# Patient Record
Sex: MALE | Race: WHITE | HISPANIC OR LATINO | URBAN - METROPOLITAN AREA
[De-identification: names, ages, dates, MRNs, and addresses within clinical notes are randomized per-mention and may not be internally consistent; named-entity substitution may affect disease eponyms.]

---

## 2018-01-02 ENCOUNTER — INPATIENT (INPATIENT)
Facility: HOSPITAL | Age: 28
LOS: 2 days | Discharge: HOME | DRG: 897 | End: 2018-01-05
Attending: HOSPITALIST | Admitting: GENERAL ACUTE CARE HOSPITAL
Payer: COMMERCIAL

## 2018-01-02 VITALS
SYSTOLIC BLOOD PRESSURE: 189 MMHG | RESPIRATION RATE: 20 BRPM | HEIGHT: 67 IN | OXYGEN SATURATION: 97 % | WEIGHT: 199.96 LBS | TEMPERATURE: 99 F | DIASTOLIC BLOOD PRESSURE: 118 MMHG | HEART RATE: 130 BPM

## 2018-01-02 DIAGNOSIS — F10.230 ALCOHOL DEPENDENCE WITH WITHDRAWAL, UNCOMPLICATED: ICD-10-CM

## 2018-01-02 LAB
ACETONE SERPL-MCNC: NEGATIVE — SIGNIFICANT CHANGE UP
ALBUMIN SERPL ELPH-MCNC: 5.1 G/DL — SIGNIFICANT CHANGE UP (ref 3.3–5.2)
ALP SERPL-CCNC: 126 U/L — HIGH (ref 40–120)
ALT FLD-CCNC: 96 U/L — HIGH
AMPHET UR-MCNC: NEGATIVE — SIGNIFICANT CHANGE UP
ANION GAP SERPL CALC-SCNC: 20 MMOL/L — HIGH (ref 5–17)
ANISOCYTOSIS BLD QL: SLIGHT — SIGNIFICANT CHANGE UP
AST SERPL-CCNC: 132 U/L — HIGH
BARBITURATES UR SCN-MCNC: NEGATIVE — SIGNIFICANT CHANGE UP
BENZODIAZ UR-MCNC: NEGATIVE — SIGNIFICANT CHANGE UP
BILIRUB SERPL-MCNC: 1.7 MG/DL — SIGNIFICANT CHANGE UP (ref 0.4–2)
BUN SERPL-MCNC: 6 MG/DL — LOW (ref 8–20)
CALCIUM SERPL-MCNC: 10.6 MG/DL — HIGH (ref 8.6–10.2)
CHLORIDE SERPL-SCNC: 92 MMOL/L — LOW (ref 98–107)
CO2 SERPL-SCNC: 25 MMOL/L — SIGNIFICANT CHANGE UP (ref 22–29)
COCAINE METAB.OTHER UR-MCNC: NEGATIVE — SIGNIFICANT CHANGE UP
CREAT SERPL-MCNC: 0.54 MG/DL — SIGNIFICANT CHANGE UP (ref 0.5–1.3)
ETHANOL SERPL-MCNC: <10 MG/DL — SIGNIFICANT CHANGE UP
GAS PNL BLDV: SIGNIFICANT CHANGE UP
GLUCOSE SERPL-MCNC: 415 MG/DL — HIGH (ref 70–115)
HCO3 BLDV-SCNC: 28 MMOL/L — SIGNIFICANT CHANGE UP (ref 21–29)
HCT VFR BLD CALC: 45.3 % — SIGNIFICANT CHANGE UP (ref 42–52)
HGB BLD-MCNC: 14.9 G/DL — SIGNIFICANT CHANGE UP (ref 14–18)
HYPOCHROMIA BLD QL: SLIGHT — SIGNIFICANT CHANGE UP
LIDOCAIN IGE QN: 14 U/L — LOW (ref 22–51)
LYMPHOCYTES # BLD AUTO: 7 % — LOW (ref 20–55)
MACROCYTES BLD QL: SLIGHT — SIGNIFICANT CHANGE UP
MAGNESIUM SERPL-MCNC: 1.8 MG/DL — SIGNIFICANT CHANGE UP (ref 1.6–2.6)
MCHC RBC-ENTMCNC: 31.6 PG — HIGH (ref 27–31)
MCHC RBC-ENTMCNC: 32.9 G/DL — SIGNIFICANT CHANGE UP (ref 32–36)
MCV RBC AUTO: 96.2 FL — HIGH (ref 80–94)
METHADONE UR-MCNC: NEGATIVE — SIGNIFICANT CHANGE UP
MICROCYTES BLD QL: SLIGHT — SIGNIFICANT CHANGE UP
MONOCYTES NFR BLD AUTO: 8 % — SIGNIFICANT CHANGE UP (ref 3–10)
NEUTROPHILS NFR BLD AUTO: 85 % — HIGH (ref 37–73)
OPIATES UR-MCNC: NEGATIVE — SIGNIFICANT CHANGE UP
PCO2 BLDV: 40 MMHG — SIGNIFICANT CHANGE UP (ref 35–50)
PCP SPEC-MCNC: SIGNIFICANT CHANGE UP
PCP UR-MCNC: NEGATIVE — SIGNIFICANT CHANGE UP
PH BLDV: 7.46 — HIGH (ref 7.32–7.43)
PHOSPHATE SERPL-MCNC: 3.1 MG/DL — SIGNIFICANT CHANGE UP (ref 2.4–4.7)
PLAT MORPH BLD: NORMAL — SIGNIFICANT CHANGE UP
PLATELET # BLD AUTO: 97 K/UL — LOW (ref 150–400)
PO2 BLDV: 103 MMHG — HIGH (ref 25–45)
POIKILOCYTOSIS BLD QL AUTO: SLIGHT — SIGNIFICANT CHANGE UP
POTASSIUM SERPL-MCNC: 4.6 MMOL/L — SIGNIFICANT CHANGE UP (ref 3.5–5.3)
POTASSIUM SERPL-SCNC: 4.6 MMOL/L — SIGNIFICANT CHANGE UP (ref 3.5–5.3)
PROT SERPL-MCNC: 8.3 G/DL — SIGNIFICANT CHANGE UP (ref 6.6–8.7)
RBC # BLD: 4.71 M/UL — SIGNIFICANT CHANGE UP (ref 4.6–6.2)
RBC # FLD: 12.9 % — SIGNIFICANT CHANGE UP (ref 11–15.6)
RBC BLD AUTO: ABNORMAL
SAO2 % BLDV: 98 % — SIGNIFICANT CHANGE UP
SODIUM SERPL-SCNC: 137 MMOL/L — SIGNIFICANT CHANGE UP (ref 135–145)
THC UR QL: NEGATIVE — SIGNIFICANT CHANGE UP
WBC # BLD: 6.6 K/UL — SIGNIFICANT CHANGE UP (ref 4.8–10.8)
WBC # FLD AUTO: 6.6 K/UL — SIGNIFICANT CHANGE UP (ref 4.8–10.8)

## 2018-01-02 PROCEDURE — 99223 1ST HOSP IP/OBS HIGH 75: CPT

## 2018-01-02 PROCEDURE — 99285 EMERGENCY DEPT VISIT HI MDM: CPT

## 2018-01-02 RX ORDER — ONDANSETRON 8 MG/1
4 TABLET, FILM COATED ORAL ONCE
Qty: 0 | Refills: 0 | Status: COMPLETED | OUTPATIENT
Start: 2018-01-02 | End: 2018-01-02

## 2018-01-02 RX ORDER — THIAMINE MONONITRATE (VIT B1) 100 MG
100 TABLET ORAL DAILY
Qty: 0 | Refills: 0 | Status: COMPLETED | OUTPATIENT
Start: 2018-01-02 | End: 2018-01-05

## 2018-01-02 RX ORDER — DEXTROSE 50 % IN WATER 50 %
1 SYRINGE (ML) INTRAVENOUS ONCE
Qty: 0 | Refills: 0 | Status: DISCONTINUED | OUTPATIENT
Start: 2018-01-02 | End: 2018-01-05

## 2018-01-02 RX ORDER — DEXTROSE 50 % IN WATER 50 %
25 SYRINGE (ML) INTRAVENOUS ONCE
Qty: 0 | Refills: 0 | Status: DISCONTINUED | OUTPATIENT
Start: 2018-01-02 | End: 2018-01-05

## 2018-01-02 RX ORDER — INSULIN LISPRO 100/ML
VIAL (ML) SUBCUTANEOUS
Qty: 0 | Refills: 0 | Status: DISCONTINUED | OUTPATIENT
Start: 2018-01-02 | End: 2018-01-05

## 2018-01-02 RX ORDER — INFLUENZA VIRUS VACCINE 15; 15; 15; 15 UG/.5ML; UG/.5ML; UG/.5ML; UG/.5ML
0.5 SUSPENSION INTRAMUSCULAR ONCE
Qty: 0 | Refills: 0 | Status: COMPLETED | OUTPATIENT
Start: 2018-01-02 | End: 2018-01-02

## 2018-01-02 RX ORDER — GLUCAGON INJECTION, SOLUTION 0.5 MG/.1ML
1 INJECTION, SOLUTION SUBCUTANEOUS ONCE
Qty: 0 | Refills: 0 | Status: DISCONTINUED | OUTPATIENT
Start: 2018-01-02 | End: 2018-01-05

## 2018-01-02 RX ORDER — AMLODIPINE BESYLATE 2.5 MG/1
5 TABLET ORAL DAILY
Qty: 0 | Refills: 0 | Status: DISCONTINUED | OUTPATIENT
Start: 2018-01-02 | End: 2018-01-03

## 2018-01-02 RX ORDER — DEXTROSE 50 % IN WATER 50 %
12.5 SYRINGE (ML) INTRAVENOUS ONCE
Qty: 0 | Refills: 0 | Status: DISCONTINUED | OUTPATIENT
Start: 2018-01-02 | End: 2018-01-05

## 2018-01-02 RX ORDER — SODIUM CHLORIDE 9 MG/ML
1000 INJECTION INTRAMUSCULAR; INTRAVENOUS; SUBCUTANEOUS ONCE
Qty: 0 | Refills: 0 | Status: COMPLETED | OUTPATIENT
Start: 2018-01-02 | End: 2018-01-02

## 2018-01-02 RX ORDER — HEPARIN SODIUM 5000 [USP'U]/ML
5000 INJECTION INTRAVENOUS; SUBCUTANEOUS EVERY 12 HOURS
Qty: 0 | Refills: 0 | Status: DISCONTINUED | OUTPATIENT
Start: 2018-01-02 | End: 2018-01-05

## 2018-01-02 RX ORDER — SODIUM CHLORIDE 9 MG/ML
1000 INJECTION, SOLUTION INTRAVENOUS
Qty: 0 | Refills: 0 | Status: DISCONTINUED | OUTPATIENT
Start: 2018-01-02 | End: 2018-01-05

## 2018-01-02 RX ORDER — FOLIC ACID 0.8 MG
1 TABLET ORAL DAILY
Qty: 0 | Refills: 0 | Status: DISCONTINUED | OUTPATIENT
Start: 2018-01-02 | End: 2018-01-05

## 2018-01-02 RX ORDER — SODIUM CHLORIDE 9 MG/ML
1000 INJECTION INTRAMUSCULAR; INTRAVENOUS; SUBCUTANEOUS
Qty: 0 | Refills: 0 | Status: COMPLETED | OUTPATIENT
Start: 2018-01-02 | End: 2018-01-03

## 2018-01-02 RX ADMIN — Medication 2 MILLIGRAM(S): at 16:59

## 2018-01-02 RX ADMIN — Medication 50 MILLIGRAM(S): at 20:10

## 2018-01-02 RX ADMIN — ONDANSETRON 4 MILLIGRAM(S): 8 TABLET, FILM COATED ORAL at 13:01

## 2018-01-02 RX ADMIN — Medication 2 MILLIGRAM(S): at 12:44

## 2018-01-02 RX ADMIN — Medication 2 MILLIGRAM(S): at 13:13

## 2018-01-02 RX ADMIN — SODIUM CHLORIDE 100 MILLILITER(S): 9 INJECTION INTRAMUSCULAR; INTRAVENOUS; SUBCUTANEOUS at 20:09

## 2018-01-02 RX ADMIN — SODIUM CHLORIDE 1000 MILLILITER(S): 9 INJECTION INTRAMUSCULAR; INTRAVENOUS; SUBCUTANEOUS at 12:41

## 2018-01-02 RX ADMIN — Medication 50 MILLIGRAM(S): at 23:36

## 2018-01-02 NOTE — ED PROVIDER NOTE - OBJECTIVE STATEMENT
This patient is a 27 year old man with a hx of alcohol abuse who presents to the ER c/o tremors and vomiting.  Past reports that last alcoholic drink was about 1 day ago when celebrating the new year.  He has had pancreatitis in the past but denies abdominal pain.  His last episode of vomiting was prior to arrival.

## 2018-01-02 NOTE — H&P ADULT - ASSESSMENT
Patient is a 27 year old male with PMH of Alcohol Abuse and Pancreatitis who presented with tremulous and admitted for alcohol withdrawal.    1. Alcohol Withdrawal  -admit to monitored bed  -CIWA protocol (current score 7)  -Librium taper  -Ativan PRN  -IV hydration  -MVI/folic acid/thiamine  -Social work evaluation  -Low threshold for MICU consult    2. Elevated LFTs  -likely secondary to alcoholic hepatitis  -check abdominal US    3. History of Pancreatitis  -normal lipase  -clinically does not appear to have pancreatitis  -regular diet  -zofran PRN    4. Counseling  -tobacco and alcohol cessation discussed in great detail    5. HTN  -start low dose norvasc with holding parameters    6. Hypercalcemia  -likely secondary to volume depletion  -hydrate and repeat levels in AM    7. DVT prophylaxis  -heparin SQ Patient is a 27 year old male with PMH of Alcohol Abuse and Pancreatitis who presented with tremulous and admitted for alcohol withdrawal.    1. Alcohol Withdrawal  -admit to monitored bed  -CIWA protocol (current score 7)  -Librium taper  -Ativan PRN  -IV hydration  -MVI/folic acid/thiamine  -Social work evaluation  -Low threshold for MICU consult    2. Elevated LFTs  -likely secondary to alcoholic hepatitis  -check abdominal US    3. History of Pancreatitis  -normal lipase  -clinically does not appear to have pancreatitis  -regular diet  -zofran PRN    4. Counseling  -tobacco and alcohol cessation discussed in great detail    5. HTN  -start low dose norvasc with holding parameters    6. Hypercalcemia  -likely secondary to volume depletion  -hydrate and repeat levels in AM    7. Hyperglycemia  -anion gap of 20, pH 7.46  -acetone negative  -repeat fingerstick stat   -sliding scale  -HbA1c    8. DVT prophylaxis  -heparin SQ Patient is a 27 year old male with PMH of Alcohol Abuse and Pancreatitis who presented with tremulous and admitted for alcohol withdrawal.    1. Alcohol Withdrawal  -admit to monitored bed  -CIWA protocol (current score 7)  -Librium taper  -Ativan PRN  -IV hydration  -MVI/folic acid/thiamine  -alcohol level < 10  -Social work evaluation  -Low threshold for MICU consult    2. Elevated LFTs  -likely secondary to alcoholic hepatitis  -check abdominal US    3. History of Pancreatitis  -normal lipase  -clinically does not appear to have pancreatitis  -regular diet  -zofran PRN    4. Counseling  -tobacco and alcohol cessation discussed in great detail    5. HTN  -start low dose norvasc with holding parameters    6. Hypercalcemia  -likely secondary to volume depletion  -hydrate and repeat levels in AM    7. Hyperglycemia  -anion gap of 20, pH 7.46  -acetone negative  -repeat fingerstick stat   -sliding scale  -HbA1c    8. DVT prophylaxis  -heparin SQ

## 2018-01-02 NOTE — SBIRT NOTE. - NSSBIRTSERVICES_GEN_A_ED
Full Screen ONLY/Brief Intervention Performed/Referral to Treatment Provided/Smoking Cessation Information Provided

## 2018-01-02 NOTE — H&P ADULT - HISTORY OF PRESENT ILLNESS
Patient is a 27 year old male with PMH of Alcohol Abuse and Pancreatitis who presented to the ED with complaints of worsening tremors. Patient reports he has been drinking daily for the past 2 months and specifically drinks four 24 oz cans of beer daily. Last drink on New Years Linda. Patient reports being in his usual state of health the following day but today he noted that he had tremors that progressively worsened as the day continued. In the ED, patient was found to have a CIWA score of 16 on arrival and was treated with IV ativan. Also noted to be hypertensive and tachycardic. Hospitalist service then called for admission. Upon arrival, patient awake and alert and able to provide history. Reports one episode of emesis earlier today but otherwise denies fever, chills, headaches, blurry vision, hallucinations, chest pain, SOB, nausea, abdominal pain, fever or chills. Reports feeling better, although still tremulous. CIWA 7.

## 2018-01-02 NOTE — ED PROVIDER NOTE - MEDICAL DECISION MAKING DETAILS
Tremors, tachycardia, HTN and nausea possible alcohol withdrawal.  Will check CIWA score, Labs, administer ativan and admit if no improvement.

## 2018-01-02 NOTE — SBIRT NOTE. - NSSBIRTSERVICES_GEN_A_ED_FT
Provided SBIRT services: Full screen positive. Referral to Treatment attempted. Screening results were reviewed with the patient and patient was provided information about healthy guidelines and potential negative consequences associated with level of risk. Motivation and readiness to reduce or stop use was discussed and goals and activities to make changes were suggested/offered.  Options discussed for further evaluation and treatment, but referral to treatment was not completed because Patient refused  Audit Score: 28  DAST Score: 0  Duration = 15 Minutes

## 2018-01-02 NOTE — ED STATDOCS - PROGRESS NOTE DETAILS
27 year old male presenting to the ED complaining of tremors s/p alcohol withdrawal. Pt states that he has drank on New Years Linda, and that he drinks approximately 4 24 oz. of alcohol daily. He states that he has been previously admitted for alcohol withdrawal. Pt denies having any hallucinations or seizures. He states that his PMHx includes alcoholism and pancreatitis. Pt states that he does not currently take any medication and that he has NKDA. Upon examination in the intake room, pt was found to be diaphoretic and tremulous. Pt has tongue vesiculations and contusions to his tongue. His pupils are 4mm round and reactive to light and he has no scleral icterus. Will transfer to the main ED on a monitor for withdrawal. 27 year old male presenting to the ED complaining of tremors s/p alcohol withdrawal. Pt states that he has drank on New Years Linda, and that he drinks approximately 4-24 oz. of alcohol daily. He states that he has been previously admitted for alcohol withdrawal. Pt denies having any hallucinations or seizures. He states that his PMHx includes alcoholism and pancreatitis. Pt states that he does not currently take any medication and that he has NKDA. Upon examination in the intake room, pt was found to be diaphoretic and tremulous. Pt has tongue vesiculations and contusions to his tongue. His pupils are 4mm round and reactive to light and he has no scleral icterus. Will transfer to the main ED on a monitor for withdrawal.

## 2018-01-02 NOTE — ED PROVIDER NOTE - MUSCULOSKELETAL, MLM
Spine appears normal, range of motion is not limited, no muscle or joint tenderness.  No CVA tenderness

## 2018-01-02 NOTE — ED ADULT NURSE NOTE - OBJECTIVE STATEMENT
Patient states that he is here for detox from alcohol. Patient states that he normally drinks a 12 pack a day for the last few months. Patient states that he had his last drink new years anna night. Patient presents to the ED tremulous, sweating and anxious. Patient states that he has been having some nausea with dry heaves. Patient states that he has tried to stop drinking on his own in the past but was unable to. Patient denies any drug use.

## 2018-01-03 LAB
ALBUMIN SERPL ELPH-MCNC: 4.2 G/DL — SIGNIFICANT CHANGE UP (ref 3.3–5.2)
ALP SERPL-CCNC: 111 U/L — SIGNIFICANT CHANGE UP (ref 40–120)
ALT FLD-CCNC: 73 U/L — HIGH
AMMONIA BLD-MCNC: 57 UMOL/L — HIGH (ref 11–55)
ANION GAP SERPL CALC-SCNC: 16 MMOL/L — SIGNIFICANT CHANGE UP (ref 5–17)
ANISOCYTOSIS BLD QL: SLIGHT — SIGNIFICANT CHANGE UP
AST SERPL-CCNC: 105 U/L — HIGH
BASOPHILS # BLD AUTO: 0 K/UL — SIGNIFICANT CHANGE UP (ref 0–0.2)
BASOPHILS NFR BLD AUTO: 1 % — SIGNIFICANT CHANGE UP (ref 0–2)
BILIRUB SERPL-MCNC: 2.7 MG/DL — HIGH (ref 0.4–2)
BUN SERPL-MCNC: 9 MG/DL — SIGNIFICANT CHANGE UP (ref 8–20)
CALCIUM SERPL-MCNC: 9.7 MG/DL — SIGNIFICANT CHANGE UP (ref 8.6–10.2)
CHLORIDE SERPL-SCNC: 96 MMOL/L — LOW (ref 98–107)
CO2 SERPL-SCNC: 26 MMOL/L — SIGNIFICANT CHANGE UP (ref 22–29)
CREAT SERPL-MCNC: 0.57 MG/DL — SIGNIFICANT CHANGE UP (ref 0.5–1.3)
EOSINOPHIL # BLD AUTO: 0.1 K/UL — SIGNIFICANT CHANGE UP (ref 0–0.5)
EOSINOPHIL NFR BLD AUTO: 4 % — SIGNIFICANT CHANGE UP (ref 0–6)
GLUCOSE BLDC GLUCOMTR-MCNC: 194 MG/DL — HIGH (ref 70–99)
GLUCOSE BLDC GLUCOMTR-MCNC: 219 MG/DL — HIGH (ref 70–99)
GLUCOSE BLDC GLUCOMTR-MCNC: 254 MG/DL — HIGH (ref 70–99)
GLUCOSE BLDC GLUCOMTR-MCNC: 275 MG/DL — HIGH (ref 70–99)
GLUCOSE SERPL-MCNC: 202 MG/DL — HIGH (ref 70–115)
HBA1C BLD-MCNC: 9.6 % — HIGH (ref 4–5.6)
HCT VFR BLD CALC: 41.6 % — LOW (ref 42–52)
HGB BLD-MCNC: 13.8 G/DL — LOW (ref 14–18)
LYMPHOCYTES # BLD AUTO: 0.8 K/UL — LOW (ref 1–4.8)
LYMPHOCYTES # BLD AUTO: 23 % — SIGNIFICANT CHANGE UP (ref 20–55)
MACROCYTES BLD QL: SLIGHT — SIGNIFICANT CHANGE UP
MAGNESIUM SERPL-MCNC: 1.9 MG/DL — SIGNIFICANT CHANGE UP (ref 1.6–2.6)
MCHC RBC-ENTMCNC: 32.7 PG — HIGH (ref 27–31)
MCHC RBC-ENTMCNC: 33.2 G/DL — SIGNIFICANT CHANGE UP (ref 32–36)
MCV RBC AUTO: 98.6 FL — HIGH (ref 80–94)
MONOCYTES # BLD AUTO: 0.6 K/UL — SIGNIFICANT CHANGE UP (ref 0–0.8)
MONOCYTES NFR BLD AUTO: 17 % — HIGH (ref 3–10)
NEUTROPHILS # BLD AUTO: 1.7 K/UL — LOW (ref 1.8–8)
NEUTROPHILS NFR BLD AUTO: 53 % — SIGNIFICANT CHANGE UP (ref 37–73)
NEUTS BAND # BLD: 2 % — SIGNIFICANT CHANGE UP (ref 0–8)
PHOSPHATE SERPL-MCNC: 4.2 MG/DL — SIGNIFICANT CHANGE UP (ref 2.4–4.7)
PLAT MORPH BLD: ABNORMAL
PLATELET # BLD AUTO: 96 K/UL — LOW (ref 150–400)
POIKILOCYTOSIS BLD QL AUTO: SLIGHT — SIGNIFICANT CHANGE UP
POTASSIUM SERPL-MCNC: 3.8 MMOL/L — SIGNIFICANT CHANGE UP (ref 3.5–5.3)
POTASSIUM SERPL-SCNC: 3.8 MMOL/L — SIGNIFICANT CHANGE UP (ref 3.5–5.3)
PROT SERPL-MCNC: 7.1 G/DL — SIGNIFICANT CHANGE UP (ref 6.6–8.7)
RBC # BLD: 4.22 M/UL — LOW (ref 4.6–6.2)
RBC # FLD: 13.2 % — SIGNIFICANT CHANGE UP (ref 11–15.6)
RBC BLD AUTO: ABNORMAL
SODIUM SERPL-SCNC: 138 MMOL/L — SIGNIFICANT CHANGE UP (ref 135–145)
WBC # BLD: 3.2 K/UL — LOW (ref 4.8–10.8)
WBC # FLD AUTO: 3.2 K/UL — LOW (ref 4.8–10.8)

## 2018-01-03 PROCEDURE — 99233 SBSQ HOSP IP/OBS HIGH 50: CPT

## 2018-01-03 PROCEDURE — 76700 US EXAM ABDOM COMPLETE: CPT | Mod: 26

## 2018-01-03 RX ADMIN — Medication 0.1 MILLIGRAM(S): at 22:28

## 2018-01-03 RX ADMIN — Medication 100 MILLIGRAM(S): at 12:10

## 2018-01-03 RX ADMIN — Medication 1 MILLIGRAM(S): at 12:10

## 2018-01-03 RX ADMIN — Medication 50 MILLIGRAM(S): at 12:10

## 2018-01-03 RX ADMIN — HEPARIN SODIUM 5000 UNIT(S): 5000 INJECTION INTRAVENOUS; SUBCUTANEOUS at 17:33

## 2018-01-03 RX ADMIN — HEPARIN SODIUM 5000 UNIT(S): 5000 INJECTION INTRAVENOUS; SUBCUTANEOUS at 05:15

## 2018-01-03 RX ADMIN — Medication 1 TABLET(S): at 12:10

## 2018-01-03 RX ADMIN — Medication 4: at 17:32

## 2018-01-03 RX ADMIN — SODIUM CHLORIDE 100 MILLILITER(S): 9 INJECTION INTRAMUSCULAR; INTRAVENOUS; SUBCUTANEOUS at 05:15

## 2018-01-03 RX ADMIN — Medication 6: at 12:03

## 2018-01-03 RX ADMIN — Medication 50 MILLIGRAM(S): at 22:28

## 2018-01-03 RX ADMIN — AMLODIPINE BESYLATE 5 MILLIGRAM(S): 2.5 TABLET ORAL at 05:15

## 2018-01-03 RX ADMIN — Medication 50 MILLIGRAM(S): at 05:15

## 2018-01-03 RX ADMIN — Medication 2: at 08:19

## 2018-01-03 NOTE — PROGRESS NOTE ADULT - SUBJECTIVE AND OBJECTIVE BOX
CC:   HPI:  Patient is a 27 year old male with PMH of Alcohol Abuse and Pancreatitis who presented to the ED with complaints of worsening tremors. Patient reports he has been drinking daily for the past 2 months and specifically drinks four 24 oz cans of beer daily. Last drink on New Years Linda. Patient reports being in his usual state of health the following day but today he noted that he had tremors that progressively worsened as the day continued. In the ED, patient was found to have a CIWA score of 16 on arrival and was treated with IV ativan. Also noted to be hypertensive and tachycardic. Hospitalist service then called for admission. Upon arrival, patient awake and alert and able to provide history. Reports one episode of emesis earlier today but otherwise denies fever, chills, headaches, blurry vision, hallucinations, chest pain, SOB, nausea, abdominal pain, fever or chills. Reports feeling better, although still tremulous. CIWA 7. (02 Jan 2018 20:44)    REVIEW OF SYSTEMS:    Patient denied fever, chills, abdominal pain, nausea, vomiting, cough, shortness of breath, chest pain or palpitations    Vital Signs Last 24 Hrs  T(C): 36.8 (03 Jan 2018 16:38), Max: 37.3 (03 Jan 2018 04:12)  T(F): 98.2 (03 Jan 2018 16:38), Max: 99.1 (03 Jan 2018 04:12)  HR: 70 (03 Jan 2018 16:38) (67 - 96)  BP: 146/81 (03 Jan 2018 16:38) (140/95 - 153/86)  BP(mean): --  RR: 18 (03 Jan 2018 16:38) (16 - 18)  SpO2: 96% (03 Jan 2018 08:07) (95% - 98%)I&O's Summary    03 Jan 2018 07:01  -  03 Jan 2018 16:58  --------------------------------------------------------  IN: 720 mL / OUT: 3 mL / NET: 717 mL      PHYSICAL EXAM:  GENERAL: NAD, well-groomed  HEENT: PERRL, +EOMI, anicteric, no [place]  NECK: Supple, No JVD   CHEST/LUNG: CTA bilaterally; Normal effort  HEART: S1S2 Normal intensity, no murmurs, gallops or rubs noted  ABDOMEN: Soft, BS Normoactive, NT, ND, no HSM noted  EXTREMITIES:  2+ radial and DP pulses noted, no clubbing, cyanosis, or edema noted, FROM x 4  SKIN: No rashes or lesions noted  NEURO: A&Ox3, no focal deficits noted, CN II-XII intact  PSYCH: normal mood and affect; insight/judgement appropriate  LABS:                        13.8   3.2   )-----------( 96       ( 03 Jan 2018 06:36 )             41.6     01-03    138  |  96<L>  |  9.0  ----------------------------<  202<H>  3.8   |  26.0  |  0.57    Ca    9.7      03 Jan 2018 06:36  Phos  4.2     01-03  Mg     1.9     01-03    TPro  7.1  /  Alb  4.2  /  TBili  2.7<H>  /  DBili  x   /  AST  105<H>  /  ALT  73<H>  /  AlkPhos  111  01-03        RADIOLOGY & ADDITIONAL TESTS:    MEDICATIONS:  MEDICATIONS  (STANDING):  amLODIPine   Tablet 5 milliGRAM(s) Oral daily  chlordiazePOXIDE 50 milliGRAM(s) Oral every 8 hours  chlordiazePOXIDE   Oral   dextrose 5%. 1000 milliLiter(s) (50 mL/Hr) IV Continuous <Continuous>  dextrose 50% Injectable 12.5 Gram(s) IV Push once  dextrose 50% Injectable 25 Gram(s) IV Push once  dextrose 50% Injectable 25 Gram(s) IV Push once  folic acid 1 milliGRAM(s) Oral daily  heparin  Injectable 5000 Unit(s) SubCutaneous every 12 hours  insulin lispro (HumaLOG) corrective regimen sliding scale   SubCutaneous three times a day before meals  multivitamin 1 Tablet(s) Oral daily  thiamine 100 milliGRAM(s) Oral daily    MEDICATIONS  (PRN):  dextrose Gel 1 Dose(s) Oral once PRN Blood Glucose LESS THAN 70 milliGRAM(s)/deciliter  glucagon  Injectable 1 milliGRAM(s) IntraMuscular once PRN Glucose LESS THAN 70 milligrams/deciliter  LORazepam   Injectable 2 milliGRAM(s) IntraMuscular every 2 hours PRN Anxiety CC: Alcohol withdrawal.  Tremors and shakes is resolving.    HPI:  Patient is a 27 year old male with PMH of Alcohol Abuse and Pancreatitis who presented to the ED with complaints of worsening tremors. Patient reports he has been drinking daily for the past 2 months and specifically drinks four 24 oz cans of beer daily. Last drink on New Years Linda. Patient reports being in his usual state of health the following day but today he noted that he had tremors that progressively worsened as the day continued. In the ED, patient was found to have a CIWA score of 16 on arrival and was treated with IV ativan. Also noted to be hypertensive and tachycardic. Hospitalist service then called for admission. Upon arrival, patient awake and alert and able to provide history. Reports one episode of emesis earlier today but otherwise denies fever, chills, headaches, blurry vision, hallucinations, chest pain, SOB, nausea, abdominal pain, fever or chills. Reports feeling better, although still tremulous. CIWA 7. (02 Jan 2018 20:44)    REVIEW OF SYSTEMS:    Patient denied fever, chills, abdominal pain, nausea, vomiting, cough, shortness of breath, chest pain or palpitations    Vital Signs Last 24 Hrs  T(C): 36.8 (03 Jan 2018 16:38), Max: 37.3 (03 Jan 2018 04:12)  T(F): 98.2 (03 Jan 2018 16:38), Max: 99.1 (03 Jan 2018 04:12)  HR: 70 (03 Jan 2018 16:38) (67 - 96)  BP: 146/81 (03 Jan 2018 16:38) (140/95 - 153/86)  BP(mean): --  RR: 18 (03 Jan 2018 16:38) (16 - 18)  SpO2: 96% (03 Jan 2018 08:07) (95% - 98%)I&O's Summary    03 Jan 2018 07:01  -  03 Jan 2018 16:58  --------------------------------------------------------  IN: 720 mL / OUT: 3 mL / NET: 717 mL      PHYSICAL EXAM:  GENERAL: NAD, well-groomed  HEENT: PERRL, +EOMI, anicteric, no [place]  NECK: Supple, No JVD   CHEST/LUNG: CTA bilaterally; Normal effort  HEART: S1S2 Normal intensity, no murmurs, gallops or rubs noted  ABDOMEN: Soft, BS Normoactive, NT, ND, no HSM noted  EXTREMITIES:  2+ radial and DP pulses noted, no clubbing, cyanosis, or edema noted. Fine finger tremors   SKIN: No rashes or lesions noted  NEURO: A&Ox3, no focal deficits noted, CN II-XII intact  PSYCH: anxious  mood and affect; insight/judgement appropriate  LABS:                        13.8   3.2   )-----------( 96       ( 03 Jan 2018 06:36 )             41.6     01-03    138  |  96<L>  |  9.0  ----------------------------<  202<H>  3.8   |  26.0  |  0.57    Ca    9.7      03 Jan 2018 06:36  Phos  4.2     01-03  Mg     1.9     01-03    TPro  7.1  /  Alb  4.2  /  TBili  2.7<H>  /  DBili  x   /  AST  105<H>  /  ALT  73<H>  /  AlkPhos  111  01-03        RADIOLOGY & ADDITIONAL TESTS:    MEDICATIONS:  MEDICATIONS  (STANDING):  amLODIPine   Tablet 5 milliGRAM(s) Oral daily  chlordiazePOXIDE 50 milliGRAM(s) Oral every 8 hours  chlordiazePOXIDE   Oral   dextrose 5%. 1000 milliLiter(s) (50 mL/Hr) IV Continuous <Continuous>  dextrose 50% Injectable 12.5 Gram(s) IV Push once  dextrose 50% Injectable 25 Gram(s) IV Push once  dextrose 50% Injectable 25 Gram(s) IV Push once  folic acid 1 milliGRAM(s) Oral daily  heparin  Injectable 5000 Unit(s) SubCutaneous every 12 hours  insulin lispro (HumaLOG) corrective regimen sliding scale   SubCutaneous three times a day before meals  multivitamin 1 Tablet(s) Oral daily  thiamine 100 milliGRAM(s) Oral daily    MEDICATIONS  (PRN):  dextrose Gel 1 Dose(s) Oral once PRN Blood Glucose LESS THAN 70 milliGRAM(s)/deciliter  glucagon  Injectable 1 milliGRAM(s) IntraMuscular once PRN Glucose LESS THAN 70 milligrams/deciliter  LORazepam   Injectable 2 milliGRAM(s) IntraMuscular every 2 hours PRN Anxiety

## 2018-01-03 NOTE — PROGRESS NOTE ADULT - ASSESSMENT
Patient is a 27 year old male with PMH of Alcohol Abuse and Pancreatitis who presented with tremulous and admitted for alcohol withdrawal.    1. Alcohol Withdrawal  -admit to monitored bed  -CIWA protocol (current score 7)  -Librium taper  -Ativan PRN  -IV hydration  -MVI/folic acid/thiamine  -alcohol level < 10  -Social work evaluation  -Low threshold for MICU consult    2. Elevated LFTs  -likely secondary to alcoholic hepatitis  -check abdominal US    3. History of Pancreatitis  -normal lipase  -clinically does not appear to have pancreatitis  -regular diet  -zofran PRN    4. Counseling  -tobacco and alcohol cessation discussed in great detail    5. HTN  -start low dose norvasc with holding parameters    6. Hypercalcemia  -likely secondary to volume depletion  -hydrate and repeat levels in AM    7. Hyperglycemia  -anion gap of 20, pH 7.46  -acetone negative  -repeat fingerstick stat   -sliding scale  -HbA1c Patient is a 27 year old male with PMH of Alcohol Abuse and Pancreatitis who presented with tremulous and admitted for alcohol withdrawal.    1. Alcohol Withdrawal  -CIWA protocol (current score 4)  -Librium taper  -Ativan PRN  -MVI/folic acid/thiamine  -Social work evaluation    2. Elevated LFTs  -likely secondary to alcoholic hepatitis  abdominal US- moderate hepatic steatosis    3. History of Pancreatitis  -normal lipase at this time.   A1c 9  DM may be due to chronic pancreatitis due to alcohol  Diabetic diet     4. Counseling  -tobacco and alcohol cessation discussed in great detail    5. HTN  -start low dose norvasc with holding parameters    6. Hypercalcemia  -likely secondary to volume depletion  -hydrate and repeat levels in AM    7. Hyperglycemia  -anion gap of 20, pH 7.46  -acetone negative  -repeat fingerstick stat   -sliding scale  -HbA1c Patient is a 27 year old male with PMH of Alcohol Abuse and Pancreatitis who presented with tremulous and admitted for alcohol withdrawal.    1. Alcohol Withdrawal  -CIWA protocol (current score 4)  -Librium taper  -Ativan PRN  -MVI/folic acid/thiamine  -Social work evaluation    2. Elevated LFTs  -likely secondary to alcoholic hepatitis  abdominal US- moderate hepatic steatosis    3. History of Pancreatitis  -normal lipase at this time.   A1c 9.6  DM may be due to chronic pancreatitis due to alcohol  Diabetic diet     4. Counseling  -tobacco and alcohol cessation discussed in great detail    5. HTN  -start low dose norvasc with holding parameters    6. Hypercalcemia  -likely secondary to volume depletion  -hydrate and repeat levels in AM    7. Hyperglycemia  -anion gap of 20, pH 7.46  -acetone negative  -repeat fingerstick stat   -sliding scale  -HbA1c

## 2018-01-04 ENCOUNTER — TRANSCRIPTION ENCOUNTER (OUTPATIENT)
Age: 28
End: 2018-01-04

## 2018-01-04 LAB
ALBUMIN SERPL ELPH-MCNC: 4.1 G/DL — SIGNIFICANT CHANGE UP (ref 3.3–5.2)
ALP SERPL-CCNC: 102 U/L — SIGNIFICANT CHANGE UP (ref 40–120)
ALT FLD-CCNC: 79 U/L — HIGH
ANION GAP SERPL CALC-SCNC: 16 MMOL/L — SIGNIFICANT CHANGE UP (ref 5–17)
AST SERPL-CCNC: 109 U/L — HIGH
BILIRUB DIRECT SERPL-MCNC: 0.4 MG/DL — HIGH (ref 0–0.3)
BILIRUB INDIRECT FLD-MCNC: 1.9 MG/DL — HIGH (ref 0.2–1)
BILIRUB SERPL-MCNC: 2.3 MG/DL — HIGH (ref 0.4–2)
BUN SERPL-MCNC: 12 MG/DL — SIGNIFICANT CHANGE UP (ref 8–20)
CALCIUM SERPL-MCNC: 9.5 MG/DL — SIGNIFICANT CHANGE UP (ref 8.6–10.2)
CHLORIDE SERPL-SCNC: 98 MMOL/L — SIGNIFICANT CHANGE UP (ref 98–107)
CO2 SERPL-SCNC: 22 MMOL/L — SIGNIFICANT CHANGE UP (ref 22–29)
CREAT SERPL-MCNC: 0.5 MG/DL — SIGNIFICANT CHANGE UP (ref 0.5–1.3)
GLUCOSE BLDC GLUCOMTR-MCNC: 176 MG/DL — HIGH (ref 70–99)
GLUCOSE BLDC GLUCOMTR-MCNC: 226 MG/DL — HIGH (ref 70–99)
GLUCOSE BLDC GLUCOMTR-MCNC: 229 MG/DL — HIGH (ref 70–99)
GLUCOSE BLDC GLUCOMTR-MCNC: 230 MG/DL — HIGH (ref 70–99)
GLUCOSE SERPL-MCNC: 250 MG/DL — HIGH (ref 70–115)
HCT VFR BLD CALC: 44 % — SIGNIFICANT CHANGE UP (ref 42–52)
HGB BLD-MCNC: 14.4 G/DL — SIGNIFICANT CHANGE UP (ref 14–18)
MCHC RBC-ENTMCNC: 31.6 PG — HIGH (ref 27–31)
MCHC RBC-ENTMCNC: 32.7 G/DL — SIGNIFICANT CHANGE UP (ref 32–36)
MCV RBC AUTO: 96.7 FL — HIGH (ref 80–94)
PLATELET # BLD AUTO: 107 K/UL — LOW (ref 150–400)
POTASSIUM SERPL-MCNC: 3.9 MMOL/L — SIGNIFICANT CHANGE UP (ref 3.5–5.3)
POTASSIUM SERPL-SCNC: 3.9 MMOL/L — SIGNIFICANT CHANGE UP (ref 3.5–5.3)
PROT SERPL-MCNC: 6.9 G/DL — SIGNIFICANT CHANGE UP (ref 6.6–8.7)
RBC # BLD: 4.55 M/UL — LOW (ref 4.6–6.2)
RBC # FLD: 12.8 % — SIGNIFICANT CHANGE UP (ref 11–15.6)
SODIUM SERPL-SCNC: 136 MMOL/L — SIGNIFICANT CHANGE UP (ref 135–145)
WBC # BLD: 3 K/UL — LOW (ref 4.8–10.8)
WBC # FLD AUTO: 3 K/UL — LOW (ref 4.8–10.8)

## 2018-01-04 PROCEDURE — 99239 HOSP IP/OBS DSCHRG MGMT >30: CPT

## 2018-01-04 RX ORDER — INSULIN LISPRO 100/ML
2 VIAL (ML) SUBCUTANEOUS ONCE
Qty: 0 | Refills: 0 | Status: COMPLETED | OUTPATIENT
Start: 2018-01-04 | End: 2018-01-04

## 2018-01-04 RX ORDER — INSULIN LISPRO 100/ML
5 VIAL (ML) SUBCUTANEOUS
Qty: 5 | Refills: 0 | OUTPATIENT
Start: 2018-01-04 | End: 2018-02-02

## 2018-01-04 RX ORDER — INSULIN GLARGINE 100 [IU]/ML
10 INJECTION, SOLUTION SUBCUTANEOUS
Qty: 5 | Refills: 0 | OUTPATIENT
Start: 2018-01-04 | End: 2018-02-02

## 2018-01-04 RX ORDER — ENOXAPARIN SODIUM 100 MG/ML
10 INJECTION SUBCUTANEOUS
Qty: 5 | Refills: 0 | OUTPATIENT
Start: 2018-01-04 | End: 2018-02-02

## 2018-01-04 RX ORDER — INSULIN ASPART 100 [IU]/ML
5 INJECTION, SOLUTION SUBCUTANEOUS
Qty: 5 | Refills: 0 | OUTPATIENT
Start: 2018-01-04 | End: 2018-02-02

## 2018-01-04 RX ORDER — THIAMINE MONONITRATE (VIT B1) 100 MG
1 TABLET ORAL
Qty: 30 | Refills: 0 | OUTPATIENT
Start: 2018-01-04 | End: 2018-02-02

## 2018-01-04 RX ORDER — FOLIC ACID 0.8 MG
1 TABLET ORAL
Qty: 30 | Refills: 0 | OUTPATIENT
Start: 2018-01-04 | End: 2018-02-02

## 2018-01-04 RX ORDER — METFORMIN HYDROCHLORIDE 850 MG/1
1 TABLET ORAL
Qty: 30 | Refills: 5 | OUTPATIENT
Start: 2018-01-04 | End: 2018-07-02

## 2018-01-04 RX ADMIN — Medication 4: at 17:11

## 2018-01-04 RX ADMIN — Medication 100 MILLIGRAM(S): at 12:24

## 2018-01-04 RX ADMIN — Medication 50 MILLIGRAM(S): at 14:33

## 2018-01-04 RX ADMIN — Medication 0.1 MILLIGRAM(S): at 21:49

## 2018-01-04 RX ADMIN — Medication 0.1 MILLIGRAM(S): at 05:33

## 2018-01-04 RX ADMIN — Medication 50 MILLIGRAM(S): at 05:33

## 2018-01-04 RX ADMIN — Medication 4: at 08:25

## 2018-01-04 RX ADMIN — Medication 2: at 12:25

## 2018-01-04 RX ADMIN — Medication 1 MILLIGRAM(S): at 12:24

## 2018-01-04 RX ADMIN — HEPARIN SODIUM 5000 UNIT(S): 5000 INJECTION INTRAVENOUS; SUBCUTANEOUS at 05:33

## 2018-01-04 RX ADMIN — Medication 1 TABLET(S): at 12:24

## 2018-01-04 RX ADMIN — Medication 2 UNIT(S): at 22:19

## 2018-01-04 RX ADMIN — HEPARIN SODIUM 5000 UNIT(S): 5000 INJECTION INTRAVENOUS; SUBCUTANEOUS at 17:10

## 2018-01-04 RX ADMIN — Medication 0.1 MILLIGRAM(S): at 14:33

## 2018-01-04 NOTE — DISCHARGE NOTE ADULT - CARE PROVIDER_API CALL
Chang Castorena  PMD  Phone: (   )    -  Fax: (   )    -    Amanda Mota), EndocrinologyMetabDiabetes  [address] [place] [id number]  Phone: [phone]  Fax: [phone]

## 2018-01-04 NOTE — DISCHARGE NOTE ADULT - PROVIDER TOKENS
FREE:[LAST:[Valentinao],FIRST:[Chang],PHONE:[(   )    -],FAX:[(   )    -],ADDRESS:[PMD]],TOKEN:'[id number]'

## 2018-01-04 NOTE — DISCHARGE NOTE ADULT - COMMUNITY RESOURCES
[facility]-PATIENT DIABETES PROGRAM, [address], [place], [place] [phone]

## 2018-01-04 NOTE — DISCHARGE NOTE ADULT - CARE PLAN
Principal Discharge DX:	Alcohol withdrawal syndrome without complication  Goal:	ADLs  Instructions for follow-up, activity and diet:	follow up with pmd in 3-5 days  follow up with endocrinologist in 2-4 weeks  Secondary Diagnosis:	Alcohol-induced chronic pancreatitis  Secondary Diagnosis:	Type 1 diabetes mellitus without complication

## 2018-01-04 NOTE — DISCHARGE NOTE ADULT - MEDICATION SUMMARY - MEDICATIONS TO TAKE
I will START or STAY ON the medications listed below when I get home from the hospital:    cloNIDine 0.1 mg oral tablet  -- 1 tab(s) by mouth 3 times a day  -- Indication: For Alcohol dependence with uncomplicated withdrawal    Lantus Solostar Pen 100 units/mL subcutaneous solution  -- 10 unit(s) subcutaneous once a day (at bedtime)   -- Do not drink alcoholic beverages when taking this medication.  It is very important that you take or use this exactly as directed.  Do not skip doses or discontinue unless directed by your doctor.  Keep in refrigerator.  Do not freeze.    -- Indication: For diabetes mellitus     HumaLOG KwikPen 100 units/mL subcutaneous solution  -- 5 unit(s) subcutaneous 3 times a day (before meals)   -- Do not drink alcoholic beverages when taking this medication.  It is very important that you take or use this exactly as directed.  Do not skip doses or discontinue unless directed by your doctor.  Keep in refrigerator.  Do not freeze.    -- Indication: For Diabetes mellitus     Fortamet 500 mg oral tablet, extended release  -- 1 tab(s) by mouth once a day   -- Check with your doctor before becoming pregnant.  Do not drink alcoholic beverages when taking this medication.  Obtain medical advice before taking any non-prescription drugs as some may affect the action of this medication.  Swallow whole.  Do not crush.  Take with food or milk.    -- Indication: For Diabetes mellitus     Multiple Vitamins oral tablet  -- 1 tab(s) by mouth once a day  -- Indication: For Alcohol withdrawal syndrome without complication    folic acid 1 mg oral tablet  -- 1 tab(s) by mouth once a day  -- Indication: For Alcohol dependence with uncomplicated withdrawal    thiamine 100 mg oral tablet  -- 1 tab(s) by mouth once a day  -- Indication: For Alcohol withdrawal syndrome without complication I will START or STAY ON the medications listed below when I get home from the hospital:    cloNIDine 0.1 mg oral tablet  -- 1 tab(s) by mouth 3 times a day  -- Indication: For Alcohol dependence with uncomplicated withdrawal    Lantus 100 units/mL subcutaneous solution  -- 10 unit(s) subcutaneous once a day (at bedtime)   -- Do not drink alcoholic beverages when taking this medication.  It is very important that you take or use this exactly as directed.  Do not skip doses or discontinue unless directed by your doctor.  Keep in refrigerator.  Do not freeze.    -- Indication: For Type 1 diabetes mellitus without complication    NovoLOG 100 units/mL subcutaneous solution  -- 5 unit(s) subcutaneous 3 times a day (before meals)   -- Do not drink alcoholic beverages when taking this medication.  Keep in refrigerator.  Do not freeze.  Obtain medical advice before taking any non-prescription drugs as some may affect the action of this medication.    -- Indication: For Type 1 diabetes mellitus without complication    Glucophage 500 mg oral tablet  -- 1 tab(s) by mouth once a day   -- Check with your doctor before becoming pregnant.  Do not drink alcoholic beverages when taking this medication.  It is very important that you take or use this exactly as directed.  Do not skip doses or discontinue unless directed by your doctor.  Obtain medical advice before taking any non-prescription drugs as some may affect the action of this medication.  Take with food or milk.    -- Indication: For Type 1 diabetes mellitus without complication    Multiple Vitamins oral tablet  -- 1 tab(s) by mouth once a day  -- Indication: For Alcohol withdrawal syndrome without complication    folic acid 1 mg oral tablet  -- 1 tab(s) by mouth once a day  -- Indication: For Alcohol dependence with uncomplicated withdrawal    thiamine 100 mg oral tablet  -- 1 tab(s) by mouth once a day  -- Indication: For Alcohol withdrawal syndrome without complication I will START or STAY ON the medications listed below when I get home from the hospital:    cloNIDine 0.1 mg oral tablet  -- 1 tab(s) by mouth 3 times a day  -- Indication: For Alcohol dependence with uncomplicated withdrawal    NovoLOG 100 units/mL subcutaneous solution  -- 5 unit(s) subcutaneous 3 times a day (before meals)   -- Do not drink alcoholic beverages when taking this medication.  Keep in refrigerator.  Do not freeze.  Obtain medical advice before taking any non-prescription drugs as some may affect the action of this medication.    -- Indication: For Type 1 diabetes mellitus without complication    Glucophage 500 mg oral tablet  -- 1 tab(s) by mouth once a day   -- Check with your doctor before becoming pregnant.  Do not drink alcoholic beverages when taking this medication.  It is very important that you take or use this exactly as directed.  Do not skip doses or discontinue unless directed by your doctor.  Obtain medical advice before taking any non-prescription drugs as some may affect the action of this medication.  Take with food or milk.    -- Indication: For Type 1 diabetes mellitus without complication    Toujeo SoloStar 300 units/mL subcutaneous solution  -- 10 unit(s) subcutaneous once a day (at bedtime)   -- Check with your doctor before becoming pregnant.  Do not drink alcoholic beverages when taking this medication.  Expires___________________  It is very important that you take or use this exactly as directed.  Do not skip doses or discontinue unless directed by your doctor.  Keep in refrigerator.  Do not freeze.  Obtain medical advice before taking any non-prescription drugs as some may affect the action of this medication.  This drug may impair the ability to drive or operate machinery.  Use care until you become familiar with its effects.    -- Indication: For Type 1 diabetes mellitus without complication    Multiple Vitamins oral tablet  -- 1 tab(s) by mouth once a day  -- Indication: For Alcohol withdrawal syndrome without complication    folic acid 1 mg oral tablet  -- 1 tab(s) by mouth once a day  -- Indication: For Alcohol dependence with uncomplicated withdrawal    thiamine 100 mg oral tablet  -- 1 tab(s) by mouth once a day  -- Indication: For Alcohol withdrawal syndrome without complication I will START or STAY ON the medications listed below when I get home from the hospital:    glucometer  -- 3 times daily blood sugar testing   -- Indication: For Type 1 diabetes mellitus without complication    lancet  -- Indication: For Type 1 diabetes mellitus without complication    test strip blood sugar   -- Indication: For Type 1 diabetes mellitus without complication    cloNIDine 0.1 mg oral tablet  -- 1 tab(s) by mouth 3 times a day  -- Indication: For Alcohol dependence with uncomplicated withdrawal    NovoLOG 100 units/mL subcutaneous solution  -- 5 unit(s) subcutaneous 3 times a day (before meals)   -- Do not drink alcoholic beverages when taking this medication.  Keep in refrigerator.  Do not freeze.  Obtain medical advice before taking any non-prescription drugs as some may affect the action of this medication.    -- Indication: For Type 1 diabetes mellitus without complication    Glucophage 500 mg oral tablet  -- 1 tab(s) by mouth once a day   -- Check with your doctor before becoming pregnant.  Do not drink alcoholic beverages when taking this medication.  It is very important that you take or use this exactly as directed.  Do not skip doses or discontinue unless directed by your doctor.  Obtain medical advice before taking any non-prescription drugs as some may affect the action of this medication.  Take with food or milk.    -- Indication: For Type 1 diabetes mellitus without complication    Toujeo SoloStar 300 units/mL subcutaneous solution  -- 10 unit(s) subcutaneous once a day (at bedtime)   -- Check with your doctor before becoming pregnant.  Do not drink alcoholic beverages when taking this medication.  Expires___________________  It is very important that you take or use this exactly as directed.  Do not skip doses or discontinue unless directed by your doctor.  Keep in refrigerator.  Do not freeze.  Obtain medical advice before taking any non-prescription drugs as some may affect the action of this medication.  This drug may impair the ability to drive or operate machinery.  Use care until you become familiar with its effects.    -- Indication: For Type 1 diabetes mellitus without complication    Multiple Vitamins oral tablet  -- 1 tab(s) by mouth once a day  -- Indication: For Alcohol withdrawal syndrome without complication    folic acid 1 mg oral tablet  -- 1 tab(s) by mouth once a day  -- Indication: For Alcohol dependence with uncomplicated withdrawal    thiamine 100 mg oral tablet  -- 1 tab(s) by mouth once a day  -- Indication: For Alcohol withdrawal syndrome without complication

## 2018-01-04 NOTE — DISCHARGE NOTE ADULT - PATIENT PORTAL LINK FT
“You can access the [facility] Patient Portal, offered by [facility], by registering with the following website: [email or web address]”

## 2018-01-04 NOTE — DISCHARGE NOTE ADULT - HOSPITAL COURSE
Patient is a 27 year old male with PMH of Alcohol Abuse and Pancreatitis who presented with tremulous and admitted for alcohol withdrawal.  Received ativan, librium , thiamine .  Improved.  Shakes and tremors resolved.  Counselled to quit alcohol use. Addressed patient concern about Diabetes mellitus.  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  Will start on insulin and metformin, Diabetes teaching prior to discharge.  Will follow with Endocrinology dr Mota who will dose insulin and/or d/c and continue oral medication if possible

## 2018-01-05 VITALS
TEMPERATURE: 98 F | HEART RATE: 64 BPM | RESPIRATION RATE: 18 BRPM | DIASTOLIC BLOOD PRESSURE: 74 MMHG | SYSTOLIC BLOOD PRESSURE: 124 MMHG | OXYGEN SATURATION: 97 %

## 2018-01-05 LAB
GLUCOSE BLDC GLUCOMTR-MCNC: 213 MG/DL — HIGH (ref 70–99)
GLUCOSE BLDC GLUCOMTR-MCNC: 227 MG/DL — HIGH (ref 70–99)
GLUCOSE BLDC GLUCOMTR-MCNC: 230 MG/DL — HIGH (ref 70–99)

## 2018-01-05 PROCEDURE — 80053 COMPREHEN METABOLIC PANEL: CPT

## 2018-01-05 PROCEDURE — 96375 TX/PRO/DX INJ NEW DRUG ADDON: CPT

## 2018-01-05 PROCEDURE — 83690 ASSAY OF LIPASE: CPT

## 2018-01-05 PROCEDURE — 93005 ELECTROCARDIOGRAM TRACING: CPT

## 2018-01-05 PROCEDURE — 83735 ASSAY OF MAGNESIUM: CPT

## 2018-01-05 PROCEDURE — 82009 KETONE BODYS QUAL: CPT

## 2018-01-05 PROCEDURE — 96374 THER/PROPH/DIAG INJ IV PUSH: CPT

## 2018-01-05 PROCEDURE — 82962 GLUCOSE BLOOD TEST: CPT

## 2018-01-05 PROCEDURE — 80048 BASIC METABOLIC PNL TOTAL CA: CPT

## 2018-01-05 PROCEDURE — 96376 TX/PRO/DX INJ SAME DRUG ADON: CPT

## 2018-01-05 PROCEDURE — 80307 DRUG TEST PRSMV CHEM ANLYZR: CPT

## 2018-01-05 PROCEDURE — 76700 US EXAM ABDOM COMPLETE: CPT

## 2018-01-05 PROCEDURE — 85027 COMPLETE CBC AUTOMATED: CPT

## 2018-01-05 PROCEDURE — 82803 BLOOD GASES ANY COMBINATION: CPT

## 2018-01-05 PROCEDURE — 83036 HEMOGLOBIN GLYCOSYLATED A1C: CPT

## 2018-01-05 PROCEDURE — 84100 ASSAY OF PHOSPHORUS: CPT

## 2018-01-05 PROCEDURE — 36415 COLL VENOUS BLD VENIPUNCTURE: CPT

## 2018-01-05 PROCEDURE — 99285 EMERGENCY DEPT VISIT HI MDM: CPT | Mod: 25

## 2018-01-05 PROCEDURE — 99233 SBSQ HOSP IP/OBS HIGH 50: CPT

## 2018-01-05 PROCEDURE — 80076 HEPATIC FUNCTION PANEL: CPT

## 2018-01-05 PROCEDURE — 82140 ASSAY OF AMMONIA: CPT

## 2018-01-05 RX ORDER — METFORMIN HYDROCHLORIDE 850 MG/1
1 TABLET ORAL
Qty: 30 | Refills: 0 | OUTPATIENT
Start: 2018-01-05 | End: 2018-02-03

## 2018-01-05 RX ORDER — INSULIN GLARGINE 100 [IU]/ML
10 INJECTION, SOLUTION SUBCUTANEOUS
Qty: 5 | Refills: 0 | OUTPATIENT
Start: 2018-01-05 | End: 2018-02-03

## 2018-01-05 RX ORDER — INSULIN LISPRO 100/ML
5 VIAL (ML) SUBCUTANEOUS
Qty: 5 | Refills: 0 | OUTPATIENT
Start: 2018-01-05 | End: 2018-02-03

## 2018-01-05 RX ORDER — INSULIN GLARGINE 100 [IU]/ML
10 INJECTION, SOLUTION SUBCUTANEOUS
Qty: 2 | Refills: 0 | OUTPATIENT
Start: 2018-01-05 | End: 2018-02-03

## 2018-01-05 RX ADMIN — Medication 100 MILLIGRAM(S): at 12:48

## 2018-01-05 RX ADMIN — Medication 4: at 18:01

## 2018-01-05 RX ADMIN — Medication 1 TABLET(S): at 12:48

## 2018-01-05 RX ADMIN — Medication 50 MILLIGRAM(S): at 18:01

## 2018-01-05 RX ADMIN — HEPARIN SODIUM 5000 UNIT(S): 5000 INJECTION INTRAVENOUS; SUBCUTANEOUS at 05:25

## 2018-01-05 RX ADMIN — Medication 1 MILLIGRAM(S): at 12:48

## 2018-01-05 RX ADMIN — Medication 0.1 MILLIGRAM(S): at 05:26

## 2018-01-05 RX ADMIN — Medication 4: at 08:16

## 2018-01-05 RX ADMIN — Medication 0.1 MILLIGRAM(S): at 17:59

## 2018-01-05 RX ADMIN — Medication 4: at 12:48

## 2018-01-05 RX ADMIN — Medication 50 MILLIGRAM(S): at 05:25

## 2018-01-05 NOTE — PROGRESS NOTE ADULT - ASSESSMENT
Patient is a 27 year old male with PMH of Alcohol Abuse and Pancreatitis who presented with tremulous and admitted for alcohol withdrawal.    1. Alcohol Withdrawal  DTs resolved    2. Elevated LFTs  -likely secondary to alcoholic hepatitis  abdominal US- moderate hepatic steatosis    3. History of Pancreatitis  -normal lipase at this time.   A1c 9.6  DM may be due to chronic pancreatitis due to alcohol  Diabetic diet .  Insulin lantus , meal time on discharge    4. Counseling  -tobacco and alcohol cessation discussed in great detail    5. HTN  Clonidine     6. Hypercalcemia  -likely secondary to volume depletion  Resolved     7. DM possible type 1  Follow up with endocrinology dr Svetlana West as outpatient.   Discharge home today.

## 2018-01-05 NOTE — PROGRESS NOTE ADULT - SUBJECTIVE AND OBJECTIVE BOX
CC: ETIO withdrawal .  Chronic pancreatitis. DM  HPI:  Patient is a 27 year old male with PMH of Alcohol Abuse and Pancreatitis who presented to the ED with complaints of worsening tremors. Patient reports he has been drinking daily for the past 2 months and specifically drinks four 24 oz cans of beer daily. Last drink on New Years [place]. Patient reports being in his usual state of health the following day but today he noted that he had tremors that progressively worsened as the day continued. In the ED, patient was found to have a CIWA score of 16 on arrival and was treated with IV ativan. Also noted to be hypertensive and tachycardic. Hospitalist service then called for admission. Upon arrival, patient awake and alert and able to provide history. Reports one episode of emesis earlier today but otherwise denies fever, chills, headaches, blurry vision, hallucinations, chest pain, SOB, nausea, abdominal pain, fever or chills. Reports feeling better, although still tremulous. CIWA 7. (02 Jan 2018 20:44)    REVIEW OF SYSTEMS:    Patient denied fever, chills, abdominal pain, nausea, vomiting, cough, shortness of breath, chest pain or palpitations    Vital Signs Last 24 Hrs  T(C): 36.4 (05 Jan 2018 08:14), Max: 37.2 (04 Jan 2018 17:21)  T(F): 97.6 (05 Jan 2018 08:14), Max: 99 (04 Jan 2018 17:21)  HR: 69 (05 Jan 2018 08:14) (58 - 70)  BP: 102/63 (05 Jan 2018 08:14) (102/63 - 115/63)  BP(mean): --  RR: 18 (05 Jan 2018 08:14) (16 - 19)  SpO2: 97% (05 Jan 2018 08:14) (96% - 97%)I&O's Summary    PHYSICAL EXAM:  GENERAL: NAD, well-groomed  HEENT: PERRL, +EOMI, anicteric, no [place]  NECK: Supple, No JVD   CHEST/LUNG: CTA bilaterally; Normal effort  HEART: S1S2 Normal intensity, no murmurs, gallops or rubs noted  ABDOMEN: Soft, BS Normoactive, NT, ND, no HSM noted  EXTREMITIES:  2+ radial and DP pulses noted, no clubbing, cyanosis, or edema noted, FROM x 4  SKIN: No rashes or lesions noted  NEURO: A&Ox3, no focal deficits noted, CN II-XII intact  PSYCH: normal mood and affect; insight/judgement appropriate  LABS:                        14.4   3.0   )-----------( 107      ( 04 Jan 2018 06:04 )             44.0     01-04    136  |  98  |  12.0  ----------------------------<  250<H>  3.9   |  22.0  |  0.50    Ca    9.5      04 Jan 2018 06:04    TPro  6.9  /  Alb  4.1  /  TBili  2.3<H>  /  DBili  0.4<H>  /  AST  109<H>  /  ALT  79<H>  /  AlkPhos  102  01-04        RADIOLOGY & ADDITIONAL TESTS:    MEDICATIONS:  MEDICATIONS  (STANDING):  chlordiazePOXIDE 50 milliGRAM(s) Oral every 12 hours  chlordiazePOXIDE 50 milliGRAM(s) Oral once  chlordiazePOXIDE   Oral   cloNIDine 0.1 milliGRAM(s) Oral three times a day  dextrose 5%. 1000 milliLiter(s) (50 mL/Hr) IV Continuous <Continuous>  dextrose 50% Injectable 12.5 Gram(s) IV Push once  dextrose 50% Injectable 25 Gram(s) IV Push once  dextrose 50% Injectable 25 Gram(s) IV Push once  folic acid 1 milliGRAM(s) Oral daily  heparin  Injectable 5000 Unit(s) SubCutaneous every 12 hours  insulin lispro (HumaLOG) corrective regimen sliding scale   SubCutaneous three times a day before meals  multivitamin 1 Tablet(s) Oral daily  thiamine 100 milliGRAM(s) Oral daily    MEDICATIONS  (PRN):  dextrose Gel 1 Dose(s) Oral once PRN Blood Glucose LESS THAN 70 milliGRAM(s)/deciliter  glucagon  Injectable 1 milliGRAM(s) IntraMuscular once PRN Glucose LESS THAN 70 milligrams/deciliter  LORazepam   Injectable 2 milliGRAM(s) IntraMuscular every 2 hours PRN Anxiety

## 2018-05-14 ENCOUNTER — INPATIENT (INPATIENT)
Facility: HOSPITAL | Age: 28
LOS: 1 days | Discharge: HOME | DRG: 897 | End: 2018-05-16
Attending: HOSPITALIST | Admitting: HOSPITALIST
Payer: COMMERCIAL

## 2018-05-14 VITALS — WEIGHT: 190.04 LBS | HEIGHT: 67 IN

## 2018-05-14 LAB
ALBUMIN SERPL ELPH-MCNC: 4.8 G/DL — SIGNIFICANT CHANGE UP (ref 3.3–5.2)
ALP SERPL-CCNC: 114 U/L — SIGNIFICANT CHANGE UP (ref 40–120)
ALT FLD-CCNC: 53 U/L — HIGH
ANION GAP SERPL CALC-SCNC: 21 MMOL/L — HIGH (ref 5–17)
APPEARANCE UR: CLEAR — SIGNIFICANT CHANGE UP
APTT BLD: 25.5 SEC — LOW (ref 27.5–37.4)
AST SERPL-CCNC: 91 U/L — HIGH
BASOPHILS # BLD AUTO: 0 K/UL — SIGNIFICANT CHANGE UP (ref 0–0.2)
BASOPHILS NFR BLD AUTO: 0.8 % — SIGNIFICANT CHANGE UP (ref 0–2)
BILIRUB SERPL-MCNC: 1.3 MG/DL — SIGNIFICANT CHANGE UP (ref 0.4–2)
BILIRUB UR-MCNC: NEGATIVE — SIGNIFICANT CHANGE UP
BUN SERPL-MCNC: 4 MG/DL — LOW (ref 8–20)
CALCIUM SERPL-MCNC: 9.3 MG/DL — SIGNIFICANT CHANGE UP (ref 8.6–10.2)
CHLORIDE SERPL-SCNC: 98 MMOL/L — SIGNIFICANT CHANGE UP (ref 98–107)
CO2 SERPL-SCNC: 19 MMOL/L — LOW (ref 22–29)
COLOR SPEC: YELLOW — SIGNIFICANT CHANGE UP
CREAT SERPL-MCNC: 0.49 MG/DL — LOW (ref 0.5–1.3)
DIFF PNL FLD: NEGATIVE — SIGNIFICANT CHANGE UP
EOSINOPHIL # BLD AUTO: 0.1 K/UL — SIGNIFICANT CHANGE UP (ref 0–0.5)
EOSINOPHIL NFR BLD AUTO: 2.2 % — SIGNIFICANT CHANGE UP (ref 0–5)
ETHANOL SERPL-MCNC: 330 MG/DL — SIGNIFICANT CHANGE UP
GLUCOSE SERPL-MCNC: 279 MG/DL — HIGH (ref 70–115)
GLUCOSE UR QL: 1000 MG/DL
HCT VFR BLD CALC: 44 % — SIGNIFICANT CHANGE UP (ref 42–52)
HGB BLD-MCNC: 15.2 G/DL — SIGNIFICANT CHANGE UP (ref 14–18)
INR BLD: 0.94 RATIO — SIGNIFICANT CHANGE UP (ref 0.88–1.16)
KETONES UR-MCNC: NEGATIVE — SIGNIFICANT CHANGE UP
LEUKOCYTE ESTERASE UR-ACNC: NEGATIVE — SIGNIFICANT CHANGE UP
LIDOCAIN IGE QN: 41 U/L — SIGNIFICANT CHANGE UP (ref 22–51)
LYMPHOCYTES # BLD AUTO: 0.8 K/UL — LOW (ref 1–4.8)
LYMPHOCYTES # BLD AUTO: 22.1 % — SIGNIFICANT CHANGE UP (ref 20–55)
MCHC RBC-ENTMCNC: 32.9 PG — HIGH (ref 27–31)
MCHC RBC-ENTMCNC: 34.5 G/DL — SIGNIFICANT CHANGE UP (ref 32–36)
MCV RBC AUTO: 95.2 FL — HIGH (ref 80–94)
MONOCYTES # BLD AUTO: 0.4 K/UL — SIGNIFICANT CHANGE UP (ref 0–0.8)
MONOCYTES NFR BLD AUTO: 10 % — SIGNIFICANT CHANGE UP (ref 3–10)
NEUTROPHILS # BLD AUTO: 2.4 K/UL — SIGNIFICANT CHANGE UP (ref 1.8–8)
NEUTROPHILS NFR BLD AUTO: 64.9 % — SIGNIFICANT CHANGE UP (ref 37–73)
NITRITE UR-MCNC: NEGATIVE — SIGNIFICANT CHANGE UP
PH UR: 6 — SIGNIFICANT CHANGE UP (ref 5–8)
PLATELET # BLD AUTO: 97 K/UL — LOW (ref 150–400)
POTASSIUM SERPL-MCNC: 5.5 MMOL/L — HIGH (ref 3.5–5.3)
POTASSIUM SERPL-SCNC: 5.5 MMOL/L — HIGH (ref 3.5–5.3)
PROT SERPL-MCNC: 8.3 G/DL — SIGNIFICANT CHANGE UP (ref 6.6–8.7)
PROT UR-MCNC: 15 MG/DL
PROTHROM AB SERPL-ACNC: 10.3 SEC — SIGNIFICANT CHANGE UP (ref 9.8–12.7)
RBC # BLD: 4.62 M/UL — SIGNIFICANT CHANGE UP (ref 4.6–6.2)
RBC # FLD: 13.7 % — SIGNIFICANT CHANGE UP (ref 11–15.6)
SODIUM SERPL-SCNC: 138 MMOL/L — SIGNIFICANT CHANGE UP (ref 135–145)
SP GR SPEC: 1.01 — SIGNIFICANT CHANGE UP (ref 1.01–1.02)
UROBILINOGEN FLD QL: NEGATIVE MG/DL — SIGNIFICANT CHANGE UP
WBC # BLD: 3.7 K/UL — LOW (ref 4.8–10.8)
WBC # FLD AUTO: 3.7 K/UL — LOW (ref 4.8–10.8)
WBC UR QL: SIGNIFICANT CHANGE UP

## 2018-05-14 PROCEDURE — 99285 EMERGENCY DEPT VISIT HI MDM: CPT

## 2018-05-14 PROCEDURE — 74177 CT ABD & PELVIS W/CONTRAST: CPT | Mod: 26

## 2018-05-14 PROCEDURE — 76705 ECHO EXAM OF ABDOMEN: CPT | Mod: 26

## 2018-05-14 RX ORDER — SODIUM CHLORIDE 9 MG/ML
3000 INJECTION INTRAMUSCULAR; INTRAVENOUS; SUBCUTANEOUS ONCE
Qty: 0 | Refills: 0 | Status: COMPLETED | OUTPATIENT
Start: 2018-05-14 | End: 2018-05-14

## 2018-05-14 RX ORDER — FAMOTIDINE 10 MG/ML
20 INJECTION INTRAVENOUS ONCE
Qty: 0 | Refills: 0 | Status: COMPLETED | OUTPATIENT
Start: 2018-05-14 | End: 2018-05-14

## 2018-05-14 RX ORDER — SODIUM CHLORIDE 9 MG/ML
2000 INJECTION, SOLUTION INTRAVENOUS ONCE
Qty: 0 | Refills: 0 | Status: COMPLETED | OUTPATIENT
Start: 2018-05-14 | End: 2018-05-14

## 2018-05-14 RX ORDER — KETOROLAC TROMETHAMINE 30 MG/ML
30 SYRINGE (ML) INJECTION ONCE
Qty: 0 | Refills: 0 | Status: DISCONTINUED | OUTPATIENT
Start: 2018-05-14 | End: 2018-05-14

## 2018-05-14 RX ORDER — LIDOCAINE 4 G/100G
5 CREAM TOPICAL ONCE
Qty: 0 | Refills: 0 | Status: COMPLETED | OUTPATIENT
Start: 2018-05-14 | End: 2018-05-14

## 2018-05-14 RX ORDER — MORPHINE SULFATE 50 MG/1
4 CAPSULE, EXTENDED RELEASE ORAL ONCE
Qty: 0 | Refills: 0 | Status: DISCONTINUED | OUTPATIENT
Start: 2018-05-14 | End: 2018-05-14

## 2018-05-14 RX ADMIN — MORPHINE SULFATE 4 MILLIGRAM(S): 50 CAPSULE, EXTENDED RELEASE ORAL at 14:18

## 2018-05-14 RX ADMIN — SODIUM CHLORIDE 1000 MILLILITER(S): 9 INJECTION INTRAMUSCULAR; INTRAVENOUS; SUBCUTANEOUS at 19:57

## 2018-05-14 RX ADMIN — Medication 1 MILLIGRAM(S): at 14:17

## 2018-05-14 RX ADMIN — Medication 50 MILLIGRAM(S): at 14:16

## 2018-05-14 RX ADMIN — FAMOTIDINE 20 MILLIGRAM(S): 10 INJECTION INTRAVENOUS at 17:48

## 2018-05-14 RX ADMIN — Medication 30 MILLIGRAM(S): at 21:44

## 2018-05-14 RX ADMIN — SODIUM CHLORIDE 2000 MILLILITER(S): 9 INJECTION, SOLUTION INTRAVENOUS at 14:23

## 2018-05-14 RX ADMIN — Medication 30 MILLIGRAM(S): at 22:14

## 2018-05-14 RX ADMIN — LIDOCAINE 5 MILLILITER(S): 4 CREAM TOPICAL at 17:48

## 2018-05-14 RX ADMIN — MORPHINE SULFATE 4 MILLIGRAM(S): 50 CAPSULE, EXTENDED RELEASE ORAL at 16:40

## 2018-05-14 RX ADMIN — Medication 30 MILLILITER(S): at 17:48

## 2018-05-14 NOTE — ED PROVIDER NOTE - PROGRESS NOTE DETAILS
As per sign-out from Dr. Martinez patient with dehydration and acute alcohol abuse pending hydration and observation of sobriety. Patient with persistent and significant signs of alcohol withdrawal. Will admit and continue to treat symptoms. Labs and CT are as noted. Patient with alcohol gastritis

## 2018-05-14 NOTE — ED PROVIDER NOTE - CARE PLAN
Principal Discharge DX:	Alcohol abuse Principal Discharge DX:	Alcohol abuse  Secondary Diagnosis:	Gastritis due to alcohol without hemorrhage

## 2018-05-14 NOTE — ED PROVIDER NOTE - MEDICAL DECISION MAKING DETAILS
Pt with symptomatic alcohol withdrawal, will require admisison for perisstently high CIWA despite benzo tx

## 2018-05-14 NOTE — ED ADULT NURSE NOTE - OBJECTIVE STATEMENT
pt c/o abd pain x 2 days, unable to keep anything done per pt.  pt drinks a 12pak beer per day last drink per pt was saturday.

## 2018-05-14 NOTE — ED STATDOCS - PROGRESS NOTE DETAILS
28 y/o male chronic ETOH use presents c/o RUQ/LUQ pain radiating to the back. Last ETOH use was yesterday. Also c/o of withdrawal symptoms. Will order labs, ativan , analgesics, fluids and transfer to main department for further management.

## 2018-05-14 NOTE — ED PROVIDER NOTE - OBJECTIVE STATEMENT
28 y/o M pt with hx of alcohol abuse presents to ED c/o sharp epigastric abdominal pain radiating to back since yesterday associated with vomiting and diarrhea. Symptoms are worse with eating. non bloody non bilious vomiting and diarrhea. pt admits to drinking EtOH. Pt was offered to talk to rehab services and declined.

## 2018-05-15 DIAGNOSIS — Z29.9 ENCOUNTER FOR PROPHYLACTIC MEASURES, UNSPECIFIED: ICD-10-CM

## 2018-05-15 DIAGNOSIS — F10.239 ALCOHOL DEPENDENCE WITH WITHDRAWAL, UNSPECIFIED: ICD-10-CM

## 2018-05-15 DIAGNOSIS — F10.10 ALCOHOL ABUSE, UNCOMPLICATED: ICD-10-CM

## 2018-05-15 DIAGNOSIS — E11.9 TYPE 2 DIABETES MELLITUS WITHOUT COMPLICATIONS: ICD-10-CM

## 2018-05-15 DIAGNOSIS — K29.20 ALCOHOLIC GASTRITIS WITHOUT BLEEDING: ICD-10-CM

## 2018-05-15 LAB
ANION GAP SERPL CALC-SCNC: 12 MMOL/L — SIGNIFICANT CHANGE UP (ref 5–17)
BUN SERPL-MCNC: 4 MG/DL — LOW (ref 8–20)
CALCIUM SERPL-MCNC: 8.5 MG/DL — LOW (ref 8.6–10.2)
CHLORIDE SERPL-SCNC: 102 MMOL/L — SIGNIFICANT CHANGE UP (ref 98–107)
CO2 SERPL-SCNC: 25 MMOL/L — SIGNIFICANT CHANGE UP (ref 22–29)
CREAT SERPL-MCNC: 0.55 MG/DL — SIGNIFICANT CHANGE UP (ref 0.5–1.3)
ETHANOL SERPL-MCNC: 29 MG/DL — SIGNIFICANT CHANGE UP
GLUCOSE BLDC GLUCOMTR-MCNC: 172 MG/DL — HIGH (ref 70–99)
GLUCOSE BLDC GLUCOMTR-MCNC: 174 MG/DL — HIGH (ref 70–99)
GLUCOSE BLDC GLUCOMTR-MCNC: 226 MG/DL — HIGH (ref 70–99)
GLUCOSE SERPL-MCNC: 137 MG/DL — HIGH (ref 70–115)
POTASSIUM SERPL-MCNC: 3.9 MMOL/L — SIGNIFICANT CHANGE UP (ref 3.5–5.3)
POTASSIUM SERPL-SCNC: 3.9 MMOL/L — SIGNIFICANT CHANGE UP (ref 3.5–5.3)
SODIUM SERPL-SCNC: 139 MMOL/L — SIGNIFICANT CHANGE UP (ref 135–145)

## 2018-05-15 PROCEDURE — 99223 1ST HOSP IP/OBS HIGH 75: CPT

## 2018-05-15 RX ORDER — DEXTROSE 50 % IN WATER 50 %
15 SYRINGE (ML) INTRAVENOUS ONCE
Qty: 0 | Refills: 0 | Status: DISCONTINUED | OUTPATIENT
Start: 2018-05-15 | End: 2018-05-16

## 2018-05-15 RX ORDER — INSULIN LISPRO 100/ML
VIAL (ML) SUBCUTANEOUS AT BEDTIME
Qty: 0 | Refills: 0 | Status: DISCONTINUED | OUTPATIENT
Start: 2018-05-15 | End: 2018-05-16

## 2018-05-15 RX ORDER — INSULIN LISPRO 100/ML
VIAL (ML) SUBCUTANEOUS
Qty: 0 | Refills: 0 | Status: DISCONTINUED | OUTPATIENT
Start: 2018-05-15 | End: 2018-05-16

## 2018-05-15 RX ORDER — SODIUM CHLORIDE 9 MG/ML
1000 INJECTION, SOLUTION INTRAVENOUS
Qty: 0 | Refills: 0 | Status: DISCONTINUED | OUTPATIENT
Start: 2018-05-15 | End: 2018-05-16

## 2018-05-15 RX ORDER — DEXTROSE 50 % IN WATER 50 %
12.5 SYRINGE (ML) INTRAVENOUS ONCE
Qty: 0 | Refills: 0 | Status: DISCONTINUED | OUTPATIENT
Start: 2018-05-15 | End: 2018-05-16

## 2018-05-15 RX ORDER — DEXTROSE 50 % IN WATER 50 %
25 SYRINGE (ML) INTRAVENOUS ONCE
Qty: 0 | Refills: 0 | Status: DISCONTINUED | OUTPATIENT
Start: 2018-05-15 | End: 2018-05-16

## 2018-05-15 RX ORDER — PANTOPRAZOLE SODIUM 20 MG/1
40 TABLET, DELAYED RELEASE ORAL
Qty: 0 | Refills: 0 | Status: DISCONTINUED | OUTPATIENT
Start: 2018-05-15 | End: 2018-05-16

## 2018-05-15 RX ORDER — GLUCAGON INJECTION, SOLUTION 0.5 MG/.1ML
1 INJECTION, SOLUTION SUBCUTANEOUS ONCE
Qty: 0 | Refills: 0 | Status: DISCONTINUED | OUTPATIENT
Start: 2018-05-15 | End: 2018-05-16

## 2018-05-15 RX ORDER — MORPHINE SULFATE 50 MG/1
2 CAPSULE, EXTENDED RELEASE ORAL EVERY 4 HOURS
Qty: 0 | Refills: 0 | Status: DISCONTINUED | OUTPATIENT
Start: 2018-05-15 | End: 2018-05-15

## 2018-05-15 RX ORDER — MORPHINE SULFATE 50 MG/1
2 CAPSULE, EXTENDED RELEASE ORAL EVERY 4 HOURS
Qty: 0 | Refills: 0 | Status: DISCONTINUED | OUTPATIENT
Start: 2018-05-15 | End: 2018-05-16

## 2018-05-15 RX ADMIN — SODIUM CHLORIDE 100 MILLILITER(S): 9 INJECTION, SOLUTION INTRAVENOUS at 08:24

## 2018-05-15 RX ADMIN — PANTOPRAZOLE SODIUM 40 MILLIGRAM(S): 20 TABLET, DELAYED RELEASE ORAL at 17:11

## 2018-05-15 RX ADMIN — Medication 2 MILLIGRAM(S): at 01:30

## 2018-05-15 RX ADMIN — Medication 2: at 17:17

## 2018-05-15 RX ADMIN — Medication 100 MILLIGRAM(S): at 01:30

## 2018-05-15 RX ADMIN — Medication 50 MILLIGRAM(S): at 17:11

## 2018-05-15 RX ADMIN — MORPHINE SULFATE 2 MILLIGRAM(S): 50 CAPSULE, EXTENDED RELEASE ORAL at 19:11

## 2018-05-15 RX ADMIN — Medication 50 MILLIGRAM(S): at 07:41

## 2018-05-15 RX ADMIN — Medication 2: at 12:07

## 2018-05-15 RX ADMIN — Medication 100 MILLIGRAM(S): at 00:11

## 2018-05-15 RX ADMIN — SODIUM CHLORIDE 100 MILLILITER(S): 9 INJECTION, SOLUTION INTRAVENOUS at 17:17

## 2018-05-15 RX ADMIN — Medication 50 MILLIGRAM(S): at 12:05

## 2018-05-15 NOTE — ED ADULT NURSE REASSESSMENT NOTE - COMFORT CARE
side rails up/treatment delay explained/wait time explained/warm blanket provided/plan of care explained/po fluids offered/repositioned

## 2018-05-15 NOTE — H&P ADULT - HISTORY OF PRESENT ILLNESS
26 yo M with h/o ETOH abuse, pancreatitis, DM2 (noncompliant with inuslin) presents from home with acute onset RUQ abdominal pain that radiated to his back that has been ongoing for one day. Unsure if anything made it better or worse, 10/10, and is sharp. Also associated with vomiting. Reports that he drinks at least 12pack beer daily and has been through withdrawals in the past. States his last drink was 4 days prior to admission. In the ED, pt was placed on observation but was found to have increasingly elevated CIWA scores despite multiple doses of librium. CT abdomen negative. RUQ US negative. Initial ETOH level 330.

## 2018-05-15 NOTE — SBIRT NOTE. - NSSBIRTSERVICES_GEN_A_ED_FT
Provided SBIRT services: Full screen positive. Referral to Treatment Performed. Screening results were reviewed with the patient and patient was provided information about healthy guidelines and potential negative consequences associated with level of risk. Motivation and readiness to reduce or stop use was discussed and goals and activities to make changes were suggested/offered.    Referral for complete assessment and level of care determination at a certified treatment facility was completed by contacting the treatment facility via phone, [facility] inpatient rehab, following inpatient medical stay at [facility].  Patient requested admission for 5/21/18.  Audit Score: 37  DAST Score: 0

## 2018-05-15 NOTE — ED ADULT NURSE REASSESSMENT NOTE - NS ED NURSE REASSESS COMMENT FT1
Dr. Martinez aware of labs and test results.
pt last drink was Saturday a 12 pack of beer
pt resting in bed awaiting sono, offers no complaints at this time.
MD Max made aware of ciwa-10, pt A&Ox4 calm and corporative , resp even and unlabored no distress noted, pt c/o ha and nausea, will continue to monitor
Md Max made aware of ciwa will medicate as ordered, will continue to monitor
Patient received from CDU a/ox4 denying pain/discomfort. IV fluids initiated, CIWA of 5 on tapering librium protocol. VSS, NSR on monitor, safety maintianed.
Pt moved to A side , pt care endorsed to holding  nurse Samia pt in no distress , VSS , pt care endorsed at this time
pt care assumed at 1950, no apparent distress noted at this time, charting as noted. pt received Alert and Oriented to person, place, situation and time laying in bed with SO at bedside. Pt c/o epigastric pain. CIWA=4. pt educated on plan of care. vital signs remain stable.
pt sleeping and easily arousable, resp even and unlabored no distress noted, pt denies any pain at this time, CIWA-6 Md cuellar made aware, will continue to monitor
pt sleeping and easily arousable, resp even and unlabored no distress noted, pt denies any pain at this time, ciwa-6, will continue to monitor pt
Pt received in the stretcher resting comfortably, appears slightly anxious , VSS, awaiting admission orders, plan of care explained, will continue monitor

## 2018-05-15 NOTE — H&P ADULT - RS GEN PE MLT RESP DETAILS PC
airway patent/no chest wall tenderness/good air movement/clear to auscultation bilaterally/breath sounds equal/normal/no intercostal retractions/respirations non-labored

## 2018-05-16 VITALS
DIASTOLIC BLOOD PRESSURE: 75 MMHG | SYSTOLIC BLOOD PRESSURE: 127 MMHG | HEART RATE: 66 BPM | RESPIRATION RATE: 18 BRPM | OXYGEN SATURATION: 98 % | TEMPERATURE: 98 F

## 2018-05-16 LAB
GLUCOSE BLDC GLUCOMTR-MCNC: 206 MG/DL — HIGH (ref 70–99)
GLUCOSE BLDC GLUCOMTR-MCNC: 230 MG/DL — HIGH (ref 70–99)
HBA1C BLD-MCNC: 8.2 % — HIGH (ref 4–5.6)

## 2018-05-16 PROCEDURE — 83690 ASSAY OF LIPASE: CPT

## 2018-05-16 PROCEDURE — 85610 PROTHROMBIN TIME: CPT

## 2018-05-16 PROCEDURE — 80053 COMPREHEN METABOLIC PANEL: CPT

## 2018-05-16 PROCEDURE — 82962 GLUCOSE BLOOD TEST: CPT

## 2018-05-16 PROCEDURE — 99239 HOSP IP/OBS DSCHRG MGMT >30: CPT

## 2018-05-16 PROCEDURE — 76705 ECHO EXAM OF ABDOMEN: CPT

## 2018-05-16 PROCEDURE — 85027 COMPLETE CBC AUTOMATED: CPT

## 2018-05-16 PROCEDURE — 80048 BASIC METABOLIC PNL TOTAL CA: CPT

## 2018-05-16 PROCEDURE — 85730 THROMBOPLASTIN TIME PARTIAL: CPT

## 2018-05-16 PROCEDURE — 81001 URINALYSIS AUTO W/SCOPE: CPT

## 2018-05-16 PROCEDURE — 96374 THER/PROPH/DIAG INJ IV PUSH: CPT | Mod: XU

## 2018-05-16 PROCEDURE — G0378: CPT

## 2018-05-16 PROCEDURE — 74177 CT ABD & PELVIS W/CONTRAST: CPT

## 2018-05-16 PROCEDURE — 83036 HEMOGLOBIN GLYCOSYLATED A1C: CPT

## 2018-05-16 PROCEDURE — 80307 DRUG TEST PRSMV CHEM ANLYZR: CPT

## 2018-05-16 PROCEDURE — 36415 COLL VENOUS BLD VENIPUNCTURE: CPT

## 2018-05-16 PROCEDURE — 99285 EMERGENCY DEPT VISIT HI MDM: CPT | Mod: 25

## 2018-05-16 PROCEDURE — 96375 TX/PRO/DX INJ NEW DRUG ADDON: CPT

## 2018-05-16 RX ORDER — PANTOPRAZOLE SODIUM 20 MG/1
1 TABLET, DELAYED RELEASE ORAL
Qty: 30 | Refills: 0 | OUTPATIENT
Start: 2018-05-16 | End: 2018-06-14

## 2018-05-16 RX ORDER — METFORMIN HYDROCHLORIDE 850 MG/1
1 TABLET ORAL
Qty: 30 | Refills: 0
Start: 2018-05-16 | End: 2018-06-14

## 2018-05-16 RX ADMIN — Medication 4: at 08:27

## 2018-05-16 RX ADMIN — Medication 50 MILLIGRAM(S): at 01:31

## 2018-05-16 RX ADMIN — Medication 4: at 12:28

## 2018-05-16 RX ADMIN — PANTOPRAZOLE SODIUM 40 MILLIGRAM(S): 20 TABLET, DELAYED RELEASE ORAL at 06:42

## 2018-05-16 NOTE — PROGRESS NOTE ADULT - SUBJECTIVE AND OBJECTIVE BOX
CARMEN JOYCE    [id number]    27y      Male    INTERVAL HPI/OVERNIGHT EVENTS: Offers no complaints. CIWA overnight 2-3.    Hospital course:  26 yo M with h/o ETOH abuse, pancreatitis, DM2 (noncompliant with inuslin) presents from home with acute onset RUQ abdominal pain that radiated to his back that has been ongoing for one day. Unsure if anything made it better or worse, 10/10, and is sharp. Also associated with vomiting. Reports that he drinks at least 12pack beer daily and has been through withdrawals in the past. States his last drink was 4 days prior to admission. In the ED, pt was placed on observation but was found to have increasingly elevated CIWA scores despite multiple doses of librium. CT abdomen negative. RUQ US negative. Initial ETOH level 330.  Started on librium taper and CIWA improved.     REVIEW OF SYSTEMS:    CONSTITUTIONAL: No fever   RESPIRATORY: No cough  CARDIOVASCULAR: No chest pain       Vital Signs Last 24 Hrs  T(C): 36.7 (16 May 2018 04:25), Max: 37.2 (15 May 2018 14:17)  T(F): 98.1 (16 May 2018 04:25), Max: 99 (15 May 2018 14:17)  HR: 67 (16 May 2018 04:25) (67 - 85)  BP: 132/75 (16 May 2018 04:25) (132/66 - 152/94)  BP(mean): --  RR: 18 (16 May 2018 04:25) (15 - 18)  SpO2: 97% (16 May 2018 04:25) (97% - 98%)    PHYSICAL EXAM:    GENERAL: NAD, well-groomed  HEENT: PERRL, +EOMI, MMM  CHEST/LUNG: Clear to percussion bilaterally; No wheezing  HEART: S1S2+, Regular rate and rhythm   ABDOMEN: Soft, Nontender, Nondistended; Bowel sounds present  EXTREMITIES:  no edema    LABS:                        15.2   3.7   )-----------( 97       ( 14 May 2018 14:33 )             44.0     05-15    139  |  102  |  4.0<L>  ----------------------------<  137<H>  3.9   |  25.0  |  0.55    Ca    8.5<L>      15 May 2018 00:15    TPro  8.3  /  Alb  4.8  /  TBili  1.3  /  DBili  x   /  AST  91<H>  /  ALT  53<H>  /  AlkPhos  114  05-14    PT/INR - ( 14 May 2018 14:33 )   PT: 10.3 sec;   INR: 0.94 ratio         PTT - ( 14 May 2018 14:33 )  PTT:25.5 sec  Urinalysis Basic - ( 14 May 2018 14:33 )    Color: Yellow / Appearance: Clear / S.010 / pH: x  Gluc: x / Ketone: Negative  / Bili: Negative / Urobili: Negative mg/dL   Blood: x / Protein: 15 mg/dL / Nitrite: Negative   Leuk Esterase: Negative / RBC: x / WBC 0-2   Sq Epi: x / Non Sq Epi: x / Bacteria: x          MEDICATIONS  (STANDING):  chlordiazePOXIDE 50 milliGRAM(s) Oral every 8 hours  chlordiazePOXIDE   Oral   dextrose 5%. 1000 milliLiter(s) (50 mL/Hr) IV Continuous <Continuous>  dextrose 50% Injectable 12.5 Gram(s) IV Push once  dextrose 50% Injectable 25 Gram(s) IV Push once  dextrose 50% Injectable 25 Gram(s) IV Push once  insulin lispro (HumaLOG) corrective regimen sliding scale   SubCutaneous three times a day before meals  insulin lispro (HumaLOG) corrective regimen sliding scale   SubCutaneous at bedtime  pantoprazole   Suspension 40 milliGRAM(s) Oral before breakfast  sodium chloride 0.9% 1000 milliLiter(s) (100 mL/Hr) IV Continuous <Continuous>    MEDICATIONS  (PRN):  dextrose 40% Gel 15 Gram(s) Oral once PRN Blood Glucose LESS THAN 70 milliGRAM(s)/deciliter  glucagon  Injectable 1 milliGRAM(s) IntraMuscular once PRN Glucose LESS THAN 70 milligrams/deciliter  LORazepam   Injectable 2 milliGRAM(s) IV Push every 1 hour PRN Symptom-triggered: each CIWA -Ar score 8 or GREATER  morphine  - Injectable 2 milliGRAM(s) IV Push every 4 hours PRN Moderate Pain (4 - 6)      RADIOLOGY & ADDITIONAL TESTS:

## 2018-05-16 NOTE — DISCHARGE NOTE ADULT - PLAN OF CARE
Therapeutic optimization Continue with librium taper. Follow up with your regular doctor in one week. Abstain from alcohol use. Continue with protonix. Continue with metformin.

## 2018-05-16 NOTE — DISCHARGE NOTE ADULT - MEDICATION SUMMARY - MEDICATIONS TO TAKE
I will START or STAY ON the medications listed below when I get home from the hospital:    metFORMIN 500 mg oral tablet  -- 1 tab(s) by mouth once a day   -- Check with your doctor before becoming pregnant.  Do not drink alcoholic beverages when taking this medication.  It is very important that you take or use this exactly as directed.  Do not skip doses or discontinue unless directed by your doctor.  Obtain medical advice before taking any non-prescription drugs as some may affect the action of this medication.  Take with food or milk.    -- Indication: For Diabetes mellitus    chlordiazePOXIDE 25 mg oral capsule  -- Day 1: Take 2 tabs PO every 8 hours  Day 2: Take 2 tabs PO every 12 hours  Day 3: Take 2 tabs PO once MDD:6 tabs  -- Caution federal law prohibits the transfer of this drug to any person other  than the person for whom it was prescribed.  May cause drowsiness.  Alcohol may intensify this effect.  Use care when operating dangerous machinery.    -- Indication: For Alcohol withdrawal syndrome    Protonix 40 mg oral granule, delayed release  -- 1 tab(s) by mouth once a day   -- Indication: For Gastritis due to alcohol without hemorrhage

## 2018-05-16 NOTE — DISCHARGE NOTE ADULT - HOSPITAL COURSE
28 yo M with h/o ETOH abuse, pancreatitis, DM2 (noncompliant with inuslin) presents from home with acute onset RUQ abdominal pain that radiated to his back that has been ongoing for one day. Unsure if anything made it better or worse, 10/10, and is sharp. Also associated with vomiting. Reports that he drinks at least 12pack beer daily and has been through withdrawals in the past. States his last drink was 4 days prior to admission. In the ED, pt was placed on observation but was found to have increasingly elevated CIWA scores despite multiple doses of librium. CT abdomen negative. RUQ US negative. Initial ETOH level 330.  Started on librium taper and CIWA improved.     Time to discharge: >35 minutes spent coordinating care  Patient agreeable to discharge plan.

## 2018-05-16 NOTE — PROGRESS NOTE ADULT - ASSESSMENT
28 yo M with h/o ETOH abuse, pancreatitis, DM2 (noncompliant with insulin) here with etoh withdrawal.

## 2018-05-16 NOTE — DISCHARGE NOTE ADULT - CARE PLAN
Principal Discharge DX:	Alcohol withdrawal syndrome  Goal:	Therapeutic optimization  Assessment and plan of treatment:	Continue with librium taper. Follow up with your regular doctor in one week.  Secondary Diagnosis:	Alcohol abuse  Assessment and plan of treatment:	Abstain from alcohol use.  Secondary Diagnosis:	Gastritis due to alcohol without hemorrhage  Assessment and plan of treatment:	Continue with protonix.  Secondary Diagnosis:	Diabetes mellitus  Assessment and plan of treatment:	Continue with metformin.

## 2018-05-16 NOTE — DISCHARGE NOTE ADULT - DEVELOP COPING SKILLS. FOR EXAMPLE, STRATEGIES AND LIFESTYLE CHANGES THAT REDUCE NEGATIVE MOODS, STRESS, AND EXPOSURE TO SMOKING CUES
2018  EMPLOYEE INFORMATION: EMPLOYER INFORMATION:   NAME: Chery TURNER [employer] TO[employer]   : 1990 [phone]   DATE OF INJURY/EVENT: 2018          [facility]   Treating Provider: Gulshan Sargent MD,MPH  Time In:  10:15 AM Time Out:  10:54 AM      DIAGNOSIS:   1. Laceration of earlobe, left, subsequent encounter    2. Facial laceration, subsequent encounter    STATUS: This injury is determined to be WORK RELATED.   RETURN TO WORK:  Employee may return to work without restrictions.               RESTRICTIONS:   Diagnostic Testing:   None  ANTICIPATED MAXIMUM MEDICAL IMPROVEMENT:  1 week  TREATMENT PLAN:  Finish antibiotic as directed  Go to ER if ear swells  FOLLOW UP:  Is not necessary at this time.  Should your condition worsen or return, please do not hesitate to call and/or return to this clinic.    Thank you for the privilege of providing medical care for this injury/condition.  If there are any questions, please call the clinic at Dept: [phone].    Electronically signed on 2018 at 10:44 AM by:   Gulshan Sargent MD,MPH   Medical Director  [facility]    
Statement Selected

## 2018-05-16 NOTE — DISCHARGE NOTE ADULT - CARE PROVIDER_API CALL
Chagn Angeles (DO), Family Medicine  [address] [facility] [address]  [place] [address]  Phone: [phone]  Fax: [phone]

## 2018-05-16 NOTE — DISCHARGE NOTE ADULT - PATIENT PORTAL LINK FT
You can access the [facility], offered by [facility], by registering with the following website: [email or web address]

## 2018-06-24 ENCOUNTER — EMERGENCY (EMERGENCY)
Facility: HOSPITAL | Age: 28
LOS: 1 days | Discharge: HOME | End: 2018-06-24
Attending: STUDENT IN AN ORGANIZED HEALTH CARE EDUCATION/TRAINING PROGRAM
Payer: COMMERCIAL

## 2018-06-24 VITALS — HEART RATE: 108 BPM | OXYGEN SATURATION: 95 % | RESPIRATION RATE: 18 BRPM

## 2018-06-24 VITALS
RESPIRATION RATE: 18 BRPM | TEMPERATURE: 99 F | SYSTOLIC BLOOD PRESSURE: 126 MMHG | HEIGHT: 67 IN | OXYGEN SATURATION: 97 % | DIASTOLIC BLOOD PRESSURE: 91 MMHG | HEART RATE: 120 BPM | WEIGHT: 169.98 LBS

## 2018-06-24 LAB
ALBUMIN SERPL ELPH-MCNC: 4.4 G/DL — SIGNIFICANT CHANGE UP (ref 3.3–5.2)
ALP SERPL-CCNC: 108 U/L — SIGNIFICANT CHANGE UP (ref 40–120)
ALT FLD-CCNC: 23 U/L — SIGNIFICANT CHANGE UP
AMPHET UR-MCNC: NEGATIVE — SIGNIFICANT CHANGE UP
ANION GAP SERPL CALC-SCNC: 17 MMOL/L — SIGNIFICANT CHANGE UP (ref 5–17)
AST SERPL-CCNC: 35 U/L — SIGNIFICANT CHANGE UP
BARBITURATES UR SCN-MCNC: NEGATIVE — SIGNIFICANT CHANGE UP
BASOPHILS # BLD AUTO: 0 K/UL — SIGNIFICANT CHANGE UP (ref 0–0.2)
BASOPHILS NFR BLD AUTO: 0.2 % — SIGNIFICANT CHANGE UP (ref 0–2)
BENZODIAZ UR-MCNC: NEGATIVE — SIGNIFICANT CHANGE UP
BILIRUB SERPL-MCNC: 0.8 MG/DL — SIGNIFICANT CHANGE UP (ref 0.4–2)
BUN SERPL-MCNC: 3 MG/DL — LOW (ref 8–20)
CALCIUM SERPL-MCNC: 9.1 MG/DL — SIGNIFICANT CHANGE UP (ref 8.6–10.2)
CHLORIDE SERPL-SCNC: 101 MMOL/L — SIGNIFICANT CHANGE UP (ref 98–107)
CO2 SERPL-SCNC: 23 MMOL/L — SIGNIFICANT CHANGE UP (ref 22–29)
COCAINE METAB.OTHER UR-MCNC: NEGATIVE — SIGNIFICANT CHANGE UP
CREAT SERPL-MCNC: 0.69 MG/DL — SIGNIFICANT CHANGE UP (ref 0.5–1.3)
EOSINOPHIL # BLD AUTO: 0 K/UL — SIGNIFICANT CHANGE UP (ref 0–0.5)
EOSINOPHIL NFR BLD AUTO: 1.2 % — SIGNIFICANT CHANGE UP (ref 0–5)
ETHANOL SERPL-MCNC: 391 MG/DL — SIGNIFICANT CHANGE UP
GLUCOSE SERPL-MCNC: 224 MG/DL — HIGH (ref 70–115)
HCT VFR BLD CALC: 41.5 % — LOW (ref 42–52)
HGB BLD-MCNC: 14.9 G/DL — SIGNIFICANT CHANGE UP (ref 14–18)
LIDOCAIN IGE QN: 7 U/L — LOW (ref 22–51)
LYMPHOCYTES # BLD AUTO: 0.9 K/UL — LOW (ref 1–4.8)
LYMPHOCYTES # BLD AUTO: 21.8 % — SIGNIFICANT CHANGE UP (ref 20–55)
MCHC RBC-ENTMCNC: 32.5 PG — HIGH (ref 27–31)
MCHC RBC-ENTMCNC: 35.9 G/DL — SIGNIFICANT CHANGE UP (ref 32–36)
MCV RBC AUTO: 90.6 FL — SIGNIFICANT CHANGE UP (ref 80–94)
METHADONE UR-MCNC: NEGATIVE — SIGNIFICANT CHANGE UP
MONOCYTES # BLD AUTO: 0.4 K/UL — SIGNIFICANT CHANGE UP (ref 0–0.8)
MONOCYTES NFR BLD AUTO: 8.7 % — SIGNIFICANT CHANGE UP (ref 3–10)
NEUTROPHILS # BLD AUTO: 2.9 K/UL — SIGNIFICANT CHANGE UP (ref 1.8–8)
NEUTROPHILS NFR BLD AUTO: 67.9 % — SIGNIFICANT CHANGE UP (ref 37–73)
OPIATES UR-MCNC: NEGATIVE — SIGNIFICANT CHANGE UP
PCP SPEC-MCNC: SIGNIFICANT CHANGE UP
PCP UR-MCNC: NEGATIVE — SIGNIFICANT CHANGE UP
PLATELET # BLD AUTO: 138 K/UL — LOW (ref 150–400)
POTASSIUM SERPL-MCNC: 4.1 MMOL/L — SIGNIFICANT CHANGE UP (ref 3.5–5.3)
POTASSIUM SERPL-SCNC: 4.1 MMOL/L — SIGNIFICANT CHANGE UP (ref 3.5–5.3)
PROT SERPL-MCNC: 7.9 G/DL — SIGNIFICANT CHANGE UP (ref 6.6–8.7)
RBC # BLD: 4.58 M/UL — LOW (ref 4.6–6.2)
RBC # FLD: 13.2 % — SIGNIFICANT CHANGE UP (ref 11–15.6)
SODIUM SERPL-SCNC: 141 MMOL/L — SIGNIFICANT CHANGE UP (ref 135–145)
THC UR QL: NEGATIVE — SIGNIFICANT CHANGE UP
TSH SERPL-MCNC: 1.78 UIU/ML — SIGNIFICANT CHANGE UP (ref 0.27–4.2)
WBC # BLD: 4.3 K/UL — LOW (ref 4.8–10.8)
WBC # FLD AUTO: 4.3 K/UL — LOW (ref 4.8–10.8)

## 2018-06-24 PROCEDURE — 83690 ASSAY OF LIPASE: CPT

## 2018-06-24 PROCEDURE — 93005 ELECTROCARDIOGRAM TRACING: CPT

## 2018-06-24 PROCEDURE — 84443 ASSAY THYROID STIM HORMONE: CPT

## 2018-06-24 PROCEDURE — 99285 EMERGENCY DEPT VISIT HI MDM: CPT

## 2018-06-24 PROCEDURE — 85027 COMPLETE CBC AUTOMATED: CPT

## 2018-06-24 PROCEDURE — 36415 COLL VENOUS BLD VENIPUNCTURE: CPT

## 2018-06-24 PROCEDURE — 99284 EMERGENCY DEPT VISIT MOD MDM: CPT

## 2018-06-24 PROCEDURE — 80053 COMPREHEN METABOLIC PANEL: CPT

## 2018-06-24 PROCEDURE — 93010 ELECTROCARDIOGRAM REPORT: CPT

## 2018-06-24 PROCEDURE — 80307 DRUG TEST PRSMV CHEM ANLYZR: CPT

## 2018-06-24 RX ADMIN — Medication 100 MILLIGRAM(S): at 20:36

## 2018-06-24 NOTE — ED PROVIDER NOTE - OBJECTIVE STATEMENT
28 y/o M, with hx of ETOH abuse, HTN, and pancreatitis, presents to the ED c/o palpitations and difficulty breathing, onset today.  Pt states that he was hanging out with friends, when he began to have a "panic attack" and difficulty breathing.  During episode pt states that he felt diaphoretic. Notes similar sx in the past. Pt states sx happen from "time to time" and are aggravated by stressful situations.  Denies LOC.  Pt also admits to ETOH use today approximately 5 hrs ago.  Pt states that he drinks 12 beers everyday.  Pt has been in detox before most recently 3 weeks ago.  Notes that he was sober for about 2 weeks before he started drinking again.  Denies SI or HI.  Denies fever, chills, N/V/D, cough, HA, abd pain, or back pain.

## 2018-06-24 NOTE — ED PROVIDER NOTE - PROGRESS NOTE DETAILS
Pt is feeling better. Discussed if pt would like to remain in the hospital or be d/c in care of sister.  Family will discuss and decide soon.

## 2018-06-24 NOTE — ED PROVIDER NOTE - MEDICAL DECISION MAKING DETAILS
Pt with episode of palpitations, possibly related to ETOH use, will evalaute for actute cause, if negative and pt doesn't want detox, will d/c home.  If pt wishes to return to detox, will speak to social work in the morning.  Will obtain labs, EKG, and reeval.

## 2018-06-24 NOTE — ED PROVIDER NOTE - PSYCHIATRIC, MLM
Oriented - self; Oriented - place; Oriented - time
Alert and oriented to person, place, time/situation. normal mood and affect. no apparent risk to self or others.

## 2018-06-24 NOTE — ED PROVIDER NOTE - CONSTITUTIONAL, MLM
normal... Diaphoretic appearing, well nourished, awake, alert, oriented to person, place, time/situation and in no apparent distress.

## 2018-06-24 NOTE — ED ADULT NURSE NOTE - OBJECTIVE STATEMENT
pt here c/o had difficulty of breathing, c/o chest and deuce discomfort, pt admits to drinks every day a 12 pack and smokes   c/o anxiety   at this time a&ox4, cooperative no acute respiratory nor cardiac distress noted, yellowish eye discoloration slight anxiety pt here c/o had difficulty of breathing, c/o chest and deuce discomfort, pt admits to drinks every day a 12 pack and smokes   c/o anxiety   at this time a&ox4, cooperative no acute respiratory nor cardiac distress noted, yellowish eye discoloration slight anxiety last alcoholic drink approxiamtely 3 hrs ago

## 2018-06-24 NOTE — ED ADULT TRIAGE NOTE - CHIEF COMPLAINT QUOTE
Pt BIBA, hx of ETOH abuse c/o alcohol intoxication from drinking heavily with his friends. Denies any drug use. Denies any falls or injuries. Pt  undressed and placed in yellow gown.

## 2018-06-25 ENCOUNTER — EMERGENCY (EMERGENCY)
Facility: HOSPITAL | Age: 28
LOS: 1 days | Discharge: HOME | End: 2018-06-25
Attending: EMERGENCY MEDICINE
Payer: COMMERCIAL

## 2018-06-25 VITALS — HEIGHT: 67 IN | WEIGHT: 179.9 LBS

## 2018-06-25 LAB
ALBUMIN SERPL ELPH-MCNC: 4.5 G/DL — SIGNIFICANT CHANGE UP (ref 3.3–5.2)
ALP SERPL-CCNC: 107 U/L — SIGNIFICANT CHANGE UP (ref 40–120)
ALT FLD-CCNC: 24 U/L — SIGNIFICANT CHANGE UP
ANION GAP SERPL CALC-SCNC: 19 MMOL/L — HIGH (ref 5–17)
APAP SERPL-MCNC: <7.5 UG/ML — LOW (ref 10–26)
AST SERPL-CCNC: 36 U/L — SIGNIFICANT CHANGE UP
BASOPHILS # BLD AUTO: 0 K/UL — SIGNIFICANT CHANGE UP (ref 0–0.2)
BASOPHILS NFR BLD AUTO: 0.2 % — SIGNIFICANT CHANGE UP (ref 0–2)
BILIRUB SERPL-MCNC: 1.3 MG/DL — SIGNIFICANT CHANGE UP (ref 0.4–2)
BUN SERPL-MCNC: 4 MG/DL — LOW (ref 8–20)
CALCIUM SERPL-MCNC: 9.6 MG/DL — SIGNIFICANT CHANGE UP (ref 8.6–10.2)
CHLORIDE SERPL-SCNC: 99 MMOL/L — SIGNIFICANT CHANGE UP (ref 98–107)
CO2 SERPL-SCNC: 23 MMOL/L — SIGNIFICANT CHANGE UP (ref 22–29)
CREAT SERPL-MCNC: 0.62 MG/DL — SIGNIFICANT CHANGE UP (ref 0.5–1.3)
EOSINOPHIL # BLD AUTO: 0 K/UL — SIGNIFICANT CHANGE UP (ref 0–0.5)
EOSINOPHIL NFR BLD AUTO: 0.3 % — SIGNIFICANT CHANGE UP (ref 0–5)
ETHANOL SERPL-MCNC: 362 MG/DL — SIGNIFICANT CHANGE UP
GLUCOSE SERPL-MCNC: 219 MG/DL — HIGH (ref 70–115)
HCT VFR BLD CALC: 41.7 % — LOW (ref 42–52)
HGB BLD-MCNC: 14.4 G/DL — SIGNIFICANT CHANGE UP (ref 14–18)
LYMPHOCYTES # BLD AUTO: 1.3 K/UL — SIGNIFICANT CHANGE UP (ref 1–4.8)
LYMPHOCYTES # BLD AUTO: 20.3 % — SIGNIFICANT CHANGE UP (ref 20–55)
MCHC RBC-ENTMCNC: 31.9 PG — HIGH (ref 27–31)
MCHC RBC-ENTMCNC: 34.5 G/DL — SIGNIFICANT CHANGE UP (ref 32–36)
MCV RBC AUTO: 92.5 FL — SIGNIFICANT CHANGE UP (ref 80–94)
MONOCYTES # BLD AUTO: 0.7 K/UL — SIGNIFICANT CHANGE UP (ref 0–0.8)
MONOCYTES NFR BLD AUTO: 10.7 % — HIGH (ref 3–10)
NEUTROPHILS # BLD AUTO: 4.5 K/UL — SIGNIFICANT CHANGE UP (ref 1.8–8)
NEUTROPHILS NFR BLD AUTO: 68.3 % — SIGNIFICANT CHANGE UP (ref 37–73)
PLATELET # BLD AUTO: 122 K/UL — LOW (ref 150–400)
POTASSIUM SERPL-MCNC: 3.9 MMOL/L — SIGNIFICANT CHANGE UP (ref 3.5–5.3)
POTASSIUM SERPL-SCNC: 3.9 MMOL/L — SIGNIFICANT CHANGE UP (ref 3.5–5.3)
PROT SERPL-MCNC: 8 G/DL — SIGNIFICANT CHANGE UP (ref 6.6–8.7)
RBC # BLD: 4.51 M/UL — LOW (ref 4.6–6.2)
RBC # FLD: 13.5 % — SIGNIFICANT CHANGE UP (ref 11–15.6)
SALICYLATES SERPL-MCNC: <0.6 MG/DL — LOW (ref 10–20)
SODIUM SERPL-SCNC: 141 MMOL/L — SIGNIFICANT CHANGE UP (ref 135–145)
TSH SERPL-MCNC: 1.86 UIU/ML — SIGNIFICANT CHANGE UP (ref 0.27–4.2)
WBC # BLD: 6.5 K/UL — SIGNIFICANT CHANGE UP (ref 4.8–10.8)
WBC # FLD AUTO: 6.5 K/UL — SIGNIFICANT CHANGE UP (ref 4.8–10.8)

## 2018-06-25 PROCEDURE — 93010 ELECTROCARDIOGRAM REPORT: CPT

## 2018-06-25 PROCEDURE — 99284 EMERGENCY DEPT VISIT MOD MDM: CPT

## 2018-06-25 RX ORDER — SODIUM CHLORIDE 9 MG/ML
1000 INJECTION INTRAMUSCULAR; INTRAVENOUS; SUBCUTANEOUS
Qty: 0 | Refills: 0 | Status: DISCONTINUED | OUTPATIENT
Start: 2018-06-25 | End: 2018-06-30

## 2018-06-25 RX ORDER — METOPROLOL TARTRATE 50 MG
50 TABLET ORAL ONCE
Qty: 0 | Refills: 0 | Status: COMPLETED | OUTPATIENT
Start: 2018-06-25 | End: 2018-06-25

## 2018-06-25 RX ORDER — IBUPROFEN 200 MG
600 TABLET ORAL ONCE
Qty: 0 | Refills: 0 | Status: COMPLETED | OUTPATIENT
Start: 2018-06-25 | End: 2018-06-25

## 2018-06-25 RX ORDER — CHLORPROMAZINE HCL 10 MG
50 TABLET ORAL ONCE
Qty: 0 | Refills: 0 | Status: DISCONTINUED | OUTPATIENT
Start: 2018-06-25 | End: 2018-06-25

## 2018-06-25 RX ADMIN — Medication 100 MILLIGRAM(S): at 20:01

## 2018-06-25 RX ADMIN — SODIUM CHLORIDE 125 MILLILITER(S): 9 INJECTION INTRAMUSCULAR; INTRAVENOUS; SUBCUTANEOUS at 20:01

## 2018-06-25 NOTE — ED PROVIDER NOTE - PLAN OF CARE
You were seen and evaluated in the emergency department for alcohol abuse and mild withdrawal symptoms. You were offered detox and declined. You did not want to stay for observation. We have provided you with resources to help place you in outpatient rehab if you change your mind or you may return here. Do not stop drinking abruptly as this can be dangerous. Your alcohol withdrawal is mild and will be controlled if you continue to drink alcohol. Please return if your symptoms worsen despite drinking, if you have a seizure, or have other worsening or concerning new symptoms or if you change your mind and wish to go to rehab.

## 2018-06-25 NOTE — ED PROVIDER NOTE - DATA REVIEWED, MDM
Patient was contacted and informed of his psa and the need for a re-draw.  Lab appt scheduled.   nurses notes/vital signs

## 2018-06-25 NOTE — ED PROVIDER NOTE - CARE PLAN
Principal Discharge DX:	Alcohol dependence  Assessment and plan of treatment:	You were seen and evaluated in the emergency department for alcohol abuse and mild withdrawal symptoms. You were offered detox and declined. You did not want to stay for observation. We have provided you with resources to help place you in outpatient rehab if you change your mind or you may return here. Do not stop drinking abruptly as this can be dangerous. Your alcohol withdrawal is mild and will be controlled if you continue to drink alcohol. Please return if your symptoms worsen despite drinking, if you have a seizure, or have other worsening or concerning new symptoms or if you change your mind and wish to go to rehab.  Secondary Diagnosis:	Uncomplicated alcohol withdrawal without perceptual disturbances

## 2018-06-25 NOTE — ED ADULT TRIAGE NOTE - CHIEF COMPLAINT QUOTE
pt here after signing out AMA last night, pt had htn and dm and had stopped taking his medications due to drinking alcohol. now tried to go to [facility]  for detox but they sent  in

## 2018-06-25 NOTE — ED ADULT NURSE NOTE - DISCHARGE TEACHING
Utilize referrals given for Detox and follow up asap. Return to ED if withdrawal sx return or worsen.

## 2018-06-25 NOTE — ED PROVIDER NOTE - PROGRESS NOTE DETAILS
ATTENDING MD Jorge: Pt stable, mild benzo requirements. tells SBIRT this AM does not want to go to rehab. Wants to be discharged. Plans to go home and drink. Minimal CIWA score currently, does not plan to abstain. Is clinically sober and of sound mind. SBIRT will provide with additional resources. Given plans to drink patient is at low risk for DTs. He  has been offered and refuses hospital admission vs observation. Pt is stable for discharge. Withdrawal symptoms continue to be stable. Pt again expresses plans to drink. Lives at home with family will not be by himself. Pt stable for discharge

## 2018-06-25 NOTE — ED ADULT NURSE NOTE - OBJECTIVE STATEMENT
patient awake and alert -s tates that he has pain to bilateral shoulders states that it is from smoking - states that he drinks everyday and was drinking today and had elevated blood pressure then started to sweat and friends were concerned - patient states that his last drink was at 4pm today - patient with warm skin - breath sounds clear bilaterally - abdomen soft - denies nausea or vomitting at this time. states that he was hyperventilating yesterday, states that he came in and was given option of going home or staying and he left - patient returns today with elevated blood pressure and sweating and states tremors and states a replase - last time he had DT's was in 2016

## 2018-06-25 NOTE — ED PROVIDER NOTE - OBJECTIVE STATEMENT
26 y/o pt with hx of HTN, pancreatitis, DM and alcohol abuse presents to the ED c/o detoxification and no significant PSHx. He admits to numbness in his feet, specifically in his toes, feeling "shakey", and pain in his upper back. Denies nausea, vomiting, chills dizziness and SOB. No further complaints at this time.    PMD: 28 y/o pt with hx of HTN, pancreatitis, DM and alcohol abuse presents to the ED for EtOH intoxication. He admits to intermittent numbness in his feet, specifically in his toes, feeling "shaky", and pain in his upper back. Pt does not have any numbness at this time. Denies nausea, vomiting, chills dizziness and SOB. Pt is on metformin and HTN medications. No further complaints at this time.

## 2018-06-26 VITALS
RESPIRATION RATE: 18 BRPM | HEART RATE: 84 BPM | DIASTOLIC BLOOD PRESSURE: 82 MMHG | TEMPERATURE: 98 F | SYSTOLIC BLOOD PRESSURE: 140 MMHG | OXYGEN SATURATION: 97 %

## 2018-06-26 LAB
AMPHET UR-MCNC: NEGATIVE — SIGNIFICANT CHANGE UP
BARBITURATES UR SCN-MCNC: NEGATIVE — SIGNIFICANT CHANGE UP
BENZODIAZ UR-MCNC: POSITIVE
COCAINE METAB.OTHER UR-MCNC: NEGATIVE — SIGNIFICANT CHANGE UP
ETHANOL SERPL-MCNC: 111 MG/DL — SIGNIFICANT CHANGE UP
METHADONE UR-MCNC: NEGATIVE — SIGNIFICANT CHANGE UP
OPIATES UR-MCNC: NEGATIVE — SIGNIFICANT CHANGE UP
PCP SPEC-MCNC: SIGNIFICANT CHANGE UP
PCP UR-MCNC: NEGATIVE — SIGNIFICANT CHANGE UP
THC UR QL: NEGATIVE — SIGNIFICANT CHANGE UP

## 2018-06-26 PROCEDURE — 85027 COMPLETE CBC AUTOMATED: CPT

## 2018-06-26 PROCEDURE — 93005 ELECTROCARDIOGRAM TRACING: CPT

## 2018-06-26 PROCEDURE — 99285 EMERGENCY DEPT VISIT HI MDM: CPT

## 2018-06-26 PROCEDURE — 84443 ASSAY THYROID STIM HORMONE: CPT

## 2018-06-26 PROCEDURE — 80307 DRUG TEST PRSMV CHEM ANLYZR: CPT

## 2018-06-26 PROCEDURE — 36415 COLL VENOUS BLD VENIPUNCTURE: CPT

## 2018-06-26 PROCEDURE — 80053 COMPREHEN METABOLIC PANEL: CPT

## 2018-06-26 RX ADMIN — Medication 100 MILLIGRAM(S): at 09:30

## 2018-06-26 RX ADMIN — SODIUM CHLORIDE 125 MILLILITER(S): 9 INJECTION INTRAMUSCULAR; INTRAVENOUS; SUBCUTANEOUS at 08:33

## 2018-06-26 NOTE — ED ADULT NURSE REASSESSMENT NOTE - NS ED NURSE REASSESS COMMENT FT1
Pt. symptoms improved and CIWA of 1 at this time. Pt. v/s stable and able to ambulate with a steady gait. Pt. is safe for d/c.
patient resting comfortably - sister at bedside patient refused motrin states " I don't need it anymore" - blood pressure taken - too low to take lopressor at this time,. saline lock intact and patent. no signs of tremors noted
sister at bedside - patient states that he is tired and his back is sore,
Pt. CIWA increased, informed MD Patel.
Assumed pt. care from Godfrey TALBERT. Pt. is a/o x4, calm, and cooperative. Pt. is in yellow gown with clothing removed. 20g R arm IV access in place. Pt. denies any pain or SOB at this time. Lungs CTA, no rales, rhonchi, or wheezes. Normal S1,S2. Pt. skin warm, dry, intact. Pt. informed up plan of care. Will continue to monitor pt.

## 2018-06-26 NOTE — SBIRT NOTE. - NSSBIRTSERVICES_GEN_A_ED_FT
Provided SBIRT services: Full screen positive. Referral to Treatment Performed. Screening results were reviewed with the patient and patient was provided information about healthy guidelines and potential negative consequences associated with level of risk. Motivation and readiness to reduce or stop use was discussed and goals and activities to make changes were suggested/offered.  Referral for complete assessment and level of care determination at a certified treatment facility was completed by contacting the treatment facility via phone, and add apt info as noted below:  Appt confirmed with [facility] will be here by 12pm to meet with patient -  Audit Score: 36  DAST Score: 2  Duration = 45 Minutes

## 2018-06-26 NOTE — ED ADULT NURSE REASSESSMENT NOTE - GENERAL PATIENT STATE
cooperative/comfortable appearance
comfortable appearance/cooperative
cooperative/comfortable appearance

## 2018-07-01 ENCOUNTER — EMERGENCY (EMERGENCY)
Facility: HOSPITAL | Age: 28
LOS: 1 days | Discharge: HOME | End: 2018-07-01
Attending: STUDENT IN AN ORGANIZED HEALTH CARE EDUCATION/TRAINING PROGRAM
Payer: COMMERCIAL

## 2018-07-01 VITALS — HEIGHT: 68 IN | WEIGHT: 179.9 LBS

## 2018-07-01 LAB
BASOPHILS # BLD AUTO: 0 K/UL — SIGNIFICANT CHANGE UP (ref 0–0.2)
BASOPHILS NFR BLD AUTO: 0.8 % — SIGNIFICANT CHANGE UP (ref 0–2)
EOSINOPHIL # BLD AUTO: 0.2 K/UL — SIGNIFICANT CHANGE UP (ref 0–0.5)
EOSINOPHIL NFR BLD AUTO: 4.4 % — SIGNIFICANT CHANGE UP (ref 0–5)
HCT VFR BLD CALC: 37.6 % — LOW (ref 42–52)
HGB BLD-MCNC: 12.6 G/DL — LOW (ref 14–18)
LYMPHOCYTES # BLD AUTO: 1.7 K/UL — SIGNIFICANT CHANGE UP (ref 1–4.8)
LYMPHOCYTES # BLD AUTO: 46 % — SIGNIFICANT CHANGE UP (ref 20–55)
MCHC RBC-ENTMCNC: 31.8 PG — HIGH (ref 27–31)
MCHC RBC-ENTMCNC: 33.5 G/DL — SIGNIFICANT CHANGE UP (ref 32–36)
MCV RBC AUTO: 94.9 FL — HIGH (ref 80–94)
MONOCYTES # BLD AUTO: 0.3 K/UL — SIGNIFICANT CHANGE UP (ref 0–0.8)
MONOCYTES NFR BLD AUTO: 8.2 % — SIGNIFICANT CHANGE UP (ref 3–10)
NEUTROPHILS # BLD AUTO: 1.5 K/UL — LOW (ref 1.8–8)
NEUTROPHILS NFR BLD AUTO: 40.6 % — SIGNIFICANT CHANGE UP (ref 37–73)
PLATELET # BLD AUTO: 111 K/UL — LOW (ref 150–400)
RBC # BLD: 3.96 M/UL — LOW (ref 4.6–6.2)
RBC # FLD: 14.3 % — SIGNIFICANT CHANGE UP (ref 11–15.6)
WBC # BLD: 3.6 K/UL — LOW (ref 4.8–10.8)
WBC # FLD AUTO: 3.6 K/UL — LOW (ref 4.8–10.8)

## 2018-07-01 PROCEDURE — 99284 EMERGENCY DEPT VISIT MOD MDM: CPT

## 2018-07-01 PROCEDURE — 93010 ELECTROCARDIOGRAM REPORT: CPT

## 2018-07-01 RX ORDER — SODIUM CHLORIDE 9 MG/ML
3 INJECTION INTRAMUSCULAR; INTRAVENOUS; SUBCUTANEOUS ONCE
Qty: 0 | Refills: 0 | Status: COMPLETED | OUTPATIENT
Start: 2018-07-01 | End: 2018-07-01

## 2018-07-01 RX ADMIN — SODIUM CHLORIDE 3 MILLILITER(S): 9 INJECTION INTRAMUSCULAR; INTRAVENOUS; SUBCUTANEOUS at 22:47

## 2018-07-01 NOTE — ED PROVIDER NOTE - PROGRESS NOTE DETAILS
Pt's girlfriend states that she went to go shopping and when she returned pt was on the floor, but awake and alert.  Girlfriend states that he has been falling more frequently and is concerned about pt's HA.  Given new information about possible recent trauma, will obtain CT head and reeval Labs and CT are as noted. Patient stable to be discharged home with significant other whom is willing to accept caring for the patient.

## 2018-07-01 NOTE — ED ADULT NURSE NOTE - OBJECTIVE STATEMENT
Brought to ED by spouse, pt offers no complaints, alcohol on breath, poor historian, admits to "a 12-pack" drinking alcohol daily

## 2018-07-01 NOTE — ED ADULT TRIAGE NOTE - CHIEF COMPLAINT QUOTE
Patient presents to ER for evaluation, as per wife patient diaphoretic and altered, reports  not complying with HTN and DM medications, patient appears intoxicated

## 2018-07-01 NOTE — ED PROVIDER NOTE - OBJECTIVE STATEMENT
28 y/o M, with hx of ETOH abuse, DM, HTN, and pancreatitis, presents to the ED with girlfriend at bedside.  As per girlfriend, pt was c/o HA, diaphoresis, and dizziness at home.  Girlfriend states that shortly after taking medications, pt appeared "sweaty".  Girlfriend states that she unsure what medications he took and how much.  Pt admits to drinking alcohol today.  Denies SOB, cough, chest pain, fever, chills, N/V/D, or abd pain.

## 2018-07-01 NOTE — ED PROVIDER NOTE - CONSTITUTIONAL, MLM
normal... Strong smell of alcohol on breath, slight slurred speech, awake, alert, oriented to person, place, time/situation and in no apparent distress.

## 2018-07-02 VITALS
DIASTOLIC BLOOD PRESSURE: 70 MMHG | OXYGEN SATURATION: 95 % | RESPIRATION RATE: 20 BRPM | TEMPERATURE: 97 F | SYSTOLIC BLOOD PRESSURE: 116 MMHG | HEART RATE: 75 BPM

## 2018-07-02 LAB
ALBUMIN SERPL ELPH-MCNC: 4.2 G/DL — SIGNIFICANT CHANGE UP (ref 3.3–5.2)
ALP SERPL-CCNC: 88 U/L — SIGNIFICANT CHANGE UP (ref 40–120)
ALT FLD-CCNC: 25 U/L — SIGNIFICANT CHANGE UP
ANION GAP SERPL CALC-SCNC: 21 MMOL/L — HIGH (ref 5–17)
AST SERPL-CCNC: 41 U/L — HIGH
BILIRUB SERPL-MCNC: 0.4 MG/DL — SIGNIFICANT CHANGE UP (ref 0.4–2)
BUN SERPL-MCNC: <3 MG/DL — LOW (ref 8–20)
CALCIUM SERPL-MCNC: 8.6 MG/DL — SIGNIFICANT CHANGE UP (ref 8.6–10.2)
CHLORIDE SERPL-SCNC: 104 MMOL/L — SIGNIFICANT CHANGE UP (ref 98–107)
CK MB CFR SERPL CALC: 1.3 NG/ML — SIGNIFICANT CHANGE UP (ref 0–6.7)
CK SERPL-CCNC: 216 U/L — HIGH (ref 30–200)
CO2 SERPL-SCNC: 22 MMOL/L — SIGNIFICANT CHANGE UP (ref 22–29)
CREAT SERPL-MCNC: 0.68 MG/DL — SIGNIFICANT CHANGE UP (ref 0.5–1.3)
ETHANOL SERPL-MCNC: 475 MG/DL — SIGNIFICANT CHANGE UP
GLUCOSE SERPL-MCNC: 186 MG/DL — HIGH (ref 70–115)
LIDOCAIN IGE QN: 8 U/L — LOW (ref 22–51)
POTASSIUM SERPL-MCNC: 4 MMOL/L — SIGNIFICANT CHANGE UP (ref 3.5–5.3)
POTASSIUM SERPL-SCNC: 4 MMOL/L — SIGNIFICANT CHANGE UP (ref 3.5–5.3)
PROT SERPL-MCNC: 7.2 G/DL — SIGNIFICANT CHANGE UP (ref 6.6–8.7)
SODIUM SERPL-SCNC: 147 MMOL/L — HIGH (ref 135–145)
TROPONIN T SERPL-MCNC: <0.01 NG/ML — SIGNIFICANT CHANGE UP (ref 0–0.06)

## 2018-07-02 PROCEDURE — 84484 ASSAY OF TROPONIN QUANT: CPT

## 2018-07-02 PROCEDURE — 83690 ASSAY OF LIPASE: CPT

## 2018-07-02 PROCEDURE — 36415 COLL VENOUS BLD VENIPUNCTURE: CPT

## 2018-07-02 PROCEDURE — 85027 COMPLETE CBC AUTOMATED: CPT

## 2018-07-02 PROCEDURE — 82553 CREATINE MB FRACTION: CPT

## 2018-07-02 PROCEDURE — 70450 CT HEAD/BRAIN W/O DYE: CPT

## 2018-07-02 PROCEDURE — 80053 COMPREHEN METABOLIC PANEL: CPT

## 2018-07-02 PROCEDURE — 93005 ELECTROCARDIOGRAM TRACING: CPT

## 2018-07-02 PROCEDURE — 80307 DRUG TEST PRSMV CHEM ANLYZR: CPT

## 2018-07-02 PROCEDURE — 82550 ASSAY OF CK (CPK): CPT

## 2018-07-02 PROCEDURE — 70450 CT HEAD/BRAIN W/O DYE: CPT | Mod: 26

## 2018-07-02 PROCEDURE — 99285 EMERGENCY DEPT VISIT HI MDM: CPT | Mod: 25

## 2018-07-02 RX ADMIN — Medication 100 MILLIGRAM(S): at 00:20

## 2018-07-02 NOTE — ED ADULT NURSE REASSESSMENT NOTE - NS ED NURSE REASSESS COMMENT FT1
Pt reports feeling better, discharge instructions given and explained, questions encouraged and answered appropriately, PIV d/c'd per facility protocol, pt tolerated well, DCD in place, pt safely discharged home with spouse. Pt reports feeling better, discharge instructions given and explained, questions encouraged and answered appropriately, PIV d/c'd per facility protocol, pt tolerated well, DCD in place, pt safely discharged home with spouse who is assuming responsibility for pt at this time.

## 2018-11-08 NOTE — ED ADULT NURSE NOTE - NS ED NURSE DISCH DISPOSITION
Outcome: Left Message    Please transfer to Patient Outreach Team at [phone] when patient returns call.    Attempt # 3         Admitted

## 2019-05-10 ENCOUNTER — EMERGENCY (EMERGENCY)
Facility: HOSPITAL | Age: 29
LOS: 1 days | Discharge: HOME | End: 2019-05-10
Attending: EMERGENCY MEDICINE
Payer: COMMERCIAL

## 2019-05-10 VITALS
RESPIRATION RATE: 18 BRPM | SYSTOLIC BLOOD PRESSURE: 152 MMHG | HEART RATE: 75 BPM | WEIGHT: 149.91 LBS | OXYGEN SATURATION: 99 % | HEIGHT: 67 IN | TEMPERATURE: 99 F | DIASTOLIC BLOOD PRESSURE: 89 MMHG

## 2019-05-10 LAB
ALBUMIN SERPL ELPH-MCNC: 5.2 G/DL — SIGNIFICANT CHANGE UP (ref 3.3–5.2)
ALP SERPL-CCNC: 128 U/L — HIGH (ref 40–120)
ALT FLD-CCNC: 128 U/L — HIGH
ANION GAP SERPL CALC-SCNC: 20 MMOL/L — HIGH (ref 5–17)
AST SERPL-CCNC: 254 U/L — HIGH
BASOPHILS NFR BLD AUTO: 2 % — SIGNIFICANT CHANGE UP (ref 0–2)
BILIRUB SERPL-MCNC: 1 MG/DL — SIGNIFICANT CHANGE UP (ref 0.4–2)
BUN SERPL-MCNC: 6 MG/DL — LOW (ref 8–20)
CALCIUM SERPL-MCNC: 9.3 MG/DL — SIGNIFICANT CHANGE UP (ref 8.6–10.2)
CHLORIDE SERPL-SCNC: 95 MMOL/L — LOW (ref 98–107)
CO2 SERPL-SCNC: 23 MMOL/L — SIGNIFICANT CHANGE UP (ref 22–29)
CREAT SERPL-MCNC: 0.58 MG/DL — SIGNIFICANT CHANGE UP (ref 0.5–1.3)
GLUCOSE SERPL-MCNC: 307 MG/DL — HIGH (ref 70–115)
HCT VFR BLD CALC: 43.1 % — SIGNIFICANT CHANGE UP (ref 42–52)
HGB BLD-MCNC: 14.7 G/DL — SIGNIFICANT CHANGE UP (ref 14–18)
LIDOCAIN IGE QN: 21 U/L — LOW (ref 22–51)
LYMPHOCYTES # BLD AUTO: 37 % — SIGNIFICANT CHANGE UP (ref 20–55)
MCHC RBC-ENTMCNC: 32.8 PG — HIGH (ref 27–31)
MCHC RBC-ENTMCNC: 34.1 G/DL — SIGNIFICANT CHANGE UP (ref 32–36)
MCV RBC AUTO: 96.2 FL — HIGH (ref 80–94)
MONOCYTES NFR BLD AUTO: 7 % — SIGNIFICANT CHANGE UP (ref 3–10)
NEUTROPHILS NFR BLD AUTO: 54 % — SIGNIFICANT CHANGE UP (ref 37–73)
PLAT MORPH BLD: NORMAL — SIGNIFICANT CHANGE UP
PLATELET # BLD AUTO: 98 K/UL — LOW (ref 150–400)
POTASSIUM SERPL-MCNC: 4.8 MMOL/L — SIGNIFICANT CHANGE UP (ref 3.5–5.3)
POTASSIUM SERPL-SCNC: 4.8 MMOL/L — SIGNIFICANT CHANGE UP (ref 3.5–5.3)
PROT SERPL-MCNC: 8.4 G/DL — SIGNIFICANT CHANGE UP (ref 6.6–8.7)
RBC # BLD: 4.48 M/UL — LOW (ref 4.6–6.2)
RBC # FLD: 13.9 % — SIGNIFICANT CHANGE UP (ref 11–15.6)
RBC BLD AUTO: NORMAL — SIGNIFICANT CHANGE UP
SODIUM SERPL-SCNC: 138 MMOL/L — SIGNIFICANT CHANGE UP (ref 135–145)
WBC # BLD: 3 K/UL — LOW (ref 4.8–10.8)
WBC # FLD AUTO: 3 K/UL — LOW (ref 4.8–10.8)

## 2019-05-10 PROCEDURE — 76705 ECHO EXAM OF ABDOMEN: CPT | Mod: 26

## 2019-05-10 PROCEDURE — 99284 EMERGENCY DEPT VISIT MOD MDM: CPT

## 2019-05-10 RX ORDER — SODIUM CHLORIDE 9 MG/ML
3 INJECTION INTRAMUSCULAR; INTRAVENOUS; SUBCUTANEOUS ONCE
Refills: 0 | Status: COMPLETED | OUTPATIENT
Start: 2019-05-10 | End: 2019-05-10

## 2019-05-10 RX ORDER — SODIUM CHLORIDE 9 MG/ML
1000 INJECTION INTRAMUSCULAR; INTRAVENOUS; SUBCUTANEOUS ONCE
Refills: 0 | Status: COMPLETED | OUTPATIENT
Start: 2019-05-10 | End: 2019-05-10

## 2019-05-10 RX ADMIN — Medication 50 MILLIGRAM(S): at 22:05

## 2019-05-10 RX ADMIN — SODIUM CHLORIDE 1000 MILLILITER(S): 9 INJECTION INTRAMUSCULAR; INTRAVENOUS; SUBCUTANEOUS at 22:06

## 2019-05-10 RX ADMIN — SODIUM CHLORIDE 3 MILLILITER(S): 9 INJECTION INTRAMUSCULAR; INTRAVENOUS; SUBCUTANEOUS at 22:06

## 2019-05-10 NOTE — ED STATDOCS - PROGRESS NOTE DETAILS
29 y/o M pt with PMHx DM, HTN, pancreatitis presents to the ED c/o abdominal pain beginning 2 days ago.  Pt reports constant epigastric pain beginning 2 days ago, with associated N/V and chills.  Pt took tylenol x2 and advil x2 at home today.  He also reports intermittent R foot numbness for the past several months.  Pt drinks a 12-pack daily, last EtOH intake approx 16:00 yesterday.  Pt has been in withdrawals previously, no hx of seizures, he has been to rehab previously.  He takes metformin.  Denies fever, diarrhea, dysuria.  No further acute complaints at this time.  Pt will be sent to the Main ED for further treatment and evaluation. Initial orders placed. 29 y/o M pt with PMHx DM, HTN, pancreatitis presents to the ED c/o abdominal pain beginning 2 days ago.  Pt reports constant epigastric pain beginning 2 days ago, with associated N/V and chills.  Pt took tylenol x2 and advil x2 at home today.  He also reports intermittent R foot numbness for the past several months.  Pt drinks a 12-pack daily, last EtOH intake approx 16:00 yesterday.  Pt has been in withdrawals previously, no hx of seizures, he has been to rehab previously.  He takes metformin.  Denies fever, diarrhea, dysuria.    Concerned for withdrawal. Pt will be sent to the Main ED for further treatment and evaluation. Initial orders placed.

## 2019-05-11 LAB
APPEARANCE UR: CLEAR — SIGNIFICANT CHANGE UP
BILIRUB UR-MCNC: NEGATIVE — SIGNIFICANT CHANGE UP
COLOR SPEC: YELLOW — SIGNIFICANT CHANGE UP
DIFF PNL FLD: NEGATIVE — SIGNIFICANT CHANGE UP
EPI CELLS # UR: SIGNIFICANT CHANGE UP
GLUCOSE UR QL: 1000 MG/DL
KETONES UR-MCNC: ABNORMAL
LEUKOCYTE ESTERASE UR-ACNC: NEGATIVE — SIGNIFICANT CHANGE UP
NITRITE UR-MCNC: NEGATIVE — SIGNIFICANT CHANGE UP
PH UR: 6 — SIGNIFICANT CHANGE UP (ref 5–8)
PROT UR-MCNC: 100 MG/DL
SP GR SPEC: 1.01 — SIGNIFICANT CHANGE UP (ref 1.01–1.02)
UROBILINOGEN FLD QL: NEGATIVE MG/DL — SIGNIFICANT CHANGE UP

## 2019-05-11 PROCEDURE — 81001 URINALYSIS AUTO W/SCOPE: CPT

## 2019-05-11 PROCEDURE — 74177 CT ABD & PELVIS W/CONTRAST: CPT | Mod: 26

## 2019-05-11 PROCEDURE — 83690 ASSAY OF LIPASE: CPT

## 2019-05-11 PROCEDURE — 85027 COMPLETE CBC AUTOMATED: CPT

## 2019-05-11 PROCEDURE — 36415 COLL VENOUS BLD VENIPUNCTURE: CPT

## 2019-05-11 PROCEDURE — 74177 CT ABD & PELVIS W/CONTRAST: CPT

## 2019-05-11 PROCEDURE — 80053 COMPREHEN METABOLIC PANEL: CPT

## 2019-05-11 PROCEDURE — 76705 ECHO EXAM OF ABDOMEN: CPT

## 2019-05-11 PROCEDURE — 99285 EMERGENCY DEPT VISIT HI MDM: CPT | Mod: 25

## 2019-05-11 RX ORDER — SODIUM CHLORIDE 9 MG/ML
1000 INJECTION, SOLUTION INTRAVENOUS
Refills: 0 | Status: DISCONTINUED | OUTPATIENT
Start: 2019-05-11 | End: 2019-05-15

## 2019-05-11 NOTE — ED PROVIDER NOTE - CARE PLAN
Principal Discharge DX:	Gastroparesis  Secondary Diagnosis:	Alcohol intoxication  Secondary Diagnosis:	Hyperglycemia

## 2019-05-11 NOTE — ED PROVIDER NOTE - NS ED ROS FT
no weight change, no fever or chills  no recent travel, no recent abox, no sick contacts  no recent change in medications  no rash, no bruises  no visual changes no eye discharge  no cough cold or congestion,   no sob, no chest pain  does not follow with cardiology  no stress test, no cath  no orthopnea, no pnd  + abd pain and nausea, no v/d  no endoscopy, no colonoscopy  no hematuria, no change in urinary habits  no joint pain, no deformity  no headache, no paresthesia

## 2019-05-11 NOTE — ED PROVIDER NOTE - PHYSICAL EXAMINATION
Constitutional : Appears uncomfortable, talking in full sentences, had eaten a few cookies prior to evaluation   Head :NC AT , no swelling  Eyes :eomi, no swelling  Mouth :mm dry, evidence of tongue bite   Neck : supple, trachea in midline  Chest :Chuck air entry, symm chest expansion, no distress  Heart :S1 S2 distant  Abdomen :abd soft, epigastric tenderness, no rebound or guarding    Musc/Skel :ext no swelling, no deformity, no spine tenderness, distal pulses present  Neuro  :AAO 3 no focal deficits

## 2019-05-11 NOTE — ED PROVIDER NOTE - OBJECTIVE STATEMENT
Source: patient    29 y/o M with PMHx of alcohol abuse, DM, pancreatitis and HTN presents to ED c/o upper abdominal pain and nausea onset 2 days ago. Describes pain as similar to when he had pancreatitis and hoped it would go away, but it has been constant since onset. Admits to drinking EtOH daily since he was 17 years old, last drink was a few hours PTA. Denies fevers or chills, difficulty passing urine or stool, blood in the stool, vomiting, diarrhea or hx of withdrawal seizures. States that if he does not drink he experiences a sensation described as "bugs crawling all over me".

## 2019-05-11 NOTE — ED PROVIDER NOTE - CLINICAL SUMMARY MEDICAL DECISION MAKING FREE TEXT BOX
27 y/o M with hx of alcohol abuse and pancreatitis, check CT of abdomen, labs, give IV fluids, CIWA protocol, pain control, and re-evaluate.

## 2019-05-12 PROBLEM — E11.9 TYPE 2 DIABETES MELLITUS WITHOUT COMPLICATIONS: Chronic | Status: ACTIVE | Noted: 2018-06-25

## 2019-05-12 PROBLEM — I10 ESSENTIAL (PRIMARY) HYPERTENSION: Chronic | Status: ACTIVE | Noted: 2018-06-25

## 2019-07-08 ENCOUNTER — EMERGENCY (EMERGENCY)
Facility: HOSPITAL | Age: 29
LOS: 1 days | Discharge: LEFT BEFORE DISCHARGE | End: 2019-07-08

## 2019-07-08 VITALS
OXYGEN SATURATION: 96 % | DIASTOLIC BLOOD PRESSURE: 93 MMHG | TEMPERATURE: 98 F | WEIGHT: 164.02 LBS | HEART RATE: 102 BPM | HEIGHT: 67 IN | RESPIRATION RATE: 18 BRPM | SYSTOLIC BLOOD PRESSURE: 152 MMHG

## 2019-07-08 NOTE — ED ADULT TRIAGE NOTE - CHIEF COMPLAINT QUOTE
Patient brought from [facility], states he thinks his BP is high, hx of HTN and has not been taking his medication, states HA and dizziness since yesterday

## 2019-07-08 NOTE — ED ADULT NURSE NOTE - CHIEF COMPLAINT QUOTE
Patient brought from [facility], states he thinks his BP is high, hx of HTN and has not been taking his medication, states HA and dizziness since yesterday

## 2019-07-31 ENCOUNTER — EMERGENCY (EMERGENCY)
Facility: HOSPITAL | Age: 29
LOS: 1 days | Discharge: HOME | End: 2019-07-31
Attending: EMERGENCY MEDICINE
Payer: COMMERCIAL

## 2019-07-31 VITALS
SYSTOLIC BLOOD PRESSURE: 160 MMHG | TEMPERATURE: 98 F | WEIGHT: 164.91 LBS | HEIGHT: 67 IN | OXYGEN SATURATION: 100 % | RESPIRATION RATE: 20 BRPM | HEART RATE: 104 BPM | DIASTOLIC BLOOD PRESSURE: 102 MMHG

## 2019-07-31 PROCEDURE — 99285 EMERGENCY DEPT VISIT HI MDM: CPT

## 2019-07-31 RX ORDER — METOCLOPRAMIDE HCL 10 MG
10 TABLET ORAL ONCE
Refills: 0 | Status: COMPLETED | OUTPATIENT
Start: 2019-07-31 | End: 2019-07-31

## 2019-07-31 RX ORDER — SODIUM CHLORIDE 9 MG/ML
2000 INJECTION INTRAMUSCULAR; INTRAVENOUS; SUBCUTANEOUS ONCE
Refills: 0 | Status: DISCONTINUED | OUTPATIENT
Start: 2019-07-31 | End: 2019-07-31

## 2019-07-31 RX ORDER — SODIUM CHLORIDE 9 MG/ML
1000 INJECTION, SOLUTION INTRAVENOUS
Refills: 0 | Status: DISCONTINUED | OUTPATIENT
Start: 2019-07-31 | End: 2019-08-09

## 2019-07-31 RX ORDER — PANTOPRAZOLE SODIUM 20 MG/1
40 TABLET, DELAYED RELEASE ORAL ONCE
Refills: 0 | Status: COMPLETED | OUTPATIENT
Start: 2019-07-31 | End: 2019-07-31

## 2019-07-31 RX ADMIN — PANTOPRAZOLE SODIUM 40 MILLIGRAM(S): 20 TABLET, DELAYED RELEASE ORAL at 23:48

## 2019-07-31 RX ADMIN — Medication 2 MILLIGRAM(S): at 23:48

## 2019-07-31 RX ADMIN — Medication 10 MILLIGRAM(S): at 23:49

## 2019-07-31 NOTE — ED PROVIDER NOTE - OBJECTIVE STATEMENT
y/o M pt with significant PMHx of presents to the ED c/o diffuse abd pain onset 3 days ago. Pt says that his initial vomit was black and "like coffee groundfd   Reports 29 y/o M pt with significant PMHx of presents to the ED c/o diffuse abd pain onset 3 days ago. Pt says that his initial vomit was black and "like coffee groundfd   Reports 27 y/o M pt with significant PMHx of Alcohol abuse, Diabetes, HTN, and Pancreatitis presents to the ED c/o constant epigastric abd pain with multiple episodes of vomiting onset 3 days ago. The abdominal pain does not radiate. Pt states he had one episode of "coffee grounds emesis." He notes the vomiting episodes usually occurs after PO intake. Reports cold sweats. Pt took Advil and Ibuprofen for his symptoms which provided minimal to no relief. He says that he does not have black stool but found traces blood when cleaning himself s/p BM yesterday. No Personal or FHx of MI or Cardiac Disease. Pt says that he drinks the equivalent of a 12 pack of EtOH daily, but his last drink was yesterday. Denies Hx of ulcers or tears in his esophagus. Pt denies fevers/chills, ha, loc, focal neuro deficits, cp/sob/palp, abd pain/n/d, urinary symptoms, hematuria, recent travel and sick contacts. He is a current smoker. No illicit drug use. No further complaints at this time. 29 y/o M pt with significant PMHx of Alcohol abuse, Diabetes, HTN, and Pancreatitis presents to the ED c/o constant epigastric abd pain with multiple episodes of vomiting onset 3 days ago. The abdominal pain does not radiate. Pt states he had one episode of "coffee grounds emesis." 3 days ago none since then. He notes the vomiting episodes usually occurs after PO intake. Reports cold sweats. Pt took Advil and Ibuprofen for his symptoms which provided minimal to no relief. He says that he does not have black stool but found traces blood when cleaning himself s/p BM yesterday. No Personal or FHx of MI or Cardiac Disease. Pt says that he drinks the equivalent of a 12 pack of EtOH daily, but his last drink was yesterday. Denies Hx of ulcers or tears in his esophagus. Pt denies fevers/chills, ha, loc, focal neuro deficits, cp/sob/palp, abd pain/n/d, urinary symptoms, hematuria, recent travel and sick contacts. He is a current smoker. No illicit drug use. No further complaints at this time.

## 2019-07-31 NOTE — ED PROVIDER NOTE - CLINICAL SUMMARY MEDICAL DECISION MAKING FREE TEXT BOX
29 y/o M pt with significant PMHx of Alcohol abuse, Diabetes, HTN, and Pancreatitis presents to the ED c/o constant diffuse abd pain with associated vomiting onset 3 days ago. 29 y/o M pt with significant PMHx of Alcohol abuse, Diabetes, HTN, and Pancreatitis presents to the ED c/o constant diffuse abd pain with associated vomiting onset 3 days ago - pt with no acute findings on CT, labs at baseline, neg occult - pt stable for dc no vomiting here, tolerating po, stable for dc follow up with GI 29 y/o M pt with significant PMHx of Alcohol abuse, Diabetes, HTN, and Pancreatitis presents to the ED c/o constant diffuse abd pain with associated vomiting onset 3 days ago - pt with no acute findings on CT, labs at baseline, neg occult - pt stable for dc no vomiting here, tolerating po, stable for dc follow up with GI but requesting rehab - will have him see SW/SBIRT

## 2019-07-31 NOTE — ED PROVIDER NOTE - PROGRESS NOTE DETAILS
Given the significant and immediate threats to this patient based on initial presentation, he would benefit from a contrast-enhanced CT although he is under the influence of alcohol. Pt agrees to test at this time as well. Patient seen and reassessed.   Patient is AAOX3, NAD, able to ambulate, non tremulous, and tolerating oral intake.   VSS.   Patient states that they feel safe going home and have a safe way to get home.   Give copy of tests performed.   Discussed need to cut down on alcohol intake, given literature. pt initially wanting to go then requesting rehab, will stay for SBIRT/SW rachel: pt to go to inpatient detox accepted by dr. middleton medically cleared

## 2019-07-31 NOTE — ED ADULT NURSE NOTE - OBJECTIVE STATEMENT
pt c/o of vomitting x3 days, and self reports vomit of black emesis once 3 days ago. pt reports last drink to be tuesday 7pm. pt reports drinking four 24's, which is equivalent to a 12 pack per day. 2 days ago self reports bloody stools x1. pt presents to ER with ABD pain. pt has been self medicating with advil, but with no relief. pt c/o of vomiting x3 days, and self reports black, coffee ground emesis that occurred once, 3 days ago.  pt reports drinking four 24 ounce beers/day. pt reports last drink to be tuesday 7pm. pt also self reports seeing streaks of blood after having a bowel movement 2 days ago. pt denies urinary symptoms. pt has hx of HTN and DM, but states he ran out of medications and has not been taking them for approximately 1 week.

## 2019-07-31 NOTE — ED ADULT NURSE NOTE - INTERVENTIONS DEFINITIONS
Stretcher in lowest position, wheels locked, appropriate side rails in place/Instruct patient to call for assistance/Call bell, personal items and telephone within reach/Physically safe environment: no spills, clutter or unnecessary equipment

## 2019-07-31 NOTE — ED PROVIDER NOTE - CHPI ED SYMPTOMS NEG
no diarrhea/HA, LOC, focal neuro deficits, cp/sob/palp, urinary symptoms/no fever/no chills/no nausea/no dysuria

## 2019-07-31 NOTE — ED ADULT TRIAGE NOTE - CHIEF COMPLAINT QUOTE
Patient reports epigastric pain, nausea/vomiting for 3 days.  Took ibuprofen with no relief.  Patient reports coffee ground emesis on the first day.  Patient admits to alcoholism.  Patient reports drinking 4 24 ounce beers/day, last drink yesterday.  Sclera appear yellow.  Pt w/ history of HTN, unable to fill medication for a week.

## 2019-07-31 NOTE — ED PROVIDER NOTE - GASTROINTESTINAL, MLM
Abdomen soft, non-tender, no guarding. Diffuse abd tenderness to palpation, + voluntary guarding, no rebound, + bowel sounds.

## 2019-08-01 VITALS
RESPIRATION RATE: 18 BRPM | OXYGEN SATURATION: 98 % | SYSTOLIC BLOOD PRESSURE: 144 MMHG | TEMPERATURE: 99 F | DIASTOLIC BLOOD PRESSURE: 97 MMHG | HEART RATE: 67 BPM

## 2019-08-01 LAB
ALBUMIN SERPL ELPH-MCNC: 4.8 G/DL — SIGNIFICANT CHANGE UP (ref 3.3–5.2)
ALP SERPL-CCNC: 119 U/L — SIGNIFICANT CHANGE UP (ref 40–120)
ALT FLD-CCNC: 82 U/L — HIGH
ANION GAP SERPL CALC-SCNC: 16 MMOL/L — SIGNIFICANT CHANGE UP (ref 5–17)
ANISOCYTOSIS BLD QL: SIGNIFICANT CHANGE UP
APPEARANCE UR: CLEAR — SIGNIFICANT CHANGE UP
APTT BLD: 30.8 SEC — SIGNIFICANT CHANGE UP (ref 27.5–36.3)
AST SERPL-CCNC: 124 U/L — HIGH
BASE EXCESS BLDV CALC-SCNC: 3 MMOL/L — HIGH (ref -2–2)
BASOPHILS # BLD AUTO: 0.09 K/UL — SIGNIFICANT CHANGE UP (ref 0–0.2)
BASOPHILS NFR BLD AUTO: 3.5 % — HIGH (ref 0–2)
BILIRUB SERPL-MCNC: 0.9 MG/DL — SIGNIFICANT CHANGE UP (ref 0.4–2)
BILIRUB UR-MCNC: NEGATIVE — SIGNIFICANT CHANGE UP
BUN SERPL-MCNC: 3 MG/DL — LOW (ref 8–20)
CA-I SERPL-SCNC: 1.12 MMOL/L — LOW (ref 1.15–1.33)
CALCIUM SERPL-MCNC: 9.5 MG/DL — SIGNIFICANT CHANGE UP (ref 8.6–10.2)
CHLORIDE BLDV-SCNC: 101 MMOL/L — SIGNIFICANT CHANGE UP (ref 98–107)
CHLORIDE SERPL-SCNC: 97 MMOL/L — LOW (ref 98–107)
CO2 SERPL-SCNC: 25 MMOL/L — SIGNIFICANT CHANGE UP (ref 22–29)
COLOR SPEC: YELLOW — SIGNIFICANT CHANGE UP
CREAT SERPL-MCNC: 0.53 MG/DL — SIGNIFICANT CHANGE UP (ref 0.5–1.3)
DIFF PNL FLD: NEGATIVE — SIGNIFICANT CHANGE UP
EOSINOPHIL # BLD AUTO: 0.02 K/UL — SIGNIFICANT CHANGE UP (ref 0–0.5)
EOSINOPHIL NFR BLD AUTO: 0.9 % — SIGNIFICANT CHANGE UP (ref 0–6)
EPI CELLS # UR: SIGNIFICANT CHANGE UP
GAS PNL BLDV: 144 MMOL/L — SIGNIFICANT CHANGE UP (ref 135–145)
GAS PNL BLDV: SIGNIFICANT CHANGE UP
GAS PNL BLDV: SIGNIFICANT CHANGE UP
GIANT PLATELETS BLD QL SMEAR: PRESENT — SIGNIFICANT CHANGE UP
GLUCOSE BLDV-MCNC: 303 MG/DL — HIGH (ref 70–99)
GLUCOSE SERPL-MCNC: 296 MG/DL — HIGH (ref 70–115)
GLUCOSE UR QL: 1000 MG/DL
HCO3 BLDV-SCNC: 27 MMOL/L — SIGNIFICANT CHANGE UP (ref 21–29)
HCT VFR BLD CALC: 41.1 % — SIGNIFICANT CHANGE UP (ref 39–50)
HCT VFR BLDA CALC: 44 — SIGNIFICANT CHANGE UP (ref 39–50)
HGB BLD CALC-MCNC: 14.3 G/DL — SIGNIFICANT CHANGE UP (ref 13–17)
HGB BLD-MCNC: 13.9 G/DL — SIGNIFICANT CHANGE UP (ref 13–17)
INR BLD: 0.88 RATIO — SIGNIFICANT CHANGE UP (ref 0.88–1.16)
KETONES UR-MCNC: ABNORMAL
LACTATE BLDV-MCNC: 1.8 MMOL/L — SIGNIFICANT CHANGE UP (ref 0.5–2)
LEUKOCYTE ESTERASE UR-ACNC: NEGATIVE — SIGNIFICANT CHANGE UP
LIDOCAIN IGE QN: 7 U/L — LOW (ref 22–51)
LYMPHOCYTES # BLD AUTO: 0.95 K/UL — LOW (ref 1–3.3)
LYMPHOCYTES # BLD AUTO: 36.8 % — SIGNIFICANT CHANGE UP (ref 13–44)
MACROCYTES BLD QL: SIGNIFICANT CHANGE UP
MANUAL SMEAR VERIFICATION: SIGNIFICANT CHANGE UP
MCHC RBC-ENTMCNC: 33.7 PG — SIGNIFICANT CHANGE UP (ref 27–34)
MCHC RBC-ENTMCNC: 33.8 GM/DL — SIGNIFICANT CHANGE UP (ref 32–36)
MCV RBC AUTO: 99.5 FL — SIGNIFICANT CHANGE UP (ref 80–100)
MONOCYTES # BLD AUTO: 0.27 K/UL — SIGNIFICANT CHANGE UP (ref 0–0.9)
MONOCYTES NFR BLD AUTO: 10.5 % — SIGNIFICANT CHANGE UP (ref 2–14)
MYELOCYTES NFR BLD: 0.9 % — HIGH (ref 0–0)
NEUTROPHILS # BLD AUTO: 1.22 K/UL — LOW (ref 1.8–7.4)
NEUTROPHILS NFR BLD AUTO: 47.4 % — SIGNIFICANT CHANGE UP (ref 43–77)
NITRITE UR-MCNC: NEGATIVE — SIGNIFICANT CHANGE UP
OB PNL STL: NEGATIVE — SIGNIFICANT CHANGE UP
OTHER CELLS CSF MANUAL: 19 ML/DL — SIGNIFICANT CHANGE UP (ref 18–22)
PCO2 BLDV: 45 MMHG — SIGNIFICANT CHANGE UP (ref 35–50)
PH BLDV: 7.41 — SIGNIFICANT CHANGE UP (ref 7.32–7.43)
PH UR: 6 — SIGNIFICANT CHANGE UP (ref 5–8)
PLAT MORPH BLD: NORMAL — SIGNIFICANT CHANGE UP
PLATELET # BLD AUTO: 85 K/UL — LOW (ref 150–400)
PO2 BLDV: 95 MMHG — HIGH (ref 25–45)
POTASSIUM BLDV-SCNC: 4.3 MMOL/L — SIGNIFICANT CHANGE UP (ref 3.4–4.5)
POTASSIUM SERPL-MCNC: 4.1 MMOL/L — SIGNIFICANT CHANGE UP (ref 3.5–5.3)
POTASSIUM SERPL-SCNC: 4.1 MMOL/L — SIGNIFICANT CHANGE UP (ref 3.5–5.3)
PROT SERPL-MCNC: 7.9 G/DL — SIGNIFICANT CHANGE UP (ref 6.6–8.7)
PROT UR-MCNC: 30 MG/DL
PROTHROM AB SERPL-ACNC: 10.1 SEC — SIGNIFICANT CHANGE UP (ref 10–12.9)
RBC # BLD: 4.13 M/UL — LOW (ref 4.2–5.8)
RBC # FLD: 12.9 % — SIGNIFICANT CHANGE UP (ref 10.3–14.5)
RBC BLD AUTO: ABNORMAL
SAO2 % BLDV: 97 % — SIGNIFICANT CHANGE UP
SMUDGE CELLS # BLD: PRESENT — SIGNIFICANT CHANGE UP
SODIUM SERPL-SCNC: 138 MMOL/L — SIGNIFICANT CHANGE UP (ref 135–145)
SP GR SPEC: 1.01 — SIGNIFICANT CHANGE UP (ref 1.01–1.02)
UROBILINOGEN FLD QL: NEGATIVE MG/DL — SIGNIFICANT CHANGE UP
WBC # BLD: 2.57 K/UL — LOW (ref 3.8–10.5)
WBC # FLD AUTO: 2.57 K/UL — LOW (ref 3.8–10.5)

## 2019-08-01 PROCEDURE — 85730 THROMBOPLASTIN TIME PARTIAL: CPT

## 2019-08-01 PROCEDURE — 80053 COMPREHEN METABOLIC PANEL: CPT

## 2019-08-01 PROCEDURE — 82435 ASSAY OF BLOOD CHLORIDE: CPT

## 2019-08-01 PROCEDURE — 93010 ELECTROCARDIOGRAM REPORT: CPT

## 2019-08-01 PROCEDURE — 82272 OCCULT BLD FECES 1-3 TESTS: CPT

## 2019-08-01 PROCEDURE — 85610 PROTHROMBIN TIME: CPT

## 2019-08-01 PROCEDURE — 96366 THER/PROPH/DIAG IV INF ADDON: CPT

## 2019-08-01 PROCEDURE — 93005 ELECTROCARDIOGRAM TRACING: CPT

## 2019-08-01 PROCEDURE — 87086 URINE CULTURE/COLONY COUNT: CPT

## 2019-08-01 PROCEDURE — 96365 THER/PROPH/DIAG IV INF INIT: CPT | Mod: XU

## 2019-08-01 PROCEDURE — 85027 COMPLETE CBC AUTOMATED: CPT

## 2019-08-01 PROCEDURE — 84484 ASSAY OF TROPONIN QUANT: CPT

## 2019-08-01 PROCEDURE — 80307 DRUG TEST PRSMV CHEM ANLYZR: CPT

## 2019-08-01 PROCEDURE — 96375 TX/PRO/DX INJ NEW DRUG ADDON: CPT

## 2019-08-01 PROCEDURE — 84295 ASSAY OF SERUM SODIUM: CPT

## 2019-08-01 PROCEDURE — 96372 THER/PROPH/DIAG INJ SC/IM: CPT | Mod: XU

## 2019-08-01 PROCEDURE — 74177 CT ABD & PELVIS W/CONTRAST: CPT | Mod: 26

## 2019-08-01 PROCEDURE — 84132 ASSAY OF SERUM POTASSIUM: CPT

## 2019-08-01 PROCEDURE — 83690 ASSAY OF LIPASE: CPT

## 2019-08-01 PROCEDURE — 74177 CT ABD & PELVIS W/CONTRAST: CPT

## 2019-08-01 PROCEDURE — 83605 ASSAY OF LACTIC ACID: CPT

## 2019-08-01 PROCEDURE — 36415 COLL VENOUS BLD VENIPUNCTURE: CPT

## 2019-08-01 PROCEDURE — 82330 ASSAY OF CALCIUM: CPT

## 2019-08-01 PROCEDURE — 82947 ASSAY GLUCOSE BLOOD QUANT: CPT

## 2019-08-01 PROCEDURE — 81001 URINALYSIS AUTO W/SCOPE: CPT

## 2019-08-01 PROCEDURE — 82803 BLOOD GASES ANY COMBINATION: CPT

## 2019-08-01 PROCEDURE — 85014 HEMATOCRIT: CPT

## 2019-08-01 PROCEDURE — 99284 EMERGENCY DEPT VISIT MOD MDM: CPT | Mod: 25

## 2019-08-01 PROCEDURE — 82962 GLUCOSE BLOOD TEST: CPT

## 2019-08-01 RX ADMIN — Medication 100 MILLIGRAM(S): at 06:33

## 2019-08-01 RX ADMIN — SODIUM CHLORIDE 1000 MILLILITER(S): 9 INJECTION, SOLUTION INTRAVENOUS at 06:25

## 2019-08-01 RX ADMIN — Medication 2 MILLIGRAM(S): at 13:37

## 2019-08-01 RX ADMIN — SODIUM CHLORIDE 200 MILLILITER(S): 9 INJECTION, SOLUTION INTRAVENOUS at 00:20

## 2019-08-01 NOTE — ED ADULT NURSE REASSESSMENT NOTE - NS ED NURSE REASSESS COMMENT FT1
assumed pt care at 0730. Pt A&O x4. States he is "feeling OK." Pt states some nausea and a mild headache. Denies any chest pain, SOB, V/D, headaches, dizziness. No s/s of respiratory distress noted. Safety maintained. CIWA as documented. IVF infusing as ordered. Will continue to monitor.

## 2019-08-02 LAB
CULTURE RESULTS: NO GROWTH — SIGNIFICANT CHANGE UP
SPECIMEN SOURCE: SIGNIFICANT CHANGE UP

## 2019-08-10 ENCOUNTER — EMERGENCY (EMERGENCY)
Facility: HOSPITAL | Age: 29
LOS: 1 days | Discharge: STILL ADMITTED OR ADMINISTRATIVE | End: 2019-08-10
Attending: STUDENT IN AN ORGANIZED HEALTH CARE EDUCATION/TRAINING PROGRAM
Payer: COMMERCIAL

## 2019-08-10 VITALS
SYSTOLIC BLOOD PRESSURE: 145 MMHG | DIASTOLIC BLOOD PRESSURE: 87 MMHG | OXYGEN SATURATION: 90 % | TEMPERATURE: 99 F | HEART RATE: 101 BPM | RESPIRATION RATE: 16 BRPM

## 2019-08-10 LAB
ALBUMIN SERPL ELPH-MCNC: 4.9 G/DL — SIGNIFICANT CHANGE UP (ref 3.3–5.2)
ALP SERPL-CCNC: 98 U/L — SIGNIFICANT CHANGE UP (ref 40–120)
ALT FLD-CCNC: 42 U/L — HIGH
AMPHET UR-MCNC: NEGATIVE — SIGNIFICANT CHANGE UP
ANION GAP SERPL CALC-SCNC: 20 MMOL/L — HIGH (ref 5–17)
APAP SERPL-MCNC: <7.5 UG/ML — LOW (ref 10–26)
AST SERPL-CCNC: 53 U/L — HIGH
BARBITURATES UR SCN-MCNC: NEGATIVE — SIGNIFICANT CHANGE UP
BASOPHILS # BLD AUTO: 0.08 K/UL — SIGNIFICANT CHANGE UP (ref 0–0.2)
BASOPHILS NFR BLD AUTO: 2.2 % — HIGH (ref 0–2)
BENZODIAZ UR-MCNC: NEGATIVE — SIGNIFICANT CHANGE UP
BILIRUB SERPL-MCNC: 0.5 MG/DL — SIGNIFICANT CHANGE UP (ref 0.4–2)
BUN SERPL-MCNC: 6 MG/DL — LOW (ref 8–20)
CALCIUM SERPL-MCNC: 9.3 MG/DL — SIGNIFICANT CHANGE UP (ref 8.6–10.2)
CHLORIDE SERPL-SCNC: 101 MMOL/L — SIGNIFICANT CHANGE UP (ref 98–107)
CO2 SERPL-SCNC: 25 MMOL/L — SIGNIFICANT CHANGE UP (ref 22–29)
COCAINE METAB.OTHER UR-MCNC: NEGATIVE — SIGNIFICANT CHANGE UP
CREAT SERPL-MCNC: 0.54 MG/DL — SIGNIFICANT CHANGE UP (ref 0.5–1.3)
D DIMER BLD IA.RAPID-MCNC: 188 NG/ML DDU — SIGNIFICANT CHANGE UP
EOSINOPHIL # BLD AUTO: 0.04 K/UL — SIGNIFICANT CHANGE UP (ref 0–0.5)
EOSINOPHIL NFR BLD AUTO: 1.1 % — SIGNIFICANT CHANGE UP (ref 0–6)
ETHANOL SERPL-MCNC: 438 MG/DL — SIGNIFICANT CHANGE UP
GAS PNL BLDA: SIGNIFICANT CHANGE UP
GLUCOSE SERPL-MCNC: 289 MG/DL — HIGH (ref 70–115)
HCT VFR BLD CALC: 40.7 % — SIGNIFICANT CHANGE UP (ref 39–50)
HGB BLD-MCNC: 14.1 G/DL — SIGNIFICANT CHANGE UP (ref 13–17)
IMM GRANULOCYTES NFR BLD AUTO: 0.6 % — SIGNIFICANT CHANGE UP (ref 0–1.5)
LIDOCAIN IGE QN: 10 U/L — LOW (ref 22–51)
LYMPHOCYTES # BLD AUTO: 1.66 K/UL — SIGNIFICANT CHANGE UP (ref 1–3.3)
LYMPHOCYTES # BLD AUTO: 46.5 % — HIGH (ref 13–44)
MCHC RBC-ENTMCNC: 34.1 PG — HIGH (ref 27–34)
MCHC RBC-ENTMCNC: 34.6 GM/DL — SIGNIFICANT CHANGE UP (ref 32–36)
MCV RBC AUTO: 98.3 FL — SIGNIFICANT CHANGE UP (ref 80–100)
METHADONE UR-MCNC: NEGATIVE — SIGNIFICANT CHANGE UP
MONOCYTES # BLD AUTO: 0.38 K/UL — SIGNIFICANT CHANGE UP (ref 0–0.9)
MONOCYTES NFR BLD AUTO: 10.6 % — SIGNIFICANT CHANGE UP (ref 2–14)
NEUTROPHILS # BLD AUTO: 1.39 K/UL — LOW (ref 1.8–7.4)
NEUTROPHILS NFR BLD AUTO: 39 % — LOW (ref 43–77)
OPIATES UR-MCNC: NEGATIVE — SIGNIFICANT CHANGE UP
PCP SPEC-MCNC: SIGNIFICANT CHANGE UP
PCP UR-MCNC: NEGATIVE — SIGNIFICANT CHANGE UP
PLATELET # BLD AUTO: 261 K/UL — SIGNIFICANT CHANGE UP (ref 150–400)
POTASSIUM SERPL-MCNC: 4.2 MMOL/L — SIGNIFICANT CHANGE UP (ref 3.5–5.3)
POTASSIUM SERPL-SCNC: 4.2 MMOL/L — SIGNIFICANT CHANGE UP (ref 3.5–5.3)
PROT SERPL-MCNC: 7.8 G/DL — SIGNIFICANT CHANGE UP (ref 6.6–8.7)
RBC # BLD: 4.14 M/UL — LOW (ref 4.2–5.8)
RBC # FLD: 12.5 % — SIGNIFICANT CHANGE UP (ref 10.3–14.5)
SALICYLATES SERPL-MCNC: <0.6 MG/DL — LOW (ref 10–20)
SODIUM SERPL-SCNC: 146 MMOL/L — HIGH (ref 135–145)
THC UR QL: NEGATIVE — SIGNIFICANT CHANGE UP
TROPONIN T SERPL-MCNC: <0.01 NG/ML — SIGNIFICANT CHANGE UP (ref 0–0.06)
WBC # BLD: 3.57 K/UL — LOW (ref 3.8–10.5)
WBC # FLD AUTO: 3.57 K/UL — LOW (ref 3.8–10.5)

## 2019-08-10 PROCEDURE — 84295 ASSAY OF SERUM SODIUM: CPT

## 2019-08-10 PROCEDURE — 86703 HIV-1/HIV-2 1 RESULT ANTBDY: CPT

## 2019-08-10 PROCEDURE — 82947 ASSAY GLUCOSE BLOOD QUANT: CPT

## 2019-08-10 PROCEDURE — 80307 DRUG TEST PRSMV CHEM ANLYZR: CPT

## 2019-08-10 PROCEDURE — 93005 ELECTROCARDIOGRAM TRACING: CPT

## 2019-08-10 PROCEDURE — 71045 X-RAY EXAM CHEST 1 VIEW: CPT | Mod: 26

## 2019-08-10 PROCEDURE — 83690 ASSAY OF LIPASE: CPT

## 2019-08-10 PROCEDURE — 82435 ASSAY OF BLOOD CHLORIDE: CPT

## 2019-08-10 PROCEDURE — 83735 ASSAY OF MAGNESIUM: CPT

## 2019-08-10 PROCEDURE — 84484 ASSAY OF TROPONIN QUANT: CPT

## 2019-08-10 PROCEDURE — 85014 HEMATOCRIT: CPT

## 2019-08-10 PROCEDURE — 36415 COLL VENOUS BLD VENIPUNCTURE: CPT

## 2019-08-10 PROCEDURE — 80053 COMPREHEN METABOLIC PANEL: CPT

## 2019-08-10 PROCEDURE — 82803 BLOOD GASES ANY COMBINATION: CPT

## 2019-08-10 PROCEDURE — 93010 ELECTROCARDIOGRAM REPORT: CPT

## 2019-08-10 PROCEDURE — 83605 ASSAY OF LACTIC ACID: CPT

## 2019-08-10 PROCEDURE — 96366 THER/PROPH/DIAG IV INF ADDON: CPT

## 2019-08-10 PROCEDURE — 96375 TX/PRO/DX INJ NEW DRUG ADDON: CPT

## 2019-08-10 PROCEDURE — 71045 X-RAY EXAM CHEST 1 VIEW: CPT

## 2019-08-10 PROCEDURE — 99285 EMERGENCY DEPT VISIT HI MDM: CPT

## 2019-08-10 PROCEDURE — 84132 ASSAY OF SERUM POTASSIUM: CPT

## 2019-08-10 PROCEDURE — 99285 EMERGENCY DEPT VISIT HI MDM: CPT | Mod: 25

## 2019-08-10 PROCEDURE — 85379 FIBRIN DEGRADATION QUANT: CPT

## 2019-08-10 PROCEDURE — 71275 CT ANGIOGRAPHY CHEST: CPT

## 2019-08-10 PROCEDURE — 96365 THER/PROPH/DIAG IV INF INIT: CPT | Mod: XU

## 2019-08-10 PROCEDURE — 85027 COMPLETE CBC AUTOMATED: CPT

## 2019-08-10 PROCEDURE — 71275 CT ANGIOGRAPHY CHEST: CPT | Mod: 26

## 2019-08-10 PROCEDURE — 82330 ASSAY OF CALCIUM: CPT

## 2019-08-10 RX ORDER — MORPHINE SULFATE 50 MG/1
4 CAPSULE, EXTENDED RELEASE ORAL ONCE
Refills: 0 | Status: DISCONTINUED | OUTPATIENT
Start: 2019-08-10 | End: 2019-08-10

## 2019-08-10 RX ORDER — SODIUM CHLORIDE 9 MG/ML
1000 INJECTION, SOLUTION INTRAVENOUS
Refills: 0 | Status: DISCONTINUED | OUTPATIENT
Start: 2019-08-10 | End: 2019-08-15

## 2019-08-10 RX ORDER — SODIUM CHLORIDE 9 MG/ML
1000 INJECTION INTRAMUSCULAR; INTRAVENOUS; SUBCUTANEOUS ONCE
Refills: 0 | Status: COMPLETED | OUTPATIENT
Start: 2019-08-10 | End: 2019-08-10

## 2019-08-10 RX ADMIN — MORPHINE SULFATE 4 MILLIGRAM(S): 50 CAPSULE, EXTENDED RELEASE ORAL at 23:38

## 2019-08-10 RX ADMIN — MORPHINE SULFATE 4 MILLIGRAM(S): 50 CAPSULE, EXTENDED RELEASE ORAL at 22:38

## 2019-08-10 RX ADMIN — SODIUM CHLORIDE 125 MILLILITER(S): 9 INJECTION, SOLUTION INTRAVENOUS at 18:09

## 2019-08-10 NOTE — ED PROVIDER NOTE - OBJECTIVE STATEMENT
27yo M with chronic ETOH abuse, daily unable to express how much, thinks last drink 12 hours ago. here with brother who said he tried to arouse him but seemed to be ahving trouble breathing, now fine. patient without CP/SOB. denies any trauma. never had seizures before. brother states it did not look like a seizure. requesting detox

## 2019-08-10 NOTE — ED ADULT NURSE REASSESSMENT NOTE - NSIMPLEMENTINTERV_GEN_ALL_ED
Implemented All Fall Risk Interventions:  [place] to call system. Call bell, personal items and telephone within reach. Instruct patient to call for assistance. Room bathroom lighting operational. Non-slip footwear when patient is off stretcher. Physically safe environment: no spills, clutter or unnecessary equipment. Stretcher in lowest position, wheels locked, appropriate side rails in place. Provide visual cue, wrist band, yellow gown, etc. Monitor gait and stability. Monitor for mental status changes and reorient to person, place, and time. Review medications for side effects contributing to fall risk. Reinforce activity limits and safety measures with patient and family.

## 2019-08-10 NOTE — ED PROVIDER NOTE - PROGRESS NOTE DETAILS
Pt signed out to me by Dr. Krishna pending CTA and reassessment.  Pt unable to consent for IV contrast 2/2 alcohol intoxication.  Pt had an episode of hypoxia with chest pain so patient needs urgent CT with IV contrast to evaluate for emergent conditions and cannot wait until he is sober. CTA negative.  Pt does not want to go to detox. Pt saturating 97% on RA and comfortable.  Pt instructed to f/up with pcp in 1-2 days and to return for any new/concerning symptoms. CTA negative.  Pt does not want to go to detox. Pt saturating 97% on RA and comfortable.  Pt instructed to f/up with pcp in 1-2 days and to return for any new/concerning symptoms.  Pt given a copy of all results and instructed to f/up with pcp regarding any abnormal results.

## 2019-08-10 NOTE — ED ADULT NURSE REASSESSMENT NOTE - NS ED NURSE REASSESS COMMENT FT1
Pt complaining of 5/10 midsternal chest pain pt states "I don't know if its because of my breathing or my chest." Pt states pain is dull, non-radiating. VSS as documented. MD SPEARS made aware. Pt awaiting CTA at this time, to be medicated for pain as ordered.
Pt having an episode of audible wheezing and stridor, NRB in place pt on CM and MD Erendira YODER called to bedside. Pt appears anxious.
Assumed pt care from RN ELIAZAR. Pt received in yellow gown. Pt is A+Ox2-3, slightly disoriented to time. Pt is moving all extremities without difficulty. Pt denies chest pain or shortness of breath, pt respirations are spontaneous even and unlabored, pt received on 2LNC with O2 sat maintained, pt to be weaned on nasal cannula O2 therapy as tolerated. Pt VSS at this time as noted in flow sheet. Pt safety maintained, side rails up, stretcher in lowest position and wheels locked, call bell within reach and use reinforced by RN. Pt and pt family educated on plan of care- aware pt is awaiting CTA at this time, able to successfully teach back plan of care to RN. RN will continue to update pt throughout ED stay.

## 2019-08-10 NOTE — ED ADULT NURSE REASSESSMENT NOTE - RESPIRATORY WDL
Breathing spontaneous and unlabored. Breath sounds clear and equal bilaterally with regular rhythm. Pt received on 2LNC, O2 sat maintained %, pt to be weaned on O2 delivery therapy.

## 2019-08-10 NOTE — ED PROVIDER NOTE - CLINICAL SUMMARY MEDICAL DECISION MAKING FREE TEXT BOX
patient with alcohol abuse requesting detox, no complaints at this time. brother describing episode of respiratory ditrress but no seizure activity, currently patient intoxicated no sign of withdrawal. labs with alcohol. ekg. cxr reasses

## 2019-08-10 NOTE — ED ADULT NURSE NOTE - OBJECTIVE STATEMENT
Pt arrives with admission to alcohol consumption today at home. Pt brother with pot states he went to wake him up and had a tough time doing so they called EMS. Pt arrives a&ox3, speaking coherently and following commands. Pt states he drinks everyday and is aware he has alcoholism. Pt offers no complaints and requests dry clothes and to be comfortable. Pt brother staying at bedside with pt.

## 2019-08-10 NOTE — ED ADULT NURSE NOTE - NSIMPLEMENTINTERV_GEN_ALL_ED
Implemented All Fall Risk Interventions:  [place] to call system. Call bell, personal items and telephone within reach. Instruct patient to call for assistance. Room bathroom lighting operational. Non-slip footwear when patient is off stretcher. Physically safe environment: no spills, clutter or unnecessary equipment. Stretcher in lowest position, wheels locked, appropriate side rails in place. Provide visual cue, wrist band, yellow gown, etc. Monitor gait and stability. Monitor for mental status changes and reorient to person, place, and time. Review medications for side effects contributing to fall risk. Reinforce activity limits and safety measures with patient and family.

## 2019-08-11 VITALS
SYSTOLIC BLOOD PRESSURE: 135 MMHG | DIASTOLIC BLOOD PRESSURE: 72 MMHG | OXYGEN SATURATION: 98 % | RESPIRATION RATE: 18 BRPM | HEART RATE: 74 BPM

## 2019-08-11 LAB — HIV 1 & 2 AB SERPL IA.RAPID: SIGNIFICANT CHANGE UP

## 2019-08-11 RX ADMIN — SODIUM CHLORIDE 1000 MILLILITER(S): 9 INJECTION, SOLUTION INTRAVENOUS at 00:52

## 2019-09-03 NOTE — ED ADULT NURSE NOTE - CAS EDN DISCHARGE INTERVENTIONS
Patient states that she is returning call to nurse from last week to schedule surgery.  Please call patient to discuss.    
IV intact/Arm band on

## 2019-10-29 ENCOUNTER — EMERGENCY (EMERGENCY)
Facility: HOSPITAL | Age: 29
LOS: 1 days | Discharge: HOME | End: 2019-10-29
Attending: STUDENT IN AN ORGANIZED HEALTH CARE EDUCATION/TRAINING PROGRAM
Payer: MEDICAID

## 2019-10-29 VITALS
RESPIRATION RATE: 20 BRPM | WEIGHT: 162.04 LBS | HEART RATE: 91 BPM | SYSTOLIC BLOOD PRESSURE: 136 MMHG | TEMPERATURE: 98 F | HEIGHT: 67 IN | DIASTOLIC BLOOD PRESSURE: 89 MMHG | OXYGEN SATURATION: 96 %

## 2019-10-29 VITALS
TEMPERATURE: 98 F | DIASTOLIC BLOOD PRESSURE: 77 MMHG | SYSTOLIC BLOOD PRESSURE: 126 MMHG | HEART RATE: 89 BPM | RESPIRATION RATE: 18 BRPM | OXYGEN SATURATION: 96 %

## 2019-10-29 PROCEDURE — 99283 EMERGENCY DEPT VISIT LOW MDM: CPT | Mod: 25

## 2019-10-29 PROCEDURE — 99283 EMERGENCY DEPT VISIT LOW MDM: CPT

## 2019-10-29 PROCEDURE — 73552 X-RAY EXAM OF FEMUR 2/>: CPT | Mod: 26,RT

## 2019-10-29 PROCEDURE — 90471 IMMUNIZATION ADMIN: CPT

## 2019-10-29 PROCEDURE — 73552 X-RAY EXAM OF FEMUR 2/>: CPT

## 2019-10-29 PROCEDURE — 90715 TDAP VACCINE 7 YRS/> IM: CPT

## 2019-10-29 RX ORDER — IBUPROFEN 200 MG
600 TABLET ORAL ONCE
Refills: 0 | Status: COMPLETED | OUTPATIENT
Start: 2019-10-29 | End: 2019-10-29

## 2019-10-29 RX ORDER — TETANUS TOXOID, REDUCED DIPHTHERIA TOXOID AND ACELLULAR PERTUSSIS VACCINE, ADSORBED 5; 2.5; 8; 8; 2.5 [IU]/.5ML; [IU]/.5ML; UG/.5ML; UG/.5ML; UG/.5ML
0.5 SUSPENSION INTRAMUSCULAR ONCE
Refills: 0 | Status: COMPLETED | OUTPATIENT
Start: 2019-10-29 | End: 2019-10-29

## 2019-10-29 RX ADMIN — Medication 600 MILLIGRAM(S): at 23:06

## 2019-10-29 RX ADMIN — TETANUS TOXOID, REDUCED DIPHTHERIA TOXOID AND ACELLULAR PERTUSSIS VACCINE, ADSORBED 0.5 MILLILITER(S): 5; 2.5; 8; 8; 2.5 SUSPENSION INTRAMUSCULAR at 23:07

## 2019-10-29 NOTE — ED PROVIDER NOTE - OBJECTIVE STATEMENT
PMHx DM2 (Metformin), pain to right upper leg (thigh) x2 days. took tylenol and iburpofen without relief. no rash. hurts, 10/10. works construction on and off. began hurting last night, then progressively stronger. 12 hours ago self medciated. began while at [employer]. subjective numbness to toes  Denies injury/trauma, recent travel, swelling, numbness/tingling.   PCP: Chang Angeles 27 yo male PMHx DM2 (Metformin), known to [facility], ED, presents to ED c/o right thigh pain. States began while at work yesterday and has since progressed. Associated with subjective numbness to right toes. Patient works in construction. Patient has self medicated with acetaminophen and ibuprofen without relief 12 hours PTA. No further complaints at this time.   Denies injury/trauma, rash, recent travel, swelling, numbness/tingling.   PCP: Chang Angeles

## 2019-10-29 NOTE — ED ADULT TRIAGE NOTE - CHIEF COMPLAINT QUOTE
pt c/o right thigh, pain started yesterday, pt denies injury.  no swelling or deformity noted. pt able to walk without difficulty

## 2019-10-29 NOTE — ED PROVIDER NOTE - CLINICAL SUMMARY MEDICAL DECISION MAKING FREE TEXT BOX
29 yo male PMHx DM2 (Metformin), known to [facility], ED, presents to ED c/o right thigh pain.  Plan:  - XR  - Meds

## 2019-10-29 NOTE — ED ADULT NURSE NOTE - CAS EDN DISCHARGE ASSESSMENT
Awake/Symptoms improved/Patient baseline mental status/Alert and oriented to person, place and time/No adverse reaction to first time med in ED

## 2019-10-29 NOTE — ED PROVIDER NOTE - ATTENDING CONTRIBUTION TO CARE
27 yo male with complaints of right thigh pain with associated tingling sensation. Patient grossly normal neurologic examination. I personally saw the patient with the PA, and completed the key components of the history and physical exam. I then discussed the management plan with the PA.

## 2019-10-29 NOTE — ED PROVIDER NOTE - PHYSICAL EXAMINATION
sentation intact  ttp anterior gith thigh. no swellign redness sentation intact  ttp anterior gith thigh. no swellign redness  right anterior foot over 4th MTP abrasion General: In NAD, non-toxic appearing; well nourished/developed.  Skin: No rashes or lesions. Warm, dry, color normal for race. No cyanosis appreciated.  Cardio: No lifts, heaves, visible pulsations. No thrills. Rate and rhythm regular, S1 & S2 clear. No audible murmur, gallop, or rub.  PV: Pulses: b/l 2+ DP, PT. Capillary refill <2 seconds.  Resp: Normal AP to lateral diameter, symmetrical excursion b/l. Breath sounds vesicular, symmetrical and without rales, rhonchi or wheezing b/l.  MSK/Neuro: A&Ox3. Normal muscle bulk/tone, no deformity. No localized bony tenderness. No snuff box tenderness. FROM. Strength 5/5 upper and lower extremities. Sensation intact to bilateral upper and lower extremities. Normal gait.     sentation intact  ttp anterior gith thigh. no swellign redness  right anterior foot over 4th MTP abrasion

## 2019-10-29 NOTE — ED PROVIDER NOTE - PATIENT PORTAL LINK FT
You can access the FollowMyHealth Patient Portal offered by [facility] by registering at the following website: [email or web address]. By joining [facility]’s FollowMyHealth portal, you will also be able to view your health information using other applications (apps) compatible with our system.

## 2019-10-29 NOTE — ED ADULT NURSE NOTE - CHPI ED NUR SYMPTOMS NEG
no back pain/no bruising/no deformity/no numbness/no fever/no tingling/no stiffness/no abrasion/no weakness/no difficulty bearing weight

## 2019-12-11 ENCOUNTER — EMERGENCY (EMERGENCY)
Facility: HOSPITAL | Age: 29
LOS: 1 days | Discharge: HOME | End: 2019-12-11
Attending: EMERGENCY MEDICINE
Payer: MEDICAID

## 2019-12-11 VITALS
HEIGHT: 67 IN | RESPIRATION RATE: 16 BRPM | DIASTOLIC BLOOD PRESSURE: 84 MMHG | HEART RATE: 101 BPM | SYSTOLIC BLOOD PRESSURE: 128 MMHG | OXYGEN SATURATION: 99 % | WEIGHT: 151.9 LBS | TEMPERATURE: 98 F

## 2019-12-11 VITALS
DIASTOLIC BLOOD PRESSURE: 73 MMHG | HEART RATE: 89 BPM | SYSTOLIC BLOOD PRESSURE: 121 MMHG | OXYGEN SATURATION: 99 % | TEMPERATURE: 98 F | RESPIRATION RATE: 20 BRPM

## 2019-12-11 LAB
ALBUMIN SERPL ELPH-MCNC: 4.4 G/DL — SIGNIFICANT CHANGE UP (ref 3.3–5.2)
ALP SERPL-CCNC: 112 U/L — SIGNIFICANT CHANGE UP (ref 40–120)
ALT FLD-CCNC: 24 U/L — SIGNIFICANT CHANGE UP
AMPHET UR-MCNC: NEGATIVE — SIGNIFICANT CHANGE UP
ANION GAP SERPL CALC-SCNC: 16 MMOL/L — SIGNIFICANT CHANGE UP (ref 5–17)
APPEARANCE UR: CLEAR — SIGNIFICANT CHANGE UP
AST SERPL-CCNC: 47 U/L — HIGH
BACTERIA # UR AUTO: ABNORMAL
BARBITURATES UR SCN-MCNC: NEGATIVE — SIGNIFICANT CHANGE UP
BASE EXCESS BLDV CALC-SCNC: 0.8 MMOL/L — SIGNIFICANT CHANGE UP (ref -2–2)
BASOPHILS # BLD AUTO: 0.05 K/UL — SIGNIFICANT CHANGE UP (ref 0–0.2)
BASOPHILS NFR BLD AUTO: 1.7 % — SIGNIFICANT CHANGE UP (ref 0–2)
BENZODIAZ UR-MCNC: NEGATIVE — SIGNIFICANT CHANGE UP
BILIRUB SERPL-MCNC: 0.6 MG/DL — SIGNIFICANT CHANGE UP (ref 0.4–2)
BILIRUB UR-MCNC: NEGATIVE — SIGNIFICANT CHANGE UP
BUN SERPL-MCNC: 4 MG/DL — LOW (ref 8–20)
CA-I SERPL-SCNC: 1.03 MMOL/L — LOW (ref 1.15–1.33)
CALCIUM SERPL-MCNC: 8.6 MG/DL — SIGNIFICANT CHANGE UP (ref 8.6–10.2)
CHLORIDE BLDV-SCNC: 111 MMOL/L — HIGH (ref 98–107)
CHLORIDE SERPL-SCNC: 105 MMOL/L — SIGNIFICANT CHANGE UP (ref 98–107)
CO2 SERPL-SCNC: 22 MMOL/L — SIGNIFICANT CHANGE UP (ref 22–29)
COCAINE METAB.OTHER UR-MCNC: NEGATIVE — SIGNIFICANT CHANGE UP
COLOR SPEC: SIGNIFICANT CHANGE UP
CREAT SERPL-MCNC: 0.51 MG/DL — SIGNIFICANT CHANGE UP (ref 0.5–1.3)
DIFF PNL FLD: NEGATIVE — SIGNIFICANT CHANGE UP
EOSINOPHIL # BLD AUTO: 0.08 K/UL — SIGNIFICANT CHANGE UP (ref 0–0.5)
EOSINOPHIL NFR BLD AUTO: 2.6 % — SIGNIFICANT CHANGE UP (ref 0–6)
EPI CELLS # UR: SIGNIFICANT CHANGE UP
ETHANOL SERPL-MCNC: 464 MG/DL — SIGNIFICANT CHANGE UP
GAS PNL BLDV: 147 MMOL/L — HIGH (ref 135–145)
GAS PNL BLDV: SIGNIFICANT CHANGE UP
GAS PNL BLDV: SIGNIFICANT CHANGE UP
GLUCOSE BLDV-MCNC: 161 MG/DL — HIGH (ref 70–99)
GLUCOSE SERPL-MCNC: 178 MG/DL — HIGH (ref 70–115)
GLUCOSE UR QL: 250 MG/DL
HCO3 BLDV-SCNC: 25 MMOL/L — SIGNIFICANT CHANGE UP (ref 20–26)
HCT VFR BLD CALC: 36.9 % — LOW (ref 39–50)
HCT VFR BLDA CALC: 38 — LOW (ref 39–50)
HGB BLD CALC-MCNC: 12.4 G/DL — LOW (ref 13–17)
HGB BLD-MCNC: 12.3 G/DL — LOW (ref 13–17)
KETONES UR-MCNC: NEGATIVE — SIGNIFICANT CHANGE UP
LACTATE BLDV-MCNC: 0.8 MMOL/L — SIGNIFICANT CHANGE UP (ref 0.5–2)
LACTATE BLDV-MCNC: 1.1 MMOL/L — SIGNIFICANT CHANGE UP (ref 0.5–2)
LEUKOCYTE ESTERASE UR-ACNC: NEGATIVE — SIGNIFICANT CHANGE UP
LIDOCAIN IGE QN: 11 U/L — LOW (ref 22–51)
LYMPHOCYTES # BLD AUTO: 1.37 K/UL — SIGNIFICANT CHANGE UP (ref 1–3.3)
LYMPHOCYTES # BLD AUTO: 43.5 % — SIGNIFICANT CHANGE UP (ref 13–44)
MAGNESIUM SERPL-MCNC: 1.9 MG/DL — SIGNIFICANT CHANGE UP (ref 1.6–2.6)
MANUAL SMEAR VERIFICATION: SIGNIFICANT CHANGE UP
MCHC RBC-ENTMCNC: 33.3 GM/DL — SIGNIFICANT CHANGE UP (ref 32–36)
MCHC RBC-ENTMCNC: 34 PG — SIGNIFICANT CHANGE UP (ref 27–34)
MCV RBC AUTO: 101.9 FL — HIGH (ref 80–100)
METHADONE UR-MCNC: NEGATIVE — SIGNIFICANT CHANGE UP
MONOCYTES # BLD AUTO: 0.3 K/UL — SIGNIFICANT CHANGE UP (ref 0–0.9)
MONOCYTES NFR BLD AUTO: 9.6 % — SIGNIFICANT CHANGE UP (ref 2–14)
NEUTROPHILS # BLD AUTO: 1.31 K/UL — LOW (ref 1.8–7.4)
NEUTROPHILS NFR BLD AUTO: 40 % — LOW (ref 43–77)
NEUTS BAND # BLD: 1.7 % — SIGNIFICANT CHANGE UP (ref 0–8)
NITRITE UR-MCNC: NEGATIVE — SIGNIFICANT CHANGE UP
OPIATES UR-MCNC: NEGATIVE — SIGNIFICANT CHANGE UP
OTHER CELLS CSF MANUAL: 17 ML/DL — LOW (ref 18–22)
PCO2 BLDV: 41 MMHG — SIGNIFICANT CHANGE UP (ref 35–50)
PCP SPEC-MCNC: SIGNIFICANT CHANGE UP
PCP UR-MCNC: NEGATIVE — SIGNIFICANT CHANGE UP
PH BLDV: 7.41 — SIGNIFICANT CHANGE UP (ref 7.32–7.43)
PH UR: 6 — SIGNIFICANT CHANGE UP (ref 5–8)
PHOSPHATE SERPL-MCNC: 2.6 MG/DL — SIGNIFICANT CHANGE UP (ref 2.4–4.7)
PLAT MORPH BLD: NORMAL — SIGNIFICANT CHANGE UP
PLATELET # BLD AUTO: 138 K/UL — LOW (ref 150–400)
PO2 BLDV: 145 MMHG — HIGH (ref 25–45)
POTASSIUM BLDV-SCNC: 4.3 MMOL/L — SIGNIFICANT CHANGE UP (ref 3.4–4.5)
POTASSIUM SERPL-MCNC: 4.5 MMOL/L — SIGNIFICANT CHANGE UP (ref 3.5–5.3)
POTASSIUM SERPL-SCNC: 4.5 MMOL/L — SIGNIFICANT CHANGE UP (ref 3.5–5.3)
PROT SERPL-MCNC: 7.5 G/DL — SIGNIFICANT CHANGE UP (ref 6.6–8.7)
PROT UR-MCNC: 15 MG/DL
RBC # BLD: 3.62 M/UL — LOW (ref 4.2–5.8)
RBC # FLD: 13.2 % — SIGNIFICANT CHANGE UP (ref 10.3–14.5)
RBC BLD AUTO: NORMAL — SIGNIFICANT CHANGE UP
RBC CASTS # UR COMP ASSIST: SIGNIFICANT CHANGE UP /HPF (ref 0–4)
SAO2 % BLDV: 99 % — SIGNIFICANT CHANGE UP
SODIUM SERPL-SCNC: 143 MMOL/L — SIGNIFICANT CHANGE UP (ref 135–145)
SP GR SPEC: 1.01 — SIGNIFICANT CHANGE UP (ref 1.01–1.02)
THC UR QL: NEGATIVE — SIGNIFICANT CHANGE UP
TROPONIN T SERPL-MCNC: 0.05 NG/ML — SIGNIFICANT CHANGE UP (ref 0–0.06)
UROBILINOGEN FLD QL: NEGATIVE MG/DL — SIGNIFICANT CHANGE UP
VARIANT LYMPHS # BLD: 0.9 % — SIGNIFICANT CHANGE UP (ref 0–6)
WBC # BLD: 3.14 K/UL — LOW (ref 3.8–10.5)
WBC # FLD AUTO: 3.14 K/UL — LOW (ref 3.8–10.5)
WBC UR QL: SIGNIFICANT CHANGE UP

## 2019-12-11 PROCEDURE — 71045 X-RAY EXAM CHEST 1 VIEW: CPT

## 2019-12-11 PROCEDURE — 84295 ASSAY OF SERUM SODIUM: CPT

## 2019-12-11 PROCEDURE — 83605 ASSAY OF LACTIC ACID: CPT

## 2019-12-11 PROCEDURE — 84484 ASSAY OF TROPONIN QUANT: CPT

## 2019-12-11 PROCEDURE — 84100 ASSAY OF PHOSPHORUS: CPT

## 2019-12-11 PROCEDURE — 82803 BLOOD GASES ANY COMBINATION: CPT

## 2019-12-11 PROCEDURE — 36415 COLL VENOUS BLD VENIPUNCTURE: CPT

## 2019-12-11 PROCEDURE — 74177 CT ABD & PELVIS W/CONTRAST: CPT | Mod: 26

## 2019-12-11 PROCEDURE — 85027 COMPLETE CBC AUTOMATED: CPT

## 2019-12-11 PROCEDURE — 96361 HYDRATE IV INFUSION ADD-ON: CPT

## 2019-12-11 PROCEDURE — 99284 EMERGENCY DEPT VISIT MOD MDM: CPT | Mod: 25

## 2019-12-11 PROCEDURE — 82435 ASSAY OF BLOOD CHLORIDE: CPT

## 2019-12-11 PROCEDURE — 96375 TX/PRO/DX INJ NEW DRUG ADDON: CPT

## 2019-12-11 PROCEDURE — 71045 X-RAY EXAM CHEST 1 VIEW: CPT | Mod: 26

## 2019-12-11 PROCEDURE — 83735 ASSAY OF MAGNESIUM: CPT

## 2019-12-11 PROCEDURE — 85014 HEMATOCRIT: CPT

## 2019-12-11 PROCEDURE — 80053 COMPREHEN METABOLIC PANEL: CPT

## 2019-12-11 PROCEDURE — 70450 CT HEAD/BRAIN W/O DYE: CPT

## 2019-12-11 PROCEDURE — 82330 ASSAY OF CALCIUM: CPT

## 2019-12-11 PROCEDURE — 80307 DRUG TEST PRSMV CHEM ANLYZR: CPT

## 2019-12-11 PROCEDURE — 96374 THER/PROPH/DIAG INJ IV PUSH: CPT | Mod: XU

## 2019-12-11 PROCEDURE — 82947 ASSAY GLUCOSE BLOOD QUANT: CPT

## 2019-12-11 PROCEDURE — 70450 CT HEAD/BRAIN W/O DYE: CPT | Mod: 26

## 2019-12-11 PROCEDURE — 81001 URINALYSIS AUTO W/SCOPE: CPT

## 2019-12-11 PROCEDURE — 74177 CT ABD & PELVIS W/CONTRAST: CPT

## 2019-12-11 PROCEDURE — 84132 ASSAY OF SERUM POTASSIUM: CPT

## 2019-12-11 PROCEDURE — 99285 EMERGENCY DEPT VISIT HI MDM: CPT

## 2019-12-11 PROCEDURE — 83690 ASSAY OF LIPASE: CPT

## 2019-12-11 RX ORDER — FAMOTIDINE 10 MG/ML
20 INJECTION INTRAVENOUS ONCE
Refills: 0 | Status: DISCONTINUED | OUTPATIENT
Start: 2019-12-11 | End: 2019-12-11

## 2019-12-11 RX ORDER — FAMOTIDINE 10 MG/ML
20 INJECTION INTRAVENOUS ONCE
Refills: 0 | Status: COMPLETED | OUTPATIENT
Start: 2019-12-11 | End: 2019-12-11

## 2019-12-11 RX ORDER — SODIUM CHLORIDE 9 MG/ML
1000 INJECTION INTRAMUSCULAR; INTRAVENOUS; SUBCUTANEOUS ONCE
Refills: 0 | Status: DISCONTINUED | OUTPATIENT
Start: 2019-12-11 | End: 2019-12-30

## 2019-12-11 RX ORDER — SODIUM CHLORIDE 9 MG/ML
1000 INJECTION INTRAMUSCULAR; INTRAVENOUS; SUBCUTANEOUS ONCE
Refills: 0 | Status: COMPLETED | OUTPATIENT
Start: 2019-12-11 | End: 2019-12-11

## 2019-12-11 RX ORDER — ONDANSETRON 8 MG/1
4 TABLET, FILM COATED ORAL ONCE
Refills: 0 | Status: COMPLETED | OUTPATIENT
Start: 2019-12-11 | End: 2019-12-11

## 2019-12-11 RX ORDER — THIAMINE MONONITRATE (VIT B1) 100 MG
100 TABLET ORAL ONCE
Refills: 0 | Status: COMPLETED | OUTPATIENT
Start: 2019-12-11 | End: 2019-12-11

## 2019-12-11 RX ADMIN — FAMOTIDINE 20 MILLIGRAM(S): 10 INJECTION INTRAVENOUS at 12:58

## 2019-12-11 RX ADMIN — SODIUM CHLORIDE 1000 MILLILITER(S): 9 INJECTION INTRAMUSCULAR; INTRAVENOUS; SUBCUTANEOUS at 13:15

## 2019-12-11 RX ADMIN — Medication 100 MILLIGRAM(S): at 12:25

## 2019-12-11 RX ADMIN — ONDANSETRON 4 MILLIGRAM(S): 8 TABLET, FILM COATED ORAL at 12:25

## 2019-12-11 RX ADMIN — Medication 2 MILLIGRAM(S): at 12:25

## 2019-12-11 RX ADMIN — SODIUM CHLORIDE 1000 MILLILITER(S): 9 INJECTION INTRAMUSCULAR; INTRAVENOUS; SUBCUTANEOUS at 12:25

## 2019-12-11 RX ADMIN — Medication 30 MILLILITER(S): at 12:58

## 2019-12-11 NOTE — ED PROVIDER NOTE - CROS ED RESP ALL NEG
Department of Neurology                                      Resident Consult Note    Reason for Consult:  AMS  Requesting Physician:  Dr. More Teague    History Obtained From:  electronic medical record, staff    CHIEF COMPLAINT:       AMS    HISTORY OF PRESENT ILLNESS:       The patient is a 46 y.o. male who presents with AMS since around afternoon yesterday. Patient has a PMHx of DM,htn,Stroke, L ICA stenosis,L BKA. In the ER he had CT Brain done that was negative for any bleeding but was consistent with possible PRES. On exam he was found to have R gaze deviation,rhythmic jerking of head and arm. He was given ativan in the ER with minimal help. SBP in 200s. He was started on cardene drip. He was also found to have leukocytosis,high lactic acid level,high pro-bnp,cxr,pulmonary venous congestion.        PAST MEDICAL HISTORY :       Past Medical History:        Diagnosis Date    Acid reflux     Calculus of gallbladder without cholecystitis without obstruction 9/2/2018    Cerebrovascular disease 2006    TIA    Diabetic foot infection ([address]     Drug (multiple) resistant infection     GERD (gastroesophageal reflux disease)     Glaucoma     Glaucoma     Hemispheric carotid artery syndrome 9/2/2018    Hyperlipidemia     Hypertension     IDDM (insulin dependent diabetes mellitus) (HCC)     MDRO (multiple drug resistant organisms) resistance     MRSA (methicillin resistant staph aureus) culture positive 09/03/2017    foot    Neuropathy     Osteomyelitis (HCC)     Left Hallux    S/P cataract extraction and insertion of intraocular lens 2015    bilateral     Type II or unspecified type diabetes mellitus without mention of complication, not stated as uncontrolled        Past Surgical History:        Procedure Laterality Date    EYE SURGERY Bilateral     lazer both eyes    FOOT SURGERY Left 02/24/2017    surgical prep of wound bed    FOOT SURGERY Left 04/13/2017    1) sodium chloride 200 mL infusion, 5 mg/hr, Intravenous, Continuous  levetiracetam (KEPPRA) 500 mg/100 mL IVPB, 500 mg, Intravenous, Once  vancomycin (VANCOCIN) 1250 mg in dextrose 5 % 250 mL IVPB, 15 mg/kg, Intravenous, Once  piperacillin-tazobactam (ZOSYN) 3.375 g in dextrose 5 % 50 mL IVPB (mini-bag), 3.375 g, Intravenous, Once  furosemide (LASIX) injection 20 mg, 20 mg, Intravenous, Once    REVIEW OF SYSTEMS:       Unable to obtain due to AMS. PHYSICAL EXAM:       BP (!) 172/71   Pulse 104   Temp 99.6 °F (37.6 °C) (Oral)   Resp 16   Ht 5' 7\" (1.702 m)   Wt 170 lb (77.1 kg)   SpO2 99%   BMI 26.63 kg/m²     CONSTITUTIONAL:  Stuporous,not following commands   HEAD:  normocephalic, atraumatic    EYES:  PERRLA, EOMI.   ENT:  moist mucous membranes   NECK:  supple, symmetric, no midline tenderness to palpation    BACK:  without midline tenderness, step-offs or deformities    LUNGS:  Equal air entry bilaterally   CARDIOVASCULAR:  normal s1 / s2   ABDOMEN:  Soft, no rigidity   NEUROLOGIC:    Mental status   Stuporous ,not following commands     Cranial nerves   II - blind                                               III, IV, VI - extra-ocular muscles full: no pupillary defect; no GYPSY, no nystagmus, no ptosis, R gaze deviation                                                                   Exam was limited due to patient not following commands   Motor function  Normal muscle bulk and tone;   No movement RUE/RLE,LUE on noxious stimulus,  Flexion withdrawal LLE   Sensory function Intact to pain   Cerebellar  No involuntary movements or tremors     Reflex function Intact 1+ DTR and symmetric with no pathologic reflex or Babinski sign     Gait                  Not tested due to weakness       GCS:9       SKIN:  no rash      LABS AND IMAGING:     CBC with Differential:    Lab Results   Component Value Date    WBC 17.0 11/07/2018    RBC 4.03 11/07/2018    HGB 11.1 11/07/2018    HCT 35.2 11/07/2018     11/07/2018    MCV 87.3 11/07/2018    MCH 27.5 11/07/2018    MCHC 31.5 11/07/2018    RDW 14.4 11/07/2018    LYMPHOPCT 8 11/07/2018    MONOPCT 3 11/07/2018    BASOPCT 0 11/07/2018    MONOSABS 0.43 11/07/2018    LYMPHSABS 1.31 11/07/2018    EOSABS 0.03 11/07/2018    BASOSABS 0.06 11/07/2018    DIFFTYPE NOT REPORTED 11/07/2018     BMP:    Lab Results   Component Value Date     11/07/2018    K 4.2 11/07/2018    CL 98 11/07/2018    CO2 24 11/07/2018    BUN 9 11/07/2018    LABALBU 3.4 11/07/2018    CREATININE 0.68 11/07/2018    CALCIUM 8.8 11/07/2018    GFRAA >60 11/07/2018    LABGLOM >60 11/07/2018    GLUCOSE 347 11/07/2018       Radiology Review:  CT Brain: no intracranial hemorrhage,PRES?       ASSESSMENT AND PLAN:       Patient Active Problem List   Diagnosis    Essential hypertension    IDDM (insulin dependent diabetes mellitus) ([address]    Neuropathy ([address]    Uncontrolled type 2 diabetes with cellulitis of foot ([address]    Cerebrovascular disease    Cataract    Dysphagia    Family history of colon cancer    Bowel habit changes    Hyperlipidemia    Gangrene of foot ([address]    Diabetic foot left    Acute kidney injury ([address]    Ischemic foot left    Urinary retention    Subacute osteomyelitis of left foot (HCC)    Hx of BKA (HCC)    Impaired mobility    HCAP (healthcare-associated pneumonia)    Primary open angle glaucoma of both eyes, moderate stage    Pseudophakia of both eyes    Retinopathy, diabetic, bilateral ([address]    Chest pain, atypical    Paresthesia of right upper extremity    Metabolic acidosis    Right arm pain    Neck pain    Intracranial carotid stenosis, left    Calculus of gallbladder without cholecystitis without obstruction    Hemispheric carotid artery syndrome    Abdominal pain    Acute cholecystitis with chronic cholecystitis    Severe malnutrition ([address]       46 y.o. male who presents with encephalopathy likely multifactorial,R gaze deviation.     - Discussed with  Anamariaidat  - CTA H/N   - MRI Brain WO   - ECHO,    - ASA 300mg rectal  now then 49IW daily   - Folic acid 1mg BID   - Pravachol 10mg nightly    - Fasting Lipid panel   - PT, OT, Speech eval    - Hydrate with  IVF NS @ 75cc/hr    - Telemetry    - Neuro checks per protocol  - We recommend -180  -load with keppra,start keppra 500 mg bid 1 hours after the bolus. -EEG  - Blood glucose goal less than 180  - Please avoid dextrose containing solutions    Additional recommendations may follow    Please contact [facility] with any changes in patients neurologic status. Thank you for your consult.        Josefina Nassar MD   11/7/2018  3:36 AM negative...

## 2019-12-11 NOTE — ED ADULT TRIAGE NOTE - CHIEF COMPLAINT QUOTE
Pt with h/o alcohol abuse comes in c/o abdominal pain, nausea and vomiting. Pt with yellowing of the eyes. Pt denies drinking anything today. Normally drinking 4 large cans of beer per day.

## 2019-12-11 NOTE — ED ADULT NURSE REASSESSMENT NOTE - NS ED NURSE REASSESS COMMENT FT1
Pt voided freely in bed, pt cleaned, linens changed, belongings labeled and pt placed in yellow gown, Pt with cardiac and O2 monitor in place, physician at bedside, fall precautions in place, will monitor.

## 2019-12-11 NOTE — ED PROVIDER NOTE - PROGRESS NOTE DETAILS
Xochilt: pt with elevated etoh level, observed for <7 hours, mentating clearly, no tremors, calm, ciwa ~1-2, pulse 90, abd improved without ttp, neg lipase. does not want detox. will give librium, parents to . otherwise well, no active withdrawal and cliniaclly appropriate and nl mentation. stable for d/c.

## 2019-12-11 NOTE — ED ADULT NURSE NOTE - NS TRANSFER PATIENT BELONGINGS
[facility]  Hematology/Oncology  [address] [id number]  Phone: [phone]  Fax:   Follow Up Time: Routine Clothing

## 2019-12-11 NOTE — ED ADULT NURSE NOTE - OBJECTIVE STATEMENT
pt alert and oriented x4 came in c/o abdominal pain with tremors. pt states he drinks daily last drink yesterday. pt ROMI Buchanan MD at bedside, pt placed on cardiac monitor. respirations even unlabored. pt denies history of withdrawal seizures. abdomen soft non distended. pt reports history of pancreatitis and states this feels the same. pt educated on plan of care, pt able to successfully teach back plan of care to RN, RN will continue to reeducate pt during hospital stay.

## 2019-12-11 NOTE — ED PROVIDER NOTE - PATIENT PORTAL LINK FT
You can access the FollowMyHealth Patient Portal offered by [facility] by registering at the following website: [email or web address]. By joining [facility]’s FollowMyHealth portal, you will also be able to view your health information using other applications (apps) compatible with our system.

## 2019-12-11 NOTE — ED PROVIDER NOTE - OBJECTIVE STATEMENT
PCP: Dr Gastelum 30 y/o M with PMH HTN, type 2 DM, ETOH abuse, pancreatitis presents to ED for abdominal pain and chest pain. Patient was scheduled for oral surgery today but reports woke up this morning tremulous with mid epigastric pain and vomited 3 x since. Emesis is non bloody. Denies fever, diarrhea, rashes, SOB. Patient had normal BM this am. Denies dysuria, hematuria, bloody stools. Girlfriend at bedside states patient confused, usually drinks four 24 oz beers daily. Was drinking four locos but developed type 2 DM and switched to beer because "its less sugar", patients last drink was 9:30pm last night. Also reports tingling to lower back unchanged from baseline, had imaging out patient and is been treated for "pinched nerve" on gabapentin.     PCP: Dr Gastelum

## 2019-12-11 NOTE — ED PROVIDER NOTE - ATTENDING CONTRIBUTION TO CARE
Xochilt: I performed a face to face bedside interview with patient regarding history of present illness, review of symptoms and past medical history. I completed an independent physical exam.  I have discussed patient's plan of care with advanced care provider.   I agree with note as stated above including HISTORY OF PRESENT ILLNESS, HIV, PAST MEDICAL/SURGICAL/FAMILY/SOCIAL HISTORY, ALLERGIES AND HOME MEDICATIONS, REVIEW OF SYSTEMS, PHYSICAL EXAM, MEDICAL DECISION MAKING and any PROGRESS NOTES during the time I functioned as the attending physician for this patient  unless otherwise noted. My brief assessment is as follows: pt daily drinker with withdrawal in past (no seizures), present with tremors, some confusion, epigastric pain, n/v. States last drink >24 hours ago as was going to have oral surgery today. Pt answers questions, occasionally with some confusion, difficutly speaking/tremors. denies f/c, neck pain. on PE: tremulous, sometimes stuttering/word scanning, sometimes fine, supple neck, no photophobia, pulse ~100, clear lungs, abd soft, tender epigastric, no rebound, moving all exremities with good strength/sensation. ciwa ~15, will give ativan, meds, ct abd/pel, ct head, reassess.

## 2019-12-11 NOTE — ED PROVIDER NOTE - PHYSICAL EXAMINATION
ALOOB, patient visibly tremulous, diaphoretic, alert to person and place, confused to time and situation, + tactile hallucinations

## 2019-12-11 NOTE — ED PROVIDER NOTE - CLINICAL SUMMARY MEDICAL DECISION MAKING FREE TEXT BOX
30 y/o M with PMHx ETOH abuse, pancreatitis presents with epigastric abdominal pain, chest pain and tremors; last drink 9:30pm, patient likely in alcohol withdrawal will give benzos for withdrawal symptoms but obtain labs, EKG, CXR, and CT abdomen to evaluate for intra-abdominal pathology and r/o cardiac causes

## 2020-04-01 ENCOUNTER — EMERGENCY (EMERGENCY)
Facility: HOSPITAL | Age: 30
LOS: 1 days | End: 2020-04-01
Attending: STUDENT IN AN ORGANIZED HEALTH CARE EDUCATION/TRAINING PROGRAM
Payer: MEDICAID

## 2020-04-01 VITALS
HEIGHT: 66 IN | HEART RATE: 156 BPM | OXYGEN SATURATION: 99 % | TEMPERATURE: 100 F | WEIGHT: 154.98 LBS | DIASTOLIC BLOOD PRESSURE: 98 MMHG | SYSTOLIC BLOOD PRESSURE: 200 MMHG | RESPIRATION RATE: 28 BRPM

## 2020-04-01 LAB
ALBUMIN SERPL ELPH-MCNC: 5.2 G/DL — SIGNIFICANT CHANGE UP (ref 3.3–5.2)
ALP SERPL-CCNC: 108 U/L — SIGNIFICANT CHANGE UP (ref 40–120)
ALT FLD-CCNC: 68 U/L — HIGH
ANION GAP SERPL CALC-SCNC: 25 MMOL/L — HIGH (ref 5–17)
AST SERPL-CCNC: 65 U/L — HIGH
BASE EXCESS BLDV CALC-SCNC: -9.8 MMOL/L — LOW (ref -2–2)
BILIRUB SERPL-MCNC: 1.7 MG/DL — SIGNIFICANT CHANGE UP (ref 0.4–2)
BUN SERPL-MCNC: 4 MG/DL — LOW (ref 8–20)
CA-I SERPL-SCNC: 1.23 MMOL/L — SIGNIFICANT CHANGE UP (ref 1.15–1.33)
CALCIUM SERPL-MCNC: 10.5 MG/DL — HIGH (ref 8.6–10.2)
CHLORIDE BLDV-SCNC: 101 MMOL/L — SIGNIFICANT CHANGE UP (ref 98–107)
CHLORIDE SERPL-SCNC: 88 MMOL/L — LOW (ref 98–107)
CO2 SERPL-SCNC: 15 MMOL/L — LOW (ref 22–29)
CREAT SERPL-MCNC: 0.46 MG/DL — LOW (ref 0.5–1.3)
FERRITIN SERPL-MCNC: 243 NG/ML — SIGNIFICANT CHANGE UP (ref 30–400)
GAS PNL BLDV: 134 MMOL/L — LOW (ref 135–145)
GAS PNL BLDV: SIGNIFICANT CHANGE UP
GAS PNL BLDV: SIGNIFICANT CHANGE UP
GLUCOSE BLDV-MCNC: 183 MG/DL — HIGH (ref 70–99)
GLUCOSE SERPL-MCNC: 204 MG/DL — HIGH (ref 70–99)
HCO3 BLDV-SCNC: 16 MMOL/L — LOW (ref 21–29)
HCT VFR BLD CALC: 43.1 % — SIGNIFICANT CHANGE UP (ref 39–50)
HCT VFR BLDA CALC: 42 — SIGNIFICANT CHANGE UP (ref 39–50)
HGB BLD CALC-MCNC: 13.8 G/DL — SIGNIFICANT CHANGE UP (ref 13–17)
HGB BLD-MCNC: 14.7 G/DL — SIGNIFICANT CHANGE UP (ref 13–17)
LACTATE BLDV-MCNC: 0.7 MMOL/L — SIGNIFICANT CHANGE UP (ref 0.5–2)
MAGNESIUM SERPL-MCNC: 1.7 MG/DL — SIGNIFICANT CHANGE UP (ref 1.6–2.6)
MCHC RBC-ENTMCNC: 33.8 PG — SIGNIFICANT CHANGE UP (ref 27–34)
MCHC RBC-ENTMCNC: 34.1 GM/DL — SIGNIFICANT CHANGE UP (ref 32–36)
MCV RBC AUTO: 99.1 FL — SIGNIFICANT CHANGE UP (ref 80–100)
OTHER CELLS CSF MANUAL: 16 ML/DL — LOW (ref 18–22)
PCO2 BLDV: 34 MMHG — LOW (ref 35–50)
PH BLDV: 7.28 — LOW (ref 7.32–7.43)
PLATELET # BLD AUTO: 55 K/UL — LOW (ref 150–400)
PO2 BLDV: 51 MMHG — HIGH (ref 25–45)
POTASSIUM BLDV-SCNC: 4 MMOL/L — SIGNIFICANT CHANGE UP (ref 3.4–4.5)
POTASSIUM SERPL-MCNC: 4.3 MMOL/L — SIGNIFICANT CHANGE UP (ref 3.5–5.3)
POTASSIUM SERPL-SCNC: 4.3 MMOL/L — SIGNIFICANT CHANGE UP (ref 3.5–5.3)
PROCALCITONIN SERPL-MCNC: 0.1 NG/ML — SIGNIFICANT CHANGE UP (ref 0.02–0.1)
PROT SERPL-MCNC: 8.5 G/DL — SIGNIFICANT CHANGE UP (ref 6.6–8.7)
RBC # BLD: 4.35 M/UL — SIGNIFICANT CHANGE UP (ref 4.2–5.8)
RBC # FLD: 12.8 % — SIGNIFICANT CHANGE UP (ref 10.3–14.5)
SAO2 % BLDV: 83 % — SIGNIFICANT CHANGE UP
SODIUM SERPL-SCNC: 128 MMOL/L — LOW (ref 135–145)
WBC # BLD: 5.92 K/UL — SIGNIFICANT CHANGE UP (ref 3.8–10.5)
WBC # FLD AUTO: 5.92 K/UL — SIGNIFICANT CHANGE UP (ref 3.8–10.5)

## 2020-04-01 PROCEDURE — 70450 CT HEAD/BRAIN W/O DYE: CPT | Mod: 26

## 2020-04-01 PROCEDURE — 93010 ELECTROCARDIOGRAM REPORT: CPT

## 2020-04-01 PROCEDURE — 99285 EMERGENCY DEPT VISIT HI MDM: CPT

## 2020-04-01 RX ORDER — INSULIN HUMAN 100 [IU]/ML
8 INJECTION, SOLUTION SUBCUTANEOUS
Qty: 100 | Refills: 0 | Status: DISCONTINUED | OUTPATIENT
Start: 2020-04-01 | End: 2020-04-02

## 2020-04-01 RX ORDER — POTASSIUM CHLORIDE 20 MEQ
10 PACKET (EA) ORAL
Refills: 0 | Status: DISCONTINUED | OUTPATIENT
Start: 2020-04-01 | End: 2020-04-06

## 2020-04-01 RX ORDER — SODIUM CHLORIDE 9 MG/ML
1000 INJECTION INTRAMUSCULAR; INTRAVENOUS; SUBCUTANEOUS ONCE
Refills: 0 | Status: COMPLETED | OUTPATIENT
Start: 2020-04-01 | End: 2020-04-01

## 2020-04-01 RX ORDER — INSULIN HUMAN 100 [IU]/ML
5 INJECTION, SOLUTION SUBCUTANEOUS ONCE
Refills: 0 | Status: COMPLETED | OUTPATIENT
Start: 2020-04-01 | End: 2020-04-01

## 2020-04-01 RX ORDER — SODIUM CHLORIDE 9 MG/ML
1000 INJECTION, SOLUTION INTRAVENOUS
Refills: 0 | Status: DISCONTINUED | OUTPATIENT
Start: 2020-04-01 | End: 2020-04-02

## 2020-04-01 RX ADMIN — INSULIN HUMAN 5 UNIT(S): 100 INJECTION, SOLUTION SUBCUTANEOUS at 22:35

## 2020-04-01 RX ADMIN — SODIUM CHLORIDE 4000 MILLILITER(S): 9 INJECTION INTRAMUSCULAR; INTRAVENOUS; SUBCUTANEOUS at 22:35

## 2020-04-01 RX ADMIN — SODIUM CHLORIDE 1000 MILLILITER(S): 9 INJECTION INTRAMUSCULAR; INTRAVENOUS; SUBCUTANEOUS at 20:01

## 2020-04-01 NOTE — ED ADULT NURSE NOTE - NS ED NOTE  TALK SOMEONE YN
"Chief Complaint   Patient presents with     RECHECK      3 month AK f/u-is going to be put on triglyceride medication, the scalp is better, using humidifier,        Initial There were no vitals taken for this visit. Estimated body mass index is 33.37 kg/(m^2) as calculated from the following:    Height as of 1/24/18: 6' 2\" (188 cm).    Weight as of 1/24/18: 259 lb 14.4 oz (117.9 kg).  Medication Reconciliation: complete   Elvi Haynes MA      " No

## 2020-04-01 NOTE — ED ADULT TRIAGE NOTE - CHIEF COMPLAINT QUOTE
since yesterday I have been feeling these zaps all over my body, it comes randomly. jittery in triage. states Hx of HTN and DM

## 2020-04-01 NOTE — ED ADULT NURSE NOTE - OBJECTIVE STATEMENT
Pt is here with c/o being intermittently being zapped.  P/s it started yesterday afternoon and states it starts in his toes and travels to his chest.  Pt denies dizziness, SOB, or diaphoretic.

## 2020-04-02 VITALS
TEMPERATURE: 98 F | SYSTOLIC BLOOD PRESSURE: 132 MMHG | RESPIRATION RATE: 20 BRPM | HEART RATE: 80 BPM | DIASTOLIC BLOOD PRESSURE: 70 MMHG | OXYGEN SATURATION: 98 %

## 2020-04-02 LAB
ACETONE SERPL-MCNC: ABNORMAL
ACETONE SERPL-MCNC: ABNORMAL
ANION GAP SERPL CALC-SCNC: 17 MMOL/L — SIGNIFICANT CHANGE UP (ref 5–17)
ANION GAP SERPL CALC-SCNC: 17 MMOL/L — SIGNIFICANT CHANGE UP (ref 5–17)
ANION GAP SERPL CALC-SCNC: 19 MMOL/L — HIGH (ref 5–17)
BUN SERPL-MCNC: 3 MG/DL — LOW (ref 8–20)
BUN SERPL-MCNC: 3 MG/DL — LOW (ref 8–20)
BUN SERPL-MCNC: <3 MG/DL — LOW (ref 8–20)
CALCIUM SERPL-MCNC: 8.8 MG/DL — SIGNIFICANT CHANGE UP (ref 8.6–10.2)
CALCIUM SERPL-MCNC: 8.8 MG/DL — SIGNIFICANT CHANGE UP (ref 8.6–10.2)
CALCIUM SERPL-MCNC: 8.9 MG/DL — SIGNIFICANT CHANGE UP (ref 8.6–10.2)
CHLORIDE SERPL-SCNC: 100 MMOL/L — SIGNIFICANT CHANGE UP (ref 98–107)
CHLORIDE SERPL-SCNC: 101 MMOL/L — SIGNIFICANT CHANGE UP (ref 98–107)
CHLORIDE SERPL-SCNC: 97 MMOL/L — LOW (ref 98–107)
CO2 SERPL-SCNC: 17 MMOL/L — LOW (ref 22–29)
CO2 SERPL-SCNC: 18 MMOL/L — LOW (ref 22–29)
CO2 SERPL-SCNC: 19 MMOL/L — LOW (ref 22–29)
CREAT SERPL-MCNC: 0.38 MG/DL — LOW (ref 0.5–1.3)
CREAT SERPL-MCNC: 0.43 MG/DL — LOW (ref 0.5–1.3)
CREAT SERPL-MCNC: 0.47 MG/DL — LOW (ref 0.5–1.3)
GLUCOSE SERPL-MCNC: 190 MG/DL — HIGH (ref 70–99)
GLUCOSE SERPL-MCNC: 321 MG/DL — HIGH (ref 70–99)
GLUCOSE SERPL-MCNC: 66 MG/DL — LOW (ref 70–99)
POTASSIUM SERPL-MCNC: 3.9 MMOL/L — SIGNIFICANT CHANGE UP (ref 3.5–5.3)
POTASSIUM SERPL-MCNC: 4 MMOL/L — SIGNIFICANT CHANGE UP (ref 3.5–5.3)
POTASSIUM SERPL-MCNC: 4.2 MMOL/L — SIGNIFICANT CHANGE UP (ref 3.5–5.3)
POTASSIUM SERPL-SCNC: 3.9 MMOL/L — SIGNIFICANT CHANGE UP (ref 3.5–5.3)
POTASSIUM SERPL-SCNC: 4 MMOL/L — SIGNIFICANT CHANGE UP (ref 3.5–5.3)
POTASSIUM SERPL-SCNC: 4.2 MMOL/L — SIGNIFICANT CHANGE UP (ref 3.5–5.3)
SODIUM SERPL-SCNC: 133 MMOL/L — LOW (ref 135–145)
SODIUM SERPL-SCNC: 136 MMOL/L — SIGNIFICANT CHANGE UP (ref 135–145)
SODIUM SERPL-SCNC: 136 MMOL/L — SIGNIFICANT CHANGE UP (ref 135–145)

## 2020-04-02 PROCEDURE — 85027 COMPLETE CBC AUTOMATED: CPT

## 2020-04-02 PROCEDURE — 83605 ASSAY OF LACTIC ACID: CPT

## 2020-04-02 PROCEDURE — 85014 HEMATOCRIT: CPT

## 2020-04-02 PROCEDURE — 82803 BLOOD GASES ANY COMBINATION: CPT

## 2020-04-02 PROCEDURE — 82962 GLUCOSE BLOOD TEST: CPT

## 2020-04-02 PROCEDURE — 84100 ASSAY OF PHOSPHORUS: CPT

## 2020-04-02 PROCEDURE — 82947 ASSAY GLUCOSE BLOOD QUANT: CPT

## 2020-04-02 PROCEDURE — 80048 BASIC METABOLIC PNL TOTAL CA: CPT

## 2020-04-02 PROCEDURE — 82330 ASSAY OF CALCIUM: CPT

## 2020-04-02 PROCEDURE — 36415 COLL VENOUS BLD VENIPUNCTURE: CPT

## 2020-04-02 PROCEDURE — 96375 TX/PRO/DX INJ NEW DRUG ADDON: CPT

## 2020-04-02 PROCEDURE — 82435 ASSAY OF BLOOD CHLORIDE: CPT

## 2020-04-02 PROCEDURE — 96374 THER/PROPH/DIAG INJ IV PUSH: CPT

## 2020-04-02 PROCEDURE — 84132 ASSAY OF SERUM POTASSIUM: CPT

## 2020-04-02 PROCEDURE — 84145 PROCALCITONIN (PCT): CPT

## 2020-04-02 PROCEDURE — 70450 CT HEAD/BRAIN W/O DYE: CPT

## 2020-04-02 PROCEDURE — 99284 EMERGENCY DEPT VISIT MOD MDM: CPT | Mod: 25

## 2020-04-02 PROCEDURE — 82009 KETONE BODYS QUAL: CPT

## 2020-04-02 PROCEDURE — 84295 ASSAY OF SERUM SODIUM: CPT

## 2020-04-02 PROCEDURE — 80053 COMPREHEN METABOLIC PANEL: CPT

## 2020-04-02 PROCEDURE — 83735 ASSAY OF MAGNESIUM: CPT

## 2020-04-02 PROCEDURE — 82728 ASSAY OF FERRITIN: CPT

## 2020-04-02 PROCEDURE — 93005 ELECTROCARDIOGRAM TRACING: CPT

## 2020-04-02 RX ORDER — SODIUM CHLORIDE 9 MG/ML
1000 INJECTION, SOLUTION INTRAVENOUS
Refills: 0 | Status: DISCONTINUED | OUTPATIENT
Start: 2020-04-02 | End: 2020-04-06

## 2020-04-02 RX ORDER — SODIUM CHLORIDE 9 MG/ML
1000 INJECTION, SOLUTION INTRAVENOUS ONCE
Refills: 0 | Status: COMPLETED | OUTPATIENT
Start: 2020-04-02 | End: 2020-04-02

## 2020-04-02 RX ORDER — SODIUM CHLORIDE 9 MG/ML
1000 INJECTION INTRAMUSCULAR; INTRAVENOUS; SUBCUTANEOUS
Refills: 0 | Status: DISCONTINUED | OUTPATIENT
Start: 2020-04-02 | End: 2020-04-06

## 2020-04-02 RX ADMIN — Medication 100 MILLIGRAM(S): at 03:05

## 2020-04-02 RX ADMIN — Medication 2 MILLIGRAM(S): at 00:49

## 2020-04-02 RX ADMIN — SODIUM CHLORIDE 125 MILLILITER(S): 9 INJECTION INTRAMUSCULAR; INTRAVENOUS; SUBCUTANEOUS at 05:29

## 2020-04-02 NOTE — CONSULT NOTE ADULT - SUBJECTIVE AND OBJECTIVE BOX
INTERVAL HPI/OVERNIGHT EVENTS/SUBJECTIVE:    ICU Vital Signs Last 24 Hrs  T(C): 37 (01 Apr 2020 23:35), Max: 37.8 (01 Apr 2020 17:12)  T(F): 98.6 (01 Apr 2020 23:35), Max: 100.1 (01 Apr 2020 17:12)  HR: 94 (01 Apr 2020 23:35) (94 - 156)  BP: 149/89 (01 Apr 2020 23:35) (149/89 - 200/98)  BP(mean): --  ABP: --  ABP(mean): --  RR: 20 (01 Apr 2020 23:35) (20 - 28)  SpO2: 98% (01 Apr 2020 23:35) (98% - 99%)      I&O's Detail            MEDICATIONS  (STANDING):  dextrose 5% + sodium chloride 0.45%. 1000 milliLiter(s) (100 mL/Hr) IV Continuous <Continuous>  potassium chloride  10 mEq/100 mL IVPB 10 milliEquivalent(s) IV Intermittent every 1 hour    MEDICATIONS  (PRN):      NUTRITION/IVF:     CENTRAL LINE:  LOCATION:   DATE INSERTED:  CVP:  SCVO2:    PENG:   DATE INSERTED:    A-LINE:    LOCATION:   DATE INSERTED:   SVV:  CO/CI:     CHEST TUBE:  LOCATION:  DATE INSERTED: OUTPUT/24 HRS:  SUCTION/WATER SEAL:     NG/OG TUBE:  DATE INSERTED:  OUTPUT/24 HRS:    MISC:     PHYSICAL EXAM:    Gen:    Eyes:    Neurological:    ENMT:    Neck:    Pulmonary:    Cardiovascular:    Gastrointestinal:    Genitourinary:    Back:    Extremities:    Skin:    Musculoskeletal:          LABS:  CBC Full  -  ( 01 Apr 2020 20:24 )  WBC Count : 5.92 K/uL  RBC Count : 4.35 M/uL  Hemoglobin : 14.7 g/dL  Hematocrit : 43.1 %  Platelet Count - Automated : 55 K/uL  Mean Cell Volume : 99.1 fl  Mean Cell Hemoglobin : 33.8 pg  Mean Cell Hemoglobin Concentration : 34.1 gm/dL  Auto Neutrophil # : x  Auto Lymphocyte # : x  Auto Monocyte # : x  Auto Eosinophil # : x  Auto Basophil # : x  Auto Neutrophil % : x  Auto Lymphocyte % : x  Auto Monocyte % : x  Auto Eosinophil % : x  Auto Basophil % : x    04-01    136  |  100  |  3.0<L>  ----------------------------<  66<L>  4.0   |  17.0<L>  |  0.43<L>    Ca    8.9      01 Apr 2020 23:34  Phos  2.3     04-01  Mg     1.7     04-01    TPro  8.5  /  Alb  5.2  /  TBili  1.7  /  DBili  x   /  AST  65<H>  /  ALT  68<H>  /  AlkPhos  108  04-01        RECENT CULTURES:      LIVER FUNCTIONS - ( 01 Apr 2020 20:24 )  Alb: 5.2 g/dL / Pro: 8.5 g/dL / ALK PHOS: 108 U/L / ALT: 68 U/L / AST: 65 U/L / GGT: x               CAPILLARY BLOOD GLUCOSE      RADIOLOGY & ADDITIONAL STUDIES:    ASSESSMENT/PLAN:  29yMale presenting with:    Neuro:    CV:    Pulm:    GI/Nutrition:    /Renal:    ID:    Lines/Tubes:    Endo:    Skin:    Proph:    Dispo:      CRITICAL CARE TIME SPENT: HPI:  30 y/o PMH IDDM and HTN present to ED 4/1 with c/o feeling "zaps" through legs starting on Monday.  Pt states has never felt this before and hasn't been able to sleep since it started.  States yesterday felt "zap through my chest like someone was using a defibrillator on me".  Pt endorses has been eating normally.  Takes Metformin TID as well as 10U premeal insulin with FS in the 150's.  Last visit to PMD 6 mos prior.  Does not see Endocrinologist.  Pt also endorses has h/o recent admission at beginning of March for ETOH withdrawal, however states has not had a drink since.  Pt denies changes in vision, chest pain, shortness of breath, abd pain, N/V/D, auditory or visual hallucinations.      ICU Vital Signs Last 24 Hrs  T(C): 37 (01 Apr 2020 23:35), Max: 37.8 (01 Apr 2020 17:12)  T(F): 98.6 (01 Apr 2020 23:35), Max: 100.1 (01 Apr 2020 17:12)  HR: 94 (01 Apr 2020 23:35) (94 - 156)  BP: 149/89 (01 Apr 2020 23:35) (149/89 - 200/98)  BP(mean): --  ABP: --  ABP(mean): --  RR: 20 (01 Apr 2020 23:35) (20 - 28)  SpO2: 98% (01 Apr 2020 23:35) (98% - 99%)      I&O's Detail            MEDICATIONS  (STANDING):  dextrose 5% + sodium chloride 0.45%. 1000 milliLiter(s) (100 mL/Hr) IV Continuous <Continuous>  potassium chloride  10 mEq/100 mL IVPB 10 milliEquivalent(s) IV Intermittent every 1 hour    MEDICATIONS  (PRN):      NUTRITION/IVF: NPO/ D5 .45%NS @ 50cc/hr        PHYSICAL EXAM:    Gen:  Pt laying in bed, noticeably tremulous, diaphoretic, no respiratory distress    Eyes:  EOMI's BL    Neurological:  GCS 15 without focal deficit.  Tremors of BL UE and LE noted.      ENMT:  MMM    Neck:  Supple, no JVD    Pulmonary:  Unlabored, CTAB    Cardiovascular:  NSR to sinus tach    Gastrointestinal:  Soft NTND    Extremities:  AFROM    Skin:  diaphoretic        LABS:  CBC Full  -  ( 01 Apr 2020 20:24 )  WBC Count : 5.92 K/uL  RBC Count : 4.35 M/uL  Hemoglobin : 14.7 g/dL  Hematocrit : 43.1 %  Platelet Count - Automated : 55 K/uL  Mean Cell Volume : 99.1 fl  Mean Cell Hemoglobin : 33.8 pg  Mean Cell Hemoglobin Concentration : 34.1 gm/dL  Auto Neutrophil # : x  Auto Lymphocyte # : x  Auto Monocyte # : x  Auto Eosinophil # : x  Auto Basophil # : x  Auto Neutrophil % : x  Auto Lymphocyte % : x  Auto Monocyte % : x  Auto Eosinophil % : x  Auto Basophil % : x    04-01    136  |  100  |  3.0<L>  ----------------------------<  66<L>  4.0   |  17.0<L>  |  0.43<L>    Ca    8.9      01 Apr 2020 23:34  Phos  2.3     04-01  Mg     1.7     04-01    TPro  8.5  /  Alb  5.2  /  TBili  1.7  /  DBili  x   /  AST  65<H>  /  ALT  68<H>  /  AlkPhos  108  04-01        RECENT CULTURES:      LIVER FUNCTIONS - ( 01 Apr 2020 20:24 )  Alb: 5.2 g/dL / Pro: 8.5 g/dL / ALK PHOS: 108 U/L / ALT: 68 U/L / AST: 65 U/L / GGT: x               CAPILLARY BLOOD GLUCOSE      RADIOLOGY & ADDITIONAL STUDIES:    ASSESSMENT/PLAN:  29yMale presenting with:  h/o IDDM, HTN presents with tremors    ICU eval called for possible DKA vs withdrawal  Pt had initial labs repeated after 2L IVF given with some improvement of metabolic acidosis.  Glucose on repeat BMP 66.  Juice given by ED attending prior to my arrival.  Would not recommend starting Insulin gtt at this time  Additional L LR recommended with repeat labs  Ativan for possible withdrawal.      Will reeval after IVF and repeat labs.

## 2020-04-02 NOTE — ED ADULT NURSE REASSESSMENT NOTE - NS ED NURSE REASSESS COMMENT FT1
Report received from NITISH Candelaria at 2330, pt care assumed at that time. Pt observed to be intermittently tremulous. Pt awake, alert, oriented  x4, denies lightheadedness/dizziness/ headache. Respirations appearing even, unlabored, pt denies SOB or chest pain. Pt denies pain at rest, reports 'it only hurts when I get the zings in my toes'. BMP showing blood sugar 66, 2 juices given to Pt By Dr. Hamilton. RPT . LR D5 bolus given by SICU PA, infusing at this time. Pt medicated per orders. Safety maintained. Will continue to monitor.

## 2020-04-02 NOTE — ED PROVIDER NOTE - CARE PLAN
Principal Discharge DX:	Alcohol abuse Principal Discharge DX:	Diabetic ketoacidosis without coma associated with other specified diabetes mellitus  Secondary Diagnosis:	Alcohol abuse

## 2020-04-02 NOTE — ED PROVIDER NOTE - OBJECTIVE STATEMENT
28 yo male pmh diabetes, htn  comes to ed c/o "feeling zaps through his body since 4pm yesterday; pt denies any cough , headaches , nausea or vomiting; pt states is compliant with medications; denies any alcohol or drug use

## 2020-04-02 NOTE — ED PROVIDER NOTE - PATIENT PORTAL LINK FT
You can access the FollowMyHealth Patient Portal offered by [facility] by registering at the following website: [email or web address]. By joining [employer]’s FollowMyHealth portal, you will also be able to view your health information using other applications (apps) compatible with our system.

## 2020-04-02 NOTE — ED PROVIDER NOTE - PROGRESS NOTE DETAILS
Dr. Watson: Pt signed out to me by Dr. Salas pending repeat labs.  Repeat labs show sugar improved.  gap closed.  Pt no longer in DKA.  will d/c to home with instructions to f/up with pcp in 1-2 days. instructed to return for any new/concerning symptoms.  Pt given a copy of all results and instructed to f/up with pcp regarding any abnormal results.

## 2020-04-02 NOTE — CHART NOTE - NSCHARTNOTEFT_GEN_A_CORE
Pt labs abd vital signs reevaluated after further IVF provided.  Metabolic acidosis improving with volume.  Pt also given Ativan and Librium from ED with some improvement per ED attending.    Hyperglycemia noted on pt's recent FS< however pt still on D5 .45%NS.  Per ED attending that was recently d/c and LR was used in place of D5.      Pt hemodynamically well.  Pt does not require ICU care at this time.    D/W Dr. Jeffries and Dr Salas in the ED.

## 2020-12-29 NOTE — ED ADULT TRIAGE NOTE - NS ED TRIAGE AVPU SCALE
8pm, 12/28/Yes Alert-The patient is alert, awake and responds to voice. The patient is oriented to time, place, and person. The triage nurse is able to obtain subjective information.

## 2021-02-18 NOTE — PATIENT PROFILE ADULT. - ..
DATE OF CONSULTATION:      REFERRING Physician:  Attila Frederick MD    REFERRING PHYSICIAN:  ER.    HISTORY OF PRESENT ILLNESS:  This is a 54-year-old male, accompanied with his wife, both of them is giving me the history.  The patient was brought to the emergency yesterday after the wife called EMS.     The patient is a drinker, heavily, for very long time.  Yesterday, his wife came home around lunch time and found him face down on the couch.  He was diaphoretic, incoherent with verbal responses.  He did lose his bladder control.  Afterwards, his speech became incoherent.  He did not act properly and then went into grand mal seizure, which was witnessed by his wife.  He was brought to the emergency and was admitted.     He was kept in ICU, and we were consulted.  Overnight, he remains on Versed drip 2 mg/hour with no recurrence of seizures.     The patient was found to be thrombocytopenic, but no evidence of any active bleeding.     Two weeks ago, he did have some rectal bleeding.  He did not seek any medical help and it resolved on its own.     Today, he says he is feeling fine.  He is hungry.  He wants to eat, and he offers no specific complaints.    REVIEW OF SYMPTOMS:  According to the wife, he drinks on regular basis.  He had bleeding 2 weeks ago.  He never had a seizure in the past.  No history of any head trauma otherwise.  Today, he looks back to his baseline.    PAST MEDICAL HISTORY:  Includes heartburn, hyperlipidemia, and alcoholism, and tobacco use.    MEDICATIONS:  At home include omeprazole.    PAST SURGICAL HISTORY:  None.    SOCIAL HISTORY:  He drinks alcohol regularly and heavily.  He used to smoke cigarettes about half pack of cigarettes a day.  He denied any drug abuse.  He is retired from work as a manager for a warehouse.    ALLERGIES:  NO KNOWN DRUG ALLERGIES.    FAMILY HISTORY:  Noncontributory.    PHYSICAL EXAMINATION:    GENERAL:  A middle-aged male, who is awake and oriented x3,  accompanied with his wife, not in any distress.  He is slightly tremulous, but follows all instructions appropriately.     VITAL SIGNS:  Reviewed.  He is in no distress.  Head of the bed raised at 30-degree angle.     HEENT:  Neck is supple.  No evidence of any external head injury.  Pupils are 3 to 4 mm in size, reacting to light.  No nystagmus.  No pallor.  No icterus.  No active nose/oral bleeding.       NECK:  Unremarkable.     HEART:  S1, S2 heard.  No murmur.  No acute ST or T-wave changes.  JVD is not raised.     LUNGS:  Show bilateral good air entry.  No wheezing.  Trachea is midline.     ABDOMEN:  Soft and nontender.  Bowel sounds are heard.     EXTREMITIES:  No edema.  No clubbing.     CNS:  Mild tremors, otherwise no focal deficit.     CT of the brain shows no acute process.    LABORATORY DATA:  All labs were reviewed.  Ultrasound of liver is pending.    IMPRESSION:    1. Alcohol dependence with seizure.  2. Thrombocytopenia, most likely because of alcoholism.  Other etiologies cannot be completely excluded.  Rule out disseminated intravascular coagulation.    3. Hypokalemia.   4. Metabolic acidosis.   5. Tobacco use.   6. Heartburn.    PLAN:    1. The patient is clinically doing better, protecting his airway.  Mental status is normal.  Hemodynamically stable.  We will replace his potassium.  I will start him on banana bag.  2. Awaiting for DIC workup.  Also, check CPK and serum osmolarity level.  We will repeat labs again in the morning.  If there is any change in condition, further recommendations will be made.  Decrease his Versed drip to 1 mg and try to wean it slowly.  The plan was discussed with the patient and his wife in detail.  The patient is at risk for delirium tremens.        ______________________________  Iva Bledsoe MD  [id number]      D:  11/03/2017 09:33:21  T:  11/03/2017 11:24:54   MM/modl   Job:  [id number]          05-Dec-2016 23:24:39

## 2021-04-18 NOTE — ED PROVIDER NOTE - CONSTITUTIONAL NEGATIVE STATEMENT, MLM
Problem: Falls - Risk of  Goal: *Absence of Falls  Description: Document Chris Gonzalez Fall Risk and appropriate interventions in the flowsheet. Outcome: Progressing Towards Goal  Note: Fall Risk Interventions:  Mobility Interventions: Bed/chair exit alarm, Patient to call before getting OOB, Utilize walker, cane, or other assistive device         Medication Interventions: Bed/chair exit alarm, Evaluate medications/consider consulting pharmacy, Patient to call before getting OOB    Elimination Interventions: Call light in reach, Bed/chair exit alarm, Toileting schedule/hourly rounds    History of Falls Interventions: Bed/chair exit alarm         Problem: Pressure Injury - Risk of  Goal: *Prevention of pressure injury  Description: Document Dejuan Scale and appropriate interventions in the flowsheet.   Outcome: Progressing Towards Goal  Note: Pressure Injury Interventions:  Sensory Interventions: Assess changes in LOC, Float heels, Minimize linen layers    Moisture Interventions: Absorbent underpads, Apply protective barrier, creams and emollients, Contain wound drainage    Activity Interventions: Pressure redistribution bed/mattress(bed type)    Mobility Interventions: Assess need for specialty bed, HOB 30 degrees or less, Float heels    Nutrition Interventions: Document food/fluid/supplement intake    Friction and Shear Interventions: Apply protective barrier, creams and emollients, Feet elevated on foot rest, HOB 30 degrees or less, Minimize layers no fever and no chills.

## 2021-05-06 ENCOUNTER — INPATIENT (INPATIENT)
Facility: HOSPITAL | Age: 31
LOS: 4 days | Discharge: HOME | DRG: 286 | End: 2021-05-11
Attending: HOSPITALIST | Admitting: STUDENT IN AN ORGANIZED HEALTH CARE EDUCATION/TRAINING PROGRAM
Payer: MEDICAID

## 2021-05-06 VITALS
DIASTOLIC BLOOD PRESSURE: 88 MMHG | SYSTOLIC BLOOD PRESSURE: 162 MMHG | HEIGHT: 66 IN | TEMPERATURE: 98 F | WEIGHT: 149.91 LBS | HEART RATE: 170 BPM | RESPIRATION RATE: 28 BRPM | OXYGEN SATURATION: 97 %

## 2021-05-06 DIAGNOSIS — E11.10 TYPE 2 DIABETES MELLITUS WITH KETOACIDOSIS WITHOUT COMA: ICD-10-CM

## 2021-05-06 LAB
A1C WITH ESTIMATED AVERAGE GLUCOSE RESULT: 10 % — HIGH (ref 4–5.6)
ACETONE SERPL-MCNC: ABNORMAL
ACETONE SERPL-MCNC: ABNORMAL
ALBUMIN SERPL ELPH-MCNC: 4.1 G/DL — SIGNIFICANT CHANGE UP (ref 3.3–5.2)
ALBUMIN SERPL ELPH-MCNC: 5.1 G/DL — SIGNIFICANT CHANGE UP (ref 3.3–5.2)
ALP SERPL-CCNC: 109 U/L — SIGNIFICANT CHANGE UP (ref 40–120)
ALP SERPL-CCNC: 199 U/L — HIGH (ref 40–120)
ALT FLD-CCNC: 155 U/L — HIGH
ALT FLD-CCNC: 211 U/L — HIGH
ANION GAP SERPL CALC-SCNC: 22 MMOL/L — HIGH (ref 5–17)
ANION GAP SERPL CALC-SCNC: SIGNIFICANT CHANGE UP MMOL/L (ref 5–17)
ANION GAP SERPL CALC-SCNC: SIGNIFICANT CHANGE UP MMOL/L (ref 5–17)
ANISOCYTOSIS BLD QL: SLIGHT — SIGNIFICANT CHANGE UP
APTT BLD: 32.2 SEC — SIGNIFICANT CHANGE UP (ref 27.5–35.5)
AST SERPL-CCNC: 177 U/L — HIGH
AST SERPL-CCNC: 276 U/L — HIGH
BASE EXCESS BLDV CALC-SCNC: -12.9 MMOL/L — LOW (ref -3–3)
BASE EXCESS BLDV CALC-SCNC: -27.6 MMOL/L — LOW (ref -2–2)
BASE EXCESS BLDV CALC-SCNC: -28.3 MMOL/L — LOW (ref -2–2)
BASOPHILS # BLD AUTO: 0 K/UL — SIGNIFICANT CHANGE UP (ref 0–0.2)
BASOPHILS NFR BLD AUTO: 0 % — SIGNIFICANT CHANGE UP (ref 0–2)
BILIRUB SERPL-MCNC: 1.7 MG/DL — SIGNIFICANT CHANGE UP (ref 0.4–2)
BILIRUB SERPL-MCNC: 1.7 MG/DL — SIGNIFICANT CHANGE UP (ref 0.4–2)
BLOOD GAS COMMENTS, VENOUS: SIGNIFICANT CHANGE UP
BUN SERPL-MCNC: 19 MG/DL — SIGNIFICANT CHANGE UP (ref 8–20)
BUN SERPL-MCNC: 24 MG/DL — HIGH (ref 8–20)
BUN SERPL-MCNC: 25 MG/DL — HIGH (ref 8–20)
CA-I SERPL-SCNC: 1.11 MMOL/L — LOW (ref 1.12–1.3)
CA-I SERPL-SCNC: 1.12 MMOL/L — LOW (ref 1.15–1.33)
CA-I SERPL-SCNC: 1.14 MMOL/L — LOW (ref 1.15–1.33)
CALCIUM SERPL-MCNC: 7.5 MG/DL — LOW (ref 8.6–10.2)
CALCIUM SERPL-MCNC: 7.9 MG/DL — LOW (ref 8.6–10.2)
CALCIUM SERPL-MCNC: 8.7 MG/DL — SIGNIFICANT CHANGE UP (ref 8.6–10.2)
CHLORIDE BLDV-SCNC: 102 MMOL/L — SIGNIFICANT CHANGE UP (ref 98–107)
CHLORIDE BLDV-SCNC: 105 MMOL/L — SIGNIFICANT CHANGE UP (ref 98–106)
CHLORIDE BLDV-SCNC: 95 MMOL/L — LOW (ref 98–107)
CHLORIDE SERPL-SCNC: 101 MMOL/L — SIGNIFICANT CHANGE UP (ref 98–107)
CHLORIDE SERPL-SCNC: 88 MMOL/L — LOW (ref 98–107)
CHLORIDE SERPL-SCNC: 92 MMOL/L — LOW (ref 98–107)
CO2 SERPL-SCNC: 11 MMOL/L — LOW (ref 22–29)
CO2 SERPL-SCNC: <6 MMOL/L — CRITICAL LOW (ref 22–29)
CO2 SERPL-SCNC: <6 MMOL/L — CRITICAL LOW (ref 22–29)
CREAT SERPL-MCNC: 1.01 MG/DL — SIGNIFICANT CHANGE UP (ref 0.5–1.3)
CREAT SERPL-MCNC: 1.34 MG/DL — HIGH (ref 0.5–1.3)
CREAT SERPL-MCNC: 1.68 MG/DL — HIGH (ref 0.5–1.3)
D DIMER BLD IA.RAPID-MCNC: 208 NG/ML DDU — SIGNIFICANT CHANGE UP
DACRYOCYTES BLD QL SMEAR: SLIGHT — SIGNIFICANT CHANGE UP
EOSINOPHIL # BLD AUTO: 0 K/UL — SIGNIFICANT CHANGE UP (ref 0–0.5)
EOSINOPHIL NFR BLD AUTO: 0 % — SIGNIFICANT CHANGE UP (ref 0–6)
ESTIMATED AVERAGE GLUCOSE: 240 MG/DL — HIGH (ref 68–114)
ETHANOL SERPL-MCNC: <10 MG/DL — SIGNIFICANT CHANGE UP (ref 0–9)
GAS PNL BLDV: 133 MMOL/L — LOW (ref 135–145)
GAS PNL BLDV: 133 MMOL/L — LOW (ref 135–145)
GAS PNL BLDV: 135 MMOL/L — LOW (ref 136–146)
GAS PNL BLDV: SIGNIFICANT CHANGE UP
GIANT PLATELETS BLD QL SMEAR: PRESENT — SIGNIFICANT CHANGE UP
GLUCOSE BLDC GLUCOMTR-MCNC: 137 MG/DL — HIGH (ref 70–99)
GLUCOSE BLDC GLUCOMTR-MCNC: 214 MG/DL — HIGH (ref 70–99)
GLUCOSE BLDC GLUCOMTR-MCNC: 340 MG/DL — HIGH (ref 70–99)
GLUCOSE BLDC GLUCOMTR-MCNC: 434 MG/DL — HIGH (ref 70–99)
GLUCOSE BLDV-MCNC: 153 MG/DL — HIGH (ref 70–99)
GLUCOSE BLDV-MCNC: 431 MG/DL — HIGH (ref 70–99)
GLUCOSE BLDV-MCNC: 472 MG/DL — CRITICAL HIGH (ref 70–99)
GLUCOSE SERPL-MCNC: 126 MG/DL — HIGH (ref 70–99)
GLUCOSE SERPL-MCNC: 445 MG/DL — HIGH (ref 70–99)
GLUCOSE SERPL-MCNC: 497 MG/DL — HIGH (ref 70–99)
HCO3 BLDV-SCNC: 14 MMOL/L — LOW (ref 20–26)
HCO3 BLDV-SCNC: 6 MMOL/L — LOW (ref 20–26)
HCO3 BLDV-SCNC: 6 MMOL/L — LOW (ref 21–29)
HCT VFR BLD CALC: 36 % — LOW (ref 39–50)
HCT VFR BLD CALC: 43.6 % — SIGNIFICANT CHANGE UP (ref 39–50)
HCT VFR BLDA CALC: 37 — LOW (ref 39–50)
HCT VFR BLDA CALC: 41 — SIGNIFICANT CHANGE UP (ref 39–50)
HCT VFR BLDA CALC: 45 — SIGNIFICANT CHANGE UP (ref 39–50)
HGB BLD CALC-MCNC: 12.1 G/DL — LOW (ref 13–17)
HGB BLD CALC-MCNC: 13.5 G/DL — SIGNIFICANT CHANGE UP (ref 13–17)
HGB BLD CALC-MCNC: 14.6 G/DL — SIGNIFICANT CHANGE UP (ref 13–17)
HGB BLD-MCNC: 12.2 G/DL — LOW (ref 13–17)
HGB BLD-MCNC: 13.8 G/DL — SIGNIFICANT CHANGE UP (ref 13–17)
HOROWITZ INDEX BLDV+IHG-RTO: SIGNIFICANT CHANGE UP
INR BLD: 1.01 RATIO — SIGNIFICANT CHANGE UP (ref 0.88–1.16)
LACTATE BLDV-MCNC: 1.3 MMOL/L — SIGNIFICANT CHANGE UP (ref 0.5–2)
LACTATE BLDV-MCNC: 1.8 MMOL/L — SIGNIFICANT CHANGE UP (ref 0.5–2)
LACTATE BLDV-MCNC: 2.4 MMOL/L — HIGH (ref 0.5–2)
LYMPHOCYTES # BLD AUTO: 0.76 K/UL — LOW (ref 1–3.3)
LYMPHOCYTES # BLD AUTO: 5.3 % — LOW (ref 13–44)
MACROCYTES BLD QL: SLIGHT — SIGNIFICANT CHANGE UP
MAGNESIUM SERPL-MCNC: 1.9 MG/DL — SIGNIFICANT CHANGE UP (ref 1.6–2.6)
MAGNESIUM SERPL-MCNC: 2.6 MG/DL — SIGNIFICANT CHANGE UP (ref 1.6–2.6)
MANUAL SMEAR VERIFICATION: SIGNIFICANT CHANGE UP
MCHC RBC-ENTMCNC: 31.7 GM/DL — LOW (ref 32–36)
MCHC RBC-ENTMCNC: 32.2 PG — SIGNIFICANT CHANGE UP (ref 27–34)
MCHC RBC-ENTMCNC: 32.8 PG — SIGNIFICANT CHANGE UP (ref 27–34)
MCHC RBC-ENTMCNC: 33.9 GM/DL — SIGNIFICANT CHANGE UP (ref 32–36)
MCV RBC AUTO: 101.6 FL — HIGH (ref 80–100)
MCV RBC AUTO: 96.8 FL — SIGNIFICANT CHANGE UP (ref 80–100)
METAMYELOCYTES # FLD: 0.9 % — HIGH (ref 0–0)
MONOCYTES # BLD AUTO: 0.37 K/UL — SIGNIFICANT CHANGE UP (ref 0–0.9)
MONOCYTES NFR BLD AUTO: 2.6 % — SIGNIFICANT CHANGE UP (ref 2–14)
NEUTROPHILS # BLD AUTO: 13.04 K/UL — HIGH (ref 1.8–7.4)
NEUTROPHILS NFR BLD AUTO: 89.5 % — HIGH (ref 43–77)
NEUTS BAND # BLD: 1.7 % — SIGNIFICANT CHANGE UP (ref 0–8)
NRBC # BLD: 1 /100 — HIGH (ref 0–0)
NT-PROBNP SERPL-SCNC: 112 PG/ML — SIGNIFICANT CHANGE UP (ref 0–300)
OTHER CELLS CSF MANUAL: 15 ML/DL — LOW (ref 18–22)
OTHER CELLS CSF MANUAL: 17 ML/DL — LOW (ref 18–22)
OTHER CELLS CSF MANUAL: 9 ML/DL — LOW (ref 18–22)
PCO2 BLDV: 23 MMHG — LOW (ref 35–50)
PCO2 BLDV: 25 MMHG — LOW (ref 35–50)
PCO2 BLDV: 35 MMHG — SIGNIFICANT CHANGE UP (ref 35–50)
PH BLDV: 6.86 — CRITICAL LOW (ref 7.32–7.43)
PH BLDV: 6.9 — CRITICAL LOW (ref 7.32–7.43)
PH BLDV: 7.21 — LOW (ref 7.35–7.45)
PHOSPHATE SERPL-MCNC: 1.5 MG/DL — LOW (ref 2.4–4.7)
PHOSPHATE SERPL-MCNC: 7.9 MG/DL — HIGH (ref 2.4–4.7)
PLAT MORPH BLD: NORMAL — SIGNIFICANT CHANGE UP
PLATELET # BLD AUTO: 123 K/UL — LOW (ref 150–400)
PLATELET # BLD AUTO: 80 K/UL — LOW (ref 150–400)
PO2 BLDV: 28 MMHG — SIGNIFICANT CHANGE UP (ref 25–45)
PO2 BLDV: 65 MMHG — HIGH (ref 25–45)
PO2 BLDV: 78 MMHG — HIGH (ref 25–45)
POIKILOCYTOSIS BLD QL AUTO: SLIGHT — SIGNIFICANT CHANGE UP
POLYCHROMASIA BLD QL SMEAR: SLIGHT — SIGNIFICANT CHANGE UP
POTASSIUM BLDV-SCNC: 3.2 MMOL/L — LOW (ref 3.4–4.5)
POTASSIUM BLDV-SCNC: 4.6 MMOL/L — HIGH (ref 3.4–4.5)
POTASSIUM BLDV-SCNC: 5.3 MMOL/L — HIGH (ref 3.4–4.5)
POTASSIUM SERPL-MCNC: 3.4 MMOL/L — LOW (ref 3.5–5.3)
POTASSIUM SERPL-MCNC: 4.6 MMOL/L — SIGNIFICANT CHANGE UP (ref 3.5–5.3)
POTASSIUM SERPL-MCNC: 4.6 MMOL/L — SIGNIFICANT CHANGE UP (ref 3.5–5.3)
POTASSIUM SERPL-SCNC: 3.4 MMOL/L — LOW (ref 3.5–5.3)
POTASSIUM SERPL-SCNC: 4.6 MMOL/L — SIGNIFICANT CHANGE UP (ref 3.5–5.3)
POTASSIUM SERPL-SCNC: 4.6 MMOL/L — SIGNIFICANT CHANGE UP (ref 3.5–5.3)
PROCALCITONIN SERPL-MCNC: 0.66 NG/ML — HIGH (ref 0.02–0.1)
PROT SERPL-MCNC: 6.6 G/DL — SIGNIFICANT CHANGE UP (ref 6.6–8.7)
PROT SERPL-MCNC: 8.5 G/DL — SIGNIFICANT CHANGE UP (ref 6.6–8.7)
PROTHROM AB SERPL-ACNC: 11.7 SEC — SIGNIFICANT CHANGE UP (ref 10.6–13.6)
RBC # BLD: 3.72 M/UL — LOW (ref 4.2–5.8)
RBC # BLD: 4.29 M/UL — SIGNIFICANT CHANGE UP (ref 4.2–5.8)
RBC # FLD: 13.2 % — SIGNIFICANT CHANGE UP (ref 10.3–14.5)
RBC # FLD: 13.4 % — SIGNIFICANT CHANGE UP (ref 10.3–14.5)
RBC BLD AUTO: ABNORMAL
SAO2 % BLDV: 56 % — LOW (ref 67–88)
SAO2 % BLDV: 82 % — SIGNIFICANT CHANGE UP
SAO2 % BLDV: 88 % — SIGNIFICANT CHANGE UP
SARS-COV-2 RNA SPEC QL NAA+PROBE: SIGNIFICANT CHANGE UP
SODIUM SERPL-SCNC: 128 MMOL/L — LOW (ref 135–145)
SODIUM SERPL-SCNC: 129 MMOL/L — LOW (ref 135–145)
SODIUM SERPL-SCNC: 134 MMOL/L — LOW (ref 135–145)
TROPONIN T SERPL-MCNC: <0.01 NG/ML — SIGNIFICANT CHANGE UP (ref 0–0.06)
TROPONIN T SERPL-MCNC: <0.01 NG/ML — SIGNIFICANT CHANGE UP (ref 0–0.06)
TSH SERPL-MCNC: 0.64 UIU/ML — SIGNIFICANT CHANGE UP (ref 0.27–4.2)
WBC # BLD: 14.3 K/UL — HIGH (ref 3.8–10.5)
WBC # BLD: 8.73 K/UL — SIGNIFICANT CHANGE UP (ref 3.8–10.5)
WBC # FLD AUTO: 14.3 K/UL — HIGH (ref 3.8–10.5)
WBC # FLD AUTO: 8.73 K/UL — SIGNIFICANT CHANGE UP (ref 3.8–10.5)

## 2021-05-06 PROCEDURE — 74176 CT ABD & PELVIS W/O CONTRAST: CPT | Mod: 26

## 2021-05-06 PROCEDURE — 99223 1ST HOSP IP/OBS HIGH 75: CPT

## 2021-05-06 PROCEDURE — 93010 ELECTROCARDIOGRAM REPORT: CPT

## 2021-05-06 PROCEDURE — 99291 CRITICAL CARE FIRST HOUR: CPT

## 2021-05-06 PROCEDURE — 71045 X-RAY EXAM CHEST 1 VIEW: CPT | Mod: 26

## 2021-05-06 RX ORDER — METOPROLOL TARTRATE 50 MG
5 TABLET ORAL ONCE
Refills: 0 | Status: COMPLETED | OUTPATIENT
Start: 2021-05-06 | End: 2021-05-06

## 2021-05-06 RX ORDER — DEXTROSE 50 % IN WATER 50 %
15 SYRINGE (ML) INTRAVENOUS ONCE
Refills: 0 | Status: DISCONTINUED | OUTPATIENT
Start: 2021-05-06 | End: 2021-05-07

## 2021-05-06 RX ORDER — SODIUM CHLORIDE 9 MG/ML
2000 INJECTION INTRAMUSCULAR; INTRAVENOUS; SUBCUTANEOUS ONCE
Refills: 0 | Status: COMPLETED | OUTPATIENT
Start: 2021-05-06 | End: 2021-05-06

## 2021-05-06 RX ORDER — SODIUM CHLORIDE 9 MG/ML
1000 INJECTION, SOLUTION INTRAVENOUS
Refills: 0 | Status: DISCONTINUED | OUTPATIENT
Start: 2021-05-06 | End: 2021-05-07

## 2021-05-06 RX ORDER — CHLORHEXIDINE GLUCONATE 213 G/1000ML
1 SOLUTION TOPICAL
Refills: 0 | Status: DISCONTINUED | OUTPATIENT
Start: 2021-05-06 | End: 2021-05-11

## 2021-05-06 RX ORDER — PHENOBARBITAL 60 MG
65 TABLET ORAL EVERY 6 HOURS
Refills: 0 | Status: DISCONTINUED | OUTPATIENT
Start: 2021-05-06 | End: 2021-05-08

## 2021-05-06 RX ORDER — DIAZEPAM 5 MG
10 TABLET ORAL ONCE
Refills: 0 | Status: DISCONTINUED | OUTPATIENT
Start: 2021-05-06 | End: 2021-05-06

## 2021-05-06 RX ORDER — THIAMINE MONONITRATE (VIT B1) 100 MG
100 TABLET ORAL DAILY
Refills: 0 | Status: DISCONTINUED | OUTPATIENT
Start: 2021-05-06 | End: 2021-05-07

## 2021-05-06 RX ORDER — PANTOPRAZOLE SODIUM 20 MG/1
40 TABLET, DELAYED RELEASE ORAL DAILY
Refills: 0 | Status: DISCONTINUED | OUTPATIENT
Start: 2021-05-06 | End: 2021-05-07

## 2021-05-06 RX ORDER — PHENOBARBITAL 60 MG
65 TABLET ORAL EVERY 6 HOURS
Refills: 0 | Status: DISCONTINUED | OUTPATIENT
Start: 2021-05-06 | End: 2021-05-07

## 2021-05-06 RX ORDER — GLUCAGON INJECTION, SOLUTION 0.5 MG/.1ML
1 INJECTION, SOLUTION SUBCUTANEOUS ONCE
Refills: 0 | Status: DISCONTINUED | OUTPATIENT
Start: 2021-05-06 | End: 2021-05-07

## 2021-05-06 RX ORDER — INSULIN HUMAN 100 [IU]/ML
7 INJECTION, SOLUTION SUBCUTANEOUS
Qty: 100 | Refills: 0 | Status: DISCONTINUED | OUTPATIENT
Start: 2021-05-06 | End: 2021-05-08

## 2021-05-06 RX ORDER — METOPROLOL TARTRATE 50 MG
25 TABLET ORAL ONCE
Refills: 0 | Status: COMPLETED | OUTPATIENT
Start: 2021-05-06 | End: 2021-05-06

## 2021-05-06 RX ORDER — PHENOBARBITAL 60 MG
260 TABLET ORAL ONCE
Refills: 0 | Status: DISCONTINUED | OUTPATIENT
Start: 2021-05-06 | End: 2021-05-06

## 2021-05-06 RX ORDER — ENOXAPARIN SODIUM 100 MG/ML
40 INJECTION SUBCUTANEOUS DAILY
Refills: 0 | Status: DISCONTINUED | OUTPATIENT
Start: 2021-05-06 | End: 2021-05-07

## 2021-05-06 RX ORDER — DIAZEPAM 5 MG
20 TABLET ORAL ONCE
Refills: 0 | Status: DISCONTINUED | OUTPATIENT
Start: 2021-05-06 | End: 2021-05-06

## 2021-05-06 RX ORDER — SODIUM CHLORIDE 9 MG/ML
3000 INJECTION INTRAMUSCULAR; INTRAVENOUS; SUBCUTANEOUS ONCE
Refills: 0 | Status: DISCONTINUED | OUTPATIENT
Start: 2021-05-06 | End: 2021-05-06

## 2021-05-06 RX ORDER — SODIUM CHLORIDE 9 MG/ML
1000 INJECTION, SOLUTION INTRAVENOUS
Refills: 0 | Status: COMPLETED | OUTPATIENT
Start: 2021-05-06 | End: 2021-05-06

## 2021-05-06 RX ORDER — DEXTROSE 50 % IN WATER 50 %
12.5 SYRINGE (ML) INTRAVENOUS ONCE
Refills: 0 | Status: DISCONTINUED | OUTPATIENT
Start: 2021-05-06 | End: 2021-05-11

## 2021-05-06 RX ORDER — HYDROMORPHONE HYDROCHLORIDE 2 MG/ML
2 INJECTION INTRAMUSCULAR; INTRAVENOUS; SUBCUTANEOUS ONCE
Refills: 0 | Status: DISCONTINUED | OUTPATIENT
Start: 2021-05-06 | End: 2021-05-06

## 2021-05-06 RX ORDER — GABAPENTIN 400 MG/1
0 CAPSULE ORAL
Qty: 0 | Refills: 0 | DISCHARGE

## 2021-05-06 RX ORDER — DEXTROSE 50 % IN WATER 50 %
25 SYRINGE (ML) INTRAVENOUS ONCE
Refills: 0 | Status: DISCONTINUED | OUTPATIENT
Start: 2021-05-06 | End: 2021-05-11

## 2021-05-06 RX ORDER — SODIUM CHLORIDE 9 MG/ML
2000 INJECTION, SOLUTION INTRAVENOUS ONCE
Refills: 0 | Status: COMPLETED | OUTPATIENT
Start: 2021-05-06 | End: 2021-05-06

## 2021-05-06 RX ADMIN — Medication 260 MILLIGRAM(S): at 18:49

## 2021-05-06 RX ADMIN — INSULIN HUMAN 7 UNIT(S)/HR: 100 INJECTION, SOLUTION SUBCUTANEOUS at 16:54

## 2021-05-06 RX ADMIN — Medication 20 MILLIGRAM(S): at 16:58

## 2021-05-06 RX ADMIN — Medication 10 MILLIGRAM(S): at 17:12

## 2021-05-06 RX ADMIN — SODIUM CHLORIDE 2000 MILLILITER(S): 9 INJECTION INTRAMUSCULAR; INTRAVENOUS; SUBCUTANEOUS at 16:24

## 2021-05-06 RX ADMIN — INSULIN HUMAN 7 UNIT(S)/HR: 100 INJECTION, SOLUTION SUBCUTANEOUS at 17:28

## 2021-05-06 RX ADMIN — Medication 25 MILLIGRAM(S): at 15:17

## 2021-05-06 RX ADMIN — Medication 50 MILLIGRAM(S): at 15:17

## 2021-05-06 RX ADMIN — Medication 1 MILLIGRAM(S): at 15:46

## 2021-05-06 RX ADMIN — Medication 1 MILLIGRAM(S): at 15:00

## 2021-05-06 RX ADMIN — Medication 10 MILLIGRAM(S): at 16:33

## 2021-05-06 RX ADMIN — HYDROMORPHONE HYDROCHLORIDE 2 MILLIGRAM(S): 2 INJECTION INTRAMUSCULAR; INTRAVENOUS; SUBCUTANEOUS at 18:27

## 2021-05-06 RX ADMIN — Medication 5 MILLIGRAM(S): at 15:33

## 2021-05-06 RX ADMIN — SODIUM CHLORIDE 2000 MILLILITER(S): 9 INJECTION, SOLUTION INTRAVENOUS at 17:28

## 2021-05-06 RX ADMIN — Medication 5 MILLIGRAM(S): at 15:00

## 2021-05-06 RX ADMIN — HYDROMORPHONE HYDROCHLORIDE 2 MILLIGRAM(S): 2 INJECTION INTRAMUSCULAR; INTRAVENOUS; SUBCUTANEOUS at 18:45

## 2021-05-06 RX ADMIN — SODIUM CHLORIDE 500 MILLILITER(S): 9 INJECTION, SOLUTION INTRAVENOUS at 18:23

## 2021-05-06 NOTE — CHART NOTE - NSCHARTNOTEFT_GEN_A_CORE
asked to see regarding AF by Dr. Cleveland    30 years old man with DM, HTN and now with severe DKA, and alcohol withdrawal. PH 6.86 on VBG. RR > 40, AFIB with RVR >140-150 bpm and SBP >100       AFIB with RVR is likely secondary to above  being managed by ICU  D/W ICU  - Suggest DKA, and alcohol withdrawal care and aggressive IVF.  - Once fully repleted with fluid which likely will need several litters can consider low dose short acting beta blocker  - cannot get proper history now given his current medical status    will r/a when out of DKA and alcohol withdrawal  above d/w ICU team

## 2021-05-06 NOTE — ED PROVIDER NOTE - PHYSICAL EXAMINATION
General: well appearing, interactive, well nourished, NAD  HEENT: pupils equal and reactive, normal external ears bilaterally   Cardiac: irregular, tachycardic, no MRG appreciated  Resp: lungs clear to auscultation bilaterally, symmetric chest wall rise  Abd: soft, +ttp RLQ, nondistended,   : no CVA tenderness  Neuro: Moving all extremities  Skin:  normal color for race

## 2021-05-06 NOTE — CONSULT NOTE ADULT - ASSESSMENT
30M hx Alcohol abuse (he drinks every other day last drink yesterday) with h/o seizures for detoxing in the past, Diabetes, HTN, Pancreatitis who started feeling his heart racing  today with associated SOB. Pt states he lives with his parent and EMS was called. Ems reports upon arrival at the scene patient was found to be in rapid AF. As  per EMS pt was given Cardizem 20mg  and ASA without improvement. Admits feeling dizzy with RLQ pain and  some discomfort in lower back. Denied CP, orthopnea, pnd, edema, hematuria, melena syncope, near syncope.     New onset AF with RVR  CXR NAP  Serial CE, EKG, Echo, TFTs  Telemetry Monitoring  Rate Control, AC  EP Evaluation    ETOH withdrawal  As per PMT    DM,   Possible DKA  Blood Glu >530  Monitor Blood Glu levels  Insulin  As per PMT 30M hx Alcohol abuse (he drinks every other day last drink yesterday) with h/o seizures for detoxing in the past, Diabetes, HTN, Pancreatitis who started feeling his heart racing  today with associated SOB. Pt states he lives with his parent and EMS was called. Ems reports upon arrival at the scene patient was found to be in rapid AF. As  per EMS pt was given Cardizem 20mg  and ASA without improvement. Admits feeling dizzy with RLQ pain and  some discomfort in lower back. Denied CP, orthopnea, pnd, edema, hematuria, melena syncope, near syncope.     New onset AF with RVR  CXR NAP  Serial CE, EKG, Echo, TFTs  Telemetry Monitoring  Rate Control, AC  EP Evaluation    ETOH withdrawal  As per PMT    DM   Possible DKA  Blood Glu >530  MICU Eval  Monitor Blood Glu levels  Insulin  As per PMT

## 2021-05-06 NOTE — ED ADULT NURSE NOTE - NSIMPLEMENTINTERV_GEN_ALL_ED
Implemented All Fall Risk Interventions:  [place] to call system. Call bell, personal items and telephone within reach. Instruct patient to call for assistance. Room bathroom lighting operational. Non-slip footwear when patient is off stretcher. Physically safe environment: no spills, clutter or unnecessary equipment. Stretcher in lowest position, wheels locked, appropriate side rails in place. Provide visual cue, wrist band, yellow gown, etc. Monitor gait and stability. Monitor for mental status changes and reorient to person, place, and time. Review medications for side effects contributing to fall risk. Reinforce activity limits and safety measures with patient and family.

## 2021-05-06 NOTE — ED ADULT NURSE REASSESSMENT NOTE - NS ED NURSE REASSESS COMMENT FT1
pt confused at this time. speaking to his mother who is not present at bedside. pt is itching skin feels as if bugs ae crawling on him. fruity breath noted. increased work of breathing noted. pt is tachycardic on monitor- 140s. md made aware. interventions implemented.

## 2021-05-06 NOTE — ED ADULT NURSE REASSESSMENT NOTE - NS ED NURSE REASSESS COMMENT FT1
ICU MD and PA at bedside. insulin drip started at 7 units/hour. respirations labored. pt tachycardic 167. medications administered.

## 2021-05-06 NOTE — H&P ADULT - GASTROINTESTINAL COMMENTS
Soft, nondistended. normoactive bowel sounds. Diffuse mild tenderness to palpation, no rigidity or guarding.

## 2021-05-06 NOTE — H&P ADULT - HISTORY OF PRESENT ILLNESS
31 yo male with pmhx of ETOH abuse, DM, HTN, and pancreatitis presented to the ER for lower right abdominal pain that radiated to the back associated with SOB. Pt has been hospitalized for alcohol withdrawal in the past. Pt was seen to be in Afib when EMS arrived; as per EMS: "given Cardizem 20mg and ASA without improvement." In the ER, pt was noted to have FS >530, moderate acetone, and was in metabolic acidosis. Pt received a total of Librium 50 mg, 2 mg Ativan, valium 40 mg, was started on insulin @7 units/hr, and Lopressor 35 mg. He was given 2L IV bolus of NaCl and currently receiving 2L IV bolus of LR.   ICU consult was called for worsening mental status, DT's, and DKA. Pt was seen and examined at bedside, laying uncomfortable in bed. Unable to obtain a thorough history from pt due to his AMS and delirium. Pt's BP was 178/77, tachycardic, and Kussmaul breathing.  31 yo male with pmhx of ETOH abuse, DM, HTN, and pancreatitis presented to the ER for lower right abdominal pain that radiated to the back associated with SOB. Pt has been hospitalized for alcohol withdrawal in the past. Pt was seen to be in Afib with RVR when EMS arrived; as per EMS: "given Cardizem 20mg and ASA without improvement." In the ER, pt was noted to have FS >530, moderate acetone, and was in metabolic acidosis. Pt received a total of Librium 50 mg, 2 mg Ativan, valium 40 mg, was started on insulin @7 units/hr, and Lopressor 35 mg. He was given 2L IV bolus of NaCl and currently receiving 2L IV bolus of LR.   ICU consult was called for worsening mental status, DT's, and DKA. Pt was seen and examined at bedside, laying uncomfortable in bed picking at things in mid air/likely halucinating.  Unable to obtain a thorough history from pt due to his AMS and delirium. Pt's BP was 178/77, tachycardic, and Kussmaul breathing.

## 2021-05-06 NOTE — SBIRT NOTE ADULT - NSSBIRTBRIEFINTDET_GEN_A_CORE
MET WITH PATIENT. PATIENT ACCEPTED VARIOUS RESOURCES FOR HELP IN THE COMMUNITY FROM ETOH. NOT REQUIRING DETOX AT THIS TIME AND NOT INTERESTED IN INPATIENT REHAB AT THIS TIME. PLEASANT AND COOPERATIVE WITH INTERVIEW.

## 2021-05-06 NOTE — ED PROVIDER NOTE - OBJECTIVE STATEMENT
Pt is a 31 y/o M w/PMHx DM, EtOH use disorder, HTN, presents c/o shortness of breath.  Pt states he began to have lower right abdominal pain this morning after waking up.  The pain has been getting worse throughout the day and began to radiate to his back.  He then started to feel his heart race and felt shortness of breath.  He states he drinks daily, last drink was yesterday.  He has gone into withdrawal before and been hospitalized.  Pt denies headache, fever, endorses nausea, palpitations, abdominal pain, back pain.

## 2021-05-06 NOTE — ED PROVIDER NOTE - NS ED ROS FT
CONSTITUTIONAL: No fevers, no chills  Eyes: No vision changes  Cardiovascular: No Chest pain  Respiratory: +SOB  Gastrointestinal: +nausea, +abd pain  Genitourinary: no dysuria, no hematuria  SKIN: no rashes.  MSK: no weakness, no myalgias, no arthralgias  NEURO: no headache, no weakness, no numbness  PSYCHIATRIC: no SI/HI

## 2021-05-06 NOTE — ED PROVIDER NOTE - PROGRESS NOTE DETAILS
Kwasi PGY-2: Given the significant and immediate threats to this patient based on initial presentation, the benefits of emergency contrast-enhanced CT imaging outweigh the potential risk of harm due to contrast-induced nephropathy.  Physician consent

## 2021-05-06 NOTE — ED PROVIDER NOTE - ATTENDING CONTRIBUTION TO CARE
Dr. Pearce : I have personally seen and examined this patient at the bedside. I have fully participated in the care of this patient. I have reviewed all pertinent clinical information, including history, physical exam, plan and the Resident's note and agree except as noted.       29 y/o M w/PMHx DM, EtOH use disorder, HTN, pw palpitations. notes initially rlq pain, then some discomfort in lower back. followed by palpitations while watching TV. and now sob.  He states he drinks daily, last drink was yesterday.  He has gone into withdrawal before and been hospitalized in the past.  denies palpitations with prior withdrawals.  notes that tried rehab before but didn't work for him  Denies f/c/n/v/cp//cough/d/c/dysuria/hematuria. no sick contacts/recent travel.    PE:  head; atraumatic normocephalic  eyes: perrla  Heart: irregularly irregular s1s2  lungs: ctab  abd: soft, mild rlq ttp nd + bs no rebound/guarding no cva ttp  le: no swelling no calf ttp  back: no midline cervical/thoracic/lumbar ttp  neuro: aa ox 3 no focal neuro deficit    -->new onset afib; pt in withdrawal will give metoprolol, card consult; ativan/librium/valium for withdrawal--FS high check labs to eval for DKA; ICU consult--admit

## 2021-05-06 NOTE — ED PROVIDER NOTE - CARE PLAN
Principal Discharge DX:	DKA (diabetic ketoacidoses)  Secondary Diagnosis:	Atrial fibrillation  Secondary Diagnosis:	Withdrawal symptoms, alcohol, with delirium

## 2021-05-06 NOTE — ED ADULT NURSE NOTE - OBJECTIVE STATEMENT
pt alert and oriented x4 comes in with rapid afib. as per EMS they gave cardizem 20 and 324 aspirin. pt states he feels dizzy with RLQ pain and SOB. pt reports he drinks everyother day last drink yesterday. reports having seizures for detoxing in the past. RR even unlabored. pt educated on plan of care, pt able to successfully teach back plan of care to RN, RN will continue to reeducate pt during hospital stay.

## 2021-05-06 NOTE — H&P ADULT - CONSTITUTIONAL COMMENTS
Thin male, laying uncomfortably in bed. In moderate distress Thin male, laying uncomfortably in bed. In moderate distress. Ill appearing

## 2021-05-06 NOTE — H&P ADULT - ASSESSMENT
31 yo male with pmhx of ETOH abuse, DM, HTN, and pancreatitis presented to the ER on 5/6/ 21 for right lower abdominal pain that radiated to the back associated with SOB and is admitted to MICU for delirium tremens secondary to ETOH withdrawal and DKA.     1. Delirium tremens  2. Metabolic acidosis secondary to DKA  3. A fib with RVR  4. SYED secondary to hypoperfusion/hypovolemia  5. Transaminitis (AST>ALT)    Heme  -DVT ppx with 40 mg SQ daily  Neuro  -Monitor mental status  -Neuro checks q4hrs  -CIWA protocol   CV  -Cardio was consulted for new onset A fib with RVR.   -TTE echo pending  Respiratory  -Monitor respiratory status for signs of fluid overload  GI  -NPO  -GI ppx with protonix   -CT A/P pending  Renal   -Monitor BUN/Cr and electrolytes  -Replete electrolytes as needed  -Monitor I&O's q1hr  Endo  -Pt was given 2 amps bicarb in 1/2 NS for metabolic acidosis  -Continue insulin gtt @7 units/hr; will titrate as needed.   -Hypoglycemic protocol  -Accuchecks hourly   -BMP q6hrs   -Elevated lactate has resolved (2.4 --> 1.8)  -consider endocrine consult  ID  -Leukocytosis with left shift (14.30)  -Blood and urine cultures, lactate pending  -Will start empiric abx if pt becomes febrile 29 yo male with pmhx of ETOH abuse, DM, HTN, and pancreatitis presented to the ER on 5/6/ 21 for right lower abdominal pain that radiated to the back associated with SOB and is admitted to MICU for delirium tremens secondary to ETOH withdrawal and DKA.     1. Delirium tremens  2. Metabolic acidosis secondary to DKA  3. A fib with RVR  4. SYED secondary to hypoperfusion/hypovolemia  5. Transaminitis (AST>ALT)    Heme  -DVT ppx with Lovenox 40 mg SQ daily  Neuro  -Monitor mental status  -Neuro checks q4hrs  -CIWA protocol   CV  -Cardio was consulted for new onset A fib with RVR.   -TTE echo pending  -If Afib still present tomorrow after aggressive treatment with volume resuscitation will need to consider full dose AC  Respiratory  -Monitor respiratory status for signs of fluid overload  GI  -NPO  -GI ppx with protonix   -CT A/P pending  Renal   -Monitor BUN/Cr and electrolytes  -Replete electrolytes as needed  -Monitor I&O's q1hr  -avoid nephrotoxins/dose adjust meds as needed  Endo  -aggressive volume resuscitation  -Pt was given 2 amps bicarb in 1/2 NS for severe metabolic acidosis  -Continue insulin gtt @7 units/hr; will titrate as needed.   -Hypoglycemic protocol  -Accuchecks hourly   -BMP q6hrs   -Elevated lactate has resolved (2.4 --> 1.8)  -consider endocrine consult  ID  -Leukocytosis with left shift (14.30)  -Blood and urine cultures, lactate pending  -Will start empiric abx if pt becomes febrile

## 2021-05-06 NOTE — H&P ADULT - NSHPLABSRESULTS_GEN_ALL_CORE
Xray Chest 1 View-PORTABLE IMMEDIATE (Xray Chest 1 View-PORTABLE IMMEDIATE .) (05.06.21 @ 15:37)   EXAM:  XR CHEST PORTABLE IMMED 1V                        PROCEDURE DATE:  05/06/2021    INTERPRETATION:  Clinical history: 30-year-old male, A. fib, shortness of breath.  Two expiratory views of the chest are compared to 12/1/2019 and demonstrate a normal cardiac silhouette and normal pulmonary vasculature with no consolidation, effusion, gross adenopathy, pneumothorax or acute osseous finding.    IMPRESSION:  No acute radiographic findings and no change  PALLAVI BARKER DO; Attending Radiologist

## 2021-05-06 NOTE — H&P ADULT - CARDIOVASCULAR COMMENTS
+S1,S2. Tachycardic. No murmurs, rubs, or gallops +S1,S2. Tachycardic. No murmurs, rubs, or gallops/Afib with RVR

## 2021-05-06 NOTE — ED PROVIDER NOTE - CLINICAL SUMMARY MEDICAL DECISION MAKING FREE TEXT BOX
Pt is a 29 y/o M c/o dka, new onset a-fib, withdrawal - lopressor for rate control, will IVF bolus, start insulin ggt, valium for withdrawal, consult ICU

## 2021-05-06 NOTE — ED ADULT TRIAGE NOTE - CHIEF COMPLAINT QUOTE
pt reports started feeling hear racing and sob. ems reports they arrived at scene and found patient to be in rapid Afib. as per ems, iv was established and Cardizem was given without improvement.

## 2021-05-06 NOTE — CONSULT NOTE ADULT - SUBJECTIVE AND OBJECTIVE BOX
[facility] [place]                                                                                                 ([facility])                                                                                                 CONSULTATION NOTE       History obtained by: Patient, family and medical record     obtained: No    Primary Cardiologist: NONE    Chief complaint:    Patient is a 30y old  Male who presents with a chief complaint of Atrial Fibrillation    HPI:      30M hx Alcohol abuse (he drinks every other day last drink yesterday) with h/o seizures for detoxing in the past, Diabetes, HTN, Pancreatitis who started feeling his heart racing  today with associated SOB. Pt states he lives with his parent and EMS was called. Ems reports upon arrival at the scene patient was found to be in rapid AF. As  per EMS pt was given Cardizem 20mg  and ASA without improvement. Admits feeling dizzy with RLQ pain and  some discomfort in lower back. Denied CP, orthopnea, pnd, edema, hematuria, melena syncope, near syncope.     REVIEW OF SYMPTOMS:   Constitutional: Denied: fever, chills, weight loss or gain  Eyes: Denied: reddened ayes, eye discharge, eye pain  ENMT: Denied: ear pain, nasal discharge, mouth pain, throat pain or swelling  Cardiovascular: Denied: chest pain,  Chest pressure,  edema  Respiratory: Denied: cough, phlegm production, wheezes,  orthopnea, PND,   GI: Denied:  constipation, melena, rectal bleed  : Denied: hematuria, frequency  Musculoskeletal: Denied: weakness,   Hematology/lymp: Denied: Bleeding disorder, anemia, blood clotting  Neuro: Denied: Headache, numbness, aphasia, dysarthria, seizure,  syncope, near syncope  Psych: Denied: anxiety  Integumentary/skin: Denied: rash, bruises, ecchymosis, itching  Allergy/Immunology: denied environmental or drug allergies    ALL OTHER REVIEW OF SYSTEMS ARE NEGATIVE.      ALLERGIES AND HOME MEDICATIONS:   Allergies:        Allergies:  	No Known Allergies:     Home Medications:   * Incomplete Medication History as of 06-May-2021 15:45 documented in Structured Notes  · 	lisinopril 10 mg oral tablet: Last Dose Taken:  , 1 tab(s) orally once a day  · 	metFORMIN 1000 mg oral tablet: Last Dose Taken:  , 1 tab(s) orally 2 times a day        PAST MEDICAL & SURGICAL HISTORY:  Alcohol abuse  Pancreatitis  HTN (hypertension)  Diabetes    No significant past surgical history        FAMILY HISTORY:  No pertinent family history in first degree relatives  Bio family h/o CVD      SOCIAL HISTORY:    CIGARETTES:  Denies    ALCOHOL: ETOH ABuse +    DRUGS: Denies    Vital Signs Last 24 Hrs  T(C): 36.6 (06 May 2021 16:38), Max: 36.9 (06 May 2021 14:50)  T(F): 97.8 (06 May 2021 16:38), Max: 98.4 (06 May 2021 14:50)  HR: 148 (06 May 2021 16:38) (116 - 170)  BP: 115/80 (06 May 2021 16:38) (115/80 - 172/90)  BP(mean): --  RR: 32 (06 May 2021 16:38) (26 - 34)  SpO2: 100% (06 May 2021 16:38) (97% - 100%)    PHYSICAL EXAM:    Constitutional: No fever, chills, NAD, Comfortable    Eyes: Not reddened, no discharge    ENMT: No discharge, No pain    Neck: No JVD, No Bruit    Back: No CVA tenderness    Respiratory: Clear to auscultation bilaterally    Cardiovascular: IRR, tachycardic S1-2, No S3-,     Gastrointestinal: Soft, NT/ND. BS+, No organomegaly    Extremities: No edema    Vascular: Distal pulses intact    Neurological: Alert, awake,  able to move extremities,     Skin: No ecchymosis, no rash    Musculoskeletal: Non tender, no weakness    Psychiatric:  no anxiety      INTERPRETATION OF TELEMETRY:    ECG: P    I&O's Detail      LABS:                        13.8   14.30 )-----------( 123      ( 06 May 2021 15:23 )             43.6     05-06    129<L>  |  88<L>  |  24.0<H>  ----------------------------<  497<H>  4.6   |  <6.0<LL>  |  1.68<H>    Ca    8.7      06 May 2021 15:23  Phos  7.9     05-06  Mg     2.6     05-06    TPro  8.5  /  Alb  5.1  /  TBili  1.7  /  DBili  x   /  AST  276<H>  /  ALT  211<H>  /  AlkPhos  199<H>  05-06    CARDIAC MARKERS ( 06 May 2021 15:23 )  x     / <0.01 ng/mL / x     / x     / x          PT/INR - ( 06 May 2021 15:23 )   PT: 11.7 sec;   INR: 1.01 ratio         PTT - ( 06 May 2021 15:23 )  PTT:32.2 sec    I&O's Summary                                                                                                                                                    [facility] [place]                                                                                                 ([facility])                                                                                                 CONSULTATION NOTE       History obtained by: Patient, family and medical record     obtained: No    Primary Cardiologist: NONE    Chief complaint:    Patient is a 30y old  Male who presents with a chief complaint of Atrial Fibrillation    HPI:      30M hx Alcohol abuse (he drinks every other day last drink yesterday) with h/o seizures for detoxing in the past, Diabetes, HTN, Pancreatitis who started feeling his heart racing  today with associated SOB. Pt states he lives with his parent and EMS was called. Ems reports upon arrival at the scene patient was found to be in rapid AF. As  per EMS pt was given Cardizem 20mg  and ASA without improvement. Admits feeling dizzy with RLQ pain and  some discomfort in lower back. Denied CP, orthopnea, pnd, edema, hematuria, melena syncope, near syncope.     REVIEW OF SYMPTOMS:   Constitutional: Denied: fever, chills, weight loss or gain  Eyes: Denied: reddened ayes, eye discharge, eye pain  ENMT: Denied: ear pain, nasal discharge, mouth pain, throat pain or swelling  Cardiovascular: Denied: chest pain,  Chest pressure,  edema  Respiratory: Denied: cough, phlegm production, wheezes,  orthopnea, PND,   GI: Denied:  constipation, melena, rectal bleed  : Denied: hematuria, frequency  Musculoskeletal: Denied: weakness,   Hematology/lymp: Denied: Bleeding disorder, anemia, blood clotting  Neuro: Denied: Headache, numbness, aphasia, dysarthria, seizure,  syncope, near syncope  Psych: Denied: anxiety  Integumentary/skin: Denied: rash, bruises, ecchymosis, itching  Allergy/Immunology: denied environmental or drug allergies    ALL OTHER REVIEW OF SYSTEMS ARE NEGATIVE.      ALLERGIES AND HOME MEDICATIONS:   Allergies:        Allergies:  	No Known Allergies:     Home Medications:   * Incomplete Medication History as of 06-May-2021 15:45 documented in Structured Notes  · 	lisinopril 10 mg oral tablet: Last Dose Taken:  , 1 tab(s) orally once a day  · 	metFORMIN 1000 mg oral tablet: Last Dose Taken:  , 1 tab(s) orally 2 times a day        PAST MEDICAL & SURGICAL HISTORY:  Alcohol abuse  Pancreatitis  HTN (hypertension)  Diabetes    No significant past surgical history        FAMILY HISTORY:  No pertinent family history in first degree relatives  Bio family h/o CVD      SOCIAL HISTORY:    CIGARETTES:  Denies    ALCOHOL: ETOH ABuse +    DRUGS: Denies    Vital Signs Last 24 Hrs  T(C): 36.6 (06 May 2021 16:38), Max: 36.9 (06 May 2021 14:50)  T(F): 97.8 (06 May 2021 16:38), Max: 98.4 (06 May 2021 14:50)  HR: 148 (06 May 2021 16:38) (116 - 170)  BP: 115/80 (06 May 2021 16:38) (115/80 - 172/90)  BP(mean): --  RR: 32 (06 May 2021 16:38) (26 - 34)  SpO2: 100% (06 May 2021 16:38) (97% - 100%)    PHYSICAL EXAM:    Constitutional: No fever, chills, NAD, Comfortable    Eyes: Not reddened, no discharge    ENMT: No discharge, No pain    Neck: No JVD, No Bruit    Back: No CVA tenderness    Respiratory: Clear to auscultation bilaterally    Cardiovascular: IRR, tachycardic S1-2, No S3-,     Gastrointestinal: Soft, NT/ND. BS+, No organomegaly    Extremities: No edema    Vascular: Distal pulses intact    Neurological: Alert, awake,  able to move extremities,     Skin: No ecchymosis, no rash    Musculoskeletal: Non tender, no weakness    Psychiatric:  no anxiety      INTERPRETATION OF TELEMETRY:    ECG: AF RVR NSST T Abnl    I&O's Detail      LABS:                        13.8   14.30 )-----------( 123      ( 06 May 2021 15:23 )             43.6     05-06    129<L>  |  88<L>  |  24.0<H>  ----------------------------<  497<H>  4.6   |  <6.0<LL>  |  1.68<H>    Ca    8.7      06 May 2021 15:23  Phos  7.9     05-06  Mg     2.6     05-06    TPro  8.5  /  Alb  5.1  /  TBili  1.7  /  DBili  x   /  AST  276<H>  /  ALT  211<H>  /  AlkPhos  199<H>  05-06    CARDIAC MARKERS ( 06 May 2021 15:23 )  x     / <0.01 ng/mL / x     / x     / x          PT/INR - ( 06 May 2021 15:23 )   PT: 11.7 sec;   INR: 1.01 ratio         PTT - ( 06 May 2021 15:23 )  PTT:32.2 sec    I&O's Summary

## 2021-05-06 NOTE — H&P ADULT - MENTAL STATUS
Pt is oriented to situation and place. Poor mental status and unable to communicate appropriately. Pt had visual hallucinations. Pt is oriented to hospital. Poor mental status and unable to communicate appropriately. Pt had visual hallucinations grabbing at things in midair

## 2021-05-07 LAB
A1C WITH ESTIMATED AVERAGE GLUCOSE RESULT: 9.6 % — HIGH (ref 4–5.6)
AMPHET UR-MCNC: NEGATIVE — SIGNIFICANT CHANGE UP
ANION GAP SERPL CALC-SCNC: 15 MMOL/L — SIGNIFICANT CHANGE UP (ref 5–17)
ANION GAP SERPL CALC-SCNC: 15 MMOL/L — SIGNIFICANT CHANGE UP (ref 5–17)
ANION GAP SERPL CALC-SCNC: 16 MMOL/L — SIGNIFICANT CHANGE UP (ref 5–17)
APPEARANCE UR: CLEAR — SIGNIFICANT CHANGE UP
BACTERIA # UR AUTO: ABNORMAL
BARBITURATES UR SCN-MCNC: POSITIVE
BENZODIAZ UR-MCNC: NEGATIVE — SIGNIFICANT CHANGE UP
BILIRUB UR-MCNC: NEGATIVE — SIGNIFICANT CHANGE UP
BUN SERPL-MCNC: 10 MG/DL — SIGNIFICANT CHANGE UP (ref 8–20)
BUN SERPL-MCNC: 5 MG/DL — LOW (ref 8–20)
BUN SERPL-MCNC: 6 MG/DL — LOW (ref 8–20)
CALCIUM SERPL-MCNC: 7.2 MG/DL — LOW (ref 8.6–10.2)
CALCIUM SERPL-MCNC: 7.3 MG/DL — LOW (ref 8.6–10.2)
CALCIUM SERPL-MCNC: 7.3 MG/DL — LOW (ref 8.6–10.2)
CHLORIDE SERPL-SCNC: 97 MMOL/L — LOW (ref 98–107)
CHLORIDE SERPL-SCNC: 97 MMOL/L — LOW (ref 98–107)
CHLORIDE SERPL-SCNC: 99 MMOL/L — SIGNIFICANT CHANGE UP (ref 98–107)
CO2 SERPL-SCNC: 14 MMOL/L — LOW (ref 22–29)
CO2 SERPL-SCNC: 16 MMOL/L — LOW (ref 22–29)
CO2 SERPL-SCNC: 17 MMOL/L — LOW (ref 22–29)
COCAINE METAB.OTHER UR-MCNC: NEGATIVE — SIGNIFICANT CHANGE UP
COLOR SPEC: YELLOW — SIGNIFICANT CHANGE UP
COVID-19 SPIKE DOMAIN AB INTERP: NEGATIVE — SIGNIFICANT CHANGE UP
COVID-19 SPIKE DOMAIN ANTIBODY RESULT: 0.4 U/ML — SIGNIFICANT CHANGE UP
CREAT SERPL-MCNC: 0.46 MG/DL — LOW (ref 0.5–1.3)
CREAT SERPL-MCNC: 0.52 MG/DL — SIGNIFICANT CHANGE UP (ref 0.5–1.3)
CREAT SERPL-MCNC: 0.63 MG/DL — SIGNIFICANT CHANGE UP (ref 0.5–1.3)
DIFF PNL FLD: ABNORMAL
EPI CELLS # UR: SIGNIFICANT CHANGE UP
ESTIMATED AVERAGE GLUCOSE: 229 MG/DL — HIGH (ref 68–114)
GLUCOSE BLDC GLUCOMTR-MCNC: 107 MG/DL — HIGH (ref 70–99)
GLUCOSE BLDC GLUCOMTR-MCNC: 127 MG/DL — HIGH (ref 70–99)
GLUCOSE BLDC GLUCOMTR-MCNC: 139 MG/DL — HIGH (ref 70–99)
GLUCOSE BLDC GLUCOMTR-MCNC: 143 MG/DL — HIGH (ref 70–99)
GLUCOSE BLDC GLUCOMTR-MCNC: 146 MG/DL — HIGH (ref 70–99)
GLUCOSE BLDC GLUCOMTR-MCNC: 149 MG/DL — HIGH (ref 70–99)
GLUCOSE BLDC GLUCOMTR-MCNC: 160 MG/DL — HIGH (ref 70–99)
GLUCOSE BLDC GLUCOMTR-MCNC: 164 MG/DL — HIGH (ref 70–99)
GLUCOSE BLDC GLUCOMTR-MCNC: 169 MG/DL — HIGH (ref 70–99)
GLUCOSE BLDC GLUCOMTR-MCNC: 169 MG/DL — HIGH (ref 70–99)
GLUCOSE BLDC GLUCOMTR-MCNC: 176 MG/DL — HIGH (ref 70–99)
GLUCOSE BLDC GLUCOMTR-MCNC: 176 MG/DL — HIGH (ref 70–99)
GLUCOSE BLDC GLUCOMTR-MCNC: 179 MG/DL — HIGH (ref 70–99)
GLUCOSE BLDC GLUCOMTR-MCNC: 187 MG/DL — HIGH (ref 70–99)
GLUCOSE BLDC GLUCOMTR-MCNC: 192 MG/DL — HIGH (ref 70–99)
GLUCOSE BLDC GLUCOMTR-MCNC: 201 MG/DL — HIGH (ref 70–99)
GLUCOSE BLDC GLUCOMTR-MCNC: 204 MG/DL — HIGH (ref 70–99)
GLUCOSE BLDC GLUCOMTR-MCNC: 218 MG/DL — HIGH (ref 70–99)
GLUCOSE BLDC GLUCOMTR-MCNC: 225 MG/DL — HIGH (ref 70–99)
GLUCOSE BLDC GLUCOMTR-MCNC: 247 MG/DL — HIGH (ref 70–99)
GLUCOSE BLDC GLUCOMTR-MCNC: 84 MG/DL — SIGNIFICANT CHANGE UP (ref 70–99)
GLUCOSE BLDC GLUCOMTR-MCNC: 86 MG/DL — SIGNIFICANT CHANGE UP (ref 70–99)
GLUCOSE SERPL-MCNC: 180 MG/DL — HIGH (ref 70–99)
GLUCOSE SERPL-MCNC: 213 MG/DL — HIGH (ref 70–99)
GLUCOSE SERPL-MCNC: 223 MG/DL — HIGH (ref 70–99)
GLUCOSE UR QL: 1000 MG/DL
GRAN CASTS # UR COMP ASSIST: ABNORMAL /LPF
HCT VFR BLD CALC: 33.9 % — LOW (ref 39–50)
HGB BLD-MCNC: 11.7 G/DL — LOW (ref 13–17)
KETONES UR-MCNC: ABNORMAL
LEUKOCYTE ESTERASE UR-ACNC: NEGATIVE — SIGNIFICANT CHANGE UP
MAGNESIUM SERPL-MCNC: 1.7 MG/DL — SIGNIFICANT CHANGE UP (ref 1.6–2.6)
MAGNESIUM SERPL-MCNC: 2.2 MG/DL — SIGNIFICANT CHANGE UP (ref 1.6–2.6)
MAGNESIUM SERPL-MCNC: 2.7 MG/DL — HIGH (ref 1.6–2.6)
MCHC RBC-ENTMCNC: 32.2 PG — SIGNIFICANT CHANGE UP (ref 27–34)
MCHC RBC-ENTMCNC: 34.5 GM/DL — SIGNIFICANT CHANGE UP (ref 32–36)
MCV RBC AUTO: 93.4 FL — SIGNIFICANT CHANGE UP (ref 80–100)
METHADONE UR-MCNC: NEGATIVE — SIGNIFICANT CHANGE UP
NITRITE UR-MCNC: NEGATIVE — SIGNIFICANT CHANGE UP
OPIATES UR-MCNC: NEGATIVE — SIGNIFICANT CHANGE UP
PCP SPEC-MCNC: SIGNIFICANT CHANGE UP
PCP UR-MCNC: NEGATIVE — SIGNIFICANT CHANGE UP
PH UR: 5 — SIGNIFICANT CHANGE UP (ref 5–8)
PHOSPHATE SERPL-MCNC: 1 MG/DL — CRITICAL LOW (ref 2.4–4.7)
PHOSPHATE SERPL-MCNC: 1.7 MG/DL — LOW (ref 2.4–4.7)
PLATELET # BLD AUTO: 76 K/UL — LOW (ref 150–400)
POTASSIUM SERPL-MCNC: 3.5 MMOL/L — SIGNIFICANT CHANGE UP (ref 3.5–5.3)
POTASSIUM SERPL-MCNC: 4 MMOL/L — SIGNIFICANT CHANGE UP (ref 3.5–5.3)
POTASSIUM SERPL-MCNC: 4.3 MMOL/L — SIGNIFICANT CHANGE UP (ref 3.5–5.3)
POTASSIUM SERPL-SCNC: 3.5 MMOL/L — SIGNIFICANT CHANGE UP (ref 3.5–5.3)
POTASSIUM SERPL-SCNC: 4 MMOL/L — SIGNIFICANT CHANGE UP (ref 3.5–5.3)
POTASSIUM SERPL-SCNC: 4.3 MMOL/L — SIGNIFICANT CHANGE UP (ref 3.5–5.3)
PROT UR-MCNC: 30 MG/DL
RBC # BLD: 3.63 M/UL — LOW (ref 4.2–5.8)
RBC # FLD: 13.1 % — SIGNIFICANT CHANGE UP (ref 10.3–14.5)
RBC CASTS # UR COMP ASSIST: ABNORMAL /HPF (ref 0–4)
SARS-COV-2 IGG+IGM SERPL QL IA: 0.4 U/ML — SIGNIFICANT CHANGE UP
SARS-COV-2 IGG+IGM SERPL QL IA: NEGATIVE — SIGNIFICANT CHANGE UP
SODIUM SERPL-SCNC: 128 MMOL/L — LOW (ref 135–145)
SODIUM SERPL-SCNC: 129 MMOL/L — LOW (ref 135–145)
SODIUM SERPL-SCNC: 129 MMOL/L — LOW (ref 135–145)
SP GR SPEC: 1.01 — SIGNIFICANT CHANGE UP (ref 1.01–1.02)
THC UR QL: NEGATIVE — SIGNIFICANT CHANGE UP
UROBILINOGEN FLD QL: NEGATIVE MG/DL — SIGNIFICANT CHANGE UP
WBC # BLD: 9.48 K/UL — SIGNIFICANT CHANGE UP (ref 3.8–10.5)
WBC # FLD AUTO: 9.48 K/UL — SIGNIFICANT CHANGE UP (ref 3.8–10.5)
WBC UR QL: SIGNIFICANT CHANGE UP

## 2021-05-07 PROCEDURE — 99222 1ST HOSP IP/OBS MODERATE 55: CPT

## 2021-05-07 PROCEDURE — 93010 ELECTROCARDIOGRAM REPORT: CPT

## 2021-05-07 PROCEDURE — 99233 SBSQ HOSP IP/OBS HIGH 50: CPT

## 2021-05-07 RX ORDER — MAGNESIUM SULFATE 500 MG/ML
1 VIAL (ML) INJECTION ONCE
Refills: 0 | Status: COMPLETED | OUTPATIENT
Start: 2021-05-07 | End: 2021-05-07

## 2021-05-07 RX ORDER — SODIUM CHLORIDE 9 MG/ML
1000 INJECTION, SOLUTION INTRAVENOUS
Refills: 0 | Status: DISCONTINUED | OUTPATIENT
Start: 2021-05-07 | End: 2021-05-08

## 2021-05-07 RX ORDER — MAGNESIUM SULFATE 500 MG/ML
2 VIAL (ML) INJECTION ONCE
Refills: 0 | Status: COMPLETED | OUTPATIENT
Start: 2021-05-07 | End: 2021-05-07

## 2021-05-07 RX ORDER — POTASSIUM CHLORIDE 20 MEQ
10 PACKET (EA) ORAL
Refills: 0 | Status: COMPLETED | OUTPATIENT
Start: 2021-05-07 | End: 2021-05-07

## 2021-05-07 RX ORDER — POTASSIUM PHOSPHATE, MONOBASIC POTASSIUM PHOSPHATE, DIBASIC 236; 224 MG/ML; MG/ML
30 INJECTION, SOLUTION INTRAVENOUS ONCE
Refills: 0 | Status: COMPLETED | OUTPATIENT
Start: 2021-05-07 | End: 2021-05-07

## 2021-05-07 RX ORDER — POTASSIUM PHOSPHATE, MONOBASIC POTASSIUM PHOSPHATE, DIBASIC 236; 224 MG/ML; MG/ML
15 INJECTION, SOLUTION INTRAVENOUS ONCE
Refills: 0 | Status: DISCONTINUED | OUTPATIENT
Start: 2021-05-07 | End: 2021-05-07

## 2021-05-07 RX ORDER — METOPROLOL TARTRATE 50 MG
50 TABLET ORAL
Refills: 0 | Status: DISCONTINUED | OUTPATIENT
Start: 2021-05-07 | End: 2021-05-09

## 2021-05-07 RX ORDER — SODIUM BICARBONATE 1 MEQ/ML
650 SYRINGE (ML) INTRAVENOUS ONCE
Refills: 0 | Status: COMPLETED | OUTPATIENT
Start: 2021-05-07 | End: 2021-05-07

## 2021-05-07 RX ORDER — POTASSIUM CHLORIDE 20 MEQ
40 PACKET (EA) ORAL EVERY 4 HOURS
Refills: 0 | Status: COMPLETED | OUTPATIENT
Start: 2021-05-07 | End: 2021-05-07

## 2021-05-07 RX ORDER — PANTOPRAZOLE SODIUM 20 MG/1
40 TABLET, DELAYED RELEASE ORAL
Refills: 0 | Status: DISCONTINUED | OUTPATIENT
Start: 2021-05-07 | End: 2021-05-11

## 2021-05-07 RX ORDER — SODIUM CHLORIDE 9 MG/ML
1000 INJECTION, SOLUTION INTRAVENOUS ONCE
Refills: 0 | Status: COMPLETED | OUTPATIENT
Start: 2021-05-07 | End: 2021-05-07

## 2021-05-07 RX ORDER — POTASSIUM CHLORIDE 20 MEQ
10 PACKET (EA) ORAL
Refills: 0 | Status: DISCONTINUED | OUTPATIENT
Start: 2021-05-07 | End: 2021-05-07

## 2021-05-07 RX ORDER — FOLIC ACID 0.8 MG
1 TABLET ORAL DAILY
Refills: 0 | Status: DISCONTINUED | OUTPATIENT
Start: 2021-05-07 | End: 2021-05-11

## 2021-05-07 RX ORDER — METOPROLOL TARTRATE 50 MG
5 TABLET ORAL ONCE
Refills: 0 | Status: COMPLETED | OUTPATIENT
Start: 2021-05-07 | End: 2021-05-07

## 2021-05-07 RX ORDER — ONDANSETRON 8 MG/1
4 TABLET, FILM COATED ORAL ONCE
Refills: 0 | Status: COMPLETED | OUTPATIENT
Start: 2021-05-07 | End: 2021-05-07

## 2021-05-07 RX ORDER — MAGNESIUM SULFATE 500 MG/ML
4 VIAL (ML) INJECTION ONCE
Refills: 0 | Status: DISCONTINUED | OUTPATIENT
Start: 2021-05-07 | End: 2021-05-07

## 2021-05-07 RX ORDER — SODIUM,POTASSIUM PHOSPHATES 278-250MG
2 POWDER IN PACKET (EA) ORAL ONCE
Refills: 0 | Status: COMPLETED | OUTPATIENT
Start: 2021-05-07 | End: 2021-05-07

## 2021-05-07 RX ORDER — THIAMINE MONONITRATE (VIT B1) 100 MG
100 TABLET ORAL DAILY
Refills: 0 | Status: DISCONTINUED | OUTPATIENT
Start: 2021-05-07 | End: 2021-05-11

## 2021-05-07 RX ORDER — ENOXAPARIN SODIUM 100 MG/ML
70 INJECTION SUBCUTANEOUS EVERY 12 HOURS
Refills: 0 | Status: DISCONTINUED | OUTPATIENT
Start: 2021-05-07 | End: 2021-05-09

## 2021-05-07 RX ADMIN — Medication 50 GRAM(S): at 08:40

## 2021-05-07 RX ADMIN — Medication 100 MILLIEQUIVALENT(S): at 07:47

## 2021-05-07 RX ADMIN — Medication 40 MILLIEQUIVALENT(S): at 00:23

## 2021-05-07 RX ADMIN — Medication 100 MILLIGRAM(S): at 11:33

## 2021-05-07 RX ADMIN — Medication 100 MILLIGRAM(S): at 06:04

## 2021-05-07 RX ADMIN — Medication 100 MILLIEQUIVALENT(S): at 09:40

## 2021-05-07 RX ADMIN — SODIUM CHLORIDE 150 MILLILITER(S): 9 INJECTION, SOLUTION INTRAVENOUS at 21:05

## 2021-05-07 RX ADMIN — SODIUM CHLORIDE 4000 MILLILITER(S): 9 INJECTION, SOLUTION INTRAVENOUS at 13:30

## 2021-05-07 RX ADMIN — Medication 85 MILLIMOLE(S): at 13:44

## 2021-05-07 RX ADMIN — PANTOPRAZOLE SODIUM 40 MILLIGRAM(S): 20 TABLET, DELAYED RELEASE ORAL at 10:36

## 2021-05-07 RX ADMIN — Medication 40 MILLIEQUIVALENT(S): at 03:30

## 2021-05-07 RX ADMIN — Medication 50 MILLIGRAM(S): at 13:16

## 2021-05-07 RX ADMIN — Medication 65 MILLIGRAM(S): at 00:17

## 2021-05-07 RX ADMIN — Medication 40 MILLIEQUIVALENT(S): at 10:36

## 2021-05-07 RX ADMIN — Medication 100 MILLIEQUIVALENT(S): at 02:12

## 2021-05-07 RX ADMIN — Medication 65 MILLIGRAM(S): at 06:04

## 2021-05-07 RX ADMIN — Medication 1 MILLIGRAM(S): at 11:33

## 2021-05-07 RX ADMIN — Medication 5 MILLIGRAM(S): at 08:06

## 2021-05-07 RX ADMIN — Medication 100 MILLIEQUIVALENT(S): at 00:18

## 2021-05-07 RX ADMIN — Medication 25 MILLIGRAM(S): at 19:59

## 2021-05-07 RX ADMIN — Medication 100 MILLIEQUIVALENT(S): at 03:05

## 2021-05-07 RX ADMIN — Medication 50 MILLIGRAM(S): at 18:57

## 2021-05-07 RX ADMIN — ENOXAPARIN SODIUM 70 MILLIGRAM(S): 100 INJECTION SUBCUTANEOUS at 17:23

## 2021-05-07 RX ADMIN — Medication 40 MILLIEQUIVALENT(S): at 13:47

## 2021-05-07 RX ADMIN — ONDANSETRON 4 MILLIGRAM(S): 8 TABLET, FILM COATED ORAL at 13:20

## 2021-05-07 RX ADMIN — Medication 100 MILLIEQUIVALENT(S): at 08:40

## 2021-05-07 RX ADMIN — Medication 2 PACKET(S): at 13:19

## 2021-05-07 RX ADMIN — CHLORHEXIDINE GLUCONATE 1 APPLICATION(S): 213 SOLUTION TOPICAL at 06:04

## 2021-05-07 RX ADMIN — Medication 650 MILLIGRAM(S): at 13:16

## 2021-05-07 RX ADMIN — Medication 100 GRAM(S): at 00:17

## 2021-05-07 RX ADMIN — Medication 50 GRAM(S): at 07:47

## 2021-05-07 RX ADMIN — Medication 40 MILLIEQUIVALENT(S): at 06:04

## 2021-05-07 RX ADMIN — SODIUM CHLORIDE 1000 MILLILITER(S): 9 INJECTION, SOLUTION INTRAVENOUS at 08:07

## 2021-05-07 RX ADMIN — Medication 85 MILLIMOLE(S): at 01:31

## 2021-05-07 RX ADMIN — Medication 50 MILLIGRAM(S): at 09:33

## 2021-05-07 RX ADMIN — POTASSIUM PHOSPHATE, MONOBASIC POTASSIUM PHOSPHATE, DIBASIC 83.33 MILLIMOLE(S): 236; 224 INJECTION, SOLUTION INTRAVENOUS at 20:51

## 2021-05-07 RX ADMIN — Medication 1 TABLET(S): at 11:33

## 2021-05-07 NOTE — PROGRESS NOTE ADULT - SUBJECTIVE AND OBJECTIVE BOX
[facility]                                                               Office: [address] [address]                                                              Telephone: [phone]. Fax:[phone]                                                                             PROGRESS NOTE  Reason for follow up:   Overnight: No new events.   Update:       Subjective: "  ______________________"        Review of symptoms:   Cardiac:  No chest pain. No dyspnea. No palpitations.  Respiratory: no cough. No dyspnea  Gastrointestinal: No diarrhea. No abdominal pain. No bleeding.   Neuro: No focal neuro complaints.      Vitals:  T(C): 37.3 (05-07-21 @ 07:38), Max: 37.3 (05-07-21 @ 07:38)  HR: 110 (05-07-21 @ 12:00) (82 - 170)  BP: 108/59 (05-07-21 @ 12:00) (97/72 - 186/118)  RR: 16 (05-07-21 @ 12:00) (9 - 34)  SpO2: 98% (05-07-21 @ 12:00) (95% - 100%)      I&O's Summary    06 May 2021 07:01  -  07 May 2021 07:00  --------------------------------------------------------  IN: 3594.2 mL / OUT: 1400 mL / NET: 2194.2 mL    07 May 2021 07:01  -  07 May 2021 12:29  --------------------------------------------------------  IN: 2672 mL / OUT: 1100 mL / NET: 1572 mL      Weight (kg): 68 (05-06 @ 14:50)      PHYSICAL EXAM:  Appearance: Comfortable. No acute distress  HEENT:  Atraumatic. Normocephalic.  Normal oral mucosa, PERRL  Neurologic: A & O x 3, no focal deficits. EOMI.  Cardiovascular: Normal S1 S2, No murmur, rubs/gallops.   Respiratory: Lungs clear to auscultation, unlabored   Gastrointestinal:  Soft, Non-tender, + BS  Lower Extremities: No edema  Psychiatry: Patient is calm. No agitation. Mood & affect appropriate  Skin: No rashes/ ecchymoses/cyanosis/ulcers visualized on the face, hands or feet.      CURRENT MEDICATIONS:  metoprolol tartrate 50 milliGRAM(s) Oral two times a day  pantoprazole    Tablet  dextrose 50% Injectable  insulin regular Infusion  chlorhexidine 4% Liquid  dextrose 5% + lactated ringers.  enoxaparin Injectable  folic acid  multivitamin  potassium chloride    Tablet ER  potassium phosphate / sodium phosphate Powder (PHOS-NaK)  sodium bicarbonate  sodium phosphate IVPB  thiamine      DIAGNOSTIC TESTING:  [ ] Echocardiogram:   < from: TTE Echo Complete w/ Contrast w/ Doppler (05.06.21 @ 21:14) >  Summary:   1. Left ventricular ejection fraction, by visual estimation, is 40 to 45%.   2. Technically difficult study.   3. Mildly decreased global left ventricular systolic function.   4. The left ventricular diastolic function could not be assessed in this study.   5. EF% difficult to determine due to afib. By visual estimation, EF appears to be about 40-45%.   6. Normal left atrial size.   7. Normal right atrial size.   8. Mild thickening of the anterior and posterior mitral valve leaflets.   9. Trace mitral valve regurgitation.  10. Endocardial visualization was enhanced with intravenous echo contrast.    MD Leo Electronically signed on 5/7/2021 at 11:59:34 AM    < end of copied text >	      LABS:	 	  CARDIAC MARKERS ( 06 May 2021 17:33 )  x     / <0.01 ng/mL / x     / x     / x      p-BNP 06 May 2021 17:33: 112 pg/mL, CARDIAC MARKERS ( 06 May 2021 15:23 )  x     / <0.01 ng/mL / x     / x     / x      p-BNP 06 May 2021 15:23: x                              11.7   9.48  )-----------( 76       ( 07 May 2021 05:21 )             33.9     05-07    128<L>  |  97<L>  |  6.0<L>  ----------------------------<  180<H>  4.3   |  16.0<L>  |  0.52    Ca    7.3<L>      07 May 2021 11:18  Phos  1.0     05-07  Mg     2.7     05-07    TPro  6.6  /  Alb  4.1  /  TBili  1.7  /  DBili  x   /  AST  177<H>  /  ALT  155<H>  /  AlkPhos  109  05-06    proBNP: Serum Pro-Brain Natriuretic Peptide: 112 pg/mL (05-06 @ 17:33)      TSH: Thyroid Stimulating Hormone, Serum: 0.64 uIU/mL      TELEMETRY: Afib, Afib RVR

## 2021-05-07 NOTE — PROGRESS NOTE ADULT - ASSESSMENT
HPI/INTERVAL EVENTS: afib with RVR, responded to metoprolol     EXAM:   alert and oriented, cooperative, nad  diaphoretic  irregular rhythm, HR now in 90s    RADIOLOGY REVIEWED:      IMPRESSION/ASSESSMENT & PLAN:    HPI/INTERVAL EVENTS: afib with RVR, responded to metoprolol     EXAM:   alert and oriented, cooperative, nad  diaphoretic  irregular rhythm, HR now in 90s  abdomen soft nontender  no edema    RADIOLOGY REVIEWED:  cxr - no infiltrate  CT abd - no acute pathology     IMPRESSION/ASSESSMENT & PLAN:   29 yo male with hx of ETOH abuse, DM, HTN, pancreatitis, admitted with DKA, also noted to be in Afib with RVR, etoh with withdrawal.     DKA  - insulin infusion, d5LR, will transition to lantus and lispro once anion gap and acidosis improve  - aggressive electrolyte repletion underway    Afib with RVR  - rate control with metoprolol, aggressive magnesium repletion   - Lovenox full dose for AC  - probable HARRY/ cardioversion on monday    ETOH withdrawal  - librium taper    Diet: start diabetic diet  activity: OOB  dvt proph: lovenox

## 2021-05-07 NOTE — PATIENT PROFILE ADULT - SURGICAL SITE INCISION
Pt resting comfortably at this time. VSS. Respirations equal and non-labored. Pt on cardiac monitor showing sinus rhythm. Pt denies pain. Call light in reach.    no

## 2021-05-07 NOTE — PROGRESS NOTE ADULT - ASSESSMENT
30M hx Alcohol abuse (he drinks every other day last drink yesterday) with h/o seizures for detoxing in the past, Diabetes, HTN, Pancreatitis who started feeling his heart racing  today with associated SOB. Pt states he lives with his parent and EMS was called. Ems reports upon arrival at the scene patient was found to be in rapid AF. As  per EMS pt was given Cardizem 20mg  and ASA without improvement. Admits feeling dizzy with RLQ pain and  some discomfort in lower back. Denied CP, orthopnea, pnd, edema, hematuria, melena syncope, near syncope.     New onset AF with RVR  TTE- EF 40-45%  Telemetry Monitoring- Afib/Afib RVR  EP following  cont metoprolol PO, IVP sustained HR > 140  IF remains in Afib over weekend, will plan for HARRY/DCCV monday    ETOH withdrawal  As per PMT    DM   Possible DKA  As per PMT

## 2021-05-07 NOTE — CONSULT NOTE ADULT - ASSESSMENT
This is a 30 years old man with PMH of DM and HTN for > 4 years as well as gallbladder removal. Also heavy alcohol abuse since he was 17. Now he is admitted with DKA and alcohol withdrawal. Noted to be in new AFIB with RVR, now improved after IVF.   Admitted to fast and strong heart beating with exertion, but no palpitation. Denies CP, SOB, orthopnea, leg edema, PND. He had 2 postural syncope episodes.   His sister found dead in her room in her 20s. An autopsy was done but he does not know the results. He has one brother who is healthy, and his parents are healthy in their late 40s/early 50s. No other family history of sudden or unexpected death.     1. AIFB, CHADS-VASc = 2 (DM, HTN) in the setting of DKA and alcohol withdrawal.  - suggest metoprolol 12.5 mg q 6 hours  - anticoagulation for at least one months then reassess. If AF becomes a chronic issue then will need long term AC  - NPO Sunday midnight for HARRY DCCV Monday if remains in AF  - TTE    2. Family history of SCD, patient stated that his other brother has the autopsy report and he was not told that a family screening is needed, I encouraged him to follow up on this and to see a specialist (could be me) if this is not resolved  - TTE    Will r/a Monday for HARRY-DCCV.  Call EPS before then if needed  above d/w patient and ICU team This is a 30 years old man with PMH of DM and HTN for > 4 years as well as gallbladder removal. Also heavy alcohol abuse since he was 17. Now he is admitted with DKA and alcohol withdrawal. Noted to be in new AFIB with RVR, now improved after IVF.   Admitted to fast and strong heart beating with exertion, but no palpitation. Denies CP, SOB, orthopnea, leg edema, PND. He had 2 postural syncope episodes.   His sister found dead in her room in her 20s. An autopsy was done but he does not know the results. He has one brother who is healthy, and his parents are healthy in their late 40s/early 50s. No other family history of sudden or unexpected death.     1. AIFB, CHADS-VASc = 2 (DM, HTN) in the setting of DKA and alcohol withdrawal.  - suggest metoprolol 12.5 mg q 6 hours  - anticoagulation for at least one months then reassess. If AF becomes a chronic issue then will need long term AC  - NPO Sunday midnight for HARRY DCCV Monday if remains in AF  - TTE    2. Family history of SCD, patient stated that his other brother has the autopsy report and he was not told that a family screening is needed, I encouraged him to follow up on this and to see a specialist (could be me) if this is not resolved  - TTE    3. syncope, c/w postural syncope. Monitor for now. TTE    Will r/a Monday for HARRY-DCCV.  Call EPS before then if needed  above d/w patient and ICU team

## 2021-05-07 NOTE — CONSULT NOTE ADULT - SUBJECTIVE AND OBJECTIVE BOX
Electrophysiology Attending Consult Note    HPI:  29 yo male with pmhx of ETOH abuse, DM, HTN, and pancreatitis presented to the ER for lower right abdominal pain that radiated to the back associated with SOB. Pt has been hospitalized for alcohol withdrawal in the past. Pt was seen to be in Afib with RVR when EMS arrived; as per EMS: "given Cardizem 20mg and ASA without improvement." In the ER, pt was noted to have FS >530, moderate acetone, and was in metabolic acidosis. Pt received a total of Librium 50 mg, 2 mg Ativan, valium 40 mg, was started on insulin @7 units/hr, and Lopressor 35 mg. He was given 2L IV bolus of NaCl and currently receiving 2L IV bolus of LR.   ICU consult was called for worsening mental status, DT's, and DKA. Pt was seen and examined at bedside, laying uncomfortable in bed picking at things in mid air/likely halucinating.  Unable to obtain a thorough history from pt due to his AMS and delirium. Pt's BP was 178/77, tachycardic, and Kussmaul breathing.  (06 May 2021 17:19)    Above appreciated  Patient has DM and HTN for > 4 years. Also heavy alcohol abuse since he was 17. Now he is admitted with DKA and alcohol withdrawal. Noted to be in new AFIB with RVR, now improved after IVF.   Admitted to fast and strong heart beating with exertion, but no palpitation. Denies CP, SOB, orthopnea, leg edema, PND. He had 2 postural syncope episodes.       Family Hx:  His sister found dead in her room in her 20s. An autopsy was done but he does not know the results. He has one brother who is healthy, and his parents are healthy in their late 40s/early 50s. No other family history of sudden or unexpected death.     Social Hx:   Heavy alcohol abuse since age 17  occasional smoking  no drug use      PAST MEDICAL & SURGICAL HISTORY:  Alcohol abuse  Pancreatitis  HTN (hypertension)  Diabetes    Gallbladder removal         REVIEW OF SYSTEMS:    CONSTITUTIONAL: No fever, weight loss, or fatigue  ENMT:  No difficulty hearing, tinnitus, vertigo; No sinus or throat pain  NECK: No pain or stiffness  RESPIRATORY: No cough, wheezing, chills or hemoptysis; No shortness of breath  CARDIOVASCULAR: No chest pain, palpitations, dizziness, or leg swelling  GASTROINTESTINAL: No abdominal or epigastric pain. No nausea, vomiting, or hematemesis; No diarrhea or constipation. No melena or hematochezia.  NEUROLOGICAL: No headaches, memory loss, loss of strength, numbness, or tremors  SKIN: No itching, burning, rashes, or lesions   LYMPH NODES: No enlarged glands  ENDOCRINE: No heat or cold intolerance; No hair loss  MUSCULOSKELETAL: No joint pain or swelling; No muscle, back, or extremity pain  HEME/LYMPH: No easy bruising, or bleeding gums  ALLERY AND IMMUNOLOGIC: No hives or eczema      MEDICATIONS  (STANDING):  chlorhexidine 4% Liquid 1 Application(s) Topical <User Schedule>  dextrose 5% + lactated ringers. 1000 milliLiter(s) (150 mL/Hr) IV Continuous <Continuous>  dextrose 50% Injectable 25 Gram(s) IV Push once  dextrose 50% Injectable 12.5 Gram(s) IV Push once  dextrose 50% Injectable 25 Gram(s) IV Push once  enoxaparin Injectable 70 milliGRAM(s) SubCutaneous every 12 hours  insulin regular Infusion 7 Unit(s)/Hr (7 mL/Hr) IV Continuous <Continuous>  metoprolol tartrate 50 milliGRAM(s) Oral two times a day  pantoprazole  Injectable 40 milliGRAM(s) IV Push daily  PHENobarbital Injectable 65 milliGRAM(s) IV Push every 6 hours  potassium chloride    Tablet ER 40 milliEquivalent(s) Oral every 4 hours  potassium chloride  10 mEq/100 mL IVPB 10 milliEquivalent(s) IV Intermittent every 1 hour  thiamine Injectable 100 milliGRAM(s) IV Push daily    MEDICATIONS  (PRN):  PHENobarbital Injectable 65 milliGRAM(s) IV Push every 6 hours PRN agitation      Allergies  - no Known Allergies      Vital Signs Last 24 Hrs  T(C): 37.3 (07 May 2021 07:38), Max: 37.3 (07 May 2021 07:38)  T(F): 99.1 (07 May 2021 07:38), Max: 99.1 (07 May 2021 07:38)  HR: 99 (07 May 2021 08:15) (99 - 170)  BP: 97/72 (07 May 2021 08:15) (97/72 - 186/118)  BP(mean): 104 (07 May 2021 08:00) (88 - 112)  RR: 15 (07 May 2021 08:15) (9 - 34)  SpO2: 100% (07 May 2021 08:15) (97% - 100%)    Physical Exam:  Constitutional: AAOx3, NAD  Neck: supple, No JVD  Cardiovascular: +S1S2 irregular pulse, no murmurs, rubs, gallops   Pulmonary: CTA b/l, unlabored, no wheezes, rales. rhonci  Abdomen: +BS, soft NTND  Extremities: no edema b/l, +distal pulses b/l  Neuro: non focal, speech clear, BOOKER x 4    LABS:                        11.7   9.48  )-----------( 76       ( 07 May 2021 05:21 )             33.9   05-07    129<L>  |  99  |  10.0  ----------------------------<  213<H>  3.5   |  14.0<L>  |  0.63    Ca    7.2<L>      07 May 2021 05:21  Phos  1.5     05-06  Mg     1.7     05-07    TPro  6.6  /  Alb  4.1  /  TBili  1.7  /  DBili  x   /  AST  177<H>  /  ALT  155<H>  /  AlkPhos  109  05-06  LIVER FUNCTIONS - ( 06 May 2021 22:18 )  Alb: 4.1 g/dL / Pro: 6.6 g/dL / ALK PHOS: 109 U/L / ALT: 155 U/L / AST: 177 U/L / GGT: x           PT/INR - ( 06 May 2021 15:23 )   PT: 11.7 sec;   INR: 1.01 ratio         PTT - ( 06 May 2021 15:23 )  PTT:32.2 secCARDIAC MARKERS ( 06 May 2021 17:33 )  x     / <0.01 ng/mL / x     / x     / x      CARDIAC MARKERS ( 06 May 2021 15:23 )  x     / <0.01 ng/mL / x     / x     / x          Urinalysis Basic - ( 07 May 2021 05:24 )    EKG: AFIb   tele: AFIb with RVR, rates now around 100-110 bpm. no pauses    c< from: CT Abdomen and Pelvis No Cont (21 @ 20:30) >    IMPRESSION:  *  No acute pathology.    < end of copied text >    Color: Yellow / Appearance: Clear / S.015 / pH: x  Gluc: x / Ketone: Large  / Bili: Negative / Urobili: Negative mg/dL   Blood: x / Protein: 30 mg/dL / Nitrite: Negative   Leuk Esterase: Negative / RBC: 3-5 /HPF / WBC 0-2   Sq Epi: x / Non Sq Epi: Occasional / Bacteria: Few        RADIOLOGY & ADDITIONAL STUDIES:

## 2021-05-07 NOTE — PROGRESS NOTE ADULT - SUBJECTIVE AND OBJECTIVE BOX
On Admission  21 (1d)  HPI:  29 yo male with pmhx of ETOH abuse, DM, HTN, and pancreatitis presented to the ER for lower right abdominal pain that radiated to the back associated with SOB. Pt has been hospitalized for alcohol withdrawal in the past. Pt was seen to be in Afib with RVR when EMS arrived; as per EMS: "given Cardizem 20mg and ASA without improvement." In the ER, pt was noted to have FS >530, moderate acetone, and was in metabolic acidosis. Pt received a total of Librium 50 mg, 2 mg Ativan, valium 40 mg, was started on insulin @7 units/hr, and Lopressor 35 mg. He was given 2L IV bolus of NaCl and currently receiving 2L IV bolus of LR.   ICU consult was called for worsening mental status, DT's, and DKA. Pt was seen and examined at bedside, laying uncomfortable in bed picking at things in mid air/likely halucinating.  Unable to obtain a thorough history from pt due to his AMS and delirium. Pt's BP was 178/77, tachycardic, and Kussmaul breathing.  (06 May 2021 17:19)    PAST MEDICAL & SURGICAL HISTORY:  Alcohol abuse    Pancreatitis    HTN (hypertension)    Diabetes    No significant past surgical history        Antimicrobial:    Cardiovascular:  metoprolol tartrate 50 milliGRAM(s) Oral two times a day    Pulmonary:    Hematalogic:  enoxaparin Injectable 70 milliGRAM(s) SubCutaneous every 12 hours    Other:  chlorhexidine 4% Liquid 1 Application(s) Topical <User Schedule>  dextrose 5% + lactated ringers. 1000 milliLiter(s) IV Continuous <Continuous>  dextrose 50% Injectable 25 Gram(s) IV Push once  dextrose 50% Injectable 12.5 Gram(s) IV Push once  dextrose 50% Injectable 25 Gram(s) IV Push once  insulin regular Infusion 7 Unit(s)/Hr IV Continuous <Continuous>  pantoprazole  Injectable 40 milliGRAM(s) IV Push daily  PHENobarbital Injectable 65 milliGRAM(s) IV Push every 6 hours  PHENobarbital Injectable 65 milliGRAM(s) IV Push every 6 hours PRN  potassium chloride    Tablet ER 40 milliEquivalent(s) Oral every 4 hours  potassium chloride  10 mEq/100 mL IVPB 10 milliEquivalent(s) IV Intermittent every 1 hour  thiamine Injectable 100 milliGRAM(s) IV Push daily      Drug Dosing Weight  Height (cm): 167.6 (06 May 2021 14:50)  Weight (kg): 68 (06 May 2021 14:50)  BMI (kg/m2): 24.2 (06 May 2021 14:50)  BSA (m2): 1.77 (06 May 2021 14:50)    T(C): 37.3 (21 @ 07:38), Max: 37.3 (21 @ 07:38)  HR: 99 (21 @ 08:15)  BP: 130/96 (21 @ 08:00)  BP(mean): 104 (21 @ 08:00)  ABP: --  ABP(mean): --  RR: 15 (21 @ 08:15)  SpO2: 100% (21 @ 08:15)           @ 07:01  -   @ 07:00  --------------------------------------------------------  IN: 3592.2 mL / OUT: 1400 mL / NET: 2192.2 mL              LABS:  CBC Full  -  ( 07 May 2021 05:21 )  WBC Count : 9.48 K/uL  RBC Count : 3.63 M/uL  Hemoglobin : 11.7 g/dL  Hematocrit : 33.9 %  Platelet Count - Automated : 76 K/uL  Mean Cell Volume : 93.4 fl  Mean Cell Hemoglobin : 32.2 pg  Mean Cell Hemoglobin Concentration : 34.5 gm/dL  Auto Neutrophil # : x  Auto Lymphocyte # : x  Auto Monocyte # : x  Auto Eosinophil # : x  Auto Basophil # : x  Auto Neutrophil % : x  Auto Lymphocyte % : x  Auto Monocyte % : x  Auto Eosinophil % : x  Auto Basophil % : x        129<L>  |  99  |  10.0  ----------------------------<  213<H>  3.5   |  14.0<L>  |  0.63    Ca    7.2<L>      07 May 2021 05:21  Phos  1.5       Mg     1.7         TPro  6.6  /  Alb  4.1  /  TBili  1.7  /  DBili  x   /  AST  177<H>  /  ALT  155<H>  /  AlkPhos  109      PT/INR - ( 06 May 2021 15:23 )   PT: 11.7 sec;   INR: 1.01 ratio         PTT - ( 06 May 2021 15:23 )  PTT:32.2 sec  Urinalysis Basic - ( 07 May 2021 05:24 )    Color: Yellow / Appearance: Clear / S.015 / pH: x  Gluc: x / Ketone: Large  / Bili: Negative / Urobili: Negative mg/dL   Blood: x / Protein: 30 mg/dL / Nitrite: Negative   Leuk Esterase: Negative / RBC: 3-5 /HPF / WBC 0-2   Sq Epi: x / Non Sq Epi: Occasional / Bacteria: Few        ____________________________________________________________________________________________________

## 2021-05-08 LAB
ACETONE SERPL-MCNC: NEGATIVE — SIGNIFICANT CHANGE UP
ALBUMIN SERPL ELPH-MCNC: 3.3 G/DL — SIGNIFICANT CHANGE UP (ref 3.3–5.2)
ALP SERPL-CCNC: 97 U/L — SIGNIFICANT CHANGE UP (ref 40–120)
ALT FLD-CCNC: 97 U/L — HIGH
ANION GAP SERPL CALC-SCNC: 10 MMOL/L — SIGNIFICANT CHANGE UP (ref 5–17)
ANION GAP SERPL CALC-SCNC: 13 MMOL/L — SIGNIFICANT CHANGE UP (ref 5–17)
AST SERPL-CCNC: 137 U/L — HIGH
BASOPHILS # BLD AUTO: 0.02 K/UL — SIGNIFICANT CHANGE UP (ref 0–0.2)
BASOPHILS NFR BLD AUTO: 0.5 % — SIGNIFICANT CHANGE UP (ref 0–2)
BILIRUB SERPL-MCNC: 1.8 MG/DL — SIGNIFICANT CHANGE UP (ref 0.4–2)
BUN SERPL-MCNC: 4 MG/DL — LOW (ref 8–20)
BUN SERPL-MCNC: 4 MG/DL — LOW (ref 8–20)
CALCIUM SERPL-MCNC: 7.9 MG/DL — LOW (ref 8.6–10.2)
CALCIUM SERPL-MCNC: 8.2 MG/DL — LOW (ref 8.6–10.2)
CHLORIDE SERPL-SCNC: 101 MMOL/L — SIGNIFICANT CHANGE UP (ref 98–107)
CHLORIDE SERPL-SCNC: 103 MMOL/L — SIGNIFICANT CHANGE UP (ref 98–107)
CO2 SERPL-SCNC: 19 MMOL/L — LOW (ref 22–29)
CO2 SERPL-SCNC: 22 MMOL/L — SIGNIFICANT CHANGE UP (ref 22–29)
CREAT SERPL-MCNC: 0.41 MG/DL — LOW (ref 0.5–1.3)
CREAT SERPL-MCNC: 0.43 MG/DL — LOW (ref 0.5–1.3)
EOSINOPHIL # BLD AUTO: 0.04 K/UL — SIGNIFICANT CHANGE UP (ref 0–0.5)
EOSINOPHIL NFR BLD AUTO: 1.1 % — SIGNIFICANT CHANGE UP (ref 0–6)
GLUCOSE BLDC GLUCOMTR-MCNC: 117 MG/DL — HIGH (ref 70–99)
GLUCOSE BLDC GLUCOMTR-MCNC: 163 MG/DL — HIGH (ref 70–99)
GLUCOSE BLDC GLUCOMTR-MCNC: 182 MG/DL — HIGH (ref 70–99)
GLUCOSE BLDC GLUCOMTR-MCNC: 198 MG/DL — HIGH (ref 70–99)
GLUCOSE BLDC GLUCOMTR-MCNC: 201 MG/DL — HIGH (ref 70–99)
GLUCOSE BLDC GLUCOMTR-MCNC: 202 MG/DL — HIGH (ref 70–99)
GLUCOSE BLDC GLUCOMTR-MCNC: 203 MG/DL — HIGH (ref 70–99)
GLUCOSE BLDC GLUCOMTR-MCNC: 226 MG/DL — HIGH (ref 70–99)
GLUCOSE BLDC GLUCOMTR-MCNC: 249 MG/DL — HIGH (ref 70–99)
GLUCOSE BLDC GLUCOMTR-MCNC: 76 MG/DL — SIGNIFICANT CHANGE UP (ref 70–99)
GLUCOSE BLDC GLUCOMTR-MCNC: 96 MG/DL — SIGNIFICANT CHANGE UP (ref 70–99)
GLUCOSE SERPL-MCNC: 122 MG/DL — HIGH (ref 70–99)
GLUCOSE SERPL-MCNC: 197 MG/DL — HIGH (ref 70–99)
HCT VFR BLD CALC: 34 % — LOW (ref 39–50)
HGB BLD-MCNC: 11.7 G/DL — LOW (ref 13–17)
IMM GRANULOCYTES NFR BLD AUTO: 0.5 % — SIGNIFICANT CHANGE UP (ref 0–1.5)
LYMPHOCYTES # BLD AUTO: 0.88 K/UL — LOW (ref 1–3.3)
LYMPHOCYTES # BLD AUTO: 23.7 % — SIGNIFICANT CHANGE UP (ref 13–44)
MAGNESIUM SERPL-MCNC: 1.9 MG/DL — SIGNIFICANT CHANGE UP (ref 1.6–2.6)
MAGNESIUM SERPL-MCNC: 2 MG/DL — SIGNIFICANT CHANGE UP (ref 1.6–2.6)
MCHC RBC-ENTMCNC: 32.1 PG — SIGNIFICANT CHANGE UP (ref 27–34)
MCHC RBC-ENTMCNC: 34.4 GM/DL — SIGNIFICANT CHANGE UP (ref 32–36)
MCV RBC AUTO: 93.4 FL — SIGNIFICANT CHANGE UP (ref 80–100)
MONOCYTES # BLD AUTO: 0.41 K/UL — SIGNIFICANT CHANGE UP (ref 0–0.9)
MONOCYTES NFR BLD AUTO: 11.1 % — SIGNIFICANT CHANGE UP (ref 2–14)
NEUTROPHILS # BLD AUTO: 2.34 K/UL — SIGNIFICANT CHANGE UP (ref 1.8–7.4)
NEUTROPHILS NFR BLD AUTO: 63.1 % — SIGNIFICANT CHANGE UP (ref 43–77)
PHOSPHATE SERPL-MCNC: 1.8 MG/DL — LOW (ref 2.4–4.7)
PHOSPHATE SERPL-MCNC: 2.6 MG/DL — SIGNIFICANT CHANGE UP (ref 2.4–4.7)
PLATELET # BLD AUTO: 64 K/UL — LOW (ref 150–400)
POTASSIUM SERPL-MCNC: 3.7 MMOL/L — SIGNIFICANT CHANGE UP (ref 3.5–5.3)
POTASSIUM SERPL-MCNC: 3.9 MMOL/L — SIGNIFICANT CHANGE UP (ref 3.5–5.3)
POTASSIUM SERPL-SCNC: 3.7 MMOL/L — SIGNIFICANT CHANGE UP (ref 3.5–5.3)
POTASSIUM SERPL-SCNC: 3.9 MMOL/L — SIGNIFICANT CHANGE UP (ref 3.5–5.3)
PROT SERPL-MCNC: 5.7 G/DL — LOW (ref 6.6–8.7)
RBC # BLD: 3.64 M/UL — LOW (ref 4.2–5.8)
RBC # FLD: 14.2 % — SIGNIFICANT CHANGE UP (ref 10.3–14.5)
SODIUM SERPL-SCNC: 132 MMOL/L — LOW (ref 135–145)
SODIUM SERPL-SCNC: 135 MMOL/L — SIGNIFICANT CHANGE UP (ref 135–145)
WBC # BLD: 3.71 K/UL — LOW (ref 3.8–10.5)
WBC # FLD AUTO: 3.71 K/UL — LOW (ref 3.8–10.5)

## 2021-05-08 PROCEDURE — 99233 SBSQ HOSP IP/OBS HIGH 50: CPT

## 2021-05-08 RX ORDER — INSULIN GLARGINE 100 [IU]/ML
20 INJECTION, SOLUTION SUBCUTANEOUS AT BEDTIME
Refills: 0 | Status: DISCONTINUED | OUTPATIENT
Start: 2021-05-08 | End: 2021-05-11

## 2021-05-08 RX ORDER — GLUCAGON INJECTION, SOLUTION 0.5 MG/.1ML
1 INJECTION, SOLUTION SUBCUTANEOUS ONCE
Refills: 0 | Status: DISCONTINUED | OUTPATIENT
Start: 2021-05-08 | End: 2021-05-11

## 2021-05-08 RX ORDER — SODIUM CHLORIDE 9 MG/ML
1000 INJECTION, SOLUTION INTRAVENOUS ONCE
Refills: 0 | Status: COMPLETED | OUTPATIENT
Start: 2021-05-08 | End: 2021-05-08

## 2021-05-08 RX ORDER — SODIUM CHLORIDE 9 MG/ML
1000 INJECTION, SOLUTION INTRAVENOUS
Refills: 0 | Status: DISCONTINUED | OUTPATIENT
Start: 2021-05-08 | End: 2021-05-11

## 2021-05-08 RX ORDER — POTASSIUM PHOSPHATE, MONOBASIC POTASSIUM PHOSPHATE, DIBASIC 236; 224 MG/ML; MG/ML
15 INJECTION, SOLUTION INTRAVENOUS ONCE
Refills: 0 | Status: COMPLETED | OUTPATIENT
Start: 2021-05-08 | End: 2021-05-08

## 2021-05-08 RX ORDER — DIGOXIN 250 MCG
500 TABLET ORAL ONCE
Refills: 0 | Status: COMPLETED | OUTPATIENT
Start: 2021-05-08 | End: 2021-05-08

## 2021-05-08 RX ORDER — MAGNESIUM SULFATE 500 MG/ML
1 VIAL (ML) INJECTION ONCE
Refills: 0 | Status: COMPLETED | OUTPATIENT
Start: 2021-05-08 | End: 2021-05-08

## 2021-05-08 RX ORDER — INSULIN LISPRO 100/ML
VIAL (ML) SUBCUTANEOUS
Refills: 0 | Status: DISCONTINUED | OUTPATIENT
Start: 2021-05-08 | End: 2021-05-11

## 2021-05-08 RX ORDER — DEXTROSE 50 % IN WATER 50 %
15 SYRINGE (ML) INTRAVENOUS ONCE
Refills: 0 | Status: DISCONTINUED | OUTPATIENT
Start: 2021-05-08 | End: 2021-05-11

## 2021-05-08 RX ORDER — DIGOXIN 250 MCG
250 TABLET ORAL EVERY 6 HOURS
Refills: 0 | Status: COMPLETED | OUTPATIENT
Start: 2021-05-08 | End: 2021-05-08

## 2021-05-08 RX ORDER — METOPROLOL TARTRATE 50 MG
5 TABLET ORAL ONCE
Refills: 0 | Status: COMPLETED | OUTPATIENT
Start: 2021-05-08 | End: 2021-05-08

## 2021-05-08 RX ORDER — INSULIN LISPRO 100/ML
4 VIAL (ML) SUBCUTANEOUS
Refills: 0 | Status: DISCONTINUED | OUTPATIENT
Start: 2021-05-08 | End: 2021-05-11

## 2021-05-08 RX ADMIN — ENOXAPARIN SODIUM 70 MILLIGRAM(S): 100 INJECTION SUBCUTANEOUS at 05:27

## 2021-05-08 RX ADMIN — Medication 4 UNIT(S): at 07:54

## 2021-05-08 RX ADMIN — SODIUM CHLORIDE 1000 MILLILITER(S): 9 INJECTION, SOLUTION INTRAVENOUS at 02:20

## 2021-05-08 RX ADMIN — Medication 250 MICROGRAM(S): at 17:00

## 2021-05-08 RX ADMIN — Medication 100 GRAM(S): at 06:33

## 2021-05-08 RX ADMIN — SODIUM CHLORIDE 175 MILLILITER(S): 9 INJECTION, SOLUTION INTRAVENOUS at 02:23

## 2021-05-08 RX ADMIN — Medication 1 MILLIGRAM(S): at 11:08

## 2021-05-08 RX ADMIN — Medication 4: at 16:24

## 2021-05-08 RX ADMIN — INSULIN GLARGINE 20 UNIT(S): 100 INJECTION, SOLUTION SUBCUTANEOUS at 22:01

## 2021-05-08 RX ADMIN — Medication 1 TABLET(S): at 11:08

## 2021-05-08 RX ADMIN — ENOXAPARIN SODIUM 70 MILLIGRAM(S): 100 INJECTION SUBCUTANEOUS at 17:00

## 2021-05-08 RX ADMIN — Medication 5 MILLIGRAM(S): at 11:30

## 2021-05-08 RX ADMIN — INSULIN GLARGINE 20 UNIT(S): 100 INJECTION, SOLUTION SUBCUTANEOUS at 03:03

## 2021-05-08 RX ADMIN — Medication 100 MILLIGRAM(S): at 11:08

## 2021-05-08 RX ADMIN — Medication 25 MILLIGRAM(S): at 14:09

## 2021-05-08 RX ADMIN — Medication 50 MILLIGRAM(S): at 17:00

## 2021-05-08 RX ADMIN — Medication 4: at 07:53

## 2021-05-08 RX ADMIN — Medication 25 MILLIGRAM(S): at 05:27

## 2021-05-08 RX ADMIN — Medication 4: at 22:01

## 2021-05-08 RX ADMIN — POTASSIUM PHOSPHATE, MONOBASIC POTASSIUM PHOSPHATE, DIBASIC 62.5 MILLIMOLE(S): 236; 224 INJECTION, SOLUTION INTRAVENOUS at 07:54

## 2021-05-08 RX ADMIN — Medication 50 MILLIGRAM(S): at 05:28

## 2021-05-08 RX ADMIN — Medication 250 MICROGRAM(S): at 22:31

## 2021-05-08 RX ADMIN — Medication 500 MICROGRAM(S): at 12:00

## 2021-05-08 RX ADMIN — CHLORHEXIDINE GLUCONATE 1 APPLICATION(S): 213 SOLUTION TOPICAL at 05:29

## 2021-05-08 RX ADMIN — Medication 4 UNIT(S): at 16:24

## 2021-05-08 RX ADMIN — PANTOPRAZOLE SODIUM 40 MILLIGRAM(S): 20 TABLET, DELAYED RELEASE ORAL at 05:28

## 2021-05-08 NOTE — DIETITIAN INITIAL EVALUATION ADULT. - OTHER INFO
30 year old male with hx of ETOH abuse, DM, HTN, pancreatitis, admitted with DKA, also noted to be in Afib with RVR, etoh with withdrawal. Pt reports a few days PTA he was experiencing nausea and vomiting with anything he ate or drink. Reports yesterday his appetite was poor, however, is feeling better this morning and consumed 100% of breakfast. Pt reports ~4 lb wt loss within the last year. HgA1c 9.6% - pt reports he normally eats well, three meals/day and checks blood sugars before eating. Emphasized the importance of portion sizes and pairing carbohydrates with a protein and fat. Also provided with written literature on carbohydrate counting. RD to follow up.

## 2021-05-08 NOTE — DIETITIAN INITIAL EVALUATION ADULT. - ADD RECOMMEND
Continue MVI, thiamine, and folic acid supplementation. Encourage po intake, monitor diet tolerance, and provide assistance at meals as needed. Reinforce diet education PRN. Obtain daily weights to monitor trends.

## 2021-05-08 NOTE — PROGRESS NOTE ADULT - ASSESSMENT
Brief summary of hospital stay;    Mr. Roman is 30M hx Alcohol abuse,  (he drinks every other day last drink yesterday) with h/o seizures due to alcohol withdrawal,  DMT2 on insluin at home ( not complaint) , HTN, Pancreatitis who presented [facility] on 05[facility]06 with c/o SOB, palpitations in rapid A.fib by EMS. On arriving to ED he was found to be in sever DKA  with ph 6.8 with starvation ketoaccidosis from alcohol abuse as well as A.fib with RVR. He was admitted to ICU started on Insulin ggt, iv hyration, cardiology team consulted for new a.fib with RVR and pt was covered of benzo for alcohol withdrawal. Pt has improved and closed anion gap and transitioned to SQ insulin on 5/7 and subsequently downgraded to tele bed to hospitalist service on 05/08.    # Sever DKA in pt with DMT2 and starvation ketoacidosis from alcohol abuse, has improved  - c/w lantus 20, ac 4 units, MDSS for now, will adjust insluin base on POC  - will dapagliflozin 10 mg daily  - Diabetic specialist consulted  - management of alcohol withdrawal as bellow    # Alcohol abuse and alcohol withdrawal   - has imrpoved   - counseled at bed side, SW provider list of rehabs  - c/w Librium mercy  - mv, Mg, folic acid and thiamin as per [facility] protocol  - ppi for gi ppx     # New a.fib with RVR with mild cardiomyopathy  - cardiology recommendation noted  - c/w bb, and digoxin as per cardiology team  - ac with full dose of Lovenox  - EP consult noted >> tentative plan for [facility] and [facility] on Monday    #DVT ppx - on lovenox as above  # CODE -full  # Dispo - will stay till Tuesday most likely given plan for [facility]

## 2021-05-08 NOTE — PROGRESS NOTE ADULT - SUBJECTIVE AND OBJECTIVE BOX
[facility]                                                               Office: [address] [address]                                                              Telephone: [phone]. Fax:[phone]                                                                             PROGRESS NOTE  Reason for follow up: Afib with RVR, HFrEF   Update: Patient still with some rapid Afib overnight, but currently with HR in the low 100s. Patient states he is feeling better today, no active complaints.   Covid Status: Negative 05/06/21    Review of symptoms:   Cardiac:  No chest pain. No dyspnea. No palpitations.  Respiratory: No cough. No dyspnea  Gastrointestinal: No diarrhea. No abdominal pain. No bleeding.     Past medical history: No updates.   	  Vitals:  T(C): 37.1 (05-08-21 @ 07:48), Max: 37.1 (05-08-21 @ 07:48)  HR: 102 (05-08-21 @ 08:00) (79 - 118)  BP: 114/91 (05-08-21 @ 08:00) (76/64 - 130/74)  RR: 20 (05-08-21 @ 08:00) (11 - 20)  SpO2: 100% (05-08-21 @ 08:00) (95% - 100%)  Wt(kg): --  I&O's Summary    07 May 2021 07:01  -  08 May 2021 07:00  --------------------------------------------------------  IN: 7748.8 mL / OUT: 4170 mL / NET: 3578.8 mL    08 May 2021 07:01  -  08 May 2021 09:10  --------------------------------------------------------  IN: 0 mL / OUT: 500 mL / NET: -500 mL      Weight (kg): 68 (05-06 @ 14:50)      PHYSICAL EXAM:  Appearance: Comfortable. No acute distress  HEENT:  Head and neck: Atraumatic. Normocephalic.  Normal oral mucosa, PERRL, Neck is supple. No JVD, No carotid bruit.   Neurologic: A&Ox 3, no focal deficits. EOMI, Cranial nerves are intact.  Lymphatic: No cervical lymphadenopathy  Cardiovascular: Tachycardic, irregularly irregular, rubs/gallops. No JVD, No edema  Respiratory: Lungs clear to auscultation  Gastrointestinal:  Soft, Non-tender, + BS  Lower Extremities: No edema  Psychiatry: Patient is calm. No agitation. Mood & affect appropriate  Skin: No rashes/ecchymoses/cyanosis/ulcers visualized on the face, hands or feet.      CURRENT MEDICATIONS:  metoprolol tartrate 50 milliGRAM(s) Oral two times a day    chlordiazePOXIDE  chlordiazePOXIDE  pantoprazole    Tablet  dextrose 40% Gel  dextrose 50% Injectable  dextrose 50% Injectable  dextrose 50% Injectable  glucagon  Injectable  insulin glargine Injectable (LANTUS)  insulin lispro (ADMELOG) corrective regimen sliding scale  insulin lispro Injectable (ADMELOG)  chlorhexidine 4% Liquid  dextrose 5%.  dextrose 5%.  enoxaparin Injectable  folic acid  multivitamin  thiamine      DIAGNOSTIC TESTING:  [ ] Echocardiogram:   < from: TTE Echo Complete w/ Contrast w/ Doppler (05.06.21 @ 21:14) >  PHYSICIAN INTERPRETATION:  Left Ventricle: Endocardial visualization was enhanced with intravenous echo contrast. The left ventricular internal cavity size is normal. Left ventricular wall thickness is normal.  Global LV systolic functionwas mildly decreased. Left ventricular ejection fraction, by visual estimation, is 40 to 45%. The left ventricular diastolic function could not be assessed in this study.  EF% difficult to determine due to afib. By visual estimation, EF appears to beabout 40-45%.  Right Ventricle: The right ventricular size is normal. RV systolic function is normal.  Left Atrium: Normal left atrial size.  Right Atrium: Normal right atrial size.  Pericardium: There is no evidence of pericardial effusion.  Mitral Valve: Mild thickening of the anterior and posterior mitral valve leaflets. Trace mitral valve regurgitation is seen.  Tricuspid Valve: The tricuspid valve is normal in structure. Trivial tricuspid regurgitation is visualized.  Aortic Valve: The aortic valve was not well visualized. The aortic valve is trileaflet. No evidence of aortic valve regurgitation is seen.  Pulmonic Valve: The pulmonic valve is normal. No indication of pulmonic valve regurgitation.  Aorta: The aortic root and ascending aorta are structurally normal, with no evidence of dilitation.  Pulmonary Artery: The main pulmonary artery is normal in size.  Venous: The inferior vena cava was normal sized, with respiratory size variation greater than 50%.      Summary:   1. Left ventricular ejection fraction, by visual estimation, is 40 to 45%.   2. Technically difficult study.   3. Mildly decreased global left ventricular systolic function.   4. The left ventricular diastolic function could not be assessed in this study.   5. EF% difficult to determine due to afib. By visual estimation, EF appears to be about 40-45%.   6. Normal left atrial size.   7. Normal right atrial size.   8. Mild thickening of the anterior and posterior mitral valve leaflets.   9. Trace mitral valve regurgitation.  10. Endocardial visualization was enhanced with intravenous echo contrast.    MD Leo Electronically signed on 5/7/2021 at 11:59:34 AM    < end of copied text >    OTHER: 	      LABS:	 	  CARDIAC MARKERS ( 06 May 2021 17:33 )  x     / <0.01 ng/mL / x     / x     / x      p-BNP 06 May 2021 17:33: 112 pg/mL, CARDIAC MARKERS ( 06 May 2021 15:23 )  x     / <0.01 ng/mL / x     / x     / x      p-BNP 06 May 2021 15:23: x                              11.7   3.71  )-----------( 64       ( 08 May 2021 04:57 )             34.0     05-08    135  |  103  |  4.0<L>  ----------------------------<  122<H>  3.7   |  22.0  |  0.41<L>    Ca    8.2<L>      08 May 2021 04:57  Phos  1.8     05-08  Mg     1.9     05-08    TPro  5.7<L>  /  Alb  3.3  /  TBili  1.8  /  DBili  x   /  AST  137<H>  /  ALT  97<H>  /  AlkPhos  97  05-08    proBNP: Serum Pro-Brain Natriuretic Peptide: 112 pg/mL (05-06 @ 17:33)    Lipid Profile:   HgA1c:   TSH: Thyroid Stimulating Hormone, Serum: 0.64 uIU/mL        TELEMETRY: Reviewed  Afib HR 90-140s                                                                        [facility]                                                               Office: [address] [address]                                                              Telephone: [phone]. Fax:[phone]                                                                             PROGRESS NOTE  Reason for follow up: Afib with RVR, HFrEF   Update: Patient still with some rapid Afib overnight, but currently with HR in the low 100s. Patient states he is feeling better today, no active complaints.        Covid Status: Negative 05/06/21    Review of symptoms:   Cardiac:  No chest pain. No dyspnea. No palpitations.  Respiratory: No cough. No dyspnea  Gastrointestinal: No diarrhea. No abdominal pain. No bleeding.     Past medical history: No updates.   	  Vitals:  T(C): 37.1 (05-08-21 @ 07:48), Max: 37.1 (05-08-21 @ 07:48)  HR: 102 (05-08-21 @ 08:00) (79 - 118)  BP: 114/91 (05-08-21 @ 08:00) (76/64 - 130/74)  RR: 20 (05-08-21 @ 08:00) (11 - 20)  SpO2: 100% (05-08-21 @ 08:00) (95% - 100%)  Wt(kg): --  I&O's Summary    07 May 2021 07:01  -  08 May 2021 07:00  --------------------------------------------------------  IN: 7748.8 mL / OUT: 4170 mL / NET: 3578.8 mL    08 May 2021 07:01  -  08 May 2021 09:10  --------------------------------------------------------  IN: 0 mL / OUT: 500 mL / NET: -500 mL      Weight (kg): 68 (05-06 @ 14:50)      PHYSICAL EXAM:  Appearance: Comfortable. No acute distress  HEENT:  Head and neck: Atraumatic. Normocephalic.  Normal oral mucosa, PERRL, Neck is supple. No JVD, No carotid bruit.   Neurologic: A&Ox 3, no focal deficits. EOMI, Cranial nerves are intact.  Lymphatic: No cervical lymphadenopathy  Cardiovascular: Tachycardic, irregularly irregular, rubs/gallops. No JVD, No edema  Respiratory: Lungs clear to auscultation  Gastrointestinal:  Soft, Non-tender, + BS  Lower Extremities: No edema  Psychiatry: Patient is calm. No agitation. Mood & affect appropriate  Skin: No rashes/ecchymoses/cyanosis/ulcers visualized on the face, hands or feet.      CURRENT MEDICATIONS:  metoprolol tartrate 50 milliGRAM(s) Oral two times a day    chlordiazePOXIDE  chlordiazePOXIDE  pantoprazole    Tablet  dextrose 40% Gel  dextrose 50% Injectable  dextrose 50% Injectable  dextrose 50% Injectable  glucagon  Injectable  insulin glargine Injectable (LANTUS)  insulin lispro (ADMELOG) corrective regimen sliding scale  insulin lispro Injectable (ADMELOG)  chlorhexidine 4% Liquid  dextrose 5%.  dextrose 5%.  enoxaparin Injectable  folic acid  multivitamin  thiamine      DIAGNOSTIC TESTING:  [ ] Echocardiogram:   < from: TTE Echo Complete w/ Contrast w/ Doppler (05.06.21 @ 21:14) >  PHYSICIAN INTERPRETATION:  Left Ventricle: Endocardial visualization was enhanced with intravenous echo contrast. The left ventricular internal cavity size is normal. Left ventricular wall thickness is normal.  Global LV systolic functionwas mildly decreased. Left ventricular ejection fraction, by visual estimation, is 40 to 45%. The left ventricular diastolic function could not be assessed in this study.  EF% difficult to determine due to afib. By visual estimation, EF appears to beabout 40-45%.  Right Ventricle: The right ventricular size is normal. RV systolic function is normal.  Left Atrium: Normal left atrial size.  Right Atrium: Normal right atrial size.  Pericardium: There is no evidence of pericardial effusion.  Mitral Valve: Mild thickening of the anterior and posterior mitral valve leaflets. Trace mitral valve regurgitation is seen.  Tricuspid Valve: The tricuspid valve is normal in structure. Trivial tricuspid regurgitation is visualized.  Aortic Valve: The aortic valve was not well visualized. The aortic valve is trileaflet. No evidence of aortic valve regurgitation is seen.  Pulmonic Valve: The pulmonic valve is normal. No indication of pulmonic valve regurgitation.  Aorta: The aortic root and ascending aorta are structurally normal, with no evidence of dilitation.  Pulmonary Artery: The main pulmonary artery is normal in size.  Venous: The inferior vena cava was normal sized, with respiratory size variation greater than 50%.      Summary:   1. Left ventricular ejection fraction, by visual estimation, is 40 to 45%.   2. Technically difficult study.   3. Mildly decreased global left ventricular systolic function.   4. The left ventricular diastolic function could not be assessed in this study.   5. EF% difficult to determine due to afib. By visual estimation, EF appears to be about 40-45%.   6. Normal left atrial size.   7. Normal right atrial size.   8. Mild thickening of the anterior and posterior mitral valve leaflets.   9. Trace mitral valve regurgitation.  10. Endocardial visualization was enhanced with intravenous echo contrast.    MD Leo Electronically signed on 5/7/2021 at 11:59:34 AM    < end of copied text >    OTHER: 	      LABS:	 	  CARDIAC MARKERS ( 06 May 2021 17:33 )  x     / <0.01 ng/mL / x     / x     / x      p-BNP 06 May 2021 17:33: 112 pg/mL, CARDIAC MARKERS ( 06 May 2021 15:23 )  x     / <0.01 ng/mL / x     / x     / x      p-BNP 06 May 2021 15:23: x                              11.7   3.71  )-----------( 64       ( 08 May 2021 04:57 )             34.0     05-08    135  |  103  |  4.0<L>  ----------------------------<  122<H>  3.7   |  22.0  |  0.41<L>    Ca    8.2<L>      08 May 2021 04:57  Phos  1.8     05-08  Mg     1.9     05-08    TPro  5.7<L>  /  Alb  3.3  /  TBili  1.8  /  DBili  x   /  AST  137<H>  /  ALT  97<H>  /  AlkPhos  97  05-08    proBNP: Serum Pro-Brain Natriuretic Peptide: 112 pg/mL (05-06 @ 17:33)    Lipid Profile:   HgA1c:   TSH: Thyroid Stimulating Hormone, Serum: 0.64 uIU/mL        TELEMETRY: Reviewed  Afib HR 90-140s

## 2021-05-08 NOTE — DIETITIAN INITIAL EVALUATION ADULT. - PERTINENT LABORATORY DATA
05-08 Na135 mmol/L Glu 122 mg/dL<H> K+ 3.7 mmol/L Cr  0.41 mg/dL<L> BUN 4.0 mg/dL<L> Phos 1.8 mg/dL<L> Alb 3.3 g/dL PAB n/a

## 2021-05-08 NOTE — PROGRESS NOTE ADULT - ASSESSMENT
29 yo male with pmhx of ETOH abuse, DM, HTN, and pancreatitis, admitted with DKA, EtOH withdrawal delirium, and new onset AFib RVR    Neuro: EtOH withdrawal delirium well controlled, has not required PRN medication. Start Librium taper.  Pulm: no active issues  CV: AFib RVR, on Metoprolol PO, d/w Cardiology start Digoxin load, recommending against amiodarone given that we do not know how long he has been in AFib or if he has LA thrombus. IV Metoprolol PRN for HR >130. Supplementing lytes to optimize arrhythmia suppression. Plan for HARRY/DCCV on Monday.  GI: CC / DASH diet  Renal: Electrolyte monitoring and supplementation as above, keep K>4, Mg>2, Phos >3  ID: No active issues  Endo: DKA resolved, bridged to Lantus 20u QHS with prandial and ISS to keep BG <180. Based on 24hr insulin gtt requirement would need ~40u Lantus, however patient is Lantus naive uptitrate as needed.  Heme: Full dose Lovenox for new onset Afib    Patient has been optimized for transfer to telemetry. Case endorsed to Dr. Adams.

## 2021-05-08 NOTE — PROGRESS NOTE ADULT - SUBJECTIVE AND OBJECTIVE BOX
[facility] Division of Hospital Medicine - ICU downgrade acceptance note    Chief Complaint:  DKA/alcohol withdrawal     SUBJECTIVE: reports feeling much better, no medical complains, asking a lot of question related to his missing belonging     OVERNIGHT EVENTS: none reported, see summary of hospital stay bellow    Patient denies chest pain, SOB, abd pain, N/V, fever, chills, dysuria or any other complaints. All remainder ROS negative.     MEDICATIONS  (STANDING):  chlordiazePOXIDE   Oral   chlorhexidine 4% Liquid 1 Application(s) Topical <User Schedule>  dextrose 40% Gel 15 Gram(s) Oral once  dextrose 5%. 1000 milliLiter(s) (50 mL/Hr) IV Continuous <Continuous>  dextrose 5%. 1000 milliLiter(s) (100 mL/Hr) IV Continuous <Continuous>  dextrose 50% Injectable 25 Gram(s) IV Push once  dextrose 50% Injectable 12.5 Gram(s) IV Push once  dextrose 50% Injectable 25 Gram(s) IV Push once  digoxin  Injectable 250 MICROGram(s) IV Push every 6 hours  enoxaparin Injectable 70 milliGRAM(s) SubCutaneous every 12 hours  folic acid 1 milliGRAM(s) Oral daily  glucagon  Injectable 1 milliGRAM(s) IntraMuscular once  insulin glargine Injectable (LANTUS) 20 Unit(s) SubCutaneous at bedtime  insulin lispro (ADMELOG) corrective regimen sliding scale   SubCutaneous Before meals and at bedtime  insulin lispro Injectable (ADMELOG) 4 Unit(s) SubCutaneous three times a day before meals  metoprolol tartrate 50 milliGRAM(s) Oral two times a day  multivitamin 1 Tablet(s) Oral daily  pantoprazole    Tablet 40 milliGRAM(s) Oral before breakfast  thiamine 100 milliGRAM(s) Oral daily    MEDICATIONS  (PRN):        I&O's Summary    07 May 2021 07:  -  08 May 2021 07:00  --------------------------------------------------------  IN: 7748.8 mL / OUT: 4170 mL / NET: 3578.8 mL    08 May 2021 07:  -  08 May 2021 17:53  --------------------------------------------------------  IN: 427.5 mL / OUT: 1050 mL / NET: -622.5 mL        PHYSICAL EXAM:  Vital Signs Last 24 Hrs  T(C): 36.7 (08 May 2021 17:07), Max: 37.1 (08 May 2021 07:48)  T(F): 98.1 (08 May 2021 17:07), Max: 98.7 (08 May 2021 07:48)  HR: 94 (08 May 2021 17:07) (79 - 168)  BP: 119/85 (08 May 2021 17:07) (76/64 - 136/97)  BP(mean): 96 (08 May 2021 12:00) (67 - 106)  RR: 18 (08 May 2021 17:07) (10 - 21)  SpO2: 99% (08 May 2021 17:07) (97% - 100%)        CONSTITUTIONAL: NAD, well-developed, well-groomed  ENMT: Moist oral mucosa, no pharyngeal injection or exudates; normal dentition; No JVD  RESPIRATORY: Normal respiratory effort; lungs are clear to auscultation bilaterally  CARDIOVASCULAR: irregular rate and rhythm, normal S1 and S2, no murmur/rub/gallop; No lower extremity edema; Peripheral pulses are 2+ bilaterally  ABDOMEN: Nontender to palpation, normoactive bowel sounds, no rebound/guarding; No hepatosplenomegaly  MUSCLOSKELETAL:  no clubbing or cyanosis of digits; no joint swelling or tenderness to palpation, few bruises on L shin  PSYCH: A+O to person, place, and time; affect appropriate  NEUROLOGY: CN 2-12 are intact and symmetric; no gross sensory deficits; was observed moving all 4 ext against gravity cooperating with exam.   SKIN: No rashes; no palpable lesions    LABS:                        11.7   3.71  )-----------( 64       ( 08 May 2021 04:57 )             34.0     05-08    135  |  103  |  4.0<L>  ----------------------------<  122<H>  3.7   |  22.0  |  0.41<L>    Ca    8.2<L>      08 May 2021 04:57  Phos  1.8     05-08  Mg     1.9     05-08    TPro  5.7<L>  /  Alb  3.3  /  TBili  1.8  /  DBili  x   /  AST  137<H>  /  ALT  97<H>  /  AlkPhos  97  05-08          Urinalysis Basic - ( 07 May 2021 05:24 )    Color: Yellow / Appearance: Clear / S.015 / pH: x  Gluc: x / Ketone: Large  / Bili: Negative / Urobili: Negative mg/dL   Blood: x / Protein: 30 mg/dL / Nitrite: Negative   Leuk Esterase: Negative / RBC: 3-5 /HPF / WBC 0-2   Sq Epi: x / Non Sq Epi: Occasional / Bacteria: Few        CAPILLARY BLOOD GLUCOSE  198 (08 May 2021 05:00)  117 (08 May 2021 04:00)  163 (08 May 2021 03:00)  203 (08 May 2021 02:00)  202 (08 May 2021 01:00)  182 (08 May 2021 00:00)  127 (07 May 2021 23:00)  84 (07 May 2021 22:00)  139 (07 May 2021 21:00)  179 (07 May 2021 20:00)      POCT Blood Glucose.: 226 mg/dL (08 May 2021 16:23)  POCT Blood Glucose.: 76 mg/dL (08 May 2021 11:05)  POCT Blood Glucose.: 96 mg/dL (08 May 2021 10:31)  POCT Blood Glucose.: 201 mg/dL (08 May 2021 07:48)  POCT Blood Glucose.: 198 mg/dL (08 May 2021 05:12)  POCT Blood Glucose.: 117 mg/dL (08 May 2021 04:08)  POCT Blood Glucose.: 163 mg/dL (08 May 2021 03:01)  POCT Blood Glucose.: 203 mg/dL (08 May 2021 02:00)  POCT Blood Glucose.: 202 mg/dL (08 May 2021 01:03)  POCT Blood Glucose.: 182 mg/dL (08 May 2021 00:13)  POCT Blood Glucose.: 127 mg/dL (07 May 2021 23:01)  POCT Blood Glucose.: 84 mg/dL (07 May 2021 22:05)  POCT Blood Glucose.: 139 mg/dL (07 May 2021 21:02)  POCT Blood Glucose.: 176 mg/dL (07 May 2021 20:01)  POCT Blood Glucose.: 169 mg/dL (07 May 2021 19:01)        RADIOLOGY & ADDITIONAL TESTS:  Results Reviewed:   Imaging Personally Reviewed:  Electrocardiogram Personally Reviewed:

## 2021-05-08 NOTE — PROGRESS NOTE ADULT - SUBJECTIVE AND OBJECTIVE BOX
Patient is a 30y old  Male who presents with a chief complaint of Delirium tremens and DKA (08 May 2021 13:10)      BRIEF HOSPITAL COURSE: 29 yo male with pmhx of ETOH abuse, DM, HTN, and pancreatitis presented to the ER for lower right abdominal pain that radiated to the back associated with SOB. Pt has been hospitalized for alcohol withdrawal in the past. Pt was seen to be in Afib with RVR when EMS arrived; as per EMS: "given Cardizem 20mg and ASA without improvement." In the ER, pt was noted to have FS >530, moderate acetone, and was in metabolic acidosis. Pt received a total of Librium 50 mg, 2 mg Ativan, valium 40 mg, was started on insulin @7 units/hr, and Lopressor 35 mg. He was given 2L IV bolus of NaCl and currently receiving 2L IV bolus of LR.   ICU consult was called for worsening mental status, DT's, and DKA. Pt was seen and examined at bedside, laying uncomfortable in bed picking at things in mid air/likely halucinating.  Unable to obtain a thorough history from pt due to his AMS and delirium. Pt's BP was 178/77, tachycardic, and Kussmaul breathing.      Events last 24 hours: Acidemia and anion gap closed, bridged to Lantus overnight. Hypoglycemic this AM before eating lunch, asymptomatic.   Developed AFib 's after receiving Digoxin. Hemodynamically stable, given Lopressor 5mg IV with improvement in HR to low 100's.  Alert, oriented, sitting up in bed watching TV and eating lunch.       PAST MEDICAL & SURGICAL HISTORY:  Alcohol abuse    Pancreatitis    HTN (hypertension)    Diabetes    No significant past surgical history        SOCIAL HISTORY: EtOH abuse        Review of Systems:  CONSTITUTIONAL: No fever, chills, or fatigue  EYES: No visual disturbances  ENMT:  No difficulty hearing  NECK: No pain  RESPIRATORY: No cough. No shortness of breath  CARDIOVASCULAR: No chest pain, palpitations, or leg swelling  GASTROINTESTINAL: No abdominal pain. No nausea, vomiting, diarrhea, or constipation. No hematemesis, melena or hematochezia  GENITOURINARY: No dysuria, frequency, hematuria, or incontinence  NEUROLOGICAL: No headaches, loss of strength, numbness, or tremors  SKIN: No rashes  MUSCULOSKELETAL: No back or extremity pain. No calf pain  PSYCHIATRIC: No depression, anxiety, or difficulty sleeping        Medications:    digoxin  Injectable 250 MICROGram(s) IV Push every 6 hours  metoprolol tartrate 50 milliGRAM(s) Oral two times a day      chlordiazePOXIDE   Oral       enoxaparin Injectable 70 milliGRAM(s) SubCutaneous every 12 hours    pantoprazole    Tablet 40 milliGRAM(s) Oral before breakfast      dextrose 40% Gel 15 Gram(s) Oral once  dextrose 50% Injectable 25 Gram(s) IV Push once  dextrose 50% Injectable 12.5 Gram(s) IV Push once  dextrose 50% Injectable 25 Gram(s) IV Push once  glucagon  Injectable 1 milliGRAM(s) IntraMuscular once  insulin glargine Injectable (LANTUS) 20 Unit(s) SubCutaneous at bedtime  insulin lispro (ADMELOG) corrective regimen sliding scale   SubCutaneous Before meals and at bedtime  insulin lispro Injectable (ADMELOG) 4 Unit(s) SubCutaneous three times a day before meals    dextrose 5%. 1000 milliLiter(s) IV Continuous <Continuous>  dextrose 5%. 1000 milliLiter(s) IV Continuous <Continuous>  folic acid 1 milliGRAM(s) Oral daily  multivitamin 1 Tablet(s) Oral daily  thiamine 100 milliGRAM(s) Oral daily      chlorhexidine 4% Liquid 1 Application(s) Topical <User Schedule>            ICU Vital Signs Last 24 Hrs  T(C): 36.7 (08 May 2021 17:07), Max: 37.1 (08 May 2021 07:48)  T(F): 98.1 (08 May 2021 17:07), Max: 98.7 (08 May 2021 07:48)  HR: 94 (08 May 2021 17:07) (79 - 168)  BP: 119/85 (08 May 2021 17:07) (76/64 - 136/97)  BP(mean): 96 (08 May 2021 12:00) (67 - 106)  ABP: --  ABP(mean): --  RR: 18 (08 May 2021 17:07) (10 - 21)  SpO2: 99% (08 May 2021 17:07) (97% - 100%)          I&O's Detail    07 May 2021 07:01  -  08 May 2021 07:00  --------------------------------------------------------  IN:    dextrose 5% + lactated ringers: 3300 mL    Insulin: 39 mL    IV PiggyBack: 300 mL    IV PiggyBack: 200 mL    IV PiggyBack: 499.8 mL    Lactated Ringers Bolus: 1000 mL    multiple electrolytes Injection Type 1 Bolus: 2000 mL    Oral Fluid: 410 mL  Total IN: 7748.8 mL    OUT:    Voided (mL): 4170 mL  Total OUT: 4170 mL    Total NET: 3578.8 mL      08 May 2021 07:01  -  08 May 2021 18:15  --------------------------------------------------------  IN:    IV PiggyBack: 187.5 mL    Oral Fluid: 240 mL  Total IN: 427.5 mL    OUT:    Voided (mL): 1050 mL  Total OUT: 1050 mL    Total NET: -622.5 mL            LABS:                        11.7   3.71  )-----------( 64       ( 08 May 2021 04:57 )             34.0     05-08    135  |  103  |  4.0<L>  ----------------------------<  122<H>  3.7   |  22.0  |  0.41<L>    Ca    8.2<L>      08 May 2021 04:57  Phos  1.8     05-08  Mg     1.9     -08    TPro  5.7<L>  /  Alb  3.3  /  TBili  1.8  /  DBili  x   /  AST  137<H>  /  ALT  97<H>  /  AlkPhos  97  05-08          CAPILLARY BLOOD GLUCOSE  198 (08 May 2021 05:00)      POCT Blood Glucose.: 226 mg/dL (08 May 2021 16:23)      Urinalysis Basic - ( 07 May 2021 05:24 )    Color: Yellow / Appearance: Clear / S.015 / pH: x  Gluc: x / Ketone: Large  / Bili: Negative / Urobili: Negative mg/dL   Blood: x / Protein: 30 mg/dL / Nitrite: Negative   Leuk Esterase: Negative / RBC: 3-5 /HPF / WBC 0-2   Sq Epi: x / Non Sq Epi: Occasional / Bacteria: Few      CULTURES:            Physical Examination:    General: No acute distress.      HEENT: Pupils equal, reactive to light.  Symmetric.    PULM: Clear to auscultation bilaterally, no significant sputum production    CVS: Regular rate and rhythm, no murmurs, rubs, or gallops    ABD: Soft, nondistended, nontender, normoactive bowel sounds, no masses    EXT: No edema, nontender    SKIN: Warm and well perfused, no rashes noted.    NEURO: Alert, oriented, interactive, nonfocal        RADIOLOGY: < from: CT Abdomen and Pelvis No Cont (21 @ 20:30) >     EXAM:  CT ABDOMEN AND PELVIS                          PROCEDURE DATE:  2021          INTERPRETATION:  CLINICAL INFORMATION: Diabetic ketoacidosis status post alcohol abuse, generalized abdominal pain    COMPARISON: CT abdomen and pelvis 2019    CONTRAST/COMPLICATIONS:  IV Contrast: None  Oral Contrast: None  Complications: None    PROCEDURE:  CT of the Abdomen and Pelvis was performed.  Sagittal and coronal reformats were performed.    FINDINGS:  LOWER CHEST: Clear.    LIVER: Diffuse steatosis.  BILE DUCTS: Nondilated.  GALLBLADDER: Stones and sludge.  SPLEEN: Normal.  PANCREAS: Diffuse atrophy.  ADRENALS: Normal.  KIDNEYS/URETERS: No hydronephrosis or urinary tract calculi.    BLADDER: Normal.  REPRODUCTIVE ORGANS: Nonenlarged.    BOWEL: No bowel-related abnormality. Specifically, no evidence of acute diverticulitis. Normal appendix and ileocecal region. No bowel obstruction or bowel inflammation.  PERITONEUM: No free air or ascites.  VESSELS: Normal caliber aorta.  RETROPERITONEUM/LYMPH NODES: No adenopathy.  ABDOMINAL WALL: Normal.  BONES: No acute bony abnormality.    IMPRESSION:  *  No acute pathology.            RACHAEL BLACK MD; Attending Radiologist  This document has been electronically signed. May  6 2021  8:43PM    < end of copied text >          INVASIVE LINES: X   INDWELLING EPNG: X   VTE PROPHYLAXIS: Lovenox BID  CAM ICU: -  CODE STATUS: FULL        TIME SPENT: 55 minutes assessing presenting problems of acute illness, including medical decision making, initiating plan of care, reviewing data, reviewing radiologic exams, discussing with multidisciplinary team, discussing goals of care.

## 2021-05-08 NOTE — PROGRESS NOTE ADULT - ASSESSMENT
A/P: 30M hx Alcohol abuse (he drinks every other day last drink yesterday) with h/o seizures for detoxing in the past, Diabetes, HTN, Pancreatitis who started feeling his heart racing  today with associated SOB. Pt states he lives with his parent and EMS was called. Ems reports upon arrival at the scene patient was found to be in rapid AF. As  per EMS pt was given Cardizem 20mg  and ASA without improvement. Admits feeling dizzy with RLQ pain and  some discomfort in lower back. Denied CP, orthopnea, pnd, edema, hematuria, melena syncope, near syncope.     New onset AF with RVR  - HR currently in the low 100s.   - Continue metoprolol 50mg PO BID.   - Can give IV metoprolol for HR sustained >130.   - Continue lovenox for AC.   - Plan for HARRY/DCCV on Monday 05/10/21.   - NPO after midnight tomorrow.   - Will need to hold AM dose of lovenox 05/10/21 until after LHC.     HFrEF   -EF 40-45%.   - Could be secondary to tachyarrhythmia vs. EtOH abuse.   - Plan for LHC on Monday as well.   - Hold AM lovenox 05/10/21.   - Repeat covid PCR ordered for tomorrow pre-procedure.   - Continue metoprolol, will transition to XL prior to D/C.   - Will start ACEi/ARB post LHC.    ETOH withdrawal  - As per PMT    DM   - Possible DKA  - As per PMT    Assessment and recommendations are final when note is signed by the attending.    A/P: 30M hx Alcohol abuse (he drinks every other day last drink yesterday) with h/o seizures for detoxing in the past, Diabetes, HTN, Pancreatitis who started feeling his heart racing  today with associated SOB. Pt states he lives with his parent and EMS was called. Ems reports upon arrival at the scene patient was found to be in rapid AF. As  per EMS pt was given Cardizem 20mg  and ASA without improvement. Admits feeling dizzy with RLQ pain and  some discomfort in lower back. Denied CP, orthopnea, pnd, edema, hematuria, melena syncope, near syncope.     New onset AF with RVR  - HR currently in the low 100s.   - Continue metoprolol 50mg PO BID, unable to increase due to blood pressure.   - Can give IV metoprolol for HR sustained >130.   - Would digoxin load patient with 0.5mg IV then 0.25mg IV q6 x 2 doses.   - Continue lovenox for AC.   - Plan for HARRY/DCCV on Monday 05/10/21.   - NPO after midnight tomorrow.   - Will need to hold AM dose of lovenox 05/10/21 until after LHC.     HFrEF   -EF 40-45%.   - Could be secondary to tachyarrhythmia vs. EtOH abuse.   - Plan for LHC on Monday as well.   - Hold AM lovenox 05/10/21.   - Repeat covid PCR ordered for tomorrow pre-procedure.   - Continue metoprolol, will transition to XL prior to D/C.   - Will start ACEi/ARB post LHC.    ETOH withdrawal  - As per PMT    DM   - Possible DKA  - As per PMT    Assessment and recommendations are final when note is signed by the attending.

## 2021-05-08 NOTE — DIETITIAN INITIAL EVALUATION ADULT. - PERTINENT MEDS FT
MEDICATIONS  (STANDING):  chlordiazePOXIDE   Oral   chlordiazePOXIDE 25 milliGRAM(s) Oral every 8 hours  chlorhexidine 4% Liquid 1 Application(s) Topical <User Schedule>  dextrose 40% Gel 15 Gram(s) Oral once  dextrose 5%. 1000 milliLiter(s) (50 mL/Hr) IV Continuous <Continuous>  dextrose 5%. 1000 milliLiter(s) (100 mL/Hr) IV Continuous <Continuous>  dextrose 50% Injectable 25 Gram(s) IV Push once  dextrose 50% Injectable 12.5 Gram(s) IV Push once  dextrose 50% Injectable 25 Gram(s) IV Push once  enoxaparin Injectable 70 milliGRAM(s) SubCutaneous every 12 hours  folic acid 1 milliGRAM(s) Oral daily  glucagon  Injectable 1 milliGRAM(s) IntraMuscular once  insulin glargine Injectable (LANTUS) 20 Unit(s) SubCutaneous at bedtime  insulin lispro (ADMELOG) corrective regimen sliding scale   SubCutaneous Before meals and at bedtime  insulin lispro Injectable (ADMELOG) 4 Unit(s) SubCutaneous three times a day before meals  metoprolol tartrate 50 milliGRAM(s) Oral two times a day  multivitamin 1 Tablet(s) Oral daily  pantoprazole    Tablet 40 milliGRAM(s) Oral before breakfast  thiamine 100 milliGRAM(s) Oral daily    MEDICATIONS  (PRN):  PHENobarbital Injectable 65 milliGRAM(s) IV Push every 6 hours PRN agitation

## 2021-05-09 LAB
ANION GAP SERPL CALC-SCNC: 12 MMOL/L — SIGNIFICANT CHANGE UP (ref 5–17)
BUN SERPL-MCNC: 6 MG/DL — LOW (ref 8–20)
CALCIUM SERPL-MCNC: 8.5 MG/DL — LOW (ref 8.6–10.2)
CHLORIDE SERPL-SCNC: 101 MMOL/L — SIGNIFICANT CHANGE UP (ref 98–107)
CHOLEST SERPL-MCNC: 155 MG/DL — SIGNIFICANT CHANGE UP
CO2 SERPL-SCNC: 24 MMOL/L — SIGNIFICANT CHANGE UP (ref 22–29)
CREAT SERPL-MCNC: 0.37 MG/DL — LOW (ref 0.5–1.3)
GLUCOSE BLDC GLUCOMTR-MCNC: 130 MG/DL — HIGH (ref 70–99)
GLUCOSE BLDC GLUCOMTR-MCNC: 131 MG/DL — HIGH (ref 70–99)
GLUCOSE BLDC GLUCOMTR-MCNC: 271 MG/DL — HIGH (ref 70–99)
GLUCOSE BLDC GLUCOMTR-MCNC: 305 MG/DL — HIGH (ref 70–99)
GLUCOSE SERPL-MCNC: 128 MG/DL — HIGH (ref 70–99)
HCT VFR BLD CALC: 37 % — LOW (ref 39–50)
HDLC SERPL-MCNC: 47 MG/DL — SIGNIFICANT CHANGE UP
HGB BLD-MCNC: 12.5 G/DL — LOW (ref 13–17)
LIPID PNL WITH DIRECT LDL SERPL: 92 MG/DL — SIGNIFICANT CHANGE UP
MAGNESIUM SERPL-MCNC: 2 MG/DL — SIGNIFICANT CHANGE UP (ref 1.6–2.6)
MCHC RBC-ENTMCNC: 31.7 PG — SIGNIFICANT CHANGE UP (ref 27–34)
MCHC RBC-ENTMCNC: 33.8 GM/DL — SIGNIFICANT CHANGE UP (ref 32–36)
MCV RBC AUTO: 93.9 FL — SIGNIFICANT CHANGE UP (ref 80–100)
NON HDL CHOLESTEROL: 108 MG/DL — SIGNIFICANT CHANGE UP
PHOSPHATE SERPL-MCNC: 2.6 MG/DL — SIGNIFICANT CHANGE UP (ref 2.4–4.7)
PLATELET # BLD AUTO: 81 K/UL — LOW (ref 150–400)
POTASSIUM SERPL-MCNC: 3.4 MMOL/L — LOW (ref 3.5–5.3)
POTASSIUM SERPL-SCNC: 3.4 MMOL/L — LOW (ref 3.5–5.3)
RBC # BLD: 3.94 M/UL — LOW (ref 4.2–5.8)
RBC # FLD: 13.9 % — SIGNIFICANT CHANGE UP (ref 10.3–14.5)
SARS-COV-2 RNA SPEC QL NAA+PROBE: SIGNIFICANT CHANGE UP
SODIUM SERPL-SCNC: 137 MMOL/L — SIGNIFICANT CHANGE UP (ref 135–145)
TRIGL SERPL-MCNC: 82 MG/DL — SIGNIFICANT CHANGE UP
WBC # BLD: 4.51 K/UL — SIGNIFICANT CHANGE UP (ref 3.8–10.5)
WBC # FLD AUTO: 4.51 K/UL — SIGNIFICANT CHANGE UP (ref 3.8–10.5)

## 2021-05-09 PROCEDURE — 99233 SBSQ HOSP IP/OBS HIGH 50: CPT

## 2021-05-09 PROCEDURE — 99232 SBSQ HOSP IP/OBS MODERATE 35: CPT

## 2021-05-09 RX ORDER — METOPROLOL TARTRATE 50 MG
50 TABLET ORAL EVERY 8 HOURS
Refills: 0 | Status: DISCONTINUED | OUTPATIENT
Start: 2021-05-09 | End: 2021-05-11

## 2021-05-09 RX ORDER — ENOXAPARIN SODIUM 100 MG/ML
70 INJECTION SUBCUTANEOUS ONCE
Refills: 0 | Status: COMPLETED | OUTPATIENT
Start: 2021-05-09 | End: 2021-05-09

## 2021-05-09 RX ADMIN — Medication 8: at 16:36

## 2021-05-09 RX ADMIN — Medication 1 TABLET(S): at 11:04

## 2021-05-09 RX ADMIN — Medication 4 UNIT(S): at 07:42

## 2021-05-09 RX ADMIN — ENOXAPARIN SODIUM 70 MILLIGRAM(S): 100 INJECTION SUBCUTANEOUS at 17:02

## 2021-05-09 RX ADMIN — INSULIN GLARGINE 20 UNIT(S): 100 INJECTION, SOLUTION SUBCUTANEOUS at 21:38

## 2021-05-09 RX ADMIN — Medication 6: at 21:37

## 2021-05-09 RX ADMIN — Medication 50 MILLIGRAM(S): at 21:38

## 2021-05-09 RX ADMIN — Medication 50 MILLIGRAM(S): at 05:07

## 2021-05-09 RX ADMIN — PANTOPRAZOLE SODIUM 40 MILLIGRAM(S): 20 TABLET, DELAYED RELEASE ORAL at 05:06

## 2021-05-09 RX ADMIN — Medication 4 UNIT(S): at 11:47

## 2021-05-09 RX ADMIN — Medication 25 MILLIGRAM(S): at 17:02

## 2021-05-09 RX ADMIN — ENOXAPARIN SODIUM 70 MILLIGRAM(S): 100 INJECTION SUBCUTANEOUS at 05:06

## 2021-05-09 RX ADMIN — Medication 1 MILLIGRAM(S): at 11:04

## 2021-05-09 RX ADMIN — Medication 4 UNIT(S): at 16:36

## 2021-05-09 RX ADMIN — Medication 100 MILLIGRAM(S): at 11:04

## 2021-05-09 RX ADMIN — Medication 50 MILLIGRAM(S): at 12:46

## 2021-05-09 RX ADMIN — Medication 25 MILLIGRAM(S): at 05:06

## 2021-05-09 NOTE — PROGRESS NOTE ADULT - SUBJECTIVE AND OBJECTIVE BOX
[facility]                                                               Office: [address] [address]                                                              Telephone: [phone]. Fax:[phone]                                                                             PROGRESS NOTE  Reason for follow up: Afib, HFrEF  Update: Patient with periods of Afib with RVR up to the 160s, currently rate controlled. Overall heart rates improved after IV digoxin. Patient amenable to LHC and HARRY/DCCV.  Covid Status: Negative 05/09/21    Review of symptoms:   Cardiac:  No chest pain. No dyspnea. No palpitations.  Respiratory: No cough. No dyspnea  Gastrointestinal: No diarrhea. No abdominal pain. No bleeding.     Past medical history: No updates.   	  Vitals:  T(C): 37.6 (05-09-21 @ 04:51), Max: 37.6 (05-09-21 @ 04:51)  HR: 118 (05-09-21 @ 08:28) (76 - 168)  BP: 104/73 (05-09-21 @ 08:28) (102/74 - 136/97)  RR: 18 (05-09-21 @ 04:51) (10 - 21)  SpO2: 99% (05-09-21 @ 04:51) (97% - 100%)  Wt(kg): --  I&O's Summary    08 May 2021 07:01  -  09 May 2021 07:00  --------------------------------------------------------  IN: 427.5 mL / OUT: 1050 mL / NET: -622.5 mL      Weight (kg): 68 (05-06 @ 14:50)      PHYSICAL EXAM:  Appearance: Comfortable. No acute distress  HEENT:  Head and neck: Atraumatic. Normocephalic.  Normal oral mucosa, PERRL, Neck is supple. No JVD, No carotid bruit.   Neurologic: A&Ox 3, no focal deficits. EOMI, Cranial nerves are intact.  Lymphatic: No cervical lymphadenopathy  Cardiovascular: Irregularly irregular, rubs/gallops. No JVD, No edema  Respiratory: Lungs clear to auscultation  Gastrointestinal:  Soft, Non-tender, + BS  Lower Extremities: No edema  Psychiatry: Patient is calm. No agitation. Mood & affect appropriate  Skin: No rashes/ecchymoses/cyanosis/ulcers visualized on the face, hands or feet.        CURRENT MEDICATIONS:  metoprolol tartrate 50 milliGRAM(s) Oral every 8 hours    chlordiazePOXIDE  chlordiazePOXIDE  pantoprazole    Tablet  dextrose 40% Gel  dextrose 50% Injectable  dextrose 50% Injectable  dextrose 50% Injectable  glucagon  Injectable  insulin glargine Injectable (LANTUS)  insulin lispro (ADMELOG) corrective regimen sliding scale  insulin lispro Injectable (ADMELOG)  chlorhexidine 4% Liquid  dextrose 5%.  dextrose 5%.  enoxaparin Injectable  folic acid  multivitamin  thiamine      DIAGNOSTIC TESTING:  [ ] Echocardiogram:   < from: TTE Echo Complete w/ Contrast w/ Doppler (05.06.21 @ 21:14) >  PHYSICIAN INTERPRETATION:  Left Ventricle: Endocardial visualization was enhanced with intravenous echo contrast. The left ventricular internal cavity size is normal. Left ventricular wall thickness is normal.  Global LV systolic functionwas mildly decreased. Left ventricular ejection fraction, by visual estimation, is 40 to 45%. The left ventricular diastolic function could not be assessed in this study.  EF% difficult to determine due to afib. By visual estimation, EF appears to beabout 40-45%.  Right Ventricle: The right ventricular size is normal. RV systolic function is normal.  Left Atrium: Normal left atrial size.  Right Atrium: Normal right atrial size.  Pericardium: There is no evidence of pericardial effusion.  Mitral Valve: Mild thickening of the anterior and posterior mitral valve leaflets. Trace mitral valve regurgitation is seen.  Tricuspid Valve: The tricuspid valve is normal in structure. Trivial tricuspid regurgitation is visualized.  Aortic Valve: The aortic valve was not well visualized. The aortic valve is trileaflet. No evidence of aortic valve regurgitation is seen.  Pulmonic Valve: The pulmonic valve is normal. No indication of pulmonic valve regurgitation.  Aorta: The aortic root and ascending aorta are structurally normal, with no evidence of dilitation.  Pulmonary Artery: The main pulmonary artery is normal in size.  Venous: The inferior vena cava was normal sized, with respiratory size variation greater than 50%.      Summary:   1. Left ventricular ejection fraction, by visual estimation, is 40 to 45%.   2. Technically difficult study.   3. Mildly decreased global left ventricular systolic function.   4. The left ventricular diastolic function could not be assessed in this study.   5. EF% difficult to determine due to afib. By visual estimation, EF appears to be about 40-45%.   6. Normal left atrial size.   7. Normal right atrial size.   8. Mild thickening of the anterior and posterior mitral valve leaflets.   9. Trace mitral valve regurgitation.  10. Endocardial visualization was enhanced with intravenous echo contrast.    MD Leo Electronically signed on 5/7/2021 at 11:59:34 AM    < end of copied text >    LABS:	 	  CARDIAC MARKERS ( 06 May 2021 17:33 )  x     / <0.01 ng/mL / x     / x     / x      p-BNP 06 May 2021 17:33: 112 pg/mL, CARDIAC MARKERS ( 06 May 2021 15:23 )  x     / <0.01 ng/mL / x     / x     / x      p-BNP 06 May 2021 15:23: x                              12.5   4.51  )-----------( 81       ( 09 May 2021 08:52 )             37.0     05-09    137  |  101  |  6.0<L>  ----------------------------<  128<H>  3.4<L>   |  24.0  |  0.37<L>    Ca    8.5<L>      09 May 2021 08:52  Phos  2.6     05-09  Mg     2.0     05-09    TPro  5.7<L>  /  Alb  3.3  /  TBili  1.8  /  DBili  x   /  AST  137<H>  /  ALT  97<H>  /  AlkPhos  97  05-08    proBNP: Serum Pro-Brain Natriuretic Peptide: 112 pg/mL (05-06 @ 17:33)    Lipid Profile: Date: 05-09 @ 08:52  Total cholesterol 155; Direct LDL: --; HDL: 47; Triglycerides:82    HgA1c:   TSH: Thyroid Stimulating Hormone, Serum: 0.64 uIU/mL        TELEMETRY: Reviewed  Afib HR 70-160s

## 2021-05-09 NOTE — PROGRESS NOTE ADULT - ASSESSMENT
This is a 30 years old man with PMH of DM and HTN for > 4 years as well as gallbladder removal. Also heavy alcohol abuse since he was 17. Now he is admitted with DKA and alcohol withdrawal. Noted to be in new AFIB with RVR, now improved after IVF.   Admitted to fast and strong heart beating with exertion, but no palpitation. Denies CP, SOB, orthopnea, leg edema, PND. He had 2 postural syncope episodes.   His sister found dead in her room in her 20s. An autopsy was done but he does not know the results. He has one brother who is healthy, and his parents are healthy in their late 40s/early 50s. No other family history of sudden or unexpected death.     Interval Hx 5/7-9:  Remains in AF with RVR, on metoprolol 50 mg q 8 and PRN IV digoxin. TTE showed mild LV dysfunction. Feeling much better with no palpitation, CP or SOB. BG is under much better control    Family Hx:  His sister found dead in her room in her 20s. An autopsy was done but he does not know the results. He has one brother who is healthy, and his parents are healthy in their late 40s/early 50s. No other family history of sudden or unexpected death.     1. AIFB, CHADS-VASc = 2 (DM, HTN) in the setting of DKA and alcohol withdrawal.  - metoprolol titration, and agree with changing to long acting prior to DC  - anticoagulation for at least one months then reassess. If AF becomes a chronic issue then will need long term AC  - NPO Sunday midnight for HARRY DCCV tomorrow after [facility].   - Agree with [facility].     2. Family history of SCD, patient stated that his other brother has the autopsy report and he was not told that a family screening is needed, I encouraged him to follow up on this and to see a specialist (could be me) if this is not resolved  - TTE showed mild LV dysfunction    3. syncope, c/w postural syncope. Monitor for now. TTE    4. Mild LV dysfunction, ? alcohol related  - GDMT after DCCV, may need to lower metoprolol dose to allow for this    above d/w patient and dr. Chavez

## 2021-05-09 NOTE — PROGRESS NOTE ADULT - SUBJECTIVE AND OBJECTIVE BOX
CC: Delirium tremens and DKA (08 May 2021 18:15)    INTERVAL HPI/OVERNIGHT EVENTS:    Vital Signs Last 24 Hrs  T(C): 37.6 (09 May 2021 04:51), Max: 37.6 (09 May 2021 04:51)  T(F): 99.6 (09 May 2021 04:51), Max: 99.6 (09 May 2021 04:51)  HR: 76 (09 May 2021 04:51) (76 - 168)  BP: 110/69 (09 May 2021 04:51) (98/74 - 136/97)  BP(mean): 96 (08 May 2021 12:00) (78 - 100)  RR: 18 (09 May 2021 04:51) (10 - 21)  SpO2: 99% (09 May 2021 04:51) (97% - 100%)    PHYSICAL EXAM:  General: Well developed; well nourished; in no acute distress  Eyes: PERRLA, EOMI; conjunctiva and sclera clear  Head: Normocephalic; atraumatic  ENMT: No nasal discharge; airway clear  Neck: Supple; non tender; no masses  Respiratory: No wheezes, rales or rhonchi  Cardiovascular: Regular rate and rhythm. S1 and S2 Normal; No murmurs, gallops or rubs  Gastrointestinal: Soft non-tender non-distended; Normal bowel sounds  Genitourinary: No costovertebral angle tenderness  Extremities: Normal range of motion, No clubbing, cyanosis or edema  Vascular: Peripheral pulses palpable 2+ bilaterally  Neurological: Alert and oriented x4  Skin: Warm and dry. No acute rash  Lymph Nodes: No acute cervical adenopathy  Musculoskeletal: Normal gait, tone, without deformities  Psychiatric: Cooperative and appropriate    I&O's Detail    08 May 2021 07:01  -  09 May 2021 07:00  --------------------------------------------------------  IN:    IV PiggyBack: 187.5 mL    Oral Fluid: 240 mL  Total IN: 427.5 mL    OUT:    Voided (mL): 1050 mL  Total OUT: 1050 mL    Total NET: -622.5 mL                        11.7   3.71  )-----------( 64       ( 08 May 2021 04:57 )             34.0     08 May 2021 04:57    135    |  103    |  4.0    ----------------------------<  122    3.7     |  22.0   |  0.41     Ca    8.2        08 May 2021 04:57  Phos  1.8       08 May 2021 04:57  Mg     1.9       08 May 2021 04:57    TPro  5.7    /  Alb  3.3    /  TBili  1.8    /  DBili  x      /  AST  137    /  ALT  97     /  AlkPhos  97     08 May 2021 04:57    CAPILLARY BLOOD GLUCOSE  POCT Blood Glucose.: 131 mg/dL (09 May 2021 07:37)  POCT Blood Glucose.: 249 mg/dL (08 May 2021 21:46)  POCT Blood Glucose.: 226 mg/dL (08 May 2021 16:23)  POCT Blood Glucose.: 76 mg/dL (08 May 2021 11:05)  POCT Blood Glucose.: 96 mg/dL (08 May 2021 10:31)    LIVER FUNCTIONS - ( 08 May 2021 04:57 )  Alb: 3.3 g/dL / Pro: 5.7 g/dL / ALK PHOS: 97 U/L / ALT: 97 U/L / AST: 137 U/L / GGT: x           MEDICATIONS  (STANDING):  chlordiazePOXIDE   Oral   chlordiazePOXIDE 25 milliGRAM(s) Oral every 12 hours  chlorhexidine 4% Liquid 1 Application(s) Topical <User Schedule>  dextrose 40% Gel 15 Gram(s) Oral once  dextrose 5%. 1000 milliLiter(s) (50 mL/Hr) IV Continuous <Continuous>  dextrose 5%. 1000 milliLiter(s) (100 mL/Hr) IV Continuous <Continuous>  dextrose 50% Injectable 25 Gram(s) IV Push once  dextrose 50% Injectable 12.5 Gram(s) IV Push once  dextrose 50% Injectable 25 Gram(s) IV Push once  enoxaparin Injectable 70 milliGRAM(s) SubCutaneous every 12 hours  folic acid 1 milliGRAM(s) Oral daily  glucagon  Injectable 1 milliGRAM(s) IntraMuscular once  insulin glargine Injectable (LANTUS) 20 Unit(s) SubCutaneous at bedtime  insulin lispro (ADMELOG) corrective regimen sliding scale   SubCutaneous Before meals and at bedtime  insulin lispro Injectable (ADMELOG) 4 Unit(s) SubCutaneous three times a day before meals  metoprolol tartrate 50 milliGRAM(s) Oral two times a day  multivitamin 1 Tablet(s) Oral daily  pantoprazole    Tablet 40 milliGRAM(s) Oral before breakfast  thiamine 100 milliGRAM(s) Oral daily    MEDICATIONS  (PRN):      RADIOLOGY & ADDITIONAL TESTS: CC: Delirium tremens and DKA (08 May 2021 18:15)    INTERVAL HPI/OVERNIGHT EVENTS:  no acute events  without acute complaints    Vital Signs Last 24 Hrs  T(C): 37.6 (09 May 2021 04:51), Max: 37.6 (09 May 2021 04:51)  T(F): 99.6 (09 May 2021 04:51), Max: 99.6 (09 May 2021 04:51)  HR: 76 (09 May 2021 04:51) (76 - 168)  BP: 110/69 (09 May 2021 04:51) (98/74 - 136/97)  BP(mean): 96 (08 May 2021 12:00) (78 - 100)  RR: 18 (09 May 2021 04:51) (10 - 21)  SpO2: 99% (09 May 2021 04:51) (97% - 100%)    PHYSICAL EXAM:  General: in no acute distress; sweaty appearing; glassy eyes  Eyes: PERRLA, EOMI; conjunctiva and sclera clear  Head: Normocephalic; atraumatic  ENMT: No nasal discharge; airway clear  Neck: Supple; non tender; no masses  Respiratory: No wheezes, rales or rhonchi  Cardiovascular: Regular rate and rhythm. S1 and S2 Normal; No murmurs, gallops or rubs  Gastrointestinal: Soft non-tender non-distended; Normal bowel sounds  Genitourinary: No costovertebral angle tenderness  Extremities: Normal range of motion, No clubbing, cyanosis or edema  Vascular: Peripheral pulses palpable 2+ bilaterally  Neurological: Alert and oriented x4  Skin: Warm and dry. No acute rash  Psychiatric: Cooperative and appropriate    I&O's Detail    08 May 2021 07:01  -  09 May 2021 07:00  --------------------------------------------------------  IN:    IV PiggyBack: 187.5 mL    Oral Fluid: 240 mL  Total IN: 427.5 mL    OUT:    Voided (mL): 1050 mL  Total OUT: 1050 mL    Total NET: -622.5 mL                        11.7   3.71  )-----------( 64       ( 08 May 2021 04:57 )             34.0     08 May 2021 04:57    135    |  103    |  4.0    ----------------------------<  122    3.7     |  22.0   |  0.41     Ca    8.2        08 May 2021 04:57  Phos  1.8       08 May 2021 04:57  Mg     1.9       08 May 2021 04:57    TPro  5.7    /  Alb  3.3    /  TBili  1.8    /  DBili  x      /  AST  137    /  ALT  97     /  AlkPhos  97     08 May 2021 04:57    CAPILLARY BLOOD GLUCOSE  POCT Blood Glucose.: 131 mg/dL (09 May 2021 07:37)  POCT Blood Glucose.: 249 mg/dL (08 May 2021 21:46)  POCT Blood Glucose.: 226 mg/dL (08 May 2021 16:23)  POCT Blood Glucose.: 76 mg/dL (08 May 2021 11:05)  POCT Blood Glucose.: 96 mg/dL (08 May 2021 10:31)    LIVER FUNCTIONS - ( 08 May 2021 04:57 )  Alb: 3.3 g/dL / Pro: 5.7 g/dL / ALK PHOS: 97 U/L / ALT: 97 U/L / AST: 137 U/L / GGT: x           MEDICATIONS  (STANDING):  chlordiazePOXIDE   Oral   chlordiazePOXIDE 25 milliGRAM(s) Oral every 12 hours  chlorhexidine 4% Liquid 1 Application(s) Topical <User Schedule>  dextrose 40% Gel 15 Gram(s) Oral once  dextrose 5%. 1000 milliLiter(s) (50 mL/Hr) IV Continuous <Continuous>  dextrose 5%. 1000 milliLiter(s) (100 mL/Hr) IV Continuous <Continuous>  dextrose 50% Injectable 25 Gram(s) IV Push once  dextrose 50% Injectable 12.5 Gram(s) IV Push once  dextrose 50% Injectable 25 Gram(s) IV Push once  enoxaparin Injectable 70 milliGRAM(s) SubCutaneous every 12 hours  folic acid 1 milliGRAM(s) Oral daily  glucagon  Injectable 1 milliGRAM(s) IntraMuscular once  insulin glargine Injectable (LANTUS) 20 Unit(s) SubCutaneous at bedtime  insulin lispro (ADMELOG) corrective regimen sliding scale   SubCutaneous Before meals and at bedtime  insulin lispro Injectable (ADMELOG) 4 Unit(s) SubCutaneous three times a day before meals  metoprolol tartrate 50 milliGRAM(s) Oral two times a day  multivitamin 1 Tablet(s) Oral daily  pantoprazole    Tablet 40 milliGRAM(s) Oral before breakfast  thiamine 100 milliGRAM(s) Oral daily    MEDICATIONS  (PRN):      RADIOLOGY & ADDITIONAL TESTS:

## 2021-05-09 NOTE — PROGRESS NOTE ADULT - ASSESSMENT
A/P: 30M hx Alcohol abuse (he drinks every other day last drink yesterday) with h/o seizures for detoxing in the past, Diabetes, HTN, Pancreatitis who started feeling his heart racing  today with associated SOB. Pt states he lives with his parent and EMS was called. Ems reports upon arrival at the scene patient was found to be in rapid AF. As  per EMS pt was given Cardizem 20mg  and ASA without improvement. Admits feeling dizzy with RLQ pain and  some discomfort in lower back. Denied CP, orthopnea, pnd, edema, hematuria, melena syncope, near syncope.     New onset AF with RVR  - HR with some improvement after IV digoxin. No PO digoxin at this time.  - Increase metoprolol to 50mg PO q8.   - Can give IV metoprolol for HR sustained >130.   - Continue lovenox for AC.   - Plan for HARRY/DCCV on tomorrow 05/10/21.   - NPO after midnight.   - Will need to hold AM dose of lovenox 05/10/21 until after LHC.     HFrEF   -EF 40-45%.   - Could be secondary to tachyarrhythmia vs. EtOH abuse.   - Plan for LHC on Monday as well.   - Hold AM lovenox 05/10/21.   - Repeat covid PCR negative.  - Continue metoprolol, will transition to XL prior to D/C.   - Will start ACEi/ARB post LHC.    ETOH withdrawal  - As per PMT    DM   - Possible DKA  - As per PMT    Assessment and recommendations are final when note is signed by the attending.

## 2021-05-09 NOTE — PROGRESS NOTE ADULT - ASSESSMENT
Mr. Roman is 30M hx Alcohol abuse,  (he drinks every other day last drink yesterday) with h/o seizures due to alcohol withdrawal,  DMT2 on insluin at home ( not complaint) , HTN, Pancreatitis who presented [facility] ED  [facility] on 05/06 with c/o SOB, palpitations in rapid a-fib. Admitted to ICU with severe DKA with ph 6.8. Treated with insulin drip. Cardiology consulted for new a.fib with RVR. Anion gap closed and transitioned to SQ insulin on 5/7 and subsequently downgraded to tele bed to hospitalist service on 05/08.    # Severe DKA in pt with DMT2 - has resolved  - c/w lantus 20, ac 4 units, MDSS for now, will adjust insluin base on POC  - will dapagliflozin 10 mg daily  - Diabetic specialist consulted  - management of alcohol withdrawal as bellow    # Alcohol abuse and alcohol withdrawal   - improving  - counseled at bed side,  provider list of rehabs  - c/w Librium mercy  - mv, Mg, folic acid and thiamin as per [facility] protocol  - ppi for gi ppx     # New a.fib with RVR with mild cardiomyopathy  - cardiology recommendation noted  - c/w bb, and digoxin as per cardiology team  - ac with full dose of Lovenox  - EP consult noted >> tentative plan for HARRY and DDCV on Monday    #DVT ppx - on lovenox as above  # CODE -full  # Dispo - will stay till Tuesday most likely given plan for DDCV

## 2021-05-09 NOTE — PROGRESS NOTE ADULT - SUBJECTIVE AND OBJECTIVE BOX
Electrophysiology Attending Consult Note    HPI:  31 yo male with pmhx of ETOH abuse, DM, HTN, and pancreatitis presented to the ER for lower right abdominal pain that radiated to the back associated with SOB. Pt has been hospitalized for alcohol withdrawal in the past. Pt was seen to be in Afib with RVR when EMS arrived; as per EMS: "given Cardizem 20mg and ASA without improvement." In the ER, pt was noted to have FS >530, moderate acetone, and was in metabolic acidosis. Pt received a total of Librium 50 mg, 2 mg Ativan, valium 40 mg, was started on insulin @7 units/hr, and Lopressor 35 mg. He was given 2L IV bolus of NaCl and currently receiving 2L IV bolus of LR.   ICU consult was called for worsening mental status, DT's, and DKA. Pt was seen and examined at bedside, laying uncomfortable in bed picking at things in mid air/likely halucinating.  Unable to obtain a thorough history from pt due to his AMS and delirium. Pt's BP was 178/77, tachycardic, and Kussmaul breathing.  (06 May 2021 17:19)    Above appreciated  Patient has DM and HTN for > 4 years. Also heavy alcohol abuse since he was 17. Now he is admitted with DKA and alcohol withdrawal. Noted to be in new AFIB with RVR, now improved after IVF.   Admitted to fast and strong heart beating with exertion, but no palpitation. Denies CP, SOB, orthopnea, leg edema, PND. He had 2 postural syncope episodes.     Interval Hx 5/7-9:  Remains in AF with RVR, on metoprolol 50 mg q 8 and PRN IV digoxin. TTE showed mild LV dysfunction. Feeling much better with no palpitation, CP or SOB. BG is under much better control    Family Hx:  His sister found dead in her room in her 20s. An autopsy was done but he does not know the results. He has one brother who is healthy, and his parents are healthy in their late 40s/early 50s. No other family history of sudden or unexpected death.     Social Hx:   Heavy alcohol abuse since age 17  occasional smoking  no drug use      PAST MEDICAL & SURGICAL HISTORY:  Alcohol abuse  Pancreatitis  HTN (hypertension)  Diabetes    Gallbladder removal         REVIEW OF SYSTEMS:    CONSTITUTIONAL: No fever, weight loss, or fatigue  ENMT:  No difficulty hearing, tinnitus, vertigo; No sinus or throat pain  NECK: No pain or stiffness  RESPIRATORY: No cough, wheezing, chills or hemoptysis; No shortness of breath  CARDIOVASCULAR: No chest pain, palpitations, dizziness, or leg swelling  GASTROINTESTINAL: No abdominal or epigastric pain. No nausea, vomiting, or hematemesis; No diarrhea or constipation. No melena or hematochezia.  NEUROLOGICAL: No headaches, memory loss, loss of strength, numbness, or tremors  SKIN: No itching, burning, rashes, or lesions   LYMPH NODES: No enlarged glands  ENDOCRINE: No heat or cold intolerance; No hair loss  MUSCULOSKELETAL: No joint pain or swelling; No muscle, back, or extremity pain  HEME/LYMPH: No easy bruising, or bleeding gums  ALLERY AND IMMUNOLOGIC: No hives or eczema    MEDICATIONS  (STANDING):  chlordiazePOXIDE   Oral   chlordiazePOXIDE 25 milliGRAM(s) Oral every 12 hours  chlorhexidine 4% Liquid 1 Application(s) Topical <User Schedule>  dextrose 40% Gel 15 Gram(s) Oral once  dextrose 5%. 1000 milliLiter(s) (50 mL/Hr) IV Continuous <Continuous>  dextrose 5%. 1000 milliLiter(s) (100 mL/Hr) IV Continuous <Continuous>  dextrose 50% Injectable 25 Gram(s) IV Push once  dextrose 50% Injectable 12.5 Gram(s) IV Push once  dextrose 50% Injectable 25 Gram(s) IV Push once  enoxaparin Injectable 70 milliGRAM(s) SubCutaneous once  folic acid 1 milliGRAM(s) Oral daily  glucagon  Injectable 1 milliGRAM(s) IntraMuscular once  insulin glargine Injectable (LANTUS) 20 Unit(s) SubCutaneous at bedtime  insulin lispro (ADMELOG) corrective regimen sliding scale   SubCutaneous Before meals and at bedtime  insulin lispro Injectable (ADMELOG) 4 Unit(s) SubCutaneous three times a day before meals  metoprolol tartrate 50 milliGRAM(s) Oral every 8 hours  multivitamin 1 Tablet(s) Oral daily  pantoprazole    Tablet 40 milliGRAM(s) Oral before breakfast  thiamine 100 milliGRAM(s) Oral daily    MEDICATIONS  (PRN):      Allergies  - no Known Allergies      Vital Signs Last 24 Hrs  T(C): 36.7 (09 May 2021 12:25), Max: 37.6 (09 May 2021 04:51)  T(F): 98 (09 May 2021 12:25), Max: 99.6 (09 May 2021 04:51)  HR: 83 (09 May 2021 12:25) (76 - 118)  BP: 113/70 (09 May 2021 12:25) (104/73 - 136/97)  BP(mean): --  RR: 18 (09 May 2021 12:25) (18 - 18)  SpO2: 98% (09 May 2021 12:25) (98% - 99%)    Physical Exam:  Constitutional: AAOx3, NAD  Neck: supple, No JVD  Cardiovascular: +S1S2 irregular pulse, no murmurs, rubs, gallops   Pulmonary: CTA b/l, unlabored, no wheezes, rales. rhonci  Abdomen: +BS, soft NTND  Extremities: no edema b/l, +distal pulses b/l  Neuro: non focal, speech clear, BOOKER x 4    LABS:                               12.5   4.51  )-----------( 81       ( 09 May 2021 08:52 )             37.0   05-09    137  |  101  |  6.0<L>  ----------------------------<  128<H>  3.4<L>   |  24.0  |  0.37<L>    Ca    8.5<L>      09 May 2021 08:52  Phos  2.6     05-09  Mg     2.0     05-09    TPro  5.7<L>  /  Alb  3.3  /  TBili  1.8  /  DBili  x   /  AST  137<H>  /  ALT  97<H>  /  AlkPhos  97  05-08      EKG: AFIb   tele: AFIb with RVR, rates now around 100-110 bpm. no pauses    c< from: CT Abdomen and Pelvis No Cont (05.06.21 @ 20:30) >    IMPRESSION:  *  No acute pathology.    < end of copied text >      RADIOLOGY & ADDITIONAL STUDIES:  < from: TTE Echo Complete w/ Contrast w/ Doppler (05.06.21 @ 21:14) >  Summary:   1. Left ventricular ejection fraction, by visual estimation, is 40 to 45%.   2. Technically difficult study.   3. Mildly decreased global left ventricular systolic function.   4. The left ventricular diastolic function could not be assessed in this study.   5. EF% difficult to determine due to afib. By visual estimation, EF appears to be about 40-45%.   6. Normal left atrial size.   7. Normal right atrial size.   8. Mild thickening of the anterior and posterior mitral valve leaflets.   9. Trace mitral valve regurgitation.  10. Endocardial visualization was enhanced with intravenous echo contrast.    < end of copied text >

## 2021-05-10 ENCOUNTER — TRANSCRIPTION ENCOUNTER (OUTPATIENT)
Age: 31
End: 2021-05-10

## 2021-05-10 LAB
ALBUMIN SERPL ELPH-MCNC: 3.4 G/DL — SIGNIFICANT CHANGE UP (ref 3.3–5.2)
ALP SERPL-CCNC: 90 U/L — SIGNIFICANT CHANGE UP (ref 40–120)
ALT FLD-CCNC: 72 U/L — HIGH
ANION GAP SERPL CALC-SCNC: 10 MMOL/L — SIGNIFICANT CHANGE UP (ref 5–17)
AST SERPL-CCNC: 61 U/L — HIGH
BILIRUB SERPL-MCNC: 0.9 MG/DL — SIGNIFICANT CHANGE UP (ref 0.4–2)
BLD GP AB SCN SERPL QL: SIGNIFICANT CHANGE UP
BUN SERPL-MCNC: 5 MG/DL — LOW (ref 8–20)
CALCIUM SERPL-MCNC: 9.3 MG/DL — SIGNIFICANT CHANGE UP (ref 8.6–10.2)
CHLORIDE SERPL-SCNC: 108 MMOL/L — HIGH (ref 98–107)
CO2 SERPL-SCNC: 22 MMOL/L — SIGNIFICANT CHANGE UP (ref 22–29)
CREAT SERPL-MCNC: 0.39 MG/DL — LOW (ref 0.5–1.3)
GLUCOSE BLDC GLUCOMTR-MCNC: 118 MG/DL — HIGH (ref 70–99)
GLUCOSE BLDC GLUCOMTR-MCNC: 154 MG/DL — HIGH (ref 70–99)
GLUCOSE BLDC GLUCOMTR-MCNC: 375 MG/DL — HIGH (ref 70–99)
GLUCOSE SERPL-MCNC: 129 MG/DL — HIGH (ref 70–99)
HCT VFR BLD CALC: 38.6 % — LOW (ref 39–50)
HGB BLD-MCNC: 12.9 G/DL — LOW (ref 13–17)
MCHC RBC-ENTMCNC: 32.2 PG — SIGNIFICANT CHANGE UP (ref 27–34)
MCHC RBC-ENTMCNC: 33.4 GM/DL — SIGNIFICANT CHANGE UP (ref 32–36)
MCV RBC AUTO: 96.3 FL — SIGNIFICANT CHANGE UP (ref 80–100)
PLATELET # BLD AUTO: 128 K/UL — LOW (ref 150–400)
POTASSIUM SERPL-MCNC: 4 MMOL/L — SIGNIFICANT CHANGE UP (ref 3.5–5.3)
POTASSIUM SERPL-SCNC: 4 MMOL/L — SIGNIFICANT CHANGE UP (ref 3.5–5.3)
PROT SERPL-MCNC: 6.3 G/DL — LOW (ref 6.6–8.7)
RBC # BLD: 4.01 M/UL — LOW (ref 4.2–5.8)
RBC # FLD: 14.1 % — SIGNIFICANT CHANGE UP (ref 10.3–14.5)
SODIUM SERPL-SCNC: 140 MMOL/L — SIGNIFICANT CHANGE UP (ref 135–145)
WBC # BLD: 3.56 K/UL — LOW (ref 3.8–10.5)
WBC # FLD AUTO: 3.56 K/UL — LOW (ref 3.8–10.5)

## 2021-05-10 PROCEDURE — 99152 MOD SED SAME PHYS/QHP 5/>YRS: CPT

## 2021-05-10 PROCEDURE — 93458 L HRT ARTERY/VENTRICLE ANGIO: CPT | Mod: 26

## 2021-05-10 PROCEDURE — 99222 1ST HOSP IP/OBS MODERATE 55: CPT

## 2021-05-10 PROCEDURE — 99232 SBSQ HOSP IP/OBS MODERATE 35: CPT

## 2021-05-10 PROCEDURE — 99233 SBSQ HOSP IP/OBS HIGH 50: CPT

## 2021-05-10 RX ORDER — SODIUM CHLORIDE 9 MG/ML
500 INJECTION INTRAMUSCULAR; INTRAVENOUS; SUBCUTANEOUS ONCE
Refills: 0 | Status: DISCONTINUED | OUTPATIENT
Start: 2021-05-10 | End: 2021-05-11

## 2021-05-10 RX ORDER — ASPIRIN/CALCIUM CARB/MAGNESIUM 324 MG
81 TABLET ORAL ONCE
Refills: 0 | Status: COMPLETED | OUTPATIENT
Start: 2021-05-10 | End: 2021-05-10

## 2021-05-10 RX ADMIN — Medication 1 TABLET(S): at 11:11

## 2021-05-10 RX ADMIN — Medication 1 MILLIGRAM(S): at 11:12

## 2021-05-10 RX ADMIN — Medication 10: at 22:40

## 2021-05-10 RX ADMIN — INSULIN GLARGINE 20 UNIT(S): 100 INJECTION, SOLUTION SUBCUTANEOUS at 22:39

## 2021-05-10 RX ADMIN — Medication 25 MILLIGRAM(S): at 11:11

## 2021-05-10 RX ADMIN — Medication 100 MILLIGRAM(S): at 11:13

## 2021-05-10 RX ADMIN — Medication 81 MILLIGRAM(S): at 16:50

## 2021-05-10 RX ADMIN — Medication 50 MILLIGRAM(S): at 22:39

## 2021-05-10 NOTE — PROGRESS NOTE ADULT - SUBJECTIVE AND OBJECTIVE BOX
Electrophysiology Attending Consult Note    HPI:  31 yo male with pmhx of ETOH abuse, DM, HTN, and pancreatitis presented to the ER for lower right abdominal pain that radiated to the back associated with SOB. Pt has been hospitalized for alcohol withdrawal in the past. Pt was seen to be in Afib with RVR when EMS arrived; as per EMS: "given Cardizem 20mg and ASA without improvement." In the ER, pt was noted to have FS >530, moderate acetone, and was in metabolic acidosis. Pt received a total of Librium 50 mg, 2 mg Ativan, valium 40 mg, was started on insulin @7 units/hr, and Lopressor 35 mg. He was given 2L IV bolus of NaCl and currently receiving 2L IV bolus of LR.   ICU consult was called for worsening mental status, DT's, and DKA. Pt was seen and examined at bedside, laying uncomfortable in bed picking at things in mid air/likely halucinating.  Unable to obtain a thorough history from pt due to his AMS and delirium. Pt's BP was 178/77, tachycardic, and Kussmaul breathing.  (06 May 2021 17:19)    Above appreciated  Patient has DM and HTN for > 4 years. Also heavy alcohol abuse since he was 17. Now he is admitted with DKA and alcohol withdrawal. Noted to be in new AFIB with RVR, now improved after IVF.   Admitted to fast and strong heart beating with exertion, but no palpitation. Denies CP, SOB, orthopnea, leg edema, PND. He had 2 postural syncope episodes.     Interval Hx 5/7-9:  Remains in AF with RVR, on metoprolol 50 mg q 8 and PRN IV digoxin. TTE showed mild LV dysfunction. Feeling much better with no palpitation, CP or SOB. BG is under much better control    Interval Hx 5/9-10  converted to sinus rhythm. now for LHC. Feeling much better, BP is soft    Family Hx:  His sister found dead in her room in her 20s. An autopsy was done but he does not know the results. He has one brother who is healthy, and his parents are healthy in their late 40s/early 50s. No other family history of sudden or unexpected death.     Social Hx:   Heavy alcohol abuse since age 17  occasional smoking  no drug use      PAST MEDICAL & SURGICAL HISTORY:  Alcohol abuse  Pancreatitis  HTN (hypertension)  Diabetes    Gallbladder removal         REVIEW OF SYSTEMS:    CONSTITUTIONAL: No fever, weight loss, or fatigue  ENMT:  No difficulty hearing, tinnitus, vertigo; No sinus or throat pain  NECK: No pain or stiffness  RESPIRATORY: No cough, wheezing, chills or hemoptysis; No shortness of breath  CARDIOVASCULAR: No chest pain, palpitations, dizziness, or leg swelling  GASTROINTESTINAL: No abdominal or epigastric pain. No nausea, vomiting, or hematemesis; No diarrhea or constipation. No melena or hematochezia.  NEUROLOGICAL: No headaches, memory loss, loss of strength, numbness, or tremors  SKIN: No itching, burning, rashes, or lesions   LYMPH NODES: No enlarged glands  ENDOCRINE: No heat or cold intolerance; No hair loss  MUSCULOSKELETAL: No joint pain or swelling; No muscle, back, or extremity pain  HEME/LYMPH: No easy bruising, or bleeding gums  ALLERY AND IMMUNOLOGIC: No hives or eczema    MEDICATIONS  (STANDING):  aspirin  chewable 81 milliGRAM(s) Oral once  chlorhexidine 4% Liquid 1 Application(s) Topical <User Schedule>  dextrose 40% Gel 15 Gram(s) Oral once  dextrose 5%. 1000 milliLiter(s) (50 mL/Hr) IV Continuous <Continuous>  dextrose 5%. 1000 milliLiter(s) (100 mL/Hr) IV Continuous <Continuous>  dextrose 50% Injectable 25 Gram(s) IV Push once  dextrose 50% Injectable 12.5 Gram(s) IV Push once  dextrose 50% Injectable 25 Gram(s) IV Push once  folic acid 1 milliGRAM(s) Oral daily  glucagon  Injectable 1 milliGRAM(s) IntraMuscular once  insulin glargine Injectable (LANTUS) 20 Unit(s) SubCutaneous at bedtime  insulin lispro (ADMELOG) corrective regimen sliding scale   SubCutaneous Before meals and at bedtime  insulin lispro Injectable (ADMELOG) 4 Unit(s) SubCutaneous three times a day before meals  metoprolol tartrate 50 milliGRAM(s) Oral every 8 hours  multivitamin 1 Tablet(s) Oral daily  pantoprazole    Tablet 40 milliGRAM(s) Oral before breakfast  thiamine 100 milliGRAM(s) Oral daily      Allergies  - no Known Allergies      Vital Signs Last 24 Hrs  T(C): 36.8 (10 May 2021 13:12), Max: 36.9 (10 May 2021 10:38)  T(F): 98.2 (10 May 2021 13:12), Max: 98.5 (10 May 2021 10:38)  HR: 84 (10 May 2021 13:12) (67 - 112)  BP: 91/58 (10 May 2021 13:12) (91/58 - 115/74)  BP(mean): --  RR: 16 (10 May 2021 13:12) (16 - 20)  SpO2: 99% (10 May 2021 13:12) (96% - 100%)    Physical Exam:  Constitutional: AAOx3, NAD  Neck: supple, No JVD  Cardiovascular: +S1S2, RRR, no murmurs, rubs, gallops   Pulmonary: CTA b/l, unlabored, no wheezes, rales. rhonci  Abdomen: +BS, soft NTND  Extremities: no edema b/l, +distal pulses b/l  Neuro: non focal, speech clear, BOOKER x 4    LABS:                        12.9   3.56  )-----------( 128      ( 10 May 2021 08:19 )             38.6   05-10    140  |  108<H>  |  5.0<L>  ----------------------------<  129<H>  4.0   |  22.0  |  0.39<L>    Ca    9.3      10 May 2021 08:19  Phos  2.6     05-09  Mg     2.0     05-09    TPro  6.3<L>  /  Alb  3.4  /  TBili  0.9  /  DBili  x   /  AST  61<H>  /  ALT  72<H>  /  AlkPhos  90  05-10      EKG today: NSR  EKG prior: AFIb   tele: AFIb with RVR, rates now around 100-110 bpm. no pauses    c< from: CT Abdomen and Pelvis No Cont (05.06.21 @ 20:30) >    IMPRESSION:  *  No acute pathology.    < end of copied text >      RADIOLOGY & ADDITIONAL STUDIES:  < from: TTE Echo Complete w/ Contrast w/ Doppler (05.06.21 @ 21:14) >  Summary:   1. Left ventricular ejection fraction, by visual estimation, is 40 to 45%.   2. Technically difficult study.   3. Mildly decreased global left ventricular systolic function.   4. The left ventricular diastolic function could not be assessed in this study.   5. EF% difficult to determine due to afib. By visual estimation, EF appears to be about 40-45%.   6. Normal left atrial size.   7. Normal right atrial size.   8. Mild thickening of the anterior and posterior mitral valve leaflets.   9. Trace mitral valve regurgitation.  10. Endocardial visualization was enhanced with intravenous echo contrast.    < end of copied text >

## 2021-05-10 NOTE — PROGRESS NOTE ADULT - SUBJECTIVE AND OBJECTIVE BOX
CARMEN JOYCE    [id number]    30y      Male    CC: sycnope  Found to be in Afib, along with delirium tremens and DKA  awaiting DCCV and LHC today   offers no other complaints     INTERVAL HPI/OVERNIGHT EVENTS: no acute events     REVIEW OF SYSTEMS:    CONSTITUTIONAL: No fever, or fatigue  RESPIRATORY: No cough, wheezing,  No shortness of breath  CARDIOVASCULAR: No chest pain, palpitations  GASTROINTESTINAL: No abdominal or epigastric pain. No nausea, vomiting        Vital Signs Last 24 Hrs  T(C): 36.8 (10 May 2021 13:12), Max: 36.9 (10 May 2021 10:38)  T(F): 98.2 (10 May 2021 13:12), Max: 98.5 (10 May 2021 10:38)  HR: 84 (10 May 2021 13:12) (67 - 112)  BP: 91/58 (10 May 2021 13:12) (91/58 - 115/74)  BP(mean): --  RR: 16 (10 May 2021 13:12) (16 - 20)  SpO2: 99% (10 May 2021 13:12) (96% - 100%)    PHYSICAL EXAM:    GENERAL: NAD, well-groomed  HEENT: PERRL, +EOMI  NECK: soft, Supple, No JVD,   CHEST/LUNG: Clear to percussion bilaterally; No wheezing  HEART: S1S2+, Regular rate and rhythm; No murmur  ABDOMEN: Soft, Nontender, Nondistended; Bowel sounds present  EXTREMITIES:   No clubbing, cyanosis, or edema  SKIN: No rashes or lesions  NEURO: AAOX3, no tremors+        LABS:                        12.9   3.56  )-----------( 128      ( 10 May 2021 08:19 )             38.6     05-10    140  |  108<H>  |  5.0<L>  ----------------------------<  129<H>  4.0   |  22.0  |  0.39<L>    Ca    9.3      10 May 2021 08:19  Phos  2.6     05-09  Mg     2.0     05-09    TPro  6.3<L>  /  Alb  3.4  /  TBili  0.9  /  DBili  x   /  AST  61<H>  /  ALT  72<H>  /  AlkPhos  90  05-10            MEDICATIONS  (STANDING):  aspirin  chewable 81 milliGRAM(s) Oral once  chlorhexidine 4% Liquid 1 Application(s) Topical <User Schedule>  dextrose 40% Gel 15 Gram(s) Oral once  dextrose 5%. 1000 milliLiter(s) (50 mL/Hr) IV Continuous <Continuous>  dextrose 5%. 1000 milliLiter(s) (100 mL/Hr) IV Continuous <Continuous>  dextrose 50% Injectable 25 Gram(s) IV Push once  dextrose 50% Injectable 12.5 Gram(s) IV Push once  dextrose 50% Injectable 25 Gram(s) IV Push once  folic acid 1 milliGRAM(s) Oral daily  glucagon  Injectable 1 milliGRAM(s) IntraMuscular once  insulin glargine Injectable (LANTUS) 20 Unit(s) SubCutaneous at bedtime  insulin lispro (ADMELOG) corrective regimen sliding scale   SubCutaneous Before meals and at bedtime  insulin lispro Injectable (ADMELOG) 4 Unit(s) SubCutaneous three times a day before meals  metoprolol tartrate 50 milliGRAM(s) Oral every 8 hours  multivitamin 1 Tablet(s) Oral daily  pantoprazole    Tablet 40 milliGRAM(s) Oral before breakfast  thiamine 100 milliGRAM(s) Oral daily    MEDICATIONS  (PRN):      RADIOLOGY & ADDITIONAL TESTS:

## 2021-05-10 NOTE — DISCHARGE NOTE PROVIDER - NSDCMRMEDTOKEN_GEN_ALL_CORE_FT
lisinopril 10 mg oral tablet: 1 tab(s) orally once a day  metFORMIN 1000 mg oral tablet: 1 tab(s) orally 2 times a day   folic acid 1 mg oral tablet: 1 tab(s) orally once a day  HumaLOG KwikPen 100 units/mL injectable solution: 5 unit(s) injectable 3 times a day   Lantus Solostar Pen 100 units/mL subcutaneous solution: 20 unit(s) subcutaneous once a day (at bedtime)   losartan 25 mg oral tablet: 1 tab(s) orally once a day  metoprolol tartrate 25 mg oral tablet: 1 tab(s) orally once a day  Multiple Vitamins oral tablet: 1 tab(s) orally once a day  pantoprazole 40 mg oral delayed release tablet: 1 tab(s) orally once a day (before a meal)  thiamine 100 mg oral tablet: 1 tab(s) orally once a day   apixaban 5 mg oral tablet: 1 tab(s) orally every 12 hours  folic acid 1 mg oral tablet: 1 tab(s) orally once a day  Lantus Solostar Pen 100 units/mL subcutaneous solution: 16 unit(s) subcutaneous once a day (at bedtime)   losartan 25 mg oral tablet: 1 tab(s) orally once a day  metFORMIN 500 mg oral tablet: 1 tab(s) orally 2 times a day  metoprolol tartrate 25 mg oral tablet: 1 tab(s) orally once a day  Multiple Vitamins oral tablet: 1 tab(s) orally once a day  pantoprazole 40 mg oral delayed release tablet: 1 tab(s) orally once a day (before a meal)  thiamine 100 mg oral tablet: 1 tab(s) orally once a day   apixaban 5 mg oral tablet: 1 tab(s) orally every 12 hours  folic acid 1 mg oral tablet: 1 tab(s) orally once a day  Insulin Pen Needles, 4mm: 1 application subcutaneously 4 times a day. ** Use with insulin pen **   lancets: 1 application subcutaneously 4 times a day   Lantus Solostar Pen 100 units/mL subcutaneous solution: 16 unit(s) subcutaneous once a day (at bedtime)   losartan 25 mg oral tablet: 1 tab(s) orally once a day  metFORMIN 500 mg oral tablet: 1 tab(s) orally 2 times a day  metoprolol tartrate 25 mg oral tablet: 1 tab(s) orally once a day  Multiple Vitamins oral tablet: 1 tab(s) orally once a day  pantoprazole 40 mg oral delayed release tablet: 1 tab(s) orally once a day (before a meal)  test strips (per patient&#x27;s insurance): 1 application subcutaneously 4 times a day. ** Compatible with patient&#x27;s glucometer **  thiamine 100 mg oral tablet: 1 tab(s) orally once a day

## 2021-05-10 NOTE — PROGRESS NOTE ADULT - SUBJECTIVE AND OBJECTIVE BOX
Nurse Practitioner Progress note:     INTERVAL HISTORY: 30 year old male who presents to cath lab for HARRY/DCCV and LHC.  Pt found to be in NSR-->DCCV cancelled.     MEDICATIONS:  metoprolol tartrate 50 milliGRAM(s) Oral every 8 hours  pantoprazole    Tablet 40 milliGRAM(s) Oral before breakfast  dextrose 40% Gel 15 Gram(s) Oral once  dextrose 50% Injectable 25 Gram(s) IV Push once  dextrose 50% Injectable 12.5 Gram(s) IV Push once  dextrose 50% Injectable 25 Gram(s) IV Push once  glucagon  Injectable 1 milliGRAM(s) IntraMuscular once  insulin glargine Injectable (LANTUS) 20 Unit(s) SubCutaneous at bedtime  insulin lispro (ADMELOG) corrective regimen sliding scale   SubCutaneous Before meals and at bedtime  insulin lispro Injectable (ADMELOG) 4 Unit(s) SubCutaneous three times a day before meals  aspirin  chewable 81 milliGRAM(s) Oral once  chlorhexidine 4% Liquid 1 Application(s) Topical <User Schedule>  dextrose 5%. 1000 milliLiter(s) IV Continuous <Continuous>  dextrose 5%. 1000 milliLiter(s) IV Continuous <Continuous>  folic acid 1 milliGRAM(s) Oral daily  multivitamin 1 Tablet(s) Oral daily  thiamine 100 milliGRAM(s) Oral daily      TELEMETRY: NSR    T(C): 36.8 (05-10-21 @ 13:12), Max: 36.9 (05-10-21 @ 10:38)  HR: 84 (05-10-21 @ 13:12) (67 - 112)  BP: 91/58 (05-10-21 @ 13:12) (91/58 - 115/74)  RR: 16 (05-10-21 @ 13:12) (16 - 20)  SpO2: 99% (05-10-21 @ 13:12) (96% - 100%)  Wt(kg): --    PHYSICAL EXAM:  Appearance: Normal	  Cardiovascular: Normal S1 S2, No JVD, No murmurs, No edema  Respiratory: Lungs clear to auscultation	  Psychiatry: A & O x 3, Mood & affect appropriate  Gastrointestinal:  Soft, Non-tender, + BS	  Skin: No rashes, No ecchymoses, No cyanosis  Neurologic: Non-focal, A&O X3.  No neuro deficits  Extremities: Normal range of motion, No clubbing, cyanosis or edema  Vascular: Peripheral pulses palpable 2+ bilaterally  Procedure Site: Right radial band in place.  Site benign.  No bleeding/hematoma/ecchymosis.  + palp radial pulse      MEDICATIONS DURING PROCEDURE:  versed 1 mg  fentanyl 25 mcg  heparin 3000 u    PROCEDURE RESULTS: S/P LHC which revealed normal coronaries, EF 55%, LVEDP 8 via right radial.  Full report to follow.    ASSESSMENT/PLAN: 	  -Radial precautions  -D/C radial band in 1 hour  -Resume meds  - cc bolus  -Follow up with Dr. Mahajan  -Site check in AM

## 2021-05-10 NOTE — DISCHARGE NOTE PROVIDER - NSDCCPCAREPLAN_GEN_ALL_CORE_FT
PRINCIPAL DISCHARGE DIAGNOSIS  Diagnosis: DKA (diabetic ketoacidoses)  Assessment and Plan of Treatment: Now resolved. Continue lantus 20U at bedtime and Humalog premeal 5U three times a day before meals. Outpatient endocrine follow up on discharge      SECONDARY DISCHARGE DIAGNOSES  Diagnosis: Atrial fibrillation  Assessment and Plan of Treatment: New diagnosis. Continue metoprolol as prescribed. Outpatient cardio follow up on discharge    Diagnosis: Withdrawal symptoms, alcohol, with delirium  Assessment and Plan of Treatment: Resolved, alcohol cessation encouraged    Diagnosis: Nonischemic cardiomyopathy  Assessment and Plan of Treatment: Continue losartan and metoprolol as prescribed. Outpatient cardio follow up on discharge.     PRINCIPAL DISCHARGE DIAGNOSIS  Diagnosis: DKA (diabetic ketoacidoses)  Assessment and Plan of Treatment: Now resolved. Continue lantus 16U at bedtime and metformin 500mg BID. Outpatient up endocrine follow on discharge      SECONDARY DISCHARGE DIAGNOSES  Diagnosis: Atrial fibrillation  Assessment and Plan of Treatment: New diagnosis. Continue metoprolol and eliquis as prescribed. Outpatient cardio follow up on discharge    Diagnosis: Withdrawal symptoms, alcohol, with delirium  Assessment and Plan of Treatment: Resolved, alcohol cessation encouraged    Diagnosis: Nonischemic cardiomyopathy  Assessment and Plan of Treatment: Continue losartan and metoprolol as prescribed. Outpatient cardio follow up on discharge.

## 2021-05-10 NOTE — PROGRESS NOTE ADULT - ASSESSMENT
This is a 30 years old man with PMH of DM and HTN for > 4 years as well as gallbladder removal. Also heavy alcohol abuse since he was 17. Now he is admitted with DKA and alcohol withdrawal. Noted to be in new AFIB with RVR, now improved after IVF.   Admitted to fast and strong heart beating with exertion, but no palpitation. Denies CP, SOB, orthopnea, leg edema, PND. He had 2 postural syncope episodes.   His sister found dead in her room in her 20s. An autopsy was done but he does not know the results. He has one brother who is healthy, and his parents are healthy in their late 40s/early 50s. No other family history of sudden or unexpected death.     Interval Hx 5/7-9:  Remains in AF with RVR, on metoprolol 50 mg q 8 and PRN IV digoxin. TTE showed mild LV dysfunction. Feeling much better with no palpitation, CP or SOB. BG is under much better control    Interval Hx 5/9-10  converted to sinus rhythm. now for [facility]. Feeling much better, BP is soft    Family Hx:  His sister found dead in her room in her 20s. An autopsy was done but he does not know the results. He has one brother who is healthy, and his parents are healthy in their late 40s/early 50s. No other family history of sudden or unexpected death.     1. AIFB, CHADS-VASc = 2 (DM, HTN) in the setting of DKA and alcohol withdrawal. Now converted to sinus.   - Lower metoprolol to Toprol 25 mg daily to allow for other HF meds  - anticoagulation for at least one months then reassess. If AF becomes a chronic issue then will need long term AC  - Outpatient follow up with me in 3-4 weeks with one week [facility]OT MONITORING  - Agree with [facility].     2. Family history of SCD, patient stated that his other brother has the autopsy report and he was not told that a family screening is needed, I encouraged him to follow up on this and to see a specialist (could be me) if this is not resolved  - TTE showed mild LV dysfunction    3. syncope, c/w postural syncope. Monitor for now. TTE    4. Mild LV dysfunction, ? alcohol related  - GDMT after DCCV, may need to lower metoprolol dose to allow for this  - awaiting [facility]    EPS will sign off, pls call us if needed

## 2021-05-10 NOTE — DISCHARGE NOTE PROVIDER - PROVIDER TOKENS
PROVIDER:[TOKEN:[[id number]]] PROVIDER:[TOKEN:[[id number]]],PROVIDER:[TOKEN:[[id number]]]

## 2021-05-10 NOTE — DISCHARGE NOTE PROVIDER - CARE PROVIDERS DIRECT ADDRESSES
[email or web address]

## 2021-05-10 NOTE — PROGRESS NOTE ADULT - SUBJECTIVE AND OBJECTIVE BOX
Cardiology Nurse Practitioner Note    30 year old male presents to cath holding for LHC and HARRY/DCCV.  Pt found to be in NSR...HARRY and DCCV cancelled.    VSS  Neuro: A&O X3  Lungs: CTA  CV: RRR  Ext: + palp pulses X4    ASA 2  [place] 2  BRA 0.6%    Aspirin given

## 2021-05-10 NOTE — CONSULT NOTE ADULT - ASSESSMENT
30M with pmhx of ETOH abuse w/ etoh w/drawal admissions in the past, T2DM, HTN, and pancreatitis presented to the ER for lower right abdominal pain that radiated to the back associated with SOB, found to have afib rvr. In the ER, pt was noted to have FS >530, moderate acetone, and was in metabolic acidosis. Pt received a total of Librium 50 mg, 2 mg Ativan, valium 40 mg, was started on insulin @7 units/hr, and Lopressor 35 mg. He was given 2L IV bolus of NaCl and currently receiving 2L IV bolus of LR.   ICU consult was called for worsening mental status, DT's, and DKA. Pt was seen and examined at bedside, laying uncomfortable in bed picking at things in mid air/likely halucinating.  Unable to obtain a thorough history from pt due to his AMS and delirium. Pt's BP was 178/77, tachycardic, and Kussmaul breathing.   Consult for diabetes mgmt. A1c 9.6    Uncontrolled T2DM- FS reasonable  -A1c 9.6  -check sugars AC and bedtime  -ensure diabetic diet  -continue with insulin sliding scale  -continue lantus 20 units  -continue lispro 4 tid  -hx of pancreatitis, likely from EtOH, not candidate for glp1 or dpp4 (potential for relapse)    Afib w/ rvr- on BB, care per primary team and cardiology, s/p cath today    EtOH abuse- care per primary team     30M with pmhx of ETOH abuse w/ etoh w/drawal admissions in the past, T2DM, HTN, and pancreatitis presented to the ER for lower right abdominal pain that radiated to the back associated with SOB, found to have afib rvr. In the ER, pt was noted to have FS >530, moderate acetone, and was in metabolic acidosis. Pt received a total of Librium 50 mg, 2 mg Ativan, valium 40 mg, was started on insulin @7 units/hr, and Lopressor 35 mg. He was given 2L IV bolus of NaCl and currently receiving 2L IV bolus of LR.   ICU consult was called for worsening mental status, DT's, and DKA. Pt was seen and examined at bedside, laying uncomfortable in bed picking at things in mid air/likely halucinating.  Unable to obtain a thorough history from pt due to his AMS and delirium. Pt's BP was 178/77, tachycardic, and Kussmaul breathing.   Consult for diabetes mgmt. A1c 9.6    Uncontrolled T2DM- FS reasonable  -A1c 9.6  -check sugars AC and bedtime  -ensure diabetic diet  -continue with insulin sliding scale  -continue lantus 20 units  -continue lispro 4 tid  -hx of pancreatitis, likely from EtOH, not candidate for glp1 or dpp4 (potential for relapse)  -likely will need basal insulin at home with metformin   -check cpeptide and PADMINI    Afib w/ rvr- on BB, care per primary team and cardiology, s/p cath today    EtOH abuse- care per primary team. counseled severely

## 2021-05-10 NOTE — DISCHARGE NOTE PROVIDER - NSDCCPTREATMENT_GEN_ALL_CORE_FT
PRINCIPAL PROCEDURE  Procedure: Left heart cardiac cath  Findings and Treatment: Normal coronaries

## 2021-05-10 NOTE — DISCHARGE NOTE PROVIDER - HOSPITAL COURSE
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 Admitted to ICU with severe DKA with ph 6.8. Treated with insulin drip. Cardiology consulted for new a.fib with RVR. Anion gap closed and transitioned to SQ insulin on 5/7 and subsequently downgraded to tele bed to hospitalist service on 05/08.     # Severe DKA in pt with DMT2   - Resolved s/p insulin drip   - C/w lantus 20, ac 4 units  - Discussed w/ pt importance of compliancy. Per pt he stopped taking his insulin as he ran out at home.   - Discussed DM regimen w/ DM Educator for discharge. Plan will be to discontinue metformin on discharge and start lantus 20U QHS and continue 5U TID premeal  - Endo consul appreciated     # New a.fib with RVR with mild cardiomyopathy  - Plan was for HARRY/DCCV 5/10 w/ EP however cancelled after pt converted to NSR  - S/p LHC 5/10 w/ cardio, normal coronaries  - Awaiting final cardio plan in terms of AC needs on discharge  - C/w Toprol 25mg QD     #HFrEF   - EF 40-45%.   - Likely secondary to tachyarrhythmia vs. EtOH abuse  - [facility]- normal cors; LV Gram on Cath EF 55%   - Continue toprolol 25mg QD  - Losartan 25mg QD started per cards    # Alcohol abuse and alcohol withdrawal   - Low CIWA, 1-2. No further signs of withdrawal at this time  - Counseled at bed side, SW provider list of rehabs  - C/w Librium mercy  - C/w mv, Mg, folic acid and thiamine as per CIWA protocol      #DVT ppx - on lovenox as above    CODE -full    DISPO: DC home pending cardio plan. 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 Admitted to ICU with severe DKA with ph 6.8. Treated with insulin drip. Cardiology consulted for new a.fib with RVR. Anion gap closed and transitioned to SQ insulin on 5/7 and subsequently downgraded to tele bed to hospitalist service on 05/08.     # Severe DKA in pt with DMT2   - Resolved s/p insulin drip   - C/w lantus 16, ac 4 units  - Discussed w/ pt importance of compliancy. Per pt he stopped taking his insulin as he ran out at home.   - Discussed DM regimen w/ DM Educator for discharge. Plan will be to discontinue metformin on discharge and start lantus 16U QHS and continue 5U TID premeal  - Endo consul appreciated     # New a.fib with RVR with mild cardiomyopathy  - Plan was for HARRY/DCCV 5/10 w/ EP however cancelled after pt converted to NSR  - S/p LHC 5/10 w/ cardio, normal coronaries  - C/w Toprol 25mg QD  - cleared by EP for disharge on Eliquis  Risk vs benefits were discussed. he is at risk of falls however is motivated to quit alcohol     #HFrEF   - EF 40-45%.   - Likely secondary to tachyarrhythmia vs. EtOH abuse  - [facility]- normal cors; LV Gram on Cath EF 55%   - Continue toprolol 25mg QD  - Losartan 25mg QD started per cards    # Alcohol abuse and alcohol withdrawal   - Low CIWA, 1-2. No further signs of withdrawal at this time  - Counseled at bed side, SW provider list of rehabs  - C/w Librium mercy  - C/w mv, Mg, folic acid and thiamine as per CIWA protocol        CODE -full    DISPO: DC home    Time spent in discharge planning and co-ordination : 45min

## 2021-05-10 NOTE — CONSULT NOTE ADULT - SUBJECTIVE AND OBJECTIVE BOX
Patient is a 30y old  Male who presents with a chief complaint of Delirium tremens and DKA (10 May 2021 14:39)    HPI:  30M with pmhx of ETOH abuse w/ etoh w/drawal admissions in the past, T2DM, HTN, and pancreatitis presented to the ER for lower right abdominal pain that radiated to the back associated with SOB, found to have afib rvr. In the ER, pt was noted to have FS >530, moderate acetone, and was in metabolic acidosis. Pt received a total of Librium 50 mg, 2 mg Ativan, valium 40 mg, was started on insulin @7 units/hr, and Lopressor 35 mg. He was given 2L IV bolus of NaCl and currently receiving 2L IV bolus of LR.   ICU consult was called for worsening mental status, DT's, and DKA. Pt was seen and examined at bedside, laying uncomfortable in bed picking at things in mid air/likely halucinating.  Unable to obtain a thorough history from pt due to his AMS and delirium. Pt's BP was 178/77, tachycardic, and Kussmaul breathing.   Consult for diabetes mgmt. A1c 9.6      PAST MEDICAL & SURGICAL HISTORY:  Alcohol abuse    Pancreatitis    HTN (hypertension)    Diabetes    No significant past surgical history        Social History:  ETOH abuse- unable to obtain amount pt drinks due to mental status (06 May 2021 17:19)      FAMILY HISTORY:  No pertinent family history in first degree relatives          Allergies    No Known Allergies    Intolerances        REVIEW OF SYSTEMS:    CONSTITUTIONAL: No fever, weight loss, or fatigue  EYES: No eye pain, visual disturbances, or discharge  ENMT:  No difficulty hearing, tinnitus, vertigo; No sinus or throat pain  NECK: No pain or stiffness  RESPIRATORY: No cough, wheezing, chills or hemoptysis; No shortness of breath  CARDIOVASCULAR: No chest pain, palpitations, dizziness, or leg swelling  GASTROINTESTINAL: No abdominal or epigastric pain. No nausea, vomiting, or hematemesis; No diarrhea or constipation. No melena or hematochezia.  NEUROLOGICAL: No headaches, memory loss, loss of strength, numbness, or tremors  SKIN: No itching, burning, rashes, or lesions   MUSCULOSKELETAL: No joint pain or swelling; No muscle, back, or extremity pain  PSYCHIATRIC: No depression, anxiety, mood swings, or difficulty sleeping        MEDICATIONS  (STANDING):  aspirin  chewable 81 milliGRAM(s) Oral once  chlorhexidine 4% Liquid 1 Application(s) Topical <User Schedule>  dextrose 40% Gel 15 Gram(s) Oral once  dextrose 5%. 1000 milliLiter(s) (50 mL/Hr) IV Continuous <Continuous>  dextrose 5%. 1000 milliLiter(s) (100 mL/Hr) IV Continuous <Continuous>  dextrose 50% Injectable 25 Gram(s) IV Push once  dextrose 50% Injectable 12.5 Gram(s) IV Push once  dextrose 50% Injectable 25 Gram(s) IV Push once  folic acid 1 milliGRAM(s) Oral daily  glucagon  Injectable 1 milliGRAM(s) IntraMuscular once  insulin glargine Injectable (LANTUS) 20 Unit(s) SubCutaneous at bedtime  insulin lispro (ADMELOG) corrective regimen sliding scale   SubCutaneous Before meals and at bedtime  insulin lispro Injectable (ADMELOG) 4 Unit(s) SubCutaneous three times a day before meals  metoprolol tartrate 50 milliGRAM(s) Oral every 8 hours  multivitamin 1 Tablet(s) Oral daily  pantoprazole    Tablet 40 milliGRAM(s) Oral before breakfast  thiamine 100 milliGRAM(s) Oral daily    MEDICATIONS  (PRN):      Vital Signs Last 24 Hrs  T(C): 36.8 (10 May 2021 13:12), Max: 36.9 (10 May 2021 10:38)  T(F): 98.2 (10 May 2021 13:12), Max: 98.5 (10 May 2021 10:38)  HR: 84 (10 May 2021 13:12) (67 - 112)  BP: 91/58 (10 May 2021 13:12) (91/58 - 115/74)  BP(mean): --  RR: 16 (10 May 2021 13:12) (16 - 20)  SpO2: 99% (10 May 2021 13:12) (96% - 100%)    Physical Exam:    Constitutional: NAD, well-developed  HEENT: EOMI, no exophalmos  Neck: trachea midline, no thyroid enlargement  Respiratory: CTAB, normal respirations  Cardiovascular: S1 and S2, RRR  Gastrointestinal: BS+, soft, ntnd  Extremities: No peripheral edema  Neurological: AOx3, no focal deficits  Psychiatric: Normal mood and normal affect  Skin: no rashes, no acanthosis    LABS  05-10    140  |  108<H>  |  5.0<L>  ----------------------------<  129<H>  4.0   |  22.0  |  0.39<L>    Ca    9.3      10 May 2021 08:19  Phos  2.6     05-09  Mg     2.0     05-09    TPro  6.3<L>  /  Alb  3.4  /  TBili  0.9  /  DBili  x   /  AST  61<H>  /  ALT  72<H>  /  AlkPhos  90  05-10                          12.9   3.56  )-----------( 128      ( 10 May 2021 08:19 )             38.6       A1C with Estimated Average Glucose Result: 9.6 % (05-07-21 @ 05:21)  A1C with Estimated Average Glucose Result: 10.0 % (05-06-21 @ 17:32)          Aspartate Aminotransferase (AST/SGOT): 61 U/L (05-10-21 @ 08:19)  Alkaline Phosphatase, Serum: 90 U/L (05-10-21 @ 08:19)  Alanine Aminotransferase (ALT/SGPT): 72 U/L (05-10-21 @ 08:19)  Albumin, Serum: 3.4 g/dL (05-10-21 @ 08:19)    Thyroid Stimulating Hormone, Serum: 0.64 uIU/mL (05-06-21 @ 15:23)    CAPILLARY BLOOD GLUCOSE      POCT Blood Glucose.: 154 mg/dL (10 May 2021 11:06)  POCT Blood Glucose.: 118 mg/dL (10 May 2021 07:46)  POCT Blood Glucose.: 271 mg/dL (09 May 2021 21:36)  POCT Blood Glucose.: 305 mg/dL (09 May 2021 16:35)     Patient is a 30y old  Male who presents with a chief complaint of Delirium tremens and DKA (10 May 2021 14:39)    HPI:  30M with pmhx of ETOH abuse w/ etoh w/drawal admissions in the past, T2DM, HTN, and pancreatitis presented to the ER for lower right abdominal pain that radiated to the back associated with SOB, found to have afib rvr. In the ER, pt was noted to have FS >530, moderate acetone, and was in metabolic acidosis. Pt received a total of Librium 50 mg, 2 mg Ativan, valium 40 mg, was started on insulin @7 units/hr, and Lopressor 35 mg. He was given 2L IV bolus of NaCl and currently receiving 2L IV bolus of LR.   ICU consult was called for worsening mental status, DT's, and DKA. Pt was seen and examined at bedside, laying uncomfortable in bed picking at things in mid air/likely halucinating.  Unable to obtain a thorough history from pt due to his AMS and delirium. Pt's BP was 178/77, tachycardic, and Kussmaul breathing.   Consult for diabetes mgmt. A1c 9.6    drinks 8 beers daily up until admission  reports that he was taking metformin 1000mg BID  was checking FS 3x daily ranging from 150-210  no fhx of diabetes  diabetes for 4-5 years  no complications  has not seen a doctor in a while  lives with his parents    PAST MEDICAL & SURGICAL HISTORY:  Alcohol abuse    Pancreatitis    HTN (hypertension)    Diabetes    No significant past surgical history        Social History:  ETOH abuse- unable to obtain amount pt drinks due to mental status (06 May 2021 17:19)      FAMILY HISTORY:  No pertinent family history in first degree relatives          Allergies    No Known Allergies    Intolerances        REVIEW OF SYSTEMS:    CONSTITUTIONAL: No fever, weight loss, or fatigue  EYES: No eye pain, visual disturbances, or discharge  ENMT:  No difficulty hearing, tinnitus, vertigo; No sinus or throat pain  NECK: No pain or stiffness  RESPIRATORY: No cough, wheezing, chills or hemoptysis; No shortness of breath  CARDIOVASCULAR: No chest pain, palpitations, dizziness, or leg swelling  GASTROINTESTINAL: No abdominal or epigastric pain. No nausea, vomiting, or hematemesis; No diarrhea or constipation. No melena or hematochezia.  NEUROLOGICAL: No headaches, memory loss, loss of strength, numbness, or tremors  SKIN: No itching, burning, rashes, or lesions   MUSCULOSKELETAL: No joint pain or swelling; No muscle, back, or extremity pain  PSYCHIATRIC: No depression, anxiety, mood swings, or difficulty sleeping        MEDICATIONS  (STANDING):  aspirin  chewable 81 milliGRAM(s) Oral once  chlorhexidine 4% Liquid 1 Application(s) Topical <User Schedule>  dextrose 40% Gel 15 Gram(s) Oral once  dextrose 5%. 1000 milliLiter(s) (50 mL/Hr) IV Continuous <Continuous>  dextrose 5%. 1000 milliLiter(s) (100 mL/Hr) IV Continuous <Continuous>  dextrose 50% Injectable 25 Gram(s) IV Push once  dextrose 50% Injectable 12.5 Gram(s) IV Push once  dextrose 50% Injectable 25 Gram(s) IV Push once  folic acid 1 milliGRAM(s) Oral daily  glucagon  Injectable 1 milliGRAM(s) IntraMuscular once  insulin glargine Injectable (LANTUS) 20 Unit(s) SubCutaneous at bedtime  insulin lispro (ADMELOG) corrective regimen sliding scale   SubCutaneous Before meals and at bedtime  insulin lispro Injectable (ADMELOG) 4 Unit(s) SubCutaneous three times a day before meals  metoprolol tartrate 50 milliGRAM(s) Oral every 8 hours  multivitamin 1 Tablet(s) Oral daily  pantoprazole    Tablet 40 milliGRAM(s) Oral before breakfast  thiamine 100 milliGRAM(s) Oral daily    MEDICATIONS  (PRN):      Vital Signs Last 24 Hrs  T(C): 36.8 (10 May 2021 13:12), Max: 36.9 (10 May 2021 10:38)  T(F): 98.2 (10 May 2021 13:12), Max: 98.5 (10 May 2021 10:38)  HR: 84 (10 May 2021 13:12) (67 - 112)  BP: 91/58 (10 May 2021 13:12) (91/58 - 115/74)  BP(mean): --  RR: 16 (10 May 2021 13:12) (16 - 20)  SpO2: 99% (10 May 2021 13:12) (96% - 100%)    Physical Exam:    Constitutional: NAD, well-developed  HEENT: EOMI, no exophalmos  Neck: trachea midline, no thyroid enlargement  Respiratory: CTAB, normal respirations  Cardiovascular: S1 and S2, RRR  Gastrointestinal: BS+, soft, ntnd  Extremities: No peripheral edema  Neurological: AOx3, no focal deficits  Psychiatric: Normal mood and normal affect  Skin: no rashes, no acanthosis    LABS  05-10    140  |  108<H>  |  5.0<L>  ----------------------------<  129<H>  4.0   |  22.0  |  0.39<L>    Ca    9.3      10 May 2021 08:19  Phos  2.6     05-09  Mg     2.0     05-09    TPro  6.3<L>  /  Alb  3.4  /  TBili  0.9  /  DBili  x   /  AST  61<H>  /  ALT  72<H>  /  AlkPhos  90  05-10                          12.9   3.56  )-----------( 128      ( 10 May 2021 08:19 )             38.6       A1C with Estimated Average Glucose Result: 9.6 % (05-07-21 @ 05:21)  A1C with Estimated Average Glucose Result: 10.0 % (05-06-21 @ 17:32)          Aspartate Aminotransferase (AST/SGOT): 61 U/L (05-10-21 @ 08:19)  Alkaline Phosphatase, Serum: 90 U/L (05-10-21 @ 08:19)  Alanine Aminotransferase (ALT/SGPT): 72 U/L (05-10-21 @ 08:19)  Albumin, Serum: 3.4 g/dL (05-10-21 @ 08:19)    Thyroid Stimulating Hormone, Serum: 0.64 uIU/mL (05-06-21 @ 15:23)    CAPILLARY BLOOD GLUCOSE      POCT Blood Glucose.: 154 mg/dL (10 May 2021 11:06)  POCT Blood Glucose.: 118 mg/dL (10 May 2021 07:46)  POCT Blood Glucose.: 271 mg/dL (09 May 2021 21:36)  POCT Blood Glucose.: 305 mg/dL (09 May 2021 16:35)

## 2021-05-10 NOTE — PROGRESS NOTE ADULT - ASSESSMENT
30M hx Alcohol abuse,  (he drinks every other day last drink yesterday) with h/o seizures due to alcohol withdrawal,  DMT2 on insulin at home ( not complaint) , HTN, Pancreatitis who presented [facility] ED  [facility] 05[facility]06 with c/o SOB, palpitations in rapid a-fib. Admitted to ICU with severe DKA with ph 6.8. Treated with insulin drip. Cardiology consulted for new a.fib with RVR. Anion gap closed and transitioned to SQ insulin on 5/7 and subsequently downgraded to tele bed to hospitalist service on 05/08.     # Severe DKA in pt with DMT2 - has resolved  - c/w lantus 20, ac 4 units, MDSS for now, will adjust   - Diabetic specialist consult placed   - management of alcohol withdrawal as bellow    # Alcohol abuse and alcohol withdrawal   - counseled at bed side,  provider list of rehabs  - c/w Librium mercy  - mv, Mg, folic acid and thiamin as per [facility] protocol  - ppi for gi ppx     # New a.fib with RVR with mild cardiomyopathy  - cardiology recommendation noted  -HARRY ?DCCV cancelled as pt converted to NSR and EP signed off - consider LHC - await cardio input. ct toprol 25mg and AC for 1mth   await C     #DVT ppx - on lovenox as above  # CODE -full  # Dispo - 1-2 days

## 2021-05-10 NOTE — DISCHARGE NOTE PROVIDER - CARE PROVIDER_API CALL
Brian Mahajan)  Cardiovascular Disease  [address] [facility], [place] [address]  Phone: [phone]  Fax: [phone]  Follow Up Time:    Brian Mahajan)  Cardiovascular Disease  [address] [facility], [address]  [place] [address]  Phone: [phone]  Fax: [phone]  Follow Up Time:     Kathy Phipps)  Cardiac Electrophysiology; Cardiovascular Disease; Internal Medicine  [address] [place] [address]  Phone: [phone]  Fax: [phone]  Follow Up Time:

## 2021-05-11 ENCOUNTER — TRANSCRIPTION ENCOUNTER (OUTPATIENT)
Age: 31
End: 2021-05-11

## 2021-05-11 VITALS
SYSTOLIC BLOOD PRESSURE: 114 MMHG | OXYGEN SATURATION: 98 % | TEMPERATURE: 98 F | RESPIRATION RATE: 18 BRPM | DIASTOLIC BLOOD PRESSURE: 62 MMHG | HEART RATE: 62 BPM

## 2021-05-11 LAB
ANION GAP SERPL CALC-SCNC: 11 MMOL/L — SIGNIFICANT CHANGE UP (ref 5–17)
BASOPHILS # BLD AUTO: 0.04 K/UL — SIGNIFICANT CHANGE UP (ref 0–0.2)
BASOPHILS NFR BLD AUTO: 1.2 % — SIGNIFICANT CHANGE UP (ref 0–2)
BUN SERPL-MCNC: 5 MG/DL — LOW (ref 8–20)
C PEPTIDE SERPL-MCNC: 0.7 NG/ML — LOW (ref 1.1–4.4)
CALCIUM SERPL-MCNC: 9.6 MG/DL — SIGNIFICANT CHANGE UP (ref 8.6–10.2)
CHLORIDE SERPL-SCNC: 103 MMOL/L — SIGNIFICANT CHANGE UP (ref 98–107)
CO2 SERPL-SCNC: 26 MMOL/L — SIGNIFICANT CHANGE UP (ref 22–29)
CREAT SERPL-MCNC: 0.54 MG/DL — SIGNIFICANT CHANGE UP (ref 0.5–1.3)
CULTURE RESULTS: SIGNIFICANT CHANGE UP
CULTURE RESULTS: SIGNIFICANT CHANGE UP
EOSINOPHIL # BLD AUTO: 0.15 K/UL — SIGNIFICANT CHANGE UP (ref 0–0.5)
EOSINOPHIL NFR BLD AUTO: 4.5 % — SIGNIFICANT CHANGE UP (ref 0–6)
GLUCOSE BLDC GLUCOMTR-MCNC: 162 MG/DL — HIGH (ref 70–99)
GLUCOSE BLDC GLUCOMTR-MCNC: 173 MG/DL — HIGH (ref 70–99)
GLUCOSE BLDC GLUCOMTR-MCNC: 78 MG/DL — SIGNIFICANT CHANGE UP (ref 70–99)
GLUCOSE SERPL-MCNC: 183 MG/DL — HIGH (ref 70–99)
HCT VFR BLD CALC: 33.8 % — LOW (ref 39–50)
HGB BLD-MCNC: 11 G/DL — LOW (ref 13–17)
IMM GRANULOCYTES NFR BLD AUTO: 0.3 % — SIGNIFICANT CHANGE UP (ref 0–1.5)
LYMPHOCYTES # BLD AUTO: 1.12 K/UL — SIGNIFICANT CHANGE UP (ref 1–3.3)
LYMPHOCYTES # BLD AUTO: 33.3 % — SIGNIFICANT CHANGE UP (ref 13–44)
MCHC RBC-ENTMCNC: 32.4 PG — SIGNIFICANT CHANGE UP (ref 27–34)
MCHC RBC-ENTMCNC: 32.5 GM/DL — SIGNIFICANT CHANGE UP (ref 32–36)
MCV RBC AUTO: 99.7 FL — SIGNIFICANT CHANGE UP (ref 80–100)
MONOCYTES # BLD AUTO: 0.79 K/UL — SIGNIFICANT CHANGE UP (ref 0–0.9)
MONOCYTES NFR BLD AUTO: 23.5 % — HIGH (ref 2–14)
NEUTROPHILS # BLD AUTO: 1.25 K/UL — LOW (ref 1.8–7.4)
NEUTROPHILS NFR BLD AUTO: 37.2 % — LOW (ref 43–77)
PLATELET # BLD AUTO: 158 K/UL — SIGNIFICANT CHANGE UP (ref 150–400)
POTASSIUM SERPL-MCNC: 4 MMOL/L — SIGNIFICANT CHANGE UP (ref 3.5–5.3)
POTASSIUM SERPL-SCNC: 4 MMOL/L — SIGNIFICANT CHANGE UP (ref 3.5–5.3)
RBC # BLD: 3.39 M/UL — LOW (ref 4.2–5.8)
RBC # FLD: 14.6 % — HIGH (ref 10.3–14.5)
SODIUM SERPL-SCNC: 140 MMOL/L — SIGNIFICANT CHANGE UP (ref 135–145)
SPECIMEN SOURCE: SIGNIFICANT CHANGE UP
SPECIMEN SOURCE: SIGNIFICANT CHANGE UP
WBC # BLD: 3.36 K/UL — LOW (ref 3.8–10.5)
WBC # FLD AUTO: 3.36 K/UL — LOW (ref 3.8–10.5)

## 2021-05-11 PROCEDURE — 86850 RBC ANTIBODY SCREEN: CPT

## 2021-05-11 PROCEDURE — 84132 ASSAY OF SERUM POTASSIUM: CPT

## 2021-05-11 PROCEDURE — 87040 BLOOD CULTURE FOR BACTERIA: CPT

## 2021-05-11 PROCEDURE — 85018 HEMOGLOBIN: CPT

## 2021-05-11 PROCEDURE — C1894: CPT

## 2021-05-11 PROCEDURE — 36415 COLL VENOUS BLD VENIPUNCTURE: CPT

## 2021-05-11 PROCEDURE — 74176 CT ABD & PELVIS W/O CONTRAST: CPT

## 2021-05-11 PROCEDURE — 83605 ASSAY OF LACTIC ACID: CPT

## 2021-05-11 PROCEDURE — 84145 PROCALCITONIN (PCT): CPT

## 2021-05-11 PROCEDURE — C1769: CPT

## 2021-05-11 PROCEDURE — 85025 COMPLETE CBC W/AUTO DIFF WBC: CPT

## 2021-05-11 PROCEDURE — 86341 ISLET CELL ANTIBODY: CPT

## 2021-05-11 PROCEDURE — 71045 X-RAY EXAM CHEST 1 VIEW: CPT

## 2021-05-11 PROCEDURE — 80307 DRUG TEST PRSMV CHEM ANLYZR: CPT

## 2021-05-11 PROCEDURE — 82435 ASSAY OF BLOOD CHLORIDE: CPT

## 2021-05-11 PROCEDURE — 85027 COMPLETE CBC AUTOMATED: CPT

## 2021-05-11 PROCEDURE — 85379 FIBRIN DEGRADATION QUANT: CPT

## 2021-05-11 PROCEDURE — 87635 SARS-COV-2 COVID-19 AMP PRB: CPT

## 2021-05-11 PROCEDURE — 99291 CRITICAL CARE FIRST HOUR: CPT | Mod: 25

## 2021-05-11 PROCEDURE — 83036 HEMOGLOBIN GLYCOSYLATED A1C: CPT

## 2021-05-11 PROCEDURE — 86900 BLOOD TYPING SEROLOGIC ABO: CPT

## 2021-05-11 PROCEDURE — 82330 ASSAY OF CALCIUM: CPT

## 2021-05-11 PROCEDURE — 82962 GLUCOSE BLOOD TEST: CPT

## 2021-05-11 PROCEDURE — 82947 ASSAY GLUCOSE BLOOD QUANT: CPT

## 2021-05-11 PROCEDURE — 84484 ASSAY OF TROPONIN QUANT: CPT

## 2021-05-11 PROCEDURE — 82803 BLOOD GASES ANY COMBINATION: CPT

## 2021-05-11 PROCEDURE — 99152 MOD SED SAME PHYS/QHP 5/>YRS: CPT

## 2021-05-11 PROCEDURE — 93458 L HRT ARTERY/VENTRICLE ANGIO: CPT

## 2021-05-11 PROCEDURE — 85610 PROTHROMBIN TIME: CPT

## 2021-05-11 PROCEDURE — U0005: CPT

## 2021-05-11 PROCEDURE — 93005 ELECTROCARDIOGRAM TRACING: CPT

## 2021-05-11 PROCEDURE — 84100 ASSAY OF PHOSPHORUS: CPT

## 2021-05-11 PROCEDURE — 83880 ASSAY OF NATRIURETIC PEPTIDE: CPT

## 2021-05-11 PROCEDURE — 80061 LIPID PANEL: CPT

## 2021-05-11 PROCEDURE — C1887: CPT

## 2021-05-11 PROCEDURE — 99153 MOD SED SAME PHYS/QHP EA: CPT

## 2021-05-11 PROCEDURE — U0003: CPT

## 2021-05-11 PROCEDURE — 84295 ASSAY OF SERUM SODIUM: CPT

## 2021-05-11 PROCEDURE — 85730 THROMBOPLASTIN TIME PARTIAL: CPT

## 2021-05-11 PROCEDURE — 93010 ELECTROCARDIOGRAM REPORT: CPT

## 2021-05-11 PROCEDURE — 80048 BASIC METABOLIC PNL TOTAL CA: CPT

## 2021-05-11 PROCEDURE — C8929: CPT

## 2021-05-11 PROCEDURE — 83735 ASSAY OF MAGNESIUM: CPT

## 2021-05-11 PROCEDURE — 99239 HOSP IP/OBS DSCHRG MGMT >30: CPT

## 2021-05-11 PROCEDURE — 81001 URINALYSIS AUTO W/SCOPE: CPT

## 2021-05-11 PROCEDURE — 80053 COMPREHEN METABOLIC PANEL: CPT

## 2021-05-11 PROCEDURE — 84681 ASSAY OF C-PEPTIDE: CPT

## 2021-05-11 PROCEDURE — 82009 KETONE BODYS QUAL: CPT

## 2021-05-11 PROCEDURE — 99232 SBSQ HOSP IP/OBS MODERATE 35: CPT

## 2021-05-11 PROCEDURE — 86901 BLOOD TYPING SEROLOGIC RH(D): CPT

## 2021-05-11 PROCEDURE — 84443 ASSAY THYROID STIM HORMONE: CPT

## 2021-05-11 PROCEDURE — 85014 HEMATOCRIT: CPT

## 2021-05-11 PROCEDURE — 36592 COLLECT BLOOD FROM PICC: CPT

## 2021-05-11 PROCEDURE — 86769 SARS-COV-2 COVID-19 ANTIBODY: CPT

## 2021-05-11 RX ORDER — ENOXAPARIN SODIUM 100 MG/ML
20 INJECTION SUBCUTANEOUS
Qty: 1 | Refills: 0
Start: 2021-05-11 | End: 2021-06-09

## 2021-05-11 RX ORDER — THIAMINE MONONITRATE (VIT B1) 100 MG
1 TABLET ORAL
Qty: 0 | Refills: 0 | DISCHARGE
Start: 2021-05-11

## 2021-05-11 RX ORDER — PANTOPRAZOLE SODIUM 20 MG/1
1 TABLET, DELAYED RELEASE ORAL
Qty: 30 | Refills: 0
Start: 2021-05-11 | End: 2021-06-09

## 2021-05-11 RX ORDER — METOPROLOL TARTRATE 50 MG
25 TABLET ORAL DAILY
Refills: 0 | Status: DISCONTINUED | OUTPATIENT
Start: 2021-05-11 | End: 2021-05-11

## 2021-05-11 RX ORDER — INSULIN GLARGINE 100 [IU]/ML
16 INJECTION, SOLUTION SUBCUTANEOUS AT BEDTIME
Refills: 0 | Status: DISCONTINUED | OUTPATIENT
Start: 2021-05-11 | End: 2021-05-11

## 2021-05-11 RX ORDER — METFORMIN HYDROCHLORIDE 850 MG/1
1 TABLET ORAL
Qty: 0 | Refills: 0 | DISCHARGE

## 2021-05-11 RX ORDER — APIXABAN 2.5 MG/1
5 TABLET, FILM COATED ORAL EVERY 12 HOURS
Refills: 0 | Status: DISCONTINUED | OUTPATIENT
Start: 2021-05-11 | End: 2021-05-11

## 2021-05-11 RX ORDER — APIXABAN 2.5 MG/1
1 TABLET, FILM COATED ORAL
Qty: 60 | Refills: 0
Start: 2021-05-11 | End: 2021-06-09

## 2021-05-11 RX ORDER — INSULIN LISPRO 100/ML
5 VIAL (ML) SUBCUTANEOUS
Qty: 1 | Refills: 0
Start: 2021-05-11 | End: 2021-06-09

## 2021-05-11 RX ORDER — LOSARTAN POTASSIUM 100 MG/1
1 TABLET, FILM COATED ORAL
Qty: 30 | Refills: 0
Start: 2021-05-11 | End: 2021-06-09

## 2021-05-11 RX ORDER — METFORMIN HYDROCHLORIDE 850 MG/1
500 TABLET ORAL
Refills: 0 | Status: DISCONTINUED | OUTPATIENT
Start: 2021-05-11 | End: 2021-05-11

## 2021-05-11 RX ORDER — LISINOPRIL 2.5 MG/1
1 TABLET ORAL
Qty: 0 | Refills: 0 | DISCHARGE

## 2021-05-11 RX ORDER — THIAMINE MONONITRATE (VIT B1) 100 MG
1 TABLET ORAL
Qty: 30 | Refills: 0
Start: 2021-05-11 | End: 2021-06-09

## 2021-05-11 RX ORDER — METOPROLOL TARTRATE 50 MG
1 TABLET ORAL
Qty: 0 | Refills: 0 | DISCHARGE
Start: 2021-05-11

## 2021-05-11 RX ORDER — PANTOPRAZOLE SODIUM 20 MG/1
1 TABLET, DELAYED RELEASE ORAL
Qty: 0 | Refills: 0 | DISCHARGE
Start: 2021-05-11

## 2021-05-11 RX ORDER — METFORMIN HYDROCHLORIDE 850 MG/1
1 TABLET ORAL
Qty: 0 | Refills: 0 | DISCHARGE
Start: 2021-05-11

## 2021-05-11 RX ORDER — ENOXAPARIN SODIUM 100 MG/ML
16 INJECTION SUBCUTANEOUS
Qty: 480 | Refills: 0
Start: 2021-05-11 | End: 2021-06-09

## 2021-05-11 RX ORDER — LOSARTAN POTASSIUM 100 MG/1
25 TABLET, FILM COATED ORAL DAILY
Refills: 0 | Status: DISCONTINUED | OUTPATIENT
Start: 2021-05-11 | End: 2021-05-11

## 2021-05-11 RX ORDER — METFORMIN HYDROCHLORIDE 850 MG/1
1 TABLET ORAL
Qty: 60 | Refills: 0
Start: 2021-05-11 | End: 2021-06-09

## 2021-05-11 RX ORDER — FOLIC ACID 0.8 MG
1 TABLET ORAL
Qty: 0 | Refills: 0 | DISCHARGE
Start: 2021-05-11

## 2021-05-11 RX ORDER — FOLIC ACID 0.8 MG
1 TABLET ORAL
Qty: 30 | Refills: 0
Start: 2021-05-11 | End: 2021-06-09

## 2021-05-11 RX ORDER — METOPROLOL TARTRATE 50 MG
1 TABLET ORAL
Qty: 30 | Refills: 0
Start: 2021-05-11 | End: 2021-06-09

## 2021-05-11 RX ADMIN — LOSARTAN POTASSIUM 25 MILLIGRAM(S): 100 TABLET, FILM COATED ORAL at 15:33

## 2021-05-11 RX ADMIN — Medication 2: at 17:00

## 2021-05-11 RX ADMIN — METFORMIN HYDROCHLORIDE 500 MILLIGRAM(S): 850 TABLET ORAL at 16:59

## 2021-05-11 RX ADMIN — APIXABAN 5 MILLIGRAM(S): 2.5 TABLET, FILM COATED ORAL at 16:56

## 2021-05-11 RX ADMIN — PANTOPRAZOLE SODIUM 40 MILLIGRAM(S): 20 TABLET, DELAYED RELEASE ORAL at 06:13

## 2021-05-11 RX ADMIN — Medication 1 TABLET(S): at 11:20

## 2021-05-11 RX ADMIN — Medication 25 MILLIGRAM(S): at 15:33

## 2021-05-11 RX ADMIN — Medication 2: at 07:50

## 2021-05-11 RX ADMIN — Medication 4 UNIT(S): at 07:50

## 2021-05-11 RX ADMIN — Medication 50 MILLIGRAM(S): at 06:13

## 2021-05-11 RX ADMIN — CHLORHEXIDINE GLUCONATE 1 APPLICATION(S): 213 SOLUTION TOPICAL at 06:14

## 2021-05-11 RX ADMIN — Medication 1 MILLIGRAM(S): at 11:20

## 2021-05-11 RX ADMIN — Medication 100 MILLIGRAM(S): at 11:20

## 2021-05-11 NOTE — PROGRESS NOTE ADULT - SUBJECTIVE AND OBJECTIVE BOX
Interval Events:  no overnight events  follow up on diabetes    patient seen and examined at bedside.  FS better  low cpeptide with glucose 180s  will need insulin on discharge      REVIEW OF SYSTEMS:    CONSTITUTIONAL: No fever, weight loss, or fatigue  EYES: No eye pain, visual disturbances, or discharge  ENMT:  No difficulty hearing, tinnitus, vertigo; No sinus or throat pain  NECK: No pain or stiffness  RESPIRATORY: No cough, wheezing, chills or hemoptysis; No shortness of breath  CARDIOVASCULAR: No chest pain, palpitations, dizziness, or leg swelling  GASTROINTESTINAL: No abdominal or epigastric pain. No nausea, vomiting, or hematemesis; No diarrhea or constipation. No melena or hematochezia.  NEUROLOGICAL: No headaches, memory loss, loss of strength, numbness, or tremors  SKIN: No itching, burning, rashes, or lesions   MUSCULOSKELETAL: No joint pain or swelling; No muscle, back, or extremity pain  PSYCHIATRIC: No depression, anxiety, mood swings, or difficulty sleeping        No Known Allergies      MEDICATIONS  (STANDING):  apixaban 5 milliGRAM(s) Oral every 12 hours  chlorhexidine 4% Liquid 1 Application(s) Topical <User Schedule>  dextrose 40% Gel 15 Gram(s) Oral once  dextrose 5%. 1000 milliLiter(s) (50 mL/Hr) IV Continuous <Continuous>  dextrose 5%. 1000 milliLiter(s) (100 mL/Hr) IV Continuous <Continuous>  dextrose 50% Injectable 25 Gram(s) IV Push once  dextrose 50% Injectable 12.5 Gram(s) IV Push once  dextrose 50% Injectable 25 Gram(s) IV Push once  folic acid 1 milliGRAM(s) Oral daily  glucagon  Injectable 1 milliGRAM(s) IntraMuscular once  insulin glargine Injectable (LANTUS) 20 Unit(s) SubCutaneous at bedtime  insulin lispro (ADMELOG) corrective regimen sliding scale   SubCutaneous Before meals and at bedtime  insulin lispro Injectable (ADMELOG) 4 Unit(s) SubCutaneous three times a day before meals  losartan 25 milliGRAM(s) Oral daily  metoprolol tartrate 25 milliGRAM(s) Oral daily  multivitamin 1 Tablet(s) Oral daily  pantoprazole    Tablet 40 milliGRAM(s) Oral before breakfast  thiamine 100 milliGRAM(s) Oral daily    MEDICATIONS  (PRN):      Vital Signs Last 24 Hrs  T(C): 36.9 (11 May 2021 09:26), Max: 36.9 (11 May 2021 09:26)  T(F): 98.4 (11 May 2021 09:26), Max: 98.4 (11 May 2021 09:26)  HR: 62 (11 May 2021 09:26) (62 - 84)  BP: 103/69 (11 May 2021 09:26) (100/57 - 119/72)  BP(mean): --  RR: 18 (11 May 2021 09:26) (16 - 18)  SpO2: 100% (11 May 2021 09:26) (98% - 100%)    Physical Exam:    Constitutional: NAD, well-developed  HEENT: EOMI, no exophalmos  Neck: trachea midline, no thyroid enlargement  Respiratory: CTAB, normal respirations  Cardiovascular: S1 and S2, RRR  Gastrointestinal: BS+, soft, ntnd  Extremities: No peripheral edema  Neurological: AOx3, no focal deficits  Psychiatric: Normal mood and normal affect  Skin: no rashes, no acanthosis    LABS  05-11    140  |  103  |  5.0<L>  ----------------------------<  183<H>  4.0   |  26.0  |  0.54    Ca    9.6      11 May 2021 09:14    TPro  6.3<L>  /  Alb  3.4  /  TBili  0.9  /  DBili  x   /  AST  61<H>  /  ALT  72<H>  /  AlkPhos  90  05-10                          11.0   3.36  )-----------( 158      ( 11 May 2021 09:46 )             33.8     Cholesterol, Serum: 155 mg/dL (05-09-21 @ 08:52)  Triglycerides, Serum: 82 mg/dL (05-09-21 @ 08:52)  HDL Cholesterol, Serum: 47 mg/dL (05-09-21 @ 08:52)    A1C with Estimated Average Glucose Result: 9.6 % (05-07-21 @ 05:21)  CT Abdomen and Pelvis No Cont:    EXAM:  CT ABDOMEN AND PELVIS                          PROCEDURE DATE:  05/06/2021          INTERPRETATION:  CLINICAL INFORMATION: Diabetic ketoacidosis status post alcohol abuse, generalized abdominal pain    COMPARISON: CT abdomen and pelvis 12/11/2019    CONTRAST/COMPLICATIONS:  IV Contrast: None  Oral Contrast: None  Complications: None    PROCEDURE:  CT of the Abdomen and Pelvis was performed.  Sagittal and coronal reformats were performed.    FINDINGS:  LOWER CHEST: Clear.    LIVER: Diffuse steatosis.  BILE DUCTS: Nondilated.  GALLBLADDER: Stones and sludge.  SPLEEN: Normal.  PANCREAS: Diffuse atrophy.  ADRENALS: Normal.  KIDNEYS/URETERS: No hydronephrosis or urinary tract calculi.    BLADDER: Normal.  REPRODUCTIVE ORGANS: Nonenlarged.    BOWEL: No bowel-related abnormality. Specifically, no evidence of acute diverticulitis. Normal appendix and ileocecal region. No bowel obstruction or bowel inflammation.  PERITONEUM: No free air or ascites.  VESSELS: Normal caliber aorta.  RETROPERITONEUM/LYMPH NODES: No adenopathy.  ABDOMINAL WALL: Normal.  BONES: No acute bony abnormality.    IMPRESSION:  *  No acute pathology.            RACHAEL BLACK MD; Attending Radiologist  This document has been electronically signed. May  6 2021  8:43PM (05-06-21 @ 20:30)  A1C with Estimated Average Glucose Result: 10.0 % (05-06-21 @ 17:32)  Thyroid Stimulating Hormone, Serum: 0.64 uIU/mL (05-06-21 @ 15:23)    Aspartate Aminotransferase (AST/SGOT): 61 U/L (05-10-21 @ 08:19)  Alkaline Phosphatase, Serum: 90 U/L (05-10-21 @ 08:19)  Alanine Aminotransferase (ALT/SGPT): 72 U/L (05-10-21 @ 08:19)  Albumin, Serum: 3.4 g/dL (05-10-21 @ 08:19)          CAPILLARY BLOOD GLUCOSE      POCT Blood Glucose.: 78 mg/dL (11 May 2021 11:17)  POCT Blood Glucose.: 173 mg/dL (11 May 2021 07:49)  POCT Blood Glucose.: 375 mg/dL (10 May 2021 22:38)

## 2021-05-11 NOTE — PROGRESS NOTE ADULT - SUBJECTIVE AND OBJECTIVE BOX
CARMEN JOYCE    [id number]    30y      Male    CC: sycnope    Pt seen and examined at bedside  Pt sitting in chair, no medical complaints other than general fatigue  Pt was suppose to have DCCV/HARRY yesterday however it was cancelled after pt converted to NSR. S/p LHC yest 5/10, normal coronaries  Denies headache, dizziness, n/v, fever, chills, cough, SOB, chest pain, abdmoinal pain  ROS neg  VSS     Vital Signs Last 24 Hrs  T(C): 36.9 (11 May 2021 09:26), Max: 36.9 (11 May 2021 09:26)  T(F): 98.4 (11 May 2021 09:26), Max: 98.4 (11 May 2021 09:26)  HR: 62 (11 May 2021 09:26) (62 - 84)  BP: 103/69 (11 May 2021 09:26) (91/58 - 119/72)  BP(mean): --  RR: 18 (11 May 2021 09:26) (16 - 18)  SpO2: 100% (11 May 2021 09:26) (96% - 100%) on RA    PHYSICAL EXAM:  GENERAL: NAD, well-groomed  HEENT: PERRL, +EOMI  NECK: soft, Supple, No JVD,   CHEST/LUNG: Clear to percussion bilaterally; No wheezing  HEART: S1S2+, Regular rate and rhythm; No murmur  ABDOMEN: Soft, Nontender, Nondistended; Bowel sounds present  EXTREMITIES:   No clubbing, cyanosis, or edema  SKIN: No rashes or lesions  NEURO: AAOX3, no tremors    LABS:                                   11.0   3.36  )-----------( 158      ( 11 May 2021 09:46 )             33.8   05-11    140  |  103  |  5.0<L>  ----------------------------<  183<H>  4.0   |  26.0  |  0.54    Ca    9.6      11 May 2021 09:14    TPro  6.3<L>  /  Alb  3.4  /  TBili  0.9  /  DBili  x   /  AST  61<H>  /  ALT  72<H>  /  AlkPhos  90  05-10      MEDICATIONS  (STANDING):  aspirin  chewable 81 milliGRAM(s) Oral once  chlorhexidine 4% Liquid 1 Application(s) Topical <User Schedule>  dextrose 40% Gel 15 Gram(s) Oral once  dextrose 5%. 1000 milliLiter(s) (50 mL/Hr) IV Continuous <Continuous>  dextrose 5%. 1000 milliLiter(s) (100 mL/Hr) IV Continuous <Continuous>  dextrose 50% Injectable 25 Gram(s) IV Push once  dextrose 50% Injectable 12.5 Gram(s) IV Push once  dextrose 50% Injectable 25 Gram(s) IV Push once  folic acid 1 milliGRAM(s) Oral daily  glucagon  Injectable 1 milliGRAM(s) IntraMuscular once  insulin glargine Injectable (LANTUS) 20 Unit(s) SubCutaneous at bedtime  insulin lispro (ADMELOG) corrective regimen sliding scale   SubCutaneous Before meals and at bedtime  insulin lispro Injectable (ADMELOG) 4 Unit(s) SubCutaneous three times a day before meals  metoprolol tartrate 50 milliGRAM(s) Oral every 8 hours  multivitamin 1 Tablet(s) Oral daily  pantoprazole    Tablet 40 milliGRAM(s) Oral before breakfast  thiamine 100 milliGRAM(s) Oral daily      RADIOLOGY & ADDITIONAL TESTS: REVIEWED

## 2021-05-11 NOTE — PROGRESS NOTE ADULT - PROVIDER SPECIALTY LIST ADULT
Cardiology
Critical Care
Hospitalist
Cardiology
Cardiology
Electrophysiology
Hospitalist
Hospitalist
MICU
Cardiology
Hospitalist
Cardiology
Cardiology
Electrophysiology
Endocrinology

## 2021-05-11 NOTE — PROGRESS NOTE ADULT - NSICDXPILOT_GEN_ALL_CORE
[place]

## 2021-05-11 NOTE — PROGRESS NOTE ADULT - SUBJECTIVE AND OBJECTIVE BOX
[place] [facility]                                                               Office: [address] [address]                                                              Telephone: [phone]. Fax:[phone]                                                                             PROGRESS NOTE  Reason for follow up:   Overnight: No new events.   Update:     Subjective: "  ______________________"      Review of symptoms:   Cardiac:  No chest pain. No dyspnea. No palpitations.  Respiratory:no cough. No dyspnea  Gastrointestinal: No diarrhea. No abdominal pain. No bleeding.   Neuro: No focal neuro complaints.      Vitals:  T(C): 36.7 (05-11-21 @ 05:00), Max: 36.9 (05-10-21 @ 10:38)  HR: 63 (05-11-21 @ 05:00) (63 - 112)  BP: 119/72 (05-11-21 @ 05:00) (91/58 - 119/72)  RR: 18 (05-11-21 @ 05:00) (16 - 20)  SpO2: 98% (05-11-21 @ 05:00) (96% - 100%)  Wt(kg): --  I&O's Summary    Weight (kg): 72.121 (05-10 @ 13:12)      PHYSICAL EXAM:  Appearance: Comfortable. No acute distress  HEENT:  Atraumatic. Normocephalic.  Normal oral mucosa, PERRL, Neck is supple. No carotid bruit.   Neurologic: A & O x 3, no focal deficits. EOMI.  Cardiovascular: Normal S1 S2, No murmur, rubs/gallops. No JVD, No edema  Respiratory: Lungs clear to auscultation, unlabored   Gastrointestinal:  Soft, Non-tender, + BS  Lower Extremities: No edema  Psychiatry: Patient is calm. No agitation. Mood & affect appropriate  Skin: No rashes/ ecchymoses/cyanosis/ulcers visualized on the face, hands or feet.      CURRENT MEDICATIONS:  metoprolol tartrate 50 milliGRAM(s) Oral every 8 hours    pantoprazole    Tablet  dextrose 40% Gel  dextrose 50% Injectable  dextrose 50% Injectable  dextrose 50% Injectable  glucagon  Injectable  insulin glargine Injectable (LANTUS)  insulin lispro (ADMELOG) corrective regimen sliding scale  insulin lispro Injectable (ADMELOG)  chlorhexidine 4% Liquid  dextrose 5%.  dextrose 5%.  folic acid  multivitamin  sodium chloride 0.9% Bolus  thiamine      DIAGNOSTIC TESTING:  [ ] Echocardiogram:   [ ]  Catheterization:  [ ] Stress Test:    OTHER: 	      LABS:	 	                            12.9   3.56  )-----------( 128      ( 10 May 2021 08:19 )             38.6     05-10    140  |  108<H>  |  5.0<L>  ----------------------------<  129<H>  4.0   |  22.0  |  0.39<L>    Ca    9.3      10 May 2021 08:19    TPro  6.3<L>  /  Alb  3.4  /  TBili  0.9  /  DBili  x   /  AST  61<H>  /  ALT  72<H>  /  AlkPhos  90  05-10    proBNP: Serum Pro-Brain Natriuretic Peptide: 112 pg/mL (05-06 @ 17:33)    Lipid Profile: Date: 05-09 @ 08:52  Total cholesterol 155; Direct LDL: --; HDL: 47; Triglycerides:82    HgA1c:   TSH: Thyroid Stimulating Hormone, Serum: 0.64 uIU/mL        TELEMETRY: Reviewed    ECG:  Reviewed by me. 	                                                                      [place] [facility]                                                               Office: [address] [address]                                                              Telephone: [phone]. Fax:[phone]                                                                             PROGRESS NOTE  Reason for follow up: Afib/HF  Overnight: No new events.   Update: s/p LHC- normal cors, EF 55% on cath. Pt converted to SR 5/10. tele- SR ST up to 130. no afib noted.    R radial site clean dry no bleeding no hematoma.    Review of symptoms:   Cardiac:  No chest pain. No dyspnea. No palpitations.  Respiratory: no cough. No dyspnea  Gastrointestinal: No diarrhea. No abdominal pain. No bleeding.   Neuro: No focal neuro complaints.      Vitals:  T(C): 36.7 (05-11-21 @ 05:00), Max: 36.9 (05-10-21 @ 10:38)  HR: 63 (05-11-21 @ 05:00) (63 - 112)  BP: 119/72 (05-11-21 @ 05:00) (91/58 - 119/72)  RR: 18 (05-11-21 @ 05:00) (16 - 20)  SpO2: 98% (05-11-21 @ 05:00) (96% - 100%)    Weight (kg): 72.121 (05-10 @ 13:12)    PHYSICAL EXAM:  Appearance: Comfortable. No acute distress  HEENT:  Atraumatic. Normocephalic.  Normal oral mucosa, PERRL  Neurologic: A & O x 3, no focal deficits. EOMI.  Cardiovascular: Normal S1 S2, No murmur, rubs/gallops.   Respiratory: Lungs clear to auscultation, unlabored   Gastrointestinal:  Soft, Non-tender, + BS  Lower Extremities: No edema  Psychiatry: Patient is calm. No agitation. Mood & affect appropriate  Skin: No rashes/ ecchymoses/cyanosis/ulcers visualized on the face, hands or feet.      CURRENT MEDICATIONS:  metoprolol tartrate 50 milliGRAM(s) Oral every 8 hours    pantoprazole    Tablet  dextrose 40% Gel  dextrose 50% Injectable  glucagon  Injectable  insulin glargine Injectable (LANTUS)  insulin lispro (ADMELOG) corrective regimen sliding scale  insulin lispro Injectable (ADMELOG)  chlorhexidine 4% Liquid  dextrose 5%.  folic acid  multivitamin  sodium chloride 0.9% Bolus  thiamine      DIAGNOSTIC TESTING:  [ ] Echocardiogram:   < from: TTE Echo Complete w/ Contrast w/ Doppler (05.06.21 @ 21:14) >    Summary:   1. Left ventricular ejection fraction, by visual estimation, is 40 to 45%.   2. Technically difficult study.   3. Mildly decreased global left ventricular systolic function.   4. The left ventricular diastolic function could not be assessed in this study.   5. EF% difficult to determine due to afib. By visual estimation, EF appears to be about 40-45%.   6. Normal left atrial size.   7. Normal right atrial size.   8. Mild thickening of the anterior and posterior mitral valve leaflets.   9. Trace mitral valve regurgitation.  10. Endocardial visualization was enhanced with intravenous echo contrast.    MD Leo Electronically signed on 5/7/2021 at 11:59:34 AM      < end of copied text >    [ ]  Catheterization:  normal Cors- full report pending     LABS:	 	                     12.9   3.56  )-----------( 128      ( 10 May 2021 08:19 )             38.6     05-10    140  |  108<H>  |  5.0<L>  ----------------------------<  129<H>  4.0   |  22.0  |  0.39<L>    Ca    9.3      10 May 2021 08:19    TPro  6.3<L>  /  Alb  3.4  /  TBili  0.9  /  DBili  x   /  AST  61<H>  /  ALT  72<H>  /  AlkPhos  90  05-10    proBNP: Serum Pro-Brain Natriuretic Peptide: 112 pg/mL (05-06 @ 17:33)    Lipid Profile: Date: 05-09 @ 08:52  Total cholesterol 155; Direct LDL: --; HDL: 47; Triglycerides:82    TSH: Thyroid Stimulating Hormone, Serum: 0.64 uIU/mL    TELEMETRY: SR, ST

## 2021-05-11 NOTE — PROGRESS NOTE ADULT - REASON FOR ADMISSION
Delirium tremens and DKA

## 2021-05-11 NOTE — PROGRESS NOTE ADULT - ASSESSMENT
30M with pmhx of ETOH abuse w/ etoh w/drawal admissions in the past, T2DM, HTN, and pancreatitis presented to the ER for lower right abdominal pain that radiated to the back associated with SOB, found to have afib rvr. In the ER, pt was noted to have FS >530, moderate acetone, and was in metabolic acidosis. Pt received a total of Librium 50 mg, 2 mg Ativan, valium 40 mg, was started on insulin @7 units/hr, and Lopressor 35 mg. He was given 2L IV bolus of NaCl and currently receiving 2L IV bolus of LR.   ICU consult was called for worsening mental status, DT's, and DKA. Pt was seen and examined at bedside, laying uncomfortable in bed picking at things in mid air/likely halucinating.  Unable to obtain a thorough history from pt due to his AMS and delirium. Pt's BP was 178/77, tachycardic, and Kussmaul breathing.   Consult for diabetes mgmt. A1c 9.6    Uncontrolled T2DM- FS reasonable  -A1c 9.6  -check sugars AC and bedtime  -ensure diabetic diet  -continue with insulin sliding scale  -change to lantus 16 units  -dc lispro,re start metformin 500mg BID  -hx of pancreatitis, likely from EtOH, not candidate for glp1 or dpp4 (potential for relapse)  -likely will need basal insulin at home with metformin   -pending PADMINI  -cpeptide 0.7 with glucose 180s    Afib w/ rvr- on BB, care per primary team and cardiology, s/p cath today    EtOH abuse- care per primary team. counseled severely     30M with pmhx of ETOH abuse w/ etoh w/drawal admissions in the past, T2DM, HTN, and pancreatitis presented to the ER for lower right abdominal pain that radiated to the back associated with SOB, found to have afib rvr. In the ER, pt was noted to have FS >530, moderate acetone, and was in metabolic acidosis. Pt received a total of Librium 50 mg, 2 mg Ativan, valium 40 mg, was started on insulin @7 units/hr, and Lopressor 35 mg. He was given 2L IV bolus of NaCl and currently receiving 2L IV bolus of LR.   ICU consult was called for worsening mental status, DT's, and DKA. Pt was seen and examined at bedside, laying uncomfortable in bed picking at things in mid air/likely halucinating.  Unable to obtain a thorough history from pt due to his AMS and delirium. Pt's BP was 178/77, tachycardic, and Kussmaul breathing.   Consult for diabetes mgmt. A1c 9.6    Uncontrolled T2DM- FS reasonable  -A1c 9.6  -check sugars AC and bedtime  -ensure diabetic diet  -continue with insulin sliding scale  -change to lantus 16 units  -dc lispro, restart metformin 500mg BID due to concern for relapse in EtoH use, warned patient that he would need to go on lifelong insulin 4x daily if he continues to drink EtOH as his insulin stores are low  -hx of pancreatitis, likely from EtOH, not candidate for glp1 or dpp4 (potential for relapse)  -pending PADMINI  -cpeptide 0.7 with glucose 180s    Afib w/ rvr- on BB, care per primary team and cardiology, s/p cath today    EtOH abuse- care per primary team. counseled severely

## 2021-05-11 NOTE — CHART NOTE - NSCHARTNOTEFT_GEN_A_CORE
PA event note    Discussed w/ GERARDO DM Educator. Plan for DM home meds on discharge are lantus 20U QHS and premeal 5U TID. DC metforming on discharge PA event note    Discussed w/ GERARDO DM Educator. Plan for DM home meds on discharge are lantus 20U QHS and premeal 5U TID. DC metforming on discharge    Endocrine was consulted - will follow recs. pt needs close f/u in office post discharge.

## 2021-05-11 NOTE — ADVANCED PRACTICE NURSE CONSULT - ASSESSMENT
went to see pt in am pt is a+ox3 c/o 0 pain, pt states he stopped taking insulin because he run out of it. pt was educated about the importance of taking insulin as prescribed and plan ahead in order not to run out of supply. pt was educated about the importance of not consuming alcohol while on insulin and the importance of using insulin @ this time wo metformin due to the use of alcohol. pt verbalized understanding. pt verbalized understanding. written material about diabetes self management education.

## 2021-05-11 NOTE — DISCHARGE NOTE NURSING/CASE MANAGEMENT/SOCIAL WORK - PATIENT PORTAL LINK FT
You can access the FollowMyHealth Patient Portal offered by [facility] by registering at the following website: [email or web address]. By joining [employer]’s FollowMyHealth portal, you will also be able to view your health information using other applications (apps) compatible with our system.

## 2021-05-11 NOTE — PROGRESS NOTE ADULT - ASSESSMENT
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 Admitted to ICU with severe DKA with ph 6.8. Treated with insulin drip. Cardiology consulted for new a.fib with RVR. Anion gap closed and transitioned to SQ insulin on 5/7 and subsequently downgraded to tele bed to hospitalist service on 05/08.     # Severe DKA in pt with DMT2   - Resolved s/p insulin drip   - C/w lantus 20, ac 4 units  - Discussed w/ pt importance of compliancy. Per pt he stopped taking his insulin as he ran out at home.   - Discussed DM regimen w/ DM Educator for discharge. Plan will be to discontinue metformin on discharge and start lantus 20U QHS and continue 5U TID premeal  - Endo consul appreciated     # New a.fib with RVR with mild cardiomyopathy  - Plan was for HARRY/DCCV 5/10 w/ EP however cancelled after pt converted to NSR  - S/p LHC 5/10 w/ cardio, normal coronaries  - Awaiting final cardio plan in terms of AC needs on discharge  - C/w Toprol 25mg QD     #HFrEF   - EF 40-45%.   - Likely secondary to tachyarrhythmia vs. EtOH abuse  - [facility]- normal cors; LV Gram on Cath EF 55%   - Continue toprolol 25mg QD  - Losartan 25mg QD started per cards    # Alcohol abuse and alcohol withdrawal   - Low CIWA, 1-2. No further signs of withdrawal at this time  - Counseled at bed side, SW provider list of rehabs  - C/w Librium mercy  - C/w mv, Mg, folic acid and thiamine as per CIWA protocol      #DVT ppx - on lovenox as above    CODE -full    DISPO: DC home pending cardio plan.      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 Admitted to ICU with severe DKA with ph 6.8. Treated with insulin drip. Cardiology consulted for new a.fib with RVR. Anion gap closed and transitioned to SQ insulin on 5/7 and subsequently downgraded to tele bed to hospitalist service on 05/08.     # Severe DKA in pt with DMT2   - Resolved s/p insulin drip   - C/w lantus 20, ac 4 units  - Discussed w/ pt importance of compliancy. Per pt he stopped taking his insulin as he ran out at home.   - Discussed DM regimen w/ DM Educator for discharge. Plan will be to discontinue metformin on discharge and start lantus 20U QHS and continue 5U TID premeal  - Endo consul appreciated     # New a.fib with RVR with mild cardiomyopathy  - Plan was for HARRY/DCCV 5/10 w/ EP however cancelled after pt converted to NSR  - S/p LHC 5/10 w/ cardio, normal coronaries  - Awaiting final cardio plan in terms of AC needs on discharge, for now continue w/ Eliquis   - C/w Toprol 25mg QD     #HFrEF   - EF 40-45%.   - Likely secondary to tachyarrhythmia vs. EtOH abuse  - [facility]- normal cors; LV Gram on Cath EF 55%   - Continue toprolol 25mg QD  - Losartan 25mg QD started per cards    # Alcohol abuse and alcohol withdrawal   - Low CIWA, 1-2. No further signs of withdrawal at this time  - Counseled at bed side,  provider list of rehabs  - C/w Librium mercy  - C/w mv, Mg, folic acid and thiamine as per CIWA protocol      #DVT ppx - on lovenox as above    CODE -full    DISPO: Monitor on tele overnight and if no further events likely DC tomorrow

## 2021-05-11 NOTE — PROGRESS NOTE ADULT - ASSESSMENT
30M hx Alcohol abuse (he drinks every other day last drink yesterday) with h/o seizures for detoxing in the past, Diabetes, HTN, Pancreatitis who started feeling his heart racing  today with associated SOB. Pt states he lives with his parent and EMS was called. DKA    DM and HTN for > 4 years. Also heavy alcohol abuse since he was 17. Now he is admitted with DKA and alcohol withdrawal. Noted to be in new AFIB with RVR, now improved after IVF.   Admitted to fast and strong heart beating with exertion, but no palpitation. Denies CP, SOB, orthopnea, leg edema, PND. He had 2 postural syncope episodes.     5/7-9: Remains in AF with RVR, on metoprolol 50 mg q 8 and PRN IV digoxin. TTE showed mild LV dysfunction. Feeling much better with no palpitation, CP or SOB. BG is under much better control  5/10- converted to sinus rhythm. now for LHC. Feeling much better, BP is soft  5/11- S/P LHC- normal Cors- SR ST on monitor. no futher Afib overnight     New onset AF with RVR  - converted to SR- 5/10- no HARRY/DCCV  - metoprolol to 50mg PO q8.   - Plan for HARRY/DCCV on tomorrow 05/10/21.     HFrEF   - EF 40-45%. -   - likely secondary to tachyarrhythmia vs. EtOH abuse.   - LHC- normal cors; LV Gram on Cath EF 55%, f/u TTE as out patient  - Continue metoprolol, will transition to XL prior to D/C.     ETOH withdrawal  - As per PMT  - Pt reading AA book this am. States in/out rehab twice 3-4 years ago, and in July  - Is willing to try again. Lives with parents    DM   - Possible DKA  - As per PMT   30M hx Alcohol abuse (he drinks every other day last drink yesterday) with h/o seizures for detoxing in the past, Diabetes, HTN, Pancreatitis who started feeling his heart racing  today with associated SOB. Pt states he lives with his parent and EMS was called. DKA    DM and HTN for > 4 years. Also heavy alcohol abuse since he was 17. Now he is admitted with DKA and alcohol withdrawal. Noted to be in new AFIB with RVR, now improved after IVF.   Admitted to fast and strong heart beating with exertion, but no palpitation. Denies CP, SOB, orthopnea, leg edema, PND. He had 2 postural syncope episodes.     5/7-9: Remains in AF with RVR, on metoprolol 50 mg q 8 and PRN IV digoxin. TTE showed mild LV dysfunction. Feeling much better with no palpitation, CP or SOB. BG is under much better control  5/10- converted to sinus rhythm. now for LHC. Feeling much better, BP is soft  5/11- S/P LHC- normal Cors- SR ST on monitor. no futher Afib overnight     New onset AF with RVR  - converted to SR- 5/10- no HARRY/DCCV  - metoprolol to 50mg PO q8.   - Plan for HARRY/DCCV on tomorrow 05/10/21.     HFrEF   - EF 40-45%. -   - likely secondary to tachyarrhythmia vs. EtOH abuse.   - LHC- normal cors; LV Gram on Cath EF 55%, f/u TTE as out patient  - Continue metoprolol, will transition to XL prior to D/C.   - Start low dose ARB, losartan 25mg PO daily    ETOH withdrawal  - As per PMT  - Pt reading AA book this am. States in/out rehab twice 3-4 years ago, and in July  - Is willing to try again. Lives with parents    DM   - as per primary team   - As per PMT

## 2021-05-11 NOTE — PROGRESS NOTE ADULT - ATTENDING COMMENTS
Pt is seen, examined, chart reviewed, d/w np/pa.  Management as outlined above.  New onset AF with RVR  New onset CM, unknown etiology, tach mediated?  TTE- EF 40-45%   Telemetry Monitoring- Afib/Afib RVR  EP following  cont metoprolol PO, IVP sustained HR > 140  -will need [facility], [facility]  IF remains in Afib over weekend, will plan for HARRY/DCCV monday
-s/p 1mg IV digoxin load yesterday still with Afib RVR 150s whenever he moves/walk, continue increase metoprolol dose as BP tolerate, eventual addition of ACE/ARB after HARRY/CV for cardiomyopathy, suspected cause due to diabetes/EtOH/Afib   -for [facility] and HARRY/CV tomorrow        Darrin Chavez DO, [facility]  Faculty Non-Invasive Cardiologist  [phone]
-still with Afib RVR adding digoxin load, continue metoprolol  -plan for LHC on Monday and HARRY/DCCV, hold LMWH on Monday morning  -continue Librium taper, mood stable   -check lipid panel, add statin for diabetes if LDL >70  -eventual addition of ACE/ARB for HFrEF/diabetes after rhythm control   -would benefit from SGLT-2 inhibitor as well      Darrin Chavez DO, [facility]  Faculty Non-Invasive Cardiologist  [phone]
pt seen and examined at bedside   Yesterday HARRY/DCCv was cancelled a s pt converted to NSR - today - he is back to AFIb - Hr in 70s - iovernight 130s at times   Discussed with EP - ct to monitor overnight, start eliquis - risks vs benefits discussed.   [facility] - wnl - cardio signed off.  poorly controlled DM   Alcohol abuse   ct to monitor on tele -  Dispo -planning for tomorrow - if remains stable for AFib standpoint.

## 2021-05-12 LAB — C PEPTIDE SERPL-MCNC: 0.7 NG/ML — LOW (ref 1.1–4.4)

## 2021-05-17 LAB — GAD65 AB SER-MCNC: 0 NMOL/L — SIGNIFICANT CHANGE UP

## 2021-05-24 ENCOUNTER — INPATIENT (INPATIENT)
Facility: HOSPITAL | Age: 31
LOS: 1 days | Discharge: LEFT BEFORE DISCHARGE | DRG: 894 | End: 2021-05-26
Attending: HOSPITALIST | Admitting: HOSPITALIST
Payer: MEDICAID

## 2021-05-24 VITALS
TEMPERATURE: 98 F | OXYGEN SATURATION: 95 % | SYSTOLIC BLOOD PRESSURE: 126 MMHG | DIASTOLIC BLOOD PRESSURE: 76 MMHG | HEIGHT: 67 IN | RESPIRATION RATE: 18 BRPM | WEIGHT: 160.06 LBS | HEART RATE: 96 BPM

## 2021-05-24 PROCEDURE — 99285 EMERGENCY DEPT VISIT HI MDM: CPT

## 2021-05-24 PROCEDURE — 93010 ELECTROCARDIOGRAM REPORT: CPT

## 2021-05-25 DIAGNOSIS — F10.239 ALCOHOL DEPENDENCE WITH WITHDRAWAL, UNSPECIFIED: ICD-10-CM

## 2021-05-25 LAB
ALBUMIN SERPL ELPH-MCNC: 3.4 G/DL — SIGNIFICANT CHANGE UP (ref 3.3–5.2)
ALBUMIN SERPL ELPH-MCNC: 4 G/DL — SIGNIFICANT CHANGE UP (ref 3.3–5.2)
ALP SERPL-CCNC: 73 U/L — SIGNIFICANT CHANGE UP (ref 40–120)
ALP SERPL-CCNC: 88 U/L — SIGNIFICANT CHANGE UP (ref 40–120)
ALT FLD-CCNC: 18 U/L — SIGNIFICANT CHANGE UP
ALT FLD-CCNC: 23 U/L — SIGNIFICANT CHANGE UP
ANION GAP SERPL CALC-SCNC: 11 MMOL/L — SIGNIFICANT CHANGE UP (ref 5–17)
ANION GAP SERPL CALC-SCNC: 9 MMOL/L — SIGNIFICANT CHANGE UP (ref 5–17)
ANISOCYTOSIS BLD QL: SLIGHT — SIGNIFICANT CHANGE UP
APTT BLD: 35.3 SEC — SIGNIFICANT CHANGE UP (ref 27.5–35.5)
AST SERPL-CCNC: 18 U/L — SIGNIFICANT CHANGE UP
AST SERPL-CCNC: 23 U/L — SIGNIFICANT CHANGE UP
BASOPHILS # BLD AUTO: 0 K/UL — SIGNIFICANT CHANGE UP (ref 0–0.2)
BASOPHILS NFR BLD AUTO: 0 % — SIGNIFICANT CHANGE UP (ref 0–2)
BILIRUB DIRECT SERPL-MCNC: 0.1 MG/DL — SIGNIFICANT CHANGE UP (ref 0–0.3)
BILIRUB INDIRECT FLD-MCNC: 0.2 MG/DL — SIGNIFICANT CHANGE UP (ref 0.2–1)
BILIRUB SERPL-MCNC: 0.3 MG/DL — LOW (ref 0.4–2)
BILIRUB SERPL-MCNC: 0.3 MG/DL — LOW (ref 0.4–2)
BUN SERPL-MCNC: 4 MG/DL — LOW (ref 8–20)
BUN SERPL-MCNC: 5 MG/DL — LOW (ref 8–20)
CALCIUM SERPL-MCNC: 7.8 MG/DL — LOW (ref 8.6–10.2)
CALCIUM SERPL-MCNC: 8.8 MG/DL — SIGNIFICANT CHANGE UP (ref 8.6–10.2)
CHLORIDE SERPL-SCNC: 111 MMOL/L — HIGH (ref 98–107)
CHLORIDE SERPL-SCNC: 113 MMOL/L — HIGH (ref 98–107)
CO2 SERPL-SCNC: 20 MMOL/L — LOW (ref 22–29)
CO2 SERPL-SCNC: 23 MMOL/L — SIGNIFICANT CHANGE UP (ref 22–29)
CREAT SERPL-MCNC: 0.47 MG/DL — LOW (ref 0.5–1.3)
CREAT SERPL-MCNC: 0.7 MG/DL — SIGNIFICANT CHANGE UP (ref 0.5–1.3)
ELLIPTOCYTES BLD QL SMEAR: SLIGHT — SIGNIFICANT CHANGE UP
EOSINOPHIL # BLD AUTO: 0.2 K/UL — SIGNIFICANT CHANGE UP (ref 0–0.5)
EOSINOPHIL NFR BLD AUTO: 3.6 % — SIGNIFICANT CHANGE UP (ref 0–6)
ETHANOL SERPL-MCNC: 388 MG/DL — HIGH (ref 0–9)
GIANT PLATELETS BLD QL SMEAR: PRESENT — SIGNIFICANT CHANGE UP
GLUCOSE BLDC GLUCOMTR-MCNC: 111 MG/DL — HIGH (ref 70–99)
GLUCOSE BLDC GLUCOMTR-MCNC: 58 MG/DL — LOW (ref 70–99)
GLUCOSE BLDC GLUCOMTR-MCNC: 66 MG/DL — LOW (ref 70–99)
GLUCOSE BLDC GLUCOMTR-MCNC: 92 MG/DL — SIGNIFICANT CHANGE UP (ref 70–99)
GLUCOSE SERPL-MCNC: 136 MG/DL — HIGH (ref 70–99)
GLUCOSE SERPL-MCNC: 82 MG/DL — SIGNIFICANT CHANGE UP (ref 70–99)
HCT VFR BLD CALC: 35.5 % — LOW (ref 39–50)
HGB BLD-MCNC: 11.4 G/DL — LOW (ref 13–17)
INR BLD: 0.99 RATIO — SIGNIFICANT CHANGE UP (ref 0.88–1.16)
LIDOCAIN IGE QN: 5 U/L — LOW (ref 22–51)
LYMPHOCYTES # BLD AUTO: 2.6 K/UL — SIGNIFICANT CHANGE UP (ref 1–3.3)
LYMPHOCYTES # BLD AUTO: 46.9 % — HIGH (ref 13–44)
MACROCYTES BLD QL: SLIGHT — SIGNIFICANT CHANGE UP
MAGNESIUM SERPL-MCNC: 1.5 MG/DL — LOW (ref 1.6–2.6)
MANUAL SMEAR VERIFICATION: SIGNIFICANT CHANGE UP
MCHC RBC-ENTMCNC: 32.1 GM/DL — SIGNIFICANT CHANGE UP (ref 32–36)
MCHC RBC-ENTMCNC: 32.6 PG — SIGNIFICANT CHANGE UP (ref 27–34)
MCV RBC AUTO: 101.4 FL — HIGH (ref 80–100)
MONOCYTES # BLD AUTO: 0.59 K/UL — SIGNIFICANT CHANGE UP (ref 0–0.9)
MONOCYTES NFR BLD AUTO: 10.6 % — SIGNIFICANT CHANGE UP (ref 2–14)
NEUTROPHILS # BLD AUTO: 1.52 K/UL — LOW (ref 1.8–7.4)
NEUTROPHILS NFR BLD AUTO: 27.4 % — LOW (ref 43–77)
OVALOCYTES BLD QL SMEAR: SLIGHT — SIGNIFICANT CHANGE UP
PHOSPHATE SERPL-MCNC: 3.9 MG/DL — SIGNIFICANT CHANGE UP (ref 2.4–4.7)
PLAT MORPH BLD: NORMAL — SIGNIFICANT CHANGE UP
PLATELET # BLD AUTO: 262 K/UL — SIGNIFICANT CHANGE UP (ref 150–400)
POIKILOCYTOSIS BLD QL AUTO: SLIGHT — SIGNIFICANT CHANGE UP
POLYCHROMASIA BLD QL SMEAR: SLIGHT — SIGNIFICANT CHANGE UP
POTASSIUM SERPL-MCNC: 4.1 MMOL/L — SIGNIFICANT CHANGE UP (ref 3.5–5.3)
POTASSIUM SERPL-MCNC: 4.3 MMOL/L — SIGNIFICANT CHANGE UP (ref 3.5–5.3)
POTASSIUM SERPL-SCNC: 4.1 MMOL/L — SIGNIFICANT CHANGE UP (ref 3.5–5.3)
POTASSIUM SERPL-SCNC: 4.3 MMOL/L — SIGNIFICANT CHANGE UP (ref 3.5–5.3)
PROT SERPL-MCNC: 5.7 G/DL — LOW (ref 6.6–8.7)
PROT SERPL-MCNC: 6.7 G/DL — SIGNIFICANT CHANGE UP (ref 6.6–8.7)
PROTHROM AB SERPL-ACNC: 11.5 SEC — SIGNIFICANT CHANGE UP (ref 10.6–13.6)
RBC # BLD: 3.5 M/UL — LOW (ref 4.2–5.8)
RBC # FLD: 15.2 % — HIGH (ref 10.3–14.5)
RBC BLD AUTO: ABNORMAL
SARS-COV-2 RNA SPEC QL NAA+PROBE: SIGNIFICANT CHANGE UP
SMUDGE CELLS # BLD: PRESENT — SIGNIFICANT CHANGE UP
SODIUM SERPL-SCNC: 142 MMOL/L — SIGNIFICANT CHANGE UP (ref 135–145)
SODIUM SERPL-SCNC: 145 MMOL/L — SIGNIFICANT CHANGE UP (ref 135–145)
TROPONIN T SERPL-MCNC: <0.01 NG/ML — SIGNIFICANT CHANGE UP (ref 0–0.06)
VARIANT LYMPHS # BLD: 11.5 % — HIGH (ref 0–6)
WBC # BLD: 5.54 K/UL — SIGNIFICANT CHANGE UP (ref 3.8–10.5)
WBC # FLD AUTO: 5.54 K/UL — SIGNIFICANT CHANGE UP (ref 3.8–10.5)

## 2021-05-25 PROCEDURE — 72125 CT NECK SPINE W/O DYE: CPT | Mod: 26,MB

## 2021-05-25 PROCEDURE — 71045 X-RAY EXAM CHEST 1 VIEW: CPT | Mod: 26

## 2021-05-25 PROCEDURE — 70450 CT HEAD/BRAIN W/O DYE: CPT | Mod: 26,MB

## 2021-05-25 PROCEDURE — 99223 1ST HOSP IP/OBS HIGH 75: CPT

## 2021-05-25 RX ORDER — DEXTROSE 50 % IN WATER 50 %
25 SYRINGE (ML) INTRAVENOUS ONCE
Refills: 0 | Status: DISCONTINUED | OUTPATIENT
Start: 2021-05-25 | End: 2021-05-26

## 2021-05-25 RX ORDER — DEXTROSE 50 % IN WATER 50 %
12.5 SYRINGE (ML) INTRAVENOUS ONCE
Refills: 0 | Status: DISCONTINUED | OUTPATIENT
Start: 2021-05-25 | End: 2021-05-26

## 2021-05-25 RX ORDER — SODIUM CHLORIDE 9 MG/ML
1000 INJECTION INTRAMUSCULAR; INTRAVENOUS; SUBCUTANEOUS
Refills: 0 | Status: DISCONTINUED | OUTPATIENT
Start: 2021-05-25 | End: 2021-05-26

## 2021-05-25 RX ORDER — METOPROLOL TARTRATE 50 MG
25 TABLET ORAL DAILY
Refills: 0 | Status: DISCONTINUED | OUTPATIENT
Start: 2021-05-25 | End: 2021-05-26

## 2021-05-25 RX ORDER — DIAZEPAM 5 MG
10 TABLET ORAL ONCE
Refills: 0 | Status: DISCONTINUED | OUTPATIENT
Start: 2021-05-25 | End: 2021-05-25

## 2021-05-25 RX ORDER — SODIUM CHLORIDE 9 MG/ML
1000 INJECTION, SOLUTION INTRAVENOUS
Refills: 0 | Status: DISCONTINUED | OUTPATIENT
Start: 2021-05-25 | End: 2021-05-26

## 2021-05-25 RX ORDER — INSULIN GLARGINE 100 [IU]/ML
10 INJECTION, SOLUTION SUBCUTANEOUS AT BEDTIME
Refills: 0 | Status: DISCONTINUED | OUTPATIENT
Start: 2021-05-25 | End: 2021-05-26

## 2021-05-25 RX ORDER — PANTOPRAZOLE SODIUM 20 MG/1
40 TABLET, DELAYED RELEASE ORAL
Refills: 0 | Status: DISCONTINUED | OUTPATIENT
Start: 2021-05-25 | End: 2021-05-26

## 2021-05-25 RX ORDER — DEXTROSE 50 % IN WATER 50 %
15 SYRINGE (ML) INTRAVENOUS ONCE
Refills: 0 | Status: DISCONTINUED | OUTPATIENT
Start: 2021-05-25 | End: 2021-05-26

## 2021-05-25 RX ORDER — LOSARTAN POTASSIUM 100 MG/1
25 TABLET, FILM COATED ORAL DAILY
Refills: 0 | Status: DISCONTINUED | OUTPATIENT
Start: 2021-05-25 | End: 2021-05-26

## 2021-05-25 RX ORDER — LANOLIN ALCOHOL/MO/W.PET/CERES
5 CREAM (GRAM) TOPICAL AT BEDTIME
Refills: 0 | Status: DISCONTINUED | OUTPATIENT
Start: 2021-05-25 | End: 2021-05-26

## 2021-05-25 RX ORDER — APIXABAN 2.5 MG/1
5 TABLET, FILM COATED ORAL EVERY 12 HOURS
Refills: 0 | Status: DISCONTINUED | OUTPATIENT
Start: 2021-05-26 | End: 2021-05-26

## 2021-05-25 RX ORDER — FOLIC ACID 0.8 MG
1 TABLET ORAL DAILY
Refills: 0 | Status: DISCONTINUED | OUTPATIENT
Start: 2021-05-25 | End: 2021-05-26

## 2021-05-25 RX ORDER — GLUCAGON INJECTION, SOLUTION 0.5 MG/.1ML
1 INJECTION, SOLUTION SUBCUTANEOUS ONCE
Refills: 0 | Status: DISCONTINUED | OUTPATIENT
Start: 2021-05-25 | End: 2021-05-26

## 2021-05-25 RX ORDER — DEXTROSE 50 % IN WATER 50 %
15 SYRINGE (ML) INTRAVENOUS ONCE
Refills: 0 | Status: COMPLETED | OUTPATIENT
Start: 2021-05-25 | End: 2021-05-25

## 2021-05-25 RX ORDER — INSULIN LISPRO 100/ML
VIAL (ML) SUBCUTANEOUS
Refills: 0 | Status: DISCONTINUED | OUTPATIENT
Start: 2021-05-25 | End: 2021-05-26

## 2021-05-25 RX ORDER — THIAMINE MONONITRATE (VIT B1) 100 MG
100 TABLET ORAL DAILY
Refills: 0 | Status: DISCONTINUED | OUTPATIENT
Start: 2021-05-25 | End: 2021-05-26

## 2021-05-25 RX ADMIN — Medication 4 MILLIGRAM(S): at 09:19

## 2021-05-25 RX ADMIN — PANTOPRAZOLE SODIUM 40 MILLIGRAM(S): 20 TABLET, DELAYED RELEASE ORAL at 09:24

## 2021-05-25 RX ADMIN — Medication 15 GRAM(S): at 22:17

## 2021-05-25 RX ADMIN — Medication 4 MILLIGRAM(S): at 13:12

## 2021-05-25 RX ADMIN — Medication 1 MILLIGRAM(S): at 11:09

## 2021-05-25 RX ADMIN — Medication 50 MILLIGRAM(S): at 03:57

## 2021-05-25 RX ADMIN — Medication 1 MILLIGRAM(S): at 03:57

## 2021-05-25 RX ADMIN — Medication 25 MILLIGRAM(S): at 11:09

## 2021-05-25 RX ADMIN — Medication 100 MILLIGRAM(S): at 11:09

## 2021-05-25 RX ADMIN — Medication 1 TABLET(S): at 11:17

## 2021-05-25 RX ADMIN — Medication 50 MILLIGRAM(S): at 01:02

## 2021-05-25 RX ADMIN — Medication 10 MILLIGRAM(S): at 05:31

## 2021-05-25 RX ADMIN — Medication 4 MILLIGRAM(S): at 17:02

## 2021-05-25 RX ADMIN — Medication 4 MILLIGRAM(S): at 22:17

## 2021-05-25 RX ADMIN — SODIUM CHLORIDE 100 MILLILITER(S): 9 INJECTION INTRAMUSCULAR; INTRAVENOUS; SUBCUTANEOUS at 22:17

## 2021-05-25 RX ADMIN — SODIUM CHLORIDE 100 MILLILITER(S): 9 INJECTION INTRAMUSCULAR; INTRAVENOUS; SUBCUTANEOUS at 08:30

## 2021-05-25 NOTE — H&P ADULT - ASSESSMENT
Patient is a 29 yo M w/ PMH of ETOH abuse, DM2 on insulin, AFib on Eliquis, HFrEF presenting with chief complaint of ETOH abuse.    #ETOH intoxication w/ impending withdrawal  - ETOH: 188  - S/p Librium 50mg x 2, Ativan 1mg IVP, Diazepam 10mg IVP  - CIWA protocol  - Will start Librium taper and symptom trigged Ativan prn  - MVT/Folate/Thiamine  - IVFs  - Counselled on importance of ETOH cessation  - SW    #DM2  - Lantus 10u qhs  - Sliding scale    #Hx of PAFib, HFpEF  - in SR  - C/w Toprol, Losartan  - No head trauma noted. Risks and benefits of Eliquis discussed. Importance of ETOH cessation emphasized as patient risk for falls. He understands and wishes to continue AC.     DVT ppx: On Eliquis   Patient is a 31 yo M w/ PMH of ETOH abuse, DM2 on insulin, AFib on Eliquis, HFrEF presenting with chief complaint of ETOH abuse.    #ETOH intoxication w/ impending withdrawal  - ETOH: 188  - S/p Librium 50mg x 2, Ativan 1mg IVP, Diazepam 10mg IVP  - CIWA protocol  - Will start Ativan taper and symptom triggered Ativan prn  - MVT/Folate/Thiamine  - IVFs  - Counselled on importance of ETOH cessation  - SW    #DM2  - Lantus 10u qhs  - Sliding scale    #Hx of PAFib, HFpEF  - in SR  - C/w Toprol, Losartan  - No head trauma noted. Risks and benefits of Eliquis discussed. Importance of ETOH cessation emphasized as patient risk for falls. He understands and wishes to continue AC.     DVT ppx: On Eliquis   Patient is a 31 yo M w/ PMH of ETOH abuse, DM2 on insulin, AFib on Eliquis, HFrEF presenting with chief complaint of ETOH abuse.    #ETOH intoxication w/ impending withdrawal  - ETOH level upon arrival: 388.  - S/p Librium 50mg x 2, Ativan 1mg IVP, Diazepam 10mg IVP  - CIWA protocol  - Will start Ativan taper and symptom triggered Ativan prn  - MVT/Folate/Thiamine  - IVFs  - Counselled on importance of ETOH cessation  - SW    #DM2  - Lantus 10u qhs  - Sliding scale    #Hx of PAFib, HFpEF  - in SR  - C/w Toprol, Losartan  - No head trauma noted. Risks and benefits of Eliquis discussed. Importance of ETOH cessation emphasized as patient risk for falls. He understands and wishes to continue AC.     DVT ppx: On Eliquis

## 2021-05-25 NOTE — H&P ADULT - NSHPPHYSICALEXAM_GEN_ALL_CORE
Vital Signs Last 24 Hrs  T(F): 98.3 (24 May 2021 23:37), Max: 98.3 (24 May 2021 23:37)  HR: 77 (25 May 2021 06:36) (77 - 96)  BP: 111/68 (25 May 2021 06:36) (98/54 - 126/76)  RR: 16 (25 May 2021 06:36) (16 - 18)  SpO2: 96% (25 May 2021 06:36) (95% - 100%)    Physical Exam:  Constitutional: Young male, laying in bed, intoxicated  HEENT: NC/AT, PERRL, EOMI, trachea midline, no JVD  Respiratory: CTA bilaterally, symmetrical chest rise  Cardiovascular: RRR, no m/g/r  Gastrointestinal: Soft, NT/ND, BS+  Vascular: 2+ peripheral pulses  Neurological: A/O x 2 (does not know day or approximate time), mildly tremulous  Psych: Fair mood/affect  Musculoskeletal: No edema, cyanosis, deformities. ROM normal  Skin: No obvious rash, lesions. No jaundice.

## 2021-05-25 NOTE — ED ADULT NURSE NOTE - NSIMPLEMENTINTERV_GEN_ALL_ED
Implemented All Fall with Harm Risk Interventions:  [place] to call system. Call bell, personal items and telephone within reach. Instruct patient to call for assistance. Room bathroom lighting operational. Non-slip footwear when patient is off stretcher. Physically safe environment: no spills, clutter or unnecessary equipment. Stretcher in lowest position, wheels locked, appropriate side rails in place. Provide visual cue, wrist band, yellow gown, etc. Monitor gait and stability. Monitor for mental status changes and reorient to person, place, and time. Review medications for side effects contributing to fall risk. Reinforce activity limits and safety measures with patient and family. Provide visual clues: red socks.

## 2021-05-25 NOTE — ED PROVIDER NOTE - PHYSICAL EXAMINATION
Const: Awake, alert and oriented. In no acute distress. alcohol noted on breath   HEENT: NC/AT. Moist mucous membranes.  Eyes: No scleral icterus. EOMI.  Neck:. Soft and supple. Full ROM without pain.  Cardiac: +S1/S2. No murmurs. Peripheral pulses 2+ and symmetric. No LE edema.  Resp: Speaking in full sentences. No evidence of respiratory distress. No wheezes, rales or rhonchi.  Abd: Soft, non-tender, non-distended. Normal bowel sounds in all 4 quadrants. No guarding or rebound.  Back: Spine midline and non-tender. No CVAT.  Skin: No rashes, abrasions or lacerations.  Lymph: No cervical lymphadenopathy.  Neuro: Awake, alert & oriented x 3. Moves all extremities symmetrically.

## 2021-05-25 NOTE — ED PROVIDER NOTE - ATTENDING CONTRIBUTION TO CARE
31 yo male presents to the ED but is unclear to why he came. I personally saw the patient with the PA, and completed the key components of the history and physical exam. I then discussed the management plan with the PA.

## 2021-05-25 NOTE — ED ADULT NURSE NOTE - OBJECTIVE STATEMENT
PT presents to ED for chest pain. Upon further assessment pt in altered asking repetitive questions stating he does not know how he got here. PT admits to drinking "a lot of alcohol" tonight.  Unknown If pt hit his head.  Bruising noted to body in various stages of healing.  IV placed labs drawn pt medicated per orders. PT taken for priority CT. Pending results. PT changed into yellow gown and belongings secured. CTM

## 2021-05-25 NOTE — H&P ADULT - HISTORY OF PRESENT ILLNESS
Patient is a 29 yo M w/ PMH of ETOH abuse, DM2 on insulin, AFib on Eliquis, HFrEF presenting with chief complaint of ETOH abuse. Patient does not recall the events that led to the hospital. He states the last think he remembers is drinking in his room last night watching TV. He cannot quantify the amount he was drinking. Per chart, patient stated that he drank multiple 24oz cans and last drink was two hours prior to arrival. Pt also stated he had a head injury and chest pain in triage and chest pain later resolved. At this time, patient denies any complaints. Denies fevers, chills, chest pain, SOB, cough, palpitations, orthopnea/PND, abdominal pain, nausea, vomiting, diarrhea, constipation, bloody bowel movements, melena.    ETOH level: 388 in ED. CTH negative.

## 2021-05-25 NOTE — H&P ADULT - NSHPREVIEWOFSYSTEMS_GEN_ALL_CORE
REVIEW OF SYSTEMS:  CONSTITUTIONAL: No fever, chills, weight loss, or fatigue  EYES: No eye pain, visual disturbances, or discharge  ENMT:  No difficulty hearing, tinnitus, vertigo; No sinus or throat pain  NECK: No pain or stiffness  BREASTS: No pain, masses, or nipple discharge  RESPIRATORY: No cough, wheezing, chills or hemoptysis; No shortness of breath  CARDIOVASCULAR: No chest pain, palpitations, dizziness, or leg swelling  GASTROINTESTINAL: No abdominal or epigastric pain. No nausea, vomiting, or hematemesis; No diarrhea or constipation. No melena or hematochezia.  GENITOURINARY: No dysuria, frequency, hematuria, or incontinence  NEUROLOGICAL: No headaches, memory loss, loss of strength, numbness, or tremors  SKIN: No itching, burning, rashes, or lesions   LYMPH NODES: No enlarged glands  ENDOCRINE: No heat or cold intolerance; No hair loss  MUSCULOSKELETAL: No joint pain or swelling; No muscle, back, or extremity pain  PSYCHIATRIC: No depression, anxiety, mood swings, or difficulty sleeping  HEME/LYMPH: No easy bruising, or bleeding gums  ALLERY AND IMMUNOLOGIC: No hives or eczema    ALL ROS REVIEWED AND NORMAL EXCEPT AS STATED ABOVE

## 2021-05-25 NOTE — ED PROVIDER NOTE - OBJECTIVE STATEMENT
pt is a 29 y/o male with a pmhx of alcohol abuse, DM, HTN presenting to the ed for alcohol intoxication. pt states drank multiple 24 oz cans tonight, states last drank two hours prior to arrival. pt states head injury, fell. pt in triage stating had chest pain, but currently denying chest pain. pt denies any complaints at this time.

## 2021-05-25 NOTE — ED ADULT NURSE NOTE - CHIEF COMPLAINT
Patient has H/o Pulmonary embolism   on Xarelto 20 mg daily   will hold AC for now due to hematuria The patient is a 30y Male complaining of chest pain.

## 2021-05-26 VITALS
OXYGEN SATURATION: 98 % | TEMPERATURE: 98 F | SYSTOLIC BLOOD PRESSURE: 157 MMHG | RESPIRATION RATE: 18 BRPM | HEART RATE: 70 BPM | DIASTOLIC BLOOD PRESSURE: 103 MMHG

## 2021-05-26 LAB
ALBUMIN SERPL ELPH-MCNC: 2.9 G/DL — LOW (ref 3.3–5.2)
ALP SERPL-CCNC: 72 U/L — SIGNIFICANT CHANGE UP (ref 40–120)
ALT FLD-CCNC: 16 U/L — SIGNIFICANT CHANGE UP
ANION GAP SERPL CALC-SCNC: 7 MMOL/L — SIGNIFICANT CHANGE UP (ref 5–17)
AST SERPL-CCNC: 18 U/L — SIGNIFICANT CHANGE UP
BILIRUB SERPL-MCNC: 0.6 MG/DL — SIGNIFICANT CHANGE UP (ref 0.4–2)
BUN SERPL-MCNC: 5 MG/DL — LOW (ref 8–20)
CALCIUM SERPL-MCNC: 7.9 MG/DL — LOW (ref 8.6–10.2)
CHLORIDE SERPL-SCNC: 107 MMOL/L — SIGNIFICANT CHANGE UP (ref 98–107)
CO2 SERPL-SCNC: 26 MMOL/L — SIGNIFICANT CHANGE UP (ref 22–29)
COVID-19 SPIKE DOMAIN AB INTERP: NEGATIVE — SIGNIFICANT CHANGE UP
COVID-19 SPIKE DOMAIN ANTIBODY RESULT: 0.4 U/ML — SIGNIFICANT CHANGE UP
CREAT SERPL-MCNC: 0.51 MG/DL — SIGNIFICANT CHANGE UP (ref 0.5–1.3)
GLUCOSE BLDC GLUCOMTR-MCNC: 110 MG/DL — HIGH (ref 70–99)
GLUCOSE BLDC GLUCOMTR-MCNC: 114 MG/DL — HIGH (ref 70–99)
GLUCOSE SERPL-MCNC: 142 MG/DL — HIGH (ref 70–99)
HCT VFR BLD CALC: 30.8 % — LOW (ref 39–50)
HGB BLD-MCNC: 10.2 G/DL — LOW (ref 13–17)
MAGNESIUM SERPL-MCNC: 1.7 MG/DL — SIGNIFICANT CHANGE UP (ref 1.6–2.6)
MCHC RBC-ENTMCNC: 32.5 PG — SIGNIFICANT CHANGE UP (ref 27–34)
MCHC RBC-ENTMCNC: 33.1 GM/DL — SIGNIFICANT CHANGE UP (ref 32–36)
MCV RBC AUTO: 98.1 FL — SIGNIFICANT CHANGE UP (ref 80–100)
PHOSPHATE SERPL-MCNC: 3.8 MG/DL — SIGNIFICANT CHANGE UP (ref 2.4–4.7)
PLATELET # BLD AUTO: 212 K/UL — SIGNIFICANT CHANGE UP (ref 150–400)
POTASSIUM SERPL-MCNC: 4 MMOL/L — SIGNIFICANT CHANGE UP (ref 3.5–5.3)
POTASSIUM SERPL-SCNC: 4 MMOL/L — SIGNIFICANT CHANGE UP (ref 3.5–5.3)
PROT SERPL-MCNC: 5.2 G/DL — LOW (ref 6.6–8.7)
RBC # BLD: 3.14 M/UL — LOW (ref 4.2–5.8)
RBC # FLD: 14.4 % — SIGNIFICANT CHANGE UP (ref 10.3–14.5)
SARS-COV-2 IGG+IGM SERPL QL IA: 0.4 U/ML — SIGNIFICANT CHANGE UP
SARS-COV-2 IGG+IGM SERPL QL IA: NEGATIVE — SIGNIFICANT CHANGE UP
SODIUM SERPL-SCNC: 140 MMOL/L — SIGNIFICANT CHANGE UP (ref 135–145)
WBC # BLD: 3.55 K/UL — LOW (ref 3.8–10.5)
WBC # FLD AUTO: 3.55 K/UL — LOW (ref 3.8–10.5)

## 2021-05-26 PROCEDURE — 99233 SBSQ HOSP IP/OBS HIGH 50: CPT

## 2021-05-26 RX ADMIN — Medication 3 MILLIGRAM(S): at 10:58

## 2021-05-26 RX ADMIN — APIXABAN 5 MILLIGRAM(S): 2.5 TABLET, FILM COATED ORAL at 05:12

## 2021-05-26 RX ADMIN — Medication 25 MILLIGRAM(S): at 05:12

## 2021-05-26 RX ADMIN — Medication 1 MILLIGRAM(S): at 10:58

## 2021-05-26 RX ADMIN — PANTOPRAZOLE SODIUM 40 MILLIGRAM(S): 20 TABLET, DELAYED RELEASE ORAL at 05:12

## 2021-05-26 RX ADMIN — Medication 100 MILLIGRAM(S): at 10:58

## 2021-05-26 RX ADMIN — Medication 4 MILLIGRAM(S): at 01:04

## 2021-05-26 RX ADMIN — SODIUM CHLORIDE 100 MILLILITER(S): 9 INJECTION INTRAMUSCULAR; INTRAVENOUS; SUBCUTANEOUS at 10:58

## 2021-05-26 RX ADMIN — Medication 1 TABLET(S): at 10:58

## 2021-05-26 RX ADMIN — LOSARTAN POTASSIUM 25 MILLIGRAM(S): 100 TABLET, FILM COATED ORAL at 05:12

## 2021-05-26 RX ADMIN — Medication 3 MILLIGRAM(S): at 05:12

## 2021-05-26 NOTE — PATIENT PROFILE ADULT - PATIENT REPRESENTATIVE: ( YOU CAN CHOOSE ANY PERSON THAT CAN ASSIST YOU WITH YOUR HEALTH CARE PREFERENCES, DOES NOT HAVE TO BE A SPOUSE, IMMEDIATE FAMILY OR SIGNIFICANT OTHER/PARTNER)
Hpi Title: Evaluation of Skin Lesions
How Severe Are Your Spot(S)?: mild
Have Your Spot(S) Been Treated In The Past?: has not been treated
yes

## 2021-05-26 NOTE — PROGRESS NOTE ADULT - SUBJECTIVE AND OBJECTIVE BOX
Patient is a 30y old  Male who presents with a chief complaint of ETOH intoxication (25 May 2021 07:41)    Patient seen and examined at bedside.     ALLERGIES:  No Known Allergies    MEDICATIONS  (STANDING):  apixaban 5 milliGRAM(s) Oral every 12 hours  dextrose 40% Gel 15 Gram(s) Oral once  dextrose 5%. 1000 milliLiter(s) (50 mL/Hr) IV Continuous <Continuous>  dextrose 5%. 1000 milliLiter(s) (100 mL/Hr) IV Continuous <Continuous>  dextrose 50% Injectable 25 Gram(s) IV Push once  dextrose 50% Injectable 12.5 Gram(s) IV Push once  dextrose 50% Injectable 25 Gram(s) IV Push once  folic acid 1 milliGRAM(s) Oral daily  glucagon  Injectable 1 milliGRAM(s) IntraMuscular once  insulin glargine Injectable (LANTUS) 10 Unit(s) SubCutaneous at bedtime  insulin lispro (ADMELOG) corrective regimen sliding scale   SubCutaneous three times a day before meals  LORazepam   Injectable   IV Push   LORazepam   Injectable 3 milliGRAM(s) IV Push every 4 hours  losartan 25 milliGRAM(s) Oral daily  metoprolol tartrate 25 milliGRAM(s) Oral daily  multivitamin 1 Tablet(s) Oral daily  pantoprazole    Tablet 40 milliGRAM(s) Oral before breakfast  sodium chloride 0.9%. 1000 milliLiter(s) (100 mL/Hr) IV Continuous <Continuous>  thiamine 100 milliGRAM(s) Oral daily    MEDICATIONS  (PRN):  LORazepam     Tablet 2 milliGRAM(s) Oral every 1 hour PRN Symptom-triggered: each CIWA -Ar score 8 or GREATER  LORazepam   Injectable 2 milliGRAM(s) IV Push every 1 hour PRN Symptom-triggered: each CIWA -Ar score 8 or GREATER  melatonin 5 milliGRAM(s) Oral at bedtime PRN Sleep    Vital Signs Last 24 Hrs  T(F): 98.5 (26 May 2021 11:25), Max: 98.7 (25 May 2021 20:53)  HR: 70 (26 May 2021 11:25) (64 - 82)  BP: 157/103 (26 May 2021 11:25) (118/65 - 157/103)  RR: 18 (26 May 2021 11:25) (16 - 18)  SpO2: 98% (26 May 2021 11:25) (96% - 98%)  I&O's Summary    PHYSICAL EXAM:  General: NAD, A/O x 3  ENT: MMM, no thrush  Neck: Supple, No JVD  Lungs: good air entry, non-labored breathing  Cardio: +s1/s2  Abdomen: Soft, Nontender, Nondistended; Bowel sounds present  Extremities:  No pitting edema    LABS:                        10.2   3.55  )-----------( 212      ( 26 May 2021 04:16 )             30.8     05-26    140  |  107  |  5.0  ----------------------------<  142  4.0   |  26.0  |  0.51    Ca    7.9      26 May 2021 04:16  Phos  3.8     05-26  Mg     1.7     05-26    TPro  5.2  /  Alb  2.9  /  TBili  0.6  /  DBili  x   /  AST  18  /  ALT  16  /  AlkPhos  72  05-26    Lipase, Serum: 5 U/L (05-25-21 @ 00:57)    eGFR if Non African American: 143 mL/min/1.73M2 (05-26-21 @ 04:16)  eGFR if African American: 166 mL/min/1.73M2 (05-26-21 @ 04:16)    PT/INR - ( 25 May 2021 00:57 )   PT: 11.5 sec;   INR: 0.99 ratio    PTT - ( 25 May 2021 00:57 )  PTT:35.3 sec    CARDIAC MARKERS ( 25 May 2021 00:57 )  x     / <0.01 ng/mL / x     / x     / x        05-09 Chol 155 mg/dL LDL -- HDL 47 mg/dL Trig 82 mg/dL    Glucose  POCT Blood Glucose.: 114 mg/dL (26 May 2021 11:05)  POCT Blood Glucose.: 110 mg/dL (26 May 2021 07:58)  POCT Blood Glucose.: 92 mg/dL (25 May 2021 22:38)  POCT Blood Glucose.: 58 mg/dL (25 May 2021 22:01)  POCT Blood Glucose.: 66 mg/dL (25 May 2021 16:35)    COVID-19 PCR: NotDetec (05-25-21 @ 07:05)  COVID-19 PCR: NotDetec (05-09-21 @ 01:03)  COVID-19 PCR: NotDetec (05-06-21 @ 17:31)    RADIOLOGY & ADDITIONAL TESTS:  < from: CT C-spine/ Head No Cont (05.25.21 @ 01:25) >  CT head:  No acute intracranial hemorrhage or mass effect.  CT cervical spine:  No CT evidence of cervical spine fracture.  < end of copied text >    < from: Xray Chest 1 View- PORTABLE-Urgent (Xray Chest 1 View- PORTABLE-Urgent .) (05.25.21 @ 00:51) >  IMPRESSION: No acute cardiopulmonarydisease process.  < end of copied text >

## 2021-05-26 NOTE — PROGRESS NOTE ADULT - ASSESSMENT
31 yo M w/ PMH of ETOH abuse, DM2 on insulin, AFib on Eliquis, HFrEF presenting with chief complaint of ETOH abuse.    #ETOH Abuse   - ciwa protocol  - w/ probable thiamine defc- MVT/Folate/Thiamine  - IVF  - Counselled on importance of ETOH cessation    #DM2  - Lantus sliding scale  - monitor fingersticks    #Hx of PAFib, HFpEF  - in SR  - Toprol, Losartan, eliquis    #DVT Prophylaxis  - eliquis    Patient wishing to leave AMA. Patient seen at bedside to further discuss plan of care. Discussed risks of leaving against medical advice, which includes worsening of current medical condition, up to and including death. Patient understands risks and still wishes to leave. AMA paperwork signed and placed in chart.     Will continue to follow, RN to call if any changes.

## 2021-06-17 NOTE — PATIENT PROFILE ADULT. - BLOOD TRANSFUSION, PREVIOUS, PROFILE
"Pt called to report that beginning Friday afternoon he began using a cane because his LBP, and RLE pain became \"unbearable\". In addition, pt reports that he can't take full steps with his right leg as it is very weak, pt has not fallen but reports he has \"almost collapsed\". Pain is 10/10 at its worst. PCP is prescribing pain medication - instructed pt to reach out for refills.     Pain has now radiated into the left side of the back and down into the left leg, some N/T into left leg which is new since his last MRI.    Pt has telephone appt with Dr. Lopez on 6/1 switched to in person d/t new symptoms. New MRI and XR orders placed, will hold off on MDP per MARY BETH Comer CNP d/t pt's hx of T2D.    Instructed pt to seek care in ED if he experiences severe/unmanagable pain, has a fall at home, experiences bowel or bladder incontinence, or new symptoms arise between today and his appt with Dr. Lopez on 6/1.    Pt in agreement with treatment plan. Was transferred to scheduling to make appt for MRI.     Laquita Savage RN         " no

## 2021-06-22 PROCEDURE — 71045 X-RAY EXAM CHEST 1 VIEW: CPT

## 2021-06-22 PROCEDURE — 72125 CT NECK SPINE W/O DYE: CPT

## 2021-06-22 PROCEDURE — U0003: CPT

## 2021-06-22 PROCEDURE — 82962 GLUCOSE BLOOD TEST: CPT

## 2021-06-22 PROCEDURE — 86769 SARS-COV-2 COVID-19 ANTIBODY: CPT

## 2021-06-22 PROCEDURE — 85027 COMPLETE CBC AUTOMATED: CPT

## 2021-06-22 PROCEDURE — 96376 TX/PRO/DX INJ SAME DRUG ADON: CPT

## 2021-06-22 PROCEDURE — 93005 ELECTROCARDIOGRAM TRACING: CPT

## 2021-06-22 PROCEDURE — U0005: CPT

## 2021-06-22 PROCEDURE — 80076 HEPATIC FUNCTION PANEL: CPT

## 2021-06-22 PROCEDURE — 85610 PROTHROMBIN TIME: CPT

## 2021-06-22 PROCEDURE — 80053 COMPREHEN METABOLIC PANEL: CPT

## 2021-06-22 PROCEDURE — 84100 ASSAY OF PHOSPHORUS: CPT

## 2021-06-22 PROCEDURE — 80307 DRUG TEST PRSMV CHEM ANLYZR: CPT

## 2021-06-22 PROCEDURE — 99285 EMERGENCY DEPT VISIT HI MDM: CPT | Mod: 25

## 2021-06-22 PROCEDURE — 80048 BASIC METABOLIC PNL TOTAL CA: CPT

## 2021-06-22 PROCEDURE — 85025 COMPLETE CBC W/AUTO DIFF WBC: CPT

## 2021-06-22 PROCEDURE — 85730 THROMBOPLASTIN TIME PARTIAL: CPT

## 2021-06-22 PROCEDURE — 70450 CT HEAD/BRAIN W/O DYE: CPT

## 2021-06-22 PROCEDURE — 83735 ASSAY OF MAGNESIUM: CPT

## 2021-06-22 PROCEDURE — 84484 ASSAY OF TROPONIN QUANT: CPT

## 2021-06-22 PROCEDURE — 96374 THER/PROPH/DIAG INJ IV PUSH: CPT

## 2021-06-22 PROCEDURE — 36415 COLL VENOUS BLD VENIPUNCTURE: CPT

## 2021-06-22 PROCEDURE — 83690 ASSAY OF LIPASE: CPT

## 2021-07-03 ENCOUNTER — INPATIENT (INPATIENT)
Facility: HOSPITAL | Age: 31
LOS: 5 days | Discharge: HOME | DRG: 637 | End: 2021-07-09
Attending: HOSPITALIST | Admitting: INTERNAL MEDICINE
Payer: MEDICAID

## 2021-07-03 VITALS
RESPIRATION RATE: 28 BRPM | HEIGHT: 67 IN | OXYGEN SATURATION: 99 % | DIASTOLIC BLOOD PRESSURE: 107 MMHG | TEMPERATURE: 98 F | WEIGHT: 160.06 LBS | HEART RATE: 147 BPM | SYSTOLIC BLOOD PRESSURE: 152 MMHG

## 2021-07-03 DIAGNOSIS — E11.10 TYPE 2 DIABETES MELLITUS WITH KETOACIDOSIS WITHOUT COMA: ICD-10-CM

## 2021-07-03 LAB
A1C WITH ESTIMATED AVERAGE GLUCOSE RESULT: 8 % — HIGH (ref 4–5.6)
ACETONE SERPL-MCNC: ABNORMAL
ALBUMIN SERPL ELPH-MCNC: 5.3 G/DL — HIGH (ref 3.3–5.2)
ALP SERPL-CCNC: 124 U/L — HIGH (ref 40–120)
ALT FLD-CCNC: 17 U/L — SIGNIFICANT CHANGE UP
ANION GAP SERPL CALC-SCNC: 39 MMOL/L — HIGH (ref 5–17)
ANISOCYTOSIS BLD QL: SLIGHT — SIGNIFICANT CHANGE UP
APPEARANCE UR: CLEAR — SIGNIFICANT CHANGE UP
APTT BLD: 26.8 SEC — LOW (ref 27.5–35.5)
AST SERPL-CCNC: 22 U/L — SIGNIFICANT CHANGE UP
BACTERIA # UR AUTO: ABNORMAL
BASE EXCESS BLDV CALC-SCNC: -25.3 MMOL/L — LOW (ref -2–3)
BASOPHILS # BLD AUTO: 0 K/UL — SIGNIFICANT CHANGE UP (ref 0–0.2)
BASOPHILS NFR BLD AUTO: 0 % — SIGNIFICANT CHANGE UP (ref 0–2)
BILIRUB SERPL-MCNC: 2.4 MG/DL — HIGH (ref 0.4–2)
BILIRUB UR-MCNC: NEGATIVE — SIGNIFICANT CHANGE UP
BLD GP AB SCN SERPL QL: SIGNIFICANT CHANGE UP
BUN SERPL-MCNC: 31.7 MG/DL — HIGH (ref 8–20)
BURR CELLS BLD QL SMEAR: SLIGHT — SIGNIFICANT CHANGE UP
CA-I SERPL-SCNC: 1.18 MMOL/L — SIGNIFICANT CHANGE UP (ref 1.15–1.33)
CALCIUM SERPL-MCNC: 9.6 MG/DL — SIGNIFICANT CHANGE UP (ref 8.6–10.2)
CHLORIDE BLDV-SCNC: 97 MMOL/L — LOW (ref 98–107)
CHLORIDE SERPL-SCNC: 91 MMOL/L — LOW (ref 98–107)
CO2 SERPL-SCNC: 7 MMOL/L — CRITICAL LOW (ref 22–29)
COLOR SPEC: YELLOW — SIGNIFICANT CHANGE UP
CREAT SERPL-MCNC: 2.12 MG/DL — HIGH (ref 0.5–1.3)
DIFF PNL FLD: ABNORMAL
EOSINOPHIL # BLD AUTO: 0 K/UL — SIGNIFICANT CHANGE UP (ref 0–0.5)
EOSINOPHIL NFR BLD AUTO: 0 % — SIGNIFICANT CHANGE UP (ref 0–6)
EPI CELLS # UR: SIGNIFICANT CHANGE UP
ESTIMATED AVERAGE GLUCOSE: 183 MG/DL — HIGH (ref 68–114)
GAS PNL BLDV: 133 MMOL/L — LOW (ref 136–145)
GAS PNL BLDV: SIGNIFICANT CHANGE UP
GAS PNL BLDV: SIGNIFICANT CHANGE UP
GIANT PLATELETS BLD QL SMEAR: PRESENT — SIGNIFICANT CHANGE UP
GLUCOSE BLDC GLUCOMTR-MCNC: 278 MG/DL — HIGH (ref 70–99)
GLUCOSE BLDV-MCNC: >656 MG/DL — CRITICAL HIGH (ref 70–99)
GLUCOSE SERPL-MCNC: 636 MG/DL — CRITICAL HIGH (ref 70–99)
GLUCOSE UR QL: 1000 MG/DL
HCO3 BLDV-SCNC: 6 MMOL/L — CRITICAL LOW (ref 22–29)
HCT VFR BLD CALC: 38 % — LOW (ref 39–50)
HCT VFR BLD CALC: 47.6 % — SIGNIFICANT CHANGE UP (ref 39–50)
HCT VFR BLDA CALC: 46 % — SIGNIFICANT CHANGE UP (ref 39–51)
HGB BLD CALC-MCNC: 15.4 G/DL — SIGNIFICANT CHANGE UP (ref 12.6–17.4)
HGB BLD-MCNC: 12.7 G/DL — LOW (ref 13–17)
HGB BLD-MCNC: 15.1 G/DL — SIGNIFICANT CHANGE UP (ref 13–17)
HYALINE CASTS # UR AUTO: ABNORMAL /LPF
INR BLD: 0.98 RATIO — SIGNIFICANT CHANGE UP (ref 0.88–1.16)
KETONES UR-MCNC: ABNORMAL
LACTATE BLDV-MCNC: 2.5 MMOL/L — HIGH (ref 0.5–2)
LEUKOCYTE ESTERASE UR-ACNC: NEGATIVE — SIGNIFICANT CHANGE UP
LIDOCAIN IGE QN: 14 U/L — LOW (ref 22–51)
LYMPHOCYTES # BLD AUTO: 0.64 K/UL — LOW (ref 1–3.3)
LYMPHOCYTES # BLD AUTO: 3.5 % — LOW (ref 13–44)
MACROCYTES BLD QL: SLIGHT — SIGNIFICANT CHANGE UP
MANUAL SMEAR VERIFICATION: SIGNIFICANT CHANGE UP
MCHC RBC-ENTMCNC: 31.4 PG — SIGNIFICANT CHANGE UP (ref 27–34)
MCHC RBC-ENTMCNC: 31.4 PG — SIGNIFICANT CHANGE UP (ref 27–34)
MCHC RBC-ENTMCNC: 31.7 GM/DL — LOW (ref 32–36)
MCHC RBC-ENTMCNC: 33.4 GM/DL — SIGNIFICANT CHANGE UP (ref 32–36)
MCV RBC AUTO: 94.1 FL — SIGNIFICANT CHANGE UP (ref 80–100)
MCV RBC AUTO: 99 FL — SIGNIFICANT CHANGE UP (ref 80–100)
METAMYELOCYTES # FLD: 0.9 % — HIGH (ref 0–0)
MONOCYTES # BLD AUTO: 1.47 K/UL — HIGH (ref 0–0.9)
MONOCYTES NFR BLD AUTO: 8 % — SIGNIFICANT CHANGE UP (ref 2–14)
NEUTROPHILS # BLD AUTO: 16.08 K/UL — HIGH (ref 1.8–7.4)
NEUTROPHILS NFR BLD AUTO: 87.6 % — HIGH (ref 43–77)
NITRITE UR-MCNC: NEGATIVE — SIGNIFICANT CHANGE UP
OVALOCYTES BLD QL SMEAR: SLIGHT — SIGNIFICANT CHANGE UP
PCO2 BLDV: 22 MMHG — LOW (ref 42–55)
PCP SPEC-MCNC: SIGNIFICANT CHANGE UP
PH BLDV: 7.02 — CRITICAL LOW (ref 7.32–7.43)
PH UR: 5 — SIGNIFICANT CHANGE UP (ref 5–8)
PLAT MORPH BLD: NORMAL — SIGNIFICANT CHANGE UP
PLATELET # BLD AUTO: 155 K/UL — SIGNIFICANT CHANGE UP (ref 150–400)
PLATELET # BLD AUTO: 211 K/UL — SIGNIFICANT CHANGE UP (ref 150–400)
PO2 BLDV: 72 MMHG — HIGH (ref 25–45)
POIKILOCYTOSIS BLD QL AUTO: SLIGHT — SIGNIFICANT CHANGE UP
POLYCHROMASIA BLD QL SMEAR: SLIGHT — SIGNIFICANT CHANGE UP
POTASSIUM BLDV-SCNC: 5.4 MMOL/L — HIGH (ref 3.5–5.1)
POTASSIUM SERPL-MCNC: 5.1 MMOL/L — SIGNIFICANT CHANGE UP (ref 3.5–5.3)
POTASSIUM SERPL-SCNC: 5.1 MMOL/L — SIGNIFICANT CHANGE UP (ref 3.5–5.3)
PROT SERPL-MCNC: 9.4 G/DL — HIGH (ref 6.6–8.7)
PROT UR-MCNC: 100 MG/DL
PROTHROM AB SERPL-ACNC: 11.4 SEC — SIGNIFICANT CHANGE UP (ref 10.6–13.6)
RBC # BLD: 4.04 M/UL — LOW (ref 4.2–5.8)
RBC # BLD: 4.81 M/UL — SIGNIFICANT CHANGE UP (ref 4.2–5.8)
RBC # FLD: 13.1 % — SIGNIFICANT CHANGE UP (ref 10.3–14.5)
RBC # FLD: 13.2 % — SIGNIFICANT CHANGE UP (ref 10.3–14.5)
RBC BLD AUTO: ABNORMAL
RBC CASTS # UR COMP ASSIST: ABNORMAL /HPF (ref 0–4)
SAO2 % BLDV: 90.4 % — SIGNIFICANT CHANGE UP
SARS-COV-2 RNA SPEC QL NAA+PROBE: SIGNIFICANT CHANGE UP
SODIUM SERPL-SCNC: 137 MMOL/L — SIGNIFICANT CHANGE UP (ref 135–145)
SP GR SPEC: 1.02 — SIGNIFICANT CHANGE UP (ref 1.01–1.02)
UROBILINOGEN FLD QL: NEGATIVE MG/DL — SIGNIFICANT CHANGE UP
WBC # BLD: 13.43 K/UL — HIGH (ref 3.8–10.5)
WBC # BLD: 18.36 K/UL — HIGH (ref 3.8–10.5)
WBC # FLD AUTO: 13.43 K/UL — HIGH (ref 3.8–10.5)
WBC # FLD AUTO: 18.36 K/UL — HIGH (ref 3.8–10.5)
WBC UR QL: SIGNIFICANT CHANGE UP

## 2021-07-03 PROCEDURE — 99291 CRITICAL CARE FIRST HOUR: CPT

## 2021-07-03 PROCEDURE — 93010 ELECTROCARDIOGRAM REPORT: CPT

## 2021-07-03 PROCEDURE — 74176 CT ABD & PELVIS W/O CONTRAST: CPT | Mod: 26

## 2021-07-03 RX ORDER — INSULIN HUMAN 100 [IU]/ML
5 INJECTION, SOLUTION SUBCUTANEOUS
Qty: 100 | Refills: 0 | Status: DISCONTINUED | OUTPATIENT
Start: 2021-07-03 | End: 2021-07-04

## 2021-07-03 RX ORDER — METOCLOPRAMIDE HCL 10 MG
10 TABLET ORAL ONCE
Refills: 0 | Status: COMPLETED | OUTPATIENT
Start: 2021-07-03 | End: 2021-07-03

## 2021-07-03 RX ORDER — FOLIC ACID 0.8 MG
1 TABLET ORAL DAILY
Refills: 0 | Status: DISCONTINUED | OUTPATIENT
Start: 2021-07-03 | End: 2021-07-09

## 2021-07-03 RX ORDER — ONDANSETRON 8 MG/1
4 TABLET, FILM COATED ORAL ONCE
Refills: 0 | Status: COMPLETED | OUTPATIENT
Start: 2021-07-03 | End: 2021-07-03

## 2021-07-03 RX ORDER — HYDROMORPHONE HYDROCHLORIDE 2 MG/ML
1 INJECTION INTRAMUSCULAR; INTRAVENOUS; SUBCUTANEOUS ONCE
Refills: 0 | Status: DISCONTINUED | OUTPATIENT
Start: 2021-07-03 | End: 2021-07-03

## 2021-07-03 RX ORDER — INSULIN HUMAN 100 [IU]/ML
10 INJECTION, SOLUTION SUBCUTANEOUS ONCE
Refills: 0 | Status: DISCONTINUED | OUTPATIENT
Start: 2021-07-03 | End: 2021-07-03

## 2021-07-03 RX ORDER — SODIUM CHLORIDE 9 MG/ML
1000 INJECTION, SOLUTION INTRAVENOUS ONCE
Refills: 0 | Status: COMPLETED | OUTPATIENT
Start: 2021-07-03 | End: 2021-07-03

## 2021-07-03 RX ORDER — PANTOPRAZOLE SODIUM 20 MG/1
80 TABLET, DELAYED RELEASE ORAL ONCE
Refills: 0 | Status: COMPLETED | OUTPATIENT
Start: 2021-07-03 | End: 2021-07-03

## 2021-07-03 RX ORDER — HYDROMORPHONE HYDROCHLORIDE 2 MG/ML
0.5 INJECTION INTRAMUSCULAR; INTRAVENOUS; SUBCUTANEOUS ONCE
Refills: 0 | Status: DISCONTINUED | OUTPATIENT
Start: 2021-07-03 | End: 2021-07-03

## 2021-07-03 RX ORDER — THIAMINE MONONITRATE (VIT B1) 100 MG
500 TABLET ORAL EVERY 8 HOURS
Refills: 0 | Status: COMPLETED | OUTPATIENT
Start: 2021-07-03 | End: 2021-07-06

## 2021-07-03 RX ORDER — DIAZEPAM 5 MG
10 TABLET ORAL ONCE
Refills: 0 | Status: DISCONTINUED | OUTPATIENT
Start: 2021-07-03 | End: 2021-07-03

## 2021-07-03 RX ORDER — CHLORHEXIDINE GLUCONATE 213 G/1000ML
1 SOLUTION TOPICAL
Refills: 0 | Status: DISCONTINUED | OUTPATIENT
Start: 2021-07-03 | End: 2021-07-09

## 2021-07-03 RX ORDER — SODIUM CHLORIDE 9 MG/ML
1000 INJECTION, SOLUTION INTRAVENOUS
Refills: 0 | Status: DISCONTINUED | OUTPATIENT
Start: 2021-07-03 | End: 2021-07-03

## 2021-07-03 RX ORDER — THIAMINE MONONITRATE (VIT B1) 100 MG
100 TABLET ORAL DAILY
Refills: 0 | Status: DISCONTINUED | OUTPATIENT
Start: 2021-07-07 | End: 2021-07-09

## 2021-07-03 RX ORDER — SODIUM CHLORIDE 9 MG/ML
2000 INJECTION, SOLUTION INTRAVENOUS ONCE
Refills: 0 | Status: COMPLETED | OUTPATIENT
Start: 2021-07-03 | End: 2021-07-03

## 2021-07-03 RX ORDER — METOPROLOL TARTRATE 50 MG
5 TABLET ORAL EVERY 6 HOURS
Refills: 0 | Status: DISCONTINUED | OUTPATIENT
Start: 2021-07-03 | End: 2021-07-06

## 2021-07-03 RX ORDER — SODIUM CHLORIDE 9 MG/ML
1000 INJECTION, SOLUTION INTRAVENOUS
Refills: 0 | Status: DISCONTINUED | OUTPATIENT
Start: 2021-07-03 | End: 2021-07-04

## 2021-07-03 RX ORDER — MORPHINE SULFATE 50 MG/1
4 CAPSULE, EXTENDED RELEASE ORAL ONCE
Refills: 0 | Status: DISCONTINUED | OUTPATIENT
Start: 2021-07-03 | End: 2021-07-03

## 2021-07-03 RX ORDER — METOCLOPRAMIDE HCL 10 MG
10 TABLET ORAL ONCE
Refills: 0 | Status: DISCONTINUED | OUTPATIENT
Start: 2021-07-03 | End: 2021-07-03

## 2021-07-03 RX ORDER — PANTOPRAZOLE SODIUM 20 MG/1
40 TABLET, DELAYED RELEASE ORAL
Refills: 0 | Status: DISCONTINUED | OUTPATIENT
Start: 2021-07-03 | End: 2021-07-09

## 2021-07-03 RX ORDER — SODIUM CHLORIDE 9 MG/ML
1000 INJECTION INTRAMUSCULAR; INTRAVENOUS; SUBCUTANEOUS ONCE
Refills: 0 | Status: DISCONTINUED | OUTPATIENT
Start: 2021-07-03 | End: 2021-07-03

## 2021-07-03 RX ADMIN — SODIUM CHLORIDE 1000 MILLILITER(S): 9 INJECTION, SOLUTION INTRAVENOUS at 21:05

## 2021-07-03 RX ADMIN — PANTOPRAZOLE SODIUM 40 MILLIGRAM(S): 20 TABLET, DELAYED RELEASE ORAL at 22:47

## 2021-07-03 RX ADMIN — MORPHINE SULFATE 4 MILLIGRAM(S): 50 CAPSULE, EXTENDED RELEASE ORAL at 21:28

## 2021-07-03 RX ADMIN — MORPHINE SULFATE 4 MILLIGRAM(S): 50 CAPSULE, EXTENDED RELEASE ORAL at 19:42

## 2021-07-03 RX ADMIN — Medication 10 MILLIGRAM(S): at 21:10

## 2021-07-03 RX ADMIN — Medication 105 MILLIGRAM(S): at 22:48

## 2021-07-03 RX ADMIN — HYDROMORPHONE HYDROCHLORIDE 1 MILLIGRAM(S): 2 INJECTION INTRAMUSCULAR; INTRAVENOUS; SUBCUTANEOUS at 22:48

## 2021-07-03 RX ADMIN — HYDROMORPHONE HYDROCHLORIDE 0.5 MILLIGRAM(S): 2 INJECTION INTRAMUSCULAR; INTRAVENOUS; SUBCUTANEOUS at 21:10

## 2021-07-03 RX ADMIN — SODIUM CHLORIDE 250 MILLILITER(S): 9 INJECTION, SOLUTION INTRAVENOUS at 23:24

## 2021-07-03 RX ADMIN — Medication 5 MILLIGRAM(S): at 22:47

## 2021-07-03 RX ADMIN — HYDROMORPHONE HYDROCHLORIDE 0.5 MILLIGRAM(S): 2 INJECTION INTRAMUSCULAR; INTRAVENOUS; SUBCUTANEOUS at 22:18

## 2021-07-03 RX ADMIN — ONDANSETRON 4 MILLIGRAM(S): 8 TABLET, FILM COATED ORAL at 20:27

## 2021-07-03 RX ADMIN — PANTOPRAZOLE SODIUM 80 MILLIGRAM(S): 20 TABLET, DELAYED RELEASE ORAL at 19:42

## 2021-07-03 RX ADMIN — Medication 2 MILLIGRAM(S): at 23:22

## 2021-07-03 RX ADMIN — HYDROMORPHONE HYDROCHLORIDE 1 MILLIGRAM(S): 2 INJECTION INTRAMUSCULAR; INTRAVENOUS; SUBCUTANEOUS at 23:41

## 2021-07-03 RX ADMIN — SODIUM CHLORIDE 2000 MILLILITER(S): 9 INJECTION, SOLUTION INTRAVENOUS at 19:42

## 2021-07-03 RX ADMIN — INSULIN HUMAN 7 UNIT(S)/HR: 100 INJECTION, SOLUTION SUBCUTANEOUS at 21:05

## 2021-07-03 NOTE — ED ADULT TRIAGE NOTE - CHIEF COMPLAINT QUOTE
Vomiting dark black for 2 days. pt reports drinking 4 24 oz beers daily. last drink 3 days ago.  pt diaphoretic, . pt brought to CC MD called to bedside. pt c/o abdominal pain

## 2021-07-03 NOTE — H&P ADULT - NEUROLOGICAL DETAILS
bilateral resting hand tremor, A&Ox4 without hallucinations. No focal deficits./alert and oriented x 3

## 2021-07-03 NOTE — H&P ADULT - TENDERNESS
abdomen diffusely tender to palpation, with extreme point tenderness to RLQ. No rebound tenderness./RLQ/diffuse

## 2021-07-03 NOTE — ED PROVIDER NOTE - CLINICAL SUMMARY MEDICAL DECISION MAKING FREE TEXT BOX
29 yo male presenting with vomiting, abdominal pain. FS is above 500. Will give fluids, order labs, ct, pain control.

## 2021-07-03 NOTE — ED ADULT NURSE NOTE - OBJECTIVE STATEMENT
Pt care assumed in critical care. Airway patent, respirations even and tachypneic. Pt diaphoretic and tremulous. A&OX3 c/o vomiting and LUQ pain x2 days. Tenderness upon abdominal palpation. Reports dark black vomit, denies any diarrhea. Hx of diabetes and HTN, states he has not taken his insulin in 4 days. Reports daily drinker, states he drinks four 24oz beers every day, last drink 3 days ago. Denies any auditory hallucinations, visual hallucinations, SI or HI.

## 2021-07-03 NOTE — H&P ADULT - NSHPSOCIALHISTORY_GEN_ALL_CORE
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

## 2021-07-03 NOTE — ED PROVIDER NOTE - ATTENDING CONTRIBUTION TO CARE
Abdominal pain and vomiting secondary to DKA, possibly some superimposed alcohol withdrawal. Aggressive hydration, insulin infusion, dispo to MICU.

## 2021-07-03 NOTE — ED PROVIDER NOTE - OBJECTIVE STATEMENT
29 yo male with hx of DM, Alcohol abuse, HTN presents to the ED complaining of vomiting. Vomiting began 2 days ago, assc with LUQ abdominal pain. Since then patient has had decreased PO intake. States he has not taken his prescribed insulin. Patient is also a daily drinker of about 14 beers/day. Recently stopped drinking 3 days ago. Feeling anxious, tremulous. Denies other ROS.

## 2021-07-03 NOTE — ED ADULT NURSE REASSESSMENT NOTE - NS ED NURSE REASSESS COMMENT FT1
Insulin drip changed to 4units/hour as per ICU PA. Pt awaiting CT results. Reports improved abdominal pain after medication administration.

## 2021-07-03 NOTE — ED ADULT NURSE REASSESSMENT NOTE - NS ED NURSE REASSESS COMMENT FT1
Pt c/o abdominal pain. Contacted MICU PA regarding abdominal pain and FS. As per ICU PA, insulin drip to remain at 7units/hour. Pt taken to CT scan. Awaiting ct results and icu bed. Family at bedside, educated family on plan of care.

## 2021-07-03 NOTE — ED ADULT NURSE NOTE - PMH
Alcohol abuse    Atrial fibrillation    Diabetes    HTN (hypertension)    Non-ischemic cardiomyopathy    Pancreatitis

## 2021-07-03 NOTE — H&P ADULT - ASSESSMENT
29 yo male, PMHx EtOH abuse, T2DM on insulin and Metformin, HTN, recently admitted with new diagnosis of non-ischemic cardiomyopathy EF 40-45% and atrial fibrillation on Eliquis, who presents with abdominal pain, n/v, found to be in DKA with severe hyperglycemia and anion gap metabolic acidosis, complicated by dehydration, hypovolemia, SYED, acute alcohol withdrawal, GI bleed.      1. DKA  Admit to MICU  Started on insulin drip, titrate per patient-specific protocol based on q1H accu-checks  Aggressive fluid hydration, received 2L IV Fluid bolus, appears hypovolemic, give additional 2L LR bolus and start maintenance fluids @ 250 cc/hr titrated to goal UOP >0.5 cc/kg/hr  When blood sugar <250 add dextrose to IVF  Q4H BMP, magnesium, phosphorous levels. Aggressive electrolyte supplementation to keep K >4, Mg >2, Phos >3 for optimal arrhythmia suppression  NPO  Diabetic education and counseling  HbA1C = 8.0  Endocrine c/s    2. GI bleed  Coffee ground emesis, suspect upper GI bleed  H/H significantly hemoconcentrated from prior admission due to hypovolemia  Start protonix  Will monitor serial CBC, keep NPO   GI consult for evaluation for eventual EGD    3. Abdominal pain  CT abd/pelv pending  Change from Morphine to Dilaudid PRN   Give dose of Reglan    4. Alcohol withdrawal  Give dose of Valium 10mg IV now  Start IV Ativan taper  CIWA protocol with Ativan IV PRN per symptom-triggered protocol        CRITICAL CARE TIME SPENT: 55 minutes assessing presenting problems of acute critical illness, which pose high probability of life threatening deterioration or end organ damage/dysfunction, requiring frequent bedside reassessments and adjustments to plan of care, including medical decision making, initiating plan of care, reviewing data, reviewing radiologic exams, discussing with multidisciplinary team, discussing goals of care. Critical Care time is non-inclusive of independent time spent on procedures performed. 29 yo male, PMHx EtOH abuse, T2DM on insulin and Metformin, HTN, recently admitted with new diagnosis of non-ischemic cardiomyopathy EF 40-45% and atrial fibrillation on Eliquis, who presents with abdominal pain, n/v, found to be in DKA with severe hyperglycemia and anion gap metabolic acidosis, complicated by dehydration, hypovolemia, SYED, acute alcohol withdrawal, GI bleed.      1. DKA  Admit to MICU  Started on insulin drip, titrate per patient-specific protocol based on q1H accu-checks  Aggressive fluid hydration, received 2L IV Fluid bolus, appears hypovolemic, give additional 2L LR bolus and start maintenance fluids @ 250 cc/hr titrated to goal UOP >0.5 cc/kg/hr  When blood sugar <250 add dextrose to IVF  Q4H BMP, magnesium, phosphorous levels. Aggressive electrolyte supplementation to keep K >4, Mg >2, Phos >3 for optimal arrhythmia suppression  NPO  Diabetic education and counseling  HbA1C = 8.0  Endocrine c/s    2. GI bleed  Coffee ground emesis, suspect upper GI bleed  Coagulopathy, on Eliquis, non-compliant with meds  H/H significantly hemoconcentrated from prior admission due to hypovolemia  Start protonix  Will monitor serial CBC, keep NPO   GI consult for evaluation for eventual EGD    3. Abdominal pain  CT abd/pelv pending  Change from Morphine to Dilaudid PRN   Give dose of Reglan    4. Alcohol withdrawal  Give dose of Valium 10mg IV now  Start IV Ativan taper  CIWA protocol with Ativan IV PRN per symptom-triggered protocol    5. CHF  Non-ischemic CM, recent cardiac cath reviewed   Recent TTE 5/6/21 with EF 40-45%  Not acutely decompensated  Hold aspirin due to GIB  Hold ACEI due to SYED  Start Metoprolol 5mg IV q6h standing    6. Afib   Start Metoprolol, as above, for rate control  Hold A/C due to suspected GI bleed        CRITICAL CARE TIME SPENT: 55 minutes assessing presenting problems of acute critical illness, which pose high probability of life threatening deterioration or end organ damage/dysfunction, requiring frequent bedside reassessments and adjustments to plan of care, including medical decision making, initiating plan of care, reviewing data, reviewing radiologic exams, discussing with multidisciplinary team, discussing goals of care. Critical Care time is non-inclusive of independent time spent on procedures performed.

## 2021-07-03 NOTE — H&P ADULT - HISTORY OF PRESENT ILLNESS
29 yo male, PMHx EtOH abuse, T2DM on insulin and Metformin, HTN, recently admitted with new diagnosis of non-ischemic cardiomyopathy EF 40-45% and atrial fibrillation on Eliquis, who presented from home with complaints of abdominal pain, nausea, and multiple episodes of vomiting over the past two days. Vomiting multiple times in ED, now black appearing. Patient states he stopped taking his insulin 4 days ago because he "forgot". On arrival to ED, patient was tachycardic in 140's, hemodynamically stable, complaining of abdominal pain. He was found to be in DKA with severe metabolic acidosis, pH 7.02, serum bicarb 7, anion gap 39, blood glucose 636 with SYED Cr 2.12 (up from 0.5 5/26/21). Tbili 2.4, normal LFTs, lipase 14. Also noted to be tremulous, last EtOH use 3 days ago. Admits to drinking malt liquor, 4 24-oz containers, daily. Denies binge drinking recently. Pending CT abd/pelv.    Patient's mother Brigid Villalpando ([phone]) present at bedside, updated via  with patient's permission.

## 2021-07-03 NOTE — H&P ADULT - ATTENDING COMMENTS
29 yo male with hx of ETOH abuse, DM, dilated cardiomyopathy, CHFrEF, Atrial fibrillation, presented to the ED 7/3 with 2 days of abdominal pain, nausea, vomiting, and inability to keep anything down.  Stopped taking insulin 4 days prior to presentation (forgot/ran out).  In ED patient noted to have DKA, ETOH withdrawal, SYED, dehydration. 29 yo male with hx of ETOH abuse, DM, dilated cardiomyopathy, CHFrEF, Atrial fibrillation, presented to the ED 7/3 with 2 days of abdominal pain, nausea, vomiting, and inability to keep anything down.  Stopped taking insulin 4 days prior to presentation (forgot/ran out).  In ED patient noted to have DKA, ETOH withdrawal, SYED, dehydration.  Also noted to have coffee ground emesis.  CT abdomen reveals small pneumomediastinum on lung cuts.   Will treat for DKA, ETOH withdrawal.  May have Nohemy Anderson tear, will start PPI, monitor cbc.  CXR to assess pneumomediastinum (likely from coughing/retching vigorously)

## 2021-07-03 NOTE — H&P ADULT - NSICDXPASTMEDICALHX_GEN_ALL_CORE_FT
PAST MEDICAL HISTORY:  Alcohol abuse     Atrial fibrillation     Diabetes     HTN (hypertension)     Non-ischemic cardiomyopathy     Pancreatitis

## 2021-07-04 LAB
ANION GAP SERPL CALC-SCNC: 12 MMOL/L — SIGNIFICANT CHANGE UP (ref 5–17)
ANION GAP SERPL CALC-SCNC: 14 MMOL/L — SIGNIFICANT CHANGE UP (ref 5–17)
ANION GAP SERPL CALC-SCNC: 29 MMOL/L — HIGH (ref 5–17)
BUN SERPL-MCNC: 18.9 MG/DL — SIGNIFICANT CHANGE UP (ref 8–20)
BUN SERPL-MCNC: 21.3 MG/DL — HIGH (ref 8–20)
BUN SERPL-MCNC: 27.5 MG/DL — HIGH (ref 8–20)
C PEPTIDE SERPL-MCNC: 0.3 NG/ML — LOW (ref 1.1–4.4)
CALCIUM SERPL-MCNC: 8.7 MG/DL — SIGNIFICANT CHANGE UP (ref 8.6–10.2)
CALCIUM SERPL-MCNC: 9.1 MG/DL — SIGNIFICANT CHANGE UP (ref 8.6–10.2)
CALCIUM SERPL-MCNC: 9.5 MG/DL — SIGNIFICANT CHANGE UP (ref 8.6–10.2)
CHLORIDE SERPL-SCNC: 104 MMOL/L — SIGNIFICANT CHANGE UP (ref 98–107)
CHLORIDE SERPL-SCNC: 112 MMOL/L — HIGH (ref 98–107)
CHLORIDE SERPL-SCNC: 112 MMOL/L — HIGH (ref 98–107)
CO2 SERPL-SCNC: 18 MMOL/L — LOW (ref 22–29)
CO2 SERPL-SCNC: 19 MMOL/L — LOW (ref 22–29)
CO2 SERPL-SCNC: 8 MMOL/L — CRITICAL LOW (ref 22–29)
CREAT SERPL-MCNC: 0.74 MG/DL — SIGNIFICANT CHANGE UP (ref 0.5–1.3)
CREAT SERPL-MCNC: 1.03 MG/DL — SIGNIFICANT CHANGE UP (ref 0.5–1.3)
CREAT SERPL-MCNC: 1.4 MG/DL — HIGH (ref 0.5–1.3)
GLUCOSE BLDC GLUCOMTR-MCNC: 104 MG/DL — HIGH (ref 70–99)
GLUCOSE BLDC GLUCOMTR-MCNC: 116 MG/DL — HIGH (ref 70–99)
GLUCOSE BLDC GLUCOMTR-MCNC: 126 MG/DL — HIGH (ref 70–99)
GLUCOSE BLDC GLUCOMTR-MCNC: 126 MG/DL — HIGH (ref 70–99)
GLUCOSE BLDC GLUCOMTR-MCNC: 128 MG/DL — HIGH (ref 70–99)
GLUCOSE BLDC GLUCOMTR-MCNC: 157 MG/DL — HIGH (ref 70–99)
GLUCOSE BLDC GLUCOMTR-MCNC: 171 MG/DL — HIGH (ref 70–99)
GLUCOSE BLDC GLUCOMTR-MCNC: 174 MG/DL — HIGH (ref 70–99)
GLUCOSE BLDC GLUCOMTR-MCNC: 184 MG/DL — HIGH (ref 70–99)
GLUCOSE BLDC GLUCOMTR-MCNC: 188 MG/DL — HIGH (ref 70–99)
GLUCOSE BLDC GLUCOMTR-MCNC: 197 MG/DL — HIGH (ref 70–99)
GLUCOSE BLDC GLUCOMTR-MCNC: 207 MG/DL — HIGH (ref 70–99)
GLUCOSE BLDC GLUCOMTR-MCNC: 212 MG/DL — HIGH (ref 70–99)
GLUCOSE BLDC GLUCOMTR-MCNC: 220 MG/DL — HIGH (ref 70–99)
GLUCOSE BLDC GLUCOMTR-MCNC: 75 MG/DL — SIGNIFICANT CHANGE UP (ref 70–99)
GLUCOSE SERPL-MCNC: 134 MG/DL — HIGH (ref 70–99)
GLUCOSE SERPL-MCNC: 172 MG/DL — HIGH (ref 70–99)
GLUCOSE SERPL-MCNC: 276 MG/DL — HIGH (ref 70–99)
HCT VFR BLD CALC: 34.8 % — LOW (ref 39–50)
HGB BLD-MCNC: 11.7 G/DL — LOW (ref 13–17)
MAGNESIUM SERPL-MCNC: 1.8 MG/DL — SIGNIFICANT CHANGE UP (ref 1.6–2.6)
MAGNESIUM SERPL-MCNC: 2 MG/DL — SIGNIFICANT CHANGE UP (ref 1.6–2.6)
MAGNESIUM SERPL-MCNC: 2.3 MG/DL — SIGNIFICANT CHANGE UP (ref 1.6–2.6)
MCHC RBC-ENTMCNC: 30.8 PG — SIGNIFICANT CHANGE UP (ref 27–34)
MCHC RBC-ENTMCNC: 33.6 GM/DL — SIGNIFICANT CHANGE UP (ref 32–36)
MCV RBC AUTO: 91.6 FL — SIGNIFICANT CHANGE UP (ref 80–100)
PHOSPHATE SERPL-MCNC: 1.5 MG/DL — LOW (ref 2.4–4.7)
PHOSPHATE SERPL-MCNC: 1.7 MG/DL — LOW (ref 2.4–4.7)
PHOSPHATE SERPL-MCNC: 2.9 MG/DL — SIGNIFICANT CHANGE UP (ref 2.4–4.7)
PLATELET # BLD AUTO: 132 K/UL — LOW (ref 150–400)
POTASSIUM SERPL-MCNC: 3.7 MMOL/L — SIGNIFICANT CHANGE UP (ref 3.5–5.3)
POTASSIUM SERPL-MCNC: 4.4 MMOL/L — SIGNIFICANT CHANGE UP (ref 3.5–5.3)
POTASSIUM SERPL-MCNC: 4.7 MMOL/L — SIGNIFICANT CHANGE UP (ref 3.5–5.3)
POTASSIUM SERPL-SCNC: 3.7 MMOL/L — SIGNIFICANT CHANGE UP (ref 3.5–5.3)
POTASSIUM SERPL-SCNC: 4.4 MMOL/L — SIGNIFICANT CHANGE UP (ref 3.5–5.3)
POTASSIUM SERPL-SCNC: 4.7 MMOL/L — SIGNIFICANT CHANGE UP (ref 3.5–5.3)
RBC # BLD: 3.8 M/UL — LOW (ref 4.2–5.8)
RBC # FLD: 13.1 % — SIGNIFICANT CHANGE UP (ref 10.3–14.5)
SODIUM SERPL-SCNC: 141 MMOL/L — SIGNIFICANT CHANGE UP (ref 135–145)
SODIUM SERPL-SCNC: 143 MMOL/L — SIGNIFICANT CHANGE UP (ref 135–145)
SODIUM SERPL-SCNC: 144 MMOL/L — SIGNIFICANT CHANGE UP (ref 135–145)
WBC # BLD: 6.64 K/UL — SIGNIFICANT CHANGE UP (ref 3.8–10.5)
WBC # FLD AUTO: 6.64 K/UL — SIGNIFICANT CHANGE UP (ref 3.8–10.5)

## 2021-07-04 PROCEDURE — 99233 SBSQ HOSP IP/OBS HIGH 50: CPT

## 2021-07-04 PROCEDURE — 71045 X-RAY EXAM CHEST 1 VIEW: CPT | Mod: 26

## 2021-07-04 PROCEDURE — 99222 1ST HOSP IP/OBS MODERATE 55: CPT

## 2021-07-04 RX ORDER — DEXTROSE 50 % IN WATER 50 %
12.5 SYRINGE (ML) INTRAVENOUS ONCE
Refills: 0 | Status: DISCONTINUED | OUTPATIENT
Start: 2021-07-04 | End: 2021-07-09

## 2021-07-04 RX ORDER — OCTREOTIDE ACETATE 200 UG/ML
50 INJECTION, SOLUTION INTRAVENOUS; SUBCUTANEOUS
Qty: 500 | Refills: 0 | Status: DISCONTINUED | OUTPATIENT
Start: 2021-07-04 | End: 2021-07-06

## 2021-07-04 RX ORDER — DEXTROSE 50 % IN WATER 50 %
15 SYRINGE (ML) INTRAVENOUS ONCE
Refills: 0 | Status: DISCONTINUED | OUTPATIENT
Start: 2021-07-04 | End: 2021-07-09

## 2021-07-04 RX ORDER — METOCLOPRAMIDE HCL 10 MG
10 TABLET ORAL EVERY 6 HOURS
Refills: 0 | Status: COMPLETED | OUTPATIENT
Start: 2021-07-04 | End: 2021-07-06

## 2021-07-04 RX ORDER — GLUCAGON INJECTION, SOLUTION 0.5 MG/.1ML
1 INJECTION, SOLUTION SUBCUTANEOUS ONCE
Refills: 0 | Status: DISCONTINUED | OUTPATIENT
Start: 2021-07-04 | End: 2021-07-09

## 2021-07-04 RX ORDER — INSULIN GLARGINE 100 [IU]/ML
5 INJECTION, SOLUTION SUBCUTANEOUS AT BEDTIME
Refills: 0 | Status: DISCONTINUED | OUTPATIENT
Start: 2021-07-04 | End: 2021-07-06

## 2021-07-04 RX ORDER — SODIUM CHLORIDE 9 MG/ML
1000 INJECTION, SOLUTION INTRAVENOUS
Refills: 0 | Status: DISCONTINUED | OUTPATIENT
Start: 2021-07-04 | End: 2021-07-08

## 2021-07-04 RX ORDER — DEXTROSE 50 % IN WATER 50 %
25 SYRINGE (ML) INTRAVENOUS ONCE
Refills: 0 | Status: DISCONTINUED | OUTPATIENT
Start: 2021-07-04 | End: 2021-07-09

## 2021-07-04 RX ORDER — SODIUM CHLORIDE 9 MG/ML
1000 INJECTION, SOLUTION INTRAVENOUS
Refills: 0 | Status: DISCONTINUED | OUTPATIENT
Start: 2021-07-04 | End: 2021-07-04

## 2021-07-04 RX ORDER — METOCLOPRAMIDE HCL 10 MG
10 TABLET ORAL EVERY 6 HOURS
Refills: 0 | Status: DISCONTINUED | OUTPATIENT
Start: 2021-07-04 | End: 2021-07-05

## 2021-07-04 RX ORDER — INSULIN LISPRO 100/ML
VIAL (ML) SUBCUTANEOUS EVERY 6 HOURS
Refills: 0 | Status: DISCONTINUED | OUTPATIENT
Start: 2021-07-04 | End: 2021-07-09

## 2021-07-04 RX ORDER — OCTREOTIDE ACETATE 200 UG/ML
8 INJECTION, SOLUTION INTRAVENOUS; SUBCUTANEOUS
Qty: 500 | Refills: 0 | Status: DISCONTINUED | OUTPATIENT
Start: 2021-07-04 | End: 2021-07-04

## 2021-07-04 RX ORDER — HYDROMORPHONE HYDROCHLORIDE 2 MG/ML
1 INJECTION INTRAMUSCULAR; INTRAVENOUS; SUBCUTANEOUS EVERY 4 HOURS
Refills: 0 | Status: DISCONTINUED | OUTPATIENT
Start: 2021-07-04 | End: 2021-07-09

## 2021-07-04 RX ORDER — SODIUM CHLORIDE 9 MG/ML
1000 INJECTION, SOLUTION INTRAVENOUS
Refills: 0 | Status: DISCONTINUED | OUTPATIENT
Start: 2021-07-04 | End: 2021-07-09

## 2021-07-04 RX ORDER — INSULIN GLARGINE 100 [IU]/ML
5 INJECTION, SOLUTION SUBCUTANEOUS EVERY MORNING
Refills: 0 | Status: DISCONTINUED | OUTPATIENT
Start: 2021-07-04 | End: 2021-07-06

## 2021-07-04 RX ORDER — POTASSIUM PHOSPHATE, MONOBASIC POTASSIUM PHOSPHATE, DIBASIC 236; 224 MG/ML; MG/ML
30 INJECTION, SOLUTION INTRAVENOUS ONCE
Refills: 0 | Status: COMPLETED | OUTPATIENT
Start: 2021-07-04 | End: 2021-07-04

## 2021-07-04 RX ORDER — MAGNESIUM SULFATE 500 MG/ML
2 VIAL (ML) INJECTION ONCE
Refills: 0 | Status: COMPLETED | OUTPATIENT
Start: 2021-07-04 | End: 2021-07-04

## 2021-07-04 RX ADMIN — HYDROMORPHONE HYDROCHLORIDE 1 MILLIGRAM(S): 2 INJECTION INTRAMUSCULAR; INTRAVENOUS; SUBCUTANEOUS at 22:58

## 2021-07-04 RX ADMIN — Medication 105 MILLIGRAM(S): at 13:00

## 2021-07-04 RX ADMIN — Medication 5 MILLIGRAM(S): at 06:00

## 2021-07-04 RX ADMIN — CHLORHEXIDINE GLUCONATE 1 APPLICATION(S): 213 SOLUTION TOPICAL at 06:04

## 2021-07-04 RX ADMIN — SODIUM CHLORIDE 250 MILLILITER(S): 9 INJECTION, SOLUTION INTRAVENOUS at 11:10

## 2021-07-04 RX ADMIN — INSULIN GLARGINE 10 UNIT(S): 100 INJECTION, SOLUTION SUBCUTANEOUS at 12:56

## 2021-07-04 RX ADMIN — OCTREOTIDE ACETATE 10 MICROGRAM(S)/HR: 200 INJECTION, SOLUTION INTRAVENOUS; SUBCUTANEOUS at 17:28

## 2021-07-04 RX ADMIN — Medication 2 MILLIGRAM(S): at 02:45

## 2021-07-04 RX ADMIN — Medication 1.5 MILLIGRAM(S): at 23:36

## 2021-07-04 RX ADMIN — Medication 2 MILLIGRAM(S): at 06:00

## 2021-07-04 RX ADMIN — SODIUM CHLORIDE 250 MILLILITER(S): 9 INJECTION, SOLUTION INTRAVENOUS at 13:20

## 2021-07-04 RX ADMIN — Medication 5 MILLIGRAM(S): at 17:28

## 2021-07-04 RX ADMIN — Medication 5 MILLIGRAM(S): at 23:22

## 2021-07-04 RX ADMIN — Medication 10 MILLIGRAM(S): at 17:30

## 2021-07-04 RX ADMIN — HYDROMORPHONE HYDROCHLORIDE 1 MILLIGRAM(S): 2 INJECTION INTRAMUSCULAR; INTRAVENOUS; SUBCUTANEOUS at 23:13

## 2021-07-04 RX ADMIN — Medication 2 MILLIGRAM(S): at 19:20

## 2021-07-04 RX ADMIN — Medication 5 MILLIGRAM(S): at 11:09

## 2021-07-04 RX ADMIN — POTASSIUM PHOSPHATE, MONOBASIC POTASSIUM PHOSPHATE, DIBASIC 83.33 MILLIMOLE(S): 236; 224 INJECTION, SOLUTION INTRAVENOUS at 03:36

## 2021-07-04 RX ADMIN — SODIUM CHLORIDE 75 MILLILITER(S): 9 INJECTION, SOLUTION INTRAVENOUS at 22:38

## 2021-07-04 RX ADMIN — Medication 105 MILLIGRAM(S): at 21:20

## 2021-07-04 RX ADMIN — Medication 2 MILLIGRAM(S): at 15:01

## 2021-07-04 RX ADMIN — Medication 1 MILLIGRAM(S): at 11:09

## 2021-07-04 RX ADMIN — SODIUM CHLORIDE 250 MILLILITER(S): 9 INJECTION, SOLUTION INTRAVENOUS at 05:45

## 2021-07-04 RX ADMIN — Medication 2 MILLIGRAM(S): at 11:09

## 2021-07-04 RX ADMIN — Medication 4 MILLIGRAM(S): at 08:20

## 2021-07-04 RX ADMIN — INSULIN GLARGINE 10 UNIT(S): 100 INJECTION, SOLUTION SUBCUTANEOUS at 23:02

## 2021-07-04 RX ADMIN — PANTOPRAZOLE SODIUM 40 MILLIGRAM(S): 20 TABLET, DELAYED RELEASE ORAL at 17:28

## 2021-07-04 RX ADMIN — Medication 10 MILLIGRAM(S): at 23:22

## 2021-07-04 RX ADMIN — Medication 105 MILLIGRAM(S): at 06:00

## 2021-07-04 RX ADMIN — PANTOPRAZOLE SODIUM 40 MILLIGRAM(S): 20 TABLET, DELAYED RELEASE ORAL at 06:00

## 2021-07-04 RX ADMIN — Medication 50 GRAM(S): at 02:45

## 2021-07-04 RX ADMIN — SODIUM CHLORIDE 250 MILLILITER(S): 9 INJECTION, SOLUTION INTRAVENOUS at 01:15

## 2021-07-04 RX ADMIN — Medication 2: at 12:57

## 2021-07-04 NOTE — PROVIDER CONTACT NOTE (EICU) - SITUATION
d/c insulin infusion, d/c D5/LR, and start LR ordered as per bedside nursing request after discussion with primary ICU team who is unable to place this order currently due to emergent patient needs. This test will be reviewed and addressed by the primary team as per local ICU protocols/guideline.  patient identification confirmed with- Name, , and MRN- confirmation facilitated by bedside clinical team if the patient is unable to self-identify.

## 2021-07-04 NOTE — CONSULT NOTE ADULT - ASSESSMENT
The patient with alcohol abuse, diabetes mellitus, presented with significant vomiting, may be related to diabetes ketoacidosis as he was not taking insulin versus alcoholic disease.  Most likely the patient had Nohemy-Anderson tear.  There are no current signs of any active GI bleeding.  At this time will recommend to consider PPI twice daily, octreotide, Reglan around-the-clock and monitor the blood count.  Once the patient is better, can consider Gastrografin esophagram to rule out any Boerhaave syndrome.Will follow up

## 2021-07-04 NOTE — PROGRESS NOTE ADULT - SUBJECTIVE AND OBJECTIVE BOX
CARMEN JOYCE  MRN-[id number]  Patient is a 30y old  Male who presents with a chief complaint of DKA, Alcohol withdrawal (2021 21:16)    HPI:  31 yo male, PMHx EtOH abuse, T2DM on insulin and Metformin, HTN, recently admitted with new diagnosis of non-ischemic cardiomyopathy EF 40-45% and atrial fibrillation on Eliquis, who presented from home with complaints of abdominal pain, nausea, and multiple episodes of vomiting over the past two days. Vomiting multiple times in ED, now black appearing. Patient states he stopped taking his insulin 4 days ago because he "forgot". On arrival to ED, patient was tachycardic in 140's, hemodynamically stable, complaining of abdominal pain. He was found to be in DKA with severe metabolic acidosis, pH 7.02, serum bicarb 7, anion gap 39, blood glucose 636 with SYED Cr 2.12 (up from 0.5 21). Tbili 2.4, normal LFTs, lipase 14. Also noted to be tremulous, last EtOH use 3 days ago. Admits to drinking malt liquor, 4 24-oz containers, daily. Denies binge drinking recently. Pending CT abd/pelv.    Patient's mother Brigid Villalpando ([phone]) present at bedside, updated via  with patient's permission. (2021 21:16)      Last 24 hours   Started on insulin infusion   CIWa monitoring with ativan taper   IV hydration       REVIEW OF SYSTEMS:    Could not obtain ROS due to underlying mentation    Physical Exam:  Vital Signs Last 24 Hrs  T(C): 36.9 (2021 08:00), Max: 37.9 (2021 19:11)  T(F): 98.4 (2021 08:00), Max: 100.2 (2021 19:11)  HR: 90 (2021 13:00) (86 - 147)  BP: 120/86 (2021 13:00) (104/71 - 172/98)  BP(mean): 94 (2021 13:00) (82 - 112)  RR: 10 (2021 13:00) (10 - 28)  SpO2: 98% (2021 13:00) (90% - 100%)    Gen:  Lethargic post ativan, prior to that was hallucinating   CNS: non focal 	  Neck: no JVD  RES : clear , no wheezing                      CVS: Regular  rhythm. Normal S1/S2  Abd: Soft, non-distended. Bowel sounds present.  Skin: No rash.  Ext:  no edema    ============================I/O===========================   I&O's Detail    2021 07:  -  2021 07:00  --------------------------------------------------------  IN:    dextrose 5% + lactated ringers: 1500 mL    Insulin: 25 mL    Insulin: 4 mL    IV PiggyBack: 50 mL    IV PiggyBack: 333.2 mL    IV PiggyBack: 100 mL  Total IN: 2012.2 mL    OUT:  Total OUT: 0 mL    Total NET: 2012.2 mL      2021 07:  -  2021 13:55  --------------------------------------------------------  IN:    dextrose 5% + lactated ringers: 1500 mL    Insulin: 12 mL    IV PiggyBack: 166.6 mL  Total IN: 1678.6 mL    OUT:  Total OUT: 0 mL    Total NET: 1678.6 mL        ============================ LABS =========================                        11.7   6.64  )-----------( 132      ( 2021 06:54 )             34.8     07-04    143  |  112<H>  |  18.9  ----------------------------<  172<H>  4.7   |  19.0<L>  |  0.74    Ca    8.7      2021 10:34  Phos  2.9     07-  Mg     2.0     -    TPro  9.4<H>  /  Alb  5.3<H>  /  TBili  2.4<H>  /  DBili  x   /  AST  22  /  ALT  17  /  AlkPhos  124<H>  -    LIVER FUNCTIONS - ( 2021 19:23 )  Alb: 5.3 g/dL / Pro: 9.4 g/dL / ALK PHOS: 124 U/L / ALT: 17 U/L / AST: 22 U/L / GGT: x           PT/INR - ( 2021 23:38 )   PT: 11.4 sec;   INR: 0.98 ratio         PTT - ( 2021 23:38 )  PTT:26.8 sec    Urinalysis Basic - ( 2021 20:10 )    Color: Yellow / Appearance: Clear / S.020 / pH: x  Gluc: x / Ketone: Large  / Bili: Negative / Urobili: Negative mg/dL   Blood: x / Protein: 100 mg/dL / Nitrite: Negative   Leuk Esterase: Negative / RBC: 6-10 /HPF / WBC 0-2   Sq Epi: x / Non Sq Epi: Occasional / Bacteria: Occasional      ======================Micro/Rad/Cardio=================  Culture: Reviewed   CXR: Reviewed  Echo:Reviewed  ======================================================  PAST MEDICAL & SURGICAL HISTORY:  Alcohol abuse    Pancreatitis    HTN (hypertension)    Diabetes    Atrial fibrillation    Non-ischemic cardiomyopathy    No significant past surgical history      ====================ASSESSMENT ==============    29 y/o  male with  EtOH abuse, T2DM HTN, non-ischemic cardiomyopathy EF 40-45% and atrial fibrillation on Eliquis admitted with     1: DKA without coma   2: Alcohol withdrawal with DTs with underlying DTs  3: GIB while on Eliquis   4: NICMP with Afib on AC        ====================== NEUROLOGY=====================  HYDROmorphone  Injectable 1 milliGRAM(s) IV Push every 4 hours PRN Severe Pain (7 - 10)  LORazepam   Injectable   IV Push   LORazepam   Injectable 4 milliGRAM(s) IV Push every 1 hour PRN Symptom-triggered: each CIWA -Ar score 8 or GREATER  LORazepam   Injectable 2 milliGRAM(s) IV Push every 4 hours  LORazepam   Injectable 1.5 milliGRAM(s) IV Push every 4 hours  metoclopramide  IVPB 10 milliGRAM(s) IV Intermittent every 6 hours PRN nausea/vomiting  Fall, aspiration and seizure precautions   folic acid Injectable 1 milliGRAM(s) IV Push daily  thiamine IVPB 500 milliGRAM(s) IV Intermittent every 8 hours    ==================== RESPIRATORY======================  On supplemental oxygen-> oxygenating and ventilating fine for now     ====================CARDIOVASCULAR==================  metoprolol tartrate Injectable 5 milliGRAM(s) IV Push every 6 hours  Trend lactate as needed     ===================HEMATOLOGIC/ONC ===================  Holding off of Eliquis for now   Trend H&H or other signs of bleeding     ===================== RENAL =========================  Continue monitoring urine output    ==================== GASTROINTESTINAL===================  dextrose 5%. 1000 milliLiter(s) (50 mL/Hr) IV Continuous <Continuous>  dextrose 5%. 1000 milliLiter(s) (100 mL/Hr) IV Continuous <Continuous>  lactated ringers. 1000 milliLiter(s) (75 mL/Hr) IV Continuous : mind ful of volume overload   pantoprazole  Injectable 40 milliGRAM(s) IV Push two times a day  Octreotide infusion started as well       =======================    ENDOCRINE  =====================  dextrose 40% Gel 15 Gram(s) Oral once  dextrose 50% Injectable 25 Gram(s) IV Push once  dextrose 50% Injectable 12.5 Gram(s) IV Push once  dextrose 50% Injectable 25 Gram(s) IV Push once  glucagon  Injectable 1 milliGRAM(s) IntraMuscular once  insulin glargine Injectable (LANTUS) 10 Unit(s) SubCutaneous every morning  insulin glargine Injectable (LANTUS) 10 Unit(s) SubCutaneous at bedtime  insulin lispro (ADMELOG) corrective regimen sliding scale   SubCutaneous every 6 hours  Stopped insulin infusion this AM    ========================INFECTIOUS DISEASE================  RODRI  If ascites, would contemplate CTX    Patient requires continuous monitoring with bedside rhythm monitoring, pulse oximetry,  monitoring and intermittent blood gas analysis.    Care plan discussed with ICU care team, family and consultants    Patient remained critical; I have spent 35 minutes providing non-routine care, revaluated multiple times during the day.

## 2021-07-05 DIAGNOSIS — J98.2 INTERSTITIAL EMPHYSEMA: ICD-10-CM

## 2021-07-05 DIAGNOSIS — K92.0 HEMATEMESIS: ICD-10-CM

## 2021-07-05 LAB
ANION GAP SERPL CALC-SCNC: 9 MMOL/L — SIGNIFICANT CHANGE UP (ref 5–17)
BUN SERPL-MCNC: 14.8 MG/DL — SIGNIFICANT CHANGE UP (ref 8–20)
CALCIUM SERPL-MCNC: 8.8 MG/DL — SIGNIFICANT CHANGE UP (ref 8.6–10.2)
CHLORIDE SERPL-SCNC: 113 MMOL/L — HIGH (ref 98–107)
CO2 SERPL-SCNC: 23 MMOL/L — SIGNIFICANT CHANGE UP (ref 22–29)
COVID-19 SPIKE DOMAIN AB INTERP: NEGATIVE — SIGNIFICANT CHANGE UP
COVID-19 SPIKE DOMAIN ANTIBODY RESULT: 0.4 U/ML — SIGNIFICANT CHANGE UP
CREAT SERPL-MCNC: 0.73 MG/DL — SIGNIFICANT CHANGE UP (ref 0.5–1.3)
GLUCOSE BLDC GLUCOMTR-MCNC: 126 MG/DL — HIGH (ref 70–99)
GLUCOSE BLDC GLUCOMTR-MCNC: 135 MG/DL — HIGH (ref 70–99)
GLUCOSE BLDC GLUCOMTR-MCNC: 151 MG/DL — HIGH (ref 70–99)
GLUCOSE BLDC GLUCOMTR-MCNC: 65 MG/DL — LOW (ref 70–99)
GLUCOSE BLDC GLUCOMTR-MCNC: 77 MG/DL — SIGNIFICANT CHANGE UP (ref 70–99)
GLUCOSE BLDC GLUCOMTR-MCNC: 99 MG/DL — SIGNIFICANT CHANGE UP (ref 70–99)
GLUCOSE SERPL-MCNC: 126 MG/DL — HIGH (ref 70–99)
HCT VFR BLD CALC: 32.8 % — LOW (ref 39–50)
HGB BLD-MCNC: 11.1 G/DL — LOW (ref 13–17)
MAGNESIUM SERPL-MCNC: 1.8 MG/DL — SIGNIFICANT CHANGE UP (ref 1.6–2.6)
MCHC RBC-ENTMCNC: 31.6 PG — SIGNIFICANT CHANGE UP (ref 27–34)
MCHC RBC-ENTMCNC: 33.8 GM/DL — SIGNIFICANT CHANGE UP (ref 32–36)
MCV RBC AUTO: 93.4 FL — SIGNIFICANT CHANGE UP (ref 80–100)
PHOSPHATE SERPL-MCNC: 1.5 MG/DL — LOW (ref 2.4–4.7)
PLATELET # BLD AUTO: 98 K/UL — LOW (ref 150–400)
POTASSIUM SERPL-MCNC: 3.8 MMOL/L — SIGNIFICANT CHANGE UP (ref 3.5–5.3)
POTASSIUM SERPL-SCNC: 3.8 MMOL/L — SIGNIFICANT CHANGE UP (ref 3.5–5.3)
RBC # BLD: 3.51 M/UL — LOW (ref 4.2–5.8)
RBC # FLD: 13.2 % — SIGNIFICANT CHANGE UP (ref 10.3–14.5)
SARS-COV-2 IGG+IGM SERPL QL IA: 0.4 U/ML — SIGNIFICANT CHANGE UP
SARS-COV-2 IGG+IGM SERPL QL IA: NEGATIVE — SIGNIFICANT CHANGE UP
SODIUM SERPL-SCNC: 145 MMOL/L — SIGNIFICANT CHANGE UP (ref 135–145)
WBC # BLD: 4.14 K/UL — SIGNIFICANT CHANGE UP (ref 3.8–10.5)
WBC # FLD AUTO: 4.14 K/UL — SIGNIFICANT CHANGE UP (ref 3.8–10.5)

## 2021-07-05 PROCEDURE — 99222 1ST HOSP IP/OBS MODERATE 55: CPT

## 2021-07-05 PROCEDURE — 99233 SBSQ HOSP IP/OBS HIGH 50: CPT

## 2021-07-05 PROCEDURE — 71250 CT THORAX DX C-: CPT | Mod: 26

## 2021-07-05 RX ORDER — POTASSIUM PHOSPHATE, MONOBASIC POTASSIUM PHOSPHATE, DIBASIC 236; 224 MG/ML; MG/ML
15 INJECTION, SOLUTION INTRAVENOUS ONCE
Refills: 0 | Status: COMPLETED | OUTPATIENT
Start: 2021-07-05 | End: 2021-07-05

## 2021-07-05 RX ORDER — INSULIN GLARGINE 100 [IU]/ML
5 INJECTION, SOLUTION SUBCUTANEOUS ONCE
Refills: 0 | Status: COMPLETED | OUTPATIENT
Start: 2021-07-05 | End: 2021-07-05

## 2021-07-05 RX ORDER — DEXTROSE 50 % IN WATER 50 %
12.5 SYRINGE (ML) INTRAVENOUS ONCE
Refills: 0 | Status: COMPLETED | OUTPATIENT
Start: 2021-07-05 | End: 2021-07-05

## 2021-07-05 RX ORDER — PIPERACILLIN AND TAZOBACTAM 4; .5 G/20ML; G/20ML
3.38 INJECTION, POWDER, LYOPHILIZED, FOR SOLUTION INTRAVENOUS ONCE
Refills: 0 | Status: COMPLETED | OUTPATIENT
Start: 2021-07-05 | End: 2021-07-05

## 2021-07-05 RX ORDER — MAGNESIUM SULFATE 500 MG/ML
1 VIAL (ML) INJECTION ONCE
Refills: 0 | Status: COMPLETED | OUTPATIENT
Start: 2021-07-05 | End: 2021-07-05

## 2021-07-05 RX ORDER — PIPERACILLIN AND TAZOBACTAM 4; .5 G/20ML; G/20ML
3.38 INJECTION, POWDER, LYOPHILIZED, FOR SOLUTION INTRAVENOUS EVERY 8 HOURS
Refills: 0 | Status: DISCONTINUED | OUTPATIENT
Start: 2021-07-05 | End: 2021-07-09

## 2021-07-05 RX ADMIN — Medication 105 MILLIGRAM(S): at 13:33

## 2021-07-05 RX ADMIN — HYDROMORPHONE HYDROCHLORIDE 1 MILLIGRAM(S): 2 INJECTION INTRAMUSCULAR; INTRAVENOUS; SUBCUTANEOUS at 21:44

## 2021-07-05 RX ADMIN — Medication 10 MILLIGRAM(S): at 05:31

## 2021-07-05 RX ADMIN — Medication 1.5 MILLIGRAM(S): at 07:44

## 2021-07-05 RX ADMIN — OCTREOTIDE ACETATE 10 MICROGRAM(S)/HR: 200 INJECTION, SOLUTION INTRAVENOUS; SUBCUTANEOUS at 03:37

## 2021-07-05 RX ADMIN — HYDROMORPHONE HYDROCHLORIDE 1 MILLIGRAM(S): 2 INJECTION INTRAMUSCULAR; INTRAVENOUS; SUBCUTANEOUS at 09:00

## 2021-07-05 RX ADMIN — Medication 10 MILLIGRAM(S): at 17:27

## 2021-07-05 RX ADMIN — INSULIN GLARGINE 5 UNIT(S): 100 INJECTION, SOLUTION SUBCUTANEOUS at 23:17

## 2021-07-05 RX ADMIN — Medication 5 MILLIGRAM(S): at 06:13

## 2021-07-05 RX ADMIN — Medication 1.5 MILLIGRAM(S): at 14:58

## 2021-07-05 RX ADMIN — PANTOPRAZOLE SODIUM 40 MILLIGRAM(S): 20 TABLET, DELAYED RELEASE ORAL at 18:35

## 2021-07-05 RX ADMIN — HYDROMORPHONE HYDROCHLORIDE 1 MILLIGRAM(S): 2 INJECTION INTRAMUSCULAR; INTRAVENOUS; SUBCUTANEOUS at 21:29

## 2021-07-05 RX ADMIN — PANTOPRAZOLE SODIUM 40 MILLIGRAM(S): 20 TABLET, DELAYED RELEASE ORAL at 05:30

## 2021-07-05 RX ADMIN — CHLORHEXIDINE GLUCONATE 1 APPLICATION(S): 213 SOLUTION TOPICAL at 05:30

## 2021-07-05 RX ADMIN — INSULIN GLARGINE 10 UNIT(S): 100 INJECTION, SOLUTION SUBCUTANEOUS at 07:52

## 2021-07-05 RX ADMIN — Medication 1.5 MILLIGRAM(S): at 11:55

## 2021-07-05 RX ADMIN — Medication 10 MILLIGRAM(S): at 23:05

## 2021-07-05 RX ADMIN — Medication 10 MILLIGRAM(S): at 12:05

## 2021-07-05 RX ADMIN — Medication 1.5 MILLIGRAM(S): at 17:24

## 2021-07-05 RX ADMIN — HYDROMORPHONE HYDROCHLORIDE 1 MILLIGRAM(S): 2 INJECTION INTRAMUSCULAR; INTRAVENOUS; SUBCUTANEOUS at 08:53

## 2021-07-05 RX ADMIN — POTASSIUM PHOSPHATE, MONOBASIC POTASSIUM PHOSPHATE, DIBASIC 62.5 MILLIMOLE(S): 236; 224 INJECTION, SOLUTION INTRAVENOUS at 06:06

## 2021-07-05 RX ADMIN — Medication 1 MILLIGRAM(S): at 13:39

## 2021-07-05 RX ADMIN — Medication 12.5 GRAM(S): at 17:24

## 2021-07-05 RX ADMIN — Medication 1.5 MILLIGRAM(S): at 03:38

## 2021-07-05 RX ADMIN — PIPERACILLIN AND TAZOBACTAM 25 GRAM(S): 4; .5 INJECTION, POWDER, LYOPHILIZED, FOR SOLUTION INTRAVENOUS at 21:29

## 2021-07-05 RX ADMIN — Medication 1 MILLIGRAM(S): at 23:04

## 2021-07-05 RX ADMIN — PIPERACILLIN AND TAZOBACTAM 200 GRAM(S): 4; .5 INJECTION, POWDER, LYOPHILIZED, FOR SOLUTION INTRAVENOUS at 17:24

## 2021-07-05 RX ADMIN — Medication 105 MILLIGRAM(S): at 05:31

## 2021-07-05 RX ADMIN — Medication 105 MILLIGRAM(S): at 21:29

## 2021-07-05 RX ADMIN — Medication 100 GRAM(S): at 05:46

## 2021-07-05 NOTE — PROGRESS NOTE ADULT - SUBJECTIVE AND OBJECTIVE BOX
CARMEN JOYCE  MRN-[id number]  Patient is a 30y old  Male who presents with a chief complaint of DKA, Alcohol withdrawal (2021 09:24)    HPI:  31 yo male, PMHx EtOH abuse, T2DM on insulin and Metformin, HTN, recently admitted with new diagnosis of non-ischemic cardiomyopathy EF 40-45% and atrial fibrillation on Eliquis, who presented from home with complaints of abdominal pain, nausea, and multiple episodes of vomiting over the past two days. Vomiting multiple times in ED, now black appearing. Patient states he stopped taking his insulin 4 days ago because he "forgot". On arrival to ED, patient was tachycardic in 140's, hemodynamically stable, complaining of abdominal pain. He was found to be in DKA with severe metabolic acidosis, pH 7.02, serum bicarb 7, anion gap 39, blood glucose 636 with SYDE Cr 2.12 (up from 0.5 21). Tbili 2.4, normal LFTs, lipase 14. Also noted to be tremulous, last EtOH use 3 days ago. Admits to drinking malt liquor, 4 24-oz containers, daily. Denies binge drinking recently. Pending CT abd/pelv.    Patient's mother Brigid Villalpando ([phone]) present at bedside, updated via  with patient's permission. (2021 21:16)      Hospital course:  7.4: Improved DKA, started on Lantus with weaning off of insulin infusion; Started with DTs and required Ativan taper; GI consulted and started on Octreotide infusion with plan to evaluate pneumomediastinum       Today:  Improved Withdrawal  LUQ and epigastric pain with intermittent Nausea with no further bleeding       REVIEW OF SYSTEMS:    Gen: No fever  EYES/ENT: No visual changes;  No vertigo or throat pain   NECK: No pain   RES:  No shortness of breath or Cough  CARD: No chest pain   GI: No abdominal pain  : No dysuria  NEURO: No weakness  SKIN: No itching, rashes     Physical Exam:  Vital Signs Last 24 Hrs  T(C): 36.8 (2021 07:19), Max: 37.7 (2021 19:25)  T(F): 98.2 (2021 07:19), Max: 99.8 (2021 19:25)  HR: 54 (2021 10:00) (54 - 96)  BP: 126/91 (2021 10:00) (113/72 - 134/88)  BP(mean): 101 (2021 10:00) (84 - 107)  RR: 11 (2021 10:00) (8 - 19)  SpO2: 98% (2021 10:00) (94% - 100%)    Gen:  Awake, alert   CNS: non focal 	  Neck: no JVD  RES : clear , no wheezing                      CVS: Regular  rhythm. Normal S1/S2  Abd: Soft, non-distended. Bowel sounds present.  Skin: No rash.  Ext:  no edema    ============================I/O===========================   I&O's Detail    2021 07:01  -  2021 07:00  --------------------------------------------------------  IN:    dextrose 5% + lactated ringers: 1500 mL    Insulin: 12 mL    IV PiggyBack: 300 mL    IV PiggyBack: 100 mL    IV PiggyBack: 291.6 mL    Lactated Ringers: 1350 mL    Octreotide: 150 mL  Total IN: 3703.6 mL    OUT:    Voided (mL): 1750 mL  Total OUT: 1750 mL    Total NET: 1953.6 mL      2021 07:01  -  2021 12:03  --------------------------------------------------------  IN:    IV PiggyBack: 125 mL    Lactated Ringers: 225 mL    Octreotide: 30 mL  Total IN: 380 mL    OUT:    Oral Fluid: 0 mL    Voided (mL): 600 mL  Total OUT: 600 mL    Total NET: -220 mL        ============================ LABS =========================                        11.1   4.14  )-----------( 98       ( 2021 04:21 )             32.8     07-05    145  |  113<H>  |  14.8  ----------------------------<  126<H>  3.8   |  23.0  |  0.73    Ca    8.8      2021 04:21  Phos  1.5     07-  Mg     1.8     07-    TPro  9.4<H>  /  Alb  5.3<H>  /  TBili  2.4<H>  /  DBili  x   /  AST  22  /  ALT  17  /  AlkPhos  124<H>  07-03    LIVER FUNCTIONS - ( 2021 19:23 )  Alb: 5.3 g/dL / Pro: 9.4 g/dL / ALK PHOS: 124 U/L / ALT: 17 U/L / AST: 22 U/L / GGT: x           PT/INR - ( 2021 23:38 )   PT: 11.4 sec;   INR: 0.98 ratio         PTT - ( 2021 23:38 )  PTT:26.8 sec    Urinalysis Basic - ( 2021 20:10 )    Color: Yellow / Appearance: Clear / S.020 / pH: x  Gluc: x / Ketone: Large  / Bili: Negative / Urobili: Negative mg/dL   Blood: x / Protein: 100 mg/dL / Nitrite: Negative   Leuk Esterase: Negative / RBC: 6-10 /HPF / WBC 0-2   Sq Epi: x / Non Sq Epi: Occasional / Bacteria: Occasional      ======================Micro/Rad/Cardio=================  Culture: Reviewed   CXR: Reviewed  Echo:Reviewed  ======================================================  PAST MEDICAL & SURGICAL HISTORY:  Alcohol abuse    Pancreatitis    HTN (hypertension)    Diabetes    Atrial fibrillation    Non-ischemic cardiomyopathy    No significant past surgical history      ====================ASSESSMENT ==============  29 y/o  male with  EtOH abuse, T2DM HTN, non-ischemic cardiomyopathy EF 40-45% and atrial fibrillation on Eliquis admitted with     1: DKA without coma   2: Alcohol withdrawal with DTs with underlying DTs  3: GIB while on Eliquis   4: NICMP with Afib on AC  5: Pneumomediastinum     ====================== NEUROLOGY=====================  HYDROmorphone  Injectable 1 milliGRAM(s) IV Push every 4 hours PRN Severe Pain (7 - 10)  LORazepam   Injectable   IV Push   LORazepam   Injectable 4 milliGRAM(s) IV Push every 1 hour PRN Symptom-triggered: each CIWA -Ar score 8 or GREATER  LORazepam   Injectable 1.5 milliGRAM(s) IV Push every 4 hours  LORazepam   Injectable 1 milliGRAM(s) IV Push every 4 hours  metoclopramide Injectable 10 milliGRAM(s) IV Push every 6 hours  Fall, aspiration and seizure precautions   thiamine IVPB 500 milliGRAM(s) IV Intermittent every 8 hours  folic acid Injectable 1 milliGRAM(s) IV Push daily  ==================== RESPIRATORY======================  On supplemental oxygen-> oxygenating and ventilating fine for now     ====================CARDIOVASCULAR==================  metoprolol tartrate Injectable 5 milliGRAM(s) IV Push every 6 hours  Trend lactate, UO   ===================HEMATOLOGIC/ONC ===================  Holding off of Eliquis or other AC  Trend H&H or other signs of bleeding   ===================== RENAL =========================  Continue monitoring urine output  Avoid Nephrotoxic agents   Adjust medications for renal fuctiion   Close urinary output monitoring   UA/Renal imaging reviviewed   Replacing electrolytes as needed with Goal K> 4, PO> 3, Mg> 2    ==================== GASTROINTESTINAL===================    lactated ringers. 1000 milliLiter(s) (75 mL/Hr) IV Continuous <Continuous>  pantoprazole  Injectable 40 milliGRAM(s) IV Push two times a day  octreotide  Infusion 50 MICROgram(s)/Hr (10 mL/Hr) IV Continuous <Continuous>  NPO  Plan d/w GI and Radiology: CT Chest with PO contrast to evaluate for GIT leak/collection  =======================    ENDOCRINE  =====================  dextrose 40% Gel 15 Gram(s) Oral once  dextrose 50% Injectable 25 Gram(s) IV Push once  dextrose 50% Injectable 12.5 Gram(s) IV Push once  dextrose 50% Injectable 25 Gram(s) IV Push once  glucagon  Injectable 1 milliGRAM(s) IntraMuscular once  insulin glargine Injectable (LANTUS) 10 Unit(s) SubCutaneous every morning  insulin glargine Injectable (LANTUS) 10 Unit(s) SubCutaneous at bedtime  insulin lispro (ADMELOG) corrective regimen sliding scale   SubCutaneous every 6 hours    ========================INFECTIOUS DISEASE================  RODRI    Patient requires continuous monitoring with bedside rhythm monitoring, pulse oximetry,  monitoring and intermittent blood gas analysis.    Care plan discussed with ICU care team, family and consultants    Patient remained critical; I have spent 35 minutes providing non-routine care, revaluated multiple times during the day.

## 2021-07-05 NOTE — CONSULT NOTE ADULT - PROBLEM SELECTOR RECOMMENDATION 9
Likely 2/2 to wretching.    Recommend the following:  Strict NPO  IV Zosyn as a prophylactic measure  Maintenance fluid. Possibly D5W to maintain euglycemia  Daily CXR  Repeat CT scan with PO contrast in 48hrs (7/7/21)  D/w Dr. Pereira  Thoracic Surgery to follow along.

## 2021-07-05 NOTE — PROGRESS NOTE ADULT - PROBLEM SELECTOR PLAN 1
Resolved with pneumomediastinum likely secondary to forceful retching and possible Nohemy-Anderson esophageal tear, Continue to keep NPO for now. IV Pantoprazole. CTS follow-up. No EGD's planned because of pneumomediastinum. Repeat imaging sometime this week. Repeat labs ordered for the AM. Resolved with pneumomediastinum likely secondary to forceful retching and possible Nohemy-Anderson esophageal tear, Continue to keep NPO for now. IV Pantoprazole. CTS consultation for pneumomediastinum. No EGD's planned because of pneumomediastinum. Repeat imaging sometime this week. Repeat labs ordered for the AM. Resolved with pneumomediastinum likely secondary to forceful retching and possible Nohemy-Anderson esophageal tear, Continue to keep NPO for now. IV Pantoprazole. CTS consultation for pneumomediastinum. No EGD's planned because of pneumomediastinum. Would repeat CXR tomorrow with repeat Chest CT later in the week. Repeat labs ordered for the AM.

## 2021-07-05 NOTE — CONSULT NOTE ADULT - ASSESSMENT
31 yo male, PMHx EtOH abuse, T2DM on insulin and Metformin, HTN, recently admitted with new diagnosis of non-ischemic cardiomyopathy EF 40-45% and atrial fibrillation on Eliquis, who presented from home with complaints of abdominal pain, nausea, and multiple episodes of vomiting over the past two days. Vomiting multiple times in ED, now black appearing. Patient states he stopped taking his insulin 4 days ago because he "forgot". On arrival to ED, patient was tachycardic in 140's, hemodynamically stable, complaining of abdominal pain. He was found to be in DKA with severe metabolic acidosis, pH 7.02, serum bicarb 7, anion gap 39, blood glucose 636 with SYED Cr 2.12 (up from 0.5 5/26/21). Suspected radha-miguel tear. Found to have finding of pneumomediastinum on CT scan. Thoracic surgery consulted for pneumomediastinum.

## 2021-07-05 NOTE — CONSULT NOTE ADULT - ATTENDING COMMENTS
Patient seen and examined. Hx of alcohol consumption and vomiting. CT chest shows esophageal perforation with pneumo-mediastinum without evidence of active contrast extravasation.   He denies any chest pain or abdominal pain. Some neck discomfort is present. Recommend strict NPO, antibiotics, and repeat CT chest in 48 hours.

## 2021-07-05 NOTE — DIETITIAN INITIAL EVALUATION ADULT. - OTHER INFO
Pt is a 29 yo male, PMHx EtOH abuse, T2DM on insulin and Metformin, HTN, recently admitted with new diagnosis of non-ischemic cardiomyopathy EF 40-45% and atrial fibrillation on Eliquis, who presented from home with complaints of abdominal pain, nausea, and multiple episodes of vomiting over the past two days. Vomiting multiple times in ED, now black appearing. Patient states he stopped taking his insulin 4 days ago because he "forgot". On arrival to ED, patient was tachycardic in 140's, hemodynamically stable, complaining of abdominal pain. He was found to be in DKA with severe metabolic acidosis. Also noted to be tremulous, last EtOH use 3 days ago. Admits to drinking malt liquor, 4 24-oz containers, daily.   Pt admitted with DKA without coma, Alcohol withdrawal with DTs with underlying DTs, GIB while on Eliquis.   Pt reports typically eats well at home, follows a low CHO diet. Pt is unsure if he has lost any weight; however feels he covarrubias have lost a few pounds over past few days since has not eaten since admission. Pt reports being hungry at this time. NPO status maintained. Pt declined diet education at this time.

## 2021-07-05 NOTE — DIETITIAN INITIAL EVALUATION ADULT. - PERTINENT LABORATORY DATA
07-05 Na145 mmol/L Glu 126 mg/dL<H> K+ 3.8 mmol/L Cr  0.73 mg/dL BUN 14.8 mg/dL Phos 1.5 mg/dL<L> Alb n/a   PAB n/a     n/a  HgbA1C

## 2021-07-06 LAB
ANION GAP SERPL CALC-SCNC: 12 MMOL/L — SIGNIFICANT CHANGE UP (ref 5–17)
BUN SERPL-MCNC: 9.5 MG/DL — SIGNIFICANT CHANGE UP (ref 8–20)
CALCIUM SERPL-MCNC: 8.4 MG/DL — LOW (ref 8.6–10.2)
CHLORIDE SERPL-SCNC: 109 MMOL/L — HIGH (ref 98–107)
CO2 SERPL-SCNC: 25 MMOL/L — SIGNIFICANT CHANGE UP (ref 22–29)
CREAT SERPL-MCNC: 0.57 MG/DL — SIGNIFICANT CHANGE UP (ref 0.5–1.3)
GLUCOSE BLDC GLUCOMTR-MCNC: 120 MG/DL — HIGH (ref 70–99)
GLUCOSE BLDC GLUCOMTR-MCNC: 124 MG/DL — HIGH (ref 70–99)
GLUCOSE BLDC GLUCOMTR-MCNC: 145 MG/DL — HIGH (ref 70–99)
GLUCOSE BLDC GLUCOMTR-MCNC: 321 MG/DL — HIGH (ref 70–99)
GLUCOSE BLDC GLUCOMTR-MCNC: 467 MG/DL — CRITICAL HIGH (ref 70–99)
GLUCOSE BLDC GLUCOMTR-MCNC: 85 MG/DL — SIGNIFICANT CHANGE UP (ref 70–99)
GLUCOSE SERPL-MCNC: 63 MG/DL — LOW (ref 70–99)
HCT VFR BLD CALC: 30.9 % — LOW (ref 39–50)
HGB BLD-MCNC: 10.7 G/DL — LOW (ref 13–17)
MAGNESIUM SERPL-MCNC: 1.5 MG/DL — LOW (ref 1.6–2.6)
MCHC RBC-ENTMCNC: 31.3 PG — SIGNIFICANT CHANGE UP (ref 27–34)
MCHC RBC-ENTMCNC: 34.6 GM/DL — SIGNIFICANT CHANGE UP (ref 32–36)
MCV RBC AUTO: 90.4 FL — SIGNIFICANT CHANGE UP (ref 80–100)
PHOSPHATE SERPL-MCNC: 3.4 MG/DL — SIGNIFICANT CHANGE UP (ref 2.4–4.7)
PLATELET # BLD AUTO: 95 K/UL — LOW (ref 150–400)
POTASSIUM SERPL-MCNC: 3.1 MMOL/L — LOW (ref 3.5–5.3)
POTASSIUM SERPL-SCNC: 3.1 MMOL/L — LOW (ref 3.5–5.3)
RBC # BLD: 3.42 M/UL — LOW (ref 4.2–5.8)
RBC # FLD: 12.8 % — SIGNIFICANT CHANGE UP (ref 10.3–14.5)
SODIUM SERPL-SCNC: 146 MMOL/L — HIGH (ref 135–145)
WBC # BLD: 5.09 K/UL — SIGNIFICANT CHANGE UP (ref 3.8–10.5)
WBC # FLD AUTO: 5.09 K/UL — SIGNIFICANT CHANGE UP (ref 3.8–10.5)

## 2021-07-06 PROCEDURE — 99233 SBSQ HOSP IP/OBS HIGH 50: CPT

## 2021-07-06 PROCEDURE — 71045 X-RAY EXAM CHEST 1 VIEW: CPT | Mod: 26

## 2021-07-06 PROCEDURE — 99223 1ST HOSP IP/OBS HIGH 75: CPT

## 2021-07-06 PROCEDURE — 99231 SBSQ HOSP IP/OBS SF/LOW 25: CPT

## 2021-07-06 RX ORDER — INSULIN GLARGINE 100 [IU]/ML
10 INJECTION, SOLUTION SUBCUTANEOUS AT BEDTIME
Refills: 0 | Status: DISCONTINUED | OUTPATIENT
Start: 2021-07-06 | End: 2021-07-06

## 2021-07-06 RX ORDER — INSULIN GLARGINE 100 [IU]/ML
10 INJECTION, SOLUTION SUBCUTANEOUS EVERY MORNING
Refills: 0 | Status: DISCONTINUED | OUTPATIENT
Start: 2021-07-06 | End: 2021-07-09

## 2021-07-06 RX ORDER — MAGNESIUM SULFATE 500 MG/ML
2 VIAL (ML) INJECTION ONCE
Refills: 0 | Status: COMPLETED | OUTPATIENT
Start: 2021-07-06 | End: 2021-07-06

## 2021-07-06 RX ORDER — POTASSIUM CHLORIDE 20 MEQ
10 PACKET (EA) ORAL
Refills: 0 | Status: COMPLETED | OUTPATIENT
Start: 2021-07-06 | End: 2021-07-06

## 2021-07-06 RX ORDER — POTASSIUM PHOSPHATE, MONOBASIC POTASSIUM PHOSPHATE, DIBASIC 236; 224 MG/ML; MG/ML
30 INJECTION, SOLUTION INTRAVENOUS ONCE
Refills: 0 | Status: COMPLETED | OUTPATIENT
Start: 2021-07-06 | End: 2021-07-06

## 2021-07-06 RX ORDER — HYDRALAZINE HCL 50 MG
10 TABLET ORAL EVERY 6 HOURS
Refills: 0 | Status: DISCONTINUED | OUTPATIENT
Start: 2021-07-06 | End: 2021-07-09

## 2021-07-06 RX ORDER — TAMSULOSIN HYDROCHLORIDE 0.4 MG/1
0.4 CAPSULE ORAL AT BEDTIME
Refills: 0 | Status: DISCONTINUED | OUTPATIENT
Start: 2021-07-06 | End: 2021-07-06

## 2021-07-06 RX ADMIN — Medication 1 MILLIGRAM(S): at 19:37

## 2021-07-06 RX ADMIN — Medication 10 MILLIGRAM(S): at 15:11

## 2021-07-06 RX ADMIN — Medication 105 MILLIGRAM(S): at 05:19

## 2021-07-06 RX ADMIN — Medication 0.5 MILLIGRAM(S): at 23:08

## 2021-07-06 RX ADMIN — PIPERACILLIN AND TAZOBACTAM 25 GRAM(S): 4; .5 INJECTION, POWDER, LYOPHILIZED, FOR SOLUTION INTRAVENOUS at 15:10

## 2021-07-06 RX ADMIN — Medication 1 MILLIGRAM(S): at 08:24

## 2021-07-06 RX ADMIN — PIPERACILLIN AND TAZOBACTAM 25 GRAM(S): 4; .5 INJECTION, POWDER, LYOPHILIZED, FOR SOLUTION INTRAVENOUS at 05:51

## 2021-07-06 RX ADMIN — CHLORHEXIDINE GLUCONATE 1 APPLICATION(S): 213 SOLUTION TOPICAL at 05:21

## 2021-07-06 RX ADMIN — SODIUM CHLORIDE 75 MILLILITER(S): 9 INJECTION, SOLUTION INTRAVENOUS at 21:11

## 2021-07-06 RX ADMIN — Medication 1 MILLIGRAM(S): at 15:11

## 2021-07-06 RX ADMIN — PANTOPRAZOLE SODIUM 40 MILLIGRAM(S): 20 TABLET, DELAYED RELEASE ORAL at 17:01

## 2021-07-06 RX ADMIN — Medication 10 MILLIGRAM(S): at 11:44

## 2021-07-06 RX ADMIN — SODIUM CHLORIDE 75 MILLILITER(S): 9 INJECTION, SOLUTION INTRAVENOUS at 03:32

## 2021-07-06 RX ADMIN — Medication 1 MILLIGRAM(S): at 11:38

## 2021-07-06 RX ADMIN — Medication 50 GRAM(S): at 05:20

## 2021-07-06 RX ADMIN — Medication 10 MILLIGRAM(S): at 05:21

## 2021-07-06 RX ADMIN — POTASSIUM PHOSPHATE, MONOBASIC POTASSIUM PHOSPHATE, DIBASIC 83.33 MILLIMOLE(S): 236; 224 INJECTION, SOLUTION INTRAVENOUS at 11:42

## 2021-07-06 RX ADMIN — Medication 100 MILLIEQUIVALENT(S): at 05:50

## 2021-07-06 RX ADMIN — Medication 100 MILLIEQUIVALENT(S): at 08:25

## 2021-07-06 RX ADMIN — PANTOPRAZOLE SODIUM 40 MILLIGRAM(S): 20 TABLET, DELAYED RELEASE ORAL at 05:20

## 2021-07-06 RX ADMIN — PIPERACILLIN AND TAZOBACTAM 25 GRAM(S): 4; .5 INJECTION, POWDER, LYOPHILIZED, FOR SOLUTION INTRAVENOUS at 21:11

## 2021-07-06 RX ADMIN — INSULIN GLARGINE 5 UNIT(S): 100 INJECTION, SOLUTION SUBCUTANEOUS at 08:24

## 2021-07-06 RX ADMIN — OCTREOTIDE ACETATE 10 MICROGRAM(S)/HR: 200 INJECTION, SOLUTION INTRAVENOUS; SUBCUTANEOUS at 01:11

## 2021-07-06 RX ADMIN — Medication 1 MILLIGRAM(S): at 03:15

## 2021-07-06 RX ADMIN — Medication 100 MILLIEQUIVALENT(S): at 06:57

## 2021-07-06 RX ADMIN — Medication 105 MILLIGRAM(S): at 15:11

## 2021-07-06 RX ADMIN — Medication 8: at 23:09

## 2021-07-06 RX ADMIN — Medication 1 MILLIGRAM(S): at 11:42

## 2021-07-06 NOTE — PROGRESS NOTE ADULT - SUBJECTIVE AND OBJECTIVE BOX
Patient is a 30y old  Male who presents with a chief complaint of DKA, Alcohol withdrawal (2021 09:33)    Patient seen and examined at bedside.    ALLERGIES:  No Known Allergies    MEDICATIONS  (STANDING):  chlorhexidine 4% Liquid 1 Application(s) Topical <User Schedule>  dextrose 40% Gel 15 Gram(s) Oral once  dextrose 5%. 1000 milliLiter(s) (50 mL/Hr) IV Continuous <Continuous>  dextrose 5%. 1000 milliLiter(s) (100 mL/Hr) IV Continuous <Continuous>  dextrose 50% Injectable 25 Gram(s) IV Push once  dextrose 50% Injectable 12.5 Gram(s) IV Push once  dextrose 50% Injectable 25 Gram(s) IV Push once  folic acid Injectable 1 milliGRAM(s) IV Push daily  glucagon  Injectable 1 milliGRAM(s) IntraMuscular once  insulin glargine Injectable (LANTUS) 10 Unit(s) SubCutaneous at bedtime  insulin lispro (ADMELOG) corrective regimen sliding scale   SubCutaneous every 6 hours  lactated ringers. 1000 milliLiter(s) (75 mL/Hr) IV Continuous <Continuous>  LORazepam   Injectable   IV Push   LORazepam   Injectable 1 milliGRAM(s) IV Push every 4 hours  LORazepam   Injectable 0.5 milliGRAM(s) IV Push every 4 hours  metoclopramide Injectable 10 milliGRAM(s) IV Push every 6 hours  pantoprazole  Injectable 40 milliGRAM(s) IV Push two times a day  piperacillin/tazobactam IVPB.. 3.375 Gram(s) IV Intermittent every 8 hours  tamsulosin 0.4 milliGRAM(s) Oral at bedtime  thiamine IVPB 500 milliGRAM(s) IV Intermittent every 8 hours    MEDICATIONS  (PRN):  hydrALAZINE Injectable 10 milliGRAM(s) IV Push every 6 hours PRN SBP > 140, DBP > 110  HYDROmorphone  Injectable 1 milliGRAM(s) IV Push every 4 hours PRN Severe Pain (7 - 10)  LORazepam   Injectable 4 milliGRAM(s) IV Push every 1 hour PRN Symptom-triggered: each CIWA -Ar score 8 or GREATER    Vital Signs Last 24 Hrs  T(F): 98.4 (2021 07:25), Max: 99.3 (2021 22:20)  HR: 50 (2021 10:00) (45 - 74)  BP: 154/127 (2021 10:00) (122/94 - 159/115)  RR: 14 (2021 10:00) (8 - 17)  SpO2: 99% (2021 10:00) (95% - 100%)  I&O's Summary    2021 07:01  -  2021 07:00  --------------------------------------------------------  IN: 2590 mL / OUT: 1800 mL / NET: 790 mL    2021 07:01  -  2021 10:49  --------------------------------------------------------  IN: 370 mL / OUT: 800 mL / NET: -430 mL      PHYSICAL EXAM:  General: NAD, A/O x 3  ENT: MMM, no thrush  Neck: Supple, No JVD  Lungs: good air entry, non-labored breathing  Cardio: +s1/s2  Abdomen: Soft, Nontender, Nondistended; Bowel sounds present  Extremities: No pitting edema    LABS:                        10.7   5.09  )-----------( 95       ( 2021 03:57 )             30.9     07-06    146  |  109  |  9.5  ----------------------------<  63  3.1   |  25.0  |  0.57    Ca    8.4      2021 03:57  Phos  3.4     07-06  Mg     1.5     07-06    TPro  9.4  /  Alb  5.3  /  TBili  2.4  /  DBili  x   /  AST  22  /  ALT  17  /  AlkPhos  124  07-03    Lipase, Serum: 14 U/L (21 @ 19:23)    eGFR if Non African American: 137 mL/min/1.73M2 (21 @ 03:57)  eGFR if : 159 mL/min/1.73M2 (21 @ 03:57)    PT/INR - ( 2021 23:38 )   PT: 11.4 sec;   INR: 0.98 ratio    PTT - ( 2021 23:38 )  PTT:26.8 sec     Chol 155 mg/dL LDL -- HDL 47 mg/dL Trig 82 mg/dL    23:53 - VBG - pH: 7.290 | pCO2: <20   | pO2: 142   | Lactate: 1.50   19:35 - VBG - pH: 7.020 | pCO2: 22    | pO2: 72    | Lactate: 2.50     Glucose  POCT Blood Glucose.: 120 mg/dL (2021 08:01)  POCT Blood Glucose.: 85 mg/dL (2021 05:43)  POCT Blood Glucose.: 77 mg/dL (2021 23:09)  POCT Blood Glucose.: 135 mg/dL (2021 17:53)  POCT Blood Glucose.: 65 mg/dL (2021 17:01)  POCT Blood Glucose.: 99 mg/dL (2021 12:02)    Urinalysis Basic - ( 2021 20:10 )  Color: Yellow / Appearance: Clear / S.020 / pH: x  Gluc: x / Ketone: Large  / Bili: Negative / Urobili: Negative mg/dL   Blood: x / Protein: 100 mg/dL / Nitrite: Negative   Leuk Esterase: Negative / RBC: 6-10 /HPF / WBC 0-2   Sq Epi: x / Non Sq Epi: Occasional / Bacteria: Occasional    Cultures  Culture - Blood (collected 2021 19:29)  Source: .Blood Blood-Venous  Preliminary Report (2021 21:01):    No growth at 48 hours    Culture - Blood (collected 2021 19:28)  Source: .Blood Blood-Venous  Preliminary Report (2021 21:01):    No growth at 48 hours    COVID-19 PCR: NotDetec (21 @ 20:37)    RADIOLOGY & ADDITIONAL TESTS:  < from: CT Chest w/ Oral Cont (21 @ 11:27) >  IMPRESSION: Mild to moderate pneumomediastinum extending into the neck. No evidence of contrast extravasation from the esophagus or evidence of posterior mediastinal collection or stranding.  < end of copied text >    < from: Xray Chest 1 View- PORTABLE-Routine (Xray Chest 1 View- PORTABLE-Routine .) (21 @ 10:10) >  IMPRESSION:   Pneumomediastinum. Lungs clear..  < end of copied text >    < from: CT Abdomen and Pelvis No Cont (21 @ 22:38) >  IMPRESSION:  Partially imaged pneumomediastinum.  No acute intra-abdominal pathology on noncontrast CT.  < end of copied text >    Care Discussed with Consultants/Other Providers:   [facility]

## 2021-07-06 NOTE — PROGRESS NOTE ADULT - SUBJECTIVE AND OBJECTIVE BOX
Patient seen and examined; chart reviewed.  Events noted.  DKA is resolved.  Remains NPO/  Feels and looks very good.  No distress.  Denies chest pain or SOB or Skin changes/ crackling.  Not toxic.  Off Octreotide.  No signs of alcohol withdrawal.        PAST MEDICAL & SURGICAL HISTORY:  Alcohol abuse    Pancreatitis    HTN (hypertension)    Diabetes    Atrial fibrillation    Non-ischemic cardiomyopathy    No significant past surgical history        ROS:  No Heartburn, regurgitation, dysphagia, odynophagia.  No dyspepsia  No abdominal pain.    No Nausea, vomiting.  No Bleeding.  No hematemesis.   No diarrhea.    No hematochesia.  No weight loss, anorexia.  No edema.      MEDICATIONS  (STANDING):  chlorhexidine 4% Liquid 1 Application(s) Topical <User Schedule>  dextrose 40% Gel 15 Gram(s) Oral once  dextrose 5%. 1000 milliLiter(s) (50 mL/Hr) IV Continuous <Continuous>  dextrose 5%. 1000 milliLiter(s) (100 mL/Hr) IV Continuous <Continuous>  dextrose 50% Injectable 25 Gram(s) IV Push once  dextrose 50% Injectable 12.5 Gram(s) IV Push once  dextrose 50% Injectable 25 Gram(s) IV Push once  folic acid Injectable 1 milliGRAM(s) IV Push daily  glucagon  Injectable 1 milliGRAM(s) IntraMuscular once  insulin glargine Injectable (LANTUS) 10 Unit(s) SubCutaneous at bedtime  insulin lispro (ADMELOG) corrective regimen sliding scale   SubCutaneous every 6 hours  lactated ringers. 1000 milliLiter(s) (75 mL/Hr) IV Continuous <Continuous>  LORazepam   Injectable   IV Push   LORazepam   Injectable 1 milliGRAM(s) IV Push every 4 hours  LORazepam   Injectable 0.5 milliGRAM(s) IV Push every 4 hours  pantoprazole  Injectable 40 milliGRAM(s) IV Push two times a day  piperacillin/tazobactam IVPB.. 3.375 Gram(s) IV Intermittent every 8 hours    MEDICATIONS  (PRN):  hydrALAZINE Injectable 10 milliGRAM(s) IV Push every 6 hours PRN SBP > 140, DBP > 110  HYDROmorphone  Injectable 1 milliGRAM(s) IV Push every 4 hours PRN Severe Pain (7 - 10)  LORazepam   Injectable 4 milliGRAM(s) IV Push every 1 hour PRN Symptom-triggered: each CIWA -Ar score 8 or GREATER      Allergies    No Known Allergies    Intolerances        Vital Signs Last 24 Hrs  T(C): 37.2 (06 Jul 2021 12:38), Max: 37.4 (05 Jul 2021 22:20)  T(F): 98.9 (06 Jul 2021 12:38), Max: 99.3 (05 Jul 2021 22:20)  HR: 64 (06 Jul 2021 14:00) (45 - 74)  BP: 149/101 (06 Jul 2021 14:00) (125/92 - 159/115)  BP(mean): 113 (06 Jul 2021 14:00) (100 - 137)  RR: 14 (06 Jul 2021 14:00) (9 - 17)  SpO2: 97% (06 Jul 2021 14:00) (95% - 100%)    PHYSICAL EXAM:    GENERAL: NAD, well-groomed, well-developed  HEAD:  Atraumatic, Normocephalic  EYES: EOMI, PERRLA, conjunctiva and sclera clear  ENMT: No tonsillar erythema, exudates, or enlargement; Moist mucous membranes, Good dentition, No lesions  NECK: Supple, No JVD, Normal thyroid.  No Skin Crepitus.    CHEST/LUNG: Clear to percussion bilaterally; No rales, rhonchi, wheezing, or rubs  HEART: Regular rate and rhythm; No murmurs, rubs, or gallops  ABDOMEN: Soft, Nontender, Nondistended; Bowel sounds present.  No HSM.  No MTR/  No Distention.  Benign abdomen.    EXTREMITIES:  2+ Peripheral Pulses, No clubbing, cyanosis, or edema  LYMPH: No lymphadenopathy noted  SKIN: No rashes or lesions      LABS:                        10.7   5.09  )-----------( 95       ( 06 Jul 2021 03:57 )             30.9     07-06    146<H>  |  109<H>  |  9.5  ----------------------------<  63<L>  3.1<L>   |  25.0  |  0.57    Ca    8.4<L>      06 Jul 2021 03:57  Phos  3.4     07-06  Mg     1.5     07-06               RADIOLOGY & ADDITIONAL STUDIES:    CT chest #1 yesterday:  + Pneumomediaistinum but no leak of contrast.  7/5.    CXR:  No residual Mediastinal air.  7/6.

## 2021-07-06 NOTE — CHART NOTE - NSCHARTNOTEFT_GEN_A_CORE
29 yo male, PMHx EtOH abuse, T2DM on insulin and Metformin, HTN, recently admitted with new diagnosis of non-ischemic cardiomyopathy EF 40-45% and atrial fibrillation on Eliquis, who presented from home with complaints of abdominal pain, nausea, and multiple episodes of vomiting over the past two days. Vomiting multiple times in ED, now black appearing. Patient states he stopped taking his insulin 4 days ago because he "forgot". On arrival to ED, patient was tachycardic in 140's, hemodynamically stable, complaining of abdominal pain. He was found to be in DKA with severe metabolic acidosis, pH 7.02, serum bicarb 7, anion gap 39, blood glucose 636 with SYED Cr 2.12 (up from 0.5 5/26/21). Tbili 2.4, normal LFTs, lipase 14. Also noted to be tremulous, last EtOH use 3 days ago. Admits to drinking malt liquor, 4 24-oz containers, daily. Denies binge drinking recently. Patient w/hematemsis, GI saw, likely Nohemy Anderson tear, stable, c/w protonix and octreotide. CT scan showing Pneumomediastinum, repeat scan w/contrast 7/7    Vital Signs Last 24 Hrs  T(C): 36.9 (06 Jul 2021 07:25), Max: 37.4 (05 Jul 2021 22:20)  T(F): 98.4 (06 Jul 2021 07:25), Max: 99.3 (05 Jul 2021 22:20)  HR: 50 (06 Jul 2021 10:00) (45 - 74)  BP: 154/127 (06 Jul 2021 10:00) (122/94 - 159/115)  BP(mean): 137 (06 Jul 2021 10:00) (100 - 137)  RR: 14 (06 Jul 2021 10:00) (8 - 17)  SpO2: 99% (06 Jul 2021 10:00) (95% - 100%)                            10.7   5.09  )-----------( 95       ( 06 Jul 2021 03:57 )             30.9     06 Jul 2021 03:57    146    |  109    |  9.5    ----------------------------<  63     3.1     |  25.0   |  0.57     Ca    8.4        06 Jul 2021 03:57  Phos  3.4       06 Jul 2021 03:57  Mg     1.5       06 Jul 2021 03:57        CAPILLARY BLOOD GLUCOSE    POCT Blood Glucose.: 120 mg/dL (06 Jul 2021 08:01)  POCT Blood Glucose.: 85 mg/dL (06 Jul 2021 05:43)  POCT Blood Glucose.: 77 mg/dL (05 Jul 2021 23:09)  POCT Blood Glucose.: 135 mg/dL (05 Jul 2021 17:53)  POCT Blood Glucose.: 65 mg/dL (05 Jul 2021 17:01)  POCT Blood Glucose.: 99 mg/dL (05 Jul 2021 12:02)    A/P:  29 yo male, PMHx EtOH abuse, T2DM on insulin and Metformin, HTN, recently admitted with new diagnosis of non-ischemic cardiomyopathy EF 40-45% and atrial fibrillation on Eliquis, who presented from home with complaints of abdominal pain, nausea, and multiple episodes of vomiting over the past two days. Vomiting multiple times in ED, now black appearing. Patient states he stopped taking his insulin 4 days ago because he "forgot". On arrival to ED, patient was tachycardic in 140's, hemodynamically stable, complaining of abdominal pain. He was found to be in DKA with severe metabolic acidosis, pH 7.02, serum bicarb 7, anion gap 39, blood glucose 636 with SYED Cr 2.12 (up from 0.5 5/26/21). Tbili 2.4, normal LFTs, lipase 14. Also noted to be tremulous, last EtOH use 3 days ago. Admits to drinking malt liquor, 4 24-oz containers, daily. Denies binge drinking recently. Patient w/hematemsis, GI saw, likely Nohemy Anderson tear, stable, c/w protonix and octreotide. CT scan showing Pneumomediastinum, repeat scan w/contrast 7/7    C/w octreotide, PPI. Repeat CT scan 7/7 w/contrast for pneumomediastinum. Zosyn abx prophylaxis. Ativan taper for etoh withdrawal. stopped octreotide, no signs of bleeding.

## 2021-07-06 NOTE — PROGRESS NOTE ADULT - ASSESSMENT
30M w/ PMHx HTN, DM, EtOH abuse w/ NICM EF 40-45% and atrial fibrillation on Eliquis, who presented from home with complaints of abdominal pain, nausea, and multiple episodes of vomiting    Hematemesis likely sec to Nohemy miguel tear  Pneumomediastinum   DKA (on admission)     Neuro: Mentation at baseline. On Ativan taper in addition to FA, thiamine  Cardiovascular: Aim for MAP target 65. No active issues  Resp/Chest: Maintain SpO2 > 92%. Oxygenation well. CTS f/u and repeat CT w/ PO contrast on 07/07  GI/Nutrition: Diet: Keep NPO for now. IV PPI. Will stop Octreotide in AM if no more episodes of bleeding. No plan for EGD at this time given pneumomediastinum as per GI. H/H stable  ID: On empiric Zosyn cover. f/u cultures  Renal: Avoid nephrotoxic agents. Strict I&O  Endocrinology: Recent A1c ~ 8.0 and normal TSH. Currently on Lantus 10U bid and ISS as per protocol. Will need to decrease dose if he remains NPO (given only 5U tonight)  Haem/Oncology:  DVT ppx w/ SCDs  Code status: Full    Critical Care time: 35 minutes assessing presenting problems of acute illness that poses high probability of life threatening deterioration or end organ damage/dysfunction.  Medical decision making including Initiating plan of care, reviewing data, reviewing radiology, discussing with multidisciplinary team, non inclusive of procedures

## 2021-07-06 NOTE — PROGRESS NOTE ADULT - SUBJECTIVE AND OBJECTIVE BOX
Subjective - patient seen and evaluated bedside. Sitting comfortably in bed. Denies CP, SOB, HA, dizziness, n/v/d    Review of Systems: negative x 10 systems except as noted above    Brief summary:  30yMale with pneumomediastinum s/p vomiting with hematemesis in setting of DKA.     Significant/[id number] events: none      PAST MEDICAL & SURGICAL HISTORY:  Alcohol abuse    Pancreatitis    HTN (hypertension)    Diabetes    Atrial fibrillation    Non-ischemic cardiomyopathy    No significant past surgical history    No significant past surgical history          chlorhexidine 4% Liquid 1 Application(s) Topical <User Schedule>  dextrose 40% Gel 15 Gram(s) Oral once  dextrose 5%. 1000 milliLiter(s) IV Continuous <Continuous>  dextrose 5%. 1000 milliLiter(s) IV Continuous <Continuous>  dextrose 50% Injectable 25 Gram(s) IV Push once  dextrose 50% Injectable 12.5 Gram(s) IV Push once  dextrose 50% Injectable 25 Gram(s) IV Push once  folic acid Injectable 1 milliGRAM(s) IV Push daily  glucagon  Injectable 1 milliGRAM(s) IntraMuscular once  hydrALAZINE Injectable 10 milliGRAM(s) IV Push every 6 hours PRN  HYDROmorphone  Injectable 1 milliGRAM(s) IV Push every 4 hours PRN  insulin glargine Injectable (LANTUS) 5 Unit(s) SubCutaneous every morning  insulin glargine Injectable (LANTUS) 5 Unit(s) SubCutaneous at bedtime  insulin lispro (ADMELOG) corrective regimen sliding scale   SubCutaneous every 6 hours  lactated ringers. 1000 milliLiter(s) IV Continuous <Continuous>  LORazepam   Injectable   IV Push   LORazepam   Injectable 4 milliGRAM(s) IV Push every 1 hour PRN  LORazepam   Injectable 1 milliGRAM(s) IV Push every 4 hours  LORazepam   Injectable 0.5 milliGRAM(s) IV Push every 4 hours  metoclopramide Injectable 10 milliGRAM(s) IV Push every 6 hours  octreotide  Infusion 50 MICROgram(s)/Hr IV Continuous <Continuous>  pantoprazole  Injectable 40 milliGRAM(s) IV Push two times a day  piperacillin/tazobactam IVPB.. 3.375 Gram(s) IV Intermittent every 8 hours  thiamine IVPB 500 milliGRAM(s) IV Intermittent every 8 hours  MEDICATIONS  (PRN):  hydrALAZINE Injectable 10 milliGRAM(s) IV Push every 6 hours PRN SBP > 140, DBP > 110  HYDROmorphone  Injectable 1 milliGRAM(s) IV Push every 4 hours PRN Severe Pain (7 - 10)  LORazepam   Injectable 4 milliGRAM(s) IV Push every 1 hour PRN Symptom-triggered: each CIWA -Ar score 8 or GREATER      Daily     Daily Weight in k.4 (2021 05:00)                              10.7   5.09  )-----------( 95       ( 2021 03:57 )             30.9   07-06    146<H>  |  109<H>  |  9.5  ----------------------------<  63<L>  3.1<L>   |  25.0  |  0.57    Ca    8.4<L>      2021 03:57  Phos  3.4     07-06  Mg     1.5     -06              Objective:  T(C): 36.9 (21 @ 07:25), Max: 37.4 (21 @ 22:20)  HR: 59 (21 @ 09:00) (45 - 74)  BP: 149/111 (21 @ 09:00) (122/94 - 159/115)  RR: 17 (21 @ 09:00) (8 - 17)  SpO2: 98% (21 @ 09:00) (95% - 100%)  Wt(kg): --CAPILLARY BLOOD GLUCOSE      POCT Blood Glucose.: 120 mg/dL (2021 08:01)  POCT Blood Glucose.: 85 mg/dL (2021 05:43)  POCT Blood Glucose.: 77 mg/dL (2021 23:09)  POCT Blood Glucose.: 135 mg/dL (2021 17:53)  POCT Blood Glucose.: 65 mg/dL (2021 17:01)  POCT Blood Glucose.: 99 mg/dL (2021 12:02)  I&O's Summary    2021 07:01  -  2021 07:00  --------------------------------------------------------  IN: 2590 mL / OUT: 1800 mL / NET: 790 mL    2021 07:01  -  2021 09:33  --------------------------------------------------------  IN: 210 mL / OUT: 0 mL / NET: 210 mL        Physical Exam  Neuro: A+O x 3, non-focal, speech clear and intact  Pulm: CTA, equal bilaterally, no SQ air appreciated  CV: RRR, , +S1S2  Abd: soft, NT, ND, +BS  Ext: +DP Pulses b/l,  no edema      Imaging:  CXR: < from: Xray Chest 1 View- PORTABLE-Routine (Xray Chest 1 View- PORTABLE-Routine .) (21 @ 10:10) >    FINDINGS:  There is a mild a pneumomediastinum with air dissecting into the inferior neck soft tissues. Heart and mediastinum size is otherwise are within normal limits.      The lungs are clear of airspace consolidations or effusions. No pneumothorax.      Visualized osseous structures are intact.    IMPRESSION:   Pneumomediastinum. Lungs clear..    < end of copied text >  < from: CT Chest w/ Oral Cont (21 @ 11:27) >  IMPRESSION: Mild to moderate pneumomediastinum extending into the neck. No evidence of contrast extravasation from the esophagus or evidence of posterior mediastinal collection or stranding.    < end of copied text >

## 2021-07-06 NOTE — PROGRESS NOTE ADULT - SUBJECTIVE AND OBJECTIVE BOX
Patient is a 30y old  Male who presents with a chief complaint of DKA, Alcohol withdrawal (05 Jul 2021 15:34)    BRIEF HOSPITAL COURSE:   31 yo male, PMHx EtOH abuse, T2DM on insulin and Metformin, HTN, recently admitted with new diagnosis of non-ischemic cardiomyopathy EF 40-45% and atrial fibrillation on Eliquis, who presented from home with complaints of abdominal pain, nausea, and multiple episodes of vomiting over the past two days. Vomiting multiple times in ED, now black appearing. Patient states he stopped taking his insulin 4 days ago because he "forgot". On arrival to ED, patient was tachycardic in 140's, hemodynamically stable, complaining of abdominal pain. He was found to be in DKA with severe metabolic acidosis, pH 7.02, serum bicarb 7, anion gap 39, blood glucose 636 with SYED Cr 2.12 (up from 0.5 5/26/21). Tbili 2.4, normal LFTs, lipase 14. Also noted to be tremulous, last EtOH use 3 days ago. Admits to drinking malt liquor, 4 24-oz containers, daily. Denies binge drinking recently. Pending CT abd/pelv    Events last 24 hours:   No acute events over the past 24hrs. No more hematemesis. Remains on Octreotide gtt    PAST MEDICAL & SURGICAL HISTORY:  Alcohol abuse    Pancreatitis    HTN (hypertension)    Diabetes    Atrial fibrillation    Non-ischemic cardiomyopathy    No significant past surgical history    Review of Systems:  CONSTITUTIONAL: No fever, chills, or fatigue  EYES: No eye pain, visual disturbances, or discharge  ENMT:  No difficulty hearing, tinnitus, vertigo; No sinus or throat pain  NECK: No pain or stiffness  RESPIRATORY: No cough, wheezing, chills or hemoptysis; No shortness of breath  CARDIOVASCULAR: No chest pain, palpitations, dizziness, or leg swelling  GASTROINTESTINAL: No abdominal or epigastric pain. No nausea, vomiting, or hematemesis; No diarrhea or constipation. No melena or hematochezia.  GENITOURINARY: No dysuria, frequency, hematuria, or incontinence  NEUROLOGICAL: No headaches, memory loss, loss of strength, numbness, or tremors  SKIN: No itching, burning, rashes, or lesions   MUSCULOSKELETAL: No joint pain or swelling; No muscle, back, or extremity pain  PSYCHIATRIC: No depression, anxiety, mood swings, or difficulty sleeping      Physical Examination:    ICU Vital Signs Last 24 Hrs  T(C): 37.4 (05 Jul 2021 23:57), Max: 37.4 (05 Jul 2021 22:20)  T(F): 99.3 (05 Jul 2021 23:57), Max: 99.3 (05 Jul 2021 22:20)  HR: 47 (06 Jul 2021 00:00) (46 - 78)  BP: 135/92 (06 Jul 2021 00:00) (119/84 - 152/104)  BP(mean): 107 (06 Jul 2021 00:00) (94 - 119)  ABP: --  ABP(mean): --  RR: 9 (06 Jul 2021 00:00) (8 - 17)  SpO2: 95% (06 Jul 2021 00:00) (95% - 100%)      General: No acute distress.    Neuro: AAO*3, No motor, sensory, or cranial nerve deficit  HEENT: Pupils equal, reactive to light, Moist oral mucosa  PULM: Clear to auscultation bilaterally  CVS: Regular rhythm and controlled rate  ABD: Soft, nondistended, nontender, normoactive bowel sounds  EXT: No b/l LE edema  SKIN: Warm and well perfused, no acute rashes       Medications:  piperacillin/tazobactam IVPB.. 3.375 Gram(s) IV Intermittent every 8 hours    metoprolol tartrate Injectable 5 milliGRAM(s) IV Push every 6 hours      HYDROmorphone  Injectable 1 milliGRAM(s) IV Push every 4 hours PRN  LORazepam   Injectable   IV Push   LORazepam   Injectable 4 milliGRAM(s) IV Push every 1 hour PRN  LORazepam   Injectable 1 milliGRAM(s) IV Push every 4 hours  LORazepam   Injectable 0.5 milliGRAM(s) IV Push every 4 hours  metoclopramide Injectable 10 milliGRAM(s) IV Push every 6 hours        pantoprazole  Injectable 40 milliGRAM(s) IV Push two times a day      dextrose 40% Gel 15 Gram(s) Oral once  dextrose 50% Injectable 25 Gram(s) IV Push once  dextrose 50% Injectable 12.5 Gram(s) IV Push once  dextrose 50% Injectable 25 Gram(s) IV Push once  glucagon  Injectable 1 milliGRAM(s) IntraMuscular once  insulin glargine Injectable (LANTUS) 10 Unit(s) SubCutaneous every morning  insulin glargine Injectable (LANTUS) 10 Unit(s) SubCutaneous at bedtime  insulin lispro (ADMELOG) corrective regimen sliding scale   SubCutaneous every 6 hours  octreotide  Infusion 50 MICROgram(s)/Hr IV Continuous <Continuous>    dextrose 5%. 1000 milliLiter(s) IV Continuous <Continuous>  dextrose 5%. 1000 milliLiter(s) IV Continuous <Continuous>  folic acid Injectable 1 milliGRAM(s) IV Push daily  lactated ringers. 1000 milliLiter(s) IV Continuous <Continuous>  thiamine IVPB 500 milliGRAM(s) IV Intermittent every 8 hours      chlorhexidine 4% Liquid 1 Application(s) Topical <User Schedule>            I&O's Detail    04 Jul 2021 07:01  -  05 Jul 2021 07:00  --------------------------------------------------------  IN:    dextrose 5% + lactated ringers: 1500 mL    Insulin: 12 mL    IV PiggyBack: 300 mL    IV PiggyBack: 100 mL    IV PiggyBack: 291.6 mL    Lactated Ringers: 1350 mL    Octreotide: 150 mL  Total IN: 3703.6 mL    OUT:    Voided (mL): 1750 mL  Total OUT: 1750 mL    Total NET: 1953.6 mL      05 Jul 2021 07:01  -  06 Jul 2021 02:58  --------------------------------------------------------  IN:    IV PiggyBack: 125 mL    IV PiggyBack: 200 mL    IV PiggyBack: 200 mL    IV PiggyBack: 100 mL    Lactated Ringers: 975 mL    Octreotide: 130 mL  Total IN: 1730 mL    OUT:    Oral Fluid: 0 mL    Voided (mL): 1450 mL  Total OUT: 1450 mL    Total NET: 280 mL            RADIOLOGY/ Microbiology/ Labs: reviewed

## 2021-07-06 NOTE — PROGRESS NOTE ADULT - PROBLEM SELECTOR PLAN 1
Maintain Strict NPO  IV Zosyn as a prophylactic measure  Maintenance fluids  Daily CXR  Repeat CT scan with PO contrast tomorrow (7/7/21)  D/w Dr. Pereira  Thoracic Surgery to follow along. Maintain Strict NPO  IV Zosyn as a prophylactic measure  Maintenance fluids  Daily CXR  Repeat CT chest with PO contrast tomorrow (7/7/21)  D/w Dr. Pereira  Thoracic Surgery to follow along.

## 2021-07-06 NOTE — CONSULT NOTE ADULT - SUBJECTIVE AND OBJECTIVE BOX
HPI:  31 yo male, PMHx EtOH abuse, T2DM on  Basaglar and Metformin, HTN, recently admitted with new diagnosis of non-ischemic cardiomyopathy EF 40-45% and atrial fibrillation on Eliquis, who presented from home with complaints of abdominal pain, nausea, and multiple episodes of vomiting over the past two days. Vomiting multiple times in ED, now black appearing. Patient states he stopped taking his insulin 4 days ago because he "forgot". On arrival to ED, patient was tachycardic in 140's, hemodynamically stable, complaining of abdominal pain. He was found to be in DKA with severe metabolic acidosis, pH 7.02, serum bicarb 7, anion gap 39, blood glucose 636 with SYED Cr 2.12 (up from 0.5 5/26/21). Tbili 2.4, normal LFTs, lipase 14. Also noted to be tremulous, last EtOH use 3 days ago. Admits to drinking malt liquor, 9 beers per day  \          PAST MEDICAL & SURGICAL HISTORY:  Alcohol abuse    Pancreatitis    HTN (hypertension)    Diabetes    Atrial fibrillation    Non-ischemic cardiomyopathy    No significant past surgical history        FAMILY HISTORY:  No pertinent family history in first degree relatives        SOCIAL HISTORY: not smoking  + Etoh Abuse     REVIEW OF SYSTEMS:    Constitutional: No fever, no chills, no change in weight.    Eyes: No eye swelling,no  blurry vision, no redness, no loss of vision.    Neck: No neck pain, no change in voice.    Lungs: No shortness of breath, no wheezing, no cough    CV: No chest pain, no palpitations, no pain with walking.    GI: No nausea, no vomiting, no constipation, no diarrhea, no abdominal pain    : No urinary frequency, no blood in urine, no urinary burning, no difficulty voiding.    Musculoskeletal: No muscle pain, no joint pain, no swelling.    Skin: No rash, no infections.    Neurologic: No headaches, no weakness, no burning or pain in feet, no tremor.    Endocrine: No heat intolerance, no cold intolerance, no increased sweating, no shakiness between meals.    Psych: No depression, no anxiety, no trouble concentrating    MEDICATIONS  (STANDING):  chlorhexidine 4% Liquid 1 Application(s) Topical <User Schedule>  dextrose 40% Gel 15 Gram(s) Oral once  dextrose 5%. 1000 milliLiter(s) (50 mL/Hr) IV Continuous <Continuous>  dextrose 5%. 1000 milliLiter(s) (100 mL/Hr) IV Continuous <Continuous>  dextrose 50% Injectable 25 Gram(s) IV Push once  dextrose 50% Injectable 12.5 Gram(s) IV Push once  dextrose 50% Injectable 25 Gram(s) IV Push once  folic acid Injectable 1 milliGRAM(s) IV Push daily  glucagon  Injectable 1 milliGRAM(s) IntraMuscular once  insulin glargine Injectable (LANTUS) 10 Unit(s) SubCutaneous every morning  insulin lispro (ADMELOG) corrective regimen sliding scale   SubCutaneous every 6 hours  lactated ringers. 1000 milliLiter(s) (75 mL/Hr) IV Continuous <Continuous>  LORazepam   Injectable   IV Push   LORazepam   Injectable 0.5 milliGRAM(s) IV Push every 4 hours  pantoprazole  Injectable 40 milliGRAM(s) IV Push two times a day  piperacillin/tazobactam IVPB.. 3.375 Gram(s) IV Intermittent every 8 hours    MEDICATIONS  (PRN):  hydrALAZINE Injectable 10 milliGRAM(s) IV Push every 6 hours PRN SBP > 140, DBP > 110  HYDROmorphone  Injectable 1 milliGRAM(s) IV Push every 4 hours PRN Severe Pain (7 - 10)  LORazepam   Injectable 4 milliGRAM(s) IV Push every 1 hour PRN Symptom-triggered: each CIWA -Ar score 8 or GREATER      Allergies    No Known Allergies    Intolerances          CAPILLARY BLOOD GLUCOSE      POCT Blood Glucose.: 145 mg/dL (06 Jul 2021 16:52)      PHYSICAL EXAM:    Vital Signs Last 24 Hrs  T(C): 36.6 (06 Jul 2021 18:44), Max: 37.4 (05 Jul 2021 23:57)  T(F): 97.9 (06 Jul 2021 18:44), Max: 99.3 (05 Jul 2021 23:57)  HR: 97 (06 Jul 2021 18:44) (45 - 97)  BP: 124/80 (06 Jul 2021 18:44) (124/80 - 159/115)  BP(mean): 99 (06 Jul 2021 17:00) (96 - 137)  RR: 20 (06 Jul 2021 18:44) (9 - 21)  SpO2: 97% (06 Jul 2021 18:44) (95% - 100%)    General appearance: Well developed, well nourished.    Eyes: Pupils equal and reactive to light. EOM full. No exophthalmos.    Neck: Trachea midline. No thyroid enlargement.    Lungs: Normal respiratory excursion. Lungs clear.    CV: Regular cardiac rhythm. No murmur. Carotid and pedal pulses intact.    Abdomen: Soft, non tender, no organomegaly or mass.    Musculoskeletal: No cyanosis, clubbing, or edema. No pedal lesions.    Skin: Warm and moist. No rash. No acanthosis.    Neuro: Cranial nerves intact. Normal motor and sensory function. DTR's normal.    Psych: Normal affect, good judgement.            LABS:                        10.7   5.09  )-----------( 95       ( 06 Jul 2021 03:57 )             30.9     07-06    146<H>  |  109<H>  |  9.5  ----------------------------<  63<L>  3.1<L>   |  25.0  |  0.57    Ca    8.4<L>      06 Jul 2021 03:57  Phos  3.4     07-06  Mg     1.5     07-06    A1C with Estimated Average Glucose Result: 8.0 % (07.03.21 @ 19:24)       Historical Values  A1C with Estimated Average Glucose Result: 8.0 % (07.03.21 @ 19:24)   A1C with Estimated Average Glucose Result: 9.6 % (05.07.21 @ 05:21)     Thyroid Stimulating Hormone, Serum: 0.64 uIU/mL (05.06.21 @ 15:23)       Historical Values  Thyroid Stimulating Hormone, Serum: 0.64 uIU/mL (05.06.21 @ 15:23)             CAPILLARY BLOOD GLUCOSE  CAPILLARY BLOOD GLUCOSE      POCT Blood Glucose.: 145 mg/dL (06 Jul 2021 16:52)  POCT Blood Glucose.: 124 mg/dL (06 Jul 2021 11:40)  POCT Blood Glucose.: 120 mg/dL (06 Jul 2021 08:01)  POCT Blood Glucose.: 85 mg/dL (06 Jul 2021 05:43)  POCT Blood Glucose.: 77 mg/dL (05 Jul 2021 23:09)      RADIOLOGY & ADDITIONAL STUDIES:    
HPI:  29 yo man PMHx EtOH abuse, T2DM on insulin and Metformin, HTN, recently admitted with new diagnosis of non-ischemic cardiomyopathy EF 40-45% and atrial fibrillation on Eliquis, who presented from home with complaints of abdominal pain, nausea, and multiple episodes of vomiting over the past two days. Vomiting multiple times in ED and then black appearing. Patient stated he stopped taking his insulin 4 days ago because he "forgot". On arrival to ED, patient was tachycardic in 140's, hemodynamically stable, complaining of abdominal pain. He was found to be in DKA with severe metabolic acidosis, pH 7.02, serum bicarb 7, anion gap 39, blood glucose 636 with SYED Cr 2.12 (up from 0.5 21). Tbili 2.4, normal LFTs, lipase 14. Also noted to be tremulous, last EtOH use 3 days ago. Admits to drinking malt liquor, 4 24-oz containers, daily. Denies binge drinking recently.    Patient is off the insulin drip now.  GI evaluation is being called for hematemesis.  Patient was also noted to have pneumomediastinum.    PAST MEDICAL & SURGICAL HISTORY:  Alcohol abuse    Pancreatitis    HTN (hypertension)    Diabetes    Atrial fibrillation    Non-ischemic cardiomyopathy    No significant past surgical history        ROS:  No Heartburn, regurgitation, dysphagia, odynophagia.  No dyspepsia  No abdominal pain.    + Nausea, vomiting.  + Bleeding.  +hematemesis.   No diarrhea.    No hematochesia.  No weight loss, anorexia.  No edema.      MEDICATIONS  (STANDING):  chlorhexidine 4% Liquid 1 Application(s) Topical <User Schedule>  dextrose 40% Gel 15 Gram(s) Oral once  dextrose 5%. 1000 milliLiter(s) (50 mL/Hr) IV Continuous <Continuous>  dextrose 5%. 1000 milliLiter(s) (100 mL/Hr) IV Continuous <Continuous>  dextrose 50% Injectable 25 Gram(s) IV Push once  dextrose 50% Injectable 12.5 Gram(s) IV Push once  dextrose 50% Injectable 25 Gram(s) IV Push once  folic acid Injectable 1 milliGRAM(s) IV Push daily  glucagon  Injectable 1 milliGRAM(s) IntraMuscular once  insulin glargine Injectable (LANTUS) 10 Unit(s) SubCutaneous every morning  insulin glargine Injectable (LANTUS) 10 Unit(s) SubCutaneous at bedtime  insulin lispro (ADMELOG) corrective regimen sliding scale   SubCutaneous every 6 hours  lactated ringers. 1000 milliLiter(s) (75 mL/Hr) IV Continuous <Continuous>  LORazepam   Injectable   IV Push   LORazepam   Injectable 2 milliGRAM(s) IV Push every 4 hours  LORazepam   Injectable 1.5 milliGRAM(s) IV Push every 4 hours  metoclopramide Injectable 10 milliGRAM(s) IV Push every 6 hours  metoprolol tartrate Injectable 5 milliGRAM(s) IV Push every 6 hours  octreotide  Infusion 50 MICROgram(s)/Hr (10 mL/Hr) IV Continuous <Continuous>  pantoprazole  Injectable 40 milliGRAM(s) IV Push two times a day  thiamine IVPB 500 milliGRAM(s) IV Intermittent every 8 hours    MEDICATIONS  (PRN):  HYDROmorphone  Injectable 1 milliGRAM(s) IV Push every 4 hours PRN Severe Pain (7 - 10)  LORazepam   Injectable 4 milliGRAM(s) IV Push every 1 hour PRN Symptom-triggered: each CIWA -Ar score 8 or GREATER  metoclopramide  IVPB 10 milliGRAM(s) IV Intermittent every 6 hours PRN nausea/vomiting      Allergies    No Known Allergies    Intolerances        SOCIAL HISTORY:Alcohol abuse      FAMILY HISTORY:  No pertinent family history in first degree relatives        Vital Signs Last 24 Hrs  T(C): 36.8 (2021 12:00), Max: 37.9 (2021 19:11)  T(F): 98.3 (2021 12:00), Max: 100.2 (2021 19:11)  HR: 95 (2021 15:00) (86 - 147)  BP: 118/77 (2021 15:00) (104/71 - 172/98)  BP(mean): 88 (2021 15:00) (82 - 112)  RR: 17 (2021 15:00) (10 - 28)  SpO2: 96% (2021 15:00) (90% - 100%)    PHYSICAL EXAM:    GENERAL: Flushed appearance  HEAD:  Atraumatic, Normocephalic  EYES: EOMI, PERRLA, conjunctiva and sclera clear  ENMT: No tonsillar erythema, exudates, or enlargement; Moist mucous membranes, Good dentition, No lesions  NECK: Supple, No JVD, Normal thyroid  CHEST/LUNG: Clear to percussion bilaterally; No rales, rhonchi, wheezing, or rubs  HEART: Regular rate and rhythm; No murmurs, rubs, or gallops  ABDOMEN: Soft, diffuse tenderness  Nondistended; Bowel sounds present  RECTAL: Brown stools  EXTREMITIES:  2+ Peripheral Pulses, No clubbing, cyanosis, or edema  LYMPH: No lymphadenopathy noted  SKIN: No rashes or lesions      LABS:                        11.7   6.64  )-----------( 132      ( 2021 06:54 )             34.8     07-04    143  |  112<H>  |  18.9  ----------------------------<  172<H>  4.7   |  19.0<L>  |  0.74    Ca    8.7      2021 10:34  Phos  2.9     07-  Mg     2.0     07-    TPro  9.4<H>  /  Alb  5.3<H>  /  TBili  2.4<H>  /  DBili  x   /  AST  22  /  ALT  17  /  AlkPhos  124<H>  07-03    PT/INR - ( 2021 23:38 )   PT: 11.4 sec;   INR: 0.98 ratio         PTT - ( 2021 23:38 )  PTT:26.8 sec   Urinalysis Basic - ( 2021 20:10 )    Color: Yellow / Appearance: Clear / S.020 / pH: x  Gluc: x / Ketone: Large  / Bili: Negative / Urobili: Negative mg/dL   Blood: x / Protein: 100 mg/dL / Nitrite: Negative   Leuk Esterase: Negative / RBC: 6-10 /HPF / WBC 0-2   Sq Epi: x / Non Sq Epi: Occasional / Bacteria: Occasional        LIVER FUNCTIONS - ( 2021 19:23 )  Alb: 5.3 g/dL / Pro: 9.4 g/dL / ALK PHOS: 124 U/L / ALT: 17 U/L / AST: 22 U/L / GGT: x               RADIOLOGY & ADDITIONAL STUDIES:  < from: CT Abdomen and Pelvis No Cont (21 @ 22:38) >     EXAM:  CT ABDOMEN AND PELVIS                          PROCEDURE DATE:  2021          INTERPRETATION:  CLINICAL INFORMATION: Abdominal pain, nausea, and multiple episodes of vomiting.    COMPARISON: None.    CONTRAST/COMPLICATIONS:  IV Contrast: NONE  Oral Contrast: NONE  Complications: None reported at time of study completion    PROCEDURE:  CT of the Abdomen and Pelvis was performed.  Sagittal and coronal reformats were performed.    FINDINGS:  LOWER CHEST: Partially imaged pneumomediastinum.    LIVER: Steatosis.  BILE DUCTS: Normal caliber.  GALLBLADDER: Cholelithiasis.  SPLEEN: Within normal limits.  PANCREAS: Within normal limits.  ADRENALS: Within normal limits.  KIDNEYS/URETERS: Within normal limits.    BLADDER: Within normal limits.  REPRODUCTIVE ORGANS: Prostate within normal limits.    BOWEL: No bowel obstruction. Underdistention of the colon, limiting evaluation. Appendix is normal.  PERITONEUM: No ascites.  VESSELS: Within normal limits.  RETROPERITONEUM/LYMPH NODES: No lymphadenopathy.  ABDOMINAL WALL: Within normal limits.  BONES: Within normal limits.    IMPRESSION:  Partially imaged pneumomediastinum.    No acute intra-abdominal pathology on noncontrast CT.    Dr. Norman discussed the above findings with VANESSA Dorman at 1:53 AM on 2021 with read back.              REBECA NORMAN MD; Attending Radiologist  This document has been electronically signed. 2021  2:01AM    < end of copied text >  
HPI    31 yo male, PMHx EtOH abuse, T2DM on insulin and Metformin, HTN, recently admitted with new diagnosis of non-ischemic cardiomyopathy EF 40-45% and atrial fibrillation on Eliquis, who presented from home with complaints of abdominal pain, nausea, and multiple episodes of vomiting over the past two days. Vomiting multiple times in ED, now black appearing. Patient states he stopped taking his insulin 4 days ago because he "forgot". On arrival to ED, patient was tachycardic in 140's, hemodynamically stable, complaining of abdominal pain. He was found to be in DKA with severe metabolic acidosis, pH 7.02, serum bicarb 7, anion gap 39, blood glucose 636 with SYED Cr 2.12 (up from 0.5 21). Suspected radha-miguel tear. Found to have finding of pneumomediastinum on CT scan. Thoracic surgery consulted for pneumomediastinum.     Subjective - patient seen and evaluated bedside. Sitting comfortably in bed. Denies CP, SOB, HA, dizziness, n/v/d    Review of Systems: negative x 10 systems except as noted above      PAST MEDICAL & SURGICAL HISTORY:  Alcohol abuse    Pancreatitis    HTN (hypertension)    Diabetes    Atrial fibrillation    Non-ischemic cardiomyopathy    No significant past surgical history    No significant past surgical history          chlorhexidine 4% Liquid 1 Application(s) Topical <User Schedule>  dextrose 40% Gel 15 Gram(s) Oral once  dextrose 5%. 1000 milliLiter(s) IV Continuous <Continuous>  dextrose 5%. 1000 milliLiter(s) IV Continuous <Continuous>  dextrose 50% Injectable 25 Gram(s) IV Push once  dextrose 50% Injectable 12.5 Gram(s) IV Push once  dextrose 50% Injectable 25 Gram(s) IV Push once  folic acid Injectable 1 milliGRAM(s) IV Push daily  glucagon  Injectable 1 milliGRAM(s) IntraMuscular once  HYDROmorphone  Injectable 1 milliGRAM(s) IV Push every 4 hours PRN  insulin glargine Injectable (LANTUS) 10 Unit(s) SubCutaneous every morning  insulin glargine Injectable (LANTUS) 10 Unit(s) SubCutaneous at bedtime  insulin lispro (ADMELOG) corrective regimen sliding scale   SubCutaneous every 6 hours  lactated ringers. 1000 milliLiter(s) IV Continuous <Continuous>  LORazepam   Injectable   IV Push   LORazepam   Injectable 4 milliGRAM(s) IV Push every 1 hour PRN  LORazepam   Injectable 1.5 milliGRAM(s) IV Push every 4 hours  LORazepam   Injectable 1 milliGRAM(s) IV Push every 4 hours  metoclopramide Injectable 10 milliGRAM(s) IV Push every 6 hours  metoprolol tartrate Injectable 5 milliGRAM(s) IV Push every 6 hours  octreotide  Infusion 50 MICROgram(s)/Hr IV Continuous <Continuous>  pantoprazole  Injectable 40 milliGRAM(s) IV Push two times a day  thiamine IVPB 500 milliGRAM(s) IV Intermittent every 8 hours  MEDICATIONS  (PRN):  HYDROmorphone  Injectable 1 milliGRAM(s) IV Push every 4 hours PRN Severe Pain (7 - 10)  LORazepam   Injectable 4 milliGRAM(s) IV Push every 1 hour PRN Symptom-triggered: each CIWA -Ar score 8 or GREATER      Daily     Daily Weight in k.6 (2021 05:00)                              11.1   4.14  )-----------( 98       ( 2021 04:21 )             32.8       145  |  113<H>  |  14.8  ----------------------------<  126<H>  3.8   |  23.0  |  0.73    Ca    8.8      2021 04:21  Phos  1.5       Mg     1.8         TPro  9.4<H>  /  Alb  5.3<H>  /  TBili  2.4<H>  /  DBili  x   /  AST  22  /  ALT  17  /  AlkPhos  124<H>  03      PT/INR - ( 2021 23:38 )   PT: 11.4 sec;   INR: 0.98 ratio         PTT - ( 2021 23:38 )  PTT:26.8 sec      Objective:  T(C): 36.9 (21 @ 12:17), Max: 37.7 (21 @ 19:25)  HR: 59 (21 @ 15:00) (52 - 96)  BP: 143/98 (21 @ 15:00) (113/72 - 143/98)  RR: 17 (21 @ 15:00) (8 - 19)  SpO2: 96% (21 @ 15:00) (94% - 100%)  Wt(kg): --CAPILLARY BLOOD GLUCOSE      POCT Blood Glucose.: 99 mg/dL (2021 12:02)  POCT Blood Glucose.: 151 mg/dL (2021 07:50)  POCT Blood Glucose.: 126 mg/dL (2021 05:50)  POCT Blood Glucose.: 128 mg/dL (2021 23:01)  POCT Blood Glucose.: 75 mg/dL (2021 17:28)  I&O's Summary    2021 07:01  -  2021 07:00  --------------------------------------------------------  IN: 3703.6 mL / OUT: 1750 mL / NET: 1953.6 mL    2021 07:01  -  2021 15:40  --------------------------------------------------------  IN: 820 mL / OUT: 600 mL / NET: 220 mL        Physical Exam  Neuro: A+O x 3, non-focal, speech clear and intact  Pulm: CTA, equal bilaterally  CV: RRR, irregularly irregular, +S1S2  Abd: soft, NT, ND, +BS  Ext: +DP Pulses b/l, +PT pulses, no edema  Inc: MSI C/D/I/stable w/ dressing, ___ C/D/I with Ace wrap  Chest tubes:    Imaging:  CXR:  < from: Xray Chest 1 View- PORTABLE-Routine (Xray Chest 1 View- PORTABLE-Routine .) (21 @ 10:10) >    FINDINGS:  There is a mild a pneumomediastinum with air dissecting into the inferior neck soft tissues. Heart and mediastinum size is otherwise are within normal limits.      The lungs are clear of airspace consolidations or effusions. No pneumothorax.      Visualized osseous structures are intact.    IMPRESSION:   Pneumomediastinum. Lungs clear..    < end of copied text >  < from: CT Chest w/ Oral Cont (21 @ 11:27) >  FINDINGS:    LUNGS AND AIRWAYS: Patent central airways.  Mild nodular thickening of the right major fissure on image 115. No discrete pulmonary nodule or confluent airspace opacity is identified.  PLEURA: No pleural effusion. There is no pneumothorax.  MEDIASTINUM AND JOSE: No lymphadenopathy. There is oral contrast opacification of the esophagus. No extravasation of oral contrast is identified into the mediastinum. Again is noted mild to moderate pneumomediastinum extending into the visualized soft tissues of the neck including the retropharyngeal space. No discrete fluid collection or stranding is identified.  VESSELS: Within normal limits.  HEART: Heart size is normal. No pericardial effusion.  CHEST WALL AND LOWER NECK: Within normal limits.  VISUALIZED UPPER ABDOMEN: Tiny layering gallstones in the gallbladder..  BONES: Within normal limits.    IMPRESSION: Mild to moderate pneumomediastinum extending into the neck. No evidence of contrast extravasation from the esophagus or evidence of posterior mediastinal collection or stranding.    < end of copied text >

## 2021-07-06 NOTE — PROGRESS NOTE ADULT - ASSESSMENT
31 yo male, PMHx EtOH abuse, T2DM on insulin and Metformin, HTN, recently admitted with new diagnosis of non-ischemic cardiomyopathy EF 40-45% and atrial fibrillation on Eliquis, who presented from home with complaints of abdominal pain, nausea, and multiple episodes of vomiting over the past two days. Vomiting multiple times in ED, now black appearing. Patient states he stopped taking his insulin 4 days ago because he "forgot". On arrival to ED, patient was tachycardic in 140's, hemodynamically stable, complaining of abdominal pain. He was found to be in DKA with severe metabolic acidosis, pH 7.02, serum bicarb 7, anion gap 39, blood glucose 636 with SYED Cr 2.12 (up from 0.5 5/26/21). Tbili 2.4, normal LFTs, lipase 14. Also noted to be tremulous, last EtOH use 3 days ago. Admits to drinking malt liquor, 4 24-oz containers, daily. Denies binge drinking recently. Patient w/hematemsis, GI saw, likely Radha Anderson tear, stable, c/w protonix and octreotide. CT scan showing Pneumomediastinum and thoracics following    #pneumomediastinum  - npo for now   - ct surgery consult appreciated  - repeat ct pending in AM   - zosyn for now   - ivf    #Type 2 Diabetes  - s/p DKA and insulin drip  - monitor fingersticks  - iss and lantus   - endo consult pending     #hematemesis- resolved  - GI consult appreciated  - probable 2/2 radha anderson tear  - protonix    #alcohol abuse  - ciwa protocol  - probable thiamine defc  - thiamine folic acid  - start mvi when able to take po     #hypokalemia and hypomagnesemia  - repleted monitor    #urinary retention   - trial of void     #GERD  - protonix    #DVT Prophylaxis  - venodynes

## 2021-07-06 NOTE — CONSULT NOTE ADULT - ASSESSMENT
DM now s/p DKA   pancreatitis   Pt informed that he has T1DM   - will continue Lantus - pt ahd epsidoe of low BS this AM  recieved 10 unit LAntus  on 7/5 in Am  and 5 unit qhs on 7/5   will change to 10 unti in AM   cont premeal Admelog    DM education    dw pt needs to quit alcohol  as  has deleterious effect s on  DM      DM now s/p DKA   pancreatitis   Pt informed that he has T1DM   - will continue Lantus - pt ahd epsidoe of low BS this AM  recieved 10 unit LAntus  on 7/5 in Am  and 5 unit qhs on 7/5   will change to 10 unti in AM    Admelog sliding scale    DM education    dw pt needs to quit alcohol  as  has deleterious effect s on  DM

## 2021-07-06 NOTE — PROGRESS NOTE ADULT - ASSESSMENT
Suspicious for MWT or Borharve's syndrome.  Resolution of mediastinal air on CXR today.  Remains asymptomatic.  On IV Ab's.  Off of octreotide/  No signs of alcohol withdrawal.    Scheduled for a repeat of the CT chest for tomorrow.  Will keep NPO until then.  Hold on any endoscopic procedures for now.    OK with downgrading patient out of ICU setting.

## 2021-07-07 DIAGNOSIS — F10.10 ALCOHOL ABUSE, UNCOMPLICATED: ICD-10-CM

## 2021-07-07 LAB
ANION GAP SERPL CALC-SCNC: 11 MMOL/L — SIGNIFICANT CHANGE UP (ref 5–17)
BUN SERPL-MCNC: 7.2 MG/DL — LOW (ref 8–20)
CALCIUM SERPL-MCNC: 8.7 MG/DL — SIGNIFICANT CHANGE UP (ref 8.6–10.2)
CHLORIDE SERPL-SCNC: 104 MMOL/L — SIGNIFICANT CHANGE UP (ref 98–107)
CO2 SERPL-SCNC: 26 MMOL/L — SIGNIFICANT CHANGE UP (ref 22–29)
CREAT SERPL-MCNC: 0.54 MG/DL — SIGNIFICANT CHANGE UP (ref 0.5–1.3)
GLUCOSE BLDC GLUCOMTR-MCNC: 109 MG/DL — HIGH (ref 70–99)
GLUCOSE BLDC GLUCOMTR-MCNC: 153 MG/DL — HIGH (ref 70–99)
GLUCOSE BLDC GLUCOMTR-MCNC: 199 MG/DL — HIGH (ref 70–99)
GLUCOSE BLDC GLUCOMTR-MCNC: 207 MG/DL — HIGH (ref 70–99)
GLUCOSE BLDC GLUCOMTR-MCNC: 295 MG/DL — HIGH (ref 70–99)
GLUCOSE SERPL-MCNC: 168 MG/DL — HIGH (ref 70–99)
HCT VFR BLD CALC: 32.8 % — LOW (ref 39–50)
HGB BLD-MCNC: 11.2 G/DL — LOW (ref 13–17)
MAGNESIUM SERPL-MCNC: 1.7 MG/DL — SIGNIFICANT CHANGE UP (ref 1.6–2.6)
MCHC RBC-ENTMCNC: 31.1 PG — SIGNIFICANT CHANGE UP (ref 27–34)
MCHC RBC-ENTMCNC: 34.1 GM/DL — SIGNIFICANT CHANGE UP (ref 32–36)
MCV RBC AUTO: 91.1 FL — SIGNIFICANT CHANGE UP (ref 80–100)
PHOSPHATE SERPL-MCNC: 3.4 MG/DL — SIGNIFICANT CHANGE UP (ref 2.4–4.7)
PLATELET # BLD AUTO: 98 K/UL — LOW (ref 150–400)
POTASSIUM SERPL-MCNC: 3 MMOL/L — LOW (ref 3.5–5.3)
POTASSIUM SERPL-SCNC: 3 MMOL/L — LOW (ref 3.5–5.3)
RBC # BLD: 3.6 M/UL — LOW (ref 4.2–5.8)
RBC # FLD: 12.6 % — SIGNIFICANT CHANGE UP (ref 10.3–14.5)
SODIUM SERPL-SCNC: 141 MMOL/L — SIGNIFICANT CHANGE UP (ref 135–145)
WBC # BLD: 4.36 K/UL — SIGNIFICANT CHANGE UP (ref 3.8–10.5)
WBC # FLD AUTO: 4.36 K/UL — SIGNIFICANT CHANGE UP (ref 3.8–10.5)

## 2021-07-07 PROCEDURE — 71045 X-RAY EXAM CHEST 1 VIEW: CPT | Mod: 26

## 2021-07-07 PROCEDURE — 99233 SBSQ HOSP IP/OBS HIGH 50: CPT

## 2021-07-07 PROCEDURE — 99232 SBSQ HOSP IP/OBS MODERATE 35: CPT | Mod: 24

## 2021-07-07 PROCEDURE — 71250 CT THORAX DX C-: CPT | Mod: 26

## 2021-07-07 RX ORDER — INSULIN LISPRO 100/ML
4 VIAL (ML) SUBCUTANEOUS
Refills: 0 | Status: DISCONTINUED | OUTPATIENT
Start: 2021-07-07 | End: 2021-07-07

## 2021-07-07 RX ORDER — LANOLIN ALCOHOL/MO/W.PET/CERES
3 CREAM (GRAM) TOPICAL AT BEDTIME
Refills: 0 | Status: DISCONTINUED | OUTPATIENT
Start: 2021-07-07 | End: 2021-07-09

## 2021-07-07 RX ORDER — POTASSIUM CHLORIDE 20 MEQ
10 PACKET (EA) ORAL
Refills: 0 | Status: COMPLETED | OUTPATIENT
Start: 2021-07-07 | End: 2021-07-07

## 2021-07-07 RX ADMIN — Medication 100 MILLIEQUIVALENT(S): at 11:43

## 2021-07-07 RX ADMIN — Medication 1 MILLIGRAM(S): at 11:44

## 2021-07-07 RX ADMIN — Medication 0.5 MILLIGRAM(S): at 21:02

## 2021-07-07 RX ADMIN — PIPERACILLIN AND TAZOBACTAM 25 GRAM(S): 4; .5 INJECTION, POWDER, LYOPHILIZED, FOR SOLUTION INTRAVENOUS at 13:14

## 2021-07-07 RX ADMIN — Medication 100 MILLIEQUIVALENT(S): at 13:09

## 2021-07-07 RX ADMIN — Medication 3 MILLIGRAM(S): at 21:02

## 2021-07-07 RX ADMIN — Medication 4: at 05:21

## 2021-07-07 RX ADMIN — PIPERACILLIN AND TAZOBACTAM 25 GRAM(S): 4; .5 INJECTION, POWDER, LYOPHILIZED, FOR SOLUTION INTRAVENOUS at 21:03

## 2021-07-07 RX ADMIN — INSULIN GLARGINE 10 UNIT(S): 100 INJECTION, SOLUTION SUBCUTANEOUS at 09:20

## 2021-07-07 RX ADMIN — Medication 100 MILLIGRAM(S): at 11:44

## 2021-07-07 RX ADMIN — CHLORHEXIDINE GLUCONATE 1 APPLICATION(S): 213 SOLUTION TOPICAL at 08:14

## 2021-07-07 RX ADMIN — Medication 0.5 MILLIGRAM(S): at 08:14

## 2021-07-07 RX ADMIN — PIPERACILLIN AND TAZOBACTAM 25 GRAM(S): 4; .5 INJECTION, POWDER, LYOPHILIZED, FOR SOLUTION INTRAVENOUS at 05:10

## 2021-07-07 RX ADMIN — Medication 0.5 MILLIGRAM(S): at 11:43

## 2021-07-07 RX ADMIN — PANTOPRAZOLE SODIUM 40 MILLIGRAM(S): 20 TABLET, DELAYED RELEASE ORAL at 18:05

## 2021-07-07 RX ADMIN — SODIUM CHLORIDE 75 MILLILITER(S): 9 INJECTION, SOLUTION INTRAVENOUS at 05:11

## 2021-07-07 RX ADMIN — Medication 100 MILLIEQUIVALENT(S): at 14:49

## 2021-07-07 RX ADMIN — Medication 2: at 11:42

## 2021-07-07 RX ADMIN — PANTOPRAZOLE SODIUM 40 MILLIGRAM(S): 20 TABLET, DELAYED RELEASE ORAL at 05:11

## 2021-07-07 RX ADMIN — Medication 0.5 MILLIGRAM(S): at 03:34

## 2021-07-07 RX ADMIN — Medication 0.5 MILLIGRAM(S): at 16:22

## 2021-07-07 NOTE — SBIRT NOTE ADULT - NSSBIRTUNABLESTOP_GEN_A_CORE
What Is The Reason For Today's Visit?: History of Non-Melanoma Skin Cancer
How Many Skin Cancers Have You Had?: more than one
Never
Additional History: Last FBSE performed by [facility] on 07/2019.
When Was Your Last Cancer Diagnosed?: 2019

## 2021-07-07 NOTE — PROGRESS NOTE ADULT - SUBJECTIVE AND OBJECTIVE BOX
31yo Male admitted to [facility] for DKA. ETOH w/drawal and hematemesis. Pt examined this a.m. by Hospitalist and PA. No acute overnight events to report. Pt currently has no complaints. Denies SOB, cough, CP, palpitations, HA, dizziness, NVD, changes in urinary or bowel habits, fevers, chills. All other remaining ROS negative.     ROS: see above    Allergies    No Known Allergies    Intolerances    MEDICATIONS  (STANDING):  chlorhexidine 4% Liquid 1 Application(s) Topical <User Schedule>  dextrose 40% Gel 15 Gram(s) Oral once  dextrose 5%. 1000 milliLiter(s) (50 mL/Hr) IV Continuous <Continuous>  dextrose 5%. 1000 milliLiter(s) (100 mL/Hr) IV Continuous <Continuous>  dextrose 50% Injectable 25 Gram(s) IV Push once  dextrose 50% Injectable 12.5 Gram(s) IV Push once  dextrose 50% Injectable 25 Gram(s) IV Push once  folic acid Injectable 1 milliGRAM(s) IV Push daily  glucagon  Injectable 1 milliGRAM(s) IntraMuscular once  insulin glargine Injectable (LANTUS) 10 Unit(s) SubCutaneous every morning  insulin lispro (ADMELOG) corrective regimen sliding scale   SubCutaneous every 6 hours  lactated ringers. 1000 milliLiter(s) (75 mL/Hr) IV Continuous <Continuous>  LORazepam   Injectable   IV Push   LORazepam   Injectable 0.5 milliGRAM(s) IV Push every 4 hours  pantoprazole  Injectable 40 milliGRAM(s) IV Push two times a day  piperacillin/tazobactam IVPB.. 3.375 Gram(s) IV Intermittent every 8 hours  potassium chloride  10 mEq/100 mL IVPB 10 milliEquivalent(s) IV Intermittent every 1 hour  thiamine 100 milliGRAM(s) Oral daily    MEDICATIONS  (PRN):  hydrALAZINE Injectable 10 milliGRAM(s) IV Push every 6 hours PRN SBP > 140, DBP > 110  HYDROmorphone  Injectable 1 milliGRAM(s) IV Push every 4 hours PRN Severe Pain (7 - 10)  LORazepam   Injectable 4 milliGRAM(s) IV Push every 1 hour PRN Symptom-triggered: each CIWA -Ar score 8 or GREATER    Vital Signs Last 24 Hrs  T(C): 36.8 (07 Jul 2021 11:35), Max: 37.2 (06 Jul 2021 12:38)  T(F): 98.2 (07 Jul 2021 11:35), Max: 99 (06 Jul 2021 15:31)  HR: 77 (07 Jul 2021 11:35) (53 - 108)  BP: 119/77 (07 Jul 2021 11:35) (100/64 - 154/105)  BP(mean): 99 (06 Jul 2021 17:00) (96 - 121)  ABP: --  ABP(mean): --  RR: 18 (07 Jul 2021 11:35) (11 - 21)  SpO2: 96% (07 Jul 2021 11:35) (96% - 98%)    PHYSICAL EXAM:  GENERAL: NAD, lying in bed comfortably  HEAD:  Atraumatic, Normocephalic  EYES: EOMI, PERRL, conjunctiva and sclera clear  ENT: Moist mucous membranes  NECK: Supple, No JVD  CHEST/LUNG: Clear to auscultation bilaterally; No rales, rhonchi, wheezing, or rubs. Unlabored respirations  HEART: Regular rate and rhythm; No murmurs, rubs, or gallops  ABDOMEN: Bowel sounds present; Soft, Nontender, Nondistended   EXTREMITIES:  2+ Peripheral Pulses, brisk capillary refill. No clubbing, cyanosis, or edema  NERVOUS SYSTEM:  Alert & Oriented X3, speech clear. No facial droop, tongue protrusion midline. Answers questions appropriately. Full and equal 5/5 strength B/L upper and lower extremities. +reflexes B/L LE. Sensation intact. No deficits   MSK: FROM x 4 extremities   SKIN: No rashes or lesions                          11.2   4.36  )-----------( 98       ( 07 Jul 2021 07:31 )             32.8   07-07    141  |  104  |  7.2<L>  ----------------------------<  168<H>  3.0<L>   |  26.0  |  0.54    Ca    8.7      07 Jul 2021 07:31  Phos  3.4     07-07  Mg     1.7     07-07     31yo Male admitted to [facility] for DKA, ETOH w/drawal and hematemesis. Pt examined this a.m. by Hospitalist and PA. No acute overnight events to report. Pt currently has no complaints. Denies SOB, cough, CP, palpitations, HA, dizziness, NVD, changes in urinary or bowel habits, fevers, chills. All other remaining ROS negative.     ROS: see above    Allergies    No Known Allergies    Intolerances    MEDICATIONS  (STANDING):  chlorhexidine 4% Liquid 1 Application(s) Topical <User Schedule>  dextrose 40% Gel 15 Gram(s) Oral once  dextrose 5%. 1000 milliLiter(s) (50 mL/Hr) IV Continuous <Continuous>  dextrose 5%. 1000 milliLiter(s) (100 mL/Hr) IV Continuous <Continuous>  dextrose 50% Injectable 25 Gram(s) IV Push once  dextrose 50% Injectable 12.5 Gram(s) IV Push once  dextrose 50% Injectable 25 Gram(s) IV Push once  folic acid Injectable 1 milliGRAM(s) IV Push daily  glucagon  Injectable 1 milliGRAM(s) IntraMuscular once  insulin glargine Injectable (LANTUS) 10 Unit(s) SubCutaneous every morning  insulin lispro (ADMELOG) corrective regimen sliding scale   SubCutaneous every 6 hours  lactated ringers. 1000 milliLiter(s) (75 mL/Hr) IV Continuous <Continuous>  LORazepam   Injectable   IV Push   LORazepam   Injectable 0.5 milliGRAM(s) IV Push every 4 hours  pantoprazole  Injectable 40 milliGRAM(s) IV Push two times a day  piperacillin/tazobactam IVPB.. 3.375 Gram(s) IV Intermittent every 8 hours  potassium chloride  10 mEq/100 mL IVPB 10 milliEquivalent(s) IV Intermittent every 1 hour  thiamine 100 milliGRAM(s) Oral daily    MEDICATIONS  (PRN):  hydrALAZINE Injectable 10 milliGRAM(s) IV Push every 6 hours PRN SBP > 140, DBP > 110  HYDROmorphone  Injectable 1 milliGRAM(s) IV Push every 4 hours PRN Severe Pain (7 - 10)  LORazepam   Injectable 4 milliGRAM(s) IV Push every 1 hour PRN Symptom-triggered: each CIWA -Ar score 8 or GREATER    Vital Signs Last 24 Hrs  T(C): 36.8 (07 Jul 2021 11:35), Max: 37.2 (06 Jul 2021 12:38)  T(F): 98.2 (07 Jul 2021 11:35), Max: 99 (06 Jul 2021 15:31)  HR: 77 (07 Jul 2021 11:35) (53 - 108)  BP: 119/77 (07 Jul 2021 11:35) (100/64 - 154/105)  BP(mean): 99 (06 Jul 2021 17:00) (96 - 121)  ABP: --  ABP(mean): --  RR: 18 (07 Jul 2021 11:35) (11 - 21)  SpO2: 96% (07 Jul 2021 11:35) (96% - 98%)    PHYSICAL EXAM:  GENERAL: NAD, lying in bed comfortably  HEAD:  Atraumatic, Normocephalic  EENT: conjunctiva and sclera clear, MMM, trachea midline, nares patent  CHEST/LUNG: CTABLy; No rales, rhonchi, wheezing, or rubs. Unlabored respirations  HEART: RRR; No murmurs, rubs, or gallops  ABDOMEN: +BS present in all 4 quadrants; Soft, NTTP, ND  EXTREMITIES:  2+ Peripheral Pulses, brisk capillary refill. No clubbing, cyanosis, or edema  NERVOUS SYSTEM:  A&OX3, speech clear. No facial droop, tongue protrusion midline. Answers questions appropriately. Full and equal 5/5 strength B/L upper and lower extremities. +reflexes B/L LE. Sensation intact. No deficits   MSK: FROM x 4 extremities   SKIN: No rashes or lesions                          11.2   4.36  )-----------( 98       ( 07 Jul 2021 07:31 )             32.8   07-07    141  |  104  |  7.2<L>  ----------------------------<  168<H>  3.0<L>   |  26.0  |  0.54    Ca    8.7      07 Jul 2021 07:31  Phos  3.4     07-07  Mg     1.7     07-07     31yo Male admitted to [facility] for DKA, ETOH w/drawal and hematemesis. Pt examined this a.m. by Hospitalist and PA. No acute overnight events to report. Pt currently has no complaints. Denies SOB, cough, CP, palpitations, HA, dizziness, NVD, changes in urinary or bowel habits, fevers, chills. All other remaining ROS negative.     ROS: see above    Allergies    No Known Allergies    Intolerances    MEDICATIONS  (STANDING):  chlorhexidine 4% Liquid 1 Application(s) Topical <User Schedule>  dextrose 40% Gel 15 Gram(s) Oral once  dextrose 5%. 1000 milliLiter(s) (50 mL/Hr) IV Continuous <Continuous>  dextrose 5%. 1000 milliLiter(s) (100 mL/Hr) IV Continuous <Continuous>  dextrose 50% Injectable 25 Gram(s) IV Push once  dextrose 50% Injectable 12.5 Gram(s) IV Push once  dextrose 50% Injectable 25 Gram(s) IV Push once  folic acid Injectable 1 milliGRAM(s) IV Push daily  glucagon  Injectable 1 milliGRAM(s) IntraMuscular once  insulin glargine Injectable (LANTUS) 10 Unit(s) SubCutaneous every morning  insulin lispro (ADMELOG) corrective regimen sliding scale   SubCutaneous every 6 hours  lactated ringers. 1000 milliLiter(s) (75 mL/Hr) IV Continuous <Continuous>  LORazepam   Injectable   IV Push   LORazepam   Injectable 0.5 milliGRAM(s) IV Push every 4 hours  pantoprazole  Injectable 40 milliGRAM(s) IV Push two times a day  piperacillin/tazobactam IVPB.. 3.375 Gram(s) IV Intermittent every 8 hours  potassium chloride  10 mEq/100 mL IVPB 10 milliEquivalent(s) IV Intermittent every 1 hour  thiamine 100 milliGRAM(s) Oral daily    MEDICATIONS  (PRN):  hydrALAZINE Injectable 10 milliGRAM(s) IV Push every 6 hours PRN SBP > 140, DBP > 110  HYDROmorphone  Injectable 1 milliGRAM(s) IV Push every 4 hours PRN Severe Pain (7 - 10)  LORazepam   Injectable 4 milliGRAM(s) IV Push every 1 hour PRN Symptom-triggered: each CIWA -Ar score 8 or GREATER    Vital Signs Last 24 Hrs  T(C): 36.8 (07 Jul 2021 11:35), Max: 37.2 (06 Jul 2021 12:38)  T(F): 98.2 (07 Jul 2021 11:35), Max: 99 (06 Jul 2021 15:31)  HR: 77 (07 Jul 2021 11:35) (53 - 108)  BP: 119/77 (07 Jul 2021 11:35) (100/64 - 154/105)  BP(mean): 99 (06 Jul 2021 17:00) (96 - 121)  ABP: --  ABP(mean): --  RR: 18 (07 Jul 2021 11:35) (11 - 21)  SpO2: 96% (07 Jul 2021 11:35) (96% - 98%)    PHYSICAL EXAM:  GENERAL: NAD, lying in bed comfortably  HEAD:  Atraumatic, Normocephalic  EENT: conjunctiva and sclera clear, MMM, trachea midline, nares patent  CHEST/LUNG: CTABL; No rales, rhonchi, wheezing, or rubs. Unlabored respirations  HEART: RRR; No murmurs, rubs, or gallops  ABDOMEN: +BS present in all 4 quadrants; Soft, NTTP, ND  EXTREMITIES:  2+ Peripheral Pulses, brisk capillary refill. No clubbing, cyanosis, or edema  NERVOUS SYSTEM:  A&OX3, speech clear. No facial droop, tongue protrusion midline. Answers questions appropriately. Full and equal 5/5 strength B/L upper and lower extremities. +reflexes B/L LE. Sensation intact. No deficits   MSK: FROM x 4 extremities   SKIN: No rashes or lesions                          11.2   4.36  )-----------( 98       ( 07 Jul 2021 07:31 )             32.8   07-07    141  |  104  |  7.2<L>  ----------------------------<  168<H>  3.0<L>   |  26.0  |  0.54    Ca    8.7      07 Jul 2021 07:31  Phos  3.4     07-07  Mg     1.7     07-07

## 2021-07-07 NOTE — PROGRESS NOTE ADULT - ASSESSMENT
DM now s/p DKA   pancreatitis   Pt informed that he has T1DM  - start Admelog 4 units before meals plus sliding scale   - cont Lantus 10 units in am    DM education    dw pt needs to quit alcohol  as  has deleterious effect s on  DM  DM now s/p DKA   pancreatitis   Pt informed that he has T1DM  variable BS  still   - start Admelog 4 units before meals plus sliding scale when able to take po      - cont Lantus 10 units in am    DM education    dw pt needs to quit alcohol  as  has deleterious effect s on  DM   pt wants to quit drinking and is motivated to do so

## 2021-07-07 NOTE — SBIRT NOTE ADULT - NSSBIRTALCPASSREFTXDET_GEN_A_CORE
Worker provided pt with [employer] resources. Pt states he will start making calls to be put on waiting list for inpt rehabs.

## 2021-07-07 NOTE — PROGRESS NOTE ADULT - PROBLEM SELECTOR PLAN 1
Maintain strict NPO  Continue Zosyn as a empiric measure   Continue LR   Repeat CXR in am   CT chest with PO contrast today  Continue care as per primary team  Thoracic surgery to follow  Discussed plan with Dr. Pereira & thoracic surgery in am rounds. Remains NPO, will advance diet to clear liquid diet pending results of CT of chest   Continue Zosyn as a empiric measure   Continue LR   Repeat CXR in am   CT chest with PO contrast today  Continue care as per primary team  Thoracic surgery to follow  Discussed plan with Dr. Pereira & thoracic surgery in am rounds.

## 2021-07-07 NOTE — PROGRESS NOTE ADULT - ASSESSMENT
30 year old male, PMH of ETOH abuse, T2DM on insulin and Metformin, HTN, recently admitted with new diagnosis of non-ischemic cardiomyopathy EF 40-45% and atrial fibrillation on Eliquis, who presented from home with complaints of abdominal pain, nausea, and multiple episodes of vomiting over the past two days. Vomiting multiple times in ED, now black appearing. Patient states he stopped taking his insulin 4 days ago because he "forgot". On arrival to ED, patient was tachycardic in 140's, hemodynamically stable, complaining of abdominal pain. He was found to be in DKA with severe metabolic acidosis. Suspected Nohemy-miguel tear.   7/5 CT of chest indicated pneumomediastinum, Thoracic surgery consulted for pneumomediastinum.

## 2021-07-07 NOTE — PROGRESS NOTE ADULT - SUBJECTIVE AND OBJECTIVE BOX
Chart and meds reviewed.  Patient seen and examined.    Interval Visit/Overnight events: Diabetes follow up. No complaints.     MEDICATIONS  (STANDING):  chlorhexidine 4% Liquid 1 Application(s) Topical <User Schedule>  dextrose 40% Gel 15 Gram(s) Oral once  dextrose 5%. 1000 milliLiter(s) (50 mL/Hr) IV Continuous <Continuous>  dextrose 5%. 1000 milliLiter(s) (100 mL/Hr) IV Continuous <Continuous>  dextrose 50% Injectable 25 Gram(s) IV Push once  dextrose 50% Injectable 12.5 Gram(s) IV Push once  dextrose 50% Injectable 25 Gram(s) IV Push once  folic acid Injectable 1 milliGRAM(s) IV Push daily  glucagon  Injectable 1 milliGRAM(s) IntraMuscular once  insulin glargine Injectable (LANTUS) 10 Unit(s) SubCutaneous every morning  insulin lispro (ADMELOG) corrective regimen sliding scale   SubCutaneous every 6 hours  insulin lispro Injectable (ADMELOG) 4 Unit(s) SubCutaneous three times a day before meals  lactated ringers. 1000 milliLiter(s) (75 mL/Hr) IV Continuous <Continuous>  LORazepam   Injectable   IV Push   LORazepam   Injectable 0.5 milliGRAM(s) IV Push every 4 hours  pantoprazole  Injectable 40 milliGRAM(s) IV Push two times a day  piperacillin/tazobactam IVPB.. 3.375 Gram(s) IV Intermittent every 8 hours  potassium chloride  10 mEq/100 mL IVPB 10 milliEquivalent(s) IV Intermittent every 1 hour  thiamine 100 milliGRAM(s) Oral daily    MEDICATIONS  (PRN):  hydrALAZINE Injectable 10 milliGRAM(s) IV Push every 6 hours PRN SBP > 140, DBP > 110  HYDROmorphone  Injectable 1 milliGRAM(s) IV Push every 4 hours PRN Severe Pain (7 - 10)  LORazepam   Injectable 4 milliGRAM(s) IV Push every 1 hour PRN Symptom-triggered: each CIWA -Ar score 8 or GREATER      VITALS:  T(F): 98.2 (07-07-21 @ 11:35), Max: 99 (07-06-21 @ 15:31)  HR: 77 (07-07-21 @ 11:35) (61 - 108)  BP: 119/77 (07-07-21 @ 11:35) (100/64 - 135/81)  RR: 18 (07-07-21 @ 11:35) (18 - 21)  SpO2: 96% (07-07-21 @ 11:35) (96% - 98%)  Wt(kg): --    I&O's Summary    06 Jul 2021 07:01  -  07 Jul 2021 07:00  --------------------------------------------------------  IN: 2495 mL / OUT: 800 mL / NET: 1695 mL        CAPILLARY BLOOD GLUCOSE      POCT Blood Glucose.: 199 mg/dL (07 Jul 2021 11:41)  POCT Blood Glucose.: 295 mg/dL (07 Jul 2021 09:18)  POCT Blood Glucose.: 153 mg/dL (07 Jul 2021 08:13)  POCT Blood Glucose.: 207 mg/dL (07 Jul 2021 05:19)  POCT Blood Glucose.: 321 mg/dL (06 Jul 2021 23:06)  POCT Blood Glucose.: 145 mg/dL (06 Jul 2021 16:52)    REVIEW OF SYSTEMS:  Constitutional: No fever, no chills, no change in weight.  Eyes: No eye swelling,no  blurry vision, no redness, no loss of vision.  Neck: No neck pain, no change in voice.  Lungs: No shortness of breath, no wheezing, no cough  CV: No chest pain, no palpitations, no pain with walking.  GI: No nausea, no vomiting, no constipation, no diarrhea, no abdominal pain  : No urinary frequency, no blood in urine, no urinary burning, no difficulty voiding.  Musculoskeletal: No muscle pain, no joint pain, no swelling.  Skin: No rash, no infections.  Neurologic: No headaches, no weakness, no burning or pain in feet, no tremor.  Endocrine: No heat intolerance, no cold intolerance, no increased sweating, no shakiness between meals.  Psych: No depression, no anxiety, no trouble concentrating      PHYSICAL EXAM:  General appearance: Well developed, well nourished.  Eyes: Pupils equal and reactive to light. EOM full. No exophthalmos.  Neck: Trachea midline. No thyroid enlargement.  Lungs: Normal respiratory excursion. Lungs clear.  CV: Regular cardiac rhythm. No murmur. Carotid and pedal pulses intact.  Abdomen: Soft, non tender, no organomegaly or mass.  Musculoskeletal: No cyanosis, clubbing, or edema. No pedal lesions.  Skin: Warm and moist. No rash. No acanthosis.  Neuro: Cranial nerves intact. Normal motor and sensory function. DTR's normal.  Psych: Normal affect, good judgement.      LABS:                        11.2   4.36  )-----------( 98       ( 07 Jul 2021 07:31 )             32.8     07-07    141  |  104  |  7.2<L>  ----------------------------<  168<H>  3.0<L>   |  26.0  |  0.54    Ca    8.7      07 Jul 2021 07:31  Phos  3.4     07-07  Mg     1.7     07-07     Chart and meds reviewed.  Patient seen and examined.    Interval Visit/Overnight events:T1 Diabetes follow up. No complaints.       MEDICATIONS  (STANDING):  chlorhexidine 4% Liquid 1 Application(s) Topical <User Schedule>  dextrose 40% Gel 15 Gram(s) Oral once  dextrose 5%. 1000 milliLiter(s) (50 mL/Hr) IV Continuous <Continuous>  dextrose 5%. 1000 milliLiter(s) (100 mL/Hr) IV Continuous <Continuous>  dextrose 50% Injectable 25 Gram(s) IV Push once  dextrose 50% Injectable 12.5 Gram(s) IV Push once  dextrose 50% Injectable 25 Gram(s) IV Push once  folic acid Injectable 1 milliGRAM(s) IV Push daily  glucagon  Injectable 1 milliGRAM(s) IntraMuscular once  insulin glargine Injectable (LANTUS) 10 Unit(s) SubCutaneous every morning  insulin lispro (ADMELOG) corrective regimen sliding scale   SubCutaneous every 6 hours  insulin lispro Injectable (ADMELOG) 4 Unit(s) SubCutaneous three times a day before meals  lactated ringers. 1000 milliLiter(s) (75 mL/Hr) IV Continuous <Continuous>  LORazepam   Injectable   IV Push   LORazepam   Injectable 0.5 milliGRAM(s) IV Push every 4 hours  pantoprazole  Injectable 40 milliGRAM(s) IV Push two times a day  piperacillin/tazobactam IVPB.. 3.375 Gram(s) IV Intermittent every 8 hours  potassium chloride  10 mEq/100 mL IVPB 10 milliEquivalent(s) IV Intermittent every 1 hour  thiamine 100 milliGRAM(s) Oral daily    MEDICATIONS  (PRN):  hydrALAZINE Injectable 10 milliGRAM(s) IV Push every 6 hours PRN SBP > 140, DBP > 110  HYDROmorphone  Injectable 1 milliGRAM(s) IV Push every 4 hours PRN Severe Pain (7 - 10)  LORazepam   Injectable 4 milliGRAM(s) IV Push every 1 hour PRN Symptom-triggered: each CIWA -Ar score 8 or GREATER      VITALS:  T(F): 98.2 (07-07-21 @ 11:35), Max: 99 (07-06-21 @ 15:31)  HR: 77 (07-07-21 @ 11:35) (61 - 108)  BP: 119/77 (07-07-21 @ 11:35) (100/64 - 135/81)  RR: 18 (07-07-21 @ 11:35) (18 - 21)  SpO2: 96% (07-07-21 @ 11:35) (96% - 98%)  Wt(kg): --    I&O's Summary    06 Jul 2021 07:01  -  07 Jul 2021 07:00  --------------------------------------------------------  IN: 2495 mL / OUT: 800 mL / NET: 1695 mL        CAPILLARY BLOOD GLUCOSE      POCT Blood Glucose.: 199 mg/dL (07 Jul 2021 11:41)  POCT Blood Glucose.: 295 mg/dL (07 Jul 2021 09:18)  POCT Blood Glucose.: 153 mg/dL (07 Jul 2021 08:13)  POCT Blood Glucose.: 207 mg/dL (07 Jul 2021 05:19)  POCT Blood Glucose.: 321 mg/dL (06 Jul 2021 23:06)  POCT Blood Glucose.: 145 mg/dL (06 Jul 2021 16:52)    REVIEW OF SYSTEMS:  Constitutional: No fever, no chills, no change in weight.  Eyes: No eye swelling,no  blurry vision, no redness, no loss of vision.  Neck: No neck pain, no change in voice.  Lungs: No shortness of breath, no wheezing, no cough  CV: No chest pain, no palpitations, no pain with walking.  GI: No nausea, no vomiting, no constipation, no diarrhea, no abdominal pain  : No urinary frequency, no blood in urine, no urinary burning, no difficulty voiding.  Musculoskeletal: No muscle pain, no joint pain, no swelling.  Skin: No rash, no infections.  Neurologic: No headaches, no weakness, no burning or pain in feet, no tremor.  Endocrine: No heat intolerance, no cold intolerance, no increased sweating, no shakiness between meals.  Psych: No depression, no anxiety, no trouble concentrating      PHYSICAL EXAM:  General appearance: Well developed, well nourished.  Eyes: Pupils equal and reactive to light. EOM full. No exophthalmos.  Neck: Trachea midline. No thyroid enlargement.  Lungs: Normal respiratory excursion. Lungs clear.  CV: Regular cardiac rhythm. No murmur. Carotid and pedal pulses intact.    Musculoskeletal: No cyanosis, clubbing, or edema. No pedal lesions.  Skin: Warm and moist. No rash. No acanthosis.  Neuro: Cranial nerves intact. Normal motor and sensory function. DTR's normal.  Psych: Normal affect, good judgement.      LABS:                        11.2   4.36  )-----------( 98       ( 07 Jul 2021 07:31 )             32.8     07-07    141  |  104  |  7.2<L>  ----------------------------<  168<H>  3.0<L>   |  26.0  |  0.54    Ca    8.7      07 Jul 2021 07:31  Phos  3.4     07-07  Mg     1.7     07-07

## 2021-07-07 NOTE — PROGRESS NOTE ADULT - PROBLEM SELECTOR PLAN 2
Monitor for withdrawals, continue CIWA protocol  MVI, Thiamine, Folic acid daily Monitor for withdrawal, continue CIWA protocol  MVI, Thiamine, Folic acid daily

## 2021-07-07 NOTE — PROGRESS NOTE ADULT - SUBJECTIVE AND OBJECTIVE BOX
Subjective: Pt. seen & examined, lying in bed    VITAL SIGNS  Vital Signs Last 24 Hrs  T(C): 36.4 (07-07-21 @ 08:09), Max: 37.2 (07-06-21 @ 12:38)  T(F): 97.6 (07-07-21 @ 08:09), Max: 99 (07-06-21 @ 15:31)  HR: 74 (07-07-21 @ 11:09) (50 - 108)  BP: 114/72 (07-07-21 @ 08:09) (100/64 - 154/105)  RR: 18 (07-07-21 @ 08:09) (11 - 21)  SpO2: 98% (07-07-21 @ 08:09) (96% - 99%)  on (O2)                   MEDICATIONS  chlorhexidine 4% Liquid 1 Application(s) Topical <User Schedule>  dextrose 40% Gel 15 Gram(s) Oral once  dextrose 5%. 1000 milliLiter(s) IV Continuous <Continuous>  dextrose 5%. 1000 milliLiter(s) IV Continuous <Continuous>  dextrose 50% Injectable 25 Gram(s) IV Push once  dextrose 50% Injectable 12.5 Gram(s) IV Push once  dextrose 50% Injectable 25 Gram(s) IV Push once  folic acid Injectable 1 milliGRAM(s) IV Push daily  glucagon  Injectable 1 milliGRAM(s) IntraMuscular once  hydrALAZINE Injectable 10 milliGRAM(s) IV Push every 6 hours PRN  HYDROmorphone  Injectable 1 milliGRAM(s) IV Push every 4 hours PRN  insulin glargine Injectable (LANTUS) 10 Unit(s) SubCutaneous every morning  insulin lispro (ADMELOG) corrective regimen sliding scale   SubCutaneous every 6 hours  lactated ringers. 1000 milliLiter(s) IV Continuous <Continuous>  LORazepam   Injectable   IV Push   LORazepam   Injectable 4 milliGRAM(s) IV Push every 1 hour PRN  LORazepam   Injectable 0.5 milliGRAM(s) IV Push every 4 hours  pantoprazole  Injectable 40 milliGRAM(s) IV Push two times a day  piperacillin/tazobactam IVPB.. 3.375 Gram(s) IV Intermittent every 8 hours  potassium chloride  10 mEq/100 mL IVPB 10 milliEquivalent(s) IV Intermittent every 1 hour  thiamine 100 milliGRAM(s) Oral daily      PHYSICAL EXAM  General:  no acute distress  Neurology: alert and oriented x 3, nonfocal, no gross deficits  Respiratory: clear to auscultation bilaterally  CV: RRR, normal S1, S2  Abdomen: soft, nontender, nondistended, positive bowel sounds  Extremities: warm, well perfused. no edema. + DP pulses      I&O's Detail    06 Jul 2021 07:01  -  07 Jul 2021 07:00  --------------------------------------------------------  IN:    IV PiggyBack: 500 mL    IV PiggyBack: 100 mL    IV PiggyBack: 200 mL    IV PiggyBack: 100 mL    Lactated Ringers: 1575 mL    Octreotide: 20 mL  Total IN: 2495 mL    OUT:    Voided (mL): 800 mL  Total OUT: 800 mL    Total NET: 1695 mL          Weights:  Daily     Daily   Admit Wt: Drug Dosing Weight  Height (cm): 170.2 (06 Jul 2021 09:33)  Weight (kg): 66.9 (06 Jul 2021 09:33)  BMI (kg/m2): 23.1 (06 Jul 2021 09:33)  BSA (m2): 1.78 (06 Jul 2021 09:33)    LABS  07-07    141  |  104  |  7.2<L>  ----------------------------<  168<H>  3.0<L>   |  26.0  |  0.54    Ca    8.7      07 Jul 2021 07:31  Phos  3.4     07-07  Mg     1.7     07-07                                   11.2   4.36  )-----------( 98       ( 07 Jul 2021 07:31 )             32.8                  CAPILLARY BLOOD GLUCOSE      POCT Blood Glucose.: 295 mg/dL (07 Jul 2021 09:18)  POCT Blood Glucose.: 153 mg/dL (07 Jul 2021 08:13)  POCT Blood Glucose.: 207 mg/dL (07 Jul 2021 05:19)  POCT Blood Glucose.: 321 mg/dL (06 Jul 2021 23:06)  POCT Blood Glucose.: 145 mg/dL (06 Jul 2021 16:52)  POCT Blood Glucose.: 124 mg/dL (06 Jul 2021 11:40)           CXR:< from: Xray Chest 1 View- PORTABLE-Routine (Xray Chest 1 View- PORTABLE-Routine in AM.) (07.06.21 @ 06:33) >  EXAM:  XR CHEST PORTABLE ROUTINE 1V                          PROCEDURE DATE:  07/06/2021          INTERPRETATION:  Clinical history: 30-year-old male, pneumomediastinum present.    Two expiratory/kyphotic views are correlated with the chest CT of 7/5/2021 and demonstrate a normal cardiac silhouette and normal pulmonary vasculature with no consolidation, effusion, pneumothorax or acute osseous finding.    Pneumomediastinum evident on CT not radiographically apparent.    IMPRESSION:  No acute radiographic findings, in particular pneumomediastinum evident on CT not radiographically apparent    --- End of Report ---            PALLAVI BARKER DO; Attending Radiologist  This document has been electronically signed. Jul 6 2021  1:32PM    < end of copied text >    < from: CT Chest w/ Oral Cont (07.05.21 @ 11:27) >   EXAM:  CT CHEST OC                          PROCEDURE DATE:  07/05/2021          INTERPRETATION:  CLINICAL INFORMATION: Pneumomediastinum after retching. History of DKA, EtOH withdrawal.    COMPARISON: Noncontrast CT of the abdomen and pelvis dated July 3, 2021 and frontal chest radiograph dated July 4, 2021.    CONTRAST/COMPLICATIONS:  IV Contrast: NONE  Oral Contrast: Omnipaque 300 + Fruit 2o  Complications: None reported at time of study completion    PROCEDURE:  CT of the Chest was performed.  Sagittal and coronal reformats were performed.    FINDINGS:    LUNGS AND AIRWAYS: Patent central airways.  Mild nodular thickening of the right major fissure on image 115. No discrete pulmonary nodule or confluent airspace opacity is identified.  PLEURA: No pleural effusion. There is no pneumothorax.  MEDIASTINUM AND JOSE: No lymphadenopathy. There is oral contrast opacification of the esophagus. No extravasation of oral contrast is identified into the mediastinum. Again is noted mild to moderate pneumomediastinum extending into the visualized soft tissues of the neck including the retropharyngeal space. No discrete fluid collection or stranding is identified.  VESSELS: Within normal limits.  HEART: Heart size is normal. No pericardial effusion.  CHEST WALL AND LOWER NECK: Within normal limits.  VISUALIZED UPPER ABDOMEN: Tiny layering gallstones in the gallbladder..  BONES: Within normal limits.    IMPRESSION: Mild to moderate pneumomediastinum extending into the neck. No evidence of contrast extravasation from the esophagus or evidence of posterior mediastinal collection or stranding.            MARTITA SANCHEZ MD; Attending Radiologist  This document has been electronically signed. Jul 5 2021 11:41AM    < end of copied text >      PAST MEDICAL & SURGICAL HISTORY:  Alcohol abuse    Pancreatitis    HTN (hypertension)    Diabetes    Atrial fibrillation    Non-ischemic cardiomyopathy    No significant past surgical history          Subjective: Pt. seen & examined, lying in bed, NAD noted.    VITAL SIGNS  Vital Signs Last 24 Hrs  T(C): 36.4 (07-07-21 @ 08:09), Max: 37.2 (07-06-21 @ 12:38)  T(F): 97.6 (07-07-21 @ 08:09), Max: 99 (07-06-21 @ 15:31)  HR: 74 (07-07-21 @ 11:09) (50 - 108)  BP: 114/72 (07-07-21 @ 08:09) (100/64 - 154/105)  RR: 18 (07-07-21 @ 08:09) (11 - 21)  SpO2: 98% (07-07-21 @ 08:09) (96% - 99%)  on (O2)                   MEDICATIONS  chlorhexidine 4% Liquid 1 Application(s) Topical <User Schedule>  dextrose 40% Gel 15 Gram(s) Oral once  dextrose 5%. 1000 milliLiter(s) IV Continuous <Continuous>  dextrose 5%. 1000 milliLiter(s) IV Continuous <Continuous>  dextrose 50% Injectable 25 Gram(s) IV Push once  dextrose 50% Injectable 12.5 Gram(s) IV Push once  dextrose 50% Injectable 25 Gram(s) IV Push once  folic acid Injectable 1 milliGRAM(s) IV Push daily  glucagon  Injectable 1 milliGRAM(s) IntraMuscular once  hydrALAZINE Injectable 10 milliGRAM(s) IV Push every 6 hours PRN  HYDROmorphone  Injectable 1 milliGRAM(s) IV Push every 4 hours PRN  insulin glargine Injectable (LANTUS) 10 Unit(s) SubCutaneous every morning  insulin lispro (ADMELOG) corrective regimen sliding scale   SubCutaneous every 6 hours  lactated ringers. 1000 milliLiter(s) IV Continuous <Continuous>  LORazepam   Injectable   IV Push   LORazepam   Injectable 4 milliGRAM(s) IV Push every 1 hour PRN  LORazepam   Injectable 0.5 milliGRAM(s) IV Push every 4 hours  pantoprazole  Injectable 40 milliGRAM(s) IV Push two times a day  piperacillin/tazobactam IVPB.. 3.375 Gram(s) IV Intermittent every 8 hours  potassium chloride  10 mEq/100 mL IVPB 10 milliEquivalent(s) IV Intermittent every 1 hour  thiamine 100 milliGRAM(s) Oral daily      PHYSICAL EXAM  General:  no acute distress  Neurology: alert and oriented x 3, nonfocal, no gross deficits  Respiratory: clear to auscultation bilaterally  CV: RRR, normal S1, S2  Abdomen: soft, nontender, nondistended, positive bowel sounds  Extremities: warm, well perfused. no edema. + DP pulses      I&O's Detail    06 Jul 2021 07:01  -  07 Jul 2021 07:00  --------------------------------------------------------  IN:    IV PiggyBack: 500 mL    IV PiggyBack: 100 mL    IV PiggyBack: 200 mL    IV PiggyBack: 100 mL    Lactated Ringers: 1575 mL    Octreotide: 20 mL  Total IN: 2495 mL    OUT:    Voided (mL): 800 mL  Total OUT: 800 mL    Total NET: 1695 mL          Weights:  Daily     Daily   Admit Wt: Drug Dosing Weight  Height (cm): 170.2 (06 Jul 2021 09:33)  Weight (kg): 66.9 (06 Jul 2021 09:33)  BMI (kg/m2): 23.1 (06 Jul 2021 09:33)  BSA (m2): 1.78 (06 Jul 2021 09:33)    LABS  07-07    141  |  104  |  7.2<L>  ----------------------------<  168<H>  3.0<L>   |  26.0  |  0.54    Ca    8.7      07 Jul 2021 07:31  Phos  3.4     07-07  Mg     1.7     07-07                                   11.2   4.36  )-----------( 98       ( 07 Jul 2021 07:31 )             32.8                  CAPILLARY BLOOD GLUCOSE      POCT Blood Glucose.: 295 mg/dL (07 Jul 2021 09:18)  POCT Blood Glucose.: 153 mg/dL (07 Jul 2021 08:13)  POCT Blood Glucose.: 207 mg/dL (07 Jul 2021 05:19)  POCT Blood Glucose.: 321 mg/dL (06 Jul 2021 23:06)  POCT Blood Glucose.: 145 mg/dL (06 Jul 2021 16:52)  POCT Blood Glucose.: 124 mg/dL (06 Jul 2021 11:40)           CXR:< from: Xray Chest 1 View- PORTABLE-Routine (Xray Chest 1 View- PORTABLE-Routine in AM.) (07.06.21 @ 06:33) >  EXAM:  XR CHEST PORTABLE ROUTINE 1V                          PROCEDURE DATE:  07/06/2021          INTERPRETATION:  Clinical history: 30-year-old male, pneumomediastinum present.    Two expiratory/kyphotic views are correlated with the chest CT of 7/5/2021 and demonstrate a normal cardiac silhouette and normal pulmonary vasculature with no consolidation, effusion, pneumothorax or acute osseous finding.    Pneumomediastinum evident on CT not radiographically apparent.    IMPRESSION:  No acute radiographic findings, in particular pneumomediastinum evident on CT not radiographically apparent    --- End of Report ---            PALLAVI BARKER DO; Attending Radiologist  This document has been electronically signed. Jul 6 2021  1:32PM    < end of copied text >    < from: CT Chest w/ Oral Cont (07.05.21 @ 11:27) >   EXAM:  CT CHEST OC                          PROCEDURE DATE:  07/05/2021          INTERPRETATION:  CLINICAL INFORMATION: Pneumomediastinum after retching. History of DKA, EtOH withdrawal.    COMPARISON: Noncontrast CT of the abdomen and pelvis dated July 3, 2021 and frontal chest radiograph dated July 4, 2021.    CONTRAST/COMPLICATIONS:  IV Contrast: NONE  Oral Contrast: Omnipaque 300 + Fruit 2o  Complications: None reported at time of study completion    PROCEDURE:  CT of the Chest was performed.  Sagittal and coronal reformats were performed.    FINDINGS:    LUNGS AND AIRWAYS: Patent central airways.  Mild nodular thickening of the right major fissure on image 115. No discrete pulmonary nodule or confluent airspace opacity is identified.  PLEURA: No pleural effusion. There is no pneumothorax.  MEDIASTINUM AND JOSE: No lymphadenopathy. There is oral contrast opacification of the esophagus. No extravasation of oral contrast is identified into the mediastinum. Again is noted mild to moderate pneumomediastinum extending into the visualized soft tissues of the neck including the retropharyngeal space. No discrete fluid collection or stranding is identified.  VESSELS: Within normal limits.  HEART: Heart size is normal. No pericardial effusion.  CHEST WALL AND LOWER NECK: Within normal limits.  VISUALIZED UPPER ABDOMEN: Tiny layering gallstones in the gallbladder..  BONES: Within normal limits.    IMPRESSION: Mild to moderate pneumomediastinum extending into the neck. No evidence of contrast extravasation from the esophagus or evidence of posterior mediastinal collection or stranding.            MARTITA SANCHEZ MD; Attending Radiologist  This document has been electronically signed. Jul 5 2021 11:41AM    < end of copied text >      PAST MEDICAL & SURGICAL HISTORY:  Alcohol abuse    Pancreatitis    HTN (hypertension)    Diabetes    Atrial fibrillation    Non-ischemic cardiomyopathy    No significant past surgical history

## 2021-07-07 NOTE — PROGRESS NOTE ADULT - SUBJECTIVE AND OBJECTIVE BOX
Patient is a 30y old  Male who presents with a chief complaint of DKA, Alcohol withdrawal (07 Jul 2021 11:33)      INTERVAL HPI/OVERNIGHT EVENTS: Patient seeing and evaluated at bedside, reporting no complains no overnight events, NPO for repeat CT today. Denies nausea, vomiting, abdominal pain, chest pain, shortness of breath, hematemesis, hematochezia, melena.    MEDICATIONS  (STANDING):  chlorhexidine 4% Liquid 1 Application(s) Topical <User Schedule>  dextrose 40% Gel 15 Gram(s) Oral once  dextrose 5%. 1000 milliLiter(s) (50 mL/Hr) IV Continuous <Continuous>  dextrose 5%. 1000 milliLiter(s) (100 mL/Hr) IV Continuous <Continuous>  dextrose 50% Injectable 25 Gram(s) IV Push once  dextrose 50% Injectable 12.5 Gram(s) IV Push once  dextrose 50% Injectable 25 Gram(s) IV Push once  folic acid Injectable 1 milliGRAM(s) IV Push daily  glucagon  Injectable 1 milliGRAM(s) IntraMuscular once  insulin glargine Injectable (LANTUS) 10 Unit(s) SubCutaneous every morning  insulin lispro (ADMELOG) corrective regimen sliding scale   SubCutaneous every 6 hours  lactated ringers. 1000 milliLiter(s) (75 mL/Hr) IV Continuous <Continuous>  LORazepam   Injectable   IV Push   LORazepam   Injectable 0.5 milliGRAM(s) IV Push every 4 hours  pantoprazole  Injectable 40 milliGRAM(s) IV Push two times a day  piperacillin/tazobactam IVPB.. 3.375 Gram(s) IV Intermittent every 8 hours  potassium chloride  10 mEq/100 mL IVPB 10 milliEquivalent(s) IV Intermittent every 1 hour  thiamine 100 milliGRAM(s) Oral daily    MEDICATIONS  (PRN):  hydrALAZINE Injectable 10 milliGRAM(s) IV Push every 6 hours PRN SBP > 140, DBP > 110  HYDROmorphone  Injectable 1 milliGRAM(s) IV Push every 4 hours PRN Severe Pain (7 - 10)  LORazepam   Injectable 4 milliGRAM(s) IV Push every 1 hour PRN Symptom-triggered: each CIWA -Ar score 8 or GREATER      Allergies    No Known Allergies    Intolerances    Review of Systems:  No Heartburn, regurgitation, dysphagia, odynophagia.  No dyspepsia  No abdominal pain.    No Nausea, vomiting.  No Bleeding.  +hematemesis.   No diarrhea.    No hematochezia  No weight loss, anorexia.  No edema.      Vital Signs Last 24 Hrs  T(C): 36.8 (07 Jul 2021 11:35), Max: 37.2 (06 Jul 2021 12:38)  T(F): 98.2 (07 Jul 2021 11:35), Max: 99 (06 Jul 2021 15:31)  HR: 77 (07 Jul 2021 11:35) (53 - 108)  BP: 119/77 (07 Jul 2021 11:35) (100/64 - 154/105)  BP(mean): 99 (06 Jul 2021 17:00) (96 - 121)  RR: 18 (07 Jul 2021 11:35) (11 - 21)  SpO2: 96% (07 Jul 2021 11:35) (96% - 98%)    PHYSICAL EXAM:  GENERAL: Sitting comfortably, in no apparent distress.   HEAD:  Atraumatic, Normocephalic  EYES: EOMI, PERRLA, conjunctiva and sclera clear.  NECK: Supple, No JVD, Normal thyroid  CHEST/LUNG: Clear to percussion bilaterally; No rales, rhonchi, wheezing, or rubs  HEART: Regular rate and rhythm; No murmurs, rubs, or gallops  ABDOMEN: Soft, diffuse tenderness  Nondistended; Bowel sounds present  EXTREMITIES:  2+ Peripheral Pulses, No clubbing, cyanosis, or edema  LYMPH: No lymphadenopathy noted  SKIN: No rashes or lesions    LABS:                        11.2   4.36  )-----------( 98       ( 07 Jul 2021 07:31 )             32.8     07-07    141  |  104  |  7.2<L>  ----------------------------<  168<H>  3.0<L>   |  26.0  |  0.54    Ca    8.7      07 Jul 2021 07:31  Phos  3.4     07-07  Mg     1.7     07-07          LIVER FUNCTIONS - ( 03 Jul 2021 19:23 )  Alb: 5.3 g/dL / Pro: 9.4 g/dL / ALK PHOS: 124 U/L / ALT: 17 U/L / AST: 22 U/L / GGT: x

## 2021-07-07 NOTE — PROGRESS NOTE ADULT - PROBLEM SELECTOR PLAN 1
Now resolved. Possibly secondary to MWT or Borhave's syndrome.  Keep NPO for repeat CT of chest today to assess resolution of pneumomediastinum.   Continue PPI for cytoprotection Now resolved. Possibly secondary to MWT or Boerhaave's syndrome.  Keep NPO for repeat CT of chest today to assess resolution of pneumomediastinum.   Continue PPI for cytoprotection

## 2021-07-07 NOTE — PROGRESS NOTE ADULT - ASSESSMENT
29yo male w/PMHx of ETOH abuse, T2DM on insulin and Metformin, HTN, recently admitted with new Dx of non-ischemic cardiomyopathy EF 40-45% and Afib on Eliquis, who presented from home with c/o abdominal pain, nausea, and multiple episodes of vomiting which is now appearing black x2d. Pt stated he stopped taking insulin 4 days prior to admission bc he "forgot".  Pt was tachycardic in 140's, hemodynamically stable and found to be in DKA with severe metabolic acidosis, pH 7.02, serum bicarb 7, anion gap 39, blood glucose 636 with SYED Cr 2.12 (up from 0.5 5/26/21). Tbili 2.4, normal LFTs, lipase 14. Also noted to be tremulous, last ETOH use was 3days prior to admission. Admits to drinking malt liquor, four 24-oz containers, daily. Denies binge drinking recently. Pt w/hematemsis, GI consulted, likely Nohemy Anderson tear, stable, c/w Protonix and Octreotide. CT scan showing Pneumomediastinum, CTS following.     1. Pneumomediastinum  - NPO  - CTS following, rec's appreciated-  will advance diet to [facility] pending results of CT chest   - Repeat CXR in am   - Repeat CT pending   - C/w Zosyn  - C/w IVF    2. DM2  - S/p DKA and Insulin drip  - Monitor fingersticks  - ISS and Lantus   - DM education   - Educated and discussed w/Pt importance of ETOH abstinence and effects on DM  - Endo following, rec's appreciated    3. Hematemesis  - Resolved  - GI consult appreciated  - Likely 2/2 Nohemy Anderson tear or Boerhaave's syndrome.  - C/w PPI    4. ETOH abuse  - CIWA protocol  - C/w Thiamine, Folic acid   - Start Multi Vit when able to take PO    5. Hypokalemia and hypomagnesemia  - Repleted   - Continue to monitor    6. Urinary retention   - Resolved  - TOV passed    7. GERD  - PPI    DVT PPx: Venodynes     29yo male w/PMHx of ETOH abuse, T2DM on insulin and Metformin, HTN, recently admitted with new Dx of non-ischemic cardiomyopathy EF 40-45% and Afib on Eliquis, who presented from home with c/o abdominal pain, nausea, and multiple episodes of vomiting which is now appearing black x2d. Pt stated he stopped taking insulin 4 days prior to admission bc he "forgot".  Pt was tachycardic in 140's, hemodynamically stable and found to be in DKA with severe metabolic acidosis, pH 7.02, serum bicarb 7, anion gap 39, blood glucose 636 with SYED Cr 2.12 (up from 0.5 5/26/21). Tbili 2.4, normal LFTs, lipase 14. Also noted to be tremulous, last ETOH use was 3days prior to admission. Admits to drinking malt liquor, four 24-oz containers, daily. Denies binge drinking recently. Pt w/hematemsis, GI consulted, likely Nohemy Anderson tear, stable, c/w Protonix and Octreotide. CT scan showing Pneumomediastinum, CTS following.     1. Pneumomediastinum  - NPO  - CTS following, rec's appreciated-  will advance diet to [facility] pending results of CT chest   - Repeat CXR in am   - Repeat CT pending   - C/w Zosyn  - C/w IVF    2. DM2  - S/p DKA and Insulin drip  - Monitor fingersticks  - ISS and Lantus   - DM education   - Educated and discussed w/Pt importance of ETOH abstinence and effects on DM  - Endo following, rec's appreciated    3. Hematemesis  - Resolved  - GI consult appreciated  - Likely 2/2 Nohemy Anderson tear or Boerhaave's syndrome.  - C/w PPI    4. ETOH abuse  - [facility] protocol  - C/w Thiamine, Folic acid   - Start Multi Vit when able to take PO    5. Hypokalemia and hypomagnesemia  - Repleted   - Continue to monitor    6. Urinary retention   - Resolved  - TOV passed    7. GERD  - PPI    DVT PPx: Venodynes  DISPO: pending clinical improvement,  GI & CTS clear. Repeat CT chest & CXR pending

## 2021-07-08 PROBLEM — Z00.00 ENCOUNTER FOR PREVENTIVE HEALTH EXAMINATION: Status: ACTIVE | Noted: 2021-07-08

## 2021-07-08 PROBLEM — I48.91 UNSPECIFIED ATRIAL FIBRILLATION: Chronic | Status: ACTIVE | Noted: 2021-07-03

## 2021-07-08 PROBLEM — I42.8 OTHER CARDIOMYOPATHIES: Chronic | Status: ACTIVE | Noted: 2021-07-03

## 2021-07-08 LAB
ANION GAP SERPL CALC-SCNC: 11 MMOL/L — SIGNIFICANT CHANGE UP (ref 5–17)
BUN SERPL-MCNC: 7.1 MG/DL — LOW (ref 8–20)
CALCIUM SERPL-MCNC: 8.7 MG/DL — SIGNIFICANT CHANGE UP (ref 8.6–10.2)
CHLORIDE SERPL-SCNC: 104 MMOL/L — SIGNIFICANT CHANGE UP (ref 98–107)
CO2 SERPL-SCNC: 23 MMOL/L — SIGNIFICANT CHANGE UP (ref 22–29)
CREAT SERPL-MCNC: 0.54 MG/DL — SIGNIFICANT CHANGE UP (ref 0.5–1.3)
CULTURE RESULTS: SIGNIFICANT CHANGE UP
CULTURE RESULTS: SIGNIFICANT CHANGE UP
GLUCOSE BLDC GLUCOMTR-MCNC: 117 MG/DL — HIGH (ref 70–99)
GLUCOSE BLDC GLUCOMTR-MCNC: 125 MG/DL — HIGH (ref 70–99)
GLUCOSE BLDC GLUCOMTR-MCNC: 190 MG/DL — HIGH (ref 70–99)
GLUCOSE BLDC GLUCOMTR-MCNC: 202 MG/DL — HIGH (ref 70–99)
GLUCOSE BLDC GLUCOMTR-MCNC: 217 MG/DL — HIGH (ref 70–99)
GLUCOSE SERPL-MCNC: 175 MG/DL — HIGH (ref 70–99)
HCT VFR BLD CALC: 32.2 % — LOW (ref 39–50)
HGB BLD-MCNC: 11 G/DL — LOW (ref 13–17)
MCHC RBC-ENTMCNC: 31.3 PG — SIGNIFICANT CHANGE UP (ref 27–34)
MCHC RBC-ENTMCNC: 34.2 GM/DL — SIGNIFICANT CHANGE UP (ref 32–36)
MCV RBC AUTO: 91.7 FL — SIGNIFICANT CHANGE UP (ref 80–100)
PLATELET # BLD AUTO: 98 K/UL — LOW (ref 150–400)
POTASSIUM SERPL-MCNC: 3.4 MMOL/L — LOW (ref 3.5–5.3)
POTASSIUM SERPL-SCNC: 3.4 MMOL/L — LOW (ref 3.5–5.3)
RBC # BLD: 3.51 M/UL — LOW (ref 4.2–5.8)
RBC # FLD: 12.8 % — SIGNIFICANT CHANGE UP (ref 10.3–14.5)
SODIUM SERPL-SCNC: 138 MMOL/L — SIGNIFICANT CHANGE UP (ref 135–145)
SPECIMEN SOURCE: SIGNIFICANT CHANGE UP
SPECIMEN SOURCE: SIGNIFICANT CHANGE UP
WBC # BLD: 6.97 K/UL — SIGNIFICANT CHANGE UP (ref 3.8–10.5)
WBC # FLD AUTO: 6.97 K/UL — SIGNIFICANT CHANGE UP (ref 3.8–10.5)

## 2021-07-08 PROCEDURE — 99231 SBSQ HOSP IP/OBS SF/LOW 25: CPT

## 2021-07-08 PROCEDURE — 99232 SBSQ HOSP IP/OBS MODERATE 35: CPT

## 2021-07-08 PROCEDURE — 99233 SBSQ HOSP IP/OBS HIGH 50: CPT

## 2021-07-08 PROCEDURE — 71045 X-RAY EXAM CHEST 1 VIEW: CPT | Mod: 26

## 2021-07-08 RX ORDER — INSULIN LISPRO 100/ML
4 VIAL (ML) SUBCUTANEOUS
Refills: 0 | Status: DISCONTINUED | OUTPATIENT
Start: 2021-07-08 | End: 2021-07-09

## 2021-07-08 RX ORDER — POTASSIUM CHLORIDE 20 MEQ
20 PACKET (EA) ORAL
Refills: 0 | Status: COMPLETED | OUTPATIENT
Start: 2021-07-08 | End: 2021-07-08

## 2021-07-08 RX ADMIN — Medication 4: at 12:28

## 2021-07-08 RX ADMIN — PANTOPRAZOLE SODIUM 40 MILLIGRAM(S): 20 TABLET, DELAYED RELEASE ORAL at 05:20

## 2021-07-08 RX ADMIN — Medication 2: at 21:06

## 2021-07-08 RX ADMIN — Medication 0.5 MILLIGRAM(S): at 21:06

## 2021-07-08 RX ADMIN — Medication 100 MILLIGRAM(S): at 12:29

## 2021-07-08 RX ADMIN — Medication 1 MILLIGRAM(S): at 11:35

## 2021-07-08 RX ADMIN — Medication 20 MILLIEQUIVALENT(S): at 12:29

## 2021-07-08 RX ADMIN — Medication 20 MILLIEQUIVALENT(S): at 08:21

## 2021-07-08 RX ADMIN — CHLORHEXIDINE GLUCONATE 1 APPLICATION(S): 213 SOLUTION TOPICAL at 08:20

## 2021-07-08 RX ADMIN — Medication 20 MILLIEQUIVALENT(S): at 10:57

## 2021-07-08 RX ADMIN — SODIUM CHLORIDE 75 MILLILITER(S): 9 INJECTION, SOLUTION INTRAVENOUS at 08:21

## 2021-07-08 RX ADMIN — INSULIN GLARGINE 10 UNIT(S): 100 INJECTION, SOLUTION SUBCUTANEOUS at 08:36

## 2021-07-08 RX ADMIN — PIPERACILLIN AND TAZOBACTAM 25 GRAM(S): 4; .5 INJECTION, POWDER, LYOPHILIZED, FOR SOLUTION INTRAVENOUS at 21:07

## 2021-07-08 RX ADMIN — PIPERACILLIN AND TAZOBACTAM 25 GRAM(S): 4; .5 INJECTION, POWDER, LYOPHILIZED, FOR SOLUTION INTRAVENOUS at 05:20

## 2021-07-08 RX ADMIN — Medication 3 MILLIGRAM(S): at 21:07

## 2021-07-08 RX ADMIN — PIPERACILLIN AND TAZOBACTAM 25 GRAM(S): 4; .5 INJECTION, POWDER, LYOPHILIZED, FOR SOLUTION INTRAVENOUS at 13:35

## 2021-07-08 RX ADMIN — Medication 4: at 00:02

## 2021-07-08 RX ADMIN — PANTOPRAZOLE SODIUM 40 MILLIGRAM(S): 20 TABLET, DELAYED RELEASE ORAL at 17:50

## 2021-07-08 RX ADMIN — Medication 0.5 MILLIGRAM(S): at 10:01

## 2021-07-08 NOTE — PROGRESS NOTE ADULT - ASSESSMENT
29yo male w/PMHx of ETOH abuse, T2DM on insulin and Metformin, HTN, recently admitted with new Dx of non-ischemic cardiomyopathy EF 40-45% and Afib on Eliquis, who presented from home with c/o abdominal pain, nausea, and multiple episodes of vomiting which is now appearing black x2d. Pt stated he stopped taking insulin 4 days prior to admission bc he "forgot".  Pt was tachycardic in 140's, hemodynamically stable and found to be in DKA with severe metabolic acidosis, pH 7.02, serum bicarb 7, anion gap 39, blood glucose 636 with SYED Cr 2.12 (up from 0.5 5/26/21). Tbili 2.4, normal LFTs, lipase 14. Also noted to be tremulous, last ETOH use was 3days prior to admission. Admits to drinking malt liquor, four 24-oz containers, daily. Denies binge drinking recently. Pt w/hematemsis, GI consulted, likely Nohemy Anderson tear, stable, c/w Protonix and Octreotide. CT scan showing Pneumomediastinum, CTS following.     1. Pneumomediastinum  - NPO  - MBS pending  - CTS following, rec's appreciated-  Pt started on CLD, will continue w/CLD and possibly advance to FLD, Pt will F/U OP w/CTS and continue FLD OP unless otherwise directed by CTS.   - Repeat CXR in am   - Repeat CT chest performed- No evidence of extravasation of oral contrast from espohagus. Decreased pneumomediastinum. Resolved gas in neck soft tissues  - C/w Zosyn    2. DM2  - S/p DKA and Insulin drip  - Monitor fingersticks  - ISS and Lantus   - DM education   - Educated and discussed w/Pt importance of ETOH abstinence and effects on DM  - Endo following, rec's appreciated    3. Hematemesis  - Resolved  - GI consult appreciated  - Likely 2/2 Nohemy Anderson tear or Boerhaave's syndrome.  - C/w PPI    4. ETOH abuse  - CIWA protocol  - C/w Thiamine, Folic acid   - Start Multi Vit when able to take PO    5. Hypokalemia and hypomagnesemia  - Repleted   - Continue to monitor    6. Urinary retention   - Resolved  - TOV passed    7. GERD  - PPI    DVT PPx: Venodynes  DISPO: pending clinical improvement,  GI & CTS clear. Pt tolerating CLD, CTS to advance to FLD.    31yo male w/PMHx of ETOH abuse, T2DM on insulin and Metformin, HTN, recently admitted with new Dx of non-ischemic cardiomyopathy EF 40-45% and Afib on Eliquis, who presented from home with c/o abdominal pain, nausea, and multiple episodes of vomiting which is now appearing black x2d. Pt stated he stopped taking insulin 4 days prior to admission bc he "forgot".  Pt was tachycardic in 140's, hemodynamically stable and found to be in DKA with severe metabolic acidosis, pH 7.02, serum bicarb 7, anion gap 39, blood glucose 636 with SYED Cr 2.12 (up from 0.5 5/26/21). Tbili 2.4, normal LFTs, lipase 14. Also noted to be tremulous, last ETOH use was 3days prior to admission. Admits to drinking malt liquor, four 24-oz containers, daily. Denies binge drinking recently. Pt w/hematemsis, GI consulted, likely Nohemy Anderson tear, stable, c/w Protonix and Octreotide. CT scan showing Pneumomediastinum, CTS following.     1. Pneumomediastinum  - CTS following, rec's appreciated-  Pt started on CLD, will continue w/CLD and possibly advance to FLD, Pt will F/U OP w/CTS and continue FLD OP unless otherwise directed by CTS.   - Repeat CXR in am   - Repeat CT chest performed- No evidence of extravasation of oral contrast from espohagus. Decreased pneumomediastinum. Resolved gas in neck soft tissues  - C/w Zosyn    2. DM2  - S/p DKA and Insulin drip  - Monitor fingersticks  - ISS and Lantus   - DM education   - Educated and discussed w/Pt importance of ETOH abstinence and effects on DM  - Endo following, rec's appreciated    3. Hematemesis  - Resolved  - GI consult appreciated  - Likely 2/2 Nohemy Anderson tear or Boerhaave's syndrome.  - C/w PPI    4. ETOH abuse  - [facility] protocol  - C/w Thiamine, Folic acid   - Start Multi Vit when able to take PO    5. Hypokalemia and hypomagnesemia  - Repleted   - Continue to monitor    6. Urinary retention   - Resolved  - TOV passed    7. GERD  - PPI    DVT PPx: Venodynes  DISPO: pending clinical improvement,  GI & CTS clear. Pt tolerating CLD, CTS to advance to FLD.

## 2021-07-08 NOTE — PROGRESS NOTE ADULT - PROBLEM SELECTOR PLAN 1
repeat CT chest with PO contrast 7/7, no extravasation of contrast, improved pneumomediastinum   Continue Zosyn as a empiric measure --> will d/w Dr. Pereira when to stop  tolerating clear liquids  d/c IVF  plan to advance to full liquids in next few days (when cleared by Dr. Pereira)  AM CXR  thoracic surgery to follow  d/w Dr. Pereira

## 2021-07-08 NOTE — PROGRESS NOTE ADULT - ASSESSMENT
30M, PMH of ETOH abuse, DM2 on insulin and Metformin, HTN, recently admitted with new diagnosis of non-ischemic cardiomyopathy EF 40-45% and atrial fibrillation on Eliquis, p/w N/V x2 days, abd pain, not taking his insulin, found to be in DKA, severe metabolic acidosis, CT chest with pneumomediastinum, thoracic surgery consulted for suspected Nohemy-miguel tear.

## 2021-07-08 NOTE — PROGRESS NOTE ADULT - SUBJECTIVE AND OBJECTIVE BOX
Chart and meds reviewed.  Patient seen and examined.    Interval Visit/Overnight events:T1 Diabetes follow up. No complaints.    MEDICATIONS  (STANDING):  chlorhexidine 4% Liquid 1 Application(s) Topical <User Schedule>  dextrose 40% Gel 15 Gram(s) Oral once  dextrose 5%. 1000 milliLiter(s) (50 mL/Hr) IV Continuous <Continuous>  dextrose 5%. 1000 milliLiter(s) (100 mL/Hr) IV Continuous <Continuous>  dextrose 50% Injectable 25 Gram(s) IV Push once  dextrose 50% Injectable 12.5 Gram(s) IV Push once  dextrose 50% Injectable 25 Gram(s) IV Push once  folic acid Injectable 1 milliGRAM(s) IV Push daily  glucagon  Injectable 1 milliGRAM(s) IntraMuscular once  insulin glargine Injectable (LANTUS) 10 Unit(s) SubCutaneous every morning  insulin lispro (ADMELOG) corrective regimen sliding scale   SubCutaneous every 6 hours  LORazepam   Injectable   IV Push   LORazepam   Injectable 0.5 milliGRAM(s) IV Push every 12 hours  melatonin 3 milliGRAM(s) Oral at bedtime  pantoprazole  Injectable 40 milliGRAM(s) IV Push two times a day  piperacillin/tazobactam IVPB.. 3.375 Gram(s) IV Intermittent every 8 hours  thiamine 100 milliGRAM(s) Oral daily    MEDICATIONS  (PRN):  hydrALAZINE Injectable 10 milliGRAM(s) IV Push every 6 hours PRN SBP > 140, DBP > 110  HYDROmorphone  Injectable 1 milliGRAM(s) IV Push every 4 hours PRN Severe Pain (7 - 10)  LORazepam   Injectable 4 milliGRAM(s) IV Push every 1 hour PRN Symptom-triggered: each CIWA -Ar score 8 or GREATER      VITALS:  T(F): 97.6 (07-08-21 @ 11:00), Max: 99.2 (07-07-21 @ 21:19)  HR: 94 (07-08-21 @ 11:00) (63 - 94)  BP: 119/75 (07-08-21 @ 11:00) (101/61 - 128/82)  RR: 18 (07-08-21 @ 11:00) (18 - 20)  SpO2: 97% (07-08-21 @ 11:00) (95% - 98%)  Wt(kg): --    I&O's Summary    07 Jul 2021 07:01  -  08 Jul 2021 07:00  --------------------------------------------------------  IN: 1400 mL / OUT: 900 mL / NET: 500 mL        CAPILLARY BLOOD GLUCOSE      POCT Blood Glucose.: 217 mg/dL (08 Jul 2021 11:39)  POCT Blood Glucose.: 117 mg/dL (08 Jul 2021 05:21)  POCT Blood Glucose.: 202 mg/dL (07 Jul 2021 23:59)  POCT Blood Glucose.: 109 mg/dL (07 Jul 2021 17:58)        REVIEW OF SYSTEMS:  Constitutional: No fever, no chills, no change in weight.  Eyes: No eye swelling,no  blurry vision, no redness, no loss of vision.  Neck: No neck pain, no change in voice.  Lungs: No shortness of breath, no wheezing, no cough  CV: No chest pain, no palpitations, no pain with walking.  GI: No nausea, no vomiting, no constipation, no diarrhea, no abdominal pain  : No urinary frequency, no blood in urine, no urinary burning, no difficulty voiding.  Musculoskeletal: No muscle pain, no joint pain, no swelling.  Skin: No rash, no infections.  Neurologic: No headaches, no weakness, no burning or pain in feet, no tremor.  Endocrine: No heat intolerance, no cold intolerance, no increased sweating, no shakiness between meals.  Psych: No depression, no anxiety, no trouble concentrating      PHYSICAL EXAM:  General appearance: Well developed, well nourished.  Eyes: Pupils equal and reactive to light. EOM full. No exophthalmos.  Neck: Trachea midline. No thyroid enlargement.  Lungs: Normal respiratory excursion. Lungs clear.  CV: Regular cardiac rhythm. No murmur. Carotid and pedal pulses intact.  Musculoskeletal: No cyanosis, or clubbing. No pedal lesions.  Skin: Warm and moist. No rash. No acanthosis. left arm with edema   Neuro: Cranial nerves intact. Normal motor and sensory function. DTR's normal.  Psych: Normal affect, good judgement.        LABS:                        11.0   6.97  )-----------( 98       ( 08 Jul 2021 07:06 )             32.2     07-08    138  |  104  |  7.1<L>  ----------------------------<  175<H>  3.4<L>   |  23.0  |  0.54    Ca    8.7      08 Jul 2021 07:06  Phos  3.4     07-07  Mg     1.7     07-07       Chart and meds reviewed.  Patient seen and examined.    Interval Visit/Overnight events:T1 Diabetes follow up.   MEDICATIONS  (STANDING):  chlorhexidine 4% Liquid 1 Application(s) Topical <User Schedule>  dextrose 40% Gel 15 Gram(s) Oral once  dextrose 5%. 1000 milliLiter(s) (50 mL/Hr) IV Continuous <Continuous>  dextrose 5%. 1000 milliLiter(s) (100 mL/Hr) IV Continuous <Continuous>  dextrose 50% Injectable 25 Gram(s) IV Push once  dextrose 50% Injectable 12.5 Gram(s) IV Push once  dextrose 50% Injectable 25 Gram(s) IV Push once  folic acid Injectable 1 milliGRAM(s) IV Push daily  glucagon  Injectable 1 milliGRAM(s) IntraMuscular once  insulin glargine Injectable (LANTUS) 10 Unit(s) SubCutaneous every morning  insulin lispro (ADMELOG) corrective regimen sliding scale   SubCutaneous every 6 hours  LORazepam   Injectable   IV Push   LORazepam   Injectable 0.5 milliGRAM(s) IV Push every 12 hours  melatonin 3 milliGRAM(s) Oral at bedtime  pantoprazole  Injectable 40 milliGRAM(s) IV Push two times a day  piperacillin/tazobactam IVPB.. 3.375 Gram(s) IV Intermittent every 8 hours  thiamine 100 milliGRAM(s) Oral daily    MEDICATIONS  (PRN):  hydrALAZINE Injectable 10 milliGRAM(s) IV Push every 6 hours PRN SBP > 140, DBP > 110  HYDROmorphone  Injectable 1 milliGRAM(s) IV Push every 4 hours PRN Severe Pain (7 - 10)  LORazepam   Injectable 4 milliGRAM(s) IV Push every 1 hour PRN Symptom-triggered: each CIWA -Ar score 8 or GREATER      VITALS:  T(F): 97.6 (07-08-21 @ 11:00), Max: 99.2 (07-07-21 @ 21:19)  HR: 94 (07-08-21 @ 11:00) (63 - 94)  BP: 119/75 (07-08-21 @ 11:00) (101/61 - 128/82)  RR: 18 (07-08-21 @ 11:00) (18 - 20)  SpO2: 97% (07-08-21 @ 11:00) (95% - 98%)  Wt(kg): --    I&O's Summary    07 Jul 2021 07:01  -  08 Jul 2021 07:00  --------------------------------------------------------  IN: 1400 mL / OUT: 900 mL / NET: 500 mL        CAPILLARY BLOOD GLUCOSE      POCT Blood Glucose.: 217 mg/dL (08 Jul 2021 11:39)  POCT Blood Glucose.: 117 mg/dL (08 Jul 2021 05:21)  POCT Blood Glucose.: 202 mg/dL (07 Jul 2021 23:59)  POCT Blood Glucose.: 109 mg/dL (07 Jul 2021 17:58)        REVIEW OF SYSTEMS:  Constitutional: No fever, no chills, no change in weight.  Eyes: No eye swelling,no  blurry vision, no redness, no loss of vision.  Neck: No neck pain, no change in voice.  Lungs: No shortness of breath, no wheezing, no cough  CV: No chest pain, no palpitations, no pain with walking.  GI: No nausea, no vomiting, no constipation, no diarrhea, no abdominal pain  : No urinary frequency, no blood in urine, no urinary burning, no difficulty voiding.  Musculoskeletal: No muscle pain, no joint pain, no swelling.  Skin:left arm  swollen  Able to move no paon  leaking out clear fluid   Neurologic: No headaches, no weakness, no burning or pain in feet, no tremor.  Endocrine: No heat intolerance, no cold intolerance, no increased sweating, no shakiness between meals.  Psych: No depression, no anxiety, no trouble concentrating      PHYSICAL EXAM:  General appearance: Well developed, well nourished.  Eyes: Pupils equal and reactive to light. EOM full. No exophthalmos.  Neck: Trachea midline. No thyroid enlargement.  Lungs: Normal respiratory excursion. Lungs clear.  CV: Regular cardiac rhythm. No murmur. Carotid and pedal pulses intact.  Musculoskeletal: No cyanosis, or clubbing. No pedal lesions.  Skin: Warm and moist. No rash. No acanthosis. left arm with edema   Neuro: Cranial nerves intact. Normal motor and sensory function. DTR's normal.  Psych: Normal affect, good judgement.        LABS:                        11.0   6.97  )-----------( 98       ( 08 Jul 2021 07:06 )             32.2     07-08    138  |  104  |  7.1<L>  ----------------------------<  175<H>  3.4<L>   |  23.0  |  0.54    Ca    8.7      08 Jul 2021 07:06  Phos  3.4     07-07  Mg     1.7     07-07

## 2021-07-08 NOTE — PROGRESS NOTE ADULT - SUBJECTIVE AND OBJECTIVE BOX
Patient is a 30y old  Male who presents with a chief complaint of DKA, Alcohol withdrawal (07 Jul 2021 14:39)      HPI:The patient had a repeat CAT scan.  Showed decreased pneumomediastinum.  There was no extravasation of oral contrast.  He has no chest pain nausea vomiting.  He is tolerating clear liquids.  He is awake and alert and oriented x3            REVIEW OF SYSTEMS:  Constitutional: No fever, weight loss or fatigue  ENMT:  No difficulty hearing, tinnitus, vertigo; No sinus or throat pain  Respiratory: No cough, wheezing, chills or hemoptysis  Cardiovascular: No chest pain, palpitations, dizziness or leg swelling  Gastrointestinal: No abdominal or epigastric pain. No nausea, vomiting or hematemesis; No diarrhea or constipation. No melena or hematochezia.  Skin: No itching, burning, rashes or lesions   Musculoskeletal: No joint pain or swelling; No muscle, back or extremity pain    PAST MEDICAL & SURGICAL HISTORY:  Alcohol abuse    Pancreatitis    HTN (hypertension)    Diabetes    Atrial fibrillation    Non-ischemic cardiomyopathy    No significant past surgical history        FAMILY HISTORY:  No pertinent family history in first degree relatives        SOCIAL HISTORY:  Smoking Status: [ ] Current, [ ] Former, [ ] Never  Pack Years:  [X  ] EtOH  [  ] IVDA    MEDICATIONS:  MEDICATIONS  (STANDING):  chlorhexidine 4% Liquid 1 Application(s) Topical <User Schedule>  dextrose 40% Gel 15 Gram(s) Oral once  dextrose 5%. 1000 milliLiter(s) (50 mL/Hr) IV Continuous <Continuous>  dextrose 5%. 1000 milliLiter(s) (100 mL/Hr) IV Continuous <Continuous>  dextrose 50% Injectable 25 Gram(s) IV Push once  dextrose 50% Injectable 12.5 Gram(s) IV Push once  dextrose 50% Injectable 25 Gram(s) IV Push once  folic acid Injectable 1 milliGRAM(s) IV Push daily  glucagon  Injectable 1 milliGRAM(s) IntraMuscular once  insulin glargine Injectable (LANTUS) 10 Unit(s) SubCutaneous every morning  insulin lispro (ADMELOG) corrective regimen sliding scale   SubCutaneous every 6 hours  lactated ringers. 1000 milliLiter(s) (75 mL/Hr) IV Continuous <Continuous>  LORazepam   Injectable   IV Push   LORazepam   Injectable 0.5 milliGRAM(s) IV Push every 12 hours  melatonin 3 milliGRAM(s) Oral at bedtime  pantoprazole  Injectable 40 milliGRAM(s) IV Push two times a day  piperacillin/tazobactam IVPB.. 3.375 Gram(s) IV Intermittent every 8 hours  potassium chloride    Tablet ER 20 milliEquivalent(s) Oral every 2 hours  thiamine 100 milliGRAM(s) Oral daily    MEDICATIONS  (PRN):  hydrALAZINE Injectable 10 milliGRAM(s) IV Push every 6 hours PRN SBP > 140, DBP > 110  HYDROmorphone  Injectable 1 milliGRAM(s) IV Push every 4 hours PRN Severe Pain (7 - 10)  LORazepam   Injectable 4 milliGRAM(s) IV Push every 1 hour PRN Symptom-triggered: each CIWA -Ar score 8 or GREATER      Allergies    No Known Allergies    Intolerances        Vital Signs Last 24 Hrs  T(C): 36.7 (08 Jul 2021 04:10), Max: 37.3 (07 Jul 2021 21:19)  T(F): 98 (08 Jul 2021 04:10), Max: 99.2 (07 Jul 2021 21:19)  HR: 78 (08 Jul 2021 04:10) (63 - 87)  BP: 101/61 (08 Jul 2021 04:10) (101/61 - 128/82)  BP(mean): --  RR: 18 (08 Jul 2021 04:10) (18 - 20)  SpO2: 95% (08 Jul 2021 04:10) (95% - 98%)    07-07 @ 07:01  -  07-08 @ 07:00  --------------------------------------------------------  IN: 1400 mL / OUT: 900 mL / NET: 500 mL      The patient had a repeat CAT scan.  Showed decreased pneumomediastinum.  There was no extravasation of oral contrast.  He has no chest pain nausea vomiting.  He is tolerating clear liquids.  He is awake and alert and oriented x3      PHYSICAL EXAM:    General: Well developed; well nourished; in no acute distress  HEENT: MMM, conjunctiva and sclera clear  H- RRR  L- CTA   Gastrointestinal: Soft, non-tender non-distended; Normal bowel sounds; No rebound or guarding  Extremities: Normal range of motion, No clubbing, cyanosis or edema  Neurological: Alert and oriented x3  Skin: Warm and dry. No obvious rash      LABS:                        11.0   6.97  )-----------( 98       ( 08 Jul 2021 07:06 )             32.2     08 Jul 2021 07:06    138    |  104    |  7.1    ----------------------------<  175    3.4     |  23.0   |  0.54     Ca    8.7        08 Jul 2021 07:06  Phos  3.4       07 Jul 2021 07:31  Mg     1.7       07 Jul 2021 07:31                RADIOLOGY & ADDITIONAL STUDIES:   A

## 2021-07-08 NOTE — PROGRESS NOTE ADULT - ASSESSMENT
DM now s/p DKA   pancreatitis   Pt informed that he has T1DM  variable BS still   - start Admelog 4 units before meals plus sliding scale when able to take po   - cont Lantus 10 units in am    DM education    dw pt needs to quit alcohol  as  has deleterious effect s on  DM   pt wants to quit drinking and is motivated to do so DM now s/p DKA   pancreatitis   1DM  variable BS still   - start Admelog 4 units before meals plus sliding scale when able to take po   - cont Lantus 10 units in am    DM education    dw pt needs to quit alcohol  as  has deleterious effect s on  DM   pt wants to quit drinking and is motivated to do so    Arm- put using toweal as is leaking clear fluid    IV infiltrated

## 2021-07-08 NOTE — PROGRESS NOTE ADULT - SUBJECTIVE AND OBJECTIVE BOX
Subjective: "can i eat something more solid, like even a small crouton?" denies CP, palpitations, SOB, cough, fever, chills, itchiness/rash, diaphoresis, vision changes, HA, dizziness/lightheadedness, numbness/tingling, abd pain, N/V     Review of Systems: as above        Patient is a 30y old  Male who presents with a chief complaint of DKA, Alcohol withdrawal (08 Jul 2021 14:33)    HPI:  29 yo male, PMHx EtOH abuse, T2DM on insulin and Metformin, HTN, recently admitted with new diagnosis of non-ischemic cardiomyopathy EF 40-45% and atrial fibrillation on Eliquis, who presented from home with complaints of abdominal pain, nausea, and multiple episodes of vomiting over the past two days. Vomiting multiple times in ED, now black appearing. Patient states he stopped taking his insulin 4 days ago because he "forgot". On arrival to ED, patient was tachycardic in 140's, hemodynamically stable, complaining of abdominal pain. He was found to be in DKA with severe metabolic acidosis, pH 7.02, serum bicarb 7, anion gap 39, blood glucose 636 with SYED Cr 2.12 (up from 0.5 5/26/21). Tbili 2.4, normal LFTs, lipase 14. Also noted to be tremulous, last EtOH use 3 days ago. Admits to drinking malt liquor, 4 24-oz containers, daily. Denies binge drinking recently. Pending CT abd/pelv.    Patient's mother Brigid Villalpando ([phone]) present at bedside, updated via  with patient's permission. (03 Jul 2021 21:16)    PAST MEDICAL & SURGICAL HISTORY:  Alcohol abuse  Pancreatitis  HTN (hypertension)  Diabetes  Atrial fibrillation  Non-ischemic cardiomyopathy  No significant past surgical history    FAMILY HISTORY:  No pertinent family history in first degree relatives    Vitals   ICU Vital Signs Last 24 Hrs  T(C): 36.8 (08 Jul 2021 15:58), Max: 37.3 (07 Jul 2021 21:19)  T(F): 98.2 (08 Jul 2021 15:58), Max: 99.2 (07 Jul 2021 21:19)  HR: 82 (08 Jul 2021 15:58) (63 - 94)  BP: 126/74 (08 Jul 2021 15:58) (101/61 - 128/74)  RR: 17 (08 Jul 2021 15:58) (17 - 20)  SpO2: 97% (08 Jul 2021 15:58) (95% - 98%)    I&O's Detail    07 Jul 2021 07:01  -  08 Jul 2021 07:00  --------------------------------------------------------  IN:    IV PiggyBack: 200 mL    IV PiggyBack: 300 mL    Lactated Ringers: 900 mL  Total IN: 1400 mL    OUT:    Voided (mL): 900 mL  Total OUT: 900 mL  Total NET: 500 mL    LABS                        11.0   6.97  )-----------( 98       ( 08 Jul 2021 07:06 )             32.2     07-08    138  |  104  |  7.1<L>  ----------------------------<  175<H>  3.4<L>   |  23.0  |  0.54    Ca    8.7      08 Jul 2021 07:06  Phos  3.4     07-07  Mg     1.7     07-07    POCT Blood Glucose.: 125 mg/dL (07-08-21 @ 16:49)  POCT Blood Glucose.: 217 mg/dL (07-08-21 @ 11:39)  POCT Blood Glucose.: 117 mg/dL (07-08-21 @ 05:21)  POCT Blood Glucose.: 202 mg/dL (07-07-21 @ 23:59)  POCT Blood Glucose.: 109 mg/dL (07-07-21 @ 17:58)    MEDICATIONS  (STANDING):  chlorhexidine 4% Liquid 1 Application(s) Topical <User Schedule>  dextrose 40% Gel 15 Gram(s) Oral once  dextrose 5%. 1000 milliLiter(s) (50 mL/Hr) IV Continuous <Continuous>  dextrose 5%. 1000 milliLiter(s) (100 mL/Hr) IV Continuous <Continuous>  dextrose 50% Injectable 25 Gram(s) IV Push once  dextrose 50% Injectable 12.5 Gram(s) IV Push once  dextrose 50% Injectable 25 Gram(s) IV Push once  folic acid Injectable 1 milliGRAM(s) IV Push daily  glucagon  Injectable 1 milliGRAM(s) IntraMuscular once  insulin glargine Injectable (LANTUS) 10 Unit(s) SubCutaneous every morning  insulin lispro (ADMELOG) corrective regimen sliding scale   SubCutaneous every 6 hours  insulin lispro Injectable (ADMELOG) 4 Unit(s) SubCutaneous three times a day before meals  LORazepam   Injectable   IV Push   LORazepam   Injectable 0.5 milliGRAM(s) IV Push every 12 hours  melatonin 3 milliGRAM(s) Oral at bedtime  pantoprazole  Injectable 40 milliGRAM(s) IV Push two times a day  piperacillin/tazobactam IVPB.. 3.375 Gram(s) IV Intermittent every 8 hours  thiamine 100 milliGRAM(s) Oral daily    MEDICATIONS  (PRN):  hydrALAZINE Injectable 10 milliGRAM(s) IV Push every 6 hours PRN SBP > 140, DBP > 110  HYDROmorphone  Injectable 1 milliGRAM(s) IV Push every 4 hours PRN Severe Pain (7 - 10)  LORazepam   Injectable 4 milliGRAM(s) IV Push every 1 hour PRN Symptom-triggered: each CIWA -Ar score 8 or GREATER    Allergies:  No Known Allergies    Physical Exam:  Gen: NAD,   Neuro: A&Ox3, non-focal, speech clear and intact  CV: S1S2 RRR  Pulm: CTA b/l,  Abd:+ BS soft NT ND  Ext: BOOKER, no cyanosis or edema

## 2021-07-08 NOTE — PROGRESS NOTE ADULT - SUBJECTIVE AND OBJECTIVE BOX
31yo Male admitted to [facility] for DKA, ETOH w/drawal and hematemesis. Pt examined this a.m. by Hospitalist and PA. No acute overnight events to report. Pt currently has no complaints. Denies SOB, cough, CP, palpitations, HA, dizziness, NVD, changes in urinary or bowel habits, fevers, chills. All other remaining ROS negative.     ROS: see above    Allergies    No Known Allergies    Intolerances    MEDICATIONS  (STANDING):  chlorhexidine 4% Liquid 1 Application(s) Topical <User Schedule>  dextrose 40% Gel 15 Gram(s) Oral once  dextrose 5%. 1000 milliLiter(s) (50 mL/Hr) IV Continuous <Continuous>  dextrose 5%. 1000 milliLiter(s) (100 mL/Hr) IV Continuous <Continuous>  dextrose 50% Injectable 25 Gram(s) IV Push once  dextrose 50% Injectable 12.5 Gram(s) IV Push once  dextrose 50% Injectable 25 Gram(s) IV Push once  folic acid Injectable 1 milliGRAM(s) IV Push daily  glucagon  Injectable 1 milliGRAM(s) IntraMuscular once  insulin glargine Injectable (LANTUS) 10 Unit(s) SubCutaneous every morning  insulin lispro (ADMELOG) corrective regimen sliding scale   SubCutaneous every 6 hours  LORazepam   Injectable   IV Push   LORazepam   Injectable 0.5 milliGRAM(s) IV Push every 12 hours  melatonin 3 milliGRAM(s) Oral at bedtime  pantoprazole  Injectable 40 milliGRAM(s) IV Push two times a day  piperacillin/tazobactam IVPB.. 3.375 Gram(s) IV Intermittent every 8 hours  potassium chloride    Tablet ER 20 milliEquivalent(s) Oral every 2 hours  thiamine 100 milliGRAM(s) Oral daily    MEDICATIONS  (PRN):  hydrALAZINE Injectable 10 milliGRAM(s) IV Push every 6 hours PRN SBP > 140, DBP > 110  HYDROmorphone  Injectable 1 milliGRAM(s) IV Push every 4 hours PRN Severe Pain (7 - 10)  LORazepam   Injectable 4 milliGRAM(s) IV Push every 1 hour PRN Symptom-triggered: each CIWA -Ar score 8 or GREATER    Vital Signs Last 24 Hrs  T(C): 36.4 (08 Jul 2021 11:00), Max: 37.3 (07 Jul 2021 21:19)  T(F): 97.6 (08 Jul 2021 11:00), Max: 99.2 (07 Jul 2021 21:19)  HR: 94 (08 Jul 2021 11:00) (63 - 94)  BP: 119/75 (08 Jul 2021 11:00) (101/61 - 128/82)  BP(mean): --  ABP: --  ABP(mean): --  RR: 18 (08 Jul 2021 11:00) (18 - 20)  SpO2: 97% (08 Jul 2021 11:00) (95% - 98%)    PHYSICAL EXAM:  GENERAL: NAD, lying in bed comfortably  HEAD:  Atraumatic, Normocephalic  EENT: conjunctiva and sclera clear, MMM, trachea midline, nares patent  CHEST/LUNG: CTABL; No rales, rhonchi, wheezing, or rubs. Unlabored respirations  HEART: RRR; No murmurs, rubs, or gallops  ABDOMEN: +BS present in all 4 quadrants; Soft, NTTP, ND  EXTREMITIES:  2+ Peripheral Pulses, brisk capillary refill. No clubbing, cyanosis, or edema  NERVOUS SYSTEM:  A&OX3, speech clear. No facial droop, tongue protrusion midline. Answers questions appropriately. Full and equal 5/5 strength B/L upper and lower extremities. +reflexes B/L LE. Sensation intact. No deficits   MSK: FROM x 4 extremities   SKIN: No rashes or lesions                          11.0   6.97  )-----------( 98       ( 08 Jul 2021 07:06 )             32.2   07-08    138  |  104  |  7.1<L>  ----------------------------<  175<H>  3.4<L>   |  23.0  |  0.54    Ca    8.7      08 Jul 2021 07:06  Phos  3.4     07-07  Mg     1.7     07-07      < from: CT Chest w/ Oral Cont (07.07.21 @ 15:46) >  EXAM:  CT CHEST OC                          PROCEDURE:  CT of the Chest was performed with oral Gastroview.  Sagittal and coronal reformats were performed.    FINDINGS:  LUNGS AND AIRWAYS: Patent central airways. 1 mm calcified granuloma in the right middle lobe on image 113. Lungs are clear.  PLEURA: Trace bilateral layering pleuralfluid. No pneumothorax. There has been interval decrease in residual pneumomediastinum, predominantly in the superior and middle mediastinum. The retropharyngeal and inferior neck subcutaneous emphysema has resolved. No extravasation of oral contrastis identified from the esophagus. There is mild concentric wall thickening of the distal esophagus.  MEDIASTINUM AND JOSE: No lymphadenopathy.  VESSELS: Within normal limits.  HEART: Heart size is normal. No pericardial effusion.  CHEST WALL AND LOWER NECK: Within normal limits.  VISUALIZED UPPER ABDOMEN: Layering tiny gallstones.  BONES: Within normal limits. There is skin thickening and infiltration with subcutaneous gas of the proximal left upper extremity.    IMPRESSION: No evidence of extravasation of oral contrast from the esophagus. Decreased pneumomediastinum, now mild. Resolved gas in the soft tissues of the neck and retropharyngeal space      < end of copied text >

## 2021-07-09 ENCOUNTER — TRANSCRIPTION ENCOUNTER (OUTPATIENT)
Age: 31
End: 2021-07-09

## 2021-07-09 VITALS
RESPIRATION RATE: 18 BRPM | OXYGEN SATURATION: 100 % | DIASTOLIC BLOOD PRESSURE: 72 MMHG | HEART RATE: 86 BPM | SYSTOLIC BLOOD PRESSURE: 122 MMHG | TEMPERATURE: 98 F

## 2021-07-09 LAB
ANION GAP SERPL CALC-SCNC: 10 MMOL/L — SIGNIFICANT CHANGE UP (ref 5–17)
BUN SERPL-MCNC: 7.6 MG/DL — LOW (ref 8–20)
CALCIUM SERPL-MCNC: 8.5 MG/DL — LOW (ref 8.6–10.2)
CHLORIDE SERPL-SCNC: 107 MMOL/L — SIGNIFICANT CHANGE UP (ref 98–107)
CO2 SERPL-SCNC: 24 MMOL/L — SIGNIFICANT CHANGE UP (ref 22–29)
CREAT SERPL-MCNC: 0.58 MG/DL — SIGNIFICANT CHANGE UP (ref 0.5–1.3)
GLUCOSE BLDC GLUCOMTR-MCNC: 117 MG/DL — HIGH (ref 70–99)
GLUCOSE BLDC GLUCOMTR-MCNC: 154 MG/DL — HIGH (ref 70–99)
GLUCOSE BLDC GLUCOMTR-MCNC: 165 MG/DL — HIGH (ref 70–99)
GLUCOSE BLDC GLUCOMTR-MCNC: 182 MG/DL — HIGH (ref 70–99)
GLUCOSE SERPL-MCNC: 195 MG/DL — HIGH (ref 70–99)
HCT VFR BLD CALC: 30.7 % — LOW (ref 39–50)
HGB BLD-MCNC: 10.3 G/DL — LOW (ref 13–17)
MAGNESIUM SERPL-MCNC: 1.7 MG/DL — SIGNIFICANT CHANGE UP (ref 1.6–2.6)
MCHC RBC-ENTMCNC: 30.9 PG — SIGNIFICANT CHANGE UP (ref 27–34)
MCHC RBC-ENTMCNC: 33.6 GM/DL — SIGNIFICANT CHANGE UP (ref 32–36)
MCV RBC AUTO: 92.2 FL — SIGNIFICANT CHANGE UP (ref 80–100)
PLATELET # BLD AUTO: 113 K/UL — LOW (ref 150–400)
POTASSIUM SERPL-MCNC: 3.7 MMOL/L — SIGNIFICANT CHANGE UP (ref 3.5–5.3)
POTASSIUM SERPL-SCNC: 3.7 MMOL/L — SIGNIFICANT CHANGE UP (ref 3.5–5.3)
RBC # BLD: 3.33 M/UL — LOW (ref 4.2–5.8)
RBC # FLD: 13 % — SIGNIFICANT CHANGE UP (ref 10.3–14.5)
SODIUM SERPL-SCNC: 140 MMOL/L — SIGNIFICANT CHANGE UP (ref 135–145)
WBC # BLD: 5.55 K/UL — SIGNIFICANT CHANGE UP (ref 3.8–10.5)
WBC # FLD AUTO: 5.55 K/UL — SIGNIFICANT CHANGE UP (ref 3.8–10.5)

## 2021-07-09 PROCEDURE — 80307 DRUG TEST PRSMV CHEM ANLYZR: CPT

## 2021-07-09 PROCEDURE — 93005 ELECTROCARDIOGRAM TRACING: CPT

## 2021-07-09 PROCEDURE — 80053 COMPREHEN METABOLIC PANEL: CPT

## 2021-07-09 PROCEDURE — 82962 GLUCOSE BLOOD TEST: CPT

## 2021-07-09 PROCEDURE — 85027 COMPLETE CBC AUTOMATED: CPT

## 2021-07-09 PROCEDURE — 82009 KETONE BODYS QUAL: CPT

## 2021-07-09 PROCEDURE — 99239 HOSP IP/OBS DSCHRG MGMT >30: CPT

## 2021-07-09 PROCEDURE — 83605 ASSAY OF LACTIC ACID: CPT

## 2021-07-09 PROCEDURE — 82435 ASSAY OF BLOOD CHLORIDE: CPT

## 2021-07-09 PROCEDURE — 86900 BLOOD TYPING SEROLOGIC ABO: CPT

## 2021-07-09 PROCEDURE — 83036 HEMOGLOBIN GLYCOSYLATED A1C: CPT

## 2021-07-09 PROCEDURE — 86901 BLOOD TYPING SEROLOGIC RH(D): CPT

## 2021-07-09 PROCEDURE — 71250 CT THORAX DX C-: CPT

## 2021-07-09 PROCEDURE — 82947 ASSAY GLUCOSE BLOOD QUANT: CPT

## 2021-07-09 PROCEDURE — 84100 ASSAY OF PHOSPHORUS: CPT

## 2021-07-09 PROCEDURE — 84681 ASSAY OF C-PEPTIDE: CPT

## 2021-07-09 PROCEDURE — 85018 HEMOGLOBIN: CPT

## 2021-07-09 PROCEDURE — 82330 ASSAY OF CALCIUM: CPT

## 2021-07-09 PROCEDURE — 81001 URINALYSIS AUTO W/SCOPE: CPT

## 2021-07-09 PROCEDURE — 99291 CRITICAL CARE FIRST HOUR: CPT

## 2021-07-09 PROCEDURE — 74176 CT ABD & PELVIS W/O CONTRAST: CPT

## 2021-07-09 PROCEDURE — 87040 BLOOD CULTURE FOR BACTERIA: CPT

## 2021-07-09 PROCEDURE — 86769 SARS-COV-2 COVID-19 ANTIBODY: CPT

## 2021-07-09 PROCEDURE — 83735 ASSAY OF MAGNESIUM: CPT

## 2021-07-09 PROCEDURE — 99233 SBSQ HOSP IP/OBS HIGH 50: CPT

## 2021-07-09 PROCEDURE — 85014 HEMATOCRIT: CPT

## 2021-07-09 PROCEDURE — 86850 RBC ANTIBODY SCREEN: CPT

## 2021-07-09 PROCEDURE — 85610 PROTHROMBIN TIME: CPT

## 2021-07-09 PROCEDURE — 87635 SARS-COV-2 COVID-19 AMP PRB: CPT

## 2021-07-09 PROCEDURE — 71045 X-RAY EXAM CHEST 1 VIEW: CPT | Mod: 26

## 2021-07-09 PROCEDURE — 80048 BASIC METABOLIC PNL TOTAL CA: CPT

## 2021-07-09 PROCEDURE — 71045 X-RAY EXAM CHEST 1 VIEW: CPT

## 2021-07-09 PROCEDURE — 82803 BLOOD GASES ANY COMBINATION: CPT

## 2021-07-09 PROCEDURE — 96375 TX/PRO/DX INJ NEW DRUG ADDON: CPT

## 2021-07-09 PROCEDURE — 96374 THER/PROPH/DIAG INJ IV PUSH: CPT

## 2021-07-09 PROCEDURE — 84295 ASSAY OF SERUM SODIUM: CPT

## 2021-07-09 PROCEDURE — 99232 SBSQ HOSP IP/OBS MODERATE 35: CPT

## 2021-07-09 PROCEDURE — 83690 ASSAY OF LIPASE: CPT

## 2021-07-09 PROCEDURE — 84132 ASSAY OF SERUM POTASSIUM: CPT

## 2021-07-09 PROCEDURE — 85730 THROMBOPLASTIN TIME PARTIAL: CPT

## 2021-07-09 PROCEDURE — 99231 SBSQ HOSP IP/OBS SF/LOW 25: CPT

## 2021-07-09 PROCEDURE — 85025 COMPLETE CBC W/AUTO DIFF WBC: CPT

## 2021-07-09 PROCEDURE — 36415 COLL VENOUS BLD VENIPUNCTURE: CPT

## 2021-07-09 RX ORDER — METOPROLOL TARTRATE 50 MG
1 TABLET ORAL
Qty: 0 | Refills: 0 | DISCHARGE
Start: 2021-07-09 | End: 2021-08-07

## 2021-07-09 RX ORDER — LOSARTAN POTASSIUM 100 MG/1
1 TABLET, FILM COATED ORAL
Qty: 30 | Refills: 0
Start: 2021-07-09 | End: 2021-08-07

## 2021-07-09 RX ORDER — INSULIN LISPRO 100/ML
4 VIAL (ML) SUBCUTANEOUS
Qty: 360 | Refills: 0
Start: 2021-07-09 | End: 2021-08-07

## 2021-07-09 RX ORDER — ENOXAPARIN SODIUM 100 MG/ML
10 INJECTION SUBCUTANEOUS
Qty: 1 | Refills: 0
Start: 2021-07-09 | End: 2021-08-07

## 2021-07-09 RX ORDER — INSULIN LISPRO 100/ML
10 VIAL (ML) SUBCUTANEOUS
Qty: 0 | Refills: 0 | DISCHARGE
Start: 2021-07-09 | End: 2021-08-07

## 2021-07-09 RX ORDER — METOPROLOL TARTRATE 50 MG
1 TABLET ORAL
Qty: 30 | Refills: 0
Start: 2021-07-09 | End: 2021-08-07

## 2021-07-09 RX ORDER — APIXABAN 2.5 MG/1
1 TABLET, FILM COATED ORAL
Qty: 60 | Refills: 0
Start: 2021-07-09 | End: 2021-08-07

## 2021-07-09 RX ADMIN — Medication 100 MILLIGRAM(S): at 12:56

## 2021-07-09 RX ADMIN — Medication 0.5 MILLIGRAM(S): at 11:19

## 2021-07-09 RX ADMIN — PANTOPRAZOLE SODIUM 40 MILLIGRAM(S): 20 TABLET, DELAYED RELEASE ORAL at 17:12

## 2021-07-09 RX ADMIN — CHLORHEXIDINE GLUCONATE 1 APPLICATION(S): 213 SOLUTION TOPICAL at 09:59

## 2021-07-09 RX ADMIN — INSULIN GLARGINE 10 UNIT(S): 100 INJECTION, SOLUTION SUBCUTANEOUS at 09:57

## 2021-07-09 RX ADMIN — Medication 4 UNIT(S): at 09:58

## 2021-07-09 RX ADMIN — Medication 2: at 09:58

## 2021-07-09 RX ADMIN — Medication 4 UNIT(S): at 12:55

## 2021-07-09 RX ADMIN — PIPERACILLIN AND TAZOBACTAM 25 GRAM(S): 4; .5 INJECTION, POWDER, LYOPHILIZED, FOR SOLUTION INTRAVENOUS at 05:21

## 2021-07-09 RX ADMIN — PIPERACILLIN AND TAZOBACTAM 25 GRAM(S): 4; .5 INJECTION, POWDER, LYOPHILIZED, FOR SOLUTION INTRAVENOUS at 12:56

## 2021-07-09 RX ADMIN — Medication 4 UNIT(S): at 17:12

## 2021-07-09 RX ADMIN — PANTOPRAZOLE SODIUM 40 MILLIGRAM(S): 20 TABLET, DELAYED RELEASE ORAL at 05:21

## 2021-07-09 RX ADMIN — Medication 2: at 12:55

## 2021-07-09 NOTE — DISCHARGE NOTE PROVIDER - NSDCCPCAREPLAN_GEN_ALL_CORE_FT
PRINCIPAL DISCHARGE DIAGNOSIS  Diagnosis: DKA (diabetic ketoacidosis)  Assessment and Plan of Treatment: f/u with endocrine      SECONDARY DISCHARGE DIAGNOSES  Diagnosis: Alcohol abuse  Assessment and Plan of Treatment: abstain    Diagnosis: Pneumomediastinum  Assessment and Plan of Treatment: full liquid diet x3 days. If tolerates, recommend to advance to mechanical soft diet x2 days. If tolerates soft diet x2 days then cleared for regular carb controlled diet    Diagnosis: Hematemesis, presence of nausea not specified  Assessment and Plan of Treatment: Hematemesis, presence of nausea not specified    Diagnosis: Atrial fibrillation  Assessment and Plan of Treatment:

## 2021-07-09 NOTE — DISCHARGE NOTE PROVIDER - CARE PROVIDER_API CALL
Amanda Mota)  EndocrinologyMetabDiabetes  [address], [place] [id number]  Phone: [phone]  Fax: [phone]  Follow Up Time:     Esperanza Pereira)  Thoracic and Cardiac Surgery  [address] [place] [address]  Phone: [phone]  Fax: [phone]  Follow Up Time:

## 2021-07-09 NOTE — DISCHARGE NOTE PROVIDER - HOSPITAL COURSE
29yo male w/PMHx of ETOH abuse, T2DM on insulin and Metformin, HTN, recently admitted with new Dx of non-ischemic cardiomyopathy EF 40-45% and Afib on Eliquis, who presented from home with c/o abdominal pain, nausea, and multiple episodes of vomiting which is now appearing black x2d. Pt stated he stopped taking insulin 4 days prior to admission bc he "forgot".  Pt was tachycardic in 140's, hemodynamically stable and found to be in DKA with severe metabolic acidosis, pH 7.02, serum bicarb 7, anion gap 39, blood glucose 636 with SYED Cr 2.12 (up from 0.5 5/26/21). Tbili 2.4, normal LFTs, lipase 14. Also noted to be tremulous, last ETOH use was 3days prior to admission. Admits to drinking malt liquor, four 24-oz containers, daily. Denies binge drinking recently. Pt w/hematemsis, GI consulted, likely Nohemy Anderson tear, stable, c/w Protonix and Octreotide. CT scan showing Pneumomediastinum. repeat Ct chest showed no leak and patient tolerated Full liq diet - he was cleared for discharge by CTSx and GI and endocrine.     1. Pneumomediastinum  - CTS following, rec's appreciated-  Pt started on CLD, will continue w/CLD and possibly advance to FLD, Pt will F/U OP w/CTS and continue FLD OP unless otherwise directed by CTS.   - Repeat CXR in am   - Repeat CT chest performed- No evidence of extravasation of oral contrast from espohagus. Decreased pneumomediastinum. Resolved gas in neck soft tissues  - DC Zosyn on discharge    2. DM2  - S/p DKA and Insulin drip    3. Hematemesis  - Resolved  - GI consult appreciated  - Likely 2/2 Nohemy Anderson tear or Boerhaave's syndrome.  - C/w PPI    4. ETOH abuse  - C/w Thiamine, Folic acid   patient refused rehab     5. Hypokalemia and hypomagnesemia  - Repleted     6. Urinary retention   - Resolved       31yo male w/PMHx of ETOH abuse, T2DM on insulin and Metformin, HTN, recently admitted with new Dx of non-ischemic cardiomyopathy EF 40-45% and Afib on Eliquis, who presented from home with c/o abdominal pain, nausea, and multiple episodes of vomiting which is now appearing black x2d. Pt stated he stopped taking insulin 4 days prior to admission bc he "forgot".  Pt was tachycardic in 140's, hemodynamically stable and found to be in DKA with severe metabolic acidosis, pH 7.02, serum bicarb 7, anion gap 39, blood glucose 636 with SYED Cr 2.12 (up from 0.5 5/26/21). Tbili 2.4, normal LFTs, lipase 14. Also noted to be tremulous, last ETOH use was 3days prior to admission. Admits to drinking malt liquor, four 24-oz containers, daily. Denies binge drinking recently. Pt w/hematemsis, GI consulted, likely Nohemy Anderson tear, stable, c/w Protonix and Octreotide. CT scan showing Pneumomediastinum. repeat Ct chest showed no leak and patient tolerated Full liq diet - he was cleared for discharge by CTSx and GI and endocrine.     1. Pneumomediastinum  - CTS following, rec's appreciated-  Pt started on CLD, will continue w/CLD and possibly advance to FLD, Pt will F/U OP w/CTS and continue FLD OP unless otherwise directed by CTS.   - Repeat CXR in am   - Repeat CT chest performed- No evidence of extravasation of oral contrast from espohagus. Decreased pneumomediastinum. Resolved gas in neck soft tissues  - DC Zosyn on discharge    2. DM2  - S/p DKA and Insulin drip    3. Hematemesis  - Resolved  - GI consult appreciated  - Likely 2/2 Nohemy Anderson tear or Boerhaave's syndrome.  - C/w PPI    4. ETOH abuse  - C/w Thiamine, Folic acid   patient refused rehab     5. Hypokalemia and hypomagnesemia  - Repleted     6. Urinary retention   - Resolved      patient seen and examined at bedside     Vital Signs Last 24 Hrs  T(C): 36.9 (09 Jul 2021 11:18), Max: 37.8 (08 Jul 2021 21:12)  T(F): 98.5 (09 Jul 2021 11:18), Max: 100 (08 Jul 2021 21:12)  HR: 78 (09 Jul 2021 11:18) (75 - 82)  BP: 115/74 (09 Jul 2021 11:18) (114/72 - 126/74)  BP(mean): --  RR: 17 (09 Jul 2021 04:21) (16 - 17)  SpO2: 97% (09 Jul 2021 04:21) (97% - 97%)    PHYSICAL EXAM:    GENERAL: NAD, well-groomed  HEAD:  Atraumatic, Normocephalic  EYES: EOMI, PERRLA, conjunctiva and sclera clear  NECK: Supple, No JVD  NERVOUS SYSTEM:  Alert & Oriented X3, Good concentration  CHEST/LUNG: Clear to percussion bilaterally; No rales, rhonchi  HEART: Regular rate and rhythm; No murmurs  ABDOMEN: Soft, Nontender, Nondistended; Bowel sounds present  EXTREMITIES:  No clubbing, cyanosis, or edema  SKIN: No rashes or lesions    Time spent in discharge planning and co-ordination : 37min    29yo male w/PMHx of ETOH abuse, T2DM on insulin and Metformin, HTN, recently admitted with new Dx of non-ischemic cardiomyopathy EF 40-45% and Afib on Eliquis, who presented from home with c/o abdominal pain, nausea, and multiple episodes of vomiting which is now appearing black x2d. Pt stated he stopped taking insulin 4 days prior to admission bc he "forgot".  Pt was tachycardic in 140's, hemodynamically stable and found to be in DKA with severe metabolic acidosis, pH 7.02, serum bicarb 7, anion gap 39, blood glucose 636 with SYED Cr 2.12 (up from 0.5 5/26/21). Tbili 2.4, normal LFTs, lipase 14. Also noted to be tremulous, last ETOH use was 3days prior to admission. Admits to drinking malt liquor, four 24-oz containers, daily. Denies binge drinking recently. Pt w/hematemsis, GI consulted, likely Nohemy Anderson tear, stable, c/w Protonix and Octreotide. CT scan showing Pneumomediastinum. repeat Ct chest showed no leak and patient tolerated Full liq diet - he was cleared for discharge by CTSx and GI and endocrine.     1. Pneumomediastinum  - CTS following, rec's appreciated-  Pt started on CLD, will continue w/CLD and possibly advance to FLD, Pt will F/U OP w/CTS and continue FLD OP unless otherwise directed by CTS.   - Repeat CXR in am   - Repeat CT chest performed- No evidence of extravasation of oral contrast from espohagus. Decreased pneumomediastinum. Resolved gas in neck soft tissues  - DC Zosyn on discharge    2. DM2  - S/p DKA and Insulin drip    3. Hematemesis  - Resolved  - GI consult appreciated  - Likely 2/2 Nohemy Anderson tear or Boerhaave's syndrome.  - C/w PPI    4. ETOH abuse  - C/w Thiamine, Folic acid   patient refused rehab     5. Hypokalemia and hypomagnesemia  - Repleted     6. Urinary retention   - Resolved      patient seen and examined at bedside     Vital Signs Last 24 Hrs  T(C): 36.9 (09 Jul 2021 11:18), Max: 37.8 (08 Jul 2021 21:12)  T(F): 98.5 (09 Jul 2021 11:18), Max: 100 (08 Jul 2021 21:12)  HR: 78 (09 Jul 2021 11:18) (75 - 82)  BP: 115/74 (09 Jul 2021 11:18) (114/72 - 126/74)  BP(mean): --  RR: 17 (09 Jul 2021 04:21) (16 - 17)  SpO2: 97% (09 Jul 2021 04:21) (97% - 97%)    PHYSICAL EXAM:    GENERAL: NAD, well-groomed  HEAD:  Atraumatic, Normocephalic  EYES: EOMI, PERRLA, conjunctiva and sclera clear  NECK: Supple, No JVD  NERVOUS SYSTEM:  Alert & Oriented X3, Good concentration  CHEST/LUNG: Clear to percussion bilaterally; No rales, rhonchi  HEART: Regular rate and rhythm; No murmurs  ABDOMEN: Soft, Nontender, Nondistended; Bowel sounds present  EXTREMITIES:  No clubbing, cyanosis, or edema  SKIN: No rashes or lesions    Time spent in discharge planning and co-ordination : 37min   High risk of readmission 2/2 medication non-compliance and failure to abstain from alcohol abuse. Pt counselled at multiple times regarding this. continues to refuse rehab.

## 2021-07-09 NOTE — PROGRESS NOTE ADULT - PROBLEM SELECTOR PLAN 1
repeat CT chest with PO contrast 7/7, no extravasation of contrast, improved pneumomediastinum   Continue Zosyn as a empiric measure --> D/c upon discharge. No need to continue antibiotics after discharge.     Monitor Daily CXR     Tolerating clear liquid diet. Recommend advance to full liquid diet. If tolerating full liquid diet, no contraindication to discharge from CT surgery perspective. Recommend full liquid diet x3 days. If tolerates, recommend to advance to mechanical soft diet x2 days. If tolerates soft diet x2 days then cleared for full diet.     d/w Dr. Pereira repeat CT chest with PO contrast 7/7, no extravasation of contrast, improved pneumomediastinum   Continue Zosyn as a empiric measure --> D/c upon discharge. No need to continue antibiotics after discharge.     Tolerating clear liquid diet. Recommend advance to full liquid diet. If tolerating full liquid diet, no contraindication to discharge from CT surgery perspective. Recommend full liquid diet x3 days. If tolerates, recommend to advance to mechanical soft diet x2 days. If tolerates soft diet x2 days then cleared for full diet.     d/w Dr. Pereira

## 2021-07-09 NOTE — DISCHARGE NOTE PROVIDER - CARE PROVIDERS DIRECT ADDRESSES
[email or web address]

## 2021-07-09 NOTE — PROGRESS NOTE ADULT - ATTENDING COMMENTS
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 remains NPO  - repeat CT chest with no esophageal leak. started on CLD per Thoracic recs   Offers no complaints    on exam :    mild icteric   no distress.   RS: CTAB  CVS - S1, S2 normal   abd - soft, Non-tender   no pedal edema     Plan:  On CLD   endocrine recs appreciated - start admelog with meals  Dc ivfluids   repeat labs in am   Possible DC in am if cleared by THoracic Sx
Pt seen and examined   IV infitlrated left arm apparently since this AM    no main no Decreased ROM   leaking clear fluid      Pt started clear liquid diet today     DM- cont RX as outlined above  admelog 4 unti caesar cope eating full diet   Cont Lnatus      Arm- dw Dr Walsh ? if needs  wrap to help with iV infiltraiton and leak from puncture site   he willasses pt
Pt reptorst o be feeling ok   for repeat Ct scan today    for Nohemy Anderson tear and pneumomediastinum   will start Ademlog when eating   DM education
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 remains NPO  -awaiting repeat CT chest.  Offers no complaints  drinks alcohol for fun.    on exam :  mild tachycardia   mild icteric   no distress.   RS: CTAB  CVS - S1, S2 normal   abd - soft, Non-tender   no pedal edema     Plan:  CT chest   NPO   GI, CTSx recs   endocrine recs   ct Ringer lactate till NPO   repeat labs
Patient appears to be doing well.  Denies any current dysphagia or chest pain.  Pneumomediastinum likely secondary to Boerhaave's syndrome, now resolved.  Agree with CT chest.  No plans for EGD at this time.  Continue PPI.  Will continue to monitor.
Patient tolerating CLD.  Likely had Boerhaave's, now resolving.  No EGD planned this admission.  GI will sign off.  Please reconsult as needed and call with any questions or concerns.

## 2021-07-09 NOTE — PROGRESS NOTE ADULT - PROBLEM SELECTOR PLAN 2
CT-chest showed no evidence of extravasation of oral contrast from the esophagus. Decreased pneumomediastinum, now mild. Resolved gas in the soft tissues of the neck and retropharyngeal space  Diet as per CT-surgery.

## 2021-07-09 NOTE — PROGRESS NOTE ADULT - SUBJECTIVE AND OBJECTIVE BOX
Patient is a 30y old  Male who presents with a chief complaint of DKA, Alcohol withdrawal (09 Jul 2021 10:56)      INTERVAL HPI/OVERNIGHT EVENTS: Patient seeing and evaluated at bedside, reporting no complains, toelrating oral intakel,  no overnight events, repeat CT-chest revealing no extravasation of oral contrast. Denies nausea, vomiting, abdominal pain, chest pain, shortness of breath, hematemesis, hematochezia, melena.                                                           .  MEDICATIONS  (STANDING):  chlorhexidine 4% Liquid 1 Application(s) Topical <User Schedule>  dextrose 40% Gel 15 Gram(s) Oral once  dextrose 5%. 1000 milliLiter(s) (50 mL/Hr) IV Continuous <Continuous>  dextrose 5%. 1000 milliLiter(s) (100 mL/Hr) IV Continuous <Continuous>  dextrose 50% Injectable 25 Gram(s) IV Push once  dextrose 50% Injectable 12.5 Gram(s) IV Push once  dextrose 50% Injectable 25 Gram(s) IV Push once  folic acid Injectable 1 milliGRAM(s) IV Push daily  glucagon  Injectable 1 milliGRAM(s) IntraMuscular once  insulin glargine Injectable (LANTUS) 10 Unit(s) SubCutaneous every morning  insulin lispro (ADMELOG) corrective regimen sliding scale   SubCutaneous every 6 hours  insulin lispro Injectable (ADMELOG) 4 Unit(s) SubCutaneous three times a day before meals  LORazepam   Injectable   IV Push   LORazepam   Injectable 0.5 milliGRAM(s) IV Push every 12 hours  melatonin 3 milliGRAM(s) Oral at bedtime  pantoprazole  Injectable 40 milliGRAM(s) IV Push two times a day  piperacillin/tazobactam IVPB.. 3.375 Gram(s) IV Intermittent every 8 hours  thiamine 100 milliGRAM(s) Oral daily    MEDICATIONS  (PRN):  hydrALAZINE Injectable 10 milliGRAM(s) IV Push every 6 hours PRN SBP > 140, DBP > 110  HYDROmorphone  Injectable 1 milliGRAM(s) IV Push every 4 hours PRN Severe Pain (7 - 10)  LORazepam   Injectable 4 milliGRAM(s) IV Push every 1 hour PRN Symptom-triggered: each CIWA -Ar score 8 or GREATER      Allergies    No Known Allergies    Intolerances        Review of Systems:  Constitutional: No fever, weight loss or fatigue  ENMT:  No difficulty hearing, tinnitus, vertigo; No sinus or throat pain  Respiratory: No cough, wheezing, chills or hemoptysis  Cardiovascular: No chest pain, palpitations, dizziness or leg swelling  Gastrointestinal: No abdominal or epigastric pain. No nausea, vomiting or hematemesis; No diarrhea or constipation. No melena or hematochezia.  Skin: No itching, burning, rashes or lesions   Musculoskeletal: No joint pain or swelling; No muscle, back or extremity pain    Vital Signs Last 24 Hrs  T(C): 36.9 (09 Jul 2021 11:18), Max: 37.8 (08 Jul 2021 21:12)  T(F): 98.5 (09 Jul 2021 11:18), Max: 100 (08 Jul 2021 21:12)  HR: 78 (09 Jul 2021 11:18) (75 - 82)  BP: 115/74 (09 Jul 2021 11:18) (114/72 - 126/74)  BP(mean): --  RR: 17 (09 Jul 2021 04:21) (16 - 17)  SpO2: 97% (09 Jul 2021 04:21) (97% - 97%)    PHYSICAL EXAM:  General: Well developed; well nourished; in no acute distress  HEENT: MMM, conjunctiva and sclera clear  H- RRR  Respiratory: Lungs clear to auscultation.   Gastrointestinal: Soft, non-tender non-distended; Normal bowel sounds; No rebound or guarding  Extremities: Normal range of motion, No clubbing, cyanosis or edema  Neurological: Alert and oriented x3  Skin: Warm and dry. No obvious rash    LABS:                        10.3   5.55  )-----------( 113      ( 09 Jul 2021 07:28 )             30.7     07-09    140  |  107  |  7.6<L>  ----------------------------<  195<H>  3.7   |  24.0  |  0.58    Ca    8.5<L>      09 Jul 2021 07:28  Mg     1.7     07-09              RADIOLOGY & ADDITIONAL TESTS:   EXAM:  CT CHEST OC                          PROCEDURE DATE:  07/07/2021          INTERPRETATION:  CLINICAL INFORMATION: Follow-up pneumomediastinum. History of Boerhaave's.    COMPARISON: July 5, 2021.    PROCEDURE:  CT of the Chest was performed with oral Gastroview.  Sagittal and coronal reformats were performed.    FINDINGS:    LUNGS AND AIRWAYS: Patent central airways. 1 mm calcified granuloma in the right middle lobe on image 113. Lungs are clear.  PLEURA: Trace bilateral layering pleuralfluid. No pneumothorax. There has been interval decrease in residual pneumomediastinum, predominantly in the superior and middle mediastinum. The retropharyngeal and inferior neck subcutaneous emphysema has resolved. No extravasation of oral contrastis identified from the esophagus. There is mild concentric wall thickening of the distal esophagus.  MEDIASTINUM AND JOSE: No lymphadenopathy.  VESSELS: Within normal limits.  HEART: Heart size is normal. No pericardial effusion.  CHEST WALL AND LOWER NECK: Within normal limits.  VISUALIZED UPPER ABDOMEN: Layering tiny gallstones.  BONES: Within normal limits. There is skin thickening and infiltration with subcutaneous gas of the proximal left upper extremity.    IMPRESSION: No evidence of extravasation of oral contrast from the esophagus. Decreased pneumomediastinum, now mild. Resolved gas in the soft tissues of the neck and retropharyngeal space    --- End of Report ---   Patient is a 30y old  Male who presents with a chief complaint of DKA, Alcohol withdrawal (09 Jul 2021 10:56)      INTERVAL HPI/OVERNIGHT EVENTS: Patient seeing and evaluated at bedside, reporting no complains, tolerating oral intake  no overnight events, repeat CT-chest revealing no extravasation of oral contrast. Denies nausea, vomiting, abdominal pain, chest pain, shortness of breath, hematemesis, hematochezia, melena.                                                           .  MEDICATIONS  (STANDING):  chlorhexidine 4% Liquid 1 Application(s) Topical <User Schedule>  dextrose 40% Gel 15 Gram(s) Oral once  dextrose 5%. 1000 milliLiter(s) (50 mL/Hr) IV Continuous <Continuous>  dextrose 5%. 1000 milliLiter(s) (100 mL/Hr) IV Continuous <Continuous>  dextrose 50% Injectable 25 Gram(s) IV Push once  dextrose 50% Injectable 12.5 Gram(s) IV Push once  dextrose 50% Injectable 25 Gram(s) IV Push once  folic acid Injectable 1 milliGRAM(s) IV Push daily  glucagon  Injectable 1 milliGRAM(s) IntraMuscular once  insulin glargine Injectable (LANTUS) 10 Unit(s) SubCutaneous every morning  insulin lispro (ADMELOG) corrective regimen sliding scale   SubCutaneous every 6 hours  insulin lispro Injectable (ADMELOG) 4 Unit(s) SubCutaneous three times a day before meals  LORazepam   Injectable   IV Push   LORazepam   Injectable 0.5 milliGRAM(s) IV Push every 12 hours  melatonin 3 milliGRAM(s) Oral at bedtime  pantoprazole  Injectable 40 milliGRAM(s) IV Push two times a day  piperacillin/tazobactam IVPB.. 3.375 Gram(s) IV Intermittent every 8 hours  thiamine 100 milliGRAM(s) Oral daily    MEDICATIONS  (PRN):  hydrALAZINE Injectable 10 milliGRAM(s) IV Push every 6 hours PRN SBP > 140, DBP > 110  HYDROmorphone  Injectable 1 milliGRAM(s) IV Push every 4 hours PRN Severe Pain (7 - 10)  LORazepam   Injectable 4 milliGRAM(s) IV Push every 1 hour PRN Symptom-triggered: each CIWA -Ar score 8 or GREATER      Allergies    No Known Allergies    Intolerances        Review of Systems:  Constitutional: No fever, weight loss or fatigue  ENMT:  No difficulty hearing, tinnitus, vertigo; No sinus or throat pain  Respiratory: No cough, wheezing, chills or hemoptysis  Cardiovascular: No chest pain, palpitations, dizziness or leg swelling  Gastrointestinal: No abdominal or epigastric pain. No nausea, vomiting or hematemesis; No diarrhea or constipation. No melena or hematochezia.  Skin: No itching, burning, rashes or lesions   Musculoskeletal: No joint pain or swelling; No muscle, back or extremity pain    Vital Signs Last 24 Hrs  T(C): 36.9 (09 Jul 2021 11:18), Max: 37.8 (08 Jul 2021 21:12)  T(F): 98.5 (09 Jul 2021 11:18), Max: 100 (08 Jul 2021 21:12)  HR: 78 (09 Jul 2021 11:18) (75 - 82)  BP: 115/74 (09 Jul 2021 11:18) (114/72 - 126/74)  BP(mean): --  RR: 17 (09 Jul 2021 04:21) (16 - 17)  SpO2: 97% (09 Jul 2021 04:21) (97% - 97%)    PHYSICAL EXAM:  General: Well developed; well nourished; in no acute distress  HEENT: MMM, conjunctiva and sclera clear  H- RRR  Respiratory: Lungs clear to auscultation.   Gastrointestinal: Soft, non-tender non-distended; Normal bowel sounds; No rebound or guarding  Extremities: Normal range of motion, No clubbing, cyanosis or edema  Neurological: Alert and oriented x3  Skin: Warm and dry. No obvious rash    LABS:                        10.3   5.55  )-----------( 113      ( 09 Jul 2021 07:28 )             30.7     07-09    140  |  107  |  7.6<L>  ----------------------------<  195<H>  3.7   |  24.0  |  0.58    Ca    8.5<L>      09 Jul 2021 07:28  Mg     1.7     07-09              RADIOLOGY & ADDITIONAL TESTS:   EXAM:  CT CHEST OC                          PROCEDURE DATE:  07/07/2021          INTERPRETATION:  CLINICAL INFORMATION: Follow-up pneumomediastinum. History of Boerhaave's.    COMPARISON: July 5, 2021.    PROCEDURE:  CT of the Chest was performed with oral Gastroview.  Sagittal and coronal reformats were performed.    FINDINGS:    LUNGS AND AIRWAYS: Patent central airways. 1 mm calcified granuloma in the right middle lobe on image 113. Lungs are clear.  PLEURA: Trace bilateral layering pleuralfluid. No pneumothorax. There has been interval decrease in residual pneumomediastinum, predominantly in the superior and middle mediastinum. The retropharyngeal and inferior neck subcutaneous emphysema has resolved. No extravasation of oral contrastis identified from the esophagus. There is mild concentric wall thickening of the distal esophagus.  MEDIASTINUM AND JOSE: No lymphadenopathy.  VESSELS: Within normal limits.  HEART: Heart size is normal. No pericardial effusion.  CHEST WALL AND LOWER NECK: Within normal limits.  VISUALIZED UPPER ABDOMEN: Layering tiny gallstones.  BONES: Within normal limits. There is skin thickening and infiltration with subcutaneous gas of the proximal left upper extremity.    IMPRESSION: No evidence of extravasation of oral contrast from the esophagus. Decreased pneumomediastinum, now mild. Resolved gas in the soft tissues of the neck and retropharyngeal space    --- End of Report ---

## 2021-07-09 NOTE — PROGRESS NOTE ADULT - SUBJECTIVE AND OBJECTIVE BOX
INTERVAL HPI/OVERNIGHT EVENTS:   follow up T1DM   pt getting readt for discharge   arm less swolen   MEDICATIONS  (STANDING):  chlorhexidine 4% Liquid 1 Application(s) Topical <User Schedule>  dextrose 40% Gel 15 Gram(s) Oral once  dextrose 5%. 1000 milliLiter(s) (50 mL/Hr) IV Continuous <Continuous>  dextrose 5%. 1000 milliLiter(s) (100 mL/Hr) IV Continuous <Continuous>  dextrose 50% Injectable 25 Gram(s) IV Push once  dextrose 50% Injectable 12.5 Gram(s) IV Push once  dextrose 50% Injectable 25 Gram(s) IV Push once  folic acid Injectable 1 milliGRAM(s) IV Push daily  glucagon  Injectable 1 milliGRAM(s) IntraMuscular once  insulin glargine Injectable (LANTUS) 10 Unit(s) SubCutaneous every morning  insulin lispro (ADMELOG) corrective regimen sliding scale   SubCutaneous every 6 hours  insulin lispro Injectable (ADMELOG) 4 Unit(s) SubCutaneous three times a day before meals  LORazepam   Injectable 0.5 milliGRAM(s) IV Push every 12 hours  LORazepam   Injectable   IV Push   melatonin 3 milliGRAM(s) Oral at bedtime  pantoprazole  Injectable 40 milliGRAM(s) IV Push two times a day  piperacillin/tazobactam IVPB.. 3.375 Gram(s) IV Intermittent every 8 hours  thiamine 100 milliGRAM(s) Oral daily    MEDICATIONS  (PRN):  hydrALAZINE Injectable 10 milliGRAM(s) IV Push every 6 hours PRN SBP > 140, DBP > 110  HYDROmorphone  Injectable 1 milliGRAM(s) IV Push every 4 hours PRN Severe Pain (7 - 10)  LORazepam   Injectable 4 milliGRAM(s) IV Push every 1 hour PRN Symptom-triggered: each CIWA -Ar score 8 or GREATER      Allergies    No Known Allergies    Intolerances        Review of systems:    Vital Signs Last 24 Hrs  T(C): 36.4 (09 Jul 2021 16:46), Max: 36.9 (09 Jul 2021 11:18)  T(F): 97.6 (09 Jul 2021 16:46), Max: 98.5 (09 Jul 2021 11:18)  HR: 86 (09 Jul 2021 16:46) (75 - 86)  BP: 122/72 (09 Jul 2021 16:46) (114/72 - 122/72)  BP(mean): --  RR: 18 (09 Jul 2021 16:46) (17 - 18)  SpO2: 100% (09 Jul 2021 16:46) (97% - 100%)    PHYSICAL EXAM:      Constitutional: NAD, well-groomed, well-developed  HEENT: PERRLA, EOMI, no exophalmos  Neck: No LAD, No JVD, trachea midline, no thyroid enlargement  Back: Normal spine flexure, No CVA tenderness  Respiratory: CTAB  Cardiovascular: S1 and S2, RRR, no M/G/R    Extremities: No peripheral edema, no pedal lesions  Vascular: 2+ peripheral pulses    Musculoskeletal: 5/5 strength b/l upper and lower extremities  Skin: No rashes, no acanthosis        LABS:                        10.3   5.55  )-----------( 113      ( 09 Jul 2021 07:28 )             30.7     07-09    140  |  107  |  7.6<L>  ----------------------------<  195<H>  3.7   |  24.0  |  0.58    Ca    8.5<L>      09 Jul 2021 07:28  Mg     1.7     07-09            CAPILLARY BLOOD GLUCOSE    CAPILLARY BLOOD GLUCOSE      POCT Blood Glucose.: 117 mg/dL (09 Jul 2021 17:11)  POCT Blood Glucose.: 154 mg/dL (09 Jul 2021 12:53)  POCT Blood Glucose.: 182 mg/dL (09 Jul 2021 09:21)  POCT Blood Glucose.: 165 mg/dL (09 Jul 2021 08:13)    RADIOLOGY & ADDITIONAL TESTS:

## 2021-07-09 NOTE — PROGRESS NOTE ADULT - ASSESSMENT
T1DM   reviewd discharge instrucitons   Lantus 10 QHS - Pt has Badsaglar - can use home insulin   Humalog 4 tidac with meals with carbs  otherwise hold    Check BS tid ac and qhs     appt /4 in [facility] with SREEDHAR DOWD E

## 2021-07-09 NOTE — DISCHARGE NOTE PROVIDER - NSDCMRMEDTOKEN_GEN_ALL_CORE_FT
apixaban 5 mg oral tablet: 1 tab(s) orally every 12 hours  folic acid 1 mg oral tablet: 1 tab(s) orally once a day  HumaLOG KwikPen 100 units/mL injectable solution: 4 unit(s) injectable 3 times a day (with meals)   Lantus Solostar Pen 100 units/mL subcutaneous solution: 10 unit(s) subcutaneous once a day (at bedtime)   losartan 25 mg oral tablet: 1 tab(s) orally once a day  metoprolol tartrate 25 mg oral tablet: 1 tab(s) orally once a day  Multiple Vitamins oral tablet: 1 tab(s) orally once a day  pantoprazole 40 mg oral delayed release tablet: 1 tab(s) orally once a day (before a meal)  thiamine 100 mg oral tablet: 1 tab(s) orally once a day

## 2021-07-09 NOTE — PROGRESS NOTE ADULT - NSICDXPILOT_GEN_ALL_CORE
[place]

## 2021-07-09 NOTE — PROGRESS NOTE ADULT - SUBJECTIVE AND OBJECTIVE BOX
Subjective - patient seen and evaluated bedside. Sitting comfortably in bed. Denies CP, SOB, HA, dizziness, n/v/d    Review of Systems: negative x 10 systems except as noted above    Brief summary:  30yMale with pneumomediastinum s/p radha miguel tear in setting of DKA w/ vomiting     Significant/Nevy87ml events: tolerating clear liquid diet      PAST MEDICAL & SURGICAL HISTORY:  Alcohol abuse    Pancreatitis    HTN (hypertension)    Diabetes    Atrial fibrillation    Non-ischemic cardiomyopathy    No significant past surgical history    No significant past surgical history          chlorhexidine 4% Liquid 1 Application(s) Topical <User Schedule>  dextrose 40% Gel 15 Gram(s) Oral once  dextrose 5%. 1000 milliLiter(s) IV Continuous <Continuous>  dextrose 5%. 1000 milliLiter(s) IV Continuous <Continuous>  dextrose 50% Injectable 25 Gram(s) IV Push once  dextrose 50% Injectable 12.5 Gram(s) IV Push once  dextrose 50% Injectable 25 Gram(s) IV Push once  folic acid Injectable 1 milliGRAM(s) IV Push daily  glucagon  Injectable 1 milliGRAM(s) IntraMuscular once  hydrALAZINE Injectable 10 milliGRAM(s) IV Push every 6 hours PRN  HYDROmorphone  Injectable 1 milliGRAM(s) IV Push every 4 hours PRN  insulin glargine Injectable (LANTUS) 10 Unit(s) SubCutaneous every morning  insulin lispro (ADMELOG) corrective regimen sliding scale   SubCutaneous every 6 hours  insulin lispro Injectable (ADMELOG) 4 Unit(s) SubCutaneous three times a day before meals  LORazepam   Injectable   IV Push   LORazepam   Injectable 4 milliGRAM(s) IV Push every 1 hour PRN  LORazepam   Injectable 0.5 milliGRAM(s) IV Push every 12 hours  melatonin 3 milliGRAM(s) Oral at bedtime  pantoprazole  Injectable 40 milliGRAM(s) IV Push two times a day  piperacillin/tazobactam IVPB.. 3.375 Gram(s) IV Intermittent every 8 hours  thiamine 100 milliGRAM(s) Oral daily  MEDICATIONS  (PRN):  hydrALAZINE Injectable 10 milliGRAM(s) IV Push every 6 hours PRN SBP > 140, DBP > 110  HYDROmorphone  Injectable 1 milliGRAM(s) IV Push every 4 hours PRN Severe Pain (7 - 10)  LORazepam   Injectable 4 milliGRAM(s) IV Push every 1 hour PRN Symptom-triggered: each CIWA -Ar score 8 or GREATER      Daily     Daily                               10.3   5.55  )-----------( 113      ( 09 Jul 2021 07:28 )             30.7   07-09    140  |  107  |  7.6<L>  ----------------------------<  195<H>  3.7   |  24.0  |  0.58    Ca    8.5<L>      09 Jul 2021 07:28  Mg     1.7     07-09              Objective:  T(C): 36.5 (07-09-21 @ 04:21), Max: 37.8 (07-08-21 @ 21:12)  HR: 75 (07-09-21 @ 04:21) (75 - 94)  BP: 114/72 (07-09-21 @ 04:21) (114/72 - 126/74)  RR: 17 (07-09-21 @ 04:21) (16 - 18)  SpO2: 97% (07-09-21 @ 04:21) (97% - 97%)  Wt(kg): --CAPILLARY BLOOD GLUCOSE      POCT Blood Glucose.: 182 mg/dL (09 Jul 2021 09:21)  POCT Blood Glucose.: 165 mg/dL (09 Jul 2021 08:13)  POCT Blood Glucose.: 190 mg/dL (08 Jul 2021 20:41)  POCT Blood Glucose.: 125 mg/dL (08 Jul 2021 16:49)  POCT Blood Glucose.: 217 mg/dL (08 Jul 2021 11:39)  I&O's Summary      Physical Exam  Neuro: A+O x 3, non-focal, speech clear and intact  Pulm: CTA, equal bilaterally  CV: RRR, , +S1S2  Abd: soft, NT, ND, +BS  Ext: +DP Pulses b/l, no edema      Imaging:  < from: CT Chest w/ Oral Cont (07.07.21 @ 15:46) >  IMPRESSION: No evidence of extravasation of oral contrast from the esophagus. Decreased pneumomediastinum, now mild. Resolved gas in the soft tissues of the neck and retropharyngeal space    < end of copied text >  < from: Xray Chest 1 View- PORTABLE-Routine (Xray Chest 1 View- PORTABLE-Routine in AM.) (07.07.21 @ 06:35) >  Visualized osseous structures are intact.    IMPRESSION:   No evidence of active chest disease.    < end of copied text >

## 2021-07-09 NOTE — ADVANCED PRACTICE NURSE CONSULT - RECOMMEDATIONS
continue diabetes self management education  cc- pls task pt to diabetes wellness out pt
continue diabetes self management education  pt to inject all inuslin doses while in the hospital using prefilled insulin syringe and rn supervision  cc- pls task pt to diabetes wellness out pt

## 2021-07-09 NOTE — PROGRESS NOTE ADULT - REASON FOR ADMISSION
DKA, Alcohol withdrawal
DKA, Alcohol withdrawal; Vomiting/ Pneumomediastinum.
DKA, Alcohol withdrawal

## 2021-07-09 NOTE — ADVANCED PRACTICE NURSE CONSULT - ASSESSMENT
return to see pt in am, pt is a+ox3 c/o 0 apin. pt states he does not want to go to rehab right now, when asked about his plan to avoid drinking and succeed in his diabetes care he was exploring [facility] out patient services. he states he has a counselor there . discussed w pt reaching out to the counselor asap post dc. discussed w him the fu appointment w dr blackwood's office and the importance of staying sober. pt verbalize the importance of staying sobor  and the effect of his alcohol on bg and bg management, but as of now still not committed.  
went to see pt in afternoon, pt is a+ox3 c/o 0 pain sitting in bed. pt states he had a relapse in his diabetes care, he was drinking alcohol and was forgetting to take his insulin as prescribed. he was taking lantus and metformin. since last dc he admitted himself into a rehab in [facility]. he is here due to relapse. asked pt to think of a plan to assist him in staying sober and keeping up with his diabetes care. pt has a fu appointment w dr Mota  in [place] on august 4 @ 1000a

## 2021-07-09 NOTE — PROGRESS NOTE ADULT - PROBLEM SELECTOR PROBLEM 1
Pneumomediastinum
Pneumomediastinum
Hematemesis, presence of nausea not specified
Hematemesis, presence of nausea not specified
Pneumomediastinum
Pneumomediastinum
Hematemesis, presence of nausea not specified
Pneumomediastinum

## 2021-07-09 NOTE — DISCHARGE NOTE NURSING/CASE MANAGEMENT/SOCIAL WORK - PATIENT PORTAL LINK FT
You can access the FollowMyHealth Patient Portal offered by [facility] by registering at the following website: [email or web address]. By joining [employer]’s FollowMyHealth portal, you will also be able to view your health information using other applications (apps) compatible with our system.

## 2021-07-09 NOTE — PROGRESS NOTE ADULT - PROVIDER SPECIALTY LIST ADULT
Endocrinology
Endocrinology
Hospitalist
Critical Care
Hospitalist
Gastroenterology
Hospitalist
Thoracic Surgery
Critical Care
Critical Care
Endocrinology
Thoracic Surgery
Gastroenterology
Thoracic Surgery
Thoracic Surgery
Gastroenterology

## 2021-07-09 NOTE — DISCHARGE NOTE NURSING/CASE MANAGEMENT/SOCIAL WORK - NSDCVIVACCINE_GEN_ALL_CORE_FT
Tdap; 29-Oct-2019 23:07; Odessa Landis (RN); [employer]; [id number] (Exp. Date: 22-Oct-2021); IntraMuscular; Deltoid Left.; 0.5 milliLiter(s); VIS (VIS Published: 09-May-2013, VIS Presented: 29-Oct-2019);

## 2021-07-22 ENCOUNTER — EMERGENCY (EMERGENCY)
Facility: HOSPITAL | Age: 31
LOS: 1 days | Discharge: HOME | End: 2021-07-22
Attending: EMERGENCY MEDICINE
Payer: MEDICAID

## 2021-07-22 VITALS
DIASTOLIC BLOOD PRESSURE: 105 MMHG | HEIGHT: 67 IN | RESPIRATION RATE: 24 BRPM | TEMPERATURE: 99 F | OXYGEN SATURATION: 94 % | HEART RATE: 117 BPM | WEIGHT: 160.06 LBS | SYSTOLIC BLOOD PRESSURE: 162 MMHG

## 2021-07-22 LAB
ALBUMIN SERPL ELPH-MCNC: 4.1 G/DL — SIGNIFICANT CHANGE UP (ref 3.3–5.2)
ALP SERPL-CCNC: 113 U/L — SIGNIFICANT CHANGE UP (ref 40–120)
ALT FLD-CCNC: 13 U/L — SIGNIFICANT CHANGE UP
ANION GAP SERPL CALC-SCNC: 14 MMOL/L — SIGNIFICANT CHANGE UP (ref 5–17)
APPEARANCE UR: CLEAR — SIGNIFICANT CHANGE UP
AST SERPL-CCNC: 47 U/L — HIGH
BASE EXCESS BLDV CALC-SCNC: 1.5 MMOL/L — SIGNIFICANT CHANGE UP (ref -2–3)
BASOPHILS # BLD AUTO: 0.08 K/UL — SIGNIFICANT CHANGE UP (ref 0–0.2)
BASOPHILS NFR BLD AUTO: 2.6 % — HIGH (ref 0–2)
BILIRUB SERPL-MCNC: 0.6 MG/DL — SIGNIFICANT CHANGE UP (ref 0.4–2)
BILIRUB UR-MCNC: NEGATIVE — SIGNIFICANT CHANGE UP
BUN SERPL-MCNC: 5.2 MG/DL — LOW (ref 8–20)
CA-I SERPL-SCNC: 1.03 MMOL/L — LOW (ref 1.15–1.33)
CALCIUM SERPL-MCNC: 8.6 MG/DL — SIGNIFICANT CHANGE UP (ref 8.6–10.2)
CHLORIDE BLDV-SCNC: 103 MMOL/L — SIGNIFICANT CHANGE UP (ref 98–107)
CHLORIDE SERPL-SCNC: 98 MMOL/L — SIGNIFICANT CHANGE UP (ref 98–107)
CK SERPL-CCNC: 93 U/L — SIGNIFICANT CHANGE UP (ref 30–200)
CO2 SERPL-SCNC: 24 MMOL/L — SIGNIFICANT CHANGE UP (ref 22–29)
COLOR SPEC: YELLOW — SIGNIFICANT CHANGE UP
CREAT SERPL-MCNC: 0.7 MG/DL — SIGNIFICANT CHANGE UP (ref 0.5–1.3)
DIFF PNL FLD: NEGATIVE — SIGNIFICANT CHANGE UP
EOSINOPHIL # BLD AUTO: 0.03 K/UL — SIGNIFICANT CHANGE UP (ref 0–0.5)
EOSINOPHIL NFR BLD AUTO: 0.9 % — SIGNIFICANT CHANGE UP (ref 0–6)
ETHANOL SERPL-MCNC: 429 MG/DL — HIGH (ref 0–9)
GAS PNL BLDV: 138 MMOL/L — SIGNIFICANT CHANGE UP (ref 136–145)
GAS PNL BLDV: SIGNIFICANT CHANGE UP
GIANT PLATELETS BLD QL SMEAR: PRESENT — SIGNIFICANT CHANGE UP
GLUCOSE BLDV-MCNC: 233 MG/DL — HIGH (ref 70–99)
GLUCOSE SERPL-MCNC: 226 MG/DL — HIGH (ref 70–99)
GLUCOSE UR QL: 100 MG/DL
HCO3 BLDV-SCNC: 27 MMOL/L — SIGNIFICANT CHANGE UP (ref 22–29)
HCT VFR BLD CALC: 35.1 % — LOW (ref 39–50)
HCT VFR BLDA CALC: 36 % — LOW (ref 39–51)
HGB BLD CALC-MCNC: 12 G/DL — LOW (ref 12.6–17.4)
HGB BLD-MCNC: 11.9 G/DL — LOW (ref 13–17)
KETONES UR-MCNC: NEGATIVE — SIGNIFICANT CHANGE UP
LACTATE BLDV-MCNC: 2.2 MMOL/L — HIGH (ref 0.5–2)
LEUKOCYTE ESTERASE UR-ACNC: NEGATIVE — SIGNIFICANT CHANGE UP
LIDOCAIN IGE QN: 6 U/L — LOW (ref 22–51)
LYMPHOCYTES # BLD AUTO: 1.45 K/UL — SIGNIFICANT CHANGE UP (ref 1–3.3)
LYMPHOCYTES # BLD AUTO: 46.1 % — HIGH (ref 13–44)
MAGNESIUM SERPL-MCNC: 1.8 MG/DL — SIGNIFICANT CHANGE UP (ref 1.6–2.6)
MANUAL SMEAR VERIFICATION: SIGNIFICANT CHANGE UP
MCHC RBC-ENTMCNC: 31.2 PG — SIGNIFICANT CHANGE UP (ref 27–34)
MCHC RBC-ENTMCNC: 33.9 GM/DL — SIGNIFICANT CHANGE UP (ref 32–36)
MCV RBC AUTO: 92.1 FL — SIGNIFICANT CHANGE UP (ref 80–100)
MONOCYTES # BLD AUTO: 0.19 K/UL — SIGNIFICANT CHANGE UP (ref 0–0.9)
MONOCYTES NFR BLD AUTO: 6.1 % — SIGNIFICANT CHANGE UP (ref 2–14)
NEUTROPHILS # BLD AUTO: 1.4 K/UL — LOW (ref 1.8–7.4)
NEUTROPHILS NFR BLD AUTO: 44.3 % — SIGNIFICANT CHANGE UP (ref 43–77)
NITRITE UR-MCNC: NEGATIVE — SIGNIFICANT CHANGE UP
OSMOLALITY SERPL: 410 MOSMOL/KG — HIGH (ref 275–300)
PCO2 BLDV: 45 MMHG — SIGNIFICANT CHANGE UP (ref 42–55)
PH BLDV: 7.38 — SIGNIFICANT CHANGE UP (ref 7.32–7.43)
PH UR: 6.5 — SIGNIFICANT CHANGE UP (ref 5–8)
PLAT MORPH BLD: NORMAL — SIGNIFICANT CHANGE UP
PLATELET # BLD AUTO: 236 K/UL — SIGNIFICANT CHANGE UP (ref 150–400)
PO2 BLDV: 106 MMHG — HIGH (ref 25–45)
POTASSIUM BLDV-SCNC: 3.9 MMOL/L — SIGNIFICANT CHANGE UP (ref 3.5–5.1)
POTASSIUM SERPL-MCNC: 4.1 MMOL/L — SIGNIFICANT CHANGE UP (ref 3.5–5.3)
POTASSIUM SERPL-SCNC: 4.1 MMOL/L — SIGNIFICANT CHANGE UP (ref 3.5–5.3)
PROT SERPL-MCNC: 7.4 G/DL — SIGNIFICANT CHANGE UP (ref 6.6–8.7)
PROT UR-MCNC: NEGATIVE MG/DL — SIGNIFICANT CHANGE UP
RBC # BLD: 3.81 M/UL — LOW (ref 4.2–5.8)
RBC # FLD: 13.6 % — SIGNIFICANT CHANGE UP (ref 10.3–14.5)
RBC BLD AUTO: NORMAL — SIGNIFICANT CHANGE UP
SAO2 % BLDV: 98.3 % — SIGNIFICANT CHANGE UP
SODIUM SERPL-SCNC: 136 MMOL/L — SIGNIFICANT CHANGE UP (ref 135–145)
SP GR SPEC: 1 — LOW (ref 1.01–1.02)
TROPONIN T SERPL-MCNC: <0.01 NG/ML — SIGNIFICANT CHANGE UP (ref 0–0.06)
UROBILINOGEN FLD QL: NEGATIVE MG/DL — SIGNIFICANT CHANGE UP
WBC # BLD: 3.15 K/UL — LOW (ref 3.8–10.5)
WBC # FLD AUTO: 3.15 K/UL — LOW (ref 3.8–10.5)

## 2021-07-22 PROCEDURE — 71045 X-RAY EXAM CHEST 1 VIEW: CPT | Mod: 26

## 2021-07-22 PROCEDURE — 93010 ELECTROCARDIOGRAM REPORT: CPT

## 2021-07-22 PROCEDURE — 99285 EMERGENCY DEPT VISIT HI MDM: CPT

## 2021-07-22 RX ORDER — SODIUM CHLORIDE 9 MG/ML
2000 INJECTION, SOLUTION INTRAVENOUS ONCE
Refills: 0 | Status: COMPLETED | OUTPATIENT
Start: 2021-07-22 | End: 2021-07-22

## 2021-07-22 RX ORDER — FAMOTIDINE 10 MG/ML
20 INJECTION INTRAVENOUS ONCE
Refills: 0 | Status: COMPLETED | OUTPATIENT
Start: 2021-07-22 | End: 2021-07-22

## 2021-07-22 RX ADMIN — Medication 2 MILLIGRAM(S): at 22:32

## 2021-07-22 RX ADMIN — SODIUM CHLORIDE 2000 MILLILITER(S): 9 INJECTION, SOLUTION INTRAVENOUS at 22:25

## 2021-07-22 RX ADMIN — FAMOTIDINE 20 MILLIGRAM(S): 10 INJECTION INTRAVENOUS at 23:52

## 2021-07-22 NOTE — ED PROVIDER NOTE - CLINICAL SUMMARY MEDICAL DECISION MAKING FREE TEXT BOX
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  Patient given IV fluids and medication.  EKG labs and imaging performed to evaluate the patient.

## 2021-07-22 NOTE — ED ADULT TRIAGE NOTE - CHIEF COMPLAINT QUOTE
shortness of breath and abd pain pt tremoring in triage.  ran out of medication 2 days ago +alcohol intake today, pt drinks 4,  24oz cans of beer a day. shortness of breath and abd pain pt tremoring in triage.  ran out of medication 2 days ago +alcohol intake today, pt drinks 4,  24oz cans of beer a day. states is on eliquis for afib, denies falls/injury

## 2021-07-22 NOTE — ED ADULT NURSE NOTE - NSIMPLEMENTINTERV_GEN_ALL_ED
Implemented All Fall Risk Interventions:  [place]ay to call system. Call bell, personal items and telephone within reach. Instruct patient to call for assistance. Room bathroom lighting operational. Non-slip footwear when patient is off stretcher. Physically safe environment: no spills, clutter or unnecessary equipment. Stretcher in lowest position, wheels locked, appropriate side rails in place. Provide visual cue, wrist band, yellow gown, etc. Monitor gait and stability. Monitor for mental status changes and reorient to person, place, and time. Review medications for side effects contributing to fall risk. Reinforce activity limits and safety measures with patient and family.

## 2021-07-22 NOTE — ED PROVIDER NOTE - NS ED ROS FT
General: No fever, no massive bleeding  Head: No HA, no ongoing scalp bleed  ENT: no neck pain, no sore throat  Resp: + SOB, no hemoptysis  Cardiovascular: no CP, No LOC  GI: + abdominal pain, No blood in stool  : No dysuria, no hematuria   MSK: No back pain, no limb pain  Skin: No painful or bleeding lesions  Neuro: No paresthesias, No focal deficits

## 2021-07-22 NOTE — ED PROVIDER NOTE - ATTENDING CONTRIBUTION TO CARE
Intoxicated with non-specific abdominal pain and shortness of breath, non-tender on exam. No actionable findings on labs, treated symptomatically with acid suppression, observed overnight until safe for dc and all symptoms have resolved.

## 2021-07-22 NOTE — ED ADULT NURSE NOTE - OBJECTIVE STATEMENT
Assumed care of pt at 22:38. Pt A&Ox3 c/o abdominal pain and difficulty breathing this PM. Pt states he is diabetic,  at triage. Pt admits to chronic ETOH use, last use this PM, 4 beers. Pt states he "feels anxious" and "it hurts to breathe". Pt bought to CC area of ED for further evaluation. MD Branch at bedside, pt placed on CM, VSS, CIWA 8. Medicated as per orders.

## 2021-07-22 NOTE — ED PROVIDER NOTE - PATIENT PORTAL LINK FT
You can access the FollowMyHealth Patient Portal offered by [facility] by registering at the following website: [email or web address]. By joining [facility]’s FollowMyHealth portal, you will also be able to view your health information using other applications (apps) compatible with our system.

## 2021-07-22 NOTE — ED PROVIDER NOTE - OBJECTIVE STATEMENT
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 Patient normally has 12 beers a day but only had 2 day. Patient states he took his meds today but has not been adhering well recently.  Otherwise denies pain, bleeding, chest pain, or fevers.  Denies other pertinent medical problems.  Denies any other substance use.

## 2021-07-22 NOTE — ED PROVIDER NOTE - PROGRESS NOTE DETAILS
Patient slept to sobriety, now feeling improved and wishes to leave, no apparent withdrawal symptoms at this time.  Workup is unremarkable for any emergent process.   Patient/family was given full return precautions.  Patient was counseled on red flag symptoms such as fever, severe pain, or focal deficits and advised to return to the ED for these reasons or any reason that was concerning to them.  Patient/ family advised to make close follow up with their primary care provider and specialty clinics (as applicable) to follow up with this visit and continue investigation/treatment.  All questions were answered. Patient/family has shown adequate competence and understanding. Patient/family is agreeable to the plan.

## 2021-07-23 VITALS
OXYGEN SATURATION: 97 % | RESPIRATION RATE: 19 BRPM | SYSTOLIC BLOOD PRESSURE: 122 MMHG | TEMPERATURE: 98 F | DIASTOLIC BLOOD PRESSURE: 72 MMHG | HEART RATE: 87 BPM

## 2021-07-23 LAB — SARS-COV-2 RNA SPEC QL NAA+PROBE: SIGNIFICANT CHANGE UP

## 2021-07-23 PROCEDURE — 85018 HEMOGLOBIN: CPT

## 2021-07-23 PROCEDURE — 82550 ASSAY OF CK (CPK): CPT

## 2021-07-23 PROCEDURE — 71045 X-RAY EXAM CHEST 1 VIEW: CPT

## 2021-07-23 PROCEDURE — 82962 GLUCOSE BLOOD TEST: CPT

## 2021-07-23 PROCEDURE — 85014 HEMATOCRIT: CPT

## 2021-07-23 PROCEDURE — 82435 ASSAY OF BLOOD CHLORIDE: CPT

## 2021-07-23 PROCEDURE — 85025 COMPLETE CBC W/AUTO DIFF WBC: CPT

## 2021-07-23 PROCEDURE — 83735 ASSAY OF MAGNESIUM: CPT

## 2021-07-23 PROCEDURE — 84295 ASSAY OF SERUM SODIUM: CPT

## 2021-07-23 PROCEDURE — 83690 ASSAY OF LIPASE: CPT

## 2021-07-23 PROCEDURE — 82803 BLOOD GASES ANY COMBINATION: CPT

## 2021-07-23 PROCEDURE — 82330 ASSAY OF CALCIUM: CPT

## 2021-07-23 PROCEDURE — 93005 ELECTROCARDIOGRAM TRACING: CPT

## 2021-07-23 PROCEDURE — 82947 ASSAY GLUCOSE BLOOD QUANT: CPT

## 2021-07-23 PROCEDURE — 80053 COMPREHEN METABOLIC PANEL: CPT

## 2021-07-23 PROCEDURE — 36415 COLL VENOUS BLD VENIPUNCTURE: CPT

## 2021-07-23 PROCEDURE — 81003 URINALYSIS AUTO W/O SCOPE: CPT

## 2021-07-23 PROCEDURE — 80307 DRUG TEST PRSMV CHEM ANLYZR: CPT

## 2021-07-23 PROCEDURE — 96375 TX/PRO/DX INJ NEW DRUG ADDON: CPT

## 2021-07-23 PROCEDURE — U0003: CPT

## 2021-07-23 PROCEDURE — U0005: CPT

## 2021-07-23 PROCEDURE — 96374 THER/PROPH/DIAG INJ IV PUSH: CPT

## 2021-07-23 PROCEDURE — 83605 ASSAY OF LACTIC ACID: CPT

## 2021-07-23 PROCEDURE — 84484 ASSAY OF TROPONIN QUANT: CPT

## 2021-07-23 PROCEDURE — 83930 ASSAY OF BLOOD OSMOLALITY: CPT

## 2021-07-23 PROCEDURE — 99284 EMERGENCY DEPT VISIT MOD MDM: CPT | Mod: 25

## 2021-07-23 PROCEDURE — 84132 ASSAY OF SERUM POTASSIUM: CPT

## 2021-07-23 RX ORDER — LANOLIN ALCOHOL/MO/W.PET/CERES
1 CREAM (GRAM) TOPICAL AT BEDTIME
Refills: 0 | Status: DISCONTINUED | OUTPATIENT
Start: 2021-07-23 | End: 2021-07-27

## 2021-07-23 RX ADMIN — Medication 1 MILLIGRAM(S): at 02:18

## 2021-07-23 NOTE — ED ADULT NURSE REASSESSMENT NOTE - NS ED NURSE REASSESS COMMENT FT1
assumed care of pt, Pt in no apparent distress at this time. Airway patent, breathing spontaneous and nonlabored. Pt A&Ox3 resting in stretcher. Pt no complaints at this time

## 2021-08-04 ENCOUNTER — APPOINTMENT (OUTPATIENT)
Dept: ENDOCRINOLOGY | Facility: CLINIC | Age: 31
End: 2021-08-04

## 2021-08-22 ENCOUNTER — EMERGENCY (EMERGENCY)
Facility: HOSPITAL | Age: 31
LOS: 1 days | Discharge: HOME | End: 2021-08-22
Attending: EMERGENCY MEDICINE
Payer: MEDICAID

## 2021-08-22 VITALS
HEIGHT: 67 IN | TEMPERATURE: 98 F | OXYGEN SATURATION: 86 % | WEIGHT: 164.91 LBS | RESPIRATION RATE: 24 BRPM | SYSTOLIC BLOOD PRESSURE: 158 MMHG | DIASTOLIC BLOOD PRESSURE: 120 MMHG | HEART RATE: 110 BPM

## 2021-08-22 VITALS
DIASTOLIC BLOOD PRESSURE: 69 MMHG | OXYGEN SATURATION: 96 % | RESPIRATION RATE: 18 BRPM | TEMPERATURE: 98 F | HEART RATE: 64 BPM | SYSTOLIC BLOOD PRESSURE: 128 MMHG

## 2021-08-22 LAB
ALBUMIN SERPL ELPH-MCNC: 4.8 G/DL — SIGNIFICANT CHANGE UP (ref 3.3–5.2)
ALP SERPL-CCNC: 128 U/L — HIGH (ref 40–120)
ALT FLD-CCNC: 48 U/L — HIGH
ANION GAP SERPL CALC-SCNC: 15 MMOL/L — SIGNIFICANT CHANGE UP (ref 5–17)
ANION GAP SERPL CALC-SCNC: 18 MMOL/L — HIGH (ref 5–17)
ANISOCYTOSIS BLD QL: SIGNIFICANT CHANGE UP
APTT BLD: 35 SEC — SIGNIFICANT CHANGE UP (ref 27.5–35.5)
AST SERPL-CCNC: 106 U/L — HIGH
BASE EXCESS BLDV CALC-SCNC: -1 MMOL/L — SIGNIFICANT CHANGE UP (ref -2–3)
BASOPHILS # BLD AUTO: 0.04 K/UL — SIGNIFICANT CHANGE UP (ref 0–0.2)
BASOPHILS NFR BLD AUTO: 0.9 % — SIGNIFICANT CHANGE UP (ref 0–2)
BILIRUB SERPL-MCNC: 1 MG/DL — SIGNIFICANT CHANGE UP (ref 0.4–2)
BUN SERPL-MCNC: 5.4 MG/DL — LOW (ref 8–20)
BUN SERPL-MCNC: 5.6 MG/DL — LOW (ref 8–20)
CA-I SERPL-SCNC: 1.02 MMOL/L — LOW (ref 1.15–1.33)
CALCIUM SERPL-MCNC: 8.3 MG/DL — LOW (ref 8.6–10.2)
CALCIUM SERPL-MCNC: 9 MG/DL — SIGNIFICANT CHANGE UP (ref 8.6–10.2)
CHLORIDE BLDV-SCNC: 105 MMOL/L — SIGNIFICANT CHANGE UP (ref 98–107)
CHLORIDE SERPL-SCNC: 102 MMOL/L — SIGNIFICANT CHANGE UP (ref 98–107)
CHLORIDE SERPL-SCNC: 98 MMOL/L — SIGNIFICANT CHANGE UP (ref 98–107)
CO2 SERPL-SCNC: 23 MMOL/L — SIGNIFICANT CHANGE UP (ref 22–29)
CO2 SERPL-SCNC: 23 MMOL/L — SIGNIFICANT CHANGE UP (ref 22–29)
CREAT SERPL-MCNC: 0.52 MG/DL — SIGNIFICANT CHANGE UP (ref 0.5–1.3)
CREAT SERPL-MCNC: 0.56 MG/DL — SIGNIFICANT CHANGE UP (ref 0.5–1.3)
EOSINOPHIL # BLD AUTO: 0.04 K/UL — SIGNIFICANT CHANGE UP (ref 0–0.5)
EOSINOPHIL NFR BLD AUTO: 0.9 % — SIGNIFICANT CHANGE UP (ref 0–6)
ETHANOL SERPL-MCNC: 522 MG/DL — HIGH (ref 0–9)
GAS PNL BLDV: 140 MMOL/L — SIGNIFICANT CHANGE UP (ref 136–145)
GAS PNL BLDV: SIGNIFICANT CHANGE UP
GIANT PLATELETS BLD QL SMEAR: PRESENT — SIGNIFICANT CHANGE UP
GLUCOSE BLDV-MCNC: 157 MG/DL — HIGH (ref 70–99)
GLUCOSE SERPL-MCNC: 163 MG/DL — HIGH (ref 70–99)
GLUCOSE SERPL-MCNC: 170 MG/DL — HIGH (ref 70–99)
HCO3 BLDV-SCNC: 24 MMOL/L — SIGNIFICANT CHANGE UP (ref 22–29)
HCT VFR BLD CALC: 38.1 % — LOW (ref 39–50)
HCT VFR BLDA CALC: 36 % — LOW (ref 39–51)
HGB BLD CALC-MCNC: 12 G/DL — LOW (ref 12.6–17.4)
HGB BLD-MCNC: 12.9 G/DL — LOW (ref 13–17)
INR BLD: 0.98 RATIO — SIGNIFICANT CHANGE UP (ref 0.88–1.16)
LACTATE BLDV-MCNC: 2.2 MMOL/L — HIGH (ref 0.5–2)
LACTATE BLDV-MCNC: 3.1 MMOL/L — HIGH (ref 0.5–2)
LACTATE BLDV-MCNC: 3.2 MMOL/L — HIGH (ref 0.5–2)
LYMPHOCYTES # BLD AUTO: 2.32 K/UL — SIGNIFICANT CHANGE UP (ref 1–3.3)
LYMPHOCYTES # BLD AUTO: 55.2 % — HIGH (ref 13–44)
MACROCYTES BLD QL: SIGNIFICANT CHANGE UP
MAGNESIUM SERPL-MCNC: 2 MG/DL — SIGNIFICANT CHANGE UP (ref 1.6–2.6)
MANUAL SMEAR VERIFICATION: SIGNIFICANT CHANGE UP
MCHC RBC-ENTMCNC: 31.2 PG — SIGNIFICANT CHANGE UP (ref 27–34)
MCHC RBC-ENTMCNC: 33.9 GM/DL — SIGNIFICANT CHANGE UP (ref 32–36)
MCV RBC AUTO: 92.3 FL — SIGNIFICANT CHANGE UP (ref 80–100)
METAMYELOCYTES # FLD: 0.9 % — HIGH (ref 0–0)
MONOCYTES # BLD AUTO: 0.15 K/UL — SIGNIFICANT CHANGE UP (ref 0–0.9)
MONOCYTES NFR BLD AUTO: 3.5 % — SIGNIFICANT CHANGE UP (ref 2–14)
NEUTROPHILS # BLD AUTO: 1.33 K/UL — LOW (ref 1.8–7.4)
NEUTROPHILS NFR BLD AUTO: 28.1 % — LOW (ref 43–77)
NEUTS BAND # BLD: 3.5 % — SIGNIFICANT CHANGE UP (ref 0–8)
NT-PROBNP SERPL-SCNC: 6 PG/ML — SIGNIFICANT CHANGE UP (ref 0–300)
PCO2 BLDV: 42 MMHG — SIGNIFICANT CHANGE UP (ref 42–55)
PH BLDV: 7.37 — SIGNIFICANT CHANGE UP (ref 7.32–7.43)
PLAT MORPH BLD: NORMAL — SIGNIFICANT CHANGE UP
PLATELET # BLD AUTO: 99 K/UL — LOW (ref 150–400)
PO2 BLDV: 190 MMHG — HIGH (ref 25–45)
POLYCHROMASIA BLD QL SMEAR: SLIGHT — SIGNIFICANT CHANGE UP
POTASSIUM BLDV-SCNC: 4 MMOL/L — SIGNIFICANT CHANGE UP (ref 3.5–5.1)
POTASSIUM SERPL-MCNC: 3.9 MMOL/L — SIGNIFICANT CHANGE UP (ref 3.5–5.3)
POTASSIUM SERPL-MCNC: 4 MMOL/L — SIGNIFICANT CHANGE UP (ref 3.5–5.3)
POTASSIUM SERPL-SCNC: 3.9 MMOL/L — SIGNIFICANT CHANGE UP (ref 3.5–5.3)
POTASSIUM SERPL-SCNC: 4 MMOL/L — SIGNIFICANT CHANGE UP (ref 3.5–5.3)
PROT SERPL-MCNC: 8.1 G/DL — SIGNIFICANT CHANGE UP (ref 6.6–8.7)
PROTHROM AB SERPL-ACNC: 11.4 SEC — SIGNIFICANT CHANGE UP (ref 10.6–13.6)
RAPID RVP RESULT: SIGNIFICANT CHANGE UP
RBC # BLD: 4.13 M/UL — LOW (ref 4.2–5.8)
RBC # FLD: 14.8 % — HIGH (ref 10.3–14.5)
RBC BLD AUTO: SIGNIFICANT CHANGE UP
SAO2 % BLDV: 99.7 % — SIGNIFICANT CHANGE UP
SARS-COV-2 RNA SPEC QL NAA+PROBE: SIGNIFICANT CHANGE UP
SODIUM SERPL-SCNC: 138 MMOL/L — SIGNIFICANT CHANGE UP (ref 135–145)
SODIUM SERPL-SCNC: 140 MMOL/L — SIGNIFICANT CHANGE UP (ref 135–145)
TROPONIN T SERPL-MCNC: <0.01 NG/ML — SIGNIFICANT CHANGE UP (ref 0–0.06)
TROPONIN T SERPL-MCNC: <0.01 NG/ML — SIGNIFICANT CHANGE UP (ref 0–0.06)
VARIANT LYMPHS # BLD: 7 % — HIGH (ref 0–6)
WBC # BLD: 4.21 K/UL — SIGNIFICANT CHANGE UP (ref 3.8–10.5)
WBC # FLD AUTO: 4.21 K/UL — SIGNIFICANT CHANGE UP (ref 3.8–10.5)

## 2021-08-22 PROCEDURE — 82947 ASSAY GLUCOSE BLOOD QUANT: CPT

## 2021-08-22 PROCEDURE — 80053 COMPREHEN METABOLIC PANEL: CPT

## 2021-08-22 PROCEDURE — 80307 DRUG TEST PRSMV CHEM ANLYZR: CPT

## 2021-08-22 PROCEDURE — 82962 GLUCOSE BLOOD TEST: CPT

## 2021-08-22 PROCEDURE — 85730 THROMBOPLASTIN TIME PARTIAL: CPT

## 2021-08-22 PROCEDURE — 84484 ASSAY OF TROPONIN QUANT: CPT

## 2021-08-22 PROCEDURE — 96374 THER/PROPH/DIAG INJ IV PUSH: CPT | Mod: XU

## 2021-08-22 PROCEDURE — 85018 HEMOGLOBIN: CPT

## 2021-08-22 PROCEDURE — 85014 HEMATOCRIT: CPT

## 2021-08-22 PROCEDURE — 99285 EMERGENCY DEPT VISIT HI MDM: CPT | Mod: 25

## 2021-08-22 PROCEDURE — 96361 HYDRATE IV INFUSION ADD-ON: CPT

## 2021-08-22 PROCEDURE — 83880 ASSAY OF NATRIURETIC PEPTIDE: CPT

## 2021-08-22 PROCEDURE — 36415 COLL VENOUS BLD VENIPUNCTURE: CPT

## 2021-08-22 PROCEDURE — 84295 ASSAY OF SERUM SODIUM: CPT

## 2021-08-22 PROCEDURE — 84132 ASSAY OF SERUM POTASSIUM: CPT

## 2021-08-22 PROCEDURE — 82435 ASSAY OF BLOOD CHLORIDE: CPT

## 2021-08-22 PROCEDURE — 85610 PROTHROMBIN TIME: CPT

## 2021-08-22 PROCEDURE — 82803 BLOOD GASES ANY COMBINATION: CPT

## 2021-08-22 PROCEDURE — 87040 BLOOD CULTURE FOR BACTERIA: CPT

## 2021-08-22 PROCEDURE — 83605 ASSAY OF LACTIC ACID: CPT

## 2021-08-22 PROCEDURE — 85025 COMPLETE CBC W/AUTO DIFF WBC: CPT

## 2021-08-22 PROCEDURE — 71275 CT ANGIOGRAPHY CHEST: CPT | Mod: 26,MA

## 2021-08-22 PROCEDURE — 80048 BASIC METABOLIC PNL TOTAL CA: CPT

## 2021-08-22 PROCEDURE — 83735 ASSAY OF MAGNESIUM: CPT

## 2021-08-22 PROCEDURE — 74177 CT ABD & PELVIS W/CONTRAST: CPT | Mod: MA

## 2021-08-22 PROCEDURE — 82330 ASSAY OF CALCIUM: CPT

## 2021-08-22 PROCEDURE — 71045 X-RAY EXAM CHEST 1 VIEW: CPT

## 2021-08-22 PROCEDURE — 71275 CT ANGIOGRAPHY CHEST: CPT | Mod: MA

## 2021-08-22 PROCEDURE — 93010 ELECTROCARDIOGRAM REPORT: CPT

## 2021-08-22 PROCEDURE — 0225U NFCT DS DNA&RNA 21 SARSCOV2: CPT

## 2021-08-22 PROCEDURE — 74177 CT ABD & PELVIS W/CONTRAST: CPT | Mod: 26,MA

## 2021-08-22 PROCEDURE — 99285 EMERGENCY DEPT VISIT HI MDM: CPT

## 2021-08-22 PROCEDURE — 71045 X-RAY EXAM CHEST 1 VIEW: CPT | Mod: 26

## 2021-08-22 PROCEDURE — 93005 ELECTROCARDIOGRAM TRACING: CPT

## 2021-08-22 RX ORDER — SODIUM CHLORIDE 9 MG/ML
1000 INJECTION INTRAMUSCULAR; INTRAVENOUS; SUBCUTANEOUS ONCE
Refills: 0 | Status: COMPLETED | OUTPATIENT
Start: 2021-08-22 | End: 2021-08-22

## 2021-08-22 RX ORDER — DIAZEPAM 5 MG
10 TABLET ORAL ONCE
Refills: 0 | Status: DISCONTINUED | OUTPATIENT
Start: 2021-08-22 | End: 2021-08-22

## 2021-08-22 RX ADMIN — Medication 2 MILLIGRAM(S): at 12:58

## 2021-08-22 RX ADMIN — Medication 1 MILLIGRAM(S): at 06:01

## 2021-08-22 RX ADMIN — Medication 1 MILLIGRAM(S): at 16:03

## 2021-08-22 RX ADMIN — SODIUM CHLORIDE 1000 MILLILITER(S): 9 INJECTION INTRAMUSCULAR; INTRAVENOUS; SUBCUTANEOUS at 01:53

## 2021-08-22 RX ADMIN — SODIUM CHLORIDE 1000 MILLILITER(S): 9 INJECTION INTRAMUSCULAR; INTRAVENOUS; SUBCUTANEOUS at 03:40

## 2021-08-22 RX ADMIN — Medication 10 MILLIGRAM(S): at 16:03

## 2021-08-22 NOTE — ED PROVIDER NOTE - CLINICAL SUMMARY MEDICAL DECISION MAKING FREE TEXT BOX
29yo male with pmh of NICM EF 40-45%, afib on eliquis, mediastinitis from esophageal tear, ETOH abuse, alcohol withdrawal, T2DM on insulin and Metformin, HTN presents with chest pain and sob. Pt with no acute findings on CT, no PE, initial gap on labs, improved with fluids, also with lactate improving with fluids, pt very intox, trop negx2, no acute ischemic findings on ekg, re-eval for sobriety
31yo male with pmh of NICM EF 40-45%, afib on eliquis, mediastinitis from esophageal tear, ETOH abuse, alcohol withdrawal, T2DM on insulin and Metformin, HTN presents with chest pain and sob.

## 2021-08-22 NOTE — ED ADULT NURSE REASSESSMENT NOTE - NS ED NURSE REASSESS COMMENT FT1
assumed care of patient from [facility]. Resting comfortably, CIWA 9. Dr Pearce made aware. assumed care of patient from [facility]. Resting in bed CIWA 9. Dr Pearce made aware. Pt is alert and oriented x4, denies any SOB. NSR noted to cardiac monitor. all other VSS. Respirations even non labored, no s/s of distress noted at this time. Awaiting orders, safety maintained.

## 2021-08-22 NOTE — ED PROVIDER NOTE - PATIENT PORTAL LINK FT
You can access the FollowMyHealth Patient Portal offered by [facility] by registering at the following website: [email or web address]. By joining [facility]’s FollowMyHealth portal, you will also be able to view your health information using other applications (apps) compatible with our system.

## 2021-08-22 NOTE — ED PROVIDER NOTE - CHIEF COMPLAINT
The patient is a 30y Male complaining of shortness of breath.
The patient is a 30y Male complaining of shortness of breath.

## 2021-08-22 NOTE — ED PROVIDER NOTE - OBJECTIVE STATEMENT
29yo male with pmh of NICM EF 40-45%, afib on eliquis, mediastinitis from esophageal tear, ETOH abuse, alcohol withdrawal, T2DM on insulin and Metformin, HTN presents with chest pain and sob. Pt states for the past couple of days having a non productive cough along with chest pain left sided non radiating and sob. Pt also with sweats for the past day. Pt states he's had a few episodes of nbnb emesis. Pt denies fevers/chills, ha, loc, focal neuro deficits, palp, abd pain/d, urinary symptoms, recent travel and sick contacts. No vaccinated for COVID
29yo male with pmh of NICM EF 40-45%, afib on eliquis, mediastinitis from esophageal tear, ETOH abuse, alcohol withdrawal, T2DM on insulin and Metformin, HTN presents with chest pain and sob. Pt states for the past couple of days having a non productive cough along with chest pain left sided non radiating and sob. Pt also with sweats for the past day. Pt states he's had a few episodes of nbnb emesis. Pt denies fevers/chills, ha, loc, focal neuro deficits, palp, abd pain/d, urinary symptoms, recent travel and sick contacts. Not vaccinated for COVID

## 2021-08-22 NOTE — ED ADULT NURSE NOTE - OBJECTIVE STATEMENT
Pt in no apparent distress at this time. Airway patent, breathing spontaneous and nonlabored. Pt A&Ox3 resting in stretcher. Pt c/o       , chest pain and SOB. pt admits to alcohol consumption. also c/o recent sweats. on monitor /

## 2021-08-22 NOTE — ED ADULT TRIAGE NOTE - CHIEF COMPLAINT QUOTE
patient states "I am having trouble breathing"  feeling shaky weakness fever on and off since yesterday/ unvaccinated, patient brought to CC for eval

## 2021-08-22 NOTE — ED PROVIDER NOTE - NSICDXPASTSURGICALHX_GEN_ALL_CORE_FT
PAST SURGICAL HISTORY:  No significant past surgical history     
PAST SURGICAL HISTORY:  No significant past surgical history

## 2021-08-22 NOTE — ED PROVIDER NOTE - NSICDXPASTMEDICALHX_GEN_ALL_CORE_FT
PAST MEDICAL HISTORY:  Alcohol abuse     Atrial fibrillation     Diabetes     HTN (hypertension)     Non-ischemic cardiomyopathy     Pancreatitis     
PAST MEDICAL HISTORY:  Alcohol abuse     Atrial fibrillation     Diabetes     HTN (hypertension)     Non-ischemic cardiomyopathy     Pancreatitis

## 2021-08-22 NOTE — ED PROVIDER NOTE - PHYSICAL EXAMINATION
Const: Awake, alert and oriented. In no acute distress.   HEENT: NC/AT. Moist mucous membranes.  Eyes: No scleral icterus. EOMI.  Neck:. Soft and supple. Full ROM without pain.  Cardiac: Tachy rate and regular rhythm. +S1/S2. Peripheral pulses 2+ and symmetric. No LE edema.  Resp: Speaking in full sentences. No evidence of respiratory distress. No wheezes, rales or rhonchi.  Abd: Soft, non-tender, non-distended. Normal bowel sounds in all 4 quadrants. No guarding or rebound.  Back: Spine midline and non-tender. No CVAT.  Skin: No rashes, abrasions or lacerations.  Lymph: No cervical lymphadenopathy.  Neuro: Awake, alert & oriented x 3. Moves all extremities symmetrically.
Const: Awake, alert and oriented. In no acute distress. Intox  HEENT: NC/AT. Moist mucous membranes.  Eyes: No scleral icterus. EOMI.  Neck:. Soft and supple. Full ROM without pain.  Cardiac: Regular rate and regular rhythm. +S1/S2. Peripheral pulses 2+ and symmetric. No LE edema.  Resp: Speaking in full sentences. No evidence of respiratory distress. No wheezes, rales or rhonchi.  Abd: Soft, non-tender, non-distended. Normal bowel sounds in all 4 quadrants. No guarding or rebound.  Back: Spine midline and non-tender. No CVAT.  Skin: No rashes, abrasions or lacerations.  Lymph: No cervical lymphadenopathy.  Neuro: Awake, alert & oriented x 3. Moves all extremities symmetrically.

## 2021-08-22 NOTE — ED PROVIDER NOTE - PROGRESS NOTE DETAILS
rachel: pt signed out by dr. freire ; intox notes cp/sob earlier on arival cardiology consulted (Dr. Banks) note pt had a negative cath recently; if 2 trops neg ok to dc with outpatient follow up; pt feels better no pain at this time; initially inwithdrawal now improved VS stable not intrested in detox would like to go home will dc

## 2021-08-22 NOTE — ED ADULT NURSE REASSESSMENT NOTE - NS ED NURSE REASSESS COMMENT FT1
Pt medicated prior to discharge. pt asked again if he would like detox. Patient refused. CIWA 4 on d/c, Dr Pearce made aware and ok for d/c.

## 2021-08-22 NOTE — ED PROVIDER NOTE - CARE PROVIDER_API CALL
Eliu Banks)  Cardiology; Cardiovascular Disease; Internal Medicine  [address] [place] [address]  Phone: [phone]  Fax: [phone]  Follow Up Time:

## 2021-08-27 LAB
CULTURE RESULTS: SIGNIFICANT CHANGE UP
CULTURE RESULTS: SIGNIFICANT CHANGE UP
SPECIMEN SOURCE: SIGNIFICANT CHANGE UP
SPECIMEN SOURCE: SIGNIFICANT CHANGE UP

## 2021-09-01 NOTE — ED ADULT NURSE NOTE - PAIN: BODY LOCATION
No humira yet.  Check the muscle with the next blood test. Ff up in 5/2022. Having side effects from the prednisone.   abdomen

## 2021-10-28 NOTE — ED PROVIDER NOTE - NS ED MD DISPO DISCHARGE
Detail Level: Detailed
Add 49193 Cpt? (Important Note: In 2017 The Use Of 58524 Is Being Tracked By Cms To Determine Future Global Period Reimbursement For Global Periods): yes
Home

## 2021-12-17 ENCOUNTER — INPATIENT (INPATIENT)
Facility: HOSPITAL | Age: 31
LOS: 2 days | Discharge: HOME | DRG: 896 | End: 2021-12-20
Attending: INTERNAL MEDICINE | Admitting: INTERNAL MEDICINE
Payer: MEDICAID

## 2021-12-17 VITALS
HEIGHT: 67 IN | RESPIRATION RATE: 20 BRPM | DIASTOLIC BLOOD PRESSURE: 105 MMHG | SYSTOLIC BLOOD PRESSURE: 162 MMHG | HEART RATE: 132 BPM | TEMPERATURE: 98 F | WEIGHT: 162.92 LBS | OXYGEN SATURATION: 93 %

## 2021-12-17 DIAGNOSIS — F10.239 ALCOHOL DEPENDENCE WITH WITHDRAWAL, UNSPECIFIED: ICD-10-CM

## 2021-12-17 LAB
ALBUMIN SERPL ELPH-MCNC: 5.2 G/DL — SIGNIFICANT CHANGE UP (ref 3.3–5.2)
ALP SERPL-CCNC: 140 U/L — HIGH (ref 40–120)
ALT FLD-CCNC: 57 U/L — HIGH
AMPHET UR-MCNC: NEGATIVE — SIGNIFICANT CHANGE UP
ANION GAP SERPL CALC-SCNC: 14 MMOL/L — SIGNIFICANT CHANGE UP (ref 5–17)
ANION GAP SERPL CALC-SCNC: 16 MMOL/L — SIGNIFICANT CHANGE UP (ref 5–17)
ANION GAP SERPL CALC-SCNC: 21 MMOL/L — HIGH (ref 5–17)
APPEARANCE UR: CLEAR — SIGNIFICANT CHANGE UP
AST SERPL-CCNC: 114 U/L — HIGH
BARBITURATES UR SCN-MCNC: NEGATIVE — SIGNIFICANT CHANGE UP
BASE EXCESS BLDV CALC-SCNC: 0.1 MMOL/L — SIGNIFICANT CHANGE UP (ref -2–3)
BASOPHILS # BLD AUTO: 0.16 K/UL — SIGNIFICANT CHANGE UP (ref 0–0.2)
BASOPHILS NFR BLD AUTO: 6.2 % — HIGH (ref 0–2)
BENZODIAZ UR-MCNC: NEGATIVE — SIGNIFICANT CHANGE UP
BILIRUB SERPL-MCNC: 0.9 MG/DL — SIGNIFICANT CHANGE UP (ref 0.4–2)
BILIRUB UR-MCNC: NEGATIVE — SIGNIFICANT CHANGE UP
BUN SERPL-MCNC: 3.4 MG/DL — LOW (ref 8–20)
BUN SERPL-MCNC: <3 MG/DL — LOW (ref 8–20)
BUN SERPL-MCNC: <3 MG/DL — LOW (ref 8–20)
CA-I SERPL-SCNC: 1.1 MMOL/L — LOW (ref 1.15–1.33)
CALCIUM SERPL-MCNC: 8.4 MG/DL — LOW (ref 8.6–10.2)
CALCIUM SERPL-MCNC: 8.5 MG/DL — LOW (ref 8.6–10.2)
CALCIUM SERPL-MCNC: 9.5 MG/DL — SIGNIFICANT CHANGE UP (ref 8.6–10.2)
CHLORIDE BLDV-SCNC: 95 MMOL/L — LOW (ref 98–107)
CHLORIDE SERPL-SCNC: 101 MMOL/L — SIGNIFICANT CHANGE UP (ref 98–107)
CHLORIDE SERPL-SCNC: 90 MMOL/L — LOW (ref 98–107)
CHLORIDE SERPL-SCNC: 99 MMOL/L — SIGNIFICANT CHANGE UP (ref 98–107)
CO2 SERPL-SCNC: 22 MMOL/L — SIGNIFICANT CHANGE UP (ref 22–29)
CO2 SERPL-SCNC: 23 MMOL/L — SIGNIFICANT CHANGE UP (ref 22–29)
CO2 SERPL-SCNC: 26 MMOL/L — SIGNIFICANT CHANGE UP (ref 22–29)
COCAINE METAB.OTHER UR-MCNC: NEGATIVE — SIGNIFICANT CHANGE UP
COLOR SPEC: YELLOW — SIGNIFICANT CHANGE UP
CREAT SERPL-MCNC: 0.45 MG/DL — LOW (ref 0.5–1.3)
CREAT SERPL-MCNC: 0.47 MG/DL — LOW (ref 0.5–1.3)
CREAT SERPL-MCNC: 0.59 MG/DL — SIGNIFICANT CHANGE UP (ref 0.5–1.3)
DIFF PNL FLD: NEGATIVE — SIGNIFICANT CHANGE UP
EOSINOPHIL # BLD AUTO: 0.02 K/UL — SIGNIFICANT CHANGE UP (ref 0–0.5)
EOSINOPHIL NFR BLD AUTO: 0.9 % — SIGNIFICANT CHANGE UP (ref 0–6)
ETHANOL SERPL-MCNC: 462 MG/DL — HIGH (ref 0–9)
GAS PNL BLDV: 133 MMOL/L — LOW (ref 136–145)
GAS PNL BLDV: SIGNIFICANT CHANGE UP
GAS PNL BLDV: SIGNIFICANT CHANGE UP
GIANT PLATELETS BLD QL SMEAR: PRESENT — SIGNIFICANT CHANGE UP
GLUCOSE BLDC GLUCOMTR-MCNC: 115 MG/DL — HIGH (ref 70–99)
GLUCOSE BLDC GLUCOMTR-MCNC: 202 MG/DL — HIGH (ref 70–99)
GLUCOSE BLDC GLUCOMTR-MCNC: 219 MG/DL — HIGH (ref 70–99)
GLUCOSE BLDV-MCNC: >656 MG/DL — CRITICAL HIGH (ref 70–99)
GLUCOSE SERPL-MCNC: 116 MG/DL — HIGH (ref 70–99)
GLUCOSE SERPL-MCNC: 252 MG/DL — HIGH (ref 70–99)
GLUCOSE SERPL-MCNC: 605 MG/DL — CRITICAL HIGH (ref 70–99)
GLUCOSE UR QL: 1000 MG/DL
HCO3 BLDV-SCNC: 25 MMOL/L — SIGNIFICANT CHANGE UP (ref 22–29)
HCT VFR BLD CALC: 35.3 % — LOW (ref 39–50)
HCT VFR BLDA CALC: 38 % — LOW (ref 39–51)
HGB BLD CALC-MCNC: 12.8 G/DL — SIGNIFICANT CHANGE UP (ref 12.6–17.4)
HGB BLD-MCNC: 12 G/DL — LOW (ref 13–17)
KETONES UR-MCNC: ABNORMAL
LACTATE BLDV-MCNC: 3 MMOL/L — HIGH (ref 0.5–2)
LEUKOCYTE ESTERASE UR-ACNC: NEGATIVE — SIGNIFICANT CHANGE UP
LYMPHOCYTES # BLD AUTO: 0.88 K/UL — LOW (ref 1–3.3)
LYMPHOCYTES # BLD AUTO: 33.6 % — SIGNIFICANT CHANGE UP (ref 13–44)
MANUAL SMEAR VERIFICATION: SIGNIFICANT CHANGE UP
MCHC RBC-ENTMCNC: 30.6 PG — SIGNIFICANT CHANGE UP (ref 27–34)
MCHC RBC-ENTMCNC: 34 GM/DL — SIGNIFICANT CHANGE UP (ref 32–36)
MCV RBC AUTO: 90.1 FL — SIGNIFICANT CHANGE UP (ref 80–100)
METHADONE UR-MCNC: NEGATIVE — SIGNIFICANT CHANGE UP
MONOCYTES # BLD AUTO: 0.28 K/UL — SIGNIFICANT CHANGE UP (ref 0–0.9)
MONOCYTES NFR BLD AUTO: 10.6 % — SIGNIFICANT CHANGE UP (ref 2–14)
MYELOCYTES NFR BLD: 0.9 % — HIGH (ref 0–0)
NEUTROPHILS # BLD AUTO: 1.18 K/UL — LOW (ref 1.8–7.4)
NEUTROPHILS NFR BLD AUTO: 45.1 % — SIGNIFICANT CHANGE UP (ref 43–77)
NITRITE UR-MCNC: NEGATIVE — SIGNIFICANT CHANGE UP
OPIATES UR-MCNC: NEGATIVE — SIGNIFICANT CHANGE UP
OSMOLALITY SERPL: 384 MOSMOL/KG — HIGH (ref 275–300)
PCO2 BLDV: 44 MMHG — SIGNIFICANT CHANGE UP (ref 42–55)
PCP SPEC-MCNC: SIGNIFICANT CHANGE UP
PCP UR-MCNC: NEGATIVE — SIGNIFICANT CHANGE UP
PH BLDV: 7.37 — SIGNIFICANT CHANGE UP (ref 7.32–7.43)
PH UR: 6 — SIGNIFICANT CHANGE UP (ref 5–8)
PLAT MORPH BLD: NORMAL — SIGNIFICANT CHANGE UP
PLATELET # BLD AUTO: 82 K/UL — LOW (ref 150–400)
PO2 BLDV: 201 MMHG — HIGH (ref 25–45)
POTASSIUM BLDV-SCNC: 4.6 MMOL/L — SIGNIFICANT CHANGE UP (ref 3.5–5.1)
POTASSIUM SERPL-MCNC: 3.3 MMOL/L — LOW (ref 3.5–5.3)
POTASSIUM SERPL-MCNC: 3.7 MMOL/L — SIGNIFICANT CHANGE UP (ref 3.5–5.3)
POTASSIUM SERPL-MCNC: 4.5 MMOL/L — SIGNIFICANT CHANGE UP (ref 3.5–5.3)
POTASSIUM SERPL-SCNC: 3.3 MMOL/L — LOW (ref 3.5–5.3)
POTASSIUM SERPL-SCNC: 3.7 MMOL/L — SIGNIFICANT CHANGE UP (ref 3.5–5.3)
POTASSIUM SERPL-SCNC: 4.5 MMOL/L — SIGNIFICANT CHANGE UP (ref 3.5–5.3)
PROT SERPL-MCNC: 8.1 G/DL — SIGNIFICANT CHANGE UP (ref 6.6–8.7)
PROT UR-MCNC: NEGATIVE — SIGNIFICANT CHANGE UP
RAPID RVP RESULT: SIGNIFICANT CHANGE UP
RBC # BLD: 3.92 M/UL — LOW (ref 4.2–5.8)
RBC # FLD: 14.8 % — HIGH (ref 10.3–14.5)
RBC BLD AUTO: NORMAL — SIGNIFICANT CHANGE UP
SAO2 % BLDV: 99.9 % — SIGNIFICANT CHANGE UP
SARS-COV-2 RNA SPEC QL NAA+PROBE: SIGNIFICANT CHANGE UP
SMUDGE CELLS # BLD: PRESENT — SIGNIFICANT CHANGE UP
SODIUM SERPL-SCNC: 133 MMOL/L — LOW (ref 135–145)
SODIUM SERPL-SCNC: 139 MMOL/L — SIGNIFICANT CHANGE UP (ref 135–145)
SODIUM SERPL-SCNC: 140 MMOL/L — SIGNIFICANT CHANGE UP (ref 135–145)
SP GR SPEC: 1 — LOW (ref 1.01–1.02)
THC UR QL: NEGATIVE — SIGNIFICANT CHANGE UP
UROBILINOGEN FLD QL: NEGATIVE MG/DL — SIGNIFICANT CHANGE UP
VARIANT LYMPHS # BLD: 2.7 % — SIGNIFICANT CHANGE UP (ref 0–6)
WBC # BLD: 2.61 K/UL — LOW (ref 3.8–10.5)
WBC # FLD AUTO: 2.61 K/UL — LOW (ref 3.8–10.5)

## 2021-12-17 PROCEDURE — 99223 1ST HOSP IP/OBS HIGH 75: CPT

## 2021-12-17 PROCEDURE — 93010 ELECTROCARDIOGRAM REPORT: CPT

## 2021-12-17 PROCEDURE — 71045 X-RAY EXAM CHEST 1 VIEW: CPT | Mod: 26

## 2021-12-17 PROCEDURE — 99285 EMERGENCY DEPT VISIT HI MDM: CPT

## 2021-12-17 RX ORDER — INSULIN HUMAN 100 [IU]/ML
6 INJECTION, SOLUTION SUBCUTANEOUS ONCE
Refills: 0 | Status: DISCONTINUED | OUTPATIENT
Start: 2021-12-17 | End: 2021-12-17

## 2021-12-17 RX ORDER — INSULIN LISPRO 100/ML
4 VIAL (ML) SUBCUTANEOUS
Refills: 0 | Status: DISCONTINUED | OUTPATIENT
Start: 2021-12-17 | End: 2021-12-20

## 2021-12-17 RX ORDER — LOSARTAN POTASSIUM 100 MG/1
25 TABLET, FILM COATED ORAL DAILY
Refills: 0 | Status: DISCONTINUED | OUTPATIENT
Start: 2021-12-17 | End: 2021-12-20

## 2021-12-17 RX ORDER — SODIUM CHLORIDE 9 MG/ML
1000 INJECTION, SOLUTION INTRAVENOUS
Refills: 0 | Status: DISCONTINUED | OUTPATIENT
Start: 2021-12-17 | End: 2021-12-20

## 2021-12-17 RX ORDER — INSULIN GLARGINE 100 [IU]/ML
10 INJECTION, SOLUTION SUBCUTANEOUS AT BEDTIME
Refills: 0 | Status: DISCONTINUED | OUTPATIENT
Start: 2021-12-17 | End: 2021-12-20

## 2021-12-17 RX ORDER — THIAMINE MONONITRATE (VIT B1) 100 MG
100 TABLET ORAL DAILY
Refills: 0 | Status: DISCONTINUED | OUTPATIENT
Start: 2021-12-17 | End: 2021-12-20

## 2021-12-17 RX ORDER — DIAZEPAM 5 MG
15 TABLET ORAL ONCE
Refills: 0 | Status: DISCONTINUED | OUTPATIENT
Start: 2021-12-17 | End: 2021-12-17

## 2021-12-17 RX ORDER — FOLIC ACID 0.8 MG
1 TABLET ORAL DAILY
Refills: 0 | Status: DISCONTINUED | OUTPATIENT
Start: 2021-12-17 | End: 2021-12-20

## 2021-12-17 RX ORDER — DEXTROSE 50 % IN WATER 50 %
12.5 SYRINGE (ML) INTRAVENOUS ONCE
Refills: 0 | Status: DISCONTINUED | OUTPATIENT
Start: 2021-12-17 | End: 2021-12-20

## 2021-12-17 RX ORDER — GLUCAGON INJECTION, SOLUTION 0.5 MG/.1ML
1 INJECTION, SOLUTION SUBCUTANEOUS ONCE
Refills: 0 | Status: DISCONTINUED | OUTPATIENT
Start: 2021-12-17 | End: 2021-12-20

## 2021-12-17 RX ORDER — INSULIN GLARGINE 100 [IU]/ML
10 INJECTION, SOLUTION SUBCUTANEOUS ONCE
Refills: 0 | Status: COMPLETED | OUTPATIENT
Start: 2021-12-17 | End: 2021-12-17

## 2021-12-17 RX ORDER — DEXTROSE 50 % IN WATER 50 %
25 SYRINGE (ML) INTRAVENOUS ONCE
Refills: 0 | Status: DISCONTINUED | OUTPATIENT
Start: 2021-12-17 | End: 2021-12-20

## 2021-12-17 RX ORDER — DIAZEPAM 5 MG
5 TABLET ORAL ONCE
Refills: 0 | Status: DISCONTINUED | OUTPATIENT
Start: 2021-12-17 | End: 2021-12-17

## 2021-12-17 RX ORDER — DIAZEPAM 5 MG
10 TABLET ORAL ONCE
Refills: 0 | Status: DISCONTINUED | OUTPATIENT
Start: 2021-12-17 | End: 2021-12-17

## 2021-12-17 RX ORDER — METOPROLOL TARTRATE 50 MG
25 TABLET ORAL
Refills: 0 | Status: DISCONTINUED | OUTPATIENT
Start: 2021-12-17 | End: 2021-12-20

## 2021-12-17 RX ORDER — INSULIN LISPRO 100/ML
5 VIAL (ML) SUBCUTANEOUS ONCE
Refills: 0 | Status: COMPLETED | OUTPATIENT
Start: 2021-12-17 | End: 2021-12-17

## 2021-12-17 RX ORDER — INSULIN LISPRO 100/ML
15 VIAL (ML) SUBCUTANEOUS ONCE
Refills: 0 | Status: COMPLETED | OUTPATIENT
Start: 2021-12-17 | End: 2021-12-17

## 2021-12-17 RX ORDER — SODIUM CHLORIDE 9 MG/ML
2000 INJECTION, SOLUTION INTRAVENOUS ONCE
Refills: 0 | Status: COMPLETED | OUTPATIENT
Start: 2021-12-17 | End: 2021-12-17

## 2021-12-17 RX ORDER — SODIUM CHLORIDE 9 MG/ML
1000 INJECTION, SOLUTION INTRAVENOUS ONCE
Refills: 0 | Status: COMPLETED | OUTPATIENT
Start: 2021-12-17 | End: 2021-12-17

## 2021-12-17 RX ORDER — PANTOPRAZOLE SODIUM 20 MG/1
40 TABLET, DELAYED RELEASE ORAL
Refills: 0 | Status: DISCONTINUED | OUTPATIENT
Start: 2021-12-17 | End: 2021-12-20

## 2021-12-17 RX ORDER — POTASSIUM CHLORIDE 20 MEQ
40 PACKET (EA) ORAL ONCE
Refills: 0 | Status: COMPLETED | OUTPATIENT
Start: 2021-12-17 | End: 2021-12-17

## 2021-12-17 RX ORDER — INSULIN LISPRO 100/ML
VIAL (ML) SUBCUTANEOUS
Refills: 0 | Status: DISCONTINUED | OUTPATIENT
Start: 2021-12-17 | End: 2021-12-18

## 2021-12-17 RX ORDER — APIXABAN 2.5 MG/1
5 TABLET, FILM COATED ORAL EVERY 12 HOURS
Refills: 0 | Status: DISCONTINUED | OUTPATIENT
Start: 2021-12-17 | End: 2021-12-20

## 2021-12-17 RX ORDER — DEXTROSE 50 % IN WATER 50 %
15 SYRINGE (ML) INTRAVENOUS ONCE
Refills: 0 | Status: DISCONTINUED | OUTPATIENT
Start: 2021-12-17 | End: 2021-12-20

## 2021-12-17 RX ADMIN — Medication 1 TABLET(S): at 12:10

## 2021-12-17 RX ADMIN — INSULIN GLARGINE 10 UNIT(S): 100 INJECTION, SOLUTION SUBCUTANEOUS at 20:35

## 2021-12-17 RX ADMIN — Medication 5 UNIT(S): at 06:45

## 2021-12-17 RX ADMIN — Medication 15 MILLIGRAM(S): at 03:36

## 2021-12-17 RX ADMIN — Medication 2 MILLIGRAM(S): at 12:13

## 2021-12-17 RX ADMIN — Medication 4 UNIT(S): at 12:09

## 2021-12-17 RX ADMIN — Medication 50 MILLIGRAM(S): at 15:12

## 2021-12-17 RX ADMIN — Medication 50 MILLIGRAM(S): at 20:27

## 2021-12-17 RX ADMIN — Medication 2 MILLIGRAM(S): at 19:08

## 2021-12-17 RX ADMIN — SODIUM CHLORIDE 1000 MILLILITER(S): 9 INJECTION, SOLUTION INTRAVENOUS at 05:42

## 2021-12-17 RX ADMIN — Medication 50 MILLIGRAM(S): at 09:53

## 2021-12-17 RX ADMIN — Medication 1 MILLIGRAM(S): at 12:10

## 2021-12-17 RX ADMIN — SODIUM CHLORIDE 1000 MILLILITER(S): 9 INJECTION, SOLUTION INTRAVENOUS at 02:44

## 2021-12-17 RX ADMIN — Medication 4: at 12:09

## 2021-12-17 RX ADMIN — Medication 100 MILLIGRAM(S): at 12:10

## 2021-12-17 RX ADMIN — APIXABAN 5 MILLIGRAM(S): 2.5 TABLET, FILM COATED ORAL at 17:25

## 2021-12-17 RX ADMIN — Medication 25 MILLIGRAM(S): at 17:25

## 2021-12-17 RX ADMIN — Medication 4 UNIT(S): at 17:21

## 2021-12-17 RX ADMIN — Medication 40 MILLIEQUIVALENT(S): at 12:10

## 2021-12-17 RX ADMIN — Medication 15 UNIT(S): at 04:08

## 2021-12-17 RX ADMIN — Medication 5 MILLIGRAM(S): at 03:05

## 2021-12-17 RX ADMIN — SODIUM CHLORIDE 2000 MILLILITER(S): 9 INJECTION, SOLUTION INTRAVENOUS at 05:42

## 2021-12-17 RX ADMIN — Medication 10 MILLIGRAM(S): at 09:08

## 2021-12-17 RX ADMIN — SODIUM CHLORIDE 2000 MILLILITER(S): 9 INJECTION, SOLUTION INTRAVENOUS at 04:08

## 2021-12-17 RX ADMIN — INSULIN GLARGINE 10 UNIT(S): 100 INJECTION, SOLUTION SUBCUTANEOUS at 06:45

## 2021-12-17 NOTE — ED PROVIDER NOTE - ATTENDING CONTRIBUTION TO CARE
Pt reports withdrawal symptoms though appears to still be intoxicated, also with uncontrolled hyperglycemia due to medication nonadherence. No evidence of DKA, glucose controlled with fluids and insulin lispro. Will monitor for withdrawal while observing to sobriety to ensure safe dc.

## 2021-12-17 NOTE — ED PROVIDER NOTE - NS ED ROS FT
Constitutional: no fever, no chills  Head: NC, AT   Eyes: no redness   ENMT: no nasal congestion/drainage, no sore throat   CV: no chest pain, no edema  Resp: no cough, no dyspnea  GI: +abdominal pain, +nausea, +vomiting, no diarrhea  : no dysuria, no hematuria   Skin: no lesions, no rashes   Neuro: no LOC, no headache, no sensory deficits, no weakness, visual and auditory hallucinations,  Psych: anxious

## 2021-12-17 NOTE — ED PROVIDER NOTE - CLINICAL SUMMARY MEDICAL DECISION MAKING FREE TEXT BOX
31 y m with pmh of alcohol disorder, DMT2, nonischemic cardiomyopathy, A fib, presenting for alcohol withdrawal. cbc, cmp, vbg, osmolality, ua, drug screen, alcohol level, LR,

## 2021-12-17 NOTE — ED PROVIDER NOTE - NSFOLLOWUPINSTRUCTIONS_ED_ALL_ED_FT
Hyperglycemia      Hyperglycemia occurs when the level of sugar (glucose) in the blood is too high. Glucose is a type of sugar that provides the body's main source of energy. Certain hormones (insulin and glucagon) control the level of glucose in the blood. Insulin lowers blood glucose, and glucagon increases blood glucose. Hyperglycemia can result from not having enough insulin in the bloodstream, or from the body not responding normally to insulin.    Hyperglycemia occurs most often in people who have diabetes (diabetes mellitus), but it can happen in people who do not have diabetes. It can develop quickly, and it can be life-threatening if it causes you to become severely dehydrated (diabetic ketoacidosis or hyperglycemic hyperosmolar state). Severe hyperglycemia is a medical emergency.    For most people with diabetes, a blood glucose level above 240 mg/dL is considered hyperglycemia.      What are the causes?  If you have diabetes, hyperglycemia may be caused by:  •Medicines that increase blood glucose or affect your diabetes control.      •Getting less physical activity.      •Eating more than planned.      •Being sick or injured, having an infection, or having surgery.      •Stress.      •Not giving yourself enough insulin (if you are taking insulin).      If you have undiagnosed diabetes, this may be the reason you have hyperglycemia.  If you do not have diabetes, hyperglycemia may be caused by:•Certain medicines, including:  •Steroid medicines.      •Beta-blockers.      •Epinephrine.      •Thiazide diuretics.        •Stress.      •Having a serious illness, an infection, or surgery.      •Diseases of the pancreas.        What increases the risk?  Hyperglycemia is more likely to develop in people who have risk factors for diabetes, such as:  •Having a family member with diabetes.      •Certain conditions in which the body's disease-fighting system (immune system) attacks itself (autoimmune disorders).      •Being overweight or obese.      •Having an inactive (sedentary) lifestyle.      •Having been diagnosed with insulin resistance.      •Having a history of prediabetes, gestational diabetes, or polycystic ovarian syndrome (PCOS).        What are the signs or symptoms?  Hyperglycemia may not cause any symptoms. If you do have symptoms, they may include:  •Increased thirst.      •Needing to urinate more often than usual.      •Hunger.      •Feeling very tired.      •Blurry vision.    Other symptoms may develop if hyperglycemia gets worse, such as:  •Dry mouth.      •Abdominal pain.      •Loss of appetite.      •Fruity-smelling breath.      •Weakness.      •Unexpected weight loss.      •Tingling or numbness in the hands or feet.      •Headache.      •Cuts or bruises that are slow to heal.        How is this diagnosed?    Hyperglycemia is diagnosed with a blood test to measure your blood glucose level. This blood test is usually done while you are having symptoms. Your health care provider may also do a physical exam and review your medical history.  You may have more tests to determine the cause of your hyperglycemia, such as:  •A fasting blood glucose (FBG) test. You will not be allowed to eat (you will fast) for at least 8 hours before a blood sample is taken.      •An A1C blood test. This provides information about blood glucose control over the previous 2–3 months.    •An oral glucose tolerance test (OGTT). This measures your blood glucose at two times:  •After fasting. This is your baseline blood glucose level.      •2 hours after drinking a beverage that contains glucose.          How is this treated?  Treatment depends on the cause of your hyperglycemia. Treatment may include:  •Taking medicine to regulate your blood glucose levels. If you take insulin or other diabetes medicines, your medicine or dosage may be adjusted.      •Lifestyle changes, such as exercising more, eating healthier foods, or losing weight.      •Treating an illness or infection.      •Checking your blood glucose more often.      •Stopping or reducing steroid medicines.      If your hyperglycemia becomes severe and it results in diabetic ketoacidosis or hyperglycemic hyperosmolar state, you must be hospitalized and given IV fluids and IV insulin.      Follow these instructions at home:    General instructions     •Take over-the-counter and prescription medicines only as told by your health care provider.      • Do not use any products that contain nicotine or tobacco. These products include cigarettes, chewing tobacco, and vaping devices, such as e-cigarettes. If you need help quitting, ask your health care provider.    •If you drink alcohol: •Limit how much you have to:  •0–1 drink a day for women who are not pregnant.      •0–2 drinks a day for men.         •Know how much alcohol is in a drink. In the [place] one drink equals one 12 oz bottle of beer (355 mL), one 5 oz glass of wine (148 mL), or one 1½ oz glass of hard liquor (44 mL).        •Learn to manage stress. If you need help with this, ask your health care provider.      •Do exercises as told by your health care provider.      •Keep all follow-up visits. This is important.        Eating and drinking      •Maintain a healthy weight.      •Stay hydrated, especially when you exercise, get sick, or spend time in hot temperatures.      •Drink enough fluid to keep your urine pale yellow.        If you have diabetes:      •Know the symptoms of hyperglycemia.    •Follow your diabetes management plan as told by your health care provider. Make sure you:  •Take your insulin and medicines as told.      •Follow your exercise plan.      •Follow your meal plan. Eat on time, and do not skip meals.      •Check your blood glucose as often as told. Make sure to check your blood glucose before and after exercise. If you exercise longer or in a different way, check your blood glucose more often.      •Follow your sick day plan whenever you cannot eat or drink normally. Make this plan in advance with your health care provider.        •Share your diabetes management plan with people in your workplace, school, and household.      •Check your urine for ketones when you are ill and as told by your health care provider.      •Carry a medical alert card or wear medical alert jewelry.        Where to find more information    American Diabetes Association: www.diabetes.org      Contact a health care provider if:    •Your blood glucose is at or above 240 mg/dL (13.3 mmol/L) for 2 days in a row.      •You have problems keeping your blood glucose in your target range.      •You have frequent episodes of hyperglycemia.      •You have signs of illness, such as nausea, vomiting, or fever.        Get help right away if:    •Your blood glucose monitor reads "high" even when you are taking insulin.      •You have trouble breathing.      •You have a change in how you think, feel, or act (mental status).      •You have nausea or vomiting that does not go away.      These symptoms may represent a serious problem that is an emergency. Do not wait to see if the symptoms will go away. Get medical help right away. Call your local emergency services (911 in the [place] Do not drive yourself to the hospital.       Summary    •Hyperglycemia occurs when the level of sugar (glucose) in the blood is too high.      •Hyperglycemia can happen with or without diabetes, and severe hyperglycemia can be life-threatening.      •Hyperglycemia is diagnosed with a blood test to measure your blood glucose level. This blood test is usually done while you are having symptoms. Your health care provider may also do a physical exam and review your medical history.      •If you have diabetes, follow your diabetes management plan as told by your health care provider.      •Contact your health care provider if you have problems keeping your blood glucose in your target range.      This information is not intended to replace advice given to you by your health care provider. Make sure you discuss any questions you have with your health care provider.

## 2021-12-17 NOTE — ED PROVIDER NOTE - PROGRESS NOTE DETAILS
Patient continues to have withdrawal symptoms, will admit to hospital for acute ETOH withdrawal vinny: received sign out; patient continues to exhibit worsening symptoms of ETOH withdrawal despite previous doses of IV valium; patient also with medical comorbidities, chronic CHF, fragile diabetic; agree with plan for admission for continued management

## 2021-12-17 NOTE — ED PROVIDER NOTE - OBJECTIVE STATEMENT
31 y m with pmh of alcohol disorder, DMT2, nonischemic cardiomyopathy, A fib, presenting for alcohol withdrawal. He attempted to stop drinking 2 days ago. He started withdrawing earlier today with tremors, anxiety, sweats, headaches, n/v, and hallucinations. Directly before coming to the hospital he drank a beer to reduce his symptoms. He typically drink 4 24oz malt liquors a day. Currently reporting hearing whispers and seeing flies though has the incite to know that they are not real. He has never had seizures with withdrawal. He also stopped taking his insulin and metformin two days ago. He has started having abdominal pain earlier today. Denies f/c, cp, sob, dysuria, cough.

## 2021-12-17 NOTE — ED PROVIDER NOTE - PHYSICAL EXAMINATION
General: well appearing, NAD  Head: NC, AT  EENT: EOMI, no scleral icterus  Cardiac: RRR, no apparent murmurs, no lower extremity edema  Respiratory: CTABL, no respiratory distress   Abdomen: soft, ND, lower abdominal tenderness, nonperitonitic  MSK/Vascular: full ROM, soft compartments, warm extremities  Neuro: AAOx3, sensation to light touch intact  Psych: anxious, tremulous, cooperative

## 2021-12-17 NOTE — H&P ADULT - HISTORY OF PRESENT ILLNESS
30 yo M w/ hx alcohol abuse, NICM, afib on eliquis, mild HFrEF, Dm1 presents to ER for shortness of breath, weakness and tremors. Similiar episodes in past when he was either intoxicated or withdrawing. Notes missing insulin last 2 days. Drinks 4 32oz cans of malt liquor daily with last drink on day of presentation. denies history of DT's or seizures but had severe withdrawals in past. notes epigastric discomfort, no nausea or vomiting. Patient tried alcohol rehab in past, only sober for 1 month prior to relapsing again. Patient with frequent hospitalizations for alcohol intoxication or withdrawals.    in Er, noted to be intoxicated with , glucose 605 and mild gap acidosis which resolved with IVF and insulin. ER requesting admission for impending alcohol withdrawal

## 2021-12-17 NOTE — ED ADULT TRIAGE NOTE - CHIEF COMPLAINT QUOTE
patient states that he thinks his sugar level is high, states shaking feeling hot and cold palpitations

## 2021-12-17 NOTE — H&P ADULT - ASSESSMENT
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 Similiar episodes in past when he was either intoxicated or withdrawing. Notes missing insulin last 2 days. Drinks 4 32oz cans of malt liquor daily with last drink on day of presentation. denies history of DT's or seizures but had severe withdrawals in past. notes epigastric discomfort, no nausea or vomiting. Patient tried alcohol rehab in past, only sober for 1 month prior to relapsing again. Patient with frequent hospitalizations for alcohol intoxication or withdrawals.    in Er, noted to be intoxicated with , glucose 605 and mild gap acidosis which resolved with IVF and insulin. ER requesting admission for impending alcohol withdrawal      acute alcohol intoxication, impending alcohol withdrawal    ciwa ativan prn, librium taper    multivitamin, folate, thiamine for suspected deficiency    psych consulted as patient notes he drinks due to anxiety/depression    dm1 with hyperglycemia on admission     cpeptide low.      missed insulin at home     restart lantus, premeal and sliding scale     a1c      pAfib, NICM     negative cath in past     c/w eliquis, lopressor, losartan    hypokalemia: replete     gastritis: protonix

## 2021-12-18 LAB
A1C WITH ESTIMATED AVERAGE GLUCOSE RESULT: 11.9 % — HIGH (ref 4–5.6)
ALBUMIN SERPL ELPH-MCNC: 4.9 G/DL — SIGNIFICANT CHANGE UP (ref 3.3–5.2)
ALP SERPL-CCNC: 130 U/L — HIGH (ref 40–120)
ALT FLD-CCNC: 52 U/L — HIGH
ANION GAP SERPL CALC-SCNC: 16 MMOL/L — SIGNIFICANT CHANGE UP (ref 5–17)
AST SERPL-CCNC: 117 U/L — HIGH
BILIRUB SERPL-MCNC: 1.3 MG/DL — SIGNIFICANT CHANGE UP (ref 0.4–2)
BUN SERPL-MCNC: 4.6 MG/DL — LOW (ref 8–20)
CALCIUM SERPL-MCNC: 10 MG/DL — SIGNIFICANT CHANGE UP (ref 8.6–10.2)
CHLORIDE SERPL-SCNC: 89 MMOL/L — LOW (ref 98–107)
CO2 SERPL-SCNC: 26 MMOL/L — SIGNIFICANT CHANGE UP (ref 22–29)
CREAT SERPL-MCNC: 0.49 MG/DL — LOW (ref 0.5–1.3)
ESTIMATED AVERAGE GLUCOSE: 295 MG/DL — HIGH (ref 68–114)
GLUCOSE BLDC GLUCOMTR-MCNC: 155 MG/DL — HIGH (ref 70–99)
GLUCOSE BLDC GLUCOMTR-MCNC: 183 MG/DL — HIGH (ref 70–99)
GLUCOSE BLDC GLUCOMTR-MCNC: 249 MG/DL — HIGH (ref 70–99)
GLUCOSE BLDC GLUCOMTR-MCNC: 334 MG/DL — HIGH (ref 70–99)
GLUCOSE SERPL-MCNC: 220 MG/DL — HIGH (ref 70–99)
HCT VFR BLD CALC: 37.7 % — LOW (ref 39–50)
HGB BLD-MCNC: 12.7 G/DL — LOW (ref 13–17)
MAGNESIUM SERPL-MCNC: 1.7 MG/DL — SIGNIFICANT CHANGE UP (ref 1.6–2.6)
MCHC RBC-ENTMCNC: 30.2 PG — SIGNIFICANT CHANGE UP (ref 27–34)
MCHC RBC-ENTMCNC: 33.7 GM/DL — SIGNIFICANT CHANGE UP (ref 32–36)
MCV RBC AUTO: 89.5 FL — SIGNIFICANT CHANGE UP (ref 80–100)
PHOSPHATE SERPL-MCNC: 3.3 MG/DL — SIGNIFICANT CHANGE UP (ref 2.4–4.7)
PLATELET # BLD AUTO: 74 K/UL — LOW (ref 150–400)
POTASSIUM SERPL-MCNC: 4.1 MMOL/L — SIGNIFICANT CHANGE UP (ref 3.5–5.3)
POTASSIUM SERPL-SCNC: 4.1 MMOL/L — SIGNIFICANT CHANGE UP (ref 3.5–5.3)
PROT SERPL-MCNC: 8.1 G/DL — SIGNIFICANT CHANGE UP (ref 6.6–8.7)
RBC # BLD: 4.21 M/UL — SIGNIFICANT CHANGE UP (ref 4.2–5.8)
RBC # FLD: 14.6 % — HIGH (ref 10.3–14.5)
SODIUM SERPL-SCNC: 131 MMOL/L — LOW (ref 135–145)
WBC # BLD: 3.76 K/UL — LOW (ref 3.8–10.5)
WBC # FLD AUTO: 3.76 K/UL — LOW (ref 3.8–10.5)

## 2021-12-18 PROCEDURE — 99233 SBSQ HOSP IP/OBS HIGH 50: CPT

## 2021-12-18 RX ORDER — LANOLIN ALCOHOL/MO/W.PET/CERES
3 CREAM (GRAM) TOPICAL AT BEDTIME
Refills: 0 | Status: DISCONTINUED | OUTPATIENT
Start: 2021-12-18 | End: 2021-12-20

## 2021-12-18 RX ORDER — INSULIN LISPRO 100/ML
VIAL (ML) SUBCUTANEOUS
Refills: 0 | Status: DISCONTINUED | OUTPATIENT
Start: 2021-12-18 | End: 2021-12-20

## 2021-12-18 RX ADMIN — APIXABAN 5 MILLIGRAM(S): 2.5 TABLET, FILM COATED ORAL at 05:05

## 2021-12-18 RX ADMIN — Medication 1 TABLET(S): at 11:34

## 2021-12-18 RX ADMIN — Medication 4 UNIT(S): at 11:33

## 2021-12-18 RX ADMIN — Medication 25 MILLIGRAM(S): at 05:06

## 2021-12-18 RX ADMIN — INSULIN GLARGINE 10 UNIT(S): 100 INJECTION, SOLUTION SUBCUTANEOUS at 21:04

## 2021-12-18 RX ADMIN — Medication 2: at 08:14

## 2021-12-18 RX ADMIN — LOSARTAN POTASSIUM 25 MILLIGRAM(S): 100 TABLET, FILM COATED ORAL at 05:05

## 2021-12-18 RX ADMIN — Medication 50 MILLIGRAM(S): at 03:27

## 2021-12-18 RX ADMIN — Medication 3 MILLIGRAM(S): at 23:24

## 2021-12-18 RX ADMIN — Medication 4: at 16:58

## 2021-12-18 RX ADMIN — Medication 50 MILLIGRAM(S): at 14:28

## 2021-12-18 RX ADMIN — PANTOPRAZOLE SODIUM 40 MILLIGRAM(S): 20 TABLET, DELAYED RELEASE ORAL at 05:05

## 2021-12-18 RX ADMIN — Medication 8: at 21:35

## 2021-12-18 RX ADMIN — Medication 2 MILLIGRAM(S): at 23:24

## 2021-12-18 RX ADMIN — Medication 25 MILLIGRAM(S): at 17:42

## 2021-12-18 RX ADMIN — Medication 2: at 11:33

## 2021-12-18 RX ADMIN — Medication 4 UNIT(S): at 08:15

## 2021-12-18 RX ADMIN — Medication 4 UNIT(S): at 16:58

## 2021-12-18 RX ADMIN — Medication 100 MILLIGRAM(S): at 11:34

## 2021-12-18 RX ADMIN — APIXABAN 5 MILLIGRAM(S): 2.5 TABLET, FILM COATED ORAL at 17:42

## 2021-12-18 RX ADMIN — Medication 50 MILLIGRAM(S): at 21:01

## 2021-12-18 RX ADMIN — Medication 1 MILLIGRAM(S): at 11:34

## 2021-12-19 LAB
ALBUMIN SERPL ELPH-MCNC: 4.6 G/DL — SIGNIFICANT CHANGE UP (ref 3.3–5.2)
ALP SERPL-CCNC: 116 U/L — SIGNIFICANT CHANGE UP (ref 40–120)
ALT FLD-CCNC: 40 U/L — SIGNIFICANT CHANGE UP
ANION GAP SERPL CALC-SCNC: 18 MMOL/L — HIGH (ref 5–17)
AST SERPL-CCNC: 65 U/L — HIGH
BILIRUB SERPL-MCNC: 1.3 MG/DL — SIGNIFICANT CHANGE UP (ref 0.4–2)
BUN SERPL-MCNC: 11.4 MG/DL — SIGNIFICANT CHANGE UP (ref 8–20)
CALCIUM SERPL-MCNC: 9.8 MG/DL — SIGNIFICANT CHANGE UP (ref 8.6–10.2)
CHLORIDE SERPL-SCNC: 94 MMOL/L — LOW (ref 98–107)
CO2 SERPL-SCNC: 22 MMOL/L — SIGNIFICANT CHANGE UP (ref 22–29)
CREAT SERPL-MCNC: 0.48 MG/DL — LOW (ref 0.5–1.3)
GLUCOSE BLDC GLUCOMTR-MCNC: 144 MG/DL — HIGH (ref 70–99)
GLUCOSE BLDC GLUCOMTR-MCNC: 206 MG/DL — HIGH (ref 70–99)
GLUCOSE BLDC GLUCOMTR-MCNC: 217 MG/DL — HIGH (ref 70–99)
GLUCOSE BLDC GLUCOMTR-MCNC: 248 MG/DL — HIGH (ref 70–99)
GLUCOSE SERPL-MCNC: 161 MG/DL — HIGH (ref 70–99)
HCT VFR BLD CALC: 38.3 % — LOW (ref 39–50)
HGB BLD-MCNC: 12.7 G/DL — LOW (ref 13–17)
MAGNESIUM SERPL-MCNC: 1.8 MG/DL — SIGNIFICANT CHANGE UP (ref 1.6–2.6)
MCHC RBC-ENTMCNC: 30.1 PG — SIGNIFICANT CHANGE UP (ref 27–34)
MCHC RBC-ENTMCNC: 33.2 GM/DL — SIGNIFICANT CHANGE UP (ref 32–36)
MCV RBC AUTO: 90.8 FL — SIGNIFICANT CHANGE UP (ref 80–100)
PLATELET # BLD AUTO: 82 K/UL — LOW (ref 150–400)
POTASSIUM SERPL-MCNC: 3.6 MMOL/L — SIGNIFICANT CHANGE UP (ref 3.5–5.3)
POTASSIUM SERPL-SCNC: 3.6 MMOL/L — SIGNIFICANT CHANGE UP (ref 3.5–5.3)
PROT SERPL-MCNC: 7.5 G/DL — SIGNIFICANT CHANGE UP (ref 6.6–8.7)
RBC # BLD: 4.22 M/UL — SIGNIFICANT CHANGE UP (ref 4.2–5.8)
RBC # FLD: 14.4 % — SIGNIFICANT CHANGE UP (ref 10.3–14.5)
SODIUM SERPL-SCNC: 134 MMOL/L — LOW (ref 135–145)
WBC # BLD: 2.93 K/UL — LOW (ref 3.8–10.5)
WBC # FLD AUTO: 2.93 K/UL — LOW (ref 3.8–10.5)

## 2021-12-19 PROCEDURE — 99233 SBSQ HOSP IP/OBS HIGH 50: CPT | Mod: GC

## 2021-12-19 RX ADMIN — Medication 4: at 08:43

## 2021-12-19 RX ADMIN — Medication 4 UNIT(S): at 11:10

## 2021-12-19 RX ADMIN — APIXABAN 5 MILLIGRAM(S): 2.5 TABLET, FILM COATED ORAL at 05:05

## 2021-12-19 RX ADMIN — Medication 1 TABLET(S): at 08:44

## 2021-12-19 RX ADMIN — Medication 25 MILLIGRAM(S): at 19:24

## 2021-12-19 RX ADMIN — LOSARTAN POTASSIUM 25 MILLIGRAM(S): 100 TABLET, FILM COATED ORAL at 05:05

## 2021-12-19 RX ADMIN — Medication 4: at 20:41

## 2021-12-19 RX ADMIN — Medication 25 MILLIGRAM(S): at 05:05

## 2021-12-19 RX ADMIN — APIXABAN 5 MILLIGRAM(S): 2.5 TABLET, FILM COATED ORAL at 16:48

## 2021-12-19 RX ADMIN — Medication 1 MILLIGRAM(S): at 08:45

## 2021-12-19 RX ADMIN — Medication 4 UNIT(S): at 08:44

## 2021-12-19 RX ADMIN — Medication 100 MILLIGRAM(S): at 08:47

## 2021-12-19 RX ADMIN — PANTOPRAZOLE SODIUM 40 MILLIGRAM(S): 20 TABLET, DELAYED RELEASE ORAL at 07:57

## 2021-12-19 RX ADMIN — Medication 4: at 16:48

## 2021-12-19 RX ADMIN — Medication 50 MILLIGRAM(S): at 05:05

## 2021-12-19 RX ADMIN — Medication 4 UNIT(S): at 16:49

## 2021-12-19 RX ADMIN — INSULIN GLARGINE 10 UNIT(S): 100 INJECTION, SOLUTION SUBCUTANEOUS at 20:37

## 2021-12-19 RX ADMIN — Medication 50 MILLIGRAM(S): at 16:48

## 2021-12-19 NOTE — PROGRESS NOTE ADULT - ATTENDING COMMENTS
patient seen and eval. agree with above.   ciwa 1-2   monitor vs , lft s  c/w librium taper   monitor lfts   sbirt , sw consult

## 2021-12-19 NOTE — PATIENT PROFILE ADULT - FALL HARM RISK - HARM RISK INTERVENTIONS

## 2021-12-20 ENCOUNTER — TRANSCRIPTION ENCOUNTER (OUTPATIENT)
Age: 31
End: 2021-12-20

## 2021-12-20 VITALS
HEART RATE: 87 BPM | RESPIRATION RATE: 18 BRPM | TEMPERATURE: 98 F | OXYGEN SATURATION: 98 % | SYSTOLIC BLOOD PRESSURE: 119 MMHG | DIASTOLIC BLOOD PRESSURE: 75 MMHG

## 2021-12-20 DIAGNOSIS — F10.20 ALCOHOL DEPENDENCE, UNCOMPLICATED: ICD-10-CM

## 2021-12-20 LAB
ALBUMIN SERPL ELPH-MCNC: 4.1 G/DL — SIGNIFICANT CHANGE UP (ref 3.3–5.2)
ALP SERPL-CCNC: 99 U/L — SIGNIFICANT CHANGE UP (ref 40–120)
ALT FLD-CCNC: 47 U/L — HIGH
ANION GAP SERPL CALC-SCNC: 15 MMOL/L — SIGNIFICANT CHANGE UP (ref 5–17)
AST SERPL-CCNC: 90 U/L — HIGH
BILIRUB SERPL-MCNC: 0.9 MG/DL — SIGNIFICANT CHANGE UP (ref 0.4–2)
BUN SERPL-MCNC: 17 MG/DL — SIGNIFICANT CHANGE UP (ref 8–20)
CALCIUM SERPL-MCNC: 9.5 MG/DL — SIGNIFICANT CHANGE UP (ref 8.6–10.2)
CHLORIDE SERPL-SCNC: 97 MMOL/L — LOW (ref 98–107)
CO2 SERPL-SCNC: 20 MMOL/L — LOW (ref 22–29)
CREAT SERPL-MCNC: 0.53 MG/DL — SIGNIFICANT CHANGE UP (ref 0.5–1.3)
GLUCOSE BLDC GLUCOMTR-MCNC: 168 MG/DL — HIGH (ref 70–99)
GLUCOSE BLDC GLUCOMTR-MCNC: 174 MG/DL — HIGH (ref 70–99)
GLUCOSE BLDC GLUCOMTR-MCNC: 200 MG/DL — HIGH (ref 70–99)
GLUCOSE SERPL-MCNC: 274 MG/DL — HIGH (ref 70–99)
HCT VFR BLD CALC: 35 % — LOW (ref 39–50)
HGB BLD-MCNC: 11.7 G/DL — LOW (ref 13–17)
MCHC RBC-ENTMCNC: 30.3 PG — SIGNIFICANT CHANGE UP (ref 27–34)
MCHC RBC-ENTMCNC: 33.4 GM/DL — SIGNIFICANT CHANGE UP (ref 32–36)
MCV RBC AUTO: 90.7 FL — SIGNIFICANT CHANGE UP (ref 80–100)
PLATELET # BLD AUTO: 87 K/UL — LOW (ref 150–400)
POTASSIUM SERPL-MCNC: 3.7 MMOL/L — SIGNIFICANT CHANGE UP (ref 3.5–5.3)
POTASSIUM SERPL-SCNC: 3.7 MMOL/L — SIGNIFICANT CHANGE UP (ref 3.5–5.3)
PROT SERPL-MCNC: 6.8 G/DL — SIGNIFICANT CHANGE UP (ref 6.6–8.7)
RBC # BLD: 3.86 M/UL — LOW (ref 4.2–5.8)
RBC # FLD: 14.6 % — HIGH (ref 10.3–14.5)
SODIUM SERPL-SCNC: 132 MMOL/L — LOW (ref 135–145)
WBC # BLD: 3.34 K/UL — LOW (ref 3.8–10.5)
WBC # FLD AUTO: 3.34 K/UL — LOW (ref 3.8–10.5)

## 2021-12-20 PROCEDURE — 71045 X-RAY EXAM CHEST 1 VIEW: CPT

## 2021-12-20 PROCEDURE — 85018 HEMOGLOBIN: CPT

## 2021-12-20 PROCEDURE — 80053 COMPREHEN METABOLIC PANEL: CPT

## 2021-12-20 PROCEDURE — 83605 ASSAY OF LACTIC ACID: CPT

## 2021-12-20 PROCEDURE — 96376 TX/PRO/DX INJ SAME DRUG ADON: CPT

## 2021-12-20 PROCEDURE — 93005 ELECTROCARDIOGRAM TRACING: CPT

## 2021-12-20 PROCEDURE — 83735 ASSAY OF MAGNESIUM: CPT

## 2021-12-20 PROCEDURE — 80048 BASIC METABOLIC PNL TOTAL CA: CPT

## 2021-12-20 PROCEDURE — 96374 THER/PROPH/DIAG INJ IV PUSH: CPT

## 2021-12-20 PROCEDURE — 99222 1ST HOSP IP/OBS MODERATE 55: CPT

## 2021-12-20 PROCEDURE — 84132 ASSAY OF SERUM POTASSIUM: CPT

## 2021-12-20 PROCEDURE — 99239 HOSP IP/OBS DSCHRG MGMT >30: CPT

## 2021-12-20 PROCEDURE — 84295 ASSAY OF SERUM SODIUM: CPT

## 2021-12-20 PROCEDURE — 0225U NFCT DS DNA&RNA 21 SARSCOV2: CPT

## 2021-12-20 PROCEDURE — 82947 ASSAY GLUCOSE BLOOD QUANT: CPT

## 2021-12-20 PROCEDURE — 82330 ASSAY OF CALCIUM: CPT

## 2021-12-20 PROCEDURE — 96361 HYDRATE IV INFUSION ADD-ON: CPT

## 2021-12-20 PROCEDURE — 83930 ASSAY OF BLOOD OSMOLALITY: CPT

## 2021-12-20 PROCEDURE — 36415 COLL VENOUS BLD VENIPUNCTURE: CPT

## 2021-12-20 PROCEDURE — 80307 DRUG TEST PRSMV CHEM ANLYZR: CPT

## 2021-12-20 PROCEDURE — 85014 HEMATOCRIT: CPT

## 2021-12-20 PROCEDURE — 82435 ASSAY OF BLOOD CHLORIDE: CPT

## 2021-12-20 PROCEDURE — 82803 BLOOD GASES ANY COMBINATION: CPT

## 2021-12-20 PROCEDURE — 85027 COMPLETE CBC AUTOMATED: CPT

## 2021-12-20 PROCEDURE — 82962 GLUCOSE BLOOD TEST: CPT

## 2021-12-20 PROCEDURE — 81003 URINALYSIS AUTO W/O SCOPE: CPT

## 2021-12-20 PROCEDURE — 99285 EMERGENCY DEPT VISIT HI MDM: CPT

## 2021-12-20 PROCEDURE — 85025 COMPLETE CBC W/AUTO DIFF WBC: CPT

## 2021-12-20 PROCEDURE — 84100 ASSAY OF PHOSPHORUS: CPT

## 2021-12-20 PROCEDURE — 83036 HEMOGLOBIN GLYCOSYLATED A1C: CPT

## 2021-12-20 RX ORDER — INFLUENZA VIRUS VACCINE 15; 15; 15; 15 UG/.5ML; UG/.5ML; UG/.5ML; UG/.5ML
0.5 SUSPENSION INTRAMUSCULAR ONCE
Refills: 0 | Status: DISCONTINUED | OUTPATIENT
Start: 2021-12-20 | End: 2021-12-20

## 2021-12-20 RX ORDER — ENOXAPARIN SODIUM 100 MG/ML
15 INJECTION SUBCUTANEOUS
Qty: 30 | Refills: 0
Start: 2021-12-20 | End: 2022-01-18

## 2021-12-20 RX ORDER — ISOPROPYL ALCOHOL, BENZOCAINE .7; .06 ML/ML; ML/ML
1 SWAB TOPICAL
Qty: 100 | Refills: 1
Start: 2021-12-20 | End: 2022-02-07

## 2021-12-20 RX ORDER — FOLIC ACID 0.8 MG
1 TABLET ORAL
Qty: 30 | Refills: 0
Start: 2021-12-20 | End: 2022-01-18

## 2021-12-20 RX ORDER — METOPROLOL TARTRATE 50 MG
1 TABLET ORAL
Qty: 60 | Refills: 0
Start: 2021-12-20 | End: 2022-01-18

## 2021-12-20 RX ORDER — THIAMINE MONONITRATE (VIT B1) 100 MG
1 TABLET ORAL
Qty: 30 | Refills: 0
Start: 2021-12-20 | End: 2022-01-18

## 2021-12-20 RX ORDER — LOSARTAN POTASSIUM 100 MG/1
1 TABLET, FILM COATED ORAL
Qty: 30 | Refills: 0
Start: 2021-12-20 | End: 2022-01-18

## 2021-12-20 RX ORDER — APIXABAN 2.5 MG/1
1 TABLET, FILM COATED ORAL
Qty: 60 | Refills: 0
Start: 2021-12-20 | End: 2022-01-18

## 2021-12-20 RX ORDER — PANTOPRAZOLE SODIUM 20 MG/1
1 TABLET, DELAYED RELEASE ORAL
Qty: 30 | Refills: 0
Start: 2021-12-20 | End: 2022-01-18

## 2021-12-20 RX ORDER — INSULIN LISPRO 100/ML
5 VIAL (ML) SUBCUTANEOUS
Qty: 30 | Refills: 0
Start: 2021-12-20 | End: 2022-01-18

## 2021-12-20 RX ADMIN — Medication 100 MILLIGRAM(S): at 11:49

## 2021-12-20 RX ADMIN — Medication 1 MILLIGRAM(S): at 11:48

## 2021-12-20 RX ADMIN — Medication 1 TABLET(S): at 11:48

## 2021-12-20 RX ADMIN — APIXABAN 5 MILLIGRAM(S): 2.5 TABLET, FILM COATED ORAL at 05:12

## 2021-12-20 RX ADMIN — Medication 2: at 08:22

## 2021-12-20 RX ADMIN — Medication 2: at 11:09

## 2021-12-20 RX ADMIN — Medication 2: at 16:24

## 2021-12-20 RX ADMIN — Medication 50 MILLIGRAM(S): at 05:11

## 2021-12-20 RX ADMIN — LOSARTAN POTASSIUM 25 MILLIGRAM(S): 100 TABLET, FILM COATED ORAL at 06:10

## 2021-12-20 RX ADMIN — APIXABAN 5 MILLIGRAM(S): 2.5 TABLET, FILM COATED ORAL at 17:25

## 2021-12-20 RX ADMIN — Medication 25 MILLIGRAM(S): at 17:24

## 2021-12-20 RX ADMIN — Medication 25 MILLIGRAM(S): at 05:12

## 2021-12-20 RX ADMIN — PANTOPRAZOLE SODIUM 40 MILLIGRAM(S): 20 TABLET, DELAYED RELEASE ORAL at 05:12

## 2021-12-20 RX ADMIN — Medication 4 UNIT(S): at 11:08

## 2021-12-20 RX ADMIN — Medication 4 UNIT(S): at 08:21

## 2021-12-20 RX ADMIN — Medication 4 UNIT(S): at 16:24

## 2021-12-20 RX ADMIN — Medication 50 MILLIGRAM(S): at 11:48

## 2021-12-20 NOTE — BH CONSULTATION LIAISON ASSESSMENT NOTE - NSSUICPROTFACT_PSY_ALL_CORE
Responsibility to children, family, or others/Identifies reasons for living/Supportive social network of family or friends/Fear of death or the actual act of killing self/Positive therapeutic relationships/Beloved pets

## 2021-12-20 NOTE — DISCHARGE NOTE PROVIDER - PROVIDER TOKENS
PROVIDER:[TOKEN:[[id number]],FOLLOWUP:[1 week]],FREE:[LAST:[primary care],PHONE:[(   )    -],FAX:[(   )    -],FOLLOWUP:[1-3 days]],PROVIDER:[TOKEN:[[id number]],FOLLOWUP:[1 week]]

## 2021-12-20 NOTE — DISCHARGE NOTE NURSING/CASE MANAGEMENT/SOCIAL WORK - PATIENT PORTAL LINK FT
You can access the FollowMyHealth Patient Portal offered by [facility] by registering at the following website: [email or web address]. By joining [employer]’s FollowMyHealth portal, you will also be able to view your health information using other applications (apps) compatible with our system.

## 2021-12-20 NOTE — DISCHARGE NOTE PROVIDER - HOSPITAL COURSE
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 Notes missing insulin last 2 days. Drinks 4 (32oz cans) of malt liquor daily with last drink on day of presentation. Denies history of DT's or seizures but had severe withdrawals in past. notes epigastric discomfort, no nausea or vomiting. Patient tried alcohol rehab in past, only sober for 1 month prior to relapsing again. Patient with frequent hospitalizations for alcohol intoxication or withdrawals. In ER, noted to be intoxicated with , glucose 605 and mild gap acidosis which resolved with IVF and insulin. ER requesting admission for impending alcohol withdrawl. was started on  CIWA protocol:  ativan prn, librium taper completed. c/w multivitamin, folate, thiamine for suspected deficiency. psych consulted as patient notes he drinks due to anxiety/depression.  pt wants to go to [facility] after hospital dc. patient willing to quit and states that he will follow up at rehab. Diabetes type I with hyperglycemia / uncontrolled .  A1C with Estimated Average Glucose Result: 11.9 % (12.18.21 @ 03:31). states he  missed insulin at home. started on   Lantus , premeal , and ISS.     patient also has hx of chronic p Afib, NICM. negative cath in past. patient c/w eliquis, lopressor, losartan. patient to follow up op with cardio and pcp. Risk vs benefits were discussed with patient. patient states that hes motivated to quit alcohol.      thrombocytopenia / stable /  likely 2/2 to etoh use /  no active bleeding. patient to follow up op with pcp.     patient seen by social work prior to dc.     meds sent to [facility].     Vital Signs Last 24 Hrs  T(C): 36.7 (20 Dec 2021 15:58), Max: 36.7 (19 Dec 2021 19:14)  T(F): 98 (20 Dec 2021 15:58), Max: 98 (19 Dec 2021 19:14)  HR: 87 (20 Dec 2021 15:58) (69 - 106)  BP: 119/75 (20 Dec 2021 15:58) (95/60 - 119/75)  BP(mean): --  RR: 18 (20 Dec 2021 15:58) (17 - 18)  SpO2: 98% (20 Dec 2021 15:58) (98% - 100%)    time spent for this dc 50 mins.

## 2021-12-20 NOTE — BH CONSULTATION LIAISON ASSESSMENT NOTE - SUMMARY
Patient is a 31 year old male who is unemployed, living with parents and brother with a past history of alcohol use disorder with multiple past rehab/detox admissions and a PMH of Afib, HTN and DM who originally presented to ER for shortness of breath, weakness and tremors.   Patient seen and evaluated and found to be calm and cooperative denying any s/h ideation and no signs of piter or psychosis elicited. Patient anxiety likely in context of withdrawal which appears since to have resolved and states she will be getting in contact with his  at [facility] to go back to rehab. Patient offered  services and recommended to go to rehab but declined stating he will spend geena with his parents and then go to rehab.

## 2021-12-20 NOTE — DISCHARGE NOTE PROVIDER - NSDCCPCAREPLAN_GEN_ALL_CORE_FT
PRINCIPAL DISCHARGE DIAGNOSIS  Diagnosis: Alcohol withdrawal  Assessment and Plan of Treatment: avoid alcohol use.   follow up at alcohol rehab.   follow up with primary care in 1 -3 days.      SECONDARY DISCHARGE DIAGNOSES  Diagnosis: Diabetes mellitus  Assessment and Plan of Treatment: diabetic diet.   cotninue with insulin as advised,   follow up with endocrinology as out patient.    Diagnosis: Atrial fibrillation  Assessment and Plan of Treatment: continue with medications as advised.   follow up with cardiology as out patient in 1 week.

## 2021-12-20 NOTE — PROGRESS NOTE ADULT - SUBJECTIVE AND OBJECTIVE BOX
CC: shortness of breath (18 Dec 2021 15:21)      INTERVAL HPI/ OVERNIGHT EVENTS:  Patient was seen and examined. He reports feeling better and reports mild tremors with arms outstretched. He wants to go to [facility] facility after discharge from hospital.       Vital Signs Last 24 Hrs  T(C): 36.5 (20 Dec 2021 10:58), Max: 36.7 (19 Dec 2021 19:14)  T(F): 97.7 (20 Dec 2021 10:58), Max: 98 (19 Dec 2021 19:14)  HR: 79 (20 Dec 2021 10:58) (69 - 106)  BP: 97/65 (20 Dec 2021 10:58) (95/60 - 106/70)  BP(mean): --  RR: 17 (20 Dec 2021 10:58) (17 - 18)  SpO2: 99% (20 Dec 2021 10:58) (98% - 100%)    PHYSICAL EXAM:  General: in no acute distress, tremors noted with outstretched arms.   Neck: Supple; non tender; no masses  Respiratory: No wheezes, rales or rhonchi  Cardiovascular: Regular rate and rhythm. S1 and S2 Normal; No murmurs, gallops or rubs  Gastrointestinal: Soft non-tender non-distended; Normal bowel sounds  Extremities: Normal range of motion, No clubbing, cyanosis or edema  Vascular: Peripheral pulses palpable 2+ bilaterally  Neurological: Alert and oriented x4  Skin: Warm and dry. No acute rash  Musculoskeletal: Normal gait, tone, without deformities                          11.7   3.34  )-----------( 87       ( 20 Dec 2021 03:49 )             35.0   12-20    132<L>  |  97<L>  |  17.0  ----------------------------<  274<H>  3.7   |  20.0<L>  |  0.53    Ca    9.5      20 Dec 2021 03:49  Mg     1.8     12-19    TPro  6.8  /  Alb  4.1  /  TBili  0.9  /  DBili  x   /  AST  90<H>  /  ALT  47<H>  /  AlkPhos  99  12-20                  
CC: shortness of breath (18 Dec 2021 15:21)    HPI:  30 yo M w/ hx alcohol abuse, NICM, afib on eliquis, mild HFrEF, Dm1 presents to ER for shortness of breath, weakness and tremors. Similiar episodes in past when he was either intoxicated or withdrawing. Notes missing insulin last 2 days. Drinks 4 32oz cans of malt liquor daily with last drink on day of presentation. denies history of DT's or seizures but had severe withdrawals in past. notes epigastric discomfort, no nausea or vomiting. Patient tried alcohol rehab in past, only sober for 1 month prior to relapsing again. Patient with frequent hospitalizations for alcohol intoxication or withdrawals.    In ER, noted to be intoxicated with , glucose 605 and mild gap acidosis which resolved with IVF and insulin. ER requesting admission for impending alcohol withdrawal (17 Dec 2021 09:56)    INTERVAL HPI/ OVERNIGHT EVENTS:    Patient was seen and examined. He reports feeling better and reports mild tremors with arms outstretched. He wants to go to [facility] facility after discharge from hospital.     Vital Signs Last 24 Hrs  T(C): 36.7 (19 Dec 2021 11:47), Max: 36.8 (18 Dec 2021 16:08)  T(F): 98.1 (19 Dec 2021 11:47), Max: 98.2 (18 Dec 2021 16:08)  HR: 90 (19 Dec 2021 11:47) (85 - 100)  BP: 90/56 (19 Dec 2021 11:47) (90/56 - 117/90)  BP(mean): --  RR: 18 (19 Dec 2021 11:47) (18 - 18)  SpO2: 98% (19 Dec 2021 11:47) (95% - 99%)    PHYSICAL EXAM:  General: in no acute distress, tremors noted with outstretched arms.   Eyes: PERRLA, EOMI; conjunctiva and sclera clear  Head: Normocephalic; atraumatic  ENMT: No nasal discharge; airway clear  Neck: Supple; non tender; no masses  Respiratory: No wheezes, rales or rhonchi  Cardiovascular: Regular rate and rhythm. S1 and S2 Normal; No murmurs, gallops or rubs  Gastrointestinal: Soft non-tender non-distended; Normal bowel sounds  Genitourinary: No costovertebral angle tenderness  Extremities: Normal range of motion, No clubbing, cyanosis or edema  Vascular: Peripheral pulses palpable 2+ bilaterally  Neurological: Alert and oriented x4  Skin: Warm and dry. No acute rash  Lymph Nodes: No acute cervical adenopathy  Musculoskeletal: Normal gait, tone, without deformities  Psychiatric: Cooperative and appropriate    I&O's Detail                                12.7   2.93  )-----------( 82       ( 19 Dec 2021 03:27 )             38.3     19 Dec 2021 03:27    134    |  94     |  11.4   ----------------------------<  161    3.6     |  22.0   |  0.48     Ca    9.8        19 Dec 2021 03:27  Phos  3.3       18 Dec 2021 03:31  Mg     1.8       19 Dec 2021 03:27    TPro  7.5    /  Alb  4.6    /  TBili  1.3    /  DBili  x      /  AST  65     /  ALT  40     /  AlkPhos  116    19 Dec 2021 03:27      CAPILLARY BLOOD GLUCOSE      POCT Blood Glucose.: 144 mg/dL (19 Dec 2021 11:08)  POCT Blood Glucose.: 206 mg/dL (19 Dec 2021 08:40)  POCT Blood Glucose.: 334 mg/dL (18 Dec 2021 21:03)  POCT Blood Glucose.: 249 mg/dL (18 Dec 2021 16:18)    LIVER FUNCTIONS - ( 19 Dec 2021 03:27 )  Alb: 4.6 g/dL / Pro: 7.5 g/dL / ALK PHOS: 116 U/L / ALT: 40 U/L / AST: 65 U/L / GGT: x               MEDICATIONS  (STANDING):  apixaban 5 milliGRAM(s) Oral every 12 hours  chlordiazePOXIDE 50 milliGRAM(s) Oral every 12 hours  chlordiazePOXIDE   Oral   dextrose 40% Gel 15 Gram(s) Oral once  dextrose 5%. 1000 milliLiter(s) (50 mL/Hr) IV Continuous <Continuous>  dextrose 5%. 1000 milliLiter(s) (100 mL/Hr) IV Continuous <Continuous>  dextrose 50% Injectable 25 Gram(s) IV Push once  dextrose 50% Injectable 12.5 Gram(s) IV Push once  dextrose 50% Injectable 25 Gram(s) IV Push once  folic acid 1 milliGRAM(s) Oral daily  glucagon  Injectable 1 milliGRAM(s) IntraMuscular once  insulin glargine Injectable (LANTUS) 10 Unit(s) SubCutaneous at bedtime  insulin lispro (ADMELOG) corrective regimen sliding scale   SubCutaneous Before meals and at bedtime  insulin lispro Injectable (ADMELOG) 4 Unit(s) SubCutaneous three times a day before meals  losartan 25 milliGRAM(s) Oral daily  metoprolol tartrate 25 milliGRAM(s) Oral two times a day  multivitamin 1 Tablet(s) Oral daily  pantoprazole    Tablet 40 milliGRAM(s) Oral before breakfast  thiamine 100 milliGRAM(s) Oral daily    MEDICATIONS  (PRN):  LORazepam   Injectable 2 milliGRAM(s) IV Push every 1 hour PRN CIWA-Ar score 8 or greater  melatonin 3 milliGRAM(s) Oral at bedtime PRN Insomnia      RADIOLOGY & ADDITIONAL TESTS:
cc: etoh use       interval hx: patient seen and eval. comfortable. in no acute distress. mild tremors noted . denies any n/v/abd pain       Vital Signs Last 24 Hrs  T(C): 36.9 (18 Dec 2021 12:00), Max: 38.3 (17 Dec 2021 19:00)  T(F): 98.5 (18 Dec 2021 12:00), Max: 100.9 (17 Dec 2021 19:00)  HR: 97 (18 Dec 2021 14:00) (89 - 113)  BP: 111/76 (18 Dec 2021 14:00) (111/71 - 156/93)  BP(mean): --  RR: 18 (18 Dec 2021 14:00) (18 - 18)  SpO2: 95% (18 Dec 2021 14:00) (95% - 98%)      Physical Exam:    Physical Exam:  · Constitutional	mild tremors  · Respiratory	Breath Sounds equal & clear to percussion & auscultation, no accessory muscle use  · Cardiovascular Details	regular rate and rhythm S1 S2  · Gastrointestinal Comments	mild epigastric discomfort, soft +BS  · Extremities	No cyanosis, clubbing or edema, Equal and normal pulses (carotid, femoral, dorsalis pedis)  · Neurological	Alert & oriented; no sensory, motor or coordination deficits, normal reflexes  · Musculoskeletal	No joint pain, swelling or deformity; no limitation of movement  · Psychiatric	Affect and characteristics of appearance, verbalizations, behaviors are appropriate                            12.7   3.76  )-----------( 74       ( 18 Dec 2021 03:31 )             37.7   12-18    131<L>  |  89<L>  |  4.6<L>  ----------------------------<  220<H>  4.1   |  26.0  |  0.49<L>    Ca    10.0      18 Dec 2021 03:31  Phos  3.3     12-18  Mg     1.7     12-18    TPro  8.1  /  Alb  4.9  /  TBili  1.3  /  DBili  x   /  AST  117<H>  /  ALT  52<H>  /  AlkPhos  130<H>  12-18

## 2021-12-20 NOTE — BH CONSULTATION LIAISON ASSESSMENT NOTE - NSBHCHARTREVIEWINVESTIGATE_PSY_A_CORE FT
< from: 12 Lead ECG (12.17.21 @ 08:09) >      Ventricular Rate 99 BPM    Atrial Rate 99 BPM    P-R Interval 166 ms    QRS Duration 94 ms    Q-T Interval 362 ms    QTC Calculation(Bazett) 464 ms    P Axis 62 degrees    R Axis 86 degrees    T Axis 58 degrees    Diagnosis Line *** Poor data quality, interpretation may be adversely affected  Normal sinus rhythm  Normal ECG    Confirmed by VERN VICENTE ([id number]) on 12/18/2021 10:40:49 PM    < end of copied text >

## 2021-12-20 NOTE — DISCHARGE NOTE NURSING/CASE MANAGEMENT/SOCIAL WORK - NSDCPEFALRISK_GEN_ALL_CORE
For information on Fall & Injury Prevention, visit: [email or web address] OR  [email or web address] OR  https://www.cdc.gov/steadi/patient.html

## 2021-12-20 NOTE — BH CONSULTATION LIAISON ASSESSMENT NOTE - NSBHCHARTREVIEWLAB_PSY_A_CORE FT
Basic Metabolic Panel (12.17.21 @ 14:09)    Sodium, Serum: 139 mmol/L    Potassium, Serum: 3.7 mmol/L    Chloride, Serum: 99 mmol/L    Carbon Dioxide, Serum: 26.0 mmol/L    Anion Gap, Serum: 14 mmol/L    Blood Urea Nitrogen, Serum: <3.0 mg/dL    Creatinine, Serum: 0.45 mg/dL    Glucose, Serum: 116 mg/dL    Calcium, Total Serum: 8.4 mg/dL    eGFR if Non : 150: Interpretative comment  The units for eGFR are mL/min/1.73M2 (normalized body surface area). The  eGFR is calculated from a serum creatinine using the CKD-EPI equation.  Other variables required for calculation are race, age and sex. Among  patients with chronic kidney disease (CKD), the eGFR is useful in  determining the stage of disease according to KDOQI CKD classification.  All eGFR results are reported numerically with the following  interpretation.          GFR                    With                 Without     (ml/min/1.73 m2)    Kidney Damage       Kidney Damage        >= 90                    Stage 1                     Normal        60-89                    Stage 2                     Decreased GFR        30-59     Stage 3                     Stage 3        15-29                    Stage 4                     Stage 4        < 15                      Stage 5                     Stage 5  Each stage of CKD assumes that the associated GFR level has been in  effect for at least 3 months. Determination of stages one and two (with  eGFR > 59 ml/min/m2) requires estimation of kidney damage for at least 3  months as defined by structural or functional abnormalities.  Limitations: All estimates of GFR will be less accurate for patients at  extremes of muscle mass (including but not limited to frail elderly,  critically ill, or cancer patients), those with unusual diets, and those  with conditions associated with reduced secretion or extrarenal  elimination of creatinine. The eGFR equation is not recommended for use  in patients with unstable creatinine levels. mL/min/1.73M2    eGFR if African American: 174 mL/min/1.73M2

## 2021-12-20 NOTE — BH CONSULTATION LIAISON ASSESSMENT NOTE - CURRENT MEDICATION
MEDICATIONS  (STANDING):  apixaban 5 milliGRAM(s) Oral every 12 hours  dextrose 40% Gel 15 Gram(s) Oral once  dextrose 5%. 1000 milliLiter(s) (50 mL/Hr) IV Continuous <Continuous>  dextrose 5%. 1000 milliLiter(s) (100 mL/Hr) IV Continuous <Continuous>  dextrose 50% Injectable 25 Gram(s) IV Push once  dextrose 50% Injectable 12.5 Gram(s) IV Push once  dextrose 50% Injectable 25 Gram(s) IV Push once  folic acid 1 milliGRAM(s) Oral daily  glucagon  Injectable 1 milliGRAM(s) IntraMuscular once  influenza   Vaccine 0.5 milliLiter(s) IntraMuscular once  insulin glargine Injectable (LANTUS) 10 Unit(s) SubCutaneous at bedtime  insulin lispro (ADMELOG) corrective regimen sliding scale   SubCutaneous Before meals and at bedtime  insulin lispro Injectable (ADMELOG) 4 Unit(s) SubCutaneous three times a day before meals  losartan 25 milliGRAM(s) Oral daily  metoprolol tartrate 25 milliGRAM(s) Oral two times a day  multivitamin 1 Tablet(s) Oral daily  pantoprazole    Tablet 40 milliGRAM(s) Oral before breakfast  thiamine 100 milliGRAM(s) Oral daily    MEDICATIONS  (PRN):  LORazepam   Injectable 2 milliGRAM(s) IV Push every 1 hour PRN CIWA-Ar score 8 or greater  melatonin 3 milliGRAM(s) Oral at bedtime PRN Insomnia

## 2021-12-20 NOTE — BH CONSULTATION LIAISON ASSESSMENT NOTE - RISK ASSESSMENT
RF: age, gender, h/o ETOH use,   PF: denies any s/h ideation, no known SA, future oriented and willing to engage in treatment

## 2021-12-20 NOTE — DISCHARGE NOTE PROVIDER - CARE PROVIDER_API CALL
Kathy Phipps)  Cardiac Electrophysiology; Cardiovascular Disease; Internal Medicine  [address] [place] [address]  Phone: [phone]  Fax: [phone]  Follow Up Time: 1 week    primary care,   Phone: (   )    -  Fax: (   )    -  Follow Up Time: 1-3 days    Amanda Mota)  EndocrinologyMetabDiabetes  [address], [place] [id number]  Phone: [phone]  Fax: [phone]  Follow Up Time: 1 week

## 2021-12-20 NOTE — DISCHARGE NOTE PROVIDER - NSDCMRMEDTOKEN_GEN_ALL_CORE_FT
alcohol swabs : Apply topically to affected area 4 times a day   apixaban 5 mg oral tablet: 1 tab(s) orally every 12 hours  folic acid 1 mg oral tablet: 1 tab(s) orally once a day  glucometer (per patient&#x27;s insurance): Test blood sugars four times a day. Dispense #1 glucometer.  HumaLOG KwikPen 100 units/mL injectable solution: 5 unit(s) injectable 3 times a day (with meals)   lancets: 1 application subcutaneously 4 times a day   Lantus Solostar Pen 100 units/mL subcutaneous solution: 15 unit(s) subcutaneous once a day (at bedtime)   losartan 25 mg oral tablet: 1 tab(s) orally once a day  metoprolol tartrate 25 mg oral tablet: 1 tab(s) orally 2 times a day  Multiple Vitamins oral tablet: 1 tab(s) orally once a day  pantoprazole 40 mg oral delayed release tablet: 1 tab(s) orally once a day (before a meal)  test strips (per patient&#x27;s insurance): 1 application subcutaneously 4 times a day. ** Compatible with patient&#x27;s glucometer **  thiamine 100 mg oral tablet: 1 tab(s) orally once a day

## 2021-12-20 NOTE — DISCHARGE NOTE PROVIDER - CARE PROVIDERS DIRECT ADDRESSES
,DirectAddress_Unknown,DirectAddress_Unknown,[email or web address]

## 2021-12-20 NOTE — BH CONSULTATION LIAISON ASSESSMENT NOTE - NSBHCHARTREVIEWVS_PSY_A_CORE FT
Vital Signs Last 24 Hrs  T(C): 36.7 (20 Dec 2021 15:58), Max: 36.7 (19 Dec 2021 19:14)  T(F): 98 (20 Dec 2021 15:58), Max: 98 (19 Dec 2021 19:14)  HR: 87 (20 Dec 2021 15:58) (69 - 106)  BP: 119/75 (20 Dec 2021 15:58) (95/60 - 119/75)  BP(mean): --  RR: 18 (20 Dec 2021 15:58) (17 - 18)  SpO2: 98% (20 Dec 2021 15:58) (98% - 100%)

## 2021-12-20 NOTE — BH CONSULTATION LIAISON ASSESSMENT NOTE - HPI (INCLUDE ILLNESS QUALITY, SEVERITY, DURATION, TIMING, CONTEXT, MODIFYING FACTORS, ASSOCIATED SIGNS AND SYMPTOMS)
Patient is a 31 year old male who is unemployed, living with parents and brother with a past history of alcohol use disorder with multiple past rehab/detox admissions and a PMH of Afib, HTN and DM who originally presented to ER for shortness of breath, weakness and tremors.     Patient seen and evaluated and found to be calm and cooperative. Patient explains he has been in the hospital for several days for "alcohol detox" and states he is feeling better. Patient reports he was anxious but has since improved with the medication. Patient reports a long history of alcoholism with multiple past rehab / detox admission but denies any past complicated withdrawal or seizures and denies any formal psychiatric history and denies any difficulties with depression. Patient reports fair sleep and appetite denying any s/h ideation, denying any symptoms of piter or AVH

## 2021-12-20 NOTE — PROGRESS NOTE ADULT - ASSESSMENT
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 Similar episodes in past when he was either intoxicated or withdrawing. Notes missing insulin last 2 days. Drinks 4 (32oz cans) of malt liquor daily with last drink on day of presentation.   Denies history of DT's or seizures but had severe withdrawals in past. notes epigastric discomfort, no nausea or vomiting.   Patient tried alcohol rehab in past, only sober for 1 month prior to relapsing again. Patient with frequent hospitalizations for alcohol intoxication or withdrawals.  In ER, noted to be intoxicated with , glucose 605 and mild gap acidosis which resolved with IVF and insulin. ER requesting admission for impending alcohol withdrawal      >acute alcohol intoxication with impending alcohol withdrawal  Overnight had score of 6 and received Ativan. Scoring 0-1 since last night and this morning CIWA score 1  - CIWA protocol:  ativan prn, librium taper (on Librium BID today and daily tomorrow)  -multivitamin, folate, thiamine for suspected deficiency  -psych consulted as patient notes he drinks due to anxiety/depression  - monitor electrolyte and replete   - social work consult placed. pt wants to go to [facility] after hospital dc    >Diabetes type I with hyperglycemia / uncontrolled   - A1C with Estimated Average Glucose Result: 11.9 % (12.18.21 @ 03:31)  - missed insulin at home  - Lantus (10u), premeal (4u) and ISS     >Afib, NICM  -negative cath in past  -c/w eliquis, lopressor, losartan    > thrombocytopenia   - likely 2/2 to etoh use   - no active bleeding   - monitor plts     > gerd : c/w ppi    > dvt ppx: on eliquis   Dispo: likely in the next 24 hrs once taper is completed. 
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 Similar episodes in past when he was either intoxicated or withdrawing. Notes missing insulin last 2 days. Drinks 4 (32oz cans) of malt liquor daily with last drink on day of presentation. Denies history of DT's or seizures but had severe withdrawals in past. notes epigastric discomfort, no nausea or vomiting.   Patient tried alcohol rehab in past, only sober for 1 month prior to relapsing again. Patient with frequent hospitalizations for alcohol intoxication or withdrawals.  In ER, noted to be intoxicated with , glucose 605 and mild gap acidosis which resolved with IVF and insulin. ER requesting admission for impending alcohol withdrawal      >acute alcohol intoxication with impending alcohol withdrawal  - [facility] protocol:  ativan prn, librium taper (on Librium BID today and daily tomorrow)  -multivitamin, folate, thiamine for suspected deficiency  -psych consulted as patient notes he drinks due to anxiety/depression  - monitor electrolyte and replete   - awaiting social work consult,  placed. pt wants to go to [facility] after hospital dc    >Diabetes type I with hyperglycemia / uncontrolled   - A1C with Estimated Average Glucose Result: 11.9 % (12.18.21 @ 03:31)  - missed insulin at home  - Lantus (10u), premeal (4u) and ISS     >Afib, NICM  -negative cath in past  -c/w eliquis, lopressor, losartan    > thrombocytopenia   - likely 2/2 to etoh use   - no active bleeding   - monitor plts     > gerd : c/w ppi    > dvt ppx: on eliquis   Dispo: likely in the next 24 hrs once taper is completed. 
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 Similiar episodes in past when he was either intoxicated or withdrawing. Notes missing insulin last 2 days. Drinks 4 32oz cans of malt liquor daily with last drink on day of presentation. denies history of DT's or seizures but had severe withdrawals in past. notes epigastric discomfort, no nausea or vomiting. Patient tried alcohol rehab in past, only sober for 1 month prior to relapsing again. Patient with frequent hospitalizations for alcohol intoxication or withdrawals.  in Er, noted to be intoxicated with , glucose 605 and mild gap acidosis which resolved with IVF and insulin. ER requesting admission for impending alcohol withdrawal      >acute alcohol intoxication with impending alcohol withdrawal  - ciwa ativan prn, librium taper  -multivitamin, folate, thiamine for suspected deficiency  -psych consulted as patient notes he drinks due to anxiety/depression  - monitor electrolyte and replete     >d1 with hyperglycemia / uncontrolled   - A1C with Estimated Average Glucose Result: 11.9 % (12.18.21 @ 03:31)  - missed insulin at home  - antus, premeal and sliding scale     >Afib, NICM  -negative cath in past  -c/w eliquis, lopressor, losartan    > thrombocytopenia   - likely due to etoh use   - no active bleeding   - monitor plts     > gerd : c/w ppi      > dvt ppx: on eliquis

## 2021-12-20 NOTE — DISCHARGE NOTE NURSING/CASE MANAGEMENT/SOCIAL WORK - NSDCVIVACCINE_GEN_ALL_CORE_FT
Tdap; 29-Oct-2019 23:07; Odessa Landis (RN); [employer]; [id number] (Exp. Date: 22-Oct-2021); IntraMuscular; Deltoid Left.; 0.5 milliLiter(s); VIS (VIS Published: 09-May-2013, VIS Presented: 29-Oct-2019);

## 2021-12-31 NOTE — ED PROVIDER NOTE - HIV OFFER
Video Visit Progress Note    Patient Name: Betty Hightower    YOB: 1955    Consent: This visit is being performed via video to discuss Video Visit (Discuss diabetes management before and after surgery) and Md Call    Clinician Location: [place] [address]    Betty is in [place] and her identity has been established.   She was informed that consent to treat includes permission to submit charges to the applicable insurance on file. Betty was advised regarding the potential risk inherent in video visits, as the assessment may be limited due to what can be seen on the screen which potentially results in an incomplete assessment; as well as either of us may discontinue the video visit if it is felt that the videoconferencing connections are not adequate for this patient's situation.       Chief Complaint:   Chief Complaint   Patient presents with   • Video Visit     Discuss diabetes management before and after surgery   • Md Call       History of Present Illness: Patient requested this video visit for evaluation of above chief complaint.  Patient is going to have revision left knee patellar component and tibial poly at [place] surgery done under spinal anesthesia on January 3, 2022 at [facility] by Dr. Houston.  She had questions about her medications as to which ones to hold and which ones to continue prior to surgery and also what to do after she is done with the surgery in case changes in medications are needed as she was told that her glucose might be higher after the surgery as expected.  Patient was able to get in with endocrinology but her endocrinologist is away for few weeks for now and therefore she needed this guidance.  She is a known diabetic and currently on insulin.  Advised the patient that she should hold her Metformin 48 hours prior to surgery and therefore she can stop taking it after today, can hold the Jardiance, insulin, glipizide on the  day of surgery and then resume them after the surgery.  She will be n.p.o. on the day of surgery.  All her other medications that include pantoprazole, buspirone, levothyroxine, sertraline, carvedilol, rosuvastatin can be taken on the day of surgery.  Patient verbalized understanding and has no other concerns or questions.  I also reviewed her blood work and her A1c is 7.9% now which is favorable for the surgery as it is less than 8%.  We will continue to monitor her postoperatively after she sees me in the office for diabetes management.  Advised her that she can reach out to me if her glucose remains elevated after surgery despite using her usual doses of medications.  Advised her to reach out to me if she has any additional concerns or questions.      Relevant past medical history, past surgical history, social history, family history reviewed in detail through chart review with the patient.     Review of Systems: As per HPI. All other systems reviewed and are negative.    Physical Examination:  Visit Vitals  LMP  (LMP Unknown)     General: Appears stated age, in no acute cardiopulmonary distress  HEENT: Head atraumatic, normal eye movements on gross inspection  Neck: No visible JVD, no visible neck masses  Respiratory: Normal respiratory effort,no audible wheezing.   CNS: CN II to XII grossly intact, moving all extremities without any difficulty, gait not tested.  Psychiatric: Mood and behavior is normal, affect is appropriate.      Home Medications/Current Medications:   Current Medications    AMOXICILLIN (AMOXIL) 500 MG CAPSULE    TK FOUR CS PO 1 HOUR B DAPP    ASPIRIN 81 MG TABLET    Take 81 mg by mouth daily.    B COMPLEX VITAMINS (B COMPLEX 1 PO)        BLOOD GLUCOSE (ONE TOUCH ULTRA TEST) TEST STRIP    Test glu BID    BUSPIRONE (BUSPAR) 10 MG TABLET    Take 10 mg by mouth 2 times daily.    CALCIUM CARBONATE-CHOLECALCIFEROL (CALCIUM + VITAMIN D3) 500-400 MG-UNIT CHEWABLE TABLET        CARVEDILOL (COREG) 25  MG TABLET    TAKE 1 TABLET BY MOUTH  TWICE DAILY    CHOLECALCIFEROL (VITAMIN D3) 50 MCG (2,000 UNITS) CAPSULE    Take 50 mcg by mouth 2 times daily.    CYANOCOBALAMIN (B-12) 1000 MCG CAP        ECONAZOLE (SPECTAZOLE) 1 % CREAM    APPLY TO THE AFFECTED AREA TWICE DAILY UNDER BREAST AND ABDOMEN FOLD    FLUCONAZOLE (DIFLUCAN) 150 MG TABLET    1 po q 3 d PRN    GLYBURIDE (DIABETA) 5 MG TABLET    Take 2 tablets by mouth 2 times daily (with meals).    INSULIN GLARGINE (LANTUS SOLOSTAR) 100 UNIT/ML PEN-INJECTOR    Inject 50 Units into the skin nightly. Prime 2 units first before each administration    INSULIN GLARGINE (LANTUS SOLOSTAR) 100 UNIT/ML PEN-INJECTOR    Inject 20 Units into the skin nightly. Prime 2 units before each dose.    INSULIN PEN NEEDLE (PEN NEEDLES) 31G X 6 MM MISC    Use once daily for insulin and once daily for victoza    JARDIANCE 10 MG TABLET    Take 10 mg by mouth daily.    LANCETS (ONETOUCH ULTRASOFT) MISC    Use once daily    LEVOTHYROXINE 25 MCG TABLET    TAKE 1 AND 1/2 TABLETS BY  MOUTH DAILY    LISINOPRIL (ZESTRIL) 10 MG TABLET    Take 10 mg by mouth every evening.     METFORMIN (GLUCOPHAGE-XR) 500 MG 24 HR TABLET    Take 500 mg by mouth daily (before lunch).     MULTIPLE VITAMIN PO    every 12 hours.    PANTOPRAZOLE (PROTONIX) 40 MG TABLET    Take 1 tablet by mouth daily.    ROSUVASTATIN (CRESTOR) 5 MG TABLET    Take 1 tablet by mouth daily.    SERTRALINE (ZOLOFT) 100 MG TABLET    Take 200 mg by mouth at bedtime.        Relevant Labs reviewed in detail.         Assessment and Plan:  Betty was seen today for video visit and md call.    Diagnoses and all orders for this visit:    Type 2 diabetes mellitus with other diabetic arthropathy, with long-term current use of insulin (CMS/[facility])           Care plan was discussed with the patient in detail. All of patient's questions were answered to the best of my knowledge about patient's case. Verbalized understanding and is in agreement with the plan of  care. Reassurance provided.      Return in about 3 months (around 3/31/2022) for Medicare wellness visit.      Ritu Salas MD  Internal Medicine  [facility]  Phone: [phone]  Fax: [phone]     Previously Declined (within the last year)

## 2022-01-19 NOTE — ED ADULT NURSE NOTE - CAS DISCH TRANSFER METHOD
Patient is here with some audible wheezing by staff at the care facility where he lives. He is vaccinated against COVID. Patient states he is normally incontinent of urine and stool. He was brought by EMS. He states he has not had a coughing attack but only 1 time while he was here however he has them frequently at the residential home where he lives as there is a lot of carpet and he has allergies. No known fever, chest pain, shortness of breath, abdominal pain, dizziness, weakness or other new symptoms. Nurses note indicates he does smell of urine. The history is provided by the patient. Wheezing  This is a new problem. The current episode started 1 to 2 hours ago. The problem occurs constantly. The problem has not changed since onset. Nothing aggravates the symptoms. Nothing relieves the symptoms. He has tried nothing for the symptoms. Past Medical History:   Diagnosis Date    Acute encephalopathy 3/22/2015    Altered mental status 9/16/2014    Alzheimer disease ([address]     Anemia 9/16/2014    MILD      Anxiety     Asymmetrical sensorineural hearing loss     Bipolar disorder ([address]     NOT TREATED, DIAGNOSED MANY YEARS AGO    Cancer ([address]     multiple myeloma    Cataract 9/8/2016    Cortical hemorrhage ([address] 9/17/2014    Elevated AST (SGOT) 9/16/2014    GERD (gastroesophageal reflux disease)     not Tx    H/O seasonal allergies     History of TIA (transient ischemic attack) 9/16/2014    Hypomagnesemia 9/16/2014    MILD      Panic attack     Senile dementia ([address] 5/19/2017    Stroke ([address]     ?  TIA, PUT ON PLAVIX, TOOK SELF OFF    Syncope and collapse 3/22/2015    Tinea corporis 9/16/2014    Tinnitus        Past Surgical History:   Procedure Laterality Date    HX APPENDECTOMY      HX COLONOSCOPY  MULTIPLE OVER THE YEARS    NO CANCER    HX OTHER SURGICAL  AGE 20    COLONIC POLYP REMOVAL    HX VASCULAR ACCESS           Family History:   Problem Relation Age of Onset    Diabetes Mother         COMPLICATIONS OF DM    Cancer Mother     Heart Disease Mother     Lung Disease Father         BLACK LUNG    Dementia Sister     Heart Disease Brother         \"OLD AGE\"    Other Son         ESTRANGED, [place]    Other Daughter         1 ESTRANGED, [place], 1 IN [place]       Social History     Socioeconomic History    Marital status:      Spouse name: Not on file    Number of children: Not on file    Years of education: Not on file    Highest education level: Not on file   Occupational History    Not on file   Tobacco Use    Smoking status: Former Smoker     Packs/day: 2.00     Years: 16.00     Pack years: 32.00     Types: Cigarettes    Smokeless tobacco: Never Used    Tobacco comment: QUIT AGE 32   Substance and Sexual Activity    Alcohol use: No    Drug use: No    Sexual activity: Not Currently     Partners: Female     Birth control/protection: None   Other Topics Concern    Not on file   Social History Narrative    9/16/14:  PATIENT IS , LIVES WITH CATARINA MAYA. 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 HE MOVED TO [facility] FOR 5 YEARS, HAS BEEN HERE (?) FOR ABOUT 10 YEARS. HAS A DAUGHTER IN [place] THAT IS NOT SPEAKING TO HIM, A SON IN [place] WHO IS NOT SPEAKING TO HIM, AND A DAUGHTER IN [facility]. BROTHER AND SISTER ARE DEAD. ONLY FRIEND IS SERA COELLOASIA, ([facility]: [phone]), A  WHO LOOKS AFTER THE PATIENT. CALL HIM ON DISCHARGE AS HE HAS PATIENT'S DOG AND HOUSE KEYS. Social Determinants of Health     Financial Resource Strain:     Difficulty of Paying Living Expenses: Not on file   Food Insecurity:     Worried About Running Out of Food in the Last Year: Not on file    Deann of Food in the Last Year: Not on file   Transportation Needs:     Lack of Transportation (Medical): Not on file    Lack of Transportation (Non-Medical):  Not on file   Physical Activity:     Days of Exercise per Week: Not on file    Minutes of Exercise per Session: Not on file   Stress:     Feeling of Stress : Not on file   Social Connections:     Frequency of Communication with Friends and Family: Not on file    Frequency of Social Gatherings with Friends and Family: Not on file    Attends [employer] Services: Not on file    Active Member of Clubs or Organizations: Not on file    Attends Club or Organization Meetings: Not on file    Marital Status: Not on file   Intimate Partner Violence:     Fear of Current or Ex-Partner: Not on file    Emotionally Abused: Not on file    Physically Abused: Not on file    Sexually Abused: Not on file   Housing Stability:     Unable to Pay for Housing in the Last Year: Not on file    Number of [place] in the Last Year: Not on file    Unstable Housing in the Last Year: Not on file         ALLERGIES: Patient has no known allergies. Review of Systems   Constitutional: Negative. HENT: Negative. Eyes: Negative. Respiratory: Positive for cough and wheezing. Cardiovascular: Negative. Gastrointestinal: Negative. Genitourinary: Negative. Musculoskeletal: Negative. Skin: Negative. Neurological: Negative. Psychiatric/Behavioral: Negative. All other systems reviewed and are negative. Vitals:    01/19/22 1601 01/19/22 1607   BP: (!) 101/46    Pulse: 64    Resp: 20    SpO2: 97% 96%   Weight: 104.3 kg (230 lb)    Height: 5' 10\" (1.778 m)             Physical Exam  Vitals and nursing note reviewed. Constitutional:       Appearance: Normal appearance. He is well-developed. HENT:      Head: Normocephalic and atraumatic. Right Ear: External ear normal.      Left Ear: External ear normal.      Nose: Nose normal.      Mouth/Throat:      Mouth: Mucous membranes are moist.   Eyes:      Conjunctiva/sclera: Conjunctivae normal.      Pupils: Pupils are equal, round, and reactive to light. Cardiovascular:      Rate and Rhythm: Normal rate and regular rhythm. Heart sounds: Normal heart sounds. Pulmonary:      Effort: Pulmonary effort is normal.      Breath sounds: Wheezing present. Abdominal:      General: Bowel sounds are normal.      Palpations: Abdomen is soft. Musculoskeletal:         General: Normal range of motion. Cervical back: Normal range of motion and neck supple. Skin:     General: Skin is warm and dry. Capillary Refill: Capillary refill takes less than 2 seconds. Neurological:      General: No focal deficit present. Mental Status: He is alert and oriented to person, place, and time. Deep Tendon Reflexes: Reflexes are normal and symmetric. Psychiatric:         Mood and Affect: Mood normal.         Behavior: Behavior normal.         Thought Content: Thought content normal.         Judgment: Judgment normal.          MDM  Number of Diagnoses or Management Options     Amount and/or Complexity of Data Reviewed  Clinical lab tests: reviewed  Tests in the radiology section of CPT®: reviewed  Discuss the patient with other providers: yes (Dr. Sun Schuler)    Risk of Complications, Morbidity, and/or Mortality  Presenting problems: moderate  Diagnostic procedures: moderate  Management options: moderate           Procedures      6:22 PM Spoke with Dr. Sun Schuler regarding patient. We discussed the history, physical exam and work-up. Patient is refusing to give us a urine, he states he is ready to go home. His COVID is positive and his chest x-ray is negative. He does have a nebulizer at home. He states he is always incontinent of urine and always smells like urine. He is very hard of hearing and states his hearing aids do not work very well. He is oriented to person place and time. He is capable of making his own decisions. We will discharge him back to his facility and if he worsens in any way, he should return to the ED for further evaluation. He will be sent with a pulse oximeter to check his oxygen levels and make sure they are staying consistently above 90%.   He is stable for discharge and EMS will take him home. The patient was observed in the ED. Results Reviewed:      Recent Results (from the past 24 hour(s))   COVID-19 RAPID TEST    Collection Time: 01/19/22  4:17 PM   Result Value Ref Range    Specimen source NASAL SWAB      COVID-19 rapid test Detected (AA) NOTD       XR CHEST PA LAT   Final Result   No radiographic evidence of acute cardiopulmonary disease. I discussed the results of all labs, procedures, radiographs, and treatments with the patient and available family. Treatment plan is agreed upon and the patient is ready for discharge. All voiced understanding of the discharge plan and medication instructions or changes as appropriate. Questions about treatment in the ED were answered. All were encouraged to return should symptoms worsen or new problems develop. Private car

## 2022-02-15 ENCOUNTER — INPATIENT (INPATIENT)
Facility: HOSPITAL | Age: 32
LOS: 2 days | Discharge: HOME | DRG: 637 | End: 2022-02-18
Attending: HOSPITALIST | Admitting: INTERNAL MEDICINE
Payer: COMMERCIAL

## 2022-02-15 VITALS
SYSTOLIC BLOOD PRESSURE: 87 MMHG | WEIGHT: 164.91 LBS | HEIGHT: 67 IN | RESPIRATION RATE: 24 BRPM | OXYGEN SATURATION: 100 % | HEART RATE: 122 BPM | DIASTOLIC BLOOD PRESSURE: 52 MMHG | TEMPERATURE: 98 F

## 2022-02-15 DIAGNOSIS — E11.10 TYPE 2 DIABETES MELLITUS WITH KETOACIDOSIS WITHOUT COMA: ICD-10-CM

## 2022-02-15 LAB
A1C WITH ESTIMATED AVERAGE GLUCOSE RESULT: 10.7 % — HIGH (ref 4–5.6)
ACETONE SERPL-MCNC: ABNORMAL
ALBUMIN SERPL ELPH-MCNC: 4.5 G/DL — SIGNIFICANT CHANGE UP (ref 3.3–5.2)
ALP SERPL-CCNC: 166 U/L — HIGH (ref 40–120)
ALT FLD-CCNC: 89 U/L — HIGH
AMPHET UR-MCNC: NEGATIVE — SIGNIFICANT CHANGE UP
ANION GAP SERPL CALC-SCNC: 32 MMOL/L — HIGH (ref 5–17)
ANION GAP SERPL CALC-SCNC: SIGNIFICANT CHANGE UP MMOL/L (ref 5–17)
ANISOCYTOSIS BLD QL: SLIGHT — SIGNIFICANT CHANGE UP
APPEARANCE UR: CLEAR — SIGNIFICANT CHANGE UP
APTT BLD: 36.1 SEC — HIGH (ref 27.5–35.5)
AST SERPL-CCNC: 92 U/L — HIGH
BACTERIA # UR AUTO: ABNORMAL
BARBITURATES UR SCN-MCNC: NEGATIVE — SIGNIFICANT CHANGE UP
BASE EXCESS BLDV CALC-SCNC: -29.3 MMOL/L — LOW (ref -2–3)
BASOPHILS # BLD AUTO: 0.15 K/UL — SIGNIFICANT CHANGE UP (ref 0–0.2)
BASOPHILS NFR BLD AUTO: 0.9 % — SIGNIFICANT CHANGE UP (ref 0–2)
BENZODIAZ UR-MCNC: NEGATIVE — SIGNIFICANT CHANGE UP
BILIRUB SERPL-MCNC: 2 MG/DL — SIGNIFICANT CHANGE UP (ref 0.4–2)
BILIRUB UR-MCNC: NEGATIVE — SIGNIFICANT CHANGE UP
BLD GP AB SCN SERPL QL: SIGNIFICANT CHANGE UP
BUN SERPL-MCNC: 25 MG/DL — HIGH (ref 8–20)
BUN SERPL-MCNC: 31.8 MG/DL — HIGH (ref 8–20)
BURR CELLS BLD QL SMEAR: PRESENT — SIGNIFICANT CHANGE UP
CA-I SERPL-SCNC: 1.08 MMOL/L — LOW (ref 1.15–1.33)
CALCIUM SERPL-MCNC: 8.3 MG/DL — LOW (ref 8.6–10.2)
CALCIUM SERPL-MCNC: 9.1 MG/DL — SIGNIFICANT CHANGE UP (ref 8.6–10.2)
CHLORIDE BLDV-SCNC: 90 MMOL/L — LOW (ref 98–107)
CHLORIDE SERPL-SCNC: 82 MMOL/L — LOW (ref 98–107)
CHLORIDE SERPL-SCNC: 97 MMOL/L — LOW (ref 98–107)
CO2 SERPL-SCNC: 8 MMOL/L — CRITICAL LOW (ref 22–29)
CO2 SERPL-SCNC: <6 MMOL/L — CRITICAL LOW (ref 22–29)
COCAINE METAB.OTHER UR-MCNC: NEGATIVE — SIGNIFICANT CHANGE UP
COLOR SPEC: YELLOW — SIGNIFICANT CHANGE UP
CREAT SERPL-MCNC: 1.86 MG/DL — HIGH (ref 0.5–1.3)
CREAT SERPL-MCNC: 2.96 MG/DL — HIGH (ref 0.5–1.3)
DACRYOCYTES BLD QL SMEAR: SIGNIFICANT CHANGE UP
DIFF PNL FLD: ABNORMAL
EOSINOPHIL # BLD AUTO: 0 K/UL — SIGNIFICANT CHANGE UP (ref 0–0.5)
EOSINOPHIL NFR BLD AUTO: 0 % — SIGNIFICANT CHANGE UP (ref 0–6)
EPI CELLS # UR: SIGNIFICANT CHANGE UP
ESTIMATED AVERAGE GLUCOSE: 260 MG/DL — HIGH (ref 68–114)
ETHANOL SERPL-MCNC: 91 MG/DL — HIGH (ref 0–9)
GAS PNL BLDV: 128 MMOL/L — LOW (ref 136–145)
GAS PNL BLDV: SIGNIFICANT CHANGE UP
GIANT PLATELETS BLD QL SMEAR: PRESENT — SIGNIFICANT CHANGE UP
GLUCOSE BLDC GLUCOMTR-MCNC: 195 MG/DL — HIGH (ref 70–99)
GLUCOSE BLDC GLUCOMTR-MCNC: 209 MG/DL — HIGH (ref 70–99)
GLUCOSE BLDC GLUCOMTR-MCNC: 213 MG/DL — HIGH (ref 70–99)
GLUCOSE BLDC GLUCOMTR-MCNC: 222 MG/DL — HIGH (ref 70–99)
GLUCOSE BLDC GLUCOMTR-MCNC: 238 MG/DL — HIGH (ref 70–99)
GLUCOSE BLDC GLUCOMTR-MCNC: 477 MG/DL — CRITICAL HIGH (ref 70–99)
GLUCOSE BLDV-MCNC: >656 MG/DL — CRITICAL HIGH (ref 70–99)
GLUCOSE SERPL-MCNC: 204 MG/DL — HIGH (ref 70–99)
GLUCOSE SERPL-MCNC: 593 MG/DL — CRITICAL HIGH (ref 70–99)
GLUCOSE UR QL: 1000 MG/DL
HCO3 BLDV-SCNC: 5 MMOL/L — CRITICAL LOW (ref 22–29)
HCT VFR BLD CALC: 45.2 % — SIGNIFICANT CHANGE UP (ref 39–50)
HCT VFR BLDA CALC: 42 % — SIGNIFICANT CHANGE UP
HGB BLD CALC-MCNC: 14 G/DL — SIGNIFICANT CHANGE UP (ref 12.6–17.4)
HGB BLD-MCNC: 13.6 G/DL — SIGNIFICANT CHANGE UP (ref 13–17)
HIV 1 & 2 AB SERPL IA.RAPID: SIGNIFICANT CHANGE UP
INR BLD: 0.96 RATIO — SIGNIFICANT CHANGE UP (ref 0.88–1.16)
KETONES UR-MCNC: ABNORMAL
LACTATE BLDV-MCNC: 3.5 MMOL/L — HIGH (ref 0.5–2)
LACTATE BLDV-MCNC: 5 MMOL/L — CRITICAL HIGH (ref 0.5–2)
LEUKOCYTE ESTERASE UR-ACNC: NEGATIVE — SIGNIFICANT CHANGE UP
LYMPHOCYTES # BLD AUTO: 0.3 K/UL — LOW (ref 1–3.3)
LYMPHOCYTES # BLD AUTO: 1.8 % — LOW (ref 13–44)
MACROCYTES BLD QL: SLIGHT — SIGNIFICANT CHANGE UP
MAGNESIUM SERPL-MCNC: 1.8 MG/DL — SIGNIFICANT CHANGE UP (ref 1.8–2.6)
MAGNESIUM SERPL-MCNC: 2.2 MG/DL — SIGNIFICANT CHANGE UP (ref 1.6–2.6)
MANUAL SMEAR VERIFICATION: SIGNIFICANT CHANGE UP
MCHC RBC-ENTMCNC: 30.1 GM/DL — LOW (ref 32–36)
MCHC RBC-ENTMCNC: 31.5 PG — SIGNIFICANT CHANGE UP (ref 27–34)
MCV RBC AUTO: 104.6 FL — HIGH (ref 80–100)
METHADONE UR-MCNC: NEGATIVE — SIGNIFICANT CHANGE UP
MONOCYTES # BLD AUTO: 0.15 K/UL — SIGNIFICANT CHANGE UP (ref 0–0.9)
MONOCYTES NFR BLD AUTO: 0.9 % — LOW (ref 2–14)
NEUTROPHILS # BLD AUTO: 16.23 K/UL — HIGH (ref 1.8–7.4)
NEUTROPHILS NFR BLD AUTO: 91.1 % — HIGH (ref 43–77)
NEUTS BAND # BLD: 5.3 % — SIGNIFICANT CHANGE UP (ref 0–8)
NITRITE UR-MCNC: NEGATIVE — SIGNIFICANT CHANGE UP
OPIATES UR-MCNC: POSITIVE
PAPPENHEIMER BOD BLD QL SMEAR: PRESENT — SIGNIFICANT CHANGE UP
PCO2 BLDV: 32 MMHG — LOW (ref 42–55)
PCP SPEC-MCNC: SIGNIFICANT CHANGE UP
PCP UR-MCNC: NEGATIVE — SIGNIFICANT CHANGE UP
PH BLDV: <6.942 — CRITICAL LOW (ref 7.32–7.43)
PH UR: 6 — SIGNIFICANT CHANGE UP (ref 5–8)
PHOSPHATE SERPL-MCNC: 1.1 MG/DL — LOW (ref 2.4–4.7)
PHOSPHATE SERPL-MCNC: 7.9 MG/DL — HIGH (ref 2.4–4.7)
PLAT MORPH BLD: NORMAL — SIGNIFICANT CHANGE UP
PLATELET # BLD AUTO: 150 K/UL — SIGNIFICANT CHANGE UP (ref 150–400)
PO2 BLDV: 78 MMHG — HIGH (ref 25–45)
POIKILOCYTOSIS BLD QL AUTO: SIGNIFICANT CHANGE UP
POLYCHROMASIA BLD QL SMEAR: SLIGHT — SIGNIFICANT CHANGE UP
POTASSIUM BLDV-SCNC: 8.6 MMOL/L — CRITICAL HIGH (ref 3.5–5.1)
POTASSIUM SERPL-MCNC: 4.2 MMOL/L — SIGNIFICANT CHANGE UP (ref 3.5–5.3)
POTASSIUM SERPL-MCNC: 7 MMOL/L — CRITICAL HIGH (ref 3.5–5.3)
POTASSIUM SERPL-SCNC: 4.2 MMOL/L — SIGNIFICANT CHANGE UP (ref 3.5–5.3)
POTASSIUM SERPL-SCNC: 7 MMOL/L — CRITICAL HIGH (ref 3.5–5.3)
PROCALCITONIN SERPL-MCNC: 3.76 NG/ML — HIGH (ref 0.02–0.1)
PROT SERPL-MCNC: 7.3 G/DL — SIGNIFICANT CHANGE UP (ref 6.6–8.7)
PROT UR-MCNC: 30 MG/DL
PROTHROM AB SERPL-ACNC: 11.2 SEC — SIGNIFICANT CHANGE UP (ref 10.6–13.6)
RBC # BLD: 4.32 M/UL — SIGNIFICANT CHANGE UP (ref 4.2–5.8)
RBC # FLD: 14.4 % — SIGNIFICANT CHANGE UP (ref 10.3–14.5)
RBC BLD AUTO: ABNORMAL
RBC CASTS # UR COMP ASSIST: SIGNIFICANT CHANGE UP /HPF (ref 0–4)
SAO2 % BLDV: 93.6 % — SIGNIFICANT CHANGE UP
SARS-COV-2 RNA SPEC QL NAA+PROBE: SIGNIFICANT CHANGE UP
SODIUM SERPL-SCNC: 128 MMOL/L — LOW (ref 135–145)
SODIUM SERPL-SCNC: 137 MMOL/L — SIGNIFICANT CHANGE UP (ref 135–145)
SP GR SPEC: 1.01 — SIGNIFICANT CHANGE UP (ref 1.01–1.02)
THC UR QL: NEGATIVE — SIGNIFICANT CHANGE UP
TROPONIN T SERPL-MCNC: <0.01 NG/ML — SIGNIFICANT CHANGE UP (ref 0–0.06)
UROBILINOGEN FLD QL: NEGATIVE MG/DL — SIGNIFICANT CHANGE UP
WBC # BLD: 16.84 K/UL — HIGH (ref 3.8–10.5)
WBC # FLD AUTO: 16.84 K/UL — HIGH (ref 3.8–10.5)
WBC UR QL: SIGNIFICANT CHANGE UP /HPF (ref 0–5)

## 2022-02-15 PROCEDURE — 99291 CRITICAL CARE FIRST HOUR: CPT

## 2022-02-15 PROCEDURE — 93010 ELECTROCARDIOGRAM REPORT: CPT

## 2022-02-15 PROCEDURE — 74176 CT ABD & PELVIS W/O CONTRAST: CPT | Mod: 26

## 2022-02-15 PROCEDURE — 71045 X-RAY EXAM CHEST 1 VIEW: CPT | Mod: 26

## 2022-02-15 RX ORDER — INSULIN HUMAN 100 [IU]/ML
4 INJECTION, SOLUTION SUBCUTANEOUS
Qty: 100 | Refills: 0 | Status: DISCONTINUED | OUTPATIENT
Start: 2022-02-15 | End: 2022-02-16

## 2022-02-15 RX ORDER — CALCIUM GLUCONATE 100 MG/ML
1 VIAL (ML) INTRAVENOUS ONCE
Refills: 0 | Status: COMPLETED | OUTPATIENT
Start: 2022-02-15 | End: 2022-02-15

## 2022-02-15 RX ORDER — ONDANSETRON 8 MG/1
4 TABLET, FILM COATED ORAL EVERY 6 HOURS
Refills: 0 | Status: DISCONTINUED | OUTPATIENT
Start: 2022-02-15 | End: 2022-02-18

## 2022-02-15 RX ORDER — CHLORHEXIDINE GLUCONATE 213 G/1000ML
1 SOLUTION TOPICAL
Refills: 0 | Status: DISCONTINUED | OUTPATIENT
Start: 2022-02-15 | End: 2022-02-16

## 2022-02-15 RX ORDER — PANTOPRAZOLE SODIUM 20 MG/1
40 TABLET, DELAYED RELEASE ORAL
Refills: 0 | Status: DISCONTINUED | OUTPATIENT
Start: 2022-02-15 | End: 2022-02-18

## 2022-02-15 RX ORDER — SODIUM CHLORIDE 9 MG/ML
2000 INJECTION, SOLUTION INTRAVENOUS ONCE
Refills: 0 | Status: COMPLETED | OUTPATIENT
Start: 2022-02-15 | End: 2022-02-15

## 2022-02-15 RX ORDER — SODIUM CHLORIDE 9 MG/ML
1000 INJECTION, SOLUTION INTRAVENOUS
Refills: 0 | Status: DISCONTINUED | OUTPATIENT
Start: 2022-02-15 | End: 2022-02-16

## 2022-02-15 RX ORDER — SODIUM BICARBONATE 1 MEQ/ML
50 SYRINGE (ML) INTRAVENOUS ONCE
Refills: 0 | Status: COMPLETED | OUTPATIENT
Start: 2022-02-15 | End: 2022-02-15

## 2022-02-15 RX ORDER — ONDANSETRON 8 MG/1
4 TABLET, FILM COATED ORAL ONCE
Refills: 0 | Status: COMPLETED | OUTPATIENT
Start: 2022-02-15 | End: 2022-02-15

## 2022-02-15 RX ORDER — FOLIC ACID 0.8 MG
1 TABLET ORAL DAILY
Refills: 0 | Status: DISCONTINUED | OUTPATIENT
Start: 2022-02-15 | End: 2022-02-18

## 2022-02-15 RX ORDER — DEXTROSE MONOHYDRATE, SODIUM CHLORIDE, AND POTASSIUM CHLORIDE 50; .745; 4.5 G/1000ML; G/1000ML; G/1000ML
1000 INJECTION, SOLUTION INTRAVENOUS
Refills: 0 | Status: DISCONTINUED | OUTPATIENT
Start: 2022-02-15 | End: 2022-02-16

## 2022-02-15 RX ORDER — POTASSIUM CHLORIDE 20 MEQ
10 PACKET (EA) ORAL
Refills: 0 | Status: COMPLETED | OUTPATIENT
Start: 2022-02-15 | End: 2022-02-16

## 2022-02-15 RX ORDER — POTASSIUM PHOSPHATE, MONOBASIC POTASSIUM PHOSPHATE, DIBASIC 236; 224 MG/ML; MG/ML
30 INJECTION, SOLUTION INTRAVENOUS ONCE
Refills: 0 | Status: COMPLETED | OUTPATIENT
Start: 2022-02-15 | End: 2022-02-16

## 2022-02-15 RX ORDER — MAGNESIUM SULFATE 500 MG/ML
1 VIAL (ML) INJECTION ONCE
Refills: 0 | Status: COMPLETED | OUTPATIENT
Start: 2022-02-15 | End: 2022-02-15

## 2022-02-15 RX ORDER — FOLIC ACID 0.8 MG
1 TABLET ORAL DAILY
Refills: 0 | Status: DISCONTINUED | OUTPATIENT
Start: 2022-02-15 | End: 2022-02-15

## 2022-02-15 RX ORDER — SODIUM CHLORIDE 9 MG/ML
1000 INJECTION, SOLUTION INTRAVENOUS
Refills: 0 | Status: DISCONTINUED | OUTPATIENT
Start: 2022-02-15 | End: 2022-02-15

## 2022-02-15 RX ORDER — THIAMINE MONONITRATE (VIT B1) 100 MG
100 TABLET ORAL DAILY
Refills: 0 | Status: DISCONTINUED | OUTPATIENT
Start: 2022-02-15 | End: 2022-02-18

## 2022-02-15 RX ORDER — MORPHINE SULFATE 50 MG/1
4 CAPSULE, EXTENDED RELEASE ORAL ONCE
Refills: 0 | Status: DISCONTINUED | OUTPATIENT
Start: 2022-02-15 | End: 2022-02-15

## 2022-02-15 RX ADMIN — Medication 50 MILLIEQUIVALENT(S): at 17:41

## 2022-02-15 RX ADMIN — ONDANSETRON 4 MILLIGRAM(S): 8 TABLET, FILM COATED ORAL at 16:37

## 2022-02-15 RX ADMIN — Medication 100 GRAM(S): at 17:41

## 2022-02-15 RX ADMIN — Medication 1 GRAM(S): at 19:18

## 2022-02-15 RX ADMIN — Medication 1 MILLIGRAM(S): at 22:24

## 2022-02-15 RX ADMIN — SODIUM CHLORIDE 2000 MILLILITER(S): 9 INJECTION, SOLUTION INTRAVENOUS at 14:58

## 2022-02-15 RX ADMIN — Medication 100 MILLIGRAM(S): at 22:24

## 2022-02-15 RX ADMIN — Medication 2 MILLIGRAM(S): at 17:41

## 2022-02-15 RX ADMIN — Medication 4 MILLIGRAM(S): at 20:24

## 2022-02-15 RX ADMIN — SODIUM CHLORIDE 200 MILLILITER(S): 9 INJECTION, SOLUTION INTRAVENOUS at 20:59

## 2022-02-15 RX ADMIN — MORPHINE SULFATE 4 MILLIGRAM(S): 50 CAPSULE, EXTENDED RELEASE ORAL at 17:03

## 2022-02-15 RX ADMIN — SODIUM CHLORIDE 200 MILLILITER(S): 9 INJECTION, SOLUTION INTRAVENOUS at 19:18

## 2022-02-15 RX ADMIN — PANTOPRAZOLE SODIUM 40 MILLIGRAM(S): 20 TABLET, DELAYED RELEASE ORAL at 19:27

## 2022-02-15 RX ADMIN — Medication 100 MILLIEQUIVALENT(S): at 23:47

## 2022-02-15 RX ADMIN — Medication 2 MILLIGRAM(S): at 18:45

## 2022-02-15 RX ADMIN — CHLORHEXIDINE GLUCONATE 1 APPLICATION(S): 213 SOLUTION TOPICAL at 22:23

## 2022-02-15 RX ADMIN — SODIUM CHLORIDE 2000 MILLILITER(S): 9 INJECTION, SOLUTION INTRAVENOUS at 16:14

## 2022-02-15 RX ADMIN — Medication 100 GRAM(S): at 23:44

## 2022-02-15 RX ADMIN — INSULIN HUMAN 7.5 UNIT(S)/HR: 100 INJECTION, SOLUTION SUBCUTANEOUS at 16:34

## 2022-02-15 RX ADMIN — SODIUM CHLORIDE 2000 MILLILITER(S): 9 INJECTION, SOLUTION INTRAVENOUS at 16:23

## 2022-02-15 RX ADMIN — Medication 4 MILLIGRAM(S): at 22:24

## 2022-02-15 NOTE — H&P ADULT - NSHPLABSRESULTS_GEN_ALL_CORE
13.6   16.84 )-----------( 150      ( 15 Feb 2022 15:24 )             45.2   02-15    128<L>  |  82<L>  |  31.8<H>  ----------------------------<  593<HH>  7.0<HH>   |  <6.0<LL>  |  2.96<H>    Ca    8.3<L>      15 Feb 2022 16:30    TPro  7.3  /  Alb  4.5  /  TBili  2.0  /  DBili  x   /  AST  92<H>  /  ALT  89<H>  /  AlkPhos  166<H>  02-15

## 2022-02-15 NOTE — ED ADULT TRIAGE NOTE - CHIEF COMPLAINT QUOTE
pt BIBA for SOB, abdominal pain, n/v x 2 days. has not been taking BS and insulin as prescribed. pt tachypneic and tachycardiac upon arrival. BS HI. dr bashir @ bedside, pt placed in critical care pt BIBA for SOB, abdominal pain, n/v x 2 days. has not been taking BS and insulin as prescribed. pt tachypneic and tachycardiac upon arrival. BS HI. pt also reports not drinking in 3 days, typically drinks 4 beers every other day. dr bashir @ bedside, pt placed in critical care

## 2022-02-15 NOTE — ED ADULT NURSE REASSESSMENT NOTE - NS ED NURSE REASSESS COMMENT FT1
Patient began to have an altered mental status from A&O4 to A&O2. CIWA Score increased from 8 to 14.  MICU PA notified. PRN 2mg IV Ativan given. Ativan taper increased up to 4mg Q4. Blood sugar level decreasing.  Patient currently sleeping on stretcher. Safety maintained.

## 2022-02-15 NOTE — ED PROVIDER NOTE - PROGRESS NOTE DETAILS
: patient in DKA, insulin gtt started, BP improving with IVF, MICU consulted, will admit to Dr. dillard

## 2022-02-15 NOTE — H&P ADULT - NSHPSOCIALHISTORY_GEN_ALL_CORE
Hx of EtOH abuse. Reportedly drinks 4-6 beers every other day. Smokes cigarettes occasionally. Denies illicit drug abuse.

## 2022-02-15 NOTE — ED PROVIDER NOTE - PHYSICAL EXAMINATION
General: tachypneic male  HEENT: Normocephalic, atraumatic. Moist mucous membranes. Oropharynx clear. No lymphadenopathy. +dark appearing tongue , no fasciculations   Eyes: No scleral icterus. EOMI. WAYNE.  Neck:. Soft and supple. Full ROM without pain. No midline tenderness  Cardiac: Regular rate and regular rhythm. No murmurs, rubs, gallops. Peripheral pulses 2+ and symmetric. No LE edema.  Resp: Lungs CTAB. Speaking in full sentences. No wheezes, rales or rhonchi.  Abd: Soft, non-tender, non-distended. No guarding or rebound. No scars, masses, or lesions.  Back: Spine midline and non-tender. No CVA tenderness.    Skin: No rashes, abrasions, or lacerations.  Neuro: AO x 3. Moves all extremities symmetrically. Motor strength and sensation grossly intact.

## 2022-02-15 NOTE — ED ADULT NURSE REASSESSMENT NOTE - NS ED NURSE REASSESS COMMENT FT1
Patient complains of pain in bilateral lower quadrants. MD Jacobson notified, awaiting CMP for meds. Patient receiving additional 2L bolus of LR. Insulin drip started. Patient awaiting lab results.

## 2022-02-15 NOTE — ED PROVIDER NOTE - OBJECTIVE STATEMENT
30 y/o M hx of alcohol abuse, DM on insulin, afib brought in by EMS for shortness of breath. states that he has been non-complaint w/ his insulin (sliding scale w/ meals and 1 basal dose at night per patient) for the last several days because "he is lazy." States that he has also had multiple episodes of emesis the past several days, states last episode was "dark." Endorses chest pain. last alcoholic drink was 3 days ago. Endorsing diffuse abdominal discomfort. Denies fever/chills. Denies nausea or vomiting.

## 2022-02-15 NOTE — H&P ADULT - ASSESSMENT
32 y/o M w/ a PMHx of EtOH abuse (4-6 beers every other day), DM type 2, HTN, a fib on eliquis, and NICM (EF 40-45%) admitted w/:    1. DKA, medication noncompliance  2. SYED  3. Hyperkalemia  4. EtOH withdrawal  5. R/o GI bleed    PLAN:  - Started on insulin infusion, titrate per patient-specific protocol based on q1 hr accuchecks.  - Aggressive IVF hydration. S/p 4 L IVF. Start LR at 200cc/hr. When finger stick < 250, will add dextrose to IVF.  - q4 hr BMP, mag, phos. Aggressive electrolyte repletion to maintain K > 4 and Mg > 2 for optimal arrythmia suppression.  - Once anion gap has closed, will transition w/ long-acting insulin.  - Administer sodium bicarb x 1 amp and calcium gluconate 1g in the setting of severe hyperkalemia (K 7.0), plus on insulin infusion.  - Send hgba1c.  - Endocrinology consult.  - Trend renal function. Monitor urine output.  - NPO.  - Protonix 40mg IV BID in the setting of coffee ground emesis.  - Send FOB.  - CT abd/pelvis pending. R/o pancreatitis. Elevated lipase and diffusely tender to palpation on clinical exam.  - Zofran 4mg IV q6 hrs PRN for nausea.  - CIWA protocol.  - Ativan taper plus ativan 2mg IV q1 hr PRN for agitation/CIWA > 8.  - Thiamine and folic acid. Will add multivitamin once taking PO.  - Send utox.  - Blood pressure soft on arrival. Will hold anti-hypertensives for now.  - Troponin negative.  - CXR w/o evidence of infiltrate.  - Hold anticoagulation in the setting of possible GI bleed.  - Mechanical DVT prophylaxis w/ SCDs.    Case discussed w/ ICU attending, Dr. Yost.

## 2022-02-15 NOTE — H&P ADULT - NSHPPHYSICALEXAM_GEN_ALL_CORE
General: Tremulous, kussmaul's respirations.  Eyes: PERRL.  Neck: No JVD.  Heart: Tachycardic.  Lungs: Clear to auscultation b/l.  Abdomen: Soft, nondistended, diffusely tender to palpation.  Extremities: No edema.  Skin: No rashes noted.  Neuro: A&Ox3, interactive, nonfocal.

## 2022-02-15 NOTE — ED PROVIDER NOTE - CLINICAL SUMMARY MEDICAL DECISION MAKING FREE TEXT BOX
30 y/o M hx of alcohol abuse, DM on insulin brought in by EMS for shortness of breath. states that he has been non-complaint w/ his insulin (sliding scale w/ meals and 1 basal dose at night per patient) for the last several days because "he is lazy."  fingerstick "HIGH" on arrival, >500  r/o dka, patient tachypneic   vbg w/ lytes, acetone, 2L fluid bolus   low suspicion for alch withdrawal currently - no tremors/ fasciculations.   will be admitted

## 2022-02-15 NOTE — ED ADULT NURSE NOTE - OBJECTIVE STATEMENT
Assumed care at 1450 pt co N/V epigastric pain, and groin pain since yesterday, pt denies any diarrhea, fevers, chills. pt placed on cardiac monitor. MD Bay at pts bedside

## 2022-02-15 NOTE — H&P ADULT - HISTORY OF PRESENT ILLNESS
30 y/o M w/ a PMHx of EtOH abuse (4-6 beers every other day), DM type 2, HTN, NICM (EF 40-45%),  30 y/o M w/ a PMHx of EtOH abuse (4-6 beers every other day), DM type 2, HTN, a fib on eliquis, and NICM (EF 40-45%) who presented to the ER c/o nausea, multiple episodes of vomiting, and abdominal pain that began yesterday. Pt stated emesis was initially clear, but the last few episodes have been black appearing. Stopped taking anti-diabetic medication three days ago. Last drink was yesterday afternoon. Upon arrival to the ER, pt was tachycardic (HR 120s) and hypotensive (SBP 80s).    Labs revealing of leukocytosis (WBC count 16K), hyperkalemia (K 7), high anion gap metabolic acidosis (anion gap undetectable, CO2 < 6), SYED (BUN/Cr 31.8/2.96), transaminitis (AST/ALT 92/89), moderate serum acetone, and elevated lipase (123). Pt was administered 4 L IVF, zofran, morphine, and started on an insulin infusion. Admitted to MICU for management of DKA and EtOH withdrawal.

## 2022-02-15 NOTE — ED ADULT NURSE NOTE - CHIEF COMPLAINT QUOTE
pt BIBA for SOB, abdominal pain, n/v x 2 days. has not been taking BS and insulin as prescribed. pt tachypneic and tachycardiac upon arrival. BS HI. pt also reports not drinking in 3 days, typically drinks 4 beers every other day. dr bashir @ bedside, pt placed in critical care

## 2022-02-16 LAB
ALBUMIN SERPL ELPH-MCNC: 3.9 G/DL — SIGNIFICANT CHANGE UP (ref 3.3–5.2)
ALP SERPL-CCNC: 130 U/L — HIGH (ref 40–120)
ALT FLD-CCNC: 68 U/L — HIGH
ANION GAP SERPL CALC-SCNC: 15 MMOL/L — SIGNIFICANT CHANGE UP (ref 5–17)
ANION GAP SERPL CALC-SCNC: 15 MMOL/L — SIGNIFICANT CHANGE UP (ref 5–17)
ANION GAP SERPL CALC-SCNC: 18 MMOL/L — HIGH (ref 5–17)
AST SERPL-CCNC: 81 U/L — HIGH
BASOPHILS # BLD AUTO: 0.03 K/UL — SIGNIFICANT CHANGE UP (ref 0–0.2)
BASOPHILS NFR BLD AUTO: 0.3 % — SIGNIFICANT CHANGE UP (ref 0–2)
BILIRUB SERPL-MCNC: 1.8 MG/DL — SIGNIFICANT CHANGE UP (ref 0.4–2)
BUN SERPL-MCNC: 15.8 MG/DL — SIGNIFICANT CHANGE UP (ref 8–20)
BUN SERPL-MCNC: 18.7 MG/DL — SIGNIFICANT CHANGE UP (ref 8–20)
BUN SERPL-MCNC: 21.9 MG/DL — HIGH (ref 8–20)
CALCIUM SERPL-MCNC: 8.9 MG/DL — SIGNIFICANT CHANGE UP (ref 8.6–10.2)
CALCIUM SERPL-MCNC: 9.1 MG/DL — SIGNIFICANT CHANGE UP (ref 8.6–10.2)
CALCIUM SERPL-MCNC: 9.4 MG/DL — SIGNIFICANT CHANGE UP (ref 8.6–10.2)
CHLORIDE SERPL-SCNC: 105 MMOL/L — SIGNIFICANT CHANGE UP (ref 98–107)
CHLORIDE SERPL-SCNC: 106 MMOL/L — SIGNIFICANT CHANGE UP (ref 98–107)
CHLORIDE SERPL-SCNC: 106 MMOL/L — SIGNIFICANT CHANGE UP (ref 98–107)
CK MB CFR SERPL CALC: 3.1 NG/ML — SIGNIFICANT CHANGE UP (ref 0–6.7)
CK SERPL-CCNC: 219 U/L — HIGH (ref 30–200)
CO2 SERPL-SCNC: 21 MMOL/L — LOW (ref 22–29)
CO2 SERPL-SCNC: 21 MMOL/L — LOW (ref 22–29)
CO2 SERPL-SCNC: 22 MMOL/L — SIGNIFICANT CHANGE UP (ref 22–29)
CREAT SERPL-MCNC: 0.82 MG/DL — SIGNIFICANT CHANGE UP (ref 0.5–1.3)
CREAT SERPL-MCNC: 0.98 MG/DL — SIGNIFICANT CHANGE UP (ref 0.5–1.3)
CREAT SERPL-MCNC: 1.34 MG/DL — HIGH (ref 0.5–1.3)
CULTURE RESULTS: SIGNIFICANT CHANGE UP
EOSINOPHIL # BLD AUTO: 0.02 K/UL — SIGNIFICANT CHANGE UP (ref 0–0.5)
EOSINOPHIL NFR BLD AUTO: 0.2 % — SIGNIFICANT CHANGE UP (ref 0–6)
GLUCOSE BLDC GLUCOMTR-MCNC: 101 MG/DL — HIGH (ref 70–99)
GLUCOSE BLDC GLUCOMTR-MCNC: 101 MG/DL — HIGH (ref 70–99)
GLUCOSE BLDC GLUCOMTR-MCNC: 108 MG/DL — HIGH (ref 70–99)
GLUCOSE BLDC GLUCOMTR-MCNC: 135 MG/DL — HIGH (ref 70–99)
GLUCOSE BLDC GLUCOMTR-MCNC: 139 MG/DL — HIGH (ref 70–99)
GLUCOSE BLDC GLUCOMTR-MCNC: 156 MG/DL — HIGH (ref 70–99)
GLUCOSE BLDC GLUCOMTR-MCNC: 184 MG/DL — HIGH (ref 70–99)
GLUCOSE BLDC GLUCOMTR-MCNC: 191 MG/DL — HIGH (ref 70–99)
GLUCOSE BLDC GLUCOMTR-MCNC: 210 MG/DL — HIGH (ref 70–99)
GLUCOSE BLDC GLUCOMTR-MCNC: 230 MG/DL — HIGH (ref 70–99)
GLUCOSE BLDC GLUCOMTR-MCNC: 236 MG/DL — HIGH (ref 70–99)
GLUCOSE BLDC GLUCOMTR-MCNC: 239 MG/DL — HIGH (ref 70–99)
GLUCOSE BLDC GLUCOMTR-MCNC: 239 MG/DL — HIGH (ref 70–99)
GLUCOSE BLDC GLUCOMTR-MCNC: 262 MG/DL — HIGH (ref 70–99)
GLUCOSE BLDC GLUCOMTR-MCNC: 81 MG/DL — SIGNIFICANT CHANGE UP (ref 70–99)
GLUCOSE BLDC GLUCOMTR-MCNC: 89 MG/DL — SIGNIFICANT CHANGE UP (ref 70–99)
GLUCOSE BLDC GLUCOMTR-MCNC: 99 MG/DL — SIGNIFICANT CHANGE UP (ref 70–99)
GLUCOSE SERPL-MCNC: 149 MG/DL — HIGH (ref 70–99)
GLUCOSE SERPL-MCNC: 170 MG/DL — HIGH (ref 70–99)
GLUCOSE SERPL-MCNC: 80 MG/DL — SIGNIFICANT CHANGE UP (ref 70–99)
HCT VFR BLD CALC: 33.5 % — LOW (ref 39–50)
HCT VFR BLD CALC: 34.2 % — LOW (ref 39–50)
HGB BLD-MCNC: 11.2 G/DL — LOW (ref 13–17)
HGB BLD-MCNC: 11.6 G/DL — LOW (ref 13–17)
IMM GRANULOCYTES NFR BLD AUTO: 1.1 % — SIGNIFICANT CHANGE UP (ref 0–1.5)
LACTATE SERPL-SCNC: 2 MMOL/L — SIGNIFICANT CHANGE UP (ref 0.5–2)
LYMPHOCYTES # BLD AUTO: 0.6 K/UL — LOW (ref 1–3.3)
LYMPHOCYTES # BLD AUTO: 6.1 % — LOW (ref 13–44)
MAGNESIUM SERPL-MCNC: 1.8 MG/DL — SIGNIFICANT CHANGE UP (ref 1.6–2.6)
MAGNESIUM SERPL-MCNC: 1.8 MG/DL — SIGNIFICANT CHANGE UP (ref 1.6–2.6)
MAGNESIUM SERPL-MCNC: 1.9 MG/DL — SIGNIFICANT CHANGE UP (ref 1.6–2.6)
MCHC RBC-ENTMCNC: 30.9 PG — SIGNIFICANT CHANGE UP (ref 27–34)
MCHC RBC-ENTMCNC: 31.5 PG — SIGNIFICANT CHANGE UP (ref 27–34)
MCHC RBC-ENTMCNC: 33.4 GM/DL — SIGNIFICANT CHANGE UP (ref 32–36)
MCHC RBC-ENTMCNC: 33.9 GM/DL — SIGNIFICANT CHANGE UP (ref 32–36)
MCV RBC AUTO: 92.5 FL — SIGNIFICANT CHANGE UP (ref 80–100)
MCV RBC AUTO: 92.9 FL — SIGNIFICANT CHANGE UP (ref 80–100)
MONOCYTES # BLD AUTO: 0.74 K/UL — SIGNIFICANT CHANGE UP (ref 0–0.9)
MONOCYTES NFR BLD AUTO: 7.6 % — SIGNIFICANT CHANGE UP (ref 2–14)
NEUTROPHILS # BLD AUTO: 8.26 K/UL — HIGH (ref 1.8–7.4)
NEUTROPHILS NFR BLD AUTO: 84.7 % — HIGH (ref 43–77)
PHOSPHATE SERPL-MCNC: 0.9 MG/DL — CRITICAL LOW (ref 2.4–4.7)
PHOSPHATE SERPL-MCNC: 2.1 MG/DL — LOW (ref 2.4–4.7)
PHOSPHATE SERPL-MCNC: 2.2 MG/DL — LOW (ref 2.4–4.7)
PLATELET # BLD AUTO: 76 K/UL — LOW (ref 150–400)
PLATELET # BLD AUTO: 91 K/UL — LOW (ref 150–400)
POTASSIUM SERPL-MCNC: 3.6 MMOL/L — SIGNIFICANT CHANGE UP (ref 3.5–5.3)
POTASSIUM SERPL-MCNC: 4 MMOL/L — SIGNIFICANT CHANGE UP (ref 3.5–5.3)
POTASSIUM SERPL-MCNC: 4.2 MMOL/L — SIGNIFICANT CHANGE UP (ref 3.5–5.3)
POTASSIUM SERPL-SCNC: 3.6 MMOL/L — SIGNIFICANT CHANGE UP (ref 3.5–5.3)
POTASSIUM SERPL-SCNC: 4 MMOL/L — SIGNIFICANT CHANGE UP (ref 3.5–5.3)
POTASSIUM SERPL-SCNC: 4.2 MMOL/L — SIGNIFICANT CHANGE UP (ref 3.5–5.3)
PROT SERPL-MCNC: 6.7 G/DL — SIGNIFICANT CHANGE UP (ref 6.6–8.7)
RBC # BLD: 3.62 M/UL — LOW (ref 4.2–5.8)
RBC # BLD: 3.68 M/UL — LOW (ref 4.2–5.8)
RBC # FLD: 13.6 % — SIGNIFICANT CHANGE UP (ref 10.3–14.5)
RBC # FLD: 14 % — SIGNIFICANT CHANGE UP (ref 10.3–14.5)
SODIUM SERPL-SCNC: 142 MMOL/L — SIGNIFICANT CHANGE UP (ref 135–145)
SODIUM SERPL-SCNC: 143 MMOL/L — SIGNIFICANT CHANGE UP (ref 135–145)
SODIUM SERPL-SCNC: 143 MMOL/L — SIGNIFICANT CHANGE UP (ref 135–145)
SPECIMEN SOURCE: SIGNIFICANT CHANGE UP
WBC # BLD: 10.25 K/UL — SIGNIFICANT CHANGE UP (ref 3.8–10.5)
WBC # BLD: 9.76 K/UL — SIGNIFICANT CHANGE UP (ref 3.8–10.5)
WBC # FLD AUTO: 10.25 K/UL — SIGNIFICANT CHANGE UP (ref 3.8–10.5)
WBC # FLD AUTO: 9.76 K/UL — SIGNIFICANT CHANGE UP (ref 3.8–10.5)

## 2022-02-16 PROCEDURE — 99233 SBSQ HOSP IP/OBS HIGH 50: CPT

## 2022-02-16 RX ORDER — METOPROLOL TARTRATE 50 MG
12.5 TABLET ORAL EVERY 12 HOURS
Refills: 0 | Status: DISCONTINUED | OUTPATIENT
Start: 2022-02-16 | End: 2022-02-18

## 2022-02-16 RX ORDER — SODIUM CHLORIDE 9 MG/ML
1000 INJECTION, SOLUTION INTRAVENOUS
Refills: 0 | Status: DISCONTINUED | OUTPATIENT
Start: 2022-02-16 | End: 2022-02-17

## 2022-02-16 RX ORDER — INSULIN GLARGINE 100 [IU]/ML
20 INJECTION, SOLUTION SUBCUTANEOUS AT BEDTIME
Refills: 0 | Status: DISCONTINUED | OUTPATIENT
Start: 2022-02-17 | End: 2022-02-18

## 2022-02-16 RX ORDER — GLUCAGON INJECTION, SOLUTION 0.5 MG/.1ML
1 INJECTION, SOLUTION SUBCUTANEOUS ONCE
Refills: 0 | Status: DISCONTINUED | OUTPATIENT
Start: 2022-02-16 | End: 2022-02-18

## 2022-02-16 RX ORDER — DEXTROSE 50 % IN WATER 50 %
12.5 SYRINGE (ML) INTRAVENOUS ONCE
Refills: 0 | Status: DISCONTINUED | OUTPATIENT
Start: 2022-02-16 | End: 2022-02-18

## 2022-02-16 RX ORDER — INSULIN GLARGINE 100 [IU]/ML
20 INJECTION, SOLUTION SUBCUTANEOUS ONCE
Refills: 0 | Status: COMPLETED | OUTPATIENT
Start: 2022-02-16 | End: 2022-02-16

## 2022-02-16 RX ORDER — SODIUM,POTASSIUM PHOSPHATES 278-250MG
2 POWDER IN PACKET (EA) ORAL
Refills: 0 | Status: DISCONTINUED | OUTPATIENT
Start: 2022-02-16 | End: 2022-02-18

## 2022-02-16 RX ORDER — POTASSIUM CHLORIDE 20 MEQ
10 PACKET (EA) ORAL
Refills: 0 | Status: COMPLETED | OUTPATIENT
Start: 2022-02-16 | End: 2022-02-16

## 2022-02-16 RX ORDER — DEXTROSE 50 % IN WATER 50 %
25 SYRINGE (ML) INTRAVENOUS ONCE
Refills: 0 | Status: DISCONTINUED | OUTPATIENT
Start: 2022-02-16 | End: 2022-02-18

## 2022-02-16 RX ORDER — ACETAMINOPHEN 500 MG
1000 TABLET ORAL ONCE
Refills: 0 | Status: COMPLETED | OUTPATIENT
Start: 2022-02-16 | End: 2022-02-16

## 2022-02-16 RX ORDER — INSULIN LISPRO 100/ML
VIAL (ML) SUBCUTANEOUS EVERY 6 HOURS
Refills: 0 | Status: DISCONTINUED | OUTPATIENT
Start: 2022-02-16 | End: 2022-02-16

## 2022-02-16 RX ORDER — APIXABAN 2.5 MG/1
5 TABLET, FILM COATED ORAL EVERY 12 HOURS
Refills: 0 | Status: DISCONTINUED | OUTPATIENT
Start: 2022-02-16 | End: 2022-02-18

## 2022-02-16 RX ORDER — DEXTROSE 50 % IN WATER 50 %
15 SYRINGE (ML) INTRAVENOUS ONCE
Refills: 0 | Status: DISCONTINUED | OUTPATIENT
Start: 2022-02-16 | End: 2022-02-18

## 2022-02-16 RX ORDER — MAGNESIUM SULFATE 500 MG/ML
1 VIAL (ML) INJECTION ONCE
Refills: 0 | Status: COMPLETED | OUTPATIENT
Start: 2022-02-16 | End: 2022-02-16

## 2022-02-16 RX ORDER — POTASSIUM PHOSPHATE, MONOBASIC POTASSIUM PHOSPHATE, DIBASIC 236; 224 MG/ML; MG/ML
15 INJECTION, SOLUTION INTRAVENOUS ONCE
Refills: 0 | Status: COMPLETED | OUTPATIENT
Start: 2022-02-16 | End: 2022-02-16

## 2022-02-16 RX ORDER — INSULIN LISPRO 100/ML
VIAL (ML) SUBCUTANEOUS
Refills: 0 | Status: DISCONTINUED | OUTPATIENT
Start: 2022-02-16 | End: 2022-02-18

## 2022-02-16 RX ORDER — POTASSIUM PHOSPHATE, MONOBASIC POTASSIUM PHOSPHATE, DIBASIC 236; 224 MG/ML; MG/ML
30 INJECTION, SOLUTION INTRAVENOUS ONCE
Refills: 0 | Status: COMPLETED | OUTPATIENT
Start: 2022-02-16 | End: 2022-02-16

## 2022-02-16 RX ADMIN — DEXTROSE MONOHYDRATE, SODIUM CHLORIDE, AND POTASSIUM CHLORIDE 200 MILLILITER(S): 50; .745; 4.5 INJECTION, SOLUTION INTRAVENOUS at 00:41

## 2022-02-16 RX ADMIN — Medication 2: at 17:12

## 2022-02-16 RX ADMIN — Medication 100 MILLIEQUIVALENT(S): at 03:35

## 2022-02-16 RX ADMIN — Medication 1000 MILLIGRAM(S): at 13:46

## 2022-02-16 RX ADMIN — Medication 100 MILLIEQUIVALENT(S): at 08:42

## 2022-02-16 RX ADMIN — PANTOPRAZOLE SODIUM 40 MILLIGRAM(S): 20 TABLET, DELAYED RELEASE ORAL at 17:03

## 2022-02-16 RX ADMIN — Medication 400 MILLIGRAM(S): at 13:16

## 2022-02-16 RX ADMIN — POTASSIUM PHOSPHATE, MONOBASIC POTASSIUM PHOSPHATE, DIBASIC 62.5 MILLIMOLE(S): 236; 224 INJECTION, SOLUTION INTRAVENOUS at 17:21

## 2022-02-16 RX ADMIN — APIXABAN 5 MILLIGRAM(S): 2.5 TABLET, FILM COATED ORAL at 17:03

## 2022-02-16 RX ADMIN — DEXTROSE MONOHYDRATE, SODIUM CHLORIDE, AND POTASSIUM CHLORIDE 200 MILLILITER(S): 50; .745; 4.5 INJECTION, SOLUTION INTRAVENOUS at 08:41

## 2022-02-16 RX ADMIN — Medication 100 MILLIEQUIVALENT(S): at 06:35

## 2022-02-16 RX ADMIN — Medication 100 MILLIEQUIVALENT(S): at 10:00

## 2022-02-16 RX ADMIN — INSULIN GLARGINE 20 UNIT(S): 100 INJECTION, SOLUTION SUBCUTANEOUS at 16:50

## 2022-02-16 RX ADMIN — Medication 2 PACKET(S): at 17:03

## 2022-02-16 RX ADMIN — Medication 4 MILLIGRAM(S): at 04:41

## 2022-02-16 RX ADMIN — Medication 1 MILLIGRAM(S): at 12:51

## 2022-02-16 RX ADMIN — Medication 100 MILLIGRAM(S): at 12:49

## 2022-02-16 RX ADMIN — Medication 100 GRAM(S): at 17:11

## 2022-02-16 RX ADMIN — Medication 6: at 21:01

## 2022-02-16 RX ADMIN — POTASSIUM PHOSPHATE, MONOBASIC POTASSIUM PHOSPHATE, DIBASIC 83.33 MILLIMOLE(S): 236; 224 INJECTION, SOLUTION INTRAVENOUS at 00:41

## 2022-02-16 RX ADMIN — CHLORHEXIDINE GLUCONATE 1 APPLICATION(S): 213 SOLUTION TOPICAL at 05:13

## 2022-02-16 RX ADMIN — PANTOPRAZOLE SODIUM 40 MILLIGRAM(S): 20 TABLET, DELAYED RELEASE ORAL at 05:13

## 2022-02-16 RX ADMIN — POTASSIUM PHOSPHATE, MONOBASIC POTASSIUM PHOSPHATE, DIBASIC 83.33 MILLIMOLE(S): 236; 224 INJECTION, SOLUTION INTRAVENOUS at 06:35

## 2022-02-16 RX ADMIN — Medication 100 MILLIEQUIVALENT(S): at 01:44

## 2022-02-16 RX ADMIN — Medication 12.5 MILLIGRAM(S): at 17:03

## 2022-02-16 RX ADMIN — SODIUM CHLORIDE 100 MILLILITER(S): 9 INJECTION, SOLUTION INTRAVENOUS at 16:01

## 2022-02-16 NOTE — DIETITIAN INITIAL EVALUATION ADULT. - CONTINUE CURRENT NUTRITION CARE PLAN
- advance diet as medically feasible/tolerable; if concerns for chewing/swallowing difficulty, suggest SLP swallow evaluation prior to diet advancement./yes

## 2022-02-16 NOTE — SBIRT NOTE ADULT - NSSBIRTSCREENAVAIL_GEN_A_CORE
Detail Level: Generalized Price (Do Not Change): 0.00 Instructions: This plan will send the code FBSE to the PM system.  DO NOT or CHANGE the price. Yes

## 2022-02-16 NOTE — DIETITIAN INITIAL EVALUATION ADULT. - ADD RECOMMEND
Continue thiamine and folic acid supplementation, add MVI daily as feasible. Obtain daily weights to monitor trends.

## 2022-02-16 NOTE — DIETITIAN INITIAL EVALUATION ADULT. - PERTINENT MEDS FT
MEDICATIONS  (STANDING):  chlorhexidine 2% Cloths 1 Application(s) Topical <User Schedule>  dextrose 5% + lactated ringers with potassium chloride 20 mEq/L 1000 milliLiter(s) (200 mL/Hr) IV Continuous <Continuous>  folic acid Injectable 1 milliGRAM(s) IV Push daily  insulin regular Infusion 4 Unit(s)/Hr (4 mL/Hr) IV Continuous <Continuous>  LORazepam   Injectable   IV Push   LORazepam   Injectable 4 milliGRAM(s) IV Push every 4 hours  LORazepam   Injectable 3 milliGRAM(s) IV Push every 4 hours  pantoprazole  Injectable 40 milliGRAM(s) IV Push two times a day  potassium chloride  10 mEq/100 mL IVPB 10 milliEquivalent(s) IV Intermittent every 1 hour  potassium phosphate / sodium phosphate Powder (PHOS-NaK) 2 Packet(s) Oral two times a day  thiamine Injectable 100 milliGRAM(s) IV Push daily    MEDICATIONS  (PRN):  LORazepam   Injectable 4 milliGRAM(s) IV Push every 1 hour PRN Agitation  ondansetron Injectable 4 milliGRAM(s) IV Push every 6 hours PRN Nausea and/or Vomiting

## 2022-02-16 NOTE — DIETITIAN INITIAL EVALUATION ADULT. - ETIOLOGY
Attempted to contact pt at  number, no answer. No vm set up. After unsuccessful attempts to contact pt and no return call will send letter to address on file. related to increased physiological demand for nutrient

## 2022-02-16 NOTE — DIETITIAN INITIAL EVALUATION ADULT. - PERTINENT LABORATORY DATA
02-16 Na142 mmol/L Glu 170 mg/dL<H> K+ 3.6 mmol/L Cr  1.34 mg/dL<H> BUN 21.9 mg/dL<H> Phos 0.9 mg/dL<LL> Alb n/a   PAB n/a

## 2022-02-16 NOTE — DIETITIAN INITIAL EVALUATION ADULT. - OTHER INFO
31 year old male with PMH of EtOH abuse (4-6 beers every other day), DM2, HTN, Afib, and NICM (EF 40-45%) admitted w/ DKA, medication noncompliance, SYED, hyperkalemia, EtOH withdrawal, acute gastritis with transient GI bleeding. Pt lethargic at time of interview, receiving Ativan taper. Pt currently NPO, insulin drip in place for DKA. Per EMR review, pts weight in 7/2021 was 145.5 lbs, current admission weight 165 lbs, possible 20 lb wt gain x ~7 months if admission weight accurate. RD to follow up.

## 2022-02-17 LAB
ALBUMIN SERPL ELPH-MCNC: 3.7 G/DL — SIGNIFICANT CHANGE UP (ref 3.3–5.2)
ALP SERPL-CCNC: 122 U/L — HIGH (ref 40–120)
ALT FLD-CCNC: 57 U/L — HIGH
ANION GAP SERPL CALC-SCNC: 16 MMOL/L — SIGNIFICANT CHANGE UP (ref 5–17)
AST SERPL-CCNC: 85 U/L — HIGH
BASOPHILS # BLD AUTO: 0.04 K/UL — SIGNIFICANT CHANGE UP (ref 0–0.2)
BASOPHILS NFR BLD AUTO: 0.5 % — SIGNIFICANT CHANGE UP (ref 0–2)
BILIRUB SERPL-MCNC: 1.5 MG/DL — SIGNIFICANT CHANGE UP (ref 0.4–2)
BUN SERPL-MCNC: 8.6 MG/DL — SIGNIFICANT CHANGE UP (ref 8–20)
CALCIUM SERPL-MCNC: 8.9 MG/DL — SIGNIFICANT CHANGE UP (ref 8.6–10.2)
CHLORIDE SERPL-SCNC: 99 MMOL/L — SIGNIFICANT CHANGE UP (ref 98–107)
CO2 SERPL-SCNC: 26 MMOL/L — SIGNIFICANT CHANGE UP (ref 22–29)
CREAT SERPL-MCNC: 0.63 MG/DL — SIGNIFICANT CHANGE UP (ref 0.5–1.3)
EOSINOPHIL # BLD AUTO: 0.16 K/UL — SIGNIFICANT CHANGE UP (ref 0–0.5)
EOSINOPHIL NFR BLD AUTO: 2.1 % — SIGNIFICANT CHANGE UP (ref 0–6)
FOLATE SERPL-MCNC: 12.3 NG/ML — SIGNIFICANT CHANGE UP
GLUCOSE BLDC GLUCOMTR-MCNC: 118 MG/DL — HIGH (ref 70–99)
GLUCOSE BLDC GLUCOMTR-MCNC: 180 MG/DL — HIGH (ref 70–99)
GLUCOSE BLDC GLUCOMTR-MCNC: 301 MG/DL — HIGH (ref 70–99)
GLUCOSE BLDC GLUCOMTR-MCNC: 319 MG/DL — HIGH (ref 70–99)
GLUCOSE BLDC GLUCOMTR-MCNC: 75 MG/DL — SIGNIFICANT CHANGE UP (ref 70–99)
GLUCOSE SERPL-MCNC: 62 MG/DL — LOW (ref 70–99)
HCT VFR BLD CALC: 31.8 % — LOW (ref 39–50)
HGB BLD-MCNC: 10.8 G/DL — LOW (ref 13–17)
IMM GRANULOCYTES NFR BLD AUTO: 0.5 % — SIGNIFICANT CHANGE UP (ref 0–1.5)
LIDOCAIN IGE QN: 6 U/L — LOW (ref 22–51)
LYMPHOCYTES # BLD AUTO: 0.97 K/UL — LOW (ref 1–3.3)
LYMPHOCYTES # BLD AUTO: 12.8 % — LOW (ref 13–44)
MAGNESIUM SERPL-MCNC: 2 MG/DL — SIGNIFICANT CHANGE UP (ref 1.6–2.6)
MCHC RBC-ENTMCNC: 31.1 PG — SIGNIFICANT CHANGE UP (ref 27–34)
MCHC RBC-ENTMCNC: 34 GM/DL — SIGNIFICANT CHANGE UP (ref 32–36)
MCV RBC AUTO: 91.6 FL — SIGNIFICANT CHANGE UP (ref 80–100)
MONOCYTES # BLD AUTO: 0.68 K/UL — SIGNIFICANT CHANGE UP (ref 0–0.9)
MONOCYTES NFR BLD AUTO: 9 % — SIGNIFICANT CHANGE UP (ref 2–14)
NEUTROPHILS # BLD AUTO: 5.66 K/UL — SIGNIFICANT CHANGE UP (ref 1.8–7.4)
NEUTROPHILS NFR BLD AUTO: 75.1 % — SIGNIFICANT CHANGE UP (ref 43–77)
PHOSPHATE SERPL-MCNC: 2.4 MG/DL — SIGNIFICANT CHANGE UP (ref 2.4–4.7)
PLATELET # BLD AUTO: 72 K/UL — LOW (ref 150–400)
POTASSIUM SERPL-MCNC: 3.5 MMOL/L — SIGNIFICANT CHANGE UP (ref 3.5–5.3)
POTASSIUM SERPL-SCNC: 3.5 MMOL/L — SIGNIFICANT CHANGE UP (ref 3.5–5.3)
PROT SERPL-MCNC: 6.4 G/DL — LOW (ref 6.6–8.7)
RBC # BLD: 3.47 M/UL — LOW (ref 4.2–5.8)
RBC # FLD: 13.8 % — SIGNIFICANT CHANGE UP (ref 10.3–14.5)
SODIUM SERPL-SCNC: 141 MMOL/L — SIGNIFICANT CHANGE UP (ref 135–145)
VIT B12 SERPL-MCNC: 891 PG/ML — SIGNIFICANT CHANGE UP (ref 232–1245)
WBC # BLD: 7.55 K/UL — SIGNIFICANT CHANGE UP (ref 3.8–10.5)
WBC # FLD AUTO: 7.55 K/UL — SIGNIFICANT CHANGE UP (ref 3.8–10.5)

## 2022-02-17 PROCEDURE — 99233 SBSQ HOSP IP/OBS HIGH 50: CPT

## 2022-02-17 RX ORDER — METOCLOPRAMIDE HCL 10 MG
5 TABLET ORAL
Refills: 0 | Status: DISCONTINUED | OUTPATIENT
Start: 2022-02-17 | End: 2022-02-18

## 2022-02-17 RX ORDER — MAGNESIUM OXIDE 400 MG ORAL TABLET 241.3 MG
400 TABLET ORAL
Refills: 0 | Status: DISCONTINUED | OUTPATIENT
Start: 2022-02-17 | End: 2022-02-18

## 2022-02-17 RX ORDER — SUCRALFATE 1 G
1 TABLET ORAL
Refills: 0 | Status: DISCONTINUED | OUTPATIENT
Start: 2022-02-17 | End: 2022-02-18

## 2022-02-17 RX ORDER — SODIUM CHLORIDE 9 MG/ML
1000 INJECTION, SOLUTION INTRAVENOUS
Refills: 0 | Status: DISCONTINUED | OUTPATIENT
Start: 2022-02-17 | End: 2022-02-18

## 2022-02-17 RX ADMIN — APIXABAN 5 MILLIGRAM(S): 2.5 TABLET, FILM COATED ORAL at 05:53

## 2022-02-17 RX ADMIN — APIXABAN 5 MILLIGRAM(S): 2.5 TABLET, FILM COATED ORAL at 17:38

## 2022-02-17 RX ADMIN — Medication 1 MILLIGRAM(S): at 17:35

## 2022-02-17 RX ADMIN — Medication 2: at 21:41

## 2022-02-17 RX ADMIN — SODIUM CHLORIDE 85 MILLILITER(S): 9 INJECTION, SOLUTION INTRAVENOUS at 09:47

## 2022-02-17 RX ADMIN — MAGNESIUM OXIDE 400 MG ORAL TABLET 400 MILLIGRAM(S): 241.3 TABLET ORAL at 09:28

## 2022-02-17 RX ADMIN — Medication 1 GRAM(S): at 12:18

## 2022-02-17 RX ADMIN — MAGNESIUM OXIDE 400 MG ORAL TABLET 400 MILLIGRAM(S): 241.3 TABLET ORAL at 17:38

## 2022-02-17 RX ADMIN — Medication 12.5 MILLIGRAM(S): at 17:36

## 2022-02-17 RX ADMIN — MAGNESIUM OXIDE 400 MG ORAL TABLET 400 MILLIGRAM(S): 241.3 TABLET ORAL at 12:18

## 2022-02-17 RX ADMIN — Medication 2 PACKET(S): at 05:53

## 2022-02-17 RX ADMIN — Medication 5 MILLIGRAM(S): at 12:19

## 2022-02-17 RX ADMIN — Medication 8: at 17:39

## 2022-02-17 RX ADMIN — INSULIN GLARGINE 20 UNIT(S): 100 INJECTION, SOLUTION SUBCUTANEOUS at 21:40

## 2022-02-17 RX ADMIN — Medication 2 PACKET(S): at 17:36

## 2022-02-17 RX ADMIN — Medication 1 GRAM(S): at 17:36

## 2022-02-17 RX ADMIN — Medication 5 MILLIGRAM(S): at 17:38

## 2022-02-17 RX ADMIN — PANTOPRAZOLE SODIUM 40 MILLIGRAM(S): 20 TABLET, DELAYED RELEASE ORAL at 05:59

## 2022-02-17 RX ADMIN — Medication 100 MILLIGRAM(S): at 17:38

## 2022-02-17 RX ADMIN — PANTOPRAZOLE SODIUM 40 MILLIGRAM(S): 20 TABLET, DELAYED RELEASE ORAL at 17:36

## 2022-02-17 RX ADMIN — Medication 12.5 MILLIGRAM(S): at 05:53

## 2022-02-17 NOTE — PROGRESS NOTE ADULT - ASSESSMENT
30 y/o M with  EtOH abuse (4-6 beers every other day), DM type 2, HTN, a fib on eliquis, and NICM (EF 40-45%) admitted for DKA , SYED , alcohol withdrawal , abd pain       1. Diabetes Mellitus type 2 with  DKA, medication noncompliance   s/p iv insulin infusion   improved  on insulin lantus , premeals and ISS   monitor BG     2- Alcohol withdrawal , cronic alcohol use   improved CIWA score   cont ativan prn   folic acid thiamine for suspected vit deficiency   check vit b12 level   cont iv fluid     3- Abdominal pain / nausea vomiting   likely duet to gastritis   cont PPI bid   add sucralfate   maalox as needed   reglan prn   hb stable  no sign of acute GI bleed     4- SYED with AGMA   improved with hydration   hyperkalemia resolved     5-Hypophosphatemia   replace po phos supplement added   check lytes mag phos, BMP  in am     6- h/o CM and atrial fib   cont anticoagulation   will review old records ( he states that do not follow up with cardiologist outpatient )     DVt propylaxis on eliquis   ambulate OOB     discharge in few days likely   SW services on board    30 y/o M with  EtOH abuse (4-6 beers every other day), DM type 2, HTN, a fib on eliquis, and NICM (EF 40-45%) admitted for DKA , SYED , alcohol withdrawal , abd pain       1. Diabetes Mellitus type 2 with  DKA, medication noncompliance   s/p iv insulin infusion   improved  on insulin lantus , premeals and ISS   monitor BG     2- Alcohol withdrawal , cronic alcohol use   improved CIWA score   cont ativan prn   folic acid thiamine for suspected vit deficiency   check vit b12 level   cont iv fluid     3- Abdominal pain / nausea vomiting   likely duet to gastritis   cont PPI bid   add sucralfate   maalox as needed   reglan prn   hb stable  no sign of acute GI bleed     4- SYED with AGMA   improved with hydration   hyperkalemia resolved     5-Hypophosphatemia   replace po phos supplement added   check lytes mag phos, BMP  in am     6- h/o CM and atrial fib   cont anticoagulation   metoprolol low dose   off losartan due to SYED, low BP   resume   Old records reviewed ( seen by [facility] cardio DR Phipps and Dr Chavez last year   will give him referal and instruction on discharge to follow up )   He states that he had not follow up with cardiologist outpatient )     DVt propylaxis on eliquis   ambulate OOB     discharge in few days likely   SW services on board

## 2022-02-17 NOTE — PROGRESS NOTE ADULT - SUBJECTIVE AND OBJECTIVE BOX
CARMEN JOYCE  MRN-[id number]  Patient is a 31y old  Male who presents with a chief complaint of DKA (15 Feb 2022 17:31)    HPI:  32 y/o M w/ a PMHx of EtOH abuse (4-6 beers every other day), DM type 2, HTN, a fib on eliquis, and NICM (EF 40-45%) who presented to the ER c/o nausea, multiple episodes of vomiting, and abdominal pain that began yesterday. Pt stated emesis was initially clear, but the last few episodes have been black appearing. Stopped taking anti-diabetic medication three days ago. Last drink was yesterday afternoon. Upon arrival to the ER, pt was tachycardic (HR 120s) and hypotensive (SBP 80s).    Labs revealing of leukocytosis (WBC count 16K), hyperkalemia (K 7), high anion gap metabolic acidosis (anion gap undetectable, CO2 < 6), SYED (BUN/Cr 31.8/2.96), transaminitis (AST/ALT 92/89), moderate serum acetone, and elevated lipase (123). Pt was administered 4 L IVF, zofran, morphine, and started on an insulin infusion. Admitted to MICU for management of DKA and EtOH withdrawal. (15 Feb 2022 17:31)      Last 24 hours:    remained on insulin infusion, added dextrose fluid with potassium supplement   remains on Ativan taper with stable CIWA score   improved anion gap and severe metabolic acidosis as well as alcohol withdrawal    REVIEW OF SYSTEMS:  Could not obtain ROS due to underlying mentation      Physical Exam:  Vital Signs Last 24 Hrs  T(C): 36.8 (15 Feb 2022 19:28), Max: 36.8 (15 Feb 2022 19:28)  T(F): 98.3 (15 Feb 2022 19:28), Max: 98.3 (15 Feb 2022 19:28)  HR: 103 (2022 06:00) (100 - 122)  BP: 114/76 (2022 06:00) (87/52 - 141/78)  BP(mean): 88 (2022 06:00) (88 - 103)  RR: 11 (2022 06:00) (11 - 24)  SpO2: 97% (2022 06:00) (97% - 100%)    Gen:   31-year-old male sleeping in bed in no apparent distress during my assessment arousable to verbal command  CNS: no acute neurological findings, WAYNE   Neck: no JVD  RES : Air entry equal b/l, no adventitious breath sounds                     CVS: Normal S1/S2, regular rhythm and normal rate   Abd: Soft, non-distended. Bowel sounds present.  Skin: No acute rash.  Ext:  no edema b/l LE, PP+    ============================I/O===========================   I&O's Detail    15 Feb 2022 07:01  -  2022 06:40  --------------------------------------------------------  IN:    dextrose 5% + lactated ringers: 400 mL    dextrose 5% + lactated ringers w/ Additives: 1800 mL    Insulin: 71 mL    IV PiggyBack: 100 mL    IV PiggyBack: 300 mL    IV PiggyBack: 499.8 mL  Total IN: 3170.8 mL    OUT:    Voided (mL): 1000 mL  Total OUT: 1000 mL    Total NET: 2170.8 mL        ============================ LABS =========================                        11.2   10.25 )-----------( 91       ( 2022 04:27 )             33.5     02-16    142  |  106  |  21.9<H>  ----------------------------<  170<H>  3.6   |  21.0<L>  |  1.34<H>    Ca    9.4      2022 04:27  Phos  0.9     02-16  Mg     1.9         TPro  7.3  /  Alb  4.5  /  TBili  2.0  /  DBili  x   /  AST  92<H>  /  ALT  89<H>  /  AlkPhos  166<H>  02-15    LIVER FUNCTIONS - ( 15 Feb 2022 16:30 )  Alb: 4.5 g/dL / Pro: 7.3 g/dL / ALK PHOS: 166 U/L / ALT: 89 U/L / AST: 92 U/L / GGT: x           PT/INR - ( 15 Feb 2022 15:24 )   PT: 11.2 sec;   INR: 0.96 ratio         PTT - ( 15 Feb 2022 15:24 )  PTT:36.1 sec    Urinalysis Basic - ( 15 Feb 2022 17:55 )    Color: Yellow / Appearance: Clear / S.015 / pH: x  Gluc: x / Ketone: Large  / Bili: Negative / Urobili: Negative mg/dL   Blood: x / Protein: 30 mg/dL / Nitrite: Negative   Leuk Esterase: Negative / RBC: 0-2 /HPF / WBC 0-2 /HPF   Sq Epi: x / Non Sq Epi: Occasional / Bacteria: Occasional      ======================Micro/Rad/Cardio=================  Culture: Reviewed   CXR: Reviewed  Echo:Reviewed  ======================================================  PAST MEDICAL & SURGICAL HISTORY:  Alcohol abuse    Pancreatitis    HTN (hypertension)    Diabetes    Atrial fibrillation    Non-ischemic cardiomyopathy    No significant past surgical history      ====================ASSESSMENT ==============    1: Diabetic ketoacidosis   2: Hypokalemia   3: Severe hypophosphatemia   4: Alcohol withdrawal with underlying alcohol use disorder   5: Essential hypertension, A. fib on Eliquis, nonischemic cardiomyopathy with EF of 40 to 45%   6: Transaminitis   7: Acute kidney injury   8: Acute gastritis with transient GI bleeding  ====================== NEUROLOGY=====================  LORazepam   Injectable 4 milliGRAM(s) IV Push every 1 hour PRN Agitation  LORazepam   Injectable 4 milliGRAM(s) IV Push every 4 hours  LORazepam   Injectable 3 milliGRAM(s) IV Push every 4 hours  LORazepam   Injectable   IV Push   ondansetron Injectable 4 milliGRAM(s) IV Push every 6 hours PRN Nausea and/or Vomiting   CIWA monitoring   thiamine folic acid and multivitamin  ==================== RESPIRATORY======================  On supplemental oxygen-> oxygenating and ventilating fine for now      ====================CARDIOVASCULAR==================   hemodynamically stable  ===================HEMATOLOGIC/ONC ===================  scd for now  ===================== RENAL =========================  Continue monitoring urine output  Avoid Nephrotoxic agents   Adjust medications for renal fuctiion   Close urinary output monitoring   UA/Renal imaging reviviewed   Replacing electrolytes as needed with Goal K> 4, PO> 3, Mg> 2    ==================== GASTROINTESTINAL===================  dextrose 5% + lactated ringers with potassium chloride 20 mEq/L 1000 milliLiter(s) (200 mL/Hr) IV Continuous <Continuous>  folic acid Injectable 1 milliGRAM(s) IV Push daily  thiamine Injectable 100 milliGRAM(s) IV Push daily    pantoprazole  Injectable 40 milliGRAM(s) IV Push two times a day  potassium chloride  10 mEq/100 mL IVPB 10 milliEquivalent(s) IV Intermittent every 1 hour  potassium phosphate / sodium phosphate Powder (PHOS-NaK) 2 Packet(s) Oral two times a day     CT abdomen pelvis negative for pancreatitis but does have Monica lithiasis without cholecystitis, does have gastritis  =======================    ENDOCRINE  =====================  insulin regular Infusion 4 Unit(s)/Hr (4 mL/Hr) IV Continuous <Continuous>   repeating BMP, mag and phosphorus at 830 with plan to transition to Lantus with weaning off of   remains on dextrose 5% with lactated Ringer's and potassium chloride   endocrinology consulted  ========================INFECTIOUS DISEASE================  RODRI for now   pending urine culture    Patient requires continuous monitoring with bedside rhythm monitoring, pulse oximetry,   monitoring and intermittent blood gas analysis.    Care plan discussed with ICU care team    Patient remained critical; I have spent 45 minutes providing non-routine care, revaluated multiple times during the day.    
Acceptance Note     CC nausea abdominal pain     chart reviewed   he is with nausea vomiting and abd pain epigastric area started  before admission , feels better now   awake alert oriented speaks English       Hospital Course   30 y/o M w/ a PMHx of EtOH abuse (4-6 beers every other day), DM type 2, HTN, a fib on eliquis, and NICM (EF 40-45%) who presented to the ER c/o nausea, multiple episodes of vomiting, and abdominal pain that began 1 day prior admission . Pt stated emesis was initially clear, but the last few episodes have been black appearing. Stopped taking anti-diabetic medication three days prior , Last drink was   Upon arrival to the ER, pt was tachycardic (HR 120s) and hypotensive (SBP 80s).  Labs revealing of leukocytosis (WBC count 16K), hyperkalemia (K 7), high anion gap metabolic acidosis (anion gap undetectable, CO2 < 6), SYED (BUN/Cr 31.8/2.96), transaminitis (AST/ALT 92/89), moderate serum acetone, and elevated lipase (123). Pt was administered 4 L IVF, zofran, morphine, and started on an insulin infusion. Admitted to MICU for management of DKA and EtOH withdrawal.  CT of abd pelvis revealed severe gastritis   Treated with in insulin, electrolyte repleted      remains on Ativan taper with stable CIWA score   improved anion gap and severe metabolic acidosis as well as alcohol withdrawal, now transferred to medicine service         ALLERGIES:  No Known Allergies    MEDICATIONS  (STANDING):  apixaban 5 milliGRAM(s) Oral every 12 hours  dextrose 40% Gel 15 Gram(s) Oral once  dextrose 50% Injectable 25 Gram(s) IV Push once  dextrose 50% Injectable 12.5 Gram(s) IV Push once  dextrose 50% Injectable 25 Gram(s) IV Push once  folic acid Injectable 1 milliGRAM(s) IV Push daily  glucagon  Injectable 1 milliGRAM(s) IntraMuscular once  insulin glargine Injectable (LANTUS) 20 Unit(s) SubCutaneous at bedtime  insulin lispro (ADMELOG) corrective regimen sliding scale   SubCutaneous Before meals and at bedtime  lactated ringers. 1000 milliLiter(s) (100 mL/Hr) IV Continuous <Continuous>  metoclopramide Injectable 5 milliGRAM(s) IV Push three times a day  metoprolol tartrate 12.5 milliGRAM(s) Oral every 12 hours  pantoprazole  Injectable 40 milliGRAM(s) IV Push two times a day  potassium phosphate / sodium phosphate Powder (PHOS-NaK) 2 Packet(s) Oral two times a day  sucralfate 1 Gram(s) Oral four times a day  thiamine Injectable 100 milliGRAM(s) IV Push daily    MEDICATIONS  (PRN):  LORazepam   Injectable 4 milliGRAM(s) IV Push every 1 hour PRN Agitation  ondansetron Injectable 4 milliGRAM(s) IV Push every 6 hours PRN Nausea and/or Vomiting    Vital Signs Last 24 Hrs  T(F): 99.8 (2022 04:00), Max: 100.6 (2022 17:00)  HR: 90 (2022 04:00) (86 - 110)  BP: 116/78 (2022 04:00) (100/75 - 130/102)  RR: 18 (2022 04:00) (11 - 20)  SpO2: 97% (2022 04:00) (97% - 100%)  I&O's Summary    2022 07:01  -  2022 07:00  --------------------------------------------------------  IN: 3019.2 mL / OUT: 1820 mL / NET: 1199.2 mL        PHYSICAL EXAM:  General: awake alert sitting in the bed   Neck: supple , no JVD   Lungs: CTA bilateral , poor isnpiratory effort   Cardio: RRR, S1/S2, No murmur  Abdomen: Soft,+ epigastric upper ad tenderness mild on palpation , Nondistended; Bowel sounds present  Extremities: No calf tenderness, No pitting edema  Skin : warm , pale color   LABS:                        11.6   9.76  )-----------( 76       ( 2022 13:46 )             34.2     02-16    143  |  106  |  15.8  ----------------------------<  80  4.0   |  22.0  |  0.82    Ca    9.1      2022 13:46  Phos  2.1       Mg     1.8         TPro  6.7  /  Alb  3.9  /  TBili  1.8  /  DBili  x   /  AST  81  /  ALT  68  /  AlkPhos  130        Lipase, Serum: 123 U/L (02-15-22 @ 15:23)    eGFR if Non African American: 117 mL/min/1.73M2 (22 @ 13:46)  eGFR if : 136 mL/min/1.73M2 (22 @ 13:46)    PT/INR - ( 15 Feb 2022 15:24 )   PT: 11.2 sec;   INR: 0.96 ratio         PTT - ( 15 Feb 2022 15:24 )  PTT:36.1 sec  Lactate, Blood: 2.0 mmol/L ( @ 04:42)    Procalcitonin, Serum: 3.76 ng/mL (02-15-22 @ 21:53)    CARDIAC MARKERS ( 2022 13:46 )  x     / x     / 219 U/L / x     / 3.1 ng/mL          19:17 - VBG - pH:       | pCO2:       | pO2:       | Lactate: 3.50   15:25 - VBG - pH: <6.942 | pCO2: 32    | pO2: 78    | Lactate: 5.00             POCT Blood Glucose.: 262 mg/dL (2022 20:58)  POCT Blood Glucose.: 191 mg/dL (2022 16:28)  POCT Blood Glucose.: 139 mg/dL (2022 15:15)  POCT Blood Glucose.: 108 mg/dL (2022 14:16)  POCT Blood Glucose.: 81 mg/dL (2022 13:17)  POCT Blood Glucose.: 101 mg/dL (2022 12:14)  POCT Blood Glucose.: 210 mg/dL (2022 10:15)  POCT Blood Glucose.: 230 mg/dL (2022 09:27)      Urinalysis Basic - ( 15 Feb 2022 17:55 )    Color: Yellow / Appearance: Clear / S.015 / pH: x  Gluc: x / Ketone: Large  / Bili: Negative / Urobili: Negative mg/dL   Blood: x / Protein: 30 mg/dL / Nitrite: Negative   Leuk Esterase: Negative / RBC: 0-2 /HPF / WBC 0-2 /HPF   Sq Epi: x / Non Sq Epi: Occasional / Bacteria: Occasional        Culture - Urine (collected 2022 01:02)  Source: Clean Catch Clean Catch (Midstream)  Final Report (2022 20:38):    <10,000 CFU/mL Normal Urogenital Zee      COVID-19 PCR: NotDetec (02-15-22 @ 15:22)    RADIOLOGY & ADDITIONAL TESTS:

## 2022-02-18 ENCOUNTER — TRANSCRIPTION ENCOUNTER (OUTPATIENT)
Age: 32
End: 2022-02-18

## 2022-02-18 VITALS
SYSTOLIC BLOOD PRESSURE: 116 MMHG | RESPIRATION RATE: 18 BRPM | HEART RATE: 91 BPM | DIASTOLIC BLOOD PRESSURE: 81 MMHG | TEMPERATURE: 98 F | OXYGEN SATURATION: 94 %

## 2022-02-18 LAB
ANION GAP SERPL CALC-SCNC: 14 MMOL/L — SIGNIFICANT CHANGE UP (ref 5–17)
BUN SERPL-MCNC: 8.7 MG/DL — SIGNIFICANT CHANGE UP (ref 8–20)
CALCIUM SERPL-MCNC: 9.2 MG/DL — SIGNIFICANT CHANGE UP (ref 8.6–10.2)
CHLORIDE SERPL-SCNC: 100 MMOL/L — SIGNIFICANT CHANGE UP (ref 98–107)
CO2 SERPL-SCNC: 27 MMOL/L — SIGNIFICANT CHANGE UP (ref 22–29)
CREAT SERPL-MCNC: 0.48 MG/DL — LOW (ref 0.5–1.3)
GLUCOSE BLDC GLUCOMTR-MCNC: 139 MG/DL — HIGH (ref 70–99)
GLUCOSE BLDC GLUCOMTR-MCNC: 217 MG/DL — HIGH (ref 70–99)
GLUCOSE SERPL-MCNC: 147 MG/DL — HIGH (ref 70–99)
HCT VFR BLD CALC: 33.3 % — LOW (ref 39–50)
HGB BLD-MCNC: 11.2 G/DL — LOW (ref 13–17)
MCHC RBC-ENTMCNC: 31.2 PG — SIGNIFICANT CHANGE UP (ref 27–34)
MCHC RBC-ENTMCNC: 33.6 GM/DL — SIGNIFICANT CHANGE UP (ref 32–36)
MCV RBC AUTO: 92.8 FL — SIGNIFICANT CHANGE UP (ref 80–100)
OB PNL STL: NEGATIVE — SIGNIFICANT CHANGE UP
PLATELET # BLD AUTO: 66 K/UL — LOW (ref 150–400)
POTASSIUM SERPL-MCNC: 3.4 MMOL/L — LOW (ref 3.5–5.3)
POTASSIUM SERPL-SCNC: 3.4 MMOL/L — LOW (ref 3.5–5.3)
RBC # BLD: 3.59 M/UL — LOW (ref 4.2–5.8)
RBC # FLD: 13.7 % — SIGNIFICANT CHANGE UP (ref 10.3–14.5)
SODIUM SERPL-SCNC: 141 MMOL/L — SIGNIFICANT CHANGE UP (ref 135–145)
WBC # BLD: 5.02 K/UL — SIGNIFICANT CHANGE UP (ref 3.8–10.5)
WBC # FLD AUTO: 5.02 K/UL — SIGNIFICANT CHANGE UP (ref 3.8–10.5)

## 2022-02-18 PROCEDURE — 84295 ASSAY OF SERUM SODIUM: CPT

## 2022-02-18 PROCEDURE — 93005 ELECTROCARDIOGRAM TRACING: CPT

## 2022-02-18 PROCEDURE — 82272 OCCULT BLD FECES 1-3 TESTS: CPT

## 2022-02-18 PROCEDURE — 71045 X-RAY EXAM CHEST 1 VIEW: CPT

## 2022-02-18 PROCEDURE — 80053 COMPREHEN METABOLIC PANEL: CPT

## 2022-02-18 PROCEDURE — G1004: CPT

## 2022-02-18 PROCEDURE — 83036 HEMOGLOBIN GLYCOSYLATED A1C: CPT

## 2022-02-18 PROCEDURE — 86901 BLOOD TYPING SEROLOGIC RH(D): CPT

## 2022-02-18 PROCEDURE — 82553 CREATINE MB FRACTION: CPT

## 2022-02-18 PROCEDURE — 85730 THROMBOPLASTIN TIME PARTIAL: CPT

## 2022-02-18 PROCEDURE — 86703 HIV-1/HIV-2 1 RESULT ANTBDY: CPT

## 2022-02-18 PROCEDURE — 84484 ASSAY OF TROPONIN QUANT: CPT

## 2022-02-18 PROCEDURE — 80048 BASIC METABOLIC PNL TOTAL CA: CPT

## 2022-02-18 PROCEDURE — 83605 ASSAY OF LACTIC ACID: CPT

## 2022-02-18 PROCEDURE — 96375 TX/PRO/DX INJ NEW DRUG ADDON: CPT

## 2022-02-18 PROCEDURE — 85025 COMPLETE CBC W/AUTO DIFF WBC: CPT

## 2022-02-18 PROCEDURE — 87086 URINE CULTURE/COLONY COUNT: CPT

## 2022-02-18 PROCEDURE — 82962 GLUCOSE BLOOD TEST: CPT

## 2022-02-18 PROCEDURE — 36415 COLL VENOUS BLD VENIPUNCTURE: CPT

## 2022-02-18 PROCEDURE — 84145 PROCALCITONIN (PCT): CPT

## 2022-02-18 PROCEDURE — 81001 URINALYSIS AUTO W/SCOPE: CPT

## 2022-02-18 PROCEDURE — 84132 ASSAY OF SERUM POTASSIUM: CPT

## 2022-02-18 PROCEDURE — 82803 BLOOD GASES ANY COMBINATION: CPT

## 2022-02-18 PROCEDURE — 84100 ASSAY OF PHOSPHORUS: CPT

## 2022-02-18 PROCEDURE — 82607 VITAMIN B-12: CPT

## 2022-02-18 PROCEDURE — 82746 ASSAY OF FOLIC ACID SERUM: CPT

## 2022-02-18 PROCEDURE — 80307 DRUG TEST PRSMV CHEM ANLYZR: CPT

## 2022-02-18 PROCEDURE — 85014 HEMATOCRIT: CPT

## 2022-02-18 PROCEDURE — 74176 CT ABD & PELVIS W/O CONTRAST: CPT | Mod: MG

## 2022-02-18 PROCEDURE — 99239 HOSP IP/OBS DSCHRG MGMT >30: CPT

## 2022-02-18 PROCEDURE — 85018 HEMOGLOBIN: CPT

## 2022-02-18 PROCEDURE — 86850 RBC ANTIBODY SCREEN: CPT

## 2022-02-18 PROCEDURE — 83735 ASSAY OF MAGNESIUM: CPT

## 2022-02-18 PROCEDURE — 85027 COMPLETE CBC AUTOMATED: CPT

## 2022-02-18 PROCEDURE — U0005: CPT

## 2022-02-18 PROCEDURE — 82330 ASSAY OF CALCIUM: CPT

## 2022-02-18 PROCEDURE — 85610 PROTHROMBIN TIME: CPT

## 2022-02-18 PROCEDURE — 82550 ASSAY OF CK (CPK): CPT

## 2022-02-18 PROCEDURE — U0003: CPT

## 2022-02-18 PROCEDURE — 96361 HYDRATE IV INFUSION ADD-ON: CPT

## 2022-02-18 PROCEDURE — 82947 ASSAY GLUCOSE BLOOD QUANT: CPT

## 2022-02-18 PROCEDURE — 83690 ASSAY OF LIPASE: CPT

## 2022-02-18 PROCEDURE — 82435 ASSAY OF BLOOD CHLORIDE: CPT

## 2022-02-18 PROCEDURE — 99291 CRITICAL CARE FIRST HOUR: CPT | Mod: 25

## 2022-02-18 PROCEDURE — 86900 BLOOD TYPING SEROLOGIC ABO: CPT

## 2022-02-18 PROCEDURE — 82009 KETONE BODYS QUAL: CPT

## 2022-02-18 PROCEDURE — 96374 THER/PROPH/DIAG INJ IV PUSH: CPT

## 2022-02-18 RX ORDER — SUCRALFATE 1 G
1 TABLET ORAL
Qty: 120 | Refills: 0
Start: 2022-02-18 | End: 2022-03-19

## 2022-02-18 RX ORDER — PANTOPRAZOLE SODIUM 20 MG/1
1 TABLET, DELAYED RELEASE ORAL
Qty: 60 | Refills: 0
Start: 2022-02-18 | End: 2022-03-19

## 2022-02-18 RX ORDER — POTASSIUM CHLORIDE 20 MEQ
20 PACKET (EA) ORAL ONCE
Refills: 0 | Status: COMPLETED | OUTPATIENT
Start: 2022-02-18 | End: 2022-02-18

## 2022-02-18 RX ORDER — LOSARTAN POTASSIUM 100 MG/1
1 TABLET, FILM COATED ORAL
Qty: 30 | Refills: 0
Start: 2022-02-18 | End: 2022-03-19

## 2022-02-18 RX ORDER — INSULIN LISPRO 100/ML
5 VIAL (ML) SUBCUTANEOUS
Qty: 1 | Refills: 0
Start: 2022-02-18 | End: 2022-03-19

## 2022-02-18 RX ORDER — ENOXAPARIN SODIUM 100 MG/ML
20 INJECTION SUBCUTANEOUS
Qty: 1 | Refills: 0
Start: 2022-02-18 | End: 2022-03-19

## 2022-02-18 RX ORDER — METOPROLOL TARTRATE 50 MG
1 TABLET ORAL
Qty: 60 | Refills: 0
Start: 2022-02-18 | End: 2022-03-19

## 2022-02-18 RX ADMIN — PANTOPRAZOLE SODIUM 40 MILLIGRAM(S): 20 TABLET, DELAYED RELEASE ORAL at 05:24

## 2022-02-18 RX ADMIN — Medication 20 MILLIEQUIVALENT(S): at 11:03

## 2022-02-18 RX ADMIN — Medication 12.5 MILLIGRAM(S): at 05:25

## 2022-02-18 RX ADMIN — APIXABAN 5 MILLIGRAM(S): 2.5 TABLET, FILM COATED ORAL at 05:25

## 2022-02-18 RX ADMIN — Medication 1 GRAM(S): at 11:04

## 2022-02-18 RX ADMIN — Medication 5 MILLIGRAM(S): at 11:04

## 2022-02-18 RX ADMIN — SODIUM CHLORIDE 85 MILLILITER(S): 9 INJECTION, SOLUTION INTRAVENOUS at 05:25

## 2022-02-18 RX ADMIN — Medication 2 PACKET(S): at 05:25

## 2022-02-18 RX ADMIN — Medication 4: at 13:27

## 2022-02-18 RX ADMIN — Medication 100 MILLIGRAM(S): at 13:30

## 2022-02-18 RX ADMIN — Medication 1 GRAM(S): at 05:25

## 2022-02-18 RX ADMIN — Medication 1 MILLIGRAM(S): at 11:05

## 2022-02-18 RX ADMIN — MAGNESIUM OXIDE 400 MG ORAL TABLET 400 MILLIGRAM(S): 241.3 TABLET ORAL at 11:06

## 2022-02-18 NOTE — DISCHARGE NOTE PROVIDER - PROVIDER TOKENS
PROVIDER:[TOKEN:[[id number]],FOLLOWUP:[1 week]],FREE:[LAST:[PCP],PHONE:[(   )    -],FAX:[(   )    -],FOLLOWUP:[1 week]],PROVIDER:[TOKEN:[[id number]],FOLLOWUP:[1 week]],PROVIDER:[TOKEN:[[id number]],FOLLOWUP:[1 week]]

## 2022-02-18 NOTE — DISCHARGE NOTE NURSING/CASE MANAGEMENT/SOCIAL WORK - NSDCVIVACCINE_GEN_ALL_CORE_FT
Tdap; 29-Oct-2019 23:07; Odessa Landis (RN); [employer]; [id number] (Exp. Date: 22-Oct-2021); IntraMuscular; Deltoid Left.; 0.5 milliLiter(s); VIS (VIS Published: 09-May-2013, VIS Presented: 29-Oct-2019);

## 2022-02-18 NOTE — DISCHARGE NOTE PROVIDER - HOSPITAL COURSE
30 y/o M with  EtOH abuse (4-6 beers every other day), DM type 2, HTN, a fib on eliquis, and NICM (EF 40-45%) admitted for DKA, ,SYED ,alcohol withdrawal, abd pain. Pt was admitted to MICU service and was initiated on insulin gtt and was later transitioned to SC insulin. Blood glucose levels have improved. Pt was also admitted for etoh withdrawl 2/2 chronic etoh abuse. CIWA score has since improved and pt educated on cessation of etoh and supportive measures. Abdominal pain likely 2/2 gastritis d/t etoh abuse. Pt initiated on PPI and carafate with belkis. Pt denies any further abdominal pain at this time. Pt is now medically stable for DC home with endocrinology follow up in 1 week.      6- h/o CM and atrial fib   cont anticoagulation   metoprolol low dose   off losartan due to SYED, low BP   resume   Old records reviewed ( seen by [facility] cardio DR Phipps and Dr Chavez last year   will give him referal and instruction on discharge to follow up )   He states that he had not follow up with cardiologist outpatient )     DVt propylaxis on eliquis   ambulate OOB     discharge in few days likely    services on board    32 y/o M with  EtOH abuse (4-6 beers every other day), DM type 2, HTN, a fib on eliquis, and NICM (EF 40-45%) admitted for DKA, ,SYED ,alcohol withdrawal, abd pain. Pt was admitted to MICU service and was initiated on insulin gtt and was later transitioned to SC insulin. Blood glucose levels have improved. Pt was also admitted for etoh withdrawl 2/2 chronic etoh abuse. CIWA score has since improved and pt educated on cessation of etoh and supportive measures. Abdominal pain likely 2/2 gastritis d/t etoh abuse. Pt initiated on PPI and carafate with beliks. Pt denies any further abdominal pain at this time. Pt is now medically stable for DC home with endocrinology follow up in 1 week.      6- h/o CM and atrial fib   cont anticoagulation   metoprolol low dose   off losartan due to SYED, low BP   resume   Old records reviewed ( seen by [facility] cardio DR Phipps and Dr Chavez last year   will give him referal and instruction on discharge to follow up )   He states that he had not follow up with cardiologist outpatient )      32 y/o M with  EtOH abuse (4-6 beers every other day), DM type 2, HTN, a fib on eliquis, and NICM (EF 40-45%) admitted for DKA, ,SYED ,alcohol withdrawal, abd pain. Pt was admitted to MICU service and was initiated on insulin gtt and was later transitioned to SC insulin. Blood glucose levels have improved. Pt was also admitted for etoh withdrawl 2/2 chronic etoh abuse. CIWA score has since improved and pt educated on cessation of etoh and supportive measures. Abdominal pain likely 2/2 gastritis d/t etoh abuse. Pt initiated on PPI and carafate with benfit. Pt denies any further abdominal pain at this time. Pt is now medically stable for DC home with endocrinology follow up in 1 week.    All electrolyte abnormalities were monitored carefully and repleted as necessary during this hospitalization. At the time of discharge patient was hemodynamically stable and amenable to all terms of discharge. The patient has received verbal instructions from myself regarding discharge plans.     Vital Signs Last 24 Hrs  T(C): 37.2 (18 Feb 2022 10:26), Max: 37.2 (18 Feb 2022 10:26)  T(F): 99 (18 Feb 2022 10:26), Max: 99 (18 Feb 2022 10:26)  HR: 90 (18 Feb 2022 10:26) (90 - 99)  BP: 127/87 (18 Feb 2022 10:26) (114/70 - 127/87)  BP(mean): --  RR: 18 (18 Feb 2022 10:26) (18 - 18)  SpO2: 95% (18 Feb 2022 10:26) (93% - 97%) 30 y/o M with  EtOH abuse (4-6 beers every other day), DM type 2, HTN, a fib on eliquis, and NICM (EF 40-45%) admitted for DKA, ,SYED ,alcohol withdrawal, abd pain. Pt was admitted to MICU service and was initiated on insulin gtt and was later transitioned to SC insulin. Blood glucose levels have improved. Pt was also admitted for etoh withdrawl 2/2 chronic etoh abuse. CIWA score has since improved and pt educated on cessation of etoh and supportive measures. Abdominal pain likely 2/2 gastritis d/t etoh abuse. Pt initiated on PPI and carafate with benfit. Pt denies any further abdominal pain at this time. Pt is now medically stable for DC home with endocrinology follow up in 1 week.    All electrolyte abnormalities were monitored carefully and repleted as necessary during this hospitalization. At the time of discharge patient was hemodynamically stable and amenable to all terms of discharge. The patient has received verbal instructions from myself regarding discharge plans.

## 2022-02-18 NOTE — DISCHARGE NOTE PROVIDER - ATTENDING DISCHARGE PHYSICAL EXAMINATION:
Vital Signs Last 24 Hrs  T(C): 37.2 (18 Feb 2022 10:26), Max: 37.2 (18 Feb 2022 10:26)  T(F): 99 (18 Feb 2022 10:26), Max: 99 (18 Feb 2022 10:26)  HR: 90 (18 Feb 2022 10:26) (90 - 99)  BP: 127/87 (18 Feb 2022 10:26) (114/70 - 127/87)  RR: 18 (18 Feb 2022 10:26) (18 - 18)  SpO2: 95% (18 Feb 2022 10:26) (93% - 97%)    PHYSICAL EXAM:  Constitutional: No acute distress, alert and oriented by 3  HEENT: AT/NC, EOMI, PERRLA, Normal conjunctiva, no pharyngeal erythema, moist oral mucosa  Respiratory: CTA BL, equal breath sounds, no crackles or wheezing  Cardiovascular: RRR, no edema  Gastrointestinal: soft, Non-tender, Non-distended + Bowel sounds, no rebound or guarding  Musculoskeletal: No joint edema  Neurological: CN 2-12 grossly intact, no focal deficits  Skin: warm, dry and intact  Psychiatric: normal mood and affect

## 2022-02-18 NOTE — DISCHARGE NOTE PROVIDER - CARE PROVIDER_API CALL
Tye Winters (MD)  EndocrinologyMetabDiabetes; Internal Medicine  [address] [place] [address]  Phone: [phone]  Fax: [phone]  Follow Up Time: 1 week    PCP,   Phone: (   )    -  Fax: (   )    -  Follow Up Time: 1 week    Kathy Phipps)  Cardiac Electrophysiology; Cardiovascular Disease; Internal Medicine  [address] [place] [address]  Phone: [phone]  Fax: [phone]  Follow Up Time: 1 week    Darrin Chavez (DO)  Cardiovascular Disease; Internal Medicine  [address] [place] [id number]  Phone: [phone]  Fax: [phone]  Follow Up Time: 1 week

## 2022-02-18 NOTE — DISCHARGE NOTE NURSING/CASE MANAGEMENT/SOCIAL WORK - PATIENT PORTAL LINK FT
You can access the FollowMyHealth Patient Portal offered by [facility] by registering at the following website: [email or web address]. By joining [employer]’s FollowMyHealth portal, you will also be able to view your health information using other applications (apps) compatible with our system.

## 2022-02-18 NOTE — DISCHARGE NOTE PROVIDER - CARE PROVIDERS DIRECT ADDRESSES
,[email or web address],DirectAddress_Unknown,DirectAddress_Unknown,DirectAddress_Unknown

## 2022-02-18 NOTE — DISCHARGE NOTE PROVIDER - NSDCCPCAREPLAN_GEN_ALL_CORE_FT
PRINCIPAL DISCHARGE DIAGNOSIS  Diagnosis: DKA (diabetic ketoacidosis)  Assessment and Plan of Treatment: - Continue insulin regimen as directed  - Lantus 20U and Admelog  - Please check blood glucose levels at home  - Follow up with Endocrinology in 1 week      SECONDARY DISCHARGE DIAGNOSES  Diagnosis: ETOH abuse  Assessment and Plan of Treatment: - Please abstain from alcohol   - Supportive measures   - Follow up with PCP in 1 week    Diagnosis: Gastritis  Assessment and Plan of Treatment: - Secondary to alcohol abuse  - Continue with pantoprazole and carafate as directed   - Follow up with PCP in 1 week    Diagnosis: Afib  Assessment and Plan of Treatment: - Continue with eliquis   - Follow up with Dr. Phipps and Dr. Chavez in 1 week   - Follow up with PCP in 1 week     PRINCIPAL DISCHARGE DIAGNOSIS  Diagnosis: DKA (diabetic ketoacidosis)  Assessment and Plan of Treatment: - Continue insulin regimen as directed  - Lantus 20U and humalog 5U premeal as needed  - Please check blood glucose levels at home  - Follow up with Endocrinology in 1 week      SECONDARY DISCHARGE DIAGNOSES  Diagnosis: ETOH abuse  Assessment and Plan of Treatment: - Please abstain from alcohol   - Supportive measures   - Follow up with PCP in 1 week    Diagnosis: Gastritis  Assessment and Plan of Treatment: - Secondary to alcohol abuse  - Continue with pantoprazole and carafate as directed   - Follow up with PCP in 1 week    Diagnosis: Afib  Assessment and Plan of Treatment: - Continue with eliquis and metoprolol   - Follow up with Dr. Phipps and Dr. Chavez in 1 week   - Follow up with PCP in 1 week     PRINCIPAL DISCHARGE DIAGNOSIS  Diagnosis: DKA (diabetic ketoacidosis)  Assessment and Plan of Treatment: - Continue insulin regimen as directed  - Lantus 20U at bedtime and humalog 5U premeal as needed  - Please check blood glucose levels at home  - Follow up with Endocrinology in 1 week      SECONDARY DISCHARGE DIAGNOSES  Diagnosis: ETOH abuse  Assessment and Plan of Treatment: - Please abstain from alcohol   - Supportive measures   - Follow up with PCP in 1 week    Diagnosis: Gastritis  Assessment and Plan of Treatment: - Secondary to alcohol abuse  - Continue with pantoprazole and carafate as directed   - Follow up with PCP in 1 week    Diagnosis: Afib  Assessment and Plan of Treatment: - Continue with eliquis and metoprolol   - Follow up with Dr. Phipps and Dr. Chavez in 1 week   - Follow up with PCP in 1 week

## 2022-03-03 LAB
GLUCOSE BLDC GLUCOMTR-MCNC: 278 MG/DL — HIGH (ref 70–99)
GLUCOSE BLDC GLUCOMTR-MCNC: 313 MG/DL — HIGH (ref 70–99)

## 2022-03-16 NOTE — DISCHARGE NOTE PROVIDER - NSDCMRMEDTOKEN_GEN_ALL_CORE_FT
trop elevated to 116 with elevated pro-BNP 26k, likely elevated iso CKD and known LV diastolic dysfunction  - low suspicion for ACS at this time, EKG w/o evidence of ischemia. No CP  - low suspicion for ADHF. no SOB, lungs CTAB.  CXR clear   - TTE demonstrated reduced ejection from 30-35%  - HF consulted, appreciate recs regarding BP management   - Per anesthesia, should not interfere with kidney biopsy apixaban 5 mg oral tablet: 1 tab(s) orally every 12 hours  folic acid 1 mg oral tablet: 1 tab(s) orally once a day  HumaLOG KwikPen 100 units/mL injectable solution: 5 unit(s) injectable 3 times a day (with meals)   Lantus Solostar Pen 100 units/mL subcutaneous solution: 15 unit(s) subcutaneous once a day (at bedtime)   losartan 25 mg oral tablet: 1 tab(s) orally once a day  metoprolol tartrate 25 mg oral tablet: 1 tab(s) orally 2 times a day  Multiple Vitamins oral tablet: 1 tab(s) orally once a day  pantoprazole 40 mg oral delayed release tablet: 1 tab(s) orally once a day (before a meal)  thiamine 100 mg oral tablet: 1 tab(s) orally once a day   apixaban 5 mg oral tablet: 1 tab(s) orally every 12 hours  folic acid 1 mg oral tablet: 1 tab(s) orally once a day  HumaLOG KwikPen 100 units/mL injectable solution: 5 unit(s) injectable 3 times a day (with meals) PRN  Lantus Solostar Pen 100 units/mL subcutaneous solution: 20 unit(s) subcutaneous once a day (at bedtime)   losartan 25 mg oral tablet: 1 tab(s) orally once a day  metoprolol tartrate 25 mg oral tablet: 1 tab(s) orally 2 times a day  Multiple Vitamins oral tablet: 1 tab(s) orally once a day  pantoprazole 40 mg oral delayed release tablet: 1 tab(s) orally 2 times a day   sucralfate 1 g oral tablet: 1 tab(s) orally 4 times a day  thiamine 100 mg oral tablet: 1 tab(s) orally once a day

## 2022-03-30 NOTE — ED PROVIDER NOTE - IV ALTEPLASE DOOR HIDDEN
This Rx Request does not qualify for assessment with the OR   Please review protocol details and the Care Due Message for extra clinical information    Reasons Rx Request may be deferred:  Labs/Vitals overdue  Patient has been seen in the ED/Hospital since the last PCP visit    Note composed:5:34 PM 03/30/2022             show

## 2022-04-08 NOTE — ED ADULT NURSE NOTE - CHIEF COMPLAINT QUOTE
Pt BIBA, hx of ETOH abuse c/o alcohol intoxication from drinking heavily with his friends. Denies any drug use. Denies any falls or injuries. Pt  undressed and placed in yellow gown. noninvasive blood pressure monitor

## 2022-07-08 ENCOUNTER — INPATIENT (INPATIENT)
Facility: HOSPITAL | Age: 32
LOS: 4 days | Discharge: HOME | DRG: 638 | End: 2022-07-13
Attending: INTERNAL MEDICINE | Admitting: STUDENT IN AN ORGANIZED HEALTH CARE EDUCATION/TRAINING PROGRAM
Payer: COMMERCIAL

## 2022-07-08 VITALS
WEIGHT: 139.99 LBS | TEMPERATURE: 98 F | HEART RATE: 99 BPM | RESPIRATION RATE: 16 BRPM | HEIGHT: 67 IN | SYSTOLIC BLOOD PRESSURE: 152 MMHG | DIASTOLIC BLOOD PRESSURE: 104 MMHG | OXYGEN SATURATION: 99 %

## 2022-07-08 LAB
ALBUMIN SERPL ELPH-MCNC: 4.9 G/DL — SIGNIFICANT CHANGE UP (ref 3.3–5.2)
ALP SERPL-CCNC: 179 U/L — HIGH (ref 40–120)
ALT FLD-CCNC: 58 U/L — HIGH
ANION GAP SERPL CALC-SCNC: 24 MMOL/L — HIGH (ref 5–17)
AST SERPL-CCNC: 131 U/L — HIGH
BASOPHILS # BLD AUTO: 0.03 K/UL — SIGNIFICANT CHANGE UP (ref 0–0.2)
BASOPHILS NFR BLD AUTO: 0.8 % — SIGNIFICANT CHANGE UP (ref 0–2)
BILIRUB SERPL-MCNC: 2.8 MG/DL — HIGH (ref 0.4–2)
BUN SERPL-MCNC: 6.9 MG/DL — LOW (ref 8–20)
CALCIUM SERPL-MCNC: 9.7 MG/DL — SIGNIFICANT CHANGE UP (ref 8.6–10.2)
CHLORIDE SERPL-SCNC: 89 MMOL/L — LOW (ref 98–107)
CO2 SERPL-SCNC: 14 MMOL/L — LOW (ref 22–29)
CREAT SERPL-MCNC: 0.54 MG/DL — SIGNIFICANT CHANGE UP (ref 0.5–1.3)
EGFR: 136 ML/MIN/1.73M2 — SIGNIFICANT CHANGE UP
EOSINOPHIL # BLD AUTO: 0.02 K/UL — SIGNIFICANT CHANGE UP (ref 0–0.5)
EOSINOPHIL NFR BLD AUTO: 0.5 % — SIGNIFICANT CHANGE UP (ref 0–6)
ETHANOL SERPL-MCNC: <10 MG/DL — SIGNIFICANT CHANGE UP (ref 0–9)
GLUCOSE SERPL-MCNC: 362 MG/DL — HIGH (ref 70–99)
HCT VFR BLD CALC: 36.8 % — LOW (ref 39–50)
HGB BLD-MCNC: 12.3 G/DL — LOW (ref 13–17)
IMM GRANULOCYTES NFR BLD AUTO: 0.8 % — SIGNIFICANT CHANGE UP (ref 0–1.5)
LACTATE BLDV-MCNC: 3.8 MMOL/L — HIGH (ref 0.5–2)
LIDOCAIN IGE QN: 5 U/L — LOW (ref 22–51)
LYMPHOCYTES # BLD AUTO: 1.11 K/UL — SIGNIFICANT CHANGE UP (ref 1–3.3)
LYMPHOCYTES # BLD AUTO: 29.1 % — SIGNIFICANT CHANGE UP (ref 13–44)
MAGNESIUM SERPL-MCNC: 1.8 MG/DL — SIGNIFICANT CHANGE UP (ref 1.6–2.6)
MCHC RBC-ENTMCNC: 31.2 PG — SIGNIFICANT CHANGE UP (ref 27–34)
MCHC RBC-ENTMCNC: 33.4 GM/DL — SIGNIFICANT CHANGE UP (ref 32–36)
MCV RBC AUTO: 93.4 FL — SIGNIFICANT CHANGE UP (ref 80–100)
MONOCYTES # BLD AUTO: 0.62 K/UL — SIGNIFICANT CHANGE UP (ref 0–0.9)
MONOCYTES NFR BLD AUTO: 16.2 % — HIGH (ref 2–14)
NEUTROPHILS # BLD AUTO: 2.01 K/UL — SIGNIFICANT CHANGE UP (ref 1.8–7.4)
NEUTROPHILS NFR BLD AUTO: 52.6 % — SIGNIFICANT CHANGE UP (ref 43–77)
PHOSPHATE SERPL-MCNC: 3 MG/DL — SIGNIFICANT CHANGE UP (ref 2.4–4.7)
PLATELET # BLD AUTO: 72 K/UL — LOW (ref 150–400)
POTASSIUM SERPL-MCNC: 4.8 MMOL/L — SIGNIFICANT CHANGE UP (ref 3.5–5.3)
POTASSIUM SERPL-SCNC: 4.8 MMOL/L — SIGNIFICANT CHANGE UP (ref 3.5–5.3)
PROT SERPL-MCNC: 7.7 G/DL — SIGNIFICANT CHANGE UP (ref 6.6–8.7)
RBC # BLD: 3.94 M/UL — LOW (ref 4.2–5.8)
RBC # FLD: 15.9 % — HIGH (ref 10.3–14.5)
SODIUM SERPL-SCNC: 127 MMOL/L — LOW (ref 135–145)
WBC # BLD: 3.82 K/UL — SIGNIFICANT CHANGE UP (ref 3.8–10.5)
WBC # FLD AUTO: 3.82 K/UL — SIGNIFICANT CHANGE UP (ref 3.8–10.5)

## 2022-07-08 PROCEDURE — 99291 CRITICAL CARE FIRST HOUR: CPT

## 2022-07-08 RX ORDER — INSULIN HUMAN 100 [IU]/ML
2 INJECTION, SOLUTION SUBCUTANEOUS
Qty: 100 | Refills: 0 | Status: DISCONTINUED | OUTPATIENT
Start: 2022-07-08 | End: 2022-07-09

## 2022-07-08 RX ORDER — SODIUM CHLORIDE 9 MG/ML
2000 INJECTION, SOLUTION INTRAVENOUS ONCE
Refills: 0 | Status: COMPLETED | OUTPATIENT
Start: 2022-07-08 | End: 2022-07-08

## 2022-07-08 RX ORDER — MIDAZOLAM HYDROCHLORIDE 1 MG/ML
2 INJECTION, SOLUTION INTRAMUSCULAR; INTRAVENOUS ONCE
Refills: 0 | Status: DISCONTINUED | OUTPATIENT
Start: 2022-07-08 | End: 2022-07-08

## 2022-07-08 RX ORDER — CHLORHEXIDINE GLUCONATE 213 G/1000ML
1 SOLUTION TOPICAL
Refills: 0 | Status: DISCONTINUED | OUTPATIENT
Start: 2022-07-08 | End: 2022-07-09

## 2022-07-08 RX ORDER — THIAMINE MONONITRATE (VIT B1) 100 MG
100 TABLET ORAL ONCE
Refills: 0 | Status: COMPLETED | OUTPATIENT
Start: 2022-07-08 | End: 2022-07-08

## 2022-07-08 RX ORDER — SODIUM CHLORIDE 9 MG/ML
1000 INJECTION, SOLUTION INTRAVENOUS
Refills: 0 | Status: DISCONTINUED | OUTPATIENT
Start: 2022-07-08 | End: 2022-07-09

## 2022-07-08 RX ORDER — PHENOBARBITAL 60 MG
130 TABLET ORAL ONCE
Refills: 0 | Status: DISCONTINUED | OUTPATIENT
Start: 2022-07-08 | End: 2022-07-08

## 2022-07-08 RX ORDER — FOLIC ACID 0.8 MG
1 TABLET ORAL ONCE
Refills: 0 | Status: COMPLETED | OUTPATIENT
Start: 2022-07-08 | End: 2022-07-08

## 2022-07-08 RX ORDER — DIAZEPAM 5 MG
20 TABLET ORAL EVERY 4 HOURS
Refills: 0 | Status: DISCONTINUED | OUTPATIENT
Start: 2022-07-08 | End: 2022-07-09

## 2022-07-08 RX ORDER — DIAZEPAM 5 MG
5 TABLET ORAL ONCE
Refills: 0 | Status: DISCONTINUED | OUTPATIENT
Start: 2022-07-08 | End: 2022-07-08

## 2022-07-08 RX ORDER — DIAZEPAM 5 MG
10 TABLET ORAL EVERY 6 HOURS
Refills: 0 | Status: DISCONTINUED | OUTPATIENT
Start: 2022-07-08 | End: 2022-07-09

## 2022-07-08 RX ORDER — INSULIN HUMAN 100 [IU]/ML
10 INJECTION, SOLUTION SUBCUTANEOUS ONCE
Refills: 0 | Status: COMPLETED | OUTPATIENT
Start: 2022-07-08 | End: 2022-07-08

## 2022-07-08 RX ADMIN — Medication 1 MILLIGRAM(S): at 17:59

## 2022-07-08 RX ADMIN — MIDAZOLAM HYDROCHLORIDE 2 MILLIGRAM(S): 1 INJECTION, SOLUTION INTRAMUSCULAR; INTRAVENOUS at 17:19

## 2022-07-08 RX ADMIN — Medication 5 MILLIGRAM(S): at 22:29

## 2022-07-08 RX ADMIN — Medication 100 MILLIGRAM(S): at 18:00

## 2022-07-08 RX ADMIN — INSULIN HUMAN 10 UNIT(S): 100 INJECTION, SOLUTION SUBCUTANEOUS at 22:29

## 2022-07-08 RX ADMIN — Medication 130 MILLIGRAM(S): at 17:24

## 2022-07-08 RX ADMIN — SODIUM CHLORIDE 2000 MILLILITER(S): 9 INJECTION, SOLUTION INTRAVENOUS at 23:33

## 2022-07-08 NOTE — H&P ADULT - ASSESSMENT
30 yo m pmhx IDDM, HTN, Afib on Eliquis and ETOH abuse (~4 x 24oz cans beer daily) admitted with     1. DKA  2. ETOH withdrawal  3. s/p withdrawal seizure    NEURO: ETOH Withdrawal, Valium 10mg IVP q6hr with 20mg ivp q4 prn for CIWA >8. CIWA monitoring.  multivit, thiamine, folic acid  CV: Afib/HTN on metoprolol with hold parameters.  Readd losartan as bp allows  RESP: No active issues  RENAL: Monitor lytes, replace as needed.    GI: NPO, ice chips/water  ENDO: DKA, insulin gtt, poct q1hr, q4-6hr bmp, d5LR when poct <250  ID: No active infectious process, monitor off abx therapy   HEME: Eliquis for ac   DISPO: Full code.

## 2022-07-08 NOTE — H&P ADULT - HISTORY OF PRESENT ILLNESS
32 yo m pmhx IDDM, HTN, Afib on Eliquis and ETOH abuse (~4 x 24oz cans beer daily) biba from home s/p witnessed seizure.  Per patient over the last 3 days he has been nauseous, unable to keep anything down.  Patient endorses he stopped drinking yesterday around 1900 and hasn't had any alcohol since.  Today patient with his day watching tv when he had a witnessed seizure and woke up surround by EMS.  In ED patient a&O but tremulous, etoh level <10, labs significant for bicarb 14, AG 24, poct 362, AST//58, lactate 3.8.  Patient given phenobarb, valium and versed in ED for tremors, started on insulin gtt for glycemic control.  Patient admitted to MICU.

## 2022-07-08 NOTE — ED PROVIDER NOTE - CLINICAL SUMMARY MEDICAL DECISION MAKING FREE TEXT BOX
Patient presents with EtOH withdrawal seizure. Lab values c/w DKA.  insulin drip started.  d/w ICU and will admit.

## 2022-07-08 NOTE — ED ADULT NURSE NOTE - OBJECTIVE STATEMENT
Pt c/o seizure secondary to alcohol withdrawal.  States he has been an everyday drinker for years.  Last drink yesterday.  No prior hx of seizures.  Denies hallucinations, headache, nausea.

## 2022-07-08 NOTE — H&P ADULT - NSHPPHYSICALEXAM_GEN_ALL_CORE
GENERAL: Pleasant adult male, sitting in bed, +tremulous  HEENT: nc/at, pupils 3mm equal round and reactive  CV: sinus tach, no murmurs appreciated  RESP: symmetrical thorax expansion upon respiration.  cta b/l  ABD: soft, nontender, nondistended, normoactive bs, no masses appreciated  : wnl  MSK: wnl  EXT: no edema  SKIN: warm, +tattoos  NEURO: Alert, oriented, tremulous

## 2022-07-08 NOTE — ED PROVIDER NOTE - PHYSICAL EXAMINATION
Gen: Alert, NAD  Head: NC, AT, scleral icterus, PERRL, EOMI, normal lids/  ENT: normal hearing, patent oropharynx without erythema/exudate, uvula midline  Neck: +supple, no tenderness/meningismus/JVD, +Trachea midline  Pulm: Bilateral BS, normal resp effort, no wheeze/stridor/retractions  CV: RRR, no R/G, +dist pulses  Abd: soft, NT/ND, +BS, no hepatosplenomegaly  Mskel: no edema/erythema/cyanosis  Skin: no rash  Neuro: AAOx3, no gross sensory/motor deficits,

## 2022-07-08 NOTE — ED PROVIDER NOTE - OBJECTIVE STATEMENT
Pertinent PMH/PSH/FHx/SHx and Review of Systems contained within:  Patient presents to the ED for EtOH withdrawal with seizure.  PMH of EtOH with IDDM2 and HTN.  Has had withdrawal seizures int he past.  Otherwise baseline.  no trauma.  Non toxic.  No aggravating or relieving factors. No other pertinent PMH.  No other pertinent PSH.  No other pertinent FHx.  Patient denies tobacco/illicit substance use. No fever/chills, No photophobia/eye pain/changes in vision, No ear pain/sore throat/dysphagia, No chest pain/palpitations, no SOB/cough/wheeze/stridor, No abdominal pain, No N/V/D, no dysuria/frequency/discharge, No neck/back pain, no rash, no changes in neurological status/function.

## 2022-07-09 DIAGNOSIS — E11.10 TYPE 2 DIABETES MELLITUS WITH KETOACIDOSIS WITHOUT COMA: ICD-10-CM

## 2022-07-09 LAB
ANION GAP SERPL CALC-SCNC: 14 MMOL/L — SIGNIFICANT CHANGE UP (ref 5–17)
ANION GAP SERPL CALC-SCNC: 15 MMOL/L — SIGNIFICANT CHANGE UP (ref 5–17)
B-OH-BUTYR SERPL-SCNC: 6.1 MMOL/L — HIGH
BASE EXCESS BLDV CALC-SCNC: -7.4 MMOL/L — LOW (ref -2–3)
BUN SERPL-MCNC: 5.3 MG/DL — LOW (ref 8–20)
BUN SERPL-MCNC: 5.7 MG/DL — LOW (ref 8–20)
CA-I SERPL-SCNC: 1.28 MMOL/L — SIGNIFICANT CHANGE UP (ref 1.15–1.33)
CALCIUM SERPL-MCNC: 9.5 MG/DL — SIGNIFICANT CHANGE UP (ref 8.6–10.2)
CALCIUM SERPL-MCNC: 9.5 MG/DL — SIGNIFICANT CHANGE UP (ref 8.6–10.2)
CHLORIDE BLDV-SCNC: 98 MMOL/L — SIGNIFICANT CHANGE UP (ref 98–107)
CHLORIDE SERPL-SCNC: 95 MMOL/L — LOW (ref 98–107)
CHLORIDE SERPL-SCNC: 99 MMOL/L — SIGNIFICANT CHANGE UP (ref 98–107)
CO2 SERPL-SCNC: 21 MMOL/L — LOW (ref 22–29)
CO2 SERPL-SCNC: 24 MMOL/L — SIGNIFICANT CHANGE UP (ref 22–29)
CREAT SERPL-MCNC: 0.39 MG/DL — LOW (ref 0.5–1.3)
CREAT SERPL-MCNC: 0.4 MG/DL — LOW (ref 0.5–1.3)
EGFR: 149 ML/MIN/1.73M2 — SIGNIFICANT CHANGE UP
EGFR: 150 ML/MIN/1.73M2 — SIGNIFICANT CHANGE UP
GAS PNL BLDV: 132 MMOL/L — LOW (ref 136–145)
GAS PNL BLDV: SIGNIFICANT CHANGE UP
GAS PNL BLDV: SIGNIFICANT CHANGE UP
GLUCOSE BLDC GLUCOMTR-MCNC: 106 MG/DL — HIGH (ref 70–99)
GLUCOSE BLDC GLUCOMTR-MCNC: 123 MG/DL — HIGH (ref 70–99)
GLUCOSE BLDC GLUCOMTR-MCNC: 124 MG/DL — HIGH (ref 70–99)
GLUCOSE BLDC GLUCOMTR-MCNC: 128 MG/DL — HIGH (ref 70–99)
GLUCOSE BLDC GLUCOMTR-MCNC: 135 MG/DL — HIGH (ref 70–99)
GLUCOSE BLDC GLUCOMTR-MCNC: 140 MG/DL — HIGH (ref 70–99)
GLUCOSE BLDC GLUCOMTR-MCNC: 247 MG/DL — HIGH (ref 70–99)
GLUCOSE BLDC GLUCOMTR-MCNC: 286 MG/DL — HIGH (ref 70–99)
GLUCOSE BLDC GLUCOMTR-MCNC: >530 MG/DL — CRITICAL HIGH (ref 70–99)
GLUCOSE BLDV-MCNC: 174 MG/DL — HIGH (ref 70–99)
GLUCOSE SERPL-MCNC: 92 MG/DL — SIGNIFICANT CHANGE UP (ref 70–99)
GLUCOSE SERPL-MCNC: 96 MG/DL — SIGNIFICANT CHANGE UP (ref 70–99)
HCO3 BLDV-SCNC: 19 MMOL/L — LOW (ref 22–29)
HCT VFR BLDA CALC: 39 % — SIGNIFICANT CHANGE UP
HGB BLD CALC-MCNC: 13.1 G/DL — SIGNIFICANT CHANGE UP (ref 12.6–17.4)
HIV 1 & 2 AB SERPL IA.RAPID: SIGNIFICANT CHANGE UP
LACTATE BLDV-MCNC: 3 MMOL/L — HIGH (ref 0.5–2)
LACTATE SERPL-SCNC: 2.3 MMOL/L — HIGH (ref 0.5–2)
MAGNESIUM SERPL-MCNC: 1.6 MG/DL — SIGNIFICANT CHANGE UP (ref 1.6–2.6)
MAGNESIUM SERPL-MCNC: 1.7 MG/DL — SIGNIFICANT CHANGE UP (ref 1.6–2.6)
PCO2 BLDV: 40 MMHG — LOW (ref 42–55)
PH BLDV: 7.29 — LOW (ref 7.32–7.43)
PHOSPHATE SERPL-MCNC: 2.1 MG/DL — LOW (ref 2.4–4.7)
PHOSPHATE SERPL-MCNC: 2.9 MG/DL — SIGNIFICANT CHANGE UP (ref 2.4–4.7)
PO2 BLDV: 172 MMHG — HIGH (ref 25–45)
POTASSIUM BLDV-SCNC: 3.7 MMOL/L — SIGNIFICANT CHANGE UP (ref 3.5–5.1)
POTASSIUM SERPL-MCNC: 3.6 MMOL/L — SIGNIFICANT CHANGE UP (ref 3.5–5.3)
POTASSIUM SERPL-MCNC: 3.8 MMOL/L — SIGNIFICANT CHANGE UP (ref 3.5–5.3)
POTASSIUM SERPL-SCNC: 3.6 MMOL/L — SIGNIFICANT CHANGE UP (ref 3.5–5.3)
POTASSIUM SERPL-SCNC: 3.8 MMOL/L — SIGNIFICANT CHANGE UP (ref 3.5–5.3)
SAO2 % BLDV: 100 % — SIGNIFICANT CHANGE UP
SARS-COV-2 RNA SPEC QL NAA+PROBE: SIGNIFICANT CHANGE UP
SODIUM SERPL-SCNC: 131 MMOL/L — LOW (ref 135–145)
SODIUM SERPL-SCNC: 137 MMOL/L — SIGNIFICANT CHANGE UP (ref 135–145)

## 2022-07-09 PROCEDURE — 99233 SBSQ HOSP IP/OBS HIGH 50: CPT

## 2022-07-09 PROCEDURE — 70450 CT HEAD/BRAIN W/O DYE: CPT | Mod: 26

## 2022-07-09 PROCEDURE — 99233 SBSQ HOSP IP/OBS HIGH 50: CPT | Mod: GC

## 2022-07-09 RX ORDER — INSULIN GLARGINE 100 [IU]/ML
10 INJECTION, SOLUTION SUBCUTANEOUS AT BEDTIME
Refills: 0 | Status: DISCONTINUED | OUTPATIENT
Start: 2022-07-09 | End: 2022-07-12

## 2022-07-09 RX ORDER — SODIUM CHLORIDE 9 MG/ML
1000 INJECTION, SOLUTION INTRAVENOUS
Refills: 0 | Status: DISCONTINUED | OUTPATIENT
Start: 2022-07-09 | End: 2022-07-09

## 2022-07-09 RX ORDER — SODIUM CHLORIDE 9 MG/ML
1000 INJECTION, SOLUTION INTRAVENOUS
Refills: 0 | Status: DISCONTINUED | OUTPATIENT
Start: 2022-07-09 | End: 2022-07-13

## 2022-07-09 RX ORDER — MAGNESIUM SULFATE 500 MG/ML
2 VIAL (ML) INJECTION ONCE
Refills: 0 | Status: COMPLETED | OUTPATIENT
Start: 2022-07-09 | End: 2022-07-09

## 2022-07-09 RX ORDER — THIAMINE MONONITRATE (VIT B1) 100 MG
100 TABLET ORAL DAILY
Refills: 0 | Status: DISCONTINUED | OUTPATIENT
Start: 2022-07-09 | End: 2022-07-13

## 2022-07-09 RX ORDER — DEXTROSE 50 % IN WATER 50 %
15 SYRINGE (ML) INTRAVENOUS ONCE
Refills: 0 | Status: DISCONTINUED | OUTPATIENT
Start: 2022-07-09 | End: 2022-07-13

## 2022-07-09 RX ORDER — APIXABAN 2.5 MG/1
5 TABLET, FILM COATED ORAL ONCE
Refills: 0 | Status: COMPLETED | OUTPATIENT
Start: 2022-07-09 | End: 2022-07-09

## 2022-07-09 RX ORDER — FOLIC ACID 0.8 MG
1 TABLET ORAL DAILY
Refills: 0 | Status: DISCONTINUED | OUTPATIENT
Start: 2022-07-09 | End: 2022-07-09

## 2022-07-09 RX ORDER — INSULIN LISPRO 100/ML
10 VIAL (ML) SUBCUTANEOUS
Refills: 0 | Status: DISCONTINUED | OUTPATIENT
Start: 2022-07-09 | End: 2022-07-12

## 2022-07-09 RX ORDER — INSULIN GLARGINE 100 [IU]/ML
10 INJECTION, SOLUTION SUBCUTANEOUS ONCE
Refills: 0 | Status: COMPLETED | OUTPATIENT
Start: 2022-07-09 | End: 2022-07-09

## 2022-07-09 RX ORDER — DEXTROSE 50 % IN WATER 50 %
50 SYRINGE (ML) INTRAVENOUS ONCE
Refills: 0 | Status: DISCONTINUED | OUTPATIENT
Start: 2022-07-09 | End: 2022-07-09

## 2022-07-09 RX ORDER — INSULIN LISPRO 100/ML
3 VIAL (ML) SUBCUTANEOUS
Refills: 0 | Status: DISCONTINUED | OUTPATIENT
Start: 2022-07-09 | End: 2022-07-09

## 2022-07-09 RX ORDER — DIAZEPAM 5 MG
20 TABLET ORAL
Refills: 0 | Status: DISCONTINUED | OUTPATIENT
Start: 2022-07-09 | End: 2022-07-09

## 2022-07-09 RX ORDER — APIXABAN 2.5 MG/1
5 TABLET, FILM COATED ORAL EVERY 12 HOURS
Refills: 0 | Status: DISCONTINUED | OUTPATIENT
Start: 2022-07-09 | End: 2022-07-13

## 2022-07-09 RX ORDER — GLUCAGON INJECTION, SOLUTION 0.5 MG/.1ML
1 INJECTION, SOLUTION SUBCUTANEOUS ONCE
Refills: 0 | Status: DISCONTINUED | OUTPATIENT
Start: 2022-07-09 | End: 2022-07-13

## 2022-07-09 RX ORDER — FOLIC ACID 0.8 MG
1 TABLET ORAL DAILY
Refills: 0 | Status: DISCONTINUED | OUTPATIENT
Start: 2022-07-09 | End: 2022-07-13

## 2022-07-09 RX ORDER — LOSARTAN POTASSIUM 100 MG/1
25 TABLET, FILM COATED ORAL DAILY
Refills: 0 | Status: DISCONTINUED | OUTPATIENT
Start: 2022-07-09 | End: 2022-07-09

## 2022-07-09 RX ORDER — DEXTROSE 50 % IN WATER 50 %
12.5 SYRINGE (ML) INTRAVENOUS ONCE
Refills: 0 | Status: DISCONTINUED | OUTPATIENT
Start: 2022-07-09 | End: 2022-07-13

## 2022-07-09 RX ORDER — METOPROLOL TARTRATE 50 MG
12.5 TABLET ORAL
Refills: 0 | Status: DISCONTINUED | OUTPATIENT
Start: 2022-07-09 | End: 2022-07-13

## 2022-07-09 RX ORDER — DEXTROSE 50 % IN WATER 50 %
25 SYRINGE (ML) INTRAVENOUS ONCE
Refills: 0 | Status: DISCONTINUED | OUTPATIENT
Start: 2022-07-09 | End: 2022-07-13

## 2022-07-09 RX ORDER — DIAZEPAM 5 MG
10 TABLET ORAL
Refills: 0 | Status: DISCONTINUED | OUTPATIENT
Start: 2022-07-09 | End: 2022-07-10

## 2022-07-09 RX ORDER — POTASSIUM CHLORIDE 20 MEQ
40 PACKET (EA) ORAL EVERY 4 HOURS
Refills: 0 | Status: DISCONTINUED | OUTPATIENT
Start: 2022-07-09 | End: 2022-07-09

## 2022-07-09 RX ORDER — THIAMINE MONONITRATE (VIT B1) 100 MG
100 TABLET ORAL DAILY
Refills: 0 | Status: DISCONTINUED | OUTPATIENT
Start: 2022-07-09 | End: 2022-07-09

## 2022-07-09 RX ORDER — DEXTROSE 10 % IN WATER 10 %
250 INTRAVENOUS SOLUTION INTRAVENOUS
Refills: 0 | Status: DISCONTINUED | OUTPATIENT
Start: 2022-07-09 | End: 2022-07-09

## 2022-07-09 RX ORDER — INSULIN LISPRO 100/ML
VIAL (ML) SUBCUTANEOUS
Refills: 0 | Status: DISCONTINUED | OUTPATIENT
Start: 2022-07-09 | End: 2022-07-13

## 2022-07-09 RX ORDER — PANTOPRAZOLE SODIUM 20 MG/1
40 TABLET, DELAYED RELEASE ORAL
Refills: 0 | Status: DISCONTINUED | OUTPATIENT
Start: 2022-07-09 | End: 2022-07-13

## 2022-07-09 RX ADMIN — PANTOPRAZOLE SODIUM 40 MILLIGRAM(S): 20 TABLET, DELAYED RELEASE ORAL at 05:21

## 2022-07-09 RX ADMIN — INSULIN HUMAN 2 UNIT(S)/HR: 100 INJECTION, SOLUTION SUBCUTANEOUS at 01:04

## 2022-07-09 RX ADMIN — SODIUM CHLORIDE 75 MILLILITER(S): 9 INJECTION, SOLUTION INTRAVENOUS at 13:13

## 2022-07-09 RX ADMIN — Medication 30 MILLILITER(S): at 05:20

## 2022-07-09 RX ADMIN — Medication 4: at 12:18

## 2022-07-09 RX ADMIN — Medication 10 MILLIGRAM(S): at 05:19

## 2022-07-09 RX ADMIN — Medication 50 MILLIGRAM(S): at 12:18

## 2022-07-09 RX ADMIN — Medication 1000 MILLILITER(S): at 00:49

## 2022-07-09 RX ADMIN — SODIUM CHLORIDE 150 MILLILITER(S): 9 INJECTION, SOLUTION INTRAVENOUS at 02:12

## 2022-07-09 RX ADMIN — Medication 10 MILLIGRAM(S): at 01:03

## 2022-07-09 RX ADMIN — Medication 12.5 MILLIGRAM(S): at 18:09

## 2022-07-09 RX ADMIN — INSULIN GLARGINE 10 UNIT(S): 100 INJECTION, SOLUTION SUBCUTANEOUS at 22:50

## 2022-07-09 RX ADMIN — Medication 1 MILLIGRAM(S): at 05:29

## 2022-07-09 RX ADMIN — SODIUM CHLORIDE 200 MILLILITER(S): 9 INJECTION, SOLUTION INTRAVENOUS at 05:20

## 2022-07-09 RX ADMIN — APIXABAN 5 MILLIGRAM(S): 2.5 TABLET, FILM COATED ORAL at 05:21

## 2022-07-09 RX ADMIN — INSULIN GLARGINE 10 UNIT(S): 100 INJECTION, SOLUTION SUBCUTANEOUS at 05:18

## 2022-07-09 RX ADMIN — Medication 100 MILLIGRAM(S): at 18:09

## 2022-07-09 RX ADMIN — Medication 6: at 18:11

## 2022-07-09 RX ADMIN — Medication 1 TABLET(S): at 12:18

## 2022-07-09 RX ADMIN — Medication 50 MILLIGRAM(S): at 18:08

## 2022-07-09 RX ADMIN — Medication 10 UNIT(S): at 18:12

## 2022-07-09 RX ADMIN — Medication 12.5 MILLIGRAM(S): at 05:21

## 2022-07-09 RX ADMIN — APIXABAN 5 MILLIGRAM(S): 2.5 TABLET, FILM COATED ORAL at 18:09

## 2022-07-09 RX ADMIN — INSULIN HUMAN 2 UNIT(S)/HR: 100 INJECTION, SOLUTION SUBCUTANEOUS at 07:27

## 2022-07-09 NOTE — ED ADULT NURSE REASSESSMENT NOTE - NS ED NURSE REASSESS COMMENT FT1
. MICU VANESSA Patterson aware. increase d5LR to 200ml/hr and decrease insulin gtt to 1 unit per hour. repeat labs sent.

## 2022-07-09 NOTE — ED ADULT NURSE REASSESSMENT NOTE - NS ED NURSE REASSESS COMMENT FT1
Call placed to MICU PA, updated on BG of 135, order obtained to give the lantus and continue insulin for 2 additional hours and then stop. Continue with hourly checks of BG, notify provider of any changes. Pt given morning medications, VS recorded and placed in the EMR, pt not in any pain or distress. Pt expresses no questions or concerns at this time, pt is compliant with treatment plan and understands, educated about the treatment plan, pt states understanding, education deemed successful after teach back shows proficiency.

## 2022-07-09 NOTE — PROGRESS NOTE ADULT - CRITICAL CARE ATTENDING COMMENT
31M w/ PMH IDDM, HTN, AF on Eliquis and EtOH abuse presented on 07/08 after a witnessed seizure and was also in DKA at the time of admission.   As per pt he briefly stopped drinking a day back. Was aggressively resuscitated and placed on an insulin gtt overnight.  Bridged to Lantus at 5AM today and was off insulin infusion at 7AM. No complaints and tolerating orally. On IV Valium q6 and PRN dosing. CIWA as per protocol. Stable for downgrade

## 2022-07-09 NOTE — ED ADULT NURSE REASSESSMENT NOTE - NS ED NURSE REASSESS COMMENT FT1
Pt status improved. Pt downgraded to any bed. Pt resting comfortably, has no complaints at this time. Pt to be moved to HR awaiting admit bed.

## 2022-07-09 NOTE — ED ADULT NURSE REASSESSMENT NOTE - NS ED NURSE REASSESS COMMENT FT1
Contacted MICU PA and notified him of the patients BG, the stopping of the insulin and administration time of Lantus, order to be placed for LR at 150. Pt is A&O4, VS recorded in the EMR. Morning nurse Eric notified of the orders to be placed.

## 2022-07-09 NOTE — CHART NOTE - NSCHARTNOTEFT_GEN_A_CORE
30 yo m pmhx IDDM, HTN, Afib on Eliquis and ETOH abuse (~4 x 24oz cans beer daily) biba from home s/p witnessed seizure likely related to alcohol withdrawal. Found to be in DKA, placed on insulin drip. Transitioned to Lantus at 5am, off insulin drip at 7am. Restarted on premeal 10 Admelog, 10 Lantus at bedtime. On Valium for alcohol withdrawal seizure. Care transitioned to medicine team under Dr. MORIAH Renee.

## 2022-07-09 NOTE — ED ADULT NURSE REASSESSMENT NOTE - NS ED NURSE REASSESS COMMENT FT1
Received report from night RN. Pt ha sno medical complaints at this time. Pt resting comfortably. Pt awaiting admit bed, as per night RN pt being downgraded from ICU. Pt awaiting CT scan.

## 2022-07-10 LAB
A1C WITH ESTIMATED AVERAGE GLUCOSE RESULT: 9.9 % — HIGH (ref 4–5.6)
ESTIMATED AVERAGE GLUCOSE: 237 MG/DL — HIGH (ref 68–114)
GLUCOSE BLDC GLUCOMTR-MCNC: 293 MG/DL — HIGH (ref 70–99)
GLUCOSE BLDC GLUCOMTR-MCNC: 69 MG/DL — LOW (ref 70–99)
GLUCOSE BLDC GLUCOMTR-MCNC: 72 MG/DL — SIGNIFICANT CHANGE UP (ref 70–99)
GLUCOSE BLDC GLUCOMTR-MCNC: 92 MG/DL — SIGNIFICANT CHANGE UP (ref 70–99)
GLUCOSE BLDC GLUCOMTR-MCNC: 94 MG/DL — SIGNIFICANT CHANGE UP (ref 70–99)

## 2022-07-10 PROCEDURE — 36000 PLACE NEEDLE IN VEIN: CPT | Mod: RT,52,77

## 2022-07-10 PROCEDURE — 36000 PLACE NEEDLE IN VEIN: CPT

## 2022-07-10 PROCEDURE — 99233 SBSQ HOSP IP/OBS HIGH 50: CPT

## 2022-07-10 RX ORDER — DIAZEPAM 5 MG
10 TABLET ORAL
Refills: 0 | Status: DISCONTINUED | OUTPATIENT
Start: 2022-07-10 | End: 2022-07-13

## 2022-07-10 RX ADMIN — Medication 50 MILLIGRAM(S): at 00:44

## 2022-07-10 RX ADMIN — Medication 10 UNIT(S): at 17:04

## 2022-07-10 RX ADMIN — Medication 1 TABLET(S): at 12:00

## 2022-07-10 RX ADMIN — Medication 50 MILLIGRAM(S): at 05:21

## 2022-07-10 RX ADMIN — APIXABAN 5 MILLIGRAM(S): 2.5 TABLET, FILM COATED ORAL at 17:02

## 2022-07-10 RX ADMIN — Medication 10 UNIT(S): at 08:38

## 2022-07-10 RX ADMIN — Medication 10 UNIT(S): at 12:00

## 2022-07-10 RX ADMIN — Medication 6: at 17:03

## 2022-07-10 RX ADMIN — Medication 50 MILLIGRAM(S): at 13:35

## 2022-07-10 RX ADMIN — PANTOPRAZOLE SODIUM 40 MILLIGRAM(S): 20 TABLET, DELAYED RELEASE ORAL at 05:23

## 2022-07-10 RX ADMIN — Medication 12.5 MILLIGRAM(S): at 17:03

## 2022-07-10 RX ADMIN — Medication 100 MILLIGRAM(S): at 12:00

## 2022-07-10 RX ADMIN — APIXABAN 5 MILLIGRAM(S): 2.5 TABLET, FILM COATED ORAL at 05:23

## 2022-07-10 RX ADMIN — INSULIN GLARGINE 10 UNIT(S): 100 INJECTION, SOLUTION SUBCUTANEOUS at 22:38

## 2022-07-10 RX ADMIN — Medication 1 MILLIGRAM(S): at 12:00

## 2022-07-10 RX ADMIN — Medication 50 MILLIGRAM(S): at 21:53

## 2022-07-10 NOTE — PATIENT PROFILE ADULT - FALL HARM RISK - HARM RISK INTERVENTIONS

## 2022-07-10 NOTE — PROCEDURE NOTE - ADDITIONAL PROCEDURE DETAILS
US Guided 18g IV placed in left upper arm. +flash, good blood return, flushes easily. Patient tolerated procedure well with no immediate complications. Tourniquet removed, all sharps disposed of appropriately, bed rails raised and bed lowered.
unsuccessful despite using sono; second PA to try

## 2022-07-11 LAB
ALBUMIN SERPL ELPH-MCNC: 3.6 G/DL — SIGNIFICANT CHANGE UP (ref 3.3–5.2)
ALP SERPL-CCNC: 107 U/L — SIGNIFICANT CHANGE UP (ref 40–120)
ALT FLD-CCNC: 48 U/L — HIGH
ANION GAP SERPL CALC-SCNC: 12 MMOL/L — SIGNIFICANT CHANGE UP (ref 5–17)
APPEARANCE UR: CLEAR — SIGNIFICANT CHANGE UP
AST SERPL-CCNC: 81 U/L — HIGH
BILIRUB SERPL-MCNC: 1.1 MG/DL — SIGNIFICANT CHANGE UP (ref 0.4–2)
BILIRUB UR-MCNC: NEGATIVE — SIGNIFICANT CHANGE UP
BUN SERPL-MCNC: 3.7 MG/DL — LOW (ref 8–20)
CALCIUM SERPL-MCNC: 9.1 MG/DL — SIGNIFICANT CHANGE UP (ref 8.6–10.2)
CHLORIDE SERPL-SCNC: 106 MMOL/L — SIGNIFICANT CHANGE UP (ref 98–107)
CO2 SERPL-SCNC: 22 MMOL/L — SIGNIFICANT CHANGE UP (ref 22–29)
COLOR SPEC: YELLOW — SIGNIFICANT CHANGE UP
CREAT SERPL-MCNC: 0.46 MG/DL — LOW (ref 0.5–1.3)
DIFF PNL FLD: NEGATIVE — SIGNIFICANT CHANGE UP
EGFR: 143 ML/MIN/1.73M2 — SIGNIFICANT CHANGE UP
GLUCOSE BLDC GLUCOMTR-MCNC: 113 MG/DL — HIGH (ref 70–99)
GLUCOSE BLDC GLUCOMTR-MCNC: 137 MG/DL — HIGH (ref 70–99)
GLUCOSE BLDC GLUCOMTR-MCNC: 137 MG/DL — HIGH (ref 70–99)
GLUCOSE BLDC GLUCOMTR-MCNC: 171 MG/DL — HIGH (ref 70–99)
GLUCOSE BLDC GLUCOMTR-MCNC: 79 MG/DL — SIGNIFICANT CHANGE UP (ref 70–99)
GLUCOSE SERPL-MCNC: 71 MG/DL — SIGNIFICANT CHANGE UP (ref 70–99)
GLUCOSE UR QL: NEGATIVE MG/DL — SIGNIFICANT CHANGE UP
HCT VFR BLD CALC: 33.3 % — LOW (ref 39–50)
HGB BLD-MCNC: 10.9 G/DL — LOW (ref 13–17)
KETONES UR-MCNC: NEGATIVE — SIGNIFICANT CHANGE UP
LEUKOCYTE ESTERASE UR-ACNC: NEGATIVE — SIGNIFICANT CHANGE UP
MAGNESIUM SERPL-MCNC: 1.7 MG/DL — SIGNIFICANT CHANGE UP (ref 1.6–2.6)
MCHC RBC-ENTMCNC: 31.6 PG — SIGNIFICANT CHANGE UP (ref 27–34)
MCHC RBC-ENTMCNC: 32.7 GM/DL — SIGNIFICANT CHANGE UP (ref 32–36)
MCV RBC AUTO: 96.5 FL — SIGNIFICANT CHANGE UP (ref 80–100)
NITRITE UR-MCNC: NEGATIVE — SIGNIFICANT CHANGE UP
PH UR: 7 — SIGNIFICANT CHANGE UP (ref 5–8)
PHOSPHATE SERPL-MCNC: 3.8 MG/DL — SIGNIFICANT CHANGE UP (ref 2.4–4.7)
PLATELET # BLD AUTO: 85 K/UL — LOW (ref 150–400)
POTASSIUM SERPL-MCNC: 3.2 MMOL/L — LOW (ref 3.5–5.3)
POTASSIUM SERPL-SCNC: 3.2 MMOL/L — LOW (ref 3.5–5.3)
PROT SERPL-MCNC: 5.9 G/DL — LOW (ref 6.6–8.7)
PROT UR-MCNC: NEGATIVE — SIGNIFICANT CHANGE UP
RBC # BLD: 3.45 M/UL — LOW (ref 4.2–5.8)
RBC # FLD: 16.8 % — HIGH (ref 10.3–14.5)
SODIUM SERPL-SCNC: 140 MMOL/L — SIGNIFICANT CHANGE UP (ref 135–145)
SP GR SPEC: 1.01 — SIGNIFICANT CHANGE UP (ref 1.01–1.02)
UROBILINOGEN FLD QL: NEGATIVE MG/DL — SIGNIFICANT CHANGE UP
WBC # BLD: 2.77 K/UL — LOW (ref 3.8–10.5)
WBC # FLD AUTO: 2.77 K/UL — LOW (ref 3.8–10.5)

## 2022-07-11 PROCEDURE — 99233 SBSQ HOSP IP/OBS HIGH 50: CPT

## 2022-07-11 RX ORDER — POTASSIUM CHLORIDE 20 MEQ
40 PACKET (EA) ORAL ONCE
Refills: 0 | Status: COMPLETED | OUTPATIENT
Start: 2022-07-11 | End: 2022-07-11

## 2022-07-11 RX ADMIN — APIXABAN 5 MILLIGRAM(S): 2.5 TABLET, FILM COATED ORAL at 05:07

## 2022-07-11 RX ADMIN — Medication 1 TABLET(S): at 12:25

## 2022-07-11 RX ADMIN — Medication 40 MILLIEQUIVALENT(S): at 12:32

## 2022-07-11 RX ADMIN — Medication 2: at 12:24

## 2022-07-11 RX ADMIN — INSULIN GLARGINE 10 UNIT(S): 100 INJECTION, SOLUTION SUBCUTANEOUS at 22:11

## 2022-07-11 RX ADMIN — PANTOPRAZOLE SODIUM 40 MILLIGRAM(S): 20 TABLET, DELAYED RELEASE ORAL at 05:07

## 2022-07-11 RX ADMIN — Medication 12.5 MILLIGRAM(S): at 05:07

## 2022-07-11 RX ADMIN — Medication 50 MILLIGRAM(S): at 05:07

## 2022-07-11 RX ADMIN — APIXABAN 5 MILLIGRAM(S): 2.5 TABLET, FILM COATED ORAL at 17:19

## 2022-07-11 RX ADMIN — Medication 10 UNIT(S): at 12:24

## 2022-07-11 RX ADMIN — Medication 1 MILLIGRAM(S): at 12:25

## 2022-07-11 RX ADMIN — Medication 100 MILLIGRAM(S): at 12:26

## 2022-07-11 RX ADMIN — Medication 10 UNIT(S): at 17:15

## 2022-07-11 RX ADMIN — Medication 50 MILLIGRAM(S): at 17:13

## 2022-07-11 NOTE — SBIRT NOTE ADULT - NSSBIRTBRIEFINTDET_GEN_A_CORE
Pt acknowledged ETOH abuse. reports that he has been in treatment multiple times in the past and is considering treatment options at this time. Education and support provided. Pt reports he will request SW when he makes his decision.

## 2022-07-12 LAB
ALBUMIN SERPL ELPH-MCNC: 3.7 G/DL — SIGNIFICANT CHANGE UP (ref 3.3–5.2)
ALP SERPL-CCNC: 113 U/L — SIGNIFICANT CHANGE UP (ref 40–120)
ALT FLD-CCNC: 52 U/L — HIGH
ANION GAP SERPL CALC-SCNC: 13 MMOL/L — SIGNIFICANT CHANGE UP (ref 5–17)
AST SERPL-CCNC: 73 U/L — HIGH
BILIRUB SERPL-MCNC: 0.9 MG/DL — SIGNIFICANT CHANGE UP (ref 0.4–2)
BUN SERPL-MCNC: 5.4 MG/DL — LOW (ref 8–20)
CALCIUM SERPL-MCNC: 8.9 MG/DL — SIGNIFICANT CHANGE UP (ref 8.6–10.2)
CHLORIDE SERPL-SCNC: 105 MMOL/L — SIGNIFICANT CHANGE UP (ref 98–107)
CO2 SERPL-SCNC: 21 MMOL/L — LOW (ref 22–29)
CREAT SERPL-MCNC: 0.56 MG/DL — SIGNIFICANT CHANGE UP (ref 0.5–1.3)
EGFR: 134 ML/MIN/1.73M2 — SIGNIFICANT CHANGE UP
GLUCOSE BLDC GLUCOMTR-MCNC: 130 MG/DL — HIGH (ref 70–99)
GLUCOSE BLDC GLUCOMTR-MCNC: 158 MG/DL — HIGH (ref 70–99)
GLUCOSE BLDC GLUCOMTR-MCNC: 187 MG/DL — HIGH (ref 70–99)
GLUCOSE BLDC GLUCOMTR-MCNC: 68 MG/DL — LOW (ref 70–99)
GLUCOSE SERPL-MCNC: 154 MG/DL — HIGH (ref 70–99)
HCT VFR BLD CALC: 35.2 % — LOW (ref 39–50)
HGB BLD-MCNC: 11.5 G/DL — LOW (ref 13–17)
MAGNESIUM SERPL-MCNC: 1.7 MG/DL — LOW (ref 1.8–2.6)
MCHC RBC-ENTMCNC: 32 PG — SIGNIFICANT CHANGE UP (ref 27–34)
MCHC RBC-ENTMCNC: 32.7 GM/DL — SIGNIFICANT CHANGE UP (ref 32–36)
MCV RBC AUTO: 98.1 FL — SIGNIFICANT CHANGE UP (ref 80–100)
PHOSPHATE SERPL-MCNC: 4.4 MG/DL — SIGNIFICANT CHANGE UP (ref 2.4–4.7)
PLATELET # BLD AUTO: 113 K/UL — LOW (ref 150–400)
POTASSIUM SERPL-MCNC: 3.9 MMOL/L — SIGNIFICANT CHANGE UP (ref 3.5–5.3)
POTASSIUM SERPL-SCNC: 3.9 MMOL/L — SIGNIFICANT CHANGE UP (ref 3.5–5.3)
PROT SERPL-MCNC: 6.1 G/DL — LOW (ref 6.6–8.7)
RBC # BLD: 3.59 M/UL — LOW (ref 4.2–5.8)
RBC # FLD: 16.9 % — HIGH (ref 10.3–14.5)
SODIUM SERPL-SCNC: 139 MMOL/L — SIGNIFICANT CHANGE UP (ref 135–145)
WBC # BLD: 3.23 K/UL — LOW (ref 3.8–10.5)
WBC # FLD AUTO: 3.23 K/UL — LOW (ref 3.8–10.5)

## 2022-07-12 PROCEDURE — 99233 SBSQ HOSP IP/OBS HIGH 50: CPT

## 2022-07-12 PROCEDURE — 99222 1ST HOSP IP/OBS MODERATE 55: CPT

## 2022-07-12 RX ORDER — INSULIN LISPRO 100/ML
8 VIAL (ML) SUBCUTANEOUS
Refills: 0 | Status: DISCONTINUED | OUTPATIENT
Start: 2022-07-12 | End: 2022-07-13

## 2022-07-12 RX ORDER — INSULIN GLARGINE 100 [IU]/ML
8 INJECTION, SOLUTION SUBCUTANEOUS AT BEDTIME
Refills: 0 | Status: DISCONTINUED | OUTPATIENT
Start: 2022-07-12 | End: 2022-07-13

## 2022-07-12 RX ADMIN — APIXABAN 5 MILLIGRAM(S): 2.5 TABLET, FILM COATED ORAL at 18:08

## 2022-07-12 RX ADMIN — Medication 10 UNIT(S): at 08:24

## 2022-07-12 RX ADMIN — Medication 100 MILLIGRAM(S): at 12:21

## 2022-07-12 RX ADMIN — Medication 10 UNIT(S): at 18:05

## 2022-07-12 RX ADMIN — PANTOPRAZOLE SODIUM 40 MILLIGRAM(S): 20 TABLET, DELAYED RELEASE ORAL at 05:32

## 2022-07-12 RX ADMIN — INSULIN GLARGINE 8 UNIT(S): 100 INJECTION, SOLUTION SUBCUTANEOUS at 21:37

## 2022-07-12 RX ADMIN — APIXABAN 5 MILLIGRAM(S): 2.5 TABLET, FILM COATED ORAL at 05:33

## 2022-07-12 RX ADMIN — Medication 1 TABLET(S): at 12:21

## 2022-07-12 RX ADMIN — Medication 10 UNIT(S): at 12:22

## 2022-07-12 RX ADMIN — Medication 2: at 21:36

## 2022-07-12 RX ADMIN — Medication 2: at 18:04

## 2022-07-12 RX ADMIN — Medication 1 MILLIGRAM(S): at 12:22

## 2022-07-12 RX ADMIN — Medication 50 MILLIGRAM(S): at 05:33

## 2022-07-12 RX ADMIN — Medication 50 MILLIGRAM(S): at 12:31

## 2022-07-12 RX ADMIN — Medication 12.5 MILLIGRAM(S): at 18:08

## 2022-07-12 NOTE — PROGRESS NOTE ADULT - SUBJECTIVE AND OBJECTIVE BOX
[facility] Division of Hospital Medicine    Chief Complaint:  DKA, alcohol withdrawal     SUBJECTIVE / OVERNIGHT EVENTS:   Pt reports he still feels shaky   reports he sometimes feels dizzy when he stands up too quickly     Patient denies chest pain, SOB, abd pain, N/V, fever, chills, dysuria or any other complaints. All remainder ROS negative.     MEDICATIONS  (STANDING):  apixaban 5 milliGRAM(s) Oral every 12 hours  chlordiazePOXIDE 50 milliGRAM(s) Oral every 12 hours  chlordiazePOXIDE   Oral   dextrose 5%. 1000 milliLiter(s) (50 mL/Hr) IV Continuous <Continuous>  dextrose 5%. 1000 milliLiter(s) (100 mL/Hr) IV Continuous <Continuous>  dextrose 50% Injectable 25 Gram(s) IV Push once  dextrose 50% Injectable 12.5 Gram(s) IV Push once  dextrose 50% Injectable 25 Gram(s) IV Push once  folic acid 1 milliGRAM(s) Oral daily  glucagon  Injectable 1 milliGRAM(s) IntraMuscular once  insulin glargine Injectable (LANTUS) 10 Unit(s) SubCutaneous at bedtime  insulin lispro (ADMELOG) corrective regimen sliding scale   SubCutaneous Before meals and at bedtime  insulin lispro Injectable (ADMELOG) 10 Unit(s) SubCutaneous before breakfast  insulin lispro Injectable (ADMELOG) 10 Unit(s) SubCutaneous before lunch  insulin lispro Injectable (ADMELOG) 10 Unit(s) SubCutaneous before dinner  lactated ringers. 1000 milliLiter(s) (75 mL/Hr) IV Continuous <Continuous>  metoprolol tartrate 12.5 milliGRAM(s) Oral two times a day  multivitamin 1 Tablet(s) Oral daily  pantoprazole    Tablet 40 milliGRAM(s) Oral before breakfast  thiamine 100 milliGRAM(s) Oral daily    MEDICATIONS  (PRN):  aluminum hydroxide/magnesium hydroxide/simethicone Suspension 30 milliLiter(s) Oral every 6 hours PRN Dyspepsia  dextrose Oral Gel 15 Gram(s) Oral once PRN Blood Glucose LESS THAN 70 milliGRAM(s)/deciliter  diazepam  Injectable 10 milliGRAM(s) IV Push every 1 hour PRN CIWA >=8        I&O's Summary      PHYSICAL EXAM:  Vital Signs Last 24 Hrs  T(C): 36.9 (2022 11:44), Max: 37.1 (10 Jul 2022 19:14)  T(F): 98.4 (2022 11:44), Max: 98.7 (10 Jul 2022 19:14)  HR: 62 (2022 11:44) (61 - 81)  BP: 102/68 (2022 11:44) (95/59 - 115/78)  BP(mean): --  RR: 18 (2022 11:44) (18 - 18)  SpO2: 98% (2022 11:44) (98% - 100%)    Parameters below as of 2022 11:44  Patient On (Oxygen Delivery Method): room air        CONSTITUTIONAL: NAD, sitting up in bed  ENMT: Dry oral mucosa, PERRLA, EOMI   RESPIRATORY: Normal respiratory effort; lungs are clear to auscultation bilaterally  CARDIOVASCULAR: Regular rate and rhythm, normal S1 and S2, No lower extremity edema  ABDOMEN: Nontender to palpation, normoactive bowel sounds  MUSCULOSKELETAL:  No clubbing or cyanosis of digits, mild tremor UE b/l   PSYCH: A+O to person, place, and time but poor insight, flat affect   NEUROLOGY: No gross sensory or motor deficits   SKIN: warm, dry       LABS:                        10.9   2.77  )-----------( 85       ( 2022 06:02 )             33.3     07-11    140  |  106  |  3.7<L>  ----------------------------<  71  3.2<L>   |  22.0  |  0.46<L>    Ca    9.1      2022 06:02  Phos  3.8     07-11  Mg     1.7     07-11    TPro  5.9<L>  /  Alb  3.6  /  TBili  1.1  /  DBili  x   /  AST  81<H>  /  ALT  48<H>  /  AlkPhos  107  07-11          Urinalysis Basic - ( 2022 06:42 )    Color: Yellow / Appearance: Clear / S.010 / pH: x  Gluc: x / Ketone: Negative  / Bili: Negative / Urobili: Negative mg/dL   Blood: x / Protein: Negative / Nitrite: Negative   Leuk Esterase: Negative / RBC: x / WBC x   Sq Epi: x / Non Sq Epi: x / Bacteria: x        CAPILLARY BLOOD GLUCOSE      POCT Blood Glucose.: 171 mg/dL (2022 12:20)  POCT Blood Glucose.: 79 mg/dL (2022 08:24)  POCT Blood Glucose.: 94 mg/dL (10 Jul 2022 21:50)  POCT Blood Glucose.: 293 mg/dL (10 Jul 2022 16:58)  
Patient is a 31y old  Male who presents with a chief complaint of DKA (08 Jul 2022 23:50)      BRIEF HOSPITAL COURSE:   30 yo m pmhx IDDM, HTN, Afib on Eliquis and ETOH abuse (~4 x 24oz cans beer daily) biba from home s/p witnessed seizure.  Per patient over the last 3 days he has been nauseous, unable to keep anything down.  Patient endorses he stopped drinking yesterday around 1900 and hasn't had any alcohol since.  Today patient with his day watching tv when he had a witnessed seizure and woke up surround by EMS.  In ED patient a&O but tremulous, etoh level <10, labs significant for bicarb 14, AG 24, poct 362, AST//58, lactate 3.8.  Patient given phenobarb, valium and versed in ED for tremors, started on insulin gtt for glycemic control.  Patient admitted to MICU.       Events last 24 hours:   -Transitioned off insulin drip  -Restarted on 10 of Lantus at night, 10 Pre-meal Lantus  -Started on maintenance fluids     PAST MEDICAL & SURGICAL HISTORY:  Alcohol abuse      Pancreatitis      HTN (hypertension)      Diabetes      Atrial fibrillation      Non-ischemic cardiomyopathy      No significant past surgical history            Medications:    metoprolol tartrate 12.5 milliGRAM(s) Oral two times a day      diazepam  Injectable 10 milliGRAM(s) IV Push every 6 hours  diazepam  Injectable 10 milliGRAM(s) IV Push every 1 hour PRN        aluminum hydroxide/magnesium hydroxide/simethicone Suspension 30 milliLiter(s) Oral every 6 hours PRN  pantoprazole    Tablet 40 milliGRAM(s) Oral before breakfast      dextrose 50% Injectable 25 Gram(s) IV Push once  dextrose 50% Injectable 12.5 Gram(s) IV Push once  dextrose 50% Injectable 25 Gram(s) IV Push once  dextrose Oral Gel 15 Gram(s) Oral once PRN  glucagon  Injectable 1 milliGRAM(s) IntraMuscular once  insulin glargine Injectable (LANTUS) 10 Unit(s) SubCutaneous at bedtime  insulin lispro (ADMELOG) corrective regimen sliding scale   SubCutaneous Before meals and at bedtime  insulin lispro Injectable (ADMELOG) 3 Unit(s) SubCutaneous three times a day before meals    dextrose 5%. 1000 milliLiter(s) IV Continuous <Continuous>  dextrose 5%. 1000 milliLiter(s) IV Continuous <Continuous>  folic acid 1 milliGRAM(s) Oral daily  lactated ringers. 1000 milliLiter(s) IV Continuous <Continuous>  magnesium sulfate  IVPB 2 Gram(s) IV Intermittent once  multivitamin 1 Tablet(s) Oral daily  potassium chloride   Powder 40 milliEquivalent(s) Oral every 4 hours  thiamine 100 milliGRAM(s) Oral daily      chlorhexidine 4% Liquid 1 Application(s) Topical <User Schedule>            ICU Vital Signs Last 24 Hrs  T(C): 36.9 (09 Jul 2022 07:00), Max: 37.1 (08 Jul 2022 19:27)  T(F): 98.4 (09 Jul 2022 07:00), Max: 98.8 (08 Jul 2022 19:27)  HR: 64 (09 Jul 2022 07:00) (64 - 111)  BP: 111/74 (09 Jul 2022 07:00) (111/74 - 152/104)  BP(mean): --  ABP: --  ABP(mean): --  RR: 14 (09 Jul 2022 07:00) (13 - 16)  SpO2: 100% (09 Jul 2022 07:00) (96% - 100%)    O2 Parameters below as of 09 Jul 2022 07:00  Patient On (Oxygen Delivery Method): room air                I&O's Detail        LABS:                        12.3   3.82  )-----------( 72       ( 08 Jul 2022 17:34 )             36.8     07-09    137  |  99  |  5.3<L>  ----------------------------<  96  3.8   |  24.0  |  0.39<L>    Ca    9.5      09 Jul 2022 08:43  Phos  2.9     07-09  Mg     1.7     07-09    TPro  7.7  /  Alb  4.9  /  TBili  2.8<H>  /  DBili  x   /  AST  131<H>  /  ALT  58<H>  /  AlkPhos  179<H>  07-08          CAPILLARY BLOOD GLUCOSE      POCT Blood Glucose.: 128 mg/dL (09 Jul 2022 07:02)        CULTURES:      Physical Examination:    General: No acute distress.      HEENT: Pupils equal, reactive to light.  Symmetric.    PULM: Clear to auscultation bilaterally, no significant sputum production    NECK: Supple, no lymphadenopathy, trachea midline    CVS: Regular rate and rhythm, no murmurs, rubs, or gallops    ABD: Soft, nondistended, nontender, normoactive bowel sounds, no masses    EXT: No edema, nontender    SKIN: Warm and well perfused, no rashes noted.    NEURO: Alert, oriented, interactive, nonfocal    DEVICES:     RADIOLOGY: ***    CRITICAL CARE TIME SPENT: ***  
[facility] Division of Hospital Medicine    Chief Complaint:  DKA, alcohol withdrawal     SUBJECTIVE / OVERNIGHT EVENTS:   Pt reports he feels a little lightheaded   BG was low this afternoon     Patient denies chest pain, SOB, abd pain, N/V, fever, chills, dysuria or any other complaints. All remainder ROS negative.     MEDICATIONS  (STANDING):  apixaban 5 milliGRAM(s) Oral every 12 hours  dextrose 5%. 1000 milliLiter(s) (50 mL/Hr) IV Continuous <Continuous>  dextrose 5%. 1000 milliLiter(s) (100 mL/Hr) IV Continuous <Continuous>  dextrose 50% Injectable 25 Gram(s) IV Push once  dextrose 50% Injectable 12.5 Gram(s) IV Push once  dextrose 50% Injectable 25 Gram(s) IV Push once  folic acid 1 milliGRAM(s) Oral daily  glucagon  Injectable 1 milliGRAM(s) IntraMuscular once  insulin glargine Injectable (LANTUS) 8 Unit(s) SubCutaneous at bedtime  insulin lispro (ADMELOG) corrective regimen sliding scale   SubCutaneous Before meals and at bedtime  insulin lispro Injectable (ADMELOG) 10 Unit(s) SubCutaneous before breakfast  insulin lispro Injectable (ADMELOG) 10 Unit(s) SubCutaneous before lunch  insulin lispro Injectable (ADMELOG) 10 Unit(s) SubCutaneous before dinner  lactated ringers. 1000 milliLiter(s) (75 mL/Hr) IV Continuous <Continuous>  metoprolol tartrate 12.5 milliGRAM(s) Oral two times a day  multivitamin 1 Tablet(s) Oral daily  pantoprazole    Tablet 40 milliGRAM(s) Oral before breakfast  thiamine 100 milliGRAM(s) Oral daily    MEDICATIONS  (PRN):  aluminum hydroxide/magnesium hydroxide/simethicone Suspension 30 milliLiter(s) Oral every 6 hours PRN Dyspepsia  dextrose Oral Gel 15 Gram(s) Oral once PRN Blood Glucose LESS THAN 70 milliGRAM(s)/deciliter  diazepam  Injectable 10 milliGRAM(s) IV Push every 1 hour PRN CIWA >=8        I&O's Summary    2022 07:01  -  2022 07:00  --------------------------------------------------------  IN: 0 mL / OUT: 1400 mL / NET: -1400 mL        PHYSICAL EXAM:  Vital Signs Last 24 Hrs  T(C): 37 (2022 16:22), Max: 37 (2022 16:22)  T(F): 98.6 (2022 16:22), Max: 98.6 (2022 16:22)  HR: 62 (2022 16:22) (61 - 69)  BP: 107/65 (2022 16:22) (95/57 - 107/65)  BP(mean): --  RR: 18 (2022 16:22) (16 - 18)  SpO2: 98% (2022 16:22) (98% - 100%)    Parameters below as of 2022 16:22  Patient On (Oxygen Delivery Method): room air      CONSTITUTIONAL: NAD, sitting up in bed  ENMT: Dry oral mucosa, PERRLA, EOMI   RESPIRATORY: Normal respiratory effort; lungs are clear to auscultation bilaterally  CARDIOVASCULAR: Regular rate and rhythm, normal S1 and S2, No lower extremity edema  ABDOMEN: Nontender to palpation, normoactive bowel sounds  MUSCULOSKELETAL:  No clubbing or cyanosis of digits   PSYCH: A+O to person, place, and time, flat affect   NEUROLOGY: No gross sensory or motor deficits   SKIN: warm, dry       LABS:                        11.5   3.23  )-----------( 113      ( 2022 06:28 )             35.2     07-12    139  |  105  |  5.4<L>  ----------------------------<  154<H>  3.9   |  21.0<L>  |  0.56    Ca    8.9      2022 06:28  Phos  4.4     07-12  Mg     1.7     07-12    TPro  6.1<L>  /  Alb  3.7  /  TBili  0.9  /  DBili  x   /  AST  73<H>  /  ALT  52<H>  /  AlkPhos  113  07-12          Urinalysis Basic - ( 2022 06:42 )    Color: Yellow / Appearance: Clear / S.010 / pH: x  Gluc: x / Ketone: Negative  / Bili: Negative / Urobili: Negative mg/dL   Blood: x / Protein: Negative / Nitrite: Negative   Leuk Esterase: Negative / RBC: x / WBC x   Sq Epi: x / Non Sq Epi: x / Bacteria: x        CAPILLARY BLOOD GLUCOSE      POCT Blood Glucose.: 68 mg/dL (2022 12:17)  POCT Blood Glucose.: 130 mg/dL (2022 08:20)  POCT Blood Glucose.: 137 mg/dL (2022 22:11)  POCT Blood Glucose.: 137 mg/dL (2022 21:56)  POCT Blood Glucose.: 113 mg/dL (2022 16:57)  
Patient is a 31y old  Male who presents with a chief complaint of DKA (09 Jul 2022 10:19)    Patient seen and examined at bedside.     ALLERGIES:  No Known Allergies    MEDICATIONS  (STANDING):  apixaban 5 milliGRAM(s) Oral every 12 hours  chlordiazePOXIDE   Oral   chlordiazePOXIDE 50 milliGRAM(s) Oral every 6 hours  dextrose 5%. 1000 milliLiter(s) (50 mL/Hr) IV Continuous <Continuous>  dextrose 5%. 1000 milliLiter(s) (100 mL/Hr) IV Continuous <Continuous>  dextrose 50% Injectable 25 Gram(s) IV Push once  dextrose 50% Injectable 12.5 Gram(s) IV Push once  dextrose 50% Injectable 25 Gram(s) IV Push once  folic acid 1 milliGRAM(s) Oral daily  glucagon  Injectable 1 milliGRAM(s) IntraMuscular once  insulin glargine Injectable (LANTUS) 10 Unit(s) SubCutaneous at bedtime  insulin lispro (ADMELOG) corrective regimen sliding scale   SubCutaneous Before meals and at bedtime  insulin lispro Injectable (ADMELOG) 10 Unit(s) SubCutaneous before breakfast  insulin lispro Injectable (ADMELOG) 10 Unit(s) SubCutaneous before lunch  insulin lispro Injectable (ADMELOG) 10 Unit(s) SubCutaneous before dinner  lactated ringers. 1000 milliLiter(s) (75 mL/Hr) IV Continuous <Continuous>  metoprolol tartrate 12.5 milliGRAM(s) Oral two times a day  multivitamin 1 Tablet(s) Oral daily  pantoprazole    Tablet 40 milliGRAM(s) Oral before breakfast  thiamine 100 milliGRAM(s) Oral daily    MEDICATIONS  (PRN):  aluminum hydroxide/magnesium hydroxide/simethicone Suspension 30 milliLiter(s) Oral every 6 hours PRN Dyspepsia  dextrose Oral Gel 15 Gram(s) Oral once PRN Blood Glucose LESS THAN 70 milliGRAM(s)/deciliter  diazepam  Injectable 10 milliGRAM(s) IV Push every 1 hour PRN CIWA >=10    Vital Signs Last 24 Hrs  T(F): 98.4 (09 Jul 2022 07:00), Max: 98.8 (08 Jul 2022 19:27)  HR: 64 (09 Jul 2022 07:00) (64 - 111)  BP: 111/74 (09 Jul 2022 07:00) (111/74 - 152/104)  RR: 14 (09 Jul 2022 07:00) (13 - 16)  SpO2: 100% (09 Jul 2022 07:00) (96% - 100%)  I&O's Summary    PHYSICAL EXAM:  General: NAD, Alert  ENT: MMM, no thrush  Neck: Supple, No JVD  Lungs: Clear to auscultation bilaterally, good air entry, non-labored breathing  Cardio: +s1/s2  Abdomen: Soft, Nontender, Nondistended; Bowel sounds present  Extremities: No calf tenderness    LABS:                        12.3   3.82  )-----------( 72       ( 08 Jul 2022 17:34 )             36.8     07-09    137  |  99  |  5.3  ----------------------------<  96  3.8   |  24.0  |  0.39    Ca    9.5      09 Jul 2022 08:43  Phos  2.9     07-09  Mg     1.7     07-09    TPro  7.7  /  Alb  4.9  /  TBili  2.8  /  DBili  x   /  AST  131  /  ALT  58  /  AlkPhos  179  07-08      Lipase, Serum: 5 U/L (07-08-22 @ 17:34)    Lactate, Blood: 2.3 mmol/L (07-09 @ 02:26)    00:59 - VBG - pH: 7.290 | pCO2: 40    | pO2: 172   | Lactate: 3.00   17:35 - VBG - pH:       | pCO2:       | pO2:       | Lactate: 3.80     Glucose  POCT Blood Glucose.: 128 mg/dL (09 Jul 2022 07:02)  POCT Blood Glucose.: 140 mg/dL (09 Jul 2022 06:05)  POCT Blood Glucose.: 135 mg/dL (09 Jul 2022 05:06)  POCT Blood Glucose.: 124 mg/dL (09 Jul 2022 03:29)  POCT Blood Glucose.: 123 mg/dL (09 Jul 2022 02:18)  POCT Blood Glucose.: >530 mg/dL (09 Jul 2022 02:17)  POCT Blood Glucose.: 171 mg/dL (08 Jul 2022 23:55)    COVID-19 PCR: NotDetec (07-09-22 @ 02:49)    RADIOLOGY & ADDITIONAL TESTS:  - no new tests    Care Discussed with Consultants/Other Providers:   MICU
CARMEN JOYCE    [id number]    31y      Male    CC: dka    INTERVAL HPI/OVERNIGHT EVENTS: pt seen and examined. no acute events o/n. pt says he feels confused; disoriented to place/situation    REVIEW OF SYSTEMS:    CONSTITUTIONAL: No fever, weight loss  RESPIRATORY: No cough, wheezing, hemoptysis; No shortness of breath  CARDIOVASCULAR: No chest pain, palpitations  GASTROINTESTINAL: No abdominal or epigastric pain. No nausea, vomiting  NEUROLOGICAL: No headaches    Vital Signs Last 24 Hrs  T(C): 36.3 (10 Jul 2022 13:36), Max: 37.4 (09 Jul 2022 19:43)  T(F): 97.4 (10 Jul 2022 13:36), Max: 99.3 (09 Jul 2022 19:43)  HR: 66 (10 Jul 2022 13:36) (62 - 80)  BP: 112/68 (10 Jul 2022 13:36) (100/67 - 112/68)  BP(mean): --  RR: 18 (10 Jul 2022 04:32) (18 - 18)  SpO2: 100% (10 Jul 2022 13:36) (99% - 100%)    Parameters below as of 10 Jul 2022 13:36  Patient On (Oxygen Delivery Method): room air        PHYSICAL EXAM:    GENERAL: NAD  HEENT: PERRL, +EOMI  NECK: soft, supple  CHEST/LUNG: Clear to auscultation bilaterally  HEART: S1S2+, Regular rate and rhythm; No murmurs, rubs, or gallops  ABDOMEN: Soft, Nontender, Nondistended; Bowel sounds present  SKIN: warm, dry  NEURO: AAOX2; +mild BUE tremor  PSYCH: calm, cooperative     LABS:                        12.3   3.82  )-----------( 72       ( 08 Jul 2022 17:34 )             36.8     07-09    137  |  99  |  5.3<L>  ----------------------------<  96  3.8   |  24.0  |  0.39<L>    Ca    9.5      09 Jul 2022 08:43  Phos  2.9     07-09  Mg     1.7     07-09    TPro  7.7  /  Alb  4.9  /  TBili  2.8<H>  /  DBili  x   /  AST  131<H>  /  ALT  58<H>  /  AlkPhos  179<H>  07-08            MEDICATIONS  (STANDING):  apixaban 5 milliGRAM(s) Oral every 12 hours  chlordiazePOXIDE 50 milliGRAM(s) Oral every 8 hours  chlordiazePOXIDE   Oral   dextrose 5%. 1000 milliLiter(s) (50 mL/Hr) IV Continuous <Continuous>  dextrose 5%. 1000 milliLiter(s) (100 mL/Hr) IV Continuous <Continuous>  dextrose 50% Injectable 25 Gram(s) IV Push once  dextrose 50% Injectable 12.5 Gram(s) IV Push once  dextrose 50% Injectable 25 Gram(s) IV Push once  folic acid 1 milliGRAM(s) Oral daily  glucagon  Injectable 1 milliGRAM(s) IntraMuscular once  insulin glargine Injectable (LANTUS) 10 Unit(s) SubCutaneous at bedtime  insulin lispro (ADMELOG) corrective regimen sliding scale   SubCutaneous Before meals and at bedtime  insulin lispro Injectable (ADMELOG) 10 Unit(s) SubCutaneous before breakfast  insulin lispro Injectable (ADMELOG) 10 Unit(s) SubCutaneous before lunch  insulin lispro Injectable (ADMELOG) 10 Unit(s) SubCutaneous before dinner  lactated ringers. 1000 milliLiter(s) (75 mL/Hr) IV Continuous <Continuous>  metoprolol tartrate 12.5 milliGRAM(s) Oral two times a day  multivitamin 1 Tablet(s) Oral daily  pantoprazole    Tablet 40 milliGRAM(s) Oral before breakfast  thiamine 100 milliGRAM(s) Oral daily    MEDICATIONS  (PRN):  aluminum hydroxide/magnesium hydroxide/simethicone Suspension 30 milliLiter(s) Oral every 6 hours PRN Dyspepsia  dextrose Oral Gel 15 Gram(s) Oral once PRN Blood Glucose LESS THAN 70 milliGRAM(s)/deciliter  diazepam  Injectable 10 milliGRAM(s) IV Push every 1 hour PRN CIWA >=8      RADIOLOGY & ADDITIONAL TESTS:

## 2022-07-12 NOTE — CONSULT NOTE ADULT - SUBJECTIVE AND OBJECTIVE BOX
HPI:  32 yo m pmhx IDDM, HTN, Afib on Eliquis and ETOH abuse (~4 x 24oz cans beer daily) biba from home s/p witnessed seizure.  Per patient over the last 3 days he has been nauseous, unable to keep anything down.  Patient endorses he stopped drinking yesterday around 1900 and hasn't had any alcohol since.  Today patient with his day watching tv when he had a witnessed seizure and woke up surround by EMS.  In ED patient a&O but tremulous, etoh level <10, labs significant for bicarb 14, AG 24, poct 362, AST//58, lactate 3.8.  Patient given phenobarb, valium and versed in ED for tremors, started on insulin gtt for glycemic control.  Patient admitted to MICU.  (08 Jul 2022 23:50)    pt reports that he has T2DM x 6-7 years and takes MFN and insulin at home. Admits to nonadherence with diabetic medications due to alcohol abuse.  He reports variable glycemic control at home. He had hypoglycemia with sx shakines this am.     PAST MEDICAL & SURGICAL HISTORY:  Alcohol abuse  Pancreatitis  HTN (hypertension)  Diabetes  Atrial fibrillation  Non-ischemic cardiomyopathy  No significant past surgical history    FAMILY HISTORY:  FH: hypertension    SOCIAL HISTORY: as above    MEDICATIONS  (STANDING):  apixaban 5 milliGRAM(s) Oral every 12 hours  dextrose 5%. 1000 milliLiter(s) (50 mL/Hr) IV Continuous <Continuous>  dextrose 5%. 1000 milliLiter(s) (100 mL/Hr) IV Continuous <Continuous>  dextrose 50% Injectable 25 Gram(s) IV Push once  dextrose 50% Injectable 12.5 Gram(s) IV Push once  dextrose 50% Injectable 25 Gram(s) IV Push once  folic acid 1 milliGRAM(s) Oral daily  glucagon  Injectable 1 milliGRAM(s) IntraMuscular once  insulin glargine Injectable (LANTUS) 8 Unit(s) SubCutaneous at bedtime  insulin lispro (ADMELOG) corrective regimen sliding scale   SubCutaneous Before meals and at bedtime  insulin lispro Injectable (ADMELOG) 10 Unit(s) SubCutaneous before breakfast  insulin lispro Injectable (ADMELOG) 10 Unit(s) SubCutaneous before lunch  insulin lispro Injectable (ADMELOG) 10 Unit(s) SubCutaneous before dinner  lactated ringers. 1000 milliLiter(s) (75 mL/Hr) IV Continuous <Continuous>  metoprolol tartrate 12.5 milliGRAM(s) Oral two times a day  multivitamin 1 Tablet(s) Oral daily  pantoprazole    Tablet 40 milliGRAM(s) Oral before breakfast  thiamine 100 milliGRAM(s) Oral daily    MEDICATIONS  (PRN):  aluminum hydroxide/magnesium hydroxide/simethicone Suspension 30 milliLiter(s) Oral every 6 hours PRN Dyspepsia  dextrose Oral Gel 15 Gram(s) Oral once PRN Blood Glucose LESS THAN 70 milliGRAM(s)/deciliter  diazepam  Injectable 10 milliGRAM(s) IV Push every 1 hour PRN CIWA >=8      ALLERGIES: No Known Allergies    Vital Signs Last 24 Hrs  T(C): 37 (12 Jul 2022 16:22), Max: 37 (12 Jul 2022 16:22)  T(F): 98.6 (12 Jul 2022 16:22), Max: 98.6 (12 Jul 2022 16:22)  HR: 62 (12 Jul 2022 16:22) (61 - 69)  BP: 107/65 (12 Jul 2022 16:22) (99/62 - 107/65)  BP(mean): --  RR: 18 (12 Jul 2022 16:22) (16 - 18)  SpO2: 98% (12 Jul 2022 16:22) (98% - 100%)    Parameters below as of 12 Jul 2022 16:22  Patient On (Oxygen Delivery Method): room air    Physical Exam:  General appearance: thin, NAD  Eyes: EOMI  Lungs: Normal respiratory excursion. Lungs clear no w/r/r  CV: Normal S1S2, regular. No m/r/g.  Pedal pulses intact.  Abdomen: Soft, nontender, nondistended, (+) BS  Musculoskeletal: No cyanosis, clubbing, or edema.  Skin: Warm and moist. No acanthosis. Feet - no ulcers  Neuro: AOx3 Good sensation to light touch.  DTR's normal.  Psych: Normal affect, good judgement/insight      LABS:                        11.5   3.23  )-----------( 113      ( 12 Jul 2022 06:28 )             35.2     07-12    139  |  105  |  5.4<L>  ----------------------------<  154<H>  3.9   |  21.0<L>  |  0.56    Ca    8.9      12 Jul 2022 06:28  Phos  4.4     07-12  Mg     1.7     07-12    TPro  6.1<L>  /  Alb  3.7  /  TBili  0.9  /  DBili  x   /  AST  73<H>  /  ALT  52<H>  /  AlkPhos  113  07-12    LIVER FUNCTIONS - ( 12 Jul 2022 06:28 )  Alb: 3.7 g/dL / Pro: 6.1 g/dL / ALK PHOS: 113 U/L / ALT: 52 U/L / AST: 73 U/L / GGT: x             A1C with Estimated Average Glucose Result: 9.9 % (07-10-22 @ 08:15)  A1C with Estimated Average Glucose Result: 10.7 % (02-15-22 @ 21:53)      CAPILLARY BLOOD GLUCOSE  POCT Blood Glucose.: 158 mg/dL (12 Jul 2022 17:35)  POCT Blood Glucose.: 68 mg/dL (12 Jul 2022 12:17)  POCT Blood Glucose.: 130 mg/dL (12 Jul 2022 08:20)  POCT Blood Glucose.: 137 mg/dL (11 Jul 2022 22:11)  POCT Blood Glucose.: 137 mg/dL (11 Jul 2022 21:56)  POCT Blood Glucose.: 113 mg/dL (11 Jul 2022 16:57)  POCT Blood Glucose.: 171 mg/dL (11 Jul 2022 12:20)  POCT Blood Glucose.: 79 mg/dL (11 Jul 2022 08:24)  POCT Blood Glucose.: 94 mg/dL (10 Jul 2022 21:50)

## 2022-07-12 NOTE — PROGRESS NOTE ADULT - ASSESSMENT
31y/oM PMH IDDM, HTN, afib on Eliquis, EtOH abuse presenting to hospital after seizure. Admitted to MICU 7/9, seizure thought to be 2/2 alcohol withdrawal. Hospital course complicated by DKA with elevated anion gap, pt treated with insulin gtt, gap closed in MICU. Transitioned to Lantus, premeal, iss; downgraded to floor 7/9.     DKA   IDDM with hyperglycemia   -hgba1c 9.9   -s/p MICU, insulin gtt  -cont lantus, premeal, ISS   -endocrine consulted     EtOH abuse   seizure on admission   presumed thiamine deficiency   -s/p valium, now on librium taper   -cont ciwa   -mvi, thiamine, folic acid   -SW consult    chronic afib   -eliquis, metoprolol     vte ppx: eliquis 
31y/oM PMH IDDM, HTN, afib on Eliquis, EtOH abuse presenting to hospital after seizure. Admitted to MICU 7/9, seizure thought to be 2/2 alcohol withdrawal. Hospital course complicated by DKA with elevated anion gap, pt treated with insulin gtt, gap closed in MICU. Transitioned to Lantus, premeal, iss; downgraded to floor 7/9.     IDDM with hyperglycemia   DKA resolved   Hba1c 9.9   s/p MICU, insulin gtt  c/w lantus, premeal, ISS   Endocrine consulted, await recs     EtOH abuse   seizure on admission   presumed thiamine deficiency   s/p valium, c/w librium taper   CIWA   mvi, thiamine, folic acid   SW consult    Chronic afib   Eliquis, metoprolol   follows with [facility] Dr Phipps and Dr Chavez     Dizziness  check orthostatics     DVT ppx: eliquis   
31y/oM PMH IDDM, HTN, afib on Eliquis, EtOH abuse presenting to hospital after seizure. Admitted to MICU 7/9, seizure thought to be 2/2 alcohol withdrawal. Hospital course complicated by DKA with elevated anion gap, pt treated with insulin gtt, gap closed in MICU. Transitioned to Lantus, premeal, iss; downgraded to floor 7/9.     IDDM with hyperglycemia   DKA resolved   Now with hypoglycemia symptomatic   Hba1c 9.9   reduce dose of lantus, c/w current dose of premeal  ISS   Endocrine consulted, await recs     EtOH abuse   seizure on admission   presumed thiamine deficiency   s/p valium, c/w librium taper   CIWA   mvi, thiamine, folic acid   SW consult    Chronic afib   Eliquis, metoprolol   follows with [facility] Dr Phipps and Dr Chavez     Dizziness  orthostatics wnl   Improving      DVT ppx: eliquis   Likely DC in AM 
30 yo m pmhx IDDM, HTN, Afib on Eliquis and ETOH abuse (~4 x 24oz cans beer daily) admitted with     1. DKA  2. ETOH withdrawal  3. s/p withdrawal seizure    NEURO: ETOH Withdrawal, Valium 10mg IVP q6hr with 20mg ivp q4 prn for CIWA >8. CIWA monitoring.  multivit, thiamine, folic acid  CV: Afib/HTN on metoprolol with hold parameters.  Readd losartan as bp allows  RESP: No active issues  RENAL: Monitor lytes, replace as needed.    GI: NPO, ice chips/water  ENDO: DKA resolved. 4-6hr bmp, restart on maintenance fluids at 75/hr, Lantus 10 units nightly, 10 U premeal Admelog  ID: No active infectious process, monitor off abx therapy   HEME: Eliquis for ac   DISPO: Full code.      Plan to downgrade this am. 
31 year old male with pmh of insulin dependent diabetes, htn, afib on eliquis and alcohol abuse coming to hospital after seizure. initially admitted to MICU and seizure thought to be 2/2 alcohol. patient hospital course complicated as patient also found to be in dka with elevated anion gap, was treated with insulin drip and gap closed in micu. transitioned to lantus and premeal insulin. endo consult pending.     #Diabetic Ketoacidosis  - s/p micu  - anion gap closed  - in type 2 diabetic probable uncontrolled  - a1c pending  - lantus sliding scale and premeal  - monitor fingersticks    #Alcohol abuse   - with seizure on admission   - s/p valium change to librium taper  - monitor ciwa  - mvi, thiamine, folic acid     #Afib   - eliquis and metoprolol

## 2022-07-12 NOTE — CONSULT NOTE ADULT - ASSESSMENT
31y/oM PMH IDDM, HTN, afib on Eliquis, EtOH abuse presenting to hospital after seizure. Admitted to MICU 7/9, seizure thought to be 2/2 alcohol withdrawal. Hospital course complicated by DKA with elevated anion gap, pt treated with insulin gtt, gap closed in MICU.   1. uncontrolled diabetes, likely T1DM given history, body type and low insulin requirements. hypoglycemia this am  - decrease lantus to 8 units  - decreased admelog to 8 units premeals  - check cpeptide, GAD65 ab and ICA    2. alcohol abuse s/p seizure  - librium taper  - on MV tab, thiamine and folic acid    3. chronic Afib  - on AC and metoprolol

## 2022-07-13 ENCOUNTER — TRANSCRIPTION ENCOUNTER (OUTPATIENT)
Age: 32
End: 2022-07-13

## 2022-07-13 VITALS
SYSTOLIC BLOOD PRESSURE: 106 MMHG | DIASTOLIC BLOOD PRESSURE: 71 MMHG | RESPIRATION RATE: 18 BRPM | OXYGEN SATURATION: 100 % | HEART RATE: 73 BPM

## 2022-07-13 LAB
C PEPTIDE SERPL-MCNC: 0.7 NG/ML — LOW (ref 1.1–4.4)
GLUCOSE BLDC GLUCOMTR-MCNC: 150 MG/DL — HIGH (ref 70–99)
GLUCOSE BLDC GLUCOMTR-MCNC: 88 MG/DL — SIGNIFICANT CHANGE UP (ref 70–99)

## 2022-07-13 PROCEDURE — 99239 HOSP IP/OBS DSCHRG MGMT >30: CPT

## 2022-07-13 RX ORDER — ENOXAPARIN SODIUM 100 MG/ML
8 INJECTION SUBCUTANEOUS
Qty: 300 | Refills: 0
Start: 2022-07-13 | End: 2022-08-11

## 2022-07-13 RX ORDER — INSULIN LISPRO 100/ML
8 VIAL (ML) SUBCUTANEOUS
Qty: 800 | Refills: 0
Start: 2022-07-13 | End: 2022-08-11

## 2022-07-13 RX ORDER — METOPROLOL TARTRATE 50 MG
1 TABLET ORAL
Qty: 60 | Refills: 0
Start: 2022-07-13 | End: 2022-08-11

## 2022-07-13 RX ADMIN — PANTOPRAZOLE SODIUM 40 MILLIGRAM(S): 20 TABLET, DELAYED RELEASE ORAL at 06:10

## 2022-07-13 RX ADMIN — Medication 8 UNIT(S): at 09:29

## 2022-07-13 RX ADMIN — Medication 1 TABLET(S): at 09:30

## 2022-07-13 RX ADMIN — Medication 12.5 MILLIGRAM(S): at 06:10

## 2022-07-13 RX ADMIN — Medication 1 MILLIGRAM(S): at 09:30

## 2022-07-13 RX ADMIN — Medication 100 MILLIGRAM(S): at 09:30

## 2022-07-13 RX ADMIN — APIXABAN 5 MILLIGRAM(S): 2.5 TABLET, FILM COATED ORAL at 06:11

## 2022-07-13 NOTE — DISCHARGE NOTE NURSING/CASE MANAGEMENT/SOCIAL WORK - NSDCPEFALRISK_GEN_ALL_CORE
For information on Fall & Injury Prevention, visit: [email or web address] OR  [email or web address] OR  https://www.cdc.gov/steadi/patient.html

## 2022-07-13 NOTE — DISCHARGE NOTE NURSING/CASE MANAGEMENT/SOCIAL WORK - NSDCVIVACCINE_GEN_ALL_CORE_FT
Tdap; 29-Oct-2019 23:07; Odessa Landis (RN); [employer]; [id number] (Exp. Date: 22-Oct-2021); IntraMuscular; Deltoid Left.; 0.5 milliLiter(s); VIS (VIS Published: 09-May-2013, VIS Presented: 29-Oct-2019);

## 2022-07-13 NOTE — DISCHARGE NOTE PROVIDER - ATTENDING DISCHARGE PHYSICAL EXAMINATION:
Vital Signs Last 24 Hrs  T(C): 36.7 (13 Jul 2022 06:06), Max: 37 (12 Jul 2022 16:22)  T(F): 98.1 (13 Jul 2022 06:06), Max: 98.6 (12 Jul 2022 16:22)  HR: 68 (13 Jul 2022 06:06) (60 - 88)  BP: 116/72 (13 Jul 2022 06:06) (100/64 - 119/82)  BP(mean): --  RR: 18 (13 Jul 2022 06:06) (18 - 18)  SpO2: 99% (13 Jul 2022 06:06) (98% - 100%)    Parameters below as of 13 Jul 2022 06:06  Patient On (Oxygen Delivery Method): room air    CONSTITUTIONAL: NAD, sitting up in bed  ENMT: Dry oral mucosa, PERRLA, EOMI   RESPIRATORY: Normal respiratory effort; lungs are clear to auscultation bilaterally  CARDIOVASCULAR: Regular rate and rhythm, normal S1 and S2, No lower extremity edema  ABDOMEN: Nontender to palpation, normoactive bowel sounds  MUSCULOSKELETAL:  No clubbing or cyanosis of digits   PSYCH: A+O to person, place, and time, flat affect   NEUROLOGY: No gross sensory or motor deficits   SKIN: warm, dry

## 2022-07-13 NOTE — DISCHARGE NOTE NURSING/CASE MANAGEMENT/SOCIAL WORK - PATIENT PORTAL LINK FT
You can access the FollowMyHealth Patient Portal offered by [facility] by registering at the following website: [email or web address]. By joining [employer]’s FollowMyHealth portal, you will also be able to view your health information using other applications (apps) compatible with our system.

## 2022-07-13 NOTE — DISCHARGE NOTE PROVIDER - CARE PROVIDER_API CALL
Amelia Galvez (DO)  EndocrinologyMetabDiabetes; Internal Medicine  [address] [place] [address]  Phone: [phone]  Fax: [phone]  Follow Up Time:

## 2022-07-13 NOTE — DISCHARGE NOTE PROVIDER - NSDCCPCAREPLAN_GEN_ALL_CORE_FT
PRINCIPAL DISCHARGE DIAGNOSIS  Diagnosis: DKA (diabetic ketoacidosis)  Assessment and Plan of Treatment: Please take your Insulin as prescribed   checlk your BG regularly 4 timesa day and keep a record  follow up with Endocrinology      SECONDARY DISCHARGE DIAGNOSES  Diagnosis: Alcohol withdrawal  Assessment and Plan of Treatment: Cessation is strongly encouraged

## 2022-07-13 NOTE — DISCHARGE NOTE PROVIDER - NSDCMRMEDTOKEN_GEN_ALL_CORE_FT
apixaban 5 mg oral tablet: 1 tab(s) orally every 12 hours  folic acid 1 mg oral tablet: 1 tab(s) orally once a day  HumaLOG KwikPen 100 units/mL injectable solution: 5 unit(s) injectable 3 times a day (with meals) PRN  Lantus Solostar Pen 100 units/mL subcutaneous solution: 20 unit(s) subcutaneous once a day (at bedtime)   losartan 25 mg oral tablet: 1 tab(s) orally once a day  metoprolol tartrate 25 mg oral tablet: 1 tab(s) orally 2 times a day  Multiple Vitamins oral tablet: 1 tab(s) orally once a day  pantoprazole 40 mg oral delayed release tablet: 1 tab(s) orally 2 times a day   sucralfate 1 g oral tablet: 1 tab(s) orally 4 times a day  thiamine 100 mg oral tablet: 1 tab(s) orally once a day   apixaban 5 mg oral tablet: 1 tab(s) orally every 12 hours  folic acid 1 mg oral tablet: 1 tab(s) orally once a day  HumaLOG KwikPen 100 units/mL injectable solution: 8 unit(s) injectable 3 times a day (with meals)  PRN MDD:30 day supply  Lantus Solostar Pen 100 units/mL subcutaneous solution: 8 unit(s) subcutaneous once a day (at bedtime) MDD:30 day supply  metoprolol tartrate 25 mg oral tablet: 1 tab(s) orally 2 times a day  Multiple Vitamins oral tablet: 1 tab(s) orally once a day  pantoprazole 40 mg oral delayed release tablet: 1 tab(s) orally 2 times a day   sucralfate 1 g oral tablet: 1 tab(s) orally 4 times a day  thiamine 100 mg oral tablet: 1 tab(s) orally once a day

## 2022-07-13 NOTE — DISCHARGE NOTE PROVIDER - ATTENDING ATTESTATION STATEMENT
Neurological: Negative for syncope and headaches. Psychiatric/Behavioral: Positive for decreased concentration. Negative for self-injury, sleep disturbance and suicidal ideas. The patient is hyperactive. The patient is not nervous/anxious. All other systems reviewed and are negative. ROS above this line reviewed by Provider. Objective:   /78   Pulse 92   Temp 97.2 °F (36.2 °C) (Tympanic)   Resp 20   Wt (!) 168 lb 6.4 oz (76.4 kg)   SpO2 98%   Physical Exam  Vitals and nursing note reviewed. Exam conducted with a chaperone present. Constitutional:       General: He is active. He is not in acute distress. Appearance: Normal appearance. He is well-developed and normal weight. He is not toxic-appearing. HENT:      Head: Normocephalic and atraumatic. Nose: Nose normal. No congestion or rhinorrhea. Mouth/Throat:      Mouth: Mucous membranes are moist.      Pharynx: Oropharynx is clear. No oropharyngeal exudate or posterior oropharyngeal erythema. Eyes:      Conjunctiva/sclera: Conjunctivae normal.   Cardiovascular:      Rate and Rhythm: Normal rate and regular rhythm. Heart sounds: Normal heart sounds. No murmur heard. No friction rub. No gallop. Pulmonary:      Effort: Pulmonary effort is normal. No respiratory distress, nasal flaring or retractions. Breath sounds: Normal breath sounds. No stridor or decreased air movement. No wheezing, rhonchi or rales. Abdominal:      General: Abdomen is flat. Bowel sounds are normal. There is no distension. Palpations: Abdomen is soft. Tenderness: There is no abdominal tenderness. Musculoskeletal:      Cervical back: Normal range of motion and neck supple. No rigidity. No muscular tenderness. Lymphadenopathy:      Cervical: No cervical adenopathy. Skin:     General: Skin is warm and dry. Findings: No rash. Neurological:      Mental Status: He is alert.    Psychiatric:         Mood and Affect: Mood normal.         Behavior: Behavior normal.         Thought Content: Thought content normal.       Assessment and Plan:   1. ADHD (attention deficit hyperactivity disorder), combined type  Resume previous dose- call if any concerns- 3 months rx sent to pharmacy. - Dexmethylphenidate HCl ER 25 MG CP24; Take 1 capsule by mouth every morning for 30 days. Dispense: 30 capsule; Refill: 0  - Dexmethylphenidate HCl ER 25 MG CP24; Take 1 capsule by mouth every morning for 30 days. Dispense: 30 capsule; Refill: 0  - Dexmethylphenidate HCl ER 25 MG CP24; Take 1 capsule by mouth every morning for 30 days. Dispense: 30 capsule; Refill: 0    2. Severe obesity due to excess calories without serious comorbidity with body mass index (BMI) greater than 99th percentile for age in pediatric patient [facility])  Discussed dietary modifications and increasing exercise. 3. Refused influenza vaccine  Mother not interested. This chart note was prepared using a voice recognition dictation program. This note was reviewed for accuracy; however, addition, deletion and sound-alike word errors may occur. If there are any questions regarding this chart note, please contact the originating provider. Electronically signed by   Kaylee Cornell MD  10/12/2021   9:08 AM    Return in about 3 months (around 1/12/2022) for Medication Management. I have personally seen and examined the patient. I have collaborated with and supervised the

## 2022-07-13 NOTE — DISCHARGE NOTE PROVIDER - HOSPITAL COURSE
31y/oM PMH IDDM, HTN, afib on Eliquis, EtOH abuse presenting to hospital after seizure. Admitted to MICU 7/9, seizure thought to be 2/2 alcohol withdrawal. Hospital course complicated by DKA with elevated anion gap, pt treated with insulin gtt, gap closed in MICU. Transitioned to Lantus, premeal, iss; downgraded to floor 7/9. Pt was evaluated by Endo as he started having episodes of hypoglycemia which was symptomatic and his Insulin regimen was adjusted. Pt to be discharged on current Insulin regimen and will need close follow up with Endocrinology.     IDDM with hyperglycemia   EtOH abuse /seizure on admission /presumed thiamine deficiency - s/p librium taper  Chronic afib - Eliquis, metoprolol, follows with [facility] Dr Phipps and Dr Chavez

## 2022-07-16 LAB — ISLET CELL512 AB SER-ACNC: SIGNIFICANT CHANGE UP

## 2022-07-18 ENCOUNTER — EMERGENCY (EMERGENCY)
Facility: HOSPITAL | Age: 32
LOS: 1 days | Discharge: HOME | End: 2022-07-18
Attending: EMERGENCY MEDICINE
Payer: COMMERCIAL

## 2022-07-18 VITALS
RESPIRATION RATE: 18 BRPM | TEMPERATURE: 98 F | DIASTOLIC BLOOD PRESSURE: 78 MMHG | OXYGEN SATURATION: 98 % | HEIGHT: 67 IN | SYSTOLIC BLOOD PRESSURE: 113 MMHG | HEART RATE: 93 BPM

## 2022-07-18 LAB
GAD65 AB SER-MCNC: 0.01 NMOL/L — SIGNIFICANT CHANGE UP
GAS PNL BLDV: SIGNIFICANT CHANGE UP

## 2022-07-18 PROCEDURE — 85014 HEMATOCRIT: CPT

## 2022-07-18 PROCEDURE — 80053 COMPREHEN METABOLIC PANEL: CPT

## 2022-07-18 PROCEDURE — 84295 ASSAY OF SERUM SODIUM: CPT

## 2022-07-18 PROCEDURE — 85379 FIBRIN DEGRADATION QUANT: CPT

## 2022-07-18 PROCEDURE — 84484 ASSAY OF TROPONIN QUANT: CPT

## 2022-07-18 PROCEDURE — 99285 EMERGENCY DEPT VISIT HI MDM: CPT

## 2022-07-18 PROCEDURE — 82803 BLOOD GASES ANY COMBINATION: CPT

## 2022-07-18 PROCEDURE — 83735 ASSAY OF MAGNESIUM: CPT

## 2022-07-18 PROCEDURE — 71045 X-RAY EXAM CHEST 1 VIEW: CPT | Mod: 26

## 2022-07-18 PROCEDURE — 85025 COMPLETE CBC W/AUTO DIFF WBC: CPT

## 2022-07-18 PROCEDURE — 82962 GLUCOSE BLOOD TEST: CPT

## 2022-07-18 PROCEDURE — 83605 ASSAY OF LACTIC ACID: CPT

## 2022-07-18 PROCEDURE — 84132 ASSAY OF SERUM POTASSIUM: CPT

## 2022-07-18 PROCEDURE — 99285 EMERGENCY DEPT VISIT HI MDM: CPT | Mod: 25

## 2022-07-18 PROCEDURE — 82435 ASSAY OF BLOOD CHLORIDE: CPT

## 2022-07-18 PROCEDURE — 93005 ELECTROCARDIOGRAM TRACING: CPT

## 2022-07-18 PROCEDURE — 36415 COLL VENOUS BLD VENIPUNCTURE: CPT

## 2022-07-18 PROCEDURE — 93010 ELECTROCARDIOGRAM REPORT: CPT

## 2022-07-18 PROCEDURE — 96360 HYDRATION IV INFUSION INIT: CPT

## 2022-07-18 PROCEDURE — 85018 HEMOGLOBIN: CPT

## 2022-07-18 PROCEDURE — 82947 ASSAY GLUCOSE BLOOD QUANT: CPT

## 2022-07-18 PROCEDURE — 82330 ASSAY OF CALCIUM: CPT

## 2022-07-18 PROCEDURE — 71045 X-RAY EXAM CHEST 1 VIEW: CPT

## 2022-07-18 PROCEDURE — 82009 KETONE BODYS QUAL: CPT

## 2022-07-18 RX ORDER — SODIUM CHLORIDE 9 MG/ML
1000 INJECTION INTRAMUSCULAR; INTRAVENOUS; SUBCUTANEOUS ONCE
Refills: 0 | Status: COMPLETED | OUTPATIENT
Start: 2022-07-18 | End: 2022-07-18

## 2022-07-18 RX ADMIN — SODIUM CHLORIDE 1000 MILLILITER(S): 9 INJECTION INTRAMUSCULAR; INTRAVENOUS; SUBCUTANEOUS at 23:19

## 2022-07-18 NOTE — ED PROVIDER NOTE - PHYSICAL EXAMINATION
Gen: Well appearing in NAD  Head: NC/AT  Neck: trachea midline  Card: regular rate and rhythm  Resp:  CTAB  Abd: soft, non-distended, non-tender  Ext: no deformities  Neuro:  A&O appears non focal  Skin:  Warm and dry as visualized  Psych:  Normal affect and mood

## 2022-07-18 NOTE — ED PROVIDER NOTE - NSFOLLOWUPINSTRUCTIONS_ED_ALL_ED_FT
- Please follow-up with your primary care doctor.  Please call for an appointment in the next 1-3 days but if you cannot follow-up with your primary care doctor please return to the ED for any urgent issues.  - You were given a copy of the tests performed today.  Please bring the results with you and review them with your primary care doctor.  - If you have any worsening of symptoms or any other concerns please return to the ED immediately.    Nausea / Vomiting    Nausea is the feeling that you have to vomit. As nausea gets worse, it can lead to vomiting. Vomiting puts you at an increased risk for dehydration. Older adults and people with other diseases or a weak immune system are at higher risk for dehydration. Drink clear fluids in small but frequent amounts as tolerated. Eat bland, easy-to-digest foods in small amounts as tolerated.    SEEK IMMEDIATE MEDICAL CARE IF YOU HAVE ANY OF THE FOLLOWING SYMPTOMS: fever, inability to keep sufficient fluids down, black or bloody vomitus, black or bloody stools, lightheadedness/dizziness, chest pain, severe headache, rash, shortness of breath, cold or clammy skin, confusion, pain with urination, or any signs of dehydration.

## 2022-07-18 NOTE — ED ADULT NURSE NOTE - NSIMPLEMENTINTERV_GEN_ALL_ED
Implemented All Fall with Harm Risk Interventions:  [place] to call system. Call bell, personal items and telephone within reach. Instruct patient to call for assistance. Room bathroom lighting operational. Non-slip footwear when patient is off stretcher. Physically safe environment: no spills, clutter or unnecessary equipment. Stretcher in lowest position, wheels locked, appropriate side rails in place. Provide visual cue, wrist band, yellow gown, etc. Monitor gait and stability. Monitor for mental status changes and reorient to person, place, and time. Review medications for side effects contributing to fall risk. Reinforce activity limits and safety measures with patient and family. Provide visual clues: red socks.

## 2022-07-18 NOTE — ED PROVIDER NOTE - ATTENDING CONTRIBUTION TO CARE
Yadi: I performed a face to face bedside interview with patient regarding history of present illness, review of symptoms and past medical history. I completed an independent physical exam and ordered tests/medications as needed.  I have discussed patient's plan of care with the resident. The resident assisted in  executing the discussed plan. I was available for any questions or issues that may have arose during the execution of the plan of care.

## 2022-07-18 NOTE — ED PROVIDER NOTE - OBJECTIVE STATEMENT
32 y/o male with PMHx of HTN, etoh abuse, DM, pancreatitis, a-fib on Eliquis, presents to the ED c/o dizziness, "unable to see straight" nauseous and left sided chest pain that hurts when he breathes in not associated with SOB. Pt states he drank 2, 24 oz beers earlier today, states he usually drinks more daily. Pt states he does not feel like he is withdrawing at this time. Pt denies abdominal pain, dysuria,. Pt endorses polyuria and polydipsia. Pt takes 10 units insulin before he eats, states he has not missed any doses since his discharge. Pt states he ran out of Eliquis, ran out of it one month ago

## 2022-07-18 NOTE — ED ADULT TRIAGE NOTE - CHIEF COMPLAINT QUOTE
pt c/o left side of chest hurts when take deep breath, pt admits to drink yesterday and feel anxious that he drank one beer today to calm his anxiety. hx of a-fib

## 2022-07-18 NOTE — ED PROVIDER NOTE - CLINICAL SUMMARY MEDICAL DECISION MAKING FREE TEXT BOX
31y male with history of HTN, etoh abuse, DM, a-fib on Eliquis presenting with dizziness, blurred vision, nausea with chest pain. Endorses to daily drinking with last ingestion 4 hours ago. RRR, lungs clear, abdomen soft, nontender, no LE edema. neurovascularly intact. Labs, ivf, cxr, reassess.

## 2022-07-18 NOTE — ED PROVIDER NOTE - PROGRESS NOTE DETAILS
labs, cxr reviewed. stable on reassessment, asymptomatic. patient awake, ambulating around ED requesting DC at this time. -DO Cristian

## 2022-07-18 NOTE — ED PROVIDER NOTE - PATIENT PORTAL LINK FT
You can access the FollowMyHealth Patient Portal offered by [facility] by registering at the following website: [email or web address]. By joining [employer]’s FollowMyHealth portal, you will also be able to view your health information using other applications (apps) compatible with our system.

## 2022-07-18 NOTE — ED ADULT NURSE NOTE - OBJECTIVE STATEMENT
c/o dizziness, nausea, and left sided CP. PMH of ETOH abuse, DM, pancreatitis, a fib on eliquis. Pt stated he had 2, 24 ounce beers today prior to arrival. Pt denies SOB, V/D, palpitations, fevers, chills. Pt AOx4, speaking coherently respirations even and unlabored on RA, skin warm and dry. Bed locked in the lowest position, side rails up.

## 2022-07-19 VITALS
RESPIRATION RATE: 18 BRPM | HEART RATE: 87 BPM | DIASTOLIC BLOOD PRESSURE: 76 MMHG | SYSTOLIC BLOOD PRESSURE: 113 MMHG | OXYGEN SATURATION: 100 % | TEMPERATURE: 98 F

## 2022-07-19 LAB
ACETONE SERPL-MCNC: NEGATIVE — SIGNIFICANT CHANGE UP
ALBUMIN SERPL ELPH-MCNC: 3.8 G/DL — SIGNIFICANT CHANGE UP (ref 3.3–5.2)
ALP SERPL-CCNC: 113 U/L — SIGNIFICANT CHANGE UP (ref 40–120)
ALT FLD-CCNC: 51 U/L — HIGH
ANION GAP SERPL CALC-SCNC: 12 MMOL/L — SIGNIFICANT CHANGE UP (ref 5–17)
ANISOCYTOSIS BLD QL: SLIGHT — SIGNIFICANT CHANGE UP
AST SERPL-CCNC: 67 U/L — HIGH
BASE EXCESS BLDV CALC-SCNC: 0.7 MMOL/L — SIGNIFICANT CHANGE UP (ref -2–3)
BASOPHILS # BLD AUTO: 0.05 K/UL — SIGNIFICANT CHANGE UP (ref 0–0.2)
BASOPHILS NFR BLD AUTO: 1.8 % — SIGNIFICANT CHANGE UP (ref 0–2)
BILIRUB SERPL-MCNC: 0.3 MG/DL — LOW (ref 0.4–2)
BUN SERPL-MCNC: 5.2 MG/DL — LOW (ref 8–20)
CA-I SERPL-SCNC: 1.13 MMOL/L — LOW (ref 1.15–1.33)
CALCIUM SERPL-MCNC: 8.3 MG/DL — LOW (ref 8.6–10.2)
CHLORIDE BLDV-SCNC: 109 MMOL/L — HIGH (ref 98–107)
CHLORIDE SERPL-SCNC: 106 MMOL/L — SIGNIFICANT CHANGE UP (ref 98–107)
CO2 SERPL-SCNC: 24 MMOL/L — SIGNIFICANT CHANGE UP (ref 22–29)
CREAT SERPL-MCNC: 0.66 MG/DL — SIGNIFICANT CHANGE UP (ref 0.5–1.3)
D DIMER BLD IA.RAPID-MCNC: <150 NG/ML DDU — SIGNIFICANT CHANGE UP
EGFR: 128 ML/MIN/1.73M2 — SIGNIFICANT CHANGE UP
EOSINOPHIL # BLD AUTO: 0.18 K/UL — SIGNIFICANT CHANGE UP (ref 0–0.5)
EOSINOPHIL NFR BLD AUTO: 6.2 % — HIGH (ref 0–6)
GAS PNL BLDV: 142 MMOL/L — SIGNIFICANT CHANGE UP (ref 136–145)
GIANT PLATELETS BLD QL SMEAR: PRESENT — SIGNIFICANT CHANGE UP
GLUCOSE BLDV-MCNC: 202 MG/DL — HIGH (ref 70–99)
GLUCOSE SERPL-MCNC: 203 MG/DL — HIGH (ref 70–99)
HCO3 BLDV-SCNC: 28 MMOL/L — SIGNIFICANT CHANGE UP (ref 22–29)
HCT VFR BLD CALC: 34.6 % — LOW (ref 39–50)
HCT VFR BLDA CALC: 35 % — SIGNIFICANT CHANGE UP
HGB BLD CALC-MCNC: 11.7 G/DL — LOW (ref 12.6–17.4)
HGB BLD-MCNC: 11.2 G/DL — LOW (ref 13–17)
LACTATE BLDV-MCNC: 2.1 MMOL/L — HIGH (ref 0.5–2)
LYMPHOCYTES # BLD AUTO: 1.45 K/UL — SIGNIFICANT CHANGE UP (ref 1–3.3)
LYMPHOCYTES # BLD AUTO: 49.6 % — HIGH (ref 13–44)
MACROCYTES BLD QL: SLIGHT — SIGNIFICANT CHANGE UP
MAGNESIUM SERPL-MCNC: 1.7 MG/DL — SIGNIFICANT CHANGE UP (ref 1.6–2.6)
MANUAL SMEAR VERIFICATION: SIGNIFICANT CHANGE UP
MCHC RBC-ENTMCNC: 31.5 PG — SIGNIFICANT CHANGE UP (ref 27–34)
MCHC RBC-ENTMCNC: 32.4 GM/DL — SIGNIFICANT CHANGE UP (ref 32–36)
MCV RBC AUTO: 97.5 FL — SIGNIFICANT CHANGE UP (ref 80–100)
MONOCYTES # BLD AUTO: 0.26 K/UL — SIGNIFICANT CHANGE UP (ref 0–0.9)
MONOCYTES NFR BLD AUTO: 8.8 % — SIGNIFICANT CHANGE UP (ref 2–14)
NEUTROPHILS # BLD AUTO: 0.88 K/UL — LOW (ref 1.8–7.4)
NEUTROPHILS NFR BLD AUTO: 30.1 % — LOW (ref 43–77)
PCO2 BLDV: 62 MMHG — HIGH (ref 42–55)
PH BLDV: 7.26 — LOW (ref 7.32–7.43)
PLAT MORPH BLD: NORMAL — SIGNIFICANT CHANGE UP
PLATELET # BLD AUTO: 296 K/UL — SIGNIFICANT CHANGE UP (ref 150–400)
PO2 BLDV: 55 MMHG — HIGH (ref 25–45)
POLYCHROMASIA BLD QL SMEAR: SLIGHT — SIGNIFICANT CHANGE UP
POTASSIUM BLDV-SCNC: 5 MMOL/L — SIGNIFICANT CHANGE UP (ref 3.5–5.1)
POTASSIUM SERPL-MCNC: 5.1 MMOL/L — SIGNIFICANT CHANGE UP (ref 3.5–5.3)
POTASSIUM SERPL-SCNC: 5.1 MMOL/L — SIGNIFICANT CHANGE UP (ref 3.5–5.3)
PROT SERPL-MCNC: 6.4 G/DL — LOW (ref 6.6–8.7)
RBC # BLD: 3.55 M/UL — LOW (ref 4.2–5.8)
RBC # FLD: 16.4 % — HIGH (ref 10.3–14.5)
RBC BLD AUTO: NORMAL — SIGNIFICANT CHANGE UP
SAO2 % BLDV: 80.8 % — SIGNIFICANT CHANGE UP
SMUDGE CELLS # BLD: PRESENT — SIGNIFICANT CHANGE UP
SODIUM SERPL-SCNC: 142 MMOL/L — SIGNIFICANT CHANGE UP (ref 135–145)
TROPONIN T SERPL-MCNC: <0.01 NG/ML — SIGNIFICANT CHANGE UP (ref 0–0.06)
VARIANT LYMPHS # BLD: 3.5 % — SIGNIFICANT CHANGE UP (ref 0–6)
WBC # BLD: 2.92 K/UL — LOW (ref 3.8–10.5)
WBC # FLD AUTO: 2.92 K/UL — LOW (ref 3.8–10.5)

## 2022-07-20 ENCOUNTER — EMERGENCY (EMERGENCY)
Facility: HOSPITAL | Age: 32
LOS: 1 days | Discharge: HOME | End: 2022-07-20
Attending: EMERGENCY MEDICINE
Payer: COMMERCIAL

## 2022-07-20 VITALS
TEMPERATURE: 98 F | HEART RATE: 80 BPM | RESPIRATION RATE: 16 BRPM | OXYGEN SATURATION: 99 % | WEIGHT: 151.9 LBS | SYSTOLIC BLOOD PRESSURE: 106 MMHG | DIASTOLIC BLOOD PRESSURE: 71 MMHG | HEIGHT: 67 IN

## 2022-07-20 VITALS
HEART RATE: 78 BPM | OXYGEN SATURATION: 100 % | DIASTOLIC BLOOD PRESSURE: 68 MMHG | TEMPERATURE: 98 F | SYSTOLIC BLOOD PRESSURE: 117 MMHG | RESPIRATION RATE: 18 BRPM

## 2022-07-20 LAB
ALBUMIN SERPL ELPH-MCNC: 4.1 G/DL — SIGNIFICANT CHANGE UP (ref 3.3–5.2)
ALP SERPL-CCNC: 133 U/L — HIGH (ref 40–120)
ALT FLD-CCNC: 44 U/L — HIGH
AMPHET UR-MCNC: NEGATIVE — SIGNIFICANT CHANGE UP
ANION GAP SERPL CALC-SCNC: 10 MMOL/L — SIGNIFICANT CHANGE UP (ref 5–17)
AST SERPL-CCNC: 68 U/L — HIGH
BARBITURATES UR SCN-MCNC: NEGATIVE — SIGNIFICANT CHANGE UP
BASOPHILS # BLD AUTO: 0.05 K/UL — SIGNIFICANT CHANGE UP (ref 0–0.2)
BASOPHILS NFR BLD AUTO: 1.4 % — SIGNIFICANT CHANGE UP (ref 0–2)
BENZODIAZ UR-MCNC: POSITIVE
BILIRUB SERPL-MCNC: 0.4 MG/DL — SIGNIFICANT CHANGE UP (ref 0.4–2)
BUN SERPL-MCNC: 3.3 MG/DL — LOW (ref 8–20)
CALCIUM SERPL-MCNC: 8.5 MG/DL — LOW (ref 8.6–10.2)
CHLORIDE SERPL-SCNC: 108 MMOL/L — HIGH (ref 98–107)
CO2 SERPL-SCNC: 28 MMOL/L — SIGNIFICANT CHANGE UP (ref 22–29)
COCAINE METAB.OTHER UR-MCNC: NEGATIVE — SIGNIFICANT CHANGE UP
CREAT SERPL-MCNC: 0.55 MG/DL — SIGNIFICANT CHANGE UP (ref 0.5–1.3)
EGFR: 135 ML/MIN/1.73M2 — SIGNIFICANT CHANGE UP
EOSINOPHIL # BLD AUTO: 0.09 K/UL — SIGNIFICANT CHANGE UP (ref 0–0.5)
EOSINOPHIL NFR BLD AUTO: 2.5 % — SIGNIFICANT CHANGE UP (ref 0–6)
ETHANOL SERPL-MCNC: 418 MG/DL — HIGH (ref 0–9)
GLUCOSE SERPL-MCNC: 193 MG/DL — HIGH (ref 70–99)
HCT VFR BLD CALC: 37.9 % — LOW (ref 39–50)
HGB BLD-MCNC: 12.2 G/DL — LOW (ref 13–17)
IMM GRANULOCYTES NFR BLD AUTO: 0.6 % — SIGNIFICANT CHANGE UP (ref 0–1.5)
LYMPHOCYTES # BLD AUTO: 1.98 K/UL — SIGNIFICANT CHANGE UP (ref 1–3.3)
LYMPHOCYTES # BLD AUTO: 55.9 % — HIGH (ref 13–44)
MCHC RBC-ENTMCNC: 31.1 PG — SIGNIFICANT CHANGE UP (ref 27–34)
MCHC RBC-ENTMCNC: 32.2 GM/DL — SIGNIFICANT CHANGE UP (ref 32–36)
MCV RBC AUTO: 96.7 FL — SIGNIFICANT CHANGE UP (ref 80–100)
METHADONE UR-MCNC: NEGATIVE — SIGNIFICANT CHANGE UP
MONOCYTES # BLD AUTO: 0.21 K/UL — SIGNIFICANT CHANGE UP (ref 0–0.9)
MONOCYTES NFR BLD AUTO: 5.9 % — SIGNIFICANT CHANGE UP (ref 2–14)
NEUTROPHILS # BLD AUTO: 1.19 K/UL — LOW (ref 1.8–7.4)
NEUTROPHILS NFR BLD AUTO: 33.7 % — LOW (ref 43–77)
OPIATES UR-MCNC: NEGATIVE — SIGNIFICANT CHANGE UP
PCP SPEC-MCNC: SIGNIFICANT CHANGE UP
PCP UR-MCNC: NEGATIVE — SIGNIFICANT CHANGE UP
PLATELET # BLD AUTO: 338 K/UL — SIGNIFICANT CHANGE UP (ref 150–400)
POTASSIUM SERPL-MCNC: 4 MMOL/L — SIGNIFICANT CHANGE UP (ref 3.5–5.3)
POTASSIUM SERPL-SCNC: 4 MMOL/L — SIGNIFICANT CHANGE UP (ref 3.5–5.3)
PROT SERPL-MCNC: 6.7 G/DL — SIGNIFICANT CHANGE UP (ref 6.6–8.7)
RBC # BLD: 3.92 M/UL — LOW (ref 4.2–5.8)
RBC # FLD: 16.3 % — HIGH (ref 10.3–14.5)
SODIUM SERPL-SCNC: 146 MMOL/L — HIGH (ref 135–145)
THC UR QL: NEGATIVE — SIGNIFICANT CHANGE UP
WBC # BLD: 3.54 K/UL — LOW (ref 3.8–10.5)
WBC # FLD AUTO: 3.54 K/UL — LOW (ref 3.8–10.5)

## 2022-07-20 PROCEDURE — 70450 CT HEAD/BRAIN W/O DYE: CPT | Mod: MG

## 2022-07-20 PROCEDURE — 93005 ELECTROCARDIOGRAM TRACING: CPT

## 2022-07-20 PROCEDURE — 70450 CT HEAD/BRAIN W/O DYE: CPT | Mod: 26,MG

## 2022-07-20 PROCEDURE — 96360 HYDRATION IV INFUSION INIT: CPT

## 2022-07-20 PROCEDURE — 12011 RPR F/E/E/N/L/M 2.5 CM/<: CPT

## 2022-07-20 PROCEDURE — 99285 EMERGENCY DEPT VISIT HI MDM: CPT | Mod: 25

## 2022-07-20 PROCEDURE — G1004: CPT

## 2022-07-20 PROCEDURE — 80307 DRUG TEST PRSMV CHEM ANLYZR: CPT

## 2022-07-20 PROCEDURE — 93010 ELECTROCARDIOGRAM REPORT: CPT

## 2022-07-20 PROCEDURE — 82962 GLUCOSE BLOOD TEST: CPT

## 2022-07-20 PROCEDURE — 80053 COMPREHEN METABOLIC PANEL: CPT

## 2022-07-20 PROCEDURE — 99284 EMERGENCY DEPT VISIT MOD MDM: CPT

## 2022-07-20 PROCEDURE — 85025 COMPLETE CBC W/AUTO DIFF WBC: CPT

## 2022-07-20 PROCEDURE — 36415 COLL VENOUS BLD VENIPUNCTURE: CPT

## 2022-07-20 PROCEDURE — 87637 SARSCOV2&INF A&B&RSV AMP PRB: CPT

## 2022-07-20 RX ORDER — SODIUM CHLORIDE 9 MG/ML
1000 INJECTION INTRAMUSCULAR; INTRAVENOUS; SUBCUTANEOUS ONCE
Refills: 0 | Status: COMPLETED | OUTPATIENT
Start: 2022-07-20 | End: 2022-07-20

## 2022-07-20 RX ADMIN — SODIUM CHLORIDE 1000 MILLILITER(S): 9 INJECTION INTRAMUSCULAR; INTRAVENOUS; SUBCUTANEOUS at 20:41

## 2022-07-20 RX ADMIN — Medication 100 MILLIGRAM(S): at 22:15

## 2022-07-20 NOTE — ED CDU PROVIDER DISPOSITION NOTE - PATIENT PORTAL LINK FT
You can access the FollowMyHealth Patient Portal offered by [facility] by registering at the following website: [email or web address]. By joining [employer]’s FollowMyHealth portal, you will also be able to view your health information using other applications (apps) compatible with our system.

## 2022-07-20 NOTE — ED ADULT NURSE NOTE - COVID-19 RESULT
Improving Improving Improving Improving Improving Improving No Change Improving No Change No Change Improving NEGATIVE Improving Improving No Change Improving

## 2022-07-20 NOTE — ED PROVIDER NOTE - OBJECTIVE STATEMENT
32 y/o male hx etoh abuse, afib on eliquis (though poor compliance), father reports his last use yesterday, IDDM present with syncopal episode in bathroom. pt states drank a lot of etoh, dizzy doesn't remember falling. father states mother heard and found pt down in bathroom, responsive. pt denies any focal complaints states just doesn't feel well which is how he feels when he drinks this much. no other complaints.     limited ros by historian.

## 2022-07-20 NOTE — ED PROVIDER NOTE - PHYSICAL EXAMINATION
Gen: No acute distress, non toxic  HEENT: Mucous membranes moist, pink conjunctivae, EOMI. 2 cm lac above left eyebrow. no other head trauma. no tongue lac  CV: RRR, nl s1/s2.  Resp: CTAB, normal rate and effort  GI: Abdomen soft, NT, ND. No rebound, no guarding  : No CVAT  Neuro: A&O x 3, moving all 4 extremities. slurring words. moving all extremities.   MSK: No spine or joint tenderness to palpation  Skin: No rashes. intact and perfused.

## 2022-07-20 NOTE — ED ADULT TRIAGE NOTE - CHIEF COMPLAINT QUOTE
31M, nad, c/o syncope today, unsure of time, reports nausea and vomiting x 3 days and passed out in bathroom and found by parents approx 30 min pta. Pt with laceration above left eyebrow from fall. Pt admits to left chest pain x 2 weeks after being asked, will send for EKG now.

## 2022-07-20 NOTE — ED CDU PROVIDER DISPOSITION NOTE - CLINICAL COURSE
no withdrawal. steady gait, pt states would like to go home and follow up with [facility] in morning as [facility] scheduled. father aware and agrees with plan and to pick pt up for safe dispo, will be with pt. return precautions. [facility] enrollment process started per Justine from [facility] and to f/u in morning at home. no withdrawal. steady gait, pt states would like to go home and follow up with [facility] in morning as [facility] scheduled. father aware and agrees with plan and to pick pt up for safe dispo, will be with pt. return precautions. [facility] enrollment process started per Justine from [facility] and to f/u in morning at home. lac return instructions given to pt and fathre.

## 2022-07-20 NOTE — ED CDU PROVIDER DISPOSITION NOTE - NSFOLLOWUPINSTRUCTIONS_ED_ALL_ED_FT
Alcohol Abuse    Alcohol intoxication occurs when the amount of alcohol that a person has consumed impairs his or her ability to mentally and physically function. Chronic alcohol consumption can also lead to a variety of health issues including neurological disease, stomach disease, heart disease, liver disease, etc. Do not drive after drinking alcohol. Drinking enough alcohol to end up in an Emergency Room suggests you may have an alcohol abuse problem. Seek help at a drug addiction center.    SEEK IMMEDIATE MEDICAL CARE IF YOU HAVE ANY OF THE FOLLOWING SYMPTOMS: seizures, vomiting blood, blood in your stool, lightheadedness/dizziness, or becoming shaky to tremulous when you stop drinking. Alcohol Abuse    Alcohol intoxication occurs when the amount of alcohol that a person has consumed impairs his or her ability to mentally and physically function. Chronic alcohol consumption can also lead to a variety of health issues including neurological disease, stomach disease, heart disease, liver disease, etc. Do not drive after drinking alcohol. Drinking enough alcohol to end up in an Emergency Room suggests you may have an alcohol abuse problem. Seek help at a drug addiction center.    SEEK IMMEDIATE MEDICAL CARE IF YOU HAVE ANY OF THE FOLLOWING SYMPTOMS: seizures, vomiting blood, blood in your stool, lightheadedness/dizziness, or becoming shaky to tremulous when you stop drinking.    Laceration    A laceration is a cut that goes through all of the layers of the skin and into the tissue that is right under the skin. Some lacerations heal on their own. Others need to be closed with skin adhesive strips, skin glue, stitches (sutures), or staples. Proper laceration care minimizes the risk of infection and helps the laceration to heal better.  If non-absorbable stitches or staples have been placed, they must be taken out within the time frame instructed by your healthcare provider.    SEEK IMMEDIATE MEDICAL CARE IF YOU HAVE ANY OF THE FOLLOWING SYMPTOMS: swelling around the wound, worsening pain, drainage from the wound, red streaking going away from your wound, inability to move finger or toe near the laceration, or discoloration of skin near the laceration.

## 2022-07-20 NOTE — SBIRT NOTE ADULT - NSSBIRTALCPASSREFTXDET_GEN_A_CORE
Provided SBIRT services: Full screen positive. Referral to Treatment Performed. Screening results were reviewed with the patient and patient was provided information about healthy guidelines and potential negative consequences associated with level of risk. Motivation and readiness to reduce or stop use was discussed and goals and activities to make changes were suggested/offered.  Referral for complete assessment and level of care determination at a certified treatment facility was completed by contacting the treatment facility via phone.  Pending medical clearance; SO contacted by health : [phone]; patient and his family will follow-up tomorrow for active referral to treatment; tSBIRT: [phone].

## 2022-07-20 NOTE — ED PROVIDER NOTE - CLINICAL SUMMARY MEDICAL DECISION MAKING FREE TEXT BOX
pt with intox, syncope/head injury. no other trauma, no tongue lac, no other complaints. lac repaired, ct head, basic labs/hydration. parents bedside to take pt home for safe dispo.

## 2022-07-20 NOTE — ED PROVIDER NOTE - PROGRESS NOTE DETAILS
Xochilt: pt with etoh intox and daily use. sbirt discussed with pt/parents about treatment, arranging [facility]. will observe, sbirt/sw in morning again. yury protocol

## 2022-07-21 LAB
FLUAV AG NPH QL: SIGNIFICANT CHANGE UP
FLUBV AG NPH QL: SIGNIFICANT CHANGE UP
RSV RNA NPH QL NAA+NON-PROBE: SIGNIFICANT CHANGE UP
SARS-COV-2 RNA SPEC QL NAA+PROBE: DETECTED

## 2022-08-11 PROCEDURE — 82947 ASSAY GLUCOSE BLOOD QUANT: CPT

## 2022-08-11 PROCEDURE — 85014 HEMATOCRIT: CPT

## 2022-08-11 PROCEDURE — 80053 COMPREHEN METABOLIC PANEL: CPT

## 2022-08-11 PROCEDURE — 80048 BASIC METABOLIC PNL TOTAL CA: CPT

## 2022-08-11 PROCEDURE — 83605 ASSAY OF LACTIC ACID: CPT

## 2022-08-11 PROCEDURE — 99285 EMERGENCY DEPT VISIT HI MDM: CPT | Mod: 25

## 2022-08-11 PROCEDURE — 83735 ASSAY OF MAGNESIUM: CPT

## 2022-08-11 PROCEDURE — 85018 HEMOGLOBIN: CPT

## 2022-08-11 PROCEDURE — 81003 URINALYSIS AUTO W/O SCOPE: CPT

## 2022-08-11 PROCEDURE — 82010 KETONE BODYS QUAN: CPT

## 2022-08-11 PROCEDURE — 86703 HIV-1/HIV-2 1 RESULT ANTBDY: CPT

## 2022-08-11 PROCEDURE — 96374 THER/PROPH/DIAG INJ IV PUSH: CPT

## 2022-08-11 PROCEDURE — 82962 GLUCOSE BLOOD TEST: CPT

## 2022-08-11 PROCEDURE — 84132 ASSAY OF SERUM POTASSIUM: CPT

## 2022-08-11 PROCEDURE — 85027 COMPLETE CBC AUTOMATED: CPT

## 2022-08-11 PROCEDURE — 84295 ASSAY OF SERUM SODIUM: CPT

## 2022-08-11 PROCEDURE — 82803 BLOOD GASES ANY COMBINATION: CPT

## 2022-08-11 PROCEDURE — 83036 HEMOGLOBIN GLYCOSYLATED A1C: CPT

## 2022-08-11 PROCEDURE — 96375 TX/PRO/DX INJ NEW DRUG ADDON: CPT

## 2022-08-11 PROCEDURE — 80307 DRUG TEST PRSMV CHEM ANLYZR: CPT

## 2022-08-11 PROCEDURE — 85025 COMPLETE CBC W/AUTO DIFF WBC: CPT

## 2022-08-11 PROCEDURE — 84100 ASSAY OF PHOSPHORUS: CPT

## 2022-08-11 PROCEDURE — 86341 ISLET CELL ANTIBODY: CPT

## 2022-08-11 PROCEDURE — 82330 ASSAY OF CALCIUM: CPT

## 2022-08-11 PROCEDURE — 70450 CT HEAD/BRAIN W/O DYE: CPT | Mod: MA

## 2022-08-11 PROCEDURE — 36415 COLL VENOUS BLD VENIPUNCTURE: CPT

## 2022-08-11 PROCEDURE — U0005: CPT

## 2022-08-11 PROCEDURE — 83690 ASSAY OF LIPASE: CPT

## 2022-08-11 PROCEDURE — U0003: CPT

## 2022-08-11 PROCEDURE — 84681 ASSAY OF C-PEPTIDE: CPT

## 2022-08-11 PROCEDURE — 82435 ASSAY OF BLOOD CHLORIDE: CPT

## 2022-09-25 ENCOUNTER — INPATIENT (INPATIENT)
Facility: HOSPITAL | Age: 32
LOS: 3 days | Discharge: HOME | DRG: 682 | End: 2022-09-29
Attending: FAMILY MEDICINE | Admitting: INTERNAL MEDICINE
Payer: COMMERCIAL

## 2022-09-25 VITALS
OXYGEN SATURATION: 99 % | RESPIRATION RATE: 16 BRPM | HEIGHT: 67 IN | DIASTOLIC BLOOD PRESSURE: 82 MMHG | WEIGHT: 162.92 LBS | TEMPERATURE: 98 F | HEART RATE: 141 BPM | SYSTOLIC BLOOD PRESSURE: 125 MMHG

## 2022-09-25 LAB
ALBUMIN SERPL ELPH-MCNC: 6.4 G/DL — HIGH (ref 3.3–5.2)
ALP SERPL-CCNC: 157 U/L — HIGH (ref 40–120)
ALT FLD-CCNC: 30 U/L — SIGNIFICANT CHANGE UP
ANION GAP SERPL CALC-SCNC: 54 MMOL/L — HIGH (ref 5–17)
APTT BLD: 31.9 SEC — SIGNIFICANT CHANGE UP (ref 27.5–35.5)
AST SERPL-CCNC: 41 U/L — HIGH
BASE EXCESS BLDV CALC-SCNC: -24 MMOL/L — LOW (ref -2–3)
BASOPHILS # BLD AUTO: 0 K/UL — SIGNIFICANT CHANGE UP (ref 0–0.2)
BASOPHILS NFR BLD AUTO: 0 % — SIGNIFICANT CHANGE UP (ref 0–2)
BILIRUB SERPL-MCNC: 3.5 MG/DL — HIGH (ref 0.4–2)
BUN SERPL-MCNC: 50.9 MG/DL — HIGH (ref 8–20)
CA-I SERPL-SCNC: 0.97 MMOL/L — LOW (ref 1.15–1.33)
CALCIUM SERPL-MCNC: 9 MG/DL — SIGNIFICANT CHANGE UP (ref 8.4–10.5)
CHLORIDE BLDV-SCNC: 81 MMOL/L — LOW (ref 98–107)
CHLORIDE SERPL-SCNC: 71 MMOL/L — LOW (ref 98–107)
CO2 SERPL-SCNC: 6 MMOL/L — CRITICAL LOW (ref 22–29)
CREAT SERPL-MCNC: 3.76 MG/DL — HIGH (ref 0.5–1.3)
EGFR: 21 ML/MIN/1.73M2 — LOW
EOSINOPHIL # BLD AUTO: 0 K/UL — SIGNIFICANT CHANGE UP (ref 0–0.5)
EOSINOPHIL NFR BLD AUTO: 0 % — SIGNIFICANT CHANGE UP (ref 0–6)
ETHANOL SERPL-MCNC: <10 MG/DL — SIGNIFICANT CHANGE UP (ref 0–9)
FLUAV AG NPH QL: SIGNIFICANT CHANGE UP
FLUBV AG NPH QL: SIGNIFICANT CHANGE UP
GAS PNL BLDV: 130 MMOL/L — LOW (ref 136–145)
GAS PNL BLDV: SIGNIFICANT CHANGE UP
GLUCOSE BLDV-MCNC: >656 MG/DL — CRITICAL HIGH (ref 70–99)
GLUCOSE SERPL-MCNC: 852 MG/DL — CRITICAL HIGH (ref 70–99)
HCO3 BLDV-SCNC: 7 MMOL/L — CRITICAL LOW (ref 22–29)
HCT VFR BLD CALC: 46.5 % — SIGNIFICANT CHANGE UP (ref 39–50)
HCT VFR BLDA CALC: 44 % — SIGNIFICANT CHANGE UP
HGB BLD CALC-MCNC: 14.6 G/DL — SIGNIFICANT CHANGE UP (ref 12.6–17.4)
HGB BLD-MCNC: 14.5 G/DL — SIGNIFICANT CHANGE UP (ref 13–17)
INR BLD: 1.03 RATIO — SIGNIFICANT CHANGE UP (ref 0.88–1.16)
LACTATE BLDV-MCNC: 6.8 MMOL/L — CRITICAL HIGH (ref 0.5–2)
LIDOCAIN IGE QN: 100 U/L — HIGH (ref 22–51)
LIDOCAIN IGE QN: 114 U/L — HIGH (ref 22–51)
LYMPHOCYTES # BLD AUTO: 0.12 K/UL — LOW (ref 1–3.3)
LYMPHOCYTES # BLD AUTO: 0.9 % — LOW (ref 13–44)
MAGNESIUM SERPL-MCNC: 2.7 MG/DL — HIGH (ref 1.6–2.6)
MANUAL SMEAR VERIFICATION: SIGNIFICANT CHANGE UP
MCHC RBC-ENTMCNC: 28.8 PG — SIGNIFICANT CHANGE UP (ref 27–34)
MCHC RBC-ENTMCNC: 31.2 GM/DL — LOW (ref 32–36)
MCV RBC AUTO: 92.4 FL — SIGNIFICANT CHANGE UP (ref 80–100)
MONOCYTES # BLD AUTO: 0.59 K/UL — SIGNIFICANT CHANGE UP (ref 0–0.9)
MONOCYTES NFR BLD AUTO: 4.3 % — SIGNIFICANT CHANGE UP (ref 2–14)
MYELOCYTES NFR BLD: 0.9 % — HIGH (ref 0–0)
NEUTROPHILS # BLD AUTO: 12.9 K/UL — HIGH (ref 1.8–7.4)
NEUTROPHILS NFR BLD AUTO: 93 % — HIGH (ref 43–77)
NEUTS BAND # BLD: 0.9 % — SIGNIFICANT CHANGE UP (ref 0–8)
NRBC # BLD: 1 /100 — HIGH (ref 0–0)
NT-PROBNP SERPL-SCNC: 2467 PG/ML — HIGH (ref 0–300)
PCO2 BLDV: 27 MMHG — LOW (ref 42–55)
PH BLDV: 7.02 — CRITICAL LOW (ref 7.32–7.43)
PHOSPHATE SERPL-MCNC: 10.6 MG/DL — HIGH (ref 2.4–4.7)
PLAT MORPH BLD: NORMAL — SIGNIFICANT CHANGE UP
PLATELET # BLD AUTO: 132 K/UL — LOW (ref 150–400)
PO2 BLDV: 46 MMHG — HIGH (ref 25–45)
POTASSIUM BLDV-SCNC: 5.4 MMOL/L — HIGH (ref 3.5–5.1)
POTASSIUM SERPL-MCNC: 5.2 MMOL/L — SIGNIFICANT CHANGE UP (ref 3.5–5.3)
POTASSIUM SERPL-SCNC: 5.2 MMOL/L — SIGNIFICANT CHANGE UP (ref 3.5–5.3)
PROT SERPL-MCNC: 9.9 G/DL — HIGH (ref 6.6–8.7)
PROTHROM AB SERPL-ACNC: 12 SEC — SIGNIFICANT CHANGE UP (ref 10.5–13.4)
RBC # BLD: 5.03 M/UL — SIGNIFICANT CHANGE UP (ref 4.2–5.8)
RBC # FLD: 14.7 % — HIGH (ref 10.3–14.5)
RBC BLD AUTO: NORMAL — SIGNIFICANT CHANGE UP
RSV RNA NPH QL NAA+NON-PROBE: SIGNIFICANT CHANGE UP
SAO2 % BLDV: 60.2 % — SIGNIFICANT CHANGE UP
SARS-COV-2 RNA SPEC QL NAA+PROBE: SIGNIFICANT CHANGE UP
SODIUM SERPL-SCNC: 131 MMOL/L — LOW (ref 135–145)
TROPONIN T SERPL-MCNC: 0.01 NG/ML — SIGNIFICANT CHANGE UP (ref 0–0.06)
WBC # BLD: 13.74 K/UL — HIGH (ref 3.8–10.5)
WBC # FLD AUTO: 13.74 K/UL — HIGH (ref 3.8–10.5)

## 2022-09-25 PROCEDURE — 93010 ELECTROCARDIOGRAM REPORT: CPT

## 2022-09-25 PROCEDURE — 71045 X-RAY EXAM CHEST 1 VIEW: CPT | Mod: 26

## 2022-09-25 PROCEDURE — 99285 EMERGENCY DEPT VISIT HI MDM: CPT

## 2022-09-25 RX ORDER — SODIUM CHLORIDE 9 MG/ML
1000 INJECTION, SOLUTION INTRAVENOUS
Refills: 0 | Status: DISCONTINUED | OUTPATIENT
Start: 2022-09-25 | End: 2022-09-26

## 2022-09-25 RX ORDER — DIAZEPAM 5 MG
5 TABLET ORAL ONCE
Refills: 0 | Status: DISCONTINUED | OUTPATIENT
Start: 2022-09-25 | End: 2022-09-25

## 2022-09-25 RX ORDER — SODIUM CHLORIDE 9 MG/ML
1000 INJECTION, SOLUTION INTRAVENOUS
Refills: 0 | Status: DISCONTINUED | OUTPATIENT
Start: 2022-09-25 | End: 2022-09-25

## 2022-09-25 RX ORDER — SODIUM CHLORIDE 9 MG/ML
2000 INJECTION, SOLUTION INTRAVENOUS ONCE
Refills: 0 | Status: COMPLETED | OUTPATIENT
Start: 2022-09-25 | End: 2022-09-25

## 2022-09-25 RX ORDER — DIAZEPAM 5 MG
10 TABLET ORAL EVERY 6 HOURS
Refills: 0 | Status: DISCONTINUED | OUTPATIENT
Start: 2022-09-25 | End: 2022-09-26

## 2022-09-25 RX ORDER — INSULIN HUMAN 100 [IU]/ML
7 INJECTION, SOLUTION SUBCUTANEOUS
Qty: 250 | Refills: 0 | Status: DISCONTINUED | OUTPATIENT
Start: 2022-09-25 | End: 2022-09-27

## 2022-09-25 RX ADMIN — Medication 5 MILLIGRAM(S): at 23:03

## 2022-09-25 RX ADMIN — SODIUM CHLORIDE 2000 MILLILITER(S): 9 INJECTION, SOLUTION INTRAVENOUS at 23:03

## 2022-09-25 RX ADMIN — Medication 5 MILLIGRAM(S): at 23:04

## 2022-09-25 RX ADMIN — INSULIN HUMAN 7 UNIT(S)/HR: 100 INJECTION, SOLUTION SUBCUTANEOUS at 23:57

## 2022-09-25 NOTE — ED PROVIDER NOTE - CARE PLAN
1 Principal Discharge DX:	DKA (diabetic ketoacidosis)  Secondary Diagnosis:	SYED (acute kidney injury)  Secondary Diagnosis:	Lactic acidosis  Secondary Diagnosis:	Alcohol withdrawal

## 2022-09-25 NOTE — ED ADULT TRIAGE NOTE - CHIEF COMPLAINT QUOTE
Patient presents to ED c/o vomiting x2 days. Patient is pale with Kussmaul's respirations noted. FS HI in triage. Hx of DM type 2, HTN. Patient taken to critical care with Dr Marquez made aware of patient.

## 2022-09-25 NOTE — ED PROVIDER NOTE - PHYSICAL EXAMINATION
Const: Awake, alert and oriented. In mod acute distress. Ill appearing.  HEENT: NC/AT. Dry mucous membranes.  Eyes: No scleral icterus. EOMI.  Neck:. Soft and supple. Full ROM without pain.  Cardiac: tachy rate and regular rhythm. +S1/S2. Peripheral pulses 2+ and symmetric. No LE edema.  Resp: Speaking in full sentences. No evidence of respiratory distress. No wheezes, rales or rhonchi.  Abd: Soft, epigastric ttp, non-distended. Normal bowel sounds in all 4 quadrants. No guarding or rebound.  Back: Spine midline and non-tender. No CVAT.  Skin: No rashes, abrasions or lacerations. diaphoretic  Lymph: No cervical lymphadenopathy.  Neuro: Awake, alert & oriented x 3. Moves all extremities symmetrically. tremulous Const: Awake, alert and oriented. In mod acute distress. Ill appearing.  HEENT: NC/AT. Dry mucous membranes.  Eyes: No scleral icterus. EOMI.  Neck:. Soft and supple. Full ROM without pain.  Cardiac: tachy rate and regular rhythm. +S1/S2. Peripheral pulses 2+ and symmetric. No LE edema.  Resp: Speaking in full sentences. Kussmaul resp No wheezes, rales or rhonchi.  Abd: Soft, epigastric ttp, non-distended. Normal bowel sounds in all 4 quadrants. No guarding or rebound.  Back: Spine midline and non-tender. No CVAT.  Skin: No rashes, abrasions or lacerations. diaphoretic  Lymph: No cervical lymphadenopathy.  Neuro: Awake, alert & oriented x 3. Moves all extremities symmetrically. tremulous

## 2022-09-25 NOTE — ED ADULT NURSE NOTE - OBJECTIVE STATEMENT
Pt is a 31YOM who is here for nausea and vomiting, pt has a hx of DKA, pt endorses that he drinks alcohol but lately he has been slowing down, pt states that his family came home and found him not feeling well, his father brought him into the Emergency Department for evaluation, pt is A&O4, pt is diaphoretic, tachypneic, pt states that he has not checked his blood sugars and does not follow a diabetic diet, pt has a hx of alcohol withdrawal, pt states he has been nauseous, with vomiting, states he has increased urinary frequency, pt denies any fevers, chills.

## 2022-09-25 NOTE — ED PROVIDER NOTE - OBJECTIVE STATEMENT
30yo male with pmh of alcohol wd, DM, DKA, HTN, HLD presents with palpitations, nausea, nbnb emesis, chest pain, diaphoresis and weakness for the past 3 days. Pt states he has  not been compliant with his medications for the past 2 weeks, including his insulin. Pt has also started drinking alcohol again several beers and shots a day, last one yesterday today tried but vomited. Pt denies fevers/chills, ha, loc, focal neuro deficits, sob, cough, diarrhea, urinary symptoms, recent travel and sick contacts.

## 2022-09-25 NOTE — ED PROVIDER NOTE - CLINICAL SUMMARY MEDICAL DECISION MAKING FREE TEXT BOX
32yo male with pmh of alcohol wd, DM, DKA, HTN, HLD presents with palpitations, nausea, nbnb emesis, chest pain, diaphoresis and weakness for the past 3 days. Pt with alcohol wd and DKA, benzos, IVF, insulin drip, MICU consult

## 2022-09-25 NOTE — ED PROVIDER NOTE - NS ED ATTENDING STATEMENT MOD
Attending Only
pt. fell on arm 1 week ago, +right radial head fracture. Dad here because he couldn't find a ortho dr to go to, seen at [facility] last week. NKA/IUTD

## 2022-09-26 DIAGNOSIS — E11.10 TYPE 2 DIABETES MELLITUS WITH KETOACIDOSIS WITHOUT COMA: ICD-10-CM

## 2022-09-26 LAB
ANION GAP SERPL CALC-SCNC: 10 MMOL/L — SIGNIFICANT CHANGE UP (ref 5–17)
ANION GAP SERPL CALC-SCNC: 14 MMOL/L — SIGNIFICANT CHANGE UP (ref 5–17)
ANION GAP SERPL CALC-SCNC: 14 MMOL/L — SIGNIFICANT CHANGE UP (ref 5–17)
ANION GAP SERPL CALC-SCNC: 33 MMOL/L — HIGH (ref 5–17)
APPEARANCE UR: CLEAR — SIGNIFICANT CHANGE UP
B-OH-BUTYR SERPL-SCNC: 16.4 MMOL/L — HIGH
BACTERIA # UR AUTO: ABNORMAL
BILIRUB UR-MCNC: NEGATIVE — SIGNIFICANT CHANGE UP
BLD GP AB SCN SERPL QL: SIGNIFICANT CHANGE UP
BUN SERPL-MCNC: 29.9 MG/DL — HIGH (ref 8–20)
BUN SERPL-MCNC: 40.6 MG/DL — HIGH (ref 8–20)
BUN SERPL-MCNC: 44 MG/DL — HIGH (ref 8–20)
BUN SERPL-MCNC: 52.1 MG/DL — HIGH (ref 8–20)
CALCIUM SERPL-MCNC: 8.9 MG/DL — SIGNIFICANT CHANGE UP (ref 8.4–10.5)
CALCIUM SERPL-MCNC: 9.3 MG/DL — SIGNIFICANT CHANGE UP (ref 8.4–10.5)
CALCIUM SERPL-MCNC: 9.3 MG/DL — SIGNIFICANT CHANGE UP (ref 8.4–10.5)
CALCIUM SERPL-MCNC: 9.5 MG/DL — SIGNIFICANT CHANGE UP (ref 8.4–10.5)
CHLORIDE SERPL-SCNC: 103 MMOL/L — SIGNIFICANT CHANGE UP (ref 98–107)
CHLORIDE SERPL-SCNC: 103 MMOL/L — SIGNIFICANT CHANGE UP (ref 98–107)
CHLORIDE SERPL-SCNC: 107 MMOL/L — SIGNIFICANT CHANGE UP (ref 98–107)
CHLORIDE SERPL-SCNC: 90 MMOL/L — LOW (ref 98–107)
CO2 SERPL-SCNC: 14 MMOL/L — LOW (ref 22–29)
CO2 SERPL-SCNC: 27 MMOL/L — SIGNIFICANT CHANGE UP (ref 22–29)
CO2 SERPL-SCNC: 27 MMOL/L — SIGNIFICANT CHANGE UP (ref 22–29)
CO2 SERPL-SCNC: 28 MMOL/L — SIGNIFICANT CHANGE UP (ref 22–29)
COLOR SPEC: YELLOW — SIGNIFICANT CHANGE UP
CREAT SERPL-MCNC: 0.92 MG/DL — SIGNIFICANT CHANGE UP (ref 0.5–1.3)
CREAT SERPL-MCNC: 1.29 MG/DL — SIGNIFICANT CHANGE UP (ref 0.5–1.3)
CREAT SERPL-MCNC: 1.4 MG/DL — HIGH (ref 0.5–1.3)
CREAT SERPL-MCNC: 2.28 MG/DL — HIGH (ref 0.5–1.3)
DIFF PNL FLD: ABNORMAL
EGFR: 113 ML/MIN/1.73M2 — SIGNIFICANT CHANGE UP
EGFR: 38 ML/MIN/1.73M2 — LOW
EGFR: 68 ML/MIN/1.73M2 — SIGNIFICANT CHANGE UP
EGFR: 76 ML/MIN/1.73M2 — SIGNIFICANT CHANGE UP
EPI CELLS # UR: SIGNIFICANT CHANGE UP
GLUCOSE BLDC GLUCOMTR-MCNC: 133 MG/DL — HIGH (ref 70–99)
GLUCOSE BLDC GLUCOMTR-MCNC: 142 MG/DL — HIGH (ref 70–99)
GLUCOSE BLDC GLUCOMTR-MCNC: 149 MG/DL — HIGH (ref 70–99)
GLUCOSE BLDC GLUCOMTR-MCNC: 160 MG/DL — HIGH (ref 70–99)
GLUCOSE BLDC GLUCOMTR-MCNC: 161 MG/DL — HIGH (ref 70–99)
GLUCOSE BLDC GLUCOMTR-MCNC: 165 MG/DL — HIGH (ref 70–99)
GLUCOSE BLDC GLUCOMTR-MCNC: 180 MG/DL — HIGH (ref 70–99)
GLUCOSE BLDC GLUCOMTR-MCNC: 209 MG/DL — HIGH (ref 70–99)
GLUCOSE BLDC GLUCOMTR-MCNC: 219 MG/DL — HIGH (ref 70–99)
GLUCOSE BLDC GLUCOMTR-MCNC: 283 MG/DL — HIGH (ref 70–99)
GLUCOSE BLDC GLUCOMTR-MCNC: 394 MG/DL — HIGH (ref 70–99)
GLUCOSE BLDC GLUCOMTR-MCNC: 510 MG/DL — CRITICAL HIGH (ref 70–99)
GLUCOSE BLDC GLUCOMTR-MCNC: 81 MG/DL — SIGNIFICANT CHANGE UP (ref 70–99)
GLUCOSE SERPL-MCNC: 131 MG/DL — HIGH (ref 70–99)
GLUCOSE SERPL-MCNC: 135 MG/DL — HIGH (ref 70–99)
GLUCOSE SERPL-MCNC: 449 MG/DL — HIGH (ref 70–99)
GLUCOSE SERPL-MCNC: 80 MG/DL — SIGNIFICANT CHANGE UP (ref 70–99)
GLUCOSE UR QL: 1000 MG/DL
HCT VFR BLD CALC: 28.9 % — LOW (ref 39–50)
HCT VFR BLD CALC: 29.2 % — LOW (ref 39–50)
HCT VFR BLD CALC: 34.5 % — LOW (ref 39–50)
HGB BLD-MCNC: 10.1 G/DL — LOW (ref 13–17)
HGB BLD-MCNC: 11.9 G/DL — LOW (ref 13–17)
HGB BLD-MCNC: 9.9 G/DL — LOW (ref 13–17)
KETONES UR-MCNC: ABNORMAL
LACTATE BLDV-MCNC: 3.7 MMOL/L — HIGH (ref 0.5–2)
LACTATE SERPL-SCNC: 1.7 MMOL/L — SIGNIFICANT CHANGE UP (ref 0.5–2)
LEUKOCYTE ESTERASE UR-ACNC: NEGATIVE — SIGNIFICANT CHANGE UP
MAGNESIUM SERPL-MCNC: 1.8 MG/DL — SIGNIFICANT CHANGE UP (ref 1.6–2.6)
MAGNESIUM SERPL-MCNC: 1.8 MG/DL — SIGNIFICANT CHANGE UP (ref 1.6–2.6)
MAGNESIUM SERPL-MCNC: 1.9 MG/DL — SIGNIFICANT CHANGE UP (ref 1.6–2.6)
MAGNESIUM SERPL-MCNC: 2.1 MG/DL — SIGNIFICANT CHANGE UP (ref 1.6–2.6)
MCHC RBC-ENTMCNC: 28.7 PG — SIGNIFICANT CHANGE UP (ref 27–34)
MCHC RBC-ENTMCNC: 28.9 PG — SIGNIFICANT CHANGE UP (ref 27–34)
MCHC RBC-ENTMCNC: 29.5 PG — SIGNIFICANT CHANGE UP (ref 27–34)
MCHC RBC-ENTMCNC: 34.3 GM/DL — SIGNIFICANT CHANGE UP (ref 32–36)
MCHC RBC-ENTMCNC: 34.5 GM/DL — SIGNIFICANT CHANGE UP (ref 32–36)
MCHC RBC-ENTMCNC: 34.6 GM/DL — SIGNIFICANT CHANGE UP (ref 32–36)
MCV RBC AUTO: 83.7 FL — SIGNIFICANT CHANGE UP (ref 80–100)
MCV RBC AUTO: 83.8 FL — SIGNIFICANT CHANGE UP (ref 80–100)
MCV RBC AUTO: 85.4 FL — SIGNIFICANT CHANGE UP (ref 80–100)
NITRITE UR-MCNC: NEGATIVE — SIGNIFICANT CHANGE UP
PH UR: 6 — SIGNIFICANT CHANGE UP (ref 5–8)
PHOSPHATE SERPL-MCNC: 1.2 MG/DL — LOW (ref 2.4–4.7)
PHOSPHATE SERPL-MCNC: 1.7 MG/DL — LOW (ref 2.4–4.7)
PHOSPHATE SERPL-MCNC: 2.3 MG/DL — LOW (ref 2.4–4.7)
PHOSPHATE SERPL-MCNC: 7.7 MG/DL — HIGH (ref 2.4–4.7)
PLATELET # BLD AUTO: 105 K/UL — LOW (ref 150–400)
PLATELET # BLD AUTO: 79 K/UL — LOW (ref 150–400)
PLATELET # BLD AUTO: 92 K/UL — LOW (ref 150–400)
POTASSIUM SERPL-MCNC: 3.3 MMOL/L — LOW (ref 3.5–5.3)
POTASSIUM SERPL-MCNC: 3.6 MMOL/L — SIGNIFICANT CHANGE UP (ref 3.5–5.3)
POTASSIUM SERPL-MCNC: 3.8 MMOL/L — SIGNIFICANT CHANGE UP (ref 3.5–5.3)
POTASSIUM SERPL-MCNC: 6.1 MMOL/L — CRITICAL HIGH (ref 3.5–5.3)
POTASSIUM SERPL-SCNC: 3.3 MMOL/L — LOW (ref 3.5–5.3)
POTASSIUM SERPL-SCNC: 3.6 MMOL/L — SIGNIFICANT CHANGE UP (ref 3.5–5.3)
POTASSIUM SERPL-SCNC: 3.8 MMOL/L — SIGNIFICANT CHANGE UP (ref 3.5–5.3)
POTASSIUM SERPL-SCNC: 6.1 MMOL/L — CRITICAL HIGH (ref 3.5–5.3)
PROT UR-MCNC: 15
RBC # BLD: 3.45 M/UL — LOW (ref 4.2–5.8)
RBC # BLD: 3.49 M/UL — LOW (ref 4.2–5.8)
RBC # BLD: 4.04 M/UL — LOW (ref 4.2–5.8)
RBC # FLD: 14.3 % — SIGNIFICANT CHANGE UP (ref 10.3–14.5)
RBC # FLD: 14.5 % — SIGNIFICANT CHANGE UP (ref 10.3–14.5)
RBC # FLD: 14.6 % — HIGH (ref 10.3–14.5)
RBC CASTS # UR COMP ASSIST: SIGNIFICANT CHANGE UP /HPF (ref 0–4)
SODIUM SERPL-SCNC: 137 MMOL/L — SIGNIFICANT CHANGE UP (ref 135–145)
SODIUM SERPL-SCNC: 144 MMOL/L — SIGNIFICANT CHANGE UP (ref 135–145)
SODIUM SERPL-SCNC: 144 MMOL/L — SIGNIFICANT CHANGE UP (ref 135–145)
SODIUM SERPL-SCNC: 145 MMOL/L — SIGNIFICANT CHANGE UP (ref 135–145)
SP GR SPEC: 1.02 — SIGNIFICANT CHANGE UP (ref 1.01–1.02)
TROPONIN T SERPL-MCNC: <0.01 NG/ML — SIGNIFICANT CHANGE UP (ref 0–0.06)
TROPONIN T SERPL-MCNC: <0.01 NG/ML — SIGNIFICANT CHANGE UP (ref 0–0.06)
UROBILINOGEN FLD QL: NEGATIVE MG/DL — SIGNIFICANT CHANGE UP
WBC # BLD: 12.51 K/UL — HIGH (ref 3.8–10.5)
WBC # BLD: 8.14 K/UL — SIGNIFICANT CHANGE UP (ref 3.8–10.5)
WBC # BLD: 8.73 K/UL — SIGNIFICANT CHANGE UP (ref 3.8–10.5)
WBC # FLD AUTO: 12.51 K/UL — HIGH (ref 3.8–10.5)
WBC # FLD AUTO: 8.14 K/UL — SIGNIFICANT CHANGE UP (ref 3.8–10.5)
WBC # FLD AUTO: 8.73 K/UL — SIGNIFICANT CHANGE UP (ref 3.8–10.5)
WBC UR QL: SIGNIFICANT CHANGE UP /HPF (ref 0–5)

## 2022-09-26 PROCEDURE — 99223 1ST HOSP IP/OBS HIGH 75: CPT

## 2022-09-26 PROCEDURE — 93010 ELECTROCARDIOGRAM REPORT: CPT

## 2022-09-26 RX ORDER — THIAMINE MONONITRATE (VIT B1) 100 MG
100 TABLET ORAL DAILY
Refills: 0 | Status: DISCONTINUED | OUTPATIENT
Start: 2022-09-26 | End: 2022-09-29

## 2022-09-26 RX ORDER — INSULIN LISPRO 100/ML
VIAL (ML) SUBCUTANEOUS EVERY 6 HOURS
Refills: 0 | Status: DISCONTINUED | OUTPATIENT
Start: 2022-09-26 | End: 2022-09-28

## 2022-09-26 RX ORDER — METOCLOPRAMIDE HCL 10 MG
10 TABLET ORAL EVERY 6 HOURS
Refills: 0 | Status: DISCONTINUED | OUTPATIENT
Start: 2022-09-26 | End: 2022-09-29

## 2022-09-26 RX ORDER — POTASSIUM PHOSPHATE, MONOBASIC POTASSIUM PHOSPHATE, DIBASIC 236; 224 MG/ML; MG/ML
30 INJECTION, SOLUTION INTRAVENOUS ONCE
Refills: 0 | Status: COMPLETED | OUTPATIENT
Start: 2022-09-26 | End: 2022-09-26

## 2022-09-26 RX ORDER — DEXTROSE 50 % IN WATER 50 %
25 SYRINGE (ML) INTRAVENOUS ONCE
Refills: 0 | Status: DISCONTINUED | OUTPATIENT
Start: 2022-09-26 | End: 2022-09-29

## 2022-09-26 RX ORDER — SODIUM CHLORIDE 9 MG/ML
1000 INJECTION, SOLUTION INTRAVENOUS
Refills: 0 | Status: DISCONTINUED | OUTPATIENT
Start: 2022-09-26 | End: 2022-09-26

## 2022-09-26 RX ORDER — PANTOPRAZOLE SODIUM 20 MG/1
40 TABLET, DELAYED RELEASE ORAL
Refills: 0 | Status: DISCONTINUED | OUTPATIENT
Start: 2022-09-26 | End: 2022-09-26

## 2022-09-26 RX ORDER — DEXTROSE 50 % IN WATER 50 %
12.5 SYRINGE (ML) INTRAVENOUS ONCE
Refills: 0 | Status: DISCONTINUED | OUTPATIENT
Start: 2022-09-26 | End: 2022-09-29

## 2022-09-26 RX ORDER — SODIUM CHLORIDE 9 MG/ML
1000 INJECTION, SOLUTION INTRAVENOUS ONCE
Refills: 0 | Status: COMPLETED | OUTPATIENT
Start: 2022-09-26 | End: 2022-09-26

## 2022-09-26 RX ORDER — PANTOPRAZOLE SODIUM 20 MG/1
8 TABLET, DELAYED RELEASE ORAL
Qty: 80 | Refills: 0 | Status: DISCONTINUED | OUTPATIENT
Start: 2022-09-26 | End: 2022-09-27

## 2022-09-26 RX ORDER — METOPROLOL TARTRATE 50 MG
12.5 TABLET ORAL
Refills: 0 | Status: DISCONTINUED | OUTPATIENT
Start: 2022-09-26 | End: 2022-09-29

## 2022-09-26 RX ORDER — SODIUM CHLORIDE 9 MG/ML
1000 INJECTION, SOLUTION INTRAVENOUS
Refills: 0 | Status: DISCONTINUED | OUTPATIENT
Start: 2022-09-26 | End: 2022-09-27

## 2022-09-26 RX ORDER — GLUCAGON INJECTION, SOLUTION 0.5 MG/.1ML
1 INJECTION, SOLUTION SUBCUTANEOUS ONCE
Refills: 0 | Status: DISCONTINUED | OUTPATIENT
Start: 2022-09-26 | End: 2022-09-29

## 2022-09-26 RX ORDER — POTASSIUM PHOSPHATE, MONOBASIC POTASSIUM PHOSPHATE, DIBASIC 236; 224 MG/ML; MG/ML
15 INJECTION, SOLUTION INTRAVENOUS ONCE
Refills: 0 | Status: COMPLETED | OUTPATIENT
Start: 2022-09-26 | End: 2022-09-26

## 2022-09-26 RX ORDER — INSULIN LISPRO 100/ML
8 VIAL (ML) SUBCUTANEOUS EVERY 6 HOURS
Refills: 0 | Status: DISCONTINUED | OUTPATIENT
Start: 2022-09-26 | End: 2022-09-28

## 2022-09-26 RX ORDER — DEXTROSE 50 % IN WATER 50 %
15 SYRINGE (ML) INTRAVENOUS ONCE
Refills: 0 | Status: DISCONTINUED | OUTPATIENT
Start: 2022-09-26 | End: 2022-09-29

## 2022-09-26 RX ORDER — CHLORHEXIDINE GLUCONATE 213 G/1000ML
1 SOLUTION TOPICAL DAILY
Refills: 0 | Status: DISCONTINUED | OUTPATIENT
Start: 2022-09-26 | End: 2022-09-28

## 2022-09-26 RX ORDER — FOLIC ACID 0.8 MG
1 TABLET ORAL DAILY
Refills: 0 | Status: DISCONTINUED | OUTPATIENT
Start: 2022-09-26 | End: 2022-09-29

## 2022-09-26 RX ORDER — CHLORHEXIDINE GLUCONATE 213 G/1000ML
1 SOLUTION TOPICAL
Refills: 0 | Status: DISCONTINUED | OUTPATIENT
Start: 2022-09-26 | End: 2022-09-26

## 2022-09-26 RX ORDER — INSULIN GLARGINE 100 [IU]/ML
25 INJECTION, SOLUTION SUBCUTANEOUS EVERY MORNING
Refills: 0 | Status: DISCONTINUED | OUTPATIENT
Start: 2022-09-26 | End: 2022-09-29

## 2022-09-26 RX ORDER — DIAZEPAM 5 MG
10 TABLET ORAL ONCE
Refills: 0 | Status: DISCONTINUED | OUTPATIENT
Start: 2022-09-26 | End: 2022-09-26

## 2022-09-26 RX ORDER — INFLUENZA VIRUS VACCINE 15; 15; 15; 15 UG/.5ML; UG/.5ML; UG/.5ML; UG/.5ML
0.5 SUSPENSION INTRAMUSCULAR ONCE
Refills: 0 | Status: DISCONTINUED | OUTPATIENT
Start: 2022-09-26 | End: 2022-09-29

## 2022-09-26 RX ADMIN — PANTOPRAZOLE SODIUM 10 MG/HR: 20 TABLET, DELAYED RELEASE ORAL at 23:38

## 2022-09-26 RX ADMIN — Medication 12.5 MILLIGRAM(S): at 18:25

## 2022-09-26 RX ADMIN — Medication 50 MILLIGRAM(S): at 11:34

## 2022-09-26 RX ADMIN — Medication 10 MILLIGRAM(S): at 01:00

## 2022-09-26 RX ADMIN — Medication 10 MILLIGRAM(S): at 11:37

## 2022-09-26 RX ADMIN — SODIUM CHLORIDE 50 MILLILITER(S): 9 INJECTION, SOLUTION INTRAVENOUS at 23:38

## 2022-09-26 RX ADMIN — Medication 1 TABLET(S): at 00:39

## 2022-09-26 RX ADMIN — Medication 12.5 MILLIGRAM(S): at 05:34

## 2022-09-26 RX ADMIN — INSULIN GLARGINE 25 UNIT(S): 100 INJECTION, SOLUTION SUBCUTANEOUS at 14:51

## 2022-09-26 RX ADMIN — Medication 50 MILLIGRAM(S): at 04:36

## 2022-09-26 RX ADMIN — POTASSIUM PHOSPHATE, MONOBASIC POTASSIUM PHOSPHATE, DIBASIC 62.5 MILLIMOLE(S): 236; 224 INJECTION, SOLUTION INTRAVENOUS at 21:51

## 2022-09-26 RX ADMIN — SODIUM CHLORIDE 2000 MILLILITER(S): 9 INJECTION, SOLUTION INTRAVENOUS at 00:53

## 2022-09-26 RX ADMIN — Medication 100 MILLIGRAM(S): at 11:35

## 2022-09-26 RX ADMIN — Medication 8 UNIT(S): at 18:24

## 2022-09-26 RX ADMIN — Medication 10 MILLIGRAM(S): at 00:36

## 2022-09-26 RX ADMIN — PANTOPRAZOLE SODIUM 40 MILLIGRAM(S): 20 TABLET, DELAYED RELEASE ORAL at 05:26

## 2022-09-26 RX ADMIN — SODIUM CHLORIDE 1000 MILLILITER(S): 9 INJECTION, SOLUTION INTRAVENOUS at 04:36

## 2022-09-26 RX ADMIN — POTASSIUM PHOSPHATE, MONOBASIC POTASSIUM PHOSPHATE, DIBASIC 83.33 MILLIMOLE(S): 236; 224 INJECTION, SOLUTION INTRAVENOUS at 10:25

## 2022-09-26 RX ADMIN — Medication 1 MILLIGRAM(S): at 00:39

## 2022-09-26 RX ADMIN — Medication 2 MILLIGRAM(S): at 10:24

## 2022-09-26 RX ADMIN — SODIUM CHLORIDE 150 MILLILITER(S): 9 INJECTION, SOLUTION INTRAVENOUS at 00:39

## 2022-09-26 RX ADMIN — PANTOPRAZOLE SODIUM 10 MG/HR: 20 TABLET, DELAYED RELEASE ORAL at 14:09

## 2022-09-26 NOTE — ED ADULT NURSE REASSESSMENT NOTE - NS ED NURSE REASSESS COMMENT FT1
Fluid bolus completed and a repeat lactate was drawn and sent to the lab. 2nd bag of fluids finished and specimen drawn.

## 2022-09-26 NOTE — H&P ADULT - ASSESSMENT
32 yo m pmhx DM2 on insulin and metformin, Afib on Eliquis, HTN and ETOH abuse admitted with     1. DKA  2. DTs    NEURO:  CV:   RESP: no active issues  RENAL: SYED, avoid nephrotoxic meds, renally dose meds, trend urine output, bun/cr and electrolytes, replace lytes as needed   GI: npo except meds and water/ice chips  ENDO: DKA/HHS, poct q1hr, insulin gtt, q6hr bmp  ID: Elevated wbc count likely reactive, afebrile, cx sent, monitoring off abx   HEME: Heparin gtt for ac in setting of syed and afib  DISPO: Full code.  32 yo m pmhx DM2 on insulin and metformin, Afib on Eliquis, HTN and ETOH abuse admitted with     1. DKA  2. ETOH withdrawal    NEURO: ETOH withdrawal, libirum taper with prn ativan for elevated ciwa per ciwa protocol.  CV: Metoprolol bid for bp control   RESP: no active issues  RENAL: SYED, avoid nephrotoxic meds, renally dose meds, trend urine output, bun/cr and electrolytes, replace lytes as needed   GI: npo except meds and water/ice chips  ENDO: DKA/HHS, poct q1hr, insulin gtt, q6hr bmp  ID: Elevated wbc count likely reactive, afebrile, cx sent, monitoring off abx   HEME: holding ac in setting of coffee ground emesis  DISPO: Full code.

## 2022-09-26 NOTE — H&P ADULT - HISTORY OF PRESENT ILLNESS
30 yo m pmhx DM2 on insulin and metformin, Afib on Eliquis, HTN and ETOH abuse presented with nausea/vomiting x1 day.  Per patient yesterday morning he began to feel nauseous, attempted to drink alcohol but couldn't keep it down.  Initially vomit was clear/yellow throughout the day it had some dark discoloration prompting presentation.  In ED patient tachycardic, noted to have dark emesis, labs significant for wbc ~13, plt 132, glucose 852, bun/cr 50.9/3.76, bicarb <6, AG 54 30 yo m pmhx DM2 on insulin and metformin, Afib on Eliquis, HTN and ETOH abuse presented with nausea/vomiting x1 day.  Per patient yesterday morning he began to feel nauseous, attempted to drink alcohol but couldn't keep it down.  Initially vomit was clear/yellow throughout the day it had some dark discoloration prompting presentation.  In ED patient tachycardic, noted to have dark emesis, labs significant for wbc ~13, plt 132, glucose 852, bun/cr 50.9/3.76, bicarb <6, AG 54; patient given 2L crystalloid IVF and started on insulin gtt.

## 2022-09-26 NOTE — CONSULT NOTE ADULT - ASSESSMENT
31 M active EtOH now CIWA protocol admitted with DKA now improved (AG normal, though K elevated, P elevated).   Suspect UGIB, non-variceal.   Agree with ICU mgmt - PPI gtt, serial CBCs  Would transfuse only for Hb <7  Will plan on EGD tomorrow, ideally with electrolytes corrected by then  Suspect will be able to come to Endoscopy as not on vent/pressors  Clears now, NPO after midnight.   Ideally would want to know when last Xarelto dose taken, perhaps ICU can help with this.

## 2022-09-26 NOTE — H&P ADULT - NSHPPHYSICALEXAM_GEN_ALL_CORE
GENERAL: adult male, sitting in bed, appears uncomfortable  HEENT: nc/at  CV: sinus tach  RESP: symmetrical thorax expansion upon respiration, cta b/l   ABD: soft, nontender, nondistended, normoactive bowel sounds  : unremarkable  MSK: wnl  EXT: no edema  SKIN: warm  NEURO: alert, interactive, tremulous

## 2022-09-26 NOTE — H&P ADULT - NS PANP COMMENT GEN_ALL_CORE FT
Pt seen and examined at the bedside.  31M with hx of DM2, Afib on Eliquis, HTN and ETOH abuse presented with nausea/vomiting x 1 day. Pt reports abdominal discomfort with retching that is now resolving. He endorsed chest discomfort in the setting of vomiting. Denies any hematemesis. Reports he has been drinking 4 tallboys daily. Reports his last drink was yesterday as he was unable to continue drinking in setting of the nausea. He denies any hx of withdrawal seizures/DTs but has had recurrent admissions for DTs. Reports he has not been taking his home medications over the last 2 days due to the nausea. In the ED, pt found to be in DKA with HCO3 6, AG 54, VBG 7.02/27, lactate 3.7.     Physical:  GEN: alert, awake, anxious   HEENT: NC/AT, anicteric, dry MM   CV: regular rhythm, tachycardiac   Pulm: CTA anteriorly   GI: normoactive BS, soft, NT/ND   Ext: no peripheral edema     DKA in setting of non-compliance   start insulin drip w/ MICU protocol   q1h FS   check chem 8 q4-6h  when AG closed transition to Lantus   give additional 1L of LR   trend lactate   DM educator     EtOH abuse   monitor CIWA protocol   will start librium 50q 6h and taper   thiamine/folate/MVI   c/w IVF resuscitation     SYED   suspect pre-renal in setting of poor PO intake  trend chem 8 following fluids   check bladder scan   renally dose medications   avoid nephrotoxins     Leukocytosis   possibly reactive/hemoconcentration   f/u cultures   will monitor off abx for now given no clear infectious source    Possible coffee ground emesis   start PPI BID    trend cbc q6h   maintain 2 large bore IV  hold Eliquis for now     SCDs for now; if Hgb stable can resume home Eliquis  Full Code   c/w icu care

## 2022-09-26 NOTE — PATIENT PROFILE ADULT - FALL HARM RISK - UNIVERSAL INTERVENTIONS
Bed in lowest position, wheels locked, appropriate side rails in place/Call bell, personal items and telephone in reach/Instruct patient to call for assistance before getting out of bed or chair/Non-slip footwear when patient is out of bed/[place] to call system/Physically safe environment - no spills, clutter or unnecessary equipment/Purposeful Proactive Rounding/Room/bathroom lighting operational, light cord in reach

## 2022-09-26 NOTE — ED ADULT NURSE REASSESSMENT NOTE - NS ED NURSE REASSESS COMMENT FT1
Pt is being transported to ICU bed 3113, RN [place], given report, pt history, reason for admission, medical history, medications given/to be administered reviewed, response to current interventions and treatments, review of patients current condition and baseline reviewed, R AC and L AC IV Sites are clean/dry/intact, pt/family educated on the plan of care and provided time for question and answers, education deemed successful with teach back demonstration showing proficiency. Belongings that remain with patient are transported to ICU, Pt placed on the transport monitor, VS recorded in the EMR prior to transport, pt transported with 2 RNs and rescue medication.

## 2022-09-27 ENCOUNTER — RESULT REVIEW (OUTPATIENT)
Age: 32
End: 2022-09-27

## 2022-09-27 LAB
A1C WITH ESTIMATED AVERAGE GLUCOSE RESULT: 7.4 % — HIGH (ref 4–5.6)
ALBUMIN SERPL ELPH-MCNC: 3.8 G/DL — SIGNIFICANT CHANGE UP (ref 3.3–5.2)
ALP SERPL-CCNC: 88 U/L — SIGNIFICANT CHANGE UP (ref 40–120)
ALT FLD-CCNC: 22 U/L — SIGNIFICANT CHANGE UP
ANION GAP SERPL CALC-SCNC: 16 MMOL/L — SIGNIFICANT CHANGE UP (ref 5–17)
AST SERPL-CCNC: 66 U/L — HIGH
BILIRUB SERPL-MCNC: 1.8 MG/DL — SIGNIFICANT CHANGE UP (ref 0.4–2)
BUN SERPL-MCNC: 23.5 MG/DL — HIGH (ref 8–20)
CALCIUM SERPL-MCNC: 9.1 MG/DL — SIGNIFICANT CHANGE UP (ref 8.4–10.5)
CHLORIDE SERPL-SCNC: 104 MMOL/L — SIGNIFICANT CHANGE UP (ref 98–107)
CO2 SERPL-SCNC: 25 MMOL/L — SIGNIFICANT CHANGE UP (ref 22–29)
CREAT SERPL-MCNC: 0.68 MG/DL — SIGNIFICANT CHANGE UP (ref 0.5–1.3)
EGFR: 127 ML/MIN/1.73M2 — SIGNIFICANT CHANGE UP
ESTIMATED AVERAGE GLUCOSE: 166 MG/DL — HIGH (ref 68–114)
GLUCOSE BLDC GLUCOMTR-MCNC: 155 MG/DL — HIGH (ref 70–99)
GLUCOSE BLDC GLUCOMTR-MCNC: 172 MG/DL — HIGH (ref 70–99)
GLUCOSE BLDC GLUCOMTR-MCNC: 239 MG/DL — HIGH (ref 70–99)
GLUCOSE BLDC GLUCOMTR-MCNC: 83 MG/DL — SIGNIFICANT CHANGE UP (ref 70–99)
GLUCOSE SERPL-MCNC: 202 MG/DL — HIGH (ref 70–99)
HCT VFR BLD CALC: 28.1 % — LOW (ref 39–50)
HGB BLD-MCNC: 9.4 G/DL — LOW (ref 13–17)
MAGNESIUM SERPL-MCNC: 1.9 MG/DL — SIGNIFICANT CHANGE UP (ref 1.8–2.6)
MCHC RBC-ENTMCNC: 28.8 PG — SIGNIFICANT CHANGE UP (ref 27–34)
MCHC RBC-ENTMCNC: 33.5 GM/DL — SIGNIFICANT CHANGE UP (ref 32–36)
MCV RBC AUTO: 86.2 FL — SIGNIFICANT CHANGE UP (ref 80–100)
MRSA PCR RESULT.: SIGNIFICANT CHANGE UP
PHOSPHATE SERPL-MCNC: 2.5 MG/DL — SIGNIFICANT CHANGE UP (ref 2.4–4.7)
PLATELET # BLD AUTO: 67 K/UL — LOW (ref 150–400)
POTASSIUM SERPL-MCNC: 3.8 MMOL/L — SIGNIFICANT CHANGE UP (ref 3.5–5.3)
POTASSIUM SERPL-SCNC: 3.8 MMOL/L — SIGNIFICANT CHANGE UP (ref 3.5–5.3)
PROT SERPL-MCNC: 6.2 G/DL — LOW (ref 6.6–8.7)
RBC # BLD: 3.26 M/UL — LOW (ref 4.2–5.8)
RBC # FLD: 14.9 % — HIGH (ref 10.3–14.5)
S AUREUS DNA NOSE QL NAA+PROBE: DETECTED
SODIUM SERPL-SCNC: 145 MMOL/L — SIGNIFICANT CHANGE UP (ref 135–145)
WBC # BLD: 6.09 K/UL — SIGNIFICANT CHANGE UP (ref 3.8–10.5)
WBC # FLD AUTO: 6.09 K/UL — SIGNIFICANT CHANGE UP (ref 3.8–10.5)

## 2022-09-27 PROCEDURE — 88342 IMHCHEM/IMCYTCHM 1ST ANTB: CPT | Mod: 26

## 2022-09-27 PROCEDURE — 99233 SBSQ HOSP IP/OBS HIGH 50: CPT

## 2022-09-27 PROCEDURE — 88305 TISSUE EXAM BY PATHOLOGIST: CPT | Mod: 26

## 2022-09-27 PROCEDURE — 99223 1ST HOSP IP/OBS HIGH 75: CPT

## 2022-09-27 PROCEDURE — 43239 EGD BIOPSY SINGLE/MULTIPLE: CPT

## 2022-09-27 RX ORDER — PANTOPRAZOLE SODIUM 20 MG/1
40 TABLET, DELAYED RELEASE ORAL EVERY 12 HOURS
Refills: 0 | Status: DISCONTINUED | OUTPATIENT
Start: 2022-09-27 | End: 2022-09-29

## 2022-09-27 RX ORDER — SUCRALFATE 1 G
1 TABLET ORAL
Refills: 0 | Status: DISCONTINUED | OUTPATIENT
Start: 2022-09-27 | End: 2022-09-29

## 2022-09-27 RX ADMIN — PANTOPRAZOLE SODIUM 40 MILLIGRAM(S): 20 TABLET, DELAYED RELEASE ORAL at 17:35

## 2022-09-27 RX ADMIN — Medication 8 UNIT(S): at 11:36

## 2022-09-27 RX ADMIN — Medication 4: at 05:53

## 2022-09-27 RX ADMIN — Medication 50 MILLIGRAM(S): at 14:02

## 2022-09-27 RX ADMIN — Medication 50 MILLIGRAM(S): at 05:48

## 2022-09-27 RX ADMIN — Medication 12.5 MILLIGRAM(S): at 05:48

## 2022-09-27 RX ADMIN — Medication 8 UNIT(S): at 17:34

## 2022-09-27 RX ADMIN — Medication 2: at 11:36

## 2022-09-27 RX ADMIN — Medication 8 UNIT(S): at 05:54

## 2022-09-27 RX ADMIN — Medication 1 GRAM(S): at 17:33

## 2022-09-27 RX ADMIN — Medication 12.5 MILLIGRAM(S): at 18:46

## 2022-09-27 RX ADMIN — Medication 2: at 17:33

## 2022-09-27 RX ADMIN — Medication 100 MILLIGRAM(S): at 11:31

## 2022-09-27 RX ADMIN — CHLORHEXIDINE GLUCONATE 1 APPLICATION(S): 213 SOLUTION TOPICAL at 11:36

## 2022-09-27 RX ADMIN — Medication 10 MILLIGRAM(S): at 09:16

## 2022-09-27 RX ADMIN — Medication 1 TABLET(S): at 11:32

## 2022-09-27 RX ADMIN — Medication 1 MILLIGRAM(S): at 11:33

## 2022-09-27 RX ADMIN — Medication 50 MILLIGRAM(S): at 22:04

## 2022-09-27 RX ADMIN — INSULIN GLARGINE 25 UNIT(S): 100 INJECTION, SOLUTION SUBCUTANEOUS at 09:15

## 2022-09-27 RX ADMIN — Medication 1 GRAM(S): at 11:32

## 2022-09-27 NOTE — PROGRESS NOTE ADULT - ASSESSMENT
31 year old male with alcoholism, IDDM, AFib on Eliquis, HTN, recent COVID (07/2022)  presented on 9/26/22 with N/V with dark emesis, found to be in DKA and in ARF, with acute blood loss anemia and SYED     DKA  Lantus 25U QAM and 8U pre-meal with sliding scale.     Acute blood loss anemia with GIB    EGD 9/27/22 - severe erosive gastritis.   Protonix IV BID,    Carafate,   clear liquid diet    Afib- doubt he needs to cont Eliquis- holding now with GIB     Hypertension   on metoprolol-     Alcoholism- on CIWA   thiamine folic acid MVI and librium 31 year old male with alcoholism, IDDM, AFib on Eliquis, HTN, recent COVID (07/2022)  presented on 9/26/22 with N/V with dark emesis, found to be in DKA and in ARF, with acute blood loss anemia and   SYED - improved    DKA  Lantus 25U QAM and 8U pre-meal with sliding scale.     Acute blood loss anemia with GIB    EGD 9/27/22 - severe erosive gastritis.   Protonix IV BID,    Carafate,   clear liquid diet    Afib- doubt he needs to cont Eliquis- holding now with GIB     Hypertension   on metoprolol-     Alcoholism- on CIWA   thiamine folic acid MVI and librium   ambulate active young

## 2022-09-27 NOTE — CONSULT NOTE ADULT - ASSESSMENT
31M PMH IDDM, AFib on Eliquis, HTN, EtOH abuse, COVID (07/2022) who presented on 9/26/22 with N/V with dark emesis, found to be in DKA and in ARF, with acute blood loss anemia s/p EGD with severe erosive gastritis and no active bleeding    DM: poorly controlled. A1c 7.7. DKA now getting  better   started basal insulin - meal insulins TID  use SSI for coverage   needs CDE for diet education   check lipids.     ARF, likely pre-renal, resolved, monitor renal function, BMP    GIB: H/H stable . S/p EGD today at bedside (Dr. Richardson) - no acute bleed, severe erosive gastritis, no varices    EtOH abuse, Librium taper, ativan prn and Thiamine/Folate/MVI    AFib   For now holding any anticoagulation   Metoprolol 12.5.mg BID         31M PMH IDDM, AFib on Eliquis, HTN, EtOH abuse, COVID (07/2022) who presented on 9/26/22 with N/V with dark emesis, found to be in DKA and in ARF, with acute blood loss anemia s/p EGD with severe erosive gastritis and no active bleeding    DM: poorly controlled. A1c 7.7. DKA now getting  better   het akes a basal insulin 10 u daily at home olus a meal insulin 4-5 units per meal. He does not know the names   he also takes Mf at home. he cannot take MF anymore due to ETOH abuse  check PADMINI, ICA, C peptide   started basal insulin - meal insulins TID  use SSI for coverage   needs CDE for diet education   check lipids.     ARF, likely pre-renal, resolved, monitor renal function, BMP    GIB: H/H stable . S/p EGD today at bedside (Dr. Richardson) - no acute bleed, severe erosive gastritis, no varices    EtOH abuse, Librium taper, ativan prn and Thiamine/Folate/MVI    AFib   For now holding any anticoagulation   Metoprolol 12.5.mg BID

## 2022-09-27 NOTE — PROGRESS NOTE ADULT - SUBJECTIVE AND OBJECTIVE BOX
Patient is a 31y old  Male who presents with a chief complaint of DKA (26 Sep 2022 11:59)      BRIEF HOSPITAL COURSE:   31M PMH IDDM, AFib on Eliquis, HTN, EtOH abuse, COVID (2022) who presented on 22 with N/V with dark emesis, found to be in DKA and in ARF, with acute blood loss anemia. SYED resolved, patient transitioned to Lantus 25U QAM and 8U pre-meal with sliding scale. He underwent bedside EGD 22 found with severe erosive gastritis. Protonix infusion was switched to IV BID, and started on Carafate, and clear liquid diet.       PAST MEDICAL & SURGICAL HISTORY:  Alcohol abuse      Pancreatitis      HTN (hypertension)      Diabetes      Atrial fibrillation      Non-ischemic cardiomyopathy      No significant past surgical history            Medications:    metoprolol tartrate 12.5 milliGRAM(s) Oral two times a day      chlordiazePOXIDE 50 milliGRAM(s) Oral every 8 hours  chlordiazePOXIDE   Oral   LORazepam   Injectable 2 milliGRAM(s) IV Push every 2 hours PRN  metoclopramide Injectable 10 milliGRAM(s) IV Push every 6 hours PRN        aluminum hydroxide/magnesium hydroxide/simethicone Suspension 30 milliLiter(s) Oral every 6 hours PRN  pantoprazole  Injectable 40 milliGRAM(s) IV Push every 12 hours  sucralfate 1 Gram(s) Oral four times a day      dextrose 50% Injectable 25 Gram(s) IV Push once  dextrose 50% Injectable 12.5 Gram(s) IV Push once  dextrose 50% Injectable 25 Gram(s) IV Push once  dextrose Oral Gel 15 Gram(s) Oral once  glucagon  Injectable 1 milliGRAM(s) IntraMuscular once  insulin glargine Injectable (LANTUS) 25 Unit(s) SubCutaneous every morning  insulin lispro (ADMELOG) corrective regimen sliding scale   SubCutaneous every 6 hours  insulin lispro Injectable (ADMELOG) 8 Unit(s) SubCutaneous every 6 hours  insulin regular Infusion 7 Unit(s)/Hr IV Continuous <Continuous>    dextrose 5% + lactated ringers. 1000 milliLiter(s) IV Continuous <Continuous>  folic acid 1 milliGRAM(s) Oral daily  multivitamin 1 Tablet(s) Oral daily  thiamine 100 milliGRAM(s) Oral daily    influenza   Vaccine 0.5 milliLiter(s) IntraMuscular once    chlorhexidine 2% Cloths 1 Application(s) Topical daily            ICU Vital Signs Last 24 Hrs  T(C): 36.9 (27 Sep 2022 15:13), Max: 37.6 (26 Sep 2022 20:12)  T(F): 98.5 (27 Sep 2022 15:13), Max: 99.7 (26 Sep 2022 20:12)  HR: 83 (27 Sep 2022 12:00) (82 - 104)  BP: 117/76 (27 Sep 2022 12:00) (114/76 - 128/83)  BP(mean): 89 (27 Sep 2022 12:00) (83 - 98)  ABP: --  ABP(mean): --  RR: 13 (27 Sep 2022 12:00) (9 - 15)  SpO2: 99% (27 Sep 2022 12:00) (96% - 100%)    O2 Parameters below as of 27 Sep 2022 12:00  Patient On (Oxygen Delivery Method): room air                I&O's Detail    26 Sep 2022 07:01  -  27 Sep 2022 07:00  --------------------------------------------------------  IN:    dextrose 5% + lactated ringers: 1075 mL    Insulin: 28 mL    Lactated Ringers: 75 mL    Lactated Ringers: 425 mL    Pantoprazole: 130 mL  Total IN: 1733 mL    OUT:    Voided (mL): 1150 mL  Total OUT: 1150 mL    Total NET: 583 mL            LABS:                        9.4    6.09  )-----------( 67       ( 27 Sep 2022 02:30 )             28.1         145  |  104  |  23.5<H>  ----------------------------<  202<H>  3.8   |  25.0  |  0.68    Ca    9.1      27 Sep 2022 02:30  Phos  2.5       Mg     1.9         TPro  6.2<L>  /  Alb  3.8  /  TBili  1.8  /  DBili  x   /  AST  66<H>  /  ALT  22  /  AlkPhos  88        CARDIAC MARKERS ( 26 Sep 2022 10:31 )  x     / <0.01 ng/mL / x     / x     / x      CARDIAC MARKERS ( 26 Sep 2022 03:40 )  x     / <0.01 ng/mL / x     / x     / x      CARDIAC MARKERS ( 25 Sep 2022 22:44 )  x     / 0.01 ng/mL / x     / x     / x          CAPILLARY BLOOD GLUCOSE      POCT Blood Glucose.: 155 mg/dL (27 Sep 2022 11:29)    PT/INR - ( 25 Sep 2022 22:44 )   PT: 12.0 sec;   INR: 1.03 ratio         PTT - ( 25 Sep 2022 22:44 )  PTT:31.9 sec  Urinalysis Basic - ( 26 Sep 2022 01:34 )    Color: Yellow / Appearance: Clear / S.020 / pH: x  Gluc: x / Ketone: Large  / Bili: Negative / Urobili: Negative mg/dL   Blood: x / Protein: 15 / Nitrite: Negative   Leuk Esterase: Negative / RBC: 0-2 /HPF / WBC 0-2 /HPF   Sq Epi: x / Non Sq Epi: Few / Bacteria: Few      CULTURES:  Culture Results:   No growth to date. ( @ 22:54)  Culture Results:   No growth to date. ( @ 22:44)      Physical Examination:  GENERAL: In NAD   HEENT: NC/AT  NECK: Supple, trachea midline  PULM: CTA B/L  CVS: +S1, S2, RRR  ABD: Soft, non-tender  EXTREMITIES: No pedal edema B/L  SKIN: No open wounds  NEURO: Grossly non-focal    DEVICES:     RADIOLOGY:   < from: Xray Chest 1 View-PORTABLE IMMEDIATE (22 @ 23:36) >    ACC: [id number] EXAM:  XR CHEST PORTABLE IMMED 1V                          PROCEDURE DATE:  2022          INTERPRETATION:  Clinical information: Tachycardia.    TECHNIQUE: Frontal view of the chest.    COMPARISON: Prior study dated 2022.    FINDINGS: The lungs are clear. The cardiomediastinal silhouette is   normal. The visualized osseous structures are unremarkable.    IMPRESSION: Clear lungs, unchanged.    --- End of Report ---            ABRAM JIANG MD; Attending Radiologist  This document has been electronically signed. Sep 26 2022  9:55AM    < end of copied text >

## 2022-09-27 NOTE — CONSULT NOTE ADULT - SUBJECTIVE AND OBJECTIVE BOX
HISTORY OF PRESENT ILLNESS: This is a 31y old man with a past medical history significant for EtOH abuse, pancreatitis, AF, DM, here because became nauseous while drinking EtOH.   CIWA protocol ongoing, score low per RN. Sedated but arousable for me though not able to provide much history.   ICU called for Hb drop, admission 14 now 9s.   BUN initially 50s now downtrending, likely upper bleed, jessica as CT from 2022 indicates gastritis.  No evidence of varices on imaging prior.   Admitted in DKA, several other electrolyte abnormalities Cr 2.28 initially now 1.4, P low prior now high.   On PPI gtt.  Spoke to RN, she reports non-billous non-bloody emesis and no BMs,. This is discrepant to how the consult was called in, for coffee ground emesis. Bedside canister empty.     On Xarelto, unknown last dose.   ROS: n/a, CIWA ongoing    PAST MEDICAL/SURGICAL HISTORY:  Alcohol abuse    Pancreatitis    HTN (hypertension)    Diabetes    Atrial fibrillation    Non-ischemic cardiomyopathy    No significant past surgical history      SOCIAL HISTORY:  - ALCOHOL: ++      FAMILY HISTORY:  No known history of gastrointestinal or liver disease;  FH: hypertension        HOME MEDICATIONS:    INPATIENT MEDICATIONS:  MEDICATIONS  (STANDING):  chlordiazePOXIDE 50 milliGRAM(s) Oral every 6 hours  chlordiazePOXIDE   Oral   chlorhexidine 4% Liquid 1 Application(s) Topical <User Schedule>  dextrose 5% + lactated ringers. 1000 milliLiter(s) (200 mL/Hr) IV Continuous <Continuous>  folic acid 1 milliGRAM(s) Oral daily  influenza   Vaccine 0.5 milliLiter(s) IntraMuscular once  insulin regular Infusion 7 Unit(s)/Hr (7 mL/Hr) IV Continuous <Continuous>  lactated ringers. 1000 milliLiter(s) (200 mL/Hr) IV Continuous <Continuous>  metoprolol tartrate 12.5 milliGRAM(s) Oral two times a day  multivitamin 1 Tablet(s) Oral daily  pantoprazole Infusion 8 mG/Hr (10 mL/Hr) IV Continuous <Continuous>  thiamine 100 milliGRAM(s) Oral daily    MEDICATIONS  (PRN):  aluminum hydroxide/magnesium hydroxide/simethicone Suspension 30 milliLiter(s) Oral every 6 hours PRN Dyspepsia  LORazepam   Injectable 2 milliGRAM(s) IV Push every 2 hours PRN CIWA >8  metoclopramide Injectable 10 milliGRAM(s) IV Push every 6 hours PRN nausea    ALLERGIES:  No Known Allergies    T(C): 37.2 (22 @ 11:39), Max: 37.3 (22 @ 01:53)  HR: 108 (22 @ 10:00) (101 - 141)  BP: 132/79 (22 @ 10:00) (123/76 - 157/93)  RR: 15 (22 @ 10:00) (12 - 26)  SpO2: 99% (22 @ 10:00) (98% - 100%)    22 @ 07:01  -  22 @ 07:00  --------------------------------------------------------  IN: 942 mL / OUT: 300 mL / NET: 642 mL    22 @ 07:01  -  22 @ 12:00  --------------------------------------------------------  IN: 514 mL / OUT: 0 mL / NET: 514 mL        PHYSICAL EXAM:  Constitutional: sedated, comfortable  Eyes: midly icteric sclerae  ENMT: Mucus membranes moist, no oropharyngeal thrush noted  Neck: No thyroid nodules appreciated, no significant cervical or supraclavicular lymphadenopathy  Respiratory: Breathing nonlabored; clear to auscultation  Cardiovascular: Regular rate and rhythm  Gastrointestinal: Soft, nontender, nondistended, normoactive bowel sounds; no hepatosplenomegaly appreciated; no rebound tenderness or involuntary guarding  Extremities: No clubbing, cyanosis or edema  Neurological: Alert   Skin: No jaundice  Lymph Nodes: No significant lymphadenopathy  Musculoskeletal: No significant peripheral atrophy  Psychiatric: Affect and mood appropriate      LABS:             9.9    8.73  )-----------( 92       (  @ 10:31 )             28.9                11.9   12.51 )-----------( 105      (  @ 03:40 )             34.5                14.5   13.74 )-----------( 132      (  @ 22:44 )             46.5       PT/INR - ( 25 Sep 2022 22:44 )   PT: 12.0 sec;   INR: 1.03 ratio         PTT - ( 25 Sep 2022 22:44 )  PTT:31.9 sec      144  |  103  |  44.0<H> max 52  ----------------------------<  135<H>  6.1<HH>   |  27.0  |  1.40<H>    Ca    9.3      26 Sep 2022 10:31  Phos  7.7       Mg     1.8         TPro  9.9<H>  /  Alb  6.4<H>  /  TBili  3.5<H>  /  DBili  x   /  AST  41<H>  /  ALT  30  /  AlkPhos  157<H>    MDS 3.5 no need for prednisone    LIVER FUNCTIONS - ( 25 Sep 2022 22:44 )  Alb: 6.4 g/dL / Pro: 9.9 g/dL / ALK PHOS: 157 U/L / ALT: 30 U/L / AST: 41 U/L / GGT: x             Lipase, Serum: 100 U/L (22 @ 23:22)  Lipase, Serum: 114 U/L (22 @ 22:44)        Urinalysis Basic - ( 26 Sep 2022 01:34 )    Color: Yellow / Appearance: Clear / S.020 / pH: x  Gluc: x / Ketone: Large  / Bili: Negative / Urobili: Negative mg/dL   Blood: x / Protein: 15 / Nitrite: Negative   Leuk Esterase: Negative / RBC: 0-2 /HPF / WBC 0-2 /HPF   Sq Epi: x / Non Sq Epi: Few / Bacteria: Few        IMAGING:   Ct 22  FINDINGS:  Limited by lack of any exogenous oral or intravenous contrast.    LOWER CHEST: Tree-in-bud parenchymal opacities right lower lobe   suggesting bronchiolitis..    LIVER: Enlarged and diffusely steatotic  BILE DUCTS: Normal caliber.  GALLBLADDER: Cholelithiasis.  SPLEEN: Within normal limits.  PANCREAS: Within normal limits. No scan evidence of pancreatitis at this   time.  ADRENALS: Within normal limits.  KIDNEYS/URETERS: Within normal limits. No hydronephrosis or urolithiasis..    BLADDER: Markedly distended..  REPRODUCTIVE ORGANS: Enlarged prostate (5.6 x 4.0cm), increased in size   from prior.    BOWEL: No bowel obstruction. Diffuse gastric wall thickening suggestive   of gastritis.. Appendix normal  PERITONEUM: No ascites.  VESSELS: Within normal limits.  RETROPERITONEUM/LYMPH NODES: No lymphadenopathy.  ABDOMINAL WALL: Within normal limits.  BONES: Within normal limits.    IMPRESSION:  Limited by lack oral and IV contrast.  Right lower lobe bronchiolitis.  No imaging evidence of pancreatitis.  Diffuse gastritis.  Cholelithiasis.  Enlarged fatty liver.  Enlarged prostate with markedly distended urinary bladder.        
HPI:  30 yo m pmhx DM2 on insulin and metformin, Afib on Eliquis, HTN and ETOH abuse presented with nausea/vomiting x1 day.  Per patient yesterday morning he began to feel nauseous, attempted to drink alcohol but couldn't keep it down.  Initially vomit was clear/yellow throughout the day it had some dark discoloration prompting presentation.  In ED patient tachycardic, noted to have dark emesis, labs significant for wbc ~13, plt 132, glucose 852, bun/cr 50.9/3.76, bicarb <6, AG 54; patient given 2L crystalloid IVF and started on insulin gtt.      PAST MEDICAL & SURGICAL HISTORY:  Alcohol abuse  Pancreatitis  HTN (hypertension)  Diabetes  Atrial fibrillation  Non-ischemic cardiomyopathy  No significant past surgical history    FAMILY HISTORY:  FH: hypertension    SOCIAL HISTORY: ETOh abuse     REVIEW OF SYSTEMS:  Constitutional: No fever, no chills, no change in weight.  Eyes: No eye swelling,no  blurry vision, no redness, no loss of vision.  Neck: No neck pain, no change in voice.  Lungs: No shortness of breath, no wheezing, no cough  CV: No chest pain, no palpitations  GI: No nausea, no vomiting, no constipation, no diarrhea, no abdominal pain  : No urinary frequency, no blood in urine  Musculoskeletal: No muscle pain, no joint pain, no swelling.  Skin: No rash  Neurologic: No headaches, no weakness  Endocrine: No heat intolerance, no cold intolerance  Psych: No depression, no anxiety    MEDICATIONS  (STANDING):  chlordiazePOXIDE 50 milliGRAM(s) Oral every 8 hours  chlordiazePOXIDE   Oral   chlorhexidine 2% Cloths 1 Application(s) Topical daily  dextrose 50% Injectable 25 Gram(s) IV Push once  dextrose 50% Injectable 12.5 Gram(s) IV Push once  dextrose 50% Injectable 25 Gram(s) IV Push once  dextrose Oral Gel 15 Gram(s) Oral once  folic acid 1 milliGRAM(s) Oral daily  glucagon  Injectable 1 milliGRAM(s) IntraMuscular once  influenza   Vaccine 0.5 milliLiter(s) IntraMuscular once  insulin glargine Injectable (LANTUS) 25 Unit(s) SubCutaneous every morning  insulin lispro (ADMELOG) corrective regimen sliding scale   SubCutaneous every 6 hours  insulin lispro Injectable (ADMELOG) 8 Unit(s) SubCutaneous every 6 hours  metoprolol tartrate 12.5 milliGRAM(s) Oral two times a day  multivitamin 1 Tablet(s) Oral daily  pantoprazole  Injectable 40 milliGRAM(s) IV Push every 12 hours  sucralfate 1 Gram(s) Oral four times a day  thiamine 100 milliGRAM(s) Oral daily    MEDICATIONS  (PRN):  aluminum hydroxide/magnesium hydroxide/simethicone Suspension 30 milliLiter(s) Oral every 6 hours PRN Dyspepsia  LORazepam   Injectable 2 milliGRAM(s) IV Push every 2 hours PRN CIWA >8  metoclopramide Injectable 10 milliGRAM(s) IV Push every 6 hours PRN nausea    Allergies  No Known Allergies    CAPILLARY BLOOD GLUCOSE  POCT Blood Glucose.: 155 mg/dL (27 Sep 2022 11:29)    PHYSICAL EXAM:  Vital Signs Last 24 Hrs  T(C): 36.9 (27 Sep 2022 15:13), Max: 37.6 (26 Sep 2022 20:12)  T(F): 98.5 (27 Sep 2022 15:13), Max: 99.7 (26 Sep 2022 20:12)  HR: 73 (27 Sep 2022 15:13) (73 - 102)  BP: 124/80 (27 Sep 2022 15:13) (114/76 - 128/83)  BP(mean): 90 (27 Sep 2022 15:13) (79 - 98)  RR: 15 (27 Sep 2022 15:13) (9 - 15)  SpO2: 99% (27 Sep 2022 15:13) (96% - 100%)    Parameters below as of 27 Sep 2022 15:13  Patient On (Oxygen Delivery Method): room air  Physical Examination:  GENERAL: In NAD   HEENT: NC/AT  NECK: Supple, trachea midline  PULM: CTA B/L  CVS: +S1, S2, RRR  ABD: Soft, non-tender  EXTREMITIES: No pedal edema B/L  SKIN: No open wounds  NEURO: Grossly non-focal    LABS:                      9.4    6.09  )-----------( 67       ( 27 Sep 2022 02:30 )             28.1         145  |  104  |  23.5<H>  ----------------------------<  202<H>  3.8   |  25.0  |  0.68    Ca    9.1      27 Sep 2022 02:30  Phos  2.5       Mg     1.9         TPro  6.2<L>  /  Alb  3.8  /  TBili  1.8  /  DBili  x   /  AST  66<H>  /  ALT  22  /  AlkPhos  88      Urinalysis Basic - ( 26 Sep 2022 01:34 )    Color: Yellow / Appearance: Clear / S.020 / pH: x  Gluc: x / Ketone: Large  / Bili: Negative / Urobili: Negative mg/dL   Blood: x / Protein: 15 / Nitrite: Negative   Leuk Esterase: Negative / RBC: 0-2 /HPF / WBC 0-2 /HPF   Sq Epi: x / Non Sq Epi: Few / Bacteria: Few      LIVER FUNCTIONS - ( 27 Sep 2022 02:30 )  Alb: 3.8 g/dL / Pro: 6.2 g/dL / ALK PHOS: 88 U/L / ALT: 22 U/L / AST: 66 U/L / GGT: x           CAPILLARY BLOOD GLUCOSE  POCT Blood Glucose.: 155 mg/dL (27 Sep 2022 11:29)  POCT Blood Glucose.: 83 mg/dL (27 Sep 2022 08:58)  POCT Blood Glucose.: 239 mg/dL (27 Sep 2022 05:51)  POCT Blood Glucose.: 81 mg/dL (26 Sep 2022 23:16)  POCT Blood Glucose.: 149 mg/dL (26 Sep 2022 18:21)  POCT Blood Glucose.: 165 mg/dL (26 Sep 2022 16:48)        RADIOLOGY:   < from: Xray Chest 1 View-PORTABLE IMMEDIATE (22 @ 23:36) >    ACC: [id number] EXAM:  XR CHEST PORTABLE IMMED 1V                          PROCEDURE DATE:  2022      INTERPRETATION:  Clinical information: Tachycardia.  TECHNIQUE: Frontal view of the chest.  COMPARISON: Prior study dated 2022.  FINDINGS: The lungs are clear. The cardiomediastinal silhouette is   normal. The visualized osseous structures are unremarkable.    IMPRESSION: Clear lungs, unchanged.  ABRAM JIANG MD; Attending Radiologist  This document has been electronically signed. Sep 26 2022  9:55AM

## 2022-09-27 NOTE — SBIRT NOTE ADULT - NSSBIRTBRIEFINTDET_GEN_A_CORE
education given on drinking and medical issues, pt will think about treatment, has been in treatment in the past

## 2022-09-27 NOTE — PROGRESS NOTE ADULT - SUBJECTIVE AND OBJECTIVE BOX
CARMEN JOYCE Patient is a 31y old  Male who presents with a chief complaint of DKA (27 Sep 2022 16:00)     HPI:  30 yo m pmhx DM2 on insulin and metformin, Afib on Eliquis, HTN and ETOH abuse presented with nausea/vomiting x1 day.  Per patient yesterday morning he began to feel nauseous, attempted to drink alcohol but couldn't keep it down.  Initially vomit was clear/yellow throughout the day it had some dark discoloration prompting presentation.  In ED patient tachycardic, noted to have dark emesis, labs significant for wbc ~13, plt 132, glucose 852, bun/cr 50.9/3.76, bicarb <6, AG 54; patient given 2L crystalloid IVF and started on insulin gtt.   (26 Sep 2022 01:29)    The patient was seen and evaluated   The patient is in no acute distress.  Denied any fever chest pain, palpitations, shortness of breath, abdominal pain, fever, dysuria, cough, edema       I&O's Summary    26 Sep 2022 07:  -  27 Sep 2022 07:00  --------------------------------------------------------  IN: 1733 mL / OUT: 1150 mL / NET: 583 mL    27 Sep 2022 07:01  -  27 Sep 2022 17:46  --------------------------------------------------------  IN: 400 mL / OUT: 600 mL / NET: -200 mL      Allergies    No Known Allergies    Intolerances      HEALTH ISSUES - PROBLEM Dx:        PAST MEDICAL & SURGICAL HISTORY:  Alcohol abuse      Pancreatitis      HTN (hypertension)      Diabetes      Atrial fibrillation      Non-ischemic cardiomyopathy      No significant past surgical history              Vital Signs Last 24 Hrs  T(C): 36.9 (27 Sep 2022 15:13), Max: 37.6 (26 Sep 2022 20:12)  T(F): 98.5 (27 Sep 2022 15:13), Max: 99.7 (26 Sep 2022 20:12)  HR: 75 (27 Sep 2022 16:00) (73 - 102)  BP: 118/71 (27 Sep 2022 16:00) (114/76 - 128/83)  BP(mean): 84 (27 Sep 2022 16:00) (79 - 98)  RR: 15 (27 Sep 2022 16:00) (9 - 15)  SpO2: 99% (27 Sep 2022 16:00) (96% - 100%)    Parameters below as of 27 Sep 2022 16:00  Patient On (Oxygen Delivery Method): room air    T(C): 36.9 (22 @ 15:13), Max: 37.6 (22 @ 20:12)  HR: 75 (22 @ 16:00) (73 - 102)  BP: 118/71 (22 @ 16:00) (114/76 - 128/83)  RR: 15 (22 @ 16:00) (9 - 15)  SpO2: 99% (22 @ 16:00) (96% - 100%)  Wt(kg): --    PHYSICAL EXAM:    GENERAL: NAD,   HEAD:  Atraumatic, Normocephalic  EYES: EOMI, PERRL conjunctiva and sclera clear  ENMT:  Moist mucous membranes,  No lesions  NECK: Supple, No JVD, Normal thyroid  NERVOUS SYSTEM:  Alert & Oriented X3,  Moves upper and lower extremities; CNS-II-XII  CHEST/LUNG: Clear to auscultation bilaterally; No rales, rhonchi, wheezing,   HEART: Regular rate and rhythm; No murmurs,   ABDOMEN: Soft, Nontender, Nondistended; Bowel sounds present  EXTREMITIES:  Peripheral Pulses, No  cyanosis, or edema  SKIN: No rashes or lesions  psychiatry- mood and affect approprite, Insight and judgement intact     aluminum hydroxide/magnesium hydroxide/simethicone Suspension 30 milliLiter(s) Oral every 6 hours PRN  chlordiazePOXIDE 50 milliGRAM(s) Oral every 8 hours  chlordiazePOXIDE   Oral   chlorhexidine 2% Cloths 1 Application(s) Topical daily  dextrose 50% Injectable 25 Gram(s) IV Push once  dextrose 50% Injectable 12.5 Gram(s) IV Push once  dextrose 50% Injectable 25 Gram(s) IV Push once  dextrose Oral Gel 15 Gram(s) Oral once  folic acid 1 milliGRAM(s) Oral daily  glucagon  Injectable 1 milliGRAM(s) IntraMuscular once  influenza   Vaccine 0.5 milliLiter(s) IntraMuscular once  insulin glargine Injectable (LANTUS) 25 Unit(s) SubCutaneous every morning  insulin lispro (ADMELOG) corrective regimen sliding scale   SubCutaneous every 6 hours  insulin lispro Injectable (ADMELOG) 8 Unit(s) SubCutaneous every 6 hours  LORazepam   Injectable 2 milliGRAM(s) IV Push every 2 hours PRN  metoclopramide Injectable 10 milliGRAM(s) IV Push every 6 hours PRN  metoprolol tartrate 12.5 milliGRAM(s) Oral two times a day  multivitamin 1 Tablet(s) Oral daily  pantoprazole  Injectable 40 milliGRAM(s) IV Push every 12 hours  sucralfate 1 Gram(s) Oral four times a day  thiamine 100 milliGRAM(s) Oral daily      LABS:                          9.4    6.09  )-----------( 67       ( 27 Sep 2022 02:30 )             28.1         145  |  104  |  23.5<H>  ----------------------------<  202<H>  3.8   |  25.0  |  0.68    Ca    9.1      27 Sep 2022 02:30  Phos  2.5       Mg     1.9         TPro  6.2<L>  /  Alb  3.8  /  TBili  1.8  /  DBili  x   /  AST  66<H>  /  ALT  22  /  AlkPhos  88      LIVER FUNCTIONS - ( 27 Sep 2022 02:30 )  Alb: 3.8 g/dL / Pro: 6.2 g/dL / ALK PHOS: 88 U/L / ALT: 22 U/L / AST: 66 U/L / GGT: x           PT/INR - ( 25 Sep 2022 22:44 )   PT: 12.0 sec;   INR: 1.03 ratio         PTT - ( 25 Sep 2022 22:44 )  PTT:31.9 sec  CARDIAC MARKERS ( 26 Sep 2022 10:31 )  x     / <0.01 ng/mL / x     / x     / x      CARDIAC MARKERS ( 26 Sep 2022 03:40 )  x     / <0.01 ng/mL / x     / x     / x      CARDIAC MARKERS ( 25 Sep 2022 22:44 )  x     / 0.01 ng/mL / x     / x     / x          Urinalysis Basic - ( 26 Sep 2022 01:34 )    Color: Yellow / Appearance: Clear / S.020 / pH: x  Gluc: x / Ketone: Large  / Bili: Negative / Urobili: Negative mg/dL   Blood: x / Protein: 15 / Nitrite: Negative   Leuk Esterase: Negative / RBC: 0-2 /HPF / WBC 0-2 /HPF   Sq Epi: x / Non Sq Epi: Few / Bacteria: Few      CAPILLARY BLOOD GLUCOSE      POCT Blood Glucose.: 172 mg/dL (27 Sep 2022 17:30)  POCT Blood Glucose.: 155 mg/dL (27 Sep 2022 11:29)  POCT Blood Glucose.: 83 mg/dL (27 Sep 2022 08:58)  POCT Blood Glucose.: 239 mg/dL (27 Sep 2022 05:51)  POCT Blood Glucose.: 81 mg/dL (26 Sep 2022 23:16)  POCT Blood Glucose.: 149 mg/dL (26 Sep 2022 18:21)      RADIOLOGY & ADDITIONAL TESTS:      Consultant notes reviewed    Case discussed with consultant/provider/ family /patient

## 2022-09-27 NOTE — PROGRESS NOTE ADULT - ASSESSMENT
31M PMH IDDM, AFib on Eliquis, HTN, EtOH abuse, COVID (07/2022) who presented on 9/26/22 with N/V with dark emesis, found to be in DKA and in ARF, with acute blood loss anemia s/p EGD with severe erosive gastritis and no active bleeding      Impression/Plan:    IDDM  DKA  Hyperglycemia  - Transitioned to Lantus 25U and 8U pre-meal  - ISS  - Placing endocrine consult  - Tolerating clear liquid diet  - PRN Reglan    ARF  - Likely pre-renal  - Resolved  - Monitor renal function, BMP    ABLA  GIB  - Has not required any pRBC units  - H&H stable  - S/p EGD today at bedside (Dr. Richardson) - no acute bleed, severe erosive gastritis, no varices  - IV Protonix BID  - Carafate QID  - Clear liquid diet for now, f/u with GI re: advancing diet    EtOH abuse  - Librium taper  - Ativan 2mg IV Q2H PRN  - Thiamine/Folate/MVI    AFib  - CHADSVASC of 2 (HTN, DM)  - Given GIB 2/2 erosive gastritis in setting of ongoing EtOH abuse, will need to weigh risks/benefits  - For now holding any anticoagulation  - Metoprolol 12.5.mg BID  - F/u with cardiology as outpatient    F/E/N/PPx/Lines  - Maintain euvolemia  - Replete lytes PRN  - Clear liquid diet as above, per GI  - No chemical VTE PPX  - PIV    Ethics/Dispo  - Full code  - Stable for downgrade to telemetry  Signed out to hospitalist Dr. Edward Manrique M.D.  , Pulmonary & Critical Care Medicine  [facility]  Pulmonary and Sleep Medicine at [place]  [address]  [place], [place] [address]  T: [phone]  F: [phone]

## 2022-09-28 DIAGNOSIS — R71.0 PRECIPITOUS DROP IN HEMATOCRIT: ICD-10-CM

## 2022-09-28 DIAGNOSIS — D64.9 ANEMIA, UNSPECIFIED: ICD-10-CM

## 2022-09-28 LAB
ALBUMIN SERPL ELPH-MCNC: 3.8 G/DL — SIGNIFICANT CHANGE UP (ref 3.3–5.2)
ALBUMIN SERPL ELPH-MCNC: 3.8 G/DL — SIGNIFICANT CHANGE UP (ref 3.3–5.2)
ALP SERPL-CCNC: 81 U/L — SIGNIFICANT CHANGE UP (ref 40–120)
ALP SERPL-CCNC: 86 U/L — SIGNIFICANT CHANGE UP (ref 40–120)
ALT FLD-CCNC: 31 U/L — SIGNIFICANT CHANGE UP
ALT FLD-CCNC: 39 U/L — SIGNIFICANT CHANGE UP
ANION GAP SERPL CALC-SCNC: 12 MMOL/L — SIGNIFICANT CHANGE UP (ref 5–17)
ANION GAP SERPL CALC-SCNC: 12 MMOL/L — SIGNIFICANT CHANGE UP (ref 5–17)
AST SERPL-CCNC: 63 U/L — HIGH
AST SERPL-CCNC: 89 U/L — HIGH
BASOPHILS # BLD AUTO: 0.02 K/UL — SIGNIFICANT CHANGE UP (ref 0–0.2)
BASOPHILS NFR BLD AUTO: 0.7 % — SIGNIFICANT CHANGE UP (ref 0–2)
BILIRUB DIRECT SERPL-MCNC: 0.3 MG/DL — SIGNIFICANT CHANGE UP (ref 0–0.3)
BILIRUB INDIRECT FLD-MCNC: 0.9 MG/DL — SIGNIFICANT CHANGE UP (ref 0.2–1)
BILIRUB SERPL-MCNC: 1.1 MG/DL — SIGNIFICANT CHANGE UP (ref 0.4–2)
BILIRUB SERPL-MCNC: 1.2 MG/DL — SIGNIFICANT CHANGE UP (ref 0.4–2)
BUN SERPL-MCNC: 14.2 MG/DL — SIGNIFICANT CHANGE UP (ref 8–20)
BUN SERPL-MCNC: 16.7 MG/DL — SIGNIFICANT CHANGE UP (ref 8–20)
C PEPTIDE SERPL-MCNC: 0.3 NG/ML — LOW (ref 1.1–4.4)
CALCIUM SERPL-MCNC: 9.1 MG/DL — SIGNIFICANT CHANGE UP (ref 8.4–10.5)
CALCIUM SERPL-MCNC: 9.4 MG/DL — SIGNIFICANT CHANGE UP (ref 8.4–10.5)
CHLORIDE SERPL-SCNC: 104 MMOL/L — SIGNIFICANT CHANGE UP (ref 98–107)
CHLORIDE SERPL-SCNC: 99 MMOL/L — SIGNIFICANT CHANGE UP (ref 98–107)
CO2 SERPL-SCNC: 23 MMOL/L — SIGNIFICANT CHANGE UP (ref 22–29)
CO2 SERPL-SCNC: 28 MMOL/L — SIGNIFICANT CHANGE UP (ref 22–29)
CREAT SERPL-MCNC: 0.47 MG/DL — LOW (ref 0.5–1.3)
CREAT SERPL-MCNC: 0.63 MG/DL — SIGNIFICANT CHANGE UP (ref 0.5–1.3)
EGFR: 130 ML/MIN/1.73M2 — SIGNIFICANT CHANGE UP
EGFR: 142 ML/MIN/1.73M2 — SIGNIFICANT CHANGE UP
EOSINOPHIL # BLD AUTO: 0.04 K/UL — SIGNIFICANT CHANGE UP (ref 0–0.5)
EOSINOPHIL NFR BLD AUTO: 1.4 % — SIGNIFICANT CHANGE UP (ref 0–6)
GLUCOSE BLDC GLUCOMTR-MCNC: 131 MG/DL — HIGH (ref 70–99)
GLUCOSE BLDC GLUCOMTR-MCNC: 180 MG/DL — HIGH (ref 70–99)
GLUCOSE BLDC GLUCOMTR-MCNC: 205 MG/DL — HIGH (ref 70–99)
GLUCOSE BLDC GLUCOMTR-MCNC: 263 MG/DL — HIGH (ref 70–99)
GLUCOSE BLDC GLUCOMTR-MCNC: 281 MG/DL — HIGH (ref 70–99)
GLUCOSE BLDC GLUCOMTR-MCNC: 79 MG/DL — SIGNIFICANT CHANGE UP (ref 70–99)
GLUCOSE BLDC GLUCOMTR-MCNC: >530 MG/DL — CRITICAL HIGH (ref 70–99)
GLUCOSE SERPL-MCNC: 116 MG/DL — HIGH (ref 70–99)
GLUCOSE SERPL-MCNC: 599 MG/DL — CRITICAL HIGH (ref 70–99)
HCT VFR BLD CALC: 30.9 % — LOW (ref 39–50)
HGB BLD-MCNC: 10.1 G/DL — LOW (ref 13–17)
IMM GRANULOCYTES NFR BLD AUTO: 0 % — SIGNIFICANT CHANGE UP (ref 0–0.9)
LYMPHOCYTES # BLD AUTO: 1.05 K/UL — SIGNIFICANT CHANGE UP (ref 1–3.3)
LYMPHOCYTES # BLD AUTO: 37 % — SIGNIFICANT CHANGE UP (ref 13–44)
MCHC RBC-ENTMCNC: 28.7 PG — SIGNIFICANT CHANGE UP (ref 27–34)
MCHC RBC-ENTMCNC: 32.7 GM/DL — SIGNIFICANT CHANGE UP (ref 32–36)
MCV RBC AUTO: 87.8 FL — SIGNIFICANT CHANGE UP (ref 80–100)
MONOCYTES # BLD AUTO: 0.47 K/UL — SIGNIFICANT CHANGE UP (ref 0–0.9)
MONOCYTES NFR BLD AUTO: 16.5 % — HIGH (ref 2–14)
NEUTROPHILS # BLD AUTO: 1.26 K/UL — LOW (ref 1.8–7.4)
NEUTROPHILS NFR BLD AUTO: 44.4 % — SIGNIFICANT CHANGE UP (ref 43–77)
PLATELET # BLD AUTO: 71 K/UL — LOW (ref 150–400)
POTASSIUM SERPL-MCNC: 3.1 MMOL/L — LOW (ref 3.5–5.3)
POTASSIUM SERPL-MCNC: 4.1 MMOL/L — SIGNIFICANT CHANGE UP (ref 3.5–5.3)
POTASSIUM SERPL-SCNC: 3.1 MMOL/L — LOW (ref 3.5–5.3)
POTASSIUM SERPL-SCNC: 4.1 MMOL/L — SIGNIFICANT CHANGE UP (ref 3.5–5.3)
PROT SERPL-MCNC: 6.3 G/DL — LOW (ref 6.6–8.7)
PROT SERPL-MCNC: 6.3 G/DL — LOW (ref 6.6–8.7)
RBC # BLD: 3.52 M/UL — LOW (ref 4.2–5.8)
RBC # FLD: 14.6 % — HIGH (ref 10.3–14.5)
SODIUM SERPL-SCNC: 134 MMOL/L — LOW (ref 135–145)
SODIUM SERPL-SCNC: 144 MMOL/L — SIGNIFICANT CHANGE UP (ref 135–145)
WBC # BLD: 2.84 K/UL — LOW (ref 3.8–10.5)
WBC # FLD AUTO: 2.84 K/UL — LOW (ref 3.8–10.5)

## 2022-09-28 PROCEDURE — 99233 SBSQ HOSP IP/OBS HIGH 50: CPT

## 2022-09-28 PROCEDURE — 99232 SBSQ HOSP IP/OBS MODERATE 35: CPT

## 2022-09-28 RX ORDER — INSULIN LISPRO 100/ML
8 VIAL (ML) SUBCUTANEOUS
Refills: 0 | Status: DISCONTINUED | OUTPATIENT
Start: 2022-09-28 | End: 2022-09-29

## 2022-09-28 RX ORDER — INSULIN LISPRO 100/ML
VIAL (ML) SUBCUTANEOUS
Refills: 0 | Status: DISCONTINUED | OUTPATIENT
Start: 2022-09-28 | End: 2022-09-29

## 2022-09-28 RX ORDER — POTASSIUM CHLORIDE 20 MEQ
40 PACKET (EA) ORAL EVERY 4 HOURS
Refills: 0 | Status: COMPLETED | OUTPATIENT
Start: 2022-09-28 | End: 2022-09-28

## 2022-09-28 RX ORDER — INSULIN LISPRO 100/ML
15 VIAL (ML) SUBCUTANEOUS ONCE
Refills: 0 | Status: COMPLETED | OUTPATIENT
Start: 2022-09-28 | End: 2022-09-28

## 2022-09-28 RX ADMIN — Medication 8 UNIT(S): at 17:38

## 2022-09-28 RX ADMIN — Medication 100 MILLIGRAM(S): at 12:20

## 2022-09-28 RX ADMIN — Medication 50 MILLIGRAM(S): at 17:40

## 2022-09-28 RX ADMIN — INSULIN GLARGINE 25 UNIT(S): 100 INJECTION, SOLUTION SUBCUTANEOUS at 08:58

## 2022-09-28 RX ADMIN — Medication 40 MILLIEQUIVALENT(S): at 08:59

## 2022-09-28 RX ADMIN — Medication 1 GRAM(S): at 17:38

## 2022-09-28 RX ADMIN — Medication 1 GRAM(S): at 06:01

## 2022-09-28 RX ADMIN — Medication 6: at 00:33

## 2022-09-28 RX ADMIN — Medication 1 GRAM(S): at 00:34

## 2022-09-28 RX ADMIN — Medication 40 MILLIEQUIVALENT(S): at 09:49

## 2022-09-28 RX ADMIN — PANTOPRAZOLE SODIUM 40 MILLIGRAM(S): 20 TABLET, DELAYED RELEASE ORAL at 06:01

## 2022-09-28 RX ADMIN — Medication 12.5 MILLIGRAM(S): at 06:02

## 2022-09-28 RX ADMIN — Medication 15 UNIT(S): at 13:33

## 2022-09-28 RX ADMIN — Medication 8 UNIT(S): at 00:34

## 2022-09-28 RX ADMIN — Medication 12: at 12:36

## 2022-09-28 RX ADMIN — PANTOPRAZOLE SODIUM 40 MILLIGRAM(S): 20 TABLET, DELAYED RELEASE ORAL at 17:41

## 2022-09-28 RX ADMIN — Medication 8 UNIT(S): at 12:35

## 2022-09-28 RX ADMIN — Medication 12.5 MILLIGRAM(S): at 17:38

## 2022-09-28 RX ADMIN — Medication 1 TABLET(S): at 12:19

## 2022-09-28 RX ADMIN — Medication 1 MILLIGRAM(S): at 12:19

## 2022-09-28 RX ADMIN — Medication 1 GRAM(S): at 12:20

## 2022-09-28 RX ADMIN — Medication 1 GRAM(S): at 21:33

## 2022-09-28 NOTE — PROGRESS NOTE ADULT - ASSESSMENT
30 y/o M PMH IDDM, AFib on Eliquis, HTN, EtOH abuse, COVID (07/2022) who presented on 9/26/22 with N/V with dark emesis, found to be in DKA and in ARF, with acute blood loss anemia s/p EGD with severe erosive gastritis and no active bleeding.    1. DM, a1c 7.4%, s/p DKA  - Lantus 25 units qAM  - Admelog 8 units TID with meals and sliding scale  - RD/CDE for dietary education  - Cpeptide/PADMINI/DALE pending  - Cannot continue metformin upon dc due to etoh abuse    2. GIB  - S/p EGD- no acute bleed, severe erosive gastritis, no varices    3. ETOH  - Librium taper  - Thiamine/Folate/MVI 30 y/o M PMH IDDM, AFib on Eliquis, HTN, EtOH abuse, COVID (07/2022) who presented on 9/26/22 with N/V with dark emesis, found to be in DKA and in ARF, with acute blood loss anemia s/p EGD with severe erosive gastritis and no active bleeding.    1. DM, a1c 7.4%, s/p DKA  - Lantus 25 units qAM  - Admelog 8 units TID with meals and sliding scale  - FS was high this afternoon because he did not receive insulin with breakfast  - RD/CDE for dietary education  - Cpeptide/PADMINI/DALE pending  - Cannot continue metformin upon dc due to etoh abuse    2. GIB  - S/p EGD- no acute bleed, severe erosive gastritis, no varices    3. ETOH  - Librium taper  - Thiamine/Folate/MVI 30 y/o M PMH IDDM, AFib on Eliquis, HTN, EtOH abuse, COVID (07/2022) who presented on 9/26/22 with N/V with dark emesis, found to be in DKA and in ARF, with acute blood loss anemia s/p EGD with severe erosive gastritis and no active bleeding.    1. DM, a1c 7.4%, s/p DKA  - Lantus 25 units qAM  - Admelog 8 units TID with meals and sliding scale  - FS was high this afternoon because he did not receive insulin with breakfast  - RD/CDE for dietary education  - Cpeptide/PADMINI/ICA pending  - Cannot continue metformin upon dc due to etoh abuse    2. GIB  - S/p EGD- no acute bleed, severe erosive gastritis, no varices    3. ETOH  - Librium taper  - Thiamine/Folate/MVI

## 2022-09-28 NOTE — CHART NOTE - NSCHARTNOTEFT_GEN_A_CORE
Notified by RN that Fingerstick >530.   Pt did not receive any premeal insulin this morning because order had been written for Q6 before tube feeds but patient not on tube feeds.  Order was changed this morning to premeal but patient didn't receive any lispro this morning. Now, before lunch, fbs >530.   Advised RN to admin scheduled premeal and coverage as ordered. Will follow glucose after.
Nutrition Note: Aware pt downgraded from ICU. Chart reviewed; RD to follow up with full nutrition assessment per medical floor protocol.
EGD showed severe LA Grade D erosive esophagitis and non-erosive gastritis. Ok for clear liquids and IV Pantoprazole 40 mg. every 12 hours and Sucrlafte liquid 1 g. PO QID.

## 2022-09-28 NOTE — PROGRESS NOTE ADULT - ASSESSMENT
31 year old M with active EtOH use PMH of Afib on Xarelto admitted with DKA. GI consult for anemia, concern for GI bleed.

## 2022-09-28 NOTE — PROGRESS NOTE ADULT - SUBJECTIVE AND OBJECTIVE BOX
CC: Follow up diabetes management   INTERVAL HPI/OVERNIGHT EVENTS: No complaints at time of exam    ROS: Denies chest pain, SOB, abd pain, nausea/vomiting     MEDICATIONS  (STANDING):  chlordiazePOXIDE 50 milliGRAM(s) Oral every 12 hours  chlordiazePOXIDE   Oral   dextrose 50% Injectable 25 Gram(s) IV Push once  dextrose 50% Injectable 12.5 Gram(s) IV Push once  dextrose 50% Injectable 25 Gram(s) IV Push once  dextrose Oral Gel 15 Gram(s) Oral once  folic acid 1 milliGRAM(s) Oral daily  glucagon  Injectable 1 milliGRAM(s) IntraMuscular once  influenza   Vaccine 0.5 milliLiter(s) IntraMuscular once  insulin glargine Injectable (LANTUS) 25 Unit(s) SubCutaneous every morning  insulin lispro (ADMELOG) corrective regimen sliding scale   SubCutaneous every 6 hours  insulin lispro Injectable (ADMELOG) 8 Unit(s) SubCutaneous three times a day before meals  metoprolol tartrate 12.5 milliGRAM(s) Oral two times a day  multivitamin 1 Tablet(s) Oral daily  pantoprazole  Injectable 40 milliGRAM(s) IV Push every 12 hours  sucralfate 1 Gram(s) Oral four times a day  thiamine 100 milliGRAM(s) Oral daily    MEDICATIONS  (PRN):  aluminum hydroxide/magnesium hydroxide/simethicone Suspension 30 milliLiter(s) Oral every 6 hours PRN Dyspepsia  LORazepam   Injectable 2 milliGRAM(s) IV Push every 2 hours PRN CIWA >8  metoclopramide Injectable 10 milliGRAM(s) IV Push every 6 hours PRN nausea    Allergies  No Known Allergies    Vital Signs Last 24 Hrs  T(C): 36.8 (28 Sep 2022 05:44), Max: 37.8 (27 Sep 2022 19:00)  T(F): 98.3 (28 Sep 2022 05:44), Max: 100.1 (27 Sep 2022 19:00)  HR: 80 (28 Sep 2022 05:44) (73 - 94)  BP: 115/71 (28 Sep 2022 05:44) (115/71 - 124/80)  BP(mean): 84 (27 Sep 2022 16:00) (79 - 90)  RR: 16 (28 Sep 2022 05:44) (11 - 17)  SpO2: 96% (28 Sep 2022 05:44) (96% - 99%)    Parameters below as of 28 Sep 2022 05:44  Patient On (Oxygen Delivery Method): room air      PHYSICAL EXAM:  General: No apparent distress  Neck: Supple, trachea midline, no thyromegaly  Respiratory: Lungs clear bilaterally, normal rate, effort  Cardiac: +S1, S2, no m/r/g  GI: +BS, soft, non tender, non distended  Extremities: No peripheral edema, no pedal lesions  Neuro: A+O X3, no tremor  Pysch: Affect appropriate   Skin: No acanthosis       LABS:                        10.1   2.84  )-----------( 71       ( 28 Sep 2022 05:25 )             30.9     09-28    144  |  104  |  14.2  ----------------------------<  116<H>  3.1<L>   |  28.0  |  0.47<L>    Ca    9.4      28 Sep 2022 05:25  Phos  2.5     09-27  Mg     1.9     09-27    TPro  6.3<L>  /  Alb  3.8  /  TBili  1.2  /  DBili  0.3  /  AST  63<H>  /  ALT  31  /  AlkPhos  81  09-28      POCT Blood Glucose.: 205 mg/dL (09-28-22 @ 08:56)  POCT Blood Glucose.: 131 mg/dL (09-28-22 @ 06:04)  POCT Blood Glucose.: 263 mg/dL (09-28-22 @ 00:32)  POCT Blood Glucose.: 172 mg/dL (09-27-22 @ 17:30)  POCT Blood Glucose.: 155 mg/dL (09-27-22 @ 11:29)         CC: Follow up diabetes management   INTERVAL HPI/OVERNIGHT EVENTS: No complaints at time of exam    ROS: Denies chest pain, SOB, abd pain, nausea/vomiting     MEDICATIONS  (STANDING):  chlordiazePOXIDE 50 milliGRAM(s) Oral every 12 hours  chlordiazePOXIDE   Oral   dextrose 50% Injectable 25 Gram(s) IV Push once  dextrose 50% Injectable 12.5 Gram(s) IV Push once  dextrose 50% Injectable 25 Gram(s) IV Push once  dextrose Oral Gel 15 Gram(s) Oral once  folic acid 1 milliGRAM(s) Oral daily  glucagon  Injectable 1 milliGRAM(s) IntraMuscular once  influenza   Vaccine 0.5 milliLiter(s) IntraMuscular once  insulin glargine Injectable (LANTUS) 25 Unit(s) SubCutaneous every morning  insulin lispro (ADMELOG) corrective regimen sliding scale   SubCutaneous every 6 hours  insulin lispro Injectable (ADMELOG) 8 Unit(s) SubCutaneous three times a day before meals  metoprolol tartrate 12.5 milliGRAM(s) Oral two times a day  multivitamin 1 Tablet(s) Oral daily  pantoprazole  Injectable 40 milliGRAM(s) IV Push every 12 hours  sucralfate 1 Gram(s) Oral four times a day  thiamine 100 milliGRAM(s) Oral daily    MEDICATIONS  (PRN):  aluminum hydroxide/magnesium hydroxide/simethicone Suspension 30 milliLiter(s) Oral every 6 hours PRN Dyspepsia  LORazepam   Injectable 2 milliGRAM(s) IV Push every 2 hours PRN CIWA >8  metoclopramide Injectable 10 milliGRAM(s) IV Push every 6 hours PRN nausea    Allergies  No Known Allergies    Vital Signs Last 24 Hrs  T(C): 36.8 (28 Sep 2022 05:44), Max: 37.8 (27 Sep 2022 19:00)  T(F): 98.3 (28 Sep 2022 05:44), Max: 100.1 (27 Sep 2022 19:00)  HR: 80 (28 Sep 2022 05:44) (73 - 94)  BP: 115/71 (28 Sep 2022 05:44) (115/71 - 124/80)  BP(mean): 84 (27 Sep 2022 16:00) (79 - 90)  RR: 16 (28 Sep 2022 05:44) (11 - 17)  SpO2: 96% (28 Sep 2022 05:44) (96% - 99%)    Parameters below as of 28 Sep 2022 05:44  Patient On (Oxygen Delivery Method): room air      PHYSICAL EXAM:  General: No apparent distress  Neck: Supple, trachea midline, no thyromegaly  Respiratory: Lungs clear bilaterally, normal rate, effort  Cardiac: +S1, S2, no m/r/g  GI: +BS, soft, non tender, non distended  Extremities: No peripheral edema, no pedal lesions  Neuro: A+O X3, no tremor  Pysch: Affect appropriate   Skin: Slight jaundice    LABS:                        10.1   2.84  )-----------( 71       ( 28 Sep 2022 05:25 )             30.9     09-28    144  |  104  |  14.2  ----------------------------<  116<H>  3.1<L>   |  28.0  |  0.47<L>    Ca    9.4      28 Sep 2022 05:25  Phos  2.5     09-27  Mg     1.9     09-27    TPro  6.3<L>  /  Alb  3.8  /  TBili  1.2  /  DBili  0.3  /  AST  63<H>  /  ALT  31  /  AlkPhos  81  09-28      POCT Blood Glucose.: 205 mg/dL (09-28-22 @ 08:56)  POCT Blood Glucose.: 131 mg/dL (09-28-22 @ 06:04)  POCT Blood Glucose.: 263 mg/dL (09-28-22 @ 00:32)  POCT Blood Glucose.: 172 mg/dL (09-27-22 @ 17:30)  POCT Blood Glucose.: 155 mg/dL (09-27-22 @ 11:29)

## 2022-09-28 NOTE — PROGRESS NOTE ADULT - ASSESSMENT
31 year old male with alcoholism, IDDM, PAF on Eliquis, HTN, recent COVID (07/2022)  presented on 9/26/22 with N/V with dark emesis, found to be in DKA and in ARF, with acute blood loss anemia      1) DKA  - Resolved  - HbA1c 7.4  - Accu checks and ISS  - Continue Lantus 25 units daily  - Continue Premeal Admelog 8 units (did not get today with breakfast so his accu checks were > 530 in the afternoon.   - Endo follow up noted      2) Hematemesis   - s/p EGD on 9/27/22: severe LA Grade D erosive esophagitis and non-erosive gastritis  - Continue Protonix and Carafate   - Advance diet as tolerated   - Monitor H&H  - GI follow up noted     3) Alcoholism  - CIWA Protocol with Librium Taper   - Thiamine for suspected deficiency, MVI and Folic Acid     4) PAF  - Currently in NSR  - Continue Metoprolol   - Eliquis on hold due to Hematemesis     DVT Prophylaxis -- Early ambulation.     Dispo: Home in 24 hours.

## 2022-09-28 NOTE — PROGRESS NOTE ADULT - SUBJECTIVE AND OBJECTIVE BOX
Chief Complaint: This is a 31y old man patient being seen in follow-up consultation for drop in hemoglobin, concern for GI bleed.     Interval HPI / 24H events:  Patient seen and evaluated at bedside, reporting no complaints, no overnight events. EGD form yesterday showed severe LA grade D- erosive esophagitis and non erosive gastritis. Tolerating clear liquid diet. His hemoglobin 10.1 gm today. Denies nausea, vomiting, abdominal pain, chest pain, shortness of breath, hematemesis, hematochezia, melena.     Review of Systems:  . Constitutional: No fever, chills, no weight gain, weight loss   · ENMT: no vertigo, no throat pain  . Neck: No pain or stiffness  · Respiratory and Thorax: No shortness of breath, no cough, no wheezing  · Cardiovascular: No chest pain, palpitation, no dizziness, no orthopnea,   · Gastrointestinal: see above.  · Genitourinary: No hematuria, no dysuria  · Musculoskeletal: Negative  · Neurological: no headache, no vision changes, no slurred speech  · Psychiatric: no agitation, no anxiety  · Hematology/Lymphatics: negative  · Endocrine: negative      PAST MEDICAL/SURGICAL HISTORY:  Alcohol abuse    Pancreatitis    HTN (hypertension)    Diabetes    Atrial fibrillation    Non-ischemic cardiomyopathy    No significant past surgical history      MEDICATIONS  (STANDING):  chlordiazePOXIDE 50 milliGRAM(s) Oral every 12 hours  chlordiazePOXIDE   Oral   dextrose 50% Injectable 25 Gram(s) IV Push once  dextrose 50% Injectable 12.5 Gram(s) IV Push once  dextrose 50% Injectable 25 Gram(s) IV Push once  dextrose Oral Gel 15 Gram(s) Oral once  folic acid 1 milliGRAM(s) Oral daily  glucagon  Injectable 1 milliGRAM(s) IntraMuscular once  influenza   Vaccine 0.5 milliLiter(s) IntraMuscular once  insulin glargine Injectable (LANTUS) 25 Unit(s) SubCutaneous every morning  insulin lispro (ADMELOG) corrective regimen sliding scale   SubCutaneous every 6 hours  insulin lispro Injectable (ADMELOG) 8 Unit(s) SubCutaneous three times a day before meals  metoprolol tartrate 12.5 milliGRAM(s) Oral two times a day  multivitamin 1 Tablet(s) Oral daily  pantoprazole  Injectable 40 milliGRAM(s) IV Push every 12 hours  sucralfate 1 Gram(s) Oral four times a day  thiamine 100 milliGRAM(s) Oral daily    MEDICATIONS  (PRN):  aluminum hydroxide/magnesium hydroxide/simethicone Suspension 30 milliLiter(s) Oral every 6 hours PRN Dyspepsia  LORazepam   Injectable 2 milliGRAM(s) IV Push every 2 hours PRN CIWA >8  metoclopramide Injectable 10 milliGRAM(s) IV Push every 6 hours PRN nausea    No Known Allergies    T(C): 36.8 (09-28-22 @ 05:44), Max: 37.8 (09-27-22 @ 19:00)  HR: 80 (09-28-22 @ 05:44) (73 - 94)  BP: 115/71 (09-28-22 @ 05:44) (115/71 - 124/80)  RR: 16 (09-28-22 @ 05:44) (11 - 17)  SpO2: 96% (09-28-22 @ 05:44) (96% - 99%)    I&O's Summary    27 Sep 2022 07:01  -  28 Sep 2022 07:00  --------------------------------------------------------  IN: 400 mL / OUT: 600 mL / NET: -200 mL      PHYSICAL EXAM:    Constitutional: No acute distress  Neuro: Awake alert, oriented to person, place and situation, non-focal, speech clear and intact  HEENT: PERRL, anicteric sclerae, oral mucosa pink and moist  Neck: supple, no JVD  CV: regular rate, regular rhythm, +S1S2,   Pulm/chest: lung sounds clear bilaterally, no accessory muscle use noted  Abd: soft, NT, ND, +BS  Ext: no Cyanosis, clubbing or edema  Skin: warm, well perfused, no jaundice   Psych: calm, appropriate affect       LABS:               10.1   2.84  )-----------( 71       ( 09-28 @ 05:25 )             30.9                9.4    6.09  )-----------( 67       ( 09-27 @ 02:30 )             28.1                10.1   8.14  )-----------( 79       ( 09-26 @ 19:35 )             29.2                9.9    8.73  )-----------( 92       ( 09-26 @ 10:31 )             28.9                11.9   12.51 )-----------( 105      ( 09-26 @ 03:40 )             34.5                14.5   13.74 )-----------( 132      ( 09-25 @ 22:44 )             46.5       09-28    144  |  104  |  14.2  ----------------------------<  116<H>  3.1<L>   |  28.0  |  0.47<L>    Ca    9.4      28 Sep 2022 05:25  Phos  2.5     09-27  Mg     1.9     09-27    TPro  6.3<L>  /  Alb  3.8  /  TBili  1.2  /  DBili  0.3  /  AST  63<H>  /  ALT  31  /  AlkPhos  81  09-28    LIVER FUNCTIONS - ( 28 Sep 2022 05:25 )  Alb: 3.8 g/dL / Pro: 6.3 g/dL / ALK PHOS: 81 U/L / ALT: 31 U/L / AST: 63 U/L / GGT: x             Lipase, Serum: 100 U/L (09-25-22 @ 23:22)  Lipase, Serum: 114 U/L (09-25-22 @ 22:44)      Culture - Blood (collected 25 Sep 2022 22:54)  Source: .Blood Blood-Peripheral  Preliminary Report (27 Sep 2022 07:02):    No growth to date.    Culture - Blood (collected 25 Sep 2022 22:44)  Source: .Blood Blood-Peripheral  Preliminary Report (27 Sep 2022 07:02):    No growth to date.

## 2022-09-28 NOTE — PROGRESS NOTE ADULT - PROBLEM SELECTOR PLAN 1
Now s/p EGD yesterday revealed  severe LA grade D- erosive esophagitis and non erosive gastritis.   Hemoglobin 10.1 this morning. No evidence of overt GI bleeding. Tolerating clear liquid diet.   Continue Protonix 40 mg BID  Sucralfate 1 GM four times a day  Can advance diet to full liquid diet for lunch and advance to regular diet for dinner if continue to remain stable with no evidence of overt GI bleed.   Okay to resume anticoagulation. Continue to monitor CBC. Transfuse to Hgb 8 or higher.  Counselling for alcohol cessation  Continue Thiamine, Folic acid, multivitamin.

## 2022-09-28 NOTE — PROGRESS NOTE ADULT - TIME BILLING
reviewing labs, notes, orders, radiographic studies, as well as counseling and coordinating care with the relevant multidisciplinary team, including with the primary and consulting providers.
I I reviewed labs, EGD report, spoke to primary MD regarding plan

## 2022-09-28 NOTE — PROGRESS NOTE ADULT - SUBJECTIVE AND OBJECTIVE BOX
Type 2 diabetes mellitus with ketoacidosis without coma    HPI:  30 yo m pmhx DM2 on insulin and metformin, Afib on Eliquis, HTN and ETOH abuse presented with nausea/vomiting x1 day.  Per patient yesterday morning he began to feel nauseous, attempted to drink alcohol but couldn't keep it down.  Initially vomit was clear/yellow throughout the day it had some dark discoloration prompting presentation.  In ED patient tachycardic, noted to have dark emesis, labs significant for wbc ~13, plt 132, glucose 852, bun/cr 50.9/3.76, bicarb <6, AG 54; patient given 2L crystalloid IVF and started on insulin gtt.   (26 Sep 2022 01:29)    Interval History:  Patient was seen and examined at bedside around 10 am.  Complaining of epigastric burning while eating.   Denies chest pain, palpitations, shortness of breath, headache, dizziness, visual symptoms, nausea or vomiting.    ROS:  As per interval history otherwise unremarkable.    PHYSICAL EXAM:  Vital Signs   T(C): 36.7 (28 Sep 2022 13:31), Max: 37.8 (27 Sep 2022 19:00)  T(F): 98.1 (28 Sep 2022 13:31), Max: 100.1 (27 Sep 2022 19:00)  HR: 99 (28 Sep 2022 13:31) (75 - 99)  BP: 116/81 (28 Sep 2022 13:31) (115/71 - 122/77)  RR: 18 (28 Sep 2022 13:31) (15 - 18)  SpO2: 97% (28 Sep 2022 13:31) (96% - 99%)  Parameters below as of 28 Sep 2022 13:31  Patient On (Oxygen Delivery Method): room air  General: Young male sitting in bed comfortably. No acute distress  HEENT: EOMI. Clear conjunctivae. Moist mucus membrane  Neck: Supple.   Chest: CTA bilaterally. No wheezing, rales or rhonchi.   Heart: Normal S1 & S2. RRR.   Abdomen: Non distended. Soft. Non-tender. + BS  Ext: No pedal edema. No calf tenderness   Neuro: AAO x 3. No focal deficit. No speech disorder  Skin: Warm and Dry  Psychiatry: Normal mood and affect    I&O's Summary    27 Sep 2022 07:01  -  28 Sep 2022 07:00  --------------------------------------------------------  IN: 400 mL / OUT: 600 mL / NET: -200 mL    LABS:  CAPILLARY BLOOD GLUCOSE  POCT Blood Glucose.: 281 mg/dL (28 Sep 2022 14:28)  POCT Blood Glucose.: >530 mg/dL (28 Sep 2022 13:23)  POCT Blood Glucose.: >530 mg/dL (28 Sep 2022 12:14)  POCT Blood Glucose.: >530 mg/dL (28 Sep 2022 12:12)  POCT Blood Glucose.: >530 mg/dL (28 Sep 2022 12:11)  POCT Blood Glucose.: 205 mg/dL (28 Sep 2022 08:56)  POCT Blood Glucose.: 131 mg/dL (28 Sep 2022 06:04)  POCT Blood Glucose.: 263 mg/dL (28 Sep 2022 00:32)  POCT Blood Glucose.: 172 mg/dL (27 Sep 2022 17:30)                      10.1   2.84  )-----------( 71       ( 28 Sep 2022 05:25 )             30.9     09-28    134<L>  |  99  |  16.7  ----------------------------<  599<HH>  4.1   |  23.0  |  0.63    Ca    9.1      28 Sep 2022 12:58  Phos  2.5     09-27  Mg     1.9     09-27    TPro  6.3<L>  /  Alb  3.8  /  TBili  1.1  /  DBili  x   /  AST  89<H>  /  ALT  39  /  AlkPhos  86  09-28    RADIOLOGY & ADDITIONAL STUDIES:  Reviewed     MEDICATIONS  (STANDING):  chlordiazePOXIDE 50 milliGRAM(s) Oral every 12 hours  chlordiazePOXIDE   Oral   dextrose 50% Injectable 25 Gram(s) IV Push once  dextrose 50% Injectable 12.5 Gram(s) IV Push once  dextrose 50% Injectable 25 Gram(s) IV Push once  dextrose Oral Gel 15 Gram(s) Oral once  folic acid 1 milliGRAM(s) Oral daily  glucagon  Injectable 1 milliGRAM(s) IntraMuscular once  influenza   Vaccine 0.5 milliLiter(s) IntraMuscular once  insulin glargine Injectable (LANTUS) 25 Unit(s) SubCutaneous every morning  insulin lispro (ADMELOG) corrective regimen sliding scale   SubCutaneous three times a day before meals  insulin lispro Injectable (ADMELOG) 8 Unit(s) SubCutaneous three times a day before meals  metoprolol tartrate 12.5 milliGRAM(s) Oral two times a day  multivitamin 1 Tablet(s) Oral daily  pantoprazole  Injectable 40 milliGRAM(s) IV Push every 12 hours  sucralfate 1 Gram(s) Oral four times a day  thiamine 100 milliGRAM(s) Oral daily    MEDICATIONS  (PRN):  aluminum hydroxide/magnesium hydroxide/simethicone Suspension 30 milliLiter(s) Oral every 6 hours PRN Dyspepsia  LORazepam   Injectable 2 milliGRAM(s) IV Push every 2 hours PRN CIWA >8  metoclopramide Injectable 10 milliGRAM(s) IV Push every 6 hours PRN nausea

## 2022-09-29 ENCOUNTER — TRANSCRIPTION ENCOUNTER (OUTPATIENT)
Age: 32
End: 2022-09-29

## 2022-09-29 VITALS
TEMPERATURE: 99 F | SYSTOLIC BLOOD PRESSURE: 130 MMHG | HEART RATE: 114 BPM | OXYGEN SATURATION: 98 % | RESPIRATION RATE: 18 BRPM | DIASTOLIC BLOOD PRESSURE: 73 MMHG

## 2022-09-29 LAB
ANION GAP SERPL CALC-SCNC: 10 MMOL/L — SIGNIFICANT CHANGE UP (ref 5–17)
BUN SERPL-MCNC: 10.3 MG/DL — SIGNIFICANT CHANGE UP (ref 8–20)
CALCIUM SERPL-MCNC: 9.5 MG/DL — SIGNIFICANT CHANGE UP (ref 8.4–10.5)
CHLORIDE SERPL-SCNC: 105 MMOL/L — SIGNIFICANT CHANGE UP (ref 98–107)
CO2 SERPL-SCNC: 24 MMOL/L — SIGNIFICANT CHANGE UP (ref 22–29)
CREAT SERPL-MCNC: 0.6 MG/DL — SIGNIFICANT CHANGE UP (ref 0.5–1.3)
EGFR: 132 ML/MIN/1.73M2 — SIGNIFICANT CHANGE UP
GLUCOSE BLDC GLUCOMTR-MCNC: 237 MG/DL — HIGH (ref 70–99)
GLUCOSE BLDC GLUCOMTR-MCNC: 286 MG/DL — HIGH (ref 70–99)
GLUCOSE SERPL-MCNC: 316 MG/DL — HIGH (ref 70–99)
HCT VFR BLD CALC: 32.3 % — LOW (ref 39–50)
HGB BLD-MCNC: 10.5 G/DL — LOW (ref 13–17)
MAGNESIUM SERPL-MCNC: 1.8 MG/DL — SIGNIFICANT CHANGE UP (ref 1.6–2.6)
MCHC RBC-ENTMCNC: 29 PG — SIGNIFICANT CHANGE UP (ref 27–34)
MCHC RBC-ENTMCNC: 32.5 GM/DL — SIGNIFICANT CHANGE UP (ref 32–36)
MCV RBC AUTO: 89.2 FL — SIGNIFICANT CHANGE UP (ref 80–100)
PHOSPHATE SERPL-MCNC: 2.5 MG/DL — SIGNIFICANT CHANGE UP (ref 2.4–4.7)
PLATELET # BLD AUTO: 81 K/UL — LOW (ref 150–400)
POTASSIUM SERPL-MCNC: 4.1 MMOL/L — SIGNIFICANT CHANGE UP (ref 3.5–5.3)
POTASSIUM SERPL-SCNC: 4.1 MMOL/L — SIGNIFICANT CHANGE UP (ref 3.5–5.3)
RBC # BLD: 3.62 M/UL — LOW (ref 4.2–5.8)
RBC # FLD: 14.5 % — SIGNIFICANT CHANGE UP (ref 10.3–14.5)
SODIUM SERPL-SCNC: 139 MMOL/L — SIGNIFICANT CHANGE UP (ref 135–145)
SURGICAL PATHOLOGY STUDY: SIGNIFICANT CHANGE UP
WBC # BLD: 3.3 K/UL — LOW (ref 3.8–10.5)
WBC # FLD AUTO: 3.3 K/UL — LOW (ref 3.8–10.5)

## 2022-09-29 PROCEDURE — 84132 ASSAY OF SERUM POTASSIUM: CPT

## 2022-09-29 PROCEDURE — 80307 DRUG TEST PRSMV CHEM ANLYZR: CPT

## 2022-09-29 PROCEDURE — 86850 RBC ANTIBODY SCREEN: CPT

## 2022-09-29 PROCEDURE — 84295 ASSAY OF SERUM SODIUM: CPT

## 2022-09-29 PROCEDURE — 87040 BLOOD CULTURE FOR BACTERIA: CPT

## 2022-09-29 PROCEDURE — 99239 HOSP IP/OBS DSCHRG MGMT >30: CPT

## 2022-09-29 PROCEDURE — 84100 ASSAY OF PHOSPHORUS: CPT

## 2022-09-29 PROCEDURE — 85018 HEMOGLOBIN: CPT

## 2022-09-29 PROCEDURE — 84484 ASSAY OF TROPONIN QUANT: CPT

## 2022-09-29 PROCEDURE — 82330 ASSAY OF CALCIUM: CPT

## 2022-09-29 PROCEDURE — 81001 URINALYSIS AUTO W/SCOPE: CPT

## 2022-09-29 PROCEDURE — 85027 COMPLETE CBC AUTOMATED: CPT

## 2022-09-29 PROCEDURE — 82947 ASSAY GLUCOSE BLOOD QUANT: CPT

## 2022-09-29 PROCEDURE — 99285 EMERGENCY DEPT VISIT HI MDM: CPT

## 2022-09-29 PROCEDURE — 80048 BASIC METABOLIC PNL TOTAL CA: CPT

## 2022-09-29 PROCEDURE — 86901 BLOOD TYPING SEROLOGIC RH(D): CPT

## 2022-09-29 PROCEDURE — 85025 COMPLETE CBC W/AUTO DIFF WBC: CPT

## 2022-09-29 PROCEDURE — 88342 IMHCHEM/IMCYTCHM 1ST ANTB: CPT

## 2022-09-29 PROCEDURE — 83036 HEMOGLOBIN GLYCOSYLATED A1C: CPT

## 2022-09-29 PROCEDURE — 82248 BILIRUBIN DIRECT: CPT

## 2022-09-29 PROCEDURE — 86341 ISLET CELL ANTIBODY: CPT

## 2022-09-29 PROCEDURE — 85610 PROTHROMBIN TIME: CPT

## 2022-09-29 PROCEDURE — 84681 ASSAY OF C-PEPTIDE: CPT

## 2022-09-29 PROCEDURE — 71045 X-RAY EXAM CHEST 1 VIEW: CPT

## 2022-09-29 PROCEDURE — 99232 SBSQ HOSP IP/OBS MODERATE 35: CPT

## 2022-09-29 PROCEDURE — 87640 STAPH A DNA AMP PROBE: CPT

## 2022-09-29 PROCEDURE — 83690 ASSAY OF LIPASE: CPT

## 2022-09-29 PROCEDURE — 87641 MR-STAPH DNA AMP PROBE: CPT

## 2022-09-29 PROCEDURE — 36415 COLL VENOUS BLD VENIPUNCTURE: CPT

## 2022-09-29 PROCEDURE — 96375 TX/PRO/DX INJ NEW DRUG ADDON: CPT

## 2022-09-29 PROCEDURE — 82962 GLUCOSE BLOOD TEST: CPT

## 2022-09-29 PROCEDURE — 86900 BLOOD TYPING SEROLOGIC ABO: CPT

## 2022-09-29 PROCEDURE — 87637 SARSCOV2&INF A&B&RSV AMP PRB: CPT

## 2022-09-29 PROCEDURE — 96374 THER/PROPH/DIAG INJ IV PUSH: CPT

## 2022-09-29 PROCEDURE — 85014 HEMATOCRIT: CPT

## 2022-09-29 PROCEDURE — 88305 TISSUE EXAM BY PATHOLOGIST: CPT

## 2022-09-29 PROCEDURE — 82803 BLOOD GASES ANY COMBINATION: CPT

## 2022-09-29 PROCEDURE — 93005 ELECTROCARDIOGRAM TRACING: CPT

## 2022-09-29 PROCEDURE — 80053 COMPREHEN METABOLIC PANEL: CPT

## 2022-09-29 PROCEDURE — 83735 ASSAY OF MAGNESIUM: CPT

## 2022-09-29 PROCEDURE — 82435 ASSAY OF BLOOD CHLORIDE: CPT

## 2022-09-29 PROCEDURE — 85730 THROMBOPLASTIN TIME PARTIAL: CPT

## 2022-09-29 PROCEDURE — 83880 ASSAY OF NATRIURETIC PEPTIDE: CPT

## 2022-09-29 PROCEDURE — 83605 ASSAY OF LACTIC ACID: CPT

## 2022-09-29 PROCEDURE — 82010 KETONE BODYS QUAN: CPT

## 2022-09-29 RX ORDER — SUCRALFATE 1 G
1 TABLET ORAL
Qty: 120 | Refills: 0
Start: 2022-09-29 | End: 2022-10-28

## 2022-09-29 RX ORDER — INSULIN LISPRO 100/ML
2 VIAL (ML) SUBCUTANEOUS
Qty: 1 | Refills: 0
Start: 2022-09-29 | End: 2022-10-28

## 2022-09-29 RX ORDER — PANTOPRAZOLE SODIUM 20 MG/1
1 TABLET, DELAYED RELEASE ORAL
Qty: 60 | Refills: 0
Start: 2022-09-29 | End: 2022-10-28

## 2022-09-29 RX ORDER — INSULIN LISPRO 100/ML
8 VIAL (ML) SUBCUTANEOUS
Qty: 1 | Refills: 0
Start: 2022-09-29 | End: 2022-10-28

## 2022-09-29 RX ORDER — INSULIN GLARGINE 100 [IU]/ML
25 INJECTION, SOLUTION SUBCUTANEOUS
Qty: 1 | Refills: 0
Start: 2022-09-29 | End: 2022-10-28

## 2022-09-29 RX ORDER — METOPROLOL TARTRATE 50 MG
0.5 TABLET ORAL
Qty: 30 | Refills: 0
Start: 2022-09-29 | End: 2022-10-28

## 2022-09-29 RX ADMIN — Medication 1 MILLIGRAM(S): at 11:21

## 2022-09-29 RX ADMIN — Medication 1 GRAM(S): at 05:07

## 2022-09-29 RX ADMIN — Medication 4: at 11:19

## 2022-09-29 RX ADMIN — Medication 1 GRAM(S): at 11:17

## 2022-09-29 RX ADMIN — INSULIN GLARGINE 25 UNIT(S): 100 INJECTION, SOLUTION SUBCUTANEOUS at 08:30

## 2022-09-29 RX ADMIN — Medication 1 TABLET(S): at 11:17

## 2022-09-29 RX ADMIN — Medication 50 MILLIGRAM(S): at 05:08

## 2022-09-29 RX ADMIN — Medication 100 MILLIGRAM(S): at 11:14

## 2022-09-29 RX ADMIN — Medication 8 UNIT(S): at 11:19

## 2022-09-29 RX ADMIN — PANTOPRAZOLE SODIUM 40 MILLIGRAM(S): 20 TABLET, DELAYED RELEASE ORAL at 05:07

## 2022-09-29 RX ADMIN — Medication 12.5 MILLIGRAM(S): at 05:07

## 2022-09-29 RX ADMIN — Medication 8 UNIT(S): at 08:30

## 2022-09-29 RX ADMIN — Medication 6: at 08:31

## 2022-09-29 NOTE — DISCHARGE NOTE NURSING/CASE MANAGEMENT/SOCIAL WORK - NSDCPEFALRISK_GEN_ALL_CORE
For information on Fall & Injury Prevention, visit: [email or web address] OR  [email or web address] OR  https://www.cdc.gov/steadi/patient.html

## 2022-09-29 NOTE — DISCHARGE NOTE PROVIDER - NSDCMRMEDTOKEN_GEN_ALL_CORE_FT
folic acid 1 mg oral tablet: 1 tab(s) orally once a day  HumaLOG KwikPen 100 units/mL injectable solution: 8 unit(s) injectable 3 times a day (before meals)   HumaLOG KwikPen 100 units/mL injectable solution: 4 times a day (before meals and at bedtime) as per sliding scale:  151-200 2 units; 201-250 4 units; 251-300 6 units; 301-350 8 units; 351-400 10 units; &gt; 400 call MD. Will Jennings Pen 100 units/mL subcutaneous solution: 25 unit(s) subcutaneous once a day  metoprolol tartrate 25 mg oral tablet: 0.5 tab(s) orally 2 times a day   Multiple Vitamins oral tablet: 1 tab(s) orally once a day  pantoprazole 40 mg oral delayed release tablet: 1 tab(s) orally 2 times a day   sucralfate 1 g oral tablet: 1 tab(s) orally 4 times a day  thiamine 100 mg oral tablet: 1 tab(s) orally once a day

## 2022-09-29 NOTE — DISCHARGE NOTE NURSING/CASE MANAGEMENT/SOCIAL WORK - PATIENT PORTAL LINK FT
You can access the FollowMyHealth Patient Portal offered by [facility] by registering at the following website: [email or web address]. By joining [facility]’s FollowMyHealth portal, you will also be able to view your health information using other applications (apps) compatible with our system.

## 2022-09-29 NOTE — DISCHARGE NOTE PROVIDER - CARE PROVIDER_API CALL
Megan Quintero)  Internal Medicine  [address] [place] [address]  Phone: [phone]  Fax: [phone]  Follow Up Time: 2 weeks    Amanda Mota)  EndocrinologyMetabDiabetes  [address], [place] [id number]  Phone: [phone]  Fax: [phone]  Follow Up Time: 1 week    Chang Angeles (DO)  Family Medicine  [address] [place] [address]  Phone: [phone]  Fax: [phone]  Established Patient  Follow Up Time: 1 week    Kathy Phipps)  Cardiac Electrophysiology; Cardiovascular Disease; Internal Medicine  [address] [place] [address]  Phone: [phone]  Fax: [phone]  Established Patient  Follow Up Time: 1 week

## 2022-09-29 NOTE — PROGRESS NOTE ADULT - SUBJECTIVE AND OBJECTIVE BOX
CC: Follow up diabetes management   INTERVAL HPI: No acute events    ROS: Patient denies chest pain, SOB, abd pain, N/V, or any other complaints. All remainder ROS negative.    MEDICATIONS  (STANDING):  chlordiazePOXIDE 50 milliGRAM(s) Oral once  chlordiazePOXIDE   Oral   dextrose 50% Injectable 25 Gram(s) IV Push once  dextrose 50% Injectable 12.5 Gram(s) IV Push once  dextrose 50% Injectable 25 Gram(s) IV Push once  dextrose Oral Gel 15 Gram(s) Oral once  folic acid 1 milliGRAM(s) Oral daily  glucagon  Injectable 1 milliGRAM(s) IntraMuscular once  influenza   Vaccine 0.5 milliLiter(s) IntraMuscular once  insulin glargine Injectable (LANTUS) 25 Unit(s) SubCutaneous every morning  insulin lispro (ADMELOG) corrective regimen sliding scale   SubCutaneous three times a day before meals  insulin lispro Injectable (ADMELOG) 8 Unit(s) SubCutaneous three times a day before meals  metoprolol tartrate 12.5 milliGRAM(s) Oral two times a day  multivitamin 1 Tablet(s) Oral daily  pantoprazole  Injectable 40 milliGRAM(s) IV Push every 12 hours  sucralfate 1 Gram(s) Oral four times a day  thiamine 100 milliGRAM(s) Oral daily    MEDICATIONS  (PRN):  aluminum hydroxide/magnesium hydroxide/simethicone Suspension 30 milliLiter(s) Oral every 6 hours PRN Dyspepsia  LORazepam   Injectable 2 milliGRAM(s) IV Push every 2 hours PRN CIWA >8  metoclopramide Injectable 10 milliGRAM(s) IV Push every 6 hours PRN nausea    Allergies  No Known Allergies      Vital Signs Last 24 Hrs  T(C): 37.2 (29 Sep 2022 14:45), Max: 37.2 (28 Sep 2022 16:20)  T(F): 98.9 (29 Sep 2022 14:45), Max: 98.9 (28 Sep 2022 16:20)  HR: 100 (29 Sep 2022 14:45) (76 - 100)  BP: 109/69 (29 Sep 2022 14:45) (109/69 - 129/76)  BP(mean): --  RR: 18 (29 Sep 2022 14:45) (18 - 18)  SpO2: 100% (29 Sep 2022 14:45) (97% - 100%)    Parameters below as of 29 Sep 2022 14:45  Patient On (Oxygen Delivery Method): room air      PHYSICAL EXAM:  General: No apparent distress  Neck: Supple, trachea midline, no thyromegaly  Respiratory: Lungs clear bilaterally, normal rate, effort  Cardiac: +S1, S2, no m/r/g  GI: +BS, soft, non tender, non distended  Extremities: No peripheral edema, no pedal lesions  Neuro: A+O X3, no tremor  Pysch: Affect appropriate   Skin: No acanthosis     LABS:                        10.5   3.30  )-----------( 81       ( 29 Sep 2022 05:00 )             32.3     09-29    139  |  105  |  10.3  ----------------------------<  316<H>  4.1   |  24.0  |  0.60    Ca    9.5      29 Sep 2022 05:00  Phos  2.5     09-29  Mg     1.8     09-29    TPro  6.3<L>  /  Alb  3.8  /  TBili  1.1  /  DBili  x   /  AST  89<H>  /  ALT  39  /  AlkPhos  86  09-28      POCT Blood Glucose.: 237 mg/dL (09-29-22 @ 11:12)  POCT Blood Glucose.: 286 mg/dL (09-29-22 @ 08:25)  POCT Blood Glucose.: 180 mg/dL (09-28-22 @ 21:34)  POCT Blood Glucose.: 79 mg/dL (09-28-22 @ 17:33)         CC: Follow up diabetes management   INTERVAL HPI: No acute events    ROS: Patient denies chest pain, SOB, abd pain, N/V, or any other complaints. All remainder ROS negative.    MEDICATIONS  (STANDING):  chlordiazePOXIDE 50 milliGRAM(s) Oral once  chlordiazePOXIDE   Oral   dextrose 50% Injectable 25 Gram(s) IV Push once  dextrose 50% Injectable 12.5 Gram(s) IV Push once  dextrose 50% Injectable 25 Gram(s) IV Push once  dextrose Oral Gel 15 Gram(s) Oral once  folic acid 1 milliGRAM(s) Oral daily  glucagon  Injectable 1 milliGRAM(s) IntraMuscular once  influenza   Vaccine 0.5 milliLiter(s) IntraMuscular once  insulin glargine Injectable (LANTUS) 25 Unit(s) SubCutaneous every morning  insulin lispro (ADMELOG) corrective regimen sliding scale   SubCutaneous three times a day before meals  insulin lispro Injectable (ADMELOG) 8 Unit(s) SubCutaneous three times a day before meals  metoprolol tartrate 12.5 milliGRAM(s) Oral two times a day  multivitamin 1 Tablet(s) Oral daily  pantoprazole  Injectable 40 milliGRAM(s) IV Push every 12 hours  sucralfate 1 Gram(s) Oral four times a day  thiamine 100 milliGRAM(s) Oral daily    MEDICATIONS  (PRN):  aluminum hydroxide/magnesium hydroxide/simethicone Suspension 30 milliLiter(s) Oral every 6 hours PRN Dyspepsia  LORazepam   Injectable 2 milliGRAM(s) IV Push every 2 hours PRN CIWA >8  metoclopramide Injectable 10 milliGRAM(s) IV Push every 6 hours PRN nausea    Allergies  No Known Allergies    Vital Signs Last 24 Hrs  T(C): 37.2 (29 Sep 2022 14:45), Max: 37.2 (28 Sep 2022 16:20)  T(F): 98.9 (29 Sep 2022 14:45), Max: 98.9 (28 Sep 2022 16:20)  HR: 100 (29 Sep 2022 14:45) (76 - 100)  BP: 109/69 (29 Sep 2022 14:45) (109/69 - 129/76)  BP(mean): --  RR: 18 (29 Sep 2022 14:45) (18 - 18)  SpO2: 100% (29 Sep 2022 14:45) (97% - 100%)    Parameters below as of 29 Sep 2022 14:45  Patient On (Oxygen Delivery Method): room air    PHYSICAL EXAM:  General: No apparent distress  Neck: Supple, trachea midline, no thyromegaly  Respiratory: Lungs clear bilaterally, normal rate, effort  Cardiac: +S1, S2, no m/r/g  GI: +BS, soft, non tender, non distended  Extremities: No peripheral edema, no pedal lesions  Neuro: A+O X3, no tremor  Pysch: Affect appropriate   Skin: No acanthosis     LABS:                        10.5   3.30  )-----------( 81       ( 29 Sep 2022 05:00 )             32.3     09-29    139  |  105  |  10.3  ----------------------------<  316<H>  4.1   |  24.0  |  0.60    Ca    9.5      29 Sep 2022 05:00  Phos  2.5     09-29  Mg     1.8     09-29    TPro  6.3<L>  /  Alb  3.8  /  TBili  1.1  /  DBili  x   /  AST  89<H>  /  ALT  39  /  AlkPhos  86  09-28    POCT Blood Glucose.: 237 mg/dL (09-29-22 @ 11:12)  POCT Blood Glucose.: 286 mg/dL (09-29-22 @ 08:25)  POCT Blood Glucose.: 180 mg/dL (09-28-22 @ 21:34)  POCT Blood Glucose.: 79 mg/dL (09-28-22 @ 17:33)

## 2022-09-29 NOTE — DISCHARGE NOTE PROVIDER - NSDCCPCAREPLAN_GEN_ALL_CORE_FT
PRINCIPAL DISCHARGE DIAGNOSIS  Diagnosis: DKA (diabetic ketoacidosis)  Assessment and Plan of Treatment: Resolved.  Continue medications as prescribed.  Follow up with Endo in 1 week.      SECONDARY DISCHARGE DIAGNOSES  Diagnosis: SYED (acute kidney injury)  Assessment and Plan of Treatment: Resolved.  Follow up with PMD in 1 week.    Diagnosis: Hematemesis  Assessment and Plan of Treatment: s/p EGD  Continue medications as prescribed.  Follow up with GI in 2 weeks.   Stop alcohol.

## 2022-09-29 NOTE — DISCHARGE NOTE PROVIDER - HOSPITAL COURSE
31 year old male with alcoholism, IDDM, PAF on Eliquis, HTN, recent COVID (07/2022)  presented on 9/26/22 with N/V with dark emesis, found to be in DKA and SYED. Admitted to MICU, started on IV Fluids and Insulin Infusion. DKA resolved. Insulin Infusion was switched to Basal / Bolus Regimen. For Hematemesis, he was started on Protonix Infusion, seen by GI and had EGD on 9/7/22 which showed severe LA Grade D erosive esophagitis and non-erosive gastritis. GI recommended Protonix and Carafate. Diet was slowly advanced. His H&H remained stable and did not require blood transfusion.   For Alcoholism, he was started on CIWA Protocol with Librium Taper -- CIWA remained stable. He is counselled extensively to stop alcohol.  His symptoms have improved and he is stable for discharge.

## 2022-09-29 NOTE — DISCHARGE NOTE PROVIDER - PROVIDER TOKENS
PROVIDER:[TOKEN:[[id number]],FOLLOWUP:[2 weeks]],PROVIDER:[TOKEN:[[id number]],FOLLOWUP:[1 week]],PROVIDER:[TOKEN:[[id number]],FOLLOWUP:[1 week],ESTABLISHEDPATIENT:[T]],PROVIDER:[TOKEN:[[id number]],FOLLOWUP:[1 week],ESTABLISHEDPATIENT:[T]]

## 2022-09-29 NOTE — DISCHARGE NOTE NURSING/CASE MANAGEMENT/SOCIAL WORK - NSDCVIVACCINE_GEN_ALL_CORE_FT
Tdap; 29-Oct-2019 23:07; Odessa Landis (RN); [employer]; [id number] (Exp. Date: 22-Oct-2021); IntraMuscular; Deltoid Left.; 0.5 milliLiter(s); VIS (VIS Published: 09-May-2013, VIS Presented: 29-Oct-2019);

## 2022-09-29 NOTE — PROGRESS NOTE ADULT - NS ATTEND AMEND GEN_ALL_CORE FT
I agree  w plan above. Increase lantus to 28 u hs and cont admelog 8 u TID w meals.
, Dr. Caro Hess was present during the history and physical.    Hemoglobin stable.  EGD showed severe esophagitis.  I agree with the above assessment and plan.    Patient advanced to full liquid diet in the morning and may take solid food (soft) in the evening.  If hemoglobin stable may be discharged home.  Will sign off  Patient should follow-up with Dr. Esteves  in 2 to 4 weeks

## 2022-09-29 NOTE — PROGRESS NOTE ADULT - ASSESSMENT
30 y/o M PMH IDDM, AFib on Eliquis, HTN, EtOH abuse, COVID (07/2022) who presented on 9/26/22 with N/V with dark emesis, found to be in DKA and in ARF, with acute blood loss anemia s/p EGD with severe erosive gastritis and no active bleeding.    1. DM, a1c 7.4%, s/p DKA  - Increase Lantus to 28 units qhs  - Admelog 8 units TID with meals  - PADMINI/ICA pending  - Cannot continue metformin upon dc due to etoh abuse    2. GIB  - S/p EGD- no acute bleed, severe erosive gastritis, no varices    3. ETOH  - Librium taper  - Thiamine/Folate/MVI

## 2022-09-29 NOTE — DISCHARGE NOTE PROVIDER - CARE PROVIDERS DIRECT ADDRESSES
,DirectAddress_Unknown,[email or web address],DirectAddress_Unknown,DirectAddress_Unknown

## 2022-09-29 NOTE — DISCHARGE NOTE PROVIDER - ATTENDING DISCHARGE PHYSICAL EXAMINATION:
Vital Signs   T(C): 37.2 (29 Sep 2022 04:55), Max: 37.2 (28 Sep 2022 16:20)  T(F): 98.9 (29 Sep 2022 04:55), Max: 98.9 (28 Sep 2022 16:20)  HR: 76 (29 Sep 2022 04:55) (76 - 99)  BP: 129/76 (29 Sep 2022 04:55) (116/69 - 129/76)  RR: 18 (29 Sep 2022 04:55) (18 - 18)  SpO2: 97% (29 Sep 2022 04:55) (97% - 99%)  Parameters below as of 29 Sep 2022 04:55  Patient On (Oxygen Delivery Method): room air  General: Young male sitting in bed comfortably. No acute distress  HEENT: EOMI. Clear conjunctivae. Moist mucus membrane  Neck: Supple.   Chest: CTA bilaterally. No wheezing, rales or rhonchi.   Heart: Normal S1 & S2. RRR.   Abdomen: Non distended. Soft. Non-tender. + BS  Ext: No pedal edema. No calf tenderness   Neuro: AAO x 3. No focal deficit. No speech disorder  Skin: Warm and Dry  Psychiatry: Normal mood and affect

## 2022-09-30 LAB — ISLET CELL512 AB SER-ACNC: SIGNIFICANT CHANGE UP

## 2022-10-03 LAB — GAD65 AB SER-MCNC: 0.01 NMOL/L — SIGNIFICANT CHANGE UP

## 2022-10-05 NOTE — ED PROVIDER NOTE - CHIEF COMPLAINT
EPA team will now exit this encounter.   The patient is a 28y Male complaining of abdominal pain.

## 2022-10-13 ENCOUNTER — INPATIENT (INPATIENT)
Facility: HOSPITAL | Age: 32
LOS: 4 days | Discharge: HOME | DRG: 896 | End: 2022-10-18
Attending: STUDENT IN AN ORGANIZED HEALTH CARE EDUCATION/TRAINING PROGRAM | Admitting: PSYCHIATRY & NEUROLOGY
Payer: COMMERCIAL

## 2022-10-13 VITALS
RESPIRATION RATE: 28 BRPM | DIASTOLIC BLOOD PRESSURE: 88 MMHG | OXYGEN SATURATION: 100 % | HEART RATE: 128 BPM | SYSTOLIC BLOOD PRESSURE: 146 MMHG

## 2022-10-13 DIAGNOSIS — G40.901 EPILEPSY, UNSPECIFIED, NOT INTRACTABLE, WITH STATUS EPILEPTICUS: ICD-10-CM

## 2022-10-13 LAB
ALBUMIN SERPL ELPH-MCNC: 3.7 G/DL — SIGNIFICANT CHANGE UP (ref 3.3–5.2)
ALP SERPL-CCNC: 108 U/L — SIGNIFICANT CHANGE UP (ref 40–120)
ALT FLD-CCNC: 22 U/L — SIGNIFICANT CHANGE UP
AMPHET UR-MCNC: NEGATIVE — SIGNIFICANT CHANGE UP
ANION GAP SERPL CALC-SCNC: 14 MMOL/L — SIGNIFICANT CHANGE UP (ref 5–17)
APAP SERPL-MCNC: <3 UG/ML — LOW (ref 10–26)
APPEARANCE UR: CLEAR — SIGNIFICANT CHANGE UP
AST SERPL-CCNC: 39 U/L — SIGNIFICANT CHANGE UP
BARBITURATES UR SCN-MCNC: NEGATIVE — SIGNIFICANT CHANGE UP
BASOPHILS # BLD AUTO: 0.03 K/UL — SIGNIFICANT CHANGE UP (ref 0–0.2)
BASOPHILS NFR BLD AUTO: 1.3 % — SIGNIFICANT CHANGE UP (ref 0–2)
BENZODIAZ UR-MCNC: POSITIVE
BILIRUB SERPL-MCNC: 0.5 MG/DL — SIGNIFICANT CHANGE UP (ref 0.4–2)
BILIRUB UR-MCNC: NEGATIVE — SIGNIFICANT CHANGE UP
BUN SERPL-MCNC: 4.2 MG/DL — LOW (ref 8–20)
CALCIUM SERPL-MCNC: 8.1 MG/DL — LOW (ref 8.4–10.5)
CHLORIDE SERPL-SCNC: 108 MMOL/L — HIGH (ref 98–107)
CO2 SERPL-SCNC: 22 MMOL/L — SIGNIFICANT CHANGE UP (ref 22–29)
COCAINE METAB.OTHER UR-MCNC: NEGATIVE — SIGNIFICANT CHANGE UP
COLOR SPEC: YELLOW — SIGNIFICANT CHANGE UP
CREAT SERPL-MCNC: 0.69 MG/DL — SIGNIFICANT CHANGE UP (ref 0.5–1.3)
DIFF PNL FLD: NEGATIVE — SIGNIFICANT CHANGE UP
EGFR: 125 ML/MIN/1.73M2 — SIGNIFICANT CHANGE UP
EOSINOPHIL # BLD AUTO: 0 K/UL — SIGNIFICANT CHANGE UP (ref 0–0.5)
EOSINOPHIL NFR BLD AUTO: 0 % — SIGNIFICANT CHANGE UP (ref 0–6)
ETHANOL SERPL-MCNC: 317 MG/DL — HIGH (ref 0–9)
GAS PNL BLDA: SIGNIFICANT CHANGE UP
GLUCOSE BLDC GLUCOMTR-MCNC: 70 MG/DL — SIGNIFICANT CHANGE UP (ref 70–99)
GLUCOSE SERPL-MCNC: 79 MG/DL — SIGNIFICANT CHANGE UP (ref 70–99)
GLUCOSE UR QL: NEGATIVE MG/DL — SIGNIFICANT CHANGE UP
HCT VFR BLD CALC: 32.6 % — LOW (ref 39–50)
HGB BLD-MCNC: 10.3 G/DL — LOW (ref 13–17)
IMM GRANULOCYTES NFR BLD AUTO: 0.4 % — SIGNIFICANT CHANGE UP (ref 0–0.9)
KETONES UR-MCNC: NEGATIVE — SIGNIFICANT CHANGE UP
LEUKOCYTE ESTERASE UR-ACNC: NEGATIVE — SIGNIFICANT CHANGE UP
LYMPHOCYTES # BLD AUTO: 0.38 K/UL — LOW (ref 1–3.3)
LYMPHOCYTES # BLD AUTO: 17 % — SIGNIFICANT CHANGE UP (ref 13–44)
MCHC RBC-ENTMCNC: 28.5 PG — SIGNIFICANT CHANGE UP (ref 27–34)
MCHC RBC-ENTMCNC: 31.6 GM/DL — LOW (ref 32–36)
MCV RBC AUTO: 90.1 FL — SIGNIFICANT CHANGE UP (ref 80–100)
METHADONE UR-MCNC: NEGATIVE — SIGNIFICANT CHANGE UP
MONOCYTES # BLD AUTO: 0.06 K/UL — SIGNIFICANT CHANGE UP (ref 0–0.9)
MONOCYTES NFR BLD AUTO: 2.7 % — SIGNIFICANT CHANGE UP (ref 2–14)
NEUTROPHILS # BLD AUTO: 1.76 K/UL — LOW (ref 1.8–7.4)
NEUTROPHILS NFR BLD AUTO: 78.6 % — HIGH (ref 43–77)
NITRITE UR-MCNC: NEGATIVE — SIGNIFICANT CHANGE UP
OPIATES UR-MCNC: NEGATIVE — SIGNIFICANT CHANGE UP
PCP SPEC-MCNC: SIGNIFICANT CHANGE UP
PCP UR-MCNC: NEGATIVE — SIGNIFICANT CHANGE UP
PH UR: 6 — SIGNIFICANT CHANGE UP (ref 5–8)
PLATELET # BLD AUTO: 342 K/UL — SIGNIFICANT CHANGE UP (ref 150–400)
POTASSIUM SERPL-MCNC: 4.1 MMOL/L — SIGNIFICANT CHANGE UP (ref 3.5–5.3)
POTASSIUM SERPL-SCNC: 4.1 MMOL/L — SIGNIFICANT CHANGE UP (ref 3.5–5.3)
PROT SERPL-MCNC: 6.4 G/DL — LOW (ref 6.6–8.7)
PROT UR-MCNC: NEGATIVE — SIGNIFICANT CHANGE UP
RBC # BLD: 3.62 M/UL — LOW (ref 4.2–5.8)
RBC # FLD: 17.3 % — HIGH (ref 10.3–14.5)
SALICYLATES SERPL-MCNC: <0.6 MG/DL — LOW (ref 10–20)
SODIUM SERPL-SCNC: 144 MMOL/L — SIGNIFICANT CHANGE UP (ref 135–145)
SP GR SPEC: 1.01 — SIGNIFICANT CHANGE UP (ref 1.01–1.02)
THC UR QL: NEGATIVE — SIGNIFICANT CHANGE UP
TSH SERPL-MCNC: 1.35 UIU/ML — SIGNIFICANT CHANGE UP (ref 0.27–4.2)
UROBILINOGEN FLD QL: NEGATIVE MG/DL — SIGNIFICANT CHANGE UP
WBC # BLD: 2.24 K/UL — LOW (ref 3.8–10.5)
WBC # FLD AUTO: 2.24 K/UL — LOW (ref 3.8–10.5)

## 2022-10-13 PROCEDURE — 74177 CT ABD & PELVIS W/CONTRAST: CPT | Mod: 26,MG

## 2022-10-13 PROCEDURE — 76937 US GUIDE VASCULAR ACCESS: CPT | Mod: 26

## 2022-10-13 PROCEDURE — 99291 CRITICAL CARE FIRST HOUR: CPT

## 2022-10-13 PROCEDURE — G1004: CPT

## 2022-10-13 PROCEDURE — 71260 CT THORAX DX C+: CPT | Mod: 26,MG

## 2022-10-13 PROCEDURE — 72125 CT NECK SPINE W/O DYE: CPT | Mod: 26,MG

## 2022-10-13 PROCEDURE — 71045 X-RAY EXAM CHEST 1 VIEW: CPT | Mod: 26

## 2022-10-13 PROCEDURE — 93306 TTE W/DOPPLER COMPLETE: CPT | Mod: 26

## 2022-10-13 PROCEDURE — 93010 ELECTROCARDIOGRAM REPORT: CPT

## 2022-10-13 PROCEDURE — 70450 CT HEAD/BRAIN W/O DYE: CPT | Mod: 26,MG

## 2022-10-13 RX ORDER — ACETAMINOPHEN 500 MG
1000 TABLET ORAL ONCE
Refills: 0 | Status: COMPLETED | OUTPATIENT
Start: 2022-10-13 | End: 2022-10-13

## 2022-10-13 RX ORDER — CHLORHEXIDINE GLUCONATE 213 G/1000ML
15 SOLUTION TOPICAL EVERY 12 HOURS
Refills: 0 | Status: DISCONTINUED | OUTPATIENT
Start: 2022-10-13 | End: 2022-10-14

## 2022-10-13 RX ORDER — THIAMINE MONONITRATE (VIT B1) 100 MG
500 TABLET ORAL EVERY 8 HOURS
Refills: 0 | Status: DISCONTINUED | OUTPATIENT
Start: 2022-10-13 | End: 2022-10-18

## 2022-10-13 RX ORDER — DEXTROSE 50 % IN WATER 50 %
25 SYRINGE (ML) INTRAVENOUS ONCE
Refills: 0 | Status: DISCONTINUED | OUTPATIENT
Start: 2022-10-13 | End: 2022-10-18

## 2022-10-13 RX ORDER — FOLIC ACID 0.8 MG
1 TABLET ORAL DAILY
Refills: 0 | Status: DISCONTINUED | OUTPATIENT
Start: 2022-10-13 | End: 2022-10-18

## 2022-10-13 RX ORDER — DEXTROSE 50 % IN WATER 50 %
12.5 SYRINGE (ML) INTRAVENOUS ONCE
Refills: 0 | Status: DISCONTINUED | OUTPATIENT
Start: 2022-10-13 | End: 2022-10-18

## 2022-10-13 RX ORDER — PROPOFOL 10 MG/ML
10 INJECTION, EMULSION INTRAVENOUS
Qty: 1000 | Refills: 0 | Status: DISCONTINUED | OUTPATIENT
Start: 2022-10-13 | End: 2022-10-14

## 2022-10-13 RX ORDER — MIDAZOLAM HYDROCHLORIDE 1 MG/ML
5 INJECTION, SOLUTION INTRAMUSCULAR; INTRAVENOUS ONCE
Refills: 0 | Status: DISCONTINUED | OUTPATIENT
Start: 2022-10-13 | End: 2022-10-13

## 2022-10-13 RX ORDER — ETOMIDATE 2 MG/ML
20 INJECTION INTRAVENOUS ONCE
Refills: 0 | Status: COMPLETED | OUTPATIENT
Start: 2022-10-13 | End: 2022-10-13

## 2022-10-13 RX ORDER — ROCURONIUM BROMIDE 10 MG/ML
100 VIAL (ML) INTRAVENOUS ONCE
Refills: 0 | Status: COMPLETED | OUTPATIENT
Start: 2022-10-13 | End: 2022-10-13

## 2022-10-13 RX ORDER — ENOXAPARIN SODIUM 100 MG/ML
40 INJECTION SUBCUTANEOUS EVERY 24 HOURS
Refills: 0 | Status: DISCONTINUED | OUTPATIENT
Start: 2022-10-13 | End: 2022-10-18

## 2022-10-13 RX ORDER — SODIUM CHLORIDE 9 MG/ML
1000 INJECTION, SOLUTION INTRAVENOUS
Refills: 0 | Status: DISCONTINUED | OUTPATIENT
Start: 2022-10-13 | End: 2022-10-14

## 2022-10-13 RX ORDER — INSULIN LISPRO 100/ML
VIAL (ML) SUBCUTANEOUS EVERY 6 HOURS
Refills: 0 | Status: DISCONTINUED | OUTPATIENT
Start: 2022-10-13 | End: 2022-10-18

## 2022-10-13 RX ORDER — GLUCAGON INJECTION, SOLUTION 0.5 MG/.1ML
1 INJECTION, SOLUTION SUBCUTANEOUS ONCE
Refills: 0 | Status: DISCONTINUED | OUTPATIENT
Start: 2022-10-13 | End: 2022-10-14

## 2022-10-13 RX ORDER — LEVETIRACETAM 250 MG/1
3000 TABLET, FILM COATED ORAL ONCE
Refills: 0 | Status: DISCONTINUED | OUTPATIENT
Start: 2022-10-13 | End: 2022-10-13

## 2022-10-13 RX ORDER — LEVETIRACETAM 250 MG/1
3000 TABLET, FILM COATED ORAL ONCE
Refills: 0 | Status: COMPLETED | OUTPATIENT
Start: 2022-10-13 | End: 2022-10-13

## 2022-10-13 RX ORDER — PANTOPRAZOLE SODIUM 20 MG/1
40 TABLET, DELAYED RELEASE ORAL DAILY
Refills: 0 | Status: DISCONTINUED | OUTPATIENT
Start: 2022-10-13 | End: 2022-10-18

## 2022-10-13 RX ORDER — DEXTROSE 50 % IN WATER 50 %
15 SYRINGE (ML) INTRAVENOUS ONCE
Refills: 0 | Status: DISCONTINUED | OUTPATIENT
Start: 2022-10-13 | End: 2022-10-14

## 2022-10-13 RX ORDER — LEVETIRACETAM 250 MG/1
1000 TABLET, FILM COATED ORAL EVERY 12 HOURS
Refills: 0 | Status: DISCONTINUED | OUTPATIENT
Start: 2022-10-13 | End: 2022-10-17

## 2022-10-13 RX ADMIN — Medication 1000 MILLIGRAM(S): at 22:40

## 2022-10-13 RX ADMIN — LEVETIRACETAM 600 MILLIGRAM(S): 250 TABLET, FILM COATED ORAL at 16:42

## 2022-10-13 RX ADMIN — MIDAZOLAM HYDROCHLORIDE 5 MILLIGRAM(S): 1 INJECTION, SOLUTION INTRAMUSCULAR; INTRAVENOUS at 15:44

## 2022-10-13 RX ADMIN — Medication 400 MILLIGRAM(S): at 22:10

## 2022-10-13 RX ADMIN — MIDAZOLAM HYDROCHLORIDE 5 MILLIGRAM(S): 1 INJECTION, SOLUTION INTRAMUSCULAR; INTRAVENOUS at 15:38

## 2022-10-13 RX ADMIN — MIDAZOLAM HYDROCHLORIDE 5 MILLIGRAM(S): 1 INJECTION, SOLUTION INTRAMUSCULAR; INTRAVENOUS at 14:00

## 2022-10-13 RX ADMIN — PROPOFOL 4.2 MICROGRAM(S)/KG/MIN: 10 INJECTION, EMULSION INTRAVENOUS at 21:36

## 2022-10-13 RX ADMIN — Medication 100 MILLIGRAM(S): at 13:36

## 2022-10-13 RX ADMIN — ETOMIDATE 20 MILLIGRAM(S): 2 INJECTION INTRAVENOUS at 13:37

## 2022-10-13 RX ADMIN — Medication 105 MILLIGRAM(S): at 21:18

## 2022-10-13 RX ADMIN — PROPOFOL 4.2 MICROGRAM(S)/KG/MIN: 10 INJECTION, EMULSION INTRAVENOUS at 14:45

## 2022-10-13 NOTE — ED ADULT TRIAGE NOTE - CHIEF COMPLAINT QUOTE
Pt BIBA from home, brother states pt sleeping on couch and began to have seizure, hx of DM, BS 65 as per EMS, no medications given, pt unresponsive upon ED arrival

## 2022-10-13 NOTE — H&P ADULT - NSHPPHYSICALEXAM_GEN_ALL_CORE
PHYSICAL EXAM:   · Physical Examination: Constitutional: Unresponsive, intubated.   	Eyes: rightward gaze  	HENT: no scalp tenderness or deformity, no facial tenderness, airway patent  	Neck: no cervical spine tenderness, no palpable stepoff, no tracheal deviation  	CV: no chest wall tenderness, no crepitus or subcutaneous emphysema.  RRR, no murmur, 2+ distal pulses in all extremities  	Pulm: non-labored respirations, CTAB  	Abdomen: soft, non-tender, non-distended, no ecchymosis  	Back: no spinal tenderness, no palpable stepoff  	Extremities: stable pelvis, no extremity tenderness or deformity  	Skin: no rash  Neuro: unresponsive to Deep noxious stimuli. Leftward gaze.

## 2022-10-13 NOTE — H&P ADULT - HISTORY OF PRESENT ILLNESS
HPI:  33 yo Male with PMH of seizure not on AED medication, IDDM, afib not on AC, ETOH abuse (per brother stopped drinking 3-4 days ago)  presents to ED for seizure. Patient was found  unresponsive by mother. Patient experienced generalized tonic clonic movements continuously for 20 mins en route to hospital, no medications given that time. In ED, patient given 5 of versed-continued having refractory seizures,was intubation and placed on propofol drip. Currently patient is sedated, unresponsive to Verbal and deep noxious stimuli, upgraded to NISCU for continous vEEG monitoring.     Per family, patient had one episode of status epileptics 4 months ago s/p alcohol withdrawal for 3-5 days.     In ED, patient experienced another episode of refractory seizures. patient was given additional 15 mg of versed, propofol drip @ 60.       Vital Signs Last 24 Hrs  T(C): 36.1 (13 Oct 2022 15:30), Max: 36.2 (13 Oct 2022 14:55)  T(F): 97 (13 Oct 2022 15:30), Max: 97.2 (13 Oct 2022 14:55)  HR: 97 (13 Oct 2022 15:30) (96 - 128)  BP: 149/93 (13 Oct 2022 15:30) (146/88 - 164/105)  BP(mean): --  RR: 16 (13 Oct 2022 15:30) (16 - 28)  SpO2: 100% (13 Oct 2022 15:30) (99% - 100%)    Parameters below as of 13 Oct 2022 15:30  Patient On (Oxygen Delivery Method): ventilator        LABS:                        10.3   2.24  )-----------( 342      ( 13 Oct 2022 14:00 )             32.6     10-13    144  |  108<H>  |  4.2<L>  ----------------------------<  79  4.1   |  22.0  |  0.69    Ca    8.1<L>      13 Oct 2022 14:00    TPro  6.4<L>  /  Alb  3.7  /  TBili  0.5  /  DBili  x   /  AST  39  /  ALT  22  /  AlkPhos  108  10-13        RADIOLOGY & ADDITIONAL TESTS:  < from: CT Head No Cont (10.13.22 @ 14:26) >  IMPRESSION: No evidence of acute hemorrhage mass or mass effect.    No acute fractures or dislocations seen involving the cervical spine.    --- End of Report ---      < end of copied text >

## 2022-10-13 NOTE — H&P ADULT - ASSESSMENT
33 yo male with hx of seizure, IDDM, afib, ETOH abuse presents for status epiliepticus.  Patient found unresponsive by family. Patient generalized tonic clonic movements continuously for about 20 min enroute. patient is currently intubated and pending vEEG evaluation.     In ED, patient experienced another episode of refractory seizures. patient was given additional 15 mg of versed, propofol drip @ 60.     Plan:   Neuro:   - q1 comas/vital check  - pending continous vEEG monitoring  - prop @60  - Versed 5 mg for active seizures.   CV:   100-160  - pending Echo  - pending ECG.  Resp:   -intubated 16/450/16/25  - pending CXR to check ET tube placement   GI:   - NPO  - MV/Thiamine/ FA   :   -Jaz   -NS @ 75  Heme:  - lovenox   ID:  - afebrile   - pending Urine culture, UA, utox  Endo:     33 yo male with hx of seizure, IDDM, afib, ETOH abuse presents for status epiliepticus.  Patient found unresponsive by family. Patient generalized tonic clonic movements continuously for about 20 min enroute. patient is currently intubated and pending vEEG evaluation.     In ED, patient experienced another episode of refractory seizures. patient was given additional 15 mg of versed, propofol drip @ 60.     Plan:   Neuro:   - q1 comas/vital check  - pending continous vEEG monitoring  - prop @60  - Versed 5 mg for active seizures.   CV:   100-160  - pending Echo  - pending ECG.  Resp:   -intubated 16/450/16/25  - pending CXR to check ET tube placement   GI:   - NPO  - MV/Thiamine/ FA   :   -Jaz   -NS @ 75  Heme:  - lovenox   ID:  - afebrile   - pending Urine culture, UA, utox  Endo:   -180  ISS

## 2022-10-13 NOTE — ED ADULT NURSE NOTE - OBJECTIVE STATEMENT
Pt received in critical care.  Pt with hx of seizures, Mother called EMS, found pt on floor with seizure like activity.  witnessed by EMS for about 20 min PTA.  Pt received unresponsive, Cardiac monitoring and pulse oximetry initiated, sinus tach on monitor with HR of 126,O2 sat 100% on NRB. MD Church and MD Zheng at bedside for eval.
S. O. at bedside/unable to assess

## 2022-10-13 NOTE — ED ADULT NURSE REASSESSMENT NOTE - COMFORT CARE
plan of care explained/wait time explained

## 2022-10-13 NOTE — ED ADULT NURSE NOTE - NS ED NURSE TRANSPORT WITH
Prop drip, casarez cath, IO in RLE/Cardiac Monitor/Defib/ACLS/Rescue Kit/O2/BVM/oxygen/IV pump/pulse ox/ventilator/drains/ACLS Rescue Kit

## 2022-10-13 NOTE — ED PROVIDER NOTE - CRTICAL CARE TIME SPENT (MIN)
Abdomen , soft, nontender, nondistended , no guarding or rigidity , no masses palpable , normal bowel sounds , Liver and Spleen , no hepatomegaly present , no hepatosplenomegaly , liver nontender , spleen not palpable 60

## 2022-10-13 NOTE — ED PROVIDER NOTE - OBJECTIVE STATEMENT
33 yo male with hx of seizure, IDDM, afib, ETOH abuse presents to the ED for seizure. Patient found unresponsive by family. Patient generalized tonic clonic movements continuously for about 20 min enroute. Unresponsive to verbal and painful stimuli.

## 2022-10-13 NOTE — ED ADULT NURSE NOTE - NSIMPLEMENTINTERV_GEN_ALL_ED
Implemented All Fall Risk Interventions:  [place] to call system. Call bell, personal items and telephone within reach. Instruct patient to call for assistance. Room bathroom lighting operational. Non-slip footwear when patient is off stretcher. Physically safe environment: no spills, clutter or unnecessary equipment. Stretcher in lowest position, wheels locked, appropriate side rails in place. Provide visual cue, wrist band, yellow gown, etc. Monitor gait and stability. Monitor for mental status changes and reorient to person, place, and time. Review medications for side effects contributing to fall risk. Reinforce activity limits and safety measures with patient and family.

## 2022-10-13 NOTE — H&P ADULT - CRITICAL CARE ATTENDING COMMENT
Pt seen and examined. Agree with above assessment and plan    31 yo Male with PMH of seizure not on AED medication, IDDM, afib not on AC, ETOH abuse (per brother stopped drinking 3-4 days ago)  presents to ED for seizure.     Intubated and sedated  PERRL 3mm  minimal WD in all ext  no cough with recent intubation/paralytic    cEEG  propofol gtt  keppra  PPI  mech vent  q1h neurochecks

## 2022-10-13 NOTE — ED PROVIDER NOTE - CLINICAL SUMMARY MEDICAL DECISION MAKING FREE TEXT BOX
Patient actively seizing for about 20 min. Intubated for airway protection. Seizure not aborted with versed. Will simeon scan, order labs.

## 2022-10-13 NOTE — ED ADULT NURSE REASSESSMENT NOTE - NS ED NURSE REASSESS COMMENT FT1
IV access unobtainable by RN x2,IO placed in L tibia by MD Church.   Pt intubated upon arrival.  7.5 ETT tube, 24 @lip by MD church. 20 Etomidate, 100 of Rocc given by MD Zheng.  Respiratory at bedside, + color change. Cardiac monitoring and pulse oximetry initiated, sinus tach on monitor.
Pt having generalized tremors at for past 10 mins. Neuro PA called and made aware. Versed 20mg given in total at this timer and Prop drip increased to 65mcg/kg/min. as per neuro PA.  Will continue to monitor.
Pt still having generalized tremors at this time.  Dr. Rajinder Casiano called and came down to eval pt. Will continue to monitor.
Pt back from CT scan, still intubated, prop drip at 10mcg/kg/min.  RASS = -5.  OGT in place.  Sebastian cath in place, draining clear jennifer urine.  Will continue to monitor.  Hypertensive, no intervention as per MD at this time.
Pt calm at this time, no generalized tremors noted at this time.  VSS.  Prop drip still running at 65 mcg/kg/min.
Pt remains calm at this time.  VSS.  Propofol drip running at 65 mcg/kg/min.  Will continue to monitor.

## 2022-10-13 NOTE — ED PROVIDER NOTE - PHYSICAL EXAMINATION
Constitutional: Unresponsive, seizing  Eyes: rightward gaze  HENT: no scalp tenderness or deformity, no facial tenderness, airway patent  Neck: no cervical spine tenderness, no palpable stepoff, no tracheal deviation  CV: no chest wall tenderness, no crepitus or subcutaneous emphysema.  RRR, no murmur, 2+ distal pulses in all extremities  Pulm: non-labored respirations, CTAB  Abdomen: soft, non-tender, non-distended, no ecchymosis  Back: no spinal tenderness, no palpable stepoff  Extremities: stable pelvis, no extremity tenderness or deformity  Skin: no rash  Neuro: unresponsive to pain. Leftward gaze. Seizing

## 2022-10-13 NOTE — ED PROVIDER NOTE - ATTENDING CONTRIBUTION TO CARE
I, Gely Elizalde DO, have personally provided 60 minutes of critical care time exclusive of time spent on separately billable procedures. Time includes review of laboratory data, radiology results, discussion with consultants, and monitoring for potential decompensation. Interventions were performed as documented above.     I personally saw the patient with the resident, and completed the key components of the history and physical exam. I then discussed the management plan with the resident.    33 y/o M with PMH IDDM, A fib, EtOH abuse presents for seizure that was tonic clonic in nature x 20 minutes, actively seizing on arrival. Per brother, patient was last seen yesterday, has been sleeping with sonorous respirations all morning, then patient's mother called brother because he was unresponsive and stiff. Brother states he doesn't think the patient takes his medications regularly.    I agree with exam as documented.    IM benzo, required intubation for airway protection, IO for access as patient is difficult to obtain access, priority CT head, C/A/P, neuro ICU consulted for admission.

## 2022-10-14 LAB
A1C WITH ESTIMATED AVERAGE GLUCOSE RESULT: 6.8 % — HIGH (ref 4–5.6)
ANION GAP SERPL CALC-SCNC: 13 MMOL/L — SIGNIFICANT CHANGE UP (ref 5–17)
BUN SERPL-MCNC: 3.2 MG/DL — LOW (ref 8–20)
CALCIUM SERPL-MCNC: 8.2 MG/DL — LOW (ref 8.4–10.5)
CHLORIDE SERPL-SCNC: 104 MMOL/L — SIGNIFICANT CHANGE UP (ref 98–107)
CO2 SERPL-SCNC: 25 MMOL/L — SIGNIFICANT CHANGE UP (ref 22–29)
CREAT SERPL-MCNC: 0.73 MG/DL — SIGNIFICANT CHANGE UP (ref 0.5–1.3)
CULTURE RESULTS: NO GROWTH — SIGNIFICANT CHANGE UP
EGFR: 123 ML/MIN/1.73M2 — SIGNIFICANT CHANGE UP
ESTIMATED AVERAGE GLUCOSE: 148 MG/DL — HIGH (ref 68–114)
GLUCOSE BLDC GLUCOMTR-MCNC: 153 MG/DL — HIGH (ref 70–99)
GLUCOSE BLDC GLUCOMTR-MCNC: 96 MG/DL — SIGNIFICANT CHANGE UP (ref 70–99)
GLUCOSE SERPL-MCNC: 122 MG/DL — HIGH (ref 70–99)
HCT VFR BLD CALC: 29.1 % — LOW (ref 39–50)
HGB BLD-MCNC: 9.5 G/DL — LOW (ref 13–17)
LACTATE SERPL-SCNC: 1.3 MMOL/L — SIGNIFICANT CHANGE UP (ref 0.5–2)
LACTATE SERPL-SCNC: 2.2 MMOL/L — HIGH (ref 0.5–2)
MAGNESIUM SERPL-MCNC: 1.5 MG/DL — LOW (ref 1.6–2.6)
MCHC RBC-ENTMCNC: 28.6 PG — SIGNIFICANT CHANGE UP (ref 27–34)
MCHC RBC-ENTMCNC: 32.6 GM/DL — SIGNIFICANT CHANGE UP (ref 32–36)
MCV RBC AUTO: 87.7 FL — SIGNIFICANT CHANGE UP (ref 80–100)
MRSA PCR RESULT.: SIGNIFICANT CHANGE UP
PHOSPHATE SERPL-MCNC: 4 MG/DL — SIGNIFICANT CHANGE UP (ref 2.4–4.7)
PLATELET # BLD AUTO: 281 K/UL — SIGNIFICANT CHANGE UP (ref 150–400)
POTASSIUM SERPL-MCNC: 3.9 MMOL/L — SIGNIFICANT CHANGE UP (ref 3.5–5.3)
POTASSIUM SERPL-SCNC: 3.9 MMOL/L — SIGNIFICANT CHANGE UP (ref 3.5–5.3)
RBC # BLD: 3.32 M/UL — LOW (ref 4.2–5.8)
RBC # FLD: 17.7 % — HIGH (ref 10.3–14.5)
S AUREUS DNA NOSE QL NAA+PROBE: DETECTED
SODIUM SERPL-SCNC: 142 MMOL/L — SIGNIFICANT CHANGE UP (ref 135–145)
SPECIMEN SOURCE: SIGNIFICANT CHANGE UP
WBC # BLD: 6.53 K/UL — SIGNIFICANT CHANGE UP (ref 3.8–10.5)
WBC # FLD AUTO: 6.53 K/UL — SIGNIFICANT CHANGE UP (ref 3.8–10.5)

## 2022-10-14 PROCEDURE — 95720 EEG PHY/QHP EA INCR W/VEEG: CPT

## 2022-10-14 PROCEDURE — 99223 1ST HOSP IP/OBS HIGH 75: CPT

## 2022-10-14 PROCEDURE — 99291 CRITICAL CARE FIRST HOUR: CPT

## 2022-10-14 PROCEDURE — 36000 PLACE NEEDLE IN VEIN: CPT | Mod: LT

## 2022-10-14 RX ORDER — LABETALOL HCL 100 MG
10 TABLET ORAL
Refills: 0 | Status: DISCONTINUED | OUTPATIENT
Start: 2022-10-14 | End: 2022-10-14

## 2022-10-14 RX ORDER — PHENOBARBITAL 60 MG
65 TABLET ORAL EVERY 6 HOURS
Refills: 0 | Status: DISCONTINUED | OUTPATIENT
Start: 2022-10-14 | End: 2022-10-15

## 2022-10-14 RX ORDER — CALCIUM GLUCONATE 100 MG/ML
1 VIAL (ML) INTRAVENOUS ONCE
Refills: 0 | Status: COMPLETED | OUTPATIENT
Start: 2022-10-14 | End: 2022-10-14

## 2022-10-14 RX ORDER — SODIUM CHLORIDE 9 MG/ML
1000 INJECTION INTRAMUSCULAR; INTRAVENOUS; SUBCUTANEOUS ONCE
Refills: 0 | Status: COMPLETED | OUTPATIENT
Start: 2022-10-14 | End: 2022-10-14

## 2022-10-14 RX ORDER — INFLUENZA VIRUS VACCINE 15; 15; 15; 15 UG/.5ML; UG/.5ML; UG/.5ML; UG/.5ML
0.5 SUSPENSION INTRAMUSCULAR ONCE
Refills: 0 | Status: DISCONTINUED | OUTPATIENT
Start: 2022-10-14 | End: 2022-10-18

## 2022-10-14 RX ORDER — MAGNESIUM SULFATE 500 MG/ML
2 VIAL (ML) INJECTION ONCE
Refills: 0 | Status: COMPLETED | OUTPATIENT
Start: 2022-10-14 | End: 2022-10-14

## 2022-10-14 RX ORDER — DEXMEDETOMIDINE HYDROCHLORIDE IN 0.9% SODIUM CHLORIDE 4 UG/ML
0.1 INJECTION INTRAVENOUS
Qty: 200 | Refills: 0 | Status: DISCONTINUED | OUTPATIENT
Start: 2022-10-14 | End: 2022-10-15

## 2022-10-14 RX ORDER — HYDRALAZINE HCL 50 MG
10 TABLET ORAL
Refills: 0 | Status: DISCONTINUED | OUTPATIENT
Start: 2022-10-14 | End: 2022-10-18

## 2022-10-14 RX ORDER — LABETALOL HCL 100 MG
10 TABLET ORAL
Refills: 0 | Status: DISCONTINUED | OUTPATIENT
Start: 2022-10-14 | End: 2022-10-18

## 2022-10-14 RX ORDER — SODIUM CHLORIDE 9 MG/ML
1000 INJECTION, SOLUTION INTRAVENOUS
Refills: 0 | Status: DISCONTINUED | OUTPATIENT
Start: 2022-10-14 | End: 2022-10-18

## 2022-10-14 RX ORDER — CHLORHEXIDINE GLUCONATE 213 G/1000ML
1 SOLUTION TOPICAL DAILY
Refills: 0 | Status: DISCONTINUED | OUTPATIENT
Start: 2022-10-14 | End: 2022-10-18

## 2022-10-14 RX ADMIN — LEVETIRACETAM 400 MILLIGRAM(S): 250 TABLET, FILM COATED ORAL at 06:16

## 2022-10-14 RX ADMIN — SODIUM CHLORIDE 500 MILLILITER(S): 9 INJECTION INTRAMUSCULAR; INTRAVENOUS; SUBCUTANEOUS at 09:40

## 2022-10-14 RX ADMIN — ENOXAPARIN SODIUM 40 MILLIGRAM(S): 100 INJECTION SUBCUTANEOUS at 06:21

## 2022-10-14 RX ADMIN — CHLORHEXIDINE GLUCONATE 15 MILLILITER(S): 213 SOLUTION TOPICAL at 06:21

## 2022-10-14 RX ADMIN — SODIUM CHLORIDE 70 MILLILITER(S): 9 INJECTION, SOLUTION INTRAVENOUS at 12:46

## 2022-10-14 RX ADMIN — Medication 2 MILLIGRAM(S): at 03:10

## 2022-10-14 RX ADMIN — Medication 25 GRAM(S): at 06:22

## 2022-10-14 RX ADMIN — Medication 10 MILLIGRAM(S): at 11:20

## 2022-10-14 RX ADMIN — PROPOFOL 4.2 MICROGRAM(S)/KG/MIN: 10 INJECTION, EMULSION INTRAVENOUS at 00:47

## 2022-10-14 RX ADMIN — Medication 1: at 11:13

## 2022-10-14 RX ADMIN — Medication 65 MILLIGRAM(S): at 11:19

## 2022-10-14 RX ADMIN — Medication 100 GRAM(S): at 06:45

## 2022-10-14 RX ADMIN — LEVETIRACETAM 400 MILLIGRAM(S): 250 TABLET, FILM COATED ORAL at 17:42

## 2022-10-14 RX ADMIN — PROPOFOL 4.2 MICROGRAM(S)/KG/MIN: 10 INJECTION, EMULSION INTRAVENOUS at 06:46

## 2022-10-14 RX ADMIN — PROPOFOL 4.2 MICROGRAM(S)/KG/MIN: 10 INJECTION, EMULSION INTRAVENOUS at 03:54

## 2022-10-14 RX ADMIN — PROPOFOL 4.2 MICROGRAM(S)/KG/MIN: 10 INJECTION, EMULSION INTRAVENOUS at 09:40

## 2022-10-14 RX ADMIN — Medication 105 MILLIGRAM(S): at 08:21

## 2022-10-14 RX ADMIN — CHLORHEXIDINE GLUCONATE 1 APPLICATION(S): 213 SOLUTION TOPICAL at 11:19

## 2022-10-14 RX ADMIN — Medication 1 TABLET(S): at 11:16

## 2022-10-14 RX ADMIN — PANTOPRAZOLE SODIUM 40 MILLIGRAM(S): 20 TABLET, DELAYED RELEASE ORAL at 11:14

## 2022-10-14 RX ADMIN — DEXMEDETOMIDINE HYDROCHLORIDE IN 0.9% SODIUM CHLORIDE 1.91 MICROGRAM(S)/KG/HR: 4 INJECTION INTRAVENOUS at 10:38

## 2022-10-14 RX ADMIN — Medication 65 MILLIGRAM(S): at 17:42

## 2022-10-14 RX ADMIN — Medication 1 MILLIGRAM(S): at 11:16

## 2022-10-14 RX ADMIN — Medication 105 MILLIGRAM(S): at 13:51

## 2022-10-14 RX ADMIN — Medication 2 MILLIGRAM(S): at 03:43

## 2022-10-14 NOTE — PROGRESS NOTE ADULT - ASSESSMENT
31 yo male with hx of seizure, IDDM, afib, ETOH abuse presents for status epiliepticus.  Patient found unresponsive by family. Patient generalized tonic clonic movements continuously for about 20 min enroute. patient is currently intubated and pending vEEG evaluation.     In ED, patient experienced another episode of refractory seizures. patient was given additional 15 mg of versed, propofol drip @ 60.     Plan:   Neuro:   - q1 comas/vital check  - pending continous vEEG monitoring  - prop   - Versed 5 mg for active seizures.     CV:   100-160  - pending Echo  - pending ECG.    Resp:   -intubated 16/450/16/25  - pending CXR to check ET tube placement     GI:   - NPO  - MV/Thiamine/ FA     :   -Sebastian   -NS @ 75    Heme:  - lovenox     ID:  - afebrile   - pending Urine culture, UA, utox    Endo:   -180  ISS    31 yo male with hx of seizure, IDDM, parox afib, ETOH abuse presents for status epiliepticus.  Patient found unresponsive by family. Patient generalized tonic clonic movements continuously for about 20 min enroute. patient is currently intubated and pending vEEG evaluation.     In ED, patient experienced another episode of refractory seizures. patient was given additional 15 mg of versed, propofol drip @ 60.     Plan:   Neuro:   - q1 comas/vital check  - pending continous vEEG monitoring  - precedex gtt  - MVT, thiamine, folate  - PHB 65 q6 and prn, check level Sunday  - keppra 1 g bid    CV:   100-160  - Echo EF 60-65%    Resp:   -intubated 16/450/16/25  - wean to extubate    GI:   - NPO  - MV/Thiamine/ FA     :   -MAGGIE Clark   - plamalyte @ 75    Heme:  - lovenox     ID:  - afebrile   - pending Urine culture, UA, utox    Endo:   -180  ISS  A1c 6.8

## 2022-10-14 NOTE — AIRWAY REMOVAL NOTE  ADULT & PEDS - ARTIFICAL AIRWAY REMOVAL COMMENTS
Extubated to NC-5L, Encouraged to take deep breaths and cough. Will continue to monitor patients respiratory status .
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
## 2022-10-14 NOTE — CHART NOTE - NSCHARTNOTEFT_GEN_A_CORE
HPI:  33 yo Male with PMH of seizure not on AED medication, IDDM, afib not on AC, ETOH abuse (per brother stopped drinking 3-4 days ago)  presents to ED for seizure. Patient was found  unresponsive by mother. Patient experienced generalized tonic clonic movements continuously for 20 mins en route to hospital, no medications given that time. In ED, patient given 5 of versed-continued to have refractory seizures,was intubation and placed on propofol drip.  upgraded to [facility] for continous vEEG monitoring.     Per family, patient had one episode of status epileptics 4 months ago s/p alcohol withdrawal for 3-5 days.     In ED, patient experienced another episode of refractory seizures. patient was given additional 15 mg of versed, propofol drip @ 60. Currently Patient is extubated, following commands.     Interval Events:   10/13: patient was intubated in the ED, placed on propofol 60, patient continued to have refractory seizures, was given versed 20 for ?myoclonus. Keppra loaded.   10/14: EEG report negative, patient was extubated, following commands.   Vital Signs Last 24 Hrs  T(C): 36.1 (13 Oct 2022 15:30), Max: 36.2 (13 Oct 2022 14:55)  T(F): 97 (13 Oct 2022 15:30), Max: 97.2 (13 Oct 2022 14:55)  HR: 97 (13 Oct 2022 15:30) (96 - 128)  BP: 149/93 (13 Oct 2022 15:30) (146/88 - 164/105)  BP(mean): --  RR: 16 (13 Oct 2022 15:30) (16 - 28)  SpO2: 100% (13 Oct 2022 15:30) (99% - 100%)    Parameters below as of 13 Oct 2022 15:30  Patient On (Oxygen Delivery Method): ventilator        LABS:                        10.3   2.24  )-----------( 342      ( 13 Oct 2022 14:00 )             32.6     10-13    144  |  108<H>  |  4.2<L>  ----------------------------<  79  4.1   |  22.0  |  0.69    Ca    8.1<L>      13 Oct 2022 14:00    TPro  6.4<L>  /  Alb  3.7  /  TBili  0.5  /  DBili  x   /  AST  39  /  ALT  22  /  AlkPhos  108  10-13        RADIOLOGY & ADDITIONAL TESTS:  < from: CT Head No Cont (10.13.22 @ 14:26) >  IMPRESSION: No evidence of acute hemorrhage mass or mass effect.    No acute fractures or dislocations seen involving the cervical spine.    --- End of Report ---      < end of copied text >     (13 Oct 2022 15:39)        INTERVAL HPI/OVERNIGHT EVENTS:  32y Male s/p __ seen lying comfortably in bed. Tolerating diet. Passing gas/BM. Voiding. Sebastian in with __ clear urine. Denies headache, weakness, numbness, n/v/d, fevers, chills, chest pain, SOB.       Vital Signs Last 24 Hrs  T(C): 37 (14 Oct 2022 12:00), Max: 38.2 (13 Oct 2022 22:30)  T(F): 98.6 (14 Oct 2022 12:00), Max: 100.8 (13 Oct 2022 22:30)  HR: 65 (14 Oct 2022 16:00) (64 - 122)  BP: 125/83 (14 Oct 2022 16:00) (94/50 - 144/92)  BP(mean): 96 (14 Oct 2022 16:00) (64 - 110)  RR: 16 (14 Oct 2022 16:00) (10 - 20)  SpO2: 99% (14 Oct 2022 16:00) (98% - 100%)    Parameters below as of 14 Oct 2022 17:18    O2 Flow (L/min): 5  O2 Concentration (%): 34      PHYSICAL EXAM:  GENERAL: NAD, well-groomed, well-developed  HEAD:  Atraumatic, normocephalic  MENTAL STATUS: AAO x3; Awake; Opens eyes spontaneously Appropriately conversant;Nonverbal; following simple commands  CRANIAL NERVES: PERRL; EOMI; corneal intact b/l;  blinks to threat b/l; no facial asymmetry; facial sensation grossly intact to light touch   MOTOR: strength 5/5 B/L upper and lower extremities; sensation grossly intact all extremities;  CHEST/LUNG: normal chest rise and expansion  HEART: Regular rate and rhythm  ABDOMEN: Soft, nontender, nondistended  EXTREMITIES:  no clubbing, cyanosis, or edema  SKIN: Warm, dry; no rashes or lesions      LABS:                        9.5    6.53  )-----------( 281      ( 14 Oct 2022 03:52 )             29.1     10-14    142  |  104  |  3.2<L>  ----------------------------<  122<H>  3.9   |  25.0  |  0.73    Ca    8.2<L>      14 Oct 2022 03:52  Phos  4.0     10-14  Mg     1.5     10-14    TPro  6.4<L>  /  Alb  3.7  /  TBili  0.5  /  DBili  x   /  AST  39  /  ALT  22  /  AlkPhos  108  10-13      Urinalysis Basic - ( 13 Oct 2022 15:00 )    Color: Yellow / Appearance: Clear / S.010 / pH: x  Gluc: x / Ketone: Negative  / Bili: Negative / Urobili: Negative mg/dL   Blood: x / Protein: Negative / Nitrite: Negative   Leuk Esterase: Negative / RBC: x / WBC x   Sq Epi: x / Non Sq Epi: x / Bacteria: x        10-13 @ :01  -  10-14 @ 07:00  --------------------------------------------------------  IN: 528.4 mL / OUT: 750 mL / NET: -221.6 mL    10-14 @ 07:01  -  10-14 @ 17:31  --------------------------------------------------------  IN: 1880.7 mL / OUT: 295 mL / NET: 1585.7 mL        RADIOLOGY & ADDITIONAL TESTS:  < from: CT Head No Cont (10.13.22 @ 14:26) >    IMPRESSION: No evidence of acute hemorrhage mass or mass effect.    No acute fractures or dislocations seen involving the cervical spine.    < end of copied text >    Assessment and Plan:   Assessment	   33 yo male with hx of seizure, IDDM, parox afib, ETOH abuse presents for status epilepticus. patient is currently extubated and on vEEG.     Plan:   Neuro:   - q1 comas/vital check  - continue with vEEG monitoring, vEEG negative.   - MVT, thiamine, folate  - PHB 65 q6 and prn, check level   - keppra 1 g bid    CV:   100-160  - Echo EF 60-65%    Resp:   -extubated, NC    GI:   - NPO  - MV/Thiamine/ FA     :   -DC MAGGIE Sebastian   - plamalyte @ 75    Heme:  - lovenox     ID:  - afebrile   - pending Urine culture, UA, utox    Endo:   -180  ISS

## 2022-10-14 NOTE — EEG REPORT - NS EEG TEXT BOX
Health system   COMPREHENSIVE EPILEPSY CENTER   REPORT OF LONG-TERM VIDEO EEG     [facility]: [address], [place] [address], Ph#: [phone]  [facility]: [id number], [place] [address], Ph#: [phone]  [facility]: [address] [place] [address], Ph#: [phone]    Patient Name: WEST[place] CRITICAL  Age and : 32y ([birth year])  MRN #: [id number]  Location: [place] [address]  Referring Physician: Candace Rosario    Start Time/Date: 01:12 on 10-14-22  End Time/Date: 08:00 on 10-14-22  Duration: 6h 41m    _____________________________________________________________  STUDY INFORMATION    EEG Recording Technique:  The patient underwent continuous Video-EEG monitoring, using Telemetry System hardware on the XLTek Digital System. EEG and video data were stored on a computer hard drive with important events saved in digital archive files. The material was reviewed by a physician (electroencephalographer / epileptologist) on a daily basis. Emeterio and seizure detection algorithms were utilized and reviewed. An EEG Technician attended to the patient, and was available throughout daytime work hours.  The epilepsy center neurologist was available in person or on call 24-hours per day.    EEG Placement and Labeling of Electrodes:  The EEG was performed utilizing 20 channel referential EEG connections (coronal over temporal over parasagittal montage) using all standard 10-20 electrode placements with EKG, with additional electrodes placed in the inferior temporal region using the modified 10-10 montage electrode placements for elective admissions, or if deemed necessary. Recording was at a sampling rate of 256 samples per second per channel. Time synchronized digital video recording was done simultaneously with EEG recording. A low light infrared camera was used for low light recording.     _____________________________________________________________  HISTORY    Patient is a 32y old  Male who presents with a chief complaint of status epilepticus (14 Oct 2022 09:48)      PERTINENT MEDICATION:  levETIRAcetam  IVPB 1000 milliGRAM(s) IV Intermittent every 12 hours  propofol Infusion 10 MICROgram(s)/kG/Min (4.2 mL/Hr) IV Continuous <Continuous>    _____________________________________________________________  STUDY INTERPRETATION    Findings: The background was continuous and reactive. No posterior dominant rhythm seen.    Background Slowing:  Background predominantly consisted of theta, delta and faster activities.    Focal Slowing:   None were present.      Sleep Background:  Drowsiness was characterized by fragmentation, attenuation, and slowing of the background activity.    Stage II sleep transients were not recorded.    Other Non-Epileptiform Findings:  Excess diffuse beta activity.    Interictal Epileptiform Activity:   None were present.    Events:  No event or seizure recorded.    Activation Procedures:   Hyperventilation was not performed.    Photic stimulation was not performed.     Artifacts:  Intermittent myogenic and movement artifacts were noted.    ECG:  The heart rate on single channel ECG was predominantly between  BPM.    _____________________________________________________________  EEG SUMMARY/CLASSIFICATION    Abnormal EEG in a sedated patient.  - Excess diffuse beta activity.  - Moderate to severe generalized slowing.    _____________________________________________________________  EEG IMPRESSION/CLINICAL CORRELATE    Abnormal EEG study.  1. Moderate to severe nonspecific diffuse or multifocal cerebral dysfunction.   2. Excess diffuse beta activity may be seen with medication use such as benzodiazepines or barbiturates.  3. No epileptiform pattern or seizure seen.    _____________________________________________________________    Darinel Gutierrez MD  Director, Epilepsy[facility]

## 2022-10-14 NOTE — PHARMACOTHERAPY INTERVENTION NOTE - COMMENTS
Called patient's pharmacy and spoke with patient's family at bedside regarding patient's home medications. 
Clarified labetalol order- changed to prn
Seizure  Rapid Sequence Intubation   Post-RSI Sedation

## 2022-10-14 NOTE — PATIENT PROFILE ADULT - FALL HARM RISK - HARM RISK INTERVENTIONS
Assistance with ambulation/Assistance OOB with selected safe patient handling equipment/Communicate Risk of Fall with Harm to all staff/Discuss with provider need for PT consult/Monitor gait and stability/Reinforce activity limits and safety measures with patient and family/Tailored Fall Risk Interventions/Visual Cue: Yellow wristband and red socks/Bed in lowest position, wheels locked, appropriate side rails in place/Call bell, personal items and telephone in reach/Instruct patient to call for assistance before getting out of bed or chair/Non-slip footwear when patient is out of bed/[place] to call system/Physically safe environment - no spills, clutter or unnecessary equipment/Purposeful Proactive Rounding/Room/bathroom lighting operational, light cord in reach

## 2022-10-14 NOTE — PROGRESS NOTE ADULT - SUBJECTIVE AND OBJECTIVE BOX
Chief complaint:   Patient is a 32y old  Male who presents with a chief complaint of status epilepticus (13 Oct 2022 15:39)    HPI:  HPI:  33 yo Male with PMH of seizure not on AED medication, IDDM, afib not on AC, ETOH abuse (per brother stopped drinking 3-4 days ago)  presents to ED for seizure. Patient was found  unresponsive by mother. Patient experienced generalized tonic clonic movements continuously for 20 mins en route to hospital, no medications given that time. In ED, patient given 5 of versed-continued having refractory seizures,was intubation and placed on propofol drip. Currently patient is sedated, unresponsive to Verbal and deep noxious stimuli, upgraded to [facility] for continous vEEG monitoring.     Per family, patient had one episode of status epileptics 4 months ago s/p alcohol withdrawal for 3-5 days.     In ED, patient experienced another episode of refractory seizures. patient was given additional 15 mg of versed, propofol drip @ 60.       Vital Signs Last 24 Hrs  T(C): 36.1 (13 Oct 2022 15:30), Max: 36.2 (13 Oct 2022 14:55)  T(F): 97 (13 Oct 2022 15:30), Max: 97.2 (13 Oct 2022 14:55)  HR: 97 (13 Oct 2022 15:30) (96 - 128)  BP: 149/93 (13 Oct 2022 15:30) (146/88 - 164/105)  BP(mean): --  RR: 16 (13 Oct 2022 15:30) (16 - 28)  SpO2: 100% (13 Oct 2022 15:30) (99% - 100%)    Parameters below as of 13 Oct 2022 15:30  Patient On (Oxygen Delivery Method): ventilator        LABS:                        10.3   2.24  )-----------( 342      ( 13 Oct 2022 14:00 )             32.6     10-13    144  |  108<H>  |  4.2<L>  ----------------------------<  79  4.1   |  22.0  |  0.69    Ca    8.1<L>      13 Oct 2022 14:00    TPro  6.4<L>  /  Alb  3.7  /  TBili  0.5  /  DBili  x   /  AST  39  /  ALT  22  /  AlkPhos  108  10-13        RADIOLOGY & ADDITIONAL TESTS:  < from: CT Head No Cont (10.13.22 @ 14:26) >  IMPRESSION: No evidence of acute hemorrhage mass or mass effect.    No acute fractures or dislocations seen involving the cervical spine.    --- End of Report ---      < end of copied text >     (13 Oct 2022 15:39)        24hr EVENTS:      ROS: [ ]  Unable to assess due to mental status   All other systems negative    -----------------------------------------------------------------------------------------------------------------------------------------------------------------------------------  ICU Vital Signs Last 24 Hrs  T(C): 37.3 (14 Oct 2022 08:00), Max: 38.2 (13 Oct 2022 22:30)  T(F): 99.1 (14 Oct 2022 08:00), Max: 100.8 (13 Oct 2022 22:30)  HR: 108 (14 Oct 2022 09:00) (81 - 128)  BP: 135/87 (14 Oct 2022 09:00) (94/50 - 164/105)  BP(mean): 100 (14 Oct 2022 09:00) (64 - 110)  ABP: --  ABP(mean): --  RR: 17 (14 Oct 2022 09:00) (10 - 28)  SpO2: 99% (14 Oct 2022 09:00) (99% - 100%)    O2 Parameters below as of 14 Oct 2022 08:00  Patient On (Oxygen Delivery Method): ventilator    O2 Concentration (%): 30        I&O's Summary    13 Oct 2022 07:01  -  14 Oct 2022 07:00  --------------------------------------------------------  IN: 528.4 mL / OUT: 750 mL / NET: -221.6 mL    14 Oct 2022 07:01  -  14 Oct 2022 09:48  --------------------------------------------------------  IN: 196.3 mL / OUT: 175 mL / NET: 21.3 mL        MEDICATIONS  (STANDING):  chlorhexidine 0.12% Liquid 15 milliLiter(s) Oral Mucosa every 12 hours  chlorhexidine 2% Cloths 1 Application(s) Topical daily  dexMEDEtomidine Infusion 0.1 MICROgram(s)/kG/Hr (1.91 mL/Hr) IV Continuous <Continuous>  dextrose 5%. 1000 milliLiter(s) (50 mL/Hr) IV Continuous <Continuous>  dextrose 5%. 1000 milliLiter(s) (100 mL/Hr) IV Continuous <Continuous>  dextrose 50% Injectable 25 Gram(s) IV Push once  dextrose 50% Injectable 12.5 Gram(s) IV Push once  dextrose 50% Injectable 25 Gram(s) IV Push once  enoxaparin Injectable 40 milliGRAM(s) SubCutaneous every 24 hours  folic acid 1 milliGRAM(s) Oral daily  glucagon  Injectable 1 milliGRAM(s) IntraMuscular once  insulin lispro (ADMELOG) corrective regimen sliding scale   SubCutaneous every 6 hours  levETIRAcetam  IVPB 1000 milliGRAM(s) IV Intermittent every 12 hours  multivitamin 1 Tablet(s) Oral daily  pantoprazole  Injectable 40 milliGRAM(s) IV Push daily  propofol Infusion 10 MICROgram(s)/kG/Min (4.2 mL/Hr) IV Continuous <Continuous>  thiamine IVPB 500 milliGRAM(s) IV Intermittent every 8 hours      RESPIRATORY:  Mode: AC/ CMV (Assist Control/ Continuous Mandatory Ventilation)  RR (machine): 16  TV (machine): 450  FiO2: 28  PEEP: 5  ITime: 1  MAP: 10  PIP: 26        IMAGING:   Recent imaging studies were reviewed.    LAB RESULTS:                          9.5    6.53  )-----------( 281      ( 14 Oct 2022 03:52 )             29.1           10-14    142  |  104  |  3.2<L>  ----------------------------<  122<H>  3.9   |  25.0  |  0.73    Ca    8.2<L>      14 Oct 2022 03:52  Phos  4.0     10-14  Mg     1.5     10-14    TPro  6.4<L>  /  Alb  3.7  /  TBili  0.5  /  DBili  x   /  AST  39  /  ALT  22  /  AlkPhos  108  10-13          ABG - ( 13 Oct 2022 14:03 )  pH, Arterial: 7.290 pH, Blood: x     /  pCO2: 41    /  pO2: 160   / HCO3: 20    / Base Excess: -6.9  /  SaO2: 99.3                  -----------------------------------------------------------------------------------------------------------------------------------------------------------------------------------    PHYSICAL EXAM:  General: Calm, intubated  HEENT: MMM  Neuro:  -Mental status- No acute distress  -CN- PERRL 3mm, EOMI, tongue midline, face symmetric    CV: RRR  Pulm: clear to auscultation  Abd: Soft, nontender, nondistended  Ext: no noted edema in lower ext  Skin: warm, dry       Chief complaint:   Patient is a 32y old  Male who presents with a chief complaint of status epilepticus (13 Oct 2022 15:39)    HPI:  HPI:  31 yo Male with PMH of seizure not on AED medication, IDDM, afib not on AC, ETOH abuse (per brother stopped drinking 3-4 days ago)  presents to ED for seizure. Patient was found  unresponsive by mother. Patient experienced generalized tonic clonic movements continuously for 20 mins en route to hospital, no medications given that time. In ED, patient given 5 of versed-continued having refractory seizures,was intubation and placed on propofol drip. Currently patient is sedated, unresponsive to Verbal and deep noxious stimuli, upgraded to [facility] for continous vEEG monitoring.     Per family, patient had one episode of status epileptics 4 months ago s/p alcohol withdrawal for 3-5 days.     In ED, patient experienced another episode of refractory seizures. patient was given additional 15 mg of versed, propofol drip @ 60.       Vital Signs Last 24 Hrs  T(C): 36.1 (13 Oct 2022 15:30), Max: 36.2 (13 Oct 2022 14:55)  T(F): 97 (13 Oct 2022 15:30), Max: 97.2 (13 Oct 2022 14:55)  HR: 97 (13 Oct 2022 15:30) (96 - 128)  BP: 149/93 (13 Oct 2022 15:30) (146/88 - 164/105)  BP(mean): --  RR: 16 (13 Oct 2022 15:30) (16 - 28)  SpO2: 100% (13 Oct 2022 15:30) (99% - 100%)    Parameters below as of 13 Oct 2022 15:30  Patient On (Oxygen Delivery Method): ventilator        LABS:                        10.3   2.24  )-----------( 342      ( 13 Oct 2022 14:00 )             32.6     10-13    144  |  108<H>  |  4.2<L>  ----------------------------<  79  4.1   |  22.0  |  0.69    Ca    8.1<L>      13 Oct 2022 14:00    TPro  6.4<L>  /  Alb  3.7  /  TBili  0.5  /  DBili  x   /  AST  39  /  ALT  22  /  AlkPhos  108  10-13        RADIOLOGY & ADDITIONAL TESTS:  < from: CT Head No Cont (10.13.22 @ 14:26) >  IMPRESSION: No evidence of acute hemorrhage mass or mass effect.    No acute fractures or dislocations seen involving the cervical spine.    --- End of Report ---      < end of copied text >     (13 Oct 2022 15:39)        24hr EVENTS:  shaking overnight, no sz on EEG    ROS: [ x]  Unable to assess due to mental status   All other systems negative    -----------------------------------------------------------------------------------------------------------------------------------------------------------------------------------  ICU Vital Signs Last 24 Hrs  T(C): 37.3 (14 Oct 2022 08:00), Max: 38.2 (13 Oct 2022 22:30)  T(F): 99.1 (14 Oct 2022 08:00), Max: 100.8 (13 Oct 2022 22:30)  HR: 108 (14 Oct 2022 09:00) (81 - 128)  BP: 135/87 (14 Oct 2022 09:00) (94/50 - 164/105)  BP(mean): 100 (14 Oct 2022 09:00) (64 - 110)  ABP: --  ABP(mean): --  RR: 17 (14 Oct 2022 09:00) (10 - 28)  SpO2: 99% (14 Oct 2022 09:00) (99% - 100%)    O2 Parameters below as of 14 Oct 2022 08:00  Patient On (Oxygen Delivery Method): ventilator    O2 Concentration (%): 30        I&O's Summary    13 Oct 2022 07:01  -  14 Oct 2022 07:00  --------------------------------------------------------  IN: 528.4 mL / OUT: 750 mL / NET: -221.6 mL    14 Oct 2022 07:01  -  14 Oct 2022 09:48  --------------------------------------------------------  IN: 196.3 mL / OUT: 175 mL / NET: 21.3 mL        MEDICATIONS  (STANDING):  chlorhexidine 0.12% Liquid 15 milliLiter(s) Oral Mucosa every 12 hours  chlorhexidine 2% Cloths 1 Application(s) Topical daily  dexMEDEtomidine Infusion 0.1 MICROgram(s)/kG/Hr (1.91 mL/Hr) IV Continuous <Continuous>  dextrose 5%. 1000 milliLiter(s) (50 mL/Hr) IV Continuous <Continuous>  dextrose 5%. 1000 milliLiter(s) (100 mL/Hr) IV Continuous <Continuous>  dextrose 50% Injectable 25 Gram(s) IV Push once  dextrose 50% Injectable 12.5 Gram(s) IV Push once  dextrose 50% Injectable 25 Gram(s) IV Push once  enoxaparin Injectable 40 milliGRAM(s) SubCutaneous every 24 hours  folic acid 1 milliGRAM(s) Oral daily  glucagon  Injectable 1 milliGRAM(s) IntraMuscular once  insulin lispro (ADMELOG) corrective regimen sliding scale   SubCutaneous every 6 hours  levETIRAcetam  IVPB 1000 milliGRAM(s) IV Intermittent every 12 hours  multivitamin 1 Tablet(s) Oral daily  pantoprazole  Injectable 40 milliGRAM(s) IV Push daily  propofol Infusion 10 MICROgram(s)/kG/Min (4.2 mL/Hr) IV Continuous <Continuous>  thiamine IVPB 500 milliGRAM(s) IV Intermittent every 8 hours      RESPIRATORY:  Mode: AC/ CMV (Assist Control/ Continuous Mandatory Ventilation)  RR (machine): 16  TV (machine): 450  FiO2: 28  PEEP: 5  ITime: 1  MAP: 10  PIP: 26        IMAGING:   Recent imaging studies were reviewed.    LAB RESULTS:                          9.5    6.53  )-----------( 281      ( 14 Oct 2022 03:52 )             29.1           10-14    142  |  104  |  3.2<L>  ----------------------------<  122<H>  3.9   |  25.0  |  0.73    Ca    8.2<L>      14 Oct 2022 03:52  Phos  4.0     10-14  Mg     1.5     10-14    TPro  6.4<L>  /  Alb  3.7  /  TBili  0.5  /  DBili  x   /  AST  39  /  ALT  22  /  AlkPhos  108  10-13    lactate 2.2    ABG - ( 13 Oct 2022 14:03 )  pH, Arterial: 7.290 pH, Blood: x     /  pCO2: 41    /  pO2: 160   / HCO3: 20    / Base Excess: -6.9  /  SaO2: 99.3        -----------------------------------------------------------------------------------------------------------------------------------------------------------------------------------    PHYSICAL EXAM:  General: Calm, intubated  HEENT: MMM  Neuro:  -Mental status- No acute distress, FC through sedation  -CN- PERRL 3mm, EOMI, tongue midline, face symmetric  moving all ext    CV: RRR  Pulm: clear to auscultation  Abd: Soft, nontender, nondistended  Ext: no noted edema in lower ext  Skin: warm, dry

## 2022-10-14 NOTE — CONSULT NOTE ADULT - TIME BILLING
coordinating care with NeuroICU team including Dr. Rosario and ACPs,  ICU RN, counseling patient/brother.

## 2022-10-14 NOTE — PROGRESS NOTE ADULT - CRITICAL CARE ATTENDING COMMENT
I spent 40 minutes of critical care time examining patient, reviewing vitals, labs, medications, imaging and discussing with the team goals of care to prevent life-threatening in this patient who is at high risk for deterioration or death due to:  AMS

## 2022-10-15 DIAGNOSIS — E11.9 TYPE 2 DIABETES MELLITUS WITHOUT COMPLICATIONS: ICD-10-CM

## 2022-10-15 DIAGNOSIS — D64.9 ANEMIA, UNSPECIFIED: ICD-10-CM

## 2022-10-15 DIAGNOSIS — G40.909 EPILEPSY, UNSPECIFIED, NOT INTRACTABLE, WITHOUT STATUS EPILEPTICUS: ICD-10-CM

## 2022-10-15 DIAGNOSIS — F10.139 ALCOHOL ABUSE WITH WITHDRAWAL, UNSPECIFIED: ICD-10-CM

## 2022-10-15 LAB
ANION GAP SERPL CALC-SCNC: 10 MMOL/L — SIGNIFICANT CHANGE UP (ref 5–17)
BUN SERPL-MCNC: 5.7 MG/DL — LOW (ref 8–20)
CALCIUM SERPL-MCNC: 8.1 MG/DL — LOW (ref 8.4–10.5)
CHLORIDE SERPL-SCNC: 101 MMOL/L — SIGNIFICANT CHANGE UP (ref 98–107)
CO2 SERPL-SCNC: 27 MMOL/L — SIGNIFICANT CHANGE UP (ref 22–29)
CREAT SERPL-MCNC: 0.63 MG/DL — SIGNIFICANT CHANGE UP (ref 0.5–1.3)
EGFR: 129 ML/MIN/1.73M2 — SIGNIFICANT CHANGE UP
GLUCOSE BLDC GLUCOMTR-MCNC: 111 MG/DL — HIGH (ref 70–99)
GLUCOSE BLDC GLUCOMTR-MCNC: 122 MG/DL — HIGH (ref 70–99)
GLUCOSE BLDC GLUCOMTR-MCNC: 141 MG/DL — HIGH (ref 70–99)
GLUCOSE BLDC GLUCOMTR-MCNC: 179 MG/DL — HIGH (ref 70–99)
GLUCOSE BLDC GLUCOMTR-MCNC: 93 MG/DL — SIGNIFICANT CHANGE UP (ref 70–99)
GLUCOSE SERPL-MCNC: 104 MG/DL — HIGH (ref 70–99)
HCT VFR BLD CALC: 26.7 % — LOW (ref 39–50)
HGB BLD-MCNC: 8.7 G/DL — LOW (ref 13–17)
MAGNESIUM SERPL-MCNC: 1.8 MG/DL — SIGNIFICANT CHANGE UP (ref 1.6–2.6)
MCHC RBC-ENTMCNC: 28.7 PG — SIGNIFICANT CHANGE UP (ref 27–34)
MCHC RBC-ENTMCNC: 32.6 GM/DL — SIGNIFICANT CHANGE UP (ref 32–36)
MCV RBC AUTO: 88.1 FL — SIGNIFICANT CHANGE UP (ref 80–100)
MRSA PCR RESULT.: SIGNIFICANT CHANGE UP
PHOSPHATE SERPL-MCNC: 4.1 MG/DL — SIGNIFICANT CHANGE UP (ref 2.4–4.7)
PLATELET # BLD AUTO: 207 K/UL — SIGNIFICANT CHANGE UP (ref 150–400)
POTASSIUM SERPL-MCNC: 3.8 MMOL/L — SIGNIFICANT CHANGE UP (ref 3.5–5.3)
POTASSIUM SERPL-SCNC: 3.8 MMOL/L — SIGNIFICANT CHANGE UP (ref 3.5–5.3)
RBC # BLD: 3.03 M/UL — LOW (ref 4.2–5.8)
RBC # FLD: 17.1 % — HIGH (ref 10.3–14.5)
S AUREUS DNA NOSE QL NAA+PROBE: DETECTED
SODIUM SERPL-SCNC: 138 MMOL/L — SIGNIFICANT CHANGE UP (ref 135–145)
WBC # BLD: 4.38 K/UL — SIGNIFICANT CHANGE UP (ref 3.8–10.5)
WBC # FLD AUTO: 4.38 K/UL — SIGNIFICANT CHANGE UP (ref 3.8–10.5)

## 2022-10-15 PROCEDURE — 99291 CRITICAL CARE FIRST HOUR: CPT

## 2022-10-15 PROCEDURE — 99223 1ST HOSP IP/OBS HIGH 75: CPT

## 2022-10-15 RX ORDER — METOPROLOL TARTRATE 50 MG
25 TABLET ORAL DAILY
Refills: 0 | Status: DISCONTINUED | OUTPATIENT
Start: 2022-10-15 | End: 2022-10-18

## 2022-10-15 RX ADMIN — LEVETIRACETAM 400 MILLIGRAM(S): 250 TABLET, FILM COATED ORAL at 17:30

## 2022-10-15 RX ADMIN — Medication 105 MILLIGRAM(S): at 05:45

## 2022-10-15 RX ADMIN — Medication 65 MILLIGRAM(S): at 04:21

## 2022-10-15 RX ADMIN — Medication 1 MILLIGRAM(S): at 11:28

## 2022-10-15 RX ADMIN — Medication 1: at 23:42

## 2022-10-15 RX ADMIN — Medication 65 MILLIGRAM(S): at 05:44

## 2022-10-15 RX ADMIN — ENOXAPARIN SODIUM 40 MILLIGRAM(S): 100 INJECTION SUBCUTANEOUS at 05:44

## 2022-10-15 RX ADMIN — Medication 25 MILLIGRAM(S): at 19:14

## 2022-10-15 RX ADMIN — Medication 50 MILLIGRAM(S): at 23:42

## 2022-10-15 RX ADMIN — Medication 50 MILLIGRAM(S): at 17:27

## 2022-10-15 RX ADMIN — PANTOPRAZOLE SODIUM 40 MILLIGRAM(S): 20 TABLET, DELAYED RELEASE ORAL at 11:28

## 2022-10-15 RX ADMIN — Medication 1 TABLET(S): at 11:28

## 2022-10-15 RX ADMIN — Medication 105 MILLIGRAM(S): at 21:53

## 2022-10-15 RX ADMIN — Medication 65 MILLIGRAM(S): at 00:16

## 2022-10-15 RX ADMIN — CHLORHEXIDINE GLUCONATE 1 APPLICATION(S): 213 SOLUTION TOPICAL at 11:28

## 2022-10-15 RX ADMIN — LEVETIRACETAM 400 MILLIGRAM(S): 250 TABLET, FILM COATED ORAL at 05:44

## 2022-10-15 RX ADMIN — Medication 105 MILLIGRAM(S): at 00:16

## 2022-10-15 RX ADMIN — Medication 50 MILLIGRAM(S): at 13:19

## 2022-10-15 NOTE — PROGRESS NOTE ADULT - SUBJECTIVE AND OBJECTIVE BOX
HOSPITALIST PROGRESS NOTE    [facility]  [id number]  32[facility]    Patient is a 32y old  Male who presents with a chief complaint of Nonintractable epilepsy with status epilepticus     (15 Oct 2022 11:28)      SUBJECTIVE:   Chart reviewed since last visit.  Patient seen and examined at bedside.      OBJECTIVE:  Vital Signs Last 24 Hrs  T(C): 37.8 (15 Oct 2022 15:36), Max: 37.8 (15 Oct 2022 15:36)  T(F): 100.1 (15 Oct 2022 15:36), Max: 100.1 (15 Oct 2022 15:36)  HR: 84 (15 Oct 2022 15:35) (65 - 92)  BP: 155/91 (15 Oct 2022 15:35) (122/84 - 164/99)  BP(mean): 119 (15 Oct 2022 14:00) (96 - 119)  RR: 16 (15 Oct 2022 15:35) (6 - 17)  SpO2: 98% (15 Oct 2022 15:35) (97% - 100%)    Parameters below as of 15 Oct 2022 14:00  Patient On (Oxygen Delivery Method): room air        PHYSICAL EXAMINATION  General:   HEENT:    NECK:    CVS:   RESP:    GI:    :   MSK:    CNS:    INTEG:    PSYCH:      MONITOR:  CAPILLARY BLOOD GLUCOSE      POCT Blood Glucose.: 111 mg/dL (15 Oct 2022 15:27)  POCT Blood Glucose.: 141 mg/dL (15 Oct 2022 11:18)  POCT Blood Glucose.: 122 mg/dL (15 Oct 2022 06:52)  POCT Blood Glucose.: 93 mg/dL (15 Oct 2022 00:05)  POCT Blood Glucose.: 96 mg/dL (14 Oct 2022 17:45)        I&O's Summary    14 Oct 2022 07:01  -  15 Oct 2022 07:00  --------------------------------------------------------  IN: 2610.7 mL / OUT: 1295 mL / NET: 1315.7 mL    15 Oct 2022 07:01  -  15 Oct 2022 15:52  --------------------------------------------------------  IN: 490 mL / OUT: 1300 mL / NET: -810 mL                            8.7    4.38  )-----------( 207      ( 15 Oct 2022 03:00 )             26.7       10-15    138  |  101  |  5.7<L>  ----------------------------<  104<H>  3.8   |  27.0  |  0.63    Ca    8.1<L>      15 Oct 2022 03:00  Phos  4.1     10-15  Mg     1.8     10-15              Culture:  Culture - Urine (10.13.22 @ 15:00)   Specimen Source: Catheterized Catheterized   Culture Results:   No growth     TTE:  < from: TTE Echo Complete w/o Contrast w/ Doppler (10.13.22 @ 19:42) >    Summary:   1. Left ventricular ejection fraction, by visual estimation, is 60 to   65%.   2. Normal global leftventricular systolic function.   3. Mildly enlarged right ventricle.   4. Normal left atrial size.   5. Normal right atrial size.   6. Mild mitral annular calcification.   7. No evidence of mitral valve regurgitation.   8. There is no evidence of pericardial effusion.   9. Endocardial visualization was enhanced with intravenous echo contrast.  10. There are no prior studies on this patient for comparison purposes.    MD Shahid Electronically signed on 10/14/2022 at 9:09:21 AM      < end of copied text >      RADIOLOGY  < from: CT Head No Cont (10.13.22 @ 14:26) >    IMPRESSION: No evidence of acute hemorrhage mass or mass effect.    No acute fractures or dislocations seen involving the cervical spine.    --- End of Report ---    < end of copied text >    < from: CT Chest w/ IV Cont (10.13.22 @ 14:37) >    IMPRESSION:  Bibasilar atelectasis.    Circumferential thickening of the bladder wall, correlate with urinalysis.    Endotracheal tube and NG tube in place    Chronic appearing posterior right 12th rib fracture.    Gallstones.            --- End of Report ---    < end of copied text >      _____________________________________________________________  EEG IMPRESSION/CLINICAL CORRELATE    Abnormal EEG study.  1. Moderate to severe nonspecific diffuse or multifocal cerebral dysfunction.   2. Excess diffuse beta activity may be seen with medication use such as benzodiazepines or barbiturates.  3. No epileptiform pattern or seizure seen.    _____________________________________________________________    _____________________________________________________________  EEG IMPRESSION/CLINICAL CORRELATE    Abnormal EEG study.  1. Moderate to severe nonspecific diffuse or multifocal cerebral dysfunction.   2. Excess diffuse beta activity may be seen with medication use such as benzodiazepines or barbiturates.  3. No epileptiform pattern or seizure seen.    _____________________________________________________________      MEDICATIONS  (STANDING):  chlordiazePOXIDE 50 milliGRAM(s) Oral every 6 hours  chlorhexidine 2% Cloths 1 Application(s) Topical daily  dextrose 50% Injectable 25 Gram(s) IV Push once  dextrose 50% Injectable 12.5 Gram(s) IV Push once  dextrose 50% Injectable 25 Gram(s) IV Push once  enoxaparin Injectable 40 milliGRAM(s) SubCutaneous every 24 hours  folic acid 1 milliGRAM(s) Oral daily  influenza   Vaccine 0.5 milliLiter(s) IntraMuscular once  insulin lispro (ADMELOG) corrective regimen sliding scale   SubCutaneous every 6 hours  levETIRAcetam  IVPB 1000 milliGRAM(s) IV Intermittent every 12 hours  multiple electrolytes Injection Type 1 1000 milliLiter(s) (70 mL/Hr) IV Continuous <Continuous>  multivitamin 1 Tablet(s) Oral daily  pantoprazole  Injectable 40 milliGRAM(s) IV Push daily  thiamine IVPB 500 milliGRAM(s) IV Intermittent every 8 hours      MEDICATIONS  (PRN):  hydrALAZINE Injectable 10 milliGRAM(s) IV Push every 2 hours PRN SBP >160  labetalol Injectable 10 milliGRAM(s) IV Push every 2 hours PRN Systolic blood pressure > 160  PHENobarbital Injectable 65 milliGRAM(s) IV Push every 6 hours PRN alc withdrawal     HOSPITALIST CONSULT NOTE    [facility]  [id number]  32y[facility]    Patient is a 32y old  Male who presents with a chief complaint of Nonintractable epilepsy with status epilepticus     (15 Oct 2022 11:28)    32 year old male with PMH diabetes, seizure, AF and alcohol abuse was brought in by family after noted to be unresponsive. Patient then noted to have generalized clonic tonic seizure en route as well as in ER and required multiple medications and was intubated for airway protection. He was admitted to NSICU and then downgraded to MICU and now being downgraded from ICU. Extubated.    Patient somewhat confused. States his name is Mundo (Anthony Villalpando; Mother Brigid Villalpando at bedside)  [place] and when asked about date states [birth year]. When asked again states its october 2021. States he was in medically induced coma.  Denies any symptoms currently,    PMH - As above  PSH - Denies  NKDA  Home medications  - Metoprolol, Apixaban, Metformin, glyburide  FH - None for seizure  Soc Hx - Unemployed. Admits to last drink 2 days ago (4 beers). Denies any smoking or drug use)        OBJECTIVE:  Vital Signs Last 24 Hrs  T(C): 37.8 (15 Oct 2022 15:36), Max: 37.8 (15 Oct 2022 15:36)  T(F): 100.1 (15 Oct 2022 15:36), Max: 100.1 (15 Oct 2022 15:36)  HR: 84 (15 Oct 2022 15:35) (65 - 92)  BP: 155/91 (15 Oct 2022 15:35) (122/84 - 164/99)  BP(mean): 119 (15 Oct 2022 14:00) (96 - 119)  RR: 16 (15 Oct 2022 15:35) (6 - 17)  SpO2: 98% (15 Oct 2022 15:35) (97% - 100%)   Parameters below as of 15 Oct 2022 14:00  Patient On (Oxygen Delivery Method): room air        PHYSICAL EXAMINATION  General: Young male lying in bed, NAD  HEENT:  EOMI  NECK:  Supple  CVS: regular rate and rhythm S1 S2  RESP:  CTAB  GI:  Soft nondistended nontender BS+  : No suprapubic tenderness  MSK:  Moves all extremities  CNS:  Awake, but drowsy. Difficulty following simple commands  INTEG:  tattos  PSYCH:  Fair mood    MONITOR:  CAPILLARY BLOOD GLUCOSE      POCT Blood Glucose.: 111 mg/dL (15 Oct 2022 15:27)  POCT Blood Glucose.: 141 mg/dL (15 Oct 2022 11:18)  POCT Blood Glucose.: 122 mg/dL (15 Oct 2022 06:52)  POCT Blood Glucose.: 93 mg/dL (15 Oct 2022 00:05)  POCT Blood Glucose.: 96 mg/dL (14 Oct 2022 17:45)        I&O's Summary    14 Oct 2022 07:01  -  15 Oct 2022 07:00  --------------------------------------------------------  IN: 2610.7 mL / OUT: 1295 mL / NET: 1315.7 mL    15 Oct 2022 07:01  -  15 Oct 2022 15:52  --------------------------------------------------------  IN: 490 mL / OUT: 1300 mL / NET: -810 mL                            8.7    4.38  )-----------( 207      ( 15 Oct 2022 03:00 )             26.7       10-15    138  |  101  |  5.7<L>  ----------------------------<  104<H>  3.8   |  27.0  |  0.63    Ca    8.1<L>      15 Oct 2022 03:00  Phos  4.1     10-15  Mg     1.8     10-15              Culture:  Culture - Urine (10.13.22 @ 15:00)   Specimen Source: Catheterized Catheterized   Culture Results:   No growth     TTE:  < from: TTE Echo Complete w/o Contrast w/ Doppler (10.13.22 @ 19:42) >    Summary:   1. Left ventricular ejection fraction, by visual estimation, is 60 to   65%.   2. Normal global leftventricular systolic function.   3. Mildly enlarged right ventricle.   4. Normal left atrial size.   5. Normal right atrial size.   6. Mild mitral annular calcification.   7. No evidence of mitral valve regurgitation.   8. There is no evidence of pericardial effusion.   9. Endocardial visualization was enhanced with intravenous echo contrast.  10. There are no prior studies on this patient for comparison purposes.    MD Shahid Electronically signed on 10/14/2022 at 9:09:21 AM      < end of copied text >      RADIOLOGY  < from: CT Head No Cont (10.13.22 @ 14:26) >    IMPRESSION: No evidence of acute hemorrhage mass or mass effect.    No acute fractures or dislocations seen involving the cervical spine.    --- End of Report ---    < end of copied text >    < from: CT Chest w/ IV Cont (10.13.22 @ 14:37) >    IMPRESSION:  Bibasilar atelectasis.    Circumferential thickening of the bladder wall, correlate with urinalysis.    Endotracheal tube and NG tube in place    Chronic appearing posterior right 12th rib fracture.    Gallstones.            --- End of Report ---    < end of copied text >      _____________________________________________________________  EEG IMPRESSION/CLINICAL CORRELATE    Abnormal EEG study.  1. Moderate to severe nonspecific diffuse or multifocal cerebral dysfunction.   2. Excess diffuse beta activity may be seen with medication use such as benzodiazepines or barbiturates.  3. No epileptiform pattern or seizure seen.    _____________________________________________________________    _____________________________________________________________  EEG IMPRESSION/CLINICAL CORRELATE    Abnormal EEG study.  1. Moderate to severe nonspecific diffuse or multifocal cerebral dysfunction.   2. Excess diffuse beta activity may be seen with medication use such as benzodiazepines or barbiturates.  3. No epileptiform pattern or seizure seen.    _____________________________________________________________      MEDICATIONS  (STANDING):  chlordiazePOXIDE 50 milliGRAM(s) Oral every 6 hours  chlorhexidine 2% Cloths 1 Application(s) Topical daily  dextrose 50% Injectable 25 Gram(s) IV Push once  dextrose 50% Injectable 12.5 Gram(s) IV Push once  dextrose 50% Injectable 25 Gram(s) IV Push once  enoxaparin Injectable 40 milliGRAM(s) SubCutaneous every 24 hours  folic acid 1 milliGRAM(s) Oral daily  influenza   Vaccine 0.5 milliLiter(s) IntraMuscular once  insulin lispro (ADMELOG) corrective regimen sliding scale   SubCutaneous every 6 hours  levETIRAcetam  IVPB 1000 milliGRAM(s) IV Intermittent every 12 hours  multiple electrolytes Injection Type 1 1000 milliLiter(s) (70 mL/Hr) IV Continuous <Continuous>  multivitamin 1 Tablet(s) Oral daily  pantoprazole  Injectable 40 milliGRAM(s) IV Push daily  thiamine IVPB 500 milliGRAM(s) IV Intermittent every 8 hours      MEDICATIONS  (PRN):  hydrALAZINE Injectable 10 milliGRAM(s) IV Push every 2 hours PRN SBP >160  labetalol Injectable 10 milliGRAM(s) IV Push every 2 hours PRN Systolic blood pressure > 160  PHENobarbital Injectable 65 milliGRAM(s) IV Push every 6 hours PRN alc withdrawal

## 2022-10-15 NOTE — SWALLOW BEDSIDE ASSESSMENT ADULT - SLP GENERAL OBSERVATIONS
Pt received A&A in bed, 0x1-2 (place: "[place]," time: "November, 2022"), +confusion, however pleasant, calm, cooperative and following commands, on room air, Sp02 100%, clear voice, pain 0/10 pre/post eval

## 2022-10-15 NOTE — SWALLOW BEDSIDE ASSESSMENT ADULT - PHARYNGEAL PHASE
Within functional limits grossly functional Grossly functional; no overt s/s aspiration across 20 oz administered

## 2022-10-15 NOTE — DIETITIAN INITIAL EVALUATION ADULT - PERTINENT LABORATORY DATA
10-15    138  |  101  |  5.7<L>  ----------------------------<  104<H>  3.8   |  27.0  |  0.63    Ca    8.1<L>      15 Oct 2022 03:00  Phos  4.1     10-15  Mg     1.8     10-15    TPro  6.4<L>  /  Alb  3.7  /  TBili  0.5  /  DBili  x   /  AST  39  /  ALT  22  /  AlkPhos  108  10-13  POCT Blood Glucose.: 141 mg/dL (10-15-22 @ 11:18)  A1C with Estimated Average Glucose Result: 6.8 % (10-14-22 @ 03:52)    10-15 Na138 mmol/L Glu 104 mg/dL<H> K+ 3.8 mmol/L Cr  0.63 mg/dL BUN 5.7 mg/dL<L> Phos 4.1 mg/dL Alb n/a   PAB n/a

## 2022-10-15 NOTE — PROGRESS NOTE ADULT - NS PANP COMMENT GEN_ALL_CORE FT
32M with hx of seizure not on AED medication, IDDM, afib not on AC, ETOH abuse (per brother stopped drinking 3-4 days ago)  presented to ED after being found unresponsive by mother and subsequently developing seizures. Pt was intubated for airway protection and initially admitted to the NSICU. He was placed on VEEG which did not show evidence of seizures. He was extubated 10/14. He was started on phenobarbital for EtoH withdrawal now transitioned to librium taper as he is able to take PO. He remains confused but able to follow commands and non focal. He remains on high dose thiamine and CIWA protocol. He is stable for transfer to telemetry.

## 2022-10-15 NOTE — EEG REPORT - NS EEG TEXT BOX
[facility]   COMPREHENSIVE EPILEPSY CENTER   REPORT OF LONG-TERM VIDEO EEG     [facility]: [address], [place] [address], Ph#: [phone]  [facility]: [id number], [place] [address], Ph#: [phone]  [facility]: [address] [place] [address], Ph#: [phone]    Patient Name: [place] [facility]  Age and : 32y ([birth year])  MRN #: [id number]  Location: [place] [address]  Referring Physician: Candace Rosario    Start Time/Date: 08:00 on 10-14-22  End Time/Date: 04:00 on 10-15-22  Duration: 20h   _____________________________________________________________  STUDY INFORMATION    EEG Recording Technique:  The patient underwent continuous Video-EEG monitoring, using Telemetry System hardware on the XLTek Digital System. EEG and video data were stored on a computer hard drive with important events saved in digital archive files. The material was reviewed by a physician (electroencephalographer / epileptologist) on a daily basis. Emeterio and seizure detection algorithms were utilized and reviewed. An EEG Technician attended to the patient, and was available throughout daytime work hours.  The epilepsy center neurologist was available in person or on call 24-hours per day.    EEG Placement and Labeling of Electrodes:  The EEG was performed utilizing 20 channel referential EEG connections (coronal over temporal over parasagittal montage) using all standard 10-20 electrode placements with EKG, with additional electrodes placed in the inferior temporal region using the modified 10-10 montage electrode placements for elective admissions, or if deemed necessary. Recording was at a sampling rate of 256 samples per second per channel. Time synchronized digital video recording was done simultaneously with EEG recording. A low light infrared camera was used for low light recording.     _____________________________________________________________  HISTORY    Patient is a 32y old  Male who presents with a chief complaint of status epilepticus (14 Oct 2022 09:48)      PERTINENT MEDICATION:  levETIRAcetam  IVPB 1000 milliGRAM(s) IV Intermittent every 12 hours  propofol Infusion 10 MICROgram(s)/kG/Min (4.2 mL/Hr) IV Continuous <Continuous>    _____________________________________________________________  STUDY INTERPRETATION    Findings: The background was continuous and reactive. No posterior dominant rhythm seen.    Background Slowing:  Background predominantly consisted of theta, delta and faster activities.    Focal Slowing:   None were present.      Sleep Background:  Drowsiness was characterized by fragmentation, attenuation, and slowing of the background activity.    Stage II sleep transients were not recorded.    Other Non-Epileptiform Findings:  Excess diffuse beta activity.    Interictal Epileptiform Activity:   None were present.    Events:  No event or seizure recorded.    Activation Procedures:   Hyperventilation was not performed.    Photic stimulation was not performed.     Artifacts:  Intermittent myogenic and movement artifacts were noted.    ECG:  The heart rate on single channel ECG was predominantly between  BPM.    _____________________________________________________________  EEG SUMMARY/CLASSIFICATION    Abnormal EEG in a sedated patient.  - Excess diffuse beta activity.  - Moderate to severe generalized slowing.    _____________________________________________________________  EEG IMPRESSION/CLINICAL CORRELATE    Abnormal EEG study.  1. Moderate to severe nonspecific diffuse or multifocal cerebral dysfunction.   2. Excess diffuse beta activity may be seen with medication use such as benzodiazepines or barbiturates.  3. No epileptiform pattern or seizure seen.    _____________________________________________________________    Theodore Sammy MD  EEG/Epilepsy Attending

## 2022-10-15 NOTE — SWALLOW BEDSIDE ASSESSMENT ADULT - SWALLOW EVAL: DIAGNOSIS
Oral and pharyngeal stages of swallow grossly functional. Large sip size across all fluid densities with piecemeal deglutition, however +benefit with verbal cues for small ,single sips.  Pt with reduced cognition/confusion, and requires supervision with PO intake to ensure adherence to aspiration precautions and slow rate of intake

## 2022-10-15 NOTE — SWALLOW BEDSIDE ASSESSMENT ADULT - SLP PERTINENT HISTORY OF CURRENT PROBLEM
31 yo male with hx of etoh abuse, seizures, DM, afib not on AC, admitted to neuroICU service with status epilepticus, acute respiratory failure requiring intubation.  Treated for etoh withdrawal seizures, placed on cEEG.  Extubated 10/14.

## 2022-10-15 NOTE — PROGRESS NOTE ADULT - SUBJECTIVE AND OBJECTIVE BOX
Patient is a 32y old  Male who presents with a chief complaint of status epilepticus (14 Oct 2022 17:23)    BRIEF HOSPITAL COURSE:   33 yo Male with PMH of seizure not on AED medication, IDDM, afib not on AC, ETOH abuse (per brother stopped drinking 3-4 days ago)  presents to ED for seizure. Patient was found  unresponsive by mother. Patient experienced generalized tonic clonic movements continuously for 20 mins en route to hospital, no medications given that time. In ED, patient given 5 of versed-continued having refractory seizures,was intubation and placed on propofol drip. Currently patient is sedated, unresponsive to Verbal and deep noxious stimuli, upgraded to NISCU for continous vEEG monitoring. Treated for etoh withdrawal seizures, placed on cEEG.  Extubated 10/14. MICU to assume care.    Events last 24 hours:   -Intermittent confusion, follows commands.  -EEG negative for seizures overnight.   -Satting well on NC. Afebrile, hemodynamics stable.    PAST MEDICAL & SURGICAL HISTORY:  As above.     Review of Systems:  Due to current mentation, subjective information was not able to be obtained from the patient. History was obtained, to the extent possible, from review of the chart and collateral sources of information.     Medications:  hydrALAZINE Injectable 10 milliGRAM(s) IV Push every 2 hours PRN  labetalol Injectable 10 milliGRAM(s) IV Push every 2 hours PRN  levETIRAcetam  IVPB 1000 milliGRAM(s) IV Intermittent every 12 hours  PHENobarbital Injectable 65 milliGRAM(s) IV Push every 6 hours  PHENobarbital Injectable 65 milliGRAM(s) IV Push every 6 hours PRN  enoxaparin Injectable 40 milliGRAM(s) SubCutaneous every 24 hours  pantoprazole  Injectable 40 milliGRAM(s) IV Push daily  dextrose 50% Injectable 25 Gram(s) IV Push once  dextrose 50% Injectable 12.5 Gram(s) IV Push once  dextrose 50% Injectable 25 Gram(s) IV Push once  insulin lispro (ADMELOG) corrective regimen sliding scale   SubCutaneous every 6 hours  folic acid 1 milliGRAM(s) Oral daily  multiple electrolytes Injection Type 1 1000 milliLiter(s) IV Continuous <Continuous>  multivitamin 1 Tablet(s) Oral daily  thiamine IVPB 500 milliGRAM(s) IV Intermittent every 8 hours  influenza   Vaccine 0.5 milliLiter(s) IntraMuscular once  chlorhexidine 2% Cloths 1 Application(s) Topical daily    Mode: AC/ CMV (Assist Control/ Continuous Mandatory Ventilation)  RR (machine): 16  TV (machine): 450  FiO2: 21  PEEP: 5  MAP: 8  PIP: 18    ICU Vital Signs Last 24 Hrs  T(C): 37.6 (15 Oct 2022 07:15), Max: 37.6 (15 Oct 2022 07:15)  T(F): 99.7 (15 Oct 2022 07:15), Max: 99.7 (15 Oct 2022 07:15)  HR: 73 (15 Oct 2022 08:00) (64 - 92)  BP: 149/93 (15 Oct 2022 08:00) (98/59 - 164/99)  BP(mean): 109 (15 Oct 2022 08:00) (70 - 116)  ABP: --  ABP(mean): --  RR: 13 (15 Oct 2022 08:00) (6 - 20)  SpO2: 100% (15 Oct 2022 08:00) (97% - 100%)  O2 Parameters below as of 14 Oct 2022 19:30  Patient On (Oxygen Delivery Method): nasal cannula  O2 Flow (L/min): 5    ABG - ( 13 Oct 2022 14:03 )  pH, Arterial: 7.290 pH, Blood: x     /  pCO2: 41    /  pO2: 160   / HCO3: 20    / Base Excess: -6.9  /  SaO2: 99.3      I&O's Detail  14 Oct 2022 07:01  -  15 Oct 2022 07:00  --------------------------------------------------------  IN:    Dexmedetomidine: 61.4 mL    Enteral Tube Flush: 80 mL    IV PiggyBack: 200 mL    IV PiggyBack: 100 mL    multiple electrolytes Injection Type 1.: 1050 mL    Propofol: 119.3 mL    Sodium Chloride 0.9% Bolus: 1000 mL  Total IN: 2610.7 mL  OUT:    Voided (mL): 1295 mL  Total OUT: 1295 mL  Total NET: 1315.7 mL    15 Oct 2022 07:01  -  15 Oct 2022 09:36  --------------------------------------------------------  IN:  Total IN: 0 mL  OUT:    Voided (mL): 300 mL  Total OUT: 300 mL  Total NET: -300 mL    LABS:                      8.7    4.38  )-----------( 207      ( 15 Oct 2022 03:00 )             26.7     10-15  138  |  101  |  5.7<L>  ----------------------------<  104<H>  3.8   |  27.0  |  0.63  Ca    8.1<L>      15 Oct 2022 03:00  Phos  4.1     10-15  Mg     1.8     10-15  TPro  6.4<L>  /  Alb  3.7  /  TBili  0.5  /  DBili  x   /  AST  39  /  ALT  22  /  AlkPhos  108  10-13    CAPILLARY BLOOD GLUCOSE  POCT Blood Glucose.: 122 mg/dL (15 Oct 2022 06:52)    Urinalysis Basic - ( 13 Oct 2022 15:00 )  Color: Yellow / Appearance: Clear / S.010 / pH: x  Gluc: x / Ketone: Negative  / Bili: Negative / Urobili: Negative mg/dL   Blood: x / Protein: Negative / Nitrite: Negative   Leuk Esterase: Negative / RBC: x / WBC x   Sq Epi: x / Non Sq Epi: x / Bacteria: x    CULTURES:  Culture Results:   No growth (10-13-22 @ 15:00)    Physical Examination:  General: Adult male.  HEENT: PERRL.  PULM: Clear to auscultation bilaterally.  CVS: s1/s2.  ABD: Soft, nondistended, nontender, normoactive bowel sounds.  EXT: No edema, nontender  SKIN: Warm.  NEURO: Slight b/l tremors.    RADIOLOGY:   < from: Xray Chest 1 View- PORTABLE-Urgent (Xray Chest 1 View- PORTABLE-Urgent .) (10.13.22 @ 17:06) >  ACC: [id number] EXAM:  XR CHEST PORTABLE URGENT 1V                        PROCEDURE DATE:  10/13/2022    Impression:  No acute pulmonary disease.  ET tube mid trachea  Tip of NG tube distal stomach. Distended stomach.        33 yo Male with PMH of seizure not on AED medication, IDDM, afib not on AC, ETOH abuse (per brother stopped drinking 3-4 days ago)  presents to ED for seizure. Patient was found  unresponsive by mother. Patient experienced generalized tonic clonic movements continuously for 20 mins en route to hospital, no medications given that time. In ED, patient given 5 of versed-continued having refractory seizures,was intubation and placed on propofol drip. Currently patient is sedated, unresponsive to Verbal and deep noxious stimuli, upgraded to NISCU for continous vEEG monitoring. Treated for etoh withdrawal seizures, placed on cEEG.  Extubated 10/14. MICU to assume care.  Assessment:  1. EtOh Withdrawal w/ Seizures  2. Acute Hypoxic Respiratory Failure    Plan:  NEURO:  -CT head negative. EEG negative for seizures.  -Standing Phenobarb IVP for CIWA protocol w/ PRN IVP. High-dose Thiamine, MV, FA.   -Keppra IVPB BID.    CV:  -Maintain goal MAP >65.  -Keep K~4 and Mg>2 for optimal arrhythmia suppression.  -Labetalol / Hydralazine IVP for HTN.     RESP:  -No acute issues, satting well on NC.     RENAL:  -Renal function currently WNL.  -trend lytes/Scr daily with BMP  -I's and O's, goal UOP 0.5 cc/kg/hr  -renal dose meds and avoid nephrotoxins     GI:  -NPO.    ENDO:  -Aggressive glycemic control to limit FS glucose to <180mg/dl. ISS.    ID:  -Afebrile, no leukocytosis. Monitor off abx.     HEME:  -DVT ppx w/ Lovenox.    DISPO: D/w ICU intensivist Dr. Neely - stable for downgrade out of MICU.    TIME SPENT: 35 minutes  Evaluating/treating patient, reviewing data/labs/imaging, discussing case with multidisciplinary team, discussing plan/goals of care with patient/family. Non-inclusive of procedure time.

## 2022-10-15 NOTE — DIETITIAN INITIAL EVALUATION ADULT - OTHER INFO
33 yo male with hx of etoh abuse, seizures, DM, afib not on AC, admitted to neuroICU service with status epilepticus, acute respiratory failure requiring intubation.  Treated for etoh withdrawal seizures, placed on cEEG.  Extubated 10/14.

## 2022-10-15 NOTE — DIETITIAN INITIAL EVALUATION ADULT - ORAL INTAKE PTA/DIET HISTORY
Nutrition assessment completed. Patient now extubated. Remains NPO. Per dysphagia screening by SLP this AM, patient able to tolerate Regular Consistency, Thin Liquids Diet. Strict aspiration precautions. IV fluids, MVI, folic acid, thiamin in progress. Likely not meeting nutrition needs prior to admission 2/2 social and ETOH history. RD to remain available, recommendations to follow as clinical course progresses.      Nutrition assessment completed. Patient now extubated. Remains NPO. Per dysphagia screening by SLP this AM, patient able to tolerate Regular Consistency, Thin Liquids Diet. Strict aspiration precautions. IV fluids, MVI, folic acid, thiamin in progress. Likely not meeting nutrition needs prior to admission 2/2 social and ETOH history. RD to remain available, recommendations to follow as clinical course progresses. At risk for malnutrition, however does not yet meet criteria.

## 2022-10-15 NOTE — SWALLOW BEDSIDE ASSESSMENT ADULT - ADDITIONAL RECOMMENDATIONS
If any overt s/s aspiration or pt with decline in mental status precluding safe PO intake, please d/c PO diet and resume NPO status pending reevaluation.

## 2022-10-15 NOTE — DIETITIAN INITIAL EVALUATION ADULT - ADD RECOMMEND
RECOMMENDATIONS:  1) Pending diet change - SLP recommendations for Regular, Thin Liquids  2) Consider Ensure Enlive BID (700kcal,40gpro)   3) RX: Continue supplementations as ordered  4) Monitor nutrition related labs

## 2022-10-15 NOTE — PROGRESS NOTE ADULT - ASSESSMENT
32 year old male with PMH diabetes, seizure, AF and alcohol abuse was brought in by family after noted to be unresponsive. Patient then noted to have generalized clonic tonic seizure en route as well as in ER and required multiple medications and was intubated for airway protection. He was admitted to NSICU and then downgraded to MICU and now being downgraded from ICU after extubation    Seizure, likely alcohol withdrawal  EEG without any epilepsy.  CT head without any relevant findings  - monitor  - Seizure precaution  - Levetiracetam  - Neurology consulted    Alcohol abuse, Withdrawal  - CIWA monitoring  - Chlordiazepoxide  - Folic acid, MVI, Thiamine for possible deficiency\    Anemia - likely due to alcohol  - Check iron, B12  - monitor hgb    T2DM  - BGM with SSI    hx AF  - monitor for now  - Metoprolol  - Defer Apixaban     VTE Prophylaxis   - LMWH

## 2022-10-15 NOTE — DIETITIAN INITIAL EVALUATION ADULT - PERTINENT MEDS FT
MEDICATIONS  (STANDING):  chlordiazePOXIDE 50 milliGRAM(s) Oral every 6 hours  chlorhexidine 2% Cloths 1 Application(s) Topical daily  dextrose 50% Injectable 25 Gram(s) IV Push once  dextrose 50% Injectable 12.5 Gram(s) IV Push once  dextrose 50% Injectable 25 Gram(s) IV Push once  enoxaparin Injectable 40 milliGRAM(s) SubCutaneous every 24 hours  folic acid 1 milliGRAM(s) Oral daily  influenza   Vaccine 0.5 milliLiter(s) IntraMuscular once  insulin lispro (ADMELOG) corrective regimen sliding scale   SubCutaneous every 6 hours  levETIRAcetam  IVPB 1000 milliGRAM(s) IV Intermittent every 12 hours  multiple electrolytes Injection Type 1 1000 milliLiter(s) (70 mL/Hr) IV Continuous <Continuous>  multivitamin 1 Tablet(s) Oral daily  pantoprazole  Injectable 40 milliGRAM(s) IV Push daily  thiamine IVPB 500 milliGRAM(s) IV Intermittent every 8 hours    MEDICATIONS  (PRN):  hydrALAZINE Injectable 10 milliGRAM(s) IV Push every 2 hours PRN SBP >160  labetalol Injectable 10 milliGRAM(s) IV Push every 2 hours PRN Systolic blood pressure > 160  PHENobarbital Injectable 65 milliGRAM(s) IV Push every 6 hours PRN alc withdrawal

## 2022-10-15 NOTE — DIETITIAN INITIAL EVALUATION ADULT - OBTAIN WEEKLY WEIGHT
yes Mart-1 - Positive Histology Text: MART-1 staining demonstrates areas of higher density and clustering of melanocytes with Pagetoid spread upwards within the epidermis. The surgical margins are positive for tumor cells.

## 2022-10-15 NOTE — SWALLOW BEDSIDE ASSESSMENT ADULT - ORAL PREPARATORY PHASE
Taking large sip size with piecemeal deglutition of bolus. Pt with good response to cues for smaller sip size Within functional limits Taking large sip size with piecemeal deglutition. Pt with good response to cues for smaller sip size

## 2022-10-15 NOTE — SWALLOW BEDSIDE ASSESSMENT ADULT - ORAL PHASE
Within functional limits Variable verbalization with mastication, however good response to verbal cues/Within functional limits

## 2022-10-16 LAB
ALBUMIN SERPL ELPH-MCNC: 3.2 G/DL — LOW (ref 3.3–5.2)
ALP SERPL-CCNC: 98 U/L — SIGNIFICANT CHANGE UP (ref 40–120)
ALT FLD-CCNC: 14 U/L — SIGNIFICANT CHANGE UP
ANION GAP SERPL CALC-SCNC: 10 MMOL/L — SIGNIFICANT CHANGE UP (ref 5–17)
AST SERPL-CCNC: 27 U/L — SIGNIFICANT CHANGE UP
BILIRUB SERPL-MCNC: 0.7 MG/DL — SIGNIFICANT CHANGE UP (ref 0.4–2)
BUN SERPL-MCNC: <3 MG/DL — LOW (ref 8–20)
CALCIUM SERPL-MCNC: 8.6 MG/DL — SIGNIFICANT CHANGE UP (ref 8.4–10.5)
CHLORIDE SERPL-SCNC: 103 MMOL/L — SIGNIFICANT CHANGE UP (ref 98–107)
CO2 SERPL-SCNC: 26 MMOL/L — SIGNIFICANT CHANGE UP (ref 22–29)
CREAT SERPL-MCNC: 0.54 MG/DL — SIGNIFICANT CHANGE UP (ref 0.5–1.3)
EGFR: 135 ML/MIN/1.73M2 — SIGNIFICANT CHANGE UP
FERRITIN SERPL-MCNC: 133 NG/ML — SIGNIFICANT CHANGE UP (ref 30–400)
GLUCOSE BLDC GLUCOMTR-MCNC: 156 MG/DL — HIGH (ref 70–99)
GLUCOSE BLDC GLUCOMTR-MCNC: 159 MG/DL — HIGH (ref 70–99)
GLUCOSE BLDC GLUCOMTR-MCNC: 167 MG/DL — HIGH (ref 70–99)
GLUCOSE BLDC GLUCOMTR-MCNC: 288 MG/DL — HIGH (ref 70–99)
GLUCOSE SERPL-MCNC: 134 MG/DL — HIGH (ref 70–99)
HCT VFR BLD CALC: 29.9 % — LOW (ref 39–50)
HGB BLD-MCNC: 9.7 G/DL — LOW (ref 13–17)
IRON SATN MFR SERPL: 14 % — LOW (ref 16–55)
IRON SATN MFR SERPL: 52 UG/DL — LOW (ref 59–158)
MAGNESIUM SERPL-MCNC: 1.9 MG/DL — SIGNIFICANT CHANGE UP (ref 1.6–2.6)
MCHC RBC-ENTMCNC: 29.2 PG — SIGNIFICANT CHANGE UP (ref 27–34)
MCHC RBC-ENTMCNC: 32.4 GM/DL — SIGNIFICANT CHANGE UP (ref 32–36)
MCV RBC AUTO: 90.1 FL — SIGNIFICANT CHANGE UP (ref 80–100)
PHOSPHATE SERPL-MCNC: 3.9 MG/DL — SIGNIFICANT CHANGE UP (ref 2.4–4.7)
PLATELET # BLD AUTO: 200 K/UL — SIGNIFICANT CHANGE UP (ref 150–400)
POTASSIUM SERPL-MCNC: 3.5 MMOL/L — SIGNIFICANT CHANGE UP (ref 3.5–5.3)
POTASSIUM SERPL-SCNC: 3.5 MMOL/L — SIGNIFICANT CHANGE UP (ref 3.5–5.3)
PROT SERPL-MCNC: 6.1 G/DL — LOW (ref 6.6–8.7)
RBC # BLD: 3.32 M/UL — LOW (ref 4.2–5.8)
RBC # BLD: 3.32 M/UL — LOW (ref 4.2–5.8)
RBC # FLD: 16.9 % — HIGH (ref 10.3–14.5)
RETICS #: 58.4 K/UL — SIGNIFICANT CHANGE UP (ref 25–125)
RETICS/RBC NFR: 1.8 % — SIGNIFICANT CHANGE UP (ref 0.5–2.5)
SODIUM SERPL-SCNC: 139 MMOL/L — SIGNIFICANT CHANGE UP (ref 135–145)
TIBC SERPL-MCNC: 363 UG/DL — SIGNIFICANT CHANGE UP (ref 220–430)
TRANSFERRIN SERPL-MCNC: 254 MG/DL — SIGNIFICANT CHANGE UP (ref 180–329)
VIT B12 SERPL-MCNC: 573 PG/ML — SIGNIFICANT CHANGE UP (ref 232–1245)
WBC # BLD: 3.32 K/UL — LOW (ref 3.8–10.5)
WBC # FLD AUTO: 3.32 K/UL — LOW (ref 3.8–10.5)

## 2022-10-16 PROCEDURE — 99223 1ST HOSP IP/OBS HIGH 75: CPT

## 2022-10-16 PROCEDURE — 70450 CT HEAD/BRAIN W/O DYE: CPT | Mod: 26

## 2022-10-16 PROCEDURE — 99233 SBSQ HOSP IP/OBS HIGH 50: CPT

## 2022-10-16 RX ORDER — FERROUS SULFATE 325(65) MG
325 TABLET ORAL DAILY
Refills: 0 | Status: DISCONTINUED | OUTPATIENT
Start: 2022-10-16 | End: 2022-10-18

## 2022-10-16 RX ADMIN — SODIUM CHLORIDE 70 MILLILITER(S): 9 INJECTION, SOLUTION INTRAVENOUS at 11:45

## 2022-10-16 RX ADMIN — LEVETIRACETAM 400 MILLIGRAM(S): 250 TABLET, FILM COATED ORAL at 06:02

## 2022-10-16 RX ADMIN — CHLORHEXIDINE GLUCONATE 1 APPLICATION(S): 213 SOLUTION TOPICAL at 12:48

## 2022-10-16 RX ADMIN — Medication 105 MILLIGRAM(S): at 06:55

## 2022-10-16 RX ADMIN — Medication 325 MILLIGRAM(S): at 12:48

## 2022-10-16 RX ADMIN — Medication 50 MILLIGRAM(S): at 06:02

## 2022-10-16 RX ADMIN — Medication 3: at 12:44

## 2022-10-16 RX ADMIN — ENOXAPARIN SODIUM 40 MILLIGRAM(S): 100 INJECTION SUBCUTANEOUS at 06:02

## 2022-10-16 RX ADMIN — Medication 105 MILLIGRAM(S): at 13:48

## 2022-10-16 RX ADMIN — Medication 50 MILLIGRAM(S): at 12:48

## 2022-10-16 RX ADMIN — Medication 25 MILLIGRAM(S): at 06:02

## 2022-10-16 RX ADMIN — LEVETIRACETAM 400 MILLIGRAM(S): 250 TABLET, FILM COATED ORAL at 17:35

## 2022-10-16 RX ADMIN — Medication 1: at 08:21

## 2022-10-16 RX ADMIN — Medication 1 TABLET(S): at 12:43

## 2022-10-16 RX ADMIN — Medication 1: at 17:35

## 2022-10-16 RX ADMIN — Medication 1 MILLIGRAM(S): at 12:43

## 2022-10-16 RX ADMIN — SODIUM CHLORIDE 70 MILLILITER(S): 9 INJECTION, SOLUTION INTRAVENOUS at 22:36

## 2022-10-16 RX ADMIN — Medication 50 MILLIGRAM(S): at 22:36

## 2022-10-16 RX ADMIN — PANTOPRAZOLE SODIUM 40 MILLIGRAM(S): 20 TABLET, DELAYED RELEASE ORAL at 12:45

## 2022-10-16 NOTE — CONSULT NOTE ADULT - SUBJECTIVE AND OBJECTIVE BOX
[facility]                                        [facility] [place]                                  [facility]arm[facility], [address]                                      [address]                                           [place], [place], [address]                                                [phone]      Real name: Lamont Villalpando.    CC: seizure     HISTORY:  The patient is a 32y Male with a history of alcohol abuse who presented with seizure. He was intubated and admitted to ICU. He has been on Keppra since arrival.   He was downgraded to medical service on 10/15/22.    PAST MEDICAL & SURGICAL HISTORY:  Diabetes Mellitus   atrial fibrillation (not on anticoagulation)  Reported prior history of seizure.     MEDICATIONS  (STANDING):  chlordiazePOXIDE   Oral   chlordiazePOXIDE 50 milliGRAM(s) Oral every 6 hours  chlordiazePOXIDE 50 milliGRAM(s) Oral every 8 hours  chlorhexidine 2% Cloths 1 Application(s) Topical daily  enoxaparin Injectable 40 milliGRAM(s) SubCutaneous every 24 hours  ferrous    sulfate 325 milliGRAM(s) Oral daily  folic acid 1 milliGRAM(s) Oral daily  influenza   Vaccine 0.5 milliLiter(s) IntraMuscular once  insulin lispro (ADMELOG) corrective regimen sliding scale   SubCutaneous every 6 hours  levETIRAcetam  IVPB 1000 milliGRAM(s) IV Intermittent every 12 hours  metoprolol succinate ER 25 milliGRAM(s) Oral daily  multiple electrolytes Injection Type 1 1000 milliLiter(s) (70 mL/Hr) IV Continuous <Continuous>  multivitamin 1 Tablet(s) Oral daily  pantoprazole  Injectable 40 milliGRAM(s) IV Push daily  thiamine IVPB 500 milliGRAM(s) IV Intermittent every 8 hours    MEDICATIONS  (PRN):  chlordiazePOXIDE 50 milliGRAM(s) Oral every 1 hour PRN Symptom-triggered: each CIWA -Ar score 8 or GREATER  hydrALAZINE Injectable 10 milliGRAM(s) IV Push every 2 hours PRN SBP >160  labetalol Injectable 10 milliGRAM(s) IV Push every 2 hours PRN Systolic blood pressure > 160      Allergies  Allergy Status Unknown    SOCIAL HISTORY:  Current smoker.   Alcohol abuse.    FAMILY HISTORY:  No known family history of stroke.     ROS:  Constitutional: The patient denies fevers or weight changes.  Neuro: As per HPI.  Eyes: Denies blurry vision.  Ears/nose/throat: Denies Tinnitus.   Cardiac: Denies chest pain. Denies palpitations.  Respiratory: Denies shortness of breath.  GI: Denies abdominal pain, nausea, or vomiting.  : Denies change in urinary pattern.  Integumentary: Denies rash.  Psych: Denies recent mood changes.  Heme: denies easy bleeding/bruising.    Exam:  Vital Signs Last 24 Hrs  T(C): 36.5 (16 Oct 2022 04:58), Max: 37.8 (15 Oct 2022 15:36)  T(F): 97.7 (16 Oct 2022 04:58), Max: 100.1 (15 Oct 2022 15:36)  HR: 55 (16 Oct 2022 04:58) (55 - 89)  BP: 127/82 (16 Oct 2022 04:58) (120/84 - 161/101)  BP(mean): 119 (15 Oct 2022 14:00) (109 - 119)  RR: 18 (16 Oct 2022 04:58) (9 - 18)  SpO2: 98% (16 Oct 2022 04:58) (94% - 100%)    Parameters below as of 16 Oct 2022 04:58  Patient On (Oxygen Delivery Method): room air    General: NAD.   Carotid bruits absent.     Mental status: The patient is awake, alert, and fully oriented. There is no aphasia.    Cranial nerves: There is no papilledema. Pupils react symmetrically to light. There is no visual field deficit to confrontation. Extraocular motion is full with no nystagmus.  Facial sensation is intact. Facial musculature is symmetric. Palate elevates symmetrically. Tongue is midline.    Motor: There is normal bulk and tone.  Strength is 5/5 in the right arm and leg.   Strength is 5/5 in the left arm and leg.    Sensation: Intact to light touch and pin. There is no extinction to double simultaneous stimulation.    Reflexes: 2+ throughout and plantar responses are flexor.    Cerebellar: There is no dysmetria on finger to nose testing.    LABS:                         9.7    3.32  )-----------( 200      ( 16 Oct 2022 05:20 )             29.9       10-16    139  |  103  |  <3.0<L>  ----------------------------<  134<H>  3.5   |  26.0  |  0.54    Ca    8.6      16 Oct 2022 05:20  Phos  3.9     10-16  Mg     1.9     10-16    TPro  6.1<L>  /  Alb  3.2<L>  /  TBili  0.7  /  DBili  x   /  AST  27  /  ALT  14  /  AlkPhos  98  10-16    RADIOLOGY   CT head images reviewed (and concur with report): There is no acute pathology. There is atrophy (likely related to chronic alcohol abuse).     EEG shows generalized slowing but no epileptiform activity.    
PULMONARY/CRITICAL CARE CONSULTATION    REASON FOR CONSULTATION/INTERVAL HPI:  33 yo male with hx of etoh abuse, seizures, DM, afib not on AC, admitted to neuroICU service with status epilepticus, acute respiratory failure requiring intubation.  Treated for etoh withdrawal seizures, placed on cEEG.  Extubated 10/14.  MICU asked to assume care    EXAM:  patient examined as he was being extubated  now on nasal cannula, asking what happened  NAD, slightly tremulous   trace edema     RADIOLOGY: cxr - no infiltrate or effusion    On Admission   HPI:  HPI:  33 yo Male with PMH of seizure not on AED medication, IDDM, afib not on AC, ETOH abuse (per brother stopped drinking 3-4 days ago)  presents to ED for seizure. Patient was found  unresponsive by mother. Patient experienced generalized tonic clonic movements continuously for 20 mins en route to hospital, no medications given that time. In ED, patient given 5 of versed-continued having refractory seizures,was intubation and placed on propofol drip. Currently patient is sedated, unresponsive to Verbal and deep noxious stimuli, upgraded to NISCU for continous vEEG monitoring.     Per family, patient had one episode of status epileptics 4 months ago s/p alcohol withdrawal for 3-5 days.     In ED, patient experienced another episode of refractory seizures. patient was given additional 15 mg of versed, propofol drip @ 60.       Vital Signs Last 24 Hrs  T(C): 36.1 (13 Oct 2022 15:30), Max: 36.2 (13 Oct 2022 14:55)  T(F): 97 (13 Oct 2022 15:30), Max: 97.2 (13 Oct 2022 14:55)  HR: 97 (13 Oct 2022 15:30) (96 - 128)  BP: 149/93 (13 Oct 2022 15:30) (146/88 - 164/105)  BP(mean): --  RR: 16 (13 Oct 2022 15:30) (16 - 28)  SpO2: 100% (13 Oct 2022 15:30) (99% - 100%)    Parameters below as of 13 Oct 2022 15:30  Patient On (Oxygen Delivery Method): ventilator        LABS:                        10.3   2.24  )-----------( 342      ( 13 Oct 2022 14:00 )             32.6     10-13    144  |  108<H>  |  4.2<L>  ----------------------------<  79  4.1   |  22.0  |  0.69    Ca    8.1<L>      13 Oct 2022 14:00    TPro  6.4<L>  /  Alb  3.7  /  TBili  0.5  /  DBili  x   /  AST  39  /  ALT  22  /  AlkPhos  108  10-13        RADIOLOGY & ADDITIONAL TESTS:  < from: CT Head No Cont (10.13.22 @ 14:26) >  IMPRESSION: No evidence of acute hemorrhage mass or mass effect.    No acute fractures or dislocations seen involving the cervical spine.    --- End of Report ---      < end of copied text >     (13 Oct 2022 15:39)      PAST MEDICAL & SURGICAL HISTORY:    Allergies    Allergy Status Unknown    Intolerances      SOCIAL HISTORY/FAMILY HISTORY:   Social History:    FAMILY HISTORY:    Medications:  chlorhexidine 2% Cloths 1 Application(s) Topical daily  dexMEDEtomidine Infusion 0.1 MICROgram(s)/kG/Hr IV Continuous <Continuous>  dextrose 5%. 1000 milliLiter(s) IV Continuous <Continuous>  dextrose 5%. 1000 milliLiter(s) IV Continuous <Continuous>  dextrose 50% Injectable 25 Gram(s) IV Push once  dextrose 50% Injectable 12.5 Gram(s) IV Push once  dextrose 50% Injectable 25 Gram(s) IV Push once  dextrose Oral Gel 15 Gram(s) Oral once PRN  enoxaparin Injectable 40 milliGRAM(s) SubCutaneous every 24 hours  folic acid 1 milliGRAM(s) Oral daily  glucagon  Injectable 1 milliGRAM(s) IntraMuscular once  hydrALAZINE Injectable 10 milliGRAM(s) IV Push every 2 hours PRN  insulin lispro (ADMELOG) corrective regimen sliding scale   SubCutaneous every 6 hours  labetalol Injectable 10 milliGRAM(s) IV Push every 2 hours PRN  levETIRAcetam  IVPB 1000 milliGRAM(s) IV Intermittent every 12 hours  multiple electrolytes Injection Type 1 1000 milliLiter(s) IV Continuous <Continuous>  multivitamin 1 Tablet(s) Oral daily  pantoprazole  Injectable 40 milliGRAM(s) IV Push daily  PHENobarbital Injectable 65 milliGRAM(s) IV Push every 6 hours  PHENobarbital Injectable 65 milliGRAM(s) IV Push every 6 hours PRN  thiamine IVPB 500 milliGRAM(s) IV Intermittent every 8 hours      12 point ROS performed, pertinent positives and negatives mentioned above, all other ROS negative.     T(F): 98.6 (10-14-22 @ 12:00), Max: 100.8 (10-13-22 @ 22:30)  HR: 65 (10-14-22 @ 16:00)  BP: 125/83 (10-14-22 @ 16:00)  BP(mean): 96 (10-14-22 @ 16:00)  ABP: --  RR: 16 (10-14-22 @ 16:00)  SpO2: 99% (10-14-22 @ 16:00)    ABG - ( 13 Oct 2022 14:03 )  pH, Arterial: 7.290 pH, Blood: x     /  pCO2: 41    /  pO2: 160   / HCO3: 20    / Base Excess: -6.9  /  SaO2: 99.3                LABS:                        9.5    6.53  )-----------( 281      ( 14 Oct 2022 03:52 )             29.1     10    142  |  104  |  3.2<L>  ----------------------------<  122<H>  3.9   |  25.0  |  0.73    Ca    8.2<L>      14 Oct 2022 03:52  Phos  4.0     10-  Mg     1.5     10-14    TPro  6.4<L>  /  Alb  3.7  /  TBili  0.5  /  DBili  x   /  AST  39  /  ALT  22  /  AlkPhos  108  10-13          CAPILLARY BLOOD GLUCOSE      POCT Blood Glucose.: 153 mg/dL (14 Oct 2022 10:43)      Urinalysis Basic - ( 13 Oct 2022 15:00 )    Color: Yellow / Appearance: Clear / S.010 / pH: x  Gluc: x / Ketone: Negative  / Bili: Negative / Urobili: Negative mg/dL   Blood: x / Protein: Negative / Nitrite: Negative   Leuk Esterase: Negative / RBC: x / WBC x   Sq Epi: x / Non Sq Epi: x / Bacteria: x      CULTURES:

## 2022-10-16 NOTE — CONSULT NOTE ADULT - ASSESSMENT
The patient is a 32y Male with recurrent seizure in setting of alcohol withdrawal.    Seizure.   Currently on Keppra.   Would continue for now.   Once librium completed can decide upon long term antiseizure drug.    alcohol abuse.   Librium detox as above.   Consider social service evaluation for more long term solution.    Case discussed with Dr SAI Renee yesterday and with Dr Ibrahim.     
Impression/Plan  Will assume care in MICU at request of NeuroICU service    ETOH withdrawal/ seizures  - IV phenobarbital standing  - once he can swallow will transition to PO librium taper  - wean off precedex   - on Keppra as well for underlying seizure disorder (although may also be related to ETOH withdrawal)  - supplement thiamine, folate    Afib  - currently in sinus rhythm  - low [id number] score, no AC    Acute respiratory failure  - extubated to nasal cannula  - high risk for pneumonia, low threshold for starting abx    Sedation/Analgesia: phenobarb, precedex  Vasoactive medications: none  DVT prophylaxis: lovenox  GI prophylaxis: protonix  Nutrition: dysphagia screen then diet  Mobility: oob, pt  Family/Patient engagement/GOC: brother updated in the room  .

## 2022-10-16 NOTE — PROGRESS NOTE ADULT - ASSESSMENT
32 year old male with PMH diabetes, seizure, AF and alcohol abuse here for mgmt of seizures and alcohol withdrawal    Seizure, likely alcohol withdrawal  - monitor  - Seizure precaution  - c/w Levetiracetam Q12  - Neuro recs appreciated  - PT eval needed due to fall    Alcohol abuse, Withdrawal  - CIWA monitoring  - c/w Librium taper, hold for sedation  - Folic acid, MVI, Thiamine for possible deficiency\    Anemia - likely due to alcohol  - Check iron, B12  - monitor hgb    T2DM  - BGM with SSI    hx AF  - monitor for now  - c/w Metoprolol  - Defer Apixaban     Healthcare Maintenance  DVT PPx - Lovenox  Diet - Regular  Dispo - Acutely ill

## 2022-10-16 NOTE — PROGRESS NOTE ADULT - SUBJECTIVE AND OBJECTIVE BOX
[facility] Division of Hospital Medicine    Chief Complaint:  Status epilepticus    SUBJECTIVE / OVERNIGHT EVENTS:  Pt transferred to medicine. Overnight, pt fell while walking out of bed. Cortical atrophy noted out of character based on patient age. Pt seen at bedside, slow speech however responding to questions appropriately, AAOx3. Patient denies chest pain, SOB, abd pain, N/V, fever, chills, dysuria or any other complaints. All remainder ROS negative.     MEDICATIONS  (STANDING):  chlordiazePOXIDE   Oral   chlordiazePOXIDE 50 milliGRAM(s) Oral every 6 hours  chlordiazePOXIDE 50 milliGRAM(s) Oral every 8 hours  chlorhexidine 2% Cloths 1 Application(s) Topical daily  dextrose 50% Injectable 25 Gram(s) IV Push once  dextrose 50% Injectable 12.5 Gram(s) IV Push once  dextrose 50% Injectable 25 Gram(s) IV Push once  enoxaparin Injectable 40 milliGRAM(s) SubCutaneous every 24 hours  ferrous    sulfate 325 milliGRAM(s) Oral daily  folic acid 1 milliGRAM(s) Oral daily  influenza   Vaccine 0.5 milliLiter(s) IntraMuscular once  insulin lispro (ADMELOG) corrective regimen sliding scale   SubCutaneous every 6 hours  levETIRAcetam  IVPB 1000 milliGRAM(s) IV Intermittent every 12 hours  metoprolol succinate ER 25 milliGRAM(s) Oral daily  multiple electrolytes Injection Type 1 1000 milliLiter(s) (70 mL/Hr) IV Continuous <Continuous>  multivitamin 1 Tablet(s) Oral daily  pantoprazole  Injectable 40 milliGRAM(s) IV Push daily  thiamine IVPB 500 milliGRAM(s) IV Intermittent every 8 hours    MEDICATIONS  (PRN):  chlordiazePOXIDE 50 milliGRAM(s) Oral every 1 hour PRN Symptom-triggered: each CIWA -Ar score 8 or GREATER  hydrALAZINE Injectable 10 milliGRAM(s) IV Push every 2 hours PRN SBP >160  labetalol Injectable 10 milliGRAM(s) IV Push every 2 hours PRN Systolic blood pressure > 160        I&O's Summary    15 Oct 2022 07:01  -  16 Oct 2022 07:00  --------------------------------------------------------  IN: 490 mL / OUT: 1300 mL / NET: -810 mL        PHYSICAL EXAM:  Vital Signs Last 24 Hrs  T(C): 36.5 (16 Oct 2022 04:58), Max: 37.8 (15 Oct 2022 15:36)  T(F): 97.7 (16 Oct 2022 04:58), Max: 100.1 (15 Oct 2022 15:36)  HR: 55 (16 Oct 2022 04:58) (55 - 89)  BP: 127/82 (16 Oct 2022 04:58) (120/84 - 161/101)  BP(mean): 119 (15 Oct 2022 14:00) (109 - 119)  RR: 18 (16 Oct 2022 04:58) (9 - 18)  SpO2: 98% (16 Oct 2022 04:58) (94% - 100%)    Parameters below as of 16 Oct 2022 04:58  Patient On (Oxygen Delivery Method): room air      General: Young male lying in bed, NAD  HEENT:  EOMI, PERRLA  NECK:  Supple, no masses  CVS: regular rate and rhythm S1 S2  RESP:  CTAB, no adventitious lung sounds  GI:  Soft nondistended nontender BS+  : No suprapubic tenderness  MSK:  Moves all extremities  CNS:  Awake, but drowsy. Difficulty following simple commands  INTEG:  tattos, cool, mildly diaphoretic in face  PSYCH:  Fair mood  LABS:                        9.7    3.32  )-----------( 200      ( 16 Oct 2022 05:20 )             29.9     10-16    139  |  103  |  <3.0<L>  ----------------------------<  134<H>  3.5   |  26.0  |  0.54    Ca    8.6      16 Oct 2022 05:20  Phos  3.9     10-16  Mg     1.9     10-16    TPro  6.1<L>  /  Alb  3.2<L>  /  TBili  0.7  /  DBili  x   /  AST  27  /  ALT  14  /  AlkPhos  98  10-16              Culture - Urine (collected 13 Oct 2022 15:00)  Source: Catheterized Catheterized  Final Report (14 Oct 2022 19:36):    No growth      CAPILLARY BLOOD GLUCOSE      POCT Blood Glucose.: 159 mg/dL (16 Oct 2022 08:13)  POCT Blood Glucose.: 179 mg/dL (15 Oct 2022 23:39)  POCT Blood Glucose.: 111 mg/dL (15 Oct 2022 15:27)  POCT Blood Glucose.: 141 mg/dL (15 Oct 2022 11:18)        RADIOLOGY & ADDITIONAL TESTS:  Results Reviewed: alexia  Imaging Personally Reviewed: alexia  Electrocardiogram Personally Reviewed: alexia

## 2022-10-16 NOTE — CHART NOTE - NSCHARTNOTEFT_GEN_A_CORE
Called by RN for fall. Patient was seen getting up out of bed on camera and was on the floor when aid came into room. Patient states he needed to go to the bathroom and might have tripped. Patient is a poor historian.  Patient denies any pain or discomfort at this time but says he landed on his L shoulder and hit his head on the floor.  BP: 120/84  HR: 89  O2: 98% on room air  Neuro: Alert, Oriented to name ([place] as per chart) and place, states its 2021  PERLL, CN 2-12 grossly intact, moves all extremities without difficulty, speech clear  Head: NC/AT, no evidence of trauma, no lumps, bumps or ecchymotic areas  Neck: supple, non-tender  Back: non-tender, no evidence of trauma  Chest: non-tender chest wall and clavicles, no ecchymosis, CTA b/l  Hips: non-tender, to cradle rock, positive abduction and adduction without discomfort, no leg length discrepancy, weight bearing not assessed due to weakness  Coccyx: non-tender, no evidence of trauma  Extremities: non-tender over all long bones and joints, knees non-tender, no erythema, no evidence of trauma, full range of motion  No obvious injury, atraumatic event  Call bell placed in patient's hand and patient instructed not to get out of bed  Patient already in camera room  Continue to monitor, notify PA of any changes in patient status Called by RN for fall. Patient was seen getting up out of bed on camera and was on the floor when aid came into room. Patient states he needed to go to the bathroom and might have tripped. Patient is a poor historian.  Patient denies any pain or discomfort at this time but says he landed on his L shoulder and hit his head on the floor.  BP: 120/84  HR: 89  O2: 98% on room air  Neuro: Alert, Oriented to name ([place] as per chart) and place, states its 2021  PERLL, CN 2-12 grossly intact, moves all extremities without difficulty, speech clear  Head: NC/AT, no evidence of trauma, no lumps, bumps or ecchymotic areas  Neck: supple, non-tender  Back: non-tender, no evidence of trauma  Chest: non-tender chest wall and clavicles, no ecchymosis, CTA b/l  Hips: non-tender, to cradle rock, positive abduction and adduction without discomfort, no leg length discrepancy, weight bearing not assessed due to weakness  Coccyx: non-tender, no evidence of trauma  Extremities: non-tender over all long bones and joints, knees non-tender, no erythema, no evidence of trauma, full range of motion  No obvious injury  CT head ordered  Call bell placed in patient's hand and patient instructed not to get out of bed  Patient already in camera room  Continue to monitor, notify PA of any changes in patient status

## 2022-10-17 LAB
ANION GAP SERPL CALC-SCNC: 11 MMOL/L — SIGNIFICANT CHANGE UP (ref 5–17)
BUN SERPL-MCNC: 3.9 MG/DL — LOW (ref 8–20)
CALCIUM SERPL-MCNC: 8.6 MG/DL — SIGNIFICANT CHANGE UP (ref 8.4–10.5)
CHLORIDE SERPL-SCNC: 104 MMOL/L — SIGNIFICANT CHANGE UP (ref 98–107)
CO2 SERPL-SCNC: 23 MMOL/L — SIGNIFICANT CHANGE UP (ref 22–29)
CREAT SERPL-MCNC: 0.58 MG/DL — SIGNIFICANT CHANGE UP (ref 0.5–1.3)
EGFR: 132 ML/MIN/1.73M2 — SIGNIFICANT CHANGE UP
GLUCOSE BLDC GLUCOMTR-MCNC: 179 MG/DL — HIGH (ref 70–99)
GLUCOSE BLDC GLUCOMTR-MCNC: 182 MG/DL — HIGH (ref 70–99)
GLUCOSE BLDC GLUCOMTR-MCNC: 195 MG/DL — HIGH (ref 70–99)
GLUCOSE BLDC GLUCOMTR-MCNC: 216 MG/DL — HIGH (ref 70–99)
GLUCOSE SERPL-MCNC: 184 MG/DL — HIGH (ref 70–99)
HCT VFR BLD CALC: 29 % — LOW (ref 39–50)
HGB BLD-MCNC: 9.2 G/DL — LOW (ref 13–17)
MCHC RBC-ENTMCNC: 28.5 PG — SIGNIFICANT CHANGE UP (ref 27–34)
MCHC RBC-ENTMCNC: 31.7 GM/DL — LOW (ref 32–36)
MCV RBC AUTO: 89.8 FL — SIGNIFICANT CHANGE UP (ref 80–100)
PLATELET # BLD AUTO: 179 K/UL — SIGNIFICANT CHANGE UP (ref 150–400)
POTASSIUM SERPL-MCNC: 3.8 MMOL/L — SIGNIFICANT CHANGE UP (ref 3.5–5.3)
POTASSIUM SERPL-SCNC: 3.8 MMOL/L — SIGNIFICANT CHANGE UP (ref 3.5–5.3)
RBC # BLD: 3.23 M/UL — LOW (ref 4.2–5.8)
RBC # FLD: 16.9 % — HIGH (ref 10.3–14.5)
SODIUM SERPL-SCNC: 138 MMOL/L — SIGNIFICANT CHANGE UP (ref 135–145)
WBC # BLD: 3.92 K/UL — SIGNIFICANT CHANGE UP (ref 3.8–10.5)
WBC # FLD AUTO: 3.92 K/UL — SIGNIFICANT CHANGE UP (ref 3.8–10.5)

## 2022-10-17 PROCEDURE — 99232 SBSQ HOSP IP/OBS MODERATE 35: CPT

## 2022-10-17 RX ORDER — LEVETIRACETAM 250 MG/1
1000 TABLET, FILM COATED ORAL
Refills: 0 | Status: DISCONTINUED | OUTPATIENT
Start: 2022-10-17 | End: 2022-10-18

## 2022-10-17 RX ADMIN — Medication 1: at 08:45

## 2022-10-17 RX ADMIN — Medication 50 MILLIGRAM(S): at 14:39

## 2022-10-17 RX ADMIN — LEVETIRACETAM 400 MILLIGRAM(S): 250 TABLET, FILM COATED ORAL at 05:16

## 2022-10-17 RX ADMIN — CHLORHEXIDINE GLUCONATE 1 APPLICATION(S): 213 SOLUTION TOPICAL at 12:56

## 2022-10-17 RX ADMIN — ENOXAPARIN SODIUM 40 MILLIGRAM(S): 100 INJECTION SUBCUTANEOUS at 05:18

## 2022-10-17 RX ADMIN — Medication 1 TABLET(S): at 12:54

## 2022-10-17 RX ADMIN — LEVETIRACETAM 1000 MILLIGRAM(S): 250 TABLET, FILM COATED ORAL at 17:21

## 2022-10-17 RX ADMIN — SODIUM CHLORIDE 70 MILLILITER(S): 9 INJECTION, SOLUTION INTRAVENOUS at 15:39

## 2022-10-17 RX ADMIN — PANTOPRAZOLE SODIUM 40 MILLIGRAM(S): 20 TABLET, DELAYED RELEASE ORAL at 12:55

## 2022-10-17 RX ADMIN — Medication 2: at 17:21

## 2022-10-17 RX ADMIN — Medication 1: at 12:55

## 2022-10-17 RX ADMIN — Medication 325 MILLIGRAM(S): at 12:54

## 2022-10-17 RX ADMIN — Medication 25 MILLIGRAM(S): at 05:17

## 2022-10-17 RX ADMIN — Medication 50 MILLIGRAM(S): at 05:16

## 2022-10-17 RX ADMIN — Medication 1 MILLIGRAM(S): at 12:54

## 2022-10-17 NOTE — PROGRESS NOTE ADULT - SUBJECTIVE AND OBJECTIVE BOX
NEUROLOGY EPILEPSY PROGRESS NOTE    CARMEN JOYCE  MRN-[id number]  [id number]    No events in the last 24 hours.   The patient reported having 3 convulsive seizures in his life. It started approximately 3 years ago. He denies having any auras prior to the seizure.   This time, he remembered sitting in a car as a passenger going to a restaurant prior to having a seizure (no recall) and woke up in the hospital. He thinks that he had a bladder incontinence but there was no tongue biting.  He did not cut down his Etoh intake prior to this seizure.  He has been drinking Etoh (6 pack of beer) for the past 10 years or so.     Vital Signs Last 24 Hrs  T(C): 36.9 (17 Oct 2022 11:02), Max: 37.2 (16 Oct 2022 16:53)  T(F): 98.5 (17 Oct 2022 11:02), Max: 99 (16 Oct 2022 16:53)  HR: 83 (17 Oct 2022 11:02) (63 - 85)  BP: 96/67 (17 Oct 2022 11:02) (96/67 - 128/79)  BP(mean): --  RR: 18 (17 Oct 2022 11:02) (16 - 20)  SpO2: 99% (17 Oct 2022 11:02) (95% - 99%)    Parameters below as of 17 Oct 2022 11:02  Patient On (Oxygen Delivery Method): room air      Medications:  MEDICATIONS  (STANDING):  chlordiazePOXIDE   Oral   chlordiazePOXIDE 50 milliGRAM(s) Oral every 8 hours  chlorhexidine 2% Cloths 1 Application(s) Topical daily  dextrose 50% Injectable 25 Gram(s) IV Push once  dextrose 50% Injectable 12.5 Gram(s) IV Push once  dextrose 50% Injectable 25 Gram(s) IV Push once  enoxaparin Injectable 40 milliGRAM(s) SubCutaneous every 24 hours  ferrous    sulfate 325 milliGRAM(s) Oral daily  folic acid 1 milliGRAM(s) Oral daily  influenza   Vaccine 0.5 milliLiter(s) IntraMuscular once  insulin lispro (ADMELOG) corrective regimen sliding scale   SubCutaneous every 6 hours  levETIRAcetam  IVPB 1000 milliGRAM(s) IV Intermittent every 12 hours  metoprolol succinate ER 25 milliGRAM(s) Oral daily  multiple electrolytes Injection Type 1 1000 milliLiter(s) (70 mL/Hr) IV Continuous <Continuous>  multivitamin 1 Tablet(s) Oral daily  pantoprazole  Injectable 40 milliGRAM(s) IV Push daily  thiamine IVPB 500 milliGRAM(s) IV Intermittent every 8 hours    MEDICATIONS  (PRN):  chlordiazePOXIDE 50 milliGRAM(s) Oral every 1 hour PRN Symptom-triggered: each CIWA -Ar score 8 or GREATER  hydrALAZINE Injectable 10 milliGRAM(s) IV Push every 2 hours PRN SBP >160  labetalol Injectable 10 milliGRAM(s) IV Push every 2 hours PRN Systolic blood pressure > 160        NEURO EXAM:    NEUROLOGIC EXAM:  MENTAL STATUS:  Oriented to person, place and time.  Speech is fluent, without dysarthria or aphasia.  Recent and remote memory are intact.  Attention span and concentration are normal.    CRANIAL NERVES:  CN 2: Visual fields are full to confrontation.  CN 3, 4, 6: Extraocular movements are intact. No nystagmus or ophthalmoplegia is evident. Pupils are equally round and reactive to light.  CN 7: Facial excursion is full and symmetric bilaterally.  CN 9,10,12: No dysarthria  MOTOR:  Strength is 5/5 throughout without abnormal movement  SENSORY:  Intact to light touch in all four extremities without side to side asymmetry or proximal to distal gradient.  GAIT:  Normal gait      Labs:  10-17    138  |  104  |  3.9<L>  ----------------------------<  184<H>  3.8   |  23.0  |  0.58    Ca    8.6      17 Oct 2022 05:29  Phos  3.9     10-16  Mg     1.9     10-16    TPro  6.1<L>  /  Alb  3.2<L>  /  TBili  0.7  /  DBili  x   /  AST  27  /  ALT  14  /  AlkPhos  98  10-16    LIVER FUNCTIONS - ( 16 Oct 2022 05:20 )  Alb: 3.2 g/dL / Pro: 6.1 g/dL / ALK PHOS: 98 U/L / ALT: 14 U/L / AST: 27 U/L / GGT: x             IMP: CARMEN JOYCE  MRN-[id number]  [id number]  Admitted for VEEG/ Epilepsy management.     PLAN:   - D/c VEEG   - Continue VEEG for event capture/response to treatment adjustment  - Seizure precautions   - Ativan prn seizure > 5 mins   - Reviewed plan with patient/ family member present who is in agreement.   - Reviewed plan with medical team who are in agreement.        NEUROLOGY EPILEPSY PROGRESS NOTE    CARMEN JOYCE  MRN-[id number]  [id number]    No events in the last 24 hours.   The patient reported having 3 convulsive seizures in his life. It started approximately 3 years ago. He denies having any auras prior to the seizure.   This time, he remembered sitting in a car as a passenger going to a restaurant prior to having a seizure (no recall) and woke up in the hospital. He thinks that he had a bladder incontinence but there was no tongue biting.  He did not cut down his Etoh intake prior to this seizure.  He has been drinking Etoh (6 pack of beer) for the past 10 years or so.     Vital Signs Last 24 Hrs  T(C): 36.9 (17 Oct 2022 11:02), Max: 37.2 (16 Oct 2022 16:53)  T(F): 98.5 (17 Oct 2022 11:02), Max: 99 (16 Oct 2022 16:53)  HR: 83 (17 Oct 2022 11:02) (63 - 85)  BP: 96/67 (17 Oct 2022 11:02) (96/67 - 128/79)  BP(mean): --  RR: 18 (17 Oct 2022 11:02) (16 - 20)  SpO2: 99% (17 Oct 2022 11:02) (95% - 99%)    Parameters below as of 17 Oct 2022 11:02  Patient On (Oxygen Delivery Method): room air      Medications:  MEDICATIONS  (STANDING):  chlordiazePOXIDE   Oral   chlordiazePOXIDE 50 milliGRAM(s) Oral every 8 hours  chlorhexidine 2% Cloths 1 Application(s) Topical daily  dextrose 50% Injectable 25 Gram(s) IV Push once  dextrose 50% Injectable 12.5 Gram(s) IV Push once  dextrose 50% Injectable 25 Gram(s) IV Push once  enoxaparin Injectable 40 milliGRAM(s) SubCutaneous every 24 hours  ferrous    sulfate 325 milliGRAM(s) Oral daily  folic acid 1 milliGRAM(s) Oral daily  influenza   Vaccine 0.5 milliLiter(s) IntraMuscular once  insulin lispro (ADMELOG) corrective regimen sliding scale   SubCutaneous every 6 hours  levETIRAcetam  IVPB 1000 milliGRAM(s) IV Intermittent every 12 hours  metoprolol succinate ER 25 milliGRAM(s) Oral daily  multiple electrolytes Injection Type 1 1000 milliLiter(s) (70 mL/Hr) IV Continuous <Continuous>  multivitamin 1 Tablet(s) Oral daily  pantoprazole  Injectable 40 milliGRAM(s) IV Push daily  thiamine IVPB 500 milliGRAM(s) IV Intermittent every 8 hours    MEDICATIONS  (PRN):  chlordiazePOXIDE 50 milliGRAM(s) Oral every 1 hour PRN Symptom-triggered: each CIWA -Ar score 8 or GREATER  hydrALAZINE Injectable 10 milliGRAM(s) IV Push every 2 hours PRN SBP >160  labetalol Injectable 10 milliGRAM(s) IV Push every 2 hours PRN Systolic blood pressure > 160        NEURO EXAM:    NEUROLOGIC EXAM:  MENTAL STATUS:  Oriented to person, place and time.  Speech is fluent, without dysarthria or aphasia.  Recent and remote memory are intact.  Attention span and concentration are normal.    CRANIAL NERVES:  CN 2: Visual fields are full to confrontation.  CN 3, 4, 6: Extraocular movements are intact. No nystagmus or ophthalmoplegia is evident. Pupils are equally round and reactive to light.  CN 7: Facial excursion is full and symmetric bilaterally.  CN 9,10,12: No dysarthria  MOTOR:  Strength is 5/5 throughout without abnormal movement  SENSORY:  Intact to light touch in all four extremities without side to side asymmetry or proximal to distal gradient.  GAIT:  Normal gait      Labs:  10-17    138  |  104  |  3.9<L>  ----------------------------<  184<H>  3.8   |  23.0  |  0.58    Ca    8.6      17 Oct 2022 05:29  Phos  3.9     10-16  Mg     1.9     10-16    TPro  6.1<L>  /  Alb  3.2<L>  /  TBili  0.7  /  DBili  x   /  AST  27  /  ALT  14  /  AlkPhos  98  10-16    LIVER FUNCTIONS - ( 16 Oct 2022 05:20 )  Alb: 3.2 g/dL / Pro: 6.1 g/dL / ALK PHOS: 98 U/L / ALT: 14 U/L / AST: 27 U/L / GGT: x           CT brain 10/16:  There is generalized volume loss, which is out of proportion to patient's age. This is unchanged compared to 10/13/2022.. There is no sulcal effacement. There is no intracranial hemorrhage, extra axial fluid collection, mass effect or midline shift. There is no acute large vessel territorial infarct. The skull is intact. Mucosal thickening of the sphenoid sinuses are appreciated. There is a fluid level seen within the left sphenoid sinus. Minimal mucosal thickening of the x-ray sinuses are noted. The mastoid air cells are clear.  Images were reviewed personally.    EEG 10/17/22:  Abnormal EEG study.  1. Moderate to severe nonspecific diffuse or multifocal cerebral dysfunction.   2. Excess diffuse beta activity may be seen with medication use such as benzodiazepines or barbiturates.  3. No epileptiform pattern or seizure seen.    IMP: CARMEN JOYCE  MRN-[id number]  [id number]  The patient presented with a 3rd convulsive seizure in his life requiring intubated and ventilator. He recovered fully.  The patient's Etoh level was very high on admission 300+. He admitted on drinking 6-pack of beer daily.   EEG was unrevealing for epileptiform abnormality. CT brain shows cerebra volume loss disproportionate for his age.     PLAN:   - Based on his recurring seizure and severity of seizure that required intubation and ventilator, I recommend that he stays on anti-seizure medication which is Keppra 1000 mg BID (can be changed to oral)  - Obtaining MRI brain seizure protocol - could be done as outpatient  - Seizure precautions   - Ativan prn seizure > 5 mins   - The patient is now on folic acid, thiamine and Librium  - He is at high risk of recurrent seizure and we discussed during this encounter about alcohol cessation and the patient agreed.  - He needs an outpatient epilepsy clinic f/u in 1 month post discharge

## 2022-10-17 NOTE — PROGRESS NOTE ADULT - ASSESSMENT
32 year old male with PMH diabetes, seizure, AF and alcohol abuse here for mgmt of seizures and alcohol withdrawal    Seizure, likely alcohol withdrawal  Stable, no further seizures so far  - monitor  - Seizure precaution  - c/w Levetiracetam Q12  - Neuro recs appreciated  - pt ambulatory w/o assistance    Alcohol abuse, Withdrawal  - CIWA monitoring  - c/w Librium taper, hold for sedation  - Folic acid, MVI, Thiamine for possible deficiency    Anemia - likely due to alcohol  - Check iron, B12  - monitor hgb    T2DM  - BGM with SSI    hx AF  - monitor for now  - c/w Metoprolol  - Defer Apixaban     Healthcare Maintenance  DVT PPx - Lovenox  Diet - Regular  Dispo - Pending completion of detox

## 2022-10-17 NOTE — PROGRESS NOTE ADULT - REASON FOR ADMISSION
status epilepticus
status epilepticus
Epilepsy Management
status epilepticus

## 2022-10-17 NOTE — PROGRESS NOTE ADULT - SUBJECTIVE AND OBJECTIVE BOX
[facility] Division of Hospital Medicine    Chief Complaint:  Status epilepticus    SUBJECTIVE / OVERNIGHT EVENTS:  No events overnight. Pt seen at bedside, sleeping, easily arousable, following commands. Patient denies chest pain, SOB, abd pain, N/V, fever, chills, dysuria or any other complaints. All remainder ROS negative.       MEDICATIONS  (STANDING):  chlordiazePOXIDE   Oral   chlordiazePOXIDE 50 milliGRAM(s) Oral every 8 hours  chlorhexidine 2% Cloths 1 Application(s) Topical daily  dextrose 50% Injectable 25 Gram(s) IV Push once  dextrose 50% Injectable 12.5 Gram(s) IV Push once  dextrose 50% Injectable 25 Gram(s) IV Push once  enoxaparin Injectable 40 milliGRAM(s) SubCutaneous every 24 hours  ferrous    sulfate 325 milliGRAM(s) Oral daily  folic acid 1 milliGRAM(s) Oral daily  influenza   Vaccine 0.5 milliLiter(s) IntraMuscular once  insulin lispro (ADMELOG) corrective regimen sliding scale   SubCutaneous every 6 hours  levETIRAcetam  IVPB 1000 milliGRAM(s) IV Intermittent every 12 hours  metoprolol succinate ER 25 milliGRAM(s) Oral daily  multiple electrolytes Injection Type 1 1000 milliLiter(s) (70 mL/Hr) IV Continuous <Continuous>  multivitamin 1 Tablet(s) Oral daily  pantoprazole  Injectable 40 milliGRAM(s) IV Push daily  thiamine IVPB 500 milliGRAM(s) IV Intermittent every 8 hours    MEDICATIONS  (PRN):  chlordiazePOXIDE 50 milliGRAM(s) Oral every 1 hour PRN Symptom-triggered: each CIWA -Ar score 8 or GREATER  hydrALAZINE Injectable 10 milliGRAM(s) IV Push every 2 hours PRN SBP >160  labetalol Injectable 10 milliGRAM(s) IV Push every 2 hours PRN Systolic blood pressure > 160        I&O's Summary    16 Oct 2022 07:01  -  17 Oct 2022 07:00  --------------------------------------------------------  IN: 1300 mL / OUT: 850 mL / NET: 450 mL        PHYSICAL EXAM:  Vital Signs Last 24 Hrs  T(C): 36.8 (17 Oct 2022 08:43), Max: 37.2 (16 Oct 2022 16:53)  T(F): 98.3 (17 Oct 2022 08:43), Max: 99 (16 Oct 2022 16:53)  HR: 63 (17 Oct 2022 08:43) (63 - 85)  BP: 117/76 (17 Oct 2022 08:43) (117/70 - 128/79)  BP(mean): --  RR: 16 (17 Oct 2022 08:43) (16 - 20)  SpO2: 98% (17 Oct 2022 08:43) (95% - 98%)    Parameters below as of 17 Oct 2022 08:43  Patient On (Oxygen Delivery Method): room air            General: Young male lying in bed, NAD  HEENT:  EOMI, PERRLA  NECK:  Supple, no masses  CVS: regular rate and rhythm S1 S2  RESP:  CTAB, no adventitious lung sounds  GI:  Soft nondistended nontender BS+  : No suprapubic tenderness  MSK:  Moves all extremities  CNS:  Awake, but drowsy. Difficulty following simple commands  INTEG:  tattoos, warm  PSYCH:  Fair mood    LABS:                        9.2    3.92  )-----------( 179      ( 17 Oct 2022 05:29 )             29.0     10-17    138  |  104  |  3.9<L>  ----------------------------<  184<H>  3.8   |  23.0  |  0.58    Ca    8.6      17 Oct 2022 05:29  Phos  3.9     10-16  Mg     1.9     10-16    TPro  6.1<L>  /  Alb  3.2<L>  /  TBili  0.7  /  DBili  x   /  AST  27  /  ALT  14  /  AlkPhos  98  10-16              CAPILLARY BLOOD GLUCOSE      POCT Blood Glucose.: 195 mg/dL (17 Oct 2022 08:28)  POCT Blood Glucose.: 179 mg/dL (17 Oct 2022 05:21)  POCT Blood Glucose.: 156 mg/dL (16 Oct 2022 22:40)  POCT Blood Glucose.: 167 mg/dL (16 Oct 2022 17:18)  POCT Blood Glucose.: 288 mg/dL (16 Oct 2022 12:08)        RADIOLOGY & ADDITIONAL TESTS:  Results Reviewed: y  Imaging Personally Reviewed: alonzo  Electrocardiogram Personally Reviewed: alonzo

## 2022-10-18 ENCOUNTER — TRANSCRIPTION ENCOUNTER (OUTPATIENT)
Age: 32
End: 2022-10-18

## 2022-10-18 VITALS
HEART RATE: 82 BPM | TEMPERATURE: 98 F | SYSTOLIC BLOOD PRESSURE: 131 MMHG | OXYGEN SATURATION: 98 % | RESPIRATION RATE: 16 BRPM | DIASTOLIC BLOOD PRESSURE: 85 MMHG

## 2022-10-18 LAB
ANION GAP SERPL CALC-SCNC: 14 MMOL/L — SIGNIFICANT CHANGE UP (ref 5–17)
BUN SERPL-MCNC: 5.6 MG/DL — LOW (ref 8–20)
CALCIUM SERPL-MCNC: 8.8 MG/DL — SIGNIFICANT CHANGE UP (ref 8.4–10.5)
CHLORIDE SERPL-SCNC: 103 MMOL/L — SIGNIFICANT CHANGE UP (ref 98–107)
CO2 SERPL-SCNC: 23 MMOL/L — SIGNIFICANT CHANGE UP (ref 22–29)
CREAT SERPL-MCNC: 0.56 MG/DL — SIGNIFICANT CHANGE UP (ref 0.5–1.3)
EGFR: 134 ML/MIN/1.73M2 — SIGNIFICANT CHANGE UP
GLUCOSE BLDC GLUCOMTR-MCNC: 158 MG/DL — HIGH (ref 70–99)
GLUCOSE BLDC GLUCOMTR-MCNC: 265 MG/DL — HIGH (ref 70–99)
GLUCOSE BLDC GLUCOMTR-MCNC: 314 MG/DL — HIGH (ref 70–99)
GLUCOSE BLDC GLUCOMTR-MCNC: 342 MG/DL — HIGH (ref 70–99)
GLUCOSE SERPL-MCNC: 170 MG/DL — HIGH (ref 70–99)
HCT VFR BLD CALC: 29.6 % — LOW (ref 39–50)
HGB BLD-MCNC: 9.8 G/DL — LOW (ref 13–17)
MCHC RBC-ENTMCNC: 29.9 PG — SIGNIFICANT CHANGE UP (ref 27–34)
MCHC RBC-ENTMCNC: 33.1 GM/DL — SIGNIFICANT CHANGE UP (ref 32–36)
MCV RBC AUTO: 90.2 FL — SIGNIFICANT CHANGE UP (ref 80–100)
PLATELET # BLD AUTO: 198 K/UL — SIGNIFICANT CHANGE UP (ref 150–400)
POTASSIUM SERPL-MCNC: 3.8 MMOL/L — SIGNIFICANT CHANGE UP (ref 3.5–5.3)
POTASSIUM SERPL-SCNC: 3.8 MMOL/L — SIGNIFICANT CHANGE UP (ref 3.5–5.3)
RBC # BLD: 3.28 M/UL — LOW (ref 4.2–5.8)
RBC # FLD: 16.8 % — HIGH (ref 10.3–14.5)
SODIUM SERPL-SCNC: 139 MMOL/L — SIGNIFICANT CHANGE UP (ref 135–145)
WBC # BLD: 4.16 K/UL — SIGNIFICANT CHANGE UP (ref 3.8–10.5)
WBC # FLD AUTO: 4.16 K/UL — SIGNIFICANT CHANGE UP (ref 3.8–10.5)

## 2022-10-18 PROCEDURE — 99239 HOSP IP/OBS DSCHRG MGMT >30: CPT

## 2022-10-18 RX ORDER — APIXABAN 2.5 MG/1
1 TABLET, FILM COATED ORAL
Qty: 60 | Refills: 0
Start: 2022-10-18 | End: 2022-11-16

## 2022-10-18 RX ORDER — METOPROLOL TARTRATE 50 MG
1 TABLET ORAL
Qty: 0 | Refills: 0 | DISCHARGE
Start: 2022-10-18

## 2022-10-18 RX ORDER — LEVETIRACETAM 250 MG/1
1 TABLET, FILM COATED ORAL
Qty: 60 | Refills: 0
Start: 2022-10-18 | End: 2022-11-16

## 2022-10-18 RX ORDER — APIXABAN 2.5 MG/1
5 TABLET, FILM COATED ORAL EVERY 12 HOURS
Refills: 0 | Status: COMPLETED | OUTPATIENT
Start: 2022-10-18 | End: 2022-10-18

## 2022-10-18 RX ADMIN — Medication 4: at 01:07

## 2022-10-18 RX ADMIN — ENOXAPARIN SODIUM 40 MILLIGRAM(S): 100 INJECTION SUBCUTANEOUS at 04:50

## 2022-10-18 RX ADMIN — APIXABAN 5 MILLIGRAM(S): 2.5 TABLET, FILM COATED ORAL at 17:31

## 2022-10-18 RX ADMIN — Medication 1 TABLET(S): at 11:50

## 2022-10-18 RX ADMIN — LEVETIRACETAM 1000 MILLIGRAM(S): 250 TABLET, FILM COATED ORAL at 07:23

## 2022-10-18 RX ADMIN — Medication 50 MILLIGRAM(S): at 17:13

## 2022-10-18 RX ADMIN — Medication 25 MILLIGRAM(S): at 07:23

## 2022-10-18 RX ADMIN — Medication 1 MILLIGRAM(S): at 11:50

## 2022-10-18 RX ADMIN — LEVETIRACETAM 1000 MILLIGRAM(S): 250 TABLET, FILM COATED ORAL at 17:13

## 2022-10-18 RX ADMIN — Medication 50 MILLIGRAM(S): at 04:50

## 2022-10-18 RX ADMIN — Medication 1: at 04:48

## 2022-10-18 RX ADMIN — Medication 3: at 11:49

## 2022-10-18 RX ADMIN — Medication 325 MILLIGRAM(S): at 11:50

## 2022-10-18 RX ADMIN — PANTOPRAZOLE SODIUM 40 MILLIGRAM(S): 20 TABLET, DELAYED RELEASE ORAL at 11:50

## 2022-10-18 RX ADMIN — CHLORHEXIDINE GLUCONATE 1 APPLICATION(S): 213 SOLUTION TOPICAL at 11:50

## 2022-10-18 NOTE — DISCHARGE NOTE PROVIDER - ATTENDING DISCHARGE PHYSICAL EXAMINATION:
General: Young male lying in bed, NAD  HEENT:  EOMI, PERRLA  NECK:  Supple, no masses  CVS: regular rate and rhythm S1 S2  RESP:  CTAB, no adventitious lung sounds  GI:  Soft nondistended nontender BS+  : No suprapubic tenderness  MSK:  Moves all extremities  CNS:  Awake, but drowsy. Difficulty following simple commands  INTEG:  tattoos, warm  PSYCH:  Fair mood

## 2022-10-18 NOTE — DISCHARGE NOTE PROVIDER - HOSPITAL COURSE
33 yo Male with PMH of seizure not on AED medication, IDDM, afib not on AC, ETOH abuse (per brother stopped drinking 3-4 days ago)  presents to ED for seizure. Patient was found  unresponsive by mother. Patient experienced generalized tonic clonic movements continuously for 20 mins en route to hospital, no medications given that time. In ED, patient given 5 of versed-continued having refractory seizures,was intubation and placed on propofol drip. Currently patient is sedated, unresponsive to Verbal and deep noxious stimuli, upgraded to NISCU for continous vEEG monitoring.     Admitted to NSICU, transferred to MICU after EEG, extubated then transferred to medicine where he underwent alcohol withdrawal taper. Clinically improved and no further episodes of seizures. Deemed fit for DC home w/ Keppra and outpatient Epilepsy appt.

## 2022-10-18 NOTE — DISCHARGE NOTE NURSING/CASE MANAGEMENT/SOCIAL WORK - PATIENT PORTAL LINK FT
You can access the FollowMyHealth Patient Portal offered by [facility] by registering at the following website: [email or web address]. By joining [facility]s FollowMyHealth portal, you will also be able to view your health information using other applications (apps) compatible with our system.

## 2022-10-18 NOTE — DISCHARGE NOTE PROVIDER - NSDCCPCAREPLAN_GEN_ALL_CORE_FT
PRINCIPAL DISCHARGE DIAGNOSIS  Diagnosis: Status epilepticus  Assessment and Plan of Treatment: Resolved. C/W Keppra 500mg Q12, follow up w/ neurology outapatient      SECONDARY DISCHARGE DIAGNOSES  Diagnosis: Alcohol abuse with withdrawal  Assessment and Plan of Treatment: Underwent detox protocol. Stop drinking alcohol. Declined Rehab    Diagnosis: Normocytic anemia  Assessment and Plan of Treatment: Stable, follow up w/ PMD    Diagnosis: Diabetes  Assessment and Plan of Treatment: c/w outpatient medications, follow up w/ PMD    Diagnosis: Paroxysmal atrial fibrillation  Assessment and Plan of Treatment: Take metoprolol as prescribed, take eliquis, follow up w/ PMD

## 2022-10-18 NOTE — DISCHARGE NOTE PROVIDER - NSDCMRMEDTOKEN_GEN_ALL_CORE_FT
Admelog 100 units/mL injectable solution: Upto 12 units injectable 3 times a day with meals  Eliquis 5 mg oral tablet: 1 tab(s) orally 2 times a day  glyBURIDE 2.5 mg oral tablet: 1 tab(s) orally once a day  levETIRAcetam 1000 mg oral tablet: 1 tab(s) orally 2 times a day  losartan 25 mg oral tablet: 1 tab(s) orally once a day  metFORMIN 500 mg oral tablet: 1 tab(s) orally 2 times a day  metoprolol succinate 25 mg oral tablet, extended release: 1 tab(s) orally once a day  pantoprazole 40 mg oral delayed release tablet: 1 tab(s) orally once a day  SEROquel 25 mg oral tablet: 1 tab(s) orally once a day (at bedtime)

## 2022-10-18 NOTE — DISCHARGE NOTE NURSING/CASE MANAGEMENT/SOCIAL WORK - NSDCPEFALRISK_GEN_ALL_CORE
For information on Fall & Injury Prevention, visit: [email or web address] OR  [email or web address] OR  https://www.cdc.gov/steadi/patient.html

## 2022-10-18 NOTE — DISCHARGE NOTE PROVIDER - PROVIDER TOKENS
PROVIDER:[TOKEN:[[id number]],FOLLOWUP:[2 weeks],ESTABLISHEDPATIENT:[T]],PROVIDER:[TOKEN:[[id number]],FOLLOWUP:[1 month]]

## 2022-10-18 NOTE — DISCHARGE NOTE PROVIDER - CARE PROVIDER_API CALL
Chang Angeles (DO)  Family Medicine  [address] [place] [address]  Phone: [phone]  Fax: [phone]  Established Patient  Follow Up Time: 2 weeks    Chapincito Lantigua)  Neurology  [address] [facility] [address]  [place] [address]  Phone: [phone]  Fax: [phone]  Follow Up Time: 1 month

## 2022-11-02 RX ORDER — APIXABAN 2.5 MG/1
1 TABLET, FILM COATED ORAL
Qty: 0 | Refills: 0 | DISCHARGE

## 2022-11-02 RX ORDER — METOPROLOL TARTRATE 50 MG
1 TABLET ORAL
Qty: 0 | Refills: 0 | DISCHARGE

## 2022-12-30 NOTE — ED PROVIDER NOTE - IV ALTEPLASE DOOR HIDDEN
[facility] DISCHARGE SUMMARY    ADMISSION DATE:  12/26/2022  DISCHARGE DATE:  12/30/2022  5:21 PM    DISCHARGING PHYSICIAN:  Guerda Singh MD    CONSULTS:    IP CONSULT TO INFECTIOUS DISEASES  IP CONSULT TO CASE MANAGEMENT  IP CONSULT TO CASE MANAGEMENT  IP CONSULT TO OTOLARYNGOLOGY  IP CONSULT TO NUTRITION SERVICES       DISCHARGE DISPOSITION:  Home      CONDITION AT DISCHARGE:    stable        DISCHARGE DIAGNOSIS & HOSPITAL COURSE  49-year-old female who was brought to the ED by EMS with complaint of altered mental status     Acute on chronic respiratory failure with hypercapnia and hypoxemia  Patient seen on admission with severe hypercapnia with PCO2 101 on ABG  Was placed on continuous BiPAP at the ED and admitted to the stepdown unit.  Chest x-ray upon admission with patchy and streaky airspace disease is seen at both lung bases  CT chest with no PE. It showed bilateral lower lobe airspace disease   Treated with nebulizers and steroids; tapered down.  Pulmonology/critical care consulted  Now weaned off continuous BIPAP  Encourage weight loss efforts and follow up with long-term COVID management.  Will benefit from Astral machine for home. CM and Pulm consulted.  Walk test prior to discharge showed that patient requires increased amount of O2 from baseline. 4L with exertion previously on 2L at home. CM consulted to arrange new home O2.     Morbid obesity, BMI 44.85  Encourage weight loss efforts     History of COVID-19 pneumonia with chronic hypoxic respiratory failure  Patient reports a previous history of COVID-19 pneumonia followed by chronic hypoxic respiratory failure.    Concern is for post COVID 19 syndrome resulting in chest x-ray findings.   CT chest with bilateral lower lobe airspace disease     SIRS with acute organ dysfunction  Patient seen admission tachypneic with leukocytosis.    Lactic acid within normal limits upon admission  Chest x-ray upon admission with patchy and streaky airspace  disease is seen at both lung bases  CT chest with bilateral lower lobe airspace disease which may be in part due to atelectasis however concurrent infection may also be present.  Procalcitonin negative  Blood cultures negative to date  Urine Legionella and streptococcal antigen negative  ID consulted.    Monitor off abx   Now resolved.     Acute metabolic encephalopathy  Syncope and collapse  Likely secondary to severe hypercapnia and hypoxemia given ABG findings upon admission.      D-dimer negative upon admission  TSH wnl  Echo with EF 56%, severely increased wall thickness, concentric hypertrophy, grade 2 diastolic dysfunction (12/27/2022).  CTA chest negative for PE  Telemetry monitoring with no acute events noted so far.  Mental status back to normal after being on BIPAP.     Essential hypertension  Hypertensive heart and kidney disease with CHF and CKD  Chronic diastolic CHF  proBNP mildly elevated at 341 upon admission; patient seen clinically euvolemic upon admission.    Last 2D echo significant for EF 53.8% per outpatient records.    Echo with EF 56%, severely increased wall thickness, concentric hypertrophy, grade 2 diastolic dysfunction (12/27/2022).  Continue home metoprolol  mg at a lower dose at 50 mg due to bradycardia with heart rate occasionally going down to 50s.  Continue home bumex  EKG with 1st degree heart block. Home digoxin discontinued  Home spironolactone discontinued due to borderline elevated potassium.  BP borderline low: Home nifedipine discontiued     CKD stage II  Creatinine 0.97, GFR 71 upon admission.  Close to baseline.     Type 2 diabetes mellitus  Can resume home metformin during hospitalization.    Hemoglobin A1C (%) Date Value 12/28/2022     6.5      Chronic microcytic anemia  Hemoglobin has been fluctuating in the past from 8-10s             Follow-up with PCP for further evaluation of anemia        Disposition plan  Astral machine to be delivered home.           *Things  to follow-up as outpatient:     CTA chest (12/27/2022) with enlarged nodular thyroid gland, consider further evaluation with ultrasound.  TSH within normal limits  Can get US as outpatient        OBJECTIVE:    VITALS:    Patient Vitals for the past 24 hrs:   BP Temp Temp src Pulse Resp SpO2   12/30/22 1521 (!) 143/88 99.3 °F (37.4 °C) Oral 69 18 97 %   12/30/22 1125 135/81 99 °F (37.2 °C) Oral 69 18 96 %   12/30/22 1011 -- -- -- 67 -- 98 %   12/30/22 0930 117/70 -- -- 64 18 97 %   12/30/22 0928 130/79 -- -- 73 -- 94 %   12/30/22 0927 -- -- -- 72 18 94 %   12/30/22 0923 138/69 -- -- 83 18 (!) 77 %   12/30/22 0729 (!) 158/91 99.7 °F (37.6 °C) Oral (!) 48 18 100 %   12/30/22 0545 (!) 160/88 98.1 °F (36.7 °C) Oral (!) 50 -- 98 %      Gen: Not in acute distress, obese  HEENT: anicteric, mmm, No conjunctival pallor/injection,  Heme: No lymphadenopathy, no bruises, no bleeding, no pallor  C/V: No elevated jugular venous pressure, No carotid bruits, S1, S2, No m/r/g, No LE edema  Lungs: Clear  Abd: +bowel sounds, Nondistended, nontender, No organomegaly  : No CVAT, No suprapubic tenderness  Ext: No joint erythema/swelling/effusion  Skin: No rash, no jaundice  Neuro:  AAO x3, CN grossly intact, strength and sensations normal              Labs      Recent Labs     12/28/22  0533 12/29/22  0543 12/30/22  0458   SODIUM 143 141 139   POTASSIUM 4.2 4.2 4.2   CO2 42* 42* 40*   ANIONGAP 6* 3* 8   GLUCOSE 141* 143* 145*   BUN 21* 21* 21*   CREATININE 0.99* 0.90 0.92   BCRAT 21 23 23   CALCIUM 9.7 9.8 9.9               Recent Labs     12/28/22  0533 12/29/22  0543 12/30/22  0458   WBC 7.6 8.8 9.6   RBC 4.64 4.52 4.85   HGB 8.3* 8.4* 8.8*   HCT 33.0* 31.9* 34.1*    416 439   MCV 71.1* 70.6* 70.3*   MCH 17.9* 18.6* 18.1*   MCHC 25.2* 26.3* 25.8*   NRBCRE 0 0 0          Imaging  XR CHEST PA OR AP 1 VIEW  Impression:  Patchy and streaky airspace disease is seen at both lung bases and could represent infection or atelectasis.   Correlate with PA and lateral chest radiograph when clinically appropriate.  Electronically Signed by: MANJU VALENZUELA M.D.   Signed on: 12/26/2022 5:27 PM      CTA CHEST PULMONARY EMBOLISM W CONTRAST  Impression:   1.   No evidence of central occlusive pulmonary emboli however evaluation more distal vasculature is suboptimal, as detailed above.  If clinically warranted repeat imaging could be obtained.   2.   Bilateral lower lobe airspace disease which may be in part due to atelectasis however concurrent infection may also be present.  Clinical correlation follow-up is recommended.   3.   Cardiomegaly.  4.   Findings concerning for pulmonary arterial hypertension.  5.   Enlarged nodular thyroid gland, consider further evaluation with ultrasound.  Electronically Signed by: MANJU VALENZUELA M.D.   Signed on: 12/27/2022 7:56 AM      TRANSTHORACIC ECHO (TTE) COMPLETE W/ W/O IMAGING AGENT  Patient: Vanda Wells      Study Date/Time:      Dec 27 2022 8:58AM  1. Left ventricle: The cavity size is normal. Wall thickness is severely increased. There is concentric hypertrophy. Doppler parameters are consistent with abnormal left ventricular relaxation and increased filling pressure (grade 2 diastolic dysfunction). The ejection fraction is 56%.   2. Left atrium: The atrium is moderately dilated.  3. Right ventricle: The cavity size is normal. Systolic function is normal.            DISCHARGE MEDICATIONS:       Summary of your Discharge Medications      Take these Medications      Details   albuterol 108 (90 Base) MCG/ACT inhaler  Notes to patient: as needed for Shortness of Breath or Wheezing   Inhale 2 puffs into the lungs every 4 hours as needed for Shortness of Breath or Wheezing.     bumetanide 2 MG tablet  Commonly known as: BUMEX  Notes to patient: Water pill, gets rid of extra fluid in the body   Take 2 mg by mouth daily.     digoxin 0.125 MG tablet  Commonly known as: LANOXIN  Notes to patient: Controls fast  heart rate   Take 125 mcg by mouth daily.     folic acid 1 MG tablet  Commonly known as: FOLATE  Notes to patient: vitamin   Take 1 mg by mouth daily.     metFORMIN 500 MG tablet  Commonly known as: GLUCOPHAGE  Notes to patient: Controls blood sugar level   Take 500 mg by mouth in the morning and 500 mg in the evening. Take with meals.     metoPROLOL succinate 50 MG 24 hr tablet  Commonly known as: TOPROL-XL  Notes to patient: Controls high blood pressure   Take 1 tablet by mouth daily.               DISCHARGE INSTRUCTIONS:      FOLLOW-UP:    Israel Nicolas MD  [address] [place] [id number]  [phone]    Schedule an appointment as soon as possible for a visit in 2 week(s)  Primary care doctor post hospital follow up    Pulmonolgist  Please get a referral from your primary care doctor  Follow up  As needed    Discharge Procedure Orders   SERVICE TO HOME CARE RESPIRATORY THERAPY   Referral Priority: Routine Referral Type: Home Care Services   Number of Visits Requested: 1 Expiration Date: 12/30/23        PCP: Israel Nicolas MD       TIME TAKEN FOR DISCHARGE:  45 min  Face-to-face time spent greater than 50%    Guerda Singh MD  12/31/2022   show

## 2023-03-16 ENCOUNTER — INPATIENT (INPATIENT)
Facility: HOSPITAL | Age: 33
LOS: 13 days | Discharge: HOME | DRG: 637 | End: 2023-03-30
Attending: HOSPITALIST | Admitting: STUDENT IN AN ORGANIZED HEALTH CARE EDUCATION/TRAINING PROGRAM
Payer: COMMERCIAL

## 2023-03-16 VITALS
RESPIRATION RATE: 20 BRPM | HEIGHT: 67 IN | OXYGEN SATURATION: 100 % | HEART RATE: 125 BPM | DIASTOLIC BLOOD PRESSURE: 96 MMHG | WEIGHT: 169.98 LBS | SYSTOLIC BLOOD PRESSURE: 134 MMHG

## 2023-03-16 LAB
A1C WITH ESTIMATED AVERAGE GLUCOSE RESULT: 15.4 % — HIGH (ref 4–5.6)
ACETONE SERPL-MCNC: ABNORMAL
CA-I SERPL-SCNC: 1.41 MMOL/L — HIGH (ref 1.15–1.33)
CHLORIDE BLDV-SCNC: 103 MMOL/L — SIGNIFICANT CHANGE UP (ref 96–108)
ESTIMATED AVERAGE GLUCOSE: 395 MG/DL — HIGH (ref 68–114)
GAS PNL BLDV: 136 MMOL/L — SIGNIFICANT CHANGE UP (ref 136–145)
GAS PNL BLDV: SIGNIFICANT CHANGE UP
GLUCOSE BLDV-MCNC: >656 MG/DL — CRITICAL HIGH (ref 70–99)
HCO3 BLDV-SCNC: SIGNIFICANT CHANGE UP MMOL/L (ref 22–29)
HCT VFR BLD CALC: 45.3 % — SIGNIFICANT CHANGE UP (ref 39–50)
HCT VFR BLDA CALC: 45 % — SIGNIFICANT CHANGE UP
HGB BLD CALC-MCNC: 15.1 G/DL — SIGNIFICANT CHANGE UP (ref 12.6–17.4)
HGB BLD-MCNC: 14.8 G/DL — SIGNIFICANT CHANGE UP (ref 13–17)
LACTATE BLDV-MCNC: 5.7 MMOL/L — CRITICAL HIGH (ref 0.5–2)
MCHC RBC-ENTMCNC: 28.4 PG — SIGNIFICANT CHANGE UP (ref 27–34)
MCHC RBC-ENTMCNC: 32.7 GM/DL — SIGNIFICANT CHANGE UP (ref 32–36)
MCV RBC AUTO: 86.8 FL — SIGNIFICANT CHANGE UP (ref 80–100)
PCO2 BLDV: 34 MMHG — LOW (ref 42–55)
PH BLDV: <6.942 — CRITICAL LOW (ref 7.32–7.43)
PLATELET # BLD AUTO: 294 K/UL — SIGNIFICANT CHANGE UP (ref 150–400)
PO2 BLDV: 66 MMHG — HIGH (ref 25–45)
POTASSIUM BLDV-SCNC: 3.5 MMOL/L — SIGNIFICANT CHANGE UP (ref 3.5–5.1)
RBC # BLD: 5.22 M/UL — SIGNIFICANT CHANGE UP (ref 4.2–5.8)
RBC # FLD: 18.2 % — HIGH (ref 10.3–14.5)
SAO2 % BLDV: 89.2 % — SIGNIFICANT CHANGE UP
WBC # BLD: 21.03 K/UL — HIGH (ref 3.8–10.5)
WBC # FLD AUTO: 21.03 K/UL — HIGH (ref 3.8–10.5)

## 2023-03-16 PROCEDURE — 99291 CRITICAL CARE FIRST HOUR: CPT | Mod: 25

## 2023-03-16 PROCEDURE — 71045 X-RAY EXAM CHEST 1 VIEW: CPT | Mod: 26

## 2023-03-16 PROCEDURE — 36556 INSERT NON-TUNNEL CV CATH: CPT

## 2023-03-16 RX ORDER — SODIUM BICARBONATE 1 MEQ/ML
0.2 SYRINGE (ML) INTRAVENOUS
Qty: 150 | Refills: 0 | Status: DISCONTINUED | OUTPATIENT
Start: 2023-03-16 | End: 2023-03-17

## 2023-03-16 RX ORDER — LEVETIRACETAM 250 MG/1
1000 TABLET, FILM COATED ORAL ONCE
Refills: 0 | Status: COMPLETED | OUTPATIENT
Start: 2023-03-16 | End: 2023-03-16

## 2023-03-16 RX ORDER — POTASSIUM CHLORIDE 20 MEQ
40 PACKET (EA) ORAL ONCE
Refills: 0 | Status: COMPLETED | OUTPATIENT
Start: 2023-03-16 | End: 2023-03-16

## 2023-03-16 RX ORDER — SODIUM CHLORIDE 9 MG/ML
2000 INJECTION INTRAMUSCULAR; INTRAVENOUS; SUBCUTANEOUS ONCE
Refills: 0 | Status: COMPLETED | OUTPATIENT
Start: 2023-03-16 | End: 2023-03-16

## 2023-03-16 RX ORDER — POTASSIUM CHLORIDE 20 MEQ
10 PACKET (EA) ORAL
Refills: 0 | Status: COMPLETED | OUTPATIENT
Start: 2023-03-16 | End: 2023-03-17

## 2023-03-16 RX ADMIN — SODIUM CHLORIDE 2000 MILLILITER(S): 9 INJECTION INTRAMUSCULAR; INTRAVENOUS; SUBCUTANEOUS at 23:38

## 2023-03-16 RX ADMIN — LEVETIRACETAM 400 MILLIGRAM(S): 250 TABLET, FILM COATED ORAL at 23:38

## 2023-03-16 NOTE — ED PROVIDER NOTE - CLINICAL SUMMARY MEDICAL DECISION MAKING FREE TEXT BOX
Pt presenting with AMS and hyperglycemia. Found to be in DKA. Pt has not been taking insulin for last week. No other inciting event found. Found to have pH of 6.8 and K of 3.5. Initially IV K and bicarb drip ordered but bicarb canceled prior to giving after conversation with MICU. Will admit to MICU.

## 2023-03-16 NOTE — ED ADULT TRIAGE NOTE - CHIEF COMPLAINT QUOTE
generalized weakness, abdominal pain, nausea and vomiting x 2 weeks. hx of DM. unable to get oral temp on pt in triage. generalized weakness, abdominal pain, nausea and vomiting x 2 weeks. hx of DM, hasn't taken his insulin today - BGL 483mg/dl. unable to get oral temp on pt in triage.

## 2023-03-16 NOTE — ED PROCEDURE NOTE - ATTENDING CONTRIBUTION TO CARE
Dr. Grullon: I personally supervised this procedure.
Dr. Grullon: I personally supervised this procedure.

## 2023-03-16 NOTE — ED PROVIDER NOTE - PHYSICAL EXAMINATION
Gen: ill appearing male in moderate distress NAD  Head: NC/AT, dry mucous membranes   Neck: trachea midline  Card: tachycardic, regular rhythm   Resp:  CTAB, tachypneic   Abd: soft, non-distended, non-tender  Ext: no deformities  Neuro:  A&O appears non focal  Skin:  pale  Psych:  Normal affect and mood Gen: ill appearing male in severe distress  Head: NC/AT, dry mucous membranes   Neck: trachea midline  Card: tachycardic, regular rhythm   Resp:  CTAB, tachypneic   Abd: soft, non-distended, non-tender  Ext: no deformities  Neuro:  A&O appears non focal  Skin:  pale  Psych:  Normal affect and mood

## 2023-03-16 NOTE — ED PROVIDER NOTE - OBJECTIVE STATEMENT
33 y/o male with PMHx of DM presents to the ED c/o abdominal pain, notes he has not taken his insulin over the past 4 days. Pt notes abdominal pain N/V for the past few weeks.  Pt evaluated in CC area. 33 y/o male with PMHx of DM presents to the ED c/o abdominal pain, notes he has not taken his insulin over the past 4 days. Pt notes abdominal pain N/V for the past few weeks.  Pt evaluated in CC area. Has not been taking his keppra.

## 2023-03-16 NOTE — ED PROVIDER NOTE - ATTENDING CONTRIBUTION TO CARE
Yadi: I performed a face to face bedside interview with patient regarding history of present illness, review of symptoms and past medical history. I completed an independent physical exam and ordered tests/medications as needed.  I have discussed patient's plan of care with the resident. The resident assisted in  executing the discussed plan. I was available for any questions or issues that may have arose during the execution of the plan of care.     Upon my evaluation, this patient had a high probability of imminent or life-threatening deterioration due to DKA which required my direct attention, intervention, and personal management.  The patient has a  medical condition that impairs one or more vital organ systems.  Frequent personal assessment and adjustment of medical interventions was performed.      I have personally provided 45 minutes of critical care time exclusive of time spent on separately billable procedures. Time includes review of laboratory data, radiology results, discussion with consultants, patient and family; monitoring for potential decompensation, as well as time spent retrieving data and reviewing the chart and documenting the visit. Interventions were performed as documented above.

## 2023-03-17 DIAGNOSIS — E11.10 TYPE 2 DIABETES MELLITUS WITH KETOACIDOSIS WITHOUT COMA: ICD-10-CM

## 2023-03-17 LAB
ALBUMIN SERPL ELPH-MCNC: 3.2 G/DL — LOW (ref 3.3–5.2)
ALBUMIN SERPL ELPH-MCNC: 3.3 G/DL — SIGNIFICANT CHANGE UP (ref 3.3–5.2)
ALBUMIN SERPL ELPH-MCNC: 3.4 G/DL — SIGNIFICANT CHANGE UP (ref 3.3–5.2)
ALBUMIN SERPL ELPH-MCNC: 3.7 G/DL — SIGNIFICANT CHANGE UP (ref 3.3–5.2)
ALBUMIN SERPL ELPH-MCNC: 3.8 G/DL — SIGNIFICANT CHANGE UP (ref 3.3–5.2)
ALBUMIN SERPL ELPH-MCNC: 4.6 G/DL — SIGNIFICANT CHANGE UP (ref 3.3–5.2)
ALP SERPL-CCNC: 109 U/L — SIGNIFICANT CHANGE UP (ref 40–120)
ALP SERPL-CCNC: 115 U/L — SIGNIFICANT CHANGE UP (ref 40–120)
ALP SERPL-CCNC: 123 U/L — HIGH (ref 40–120)
ALP SERPL-CCNC: 130 U/L — HIGH (ref 40–120)
ALP SERPL-CCNC: 152 U/L — HIGH (ref 40–120)
ALP SERPL-CCNC: 180 U/L — HIGH (ref 40–120)
ALT FLD-CCNC: 10 U/L — SIGNIFICANT CHANGE UP
ALT FLD-CCNC: 6 U/L — SIGNIFICANT CHANGE UP
ALT FLD-CCNC: 7 U/L — SIGNIFICANT CHANGE UP
AMPHET UR-MCNC: NEGATIVE — SIGNIFICANT CHANGE UP
ANION GAP SERPL CALC-SCNC: 21 MMOL/L — HIGH (ref 5–17)
ANION GAP SERPL CALC-SCNC: 21 MMOL/L — HIGH (ref 5–17)
ANION GAP SERPL CALC-SCNC: SIGNIFICANT CHANGE UP MMOL/L (ref 5–17)
APPEARANCE UR: CLEAR — SIGNIFICANT CHANGE UP
AST SERPL-CCNC: 12 U/L — SIGNIFICANT CHANGE UP
AST SERPL-CCNC: 12 U/L — SIGNIFICANT CHANGE UP
AST SERPL-CCNC: 14 U/L — SIGNIFICANT CHANGE UP
AST SERPL-CCNC: 15 U/L — SIGNIFICANT CHANGE UP
AST SERPL-CCNC: 15 U/L — SIGNIFICANT CHANGE UP
AST SERPL-CCNC: 19 U/L — SIGNIFICANT CHANGE UP
BACTERIA # UR AUTO: ABNORMAL
BARBITURATES UR SCN-MCNC: NEGATIVE — SIGNIFICANT CHANGE UP
BASE EXCESS BLDV CALC-SCNC: -16.7 MMOL/L — LOW (ref -2–3)
BASOPHILS # BLD AUTO: 0.03 K/UL — SIGNIFICANT CHANGE UP (ref 0–0.2)
BASOPHILS # BLD AUTO: 0.09 K/UL — SIGNIFICANT CHANGE UP (ref 0–0.2)
BASOPHILS NFR BLD AUTO: 0.2 % — SIGNIFICANT CHANGE UP (ref 0–2)
BASOPHILS NFR BLD AUTO: 0.4 % — SIGNIFICANT CHANGE UP (ref 0–2)
BENZODIAZ UR-MCNC: NEGATIVE — SIGNIFICANT CHANGE UP
BILIRUB SERPL-MCNC: 0.4 MG/DL — SIGNIFICANT CHANGE UP (ref 0.4–2)
BILIRUB SERPL-MCNC: 0.5 MG/DL — SIGNIFICANT CHANGE UP (ref 0.4–2)
BILIRUB SERPL-MCNC: 0.6 MG/DL — SIGNIFICANT CHANGE UP (ref 0.4–2)
BILIRUB SERPL-MCNC: 0.6 MG/DL — SIGNIFICANT CHANGE UP (ref 0.4–2)
BILIRUB SERPL-MCNC: 0.7 MG/DL — SIGNIFICANT CHANGE UP (ref 0.4–2)
BILIRUB SERPL-MCNC: 0.9 MG/DL — SIGNIFICANT CHANGE UP (ref 0.4–2)
BILIRUB UR-MCNC: NEGATIVE — SIGNIFICANT CHANGE UP
BLOOD GAS COMMENTS ARTERIAL: SIGNIFICANT CHANGE UP
BLOOD GAS COMMENTS, VENOUS: SIGNIFICANT CHANGE UP
BUN SERPL-MCNC: 16.2 MG/DL — SIGNIFICANT CHANGE UP (ref 8–20)
BUN SERPL-MCNC: 17.9 MG/DL — SIGNIFICANT CHANGE UP (ref 8–20)
BUN SERPL-MCNC: 19.4 MG/DL — SIGNIFICANT CHANGE UP (ref 8–20)
BUN SERPL-MCNC: 20 MG/DL — SIGNIFICANT CHANGE UP (ref 8–20)
BUN SERPL-MCNC: 20.2 MG/DL — HIGH (ref 8–20)
BUN SERPL-MCNC: 21.6 MG/DL — HIGH (ref 8–20)
CA-I SERPL-SCNC: 1.3 MMOL/L — SIGNIFICANT CHANGE UP (ref 1.15–1.33)
CA-I SERPL-SCNC: 1.48 MMOL/L — HIGH (ref 1.15–1.33)
CALCIUM SERPL-MCNC: 8.2 MG/DL — LOW (ref 8.4–10.5)
CALCIUM SERPL-MCNC: 8.7 MG/DL — SIGNIFICANT CHANGE UP (ref 8.4–10.5)
CALCIUM SERPL-MCNC: 8.8 MG/DL — SIGNIFICANT CHANGE UP (ref 8.4–10.5)
CALCIUM SERPL-MCNC: 9.7 MG/DL — SIGNIFICANT CHANGE UP (ref 8.4–10.5)
CHLORIDE BLDV-SCNC: 107 MMOL/L — SIGNIFICANT CHANGE UP (ref 96–108)
CHLORIDE BLDV-SCNC: 117 MMOL/L — HIGH (ref 96–108)
CHLORIDE SERPL-SCNC: 111 MMOL/L — HIGH (ref 96–108)
CHLORIDE SERPL-SCNC: 114 MMOL/L — HIGH (ref 96–108)
CHLORIDE SERPL-SCNC: 115 MMOL/L — HIGH (ref 96–108)
CHLORIDE SERPL-SCNC: 116 MMOL/L — HIGH (ref 96–108)
CHLORIDE SERPL-SCNC: 99 MMOL/L — SIGNIFICANT CHANGE UP (ref 96–108)
CHLORIDE SERPL-SCNC: 99 MMOL/L — SIGNIFICANT CHANGE UP (ref 96–108)
CO2 SERPL-SCNC: 10 MMOL/L — CRITICAL LOW (ref 22–29)
CO2 SERPL-SCNC: 6 MMOL/L — CRITICAL LOW (ref 22–29)
CO2 SERPL-SCNC: <6 MMOL/L — CRITICAL LOW (ref 22–29)
COCAINE METAB.OTHER UR-MCNC: NEGATIVE — SIGNIFICANT CHANGE UP
COLOR SPEC: YELLOW — SIGNIFICANT CHANGE UP
CREAT SERPL-MCNC: 1.43 MG/DL — HIGH (ref 0.5–1.3)
CREAT SERPL-MCNC: 1.49 MG/DL — HIGH (ref 0.5–1.3)
CREAT SERPL-MCNC: 1.51 MG/DL — HIGH (ref 0.5–1.3)
CREAT SERPL-MCNC: 1.52 MG/DL — HIGH (ref 0.5–1.3)
CREAT SERPL-MCNC: 1.56 MG/DL — HIGH (ref 0.5–1.3)
CREAT SERPL-MCNC: 1.64 MG/DL — HIGH (ref 0.5–1.3)
DIFF PNL FLD: ABNORMAL
EGFR: 56 ML/MIN/1.73M2 — LOW
EGFR: 60 ML/MIN/1.73M2 — SIGNIFICANT CHANGE UP
EGFR: 62 ML/MIN/1.73M2 — SIGNIFICANT CHANGE UP
EGFR: 62 ML/MIN/1.73M2 — SIGNIFICANT CHANGE UP
EGFR: 63 ML/MIN/1.73M2 — SIGNIFICANT CHANGE UP
EGFR: 66 ML/MIN/1.73M2 — SIGNIFICANT CHANGE UP
EOSINOPHIL # BLD AUTO: 0 K/UL — SIGNIFICANT CHANGE UP (ref 0–0.5)
EOSINOPHIL # BLD AUTO: 0.01 K/UL — SIGNIFICANT CHANGE UP (ref 0–0.5)
EOSINOPHIL NFR BLD AUTO: 0 % — SIGNIFICANT CHANGE UP (ref 0–6)
EOSINOPHIL NFR BLD AUTO: 0 % — SIGNIFICANT CHANGE UP (ref 0–6)
EPI CELLS # UR: SIGNIFICANT CHANGE UP
FLUAV AG NPH QL: SIGNIFICANT CHANGE UP
FLUBV AG NPH QL: SIGNIFICANT CHANGE UP
GAS PNL BLDA: SIGNIFICANT CHANGE UP
GAS PNL BLDV: 132 MMOL/L — LOW (ref 136–145)
GAS PNL BLDV: 143 MMOL/L — SIGNIFICANT CHANGE UP (ref 136–145)
GAS PNL BLDV: SIGNIFICANT CHANGE UP
GLUCOSE BLDC GLUCOMTR-MCNC: 219 MG/DL — HIGH (ref 70–99)
GLUCOSE BLDC GLUCOMTR-MCNC: 223 MG/DL — HIGH (ref 70–99)
GLUCOSE BLDC GLUCOMTR-MCNC: 234 MG/DL — HIGH (ref 70–99)
GLUCOSE BLDC GLUCOMTR-MCNC: 249 MG/DL — HIGH (ref 70–99)
GLUCOSE BLDC GLUCOMTR-MCNC: 261 MG/DL — HIGH (ref 70–99)
GLUCOSE BLDC GLUCOMTR-MCNC: 263 MG/DL — HIGH (ref 70–99)
GLUCOSE BLDC GLUCOMTR-MCNC: 266 MG/DL — HIGH (ref 70–99)
GLUCOSE BLDC GLUCOMTR-MCNC: 268 MG/DL — HIGH (ref 70–99)
GLUCOSE BLDC GLUCOMTR-MCNC: 272 MG/DL — HIGH (ref 70–99)
GLUCOSE BLDC GLUCOMTR-MCNC: 284 MG/DL — HIGH (ref 70–99)
GLUCOSE BLDC GLUCOMTR-MCNC: 286 MG/DL — HIGH (ref 70–99)
GLUCOSE BLDC GLUCOMTR-MCNC: 293 MG/DL — HIGH (ref 70–99)
GLUCOSE BLDC GLUCOMTR-MCNC: 299 MG/DL — HIGH (ref 70–99)
GLUCOSE BLDC GLUCOMTR-MCNC: 319 MG/DL — HIGH (ref 70–99)
GLUCOSE BLDC GLUCOMTR-MCNC: 334 MG/DL — HIGH (ref 70–99)
GLUCOSE BLDC GLUCOMTR-MCNC: 335 MG/DL — HIGH (ref 70–99)
GLUCOSE BLDC GLUCOMTR-MCNC: 373 MG/DL — HIGH (ref 70–99)
GLUCOSE BLDC GLUCOMTR-MCNC: 481 MG/DL — CRITICAL HIGH (ref 70–99)
GLUCOSE BLDC GLUCOMTR-MCNC: 502 MG/DL — CRITICAL HIGH (ref 70–99)
GLUCOSE BLDC GLUCOMTR-MCNC: 511 MG/DL — CRITICAL HIGH (ref 70–99)
GLUCOSE BLDC GLUCOMTR-MCNC: 522 MG/DL — CRITICAL HIGH (ref 70–99)
GLUCOSE BLDV-MCNC: 335 MG/DL — HIGH (ref 70–99)
GLUCOSE BLDV-MCNC: >656 MG/DL — CRITICAL HIGH (ref 70–99)
GLUCOSE SERPL-MCNC: 287 MG/DL — HIGH (ref 70–99)
GLUCOSE SERPL-MCNC: 292 MG/DL — HIGH (ref 70–99)
GLUCOSE SERPL-MCNC: 331 MG/DL — HIGH (ref 70–99)
GLUCOSE SERPL-MCNC: 336 MG/DL — HIGH (ref 70–99)
GLUCOSE SERPL-MCNC: 559 MG/DL — CRITICAL HIGH (ref 70–99)
GLUCOSE SERPL-MCNC: 590 MG/DL — CRITICAL HIGH (ref 70–99)
GLUCOSE UR QL: 1000 MG/DL
HCO3 BLDV-SCNC: 10 MMOL/L — CRITICAL LOW (ref 22–29)
HCT VFR BLD CALC: 34.9 % — LOW (ref 39–50)
HCT VFR BLD CALC: 38.1 % — LOW (ref 39–50)
HCT VFR BLDA CALC: 32 % — SIGNIFICANT CHANGE UP
HCT VFR BLDA CALC: 40 % — SIGNIFICANT CHANGE UP
HGB BLD CALC-MCNC: 10.6 G/DL — LOW (ref 12.6–17.4)
HGB BLD CALC-MCNC: 13.4 G/DL — SIGNIFICANT CHANGE UP (ref 12.6–17.4)
HGB BLD-MCNC: 12 G/DL — LOW (ref 13–17)
HGB BLD-MCNC: 12.6 G/DL — LOW (ref 13–17)
HOROWITZ INDEX BLDA+IHG-RTO: SIGNIFICANT CHANGE UP
HOROWITZ INDEX BLDV+IHG-RTO: SIGNIFICANT CHANGE UP
IMM GRANULOCYTES NFR BLD AUTO: 0.9 % — SIGNIFICANT CHANGE UP (ref 0–0.9)
IMM GRANULOCYTES NFR BLD AUTO: 1.3 % — HIGH (ref 0–0.9)
KETONES UR-MCNC: ABNORMAL
LACTATE BLDV-MCNC: 1.5 MMOL/L — SIGNIFICANT CHANGE UP (ref 0.5–2)
LACTATE BLDV-MCNC: 3.1 MMOL/L — HIGH (ref 0.5–2)
LEUKOCYTE ESTERASE UR-ACNC: NEGATIVE — SIGNIFICANT CHANGE UP
LIDOCAIN IGE QN: 11 U/L — LOW (ref 22–51)
LYMPHOCYTES # BLD AUTO: 0.89 K/UL — LOW (ref 1–3.3)
LYMPHOCYTES # BLD AUTO: 15.6 % — SIGNIFICANT CHANGE UP (ref 13–44)
LYMPHOCYTES # BLD AUTO: 3.29 K/UL — SIGNIFICANT CHANGE UP (ref 1–3.3)
LYMPHOCYTES # BLD AUTO: 6.3 % — LOW (ref 13–44)
MAGNESIUM SERPL-MCNC: 1.7 MG/DL — SIGNIFICANT CHANGE UP (ref 1.6–2.6)
MAGNESIUM SERPL-MCNC: 1.8 MG/DL — SIGNIFICANT CHANGE UP (ref 1.6–2.6)
MAGNESIUM SERPL-MCNC: 1.9 MG/DL — SIGNIFICANT CHANGE UP (ref 1.6–2.6)
MAGNESIUM SERPL-MCNC: 2.2 MG/DL — SIGNIFICANT CHANGE UP (ref 1.6–2.6)
MAGNESIUM SERPL-MCNC: 2.3 MG/DL — SIGNIFICANT CHANGE UP (ref 1.6–2.6)
MAGNESIUM SERPL-MCNC: 2.6 MG/DL — SIGNIFICANT CHANGE UP (ref 1.6–2.6)
MANUAL SMEAR VERIFICATION: SIGNIFICANT CHANGE UP
MCHC RBC-ENTMCNC: 28.7 PG — SIGNIFICANT CHANGE UP (ref 27–34)
MCHC RBC-ENTMCNC: 28.9 PG — SIGNIFICANT CHANGE UP (ref 27–34)
MCHC RBC-ENTMCNC: 33.1 GM/DL — SIGNIFICANT CHANGE UP (ref 32–36)
MCHC RBC-ENTMCNC: 34.4 GM/DL — SIGNIFICANT CHANGE UP (ref 32–36)
MCV RBC AUTO: 84.1 FL — SIGNIFICANT CHANGE UP (ref 80–100)
MCV RBC AUTO: 86.8 FL — SIGNIFICANT CHANGE UP (ref 80–100)
METHADONE UR-MCNC: NEGATIVE — SIGNIFICANT CHANGE UP
MONOCYTES # BLD AUTO: 1.03 K/UL — HIGH (ref 0–0.9)
MONOCYTES # BLD AUTO: 2.51 K/UL — HIGH (ref 0–0.9)
MONOCYTES NFR BLD AUTO: 11.9 % — SIGNIFICANT CHANGE UP (ref 2–14)
MONOCYTES NFR BLD AUTO: 7.3 % — SIGNIFICANT CHANGE UP (ref 2–14)
NEUTROPHILS # BLD AUTO: 11.98 K/UL — HIGH (ref 1.8–7.4)
NEUTROPHILS # BLD AUTO: 14.85 K/UL — HIGH (ref 1.8–7.4)
NEUTROPHILS NFR BLD AUTO: 70.8 % — SIGNIFICANT CHANGE UP (ref 43–77)
NEUTROPHILS NFR BLD AUTO: 85.3 % — HIGH (ref 43–77)
NITRITE UR-MCNC: NEGATIVE — SIGNIFICANT CHANGE UP
OPIATES UR-MCNC: NEGATIVE — SIGNIFICANT CHANGE UP
OSMOLALITY SERPL: 336 MOSMOL/KG — HIGH (ref 275–300)
PCO2 BLDV: 24 MMHG — LOW (ref 42–55)
PCO2 BLDV: <20 MMHG — LOW (ref 42–55)
PCP SPEC-MCNC: SIGNIFICANT CHANGE UP
PCP UR-MCNC: NEGATIVE — SIGNIFICANT CHANGE UP
PH BLDA: 7.16 — CRITICAL LOW (ref 7.35–7.45)
PH BLDV: 7.25 — LOW (ref 7.32–7.43)
PH BLDV: <6.942 — CRITICAL LOW (ref 7.32–7.43)
PH UR: 6 — SIGNIFICANT CHANGE UP (ref 5–8)
PHOSPHATE SERPL-MCNC: 1 MG/DL — CRITICAL LOW (ref 2.4–4.7)
PHOSPHATE SERPL-MCNC: 1.1 MG/DL — LOW (ref 2.4–4.7)
PHOSPHATE SERPL-MCNC: 3 MG/DL — SIGNIFICANT CHANGE UP (ref 2.4–4.7)
PHOSPHATE SERPL-MCNC: 5.8 MG/DL — HIGH (ref 2.4–4.7)
PHOSPHATE SERPL-MCNC: 6.9 MG/DL — HIGH (ref 2.4–4.7)
PHOSPHATE SERPL-MCNC: <0.4 MG/DL — CRITICAL LOW (ref 2.4–4.7)
PLAT MORPH BLD: NORMAL — SIGNIFICANT CHANGE UP
PLATELET # BLD AUTO: 196 K/UL — SIGNIFICANT CHANGE UP (ref 150–400)
PLATELET # BLD AUTO: 269 K/UL — SIGNIFICANT CHANGE UP (ref 150–400)
PO2 BLDV: 160 MMHG — HIGH (ref 25–45)
PO2 BLDV: <42 MMHG — SIGNIFICANT CHANGE UP (ref 25–45)
POTASSIUM BLDV-SCNC: 2.7 MMOL/L — CRITICAL LOW (ref 3.5–5.1)
POTASSIUM BLDV-SCNC: 4 MMOL/L — SIGNIFICANT CHANGE UP (ref 3.5–5.1)
POTASSIUM SERPL-MCNC: 2.6 MMOL/L — CRITICAL LOW (ref 3.5–5.3)
POTASSIUM SERPL-MCNC: 3.3 MMOL/L — LOW (ref 3.5–5.3)
POTASSIUM SERPL-MCNC: 3.6 MMOL/L — SIGNIFICANT CHANGE UP (ref 3.5–5.3)
POTASSIUM SERPL-MCNC: 3.9 MMOL/L — SIGNIFICANT CHANGE UP (ref 3.5–5.3)
POTASSIUM SERPL-MCNC: 4.1 MMOL/L — SIGNIFICANT CHANGE UP (ref 3.5–5.3)
POTASSIUM SERPL-MCNC: 4.1 MMOL/L — SIGNIFICANT CHANGE UP (ref 3.5–5.3)
POTASSIUM SERPL-SCNC: 2.6 MMOL/L — CRITICAL LOW (ref 3.5–5.3)
POTASSIUM SERPL-SCNC: 3.3 MMOL/L — LOW (ref 3.5–5.3)
POTASSIUM SERPL-SCNC: 3.6 MMOL/L — SIGNIFICANT CHANGE UP (ref 3.5–5.3)
POTASSIUM SERPL-SCNC: 3.9 MMOL/L — SIGNIFICANT CHANGE UP (ref 3.5–5.3)
POTASSIUM SERPL-SCNC: 4.1 MMOL/L — SIGNIFICANT CHANGE UP (ref 3.5–5.3)
POTASSIUM SERPL-SCNC: 4.1 MMOL/L — SIGNIFICANT CHANGE UP (ref 3.5–5.3)
PROT SERPL-MCNC: 5.2 G/DL — LOW (ref 6.6–8.7)
PROT SERPL-MCNC: 5.3 G/DL — LOW (ref 6.6–8.7)
PROT SERPL-MCNC: 5.3 G/DL — LOW (ref 6.6–8.7)
PROT SERPL-MCNC: 5.9 G/DL — LOW (ref 6.6–8.7)
PROT SERPL-MCNC: 6.4 G/DL — LOW (ref 6.6–8.7)
PROT SERPL-MCNC: 7.7 G/DL — SIGNIFICANT CHANGE UP (ref 6.6–8.7)
PROT UR-MCNC: 30 MG/DL
RBC # BLD: 4.15 M/UL — LOW (ref 4.2–5.8)
RBC # BLD: 4.39 M/UL — SIGNIFICANT CHANGE UP (ref 4.2–5.8)
RBC # FLD: 17.4 % — HIGH (ref 10.3–14.5)
RBC # FLD: 17.9 % — HIGH (ref 10.3–14.5)
RBC BLD AUTO: NORMAL — SIGNIFICANT CHANGE UP
RBC CASTS # UR COMP ASSIST: SIGNIFICANT CHANGE UP /HPF (ref 0–4)
RSV RNA NPH QL NAA+NON-PROBE: SIGNIFICANT CHANGE UP
SAO2 % BLDV: 78.7 % — SIGNIFICANT CHANGE UP
SAO2 % BLDV: 98.8 % — SIGNIFICANT CHANGE UP
SARS-COV-2 RNA SPEC QL NAA+PROBE: SIGNIFICANT CHANGE UP
SODIUM SERPL-SCNC: 133 MMOL/L — LOW (ref 135–145)
SODIUM SERPL-SCNC: 140 MMOL/L — SIGNIFICANT CHANGE UP (ref 135–145)
SODIUM SERPL-SCNC: 142 MMOL/L — SIGNIFICANT CHANGE UP (ref 135–145)
SODIUM SERPL-SCNC: 147 MMOL/L — HIGH (ref 135–145)
SP GR SPEC: 1.02 — SIGNIFICANT CHANGE UP (ref 1.01–1.02)
THC UR QL: NEGATIVE — SIGNIFICANT CHANGE UP
TROPONIN T SERPL-MCNC: <0.01 NG/ML — SIGNIFICANT CHANGE UP (ref 0–0.06)
UROBILINOGEN FLD QL: NEGATIVE MG/DL — SIGNIFICANT CHANGE UP
WBC # BLD: 14.05 K/UL — HIGH (ref 3.8–10.5)
WBC # BLD: 20.56 K/UL — HIGH (ref 3.8–10.5)
WBC # FLD AUTO: 14.05 K/UL — HIGH (ref 3.8–10.5)
WBC # FLD AUTO: 20.56 K/UL — HIGH (ref 3.8–10.5)
WBC UR QL: NEGATIVE /HPF — SIGNIFICANT CHANGE UP (ref 0–5)

## 2023-03-17 PROCEDURE — 93010 ELECTROCARDIOGRAM REPORT: CPT

## 2023-03-17 PROCEDURE — 99222 1ST HOSP IP/OBS MODERATE 55: CPT

## 2023-03-17 PROCEDURE — 99223 1ST HOSP IP/OBS HIGH 75: CPT

## 2023-03-17 PROCEDURE — 74177 CT ABD & PELVIS W/CONTRAST: CPT | Mod: 26

## 2023-03-17 RX ORDER — POTASSIUM CHLORIDE 20 MEQ
40 PACKET (EA) ORAL ONCE
Refills: 0 | Status: DISCONTINUED | OUTPATIENT
Start: 2023-03-17 | End: 2023-03-17

## 2023-03-17 RX ORDER — POTASSIUM PHOSPHATE, MONOBASIC POTASSIUM PHOSPHATE, DIBASIC 236; 224 MG/ML; MG/ML
30 INJECTION, SOLUTION INTRAVENOUS ONCE
Refills: 0 | Status: COMPLETED | OUTPATIENT
Start: 2023-03-17 | End: 2023-03-17

## 2023-03-17 RX ORDER — PANTOPRAZOLE SODIUM 20 MG/1
40 TABLET, DELAYED RELEASE ORAL
Refills: 0 | Status: DISCONTINUED | OUTPATIENT
Start: 2023-03-17 | End: 2023-03-30

## 2023-03-17 RX ORDER — SODIUM CHLORIDE 9 MG/ML
1000 INJECTION, SOLUTION INTRAVENOUS
Refills: 0 | Status: DISCONTINUED | OUTPATIENT
Start: 2023-03-17 | End: 2023-03-17

## 2023-03-17 RX ORDER — SODIUM BICARBONATE 1 MEQ/ML
100 SYRINGE (ML) INTRAVENOUS ONCE
Refills: 0 | Status: COMPLETED | OUTPATIENT
Start: 2023-03-17 | End: 2023-03-17

## 2023-03-17 RX ORDER — PIPERACILLIN AND TAZOBACTAM 4; .5 G/20ML; G/20ML
3.38 INJECTION, POWDER, LYOPHILIZED, FOR SOLUTION INTRAVENOUS ONCE
Refills: 0 | Status: COMPLETED | OUTPATIENT
Start: 2023-03-17 | End: 2023-03-17

## 2023-03-17 RX ORDER — POTASSIUM CHLORIDE 20 MEQ
20 PACKET (EA) ORAL
Refills: 0 | Status: COMPLETED | OUTPATIENT
Start: 2023-03-17 | End: 2023-03-17

## 2023-03-17 RX ORDER — POTASSIUM CHLORIDE 20 MEQ
10 PACKET (EA) ORAL ONCE
Refills: 0 | Status: COMPLETED | OUTPATIENT
Start: 2023-03-17 | End: 2023-03-17

## 2023-03-17 RX ORDER — LEVETIRACETAM 250 MG/1
1000 TABLET, FILM COATED ORAL
Refills: 0 | Status: DISCONTINUED | OUTPATIENT
Start: 2023-03-17 | End: 2023-03-17

## 2023-03-17 RX ORDER — SODIUM CHLORIDE 9 MG/ML
1000 INJECTION, SOLUTION INTRAVENOUS ONCE
Refills: 0 | Status: COMPLETED | OUTPATIENT
Start: 2023-03-17 | End: 2023-03-17

## 2023-03-17 RX ORDER — POTASSIUM CHLORIDE 20 MEQ
40 PACKET (EA) ORAL ONCE
Refills: 0 | Status: COMPLETED | OUTPATIENT
Start: 2023-03-17 | End: 2023-03-17

## 2023-03-17 RX ORDER — HYALURONIDASE (HUMAN RECOMBINANT) 150 [USP'U]/ML
150 INJECTION, SOLUTION SUBCUTANEOUS ONCE
Refills: 0 | Status: COMPLETED | OUTPATIENT
Start: 2023-03-17 | End: 2023-03-17

## 2023-03-17 RX ORDER — MAGNESIUM SULFATE 500 MG/ML
2 VIAL (ML) INJECTION ONCE
Refills: 0 | Status: COMPLETED | OUTPATIENT
Start: 2023-03-17 | End: 2023-03-17

## 2023-03-17 RX ORDER — PHENYLEPHRINE HYDROCHLORIDE 10 MG/ML
0.1 INJECTION INTRAVENOUS
Qty: 40 | Refills: 0 | Status: DISCONTINUED | OUTPATIENT
Start: 2023-03-17 | End: 2023-03-18

## 2023-03-17 RX ORDER — POTASSIUM CHLORIDE 20 MEQ
40 PACKET (EA) ORAL ONCE
Refills: 0 | Status: COMPLETED | OUTPATIENT
Start: 2023-03-17 | End: 2023-03-18

## 2023-03-17 RX ORDER — POTASSIUM CHLORIDE 20 MEQ
20 PACKET (EA) ORAL ONCE
Refills: 0 | Status: COMPLETED | OUTPATIENT
Start: 2023-03-17 | End: 2023-03-17

## 2023-03-17 RX ORDER — FOLIC ACID 0.8 MG
1 TABLET ORAL DAILY
Refills: 0 | Status: DISCONTINUED | OUTPATIENT
Start: 2023-03-17 | End: 2023-03-30

## 2023-03-17 RX ORDER — SODIUM BICARBONATE 1 MEQ/ML
50 SYRINGE (ML) INTRAVENOUS ONCE
Refills: 0 | Status: COMPLETED | OUTPATIENT
Start: 2023-03-17 | End: 2023-03-17

## 2023-03-17 RX ORDER — FLUMAZENIL 0.1 MG/ML
0.2 VIAL (ML) INTRAVENOUS ONCE
Refills: 0 | Status: COMPLETED | OUTPATIENT
Start: 2023-03-17 | End: 2023-03-17

## 2023-03-17 RX ORDER — FOLIC ACID 0.8 MG
1 TABLET ORAL DAILY
Refills: 0 | Status: DISCONTINUED | OUTPATIENT
Start: 2023-03-17 | End: 2023-03-17

## 2023-03-17 RX ORDER — DEXMEDETOMIDINE HYDROCHLORIDE IN 0.9% SODIUM CHLORIDE 4 UG/ML
0.3 INJECTION INTRAVENOUS
Qty: 200 | Refills: 0 | Status: DISCONTINUED | OUTPATIENT
Start: 2023-03-17 | End: 2023-03-18

## 2023-03-17 RX ORDER — APIXABAN 2.5 MG/1
5 TABLET, FILM COATED ORAL
Refills: 0 | Status: DISCONTINUED | OUTPATIENT
Start: 2023-03-17 | End: 2023-03-18

## 2023-03-17 RX ORDER — METOCLOPRAMIDE HCL 10 MG
10 TABLET ORAL ONCE
Refills: 0 | Status: COMPLETED | OUTPATIENT
Start: 2023-03-17 | End: 2023-03-17

## 2023-03-17 RX ORDER — CHLORHEXIDINE GLUCONATE 213 G/1000ML
1 SOLUTION TOPICAL DAILY
Refills: 0 | Status: DISCONTINUED | OUTPATIENT
Start: 2023-03-17 | End: 2023-03-30

## 2023-03-17 RX ORDER — DEXTROSE 50 % IN WATER 50 %
50 SYRINGE (ML) INTRAVENOUS
Refills: 0 | Status: DISCONTINUED | OUTPATIENT
Start: 2023-03-17 | End: 2023-03-30

## 2023-03-17 RX ORDER — THIAMINE MONONITRATE (VIT B1) 100 MG
100 TABLET ORAL DAILY
Refills: 0 | Status: DISCONTINUED | OUTPATIENT
Start: 2023-03-17 | End: 2023-03-17

## 2023-03-17 RX ORDER — INSULIN HUMAN 100 [IU]/ML
10 INJECTION, SOLUTION SUBCUTANEOUS
Qty: 50 | Refills: 0 | Status: DISCONTINUED | OUTPATIENT
Start: 2023-03-17 | End: 2023-03-19

## 2023-03-17 RX ORDER — PIPERACILLIN AND TAZOBACTAM 4; .5 G/20ML; G/20ML
3.38 INJECTION, POWDER, LYOPHILIZED, FOR SOLUTION INTRAVENOUS EVERY 8 HOURS
Refills: 0 | Status: DISCONTINUED | OUTPATIENT
Start: 2023-03-17 | End: 2023-03-18

## 2023-03-17 RX ORDER — CHLORHEXIDINE GLUCONATE 213 G/1000ML
1 SOLUTION TOPICAL
Refills: 0 | Status: DISCONTINUED | OUTPATIENT
Start: 2023-03-17 | End: 2023-03-18

## 2023-03-17 RX ORDER — LEVETIRACETAM 250 MG/1
1000 TABLET, FILM COATED ORAL EVERY 12 HOURS
Refills: 0 | Status: DISCONTINUED | OUTPATIENT
Start: 2023-03-17 | End: 2023-03-30

## 2023-03-17 RX ORDER — POTASSIUM CHLORIDE 20 MEQ
10 PACKET (EA) ORAL
Refills: 0 | Status: DISCONTINUED | OUTPATIENT
Start: 2023-03-17 | End: 2023-03-17

## 2023-03-17 RX ORDER — THIAMINE MONONITRATE (VIT B1) 100 MG
100 TABLET ORAL DAILY
Refills: 0 | Status: DISCONTINUED | OUTPATIENT
Start: 2023-03-17 | End: 2023-03-30

## 2023-03-17 RX ORDER — DEXTROSE MONOHYDRATE, SODIUM CHLORIDE, AND POTASSIUM CHLORIDE 50; .745; 4.5 G/1000ML; G/1000ML; G/1000ML
1000 INJECTION, SOLUTION INTRAVENOUS
Refills: 0 | Status: DISCONTINUED | OUTPATIENT
Start: 2023-03-17 | End: 2023-03-19

## 2023-03-17 RX ORDER — ENOXAPARIN SODIUM 100 MG/ML
40 INJECTION SUBCUTANEOUS EVERY 24 HOURS
Refills: 0 | Status: DISCONTINUED | OUTPATIENT
Start: 2023-03-17 | End: 2023-03-17

## 2023-03-17 RX ADMIN — Medication 100 MILLIGRAM(S): at 23:09

## 2023-03-17 RX ADMIN — DEXMEDETOMIDINE HYDROCHLORIDE IN 0.9% SODIUM CHLORIDE 5.78 MICROGRAM(S)/KG/HR: 4 INJECTION INTRAVENOUS at 05:24

## 2023-03-17 RX ADMIN — Medication 50 MILLIEQUIVALENT(S): at 09:21

## 2023-03-17 RX ADMIN — PIPERACILLIN AND TAZOBACTAM 25 GRAM(S): 4; .5 INJECTION, POWDER, LYOPHILIZED, FOR SOLUTION INTRAVENOUS at 17:29

## 2023-03-17 RX ADMIN — CHLORHEXIDINE GLUCONATE 1 APPLICATION(S): 213 SOLUTION TOPICAL at 23:10

## 2023-03-17 RX ADMIN — SODIUM CHLORIDE 200 MILLILITER(S): 9 INJECTION, SOLUTION INTRAVENOUS at 10:19

## 2023-03-17 RX ADMIN — Medication 50 MILLIEQUIVALENT(S): at 22:00

## 2023-03-17 RX ADMIN — Medication 50 MILLIEQUIVALENT(S): at 08:22

## 2023-03-17 RX ADMIN — PHENYLEPHRINE HYDROCHLORIDE 2.89 MICROGRAM(S)/KG/MIN: 10 INJECTION INTRAVENOUS at 07:28

## 2023-03-17 RX ADMIN — LEVETIRACETAM 400 MILLIGRAM(S): 250 TABLET, FILM COATED ORAL at 20:57

## 2023-03-17 RX ADMIN — PIPERACILLIN AND TAZOBACTAM 200 GRAM(S): 4; .5 INJECTION, POWDER, LYOPHILIZED, FOR SOLUTION INTRAVENOUS at 02:43

## 2023-03-17 RX ADMIN — SODIUM CHLORIDE 1000 MILLILITER(S): 9 INJECTION, SOLUTION INTRAVENOUS at 06:03

## 2023-03-17 RX ADMIN — Medication 100 MILLIEQUIVALENT(S): at 00:00

## 2023-03-17 RX ADMIN — Medication 100 MILLIEQUIVALENT(S): at 17:29

## 2023-03-17 RX ADMIN — Medication 100 MILLIEQUIVALENT(S): at 01:49

## 2023-03-17 RX ADMIN — Medication 0.2 MILLIGRAM(S): at 04:59

## 2023-03-17 RX ADMIN — Medication 4 MILLIGRAM(S): at 04:19

## 2023-03-17 RX ADMIN — SODIUM CHLORIDE 200 MILLILITER(S): 9 INJECTION, SOLUTION INTRAVENOUS at 07:27

## 2023-03-17 RX ADMIN — POTASSIUM PHOSPHATE, MONOBASIC POTASSIUM PHOSPHATE, DIBASIC 83.33 MILLIMOLE(S): 236; 224 INJECTION, SOLUTION INTRAVENOUS at 13:33

## 2023-03-17 RX ADMIN — SODIUM CHLORIDE 1000 MILLILITER(S): 9 INJECTION, SOLUTION INTRAVENOUS at 17:30

## 2023-03-17 RX ADMIN — POTASSIUM PHOSPHATE, MONOBASIC POTASSIUM PHOSPHATE, DIBASIC 83.33 MILLIMOLE(S): 236; 224 INJECTION, SOLUTION INTRAVENOUS at 23:21

## 2023-03-17 RX ADMIN — Medication 50 MILLIEQUIVALENT(S): at 05:01

## 2023-03-17 RX ADMIN — INSULIN HUMAN 10 UNIT(S)/HR: 100 INJECTION, SOLUTION SUBCUTANEOUS at 07:28

## 2023-03-17 RX ADMIN — Medication 100 MILLIEQUIVALENT(S): at 14:07

## 2023-03-17 RX ADMIN — DEXTROSE MONOHYDRATE, SODIUM CHLORIDE, AND POTASSIUM CHLORIDE 200 MILLILITER(S): 50; .745; 4.5 INJECTION, SOLUTION INTRAVENOUS at 18:40

## 2023-03-17 RX ADMIN — Medication 100 MILLIEQUIVALENT(S): at 15:02

## 2023-03-17 RX ADMIN — Medication 50 MILLIEQUIVALENT(S): at 23:13

## 2023-03-17 RX ADMIN — INSULIN HUMAN 7 UNIT(S)/HR: 100 INJECTION, SOLUTION SUBCUTANEOUS at 04:53

## 2023-03-17 RX ADMIN — Medication 50 MILLIEQUIVALENT(S): at 07:26

## 2023-03-17 RX ADMIN — Medication 40 MILLIEQUIVALENT(S): at 20:49

## 2023-03-17 RX ADMIN — Medication 50 MILLIEQUIVALENT(S): at 20:44

## 2023-03-17 RX ADMIN — PHENYLEPHRINE HYDROCHLORIDE 2.89 MICROGRAM(S)/KG/MIN: 10 INJECTION INTRAVENOUS at 06:27

## 2023-03-17 RX ADMIN — SODIUM CHLORIDE 1000 MILLILITER(S): 9 INJECTION, SOLUTION INTRAVENOUS at 04:53

## 2023-03-17 RX ADMIN — DEXMEDETOMIDINE HYDROCHLORIDE IN 0.9% SODIUM CHLORIDE 5.78 MICROGRAM(S)/KG/HR: 4 INJECTION INTRAVENOUS at 09:20

## 2023-03-17 RX ADMIN — CHLORHEXIDINE GLUCONATE 1 APPLICATION(S): 213 SOLUTION TOPICAL at 05:31

## 2023-03-17 RX ADMIN — Medication 50 MILLIEQUIVALENT(S): at 04:53

## 2023-03-17 RX ADMIN — Medication 10 MILLIGRAM(S): at 08:24

## 2023-03-17 RX ADMIN — Medication 100 MILLIEQUIVALENT(S): at 17:31

## 2023-03-17 RX ADMIN — Medication 1 MILLIGRAM(S): at 23:10

## 2023-03-17 RX ADMIN — PIPERACILLIN AND TAZOBACTAM 25 GRAM(S): 4; .5 INJECTION, POWDER, LYOPHILIZED, FOR SOLUTION INTRAVENOUS at 09:25

## 2023-03-17 RX ADMIN — Medication 50 MILLIEQUIVALENT(S): at 02:44

## 2023-03-17 RX ADMIN — Medication 25 GRAM(S): at 13:17

## 2023-03-17 RX ADMIN — HYALURONIDASE (HUMAN RECOMBINANT) 150 UNIT(S): 150 INJECTION, SOLUTION SUBCUTANEOUS at 02:27

## 2023-03-17 RX ADMIN — Medication 50 MILLIEQUIVALENT(S): at 03:51

## 2023-03-17 NOTE — ED ADULT NURSE NOTE - CHIEF COMPLAINT QUOTE
generalized weakness, abdominal pain, nausea and vomiting x 2 weeks. hx of DM, hasn't taken his insulin today - BGL 483mg/dl. unable to get oral temp on pt in triage.

## 2023-03-17 NOTE — PATIENT PROFILE ADULT - FALL HARM RISK - HARM RISK INTERVENTIONS
Assistance with ambulation/Assistance OOB with selected safe patient handling equipment/Communicate Risk of Fall with Harm to all staff/Monitor for mental status changes/Monitor gait and stability/Reinforce activity limits and safety measures with patient and family/Tailored Fall Risk Interventions/Toileting schedule using arm’s reach rule for commode and bathroom/Use of alarms - bed, chair and/or voice tab/Visual Cue: Yellow wristband and red socks/Bed in lowest position, wheels locked, appropriate side rails in place/Call bell, personal items and telephone in reach/Instruct patient to call for assistance before getting out of bed or chair/Non-slip footwear when patient is out of bed/[place] to call system/Physically safe environment - no spills, clutter or unnecessary equipment/Purposeful Proactive Rounding/Room/bathroom lighting operational, light cord in reach

## 2023-03-17 NOTE — PHARMACOTHERAPY INTERVENTION NOTE - COMMENTS
Assisted in preparation of hyaluronidase for administration in the setting of infiltration of potassium chloride

## 2023-03-17 NOTE — H&P ADULT - ASSESSMENT
32M PMH IDDM, seizure disorder, AFib, EtOH abuse who presented on 3/16/23 with abdominal pain, insulin non-compliance, found to be in severe DKA    Impression/Plan:    HAGMA  DKA  Hyperglycemia  Poorly controlled IDDM  Insulin non-compliance  - Insulin infusion per DKA protocol  - Aggressive IVF resuscitation  - BMP Q4H  - Serial VBG to trend pH  - Aggressively replete electrolytes  - If K <3.3, hold insulin    EtOH disorder  - CIWA  - Ativan 4mg IV Q1H PRN for CIWA >=12  - Monitor mentation  - Checking UTox    Seizure disorder  - C/w home Keppra 1g BID  - Will check Keppra level    Abdominal pain  - F/u lipase  - F/u CT abdomen/pelvis  - Trend hepatic panel    AFib  - C/w home Eliquis    NPO except meds  PIV  GI PPx - Protonix  DVT PPx - on Eliquis  Admit to ICU  FULL code      Chandrakant Manrique M.D.  , Pulmonary & Critical Care Medicine  [facility]  Pulmonary and Sleep Medicine at [place]  [address]  [place] [address]  T: [phone]  F: [phone]   32M PMH IDDM, seizure disorder, AFib, EtOH abuse who presented on 3/16/23 with abdominal pain, insulin non-compliance, found to be in severe DKA    Impression/Plan:    HAGMA  DKA  Hyperglycemia  Poorly controlled IDDM  Insulin non-compliance  - Insulin infusion per DKA protocol  - Aggressive IVF resuscitation - LR @ 200cc/hr if FSG >250, and D5LR @ 200cc/hr if FSG <250  - BMP Q4H  - Serial VBG to trend pH  - Aggressively replete electrolytes  - If K <3.3, hold insulin    EtOH disorder  - CIWA  - Ativan 2mg IV Q2H PRN for CIWA >=12  - Required Flumazenil 0.2mg IVP overnight for oversedation after 4mg IVP of Ativan  - Monitor mentation  - F/u UTox    Seizure disorder  - C/w home Keppra 1g BID  - Will check Keppra level    Abdominal pain  - F/u lipase  - F/u CT abdomen/pelvis  - Trend hepatic panel    AFib  - C/w home Eliquis    NPO except meds  PIV  GI PPx - Protonix  DVT PPx - on Eliquis  Admit to ICU  FULL code      Chandrakant Manrique M.D.  , Pulmonary & Critical Care Medicine  [facility]  Pulmonary and Sleep Medicine at [place]  [address]  [place] [address]  T: [phone]  F: [phone]

## 2023-03-17 NOTE — CONSULT NOTE ADULT - SUBJECTIVE AND OBJECTIVE BOX
HPI:  32 y.o. Male with EtOH abuse and PMH IDDM, recurrent DKA, seizure disorder, AFib on AC, presented with c/o abdominal pain and found with severe DKA. Upon my evaluation patient is sedated, unable to provide history. Reportedly he run out of insulin about 5 days prior to this admission. Labs significant for WBC 20,5, , pH venous 6.9, Bicarb <6.0, unable to calculate AG, lactate 5.7. Patient was transferred to ICU. Initially hypokalemic requiring K+ supplementation until initiating IV insulin. Treatment with IVF and IV insulin gtt, which he is currently on. A1C 15.4%. Endocrinology consult was requested for DKA and DM management.     PAST MEDICAL & SURGICAL HISTORY:  Diabetes  Alcohol abuse  Pancreatitis  HTN (hypertension)  Atrial fibrillation  Non-ischemic cardiomyopathy  No significant past surgical history    Social History:  Unable to obtain     FAMILY HISTORY:  FH: hypertension    Allergies  No Known Allergies    REVIEW OF SYSTEMS:  Patient is sedated. Unable to obtain.     MEDICATIONS  (STANDING):  apixaban 5 milliGRAM(s) Oral two times a day  chlorhexidine 4% Liquid 1 Application(s) Topical <User Schedule>  dexMEDEtomidine Infusion 0.3 MICROgram(s)/kG/Hr (5.78 mL/Hr) IV Continuous <Continuous>  dextrose 5% + lactated ringers. 1000 milliLiter(s) (200 mL/Hr) IV Continuous <Continuous>  dextrose 50% Injectable 50 milliLiter(s) IV Push every 15 minutes  folic acid 1 milliGRAM(s) Oral daily  insulin regular Infusion 10 Unit(s)/Hr (10 mL/Hr) IV Continuous <Continuous>  lactated ringers. 1000 milliLiter(s) (200 mL/Hr) IV Continuous <Continuous>  levETIRAcetam 1000 milliGRAM(s) Oral two times a day  magnesium sulfate  IVPB 2 Gram(s) IV Intermittent once  multivitamin 1 Tablet(s) Oral daily  pantoprazole    Tablet 40 milliGRAM(s) Oral before breakfast  phenylephrine    Infusion 0.1 MICROgram(s)/kG/Min (2.89 mL/Hr) IV Continuous <Continuous>  piperacillin/tazobactam IVPB.. 3.375 Gram(s) IV Intermittent every 8 hours  potassium phosphate IVPB 30 milliMole(s) IV Intermittent once  thiamine 100 milliGRAM(s) Oral daily    MEDICATIONS  (PRN):  LORazepam   Injectable 2 milliGRAM(s) IV Push every 2 hours PRN CIWA >=12      Vital Signs Last 24 Hrs  T(C): 35.9 (17 Mar 2023 12:15), Max: 35.9 (17 Mar 2023 12:15)  T(F): 96.6 (17 Mar 2023 12:15), Max: 96.6 (17 Mar 2023 12:15)  HR: 82 (17 Mar 2023 12:15) (80 - 145)  BP: 89/55 (17 Mar 2023 12:15) (72/50 - 155/114)  BP(mean): 65 (17 Mar 2023 12:15) (56 - 129)  RR: 14 (17 Mar 2023 12:15) (11 - 25)  SpO2: 100% (17 Mar 2023 12:15) (100% - 100%)    Parameters below as of 17 Mar 2023 12:00  Patient On (Oxygen Delivery Method): nasal cannula  O2 Flow (L/min): 2  O2 Concentration (%): 28  Height (cm): 170.2 (03-16-23 @ 21:43)  Weight (kg): 77.1 (03-16-23 @ 21:43)  BMI (kg/m2): 26.6 (03-16-23 @ 21:43)  BSA (m2): 1.89 (03-16-23 @ 21:43)    Physical Exam:    Constitutional: Sedated, does not follow commands  HEENT: EOMI, no exomphalos  Neck: trachea midline, no thyroid enlargement  Respiratory: B/l air entry with transferred tracheal sounds.   Cardiovascular: S1 and S2, RRR  Gastrointestinal: BS+, soft, ntnd  Extremities: No peripheral edema  Neurological: Sedated, moves all extremities   Skin: no rashes,    LABS  03-17    142  |  114<H>  |  21.6<H>  ----------------------------<  287<H>  3.6   |  <6.0<LL>  |  1.64<H>    Ca    8.8      17 Mar 2023 12:10  Phos  1.0     03-17  Mg     1.7     03-17    TPro  5.3<L>  /  Alb  3.2<L>  /  TBili  0.6  /  DBili  x   /  AST  12  /  ALT  6   /  AlkPhos  115  03-17                          12.0   14.05 )-----------( 196      ( 17 Mar 2023 08:47 )             34.9       A1C with Estimated Average Glucose Result: 15.4 % (03-16-23 @ 23:20)    Ketone - Urine: Large (03-17 @ 08:00)    Alkaline Phosphatase, Serum: 115 U/L (03-17-23 @ 12:10)  Albumin, Serum: 3.2 g/dL (03-17-23 @ 12:10)  Aspartate Aminotransferase (AST/SGOT): 12 U/L (03-17-23 @ 12:10)  Alanine Aminotransferase (ALT/SGPT): 6 U/L (03-17-23 @ 12:10)  Alkaline Phosphatase, Serum: 130 U/L (03-17-23 @ 08:47)  Albumin, Serum: 3.7 g/dL (03-17-23 @ 08:47)  Aspartate Aminotransferase (AST/SGOT): 15 U/L (03-17-23 @ 08:47)  Alanine Aminotransferase (ALT/SGPT): 7 U/L (03-17-23 @ 08:47)  Aspartate Aminotransferase (AST/SGOT): 12 U/L (03-17-23 @ 03:51)  Alanine Aminotransferase (ALT/SGPT): 7 U/L (03-17-23 @ 03:51)  Alkaline Phosphatase, Serum: 152 U/L (03-17-23 @ 03:51)  Albumin, Serum: 3.8 g/dL (03-17-23 @ 03:51)  Alkaline Phosphatase, Serum: 180 U/L (03-16-23 @ 23:20)  Albumin, Serum: 4.6 g/dL (03-16-23 @ 23:20)  Aspartate Aminotransferase (AST/SGOT): 19 U/L (03-16-23 @ 23:20)  Alanine Aminotransferase (ALT/SGPT): 10 U/L (03-16-23 @ 23:20)    CAPILLARY BLOOD GLUCOSE    POCT Blood Glucose.: 268 mg/dL (17 Mar 2023 11:52)  POCT Blood Glucose.: 219 mg/dL (17 Mar 2023 10:52)  POCT Blood Glucose.: 223 mg/dL (17 Mar 2023 10:13)  POCT Blood Glucose.: 299 mg/dL (17 Mar 2023 08:59)  POCT Blood Glucose.: 335 mg/dL (17 Mar 2023 07:57)  POCT Blood Glucose.: 373 mg/dL (17 Mar 2023 07:24)  POCT Blood Glucose.: 522 mg/dL (17 Mar 2023 06:00)  POCT Blood Glucose.: 511 mg/dL (17 Mar 2023 04:23)  POCT Blood Glucose.: 502 mg/dL (17 Mar 2023 01:59)  POCT Blood Glucose.: 481 mg/dL (17 Mar 2023 00:58)  POCT Blood Glucose.: 515 mg/dL (16 Mar 2023 23:35)  POCT Blood Glucose.: 483 mg/dL (16 Mar 2023 21:49)

## 2023-03-17 NOTE — H&P ADULT - HISTORY OF PRESENT ILLNESS
32M PMH IDDM, seizure disorder, AFib, EtOH abuse who presented on 3/16/23 with abdominal pain. He notes he has not taken his insulin for approximately 5 days due to running out. He is a poor historian at the time of evaluation. He is unsure when he had his last alcoholic beverage. He is unsure if he is having fevers or chills. He does not remember his dose of insulin. In the ED he was noted with WBC 21, VBG <6.94/34/66, K 3.5, A1c 15.4. He was given 2L IVF in the ED.

## 2023-03-17 NOTE — CONSULT NOTE ADULT - ASSESSMENT
32 y.o. Male with EtOH abuse and PMH IDDM, recurrent DKA, seizure disorder, AFib on AC, presented with c/o abdominal pain and found with severe DKA. Upon my evaluation patient is sedated, unable to provide history. Reportedly he run out of insulin about 5 days prior to this admission. Labs significant for WBC 20,5, , pH venous 6.9, Bicarb <6.0, unable to calculate AG, lactate 5.7. Patient was transferred to ICU, treated with IVF and IV insulin gtt, which he is currently on. A1C 15.4%. Endocrinology consult was requested for DKA and DM management.     # Severe, recurrent DKA likely due to non compliance   However, secondary causes should be ruled out, (elevated WBC)  Continue current regimen with IV gtt as per DKA protocol  Endocrinology will follow as ketosis resolves and patient transitioned to sq insulin  Allow at least 2-4 hours overlapping basal insulin with IV drip to prevent opening AG.  Monitor electrolytes and replace accordingly    # Poorly controlled DM exacerbated by DKA  ?if autoimmune DM  PADMINI and ICA negative in the past but insulopenic with low C-peptide 0.3  Will resume basal-bolus regimen once ketosis resolves    # Elevated WBC  R/o infection  F/u Cx  Continue Antimicrobials    # Hx of ETOH abuse  Watch for withdrawal  Thiamine, Folate, MVT     none

## 2023-03-17 NOTE — ED PROCEDURE NOTE - PROCEDURE NAME, MLM
Point of Care Ultrasound Vascular Access
Point of Care Ultrasound Vascular Access
Central Line Insertion

## 2023-03-17 NOTE — ED ADULT NURSE NOTE - OBJECTIVE STATEMENT
pt presents to ed with abdominal pain.  pt with hx dm, last time he took his medicine was "2 weeks ago." per brother "5 days ago."  pt lethargic and mildly confused.  NAD at this time.

## 2023-03-17 NOTE — H&P ADULT - NSHPPHYSICALEXAM_GEN_ALL_CORE
GENERAL: Diaphoretic  HEENT: NC/AT  NECK: Supple, trachea midline  PULM: CTA anteriorly  CVS: +S1, S2  ABD: Soft, NT/ND  EXT: No pedal edema  SKIN: Warm and well perfused, no rashes noted.  NEURO: Confused, unable to coherently answer questions

## 2023-03-17 NOTE — ED ADULT NURSE REASSESSMENT NOTE - NS ED NURSE REASSESS COMMENT FT1
NITISH Mcdonough notified of infiltrate.  pharmacist Omkar to meet in ICU to give dosing to PA for Hylenex.

## 2023-03-18 LAB
ALBUMIN SERPL ELPH-MCNC: 3.3 G/DL — SIGNIFICANT CHANGE UP (ref 3.3–5.2)
ALBUMIN SERPL ELPH-MCNC: 3.4 G/DL — SIGNIFICANT CHANGE UP (ref 3.3–5.2)
ALP SERPL-CCNC: 107 U/L — SIGNIFICANT CHANGE UP (ref 40–120)
ALP SERPL-CCNC: 110 U/L — SIGNIFICANT CHANGE UP (ref 40–120)
ALT FLD-CCNC: 7 U/L — SIGNIFICANT CHANGE UP
ALT FLD-CCNC: 7 U/L — SIGNIFICANT CHANGE UP
ANION GAP SERPL CALC-SCNC: 10 MMOL/L — SIGNIFICANT CHANGE UP (ref 5–17)
ANION GAP SERPL CALC-SCNC: 12 MMOL/L — SIGNIFICANT CHANGE UP (ref 5–17)
ANION GAP SERPL CALC-SCNC: 17 MMOL/L — SIGNIFICANT CHANGE UP (ref 5–17)
ANION GAP SERPL CALC-SCNC: 17 MMOL/L — SIGNIFICANT CHANGE UP (ref 5–17)
ANION GAP SERPL CALC-SCNC: 23 MMOL/L — HIGH (ref 5–17)
AST SERPL-CCNC: 14 U/L — SIGNIFICANT CHANGE UP
AST SERPL-CCNC: 16 U/L — SIGNIFICANT CHANGE UP
BASE EXCESS BLDV CALC-SCNC: -18.8 MMOL/L — LOW (ref -2–3)
BASOPHILS # BLD AUTO: 0.01 K/UL — SIGNIFICANT CHANGE UP (ref 0–0.2)
BASOPHILS NFR BLD AUTO: 0.1 % — SIGNIFICANT CHANGE UP (ref 0–2)
BILIRUB SERPL-MCNC: 0.7 MG/DL — SIGNIFICANT CHANGE UP (ref 0.4–2)
BILIRUB SERPL-MCNC: 0.9 MG/DL — SIGNIFICANT CHANGE UP (ref 0.4–2)
BLOOD GAS COMMENTS, VENOUS: SIGNIFICANT CHANGE UP
BUN SERPL-MCNC: 10.1 MG/DL — SIGNIFICANT CHANGE UP (ref 8–20)
BUN SERPL-MCNC: 13.2 MG/DL — SIGNIFICANT CHANGE UP (ref 8–20)
BUN SERPL-MCNC: 4.7 MG/DL — LOW (ref 8–20)
BUN SERPL-MCNC: 5.8 MG/DL — LOW (ref 8–20)
BUN SERPL-MCNC: 7.2 MG/DL — LOW (ref 8–20)
CA-I SERPL-SCNC: 1.42 MMOL/L — HIGH (ref 1.15–1.33)
CALCIUM SERPL-MCNC: 8 MG/DL — LOW (ref 8.4–10.5)
CALCIUM SERPL-MCNC: 8.4 MG/DL — SIGNIFICANT CHANGE UP (ref 8.4–10.5)
CALCIUM SERPL-MCNC: 8.5 MG/DL — SIGNIFICANT CHANGE UP (ref 8.4–10.5)
CALCIUM SERPL-MCNC: 8.7 MG/DL — SIGNIFICANT CHANGE UP (ref 8.4–10.5)
CALCIUM SERPL-MCNC: 9.1 MG/DL — SIGNIFICANT CHANGE UP (ref 8.4–10.5)
CHLORIDE BLDV-SCNC: 118 MMOL/L — HIGH (ref 96–108)
CHLORIDE SERPL-SCNC: 115 MMOL/L — HIGH (ref 96–108)
CHLORIDE SERPL-SCNC: 116 MMOL/L — HIGH (ref 96–108)
CHLORIDE SERPL-SCNC: 118 MMOL/L — HIGH (ref 96–108)
CHLORIDE SERPL-SCNC: 118 MMOL/L — HIGH (ref 96–108)
CHLORIDE SERPL-SCNC: 119 MMOL/L — HIGH (ref 96–108)
CO2 SERPL-SCNC: 13 MMOL/L — LOW (ref 22–29)
CO2 SERPL-SCNC: 15 MMOL/L — LOW (ref 22–29)
CO2 SERPL-SCNC: 19 MMOL/L — LOW (ref 22–29)
CO2 SERPL-SCNC: 21 MMOL/L — LOW (ref 22–29)
CO2 SERPL-SCNC: 8 MMOL/L — CRITICAL LOW (ref 22–29)
CREAT SERPL-MCNC: 0.68 MG/DL — SIGNIFICANT CHANGE UP (ref 0.5–1.3)
CREAT SERPL-MCNC: 0.84 MG/DL — SIGNIFICANT CHANGE UP (ref 0.5–1.3)
CREAT SERPL-MCNC: 0.95 MG/DL — SIGNIFICANT CHANGE UP (ref 0.5–1.3)
CREAT SERPL-MCNC: 1.16 MG/DL — SIGNIFICANT CHANGE UP (ref 0.5–1.3)
CREAT SERPL-MCNC: 1.22 MG/DL — SIGNIFICANT CHANGE UP (ref 0.5–1.3)
EGFR: 108 ML/MIN/1.73M2 — SIGNIFICANT CHANGE UP
EGFR: 118 ML/MIN/1.73M2 — SIGNIFICANT CHANGE UP
EGFR: 126 ML/MIN/1.73M2 — SIGNIFICANT CHANGE UP
EGFR: 80 ML/MIN/1.73M2 — SIGNIFICANT CHANGE UP
EGFR: 85 ML/MIN/1.73M2 — SIGNIFICANT CHANGE UP
EOSINOPHIL # BLD AUTO: 0 K/UL — SIGNIFICANT CHANGE UP (ref 0–0.5)
EOSINOPHIL NFR BLD AUTO: 0 % — SIGNIFICANT CHANGE UP (ref 0–6)
GAS PNL BLDA: SIGNIFICANT CHANGE UP
GAS PNL BLDA: SIGNIFICANT CHANGE UP
GAS PNL BLDV: 143 MMOL/L — SIGNIFICANT CHANGE UP (ref 136–145)
GAS PNL BLDV: SIGNIFICANT CHANGE UP
GAS PNL BLDV: SIGNIFICANT CHANGE UP
GLUCOSE BLDC GLUCOMTR-MCNC: 100 MG/DL — HIGH (ref 70–99)
GLUCOSE BLDC GLUCOMTR-MCNC: 102 MG/DL — HIGH (ref 70–99)
GLUCOSE BLDC GLUCOMTR-MCNC: 105 MG/DL — HIGH (ref 70–99)
GLUCOSE BLDC GLUCOMTR-MCNC: 137 MG/DL — HIGH (ref 70–99)
GLUCOSE BLDC GLUCOMTR-MCNC: 151 MG/DL — HIGH (ref 70–99)
GLUCOSE BLDC GLUCOMTR-MCNC: 161 MG/DL — HIGH (ref 70–99)
GLUCOSE BLDC GLUCOMTR-MCNC: 168 MG/DL — HIGH (ref 70–99)
GLUCOSE BLDC GLUCOMTR-MCNC: 171 MG/DL — HIGH (ref 70–99)
GLUCOSE BLDC GLUCOMTR-MCNC: 181 MG/DL — HIGH (ref 70–99)
GLUCOSE BLDC GLUCOMTR-MCNC: 182 MG/DL — HIGH (ref 70–99)
GLUCOSE BLDC GLUCOMTR-MCNC: 213 MG/DL — HIGH (ref 70–99)
GLUCOSE BLDC GLUCOMTR-MCNC: 257 MG/DL — HIGH (ref 70–99)
GLUCOSE BLDC GLUCOMTR-MCNC: 321 MG/DL — HIGH (ref 70–99)
GLUCOSE BLDC GLUCOMTR-MCNC: 339 MG/DL — HIGH (ref 70–99)
GLUCOSE BLDC GLUCOMTR-MCNC: 355 MG/DL — HIGH (ref 70–99)
GLUCOSE BLDC GLUCOMTR-MCNC: 96 MG/DL — SIGNIFICANT CHANGE UP (ref 70–99)
GLUCOSE BLDV-MCNC: 380 MG/DL — HIGH (ref 70–99)
GLUCOSE SERPL-MCNC: 104 MG/DL — HIGH (ref 70–99)
GLUCOSE SERPL-MCNC: 145 MG/DL — HIGH (ref 70–99)
GLUCOSE SERPL-MCNC: 166 MG/DL — HIGH (ref 70–99)
GLUCOSE SERPL-MCNC: 233 MG/DL — HIGH (ref 70–99)
GLUCOSE SERPL-MCNC: 372 MG/DL — HIGH (ref 70–99)
HCO3 BLDV-SCNC: 9 MMOL/L — CRITICAL LOW (ref 22–29)
HCT VFR BLD CALC: 28.2 % — LOW (ref 39–50)
HCT VFR BLDA CALC: 32 % — SIGNIFICANT CHANGE UP
HGB BLD CALC-MCNC: 10.6 G/DL — LOW (ref 12.6–17.4)
HGB BLD-MCNC: 10.1 G/DL — LOW (ref 13–17)
HOROWITZ INDEX BLDV+IHG-RTO: SIGNIFICANT CHANGE UP
IMM GRANULOCYTES NFR BLD AUTO: 0.6 % — SIGNIFICANT CHANGE UP (ref 0–0.9)
LACTATE BLDV-MCNC: 1.2 MMOL/L — SIGNIFICANT CHANGE UP (ref 0.5–2)
LYMPHOCYTES # BLD AUTO: 0.41 K/UL — LOW (ref 1–3.3)
LYMPHOCYTES # BLD AUTO: 4.3 % — LOW (ref 13–44)
MAGNESIUM SERPL-MCNC: 1.7 MG/DL — SIGNIFICANT CHANGE UP (ref 1.6–2.6)
MAGNESIUM SERPL-MCNC: 1.9 MG/DL — SIGNIFICANT CHANGE UP (ref 1.8–2.6)
MAGNESIUM SERPL-MCNC: 1.9 MG/DL — SIGNIFICANT CHANGE UP (ref 1.8–2.6)
MCHC RBC-ENTMCNC: 28.4 PG — SIGNIFICANT CHANGE UP (ref 27–34)
MCHC RBC-ENTMCNC: 35.8 GM/DL — SIGNIFICANT CHANGE UP (ref 32–36)
MCV RBC AUTO: 79.2 FL — LOW (ref 80–100)
MONOCYTES # BLD AUTO: 1.13 K/UL — HIGH (ref 0–0.9)
MONOCYTES NFR BLD AUTO: 11.8 % — SIGNIFICANT CHANGE UP (ref 2–14)
NEUTROPHILS # BLD AUTO: 7.99 K/UL — HIGH (ref 1.8–7.4)
NEUTROPHILS NFR BLD AUTO: 83.2 % — HIGH (ref 43–77)
PCO2 BLDV: 21 MMHG — LOW (ref 42–55)
PH BLDV: 7.23 — LOW (ref 7.32–7.43)
PHOSPHATE SERPL-MCNC: 1 MG/DL — CRITICAL LOW (ref 2.4–4.7)
PHOSPHATE SERPL-MCNC: 1.3 MG/DL — LOW (ref 2.4–4.7)
PHOSPHATE SERPL-MCNC: 3 MG/DL — SIGNIFICANT CHANGE UP (ref 2.4–4.7)
PHOSPHATE SERPL-MCNC: 3.1 MG/DL — SIGNIFICANT CHANGE UP (ref 2.4–4.7)
PHOSPHATE SERPL-MCNC: 3.6 MG/DL — SIGNIFICANT CHANGE UP (ref 2.4–4.7)
PLATELET # BLD AUTO: 159 K/UL — SIGNIFICANT CHANGE UP (ref 150–400)
PO2 BLDV: 53 MMHG — HIGH (ref 25–45)
POTASSIUM BLDV-SCNC: 3.7 MMOL/L — SIGNIFICANT CHANGE UP (ref 3.5–5.1)
POTASSIUM SERPL-MCNC: 3 MMOL/L — LOW (ref 3.5–5.3)
POTASSIUM SERPL-MCNC: 3.3 MMOL/L — LOW (ref 3.5–5.3)
POTASSIUM SERPL-MCNC: 3.5 MMOL/L — SIGNIFICANT CHANGE UP (ref 3.5–5.3)
POTASSIUM SERPL-MCNC: 3.9 MMOL/L — SIGNIFICANT CHANGE UP (ref 3.5–5.3)
POTASSIUM SERPL-MCNC: 4.1 MMOL/L — SIGNIFICANT CHANGE UP (ref 3.5–5.3)
POTASSIUM SERPL-SCNC: 3 MMOL/L — LOW (ref 3.5–5.3)
POTASSIUM SERPL-SCNC: 3.3 MMOL/L — LOW (ref 3.5–5.3)
POTASSIUM SERPL-SCNC: 3.5 MMOL/L — SIGNIFICANT CHANGE UP (ref 3.5–5.3)
POTASSIUM SERPL-SCNC: 3.9 MMOL/L — SIGNIFICANT CHANGE UP (ref 3.5–5.3)
POTASSIUM SERPL-SCNC: 4.1 MMOL/L — SIGNIFICANT CHANGE UP (ref 3.5–5.3)
PROT SERPL-MCNC: 5.2 G/DL — LOW (ref 6.6–8.7)
PROT SERPL-MCNC: 5.3 G/DL — LOW (ref 6.6–8.7)
RBC # BLD: 3.56 M/UL — LOW (ref 4.2–5.8)
RBC # FLD: 18.1 % — HIGH (ref 10.3–14.5)
SAO2 % BLDV: 89.5 % — SIGNIFICANT CHANGE UP
SODIUM SERPL-SCNC: 146 MMOL/L — HIGH (ref 135–145)
SODIUM SERPL-SCNC: 148 MMOL/L — HIGH (ref 135–145)
SODIUM SERPL-SCNC: 148 MMOL/L — HIGH (ref 135–145)
SODIUM SERPL-SCNC: 149 MMOL/L — HIGH (ref 135–145)
SODIUM SERPL-SCNC: 150 MMOL/L — HIGH (ref 135–145)
WBC # BLD: 9.6 K/UL — SIGNIFICANT CHANGE UP (ref 3.8–10.5)
WBC # FLD AUTO: 9.6 K/UL — SIGNIFICANT CHANGE UP (ref 3.8–10.5)

## 2023-03-18 PROCEDURE — 99233 SBSQ HOSP IP/OBS HIGH 50: CPT

## 2023-03-18 RX ORDER — ENOXAPARIN SODIUM 100 MG/ML
40 INJECTION SUBCUTANEOUS EVERY 24 HOURS
Refills: 0 | Status: DISCONTINUED | OUTPATIENT
Start: 2023-03-19 | End: 2023-03-28

## 2023-03-18 RX ORDER — DIAZEPAM 5 MG
10 TABLET ORAL EVERY 4 HOURS
Refills: 0 | Status: DISCONTINUED | OUTPATIENT
Start: 2023-03-18 | End: 2023-03-21

## 2023-03-18 RX ORDER — PHENOBARBITAL 60 MG
130 TABLET ORAL
Refills: 0 | Status: DISCONTINUED | OUTPATIENT
Start: 2023-03-18 | End: 2023-03-19

## 2023-03-18 RX ORDER — PHENOBARBITAL 60 MG
770 TABLET ORAL ONCE
Refills: 0 | Status: DISCONTINUED | OUTPATIENT
Start: 2023-03-18 | End: 2023-03-18

## 2023-03-18 RX ORDER — SODIUM CHLORIDE 9 MG/ML
1000 INJECTION, SOLUTION INTRAVENOUS ONCE
Refills: 0 | Status: COMPLETED | OUTPATIENT
Start: 2023-03-18 | End: 2023-03-18

## 2023-03-18 RX ORDER — SODIUM CHLORIDE 9 MG/ML
1000 INJECTION, SOLUTION INTRAVENOUS
Refills: 0 | Status: DISCONTINUED | OUTPATIENT
Start: 2023-03-18 | End: 2023-03-30

## 2023-03-18 RX ORDER — INSULIN LISPRO 100/ML
3 VIAL (ML) SUBCUTANEOUS EVERY 6 HOURS
Refills: 0 | Status: DISCONTINUED | OUTPATIENT
Start: 2023-03-18 | End: 2023-03-19

## 2023-03-18 RX ORDER — SODIUM CHLORIDE 9 MG/ML
500 INJECTION, SOLUTION INTRAVENOUS ONCE
Refills: 0 | Status: COMPLETED | OUTPATIENT
Start: 2023-03-18 | End: 2023-03-18

## 2023-03-18 RX ORDER — POTASSIUM CHLORIDE 20 MEQ
20 PACKET (EA) ORAL
Refills: 0 | Status: COMPLETED | OUTPATIENT
Start: 2023-03-18 | End: 2023-03-18

## 2023-03-18 RX ORDER — GLUCAGON INJECTION, SOLUTION 0.5 MG/.1ML
1 INJECTION, SOLUTION SUBCUTANEOUS ONCE
Refills: 0 | Status: DISCONTINUED | OUTPATIENT
Start: 2023-03-18 | End: 2023-03-30

## 2023-03-18 RX ORDER — PHENOBARBITAL 60 MG
260 TABLET ORAL ONCE
Refills: 0 | Status: DISCONTINUED | OUTPATIENT
Start: 2023-03-18 | End: 2023-03-18

## 2023-03-18 RX ORDER — POTASSIUM CHLORIDE 20 MEQ
20 PACKET (EA) ORAL ONCE
Refills: 0 | Status: COMPLETED | OUTPATIENT
Start: 2023-03-18 | End: 2023-03-18

## 2023-03-18 RX ORDER — PHENOBARBITAL 60 MG
64.8 TABLET ORAL EVERY 4 HOURS
Refills: 0 | Status: DISCONTINUED | OUTPATIENT
Start: 2023-03-18 | End: 2023-03-18

## 2023-03-18 RX ORDER — POTASSIUM CHLORIDE 20 MEQ
10 PACKET (EA) ORAL
Refills: 0 | Status: COMPLETED | OUTPATIENT
Start: 2023-03-18 | End: 2023-03-18

## 2023-03-18 RX ORDER — POTASSIUM CHLORIDE 20 MEQ
40 PACKET (EA) ORAL ONCE
Refills: 0 | Status: COMPLETED | OUTPATIENT
Start: 2023-03-18 | End: 2023-03-18

## 2023-03-18 RX ORDER — INSULIN LISPRO 100/ML
VIAL (ML) SUBCUTANEOUS EVERY 6 HOURS
Refills: 0 | Status: DISCONTINUED | OUTPATIENT
Start: 2023-03-18 | End: 2023-03-21

## 2023-03-18 RX ORDER — MAGNESIUM SULFATE 500 MG/ML
2 VIAL (ML) INJECTION ONCE
Refills: 0 | Status: COMPLETED | OUTPATIENT
Start: 2023-03-18 | End: 2023-03-18

## 2023-03-18 RX ORDER — DEXTROSE 50 % IN WATER 50 %
15 SYRINGE (ML) INTRAVENOUS ONCE
Refills: 0 | Status: DISCONTINUED | OUTPATIENT
Start: 2023-03-18 | End: 2023-03-30

## 2023-03-18 RX ORDER — INSULIN GLARGINE 100 [IU]/ML
25 INJECTION, SOLUTION SUBCUTANEOUS AT BEDTIME
Refills: 0 | Status: DISCONTINUED | OUTPATIENT
Start: 2023-03-18 | End: 2023-03-19

## 2023-03-18 RX ORDER — POTASSIUM PHOSPHATE, MONOBASIC POTASSIUM PHOSPHATE, DIBASIC 236; 224 MG/ML; MG/ML
30 INJECTION, SOLUTION INTRAVENOUS ONCE
Refills: 0 | Status: COMPLETED | OUTPATIENT
Start: 2023-03-18 | End: 2023-03-18

## 2023-03-18 RX ADMIN — DEXTROSE MONOHYDRATE, SODIUM CHLORIDE, AND POTASSIUM CHLORIDE 200 MILLILITER(S): 50; .745; 4.5 INJECTION, SOLUTION INTRAVENOUS at 05:07

## 2023-03-18 RX ADMIN — INSULIN GLARGINE 25 UNIT(S): 100 INJECTION, SOLUTION SUBCUTANEOUS at 15:42

## 2023-03-18 RX ADMIN — Medication 100 MILLIEQUIVALENT(S): at 19:48

## 2023-03-18 RX ADMIN — Medication 10 MILLIGRAM(S): at 23:43

## 2023-03-18 RX ADMIN — Medication 100 MILLIEQUIVALENT(S): at 06:41

## 2023-03-18 RX ADMIN — POTASSIUM PHOSPHATE, MONOBASIC POTASSIUM PHOSPHATE, DIBASIC 83.33 MILLIMOLE(S): 236; 224 INJECTION, SOLUTION INTRAVENOUS at 06:00

## 2023-03-18 RX ADMIN — PIPERACILLIN AND TAZOBACTAM 25 GRAM(S): 4; .5 INJECTION, POWDER, LYOPHILIZED, FOR SOLUTION INTRAVENOUS at 01:24

## 2023-03-18 RX ADMIN — LEVETIRACETAM 400 MILLIGRAM(S): 250 TABLET, FILM COATED ORAL at 19:48

## 2023-03-18 RX ADMIN — SODIUM CHLORIDE 1000 MILLILITER(S): 9 INJECTION, SOLUTION INTRAVENOUS at 09:10

## 2023-03-18 RX ADMIN — LEVETIRACETAM 400 MILLIGRAM(S): 250 TABLET, FILM COATED ORAL at 07:54

## 2023-03-18 RX ADMIN — CHLORHEXIDINE GLUCONATE 1 APPLICATION(S): 213 SOLUTION TOPICAL at 06:43

## 2023-03-18 RX ADMIN — DEXTROSE MONOHYDRATE, SODIUM CHLORIDE, AND POTASSIUM CHLORIDE 250 MILLILITER(S): 50; .745; 4.5 INJECTION, SOLUTION INTRAVENOUS at 18:34

## 2023-03-18 RX ADMIN — DEXTROSE MONOHYDRATE, SODIUM CHLORIDE, AND POTASSIUM CHLORIDE 250 MILLILITER(S): 50; .745; 4.5 INJECTION, SOLUTION INTRAVENOUS at 19:47

## 2023-03-18 RX ADMIN — Medication 50 MILLIEQUIVALENT(S): at 04:23

## 2023-03-18 RX ADMIN — DEXTROSE MONOHYDRATE, SODIUM CHLORIDE, AND POTASSIUM CHLORIDE 200 MILLILITER(S): 50; .745; 4.5 INJECTION, SOLUTION INTRAVENOUS at 11:42

## 2023-03-18 RX ADMIN — Medication 40 MILLIEQUIVALENT(S): at 01:25

## 2023-03-18 RX ADMIN — Medication 40 MILLIEQUIVALENT(S): at 04:23

## 2023-03-18 RX ADMIN — Medication 50 MILLIEQUIVALENT(S): at 01:23

## 2023-03-18 RX ADMIN — Medication 1 MILLIGRAM(S): at 11:42

## 2023-03-18 RX ADMIN — SODIUM CHLORIDE 1000 MILLILITER(S): 9 INJECTION, SOLUTION INTRAVENOUS at 13:11

## 2023-03-18 RX ADMIN — Medication 100 MILLIEQUIVALENT(S): at 07:54

## 2023-03-18 RX ADMIN — PIPERACILLIN AND TAZOBACTAM 25 GRAM(S): 4; .5 INJECTION, POWDER, LYOPHILIZED, FOR SOLUTION INTRAVENOUS at 09:10

## 2023-03-18 RX ADMIN — SODIUM CHLORIDE 500 MILLILITER(S): 9 INJECTION, SOLUTION INTRAVENOUS at 01:51

## 2023-03-18 RX ADMIN — Medication 50 MILLIEQUIVALENT(S): at 03:09

## 2023-03-18 RX ADMIN — Medication 100 MILLIEQUIVALENT(S): at 15:43

## 2023-03-18 RX ADMIN — Medication 260 MILLIGRAM(S): at 12:00

## 2023-03-18 RX ADMIN — Medication 130 MILLIGRAM(S): at 22:43

## 2023-03-18 RX ADMIN — Medication 100 MILLIGRAM(S): at 11:42

## 2023-03-18 RX ADMIN — APIXABAN 5 MILLIGRAM(S): 2.5 TABLET, FILM COATED ORAL at 06:00

## 2023-03-18 RX ADMIN — CHLORHEXIDINE GLUCONATE 1 APPLICATION(S): 213 SOLUTION TOPICAL at 11:43

## 2023-03-18 RX ADMIN — SODIUM CHLORIDE 500 MILLILITER(S): 9 INJECTION, SOLUTION INTRAVENOUS at 21:35

## 2023-03-18 RX ADMIN — Medication 2 MILLIGRAM(S): at 10:16

## 2023-03-18 RX ADMIN — PANTOPRAZOLE SODIUM 40 MILLIGRAM(S): 20 TABLET, DELAYED RELEASE ORAL at 06:02

## 2023-03-18 RX ADMIN — Medication 100 MILLIEQUIVALENT(S): at 20:59

## 2023-03-18 RX ADMIN — Medication 100 MILLIEQUIVALENT(S): at 22:01

## 2023-03-18 RX ADMIN — Medication 423.68 MILLIGRAM(S): at 13:22

## 2023-03-18 RX ADMIN — Medication 3 UNIT(S): at 18:34

## 2023-03-18 RX ADMIN — Medication 25 GRAM(S): at 09:09

## 2023-03-18 RX ADMIN — Medication 100 MILLIEQUIVALENT(S): at 05:59

## 2023-03-18 NOTE — PROGRESS NOTE ADULT - SUBJECTIVE AND OBJECTIVE BOX
Interval Events:  no overnight events  follow up on DKA, uncontrolled DM    Patient seen and examined at bedside. Patient is sedated due to signs of ETOH withdrawal     REVIEW OF SYSTEMS: Sedated, unable to obtain    Allergy Status Unknown  No Known Allergies      MEDICATIONS  (STANDING):  chlorhexidine 2% Cloths 1 Application(s) Topical daily  dextrose 5% + lactated ringers with potassium chloride 20 mEq/L 1000 milliLiter(s) (250 mL/Hr) IV Continuous <Continuous>  dextrose 50% Injectable 50 milliLiter(s) IV Push every 15 minutes  folic acid Injectable 1 milliGRAM(s) IV Push daily  insulin regular Infusion 10 Unit(s)/Hr (10 mL/Hr) IV Continuous <Continuous>  lactated ringers Bolus 1000 milliLiter(s) IV Bolus once  levETIRAcetam  IVPB 1000 milliGRAM(s) IV Intermittent every 12 hours  multivitamin 1 Tablet(s) Oral daily  pantoprazole    Tablet 40 milliGRAM(s) Oral before breakfast  PHENobarbital 64.8 milliGRAM(s) Oral every 4 hours  PHENobarbital IVPB 770 milliGRAM(s) IV Intermittent once  thiamine Injectable 100 milliGRAM(s) IV Push daily    MEDICATIONS  (PRN):  diazepam  Injectable 10 milliGRAM(s) IV Push every 4 hours PRN Breakthrough agitation  PHENobarbital Injectable 130 milliGRAM(s) IV Push every 1 hour PRN CIWA >8      Vital Signs Last 24 Hrs  T(C): 36.9 (18 Mar 2023 11:35), Max: 37.5 (18 Mar 2023 02:00)  T(F): 98.5 (18 Mar 2023 11:35), Max: 99.5 (18 Mar 2023 02:00)  HR: 99 (18 Mar 2023 12:00) (80 - 114)  BP: 116/49 (18 Mar 2023 12:00) (80/47 - 127/75)  BP(mean): 66 (18 Mar 2023 12:00) (59 - 97)  RR: 14 (18 Mar 2023 12:00) (10 - 26)  SpO2: 100% (18 Mar 2023 12:00) (98% - 100%)    Parameters below as of 18 Mar 2023 12:00  Patient On (Oxygen Delivery Method): room air      Weight (kg): 77.1 (03-16-23 @ 21:43)    Physical Exam:    Constitutional: NAD, Sedated  HEENT: EOMI, no exophalmos  Neck: trachea midline, no thyroid enlargement  Respiratory: B/l air entry with no auxiliary sounds  Cardiovascular: S1 and S2, RRR  Gastrointestinal: BS+, soft, ntnd  Extremities: No peripheral edema  Neurological: Sedated. Moves all extremities.   Skin: no rashes    LABS  03-18    148<H>  |  116<H>  |  7.2<L>  ----------------------------<  166<H>  4.1   |  15.0<L>  |  0.95    Ca    8.5      18 Mar 2023 08:08  Phos  3.1     03-18  Mg     1.7     03-18    TPro  5.3<L>  /  Alb  3.4  /  TBili  0.7  /  DBili  x   /  AST  14  /  ALT  7   /  AlkPhos  107  03-18                          10.1   9.60  )-----------( 159      ( 18 Mar 2023 03:50 )             28.2     A1C with Estimated Average Glucose Result: 15.4 % (03-16-23 @ 23:20)    Alkaline Phosphatase, Serum: 107 U/L (03-18-23 @ 03:50)  Albumin, Serum: 3.4 g/dL (03-18-23 @ 03:50)  Aspartate Aminotransferase (AST/SGOT): 14 U/L (03-18-23 @ 03:50)  Alanine Aminotransferase (ALT/SGPT): 7 U/L (03-18-23 @ 03:50)  Albumin, Serum: 3.3 g/dL (03-18-23 @ 00:01)  Aspartate Aminotransferase (AST/SGOT): 16 U/L (03-18-23 @ 00:01)  Alanine Aminotransferase (ALT/SGPT): 7 U/L (03-18-23 @ 00:01)  Alkaline Phosphatase, Serum: 110 U/L (03-18-23 @ 00:01)  Alanine Aminotransferase (ALT/SGPT): 7 U/L (03-17-23 @ 20:10)  Alkaline Phosphatase, Serum: 109 U/L (03-17-23 @ 20:10)  Albumin, Serum: 3.4 g/dL (03-17-23 @ 20:10)  Aspartate Aminotransferase (AST/SGOT): 15 U/L (03-17-23 @ 20:10)  Aspartate Aminotransferase (AST/SGOT): 14 U/L (03-17-23 @ 16:09)  Alanine Aminotransferase (ALT/SGPT): 7 U/L (03-17-23 @ 16:09)  Alkaline Phosphatase, Serum: 123 U/L (03-17-23 @ 16:09)  Albumin, Serum: 3.3 g/dL (03-17-23 @ 16:09)  Alkaline Phosphatase, Serum: 115 U/L (03-17-23 @ 12:10)  Albumin, Serum: 3.2 g/dL (03-17-23 @ 12:10)  Aspartate Aminotransferase (AST/SGOT): 12 U/L (03-17-23 @ 12:10)  Alanine Aminotransferase (ALT/SGPT): 6 U/L (03-17-23 @ 12:10)  Alkaline Phosphatase, Serum: 130 U/L (03-17-23 @ 08:47)  Albumin, Serum: 3.7 g/dL (03-17-23 @ 08:47)  Aspartate Aminotransferase (AST/SGOT): 15 U/L (03-17-23 @ 08:47)  Alanine Aminotransferase (ALT/SGPT): 7 U/L (03-17-23 @ 08:47)  Aspartate Aminotransferase (AST/SGOT): 12 U/L (03-17-23 @ 03:51)  Alanine Aminotransferase (ALT/SGPT): 7 U/L (03-17-23 @ 03:51)  Alkaline Phosphatase, Serum: 152 U/L (03-17-23 @ 03:51)  Albumin, Serum: 3.8 g/dL (03-17-23 @ 03:51)  Alkaline Phosphatase, Serum: 180 U/L (03-16-23 @ 23:20)  Albumin, Serum: 4.6 g/dL (03-16-23 @ 23:20)  Aspartate Aminotransferase (AST/SGOT): 19 U/L (03-16-23 @ 23:20)  Alanine Aminotransferase (ALT/SGPT): 10 U/L (03-16-23 @ 23:20)    CAPILLARY BLOOD GLUCOSE    POCT Blood Glucose.: 96 mg/dL (18 Mar 2023 12:07)  POCT Blood Glucose.: 137 mg/dL (18 Mar 2023 11:13)  POCT Blood Glucose.: 171 mg/dL (18 Mar 2023 10:04)

## 2023-03-18 NOTE — PROGRESS NOTE ADULT - ASSESSMENT
32 y.o. Male with EtOH abuse and PMH of severely uncontrolled IDDM with recurrent DKA, seizure disorder, AFib on AC admitted for DKA. Patient is sedated for signs of withdrawal, unable to provide history.  Currently managed in ICU settings, on insulin gtt 8.5U/h. Endocrinology follows for DM management. A1C 15.4%.    # Severe, recurrent DKA secondary to non compliance ETOH abuse   Continue current regimen with IV gtt as per DKA protocol  Bridge with basal insulin as ketosis resolves  Allow at least 2-4 hours overlapping of insulin gtt with basal insulin to prevent opening AG.   Monitor electrolytes and replace accordingly    # Poorly controlled DM exacerbated by DKA  ?if autoimmune DM  PADMINI and ICA negative but C-peptide is low  Endocrinology will follow as patient is transitioned to basal-bolus regimen.     # Leucocytosis improved  Possibly stress related  F/u Cx  On Antimicrobials    # Hx of ETOH abuse  Watch for withdrawal  CIWA  Thiamine, Folate, MVT

## 2023-03-18 NOTE — PROVIDER CONTACT NOTE (OTHER) - SITUATION
Pt has pending urgent CT head. Pt has occasional ST elevation complex.
Pt arrived with yadi infusing through CRISTINA midline, rt arm is whole ecchymotic. Anesthesia made aware, VANESSA Guerrero made aware.

## 2023-03-18 NOTE — PROGRESS NOTE ADULT - ASSESSMENT
32M PMH IDDM, seizure disorder, AFib, EtOH abuse who presented on 3/16/23 with abdominal pain, insulin non-compliance, found to be in severe DKA    Impression/Plan:    HAGMA  DKA  Hyperglycemia  Poorly controlled IDDM  Insulin non-compliance  - Insulin infusion per DKA protocol  - Aggressive IVF resuscitation - LR @ 200cc/hr if FSG >250, and D5LR @ 200cc/hr if FSG <250  - BMP Q4H  - Serial VBG to trend pH  - Aggressively replete electrolytes  - If K <3.3, hold insulin    EtOH disorder  - CIWA  - Ativan 2mg IV Q2H PRN for CIWA >=12  - Required Flumazenil 0.2mg IVP overnight for oversedation after 4mg IVP of Ativan  - Monitor mentation  - F/u UTox    Seizure disorder  - C/w home Keppra 1g BID  - Will check Keppra level    Abdominal pain  - F/u lipase  - F/u CT abdomen/pelvis  - Trend hepatic panel    AFib  - C/w home Eliquis    NPO except meds  PIV  GI PPx - Protonix  DVT PPx - on Eliquis  Admit to ICU  FULL code      Chandrakant Manrique M.D.  , Pulmonary & Critical Care Medicine  [facility]  Pulmonary and Sleep Medicine at [place]  [address]  [place] [address]  T: [phone]  F: [phone]   32M PMH IDDM, seizure disorder, AFib, EtOH abuse who presented on 3/16/23 with abdominal pain, insulin non-compliance, found to be in severe DKA    Impression/Plan:    HAGMA  DKA  Hyperglycemia  Poorly controlled IDDM  Insulin non-compliance  Hypokalemia  - Insulin infusion per DKA protocol  - Aggressive IVF resuscitation - LR @ 200cc/hr if FSG >250, and D5LR @ 200cc/hr if FSG <250  - BMP Q4H  - Serial VBG to trend pH  - Aggressively replete electrolytes  - If K <3.3, hold insulin    EtOH disorder  UTox negative  - Required Flumazenil 0.2mg IVP on 3/17/23 for oversedation after 4mg IVP of Ativan  - CIWA  - Ativan 2mg IV Q2H PRN for CIWA >=12  - Monitor mentation    Leukocytosis - improving  Shock state - suspect hypovolemic  - Is on empiric Zosyn (3/17- )  - F/u BCx (in lab) - if negative, will D/C ABx    Seizure disorder  - C/w home Keppra 1g BID IV  - F/u Keppra level    Abdominal pain  - Lipase negative  - CT A/P without acute findings  - Trend hepatic panel    AFib  - C/w home Eliquis    Re-introduce diet with AG closure  PIV  GI PPx - Protonix  DVT PPx - on Eliquis  FULL code  C/w care in ICU      Chandrakant Manrique M.D.  , Pulmonary & Critical Care Medicine  [facility]  Pulmonary and Sleep Medicine at [place]  [address]  [place], [place] [address]  T: [phone]  F: [phone]

## 2023-03-18 NOTE — PROVIDER CONTACT NOTE (CRITICAL VALUE NOTIFICATION) - ACTION/TREATMENT ORDERED:
Dr. Manrique made aware
Will review chart and place orders.
K supplements
Dr. Manrique made aware
MICU team aware

## 2023-03-18 NOTE — PROGRESS NOTE ADULT - SUBJECTIVE AND OBJECTIVE BOX
Patient is a 32y old  Male who presents with a chief complaint of DKA (17 Mar 2023 12:59)      BRIEF HOSPITAL COURSE: ***    Events last 24 hours: ***    PAST MEDICAL & SURGICAL HISTORY:  Alcohol abuse      Pancreatitis      HTN (hypertension)      Diabetes      Atrial fibrillation      Non-ischemic cardiomyopathy      No significant past surgical history            Medications:  piperacillin/tazobactam IVPB.. 3.375 Gram(s) IV Intermittent every 8 hours    phenylephrine    Infusion 0.1 MICROgram(s)/kG/Min IV Continuous <Continuous>      dexMEDEtomidine Infusion 0.3 MICROgram(s)/kG/Hr IV Continuous <Continuous>  levETIRAcetam  IVPB 1000 milliGRAM(s) IV Intermittent every 12 hours  LORazepam   Injectable 2 milliGRAM(s) IV Push every 2 hours PRN      apixaban 5 milliGRAM(s) Oral two times a day    pantoprazole    Tablet 40 milliGRAM(s) Oral before breakfast      dextrose 50% Injectable 50 milliLiter(s) IV Push every 15 minutes  insulin regular Infusion 10 Unit(s)/Hr IV Continuous <Continuous>    dextrose 5% + lactated ringers with potassium chloride 20 mEq/L 1000 milliLiter(s) IV Continuous <Continuous>  folic acid Injectable 1 milliGRAM(s) IV Push daily  multivitamin 1 Tablet(s) Oral daily  potassium chloride   Powder 40 milliEquivalent(s) Oral once  potassium chloride  20 mEq/100 mL IVPB 20 milliEquivalent(s) IV Intermittent every 2 hours  thiamine Injectable 100 milliGRAM(s) IV Push daily      chlorhexidine 2% Cloths 1 Application(s) Topical daily  chlorhexidine 4% Liquid 1 Application(s) Topical <User Schedule>            ICU Vital Signs Last 24 Hrs  T(C): 37.4 (18 Mar 2023 02:45), Max: 37.5 (18 Mar 2023 02:00)  T(F): 99.3 (18 Mar 2023 02:45), Max: 99.5 (18 Mar 2023 02:00)  HR: 105 (18 Mar 2023 03:00) (80 - 145)  BP: 103/81 (18 Mar 2023 02:45) (72/50 - 155/114)  BP(mean): 90 (18 Mar 2023 02:45) (56 - 129)  ABP: --  ABP(mean): --  RR: 17 (18 Mar 2023 03:00) (11 - 26)  SpO2: 100% (18 Mar 2023 03:00) (98% - 100%)    O2 Parameters below as of 18 Mar 2023 00:00  Patient On (Oxygen Delivery Method): nasal cannula    O2 Concentration (%): 1        ABG - ( 17 Mar 2023 17:13 )  pH, Arterial: 7.160 pH, Blood: x     /  pCO2: x     /  pO2: x     / HCO3: x     / Base Excess: x     /  SaO2: x                   I&O's Detail    16 Mar 2023 07:01  -  17 Mar 2023 07:00  --------------------------------------------------------  IN:    Insulin: 17 mL    Insulin: 10 mL    Lactated Ringers: 2200 mL    Phenylephrine: 20 mL  Total IN: 2247 mL    OUT:  Total OUT: 0 mL    Total NET: 2247 mL      17 Mar 2023 07:01  -  18 Mar 2023 03:14  --------------------------------------------------------  IN:    Dexmedetomidine: 22.9 mL    dextrose 5% + lactated ringers: 800 mL    dextrose 5% + lactated ringers w/ Additives: 1800 mL    Insulin: 108 mL    IV PiggyBack: 100 mL    IV PiggyBack: 675 mL    IV PiggyBack: 600 mL    IV PiggyBack: 832 mL    Lactated Ringers: 2000 mL    Lactated Ringers Bolus: 1000 mL    Phenylephrine: 354.1 mL  Total IN: 8292 mL    OUT:    Voided (mL): 7450 mL  Total OUT: 7450 mL    Total NET: 842 mL            LABS:                        12.0   14.05 )-----------( 196      ( 17 Mar 2023 08:47 )             34.9     03-18    146<H>  |  115<H>  |  13.2  ----------------------------<  372<H>  3.9   |  8.0<LL>  |  1.16    Ca    8.7      18 Mar 2023 00:01  Phos  1.3     03-18  Mg     1.7     03-18    TPro  5.2<L>  /  Alb  3.3  /  TBili  0.9  /  DBili  x   /  AST  16  /  ALT  7   /  AlkPhos  110  03-18      CARDIAC MARKERS ( 16 Mar 2023 23:20 )  x     / <0.01 ng/mL / x     / x     / x          CAPILLARY BLOOD GLUCOSE      POCT Blood Glucose.: 321 mg/dL (18 Mar 2023 02:04)      Urinalysis Basic - ( 17 Mar 2023 08:00 )    Color: Yellow / Appearance: Clear / S.020 / pH: x  Gluc: x / Ketone: Large  / Bili: Negative / Urobili: Negative mg/dL   Blood: x / Protein: 30 mg/dL / Nitrite: Negative   Leuk Esterase: Negative / RBC: 0-2 /HPF / WBC Negative /HPF   Sq Epi: x / Non Sq Epi: Occasional / Bacteria: Few      CULTURES:      Physical Examination:  GENERAL: In NAD   HEENT: NC/AT  NECK: Supple, trachea midline  PULM: ***  CVS: +S1, S2, RRR  ABD: Soft, non-tender  EXTREMITIES: No pedal edema B/L  SKIN: No open wounds  NEURO: Grossly non-focal    DEVICES:     RADIOLOGY: ***   Patient is a 32y old  Male who presents with a chief complaint of DKA (17 Mar 2023 12:59)      BRIEF HOSPITAL COURSE:   32M PMH IDDM, seizure disorder, AFib on Eliquis, EtOH abuse who presented on 3/16/23 with abdominal pain. He notes he has not taken his insulin for approximately 5 days due to running out. He is a poor historian at the time of evaluation. He is unsure when he had his last alcoholic beverage. He is unsure if he is having fevers or chills. He does not remember his dose of insulin. In the ED he was noted with WBC 21, VBG <6.94/34/66, K 3.5, A1c 15.4. Started on Ativan in MICU for EtOH withdrawal, became oversedated and bradypneic, and was given Flumazenil with good response. Noted with leukocytosis, developed shock, empirically started on Zosyn, CXR clear, CT abdomen without acute findings.       PAST MEDICAL & SURGICAL HISTORY:  Alcohol abuse      Pancreatitis      HTN (hypertension)      Diabetes      Atrial fibrillation      Non-ischemic cardiomyopathy      No significant past surgical history            Medications:  piperacillin/tazobactam IVPB.. 3.375 Gram(s) IV Intermittent every 8 hours    phenylephrine    Infusion 0.1 MICROgram(s)/kG/Min IV Continuous <Continuous>      dexMEDEtomidine Infusion 0.3 MICROgram(s)/kG/Hr IV Continuous <Continuous>  levETIRAcetam  IVPB 1000 milliGRAM(s) IV Intermittent every 12 hours  LORazepam   Injectable 2 milliGRAM(s) IV Push every 2 hours PRN      apixaban 5 milliGRAM(s) Oral two times a day    pantoprazole    Tablet 40 milliGRAM(s) Oral before breakfast      dextrose 50% Injectable 50 milliLiter(s) IV Push every 15 minutes  insulin regular Infusion 10 Unit(s)/Hr IV Continuous <Continuous>    dextrose 5% + lactated ringers with potassium chloride 20 mEq/L 1000 milliLiter(s) IV Continuous <Continuous>  folic acid Injectable 1 milliGRAM(s) IV Push daily  multivitamin 1 Tablet(s) Oral daily  potassium chloride   Powder 40 milliEquivalent(s) Oral once  potassium chloride  20 mEq/100 mL IVPB 20 milliEquivalent(s) IV Intermittent every 2 hours  thiamine Injectable 100 milliGRAM(s) IV Push daily      chlorhexidine 2% Cloths 1 Application(s) Topical daily  chlorhexidine 4% Liquid 1 Application(s) Topical <User Schedule>            ICU Vital Signs Last 24 Hrs  T(C): 37.4 (18 Mar 2023 02:45), Max: 37.5 (18 Mar 2023 02:00)  T(F): 99.3 (18 Mar 2023 02:45), Max: 99.5 (18 Mar 2023 02:00)  HR: 105 (18 Mar 2023 03:00) (80 - 145)  BP: 103/81 (18 Mar 2023 02:45) (72/50 - 155/114)  BP(mean): 90 (18 Mar 2023 02:45) (56 - 129)  ABP: --  ABP(mean): --  RR: 17 (18 Mar 2023 03:00) (11 - 26)  SpO2: 100% (18 Mar 2023 03:00) (98% - 100%)    O2 Parameters below as of 18 Mar 2023 00:00  Patient On (Oxygen Delivery Method): nasal cannula    O2 Concentration (%): 1        ABG - ( 17 Mar 2023 17:13 )  pH, Arterial: 7.160 pH, Blood: x     /  pCO2: x     /  pO2: x     / HCO3: x     / Base Excess: x     /  SaO2: x                   I&O's Detail    16 Mar 2023 07:01  -  17 Mar 2023 07:00  --------------------------------------------------------  IN:    Insulin: 17 mL    Insulin: 10 mL    Lactated Ringers: 2200 mL    Phenylephrine: 20 mL  Total IN: 2247 mL    OUT:  Total OUT: 0 mL    Total NET: 2247 mL      17 Mar 2023 07:01  -  18 Mar 2023 03:14  --------------------------------------------------------  IN:    Dexmedetomidine: 22.9 mL    dextrose 5% + lactated ringers: 800 mL    dextrose 5% + lactated ringers w/ Additives: 1800 mL    Insulin: 108 mL    IV PiggyBack: 100 mL    IV PiggyBack: 675 mL    IV PiggyBack: 600 mL    IV PiggyBack: 832 mL    Lactated Ringers: 2000 mL    Lactated Ringers Bolus: 1000 mL    Phenylephrine: 354.1 mL  Total IN: 8292 mL    OUT:    Voided (mL): 7450 mL  Total OUT: 7450 mL    Total NET: 842 mL            LABS:                        12.0   14.05 )-----------( 196      ( 17 Mar 2023 08:47 )             34.9     03-18    146<H>  |  115<H>  |  13.2  ----------------------------<  372<H>  3.9   |  8.0<LL>  |  1.16    Ca    8.7      18 Mar 2023 00:01  Phos  1.3     03-18  Mg     1.7     03-18    TPro  5.2<L>  /  Alb  3.3  /  TBili  0.9  /  DBili  x   /  AST  16  /  ALT  7   /  AlkPhos  110  03-18      CARDIAC MARKERS ( 16 Mar 2023 23:20 )  x     / <0.01 ng/mL / x     / x     / x          CAPILLARY BLOOD GLUCOSE      POCT Blood Glucose.: 321 mg/dL (18 Mar 2023 02:04)      Urinalysis Basic - ( 17 Mar 2023 08:00 )    Color: Yellow / Appearance: Clear / S.020 / pH: x  Gluc: x / Ketone: Large  / Bili: Negative / Urobili: Negative mg/dL   Blood: x / Protein: 30 mg/dL / Nitrite: Negative   Leuk Esterase: Negative / RBC: 0-2 /HPF / WBC Negative /HPF   Sq Epi: x / Non Sq Epi: Occasional / Bacteria: Few      CULTURES:      Physical Examination:  GENERAL: In NAD   HEENT: NC/AT  NECK: Supple, trachea midline  PULM: CTA anteriorly  CVS: +S1, S2  ABD: Soft  EXTREMITIES: No pedal edema B/L  SKIN: No open wounds  NEURO: Somnolent    DEVICES:     RADIOLOGY:   < from: CT Abdomen and Pelvis w/ IV Cont (23 @ 01:08) >    ACC: [id number] EXAM:  CT ABDOMEN AND PELVIS IC   ORDERED BY: KOFI DUVALL     PROCEDURE DATE:  2023          INTERPRETATION:  CLINICAL INFORMATION: Abdominal pain    COMPARISON: CT abdomen pelvis 10/13/2022.    CONTRAST/COMPLICATIONS:  IVContrast: Omnipaque 350  95 cc administered   5 cc discarded  Oral Contrast: NONE  Complications: None reported at time of study completion    PROCEDURE:  CT of the Abdomen and Pelvis was performed.  Sagittal and coronal reformats were performed.    FINDINGS: Images are degraded by respiratory motion artifact.    LOWER CHEST: Within normal limits.    LIVER: Within normal limits.  BILE DUCTS: Normal caliber.  GALLBLADDER: Within normal limits.  SPLEEN: Within normal limits.  PANCREAS: Pancreatic atrophy and diffuse. Multiple calcifications   compatible with chronic pancreatitis change..  ADRENALS: Within normal limits.  KIDNEYS/URETERS: Within normal limits.    BLADDER: Within normal limits.  REPRODUCTIVE ORGANS: Prostate within normal limits.    BOWEL: Rectal distention with stool. Gastric distention with fluid. No   bowel obstruction. Appendix is normal.  PERITONEUM: No ascites.  VESSELS: Within normal limits.  RETROPERITONEUM/LYMPH NODES: No lymphadenopathy.  ABDOMINAL WALL: Within normal limits.  BONES: Within normal limits.    IMPRESSION:  Motion limited exam.    Gastric distention with fluid. No evidence for small bowel obstruction.    Rectal distention with stool.    --- End of Report ---            CARLOS ALBERTO HALL MD; Attending Radiologist  This document has been electronically signed. Mar 17 2023  2:46AM    < end of copied text >

## 2023-03-19 LAB
ALBUMIN SERPL ELPH-MCNC: 2.7 G/DL — LOW (ref 3.3–5.2)
ALBUMIN SERPL ELPH-MCNC: 2.8 G/DL — LOW (ref 3.3–5.2)
ALP SERPL-CCNC: 78 U/L — SIGNIFICANT CHANGE UP (ref 40–120)
ALP SERPL-CCNC: 81 U/L — SIGNIFICANT CHANGE UP (ref 40–120)
ALT FLD-CCNC: 6 U/L — SIGNIFICANT CHANGE UP
ALT FLD-CCNC: 7 U/L — SIGNIFICANT CHANGE UP
ANION GAP SERPL CALC-SCNC: 6 MMOL/L — SIGNIFICANT CHANGE UP (ref 5–17)
ANION GAP SERPL CALC-SCNC: 8 MMOL/L — SIGNIFICANT CHANGE UP (ref 5–17)
ANION GAP SERPL CALC-SCNC: 8 MMOL/L — SIGNIFICANT CHANGE UP (ref 5–17)
ANION GAP SERPL CALC-SCNC: 9 MMOL/L — SIGNIFICANT CHANGE UP (ref 5–17)
AST SERPL-CCNC: 11 U/L — SIGNIFICANT CHANGE UP
AST SERPL-CCNC: 12 U/L — SIGNIFICANT CHANGE UP
BASOPHILS # BLD AUTO: 0.02 K/UL — SIGNIFICANT CHANGE UP (ref 0–0.2)
BASOPHILS NFR BLD AUTO: 0.3 % — SIGNIFICANT CHANGE UP (ref 0–2)
BILIRUB SERPL-MCNC: 0.8 MG/DL — SIGNIFICANT CHANGE UP (ref 0.4–2)
BILIRUB SERPL-MCNC: 0.8 MG/DL — SIGNIFICANT CHANGE UP (ref 0.4–2)
BUN SERPL-MCNC: 3.3 MG/DL — LOW (ref 8–20)
BUN SERPL-MCNC: 3.7 MG/DL — LOW (ref 8–20)
BUN SERPL-MCNC: <3 MG/DL — LOW (ref 8–20)
BUN SERPL-MCNC: <3 MG/DL — LOW (ref 8–20)
CALCIUM SERPL-MCNC: 8 MG/DL — LOW (ref 8.4–10.5)
CALCIUM SERPL-MCNC: 8.2 MG/DL — LOW (ref 8.4–10.5)
CALCIUM SERPL-MCNC: 8.3 MG/DL — LOW (ref 8.4–10.5)
CALCIUM SERPL-MCNC: 8.3 MG/DL — LOW (ref 8.4–10.5)
CHLORIDE SERPL-SCNC: 110 MMOL/L — HIGH (ref 96–108)
CHLORIDE SERPL-SCNC: 112 MMOL/L — HIGH (ref 96–108)
CHLORIDE SERPL-SCNC: 114 MMOL/L — HIGH (ref 96–108)
CHLORIDE SERPL-SCNC: 115 MMOL/L — HIGH (ref 96–108)
CO2 SERPL-SCNC: 21 MMOL/L — LOW (ref 22–29)
CO2 SERPL-SCNC: 22 MMOL/L — SIGNIFICANT CHANGE UP (ref 22–29)
CO2 SERPL-SCNC: 24 MMOL/L — SIGNIFICANT CHANGE UP (ref 22–29)
CO2 SERPL-SCNC: 25 MMOL/L — SIGNIFICANT CHANGE UP (ref 22–29)
CREAT SERPL-MCNC: 0.51 MG/DL — SIGNIFICANT CHANGE UP (ref 0.5–1.3)
CREAT SERPL-MCNC: 0.57 MG/DL — SIGNIFICANT CHANGE UP (ref 0.5–1.3)
CREAT SERPL-MCNC: 0.73 MG/DL — SIGNIFICANT CHANGE UP (ref 0.5–1.3)
CREAT SERPL-MCNC: 0.79 MG/DL — SIGNIFICANT CHANGE UP (ref 0.5–1.3)
EGFR: 120 ML/MIN/1.73M2 — SIGNIFICANT CHANGE UP
EGFR: 123 ML/MIN/1.73M2 — SIGNIFICANT CHANGE UP
EGFR: 133 ML/MIN/1.73M2 — SIGNIFICANT CHANGE UP
EGFR: 137 ML/MIN/1.73M2 — SIGNIFICANT CHANGE UP
EOSINOPHIL # BLD AUTO: 0.04 K/UL — SIGNIFICANT CHANGE UP (ref 0–0.5)
EOSINOPHIL NFR BLD AUTO: 0.7 % — SIGNIFICANT CHANGE UP (ref 0–6)
GLUCOSE BLDC GLUCOMTR-MCNC: 124 MG/DL — HIGH (ref 70–99)
GLUCOSE BLDC GLUCOMTR-MCNC: 155 MG/DL — HIGH (ref 70–99)
GLUCOSE BLDC GLUCOMTR-MCNC: 233 MG/DL — HIGH (ref 70–99)
GLUCOSE BLDC GLUCOMTR-MCNC: 266 MG/DL — HIGH (ref 70–99)
GLUCOSE BLDC GLUCOMTR-MCNC: 74 MG/DL — SIGNIFICANT CHANGE UP (ref 70–99)
GLUCOSE BLDC GLUCOMTR-MCNC: 89 MG/DL — SIGNIFICANT CHANGE UP (ref 70–99)
GLUCOSE SERPL-MCNC: 148 MG/DL — HIGH (ref 70–99)
GLUCOSE SERPL-MCNC: 252 MG/DL — HIGH (ref 70–99)
GLUCOSE SERPL-MCNC: 304 MG/DL — HIGH (ref 70–99)
GLUCOSE SERPL-MCNC: 67 MG/DL — LOW (ref 70–99)
HCT VFR BLD CALC: 23.9 % — LOW (ref 39–50)
HGB BLD-MCNC: 8.7 G/DL — LOW (ref 13–17)
IMM GRANULOCYTES NFR BLD AUTO: 0.3 % — SIGNIFICANT CHANGE UP (ref 0–0.9)
LYMPHOCYTES # BLD AUTO: 1.57 K/UL — SIGNIFICANT CHANGE UP (ref 1–3.3)
LYMPHOCYTES # BLD AUTO: 26.7 % — SIGNIFICANT CHANGE UP (ref 13–44)
MAGNESIUM SERPL-MCNC: 1.7 MG/DL — SIGNIFICANT CHANGE UP (ref 1.6–2.6)
MAGNESIUM SERPL-MCNC: 1.7 MG/DL — SIGNIFICANT CHANGE UP (ref 1.6–2.6)
MAGNESIUM SERPL-MCNC: 2 MG/DL — SIGNIFICANT CHANGE UP (ref 1.6–2.6)
MCHC RBC-ENTMCNC: 29 PG — SIGNIFICANT CHANGE UP (ref 27–34)
MCHC RBC-ENTMCNC: 36.4 GM/DL — HIGH (ref 32–36)
MCV RBC AUTO: 79.7 FL — LOW (ref 80–100)
MONOCYTES # BLD AUTO: 0.72 K/UL — SIGNIFICANT CHANGE UP (ref 0–0.9)
MONOCYTES NFR BLD AUTO: 12.2 % — SIGNIFICANT CHANGE UP (ref 2–14)
NEUTROPHILS # BLD AUTO: 3.52 K/UL — SIGNIFICANT CHANGE UP (ref 1.8–7.4)
NEUTROPHILS NFR BLD AUTO: 59.8 % — SIGNIFICANT CHANGE UP (ref 43–77)
PHOSPHATE SERPL-MCNC: 2.4 MG/DL — SIGNIFICANT CHANGE UP (ref 2.4–4.7)
PHOSPHATE SERPL-MCNC: 2.7 MG/DL — SIGNIFICANT CHANGE UP (ref 2.4–4.7)
PHOSPHATE SERPL-MCNC: 2.9 MG/DL — SIGNIFICANT CHANGE UP (ref 2.4–4.7)
PLATELET # BLD AUTO: 117 K/UL — LOW (ref 150–400)
POTASSIUM SERPL-MCNC: 3.1 MMOL/L — LOW (ref 3.5–5.3)
POTASSIUM SERPL-MCNC: 3.6 MMOL/L — SIGNIFICANT CHANGE UP (ref 3.5–5.3)
POTASSIUM SERPL-MCNC: 4 MMOL/L — SIGNIFICANT CHANGE UP (ref 3.5–5.3)
POTASSIUM SERPL-MCNC: 4.3 MMOL/L — SIGNIFICANT CHANGE UP (ref 3.5–5.3)
POTASSIUM SERPL-SCNC: 3.1 MMOL/L — LOW (ref 3.5–5.3)
POTASSIUM SERPL-SCNC: 3.6 MMOL/L — SIGNIFICANT CHANGE UP (ref 3.5–5.3)
POTASSIUM SERPL-SCNC: 4 MMOL/L — SIGNIFICANT CHANGE UP (ref 3.5–5.3)
POTASSIUM SERPL-SCNC: 4.3 MMOL/L — SIGNIFICANT CHANGE UP (ref 3.5–5.3)
PROT SERPL-MCNC: 4.4 G/DL — LOW (ref 6.6–8.7)
PROT SERPL-MCNC: 4.4 G/DL — LOW (ref 6.6–8.7)
RBC # BLD: 3 M/UL — LOW (ref 4.2–5.8)
RBC # FLD: 19.1 % — HIGH (ref 10.3–14.5)
SODIUM SERPL-SCNC: 142 MMOL/L — SIGNIFICANT CHANGE UP (ref 135–145)
SODIUM SERPL-SCNC: 143 MMOL/L — SIGNIFICANT CHANGE UP (ref 135–145)
SODIUM SERPL-SCNC: 144 MMOL/L — SIGNIFICANT CHANGE UP (ref 135–145)
SODIUM SERPL-SCNC: 145 MMOL/L — SIGNIFICANT CHANGE UP (ref 135–145)
WBC # BLD: 5.89 K/UL — SIGNIFICANT CHANGE UP (ref 3.8–10.5)
WBC # FLD AUTO: 5.89 K/UL — SIGNIFICANT CHANGE UP (ref 3.8–10.5)

## 2023-03-19 PROCEDURE — 99233 SBSQ HOSP IP/OBS HIGH 50: CPT

## 2023-03-19 RX ORDER — PHENOBARBITAL 60 MG
260 TABLET ORAL
Refills: 0 | Status: DISCONTINUED | OUTPATIENT
Start: 2023-03-19 | End: 2023-03-20

## 2023-03-19 RX ORDER — POTASSIUM CHLORIDE 20 MEQ
10 PACKET (EA) ORAL
Refills: 0 | Status: COMPLETED | OUTPATIENT
Start: 2023-03-19 | End: 2023-03-19

## 2023-03-19 RX ORDER — POTASSIUM CHLORIDE 20 MEQ
40 PACKET (EA) ORAL EVERY 4 HOURS
Refills: 0 | Status: COMPLETED | OUTPATIENT
Start: 2023-03-19 | End: 2023-03-19

## 2023-03-19 RX ORDER — DIAZEPAM 5 MG
10 TABLET ORAL EVERY 4 HOURS
Refills: 0 | Status: DISCONTINUED | OUTPATIENT
Start: 2023-03-19 | End: 2023-03-21

## 2023-03-19 RX ORDER — SODIUM CHLORIDE 9 MG/ML
1000 INJECTION, SOLUTION INTRAVENOUS
Refills: 0 | Status: DISCONTINUED | OUTPATIENT
Start: 2023-03-19 | End: 2023-03-20

## 2023-03-19 RX ORDER — POTASSIUM CHLORIDE 20 MEQ
10 PACKET (EA) ORAL ONCE
Refills: 0 | Status: DISCONTINUED | OUTPATIENT
Start: 2023-03-19 | End: 2023-03-19

## 2023-03-19 RX ORDER — PHENOBARBITAL 60 MG
260 TABLET ORAL EVERY 6 HOURS
Refills: 0 | Status: DISCONTINUED | OUTPATIENT
Start: 2023-03-19 | End: 2023-03-19

## 2023-03-19 RX ORDER — MAGNESIUM SULFATE 500 MG/ML
2 VIAL (ML) INJECTION ONCE
Refills: 0 | Status: COMPLETED | OUTPATIENT
Start: 2023-03-19 | End: 2023-03-19

## 2023-03-19 RX ORDER — INSULIN GLARGINE 100 [IU]/ML
15 INJECTION, SOLUTION SUBCUTANEOUS AT BEDTIME
Refills: 0 | Status: DISCONTINUED | OUTPATIENT
Start: 2023-03-19 | End: 2023-03-22

## 2023-03-19 RX ADMIN — LEVETIRACETAM 400 MILLIGRAM(S): 250 TABLET, FILM COATED ORAL at 20:00

## 2023-03-19 RX ADMIN — Medication 3 UNIT(S): at 00:10

## 2023-03-19 RX ADMIN — Medication 1 MILLIGRAM(S): at 16:26

## 2023-03-19 RX ADMIN — Medication 100 MILLIEQUIVALENT(S): at 18:51

## 2023-03-19 RX ADMIN — Medication 40 MILLIEQUIVALENT(S): at 18:49

## 2023-03-19 RX ADMIN — Medication 25 GRAM(S): at 05:59

## 2023-03-19 RX ADMIN — CHLORHEXIDINE GLUCONATE 1 APPLICATION(S): 213 SOLUTION TOPICAL at 11:53

## 2023-03-19 RX ADMIN — Medication 260 MILLIGRAM(S): at 20:00

## 2023-03-19 RX ADMIN — Medication 100 MILLIEQUIVALENT(S): at 18:00

## 2023-03-19 RX ADMIN — Medication 100 MILLIGRAM(S): at 11:51

## 2023-03-19 RX ADMIN — Medication 2: at 11:57

## 2023-03-19 RX ADMIN — Medication 3 UNIT(S): at 11:52

## 2023-03-19 RX ADMIN — Medication 100 MILLIEQUIVALENT(S): at 07:18

## 2023-03-19 RX ADMIN — Medication 100 MILLIEQUIVALENT(S): at 04:56

## 2023-03-19 RX ADMIN — Medication 3 UNIT(S): at 05:58

## 2023-03-19 RX ADMIN — Medication 10 MILLIGRAM(S): at 11:11

## 2023-03-19 RX ADMIN — Medication 100 MILLIEQUIVALENT(S): at 20:01

## 2023-03-19 RX ADMIN — Medication 130 MILLIGRAM(S): at 10:16

## 2023-03-19 RX ADMIN — Medication 6: at 00:10

## 2023-03-19 RX ADMIN — SODIUM CHLORIDE 50 MILLILITER(S): 9 INJECTION, SOLUTION INTRAVENOUS at 16:26

## 2023-03-19 RX ADMIN — Medication 10 MILLIGRAM(S): at 18:00

## 2023-03-19 RX ADMIN — INSULIN GLARGINE 15 UNIT(S): 100 INJECTION, SOLUTION SUBCUTANEOUS at 23:13

## 2023-03-19 RX ADMIN — Medication 100 MILLIEQUIVALENT(S): at 05:59

## 2023-03-19 RX ADMIN — ENOXAPARIN SODIUM 40 MILLIGRAM(S): 100 INJECTION SUBCUTANEOUS at 05:59

## 2023-03-19 RX ADMIN — DEXTROSE MONOHYDRATE, SODIUM CHLORIDE, AND POTASSIUM CHLORIDE 250 MILLILITER(S): 50; .745; 4.5 INJECTION, SOLUTION INTRAVENOUS at 04:00

## 2023-03-19 RX ADMIN — LEVETIRACETAM 400 MILLIGRAM(S): 250 TABLET, FILM COATED ORAL at 10:36

## 2023-03-19 RX ADMIN — Medication 10 MILLIGRAM(S): at 22:44

## 2023-03-19 RX ADMIN — Medication 4: at 05:58

## 2023-03-19 NOTE — PROGRESS NOTE ADULT - SUBJECTIVE AND OBJECTIVE BOX
====================ASSESSMENT/PLAN==============   32-year-old  male with past medical history of insulin-dependent diabetes mellitus seizure disorder A-fib alcohol abuse admitted on March 16 for abdominal pain altered mental status to medical ICU for acute metabolic encephalopathy, DKA severe dehydration hypovolemic shock, hospital course complicated by delirium tremens, severe hypokalemia   ====================== NEUROLOGY=====================  diazepam  Injectable 10 milliGRAM(s) IV Push every 4 hours  diazepam  Injectable 10 milliGRAM(s) IV Push every 4 hours PRN Breakthrough agitation  levETIRAcetam  IVPB 1000 milliGRAM(s) IV Intermittent every 12 hours  PHENobarbital Injectable 260 milliGRAM(s) IV Push every 1 hour PRN CIWA >8    ==================== RESPIRATORY======================   oxygenating and ventilating okay on room air, when following instruction will need incentive spirometry out of bed to chair and ambulation as tolerated    ====================CARDIOVASCULAR==================   hemodynamically stable, weaned off of pressors couple of days ago currently remaining on IV fluid which could be stopped later tonight if consistent with p.o. intake    ===================HEMATOLOGIC/ONC ===================  enoxaparin Injectable 40 milliGRAM(s) SubCutaneous every 24 hours    ===================== RENAL =========================  Continue monitoring urine output   external urinary catheter  Avoid Nephrotoxic agents   Adjusting medications for renal  function   Replacing electrolytes as needed with Goal K> 4, PO> 3, Mg> 2  ==================== GASTROINTESTINAL===================  dextrose 5%. 1000 milliLiter(s) (50 mL/Hr) IV Continuous <Continuous>  dextrose 5%. 1000 milliLiter(s) (100 mL/Hr) IV Continuous <Continuous>  folic acid Injectable 1 milliGRAM(s) IV Push daily  lactated ringers. 1000 milliLiter(s) (50 mL/Hr) IV Continuous <Continuous>  multivitamin 1 Tablet(s) Oral daily  pantoprazole    Tablet 40 milliGRAM(s) Oral before breakfast  potassium chloride    Tablet ER 40 milliEquivalent(s) Oral every 4 hours  potassium chloride  10 mEq/100 mL IVPB 10 milliEquivalent(s) IV Intermittent every 1 hour  thiamine Injectable 100 milliGRAM(s) IV Push daily     diet: advance as tolerated, at risk of refeeding syndrome, close monitoring of electrolytes  =======================    ENDOCRINE  =====================  dextrose 50% Injectable 50 milliLiter(s) IV Push every 15 minutes  dextrose Oral Gel 15 Gram(s) Oral once PRN Blood Glucose LESS THAN 70 milliGRAM(s)/deciliter  glucagon  Injectable 1 milliGRAM(s) IntraMuscular once  insulin glargine Injectable (LANTUS) 15 Unit(s) SubCutaneous at bedtime  insulin lispro (ADMELOG) corrective regimen sliding scale   SubCutaneous every 6 hours   anion gap remains closed, transition to above regimen on March 18  Stopped dextrose fluid earlier today    ========================INFECTIOUS DISEASE================   No active issues  Blood culture negative  Empiric antibiotics discontinued on March 18    ____________________________________________      ISIAH CRAMEN  MRN-[id number]  Patient is a 32y old  Male who presents with a chief complaint of Type 2 diabetes mellitus with ketoacidosis without coma     (19 Mar 2023 17:56)     HPI/Hospital course:  32M PMH IDDM, seizure disorder, AFib on Eliquis, EtOH abuse who presented on 3/16/23 with abdominal pain. He notes he has not taken his insulin for approximately 5 days due to running out. He is a poor historian at the time of evaluation. He is unsure when he had his last alcoholic beverage. He is unsure if he is having fevers or chills. He does not remember his dose of insulin. In the ED he was noted with WBC 21, VBG <6.94/34/66, K 3.5, A1c 15.4. Started on Ativan in MICU for EtOH withdrawal, became oversedated and bradypneic, and was given Flumazenil with good response. Noted with leukocytosis, developed shock, empirically started on Zosyn, CXR clear, CT abdomen without acute findings.     Last 24 hours:    going through delirium tremens overnight getting additional dose of IV Valium, required dose of phenobarb and IV Valium earlier today switched over to scheduled IV Valium with as needed IV Valium and phenobarbital   repleting potassium aggressively IV and p.o.   this evening with improved mentation, tolerating diet with assistance at bedside    REVIEW OF SYSTEMS:  11 systems reviewed. No other symptoms besides mentioned above      Physical Exam:  Vital Signs Last 24 Hrs  T(C): 36.6 (19 Mar 2023 15:35), Max: 36.9 (18 Mar 2023 23:17)  T(F): 97.8 (19 Mar 2023 15:35), Max: 98.5 (18 Mar 2023 23:17)  HR: 69 (19 Mar 2023 15:00) (69 - 99)  BP: 94/64 (19 Mar 2023 15:00) (83/58 - 116/82)  BP(mean): 72 (19 Mar 2023 15:00) (67 - 94)  RR: 10 (19 Mar 2023 15:00) (7 - 19)  SpO2: 100% (19 Mar 2023 15:00) (98% - 100%)    Parameters below as of 19 Mar 2023 12:00  Patient On (Oxygen Delivery Method): room air        Gen:  Awake, alert, oriented *1-2 in NAD; Earlier today was agitated  CNS: no acute neurological findings, WAYNE , moving all extremities fine, wrist restraints bilaterally earlier in the day currently weaned off of it  Neck: no JVD  RES : Air entry equal b/l, no Adventitious breath sounds                     CVS: Normal S1/S2, regular rhythm and normal  to increased rate   Abd: Soft, non-distended. Bowel sounds present.  Skin: No acute rash.  Ext:  no edema b/l LE, PP+   external catheter   right femoral femoral line    ============================I/O===========================   I&O's Detail    18 Mar 2023 07:01  -  19 Mar 2023 07:00  --------------------------------------------------------  IN:    dextrose 5% + lactated ringers w/ Additives: 5750 mL    Insulin: 65.5 mL    IV PiggyBack: 100 mL    IV PiggyBack: 700 mL    IV PiggyBack: 333.2 mL    IV PiggyBack: 100 mL    IV PiggyBack: 100 mL    IV PiggyBack: 200 mL    Lactated Ringers Bolus: 2500 mL  Total IN: 9848.7 mL    OUT:    Voided (mL): 3450 mL  Total OUT: 3450 mL    Total NET: 6398.7 mL      19 Mar 2023 07:01  -  19 Mar 2023 18:18  --------------------------------------------------------  IN:    dextrose 5% + lactated ringers w/ Additives: 750 mL    IV PiggyBack: 100 mL    Lactated Ringers: 250 mL  Total IN: 1100 mL    OUT:    Voided (mL): 850 mL  Total OUT: 850 mL    Total NET: 250 mL        ============================ LABS =========================                        8.7    5.89  )-----------( 117      ( 19 Mar 2023 03:40 )             23.9     03-19    143  |  112<H>  |  <3.0<L>  ----------------------------<  67<L>  3.1<L>   |  25.0  |  0.51    Ca    8.0<L>      19 Mar 2023 15:04  Phos  2.9     03-19  Mg     2.0     03-19    TPro  4.4<L>  /  Alb  2.7<L>  /  TBili  0.8  /  DBili  x   /  AST  12  /  ALT  7   /  AlkPhos  78  03-19    LIVER FUNCTIONS - ( 19 Mar 2023 03:40 )  Alb: 2.7 g/dL / Pro: 4.4 g/dL / ALK PHOS: 78 U/L / ALT: 7 U/L / AST: 12 U/L / GGT: x             ABG - ( 18 Mar 2023 16:24 )  pH, Arterial: 7.390 pH, Blood: x     /  pCO2: 32    /  pO2: 116   / HCO3: 19    / Base Excess: -5.6  /  SaO2: 100.0               ======================Micro/Rad/Cardio=================  Culture: Reviewed   CXR: Reviewed  Echo:Reviewed        Patient requires continuous monitoring   Care plan discussed with ICU care team, family and consultants  Patient remained critical; I have spent 50 minutes providing non-routine care, revaluated multiple times during the day.

## 2023-03-19 NOTE — DIETITIAN INITIAL EVALUATION ADULT - PERTINENT MEDS FT
MEDICATIONS  (STANDING):  chlorhexidine 2% Cloths 1 Application(s) Topical daily  dextrose 5%. 1000 milliLiter(s) (50 mL/Hr) IV Continuous <Continuous>  dextrose 5%. 1000 milliLiter(s) (100 mL/Hr) IV Continuous <Continuous>  dextrose 50% Injectable 50 milliLiter(s) IV Push every 15 minutes  diazepam  Injectable 10 milliGRAM(s) IV Push every 4 hours  enoxaparin Injectable 40 milliGRAM(s) SubCutaneous every 24 hours  folic acid Injectable 1 milliGRAM(s) IV Push daily  glucagon  Injectable 1 milliGRAM(s) IntraMuscular once  insulin glargine Injectable (LANTUS) 15 Unit(s) SubCutaneous at bedtime  insulin lispro (ADMELOG) corrective regimen sliding scale   SubCutaneous every 6 hours  lactated ringers. 1000 milliLiter(s) (50 mL/Hr) IV Continuous <Continuous>  levETIRAcetam  IVPB 1000 milliGRAM(s) IV Intermittent every 12 hours  multivitamin 1 Tablet(s) Oral daily  pantoprazole    Tablet 40 milliGRAM(s) Oral before breakfast  potassium chloride  10 mEq/100 mL IVPB 10 milliEquivalent(s) IV Intermittent every 1 hour  thiamine Injectable 100 milliGRAM(s) IV Push daily    MEDICATIONS  (PRN):  dextrose Oral Gel 15 Gram(s) Oral once PRN Blood Glucose LESS THAN 70 milliGRAM(s)/deciliter  diazepam  Injectable 10 milliGRAM(s) IV Push every 4 hours PRN Breakthrough agitation  PHENobarbital Injectable 260 milliGRAM(s) IV Push every 1 hour PRN CIWA >8

## 2023-03-19 NOTE — DIETITIAN INITIAL EVALUATION ADULT - PERTINENT LABORATORY DATA
03-19    143  |  112<H>  |  <3.0<L>  ----------------------------<  67<L>  3.1<L>   |  25.0  |  0.51    Ca    8.0<L>      19 Mar 2023 15:04  Phos  2.9     03-19  Mg     2.0     03-19    TPro  4.4<L>  /  Alb  2.7<L>  /  TBili  0.8  /  DBili  x   /  AST  12  /  ALT  7   /  AlkPhos  78  03-19  POCT Blood Glucose.: 89 mg/dL (03-19-23 @ 17:36)  A1C with Estimated Average Glucose Result: 15.4 % (03-16-23 @ 23:20)  A1C with Estimated Average Glucose Result: 6.8 % (10-14-22 @ 03:52)  A1C with Estimated Average Glucose Result: 7.4 % (09-27-22 @ 02:30)

## 2023-03-19 NOTE — PROGRESS NOTE ADULT - SUBJECTIVE AND OBJECTIVE BOX
Interval Events:  no overnight events  follow up on DKA, DM management     Patient seen and examined at bedside. Still drowsy due to sedation for alcohol withdrawal. Off insulin gtt. Transitioned ot basal - bolus regimen    MEDICATIONS  (STANDING):  chlorhexidine 2% Cloths 1 Application(s) Topical daily  dextrose 5%. 1000 milliLiter(s) (50 mL/Hr) IV Continuous <Continuous>  dextrose 5%. 1000 milliLiter(s) (100 mL/Hr) IV Continuous <Continuous>  dextrose 50% Injectable 50 milliLiter(s) IV Push every 15 minutes  diazepam  Injectable 10 milliGRAM(s) IV Push every 4 hours  enoxaparin Injectable 40 milliGRAM(s) SubCutaneous every 24 hours  folic acid Injectable 1 milliGRAM(s) IV Push daily  glucagon  Injectable 1 milliGRAM(s) IntraMuscular once  insulin glargine Injectable (LANTUS) 15 Unit(s) SubCutaneous at bedtime  insulin lispro (ADMELOG) corrective regimen sliding scale   SubCutaneous every 6 hours  lactated ringers. 1000 milliLiter(s) (50 mL/Hr) IV Continuous <Continuous>  levETIRAcetam  IVPB 1000 milliGRAM(s) IV Intermittent every 12 hours  multivitamin 1 Tablet(s) Oral daily  pantoprazole    Tablet 40 milliGRAM(s) Oral before breakfast  potassium chloride  10 mEq/100 mL IVPB 10 milliEquivalent(s) IV Intermittent every 1 hour  thiamine Injectable 100 milliGRAM(s) IV Push daily    MEDICATIONS  (PRN):  dextrose Oral Gel 15 Gram(s) Oral once PRN Blood Glucose LESS THAN 70 milliGRAM(s)/deciliter  diazepam  Injectable 10 milliGRAM(s) IV Push every 4 hours PRN Breakthrough agitation  PHENobarbital Injectable 260 milliGRAM(s) IV Push every 1 hour PRN CIWA >8      Vital Signs Last 24 Hrs  T(C): 36.6 (19 Mar 2023 15:35), Max: 36.9 (18 Mar 2023 23:17)  T(F): 97.8 (19 Mar 2023 15:35), Max: 98.5 (18 Mar 2023 23:17)  HR: 69 (19 Mar 2023 15:00) (69 - 99)  BP: 94/64 (19 Mar 2023 15:00) (83/58 - 116/82)  BP(mean): 72 (19 Mar 2023 15:00) (67 - 94)  RR: 10 (19 Mar 2023 15:00) (7 - 19)  SpO2: 100% (19 Mar 2023 15:00) (98% - 100%)    Parameters below as of 19 Mar 2023 12:00  Patient On (Oxygen Delivery Method): room air      Weight (kg): 77.1 (03-16-23 @ 21:43)    Physical Exam:    Constitutional: Sedated, opens eyes and answers with yes/no  HEENT: EOMI, no exophalmos  Neck: trachea midline, no thyroid enlargement  Respiratory: B/l air entry with no auxiliary sounds.   Cardiovascular: S1 and S2, RRR  Gastrointestinal: BS+, soft, ntnd  Extremities: No peripheral edema  Neurological: Sedated, minimally verbal, moves all extremities.       LABS  03-19    143  |  112<H>  |  <3.0<L>  ----------------------------<  67<L>  3.1<L>   |  25.0  |  0.51    Ca    8.0<L>      19 Mar 2023 15:04  Phos  2.9     03-19  Mg     2.0     03-19    TPro  4.4<L>  /  Alb  2.7<L>  /  TBili  0.8  /  DBili  x   /  AST  12  /  ALT  7   /  AlkPhos  78  03-19                          8.7    5.89  )-----------( 117      ( 19 Mar 2023 03:40 )             23.9       A1C with Estimated Average Glucose Result: 15.4 % (03-16-23 @ 23:20)    Alkaline Phosphatase, Serum: 78 U/L (03-19-23 @ 03:40)  Albumin, Serum: 2.7 g/dL (03-19-23 @ 03:40)  Aspartate Aminotransferase (AST/SGOT): 12 U/L (03-19-23 @ 03:40)  Alanine Aminotransferase (ALT/SGPT): 7 U/L (03-19-23 @ 03:40)  Alanine Aminotransferase (ALT/SGPT): 6 U/L (03-19-23 @ 00:00)  Alkaline Phosphatase, Serum: 81 U/L (03-19-23 @ 00:00)  Albumin, Serum: 2.8 g/dL (03-19-23 @ 00:00)  Aspartate Aminotransferase (AST/SGOT): 11 U/L (03-19-23 @ 00:00)  Alkaline Phosphatase, Serum: 107 U/L (03-18-23 @ 03:50)  Albumin, Serum: 3.4 g/dL (03-18-23 @ 03:50)  Aspartate Aminotransferase (AST/SGOT): 14 U/L (03-18-23 @ 03:50)  Alanine Aminotransferase (ALT/SGPT): 7 U/L (03-18-23 @ 03:50)  Albumin, Serum: 3.3 g/dL (03-18-23 @ 00:01)  Aspartate Aminotransferase (AST/SGOT): 16 U/L (03-18-23 @ 00:01)  Alanine Aminotransferase (ALT/SGPT): 7 U/L (03-18-23 @ 00:01)  Alkaline Phosphatase, Serum: 110 U/L (03-18-23 @ 00:01)  Alanine Aminotransferase (ALT/SGPT): 7 U/L (03-17-23 @ 20:10)  Alkaline Phosphatase, Serum: 109 U/L (03-17-23 @ 20:10)  Albumin, Serum: 3.4 g/dL (03-17-23 @ 20:10)  Aspartate Aminotransferase (AST/SGOT): 15 U/L (03-17-23 @ 20:10)    CAPILLARY BLOOD GLUCOSE    POCT Blood Glucose.: 89 mg/dL (19 Mar 2023 17:36)  POCT Blood Glucose.: 74 mg/dL (19 Mar 2023 17:07)  POCT Blood Glucose.: 155 mg/dL (19 Mar 2023 11:49)  POCT Blood Glucose.: 233 mg/dL (19 Mar 2023 05:56)  POCT Blood Glucose.: 266 mg/dL (19 Mar 2023 00:03)

## 2023-03-19 NOTE — PROGRESS NOTE ADULT - ASSESSMENT
32 y.o. Male with EtOH abuse and PMH of severely uncontrolled IDDM with recurrent DKA, seizure disorder, AFib on AC admitted for DKA. Patient is sedated for alcohol withdrawal, unable to provide history.  Currently managed in ICU settings, transitioned to basal - bolus regimen. Endocrinology follows for DM management. A1C 15.4%.    # Severe, recurrent DKA secondary to non compliance ETOH abuse   Resolved, transitioned to sq insulin. Received dose of Lantus 25U  Monitor electrolytes and replace accordingly    # Poorly controlled DM exacerbated by DKA  PADMINI and ICA negative in the past but C-peptide was low  POC- glucose low  Agree decreasing lantus  to 15U qhs  Continue correction scale  Monitor POC glucose with goal 120 - 180 mg/dL    # Leucocytosis - resolved  Possibly stress related  F/u Cx    # Hx of ETOH abuse  ETOH withdrawal  Continue CIWA  Thiamine, Folate, MVT

## 2023-03-19 NOTE — DIETITIAN INITIAL EVALUATION ADULT - ORAL INTAKE PTA/DIET HISTORY
Pt asleep, A&Ox 2 per documentation [facility] protocol, education deferred to a later date

## 2023-03-19 NOTE — DIETITIAN INITIAL EVALUATION ADULT - OTHER INFO
32M PMH IDDM, seizure disorder, AFib, EtOH abuse who presented on 3/16/23 with abdominal pain, insulin non-compliance, found to be in severe

## 2023-03-20 LAB
ALBUMIN SERPL ELPH-MCNC: 2.8 G/DL — LOW (ref 3.3–5.2)
ALP SERPL-CCNC: 81 U/L — SIGNIFICANT CHANGE UP (ref 40–120)
ALT FLD-CCNC: 7 U/L — SIGNIFICANT CHANGE UP
ANION GAP SERPL CALC-SCNC: 7 MMOL/L — SIGNIFICANT CHANGE UP (ref 5–17)
ANION GAP SERPL CALC-SCNC: SIGNIFICANT CHANGE UP MMOL/L (ref 5–17)
ANISOCYTOSIS BLD QL: SLIGHT — SIGNIFICANT CHANGE UP
AST SERPL-CCNC: 14 U/L — SIGNIFICANT CHANGE UP
BASOPHILS # BLD AUTO: 0.03 K/UL — SIGNIFICANT CHANGE UP (ref 0–0.2)
BASOPHILS NFR BLD AUTO: 0.9 % — SIGNIFICANT CHANGE UP (ref 0–2)
BILIRUB SERPL-MCNC: 0.8 MG/DL — SIGNIFICANT CHANGE UP (ref 0.4–2)
BUN SERPL-MCNC: 3.7 MG/DL — LOW (ref 8–20)
CALCIUM SERPL-MCNC: 8.2 MG/DL — LOW (ref 8.4–10.5)
CHLORIDE SERPL-SCNC: 108 MMOL/L — SIGNIFICANT CHANGE UP (ref 96–108)
CO2 SERPL-SCNC: 26 MMOL/L — SIGNIFICANT CHANGE UP (ref 22–29)
CREAT SERPL-MCNC: 0.6 MG/DL — SIGNIFICANT CHANGE UP (ref 0.5–1.3)
EGFR: 131 ML/MIN/1.73M2 — SIGNIFICANT CHANGE UP
EOSINOPHIL # BLD AUTO: 0.12 K/UL — SIGNIFICANT CHANGE UP (ref 0–0.5)
EOSINOPHIL NFR BLD AUTO: 3.6 % — SIGNIFICANT CHANGE UP (ref 0–6)
GIANT PLATELETS BLD QL SMEAR: PRESENT — SIGNIFICANT CHANGE UP
GLUCOSE BLDC GLUCOMTR-MCNC: 171 MG/DL — HIGH (ref 70–99)
GLUCOSE BLDC GLUCOMTR-MCNC: 190 MG/DL — HIGH (ref 70–99)
GLUCOSE BLDC GLUCOMTR-MCNC: 231 MG/DL — HIGH (ref 70–99)
GLUCOSE BLDC GLUCOMTR-MCNC: 244 MG/DL — HIGH (ref 70–99)
GLUCOSE SERPL-MCNC: 144 MG/DL — HIGH (ref 70–99)
HCT VFR BLD CALC: 25 % — LOW (ref 39–50)
HGB BLD-MCNC: 8.8 G/DL — LOW (ref 13–17)
LEVETIRACETAM SERPL-MCNC: 23.1 UG/ML — SIGNIFICANT CHANGE UP (ref 10–40)
LEVETIRACETAM SERPL-MCNC: <2 UG/ML — LOW (ref 10–40)
LYMPHOCYTES # BLD AUTO: 1.65 K/UL — SIGNIFICANT CHANGE UP (ref 1–3.3)
LYMPHOCYTES # BLD AUTO: 50.5 % — HIGH (ref 13–44)
MACROCYTES BLD QL: SLIGHT — SIGNIFICANT CHANGE UP
MAGNESIUM SERPL-MCNC: 1.8 MG/DL — SIGNIFICANT CHANGE UP (ref 1.6–2.6)
MANUAL SMEAR VERIFICATION: SIGNIFICANT CHANGE UP
MCHC RBC-ENTMCNC: 28.9 PG — SIGNIFICANT CHANGE UP (ref 27–34)
MCHC RBC-ENTMCNC: 35.2 GM/DL — SIGNIFICANT CHANGE UP (ref 32–36)
MCV RBC AUTO: 82 FL — SIGNIFICANT CHANGE UP (ref 80–100)
MICROCYTES BLD QL: SLIGHT — SIGNIFICANT CHANGE UP
MONOCYTES # BLD AUTO: 0.15 K/UL — SIGNIFICANT CHANGE UP (ref 0–0.9)
MONOCYTES NFR BLD AUTO: 4.5 % — SIGNIFICANT CHANGE UP (ref 2–14)
NEUTROPHILS # BLD AUTO: 1.32 K/UL — LOW (ref 1.8–7.4)
NEUTROPHILS NFR BLD AUTO: 37.8 % — LOW (ref 43–77)
NEUTS BAND # BLD: 2.7 % — SIGNIFICANT CHANGE UP (ref 0–8)
NRBC # BLD: 2 /100 — HIGH (ref 0–0)
OVALOCYTES BLD QL SMEAR: SLIGHT — SIGNIFICANT CHANGE UP
PHOSPHATE SERPL-MCNC: 3.5 MG/DL — SIGNIFICANT CHANGE UP (ref 2.4–4.7)
PLAT MORPH BLD: NORMAL — SIGNIFICANT CHANGE UP
PLATELET # BLD AUTO: 100 K/UL — LOW (ref 150–400)
POIKILOCYTOSIS BLD QL AUTO: SLIGHT — SIGNIFICANT CHANGE UP
POLYCHROMASIA BLD QL SMEAR: SLIGHT — SIGNIFICANT CHANGE UP
POTASSIUM SERPL-MCNC: 4.1 MMOL/L — SIGNIFICANT CHANGE UP (ref 3.5–5.3)
POTASSIUM SERPL-SCNC: 4.1 MMOL/L — SIGNIFICANT CHANGE UP (ref 3.5–5.3)
PROT SERPL-MCNC: 4.4 G/DL — LOW (ref 6.6–8.7)
RBC # BLD: 3.05 M/UL — LOW (ref 4.2–5.8)
RBC # FLD: 19.3 % — HIGH (ref 10.3–14.5)
RBC BLD AUTO: ABNORMAL
SMUDGE CELLS # BLD: PRESENT — SIGNIFICANT CHANGE UP
SODIUM SERPL-SCNC: 141 MMOL/L — SIGNIFICANT CHANGE UP (ref 135–145)
WBC # BLD: 3.26 K/UL — LOW (ref 3.8–10.5)
WBC # FLD AUTO: 3.26 K/UL — LOW (ref 3.8–10.5)

## 2023-03-20 PROCEDURE — 99233 SBSQ HOSP IP/OBS HIGH 50: CPT

## 2023-03-20 PROCEDURE — 99223 1ST HOSP IP/OBS HIGH 75: CPT

## 2023-03-20 RX ORDER — MAGNESIUM SULFATE 500 MG/ML
2 VIAL (ML) INJECTION ONCE
Refills: 0 | Status: COMPLETED | OUTPATIENT
Start: 2023-03-20 | End: 2023-03-20

## 2023-03-20 RX ORDER — QUETIAPINE FUMARATE 200 MG/1
25 TABLET, FILM COATED ORAL AT BEDTIME
Refills: 0 | Status: DISCONTINUED | OUTPATIENT
Start: 2023-03-20 | End: 2023-03-30

## 2023-03-20 RX ORDER — SUCRALFATE 1 G
1 TABLET ORAL
Refills: 0 | Status: DISCONTINUED | OUTPATIENT
Start: 2023-03-20 | End: 2023-03-30

## 2023-03-20 RX ORDER — THIAMINE MONONITRATE (VIT B1) 100 MG
500 TABLET ORAL THREE TIMES A DAY
Refills: 0 | Status: COMPLETED | OUTPATIENT
Start: 2023-03-20 | End: 2023-03-23

## 2023-03-20 RX ADMIN — Medication 1 TABLET(S): at 11:25

## 2023-03-20 RX ADMIN — Medication 105 MILLIGRAM(S): at 22:49

## 2023-03-20 RX ADMIN — INSULIN GLARGINE 15 UNIT(S): 100 INJECTION, SOLUTION SUBCUTANEOUS at 22:49

## 2023-03-20 RX ADMIN — Medication 25 GRAM(S): at 17:54

## 2023-03-20 RX ADMIN — Medication 2: at 11:32

## 2023-03-20 RX ADMIN — Medication 100 MILLIGRAM(S): at 17:53

## 2023-03-20 RX ADMIN — Medication 10 MILLIGRAM(S): at 17:52

## 2023-03-20 RX ADMIN — Medication 10 MILLIGRAM(S): at 07:31

## 2023-03-20 RX ADMIN — PANTOPRAZOLE SODIUM 40 MILLIGRAM(S): 20 TABLET, DELAYED RELEASE ORAL at 09:56

## 2023-03-20 RX ADMIN — Medication 4: at 22:47

## 2023-03-20 RX ADMIN — CHLORHEXIDINE GLUCONATE 1 APPLICATION(S): 213 SOLUTION TOPICAL at 11:25

## 2023-03-20 RX ADMIN — QUETIAPINE FUMARATE 25 MILLIGRAM(S): 200 TABLET, FILM COATED ORAL at 22:49

## 2023-03-20 RX ADMIN — Medication 10 MILLIGRAM(S): at 10:41

## 2023-03-20 RX ADMIN — Medication 1 MILLIGRAM(S): at 11:25

## 2023-03-20 RX ADMIN — Medication 25 GRAM(S): at 06:26

## 2023-03-20 RX ADMIN — LEVETIRACETAM 400 MILLIGRAM(S): 250 TABLET, FILM COATED ORAL at 20:16

## 2023-03-20 RX ADMIN — ENOXAPARIN SODIUM 40 MILLIGRAM(S): 100 INJECTION SUBCUTANEOUS at 06:31

## 2023-03-20 RX ADMIN — Medication 2: at 06:43

## 2023-03-20 RX ADMIN — Medication 10 MILLIGRAM(S): at 02:50

## 2023-03-20 RX ADMIN — Medication 10 MILLIGRAM(S): at 06:26

## 2023-03-20 RX ADMIN — Medication 4: at 18:00

## 2023-03-20 RX ADMIN — LEVETIRACETAM 400 MILLIGRAM(S): 250 TABLET, FILM COATED ORAL at 09:56

## 2023-03-20 RX ADMIN — Medication 10 MILLIGRAM(S): at 20:23

## 2023-03-20 NOTE — PROGRESS NOTE ADULT - ASSESSMENT
31 y/o M with PMHx DM with recurrent DKA, ETOH, seizure disorder, AFib on AC. Patient was admitted to ICU for DKA management, course complicated by alcohol withdrawal Endocrinology follows for DM management. A1C 15.4%.    1. Uncontrolled DM, a1c 15.4% - S/p DKA  - Continue lantus 15 units qhs  - When tolerating diet, can start premeal admelog 3 units  - Decrease sliding scale to mild coverage  - PADMINI negative, low c-peptide from previous admission  - Monitor electrolytes    2. ETOH abuse  - Withdrawal  - Care per ICU team   33 y/o M with PMHx T1DM with recurrent DKA, ETOH, seizure disorder, AFib on AC. Patient was admitted to ICU for DKA management, course complicated by alcohol withdrawal Endocrinology follows for DM management. A1C 15.4%.    1. Uncontrolled T1DM, a1c 15.4% - S/p DKA  - Continue lantus 15 units qhs  - When tolerating diet, can start premeal admelog 3 units  - Decrease sliding scale to mild coverage  - PADMINI negative, low c-peptide from previous admission  - must be discharged on basal bolus  - Monitor electrolytes    2. ETOH abuse  - Withdrawal  - Care per ICU team

## 2023-03-20 NOTE — PROGRESS NOTE ADULT - SUBJECTIVE AND OBJECTIVE BOX
INTERVAL EVENTS:  Follow up diabetes management    Denies pain at time of exam    MEDICATIONS  (STANDING):  chlorhexidine 2% Cloths 1 Application(s) Topical daily  dextrose 5%. 1000 milliLiter(s) (50 mL/Hr) IV Continuous <Continuous>  dextrose 5%. 1000 milliLiter(s) (100 mL/Hr) IV Continuous <Continuous>  dextrose 50% Injectable 50 milliLiter(s) IV Push every 15 minutes  diazepam  Injectable 10 milliGRAM(s) IV Push every 4 hours  enoxaparin Injectable 40 milliGRAM(s) SubCutaneous every 24 hours  folic acid Injectable 1 milliGRAM(s) IV Push daily  glucagon  Injectable 1 milliGRAM(s) IntraMuscular once  insulin glargine Injectable (LANTUS) 15 Unit(s) SubCutaneous at bedtime  insulin lispro (ADMELOG) corrective regimen sliding scale   SubCutaneous every 6 hours  levETIRAcetam  IVPB 1000 milliGRAM(s) IV Intermittent every 12 hours  magnesium sulfate  IVPB 2 Gram(s) IV Intermittent once  multivitamin 1 Tablet(s) Oral daily  pantoprazole    Tablet 40 milliGRAM(s) Oral before breakfast  QUEtiapine 25 milliGRAM(s) Oral at bedtime  sucralfate 1 Gram(s) Oral four times a day  thiamine Injectable 100 milliGRAM(s) IV Push daily    MEDICATIONS  (PRN):  dextrose Oral Gel 15 Gram(s) Oral once PRN Blood Glucose LESS THAN 70 milliGRAM(s)/deciliter  diazepam  Injectable 10 milliGRAM(s) IV Push every 4 hours PRN Breakthrough agitation    Allergies  Allergy Status Unknown  No Known Allergies    Vital Signs Last 24 Hrs  T(C): 37 (20 Mar 2023 12:00), Max: 37.1 (20 Mar 2023 00:00)  T(F): 98.6 (20 Mar 2023 12:00), Max: 98.7 (20 Mar 2023 00:00)  HR: 71 (20 Mar 2023 14:00) (65 - 102)  BP: 93/70 (20 Mar 2023 14:00) (84/67 - 125/100)  BP(mean): 78 (20 Mar 2023 14:00) (72 - 112)  RR: 12 (20 Mar 2023 14:00) (8 - 30)  SpO2: 100% (20 Mar 2023 14:00) (98% - 100%)    Parameters below as of 20 Mar 2023 14:00  Patient On (Oxygen Delivery Method): nasal cannula  O2 Flow (L/min): 2      PHYSICAL EXAM:  General: No apparent distress  Neck: Supple, trachea midline, no thyromegaly  Respiratory: Lungs clear bilaterally, normal rate, effort  Cardiac: +S1, S2, no m/r/g  GI: +BS, soft, non tender, non distended  Extremities: No peripheral edema, no pedal lesions  Neuro: Arouses to voice, lethargic      LABS:                        8.8    3.26  )-----------( 100      ( 20 Mar 2023 03:45 )             25.0     03-20    141  |  108  |  3.7<L>  ----------------------------<  144<H>  4.1   |  26.0  |  0.60    Ca    8.2<L>      20 Mar 2023 03:45  Phos  3.5     03-20  Mg     1.8     03-20    TPro  4.4<L>  /  Alb  2.8<L>  /  TBili  0.8  /  DBili  x   /  AST  14  /  ALT  7   /  AlkPhos  81  03-20    POCT Blood Glucose.: 171 mg/dL (03-20-23 @ 11:28)  POCT Blood Glucose.: 190 mg/dL (03-20-23 @ 06:40)  POCT Blood Glucose.: 124 mg/dL (03-19-23 @ 23:12)  POCT Blood Glucose.: 89 mg/dL (03-19-23 @ 17:36)  POCT Blood Glucose.: 74 mg/dL (03-19-23 @ 17:07)

## 2023-03-20 NOTE — PROGRESS NOTE ADULT - NS ATTEND AMEND GEN_ALL_CORE FT
32M with PMHx DM with recurrent DKA, ETOH, seizure disorder, AFib on AC. Patient was admitted to ICU for DKA management, course complicated by alcohol withdrawal. Endocrinology follows for DM management. A1C 15.4%.    a0x1 to himself  does not seem very oriented  and slow with speech with staring affect  likely has type1 or pancreatogenic diabetes due to etoh  reports occ etoh (drinks 4 beers daily, lives with parents)  needs basal bolus on discharge  agree with insulin changes as above  check cpeptide/tiffany 32M with PMHx DM with recurrent DKA, ETOH, seizure disorder, AFib on AC. Patient was admitted to ICU for DKA management, course complicated by alcohol withdrawal. Endocrinology follows for DM management. A1C 15.4%.    a0x1 to himself  does not seem very oriented  and slow with speech with staring affect  has type1 or pancreatogenic diabetes due to etoh  reports occ etoh (drinks 4 beers daily, lives with parents)  needs basal bolus on discharge  agree with insulin changes as above

## 2023-03-20 NOTE — SBIRT NOTE ADULT - NSSBIRTALCACTIVEREFTXDET_GEN_A_CORE
Pt reports he has been drinking less compared to last yr. Pt shared that as of this yr he drinks 4 "tall boys" [about 8 drinks] about 3x a week. SW informed pt that MD was recommending pt for inpt alcohol/sub use - pt was agreeable to inpt tx. Pt shared that he had been to [facility] recently and it helped but after discharge he "gave up." SW asked pt if he had ever f/u with outpt services after leaving which pt denied. SW suggested that after attending inpt tx he have staff at facility assist in scheduling a f/u in an outpt tx center of his choice. SW provided pt COS list asked pt if he had any preferences for inpt tx at this time - pt was agreeable to [facility].

## 2023-03-20 NOTE — PROGRESS NOTE ADULT - ASSESSMENT
32M PMH IDDM, seizure disorder, AFib on Eliquis, EtOH abuse who presented on 3/16/23 with abdominal pain admitted with DKA, Severe Dehydration, Severe Alcohol Withdrawal course was complicated by oversedation/bradypnea requiring flumazenil as well as shock. Patient now stabilized for transfer to medicine.     Insulin Dependent DM with Severe DKA sec to medication non compliance and alcohol abuse   A1C > 15  PH < 7, AG >22, Bicarb <5 on presentation  S/p IVF, Insulin with improvement of metabolic derangements   Cont current insulin regiment   Lantus 15 qhs + ISS  Titrate insulin as necessary  Endo consult given poorly controlled DM    Alcohol abuse with dependency and severe withdrawal  S/p Phenobarb  Now transitioned to valium q4h + prns  can taper to q6h tomorrow   MV, Folate, HD Thiamine x 3 days to prevent Wernicke's  Social Work Consult    AF RVR on presentation   Confirmed on EKG  Now sinus and normal rate.  Was likely precipitated in setting of severe metabolic derangements  Hold off AC at this time       DVT ppx: Lovenox  Dispo: Med Tele

## 2023-03-20 NOTE — PROGRESS NOTE ADULT - SUBJECTIVE AND OBJECTIVE BOX
[facility] Division of Hospital Medicine  Transfer of Care Note  ICU to medicine    Chief Complaint:    DKA, ETOH W/D    SUBJECTIVE / OVERNIGHT EVENTS:  Hospital Course    32M PMH IDDM, seizure disorder, AFib on Eliquis, EtOH abuse who presented on 3/16/23 with abdominal pain. He notes he has not taken his insulin for approximately 5 days due to running out.   In the ED he was noted with WBC 21, VBG <6.94/34/66, K 3.5, A1c 15.4. Started on IVF, Insulin gtt. Started on Ativan in MICU for EtOH withdrawal, became oversedated and bradypneic, and was given Flumazenil with good response. Noted with leukocytosis, developed shock, empirically started on Zosyn, CXR clear, CT abdomen without acute findings. Now off IV Insulin, Off IV Phenobarb. Stable for DG to medicine      Patient currently sleeping/resting comfortably after sedative. Unable to contribute to history or ROS    Unable to obtain Allergy, Social, Family, Surgical History sec to mental status     MEDICATIONS  (STANDING):  chlorhexidine 2% Cloths 1 Application(s) Topical daily  dextrose 5%. 1000 milliLiter(s) (50 mL/Hr) IV Continuous <Continuous>  dextrose 5%. 1000 milliLiter(s) (100 mL/Hr) IV Continuous <Continuous>  dextrose 50% Injectable 50 milliLiter(s) IV Push every 15 minutes  diazepam  Injectable 10 milliGRAM(s) IV Push every 4 hours  enoxaparin Injectable 40 milliGRAM(s) SubCutaneous every 24 hours  folic acid Injectable 1 milliGRAM(s) IV Push daily  glucagon  Injectable 1 milliGRAM(s) IntraMuscular once  insulin glargine Injectable (LANTUS) 15 Unit(s) SubCutaneous at bedtime  insulin lispro (ADMELOG) corrective regimen sliding scale   SubCutaneous every 6 hours  levETIRAcetam  IVPB 1000 milliGRAM(s) IV Intermittent every 12 hours  magnesium sulfate  IVPB 2 Gram(s) IV Intermittent once  multivitamin 1 Tablet(s) Oral daily  pantoprazole    Tablet 40 milliGRAM(s) Oral before breakfast  QUEtiapine 25 milliGRAM(s) Oral at bedtime  sucralfate 1 Gram(s) Oral four times a day  thiamine Injectable 100 milliGRAM(s) IV Push daily    MEDICATIONS  (PRN):  dextrose Oral Gel 15 Gram(s) Oral once PRN Blood Glucose LESS THAN 70 milliGRAM(s)/deciliter  diazepam  Injectable 10 milliGRAM(s) IV Push every 4 hours PRN Breakthrough agitation        I&O's Summary    19 Mar 2023 07:01  -  20 Mar 2023 07:00  --------------------------------------------------------  IN: 2190 mL / OUT: 1650 mL / NET: 540 mL    20 Mar 2023 07:01  -  20 Mar 2023 15:49  --------------------------------------------------------  IN: 0 mL / OUT: 1450 mL / NET: -1450 mL        PHYSICAL EXAM:  Vital Signs Last 24 Hrs  T(C): 37 (20 Mar 2023 12:00), Max: 37.1 (20 Mar 2023 00:00)  T(F): 98.6 (20 Mar 2023 12:00), Max: 98.7 (20 Mar 2023 00:00)  HR: 71 (20 Mar 2023 14:00) (65 - 102)  BP: 93/70 (20 Mar 2023 14:00) (84/67 - 125/100)  BP(mean): 78 (20 Mar 2023 14:00) (75 - 112)  RR: 12 (20 Mar 2023 14:00) (8 - 30)  SpO2: 100% (20 Mar 2023 14:00) (98% - 100%)    Parameters below as of 20 Mar 2023 14:00  Patient On (Oxygen Delivery Method): nasal cannula  O2 Flow (L/min): 2          CONSTITUTIONAL: NAD, supine in bed  ENMT: Moist oral mucosa, no pharyngeal injection or exudates; normal dentition  RESPIRATORY: Normal respiratory effort; lungs are clear to auscultation bilaterally  CARDIOVASCULAR: Regular rate and rhythm, normal S1 and S2, no murmur/rub/gallop; Peripheral pulses are 2+ bilaterally  ABDOMEN: Nontender to palpation, normoactive bowel sounds, no rebound/guarding;   MUSCLOSKELETAL:  No clubbing or cyanosis of digits; no joint swelling or tenderness to palpation  PSYCH: Not alert, not oriented  NEUROLOGY: CN 2-12 are intact and symmetric; no gross sensory deficits;   SKIN: No rashes; no palpable lesions    LABS:                        8.8    3.26  )-----------( 100      ( 20 Mar 2023 03:45 )             25.0     03-20    141  |  108  |  3.7<L>  ----------------------------<  144<H>  4.1   |  26.0  |  0.60    Ca    8.2<L>      20 Mar 2023 03:45  Phos  3.5     03-20  Mg     1.8     03-20    TPro  4.4<L>  /  Alb  2.8<L>  /  TBili  0.8  /  DBili  x   /  AST  14  /  ALT  7   /  AlkPhos  81  03-20              CAPILLARY BLOOD GLUCOSE      POCT Blood Glucose.: 171 mg/dL (20 Mar 2023 11:28)  POCT Blood Glucose.: 190 mg/dL (20 Mar 2023 06:40)  POCT Blood Glucose.: 124 mg/dL (19 Mar 2023 23:12)  POCT Blood Glucose.: 89 mg/dL (19 Mar 2023 17:36)  POCT Blood Glucose.: 74 mg/dL (19 Mar 2023 17:07)        RADIOLOGY & ADDITIONAL TESTS:  Results Reviewed:   Imaging Personally Reviewed:  Electrocardiogram Personally Reviewed:

## 2023-03-20 NOTE — CHART NOTE - NSCHARTNOTEFT_GEN_A_CORE
Removed femoral TLC under valsalva maneuver in its entirety. Tip of catheter intact. Pt tolerated procedure well. No complications.

## 2023-03-20 NOTE — SBIRT NOTE ADULT - NSSBIRTSIXOCC_GEN_A_CORE
"Physical Therapy Daily Treatment Note      Patient: Sindi Dang   : 1965  Referring practitioner: Philip Mckeon MD  Date of Initial Visit: Type: THERAPY  Noted: 3/2/2023  Today's Date: 3/20/2023  Patient seen for 6 sessions           Subjective Questionnaire:       Subjective Evaluation    History of Present Illness    Subjective comment: Pt reported 2/10 L knee pain which increased \"a little bit\" with strengthening.  Pt reported she didn't feel well secondary to a migrain.         Objective   See Exercise, Manual, and Modality Logs for complete treatment.       Assessment & Plan     Assessment    Assessment details: Pt reported she has had a migrain today and was slow with performing exercise.  Pt's L knee extension is 0 degrees actively and flexion is progressing well.  Continue with POC.        Visit Diagnoses:    ICD-10-CM ICD-9-CM   1. Old tear of lateral meniscus of left knee, unspecified tear type  M23.201 717.40   2. Left knee pain, unspecified chronicity  M25.562 719.46   3. Difficulty walking  R26.2 719.7       Progress per Plan of Care and Progress strengthening /stabilization /functional activity           Timed:  Manual Therapy:    2     mins  82861;  Therapeutic Exercise:    18     mins  21918;     Neuromuscular Jason:        mins  51812;    Therapeutic Activity:     8     mins  89667;     Gait Training:           mins  64208;     Ultrasound:          mins  84826;    Electrical Stimulation:         mins  27436 ( );  Aquatic Therapy          mins  21651    Untimed:  Electrical Stimulation:         mins  08356 ( );  Mechanical Traction:         mins  11221;     Timed Treatment:   28   mins   Total Treatment:     28   mins    Electronically signed    Delia Calix PTA  Physical Therapist Assistant    [employer] license: [id number]    "
Weekly

## 2023-03-20 NOTE — PROGRESS NOTE ADULT - SUBJECTIVE AND OBJECTIVE BOX
====================ASSESSMENT/PLAN==============   32-year-old  male with past medical history of insulin-dependent diabetes mellitus seizure disorder A-fib alcohol abuse admitted on March 16 for abdominal pain altered mental status to medical ICU for acute metabolic encephalopathy, DKA severe dehydration hypovolemic shock, hospital course complicated by delirium tremens, severe hypokalemia   ====================== NEUROLOGY=====================  diazepam  Injectable 10 milliGRAM(s) IV Push every 4 hours  diazepam  Injectable 10 milliGRAM(s) IV Push every 4 hours PRN Breakthrough agitation  levETIRAcetam  IVPB 1000 milliGRAM(s) IV Intermittent every 12 hours  QUEtiapine 25 milliGRAM(s) Oral at bedtime  Continued as needed phenobarb as he did not require any further   overall improving DTs    ==================== RESPIRATORY======================   oxygenating and ventilating okay on room air  Incentive spirometry when able to  Out of bed to chair and ambulation as tolerated    ====================CARDIOVASCULAR==================   hemodynamically stable  Stopped IV fluid today  ===================HEMATOLOGIC/ONC ===================  enoxaparin Injectable 40 milliGRAM(s) SubCutaneous every 24 hours   not candidate for anticoagulation with significant high risk of bleeding  ===================== RENAL =========================  Continue monitoring urine output   external urinary catheter, status post indwelling urinary catheter through ICU course on March 18  Avoid Nephrotoxic agents   Adjusting medications for renal  function   Replacing electrolytes as needed with Goal K> 4, PO> 3, Mg> 2  ==================== GASTROINTESTINAL===================  dextrose 5%. 1000 milliLiter(s) (50 mL/Hr) IV Continuous <Continuous>  dextrose 5%. 1000 milliLiter(s) (100 mL/Hr) IV Continuous <Continuous>  folic acid Injectable 1 milliGRAM(s) IV Push daily  magnesium sulfate  IVPB 2 Gram(s) IV Intermittent once  multivitamin 1 Tablet(s) Oral daily  pantoprazole    Tablet 40 milliGRAM(s) Oral before breakfast  sucralfate 1 Gram(s) Oral four times a day  thiamine Injectable 100 milliGRAM(s) IV Push daily     diet: advance as tolerated, consistent carb diet  =======================    ENDOCRINE  =====================  dextrose 50% Injectable 50 milliLiter(s) IV Push every 15 minutes  dextrose Oral Gel 15 Gram(s) Oral once PRN Blood Glucose LESS THAN 70 milliGRAM(s)/deciliter  glucagon  Injectable 1 milliGRAM(s) IntraMuscular once  insulin glargine Injectable (LANTUS) 15 Unit(s) SubCutaneous at bedtime  insulin lispro (ADMELOG) corrective regimen sliding scale   SubCutaneous every 6 hours    ========================INFECTIOUS DISEASE================   no active issues  Blood cultures negative  Empiric antibiotics discontinued on March 18    ____________________________________________      CARMEN JOYCE  MRN-[id number]  Patient is a 32y old  Male who presents with a chief complaint of DKA (19 Mar 2023 18:18)     HPI/Hospital course:  32M PMH IDDM, seizure disorder, AFib on Eliquis, EtOH abuse who presented on 3/16/23 with abdominal pain. He notes he has not taken his insulin for approximately 5 days due to running out. He is a poor historian at the time of evaluation. He is unsure when he had his last alcoholic beverage. He is unsure if he is having fevers or chills. He does not remember his dose of insulin. In the ED he was noted with WBC 21, VBG <6.94/34/66, K 3.5, A1c 15.4. Started on Ativan in MICU for EtOH withdrawal, became oversedated and bradypneic, and was given Flumazenil with good response. Noted with leukocytosis, developed shock, empirically started on Zosyn, CXR clear, CT abdomen without acute findings.       Last 24 hours:    improving delirium tremens   discontinued IV phenobarb today as he did not require responding well to scheduled and as needed IV Valium  We will discontinue restraints but will need constant monitoring  Will need fall aspiration and seizure precaution  Femoral central line removed earlier today without complication    REVIEW OF SYSTEMS:  11 systems reviewed. No other symptoms besides mentioned above      Physical Exam:  Vital Signs Last 24 Hrs  T(C): 36.4 (20 Mar 2023 08:00), Max: 37.1 (20 Mar 2023 00:00)  T(F): 97.6 (20 Mar 2023 08:00), Max: 98.7 (20 Mar 2023 00:00)  HR: 80 (20 Mar 2023 12:00) (65 - 102)  BP: 111/91 (20 Mar 2023 12:00) (84/67 - 125/100)  BP(mean): 98 (20 Mar 2023 12:00) (72 - 112)  RR: 11 (20 Mar 2023 12:00) (8 - 30)  SpO2: 100% (20 Mar 2023 12:00) (98% - 100%)    Parameters below as of 20 Mar 2023 12:00  Patient On (Oxygen Delivery Method): room air    Gen:  Awake, alert, oriented *1-2 in NAD  CNS: no acute neurological findings, WAYNE , moving all extremities fine  Neck: no JVD  RES : Air entry equal b/l, no Adventitious breath sounds                     CVS: Normal S1/S2, regular rhythm and normal  to increased rate   Abd: Soft, non-distended. Bowel sounds present.  Skin: No acute rash.  Ext:  no edema b/l LE, PP+   external  urinary catheter          ============================I/O===========================   I&O's Detail    19 Mar 2023 07:01  -  20 Mar 2023 07:00  --------------------------------------------------------  IN:    dextrose 5% + lactated ringers w/ Additives: 750 mL    IV PiggyBack: 200 mL    Lactated Ringers: 1000 mL    Oral Fluid: 240 mL  Total IN: 2190 mL    OUT:    Voided (mL): 1650 mL  Total OUT: 1650 mL    Total NET: 540 mL      20 Mar 2023 07:01  -  20 Mar 2023 13:02  --------------------------------------------------------  IN:  Total IN: 0 mL    OUT:    Voided (mL): 1450 mL  Total OUT: 1450 mL    Total NET: -1450 mL        ============================ LABS =========================                        8.8    3.26  )-----------( 100      ( 20 Mar 2023 03:45 )             25.0     03-20    141  |  108  |  3.7<L>  ----------------------------<  144<H>  4.1   |  26.0  |  0.60    Ca    8.2<L>      20 Mar 2023 03:45  Phos  3.5     03-20  Mg     1.8     03-20    TPro  4.4<L>  /  Alb  2.8<L>  /  TBili  0.8  /  DBili  x   /  AST  14  /  ALT  7   /  AlkPhos  81  03-20    LIVER FUNCTIONS - ( 20 Mar 2023 03:45 )  Alb: 2.8 g/dL / Pro: 4.4 g/dL / ALK PHOS: 81 U/L / ALT: 7 U/L / AST: 14 U/L / GGT: x             ABG - ( 18 Mar 2023 16:24 )  pH, Arterial: 7.390 pH, Blood: x     /  pCO2: 32    /  pO2: 116   / HCO3: 19    / Base Excess: -5.6  /  SaO2: 100.0               ======================Micro/Rad/Cardio=================  Culture: Reviewed   CXR: Reviewed  Echo:Reviewed        Patient requires continuous monitoring   Care plan discussed with ICU care team, family and consultants  Patient remained critical; I have spent 50 minutes providing non-routine care, revaluated multiple times during the day.

## 2023-03-21 LAB
ALBUMIN SERPL ELPH-MCNC: 2.9 G/DL — LOW (ref 3.3–5.2)
ALP SERPL-CCNC: 105 U/L — SIGNIFICANT CHANGE UP (ref 40–120)
ALT FLD-CCNC: 7 U/L — SIGNIFICANT CHANGE UP
ANION GAP SERPL CALC-SCNC: 9 MMOL/L — SIGNIFICANT CHANGE UP (ref 5–17)
AST SERPL-CCNC: 13 U/L — SIGNIFICANT CHANGE UP
BASOPHILS # BLD AUTO: 0.02 K/UL — SIGNIFICANT CHANGE UP (ref 0–0.2)
BASOPHILS NFR BLD AUTO: 0.6 % — SIGNIFICANT CHANGE UP (ref 0–2)
BILIRUB SERPL-MCNC: 0.5 MG/DL — SIGNIFICANT CHANGE UP (ref 0.4–2)
BUN SERPL-MCNC: 5.4 MG/DL — LOW (ref 8–20)
CALCIUM SERPL-MCNC: 8.8 MG/DL — SIGNIFICANT CHANGE UP (ref 8.4–10.5)
CHLORIDE SERPL-SCNC: 109 MMOL/L — HIGH (ref 96–108)
CO2 SERPL-SCNC: 27 MMOL/L — SIGNIFICANT CHANGE UP (ref 22–29)
CREAT SERPL-MCNC: 0.56 MG/DL — SIGNIFICANT CHANGE UP (ref 0.5–1.3)
EGFR: 133 ML/MIN/1.73M2 — SIGNIFICANT CHANGE UP
EOSINOPHIL # BLD AUTO: 0.04 K/UL — SIGNIFICANT CHANGE UP (ref 0–0.5)
EOSINOPHIL NFR BLD AUTO: 1.1 % — SIGNIFICANT CHANGE UP (ref 0–6)
GLUCOSE BLDC GLUCOMTR-MCNC: 118 MG/DL — HIGH (ref 70–99)
GLUCOSE BLDC GLUCOMTR-MCNC: 140 MG/DL — HIGH (ref 70–99)
GLUCOSE BLDC GLUCOMTR-MCNC: 160 MG/DL — HIGH (ref 70–99)
GLUCOSE BLDC GLUCOMTR-MCNC: 168 MG/DL — HIGH (ref 70–99)
GLUCOSE SERPL-MCNC: 157 MG/DL — HIGH (ref 70–99)
HCT VFR BLD CALC: 34 % — LOW (ref 39–50)
HGB BLD-MCNC: 11.3 G/DL — LOW (ref 13–17)
IMM GRANULOCYTES NFR BLD AUTO: 0.6 % — SIGNIFICANT CHANGE UP (ref 0–0.9)
LYMPHOCYTES # BLD AUTO: 1.29 K/UL — SIGNIFICANT CHANGE UP (ref 1–3.3)
LYMPHOCYTES # BLD AUTO: 37.1 % — SIGNIFICANT CHANGE UP (ref 13–44)
MAGNESIUM SERPL-MCNC: 2.1 MG/DL — SIGNIFICANT CHANGE UP (ref 1.6–2.6)
MCHC RBC-ENTMCNC: 28.8 PG — SIGNIFICANT CHANGE UP (ref 27–34)
MCHC RBC-ENTMCNC: 33.2 GM/DL — SIGNIFICANT CHANGE UP (ref 32–36)
MCV RBC AUTO: 86.5 FL — SIGNIFICANT CHANGE UP (ref 80–100)
MONOCYTES # BLD AUTO: 0.46 K/UL — SIGNIFICANT CHANGE UP (ref 0–0.9)
MONOCYTES NFR BLD AUTO: 13.2 % — SIGNIFICANT CHANGE UP (ref 2–14)
NEUTROPHILS # BLD AUTO: 1.65 K/UL — LOW (ref 1.8–7.4)
NEUTROPHILS NFR BLD AUTO: 47.4 % — SIGNIFICANT CHANGE UP (ref 43–77)
PHOSPHATE SERPL-MCNC: 4.7 MG/DL — SIGNIFICANT CHANGE UP (ref 2.4–4.7)
PLATELET # BLD AUTO: 93 K/UL — LOW (ref 150–400)
POTASSIUM SERPL-MCNC: 3.9 MMOL/L — SIGNIFICANT CHANGE UP (ref 3.5–5.3)
POTASSIUM SERPL-SCNC: 3.9 MMOL/L — SIGNIFICANT CHANGE UP (ref 3.5–5.3)
PROT SERPL-MCNC: 5.1 G/DL — LOW (ref 6.6–8.7)
RBC # BLD: 3.93 M/UL — LOW (ref 4.2–5.8)
RBC # FLD: 18.6 % — HIGH (ref 10.3–14.5)
SODIUM SERPL-SCNC: 145 MMOL/L — SIGNIFICANT CHANGE UP (ref 135–145)
WBC # BLD: 3.48 K/UL — LOW (ref 3.8–10.5)
WBC # FLD AUTO: 3.48 K/UL — LOW (ref 3.8–10.5)

## 2023-03-21 PROCEDURE — 99233 SBSQ HOSP IP/OBS HIGH 50: CPT

## 2023-03-21 PROCEDURE — 99232 SBSQ HOSP IP/OBS MODERATE 35: CPT

## 2023-03-21 RX ORDER — INSULIN LISPRO 100/ML
VIAL (ML) SUBCUTANEOUS
Refills: 0 | Status: DISCONTINUED | OUTPATIENT
Start: 2023-03-21 | End: 2023-03-30

## 2023-03-21 RX ORDER — DIAZEPAM 5 MG
10 TABLET ORAL EVERY 6 HOURS
Refills: 0 | Status: DISCONTINUED | OUTPATIENT
Start: 2023-03-21 | End: 2023-03-22

## 2023-03-21 RX ORDER — INSULIN LISPRO 100/ML
4 VIAL (ML) SUBCUTANEOUS
Refills: 0 | Status: DISCONTINUED | OUTPATIENT
Start: 2023-03-21 | End: 2023-03-24

## 2023-03-21 RX ADMIN — Medication 1: at 12:57

## 2023-03-21 RX ADMIN — Medication 1 GRAM(S): at 02:08

## 2023-03-21 RX ADMIN — CHLORHEXIDINE GLUCONATE 1 APPLICATION(S): 213 SOLUTION TOPICAL at 12:30

## 2023-03-21 RX ADMIN — ENOXAPARIN SODIUM 40 MILLIGRAM(S): 100 INJECTION SUBCUTANEOUS at 06:01

## 2023-03-21 RX ADMIN — Medication 10 MILLIGRAM(S): at 06:02

## 2023-03-21 RX ADMIN — Medication 105 MILLIGRAM(S): at 06:02

## 2023-03-21 RX ADMIN — Medication 1 MILLIGRAM(S): at 17:37

## 2023-03-21 RX ADMIN — Medication 10 MILLIGRAM(S): at 23:00

## 2023-03-21 RX ADMIN — PANTOPRAZOLE SODIUM 40 MILLIGRAM(S): 20 TABLET, DELAYED RELEASE ORAL at 06:02

## 2023-03-21 RX ADMIN — Medication 4 UNIT(S): at 12:56

## 2023-03-21 RX ADMIN — LEVETIRACETAM 400 MILLIGRAM(S): 250 TABLET, FILM COATED ORAL at 09:12

## 2023-03-21 RX ADMIN — QUETIAPINE FUMARATE 25 MILLIGRAM(S): 200 TABLET, FILM COATED ORAL at 22:55

## 2023-03-21 RX ADMIN — Medication 4 UNIT(S): at 17:37

## 2023-03-21 RX ADMIN — Medication 1 GRAM(S): at 17:46

## 2023-03-21 RX ADMIN — INSULIN GLARGINE 15 UNIT(S): 100 INJECTION, SOLUTION SUBCUTANEOUS at 23:04

## 2023-03-21 RX ADMIN — Medication 100 MILLIGRAM(S): at 17:37

## 2023-03-21 RX ADMIN — Medication 10 MILLIGRAM(S): at 02:14

## 2023-03-21 RX ADMIN — Medication 10 MILLIGRAM(S): at 09:12

## 2023-03-21 RX ADMIN — LEVETIRACETAM 400 MILLIGRAM(S): 250 TABLET, FILM COATED ORAL at 23:03

## 2023-03-21 RX ADMIN — Medication 1 GRAM(S): at 06:02

## 2023-03-21 RX ADMIN — Medication 1 TABLET(S): at 12:56

## 2023-03-21 RX ADMIN — Medication 105 MILLIGRAM(S): at 19:06

## 2023-03-21 NOTE — PROGRESS NOTE ADULT - ASSESSMENT
33 yo M PMH IDDM, seizure disorder, AFib on Eliquis, EtOH abuse who presented on 3/16/23 with abdominal pain admitted with DKA, Severe Dehydration, Severe Alcohol Withdrawal course was complicated by oversedation/bradypnea requiring flumazenil as well as shock. Patient  transferred to medicine on 3/20.     Insulin Dependent DM with Severe DKA sec to medication non compliance and alcohol abuse   A1C > 15  S/p IVF, Insulin with improvement of metabolic derangements   - continue Lantus 15 qhs  - lispro 4 units with meals  -  ISS  - Titrate insulin as necessary  - Endo consult appreciated, Must be discharged on basal bolus regimen.     Alcohol abuse with dependency and severe withdrawal  S/p Phenobarb  -taper valium to q6h + prns  - can taper further tomorrow  - MV, Folate, HD Thiamine x 3 days to prevent Wernicke's  Social Work Consult    AF RVR on presentation   Confirmed on EKG  Now sinus and normal rate.  Was likely precipitated in setting of severe metabolic derangements  - Hold off AC at this time       DVT ppx: Lovenox  Dispo: remains acute. no plans for dc

## 2023-03-21 NOTE — PROGRESS NOTE ADULT - ASSESSMENT
33 y/o M with PMHx T1DM with recurrent DKA, ETOH, seizure disorder, AFib on AC. Patient was admitted to ICU for DKA management, course complicated by alcohol withdrawal Endocrinology follows for DM management. A1C 15.4%.    1. Uncontrolled T1DM, a1c 15.4% - S/p DKA  - Start premeal admelog 4 units tid   - Continue lantus 15 units qhs  - Mild sliding scale coverage  - PADMINI negative, low c-peptide from previous admission  - Must be discharged on basal bolus regimen    2. ETOH abuse  - On Valium q4h  - MV, Folate, Thiamine

## 2023-03-21 NOTE — PROGRESS NOTE ADULT - SUBJECTIVE AND OBJECTIVE BOX
[facility] Division of Hospital Medicine    Chief Complaint:  DKA    SUBJECTIVE / OVERNIGHT EVENTS: patient seen and examined. States he stopped taking his insulin because he ran out. Reports he drinks a 6 pack of beer on occasion but states he does not drink daily. Lives with his parents. He is calm but reports periods of feeling anxious     Patient denies chest pain, SOB, abd pain, N/V, fever, chills, dysuria or any other complaints. All remainder ROS negative.     MEDICATIONS  (STANDING):  chlorhexidine 2% Cloths 1 Application(s) Topical daily  dextrose 5%. 1000 milliLiter(s) (50 mL/Hr) IV Continuous <Continuous>  dextrose 5%. 1000 milliLiter(s) (100 mL/Hr) IV Continuous <Continuous>  dextrose 50% Injectable 50 milliLiter(s) IV Push every 15 minutes  diazepam  Injectable 10 milliGRAM(s) IV Push every 6 hours  enoxaparin Injectable 40 milliGRAM(s) SubCutaneous every 24 hours  folic acid Injectable 1 milliGRAM(s) IV Push daily  glucagon  Injectable 1 milliGRAM(s) IntraMuscular once  insulin glargine Injectable (LANTUS) 15 Unit(s) SubCutaneous at bedtime  insulin lispro (ADMELOG) corrective regimen sliding scale   SubCutaneous three times a day before meals  insulin lispro Injectable (ADMELOG) 4 Unit(s) SubCutaneous three times a day before meals  levETIRAcetam  IVPB 1000 milliGRAM(s) IV Intermittent every 12 hours  multivitamin 1 Tablet(s) Oral daily  pantoprazole    Tablet 40 milliGRAM(s) Oral before breakfast  QUEtiapine 25 milliGRAM(s) Oral at bedtime  sucralfate 1 Gram(s) Oral four times a day  thiamine Injectable 100 milliGRAM(s) IV Push daily  thiamine IVPB 500 milliGRAM(s) IV Intermittent three times a day    MEDICATIONS  (PRN):  dextrose Oral Gel 15 Gram(s) Oral once PRN Blood Glucose LESS THAN 70 milliGRAM(s)/deciliter  diazepam  Injectable 10 milliGRAM(s) IV Push every 4 hours PRN Breakthrough agitation        I&O's Summary    20 Mar 2023 07:01  -  21 Mar 2023 07:00  --------------------------------------------------------  IN: 450 mL / OUT: 3850 mL / NET: -3400 mL        PHYSICAL EXAM:  Vital Signs Last 24 Hrs  T(C): 36.4 (21 Mar 2023 11:11), Max: 36.7 (21 Mar 2023 04:16)  T(F): 97.6 (21 Mar 2023 11:11), Max: 98.1 (21 Mar 2023 04:16)  HR: 75 (21 Mar 2023 11:11) (63 - 85)  BP: 98/68 (21 Mar 2023 11:11) (93/70 - 108/84)  BP(mean): 86 (20 Mar 2023 21:00) (78 - 94)  RR: 18 (21 Mar 2023 11:11) (10 - 18)  SpO2: 98% (21 Mar 2023 11:11) (97% - 100%)    Parameters below as of 21 Mar 2023 11:11  Patient On (Oxygen Delivery Method): room air            CONSTITUTIONAL: NAD, unkempt  ENMT: Moist oral mucosa, no pharyngeal injection or exudates  RESPIRATORY: Normal respiratory effort; lungs are clear to auscultation bilaterally  CARDIOVASCULAR: Regular rate and rhythm, normal S1 and S2, no murmur/rub/gallop; No lower extremity edema  ABDOMEN: Nontender to palpation, normoactive bowel sounds, no rebound/guarding;   MUSCLOSKELETAL:   no joint swelling or tenderness to palpation  PSYCH: A+O to person, place, and time; affect appropriate  NEUROLOGY: CN 2-12 are intact and symmetric; no gross sensory deficits;   SKIN: No rashes; no palpable lesions    LABS:                        11.3   3.48  )-----------( 93       ( 21 Mar 2023 07:00 )             34.0     03-21    145  |  109<H>  |  5.4<L>  ----------------------------<  157<H>  3.9   |  27.0  |  0.56    Ca    8.8      21 Mar 2023 07:00  Phos  4.7     03-21  Mg     2.1     03-21    TPro  5.1<L>  /  Alb  2.9<L>  /  TBili  0.5  /  DBili  x   /  AST  13  /  ALT  7   /  AlkPhos  105  03-21              CAPILLARY BLOOD GLUCOSE      POCT Blood Glucose.: 160 mg/dL (21 Mar 2023 11:43)  POCT Blood Glucose.: 140 mg/dL (21 Mar 2023 05:59)  POCT Blood Glucose.: 244 mg/dL (20 Mar 2023 22:46)  POCT Blood Glucose.: 231 mg/dL (20 Mar 2023 17:59)        RADIOLOGY & ADDITIONAL TESTS:  Results Reviewed:   Imaging Personally Reviewed:  Electrocardiogram Personally Reviewed:

## 2023-03-21 NOTE — PROGRESS NOTE ADULT - SUBJECTIVE AND OBJECTIVE BOX
INTERVAL EVENTS:  Follow up diabetes management  ROS negative, denies pain  Limited due to mental status    MEDICATIONS  (STANDING):  chlorhexidine 2% Cloths 1 Application(s) Topical daily  dextrose 5%. 1000 milliLiter(s) (50 mL/Hr) IV Continuous <Continuous>  dextrose 5%. 1000 milliLiter(s) (100 mL/Hr) IV Continuous <Continuous>  dextrose 50% Injectable 50 milliLiter(s) IV Push every 15 minutes  diazepam  Injectable 10 milliGRAM(s) IV Push every 4 hours  enoxaparin Injectable 40 milliGRAM(s) SubCutaneous every 24 hours  folic acid Injectable 1 milliGRAM(s) IV Push daily  glucagon  Injectable 1 milliGRAM(s) IntraMuscular once  insulin glargine Injectable (LANTUS) 15 Unit(s) SubCutaneous at bedtime  insulin lispro (ADMELOG) corrective regimen sliding scale   SubCutaneous three times a day before meals  insulin lispro Injectable (ADMELOG) 4 Unit(s) SubCutaneous three times a day before meals  levETIRAcetam  IVPB 1000 milliGRAM(s) IV Intermittent every 12 hours  multivitamin 1 Tablet(s) Oral daily  pantoprazole    Tablet 40 milliGRAM(s) Oral before breakfast  QUEtiapine 25 milliGRAM(s) Oral at bedtime  sucralfate 1 Gram(s) Oral four times a day  thiamine Injectable 100 milliGRAM(s) IV Push daily  thiamine IVPB 500 milliGRAM(s) IV Intermittent three times a day    MEDICATIONS  (PRN):  dextrose Oral Gel 15 Gram(s) Oral once PRN Blood Glucose LESS THAN 70 milliGRAM(s)/deciliter  diazepam  Injectable 10 milliGRAM(s) IV Push every 4 hours PRN Breakthrough agitation    Allergies  Allergy Status Unknown  No Known Allergies    Vital Signs Last 24 Hrs  T(C): 36.4 (21 Mar 2023 11:11), Max: 36.7 (21 Mar 2023 04:16)  T(F): 97.6 (21 Mar 2023 11:11), Max: 98.1 (21 Mar 2023 04:16)  HR: 75 (21 Mar 2023 11:11) (63 - 85)  BP: 98/68 (21 Mar 2023 11:11) (93/70 - 108/84)  BP(mean): 86 (20 Mar 2023 21:00) (78 - 94)  RR: 18 (21 Mar 2023 11:11) (10 - 18)  SpO2: 98% (21 Mar 2023 11:11) (97% - 100%)    Parameters below as of 21 Mar 2023 11:11  Patient On (Oxygen Delivery Method): room air      PHYSICAL EXAM:  General: No apparent distress  Neck: Supple, trachea midline, no thyromegaly  Respiratory: Lungs clear bilaterally, normal rate, effort  Cardiac: +S1, S2, no m/r/g  GI: +BS, soft, non tender, non distended  Extremities: No peripheral edema, no pedal lesions  Neuro: Awake, flat affect/slow to respond    LABS:                        11.3   3.48  )-----------( 93       ( 21 Mar 2023 07:00 )             34.0     03-21    145  |  109<H>  |  5.4<L>  ----------------------------<  157<H>  3.9   |  27.0  |  0.56    Ca    8.8      21 Mar 2023 07:00  Phos  4.7     03-21  Mg     2.1     03-21    TPro  5.1<L>  /  Alb  2.9<L>  /  TBili  0.5  /  DBili  x   /  AST  13  /  ALT  7   /  AlkPhos  105  03-21    POCT Blood Glucose.: 160 mg/dL (03-21-23 @ 11:43)  POCT Blood Glucose.: 140 mg/dL (03-21-23 @ 05:59)  POCT Blood Glucose.: 244 mg/dL (03-20-23 @ 22:46)  POCT Blood Glucose.: 231 mg/dL (03-20-23 @ 17:59)

## 2023-03-22 LAB
ALBUMIN SERPL ELPH-MCNC: 2.8 G/DL — LOW (ref 3.3–5.2)
ALP SERPL-CCNC: 96 U/L — SIGNIFICANT CHANGE UP (ref 40–120)
ALT FLD-CCNC: 7 U/L — SIGNIFICANT CHANGE UP
ANION GAP SERPL CALC-SCNC: 9 MMOL/L — SIGNIFICANT CHANGE UP (ref 5–17)
AST SERPL-CCNC: 16 U/L — SIGNIFICANT CHANGE UP
BILIRUB SERPL-MCNC: 0.3 MG/DL — LOW (ref 0.4–2)
BUN SERPL-MCNC: 8.3 MG/DL — SIGNIFICANT CHANGE UP (ref 8–20)
CALCIUM SERPL-MCNC: 8.5 MG/DL — SIGNIFICANT CHANGE UP (ref 8.4–10.5)
CHLORIDE SERPL-SCNC: 108 MMOL/L — SIGNIFICANT CHANGE UP (ref 96–108)
CO2 SERPL-SCNC: 24 MMOL/L — SIGNIFICANT CHANGE UP (ref 22–29)
CREAT SERPL-MCNC: 0.56 MG/DL — SIGNIFICANT CHANGE UP (ref 0.5–1.3)
CULTURE RESULTS: SIGNIFICANT CHANGE UP
EGFR: 133 ML/MIN/1.73M2 — SIGNIFICANT CHANGE UP
GLUCOSE BLDC GLUCOMTR-MCNC: 183 MG/DL — HIGH (ref 70–99)
GLUCOSE BLDC GLUCOMTR-MCNC: 216 MG/DL — HIGH (ref 70–99)
GLUCOSE BLDC GLUCOMTR-MCNC: 229 MG/DL — HIGH (ref 70–99)
GLUCOSE BLDC GLUCOMTR-MCNC: 80 MG/DL — SIGNIFICANT CHANGE UP (ref 70–99)
GLUCOSE SERPL-MCNC: 92 MG/DL — SIGNIFICANT CHANGE UP (ref 70–99)
HCT VFR BLD CALC: 30.9 % — LOW (ref 39–50)
HGB BLD-MCNC: 10.2 G/DL — LOW (ref 13–17)
MAGNESIUM SERPL-MCNC: 2 MG/DL — SIGNIFICANT CHANGE UP (ref 1.6–2.6)
MCHC RBC-ENTMCNC: 29.2 PG — SIGNIFICANT CHANGE UP (ref 27–34)
MCHC RBC-ENTMCNC: 33 GM/DL — SIGNIFICANT CHANGE UP (ref 32–36)
MCV RBC AUTO: 88.5 FL — SIGNIFICANT CHANGE UP (ref 80–100)
PLATELET # BLD AUTO: 104 K/UL — LOW (ref 150–400)
POTASSIUM SERPL-MCNC: 4 MMOL/L — SIGNIFICANT CHANGE UP (ref 3.5–5.3)
POTASSIUM SERPL-SCNC: 4 MMOL/L — SIGNIFICANT CHANGE UP (ref 3.5–5.3)
PROT SERPL-MCNC: 5 G/DL — LOW (ref 6.6–8.7)
RBC # BLD: 3.49 M/UL — LOW (ref 4.2–5.8)
RBC # FLD: 17.7 % — HIGH (ref 10.3–14.5)
SODIUM SERPL-SCNC: 141 MMOL/L — SIGNIFICANT CHANGE UP (ref 135–145)
SPECIMEN SOURCE: SIGNIFICANT CHANGE UP
WBC # BLD: 3.92 K/UL — SIGNIFICANT CHANGE UP (ref 3.8–10.5)
WBC # FLD AUTO: 3.92 K/UL — SIGNIFICANT CHANGE UP (ref 3.8–10.5)

## 2023-03-22 PROCEDURE — 99232 SBSQ HOSP IP/OBS MODERATE 35: CPT

## 2023-03-22 RX ORDER — DIAZEPAM 5 MG
5 TABLET ORAL EVERY 8 HOURS
Refills: 0 | Status: DISCONTINUED | OUTPATIENT
Start: 2023-03-22 | End: 2023-03-23

## 2023-03-22 RX ORDER — INSULIN GLARGINE 100 [IU]/ML
12 INJECTION, SOLUTION SUBCUTANEOUS AT BEDTIME
Refills: 0 | Status: DISCONTINUED | OUTPATIENT
Start: 2023-03-22 | End: 2023-03-24

## 2023-03-22 RX ADMIN — LEVETIRACETAM 400 MILLIGRAM(S): 250 TABLET, FILM COATED ORAL at 10:04

## 2023-03-22 RX ADMIN — Medication 1 GRAM(S): at 05:58

## 2023-03-22 RX ADMIN — Medication 1 MILLIGRAM(S): at 13:54

## 2023-03-22 RX ADMIN — PANTOPRAZOLE SODIUM 40 MILLIGRAM(S): 20 TABLET, DELAYED RELEASE ORAL at 06:57

## 2023-03-22 RX ADMIN — Medication 105 MILLIGRAM(S): at 13:54

## 2023-03-22 RX ADMIN — Medication 1 GRAM(S): at 00:00

## 2023-03-22 RX ADMIN — Medication 1 GRAM(S): at 11:59

## 2023-03-22 RX ADMIN — Medication 1 TABLET(S): at 11:59

## 2023-03-22 RX ADMIN — LEVETIRACETAM 400 MILLIGRAM(S): 250 TABLET, FILM COATED ORAL at 23:08

## 2023-03-22 RX ADMIN — Medication 2: at 11:59

## 2023-03-22 RX ADMIN — Medication 105 MILLIGRAM(S): at 23:18

## 2023-03-22 RX ADMIN — Medication 2: at 17:30

## 2023-03-22 RX ADMIN — Medication 10 MILLIGRAM(S): at 05:39

## 2023-03-22 RX ADMIN — INSULIN GLARGINE 12 UNIT(S): 100 INJECTION, SOLUTION SUBCUTANEOUS at 23:13

## 2023-03-22 RX ADMIN — Medication 105 MILLIGRAM(S): at 05:39

## 2023-03-22 RX ADMIN — Medication 4 UNIT(S): at 11:59

## 2023-03-22 RX ADMIN — QUETIAPINE FUMARATE 25 MILLIGRAM(S): 200 TABLET, FILM COATED ORAL at 23:13

## 2023-03-22 RX ADMIN — CHLORHEXIDINE GLUCONATE 1 APPLICATION(S): 213 SOLUTION TOPICAL at 12:00

## 2023-03-22 RX ADMIN — Medication 1 GRAM(S): at 17:29

## 2023-03-22 RX ADMIN — ENOXAPARIN SODIUM 40 MILLIGRAM(S): 100 INJECTION SUBCUTANEOUS at 05:39

## 2023-03-22 RX ADMIN — Medication 1 GRAM(S): at 23:13

## 2023-03-22 RX ADMIN — Medication 4 UNIT(S): at 17:29

## 2023-03-22 RX ADMIN — Medication 10 MILLIGRAM(S): at 00:00

## 2023-03-22 RX ADMIN — Medication 5 MILLIGRAM(S): at 23:13

## 2023-03-22 NOTE — PROGRESS NOTE ADULT - SUBJECTIVE AND OBJECTIVE BOX
INTERVAL EVENTS:  Follow up diabetes management    ROS: Denies abd pain, n/v. Endorses good appetite.    MEDICATIONS  (STANDING):  chlorhexidine 2% Cloths 1 Application(s) Topical daily  dextrose 5%. 1000 milliLiter(s) (50 mL/Hr) IV Continuous <Continuous>  dextrose 5%. 1000 milliLiter(s) (100 mL/Hr) IV Continuous <Continuous>  dextrose 50% Injectable 50 milliLiter(s) IV Push every 15 minutes  diazepam  Injectable 5 milliGRAM(s) IV Push every 8 hours  enoxaparin Injectable 40 milliGRAM(s) SubCutaneous every 24 hours  folic acid Injectable 1 milliGRAM(s) IV Push daily  glucagon  Injectable 1 milliGRAM(s) IntraMuscular once  insulin glargine Injectable (LANTUS) 12 Unit(s) SubCutaneous at bedtime  insulin lispro (ADMELOG) corrective regimen sliding scale   SubCutaneous three times a day before meals  insulin lispro Injectable (ADMELOG) 4 Unit(s) SubCutaneous three times a day before meals  levETIRAcetam  IVPB 1000 milliGRAM(s) IV Intermittent every 12 hours  multivitamin 1 Tablet(s) Oral daily  pantoprazole    Tablet 40 milliGRAM(s) Oral before breakfast  QUEtiapine 25 milliGRAM(s) Oral at bedtime  sucralfate 1 Gram(s) Oral four times a day  thiamine Injectable 100 milliGRAM(s) IV Push daily  thiamine IVPB 500 milliGRAM(s) IV Intermittent three times a day    MEDICATIONS  (PRN):  dextrose Oral Gel 15 Gram(s) Oral once PRN Blood Glucose LESS THAN 70 milliGRAM(s)/deciliter  diazepam  Injectable 10 milliGRAM(s) IV Push every 4 hours PRN Breakthrough agitation    Allergies  Allergy Status Unknown  No Known Allergies    Vital Signs Last 24 Hrs  T(C): 36.6 (22 Mar 2023 09:26), Max: 36.6 (22 Mar 2023 04:26)  T(F): 97.8 (22 Mar 2023 09:26), Max: 97.8 (22 Mar 2023 04:26)  HR: 70 (22 Mar 2023 09:26) (62 - 88)  BP: 94/62 (22 Mar 2023 09:26) (94/62 - 111/74)  BP(mean): --  RR: 18 (22 Mar 2023 09:26) (16 - 18)  SpO2: 96% (22 Mar 2023 09:26) (94% - 97%)    Parameters below as of 22 Mar 2023 09:26  Patient On (Oxygen Delivery Method): room air    PHYSICAL EXAM:  General: No apparent distress  Neck: Supple, trachea midline, no thyromegaly  Respiratory: Lungs clear bilaterally, normal rate, effort  Cardiac: +S1, S2, no m/r/g  GI: +BS, soft, non tender, non distended  Extremities: No peripheral edema, no pedal lesions  Neuro: A+O X3, no tremor  Pysch: Affect appropriate   Skin: No acanthosis         LABS:                        10.2   3.92  )-----------( 104      ( 22 Mar 2023 06:56 )             30.9     03-22    141  |  108  |  8.3  ----------------------------<  92  4.0   |  24.0  |  0.56    Ca    8.5      22 Mar 2023 06:56  Phos  4.7     03-21  Mg     2.0     03-22    TPro  5.0<L>  /  Alb  2.8<L>  /  TBili  0.3<L>  /  DBili  x   /  AST  16  /  ALT  7   /  AlkPhos  96  03-22        POCT Blood Glucose.: 80 mg/dL (03-22-23 @ 07:41)  POCT Blood Glucose.: 168 mg/dL (03-21-23 @ 22:54)  POCT Blood Glucose.: 118 mg/dL (03-21-23 @ 16:37)  POCT Blood Glucose.: 160 mg/dL (03-21-23 @ 11:43)

## 2023-03-22 NOTE — PHYSICAL THERAPY INITIAL EVALUATION ADULT - ADDITIONAL COMMENTS
pt is a questionable historian: owns no DME, no stairs to enter home, flight of stairs 1 rail to bedroom

## 2023-03-22 NOTE — PHYSICAL THERAPY INITIAL EVALUATION ADULT - PERTINENT HX OF CURRENT PROBLEM, REHAB EVAL
abdominal pain, DKA, Severe Dehydration, Severe Alcohol Withdrawal course was complicated by oversedation/bradypnea requiring flumazenil as well as shock.

## 2023-03-22 NOTE — PROGRESS NOTE ADULT - NS ATTEND AMEND GEN_ALL_CORE FT
discussed with np  fs running lower  might be out of glucose toxicity  agree with insulin changes above

## 2023-03-22 NOTE — PHYSICAL THERAPY INITIAL EVALUATION ADULT - GAIT PATTERN USED, PT EVAL
unsteadiness throughout requiring assist, decreased antonette step length, verbal cues for sequencing

## 2023-03-22 NOTE — PROGRESS NOTE ADULT - ASSESSMENT
33 y/o M with PMHx T1DM with recurrent DKA, ETOH, seizure disorder, AFib on AC. Patient was admitted to ICU for DKA management, course complicated by alcohol withdrawal Endocrinology follows for DM management. A1C 15.4%.    1. Uncontrolled T1DM, a1c 15.4% - S/p DKA  - FS 80 this am  - Decrease lantus to 12 units qhs  - Continue premeal admelog 4 units tid with meals  - Mild sliding scale coverage  - PADMINI negative, low c-peptide from previous admission  - Must be discharged on basal bolus regimen    2. ETOH abuse  - On Valium q8 hr  - MV, Folate, Thiamine

## 2023-03-22 NOTE — PROGRESS NOTE ADULT - SUBJECTIVE AND OBJECTIVE BOX
[facility] Division of Hospital Medicine    Chief Complaint:  DKA    SUBJECTIVE / OVERNIGHT EVENTS: Patient seen and examined. Less anxious Has not been out of bed tells me he was told he is not allowed to get up.     Patient denies chest pain, SOB, abd pain, N/V, fever, chills, dysuria or any other complaints. All remainder ROS negative.     MEDICATIONS  (STANDING):  chlorhexidine 2% Cloths 1 Application(s) Topical daily  dextrose 5%. 1000 milliLiter(s) (50 mL/Hr) IV Continuous <Continuous>  dextrose 5%. 1000 milliLiter(s) (100 mL/Hr) IV Continuous <Continuous>  dextrose 50% Injectable 50 milliLiter(s) IV Push every 15 minutes  diazepam  Injectable 5 milliGRAM(s) IV Push every 8 hours  enoxaparin Injectable 40 milliGRAM(s) SubCutaneous every 24 hours  folic acid Injectable 1 milliGRAM(s) IV Push daily  glucagon  Injectable 1 milliGRAM(s) IntraMuscular once  insulin glargine Injectable (LANTUS) 12 Unit(s) SubCutaneous at bedtime  insulin lispro (ADMELOG) corrective regimen sliding scale   SubCutaneous three times a day before meals  insulin lispro Injectable (ADMELOG) 4 Unit(s) SubCutaneous three times a day before meals  levETIRAcetam  IVPB 1000 milliGRAM(s) IV Intermittent every 12 hours  multivitamin 1 Tablet(s) Oral daily  pantoprazole    Tablet 40 milliGRAM(s) Oral before breakfast  QUEtiapine 25 milliGRAM(s) Oral at bedtime  sucralfate 1 Gram(s) Oral four times a day  thiamine Injectable 100 milliGRAM(s) IV Push daily  thiamine IVPB 500 milliGRAM(s) IV Intermittent three times a day    MEDICATIONS  (PRN):  dextrose Oral Gel 15 Gram(s) Oral once PRN Blood Glucose LESS THAN 70 milliGRAM(s)/deciliter  diazepam  Injectable 10 milliGRAM(s) IV Push every 4 hours PRN Breakthrough agitation        I&O's Summary      PHYSICAL EXAM:  Vital Signs Last 24 Hrs  T(C): 36.6 (22 Mar 2023 09:26), Max: 36.6 (22 Mar 2023 04:26)  T(F): 97.8 (22 Mar 2023 09:26), Max: 97.8 (22 Mar 2023 04:26)  HR: 70 (22 Mar 2023 09:26) (62 - 88)  BP: 94/62 (22 Mar 2023 09:26) (94/62 - 111/74)  BP(mean): --  RR: 18 (22 Mar 2023 09:26) (16 - 18)  SpO2: 96% (22 Mar 2023 09:26) (94% - 97%)    Parameters below as of 22 Mar 2023 09:26  Patient On (Oxygen Delivery Method): room air        CONSTITUTIONAL: NAD, unkempt  ENMT: Moist oral mucosa, no pharyngeal injection or exudates  RESPIRATORY: Normal respiratory effort; lungs are clear to auscultation bilaterally  CARDIOVASCULAR: Regular rate and rhythm, normal S1 and S2, no murmur/rub/gallop; No lower extremity edema  ABDOMEN: Nontender to palpation, normoactive bowel sounds, no rebound/guarding;   MUSCLOSKELETAL:   no joint swelling or tenderness to palpation  PSYCH: A+O to person, place, and time; affect appropriate  NEUROLOGY: CN 2-12 are intact and symmetric; no gross sensory deficits;   SKIN: No rashes; no palpable lesions    LABS:                        10.2   3.92  )-----------( 104      ( 22 Mar 2023 06:56 )             30.9     03-22    141  |  108  |  8.3  ----------------------------<  92  4.0   |  24.0  |  0.56    Ca    8.5      22 Mar 2023 06:56  Phos  4.7     03-21  Mg     2.0     03-22    TPro  5.0<L>  /  Alb  2.8<L>  /  TBili  0.3<L>  /  DBili  x   /  AST  16  /  ALT  7   /  AlkPhos  96  03-22              CAPILLARY BLOOD GLUCOSE      POCT Blood Glucose.: 216 mg/dL (22 Mar 2023 11:40)  POCT Blood Glucose.: 80 mg/dL (22 Mar 2023 07:41)  POCT Blood Glucose.: 168 mg/dL (21 Mar 2023 22:54)  POCT Blood Glucose.: 118 mg/dL (21 Mar 2023 16:37)        RADIOLOGY & ADDITIONAL TESTS:  Results Reviewed:   Imaging Personally Reviewed:  Electrocardiogram Personally Reviewed:

## 2023-03-22 NOTE — PHYSICAL THERAPY INITIAL EVALUATION ADULT - NSPTDISCHREC_GEN_A_CORE
subacute rehab, if pt's progresses more than expected than pt may be able to return home, will continue to follow

## 2023-03-22 NOTE — PROGRESS NOTE ADULT - ASSESSMENT
33 yo M PMH IDDM, seizure disorder, AFib on Eliquis, EtOH abuse who presented on 3/16/23 with abdominal pain admitted with DKA, Severe Dehydration, Severe Alcohol Withdrawal course was complicated by oversedation/bradypnea requiring flumazenil as well as shock. Patient  transferred to medicine on 3/20.     Insulin Dependent DM with Severe DKA sec to medication non compliance and alcohol abuse   A1C > 15  S/p IVF, Insulin with improvement of metabolic derangements   - decrease Lantus 12 qhs  - lispro 4 units with meals  -  ISS  - Titrate insulin as necessary  - Endo consult appreciated, Must be discharged on basal bolus regimen.     Alcohol abuse with dependency and severe withdrawal  S/p Phenobarb  - taper valium to 5mg q8h + prns  - can taper further tomorrow  - MV, Folate, HD Thiamine x 3 days to prevent Wernicke's  - Social Work Consult    AF RVR on presentation   Confirmed on EKG  Now sinus and normal rate.  Was likely precipitated in setting of severe metabolic derangements  - Hold off AC at this time     Unsteady gait  likely from chronic alcohol use  - PT ordered    DVT ppx: Lovenox  Dispo: remains acute on IV Valium. Pending PT eval

## 2023-03-23 LAB
ALBUMIN SERPL ELPH-MCNC: 2.8 G/DL — LOW (ref 3.3–5.2)
ALP SERPL-CCNC: 84 U/L — SIGNIFICANT CHANGE UP (ref 40–120)
ALT FLD-CCNC: 6 U/L — SIGNIFICANT CHANGE UP
ANION GAP SERPL CALC-SCNC: 9 MMOL/L — SIGNIFICANT CHANGE UP (ref 5–17)
AST SERPL-CCNC: 10 U/L — SIGNIFICANT CHANGE UP
BILIRUB SERPL-MCNC: <0.2 MG/DL — LOW (ref 0.4–2)
BUN SERPL-MCNC: 10.6 MG/DL — SIGNIFICANT CHANGE UP (ref 8–20)
CALCIUM SERPL-MCNC: 8.1 MG/DL — LOW (ref 8.4–10.5)
CHLORIDE SERPL-SCNC: 106 MMOL/L — SIGNIFICANT CHANGE UP (ref 96–108)
CO2 SERPL-SCNC: 25 MMOL/L — SIGNIFICANT CHANGE UP (ref 22–29)
CREAT SERPL-MCNC: 0.65 MG/DL — SIGNIFICANT CHANGE UP (ref 0.5–1.3)
EGFR: 128 ML/MIN/1.73M2 — SIGNIFICANT CHANGE UP
GLUCOSE BLDC GLUCOMTR-MCNC: 126 MG/DL — HIGH (ref 70–99)
GLUCOSE BLDC GLUCOMTR-MCNC: 171 MG/DL — HIGH (ref 70–99)
GLUCOSE BLDC GLUCOMTR-MCNC: 332 MG/DL — HIGH (ref 70–99)
GLUCOSE BLDC GLUCOMTR-MCNC: 79 MG/DL — SIGNIFICANT CHANGE UP (ref 70–99)
GLUCOSE SERPL-MCNC: 194 MG/DL — HIGH (ref 70–99)
HCT VFR BLD CALC: 27.5 % — LOW (ref 39–50)
HGB BLD-MCNC: 8.9 G/DL — LOW (ref 13–17)
MAGNESIUM SERPL-MCNC: 1.8 MG/DL — SIGNIFICANT CHANGE UP (ref 1.8–2.6)
MCHC RBC-ENTMCNC: 28.6 PG — SIGNIFICANT CHANGE UP (ref 27–34)
MCHC RBC-ENTMCNC: 32.4 GM/DL — SIGNIFICANT CHANGE UP (ref 32–36)
MCV RBC AUTO: 88.4 FL — SIGNIFICANT CHANGE UP (ref 80–100)
PLATELET # BLD AUTO: 121 K/UL — LOW (ref 150–400)
POTASSIUM SERPL-MCNC: 3.9 MMOL/L — SIGNIFICANT CHANGE UP (ref 3.5–5.3)
POTASSIUM SERPL-SCNC: 3.9 MMOL/L — SIGNIFICANT CHANGE UP (ref 3.5–5.3)
PROT SERPL-MCNC: 4.7 G/DL — LOW (ref 6.6–8.7)
RBC # BLD: 3.11 M/UL — LOW (ref 4.2–5.8)
RBC # FLD: 17.4 % — HIGH (ref 10.3–14.5)
SODIUM SERPL-SCNC: 140 MMOL/L — SIGNIFICANT CHANGE UP (ref 135–145)
WBC # BLD: 3.55 K/UL — LOW (ref 3.8–10.5)
WBC # FLD AUTO: 3.55 K/UL — LOW (ref 3.8–10.5)

## 2023-03-23 PROCEDURE — 99233 SBSQ HOSP IP/OBS HIGH 50: CPT

## 2023-03-23 PROCEDURE — 99232 SBSQ HOSP IP/OBS MODERATE 35: CPT

## 2023-03-23 RX ORDER — INSULIN LISPRO 100/ML
2 VIAL (ML) SUBCUTANEOUS ONCE
Refills: 0 | Status: COMPLETED | OUTPATIENT
Start: 2023-03-23 | End: 2023-03-23

## 2023-03-23 RX ORDER — DIAZEPAM 5 MG
5 TABLET ORAL THREE TIMES A DAY
Refills: 0 | Status: DISCONTINUED | OUTPATIENT
Start: 2023-03-23 | End: 2023-03-23

## 2023-03-23 RX ADMIN — Medication 1 TABLET(S): at 12:59

## 2023-03-23 RX ADMIN — Medication 1 MILLIGRAM(S): at 13:59

## 2023-03-23 RX ADMIN — Medication 105 MILLIGRAM(S): at 06:13

## 2023-03-23 RX ADMIN — Medication 1 GRAM(S): at 18:06

## 2023-03-23 RX ADMIN — Medication 1: at 08:00

## 2023-03-23 RX ADMIN — Medication 1 GRAM(S): at 12:59

## 2023-03-23 RX ADMIN — ENOXAPARIN SODIUM 40 MILLIGRAM(S): 100 INJECTION SUBCUTANEOUS at 06:13

## 2023-03-23 RX ADMIN — Medication 1 GRAM(S): at 06:13

## 2023-03-23 RX ADMIN — CHLORHEXIDINE GLUCONATE 1 APPLICATION(S): 213 SOLUTION TOPICAL at 14:00

## 2023-03-23 RX ADMIN — Medication 4 UNIT(S): at 13:02

## 2023-03-23 RX ADMIN — Medication 105 MILLIGRAM(S): at 13:59

## 2023-03-23 RX ADMIN — QUETIAPINE FUMARATE 25 MILLIGRAM(S): 200 TABLET, FILM COATED ORAL at 22:31

## 2023-03-23 RX ADMIN — PANTOPRAZOLE SODIUM 40 MILLIGRAM(S): 20 TABLET, DELAYED RELEASE ORAL at 06:13

## 2023-03-23 RX ADMIN — LEVETIRACETAM 400 MILLIGRAM(S): 250 TABLET, FILM COATED ORAL at 12:59

## 2023-03-23 RX ADMIN — Medication 4 UNIT(S): at 08:00

## 2023-03-23 RX ADMIN — Medication 2 UNIT(S): at 22:45

## 2023-03-23 RX ADMIN — LEVETIRACETAM 400 MILLIGRAM(S): 250 TABLET, FILM COATED ORAL at 23:24

## 2023-03-23 RX ADMIN — INSULIN GLARGINE 12 UNIT(S): 100 INJECTION, SOLUTION SUBCUTANEOUS at 22:32

## 2023-03-23 RX ADMIN — Medication 1 GRAM(S): at 23:23

## 2023-03-23 NOTE — PROGRESS NOTE ADULT - SUBJECTIVE AND OBJECTIVE BOX
[facility] Division of Hospital Medicine    Follow up for uncontrolled Diabetes Mellitus .,  DKA     Patient seen and examined awake alert   no complaints   CIWA score 5   d/w nurse       Patient denies chest pain, SOB, abd pain, N/V, fever, chills, dysuria or any other complaints. All remainder ROS negative.       MEDICATIONS  (STANDING):  chlorhexidine 2% Cloths 1 Application(s) Topical daily  dextrose 5%. 1000 milliLiter(s) (50 mL/Hr) IV Continuous <Continuous>  dextrose 5%. 1000 milliLiter(s) (100 mL/Hr) IV Continuous <Continuous>  dextrose 50% Injectable 50 milliLiter(s) IV Push every 15 minutes  diazepam  Injectable 5 milliGRAM(s) IV Push every 8 hours  enoxaparin Injectable 40 milliGRAM(s) SubCutaneous every 24 hours  folic acid Injectable 1 milliGRAM(s) IV Push daily  glucagon  Injectable 1 milliGRAM(s) IntraMuscular once  insulin glargine Injectable (LANTUS) 12 Unit(s) SubCutaneous at bedtime  insulin lispro (ADMELOG) corrective regimen sliding scale   SubCutaneous three times a day before meals  insulin lispro Injectable (ADMELOG) 4 Unit(s) SubCutaneous three times a day before meals  levETIRAcetam  IVPB 1000 milliGRAM(s) IV Intermittent every 12 hours  multivitamin 1 Tablet(s) Oral daily  pantoprazole    Tablet 40 milliGRAM(s) Oral before breakfast  QUEtiapine 25 milliGRAM(s) Oral at bedtime  sucralfate 1 Gram(s) Oral four times a day  thiamine Injectable 100 milliGRAM(s) IV Push daily  thiamine IVPB 500 milliGRAM(s) IV Intermittent three times a day      Vital Signs Last 24 Hrs  T(C): 37 (23 Mar 2023 09:34), Max: 37 (23 Mar 2023 09:34)  T(F): 98.6 (23 Mar 2023 09:34), Max: 98.6 (23 Mar 2023 09:34)  HR: 77 (23 Mar 2023 09:34) (73 - 80)  BP: 95/60 (23 Mar 2023 09:34) (93/54 - 96/67)  BP(mean): --  RR: 18 (23 Mar 2023 09:34) (18 - 18)  SpO2: 95% (23 Mar 2023 09:34) (95% - 96%)    Parameters below as of 23 Mar 2023 09:34  Patient On (Oxygen Delivery Method): room air          CONSTITUTIONAL: NAD, awake alert   ENMT: Moist oral mucosa, no pharyngeal injection or exudates  RESPIRATORY:  lungs are clear to auscultation bilaterally  CARDIOVASCULAR: Regular rate and rhythm, normal S1 and S2, no murmur/rub/gallop  ABDOMEN: normoactive bowel sounds, no rebound/guarding;   MUSCLOSKELETAL:   no joint swelling or tenderness to palpation  SKIN: Normal color , warm                            8.9    3.55  )-----------( 121      ( 23 Mar 2023 04:18 )             27.5   03-23    140  |  106  |  10.6  ----------------------------<  194<H>  3.9   |  25.0  |  0.65    Ca    8.1<L>      23 Mar 2023 04:18  Mg     1.8     03-23    TPro  4.7<L>  /  Alb  2.8<L>  /  TBili  <0.2<L>  /  DBili  x   /  AST  10  /  ALT  6   /  AlkPhos  84  03-23    CAPILLARY BLOOD GLUCOSE      POCT Blood Glucose.: 126 mg/dL (23 Mar 2023 11:53)  POCT Blood Glucose.: 171 mg/dL (23 Mar 2023 07:34)  POCT Blood Glucose.: 183 mg/dL (22 Mar 2023 23:12)  POCT Blood Glucose.: 229 mg/dL (22 Mar 2023 16:40)

## 2023-03-23 NOTE — PROGRESS NOTE ADULT - ASSESSMENT
31 y/o M with PMHx T1DM with recurrent DKA, ETOH, seizure disorder, AFib on AC. Patient was admitted to ICU for DKA management, course complicated by alcohol withdrawal Endocrinology follows for DM management. A1C 15.4%.    1. Uncontrolled T1DM, a1c 15.4% - S/p DKA  - Continue lantus 12 units qhs  - Continue premeal admelog 4 units tid with meals  - Mild sliding scale coverage  - PADMINI negative, low c-peptide from previous admission  - Must be discharged on basal bolus regimen  - IGF1 ordered for tomorrow morning (acromegalic features)    2. ETOH abuse  - On Valium q8 hr  - MV, Folate, Thiamine

## 2023-03-23 NOTE — PROGRESS NOTE ADULT - NS ATTEND AMEND GEN_ALL_CORE FT
patient seen at bedside  clinically improving  a0x3 today  able to answer questions appropriately  patient with possible facial features concerning for acromegaly (frontal bossing, bullous nose), possibly related to EtOH abuse, but will check igf1  agree with plan above

## 2023-03-23 NOTE — PROGRESS NOTE ADULT - SUBJECTIVE AND OBJECTIVE BOX
INTERVAL EVENTS:  Follow up diabetes management    ROS: Denies chest pain, sob, abd pain.     MEDICATIONS  (STANDING):  chlorhexidine 2% Cloths 1 Application(s) Topical daily  dextrose 5%. 1000 milliLiter(s) (50 mL/Hr) IV Continuous <Continuous>  dextrose 5%. 1000 milliLiter(s) (100 mL/Hr) IV Continuous <Continuous>  dextrose 50% Injectable 50 milliLiter(s) IV Push every 15 minutes  diazepam  Injectable 5 milliGRAM(s) IV Push every 8 hours  enoxaparin Injectable 40 milliGRAM(s) SubCutaneous every 24 hours  folic acid Injectable 1 milliGRAM(s) IV Push daily  glucagon  Injectable 1 milliGRAM(s) IntraMuscular once  insulin glargine Injectable (LANTUS) 12 Unit(s) SubCutaneous at bedtime  insulin lispro (ADMELOG) corrective regimen sliding scale   SubCutaneous three times a day before meals  insulin lispro Injectable (ADMELOG) 4 Unit(s) SubCutaneous three times a day before meals  levETIRAcetam  IVPB 1000 milliGRAM(s) IV Intermittent every 12 hours  multivitamin 1 Tablet(s) Oral daily  pantoprazole    Tablet 40 milliGRAM(s) Oral before breakfast  QUEtiapine 25 milliGRAM(s) Oral at bedtime  sucralfate 1 Gram(s) Oral four times a day  thiamine Injectable 100 milliGRAM(s) IV Push daily  thiamine IVPB 500 milliGRAM(s) IV Intermittent three times a day    MEDICATIONS  (PRN):  dextrose Oral Gel 15 Gram(s) Oral once PRN Blood Glucose LESS THAN 70 milliGRAM(s)/deciliter  diazepam  Injectable 10 milliGRAM(s) IV Push every 4 hours PRN Breakthrough agitation    Allergies  Allergy Status Unknown  No Known Allergies    Vital Signs Last 24 Hrs  T(C): 37 (23 Mar 2023 09:34), Max: 37 (23 Mar 2023 09:34)  T(F): 98.6 (23 Mar 2023 09:34), Max: 98.6 (23 Mar 2023 09:34)  HR: 77 (23 Mar 2023 09:34) (73 - 80)  BP: 95/60 (23 Mar 2023 09:34) (93/54 - 96/67)  BP(mean): --  RR: 18 (23 Mar 2023 09:34) (18 - 18)  SpO2: 95% (23 Mar 2023 09:34) (95% - 96%)    Parameters below as of 23 Mar 2023 09:34  Patient On (Oxygen Delivery Method): room air    PHYSICAL EXAM:  General: No apparent distress  Neck: Supple, trachea midline, no thyromegaly  Respiratory: Lungs clear bilaterally, normal rate, effort  Cardiac: +S1, S2, no m/r/g  GI: +BS, soft, non tender, non distended  Extremities: No peripheral edema, no pedal lesions  Neuro: A+O X3    LABS:                        8.9    3.55  )-----------( 121      ( 23 Mar 2023 04:18 )             27.5     03-23    140  |  106  |  10.6  ----------------------------<  194<H>  3.9   |  25.0  |  0.65    Ca    8.1<L>      23 Mar 2023 04:18  Mg     1.8     03-23    TPro  4.7<L>  /  Alb  2.8<L>  /  TBili  <0.2<L>  /  DBili  x   /  AST  10  /  ALT  6   /  AlkPhos  84  03-23    POCT Blood Glucose.: 126 mg/dL (03-23-23 @ 11:53)  POCT Blood Glucose.: 171 mg/dL (03-23-23 @ 07:34)  POCT Blood Glucose.: 183 mg/dL (03-22-23 @ 23:12)  POCT Blood Glucose.: 229 mg/dL (03-22-23 @ 16:40)

## 2023-03-23 NOTE — PROGRESS NOTE ADULT - ASSESSMENT
33 yo M PMH IDDM, seizure disorder, AFib on Eliquis, EtOH abuse who presented on 3/16/23 with abdominal pain admitted with DKA, Severe Dehydration, Severe Alcohol Withdrawal course was complicated by oversedation/bradypnea requiring flumazenil as well as shock. Patient  transferred to medicine on 3/20.     1- insulin Dependent DM with Severe DKA sec to medication non compliance and alcohol abuse   A1C > 15  S/p IVF, Insulin infusion   cont  Lantus 12 qhs  - lispro 4 units with meals  - Titrate insulin as necessary  - Endo consult appreciated - Must be discharged on basal bolus regimen.     2-Alcohol abuse with dependency and severe withdrawal  S/p Phenobarbital   - taper valium to use PRN   - MV, Folate, HD Thiamine x 3 days to prevent Wernicke's  - Social Work Consult    3-Paroxysmal AF RVR on presentation   Confirmed on EKG  Now sinus and normal rate.  Was likely precipitated in setting of severe metabolic derangements  - Hold off AC at this time     4-Unsteady gait  likely from chronic alcohol use  - PT ordered  home vs [facility]     DVT ppx: Lovenox  Dispo: DC in 1-2 days likely

## 2023-03-24 LAB
GLUCOSE BLDC GLUCOMTR-MCNC: 110 MG/DL — HIGH (ref 70–99)
GLUCOSE BLDC GLUCOMTR-MCNC: 139 MG/DL — HIGH (ref 70–99)
GLUCOSE BLDC GLUCOMTR-MCNC: 198 MG/DL — HIGH (ref 70–99)
GLUCOSE BLDC GLUCOMTR-MCNC: 262 MG/DL — HIGH (ref 70–99)

## 2023-03-24 PROCEDURE — 99233 SBSQ HOSP IP/OBS HIGH 50: CPT

## 2023-03-24 PROCEDURE — 99232 SBSQ HOSP IP/OBS MODERATE 35: CPT

## 2023-03-24 RX ORDER — INSULIN LISPRO 100/ML
3 VIAL (ML) SUBCUTANEOUS
Refills: 0 | Status: DISCONTINUED | OUTPATIENT
Start: 2023-03-24 | End: 2023-03-28

## 2023-03-24 RX ORDER — INSULIN GLARGINE 100 [IU]/ML
10 INJECTION, SOLUTION SUBCUTANEOUS AT BEDTIME
Refills: 0 | Status: DISCONTINUED | OUTPATIENT
Start: 2023-03-24 | End: 2023-03-27

## 2023-03-24 RX ADMIN — PANTOPRAZOLE SODIUM 40 MILLIGRAM(S): 20 TABLET, DELAYED RELEASE ORAL at 05:34

## 2023-03-24 RX ADMIN — Medication 1 GRAM(S): at 11:31

## 2023-03-24 RX ADMIN — Medication 1 TABLET(S): at 11:31

## 2023-03-24 RX ADMIN — LEVETIRACETAM 400 MILLIGRAM(S): 250 TABLET, FILM COATED ORAL at 11:31

## 2023-03-24 RX ADMIN — Medication 4 UNIT(S): at 11:32

## 2023-03-24 RX ADMIN — Medication 100 MILLIGRAM(S): at 17:29

## 2023-03-24 RX ADMIN — CHLORHEXIDINE GLUCONATE 1 APPLICATION(S): 213 SOLUTION TOPICAL at 11:32

## 2023-03-24 RX ADMIN — QUETIAPINE FUMARATE 25 MILLIGRAM(S): 200 TABLET, FILM COATED ORAL at 21:12

## 2023-03-24 RX ADMIN — Medication 1 GRAM(S): at 17:30

## 2023-03-24 RX ADMIN — Medication 3 UNIT(S): at 17:28

## 2023-03-24 RX ADMIN — ENOXAPARIN SODIUM 40 MILLIGRAM(S): 100 INJECTION SUBCUTANEOUS at 05:34

## 2023-03-24 RX ADMIN — INSULIN GLARGINE 10 UNIT(S): 100 INJECTION, SOLUTION SUBCUTANEOUS at 21:12

## 2023-03-24 RX ADMIN — Medication 1 GRAM(S): at 05:34

## 2023-03-24 RX ADMIN — Medication 1: at 17:27

## 2023-03-24 RX ADMIN — Medication 1 MILLIGRAM(S): at 17:28

## 2023-03-24 RX ADMIN — Medication 4 UNIT(S): at 08:11

## 2023-03-24 NOTE — PROGRESS NOTE ADULT - ASSESSMENT
31 yo M PMH IDDM, seizure disorder, AFib on Eliquis, EtOH abuse who presented on 3/16/23 with abdominal pain admitted with DKA, Severe Dehydration, Severe Alcohol Withdrawal course was complicated by oversedation/bradypnea requiring flumazenil as well as shock. Patient  transferred to medicine on 3/20.     1- insulin Dependent DM with Severe DKA sec to medication non compliance and alcohol abuse   A1C > 15  S/p IVF, Insulin infusion   Bg is low at dinner time   endocrinology input appreciated   cont to adjust insulin regimen  changed to  admelog 3 units tid , decrease lantus to 10 units bedtime   pt needs to go home on basal insulin     2-Alcohol abuse with dependency and severe withdrawal  S/p Phenobarbital   - taper valium to use PRN   - MV, Folate, HD Thiamine x 3 days to prevent Wernicke's  - Social Work Consult    3-Paroxysmal AF RVR on presentation   Confirmed on EKG  Now sinus and normal rate.  Was likely precipitated in setting of severe metabolic derangements  - Hold off AC at this time     4-Unsteady gait  likely from chronic alcohol use  daily pt   optimize home vs if agrees [facility]D and  on board     Pt follow up needed     DVT ppx: Lovenox  Dispo: DC in 1-2 days likely

## 2023-03-24 NOTE — ADVANCED PRACTICE NURSE CONSULT - ASSESSMENT
pt is a+ox1-2 still in withdrawal and restrain. not appropriate for teaching at this time.
return to see pt in am pt as per his request. he is a+ox3 co 0 pain, but altered thought process. reviewed whim the importance of stopping alcohol consumption completely, he verbalized understanding, he went to rehab 2x but still drinnks 10 beers a day. reviewed w him the insulin he will be going home on and that they will be via pen. pt was invited to [facility] diabetes club on may 7 2023 yearly calendar provided
went to see pt in am pt is a+ox1-2, not answering appropriately. not appropriate for teaching at this time

## 2023-03-24 NOTE — PROGRESS NOTE ADULT - ASSESSMENT
33 y/o M with PMHx T1DM with recurrent DKA, ETOH, seizure disorder, AFib on AC. Patient was admitted to ICU for DKA management, course complicated by alcohol withdrawal Endocrinology follows for DM management. A1C 15.4%.    1. Uncontrolled T1DM, a1c 15.4% - S/p DKA  - FS running low yesterday at dinner time  - Decrease premeal admelog to 3 units tid  - Decrease lantus to 10 units qhs  - Mild sliding scale coverage  - PADMINI negative, low c-peptide from previous admission  - Must be discharged on basal bolus regimen  - IGF1 pending (frontal bossing, bullous nose) to assess for acromegaly    2. ETOH abuse  - On Valium PRN  - MV, Folate, Thiamine

## 2023-03-24 NOTE — PROGRESS NOTE ADULT - NS ATTEND AMEND GEN_ALL_CORE FT
discussed w/ np  FS on the lower side but possible insulin sensitive  agree with insulin changes as above

## 2023-03-24 NOTE — ADVANCED PRACTICE NURSE CONSULT - RECOMMEDATIONS
continue diabetes self management education when pt is a+ox3  pls consider dc pt on glargin and admelog insulin pen from [facility]  cc- pls consider alcohol rehab 
continua diabetes self management education  please consider dc pt on glargin and admelog insulin pens from [email or web address] ($3 each)
continue diabetes self management educaiton  pt to demonstrate correct use of insulin pen setting testing dosing and injection  cc- [place] [facility] [place]

## 2023-03-24 NOTE — PROGRESS NOTE ADULT - SUBJECTIVE AND OBJECTIVE BOX
INTERVAL EVENTS:  Follow up diabetes management    ROS: Denies abd pain, n/v. Endorses good appetite.    MEDICATIONS  (STANDING):  chlorhexidine 2% Cloths 1 Application(s) Topical daily  dextrose 5%. 1000 milliLiter(s) (50 mL/Hr) IV Continuous <Continuous>  dextrose 5%. 1000 milliLiter(s) (100 mL/Hr) IV Continuous <Continuous>  dextrose 50% Injectable 50 milliLiter(s) IV Push every 15 minutes  enoxaparin Injectable 40 milliGRAM(s) SubCutaneous every 24 hours  folic acid Injectable 1 milliGRAM(s) IV Push daily  glucagon  Injectable 1 milliGRAM(s) IntraMuscular once  insulin glargine Injectable (LANTUS) 10 Unit(s) SubCutaneous at bedtime  insulin lispro (ADMELOG) corrective regimen sliding scale   SubCutaneous three times a day before meals  insulin lispro Injectable (ADMELOG) 3 Unit(s) SubCutaneous three times a day before meals  levETIRAcetam  IVPB 1000 milliGRAM(s) IV Intermittent every 12 hours  multivitamin 1 Tablet(s) Oral daily  pantoprazole    Tablet 40 milliGRAM(s) Oral before breakfast  QUEtiapine 25 milliGRAM(s) Oral at bedtime  sucralfate 1 Gram(s) Oral four times a day  thiamine Injectable 100 milliGRAM(s) IV Push daily    MEDICATIONS  (PRN):  dextrose Oral Gel 15 Gram(s) Oral once PRN Blood Glucose LESS THAN 70 milliGRAM(s)/deciliter  diazepam    Tablet 5 milliGRAM(s) Oral three times a day PRN anxiety  diazepam  Injectable 10 milliGRAM(s) IV Push every 4 hours PRN Breakthrough agitation    Allergies  Allergy Status Unknown  No Known Allergies    Vital Signs Last 24 Hrs  T(C): 36.9 (24 Mar 2023 08:37), Max: 36.9 (24 Mar 2023 08:37)  T(F): 98.5 (24 Mar 2023 08:37), Max: 98.5 (24 Mar 2023 08:37)  HR: 81 (24 Mar 2023 08:37) (71 - 81)  BP: 106/64 (24 Mar 2023 08:37) (88/55 - 106/64)  BP(mean): --  RR: 18 (24 Mar 2023 08:37) (16 - 18)  SpO2: 97% (24 Mar 2023 08:37) (94% - 100%)    Parameters below as of 24 Mar 2023 08:37  Patient On (Oxygen Delivery Method): room air      PHYSICAL EXAM:  General: No apparent distress  Neck: Supple, trachea midline, no thyromegaly  Respiratory: Lungs clear bilaterally, normal rate, effort  Cardiac: +S1, S2, no m/r/g  GI: +BS, soft, non tender, non distended  Extremities: No peripheral edema, no pedal lesions  Neuro: A+O X3, no tremor     LABS:                        8.9    3.55  )-----------( 121      ( 23 Mar 2023 04:18 )             27.5     03-23    140  |  106  |  10.6  ----------------------------<  194<H>  3.9   |  25.0  |  0.65    Ca    8.1<L>      23 Mar 2023 04:18  Mg     1.8     03-23    TPro  4.7<L>  /  Alb  2.8<L>  /  TBili  <0.2<L>  /  DBili  x   /  AST  10  /  ALT  6   /  AlkPhos  84  03-23      POCT Blood Glucose.: 139 mg/dL (03-24-23 @ 11:25)  POCT Blood Glucose.: 110 mg/dL (03-24-23 @ 07:59)  POCT Blood Glucose.: 332 mg/dL (03-23-23 @ 22:20)  POCT Blood Glucose.: 79 mg/dL (03-23-23 @ 16:42)

## 2023-03-24 NOTE — PROGRESS NOTE ADULT - SUBJECTIVE AND OBJECTIVE BOX
[facility] Division of Hospital Medicine    Follow up for uncontrolled Diabetes Mellitus .  DKA    Patient seen and examined this morning   no overnight events   seen by Pt 2 days ago unsteady   d/w    asked PT team to follow up and see him daily to optimize his functional status for DC         Vital Signs Last 24 Hrs  T(C): 36.9 (24 Mar 2023 08:37), Max: 36.9 (24 Mar 2023 08:37)  T(F): 98.5 (24 Mar 2023 08:37), Max: 98.5 (24 Mar 2023 08:37)  HR: 81 (24 Mar 2023 08:37) (71 - 81)  BP: 106/64 (24 Mar 2023 08:37) (88/55 - 106/64)  BP(mean): --  RR: 18 (24 Mar 2023 08:37) (16 - 18)  SpO2: 97% (24 Mar 2023 08:37) (94% - 100%)    Parameters below as of 24 Mar 2023 08:37  Patient On (Oxygen Delivery Method): room air      CONSTITUTIONAL: NAD, awake alert   ENMT: Moist oral mucosa, no pharyngeal injection or exudates  RESPIRATORY:  lungs are clear to auscultation bilaterally  CARDIOVASCULAR: Regular rate and rhythm, normal S1 and S2, no murmur/rub/gallop  ABDOMEN: normoactive bowel sounds, no rebound/guarding;   MUSCLOSKELETAL:   no joint swelling or tenderness to palpation  SKIN: Normal color , warm      CAPILLARY BLOOD GLUCOSE      POCT Blood Glucose.: 139 mg/dL (24 Mar 2023 11:25)  POCT Blood Glucose.: 110 mg/dL (24 Mar 2023 07:59)  POCT Blood Glucose.: 332 mg/dL (23 Mar 2023 22:20)  POCT Blood Glucose.: 79 mg/dL (23 Mar 2023 16:42)                          8.9    3.55  )-----------( 121      ( 23 Mar 2023 04:18 )             27.5   03-23    140  |  106  |  10.6  ----------------------------<  194<H>  3.9   |  25.0  |  0.65    Ca    8.1<L>      23 Mar 2023 04:18  Mg     1.8     03-23    TPro  4.7<L>  /  Alb  2.8<L>  /  TBili  <0.2<L>  /  DBili  x   /  AST  10  /  ALT  6   /  AlkPhos  84  03-23

## 2023-03-25 LAB
FERRITIN SERPL-MCNC: 63 NG/ML — SIGNIFICANT CHANGE UP (ref 30–400)
FOLATE SERPL-MCNC: 13.1 NG/ML — SIGNIFICANT CHANGE UP
GLUCOSE BLDC GLUCOMTR-MCNC: 112 MG/DL — HIGH (ref 70–99)
GLUCOSE BLDC GLUCOMTR-MCNC: 166 MG/DL — HIGH (ref 70–99)
GLUCOSE BLDC GLUCOMTR-MCNC: 315 MG/DL — HIGH (ref 70–99)
GLUCOSE BLDC GLUCOMTR-MCNC: 321 MG/DL — HIGH (ref 70–99)
INSULIN-LIKE GROWTH FACTOR 1 INTERPRETATION: SIGNIFICANT CHANGE UP
INSULIN-LIKE GROWTH FACTOR 1: 128 NG/ML — SIGNIFICANT CHANGE UP (ref 82–242)
IRON SATN MFR SERPL: 14 % — LOW (ref 16–55)
IRON SATN MFR SERPL: 39 UG/DL — LOW (ref 59–158)
TIBC SERPL-MCNC: 279 UG/DL — SIGNIFICANT CHANGE UP (ref 220–430)
TRANSFERRIN SERPL-MCNC: 195 MG/DL — SIGNIFICANT CHANGE UP (ref 180–329)
VIT B12 SERPL-MCNC: 432 PG/ML — SIGNIFICANT CHANGE UP (ref 232–1245)

## 2023-03-25 PROCEDURE — 99233 SBSQ HOSP IP/OBS HIGH 50: CPT

## 2023-03-25 RX ORDER — INSULIN LISPRO 100/ML
2 VIAL (ML) SUBCUTANEOUS ONCE
Refills: 0 | Status: COMPLETED | OUTPATIENT
Start: 2023-03-25 | End: 2023-03-25

## 2023-03-25 RX ADMIN — Medication 1 GRAM(S): at 06:00

## 2023-03-25 RX ADMIN — Medication 4: at 07:00

## 2023-03-25 RX ADMIN — LEVETIRACETAM 400 MILLIGRAM(S): 250 TABLET, FILM COATED ORAL at 00:00

## 2023-03-25 RX ADMIN — Medication 3 UNIT(S): at 16:23

## 2023-03-25 RX ADMIN — PANTOPRAZOLE SODIUM 40 MILLIGRAM(S): 20 TABLET, DELAYED RELEASE ORAL at 06:00

## 2023-03-25 RX ADMIN — Medication 3 UNIT(S): at 12:33

## 2023-03-25 RX ADMIN — LEVETIRACETAM 400 MILLIGRAM(S): 250 TABLET, FILM COATED ORAL at 23:54

## 2023-03-25 RX ADMIN — QUETIAPINE FUMARATE 25 MILLIGRAM(S): 200 TABLET, FILM COATED ORAL at 21:52

## 2023-03-25 RX ADMIN — Medication 1: at 12:33

## 2023-03-25 RX ADMIN — Medication 4: at 16:22

## 2023-03-25 RX ADMIN — Medication 2 UNIT(S): at 21:59

## 2023-03-25 RX ADMIN — CHLORHEXIDINE GLUCONATE 1 APPLICATION(S): 213 SOLUTION TOPICAL at 12:34

## 2023-03-25 RX ADMIN — Medication 1 GRAM(S): at 23:54

## 2023-03-25 RX ADMIN — Medication 1 GRAM(S): at 18:09

## 2023-03-25 RX ADMIN — Medication 1 MILLIGRAM(S): at 12:34

## 2023-03-25 RX ADMIN — Medication 3 UNIT(S): at 07:00

## 2023-03-25 RX ADMIN — Medication 1 GRAM(S): at 00:00

## 2023-03-25 RX ADMIN — INSULIN GLARGINE 10 UNIT(S): 100 INJECTION, SOLUTION SUBCUTANEOUS at 21:52

## 2023-03-25 RX ADMIN — Medication 100 MILLIGRAM(S): at 12:00

## 2023-03-25 RX ADMIN — ENOXAPARIN SODIUM 40 MILLIGRAM(S): 100 INJECTION SUBCUTANEOUS at 06:00

## 2023-03-25 RX ADMIN — Medication 1 GRAM(S): at 12:00

## 2023-03-25 RX ADMIN — LEVETIRACETAM 400 MILLIGRAM(S): 250 TABLET, FILM COATED ORAL at 12:00

## 2023-03-25 RX ADMIN — Medication 1 TABLET(S): at 12:00

## 2023-03-25 NOTE — PROGRESS NOTE ADULT - ASSESSMENT
33 yo M PMH IDDM, seizure disorder, AFib on Eliquis, EtOH abuse who presented on 3/16/23 with abdominal pain admitted with DKA, Severe Dehydration, Severe Alcohol Withdrawal course was complicated by oversedation/bradypnea requiring flumazenil as well as shock. Patient  transferred to medicine on 3/20.     1- insulin Dependent DM with Severe DKA sec to medication non compliance and alcohol abuse   A1C > 15  S/p IVF, Insulin infusion   endocrinology following  cont to adjust insulin regimen  changed to  admelog 3 units tid , decrease lantus to 10 units bedtime   pt needs to go home on basal insulin     2-Alcohol abuse with dependency and severe withdrawal  S/p Phenobarbital   - taper valium to use PRN   - MV, Folate, HD Thiamine x 3 days to prevent Wernicke's  - Social Work Consult    3-Paroxysmal AF RVR on presentation   Confirmed on EKG  Now sinus and normal rate.  Was likely precipitated in setting of severe metabolic derangements   - per H&P - PMH - Afib on eliquis - resume eliquis.     4-Unsteady gait  likely from chronic alcohol use  daily pt   optimize home vs if agrees ERIN FORD and  on board     Pt follow up needed     DVT ppx: Lovenox  Dispo: DC in 1-2 days likely pending daily PT      33 yo M PMH IDDM, seizure disorder, AFib on Eliquis, EtOH abuse who presented on 3/16/23 with abdominal pain admitted with DKA, Severe Dehydration, Severe Alcohol Withdrawal course was complicated by oversedation/bradypnea requiring flumazenil as well as shock. Patient  transferred to medicine on 3/20.     1- insulin Dependent DM with Severe DKA sec to medication non compliance and alcohol abuse   A1C > 15  S/p IVF, Insulin infusion   endocrinology following  cont to adjust insulin regimen  changed to  admelog 3 units tid , decrease lantus to 10 units bedtime   pt needs to go home on basal insulin     2-Alcohol abuse with dependency and severe withdrawal  S/p Phenobarbital   - taper valium to use PRN   - MV, Folate, HD Thiamine x 3 days to prevent Wernicke's  - Social Work Consult    3-Paroxysmal AF RVR on presentation   Confirmed on EKG  Now sinus and normal rate.  Was likely precipitated in setting of severe metabolic derangements   - per H&P - PMH - Afib on eliquis - unclear if patient has been compliant with f/u with EP. will clarify with pt and EP and resume     4-Unsteady gait  likely from chronic alcohol use  daily pt   optimize home vs if agrees ERIN FORD and  on board     Pt follow up needed     DVT ppx: Lovenox  Dispo: DC in 1-2 days likely pending daily PT

## 2023-03-25 NOTE — PROGRESS NOTE ADULT - SUBJECTIVE AND OBJECTIVE BOX
[facility] Division of Hospital Medicine    Follow up for uncontrolled Diabetes Mellitus .  DKA, alcohol abuse    Patient seen and examined this morning   no overnight events   unsteady gait        Vital Signs Last 24 Hrs  T(C): 36.8 (25 Mar 2023 09:00), Max: 36.9 (24 Mar 2023 20:45)  T(F): 98.3 (25 Mar 2023 09:00), Max: 98.5 (24 Mar 2023 20:45)  HR: 80 (25 Mar 2023 09:00) (78 - 92)  BP: 103/65 (25 Mar 2023 09:00) (100/64 - 104/63)  BP(mean): --  RR: 18 (25 Mar 2023 09:00) (18 - 18)  SpO2: 92% (25 Mar 2023 09:00) (92% - 97%)        CONSTITUTIONAL: NAD, awake alert   ENMT: Moist oral mucosa  RESPIRATORY:  lungs are clear to auscultation bilaterally  CARDIOVASCULAR: Regular rate and rhythm, normal S1 and S2, no murmur  ABDOMEN: normoactive bowel sounds, no rebound/guarding;   MUSCLOSKELETAL:   no joint swelling or tenderness to palpation  SKIN: Normal color , warm        Parameters below as of 25 Mar 2023 09:00  MEDICATIONS  (STANDING):  chlorhexidine 2% Cloths 1 Application(s) Topical daily  dextrose 5%. 1000 milliLiter(s) (50 mL/Hr) IV Continuous <Continuous>  dextrose 5%. 1000 milliLiter(s) (100 mL/Hr) IV Continuous <Continuous>  dextrose 50% Injectable 50 milliLiter(s) IV Push every 15 minutes  enoxaparin Injectable 40 milliGRAM(s) SubCutaneous every 24 hours  folic acid Injectable 1 milliGRAM(s) IV Push daily  glucagon  Injectable 1 milliGRAM(s) IntraMuscular once  insulin glargine Injectable (LANTUS) 10 Unit(s) SubCutaneous at bedtime  insulin lispro (ADMELOG) corrective regimen sliding scale   SubCutaneous three times a day before meals  insulin lispro Injectable (ADMELOG) 3 Unit(s) SubCutaneous three times a day before meals  levETIRAcetam  IVPB 1000 milliGRAM(s) IV Intermittent every 12 hours  multivitamin 1 Tablet(s) Oral daily  pantoprazole    Tablet 40 milliGRAM(s) Oral before breakfast  QUEtiapine 25 milliGRAM(s) Oral at bedtime  sucralfate 1 Gram(s) Oral four times a day  thiamine Injectable 100 milliGRAM(s) IV Push daily    MEDICATIONS  (PRN):  dextrose Oral Gel 15 Gram(s) Oral once PRN Blood Glucose LESS THAN 70 milliGRAM(s)/deciliter  diazepam    Tablet 5 milliGRAM(s) Oral three times a day PRN anxiety  diazepam  Injectable 10 milliGRAM(s) IV Push every 4 hours PRN Breakthrough agitation  Patient On (Oxygen Delivery Method): room air

## 2023-03-26 LAB
GLUCOSE BLDC GLUCOMTR-MCNC: 103 MG/DL — HIGH (ref 70–99)
GLUCOSE BLDC GLUCOMTR-MCNC: 170 MG/DL — HIGH (ref 70–99)
GLUCOSE BLDC GLUCOMTR-MCNC: 297 MG/DL — HIGH (ref 70–99)
GLUCOSE BLDC GLUCOMTR-MCNC: 318 MG/DL — HIGH (ref 70–99)

## 2023-03-26 PROCEDURE — 99232 SBSQ HOSP IP/OBS MODERATE 35: CPT

## 2023-03-26 RX ADMIN — Medication 1 GRAM(S): at 11:27

## 2023-03-26 RX ADMIN — LEVETIRACETAM 400 MILLIGRAM(S): 250 TABLET, FILM COATED ORAL at 11:27

## 2023-03-26 RX ADMIN — QUETIAPINE FUMARATE 25 MILLIGRAM(S): 200 TABLET, FILM COATED ORAL at 21:33

## 2023-03-26 RX ADMIN — Medication 1 GRAM(S): at 16:56

## 2023-03-26 RX ADMIN — PANTOPRAZOLE SODIUM 40 MILLIGRAM(S): 20 TABLET, DELAYED RELEASE ORAL at 05:51

## 2023-03-26 RX ADMIN — Medication 3 UNIT(S): at 11:25

## 2023-03-26 RX ADMIN — Medication 3: at 16:55

## 2023-03-26 RX ADMIN — INSULIN GLARGINE 10 UNIT(S): 100 INJECTION, SOLUTION SUBCUTANEOUS at 21:33

## 2023-03-26 RX ADMIN — CHLORHEXIDINE GLUCONATE 1 APPLICATION(S): 213 SOLUTION TOPICAL at 11:27

## 2023-03-26 RX ADMIN — Medication 3 UNIT(S): at 16:55

## 2023-03-26 RX ADMIN — Medication 1 GRAM(S): at 05:51

## 2023-03-26 RX ADMIN — Medication 1 TABLET(S): at 11:27

## 2023-03-26 RX ADMIN — Medication 1: at 07:00

## 2023-03-26 RX ADMIN — ENOXAPARIN SODIUM 40 MILLIGRAM(S): 100 INJECTION SUBCUTANEOUS at 05:51

## 2023-03-26 RX ADMIN — Medication 100 MILLIGRAM(S): at 13:12

## 2023-03-26 RX ADMIN — Medication 3 UNIT(S): at 07:00

## 2023-03-26 RX ADMIN — Medication 1 MILLIGRAM(S): at 13:12

## 2023-03-26 NOTE — PROGRESS NOTE ADULT - ASSESSMENT
31 yo M PMH IDDM, seizure disorder, AFib on Eliquis, EtOH abuse who presented on 3/16/23 with abdominal pain admitted with DKA, Severe Dehydration, Severe Alcohol Withdrawal course was complicated by oversedation/bradypnea requiring flumazenil as well as shock. Patient  transferred to medicine on 3/20.     1- insulin Dependent DM with Severe DKA sec to medication non compliance and alcohol abuse   A1C > 15  S/p IVF, Insulin infusion   endocrinology following  cont to adjust insulin regimen  changed to  admelog 3 units tid , decrease lantus to 10 units bedtime   pt needs to go home on basal insulin     2-Alcohol abuse with dependency and severe withdrawal  S/p Phenobarbital   - taper valium to use PRN   - MV, Folate, HD Thiamine x 3 days to prevent Wernicke's  - Social Work Consult    3-Paroxysmal AF RVR on presentation   Confirmed on EKG  Now sinus and normal rate.  Was likely precipitated in setting of severe metabolic derangements   - per H&P - PMH - Afib on eliquis - unclear if patient has been compliant with f/u with EP. will clarify with pt and EP and resume     4-Unsteady gait  likely from chronic alcohol use  daily pt   optimize home vs if agrees ERIN FORD and  on board     Pt follow up needed     DVT ppx: Lovenox  Dispo: DC in 1-2 days likely pending daily PT, LOGAN fr possible rehab

## 2023-03-26 NOTE — PROGRESS NOTE ADULT - SUBJECTIVE AND OBJECTIVE BOX
Follow up for uncontrolled Diabetes Mellitus. DKA, alcohol abuse    Patient seen and examined this morning   no overnight events   unsteady gait      Vital Signs Last 24 Hrs  T(C): 36.8 (03-26-23 @ 09:00), Max: 37.1 (03-25-23 @ 16:38)  T(F): 98.2 (03-26-23 @ 09:00), Max: 98.8 (03-25-23 @ 16:38)  HR: 86 (03-26-23 @ 09:00) (74 - 86)  BP: 119/72 (03-26-23 @ 09:00) (96/61 - 119/72)  BP(mean): --  RR: 18 (03-26-23 @ 09:00) (16 - 18)  SpO2: 95% (03-26-23 @ 09:00) (95% - 99%)      CONSTITUTIONAL: NAD, awake alert   ENMT: Moist oral mucosa  RESPIRATORY:  lungs are clear to auscultation bilaterally  CARDIOVASCULAR: Regular rate and rhythm, normal S1 and S2, no murmur  ABDOMEN: normoactive bowel sounds, no rebound/guarding;   MUSCULOSKELETAL:   no joint swelling or tenderness to palpation  SKIN: Normal color , warm          MEDICATIONS  (STANDING):  chlorhexidine 2% Cloths 1 Application(s) Topical daily  dextrose 5%. 1000 milliLiter(s) (50 mL/Hr) IV Continuous <Continuous>  dextrose 5%. 1000 milliLiter(s) (100 mL/Hr) IV Continuous <Continuous>  dextrose 50% Injectable 50 milliLiter(s) IV Push every 15 minutes  enoxaparin Injectable 40 milliGRAM(s) SubCutaneous every 24 hours  folic acid Injectable 1 milliGRAM(s) IV Push daily  glucagon  Injectable 1 milliGRAM(s) IntraMuscular once  insulin glargine Injectable (LANTUS) 10 Unit(s) SubCutaneous at bedtime  insulin lispro (ADMELOG) corrective regimen sliding scale   SubCutaneous three times a day before meals  insulin lispro Injectable (ADMELOG) 3 Unit(s) SubCutaneous three times a day before meals  levETIRAcetam  IVPB 1000 milliGRAM(s) IV Intermittent every 12 hours  multivitamin 1 Tablet(s) Oral daily  pantoprazole    Tablet 40 milliGRAM(s) Oral before breakfast  QUEtiapine 25 milliGRAM(s) Oral at bedtime  sucralfate 1 Gram(s) Oral four times a day  thiamine Injectable 100 milliGRAM(s) IV Push daily    MEDICATIONS  (PRN):  dextrose Oral Gel 15 Gram(s) Oral once PRN Blood Glucose LESS THAN 70 milliGRAM(s)/deciliter  diazepam    Tablet 5 milliGRAM(s) Oral three times a day PRN anxiety

## 2023-03-27 LAB
GLUCOSE BLDC GLUCOMTR-MCNC: 132 MG/DL — HIGH (ref 70–99)
GLUCOSE BLDC GLUCOMTR-MCNC: 172 MG/DL — HIGH (ref 70–99)
GLUCOSE BLDC GLUCOMTR-MCNC: 315 MG/DL — HIGH (ref 70–99)
GLUCOSE BLDC GLUCOMTR-MCNC: 339 MG/DL — HIGH (ref 70–99)

## 2023-03-27 PROCEDURE — 99233 SBSQ HOSP IP/OBS HIGH 50: CPT

## 2023-03-27 PROCEDURE — 99232 SBSQ HOSP IP/OBS MODERATE 35: CPT

## 2023-03-27 RX ORDER — INSULIN GLARGINE 100 [IU]/ML
12 INJECTION, SOLUTION SUBCUTANEOUS AT BEDTIME
Refills: 0 | Status: DISCONTINUED | OUTPATIENT
Start: 2023-03-27 | End: 2023-03-30

## 2023-03-27 RX ORDER — INSULIN LISPRO 100/ML
3 VIAL (ML) SUBCUTANEOUS ONCE
Refills: 0 | Status: COMPLETED | OUTPATIENT
Start: 2023-03-27 | End: 2023-03-27

## 2023-03-27 RX ADMIN — QUETIAPINE FUMARATE 25 MILLIGRAM(S): 200 TABLET, FILM COATED ORAL at 22:07

## 2023-03-27 RX ADMIN — Medication 1 GRAM(S): at 00:01

## 2023-03-27 RX ADMIN — LEVETIRACETAM 400 MILLIGRAM(S): 250 TABLET, FILM COATED ORAL at 23:23

## 2023-03-27 RX ADMIN — Medication 3 UNIT(S): at 12:30

## 2023-03-27 RX ADMIN — Medication 1 MILLIGRAM(S): at 13:00

## 2023-03-27 RX ADMIN — Medication 4: at 08:41

## 2023-03-27 RX ADMIN — Medication 1: at 12:30

## 2023-03-27 RX ADMIN — ENOXAPARIN SODIUM 40 MILLIGRAM(S): 100 INJECTION SUBCUTANEOUS at 05:08

## 2023-03-27 RX ADMIN — CHLORHEXIDINE GLUCONATE 1 APPLICATION(S): 213 SOLUTION TOPICAL at 13:00

## 2023-03-27 RX ADMIN — PANTOPRAZOLE SODIUM 40 MILLIGRAM(S): 20 TABLET, DELAYED RELEASE ORAL at 05:07

## 2023-03-27 RX ADMIN — Medication 1 GRAM(S): at 13:32

## 2023-03-27 RX ADMIN — Medication 1 GRAM(S): at 17:26

## 2023-03-27 RX ADMIN — Medication 3 UNIT(S): at 17:26

## 2023-03-27 RX ADMIN — Medication 3 UNIT(S): at 08:40

## 2023-03-27 RX ADMIN — INSULIN GLARGINE 12 UNIT(S): 100 INJECTION, SOLUTION SUBCUTANEOUS at 22:07

## 2023-03-27 RX ADMIN — Medication 3 UNIT(S): at 22:18

## 2023-03-27 RX ADMIN — Medication 1 TABLET(S): at 13:31

## 2023-03-27 RX ADMIN — Medication 1 GRAM(S): at 05:07

## 2023-03-27 RX ADMIN — LEVETIRACETAM 400 MILLIGRAM(S): 250 TABLET, FILM COATED ORAL at 01:00

## 2023-03-27 RX ADMIN — Medication 100 MILLIGRAM(S): at 13:00

## 2023-03-27 RX ADMIN — Medication 1 GRAM(S): at 23:23

## 2023-03-27 RX ADMIN — LEVETIRACETAM 400 MILLIGRAM(S): 250 TABLET, FILM COATED ORAL at 00:01

## 2023-03-27 NOTE — PROGRESS NOTE ADULT - NS ATTEND AMEND GEN_ALL_CORE FT
I agree with plan above and discussed with NP.  Increase Lantus to 12 u HS and cont lispro 3 u TID w meals.

## 2023-03-27 NOTE — PROGRESS NOTE ADULT - SUBJECTIVE AND OBJECTIVE BOX
Follow up for uncontrolled Diabetes Mellitus. DKA, alcohol abuse    Patient seen and examined this morning   no overnight events   unsteady gait, working with PT - improving      Vital Signs Last 24 Hrs  T(C): 37 (27 Mar 2023 08:23), Max: 37.1 (26 Mar 2023 15:43)  T(F): 98.6 (27 Mar 2023 08:23), Max: 98.7 (26 Mar 2023 15:43)  HR: 98 (27 Mar 2023 08:23) (69 - 98)  BP: 107/69 (27 Mar 2023 08:23) (97/64 - 115/49)  BP(mean): --  RR: 18 (27 Mar 2023 08:23) (16 - 18)  SpO2: 98% (27 Mar 2023 08:23) (93% - 100%)    Parameters below as of 27 Mar 2023 08:23  Patient On (Oxygen Delivery Method): room air        CONSTITUTIONAL: NAD, awake alert   ENMT: Moist oral mucosa  RESPIRATORY:  lungs are clear to auscultation bilaterally  CARDIOVASCULAR: Regular rate and rhythm, normal S1 and S2, no murmur  ABDOMEN: normoactive bowel sounds, no rebound/guarding;   MUSCULOSKELETAL:   no joint swelling or tenderness to palpation  SKIN: Normal color , warm        MEDICATIONS  (STANDING):  chlorhexidine 2% Cloths 1 Application(s) Topical daily  dextrose 5%. 1000 milliLiter(s) (50 mL/Hr) IV Continuous <Continuous>  dextrose 5%. 1000 milliLiter(s) (100 mL/Hr) IV Continuous <Continuous>  dextrose 50% Injectable 50 milliLiter(s) IV Push every 15 minutes  enoxaparin Injectable 40 milliGRAM(s) SubCutaneous every 24 hours  folic acid Injectable 1 milliGRAM(s) IV Push daily  glucagon  Injectable 1 milliGRAM(s) IntraMuscular once  insulin glargine Injectable (LANTUS) 12 Unit(s) SubCutaneous at bedtime  insulin lispro (ADMELOG) corrective regimen sliding scale   SubCutaneous three times a day before meals  insulin lispro Injectable (ADMELOG) 3 Unit(s) SubCutaneous three times a day before meals  levETIRAcetam  IVPB 1000 milliGRAM(s) IV Intermittent every 12 hours  multivitamin 1 Tablet(s) Oral daily  pantoprazole    Tablet 40 milliGRAM(s) Oral before breakfast  QUEtiapine 25 milliGRAM(s) Oral at bedtime  sucralfate 1 Gram(s) Oral four times a day  thiamine Injectable 100 milliGRAM(s) IV Push daily    MEDICATIONS  (PRN):  dextrose Oral Gel 15 Gram(s) Oral once PRN Blood Glucose LESS THAN 70 milliGRAM(s)/deciliter  diazepam    Tablet 5 milliGRAM(s) Oral three times a day PRN anxiety

## 2023-03-27 NOTE — PROGRESS NOTE ADULT - SUBJECTIVE AND OBJECTIVE BOX
INTERVAL EVENTS:  Follow up diabetes management    ROS: Denies chest pain, sob, abd pain. Endorses good appetite.    MEDICATIONS  (STANDING):  chlorhexidine 2% Cloths 1 Application(s) Topical daily  dextrose 5%. 1000 milliLiter(s) (50 mL/Hr) IV Continuous <Continuous>  dextrose 5%. 1000 milliLiter(s) (100 mL/Hr) IV Continuous <Continuous>  dextrose 50% Injectable 50 milliLiter(s) IV Push every 15 minutes  enoxaparin Injectable 40 milliGRAM(s) SubCutaneous every 24 hours  folic acid Injectable 1 milliGRAM(s) IV Push daily  glucagon  Injectable 1 milliGRAM(s) IntraMuscular once  insulin glargine Injectable (LANTUS) 12 Unit(s) SubCutaneous at bedtime  insulin lispro (ADMELOG) corrective regimen sliding scale   SubCutaneous three times a day before meals  insulin lispro Injectable (ADMELOG) 3 Unit(s) SubCutaneous three times a day before meals  levETIRAcetam  IVPB 1000 milliGRAM(s) IV Intermittent every 12 hours  multivitamin 1 Tablet(s) Oral daily  pantoprazole    Tablet 40 milliGRAM(s) Oral before breakfast  QUEtiapine 25 milliGRAM(s) Oral at bedtime  sucralfate 1 Gram(s) Oral four times a day  thiamine Injectable 100 milliGRAM(s) IV Push daily    MEDICATIONS  (PRN):  dextrose Oral Gel 15 Gram(s) Oral once PRN Blood Glucose LESS THAN 70 milliGRAM(s)/deciliter  diazepam    Tablet 5 milliGRAM(s) Oral three times a day PRN anxiety    Allergies  Allergy Status Unknown  No Known Allergies    Vital Signs Last 24 Hrs  T(C): 37 (27 Mar 2023 08:23), Max: 37.1 (26 Mar 2023 15:43)  T(F): 98.6 (27 Mar 2023 08:23), Max: 98.7 (26 Mar 2023 15:43)  HR: 98 (27 Mar 2023 08:23) (69 - 98)  BP: 107/69 (27 Mar 2023 08:23) (97/64 - 115/49)  BP(mean): --  RR: 18 (27 Mar 2023 08:23) (16 - 18)  SpO2: 98% (27 Mar 2023 08:23) (93% - 100%)    Parameters below as of 27 Mar 2023 08:23  Patient On (Oxygen Delivery Method): room air    PHYSICAL EXAM:  General: No apparent distress  Neck: Supple, trachea midline, no thyromegaly  Respiratory: Lungs clear bilaterally, normal rate, effort  Cardiac: +S1, S2, no m/r/g  GI: +BS, soft, non tender, non distended  Extremities: No peripheral edema, no pedal lesions  Neuro: A+O X3, no tremor      POCT Blood Glucose.: 315 mg/dL (03-27-23 @ 08:18)  POCT Blood Glucose.: 318 mg/dL (03-26-23 @ 21:02)  POCT Blood Glucose.: 297 mg/dL (03-26-23 @ 16:37)

## 2023-03-27 NOTE — PROGRESS NOTE ADULT - ASSESSMENT
33 y/o M with PMHx T1DM with recurrent DKA, ETOH, seizure disorder, AFib on AC. Patient was admitted to ICU for DKA management, course complicated by alcohol withdrawal Endocrinology follows for DM management. A1C 15.4%.    1. Uncontrolled T1DM, a1c 15.4% - S/p DKA  - Blood sugars elevated, denies snacking  - Increase lantus to 12 units qhs  - Continue premeal admelog 3 units tid with meals  - Mild sliding scale coverage  - PADMINI negative, low c-peptide from previous admission  - Must be discharged on basal bolus regimen    2. ETOH abuse  - On Valium PRN  - MV, Folate, Thiamine

## 2023-03-28 LAB
GLUCOSE BLDC GLUCOMTR-MCNC: 177 MG/DL — HIGH (ref 70–99)
GLUCOSE BLDC GLUCOMTR-MCNC: 182 MG/DL — HIGH (ref 70–99)
GLUCOSE BLDC GLUCOMTR-MCNC: 214 MG/DL — HIGH (ref 70–99)
GLUCOSE BLDC GLUCOMTR-MCNC: 432 MG/DL — HIGH (ref 70–99)
HCT VFR BLD CALC: 30.4 % — LOW (ref 39–50)
HGB BLD-MCNC: 10.1 G/DL — LOW (ref 13–17)
MCHC RBC-ENTMCNC: 29 PG — SIGNIFICANT CHANGE UP (ref 27–34)
MCHC RBC-ENTMCNC: 33.2 GM/DL — SIGNIFICANT CHANGE UP (ref 32–36)
MCV RBC AUTO: 87.4 FL — SIGNIFICANT CHANGE UP (ref 80–100)
PLATELET # BLD AUTO: 300 K/UL — SIGNIFICANT CHANGE UP (ref 150–400)
RBC # BLD: 3.48 M/UL — LOW (ref 4.2–5.8)
RBC # FLD: 17 % — HIGH (ref 10.3–14.5)
SARS-COV-2 RNA SPEC QL NAA+PROBE: SIGNIFICANT CHANGE UP
WBC # BLD: 5.67 K/UL — SIGNIFICANT CHANGE UP (ref 3.8–10.5)
WBC # FLD AUTO: 5.67 K/UL — SIGNIFICANT CHANGE UP (ref 3.8–10.5)

## 2023-03-28 PROCEDURE — 99232 SBSQ HOSP IP/OBS MODERATE 35: CPT

## 2023-03-28 PROCEDURE — 93010 ELECTROCARDIOGRAM REPORT: CPT

## 2023-03-28 RX ORDER — INSULIN LISPRO 100/ML
3 VIAL (ML) SUBCUTANEOUS
Refills: 0 | Status: DISCONTINUED | OUTPATIENT
Start: 2023-03-28 | End: 2023-03-30

## 2023-03-28 RX ORDER — INSULIN LISPRO 100/ML
6 VIAL (ML) SUBCUTANEOUS
Refills: 0 | Status: DISCONTINUED | OUTPATIENT
Start: 2023-03-28 | End: 2023-03-29

## 2023-03-28 RX ORDER — APIXABAN 2.5 MG/1
5 TABLET, FILM COATED ORAL EVERY 12 HOURS
Refills: 0 | Status: DISCONTINUED | OUTPATIENT
Start: 2023-03-28 | End: 2023-03-30

## 2023-03-28 RX ORDER — INSULIN LISPRO 100/ML
8 VIAL (ML) SUBCUTANEOUS ONCE
Refills: 0 | Status: COMPLETED | OUTPATIENT
Start: 2023-03-28 | End: 2023-03-28

## 2023-03-28 RX ADMIN — Medication 1 GRAM(S): at 11:45

## 2023-03-28 RX ADMIN — Medication 2: at 08:25

## 2023-03-28 RX ADMIN — Medication 3 UNIT(S): at 08:25

## 2023-03-28 RX ADMIN — APIXABAN 5 MILLIGRAM(S): 2.5 TABLET, FILM COATED ORAL at 17:26

## 2023-03-28 RX ADMIN — Medication 100 MILLIGRAM(S): at 12:19

## 2023-03-28 RX ADMIN — PANTOPRAZOLE SODIUM 40 MILLIGRAM(S): 20 TABLET, DELAYED RELEASE ORAL at 05:30

## 2023-03-28 RX ADMIN — Medication 8 UNIT(S): at 22:05

## 2023-03-28 RX ADMIN — Medication 1: at 17:25

## 2023-03-28 RX ADMIN — Medication 3 UNIT(S): at 11:44

## 2023-03-28 RX ADMIN — CHLORHEXIDINE GLUCONATE 1 APPLICATION(S): 213 SOLUTION TOPICAL at 11:45

## 2023-03-28 RX ADMIN — LEVETIRACETAM 400 MILLIGRAM(S): 250 TABLET, FILM COATED ORAL at 23:55

## 2023-03-28 RX ADMIN — Medication 1 TABLET(S): at 11:45

## 2023-03-28 RX ADMIN — INSULIN GLARGINE 12 UNIT(S): 100 INJECTION, SOLUTION SUBCUTANEOUS at 21:56

## 2023-03-28 RX ADMIN — Medication 1 MILLIGRAM(S): at 12:19

## 2023-03-28 RX ADMIN — Medication 6 UNIT(S): at 17:26

## 2023-03-28 RX ADMIN — Medication 1 GRAM(S): at 17:26

## 2023-03-28 RX ADMIN — Medication 1: at 11:44

## 2023-03-28 RX ADMIN — QUETIAPINE FUMARATE 25 MILLIGRAM(S): 200 TABLET, FILM COATED ORAL at 21:56

## 2023-03-28 RX ADMIN — ENOXAPARIN SODIUM 40 MILLIGRAM(S): 100 INJECTION SUBCUTANEOUS at 05:29

## 2023-03-28 RX ADMIN — Medication 1 GRAM(S): at 23:53

## 2023-03-28 RX ADMIN — LEVETIRACETAM 400 MILLIGRAM(S): 250 TABLET, FILM COATED ORAL at 11:45

## 2023-03-28 RX ADMIN — Medication 1 GRAM(S): at 05:30

## 2023-03-28 NOTE — PROGRESS NOTE ADULT - SUBJECTIVE AND OBJECTIVE BOX
INTERVAL EVENTS:  Follow up diabetes management    ROS: Denies abd pain, n/v. Endorses good appetite.    MEDICATIONS  (STANDING):  chlorhexidine 2% Cloths 1 Application(s) Topical daily  dextrose 5%. 1000 milliLiter(s) (50 mL/Hr) IV Continuous <Continuous>  dextrose 5%. 1000 milliLiter(s) (100 mL/Hr) IV Continuous <Continuous>  dextrose 50% Injectable 50 milliLiter(s) IV Push every 15 minutes  enoxaparin Injectable 40 milliGRAM(s) SubCutaneous every 24 hours  folic acid Injectable 1 milliGRAM(s) IV Push daily  glucagon  Injectable 1 milliGRAM(s) IntraMuscular once  insulin glargine Injectable (LANTUS) 12 Unit(s) SubCutaneous at bedtime  insulin lispro (ADMELOG) corrective regimen sliding scale   SubCutaneous three times a day before meals  insulin lispro Injectable (ADMELOG) 3 Unit(s) SubCutaneous before breakfast  insulin lispro Injectable (ADMELOG) 3 Unit(s) SubCutaneous before lunch  insulin lispro Injectable (ADMELOG) 6 Unit(s) SubCutaneous before dinner  levETIRAcetam  IVPB 1000 milliGRAM(s) IV Intermittent every 12 hours  multivitamin 1 Tablet(s) Oral daily  pantoprazole    Tablet 40 milliGRAM(s) Oral before breakfast  QUEtiapine 25 milliGRAM(s) Oral at bedtime  sucralfate 1 Gram(s) Oral four times a day  thiamine Injectable 100 milliGRAM(s) IV Push daily    MEDICATIONS  (PRN):  dextrose Oral Gel 15 Gram(s) Oral once PRN Blood Glucose LESS THAN 70 milliGRAM(s)/deciliter  diazepam    Tablet 5 milliGRAM(s) Oral three times a day PRN anxiety    Allergies  Allergy Status Unknown  No Known Allergies    Vital Signs Last 24 Hrs  T(C): 36.9 (28 Mar 2023 10:00), Max: 37 (27 Mar 2023 15:24)  T(F): 98.5 (28 Mar 2023 10:00), Max: 98.6 (27 Mar 2023 15:24)  HR: 80 (28 Mar 2023 10:00) (68 - 82)  BP: 90/58 (28 Mar 2023 10:00) (90/58 - 120/61)  BP(mean): --  RR: 18 (28 Mar 2023 10:00) (16 - 18)  SpO2: 99% (28 Mar 2023 10:00) (98% - 99%)    Parameters below as of 28 Mar 2023 10:00  Patient On (Oxygen Delivery Method): room air      PHYSICAL EXAM:  General: No apparent distress  Neck: Supple, trachea midline, no thyromegaly  Respiratory: Lungs clear bilaterally, normal rate, effort  Cardiac: +S1, S2, no m/r/g  GI: +BS, soft, non tender, non distended  Extremities: No peripheral edema, no pedal lesions  Neuro: A+O X3, no tremor  Pysch: Affect appropriate   Skin: No acanthosis       POCT Blood Glucose.: 214 mg/dL (03-28-23 @ 07:57)  POCT Blood Glucose.: 339 mg/dL (03-27-23 @ 21:42)  POCT Blood Glucose.: 132 mg/dL (03-27-23 @ 16:46)  POCT Blood Glucose.: 172 mg/dL (03-27-23 @ 11:52)

## 2023-03-28 NOTE — PROGRESS NOTE ADULT - NS ATTEND AMEND GEN_ALL_CORE FT
I agree with plan above and discussed with NP.  He seems to have heavy in carbs dinners . will increase dinner dose to 6  units lispro

## 2023-03-28 NOTE — PROGRESS NOTE ADULT - ASSESSMENT
33 y/o M with PMHx T1DM with recurrent DKA, ETOH, seizure disorder, AFib on AC. Patient was admitted to ICU for DKA management, course complicated by alcohol withdrawal Endocrinology follows for DM management. A1C 15.4%.    1. Uncontrolled T1DM, a1c 15.4% - S/p DKA  - Blood sugars elevated at bedtime  - Increase admelog to 6 units before dinner  - Continue admelog 3 units before breakfast and lunch  - Continue lantus 12 units qhs  - Mild sliding scale coverage  - PADMINI negative, low c-peptide from previous admission  - Must be discharged on basal bolus regimen    2. ETOH abuse  - On Valium PRN  - MV, Folate, Thiamine

## 2023-03-28 NOTE — PROGRESS NOTE ADULT - SUBJECTIVE AND OBJECTIVE BOX
Follow up for uncontrolled Diabetes Mellitus. DKA, alcohol abuse    Patient seen and examined  no overnight events   per pt he was able to ambulate with PT       Vital Signs Last 24 Hrs  T(C): 36.9 (28 Mar 2023 10:00), Max: 37 (27 Mar 2023 15:24)  T(F): 98.5 (28 Mar 2023 10:00), Max: 98.6 (27 Mar 2023 15:24)  HR: 80 (28 Mar 2023 10:00) (68 - 82)  BP: 90/58 (28 Mar 2023 10:00) (90/58 - 120/61)  BP(mean): --  RR: 18 (28 Mar 2023 10:00) (16 - 18)  SpO2: 99% (28 Mar 2023 10:00) (98% - 99%)    Parameters below as of 28 Mar 2023 10:00  Patient On (Oxygen Delivery Method): room air          CONSTITUTIONAL: NAD, awake alert   ENMT: Moist oral mucosa  RESPIRATORY:  lungs are clear to auscultation bilaterally  CARDIOVASCULAR: Regular rate and rhythm, normal S1 and S2, no murmur  MUSCULOSKELETAL:   no joint swelling or tenderness to palpation  SKIN: Normal color , warm            MEDICATIONS  (STANDING):  apixaban 5 milliGRAM(s) Oral every 12 hours  chlorhexidine 2% Cloths 1 Application(s) Topical daily  dextrose 5%. 1000 milliLiter(s) (100 mL/Hr) IV Continuous <Continuous>  dextrose 5%. 1000 milliLiter(s) (50 mL/Hr) IV Continuous <Continuous>  dextrose 50% Injectable 50 milliLiter(s) IV Push every 15 minutes  folic acid Injectable 1 milliGRAM(s) IV Push daily  glucagon  Injectable 1 milliGRAM(s) IntraMuscular once  insulin glargine Injectable (LANTUS) 12 Unit(s) SubCutaneous at bedtime  insulin lispro (ADMELOG) corrective regimen sliding scale   SubCutaneous three times a day before meals  insulin lispro Injectable (ADMELOG) 3 Unit(s) SubCutaneous before breakfast  insulin lispro Injectable (ADMELOG) 3 Unit(s) SubCutaneous before lunch  insulin lispro Injectable (ADMELOG) 6 Unit(s) SubCutaneous before dinner  levETIRAcetam  IVPB 1000 milliGRAM(s) IV Intermittent every 12 hours  multivitamin 1 Tablet(s) Oral daily  pantoprazole    Tablet 40 milliGRAM(s) Oral before breakfast  QUEtiapine 25 milliGRAM(s) Oral at bedtime  sucralfate 1 Gram(s) Oral four times a day  thiamine Injectable 100 milliGRAM(s) IV Push daily    MEDICATIONS  (PRN):  dextrose Oral Gel 15 Gram(s) Oral once PRN Blood Glucose LESS THAN 70 milliGRAM(s)/deciliter  diazepam    Tablet 5 milliGRAM(s) Oral three times a day PRN anxiety

## 2023-03-28 NOTE — PROGRESS NOTE ADULT - ASSESSMENT
31 yo M PMH IDDM, seizure disorder, AFib on Eliquis, EtOH abuse who presented on 3/16/23 with abdominal pain admitted with DKA, Severe Dehydration, Severe Alcohol Withdrawal course was complicated by oversedation/bradypnea requiring flumazenil as well as shock. Patient  transferred to medicine on 3/20.     1- insulin Dependent DM with Severe DKA sec to medication non compliance and alcohol abuse   A1C > 15  DKSA resolved S/p IVF, Insulin infusion   endocrinology following  cont to adjust insulin regimen     pt needs to go home on basal insulin     2-Alcohol abuse with dependency and severe withdrawal  S/p Phenobarbital   - taper valium to use PRN   - MV, Folate, HD Thiamine x 3 days to prevent Wernicke's  - Social Work following    3- AF RVR on presentation and has h/o Afib   Now sinus and normal rate  pt stopped taking eliquis on his own 4mths ago  but now wants to resume. Risks and benefits were discussed - Advised to fEvangelinau with EP in office.     4-Unsteady gait  likely from chronic alcohol use  improving - with PT       DVT ppx: Lovenox  Dispo: awaiting rehab - At [facility] - pending PT recs

## 2023-03-29 ENCOUNTER — TRANSCRIPTION ENCOUNTER (OUTPATIENT)
Age: 33
End: 2023-03-29

## 2023-03-29 LAB
GLUCOSE BLDC GLUCOMTR-MCNC: 147 MG/DL — HIGH (ref 70–99)
GLUCOSE BLDC GLUCOMTR-MCNC: 176 MG/DL — HIGH (ref 70–99)
GLUCOSE BLDC GLUCOMTR-MCNC: 272 MG/DL — HIGH (ref 70–99)
GLUCOSE BLDC GLUCOMTR-MCNC: 384 MG/DL — HIGH (ref 70–99)
GLUCOSE BLDC GLUCOMTR-MCNC: 490 MG/DL — CRITICAL HIGH (ref 70–99)
GLUCOSE BLDC GLUCOMTR-MCNC: 493 MG/DL — CRITICAL HIGH (ref 70–99)

## 2023-03-29 PROCEDURE — 99239 HOSP IP/OBS DSCHRG MGMT >30: CPT

## 2023-03-29 PROCEDURE — 99233 SBSQ HOSP IP/OBS HIGH 50: CPT

## 2023-03-29 RX ORDER — INSULIN LISPRO 100/ML
8 VIAL (ML) SUBCUTANEOUS
Refills: 0 | Status: DISCONTINUED | OUTPATIENT
Start: 2023-03-29 | End: 2023-03-30

## 2023-03-29 RX ORDER — LOSARTAN POTASSIUM 100 MG/1
1 TABLET, FILM COATED ORAL
Qty: 0 | Refills: 0 | DISCHARGE

## 2023-03-29 RX ORDER — INSULIN LISPRO 100/ML
0 VIAL (ML) SUBCUTANEOUS
Qty: 0 | Refills: 0 | DISCHARGE

## 2023-03-29 RX ORDER — INSULIN GLARGINE 100 [IU]/ML
15 INJECTION, SOLUTION SUBCUTANEOUS
Qty: 0 | Refills: 0 | DISCHARGE
Start: 2023-03-29

## 2023-03-29 RX ADMIN — APIXABAN 5 MILLIGRAM(S): 2.5 TABLET, FILM COATED ORAL at 05:08

## 2023-03-29 RX ADMIN — Medication 1 GRAM(S): at 05:09

## 2023-03-29 RX ADMIN — PANTOPRAZOLE SODIUM 40 MILLIGRAM(S): 20 TABLET, DELAYED RELEASE ORAL at 05:09

## 2023-03-29 RX ADMIN — INSULIN GLARGINE 12 UNIT(S): 100 INJECTION, SOLUTION SUBCUTANEOUS at 21:46

## 2023-03-29 RX ADMIN — Medication 1 MILLIGRAM(S): at 12:14

## 2023-03-29 RX ADMIN — Medication 8 UNIT(S): at 17:00

## 2023-03-29 RX ADMIN — Medication 1 GRAM(S): at 17:30

## 2023-03-29 RX ADMIN — APIXABAN 5 MILLIGRAM(S): 2.5 TABLET, FILM COATED ORAL at 17:29

## 2023-03-29 RX ADMIN — Medication 3 UNIT(S): at 12:14

## 2023-03-29 RX ADMIN — Medication 1 TABLET(S): at 12:15

## 2023-03-29 RX ADMIN — Medication 3 UNIT(S): at 08:06

## 2023-03-29 RX ADMIN — Medication 6: at 17:18

## 2023-03-29 RX ADMIN — Medication 1: at 12:00

## 2023-03-29 RX ADMIN — Medication 1 GRAM(S): at 12:15

## 2023-03-29 RX ADMIN — Medication 1 GRAM(S): at 21:46

## 2023-03-29 RX ADMIN — LEVETIRACETAM 400 MILLIGRAM(S): 250 TABLET, FILM COATED ORAL at 21:46

## 2023-03-29 RX ADMIN — QUETIAPINE FUMARATE 25 MILLIGRAM(S): 200 TABLET, FILM COATED ORAL at 21:47

## 2023-03-29 RX ADMIN — CHLORHEXIDINE GLUCONATE 1 APPLICATION(S): 213 SOLUTION TOPICAL at 12:14

## 2023-03-29 RX ADMIN — Medication 100 MILLIGRAM(S): at 12:16

## 2023-03-29 RX ADMIN — LEVETIRACETAM 400 MILLIGRAM(S): 250 TABLET, FILM COATED ORAL at 12:15

## 2023-03-29 NOTE — DISCHARGE NOTE NURSING/CASE MANAGEMENT/SOCIAL WORK - NSDCVIVACCINE_GEN_ALL_CORE_FT
Tdap; 29-Oct-2019 23:07; Odessa Landis (RN); [employer]; [id number] (Exp. Date: 22-Oct-2021); IntraMuscular; Deltoid Left.; 0.5 milliLiter(s); VIS (VIS Published: 09-May-2013, VIS Presented: 29-Oct-2019);

## 2023-03-29 NOTE — DISCHARGE NOTE PROVIDER - HOSPITAL COURSE
33 yo M PMH IDDM, seizure disorder last seizure in 10/2022 (was admitted at [facility]), AFib on Eliquis, EtOH abuse who presented on 3/16/23 with abdominal pain admitted with DKA, Severe Dehydration, Severe Alcohol Withdrawal course was complicated by oversedation/bradypnea requiring flumazenil. Patient  transferred to medicine on 3/20.   DKA resolved S/p IVF, Insulin infusion, endocrinology was consulted - adjusted insulin regimen. pt needs to go home on basal insulin. He was in AF RVR on presentation and has h/o Afib Now sinus, normal rate. pt stopped taking eliquis on his own 4mths ago  but now wants to resume. Risks and benefits were discussed - Advised to fEvangelinau with EP in office.   He was extensively counselled regarding Alcohol abuse with dependency - Pt wants to go to alcohol rehab.            31 yo M PMH IDDM, seizure disorder last seizure in 10/2022 (was admitted at [facility]), AFib on Eliquis, EtOH abuse who presented on 3/16/23 with abdominal pain admitted with DKA, Severe Dehydration, Severe Alcohol Withdrawal course was complicated by oversedation/bradypnea requiring flumazenil. Patient  transferred to medicine on 3/20.   DKA resolved S/p IVF, Insulin infusion, endocrinology was consulted - adjusted insulin regimen. pt needs to go home on basal insulin. He was in AF RVR on presentation and has h/o Afib Now sinus, normal rate. pt stopped taking eliquis on his own 4mths ago  but now wants to resume. Risks and benefits were discussed - Advised to fsarah with EP in office.   He was extensively counselled regarding Alcohol abuse with dependency - Pt declined to go to alcohol rehab and out referral. wants to go home.

## 2023-03-29 NOTE — PROGRESS NOTE ADULT - SUBJECTIVE AND OBJECTIVE BOX
INTERVAL EVENTS:  Follow up diabetes management    ROS: Denies chest pain, sob, abd pain.    MEDICATIONS  (STANDING):  apixaban 5 milliGRAM(s) Oral every 12 hours  chlorhexidine 2% Cloths 1 Application(s) Topical daily  dextrose 5%. 1000 milliLiter(s) (50 mL/Hr) IV Continuous <Continuous>  dextrose 5%. 1000 milliLiter(s) (100 mL/Hr) IV Continuous <Continuous>  dextrose 50% Injectable 50 milliLiter(s) IV Push every 15 minutes  folic acid Injectable 1 milliGRAM(s) IV Push daily  glucagon  Injectable 1 milliGRAM(s) IntraMuscular once  insulin glargine Injectable (LANTUS) 12 Unit(s) SubCutaneous at bedtime  insulin lispro (ADMELOG) corrective regimen sliding scale   SubCutaneous three times a day before meals  insulin lispro Injectable (ADMELOG) 8 Unit(s) SubCutaneous before dinner  insulin lispro Injectable (ADMELOG) 3 Unit(s) SubCutaneous before breakfast  insulin lispro Injectable (ADMELOG) 3 Unit(s) SubCutaneous before lunch  levETIRAcetam  IVPB 1000 milliGRAM(s) IV Intermittent every 12 hours  multivitamin 1 Tablet(s) Oral daily  pantoprazole    Tablet 40 milliGRAM(s) Oral before breakfast  QUEtiapine 25 milliGRAM(s) Oral at bedtime  sucralfate 1 Gram(s) Oral four times a day  thiamine Injectable 100 milliGRAM(s) IV Push daily    MEDICATIONS  (PRN):  dextrose Oral Gel 15 Gram(s) Oral once PRN Blood Glucose LESS THAN 70 milliGRAM(s)/deciliter  diazepam    Tablet 5 milliGRAM(s) Oral three times a day PRN anxiety    Allergies  Allergy Status Unknown  No Known Allergies    Vital Signs Last 24 Hrs  T(C): 36.8 (29 Mar 2023 09:00), Max: 37 (28 Mar 2023 20:30)  T(F): 98.2 (29 Mar 2023 09:00), Max: 98.6 (28 Mar 2023 20:30)  HR: 72 (29 Mar 2023 09:00) (67 - 81)  BP: 104/63 (29 Mar 2023 09:00) (91/54 - 117/64)  BP(mean): --  RR: 18 (29 Mar 2023 09:00) (18 - 18)  SpO2: 99% (29 Mar 2023 09:00) (97% - 99%)    Parameters below as of 29 Mar 2023 09:00  Patient On (Oxygen Delivery Method): room air      PHYSICAL EXAM:  General: No apparent distress  Neck: Supple, trachea midline, no thyromegaly  Respiratory: Lungs clear bilaterally, normal rate, effort  Cardiac: +S1, S2, no m/r/g  GI: +BS, soft, non tender, non distended  Extremities: No peripheral edema, no pedal lesions  Neuro: A+O X3, no tremor  Pysch: Affect appropriate   Skin: No acanthosis       LABS:                        10.1   5.67  )-----------( 300      ( 28 Mar 2023 15:00 )             30.4       POCT Blood Glucose.: 147 mg/dL (03-29-23 @ 07:41)  POCT Blood Glucose.: 432 mg/dL (03-28-23 @ 21:08)  POCT Blood Glucose.: 177 mg/dL (03-28-23 @ 16:29)

## 2023-03-29 NOTE — DISCHARGE NOTE PROVIDER - CARE PROVIDER_API CALL
Kathy Phipps)  Cardiac Electrophysiology; Cardiovascular Disease; Internal Medicine  [address] [place] [address]  Phone: [phone]  Fax: [phone]  Follow Up Time:     Aj Rodriguez)  EEGEpilepsy; Neurology  [address] [place] [address]  Phone: [phone]  Fax: [phone]  Follow Up Time:

## 2023-03-29 NOTE — DISCHARGE NOTE PROVIDER - ATTENDING DISCHARGE PHYSICAL EXAMINATION:
CONSTITUTIONAL: NAD, awake alert   ENMT: Moist oral mucosa  RESPIRATORY:  lungs are clear to auscultation bilaterally  CARDIOVASCULAR: Regular rate and rhythm, normal S1 and S2, no murmur  MUSCULOSKELETAL:   no joint swelling or tenderness to palpation  SKIN: Normal color , warm

## 2023-03-29 NOTE — DISCHARGE NOTE NURSING/CASE MANAGEMENT/SOCIAL WORK - NSDCPEFALRISK_GEN_ALL_CORE
For information on Fall & Injury Prevention, visit: [email or web address] OR  [email or web address] OR  https://www.cdc.gov/steadi/patient.html

## 2023-03-29 NOTE — DISCHARGE NOTE PROVIDER - NSDCCPCAREPLAN_GEN_ALL_CORE_FT
PRINCIPAL DISCHARGE DIAGNOSIS  Diagnosis: DKA (diabetic ketoacidosis)  Assessment and Plan of Treatment: resolved      SECONDARY DISCHARGE DIAGNOSES  Diagnosis: Alcohol dependence with withdrawal  Assessment and Plan of Treatment: rehab    Diagnosis: Paroxysmal atrial fibrillation  Assessment and Plan of Treatment: f/u with EP    Diagnosis: Seizure  Assessment and Plan of Treatment: f/u with epilepsy in 4-6 weeks

## 2023-03-29 NOTE — DISCHARGE NOTE NURSING/CASE MANAGEMENT/SOCIAL WORK - NSDCPEELIQUISCOMP_GEN_ALL_CORE
Addended by: STEFAN PECK on: 6/22/2022 02:31 PM     Modules accepted: Orders    
Addended by: TOBY BIRMINGHAM on: 6/23/2022 06:41 PM     Modules accepted: Orders    
Apixaban/Eliquis is used to treat and prevent blood clots. If you are not able to swallow the tablets whole, they may be crushed and mixed in water, apple juice, or applesauce and promptly taken within four hours. Never skip a dose of Apixaban/Eliquis. If you forget to take your Apixaban/Eliquis, take a dose as soon as you remember. If it is almost time for your next Apixaban/Eliquis dose, wait until then and take a regular dose. DO NOT take an extra pill to ‘catch up’.  NEVER TAKE A DOUBLE DOSE. Notify your doctor that you missed a dose. Take Apixaban/Eliquis at the same time each morning and evening. Apixaban/Eliquis may be taken with other medication or food.

## 2023-03-29 NOTE — DISCHARGE NOTE NURSING/CASE MANAGEMENT/SOCIAL WORK - NSSBIRTALCACTIVEREFTXDET_GEN_A_CORE
Pt reports he has been drinking less compared to last yr. Pt shared that as of this yr he drinks 4 "tall boys" [about 8 drinks] about 3x a week. SW informed pt that MD was recommending pt for inpt alcohol/sub use - pt was agreeable to inpt tx. Pt shared that he had been to [facility] recently and it helped but after discharge he "gave up." SW asked pt if he had ever f/u with outpt services after leaving which pt denied. SW suggested that after attending inpt tx he have staff at facility assist in scheduling a f/u in an outpt tx center of his choice. SW provided pt COS list asked pt if he had any preferences for inpt tx at this time - pt was agreeable to [facility].

## 2023-03-29 NOTE — PROGRESS NOTE ADULT - ASSESSMENT
33 yo M PMH IDDM, seizure disorder, AFib on Eliquis, EtOH abuse who presented on 3/16/23 with abdominal pain admitted with DKA, Severe Dehydration, Severe Alcohol Withdrawal course was complicated by oversedation/bradypnea requiring flumazenil as well as shock. Patient  transferred to medicine on 3/20.     1- insulin Dependent DM with Severe DKA sec to medication non compliance and alcohol abuse   DKSA resolved    endocrinology following  cont to adjust insulin regimen         2-Alcohol abuse with dependency and severe withdrawal  S/p Phenobarbital   - taper valium to use PRN   - MV, Folate, HD Thiamine x 3 days to prevent Wernicke's  - Social Work following    3- AF RVR on presentation and has h/o Afib   Now sinus and normal rate  pt stopped taking eliquis on his own 4mths ago  but now wants to resume. Risks and benefits were discussed - Advised to f.u with EP in office.     4-Unsteady gait  likely from chronic alcohol use  improving - with PT       DVT ppx: Lovenox  Dispo: awaiting rehab - At [facility] today

## 2023-03-29 NOTE — PROGRESS NOTE ADULT - SUBJECTIVE AND OBJECTIVE BOX
Follow up for uncontrolled Diabetes Mellitus. DKA, alcohol abuse    Patient seen and examined  no overnight events   Plan for inpaitent alcohol rehab       Vital reveiwed           CONSTITUTIONAL: NAD, awake alert   ENMT: Moist oral mucosa  RESPIRATORY:  lungs are clear to auscultation bilaterally  CARDIOVASCULAR: Regular rate and rhythm, normal S1 and S2, no murmur  MUSCULOSKELETAL:   no joint swelling or tenderness to palpation  SKIN: Normal color , warm

## 2023-03-29 NOTE — DISCHARGE NOTE NURSING/CASE MANAGEMENT/SOCIAL WORK - PATIENT PORTAL LINK FT
You can access the FollowMyHealth Patient Portal offered by [facility] by registering at the following website: [email or web address]. By joining [employer]’s FollowMyHealth portal, you will also be able to view your health information using other applications (apps) compatible with our system.

## 2023-03-29 NOTE — DISCHARGE NOTE PROVIDER - NSDCMRMEDTOKEN_GEN_ALL_CORE_FT
Eliquis 5 mg oral tablet: 1 tab(s) orally 2 times a day  folic acid 1 mg oral tablet: 1 tab(s) orally once a day  glyBURIDE 2.5 mg oral tablet: 1 tab(s) orally once a day  HumaLOG KwikPen 100 units/mL injectable solution: 4 times a day (before meals and at bedtime) as per sliding scale:  151-200 2 units; 201-250 4 units; 251-300 6 units; 301-350 8 units; 351-400 10 units; &gt; 400 call MD.   Lantus 100 units/mL subcutaneous solution: 12 unit(s) subcutaneous once a day (at bedtime)  levETIRAcetam 1000 mg oral tablet: 1 tab(s) orally 2 times a day  metFORMIN 500 mg oral tablet: 1 tab(s) orally 2 times a day  metoprolol succinate 25 mg oral tablet, extended release: 1 tab(s) orally once a day  Multiple Vitamins oral tablet: 1 tab(s) orally once a day  pantoprazole 40 mg oral delayed release tablet: 1 tab(s) orally once a day  SEROquel 25 mg oral tablet: 1 tab(s) orally once a day (at bedtime)  sucralfate 1 g oral tablet: 1 tab(s) orally 4 times a day  thiamine 100 mg oral tablet: 1 tab(s) orally once a day   Eliquis 5 mg oral tablet: 1 tab(s) orally 2 times a day  folic acid 1 mg oral tablet: 1 tab(s) orally once a day  HumaLOG KwikPen 100 units/mL injectable solution: 4 times a day (before meals and at bedtime) as per sliding scale:  151-200 2 units; 201-250 4 units; 251-300 6 units; 301-350 8 units; 351-400 10 units; &gt; 400 call MD.   HumaLOG KwikPen 100 units/mL injectable solution: 6 units injectable 2 times a day (with meals) with breakfast and lunch  HumaLOG KwikPen 100 units/mL injectable solution: 11 unit(s) subcutaneous once a day (with meals) 11 unit(s) subcutaneous 3 times a day (with meals) dinner  Lantus 100 units/mL subcutaneous solution: 15 unit(s) subcutaneous once a day (at bedtime)  levETIRAcetam 1000 mg oral tablet: 1 tab(s) orally 2 times a day  metoprolol succinate 25 mg oral tablet, extended release: 1 tab(s) orally once a day  Multiple Vitamins oral tablet: 1 tab(s) orally once a day  pantoprazole 40 mg oral delayed release tablet: 1 tab(s) orally once a day  SEROquel 25 mg oral tablet: 1 tab(s) orally once a day (at bedtime)  sucralfate 1 g oral tablet: 1 tab(s) orally 4 times a day  thiamine 100 mg oral tablet: 1 tab(s) orally once a day

## 2023-03-29 NOTE — PROGRESS NOTE ADULT - ASSESSMENT
33 y/o M with PMHx T1DM with recurrent DKA, ETOH, seizure disorder, AFib on AC. Patient was admitted to ICU for DKA management, course complicated by alcohol withdrawal Endocrinology follows for DM management. A1C 15.4%.    1. Uncontrolled T1DM, a1c 15.4% - S/p DKA  - Blood sugars remain elevated at bedtime  - Increase admelog to 8 units before dinner  - Continue admelog 3 units before breakfast and lunch  - Continue lantus 12 units qhs  - Mild sliding scale coverage  - PADMINI negative, low c-peptide from previous admission  - Must be discharged on basal bolus regimen    2. ETOH abuse  - On Valium PRN  - MV, Folate, Thiamine

## 2023-03-30 VITALS
TEMPERATURE: 98 F | RESPIRATION RATE: 18 BRPM | HEART RATE: 73 BPM | OXYGEN SATURATION: 98 % | SYSTOLIC BLOOD PRESSURE: 111 MMHG | DIASTOLIC BLOOD PRESSURE: 66 MMHG

## 2023-03-30 LAB
GLUCOSE BLDC GLUCOMTR-MCNC: 162 MG/DL — HIGH (ref 70–99)
GLUCOSE BLDC GLUCOMTR-MCNC: 247 MG/DL — HIGH (ref 70–99)

## 2023-03-30 PROCEDURE — 36415 COLL VENOUS BLD VENIPUNCTURE: CPT

## 2023-03-30 PROCEDURE — 81001 URINALYSIS AUTO W/SCOPE: CPT

## 2023-03-30 PROCEDURE — 80048 BASIC METABOLIC PNL TOTAL CA: CPT

## 2023-03-30 PROCEDURE — 80177 DRUG SCRN QUAN LEVETIRACETAM: CPT

## 2023-03-30 PROCEDURE — 74177 CT ABD & PELVIS W/CONTRAST: CPT | Mod: MA

## 2023-03-30 PROCEDURE — 82330 ASSAY OF CALCIUM: CPT

## 2023-03-30 PROCEDURE — 83540 ASSAY OF IRON: CPT

## 2023-03-30 PROCEDURE — 82746 ASSAY OF FOLIC ACID SERUM: CPT

## 2023-03-30 PROCEDURE — 80307 DRUG TEST PRSMV CHEM ANLYZR: CPT

## 2023-03-30 PROCEDURE — 82962 GLUCOSE BLOOD TEST: CPT

## 2023-03-30 PROCEDURE — 84484 ASSAY OF TROPONIN QUANT: CPT

## 2023-03-30 PROCEDURE — 82009 KETONE BODYS QUAL: CPT

## 2023-03-30 PROCEDURE — 83550 IRON BINDING TEST: CPT

## 2023-03-30 PROCEDURE — 82435 ASSAY OF BLOOD CHLORIDE: CPT

## 2023-03-30 PROCEDURE — 99232 SBSQ HOSP IP/OBS MODERATE 35: CPT

## 2023-03-30 PROCEDURE — 85025 COMPLETE CBC W/AUTO DIFF WBC: CPT

## 2023-03-30 PROCEDURE — U0003: CPT

## 2023-03-30 PROCEDURE — 82607 VITAMIN B-12: CPT

## 2023-03-30 PROCEDURE — 97163 PT EVAL HIGH COMPLEX 45 MIN: CPT

## 2023-03-30 PROCEDURE — 84305 ASSAY OF SOMATOMEDIN: CPT

## 2023-03-30 PROCEDURE — 36410 VNPNXR 3YR/> PHY/QHP DX/THER: CPT | Mod: XU

## 2023-03-30 PROCEDURE — 83930 ASSAY OF BLOOD OSMOLALITY: CPT

## 2023-03-30 PROCEDURE — 83735 ASSAY OF MAGNESIUM: CPT

## 2023-03-30 PROCEDURE — 36556 INSERT NON-TUNNEL CV CATH: CPT

## 2023-03-30 PROCEDURE — C1751: CPT

## 2023-03-30 PROCEDURE — 83036 HEMOGLOBIN GLYCOSYLATED A1C: CPT

## 2023-03-30 PROCEDURE — 82803 BLOOD GASES ANY COMBINATION: CPT

## 2023-03-30 PROCEDURE — 85027 COMPLETE CBC AUTOMATED: CPT

## 2023-03-30 PROCEDURE — 87040 BLOOD CULTURE FOR BACTERIA: CPT

## 2023-03-30 PROCEDURE — 82728 ASSAY OF FERRITIN: CPT

## 2023-03-30 PROCEDURE — 85018 HEMOGLOBIN: CPT

## 2023-03-30 PROCEDURE — 82947 ASSAY GLUCOSE BLOOD QUANT: CPT

## 2023-03-30 PROCEDURE — 84466 ASSAY OF TRANSFERRIN: CPT

## 2023-03-30 PROCEDURE — U0005: CPT

## 2023-03-30 PROCEDURE — 71045 X-RAY EXAM CHEST 1 VIEW: CPT

## 2023-03-30 PROCEDURE — 83605 ASSAY OF LACTIC ACID: CPT

## 2023-03-30 PROCEDURE — 85014 HEMATOCRIT: CPT

## 2023-03-30 PROCEDURE — 99285 EMERGENCY DEPT VISIT HI MDM: CPT

## 2023-03-30 PROCEDURE — 96374 THER/PROPH/DIAG INJ IV PUSH: CPT

## 2023-03-30 PROCEDURE — 93005 ELECTROCARDIOGRAM TRACING: CPT

## 2023-03-30 PROCEDURE — 80053 COMPREHEN METABOLIC PANEL: CPT

## 2023-03-30 PROCEDURE — 83690 ASSAY OF LIPASE: CPT

## 2023-03-30 PROCEDURE — 84132 ASSAY OF SERUM POTASSIUM: CPT

## 2023-03-30 PROCEDURE — 99233 SBSQ HOSP IP/OBS HIGH 50: CPT

## 2023-03-30 PROCEDURE — 84100 ASSAY OF PHOSPHORUS: CPT

## 2023-03-30 PROCEDURE — 87637 SARSCOV2&INF A&B&RSV AMP PRB: CPT

## 2023-03-30 PROCEDURE — 84295 ASSAY OF SERUM SODIUM: CPT

## 2023-03-30 RX ORDER — QUETIAPINE FUMARATE 200 MG/1
1 TABLET, FILM COATED ORAL
Qty: 30 | Refills: 0
Start: 2023-03-30 | End: 2023-04-28

## 2023-03-30 RX ORDER — FOLIC ACID 0.8 MG
1 TABLET ORAL
Qty: 30 | Refills: 0
Start: 2023-03-30 | End: 2023-04-28

## 2023-03-30 RX ORDER — INSULIN LISPRO 100/ML
6 VIAL (ML) SUBCUTANEOUS
Qty: 2 | Refills: 0
Start: 2023-03-30 | End: 2023-04-28

## 2023-03-30 RX ORDER — METFORMIN HYDROCHLORIDE 850 MG/1
1 TABLET ORAL
Qty: 0 | Refills: 0 | DISCHARGE

## 2023-03-30 RX ORDER — GLYBURIDE 5 MG
1 TABLET ORAL
Qty: 0 | Refills: 0 | DISCHARGE

## 2023-03-30 RX ORDER — INSULIN LISPRO 100/ML
11 VIAL (ML) SUBCUTANEOUS
Refills: 0 | Status: DISCONTINUED | OUTPATIENT
Start: 2023-03-30 | End: 2023-03-30

## 2023-03-30 RX ORDER — SUCRALFATE 1 G
1 TABLET ORAL
Qty: 120 | Refills: 0
Start: 2023-03-30 | End: 2023-04-28

## 2023-03-30 RX ORDER — PANTOPRAZOLE SODIUM 20 MG/1
1 TABLET, DELAYED RELEASE ORAL
Qty: 0 | Refills: 0 | DISCHARGE

## 2023-03-30 RX ORDER — INSULIN LISPRO 100/ML
6 VIAL (ML) SUBCUTANEOUS
Refills: 0 | Status: DISCONTINUED | OUTPATIENT
Start: 2023-03-30 | End: 2023-03-30

## 2023-03-30 RX ORDER — INSULIN GLARGINE 100 [IU]/ML
15 INJECTION, SOLUTION SUBCUTANEOUS AT BEDTIME
Refills: 0 | Status: DISCONTINUED | OUTPATIENT
Start: 2023-03-30 | End: 2023-03-30

## 2023-03-30 RX ORDER — INSULIN LISPRO 100/ML
11 VIAL (ML) SUBCUTANEOUS
Qty: 2 | Refills: 0
Start: 2023-03-30 | End: 2023-04-28

## 2023-03-30 RX ORDER — APIXABAN 2.5 MG/1
1 TABLET, FILM COATED ORAL
Qty: 60 | Refills: 0
Start: 2023-03-30 | End: 2023-04-28

## 2023-03-30 RX ORDER — METOPROLOL TARTRATE 50 MG
1 TABLET ORAL
Qty: 30 | Refills: 0
Start: 2023-03-30 | End: 2023-04-28

## 2023-03-30 RX ORDER — LEVETIRACETAM 250 MG/1
1 TABLET, FILM COATED ORAL
Qty: 60 | Refills: 0
Start: 2023-03-30 | End: 2023-04-28

## 2023-03-30 RX ORDER — QUETIAPINE FUMARATE 200 MG/1
1 TABLET, FILM COATED ORAL
Qty: 0 | Refills: 0 | DISCHARGE

## 2023-03-30 RX ORDER — PANTOPRAZOLE SODIUM 20 MG/1
1 TABLET, DELAYED RELEASE ORAL
Qty: 30 | Refills: 0
Start: 2023-03-30 | End: 2023-04-28

## 2023-03-30 RX ORDER — THIAMINE MONONITRATE (VIT B1) 100 MG
1 TABLET ORAL
Qty: 30 | Refills: 0
Start: 2023-03-30 | End: 2023-04-28

## 2023-03-30 RX ADMIN — Medication 1 GRAM(S): at 05:32

## 2023-03-30 RX ADMIN — Medication 6 UNIT(S): at 12:45

## 2023-03-30 RX ADMIN — Medication 100 MILLIGRAM(S): at 12:46

## 2023-03-30 RX ADMIN — Medication 1 GRAM(S): at 12:48

## 2023-03-30 RX ADMIN — Medication 1: at 12:44

## 2023-03-30 RX ADMIN — PANTOPRAZOLE SODIUM 40 MILLIGRAM(S): 20 TABLET, DELAYED RELEASE ORAL at 05:32

## 2023-03-30 RX ADMIN — CHLORHEXIDINE GLUCONATE 1 APPLICATION(S): 213 SOLUTION TOPICAL at 13:00

## 2023-03-30 RX ADMIN — Medication 1 TABLET(S): at 12:48

## 2023-03-30 RX ADMIN — APIXABAN 5 MILLIGRAM(S): 2.5 TABLET, FILM COATED ORAL at 05:32

## 2023-03-30 RX ADMIN — Medication 3 UNIT(S): at 09:00

## 2023-03-30 RX ADMIN — Medication 2: at 08:58

## 2023-03-30 RX ADMIN — Medication 1 MILLIGRAM(S): at 13:28

## 2023-03-30 RX ADMIN — LEVETIRACETAM 400 MILLIGRAM(S): 250 TABLET, FILM COATED ORAL at 12:48

## 2023-03-30 NOTE — PROGRESS NOTE ADULT - SUBJECTIVE AND OBJECTIVE BOX
INTERVAL EVENTS:  Follow up diabetes management  Hyperglycemic last night, his brother brought in fast food     ROS: Denies abd pain, n/v.    MEDICATIONS  (STANDING):  apixaban 5 milliGRAM(s) Oral every 12 hours  chlorhexidine 2% Cloths 1 Application(s) Topical daily  dextrose 5%. 1000 milliLiter(s) (50 mL/Hr) IV Continuous <Continuous>  dextrose 5%. 1000 milliLiter(s) (100 mL/Hr) IV Continuous <Continuous>  dextrose 50% Injectable 50 milliLiter(s) IV Push every 15 minutes  folic acid Injectable 1 milliGRAM(s) IV Push daily  glucagon  Injectable 1 milliGRAM(s) IntraMuscular once  insulin glargine Injectable (LANTUS) 15 Unit(s) SubCutaneous at bedtime  insulin lispro (ADMELOG) corrective regimen sliding scale   SubCutaneous three times a day before meals  insulin lispro Injectable (ADMELOG) 6 Unit(s) SubCutaneous before breakfast  insulin lispro Injectable (ADMELOG) 6 Unit(s) SubCutaneous before lunch  insulin lispro Injectable (ADMELOG) 11 Unit(s) SubCutaneous before dinner  levETIRAcetam  IVPB 1000 milliGRAM(s) IV Intermittent every 12 hours  multivitamin 1 Tablet(s) Oral daily  pantoprazole    Tablet 40 milliGRAM(s) Oral before breakfast  QUEtiapine 25 milliGRAM(s) Oral at bedtime  sucralfate 1 Gram(s) Oral four times a day  thiamine Injectable 100 milliGRAM(s) IV Push daily    MEDICATIONS  (PRN):  dextrose Oral Gel 15 Gram(s) Oral once PRN Blood Glucose LESS THAN 70 milliGRAM(s)/deciliter  diazepam    Tablet 5 milliGRAM(s) Oral three times a day PRN anxiety    Allergies  Allergy Status Unknown  No Known Allergies    Vital Signs Last 24 Hrs  T(C): 36.9 (30 Mar 2023 09:12), Max: 37.2 (29 Mar 2023 16:02)  T(F): 98.5 (30 Mar 2023 09:12), Max: 99 (29 Mar 2023 16:02)  HR: 73 (30 Mar 2023 09:12) (66 - 82)  BP: 111/66 (30 Mar 2023 09:12) (102/64 - 118/70)  BP(mean): --  RR: 18 (30 Mar 2023 09:12) (17 - 18)  SpO2: 98% (30 Mar 2023 09:12) (97% - 98%)    Parameters below as of 30 Mar 2023 09:12  Patient On (Oxygen Delivery Method): room air      PHYSICAL EXAM:  General: No apparent distress  Neck: Supple, trachea midline, no thyromegaly  Respiratory: Lungs clear bilaterally, normal rate, effort  Cardiac: +S1, S2, no m/r/g  GI: +BS, soft, non tender, non distended  Extremities: No peripheral edema, no pedal lesions  Neuro: A+O X3, no tremor  Pysch: Affect appropriate   Skin: No acanthosis       LABS:                        10.1   5.67  )-----------( 300      ( 28 Mar 2023 15:00 )             30.4       POCT Blood Glucose.: 247 mg/dL (03-30-23 @ 07:34)  POCT Blood Glucose.: 272 mg/dL (03-29-23 @ 21:38)  POCT Blood Glucose.: 384 mg/dL (03-29-23 @ 18:54)  POCT Blood Glucose.: 493 mg/dL (03-29-23 @ 18:05)  POCT Blood Glucose.: 490 mg/dL (03-29-23 @ 16:24)  POCT Blood Glucose.: 176 mg/dL (03-29-23 @ 11:30)

## 2023-03-30 NOTE — PROGRESS NOTE ADULT - ASSESSMENT
33 yo M PMH IDDM, seizure disorder, AFib on Eliquis, EtOH abuse who presented on 3/16/23 with abdominal pain admitted with DKA, Severe Dehydration, Severe Alcohol Withdrawal course was complicated by oversedation/bradypnea requiring flumazenil as well as shock. Patient  transferred to medicine on 3/20.     1- insulin Dependent DM with Severe DKA sec to medication non compliance and alcohol abuse   A1C > 15  DKSA resolved S/p IVF, Insulin infusion   endocrinology following  cont to adjust insulin regimen     pt needs to go home on basal insulin     2-Alcohol abuse with dependency and severe withdrawal  S/p Phenobarbital   - taper valium to use PRN   - MV, Folate, HD Thiamine x 3 days to prevent Wernicke's      3- AF RVR on presentation and has h/o Afib   Now sinus and normal rate  pt stopped taking eliquis on his own 4mths ago  but now wants to resume. Risks and benefits were discussed - Advised to f.u with EP in office.     4-Unsteady gait  likely from chronic alcohol use  improving - with PT       DVT ppx: Lovenox  Dispo: home - pt refused inpatient and out pt rehab. wants to go home   High risk of readmission and non-compliance.

## 2023-03-30 NOTE — PROGRESS NOTE ADULT - NS ATTEND OPT1 GEN_ALL_CORE
I attest my time as attending is greater than 50% of the total combined time spent on qualifying patient care activities by the PA/NP and attending.
I independently performed the documented:
I independently performed the documented:
I attest my time as attending is greater than 50% of the total combined time spent on qualifying patient care activities by the PA/NP and attending.
I independently performed the documented:
I attest my time as attending is greater than 50% of the total combined time spent on qualifying patient care activities by the PA/NP and attending.
I independently performed the documented:

## 2023-03-30 NOTE — ED ADULT NURSE REASSESSMENT NOTE - NS ED NURSE REASSESS COMMENT FT1
Patient is seen virtually  Back in Jan 2023  Consent forms and CATHIE's were mailed asking them to be signed and mailed back        As of today, we have not received anything back -- mailing a second time today assumed care of pt at 2:02 from prior RN. charting as noted. rr even and unlabored. denies pain at this  time. denies chest pain. no apparent distress noted. pt educated on plan of care, pt able to successfully teach back plan of care to RN, RN will continue to reeducate pt during hospital stay.

## 2023-03-30 NOTE — PROGRESS NOTE ADULT - NS ATTEND OPT1A GEN_ALL_CORE
History/Exam/Medical decision making
History/Exam/Medical decision making
Medical decision making
Exam/Medical decision making

## 2023-03-30 NOTE — PROGRESS NOTE ADULT - SUBJECTIVE AND OBJECTIVE BOX
Follow up for uncontrolled Diabetes Mellitus. DKA, alcohol abuse    Patient seen and examined  no overnight events   pt refused rehab last night = however discharge home was cancelled 2/2 BS >400  He ate some fast food brought by family per RN     Vital Signs Last 24 Hrs  T(C): 36.9 (30 Mar 2023 09:12), Max: 37.1 (29 Mar 2023 21:52)  T(F): 98.5 (30 Mar 2023 09:12), Max: 98.8 (29 Mar 2023 21:52)  HR: 73 (30 Mar 2023 09:12) (66 - 80)  BP: 111/66 (30 Mar 2023 09:12) (102/64 - 118/70)  BP(mean): --  RR: 18 (30 Mar 2023 09:12) (17 - 18)  SpO2: 98% (30 Mar 2023 09:12) (98% - 98%)    Parameters below as of 30 Mar 2023 09:12  Patient On (Oxygen Delivery Method): room air            CONSTITUTIONAL: NAD, awake alert   ENMT: Moist oral mucosa  RESPIRATORY:  lungs are clear to auscultation bilaterally  CARDIOVASCULAR: Regular rate and rhythm, normal S1 and S2, no murmur  MUSCULOSKELETAL:   no joint swelling or tenderness to palpation  SKIN: Normal color , warm            MEDICATIONS  (STANDING):  apixaban 5 milliGRAM(s) Oral every 12 hours  chlorhexidine 2% Cloths 1 Application(s) Topical daily  dextrose 5%. 1000 milliLiter(s) (50 mL/Hr) IV Continuous <Continuous>  dextrose 5%. 1000 milliLiter(s) (100 mL/Hr) IV Continuous <Continuous>  dextrose 50% Injectable 50 milliLiter(s) IV Push every 15 minutes  folic acid Injectable 1 milliGRAM(s) IV Push daily  glucagon  Injectable 1 milliGRAM(s) IntraMuscular once  insulin glargine Injectable (LANTUS) 15 Unit(s) SubCutaneous at bedtime  insulin lispro (ADMELOG) corrective regimen sliding scale   SubCutaneous three times a day before meals  insulin lispro Injectable (ADMELOG) 11 Unit(s) SubCutaneous before dinner  insulin lispro Injectable (ADMELOG) 6 Unit(s) SubCutaneous before breakfast  insulin lispro Injectable (ADMELOG) 6 Unit(s) SubCutaneous before lunch  levETIRAcetam  IVPB 1000 milliGRAM(s) IV Intermittent every 12 hours  multivitamin 1 Tablet(s) Oral daily  pantoprazole    Tablet 40 milliGRAM(s) Oral before breakfast  QUEtiapine 25 milliGRAM(s) Oral at bedtime  sucralfate 1 Gram(s) Oral four times a day  thiamine Injectable 100 milliGRAM(s) IV Push daily    MEDICATIONS  (PRN):  dextrose Oral Gel 15 Gram(s) Oral once PRN Blood Glucose LESS THAN 70 milliGRAM(s)/deciliter  diazepam    Tablet 5 milliGRAM(s) Oral three times a day PRN anxiety

## 2023-03-30 NOTE — PROGRESS NOTE ADULT - ASSESSMENT
33 y/o M with PMHx T1DM with recurrent DKA, ETOH, seizure disorder, AFib on AC. Patient was admitted to ICU for DKA management, course complicated by alcohol withdrawal Endocrinology follows for DM management. A1C 15.4%.    1. Uncontrolled T1DM, a1c 15.4% - S/p DKA  - Hyperglycemic yesterday, brother brought him in fast fod (cheeseburger, fries)  - Increase admelog to 6 units before breakfast and lunch, 11 units before dinner  - Increase lantus to 15 units qhs  - Mild sliding scale coverage  - PADMINI negative, low c-peptide from previous admission  - Must be discharged on basal bolus regimen    2. ETOH abuse  - On Valium PRN  - MV, Folate, Thiamine

## 2023-03-30 NOTE — PROGRESS NOTE ADULT - PROVIDER SPECIALTY LIST ADULT
Endocrinology
Endocrinology
Hospitalist
Hospitalist
Endocrinology
Hospitalist
MICU
MICU
Endocrinology
Endocrinology
Hospitalist
MICU
Endocrinology
Hospitalist

## 2023-04-17 NOTE — ED ADULT NURSE NOTE - NSFALLRSKASSESSTYPE_ED_ALL_ED
Initial (On Arrival) Mastoid Interpolation Flap Text: A decision was made to reconstruct the defect utilizing an interpolation axial flap and a staged reconstruction.  A telfa template was made of the defect.  This telfa template was then used to outline the mastoid interpolation flap.  The donor area for the pedicle flap was then injected with anesthesia.  The flap was excised through the skin and subcutaneous tissue down to the layer of the underlying musculature.  The pedicle flap was carefully excised within this deep plane to maintain its blood supply.  The edges of the donor site were undermined.   The donor site was closed in a primary fashion.  The pedicle was then rotated into position and sutured.  Once the tube was sutured into place, adequate blood supply was confirmed with blanching and refill.  The pedicle was then wrapped with xeroform gauze and dressed appropriately with a telfa and gauze bandage to ensure continued blood supply and protect the attached pedicle.

## 2023-05-10 NOTE — PROGRESS NOTE ADULT - ASSESSMENT
Patient ID: Selene Escobar is a 85 year old female.  5/16/2023    Chief Complaint   Patient presents with   • Transitional Care Management     4/30/2023 - 5/4/2023 @ [facility] pt went in for doppler and was treated with intravenous antibiotic   • Pre-Op Exam     LEFT LOWER EXTREMITY CARBON DIOXIDE ANGIOGRAM, POSSIBLE ATHERECTOMY, POSSIBLE ANGIOPLASTY, POSSIBLE STENT on 05/30/2023 with  @ [facility]      Here for transitional care appointment as well as pre op appointment. She has a hx of CKD stage IIIb, CAD s/p CABG, HRpEF and PAD with high grade left popliteal artery stenosis. She failed revascularization of the artery in February and presented to [facility] with a left great toe wound and surrounding cellulitis. She was started on antibiotics and seen by cardiology for peripheral intervention, ID, and vascular surgery. Imaging negative for osteomyelitis. Nephrology recommended avoiding all IV contrast given CKD. Vascular surgery recommended outpatient CO2 angiogram of the lower extremity, which will be happening on 5/30/23 at [facility] with Dr. Ethan Srinivasan. She will also need cardiac clearance with Dr. Sim Ferris.    During the hospital stay Jardiace was discontinued due to PAD and Lantus was increased. Blood sugar was 115 last night before bed and 93 this morning. She will see Dr. Celeste Bull on 5/22.    She denies any chest pain/tightness, dyspnea, palpitations. She is able to walk 1-2 blocks without stopping without symptoms. She reports no history of adverse effect to anesthesia. She does not have sleep apnea.                  Selene has a past medical history of Acute renal failure, unspecified acute renal failure type (CMD) (2/27/2023), AMD (age related macular degeneration), CKD (chronic kidney disease), Coronary artery disease, Diabetic neuropathy (CMD), Diabetic retinopathy (CMD), DM2 (diabetes mellitus, type 2) (CMD), Essential (primary) hypertension, GERD (gastroesophageal reflux disease),  Mixed hyperlipidemia, Nuclear sclerotic cataract of both eyes, OAG (open angle glaucoma) suspect, low risk, bilateral, Osteopenia, Skin cancer, and Tailor's bunion.  Selene has S/P CABG (coronary artery bypass graft); Essential hypertension; Elevated cholesterol; Herniated disc; DM (diabetes mellitus) (CMD); Carotid bruit; Atherosclerosis of coronary artery; Type 2 diabetes mellitus with diabetic neuropathy (CMD); Squamous cell carcinoma; Renal mass, right; Peripheral vascular disease (CMD); Pap smear for cervical cancer screening; Breast cancer screening by mammogram; Other hyperlipidemia; Osteoporosis; Osteoarthritis, chronic; Medicare annual wellness visit, subsequent; H/O mammogram; H/O colonoscopy; H/O cardiovascular stress test; H/O bone density study; Coronary artery disease involving coronary bypass graft of native heart with angina pectoris (CMD); Chronic GERD; Bilateral hearing loss; Pre-op evaluation; Basal cell carcinoma (BCC) of skin of face; Hyponatremia; History of 2019 novel coronavirus disease (COVID-19); Demand ischemia (CMD); Visit for screening mammogram; Encounter for screening for osteoporosis; Menopause; Diabetic ulcer of toe of left foot associated with type 2 diabetes mellitus, limited to breakdown of skin (CMD); Vitamin D deficiency; Atrial fibrillation (CMD); Hospital discharge follow-up; Pancreatic cyst; Adnexal cyst; Left foot pain; PAD (peripheral artery disease) (CMD); Cellulitis of left lower extremity; and Stage 3b chronic kidney disease (CMD) on their problem list.  Selene has a past surgical history that includes Cholecystectomy; cabg, arterial, four+; mohs 1 stage upto5 tissue blocks each adl; and Excision basal cell carcinoma (05/20/2022).  Her family history includes Aneurysm in her brother; Atrial Fibrilliation in her sister; Cancer, Liver in her father; Coronary Artery Disease in her brother and sister; Diabetes in her brother, mother, and sister; Heart disease in her mother;  Myocardial Infarction in her sister; Stroke/TIA in her brother, brother, and sister.  Selene reports that she has never smoked. She has never used smokeless tobacco. She reports that she does not currently use alcohol. She reports that she does not use drugs.  Selene has a current medication list which includes the following prescription(s): aspirin, lantus solostar, apixaban, acetaminophen, lidocaine, accu-chek fastclix lancets, famotidine, bd pen needle lala 2nd gen, preservision areds 2+multi vit, cyanocobalamin, turmeric, cranberry, metoprolol tartrate, amlodipine, rosuvastatin, magnesium, insulin syringe-needle u-100, insulin lispro, blood glucose, glucosamine 1500 complex, vitamin d, vitamin c, and coenzyme q10.  Selene   Current Outpatient Medications   Medication Sig Dispense Refill   • aspirin 81 MG chewable tablet Chew 1 tablet by mouth daily. Do not start before May 5, 2023. 30 tablet 1   • insulin glargine (Lantus SoloStar) 100 UNIT/ML pen-injector Inject 50 Units into the skin nightly. 15 mL 12   • apixaBAN (ELIQUIS) 5 MG Tab Take 1 tablet by mouth every 12 hours. 180 tablet 3   • acetaminophen (TYLENOL) 325 MG tablet Take 650 mg by mouth every 6 hours as needed. Do not start before March 10, 2023. for mild pain     • lidocaine (LIDODERM) 5 % patch Place 1 patch onto the skin every 24 hours as needed for Pain. Remove patch 12 hours after applying     • Accu-Chek FastClix Lancets Misc USE THREE TIMES DAILY TO CHECK BLOOD SUGAR     • famotidine (PEPCID) 40 MG tablet Take 40 mg by mouth daily.     • BD Pen Needle Lala 2nd Gen 32G X 4 MM Misc USE THREE TIMES DAILY AS DIRECTED 300 each 3   • Multiple Vitamins-Minerals (PreserVision AREDS 2+Multi Vit) Cap Take 2 tablets by mouth daily.     • cyanocobalamin 1000 MCG tablet Take 1,000 mcg by mouth daily.     • turmeric 500 MG capsule Take 500 mg by mouth in the morning and 500 mg in the evening.     • CRANBERRY EXTRACT PO Take 1 tablet by mouth daily. Takes 1  33 yo M PMH IDDM, seizure disorder, AFib on Eliquis, EtOH abuse who presented on 3/16/23 with abdominal pain admitted with DKA, Severe Dehydration, Severe Alcohol Withdrawal course was complicated by oversedation/bradypnea requiring flumazenil as well as shock. Patient  transferred to medicine on 3/20.     1- insulin Dependent DM with Severe DKA sec to medication non compliance and alcohol abuse   A1C > 15  DKSA resolved S/p IVF, Insulin infusion   endocrinology following  cont to adjust insulin regimen  changed to  admelog 3 units tid , increase lantus to 12 units bedtime   pt needs to go home on basal insulin     2-Alcohol abuse with dependency and severe withdrawal  S/p Phenobarbital   - taper valium to use PRN   - MV, Folate, HD Thiamine x 3 days to prevent Wernicke's  - Social Work Consult    3- AF RVR on presentation and has h/o Afib   Now sinus and normal rate  pt stopped taking eliquis on his own 4mths ago  but now wants to resume. Risks and benefits were discussed - Advised to f.u with EP in office.     4-Unsteady gait  likely from chronic alcohol use  daily pt   optimize home vs if agrees ERIN   LOGAN and  on board     Pt follow up needed     DVT ppx: Lovenox  Dispo: DC home in 1-2 days likely pending daily PT vs LOGAN fr possible rehab      tablet once a day     • metoPROLOL tartrate (LOPRESSOR) 25 MG tablet Take 25 mg by mouth 2 times daily.      • amLODIPine (NORVASC) 10 MG tablet Take 10 mg by mouth daily.      • rosuvastatin (CRESTOR) 5 MG tablet Take 5 mg by mouth daily.     • MAGNESIUM PO Take 400 mg by mouth nightly. 1 tablet once a day     • Insulin Syringe-Needle U-100 31G X 15/64\" 1 ML Misc      • insulin lispro (HumaLOG KwikPen) 100 UNIT/ML pen-injector Inject 18 Units into the skin in the morning and 18 Units at noon and 18 Units in the evening. Inject with meals.     • blood glucose test strip TEST BID     • Glucosamine-Chondroit-Vit C-Mn (GLUCOSAMINE 1500 COMPLEX) Cap Take by mouth daily.      • Cholecalciferol (VITAMIN D) 2000 units capsule Take 50 mcg by mouth daily.      • Ascorbic Acid (VITAMIN C) 1000 MG tablet Take 1,000 mg by mouth daily.     • Coenzyme Q10 10 MG Cap Take 1 capsule by mouth.       No current facility-administered medications for this visit.     Selene is allergic to celecoxib, iodinated diagnostic agents, reglan, and voltaren.    Review of Systems:  Review of Systems   Skin: Positive for color change and wound.   All other systems reviewed and are negative.      Physical:  Visit Vitals  BP (!) 142/68   Pulse 68   Temp 97.9 °F (36.6 °C) (Tympanic)   Resp 18   Wt 68.9 kg (152 lb)   LMP  (LMP Unknown)   SpO2 96%   BMI 26.09 kg/m²     Physical Exam  Vitals and nursing note reviewed.   Constitutional:       General: She is not in acute distress.     Appearance: Normal appearance. She is well-developed and well-groomed. She is not ill-appearing.   HENT:      Head: Normocephalic and atraumatic.      Right Ear: Tympanic membrane, ear canal and external ear normal.      Left Ear: Tympanic membrane, ear canal and external ear normal.      Nose: Nose normal.      Mouth/Throat:      Lips: Pink.      Mouth: Mucous membranes are moist.      Pharynx: Oropharynx is clear. Uvula midline.      Tonsils: No tonsillar exudate.       Neck: Full passive range of motion without pain, normal range of motion and neck supple.   Eyes:      General: Lids are normal.      Extraocular Movements:      Right eye: Normal extraocular motion.      Left eye: Normal extraocular motion.      Conjunctiva/sclera: Conjunctivae normal.      Pupils: Pupils are equal, round, and reactive to light.   Neck:      Thyroid: No thyroid mass or thyromegaly.      Trachea: Trachea normal.   Cardiovascular:      Rate and Rhythm: Normal rate and regular rhythm.      Pulses: Normal pulses.      Heart sounds: Normal heart sounds, S1 normal and S2 normal. No murmur heard.  Pulmonary:      Effort: Pulmonary effort is normal.      Breath sounds: Normal breath sounds and air entry.   Abdominal:      General: Abdomen is flat. Bowel sounds are normal.      Palpations: Abdomen is soft.      Tenderness: There is no abdominal tenderness.      Hernia: No hernia is present.   Musculoskeletal:         General: Normal range of motion.      Right lower leg: No edema.      Left lower leg: No edema.   Feet:      Comments: left great toe wound is a dry eschar, foot is cool to the touch, but reddened due to PAD. Redness extends to mid foot.  Lymphadenopathy:      Cervical: No cervical adenopathy.   Skin:     General: Skin is warm and dry.      Findings: No rash.   Neurological:      General: No focal deficit present.      Mental Status: She is alert and oriented to person, place, and time. Mental status is at baseline.      Cranial Nerves: No cranial nerve deficit.      Sensory: No sensory deficit.      Deep Tendon Reflexes: Reflexes are normal and symmetric.   Psychiatric:         Attention and Perception: Attention normal.         Mood and Affect: Mood normal.         Speech: Speech normal.         Behavior: Behavior normal. Behavior is cooperative.         Thought Content: Thought content normal.         Judgment: Judgment normal.           Assessment and Plan  Problem List Items Addressed This  Visit        Cardiac and Vasculature    PAD (peripheral artery disease) (CMD) - Primary     Continue eliquis and asa  Hold eliquis 48 hours before procedure  Agree with planned revascularization on 5/30/23 with Dr. Srinivasan  Pt is a moderate risk for surgery but medically optimized            Endocrine and Metabolic    DM (diabetes mellitus) (CMD)     Will see endo Dr. Celeste woodard next week  Blood sugars controlled at home  Continue lantus nightly and aspart with meals            Genitourinary and Reproductive    Stage 3b chronic kidney disease (CMD)     Seeing nephrology  Avoid contrast and nephrotoxic agents              Health Encounters    Pre-op evaluation     Pt cleared by cardiologist, Dr. Sim Ferris  Agree with planned revascularization on 5/30/23 with Dr. Srinivasan  Pt is a moderate risk for surgery but medically optimized  Hold eliquis 48 hours before surgery  Take metoprolol and amlodipine the morning of surgery             Hospital discharge follow-up       Infectious Diseases    Cellulitis of left lower extremity     She completed Augmentin last Saturday  Foot is red, but not worse than baseline per pt  Advised her to see her podiatrist early next week   If worsening redness pain or fever she needs to go to the ER                Kalina Quintero, CNP

## 2023-05-11 ENCOUNTER — INPATIENT (INPATIENT)
Facility: HOSPITAL | Age: 33
LOS: 2 days | Discharge: HOME | DRG: 638 | End: 2023-05-14
Attending: GENERAL ACUTE CARE HOSPITAL | Admitting: HOSPITALIST
Payer: COMMERCIAL

## 2023-05-11 VITALS
HEART RATE: 137 BPM | HEIGHT: 67 IN | RESPIRATION RATE: 30 BRPM | SYSTOLIC BLOOD PRESSURE: 155 MMHG | TEMPERATURE: 94 F | OXYGEN SATURATION: 100 % | DIASTOLIC BLOOD PRESSURE: 109 MMHG | WEIGHT: 179.9 LBS

## 2023-05-11 DIAGNOSIS — E11.10 TYPE 2 DIABETES MELLITUS WITH KETOACIDOSIS WITHOUT COMA: ICD-10-CM

## 2023-05-11 LAB
ACETONE SERPL-MCNC: ABNORMAL
ALBUMIN SERPL ELPH-MCNC: 4.4 G/DL — SIGNIFICANT CHANGE UP (ref 3.3–5.2)
ALBUMIN SERPL ELPH-MCNC: 4.8 G/DL — SIGNIFICANT CHANGE UP (ref 3.3–5.2)
ALP SERPL-CCNC: 141 U/L — HIGH (ref 40–120)
ALP SERPL-CCNC: 172 U/L — HIGH (ref 40–120)
ALT FLD-CCNC: 8 U/L — SIGNIFICANT CHANGE UP
ALT FLD-CCNC: 8 U/L — SIGNIFICANT CHANGE UP
AMMONIA BLD-MCNC: 26 UMOL/L — SIGNIFICANT CHANGE UP (ref 11–55)
AMPHET UR-MCNC: NEGATIVE — SIGNIFICANT CHANGE UP
ANION GAP SERPL CALC-SCNC: 13 MMOL/L — SIGNIFICANT CHANGE UP (ref 5–17)
ANION GAP SERPL CALC-SCNC: 16 MMOL/L — SIGNIFICANT CHANGE UP (ref 5–17)
ANION GAP SERPL CALC-SCNC: 26 MMOL/L — HIGH (ref 5–17)
ANION GAP SERPL CALC-SCNC: SIGNIFICANT CHANGE UP MMOL/L (ref 5–17)
ANION GAP SERPL CALC-SCNC: SIGNIFICANT CHANGE UP MMOL/L (ref 5–17)
APAP SERPL-MCNC: <3 UG/ML — LOW (ref 10–26)
APPEARANCE UR: ABNORMAL
AST SERPL-CCNC: 9 U/L — SIGNIFICANT CHANGE UP
AST SERPL-CCNC: 9 U/L — SIGNIFICANT CHANGE UP
BACTERIA # UR AUTO: ABNORMAL
BARBITURATES UR SCN-MCNC: NEGATIVE — SIGNIFICANT CHANGE UP
BASOPHILS # BLD AUTO: 0 K/UL — SIGNIFICANT CHANGE UP (ref 0–0.2)
BASOPHILS # BLD AUTO: 0.03 K/UL — SIGNIFICANT CHANGE UP (ref 0–0.2)
BASOPHILS NFR BLD AUTO: 0 % — SIGNIFICANT CHANGE UP (ref 0–2)
BASOPHILS NFR BLD AUTO: 0.2 % — SIGNIFICANT CHANGE UP (ref 0–2)
BENZODIAZ UR-MCNC: NEGATIVE — SIGNIFICANT CHANGE UP
BILIRUB SERPL-MCNC: 0.4 MG/DL — SIGNIFICANT CHANGE UP (ref 0.4–2)
BILIRUB SERPL-MCNC: 0.6 MG/DL — SIGNIFICANT CHANGE UP (ref 0.4–2)
BILIRUB UR-MCNC: NEGATIVE — SIGNIFICANT CHANGE UP
BUN SERPL-MCNC: 11.3 MG/DL — SIGNIFICANT CHANGE UP (ref 8–20)
BUN SERPL-MCNC: 5.1 MG/DL — LOW (ref 8–20)
BUN SERPL-MCNC: 5.9 MG/DL — LOW (ref 8–20)
BUN SERPL-MCNC: 8.2 MG/DL — SIGNIFICANT CHANGE UP (ref 8–20)
BUN SERPL-MCNC: 9.5 MG/DL — SIGNIFICANT CHANGE UP (ref 8–20)
CA-I SERPL-SCNC: 1.33 MMOL/L — SIGNIFICANT CHANGE UP (ref 1.15–1.33)
CALCIUM SERPL-MCNC: 8.1 MG/DL — LOW (ref 8.4–10.5)
CALCIUM SERPL-MCNC: 8.2 MG/DL — LOW (ref 8.4–10.5)
CALCIUM SERPL-MCNC: 8.3 MG/DL — LOW (ref 8.4–10.5)
CALCIUM SERPL-MCNC: 8.4 MG/DL — SIGNIFICANT CHANGE UP (ref 8.4–10.5)
CALCIUM SERPL-MCNC: 9.3 MG/DL — SIGNIFICANT CHANGE UP (ref 8.4–10.5)
CHLORIDE BLDV-SCNC: 102 MMOL/L — SIGNIFICANT CHANGE UP (ref 96–108)
CHLORIDE SERPL-SCNC: 101 MMOL/L — SIGNIFICANT CHANGE UP (ref 96–108)
CHLORIDE SERPL-SCNC: 104 MMOL/L — SIGNIFICANT CHANGE UP (ref 96–108)
CHLORIDE SERPL-SCNC: 107 MMOL/L — SIGNIFICANT CHANGE UP (ref 96–108)
CHLORIDE SERPL-SCNC: 108 MMOL/L — SIGNIFICANT CHANGE UP (ref 96–108)
CHLORIDE SERPL-SCNC: 93 MMOL/L — LOW (ref 96–108)
CK SERPL-CCNC: 48 U/L — SIGNIFICANT CHANGE UP (ref 30–200)
CO2 SERPL-SCNC: 12 MMOL/L — LOW (ref 22–29)
CO2 SERPL-SCNC: 13 MMOL/L — LOW (ref 22–29)
CO2 SERPL-SCNC: 7 MMOL/L — CRITICAL LOW (ref 22–29)
CO2 SERPL-SCNC: <6 MMOL/L — CRITICAL LOW (ref 22–29)
CO2 SERPL-SCNC: <6 MMOL/L — CRITICAL LOW (ref 22–29)
COCAINE METAB.OTHER UR-MCNC: NEGATIVE — SIGNIFICANT CHANGE UP
COLOR SPEC: YELLOW — SIGNIFICANT CHANGE UP
CREAT SERPL-MCNC: 0.58 MG/DL — SIGNIFICANT CHANGE UP (ref 0.5–1.3)
CREAT SERPL-MCNC: 0.74 MG/DL — SIGNIFICANT CHANGE UP (ref 0.5–1.3)
CREAT SERPL-MCNC: 0.92 MG/DL — SIGNIFICANT CHANGE UP (ref 0.5–1.3)
CREAT SERPL-MCNC: 1.09 MG/DL — SIGNIFICANT CHANGE UP (ref 0.5–1.3)
CREAT SERPL-MCNC: 1.38 MG/DL — HIGH (ref 0.5–1.3)
DIFF PNL FLD: ABNORMAL
EGFR: 113 ML/MIN/1.73M2 — SIGNIFICANT CHANGE UP
EGFR: 123 ML/MIN/1.73M2 — SIGNIFICANT CHANGE UP
EGFR: 132 ML/MIN/1.73M2 — SIGNIFICANT CHANGE UP
EGFR: 69 ML/MIN/1.73M2 — SIGNIFICANT CHANGE UP
EGFR: 92 ML/MIN/1.73M2 — SIGNIFICANT CHANGE UP
EOSINOPHIL # BLD AUTO: 0 K/UL — SIGNIFICANT CHANGE UP (ref 0–0.5)
EOSINOPHIL # BLD AUTO: 0 K/UL — SIGNIFICANT CHANGE UP (ref 0–0.5)
EOSINOPHIL NFR BLD AUTO: 0 % — SIGNIFICANT CHANGE UP (ref 0–6)
EOSINOPHIL NFR BLD AUTO: 0 % — SIGNIFICANT CHANGE UP (ref 0–6)
EPI CELLS # UR: SIGNIFICANT CHANGE UP
ETHANOL SERPL-MCNC: <10 MG/DL — SIGNIFICANT CHANGE UP (ref 0–9)
GAS PNL BLDV: 130 MMOL/L — LOW (ref 136–145)
GAS PNL BLDV: SIGNIFICANT CHANGE UP
GIANT PLATELETS BLD QL SMEAR: PRESENT — SIGNIFICANT CHANGE UP
GLUCOSE BLDC GLUCOMTR-MCNC: 160 MG/DL — HIGH (ref 70–99)
GLUCOSE BLDC GLUCOMTR-MCNC: 169 MG/DL — HIGH (ref 70–99)
GLUCOSE BLDC GLUCOMTR-MCNC: 183 MG/DL — HIGH (ref 70–99)
GLUCOSE BLDC GLUCOMTR-MCNC: 186 MG/DL — HIGH (ref 70–99)
GLUCOSE BLDC GLUCOMTR-MCNC: 187 MG/DL — HIGH (ref 70–99)
GLUCOSE BLDC GLUCOMTR-MCNC: 191 MG/DL — HIGH (ref 70–99)
GLUCOSE BLDC GLUCOMTR-MCNC: 195 MG/DL — HIGH (ref 70–99)
GLUCOSE BLDC GLUCOMTR-MCNC: 206 MG/DL — HIGH (ref 70–99)
GLUCOSE BLDC GLUCOMTR-MCNC: 208 MG/DL — HIGH (ref 70–99)
GLUCOSE BLDC GLUCOMTR-MCNC: 211 MG/DL — HIGH (ref 70–99)
GLUCOSE BLDC GLUCOMTR-MCNC: 214 MG/DL — HIGH (ref 70–99)
GLUCOSE BLDC GLUCOMTR-MCNC: 229 MG/DL — HIGH (ref 70–99)
GLUCOSE BLDC GLUCOMTR-MCNC: 293 MG/DL — HIGH (ref 70–99)
GLUCOSE BLDC GLUCOMTR-MCNC: 496 MG/DL — CRITICAL HIGH (ref 70–99)
GLUCOSE BLDC GLUCOMTR-MCNC: >530 MG/DL — CRITICAL HIGH (ref 70–99)
GLUCOSE BLDC GLUCOMTR-MCNC: >530 MG/DL — CRITICAL HIGH (ref 70–99)
GLUCOSE BLDV-MCNC: >656 MG/DL — CRITICAL HIGH (ref 70–99)
GLUCOSE SERPL-MCNC: 194 MG/DL — HIGH (ref 70–99)
GLUCOSE SERPL-MCNC: 204 MG/DL — HIGH (ref 70–99)
GLUCOSE SERPL-MCNC: 205 MG/DL — HIGH (ref 70–99)
GLUCOSE SERPL-MCNC: 494 MG/DL — CRITICAL HIGH (ref 70–99)
GLUCOSE SERPL-MCNC: 704 MG/DL — CRITICAL HIGH (ref 70–99)
GLUCOSE UR QL: 1000 MG/DL
HCT VFR BLD CALC: 36.3 % — LOW (ref 39–50)
HCT VFR BLD CALC: 44.1 % — SIGNIFICANT CHANGE UP (ref 39–50)
HCT VFR BLDA CALC: 44 % — SIGNIFICANT CHANGE UP
HGB BLD CALC-MCNC: 14.5 G/DL — SIGNIFICANT CHANGE UP (ref 12.6–17.4)
HGB BLD-MCNC: 12.4 G/DL — LOW (ref 13–17)
HGB BLD-MCNC: 14.2 G/DL — SIGNIFICANT CHANGE UP (ref 13–17)
IMM GRANULOCYTES NFR BLD AUTO: 1.2 % — HIGH (ref 0–0.9)
KETONES UR-MCNC: ABNORMAL
LACTATE BLDV-MCNC: 2.7 MMOL/L — HIGH (ref 0.5–2)
LACTATE BLDV-MCNC: 4.2 MMOL/L — CRITICAL HIGH (ref 0.5–2)
LACTATE SERPL-SCNC: 3.5 MMOL/L — HIGH (ref 0.5–2)
LEUKOCYTE ESTERASE UR-ACNC: NEGATIVE — SIGNIFICANT CHANGE UP
LYMPHOCYTES # BLD AUTO: 1.23 K/UL — SIGNIFICANT CHANGE UP (ref 1–3.3)
LYMPHOCYTES # BLD AUTO: 1.24 K/UL — SIGNIFICANT CHANGE UP (ref 1–3.3)
LYMPHOCYTES # BLD AUTO: 6.2 % — LOW (ref 13–44)
LYMPHOCYTES # BLD AUTO: 7.8 % — LOW (ref 13–44)
MAGNESIUM SERPL-MCNC: 1.7 MG/DL — SIGNIFICANT CHANGE UP (ref 1.6–2.6)
MAGNESIUM SERPL-MCNC: 1.9 MG/DL — SIGNIFICANT CHANGE UP (ref 1.6–2.6)
MANUAL SMEAR VERIFICATION: SIGNIFICANT CHANGE UP
MCHC RBC-ENTMCNC: 28.7 PG — SIGNIFICANT CHANGE UP (ref 27–34)
MCHC RBC-ENTMCNC: 29 PG — SIGNIFICANT CHANGE UP (ref 27–34)
MCHC RBC-ENTMCNC: 32.2 GM/DL — SIGNIFICANT CHANGE UP (ref 32–36)
MCHC RBC-ENTMCNC: 34.2 GM/DL — SIGNIFICANT CHANGE UP (ref 32–36)
MCV RBC AUTO: 85 FL — SIGNIFICANT CHANGE UP (ref 80–100)
MCV RBC AUTO: 89.1 FL — SIGNIFICANT CHANGE UP (ref 80–100)
METHADONE UR-MCNC: NEGATIVE — SIGNIFICANT CHANGE UP
MONOCYTES # BLD AUTO: 0.88 K/UL — SIGNIFICANT CHANGE UP (ref 0–0.9)
MONOCYTES # BLD AUTO: 1.24 K/UL — HIGH (ref 0–0.9)
MONOCYTES NFR BLD AUTO: 4.4 % — SIGNIFICANT CHANGE UP (ref 2–14)
MONOCYTES NFR BLD AUTO: 7.9 % — SIGNIFICANT CHANGE UP (ref 2–14)
NEUTROPHILS # BLD AUTO: 13.06 K/UL — HIGH (ref 1.8–7.4)
NEUTROPHILS # BLD AUTO: 17.73 K/UL — HIGH (ref 1.8–7.4)
NEUTROPHILS NFR BLD AUTO: 82.9 % — HIGH (ref 43–77)
NEUTROPHILS NFR BLD AUTO: 88.5 % — HIGH (ref 43–77)
NITRITE UR-MCNC: NEGATIVE — SIGNIFICANT CHANGE UP
OPIATES UR-MCNC: NEGATIVE — SIGNIFICANT CHANGE UP
PCO2 BLDV: 35 MMHG — LOW (ref 42–55)
PCP SPEC-MCNC: SIGNIFICANT CHANGE UP
PCP UR-MCNC: NEGATIVE — SIGNIFICANT CHANGE UP
PH BLDV: <6.942 — CRITICAL LOW (ref 7.32–7.43)
PH UR: 5 — SIGNIFICANT CHANGE UP (ref 5–8)
PHOSPHATE SERPL-MCNC: 1.5 MG/DL — LOW (ref 2.4–4.7)
PHOSPHATE SERPL-MCNC: 1.6 MG/DL — LOW (ref 2.4–4.7)
PLAT MORPH BLD: NORMAL — SIGNIFICANT CHANGE UP
PLATELET # BLD AUTO: 247 K/UL — SIGNIFICANT CHANGE UP (ref 150–400)
PLATELET # BLD AUTO: 372 K/UL — SIGNIFICANT CHANGE UP (ref 150–400)
PO2 BLDV: <42 MMHG — SIGNIFICANT CHANGE UP (ref 25–45)
POTASSIUM BLDV-SCNC: 4.4 MMOL/L — SIGNIFICANT CHANGE UP (ref 3.5–5.1)
POTASSIUM SERPL-MCNC: 3.6 MMOL/L — SIGNIFICANT CHANGE UP (ref 3.5–5.3)
POTASSIUM SERPL-MCNC: 3.8 MMOL/L — SIGNIFICANT CHANGE UP (ref 3.5–5.3)
POTASSIUM SERPL-MCNC: 4 MMOL/L — SIGNIFICANT CHANGE UP (ref 3.5–5.3)
POTASSIUM SERPL-MCNC: 4.2 MMOL/L — SIGNIFICANT CHANGE UP (ref 3.5–5.3)
POTASSIUM SERPL-MCNC: 4.7 MMOL/L — SIGNIFICANT CHANGE UP (ref 3.5–5.3)
POTASSIUM SERPL-SCNC: 3.6 MMOL/L — SIGNIFICANT CHANGE UP (ref 3.5–5.3)
POTASSIUM SERPL-SCNC: 3.8 MMOL/L — SIGNIFICANT CHANGE UP (ref 3.5–5.3)
POTASSIUM SERPL-SCNC: 4 MMOL/L — SIGNIFICANT CHANGE UP (ref 3.5–5.3)
POTASSIUM SERPL-SCNC: 4.2 MMOL/L — SIGNIFICANT CHANGE UP (ref 3.5–5.3)
POTASSIUM SERPL-SCNC: 4.7 MMOL/L — SIGNIFICANT CHANGE UP (ref 3.5–5.3)
PROT SERPL-MCNC: 6.9 G/DL — SIGNIFICANT CHANGE UP (ref 6.6–8.7)
PROT SERPL-MCNC: 7.9 G/DL — SIGNIFICANT CHANGE UP (ref 6.6–8.7)
PROT UR-MCNC: 30 MG/DL
RBC # BLD: 4.27 M/UL — SIGNIFICANT CHANGE UP (ref 4.2–5.8)
RBC # BLD: 4.95 M/UL — SIGNIFICANT CHANGE UP (ref 4.2–5.8)
RBC # FLD: 15.2 % — HIGH (ref 10.3–14.5)
RBC # FLD: 15.5 % — HIGH (ref 10.3–14.5)
RBC BLD AUTO: NORMAL — SIGNIFICANT CHANGE UP
RBC CASTS # UR COMP ASSIST: SIGNIFICANT CHANGE UP /HPF (ref 0–4)
SALICYLATES SERPL-MCNC: <0.6 MG/DL — LOW (ref 10–20)
SAO2 % BLDV: 65.5 % — SIGNIFICANT CHANGE UP
SODIUM SERPL-SCNC: 130 MMOL/L — LOW (ref 135–145)
SODIUM SERPL-SCNC: 134 MMOL/L — LOW (ref 135–145)
SODIUM SERPL-SCNC: 137 MMOL/L — SIGNIFICANT CHANGE UP (ref 135–145)
SP GR SPEC: 1.02 — SIGNIFICANT CHANGE UP (ref 1.01–1.02)
THC UR QL: NEGATIVE — SIGNIFICANT CHANGE UP
UROBILINOGEN FLD QL: NEGATIVE MG/DL — SIGNIFICANT CHANGE UP
VARIANT LYMPHS # BLD: 0.9 % — SIGNIFICANT CHANGE UP (ref 0–6)
WBC # BLD: 15.75 K/UL — HIGH (ref 3.8–10.5)
WBC # BLD: 20.03 K/UL — HIGH (ref 3.8–10.5)
WBC # FLD AUTO: 15.75 K/UL — HIGH (ref 3.8–10.5)
WBC # FLD AUTO: 20.03 K/UL — HIGH (ref 3.8–10.5)
WBC UR QL: SIGNIFICANT CHANGE UP /HPF (ref 0–5)

## 2023-05-11 PROCEDURE — 99291 CRITICAL CARE FIRST HOUR: CPT

## 2023-05-11 PROCEDURE — 71045 X-RAY EXAM CHEST 1 VIEW: CPT | Mod: 26

## 2023-05-11 RX ORDER — INSULIN HUMAN 100 [IU]/ML
8 INJECTION, SOLUTION SUBCUTANEOUS
Qty: 100 | Refills: 0 | Status: DISCONTINUED | OUTPATIENT
Start: 2023-05-11 | End: 2023-05-12

## 2023-05-11 RX ORDER — INSULIN GLARGINE 100 [IU]/ML
15 INJECTION, SOLUTION SUBCUTANEOUS AT BEDTIME
Refills: 0 | Status: DISCONTINUED | OUTPATIENT
Start: 2023-05-11 | End: 2023-05-14

## 2023-05-11 RX ORDER — CHLORHEXIDINE GLUCONATE 213 G/1000ML
1 SOLUTION TOPICAL
Refills: 0 | Status: DISCONTINUED | OUTPATIENT
Start: 2023-05-11 | End: 2023-05-12

## 2023-05-11 RX ORDER — SODIUM CHLORIDE 9 MG/ML
1000 INJECTION, SOLUTION INTRAVENOUS ONCE
Refills: 0 | Status: COMPLETED | OUTPATIENT
Start: 2023-05-11 | End: 2023-05-11

## 2023-05-11 RX ORDER — SODIUM BICARBONATE 1 MEQ/ML
0.18 SYRINGE (ML) INTRAVENOUS
Qty: 150 | Refills: 0 | Status: DISCONTINUED | OUTPATIENT
Start: 2023-05-11 | End: 2023-05-11

## 2023-05-11 RX ORDER — SODIUM,POTASSIUM PHOSPHATES 278-250MG
1 POWDER IN PACKET (EA) ORAL ONCE
Refills: 0 | Status: COMPLETED | OUTPATIENT
Start: 2023-05-11 | End: 2023-05-11

## 2023-05-11 RX ORDER — POTASSIUM CHLORIDE 20 MEQ
10 PACKET (EA) ORAL
Refills: 0 | Status: COMPLETED | OUTPATIENT
Start: 2023-05-11 | End: 2023-05-12

## 2023-05-11 RX ORDER — DEXTROSE 50 % IN WATER 50 %
25 SYRINGE (ML) INTRAVENOUS ONCE
Refills: 0 | Status: DISCONTINUED | OUTPATIENT
Start: 2023-05-11 | End: 2023-05-14

## 2023-05-11 RX ORDER — SODIUM CHLORIDE 9 MG/ML
1000 INJECTION, SOLUTION INTRAVENOUS
Refills: 0 | Status: DISCONTINUED | OUTPATIENT
Start: 2023-05-11 | End: 2023-05-12

## 2023-05-11 RX ORDER — INSULIN LISPRO 100/ML
5 VIAL (ML) SUBCUTANEOUS
Refills: 0 | Status: DISCONTINUED | OUTPATIENT
Start: 2023-05-11 | End: 2023-05-14

## 2023-05-11 RX ORDER — GLUCAGON INJECTION, SOLUTION 0.5 MG/.1ML
1 INJECTION, SOLUTION SUBCUTANEOUS ONCE
Refills: 0 | Status: DISCONTINUED | OUTPATIENT
Start: 2023-05-11 | End: 2023-05-14

## 2023-05-11 RX ORDER — DEXTROSE 50 % IN WATER 50 %
15 SYRINGE (ML) INTRAVENOUS ONCE
Refills: 0 | Status: DISCONTINUED | OUTPATIENT
Start: 2023-05-11 | End: 2023-05-14

## 2023-05-11 RX ORDER — SODIUM CHLORIDE 9 MG/ML
1000 INJECTION, SOLUTION INTRAVENOUS
Refills: 0 | Status: DISCONTINUED | OUTPATIENT
Start: 2023-05-11 | End: 2023-05-14

## 2023-05-11 RX ORDER — SODIUM CHLORIDE 9 MG/ML
2000 INJECTION INTRAMUSCULAR; INTRAVENOUS; SUBCUTANEOUS ONCE
Refills: 0 | Status: COMPLETED | OUTPATIENT
Start: 2023-05-11 | End: 2023-05-11

## 2023-05-11 RX ORDER — THIAMINE MONONITRATE (VIT B1) 100 MG
100 TABLET ORAL DAILY
Refills: 0 | Status: DISCONTINUED | OUTPATIENT
Start: 2023-05-11 | End: 2023-05-14

## 2023-05-11 RX ORDER — FOLIC ACID 0.8 MG
1 TABLET ORAL DAILY
Refills: 0 | Status: DISCONTINUED | OUTPATIENT
Start: 2023-05-11 | End: 2023-05-14

## 2023-05-11 RX ORDER — POTASSIUM CHLORIDE 20 MEQ
40 PACKET (EA) ORAL ONCE
Refills: 0 | Status: COMPLETED | OUTPATIENT
Start: 2023-05-11 | End: 2023-05-11

## 2023-05-11 RX ORDER — CHLORHEXIDINE GLUCONATE 213 G/1000ML
1 SOLUTION TOPICAL DAILY
Refills: 0 | Status: DISCONTINUED | OUTPATIENT
Start: 2023-05-11 | End: 2023-05-12

## 2023-05-11 RX ORDER — DEXTROSE 50 % IN WATER 50 %
12.5 SYRINGE (ML) INTRAVENOUS ONCE
Refills: 0 | Status: DISCONTINUED | OUTPATIENT
Start: 2023-05-11 | End: 2023-05-14

## 2023-05-11 RX ORDER — LEVETIRACETAM 250 MG/1
1000 TABLET, FILM COATED ORAL
Refills: 0 | Status: DISCONTINUED | OUTPATIENT
Start: 2023-05-11 | End: 2023-05-14

## 2023-05-11 RX ORDER — SODIUM CHLORIDE 9 MG/ML
1000 INJECTION, SOLUTION INTRAVENOUS
Refills: 0 | Status: DISCONTINUED | OUTPATIENT
Start: 2023-05-11 | End: 2023-05-11

## 2023-05-11 RX ORDER — QUETIAPINE FUMARATE 200 MG/1
25 TABLET, FILM COATED ORAL AT BEDTIME
Refills: 0 | Status: DISCONTINUED | OUTPATIENT
Start: 2023-05-11 | End: 2023-05-14

## 2023-05-11 RX ORDER — POTASSIUM PHOSPHATE, MONOBASIC POTASSIUM PHOSPHATE, DIBASIC 236; 224 MG/ML; MG/ML
30 INJECTION, SOLUTION INTRAVENOUS ONCE
Refills: 0 | Status: COMPLETED | OUTPATIENT
Start: 2023-05-11 | End: 2023-05-11

## 2023-05-11 RX ORDER — APIXABAN 2.5 MG/1
5 TABLET, FILM COATED ORAL EVERY 12 HOURS
Refills: 0 | Status: DISCONTINUED | OUTPATIENT
Start: 2023-05-11 | End: 2023-05-14

## 2023-05-11 RX ADMIN — QUETIAPINE FUMARATE 25 MILLIGRAM(S): 200 TABLET, FILM COATED ORAL at 23:02

## 2023-05-11 RX ADMIN — Medication 100 MILLIEQUIVALENT(S): at 23:11

## 2023-05-11 RX ADMIN — Medication 40 MILLIEQUIVALENT(S): at 07:54

## 2023-05-11 RX ADMIN — SODIUM CHLORIDE 2000 MILLILITER(S): 9 INJECTION INTRAMUSCULAR; INTRAVENOUS; SUBCUTANEOUS at 05:50

## 2023-05-11 RX ADMIN — LEVETIRACETAM 1000 MILLIGRAM(S): 250 TABLET, FILM COATED ORAL at 17:38

## 2023-05-11 RX ADMIN — INSULIN GLARGINE 15 UNIT(S): 100 INJECTION, SOLUTION SUBCUTANEOUS at 23:11

## 2023-05-11 RX ADMIN — Medication 100 MEQ/KG/HR: at 06:56

## 2023-05-11 RX ADMIN — Medication 1 TABLET(S): at 19:22

## 2023-05-11 RX ADMIN — APIXABAN 5 MILLIGRAM(S): 2.5 TABLET, FILM COATED ORAL at 17:38

## 2023-05-11 RX ADMIN — INSULIN HUMAN 8 UNIT(S)/HR: 100 INJECTION, SOLUTION SUBCUTANEOUS at 06:56

## 2023-05-11 RX ADMIN — POTASSIUM PHOSPHATE, MONOBASIC POTASSIUM PHOSPHATE, DIBASIC 83.33 MILLIMOLE(S): 236; 224 INJECTION, SOLUTION INTRAVENOUS at 14:55

## 2023-05-11 RX ADMIN — SODIUM CHLORIDE 150 MILLILITER(S): 9 INJECTION, SOLUTION INTRAVENOUS at 14:56

## 2023-05-11 RX ADMIN — SODIUM CHLORIDE 2000 MILLILITER(S): 9 INJECTION INTRAMUSCULAR; INTRAVENOUS; SUBCUTANEOUS at 07:50

## 2023-05-11 RX ADMIN — SODIUM CHLORIDE 1000 MILLILITER(S): 9 INJECTION, SOLUTION INTRAVENOUS at 10:31

## 2023-05-11 NOTE — H&P ADULT - NSHPSOCIALHISTORY_GEN_ALL_CORE
Lives at home with family. Works in the [employer] business on [address]. Has not traveled out of [place] or out of [facility] recently. Does not report any sexual relationship.

## 2023-05-11 NOTE — ED PROVIDER NOTE - CLINICAL SUMMARY MEDICAL DECISION MAKING FREE TEXT BOX
patient with known history of DKA presenting with polyuria polydipsia tachypnea concerning for recurrent DKA.  Plan for cardiac monitor, IV fluids, labs.  We will hold off on insulin drip until we obtain potassium results.  Likely dispo to MICU.

## 2023-05-11 NOTE — ED PROVIDER NOTE - DIFFERENTIAL DIAGNOSIS
PAST MEDICAL HISTORY:  Alcohol abuse     Anxiety     Depression     Overdose heroine and xanax 3/10/2015       hyperglycemia versus DKA Differential Diagnosis

## 2023-05-11 NOTE — ED PROVIDER NOTE - PHYSICAL EXAMINATION
Gen:  ill-appearing thin male in mild distress  Head: NC/AT  Neck: trachea midline  Resp:   tachypneic, CTAB  CV:  tachycardic, regular rhythm  GI: soft, NTND  Ext: no deformities, no calf ttp, no LE edema  Neuro:  A&O appears non focal  Skin:  Warm and dry as visualized  Psych:  Normal affect and mood

## 2023-05-11 NOTE — ED PROVIDER NOTE - OBJECTIVE STATEMENT
32-year-old male history of alcohol abuse, cardiomyopathy, pancreatitis, insulin-dependent diabetes presenting with polyuria, polydipsia, tachypnea, dizziness over the last 2 days.  Has been using his insulin intermittently.  Triage fingerstick above 530.  Denies fever, vomiting, diarrhea, recent alcohol use.

## 2023-05-11 NOTE — ED ADULT TRIAGE NOTE - CHIEF COMPLAINT QUOTE
Pt c/o L sided chest pain with difficulty breathing since yesterday; also reported abdominal pain and vomiting. Tachy , tachypneic RR 30s on arrival. EKG done. PMH: DM. Capillary glucose of HI during triage.

## 2023-05-11 NOTE — H&P ADULT - ASSESSMENT
Patient is a 33 yo male with PMHx of IDDMII, HTN, seizures, and Afib presenting to the ED with trouble breathing in the setting of DKA.    DKA  Hyperglycemia  Poorly controlled IDDM  Insulin non-compliance  - Insulin infusion per DKA protocol  - Aggressive IVF resuscitation - LR @ 200cc/hr if FSG >250, and D5LR @ 200cc/hr if FSG <250  - BMP Q4H  - Serial VBG to trend pH  - Aggressively replete electrolytes  - If K <3.3, hold insulin  - Goal of lytes to be K>4, Phos>3, and Mg>2    Seizure disorder  - Will check Keppra level    AFib  - Continue Eliquis    NPO except meds  PIV  GI PPx - Protonix  DVT PPx - on Eliquis  Admit to ICU  FULL code   32M pmhx IDDM2, HTN, Seizures, afib inconsistently on eliquis, etoh abuse (now sober) now admitted with:     DKA   Hyperglycemia   Pseudohyponatremia   HAGMA  Lactic acidemia     - unclear trigger, reports compliance with meds, utox neg, no clear source of infection (leukocytosis but afebrile thusfar); CXR neg, UA neg, RVP neg - will monitor off abx for now. bcx, ucx sent  - Started on insulin infusion  - Also started on bicarb gtt in the ED, will dc for now, may require pushed of bicarb if levels remain persistently low  - s/p 3L crystalloid (2L NS, then 1L plasmalyte). Added D5NS, now switched to D5LR @150cc/hr for aggressive hydration   - Serial BMP q6h with aggressive repletion of lytes, goal K >4 phos >3 Mg >2. will hold insulin for K <3.2   - continue keppra, will check level in am   - continue eliquis   - asking for food, not nauseous, will ADAT     Dispo: admit to ICU    Case d/w Dr. Renee, ICU attending

## 2023-05-11 NOTE — H&P ADULT - SOCIAL HISTORY: ALCOHOL USE
Typically drinks four 24 ounce cans of alcohol per day. Patient states that last drink was 2 weeks ago.

## 2023-05-11 NOTE — H&P ADULT - NS PANP COMMENT GEN_ALL_CORE FT
32-year-old  male with past medical history of insulin-dependent diabetes mellitus hypertension seizure A-fib on Eliquis alcohol use disorder with history of DTs and electrolyte imbalance presented with nausea vomiting  epigastric pain being admitted to medical ICU for diabetic ketoacidosis severe dehydration hypophosphatemia hypokalemia     fluids: S/p 3 L bolus in ED,    Insulin infusion at 9 units per hour with q1 hour blood sugar measurement to target drop in BS by  per hour  Trend BMP, Mg, PO4 every 4 hour and once AG < 18 and asymptomatic patient, will transition to Long acting insulin with pre-meal and also SS short acting insulin   If AG + with BS< 250 will transition IVF to Dextrose mixed IVF with continuation goal directed management   Endocrinology consult   Most likely reason for DKA:Most likely noncompliance  Diabetic education   Diet:  consistent carb advance as tolerated   U tox negative alcohol level negative  Thiamine folic acid multivitamin aspiration seizure of fall precautions  As needed Ativan for CIWA monitoring  Resume home dose of Eliquis and Keppra     plan discussed with patient, medical ICU team, pharmacy team

## 2023-05-11 NOTE — ED ADULT NURSE REASSESSMENT NOTE - NSFALLUNIVINTERV_ED_ALL_ED
Bed/Stretcher in lowest position, wheels locked, appropriate side rails in place/Call bell, personal items and telephone in reach/Instruct patient to call for assistance before getting out of bed/chair/stretcher/Non-slip footwear applied when patient is off stretcher/[place] to call system/Physically safe environment - no spills, clutter or unnecessary equipment/Purposeful proactive rounding/Room/bathroom lighting operational, light cord in reach

## 2023-05-11 NOTE — ED ADULT NURSE REASSESSMENT NOTE - NS ED NURSE REASSESS COMMENT FT1
Pt received A&O x's 3 on cardiac monitor in . Pt reports improvement in symptoms. Initially c/o chest pain, SOB and nausea. Denies any vomiting or diarrhea. States that he is compliant with insulin at home.

## 2023-05-11 NOTE — ED PROVIDER NOTE - PROGRESS NOTE DETAILS
Jose: Pt's pH on VBG < 6.9, Glucose > 656, and Lactate of 4.2. Small Acetone detected in the serum. Pt in DKA. MICU consulted.

## 2023-05-11 NOTE — PATIENT PROFILE ADULT - FALL HARM RISK - UNIVERSAL INTERVENTIONS
Bed in lowest position, wheels locked, appropriate side rails in place/Call bell, personal items and telephone in reach/Instruct patient to call for assistance before getting out of bed or chair/Non-slip footwear when patient is out of bed/[place] to call system/Physically safe environment - no spills, clutter or unnecessary equipment/Purposeful Proactive Rounding/Room/bathroom lighting operational, light cord in reach

## 2023-05-11 NOTE — ED ADULT TRIAGE NOTE - MEANS OF ARRIVAL
Detail Level: Zone
Render In Strict Bullet Format?: No
Plan: Okay to call in Nizoral if needed
ambulatory

## 2023-05-11 NOTE — H&P ADULT - HISTORY OF PRESENT ILLNESS
CC: trouble breathing    HPI: Patient is a 31 yo male with PMHx of IDDMII, HTN, seizures, and Afib presenting to the ED with trouble breathing. Patient reported that starting yesterday afternoon, he had developed some chest tightness and trouble breathing which made him seek medical attention. Patient is alert and interactive but poor historian as patient could not tell me the specific name of medications for his insulin and HTN. Patient explained that at home he vomited once (non-bilious) before coming to the ED. Said that subsequently he developed some dizziness and non-radiating chest pain localized at the left midepigastric region. While in ED patient explained that breathing has gotten better and chest pain has also lessened. Admits last BM was 2 days ago and polydypsia. Denies any weight loss, fevers, diarrhea, syncope, seizures, polyuria, polyphagia.    Patient seen and examined at bedside.     ROS:   CONSTITUTIONAL: (-) fever, (-) chills  RESPIRATORY: (-) SOB, (-) cough  CV: (-) chest pain, (-) palpitations  GI: (-) abdominal pain, (-) nausea, (-) vomiting, (-) diarrhea, (-) constipation   NEURO: (-) headache, (-) weakness, (-) visual changes    [  ] Due to altered mental status/intubation, subjective information was not able to be obtained from the patient. History was obtained, to the extent possible, from review of the chart and collateral sources of information.    PMHx:   PSHx:  FamHx:   Social Hx:   Allergies: NKA    PAST MEDICAL & SURGICAL HISTORY:  Alcohol abuse      Pancreatitis      HTN (hypertension)      Diabetes      Atrial fibrillation      Non-ischemic cardiomyopathy      No significant past surgical history          SOCIAL HISTORY:  EtOH:   Smoking:   Recreational drug use:     Medications:                  insulin regular Infusion 8 Unit(s)/Hr IV Continuous <Continuous>    multiple electrolytes Injection Type 1 Bolus 1000 milliLiter(s) IV Bolus once  sodium bicarbonate  Infusion 0.184 mEq/kG/Hr IV Continuous <Continuous>                ICU Vital Signs Last 24 Hrs  T(C): 36.3 (11 May 2023 07:46), Max: 36.3 (11 May 2023 07:46)  T(F): 97.4 (11 May 2023 07:46), Max: 97.4 (11 May 2023 07:46)  HR: 119 (11 May 2023 07:46) (115 - 137)  BP: 155/92 (11 May 2023 07:46) (155/92 - 158/83)  BP(mean): --  ABP: --  ABP(mean): --  RR: 28 (11 May 2023 07:46) (27 - 30)  SpO2: 100% (11 May 2023 07:46) (100% - 100%)    O2 Parameters below as of 11 May 2023 07:46  Patient On (Oxygen Delivery Method): room air            Physical Examination:    General: Laying in bed comfortably in no acute distress  HEENT: Pupils equal, reactive to light.  Symmetric.  PULM: Clear to auscultation bilaterally, unlabored respirations on room air   CVS: Regular rate and rhythm, no murmurs, rubs, or gallops  ABD: Soft, nondistended, nontender, normoactive bowel sounds, no masses  EXT: No edema, nontender  SKIN: Warm and well perfused  NEURO: interactive, nonfocal         I&O's Detail      LABS:                        14.2   20.03 )-----------( 372      ( 11 May 2023 05:40 )             44.1     05-11    134<L>  |  101  |  9.5  ----------------------------<  494<HH>  4.7   |  <6.0<LL>  |  1.09    Ca    8.2<L>      11 May 2023 08:24    TPro  7.9  /  Alb  4.8  /  TBili  0.4  /  DBili  x   /  AST  9   /  ALT  8   /  AlkPhos  172<H>  11          CAPILLARY BLOOD GLUCOSE      POCT Blood Glucose.: 496 mg/dL (11 May 2023 09:41)      Urinalysis Basic - ( 11 May 2023 06:44 )    Color: Yellow / Appearance: Slightly Turbid / S.020 / pH: x  Gluc: x / Ketone: Large  / Bili: Negative / Urobili: Negative mg/dL   Blood: x / Protein: 30 mg/dL / Nitrite: Negative   Leuk Esterase: Negative / RBC: 0-2 /HPF / WBC 0-2 /HPF   Sq Epi: x / Non Sq Epi: x / Bacteria: Occasional      CULTURES:        RADIOLOGY: ***      INVASIVE LINES:  INDWELLING PENG:  VTE PROPHYLAXIS:  CODE STATUS:        CRITICAL CARE TIME SPENT: *** minutes spent performing frequent bedside reassessments and augmenting plan of care to address problems of acute critical illness that pose high probability of life threatening deterioration and/or end organ damage/dysfunction and discussing goals of care, non-inclusive of time spent on procedures performed. CC: trouble breathing    HPI: Patient is a 31 yo male with PMHx of IDDMII, HTN, seizures, and Afib presenting to the ED with trouble breathing. Patient reported that starting yesterday afternoon, he had developed some chest tightness and trouble breathing which made him seek medical attention. Patient is alert and interactive but poor historian (could not recall anti-hypertensive meds). Reports nonbilious vomiting x1 before coming to the ED. Said that subsequently he developed some dizziness and non-radiating chest pain localized at the left lateral pec. While in ED patient explained that breathing and CP improved. Admits last BM was 2 days ago. Denies any weight loss, fevers, diarrhea, syncope, seizures, polyuria, polyphagia. Labs in the ED significant for leukocytosis, undetectably low serum bicarb, undetectably high AG and hyperglycemia >700. Started on insulin and bicarb infusion, given 3L total crystalloid for treatment of DKA. ICU consulted for admission.     Patient seen and examined at bedside. History as above. Pt reports overall improvement in breathing and chest pain. Endorses mild abdominal pain. No further vomiting. Otherwise offers no acute complaints.  States that he is consistent with his insulin regimen.    ROS:   CONSTITUTIONAL: (-) fever, (-) chills  RESPIRATORY: (-) SOB, (-) cough  CV: (-) chest pain, (-) palpitations  GI: (+) abdominal pain, (-) nausea, (-) vomiting, (-) diarrhea, (-) constipation   NEURO: (-) headache, (-) weakness, (-) visual changes      PAST MEDICAL & SURGICAL HISTORY:  Alcohol abuse  Pancreatitis  HTN (hypertension)  Diabetes  Atrial fibrillation  Non-ischemic cardiomyopathy  No significant past surgical history    Medications:  insulin regular Infusion 8 Unit(s)/Hr IV Continuous <Continuous>  multiple electrolytes Injection Type 1 Bolus 1000 milliLiter(s) IV Bolus once  sodium bicarbonate  Infusion 0.184 mEq/kG/Hr IV Continuous <Continuous>    ICU Vital Signs Last 24 Hrs  T(C): 36.3 (11 May 2023 07:46), Max: 36.3 (11 May 2023 07:46)  T(F): 97.4 (11 May 2023 07:46), Max: 97.4 (11 May 2023 07:46)  HR: 119 (11 May 2023 07:46) (115 - 137)  BP: 155/92 (11 May 2023 07:46) (155/92 - 158/83)  BP(mean): --  ABP: --  ABP(mean): --  RR: 28 (11 May 2023 07:46) (27 - 30)  SpO2: 100% (11 May 2023 07:46) (100% - 100%)    O2 Parameters below as of 11 May 2023 07:46  Patient On (Oxygen Delivery Method): room air    Physical Examination:    General: Laying in bed comfortably in no acute distress  HEENT: Pupils equal, reactive to light.  Symmetric.  PULM: Clear to auscultation bilaterally, unlabored respirations on room air   CVS: reg rate, fast rhythym, +s1s2  ABD: Soft, nondistended, nontender, normoactive bowel sounds  EXT: No edema, nontender  SKIN: Warm and well perfused  NEURO: interactive, nonfocal, AOx4    I&O's Detail    LABS:                        14.2   20.03 )-----------( 372      ( 11 May 2023 05:40 )             44.1         134<L>  |  101  |  9.5  ----------------------------<  494<HH>  4.7   |  <6.0<LL>  |  1.09    Ca    8.2<L>      11 May 2023 08:24    TPro  7.9  /  Alb  4.8  /  TBili  0.4  /  DBili  x   /  AST  9   /  ALT  8   /  AlkPhos  172<H>        CAPILLARY BLOOD GLUCOSE      POCT Blood Glucose.: 496 mg/dL (11 May 2023 09:41)      Urinalysis Basic - ( 11 May 2023 06:44 )    Color: Yellow / Appearance: Slightly Turbid / S.020 / pH: x  Gluc: x / Ketone: Large  / Bili: Negative / Urobili: Negative mg/dL   Blood: x / Protein: 30 mg/dL / Nitrite: Negative   Leuk Esterase: Negative / RBC: 0-2 /HPF / WBC 0-2 /HPF   Sq Epi: x / Non Sq Epi: x / Bacteria: Occasional      CULTURES: bcx sent x2, pending     RADIOLOGY:     < from: Xray Chest 1 View-PORTABLE IMMEDIATE (Xray Chest 1 View-PORTABLE IMMEDIATE .) (23 @ 10:33) >    IMPRESSION: Negative chest.    --- End of Report ---      < end of copied text >        INVASIVE LINES: N   INDWELLING PENG: N   VTE PROPHYLAXIS: eliquis  CODE STATUS: full

## 2023-05-11 NOTE — PATIENT PROFILE ADULT - FUNCTIONAL ASSESSMENT - BASIC MOBILITY 5.
Significant discomfort at the left lateral epicondyle  Recommend Aleve 2 pills twice a day  Patient can follow-up in the future as needed  Would recommend ice, heat  Given the weather changes of late, it is likely that he will improve over the next day or so  Would recommend that he stay home yesterday, today, and then return on the 2nd  4 = No assist / stand by assistance

## 2023-05-11 NOTE — ED PROVIDER NOTE - ATTENDING CONTRIBUTION TO CARE
Yadi: I performed a face to face bedside interview with patient regarding history of present illness, review of symptoms and past medical history. I completed an independent physical exam and ordered tests/medications as needed.  I have discussed patient's plan of care with the resident. The resident assisted in  executing the discussed plan. I was available for any questions or issues that may have arose during the execution of the plan of care.     Upon my evaluation, this patient had a high probability of imminent or life-threatening deterioration due to DKA, tachycardia which required my direct attention, intervention, and personal management.  The patient has a  medical condition that impairs one or more vital organ systems.  Frequent personal assessment and adjustment of medical interventions was performed.      I have personally provided 45 minutes of critical care time exclusive of time spent on separately billable procedures. Time includes review of laboratory data, radiology results, discussion with consultants, patient and family; monitoring for potential decompensation, as well as time spent retrieving data and reviewing the chart and documenting the visit. Interventions were performed as documented above.

## 2023-05-11 NOTE — ED ADULT NURSE NOTE - OBJECTIVE STATEMENT
Pt presents to ED c/o left sided chest pain and difficulty breathing beginning yesterday. Pt c/o abdominal pain, dizziness, and increased thirst. PMH DM. Pt states he is sometimes compliant with his medications. Cardiac monitor in place showing sinus tachycardia. SPO2 100% on room air. MD at bedside for further evaluation.

## 2023-05-12 LAB
A1C WITH ESTIMATED AVERAGE GLUCOSE RESULT: 14 % — HIGH (ref 4–5.6)
ANION GAP SERPL CALC-SCNC: 11 MMOL/L — SIGNIFICANT CHANGE UP (ref 5–17)
ANION GAP SERPL CALC-SCNC: 13 MMOL/L — SIGNIFICANT CHANGE UP (ref 5–17)
BASOPHILS # BLD AUTO: 0.04 K/UL — SIGNIFICANT CHANGE UP (ref 0–0.2)
BASOPHILS NFR BLD AUTO: 0.9 % — SIGNIFICANT CHANGE UP (ref 0–2)
BUN SERPL-MCNC: 4.1 MG/DL — LOW (ref 8–20)
BUN SERPL-MCNC: 4.4 MG/DL — LOW (ref 8–20)
CALCIUM SERPL-MCNC: 8.2 MG/DL — LOW (ref 8.4–10.5)
CALCIUM SERPL-MCNC: 8.4 MG/DL — SIGNIFICANT CHANGE UP (ref 8.4–10.5)
CHLORIDE SERPL-SCNC: 107 MMOL/L — SIGNIFICANT CHANGE UP (ref 96–108)
CHLORIDE SERPL-SCNC: 110 MMOL/L — HIGH (ref 96–108)
CO2 SERPL-SCNC: 12 MMOL/L — LOW (ref 22–29)
CO2 SERPL-SCNC: 19 MMOL/L — LOW (ref 22–29)
CREAT SERPL-MCNC: 0.53 MG/DL — SIGNIFICANT CHANGE UP (ref 0.5–1.3)
CREAT SERPL-MCNC: 0.54 MG/DL — SIGNIFICANT CHANGE UP (ref 0.5–1.3)
CULTURE RESULTS: SIGNIFICANT CHANGE UP
EGFR: 135 ML/MIN/1.73M2 — SIGNIFICANT CHANGE UP
EGFR: 136 ML/MIN/1.73M2 — SIGNIFICANT CHANGE UP
EOSINOPHIL # BLD AUTO: 0 K/UL — SIGNIFICANT CHANGE UP (ref 0–0.5)
EOSINOPHIL NFR BLD AUTO: 0 % — SIGNIFICANT CHANGE UP (ref 0–6)
ESTIMATED AVERAGE GLUCOSE: 355 MG/DL — HIGH (ref 68–114)
GLUCOSE BLDC GLUCOMTR-MCNC: 156 MG/DL — HIGH (ref 70–99)
GLUCOSE BLDC GLUCOMTR-MCNC: 195 MG/DL — HIGH (ref 70–99)
GLUCOSE BLDC GLUCOMTR-MCNC: 204 MG/DL — HIGH (ref 70–99)
GLUCOSE BLDC GLUCOMTR-MCNC: 212 MG/DL — HIGH (ref 70–99)
GLUCOSE BLDC GLUCOMTR-MCNC: 212 MG/DL — HIGH (ref 70–99)
GLUCOSE BLDC GLUCOMTR-MCNC: 216 MG/DL — HIGH (ref 70–99)
GLUCOSE BLDC GLUCOMTR-MCNC: 223 MG/DL — HIGH (ref 70–99)
GLUCOSE BLDC GLUCOMTR-MCNC: 238 MG/DL — HIGH (ref 70–99)
GLUCOSE BLDC GLUCOMTR-MCNC: 246 MG/DL — HIGH (ref 70–99)
GLUCOSE SERPL-MCNC: 163 MG/DL — HIGH (ref 70–99)
GLUCOSE SERPL-MCNC: 219 MG/DL — HIGH (ref 70–99)
HCT VFR BLD CALC: 25.8 % — LOW (ref 39–50)
HCT VFR BLD CALC: 29.2 % — LOW (ref 39–50)
HGB BLD-MCNC: 10.2 G/DL — LOW (ref 13–17)
HGB BLD-MCNC: 9.1 G/DL — LOW (ref 13–17)
LYMPHOCYTES # BLD AUTO: 1.33 K/UL — SIGNIFICANT CHANGE UP (ref 1–3.3)
LYMPHOCYTES # BLD AUTO: 27 % — SIGNIFICANT CHANGE UP (ref 13–44)
MAGNESIUM SERPL-MCNC: 1.6 MG/DL — SIGNIFICANT CHANGE UP (ref 1.6–2.6)
MANUAL SMEAR VERIFICATION: SIGNIFICANT CHANGE UP
MCHC RBC-ENTMCNC: 28.8 PG — SIGNIFICANT CHANGE UP (ref 27–34)
MCHC RBC-ENTMCNC: 29.2 PG — SIGNIFICANT CHANGE UP (ref 27–34)
MCHC RBC-ENTMCNC: 34.9 GM/DL — SIGNIFICANT CHANGE UP (ref 32–36)
MCHC RBC-ENTMCNC: 35.3 GM/DL — SIGNIFICANT CHANGE UP (ref 32–36)
MCV RBC AUTO: 82.5 FL — SIGNIFICANT CHANGE UP (ref 80–100)
MCV RBC AUTO: 82.7 FL — SIGNIFICANT CHANGE UP (ref 80–100)
MONOCYTES # BLD AUTO: 0.34 K/UL — SIGNIFICANT CHANGE UP (ref 0–0.9)
MONOCYTES NFR BLD AUTO: 6.9 % — SIGNIFICANT CHANGE UP (ref 2–14)
MRSA PCR RESULT.: SIGNIFICANT CHANGE UP
NEUTROPHILS # BLD AUTO: 3.2 K/UL — SIGNIFICANT CHANGE UP (ref 1.8–7.4)
NEUTROPHILS NFR BLD AUTO: 65.2 % — SIGNIFICANT CHANGE UP (ref 43–77)
NRBC # BLD: 1 /100 — HIGH (ref 0–0)
PHOSPHATE SERPL-MCNC: 2.1 MG/DL — LOW (ref 2.4–4.7)
PLAT MORPH BLD: NORMAL — SIGNIFICANT CHANGE UP
PLATELET # BLD AUTO: 170 K/UL — SIGNIFICANT CHANGE UP (ref 150–400)
PLATELET # BLD AUTO: 175 K/UL — SIGNIFICANT CHANGE UP (ref 150–400)
POTASSIUM SERPL-MCNC: 3.7 MMOL/L — SIGNIFICANT CHANGE UP (ref 3.5–5.3)
POTASSIUM SERPL-MCNC: 3.8 MMOL/L — SIGNIFICANT CHANGE UP (ref 3.5–5.3)
POTASSIUM SERPL-SCNC: 3.7 MMOL/L — SIGNIFICANT CHANGE UP (ref 3.5–5.3)
POTASSIUM SERPL-SCNC: 3.8 MMOL/L — SIGNIFICANT CHANGE UP (ref 3.5–5.3)
RBC # BLD: 3.12 M/UL — LOW (ref 4.2–5.8)
RBC # BLD: 3.54 M/UL — LOW (ref 4.2–5.8)
RBC # FLD: 15.4 % — HIGH (ref 10.3–14.5)
RBC # FLD: 15.7 % — HIGH (ref 10.3–14.5)
RBC BLD AUTO: NORMAL — SIGNIFICANT CHANGE UP
S AUREUS DNA NOSE QL NAA+PROBE: DETECTED
SODIUM SERPL-SCNC: 132 MMOL/L — LOW (ref 135–145)
SODIUM SERPL-SCNC: 140 MMOL/L — SIGNIFICANT CHANGE UP (ref 135–145)
SPECIMEN SOURCE: SIGNIFICANT CHANGE UP
WBC # BLD: 2.96 K/UL — LOW (ref 3.8–10.5)
WBC # BLD: 4.91 K/UL — SIGNIFICANT CHANGE UP (ref 3.8–10.5)
WBC # FLD AUTO: 2.96 K/UL — LOW (ref 3.8–10.5)
WBC # FLD AUTO: 4.91 K/UL — SIGNIFICANT CHANGE UP (ref 3.8–10.5)

## 2023-05-12 PROCEDURE — 99233 SBSQ HOSP IP/OBS HIGH 50: CPT

## 2023-05-12 PROCEDURE — 99222 1ST HOSP IP/OBS MODERATE 55: CPT

## 2023-05-12 RX ORDER — SODIUM CHLORIDE 9 MG/ML
1000 INJECTION, SOLUTION INTRAVENOUS
Refills: 0 | Status: DISCONTINUED | OUTPATIENT
Start: 2023-05-12 | End: 2023-05-13

## 2023-05-12 RX ORDER — INSULIN LISPRO 100/ML
VIAL (ML) SUBCUTANEOUS
Refills: 0 | Status: DISCONTINUED | OUTPATIENT
Start: 2023-05-12 | End: 2023-05-14

## 2023-05-12 RX ADMIN — LEVETIRACETAM 1000 MILLIGRAM(S): 250 TABLET, FILM COATED ORAL at 06:01

## 2023-05-12 RX ADMIN — Medication 5 UNIT(S): at 17:15

## 2023-05-12 RX ADMIN — Medication 1 MILLIGRAM(S): at 11:08

## 2023-05-12 RX ADMIN — Medication 5 UNIT(S): at 06:01

## 2023-05-12 RX ADMIN — LEVETIRACETAM 1000 MILLIGRAM(S): 250 TABLET, FILM COATED ORAL at 17:14

## 2023-05-12 RX ADMIN — SODIUM CHLORIDE 75 MILLILITER(S): 9 INJECTION, SOLUTION INTRAVENOUS at 11:48

## 2023-05-12 RX ADMIN — SODIUM CHLORIDE 75 MILLILITER(S): 9 INJECTION, SOLUTION INTRAVENOUS at 17:16

## 2023-05-12 RX ADMIN — Medication 100 MILLIGRAM(S): at 11:08

## 2023-05-12 RX ADMIN — APIXABAN 5 MILLIGRAM(S): 2.5 TABLET, FILM COATED ORAL at 17:14

## 2023-05-12 RX ADMIN — Medication 1: at 17:15

## 2023-05-12 RX ADMIN — Medication 1 TABLET(S): at 11:08

## 2023-05-12 RX ADMIN — Medication 100 MILLIEQUIVALENT(S): at 01:09

## 2023-05-12 RX ADMIN — Medication 63.75 MILLIMOLE(S): at 08:59

## 2023-05-12 RX ADMIN — Medication 5 UNIT(S): at 11:20

## 2023-05-12 RX ADMIN — QUETIAPINE FUMARATE 25 MILLIGRAM(S): 200 TABLET, FILM COATED ORAL at 22:07

## 2023-05-12 RX ADMIN — CHLORHEXIDINE GLUCONATE 1 APPLICATION(S): 213 SOLUTION TOPICAL at 06:07

## 2023-05-12 RX ADMIN — APIXABAN 5 MILLIGRAM(S): 2.5 TABLET, FILM COATED ORAL at 06:01

## 2023-05-12 RX ADMIN — Medication 100 MILLIEQUIVALENT(S): at 00:23

## 2023-05-12 RX ADMIN — SODIUM CHLORIDE 75 MILLILITER(S): 9 INJECTION, SOLUTION INTRAVENOUS at 19:00

## 2023-05-12 RX ADMIN — INSULIN GLARGINE 15 UNIT(S): 100 INJECTION, SOLUTION SUBCUTANEOUS at 22:07

## 2023-05-12 RX ADMIN — SODIUM CHLORIDE 75 MILLILITER(S): 9 INJECTION, SOLUTION INTRAVENOUS at 01:07

## 2023-05-12 RX ADMIN — Medication 2: at 11:21

## 2023-05-12 NOTE — CONSULT NOTE ADULT - SUBJECTIVE AND OBJECTIVE BOX
HPI:  32 y.o. male with Hx of ETOH abuse and PMHx of Poorly controlled T1D with recurrent DKA, HTN, seizures, and A-fib presenting to the ED with trouble breathing, nausea, vomiting and epigastric pain, found to be in DKA. He was last hospitalized in 3/2023 for DKA, complicated by ETOH withdrawal. Upon this presentation, labs significant for , AG unable to estimate, Bicarb <6. Patient was treated in ICU setting with IVF and IV insulin drip with resolving ketosis. Transitioned to babsal bolus regimen last night at 23:00. Insulin gtt d/bart at 1am. Currently on Lantus 15U and Ademlog 5U TID + correction. Last drink 1.5 weeks ago (as per patient). Home regimen Lantus 8U qhs and Admelog 8U TID ac. Does not follow with provider.       PAST MEDICAL & SURGICAL HISTORY:  Alcohol abuse  Diabetes  Pancreatitis  HTN (hypertension)  Atrial fibrillation  Non-ischemic cardiomyopathy  No significant past surgical history    Social History:  Lives at home with family. Works in the [employer] business on [employer].  Drinks four 24Oz beers daily    FAMILY HISTORY:  FH: hypertension    Allergies  No Known Allergies    REVIEW OF SYSTEMS:    CONSTITUTIONAL: No fever, weight loss, or fatigue  EYES: No eye pain, visual disturbances, or discharge  ENMT:  No difficulty hearing, tinnitus, vertigo; No sinus or throat pain  NECK: No pain or stiffness  RESPIRATORY: No cough, wheezing, chills or hemoptysis; No shortness of breath  CARDIOVASCULAR: No chest pain, palpitations, dizziness, or leg swelling  GASTROINTESTINAL: + abdominal . + nausea, + vomiting, No diarrhea or constipation. No melena or hematochezia.  NEUROLOGICAL: No headaches, memory loss, loss of strength, numbness, or tremors  SKIN: No itching, burning, rashes, or lesions   MUSCULOSKELETAL: No joint pain or swelling; No muscle, back, or extremity pain  PSYCHIATRIC: No depression, anxiety, mood swings, or difficulty sleeping        MEDICATIONS  (STANDING):  apixaban 5 milliGRAM(s) Oral every 12 hours  dextrose 5%. 1000 milliLiter(s) (100 mL/Hr) IV Continuous <Continuous>  dextrose 5%. 1000 milliLiter(s) (50 mL/Hr) IV Continuous <Continuous>  dextrose 50% Injectable 25 Gram(s) IV Push once  dextrose 50% Injectable 12.5 Gram(s) IV Push once  dextrose 50% Injectable 25 Gram(s) IV Push once  folic acid 1 milliGRAM(s) Oral daily  glucagon  Injectable 1 milliGRAM(s) IntraMuscular once  insulin glargine Injectable (LANTUS) 15 Unit(s) SubCutaneous at bedtime  insulin lispro (ADMELOG) corrective regimen sliding scale   SubCutaneous three times a day before meals  insulin lispro Injectable (ADMELOG) 5 Unit(s) SubCutaneous three times a day before meals  lactated ringers. 1000 milliLiter(s) (75 mL/Hr) IV Continuous <Continuous>  levETIRAcetam 1000 milliGRAM(s) Oral two times a day  multivitamin 1 Tablet(s) Oral daily  QUEtiapine 25 milliGRAM(s) Oral at bedtime  thiamine 100 milliGRAM(s) Oral daily    MEDICATIONS  (PRN):  dextrose Oral Gel 15 Gram(s) Oral once PRN Blood Glucose LESS THAN 70 milliGRAM(s)/deciliter  LORazepam   Injectable 2 milliGRAM(s) IV Push every 1 hour PRN CIWA > 8      Vital Signs Last 24 Hrs  T(C): 36.7 (12 May 2023 11:58), Max: 37.3 (11 May 2023 15:40)  T(F): 98.1 (12 May 2023 11:58), Max: 99.1 (11 May 2023 15:40)  HR: 83 (12 May 2023 11:58) (70 - 99)  BP: 112/78 (12 May 2023 11:58) (80/58 - 131/85)  BP(mean): 74 (12 May 2023 09:00) (66 - 96)  RR: 18 (12 May 2023 11:58) (7 - 22)  SpO2: 100% (12 May 2023 11:58) (96% - 100%)    Parameters below as of 12 May 2023 11:58  Patient On (Oxygen Delivery Method): room air      Physical Exam:    Constitutional: NAD, well-developed  HEENT: EOMI, no exophalmos  Neck: trachea midline, no thyroid enlargement  Respiratory: CTAB, normal respirations  Cardiovascular: S1 and S2, RRR, distant tones   Gastrointestinal: BS+, soft, ntnd  Extremities: No peripheral edema  Neurological: AOx3, no focal deficits  Psychiatric: Normal mood and normal affect  Skin: no rashes, no acanthosis    LABS  05-12    132<L>  |  107  |  4.1<L>  ----------------------------<  219<H>  3.8   |  12.0<L>  |  0.54    Ca    8.4      12 May 2023 03:50  Phos  2.1     05-12  Mg     1.6     05-12    TPro  6.9  /  Alb  4.4  /  TBili  0.6  /  DBili  x   /  AST  9   /  ALT  8   /  AlkPhos  141<H>  05-11                          10.2   4.91  )-----------( 175      ( 12 May 2023 03:50 )             29.2       A1C with Estimated Average Glucose Result: 14.0 % (05-12-23 @ 03:50)      Ketone - Urine: Large (05-11 @ 06:44)    Albumin, Serum: 4.4 g/dL (05-11-23 @ 11:22)  Aspartate Aminotransferase (AST/SGOT): 9 U/L (05-11-23 @ 11:22)  Alanine Aminotransferase (ALT/SGPT): 8 U/L (05-11-23 @ 11:22)  Alkaline Phosphatase, Serum: 141 U/L (05-11-23 @ 11:22)  Aspartate Aminotransferase (AST/SGOT): 9 U/L (05-11-23 @ 05:40)  Alanine Aminotransferase (ALT/SGPT): 8 U/L (05-11-23 @ 05:40)  Alkaline Phosphatase, Serum: 172 U/L (05-11-23 @ 05:40)  Albumin, Serum: 4.8 g/dL (05-11-23 @ 05:40)    CAPILLARY BLOOD GLUCOSE    POCT Blood Glucose.: 216 mg/dL (12 May 2023 13:08)  POCT Blood Glucose.: 238 mg/dL (12 May 2023 11:19)  POCT Blood Glucose.: 195 mg/dL (12 May 2023 06:00)  POCT Blood Glucose.: 212 mg/dL (12 May 2023 03:33)

## 2023-05-12 NOTE — CONSULT NOTE ADULT - ASSESSMENT
32 y.o. male with Hx of ETOH abuse and PMHx of Poorly controlled T1D with recurrent DKA, HTN, seizures, and A-fib presenting to the ED with trouble breathing, nausea, vomiting and epigastric pain, found to be in DKA.  , AG unable to estimate, Bicarb <6. Patient was treated in ICU setting with IVF and IV insulin drip with resolving ketosis. Transitioned to basal bolus regimen. Currently on Lantus 15U and Ademlog 5U TID + correction. Last drink 1.5 weeks ago (as per patient). Home regimen Lantus 8U qhs and Admelog 8U TID ac. Does not follow with provider.       # Severely uncontrolled T1D due non compliance and ETOH abuse  Exacerbated by DKA - resolved  PADMINI negative with low C-peptide in the past  Continue current regimen with Lantus 15U qha and Admelog 5U TIDac + correction   Monitor CMP and electrolytes. Bicarbs still low.  Patient tolerates food now.  Monitor POC glucose qac and qhs with goal 120 - 180 mg/dL    # ETOH abuse  Watch for signs of withdrawal  MV, Thiamine, Folate

## 2023-05-13 LAB
ANION GAP SERPL CALC-SCNC: 12 MMOL/L — SIGNIFICANT CHANGE UP (ref 5–17)
ANION GAP SERPL CALC-SCNC: 13 MMOL/L — SIGNIFICANT CHANGE UP (ref 5–17)
BUN SERPL-MCNC: 4.9 MG/DL — LOW (ref 8–20)
BUN SERPL-MCNC: 5.4 MG/DL — LOW (ref 8–20)
CALCIUM SERPL-MCNC: 8.1 MG/DL — LOW (ref 8.4–10.5)
CALCIUM SERPL-MCNC: 8.2 MG/DL — LOW (ref 8.4–10.5)
CHLORIDE SERPL-SCNC: 107 MMOL/L — SIGNIFICANT CHANGE UP (ref 96–108)
CHLORIDE SERPL-SCNC: 109 MMOL/L — HIGH (ref 96–108)
CO2 SERPL-SCNC: 18 MMOL/L — LOW (ref 22–29)
CO2 SERPL-SCNC: 20 MMOL/L — LOW (ref 22–29)
CREAT SERPL-MCNC: 0.41 MG/DL — LOW (ref 0.5–1.3)
CREAT SERPL-MCNC: 0.41 MG/DL — LOW (ref 0.5–1.3)
EGFR: 147 ML/MIN/1.73M2 — SIGNIFICANT CHANGE UP
EGFR: 147 ML/MIN/1.73M2 — SIGNIFICANT CHANGE UP
GLUCOSE BLDC GLUCOMTR-MCNC: 165 MG/DL — HIGH (ref 70–99)
GLUCOSE BLDC GLUCOMTR-MCNC: 199 MG/DL — HIGH (ref 70–99)
GLUCOSE BLDC GLUCOMTR-MCNC: 312 MG/DL — HIGH (ref 70–99)
GLUCOSE BLDC GLUCOMTR-MCNC: 73 MG/DL — SIGNIFICANT CHANGE UP (ref 70–99)
GLUCOSE SERPL-MCNC: 206 MG/DL — HIGH (ref 70–99)
GLUCOSE SERPL-MCNC: 226 MG/DL — HIGH (ref 70–99)
HCT VFR BLD CALC: 26 % — LOW (ref 39–50)
HGB BLD-MCNC: 8.9 G/DL — LOW (ref 13–17)
MAGNESIUM SERPL-MCNC: 1.8 MG/DL — SIGNIFICANT CHANGE UP (ref 1.6–2.6)
MAGNESIUM SERPL-MCNC: 1.9 MG/DL — SIGNIFICANT CHANGE UP (ref 1.8–2.6)
MCHC RBC-ENTMCNC: 28.5 PG — SIGNIFICANT CHANGE UP (ref 27–34)
MCHC RBC-ENTMCNC: 34.2 GM/DL — SIGNIFICANT CHANGE UP (ref 32–36)
MCV RBC AUTO: 83.3 FL — SIGNIFICANT CHANGE UP (ref 80–100)
PHOSPHATE SERPL-MCNC: 3.2 MG/DL — SIGNIFICANT CHANGE UP (ref 2.4–4.7)
PHOSPHATE SERPL-MCNC: 3.4 MG/DL — SIGNIFICANT CHANGE UP (ref 2.4–4.7)
PLATELET # BLD AUTO: 163 K/UL — SIGNIFICANT CHANGE UP (ref 150–400)
POTASSIUM SERPL-MCNC: 2.8 MMOL/L — CRITICAL LOW (ref 3.5–5.3)
POTASSIUM SERPL-MCNC: 3.4 MMOL/L — LOW (ref 3.5–5.3)
POTASSIUM SERPL-SCNC: 2.8 MMOL/L — CRITICAL LOW (ref 3.5–5.3)
POTASSIUM SERPL-SCNC: 3.4 MMOL/L — LOW (ref 3.5–5.3)
RBC # BLD: 3.12 M/UL — LOW (ref 4.2–5.8)
RBC # FLD: 16 % — HIGH (ref 10.3–14.5)
SODIUM SERPL-SCNC: 139 MMOL/L — SIGNIFICANT CHANGE UP (ref 135–145)
SODIUM SERPL-SCNC: 140 MMOL/L — SIGNIFICANT CHANGE UP (ref 135–145)
WBC # BLD: 3.15 K/UL — LOW (ref 3.8–10.5)
WBC # FLD AUTO: 3.15 K/UL — LOW (ref 3.8–10.5)

## 2023-05-13 PROCEDURE — 99233 SBSQ HOSP IP/OBS HIGH 50: CPT

## 2023-05-13 RX ORDER — POTASSIUM CHLORIDE 20 MEQ
40 PACKET (EA) ORAL ONCE
Refills: 0 | Status: COMPLETED | OUTPATIENT
Start: 2023-05-13 | End: 2023-05-13

## 2023-05-13 RX ORDER — MAGNESIUM SULFATE 500 MG/ML
2 VIAL (ML) INJECTION ONCE
Refills: 0 | Status: COMPLETED | OUTPATIENT
Start: 2023-05-13 | End: 2023-05-13

## 2023-05-13 RX ORDER — POTASSIUM CHLORIDE 20 MEQ
10 PACKET (EA) ORAL
Refills: 0 | Status: COMPLETED | OUTPATIENT
Start: 2023-05-13 | End: 2023-05-13

## 2023-05-13 RX ADMIN — Medication 100 MILLIGRAM(S): at 17:04

## 2023-05-13 RX ADMIN — Medication 1: at 07:55

## 2023-05-13 RX ADMIN — Medication 25 GRAM(S): at 07:39

## 2023-05-13 RX ADMIN — SODIUM CHLORIDE 75 MILLILITER(S): 9 INJECTION, SOLUTION INTRAVENOUS at 17:05

## 2023-05-13 RX ADMIN — SODIUM CHLORIDE 75 MILLILITER(S): 9 INJECTION, SOLUTION INTRAVENOUS at 05:09

## 2023-05-13 RX ADMIN — QUETIAPINE FUMARATE 25 MILLIGRAM(S): 200 TABLET, FILM COATED ORAL at 21:06

## 2023-05-13 RX ADMIN — Medication 4: at 17:03

## 2023-05-13 RX ADMIN — INSULIN GLARGINE 15 UNIT(S): 100 INJECTION, SOLUTION SUBCUTANEOUS at 21:06

## 2023-05-13 RX ADMIN — Medication 5 UNIT(S): at 17:03

## 2023-05-13 RX ADMIN — Medication 100 MILLIEQUIVALENT(S): at 08:42

## 2023-05-13 RX ADMIN — Medication 40 MILLIEQUIVALENT(S): at 18:48

## 2023-05-13 RX ADMIN — LEVETIRACETAM 1000 MILLIGRAM(S): 250 TABLET, FILM COATED ORAL at 17:04

## 2023-05-13 RX ADMIN — Medication 100 MILLIEQUIVALENT(S): at 07:39

## 2023-05-13 RX ADMIN — Medication 1 TABLET(S): at 17:05

## 2023-05-13 RX ADMIN — APIXABAN 5 MILLIGRAM(S): 2.5 TABLET, FILM COATED ORAL at 17:05

## 2023-05-13 RX ADMIN — APIXABAN 5 MILLIGRAM(S): 2.5 TABLET, FILM COATED ORAL at 05:09

## 2023-05-13 RX ADMIN — Medication 5 UNIT(S): at 07:55

## 2023-05-13 RX ADMIN — Medication 1 MILLIGRAM(S): at 17:04

## 2023-05-13 RX ADMIN — Medication 5 UNIT(S): at 11:36

## 2023-05-13 RX ADMIN — LEVETIRACETAM 1000 MILLIGRAM(S): 250 TABLET, FILM COATED ORAL at 05:09

## 2023-05-13 RX ADMIN — Medication 100 MILLIEQUIVALENT(S): at 09:33

## 2023-05-13 NOTE — PROGRESS NOTE ADULT - SUBJECTIVE AND OBJECTIVE BOX
Interval Events:  no overnight events  follow up on DM management     patient seen and examined at bedside. NAD, comfortable. Low K+ , supplemented.     MEDICATIONS  (STANDING):  apixaban 5 milliGRAM(s) Oral every 12 hours  dextrose 5%. 1000 milliLiter(s) (100 mL/Hr) IV Continuous <Continuous>  dextrose 5%. 1000 milliLiter(s) (50 mL/Hr) IV Continuous <Continuous>  dextrose 50% Injectable 25 Gram(s) IV Push once  dextrose 50% Injectable 12.5 Gram(s) IV Push once  dextrose 50% Injectable 25 Gram(s) IV Push once  folic acid 1 milliGRAM(s) Oral daily  glucagon  Injectable 1 milliGRAM(s) IntraMuscular once  insulin glargine Injectable (LANTUS) 15 Unit(s) SubCutaneous at bedtime  insulin lispro (ADMELOG) corrective regimen sliding scale   SubCutaneous three times a day before meals  insulin lispro Injectable (ADMELOG) 5 Unit(s) SubCutaneous three times a day before meals  lactated ringers. 1000 milliLiter(s) (75 mL/Hr) IV Continuous <Continuous>  levETIRAcetam 1000 milliGRAM(s) Oral two times a day  multivitamin 1 Tablet(s) Oral daily  QUEtiapine 25 milliGRAM(s) Oral at bedtime  thiamine 100 milliGRAM(s) Oral daily    MEDICATIONS  (PRN):  dextrose Oral Gel 15 Gram(s) Oral once PRN Blood Glucose LESS THAN 70 milliGRAM(s)/deciliter  LORazepam   Injectable 2 milliGRAM(s) IV Push every 1 hour PRN CIWA > 8      Vital Signs Last 24 Hrs  T(C): 36.6 (13 May 2023 11:02), Max: 36.7 (12 May 2023 16:12)  T(F): 97.8 (13 May 2023 11:02), Max: 98.1 (12 May 2023 16:12)  HR: 79 (13 May 2023 11:02) (62 - 79)  BP: 103/67 (13 May 2023 11:02) (90/52 - 103/67)  BP(mean): --  RR: 18 (13 May 2023 11:02) (17 - 18)  SpO2: 99% (13 May 2023 11:02) (94% - 99%)    Parameters below as of 13 May 2023 11:02  Patient On (Oxygen Delivery Method): room air      Weight (kg): 58.8 (05-11-23 @ 11:00)    Physical Exam:    Constitutional: NAD, well-developed  HEENT: EOMI, no exophalmos  Neck: trachea midline, no thyroid enlargement  Respiratory: CTAB, normal respirations  Cardiovascular: S1 and S2, RRR, distant tones   Gastrointestinal: BS+, soft, ntnd  Extremities: No peripheral edema  Neurological: AOx3, no focal deficits  Psychiatric: Normal mood and normal affect  Skin: no rashes, no acanthosis      LABS  05-13    140  |  109<H>  |  5.4<L>  ----------------------------<  206<H>  2.8<LL>   |  18.0<L>  |  0.41<L>    Ca    8.2<L>      13 May 2023 04:48  Phos  3.4     05-13  Mg     1.8     05-13                            8.9    3.15  )-----------( 163      ( 13 May 2023 04:48 )             26.0       A1C with Estimated Average Glucose Result: 14.0 % (05-12-23 @ 03:50)    CAPILLARY BLOOD GLUCOSE    POCT Blood Glucose.: 73 mg/dL (13 May 2023 11:35)  POCT Blood Glucose.: 165 mg/dL (13 May 2023 07:54)  POCT Blood Glucose.: 204 mg/dL (12 May 2023 22:06)  POCT Blood Glucose.: 156 mg/dL (12 May 2023 17:14)  
====================ASSESSMENT/PLAN==============  32-year-old  male past medical history of insulin-dependent diabetes mellitus essential hypertension seizure A-fib on Eliquis alcohol use disorder with history of DTs electrolyte imbalance presented with nausea vomiting epigastric pain was admitted to medical ICU for diabetic ketoacidosis severe dehydration hypophosphatemia and hypokalemia was treated with insulin infusion and IV fluids with resolution of anion gap and transition to Lantus with scheduled Premeal Admelog along with insulin sliding scale with improvement in dehydration as well as correction of electrolyte imbalance as well  ====================== NEUROLOGY=====================  levETIRAcetam 1000 milliGRAM(s) Oral two times a day  LORazepam   Injectable 2 milliGRAM(s) IV Push every 1 hour PRN CIWA > 8  QUEtiapine 25 milliGRAM(s) Oral at bedtime  CIWA monitoring  ==================== RESPIRATORY======================  On supplemental oxygen-> oxygenating and ventilating fine for now     ====================CARDIOVASCULAR==================  HDS  Eliquis   ? compliance with meds   ===================HEMATOLOGIC/ONC ===================  apixaban 5 milliGRAM(s) Oral every 12 hours    ===================== RENAL =========================  Continue monitoring urine output  Avoid Nephrotoxic agents   Adjusting medications for renal  function   Replacing electrolytes as needed with Goal K> 4, PO> 3, Mg> 2  ==================== GASTROINTESTINAL===================  dextrose 5%. 1000 milliLiter(s) (100 mL/Hr) IV Continuous <Continuous>  dextrose 5%. 1000 milliLiter(s) (50 mL/Hr) IV Continuous <Continuous>  folic acid 1 milliGRAM(s) Oral daily  lactated ringers. 1000 milliLiter(s) (75 mL/Hr) IV Continuous <Continuous>  multivitamin 1 Tablet(s) Oral daily  thiamine 100 milliGRAM(s) Oral daily     diet: consistent carb  =======================    ENDOCRINE  =====================  dextrose 50% Injectable 12.5 Gram(s) IV Push once  dextrose 50% Injectable 25 Gram(s) IV Push once  dextrose 50% Injectable 25 Gram(s) IV Push once  dextrose Oral Gel 15 Gram(s) Oral once PRN Blood Glucose LESS THAN 70 milliGRAM(s)/deciliter  glucagon  Injectable 1 milliGRAM(s) IntraMuscular once  insulin glargine Injectable (LANTUS) 15 Unit(s) SubCutaneous at bedtime  insulin lispro (ADMELOG) corrective regimen sliding scale   SubCutaneous three times a day before meals  insulin lispro Injectable (ADMELOG) 5 Unit(s) SubCutaneous three times a day before meals    ========================INFECTIOUS DISEASE================  Blood cx on admission  Afebrile, normalized WBC   HDS   No abx     ____________________________________________      AZRALATISHACARMEN  MRN-[id number]  Patient is a 32y old  Male who presents with a chief complaint of DKA (11 May 2023 09:54)    HPI:  CC: trouble breathing    HPI: Patient is a 33 yo male with PMHx of IDDMII, HTN, seizures, and Afib presenting to the ED with trouble breathing. Patient reported that starting yesterday afternoon, he had developed some chest tightness and trouble breathing which made him seek medical attention. Patient is alert and interactive but poor historian (could not recall anti-hypertensive meds). Reports nonbilious vomiting x1 before coming to the ED. Said that subsequently he developed some dizziness and non-radiating chest pain localized at the left lateral pec. While in ED patient explained that breathing and CP improved. Admits last BM was 2 days ago. Denies any weight loss, fevers, diarrhea, syncope, seizures, polyuria, polyphagia. Labs in the ED significant for leukocytosis, undetectably low serum bicarb, undetectably high AG and hyperglycemia >700. Started on insulin and bicarb infusion, given 3L total crystalloid for treatment of DKA. ICU consulted for admission.     Patient seen and examined at bedside. History as above. Pt reports overall improvement in breathing and chest pain. Endorses mild abdominal pain. No further vomiting. Otherwise offers no acute complaints.  States that he is consistent with his insulin regimen.    Last 24 hours:   Transitioned off of Insulin infusion and started on Lantus and pre-meal Admelog; C/w LR  Can be transitioned out of MICU     REVIEW OF SYSTEMS:  11 systems reviewed. No other symptoms besides mentioned above      Physical Exam:  Vital Signs Last 24 Hrs  T(C): 36.7 (12 May 2023 11:58), Max: 37.3 (11 May 2023 15:40)  T(F): 98.1 (12 May 2023 11:58), Max: 99.1 (11 May 2023 15:40)  HR: 83 (12 May 2023 11:58) (70 - 99)  BP: 112/78 (12 May 2023 11:58) (80/58 - 131/85)  BP(mean): 74 (12 May 2023 09:00) (66 - 97)  RR: 18 (12 May 2023 11:58) (7 - 22)  SpO2: 100% (12 May 2023 11:58) (96% - 100%)    Parameters below as of 12 May 2023 11:58  Patient On (Oxygen Delivery Method): room air        Gen:  Awake, alert, oriented *3 in NAD  CNS: no acute neurological findings, WAYNE   Neck: no JVD  RES : Air entry equal b/l, no Adventitious breath sounds                     CVS: Normal S1/S2, regular rhythm and normal rate   Abd: Soft, non-distended. Bowel sounds present.  Skin: No acute rash.  Ext:  no edema b/l LE, PP+    ============================I/O===========================   I&O's Detail    11 May 2023 07:01  -  12 May 2023 07:00  --------------------------------------------------------  IN:    dextrose 5% + lactated ringers: 1200 mL    dextrose 5% + sodium chloride 0.9%: 100 mL    Insulin: 30 mL    IV PiggyBack: 416.5 mL    IV PiggyBack: 300 mL    Lactated Ringers: 525 mL    Oral Fluid: 240 mL  Total IN: 2811.5 mL    OUT:    Voided (mL): 1300 mL  Total OUT: 1300 mL    Total NET: 1511.5 mL      12 May 2023 07:01  -  12 May 2023 12:18  --------------------------------------------------------  IN:    IV PiggyBack: 255 mL    Lactated Ringers: 225 mL  Total IN: 480 mL    OUT:    dextrose 5% + lactated ringers: 0 mL  Total OUT: 0 mL    Total NET: 480 mL        ============================ LABS =========================                        10.2   4.91  )-----------( 175      ( 12 May 2023 03:50 )             29.2     05-12    132<L>  |  107  |  4.1<L>  ----------------------------<  219<H>  3.8   |  12.0<L>  |  0.54    Ca    8.4      12 May 2023 03:50  Phos  2.1     -  Mg     1.6     -    TPro  6.9  /  Alb  4.4  /  TBili  0.6  /  DBili  x   /  AST  9   /  ALT  8   /  AlkPhos  141<H>      LIVER FUNCTIONS - ( 11 May 2023 11:22 )  Alb: 4.4 g/dL / Pro: 6.9 g/dL / ALK PHOS: 141 U/L / ALT: 8 U/L / AST: 9 U/L / GGT: x               Urinalysis Basic - ( 11 May 2023 06:44 )    Color: Yellow / Appearance: Slightly Turbid / S.020 / pH: x  Gluc: x / Ketone: Large  / Bili: Negative / Urobili: Negative mg/dL   Blood: x / Protein: 30 mg/dL / Nitrite: Negative   Leuk Esterase: Negative / RBC: 0-2 /HPF / WBC 0-2 /HPF   Sq Epi: x / Non Sq Epi: x / Bacteria: Occasional      ======================Micro/Rad/Cardio=================  Culture: Reviewed   CXR: Reviewed  Echo:Reviewed        Patient requires continuous monitoring   Care plan discussed with ICU care team, patient, and endorsed to Hospitalist team Dr. Nails   Patient remained critical; I have spent 50 minutes providing non-routine care, revaluated multiple times during the day.    
CARMEN JOYCE    [id number]    32y      Male    CC: DKA    HPI - 33yo [place]an speaking male with PMHx of IDDM , HTN, seizures, and Afib presenting to the ED with trouble breathing and chest tightness, nonbilious vomiting  Labs in the ED significant for leukocytosis, undetectable low serum bicarb, undetectably high AG and hyperglycemia >700. Started on insulin and bicarb infusion, given 3L total crystalloid for treatment of DKA. Pt was admitted to MICU. HE was started on Iv fluids, Iv insulin infusion, monitored closely and now transitioned to subcutaneous insulin. He is now deemed stable for floor.       INTERVAL HPI/OVERNIGHT EVENTS: feels well, seen eating his lunch, his last alcohol drink was 1.5week ago    REVIEW OF SYSTEMS:    CONSTITUTIONAL: No fever, fatigue  RESPIRATORY: No cough, wheezing,No shortness of breath  CARDIOVASCULAR: No chest pain, palpitations  GASTROINTESTINAL: No abdominal or epigastric pain. No nausea, vomiting  NEUROLOGICAL: No headaches      Vital Signs Last 24 Hrs  T(C): 36.7 (12 May 2023 11:58), Max: 37.3 (11 May 2023 15:40)  T(F): 98.1 (12 May 2023 11:58), Max: 99.1 (11 May 2023 15:40)  HR: 83 (12 May 2023 11:58) (70 - 95)  BP: 112/78 (12 May 2023 11:58) (80/58 - 112/78)  BP(mean): 74 (12 May 2023 09:00) (66 - 87)  RR: 18 (12 May 2023 11:58) (7 - 22)  SpO2: 100% (12 May 2023 11:58) (96% - 100%)    Parameters below as of 12 May 2023 11:58  Patient On (Oxygen Delivery Method): room air        PHYSICAL EXAM:    GENERAL: NAD, well-groomed  HEENT: PERRL, +EOMI  NECK: soft, Supple  CHEST/LUNG: Clear to percussion bilaterally; No wheezing  HEART: S1S2+, Regular rate and rhythm; No murmur  ABDOMEN: Soft, Nontender, Nondistended; Bowel sounds present  EXTREMITIES:  No clubbing, cyanosis, or edema  SKIN: No rashes or lesions  NEURO: AAOX3, no focal deficits      05-11 @ 07:01  -  05-12 @ 07:00  --------------------------------------------------------  IN: 2811.5 mL / OUT: 1300 mL / NET: 1511.5 mL     @ 07:01  -   @ 15:30  --------------------------------------------------------  IN: 480 mL / OUT: 0 mL / NET: 480 mL        LABS:                        10.2   4.91  )-----------( 175      ( 12 May 2023 03:50 )             29.2         132<L>  |  107  |  4.1<L>  ----------------------------<  219<H>  3.8   |  12.0<L>  |  0.54    Ca    8.4      12 May 2023 03:50  Phos  2.1       Mg     1.6         TPro  6.9  /  Alb  4.4  /  TBili  0.6  /  DBili  x   /  AST  9   /  ALT  8   /  AlkPhos  141<H>        Urinalysis Basic - ( 11 May 2023 06:44 )    Color: Yellow / Appearance: Slightly Turbid / S.020 / pH: x  Gluc: x / Ketone: Large  / Bili: Negative / Urobili: Negative mg/dL   Blood: x / Protein: 30 mg/dL / Nitrite: Negative   Leuk Esterase: Negative / RBC: 0-2 /HPF / WBC 0-2 /HPF   Sq Epi: x / Non Sq Epi: x / Bacteria: Occasional          MEDICATIONS  (STANDING):  apixaban 5 milliGRAM(s) Oral every 12 hours  dextrose 5%. 1000 milliLiter(s) (100 mL/Hr) IV Continuous <Continuous>  dextrose 5%. 1000 milliLiter(s) (50 mL/Hr) IV Continuous <Continuous>  dextrose 50% Injectable 25 Gram(s) IV Push once  dextrose 50% Injectable 12.5 Gram(s) IV Push once  dextrose 50% Injectable 25 Gram(s) IV Push once  folic acid 1 milliGRAM(s) Oral daily  glucagon  Injectable 1 milliGRAM(s) IntraMuscular once  insulin glargine Injectable (LANTUS) 15 Unit(s) SubCutaneous at bedtime  insulin lispro (ADMELOG) corrective regimen sliding scale   SubCutaneous three times a day before meals  insulin lispro Injectable (ADMELOG) 5 Unit(s) SubCutaneous three times a day before meals  lactated ringers. 1000 milliLiter(s) (75 mL/Hr) IV Continuous <Continuous>  levETIRAcetam 1000 milliGRAM(s) Oral two times a day  multivitamin 1 Tablet(s) Oral daily  QUEtiapine 25 milliGRAM(s) Oral at bedtime  thiamine 100 milliGRAM(s) Oral daily    MEDICATIONS  (PRN):  dextrose Oral Gel 15 Gram(s) Oral once PRN Blood Glucose LESS THAN 70 milliGRAM(s)/deciliter  LORazepam   Injectable 2 milliGRAM(s) IV Push every 1 hour PRN CIWA > 8      RADIOLOGY & ADDITIONAL TESTS:    
CHIEF COMPLAINT/INTERVAL HISTORY:    Patient is a 32y old  Male who presents with a chief complaint of DKA (13 May 2023 14:34)    SUBJECTIVE & OBJECTIVE: Pt seen and examined at bedside. No overnight events. OOB to chair; denies any new complaints. Lytes repleted. Sugars labile. Continue current insulin regimen as discussed with endo.    ROS: No chest pain, palpitations, SOB, light headedness, dizziness, headache, nausea/vomiting, fevers/chills, abdominal pain, dysuria or increased urinary frequency.    ICU Vital Signs Last 24 Hrs  T(C): 37 (13 May 2023 17:15), Max: 37 (13 May 2023 17:15)  T(F): 98.6 (13 May 2023 17:15), Max: 98.6 (13 May 2023 17:15)  HR: 67 (13 May 2023 17:15) (67 - 79)  BP: 104/70 (13 May 2023 17:15) (93/54 - 104/70)  RR: 18 (13 May 2023 17:15) (17 - 18)  SpO2: 97% (13 May 2023 17:15) (97% - 99%)    O2 Parameters below as of 13 May 2023 17:15  Patient On (Oxygen Delivery Method): room air    MEDICATIONS  (STANDING):  apixaban 5 milliGRAM(s) Oral every 12 hours  dextrose 5%. 1000 milliLiter(s) (100 mL/Hr) IV Continuous <Continuous>  dextrose 5%. 1000 milliLiter(s) (50 mL/Hr) IV Continuous <Continuous>  dextrose 50% Injectable 12.5 Gram(s) IV Push once  dextrose 50% Injectable 25 Gram(s) IV Push once  dextrose 50% Injectable 25 Gram(s) IV Push once  folic acid 1 milliGRAM(s) Oral daily  glucagon  Injectable 1 milliGRAM(s) IntraMuscular once  insulin glargine Injectable (LANTUS) 15 Unit(s) SubCutaneous at bedtime  insulin lispro (ADMELOG) corrective regimen sliding scale   SubCutaneous three times a day before meals  insulin lispro Injectable (ADMELOG) 5 Unit(s) SubCutaneous three times a day before meals  lactated ringers. 1000 milliLiter(s) (75 mL/Hr) IV Continuous <Continuous>  levETIRAcetam 1000 milliGRAM(s) Oral two times a day  multivitamin 1 Tablet(s) Oral daily  potassium chloride    Tablet ER 40 milliEquivalent(s) Oral once  QUEtiapine 25 milliGRAM(s) Oral at bedtime  thiamine 100 milliGRAM(s) Oral daily    MEDICATIONS  (PRN):  dextrose Oral Gel 15 Gram(s) Oral once PRN Blood Glucose LESS THAN 70 milliGRAM(s)/deciliter  LORazepam   Injectable 2 milliGRAM(s) IV Push every 1 hour PRN CIWA > 8      LABS:                        8.9    3.15  )-----------( 163      ( 13 May 2023 04:48 )             26.0     05-13    139  |  107  |  4.9<L>  ----------------------------<  226<H>  3.4<L>   |  20.0<L>  |  0.41<L>    Ca    8.1<L>      13 May 2023 14:31  Phos  3.2     05-13  Mg     1.9     05-13    CAPILLARY BLOOD GLUCOSE      POCT Blood Glucose.: 312 mg/dL (13 May 2023 17:01)  POCT Blood Glucose.: 73 mg/dL (13 May 2023 11:35)  POCT Blood Glucose.: 165 mg/dL (13 May 2023 07:54)  POCT Blood Glucose.: 204 mg/dL (12 May 2023 22:06)    PHYSICAL EXAM:    GENERAL: middle aged male, sitting in chair, NAD  HEAD:  Atraumatic, Normocephalic  EYES: EOMI, PERRLA, conjunctiva and sclera clear  ENMT: Moist mucous membranes  NECK: Supple  NERVOUS SYSTEM:  Alert & Oriented X3, Motor Strength 5/5 B/L upper and lower extremities; DTRs 2+ intact and symmetric  CHEST/LUNG: Clear to auscultation bilaterally; No rales, rhonchi, wheezing, or rubs  HEART: Regular rate and rhythm; + S1/S2  ABDOMEN: Soft, Nontender, Nondistended; Bowel sounds present  EXTREMITIES:  no pedal edema

## 2023-05-13 NOTE — PROGRESS NOTE ADULT - ASSESSMENT
31yo English speaking male with PMHx of IDDM , HTN, seizures, and Afib admitted with DKA to MICU. HE was started on Iv fluids, Iv insulin infusion, monitored closely and now transitioned to subcutaneous insulin. He is now deemed stable for floor.     # DKA - likely 2/2 medication and dietary non-compliance  Lantus  15 U hS + meal coverage 5u TID with meals + SSI #2  appreciate endocrine recs   Still acidotic - bicarb is 15 - ct ivfluid hydration   repeat labs     # leucocytosis on admission   NO obvious source - CXR - NAD, UA - neg   repeat ordered   pt asymptomatic - monitor -  will not treat empirically unless spike fever     # Afib on eliquis -ct home meds  # Seizure disorder  - on keppra   # Alcohol abuse - last drink 1.5weeks ago, lucien for now, hx CRISTIANO - ROMI protocol   # VTE px - Eliquis
31yo [place] speaking male with PMHx of IDDM , HTN, seizures, and Afib admitted with DKA to MICU. HE was started on Iv fluids, Iv insulin infusion, monitored closely and now transitioned to subcutaneous insulin. He is now deemed stable for floor.     # DKA  likely secondary to medication and dietary non-compliance  -sugars labile today  -continue current insulin regimen as discussed with endo  -lantus 15 units at bedtime and admelog 5 units before meals  -serum bicarb improved from 12-->20  -ADA diet  -diabetic education  -endo recs appreciated    #Hypokalemia  -potassium supplemented    # Afib  -continue on eliquis      # Seizure disorder    -continue keppra  -seizure precautions     # Alcohol abuse  -last drink 1.5weeks ago   -history of DTs  -continue [facility] protocol  -MVI/folic acid/thiamine    #Mood Disturbance  -continue seroquel    #Anemia likely due to bone marrow suppression in the setting of ETOH abuse  -Hb noted  -check iron studies  -no overt signs/symptoms of bleeding  -monitor CBC    DVT ppx- Eliquis    Dispo - Remains acute; glycemic control. Possible discharge in 24 hours.    Plan discussed with patient, Dr. WOODARD, RN
32 y.o. male with Hx of ETOH abuse and PMHx of Poorly controlled T1D with recurrent DKA, HTN, seizures, and A-fib presented with trouble breathing, nausea, vomiting and epigastric pain, found to be in DKA.  , AG unable to estimate, Bicarb <6. Treated in ICU setting with IVF and IV insulin drip with resolving ketosis. Home regimen Lantus 8U qhs and Admelog 8U TID ac. Does not follow with provider.     Currently on:  Lantus 15U qhs  Ademlog 5U TID   + correction.    # Severely uncontrolled T1D due non compliance and ETOH abuse  Exacerbated by DKA - resolved  PADMINI negative with low C-peptide in the past  A1C 14%  Continue current regimen  Monitor CMP and electrolytes and replenish   Monitor POC glucose qac and qhs with goal 120 - 180 mg/dL    # ETOH abuse  Watch for signs of withdrawal  MV, Thiamine, Folate

## 2023-05-14 ENCOUNTER — TRANSCRIPTION ENCOUNTER (OUTPATIENT)
Age: 33
End: 2023-05-14

## 2023-05-14 VITALS
SYSTOLIC BLOOD PRESSURE: 114 MMHG | RESPIRATION RATE: 18 BRPM | HEART RATE: 78 BPM | OXYGEN SATURATION: 98 % | DIASTOLIC BLOOD PRESSURE: 73 MMHG | TEMPERATURE: 99 F

## 2023-05-14 DIAGNOSIS — I48.20 CHRONIC ATRIAL FIBRILLATION, UNSPECIFIED: ICD-10-CM

## 2023-05-14 LAB
ANION GAP SERPL CALC-SCNC: 12 MMOL/L — SIGNIFICANT CHANGE UP (ref 5–17)
BASOPHILS # BLD AUTO: 0.03 K/UL — SIGNIFICANT CHANGE UP (ref 0–0.2)
BASOPHILS NFR BLD AUTO: 0.8 % — SIGNIFICANT CHANGE UP (ref 0–2)
BUN SERPL-MCNC: 6.7 MG/DL — LOW (ref 8–20)
CALCIUM SERPL-MCNC: 8.3 MG/DL — LOW (ref 8.4–10.5)
CHLORIDE SERPL-SCNC: 110 MMOL/L — HIGH (ref 96–108)
CO2 SERPL-SCNC: 21 MMOL/L — LOW (ref 22–29)
CREAT SERPL-MCNC: 0.46 MG/DL — LOW (ref 0.5–1.3)
EGFR: 142 ML/MIN/1.73M2 — SIGNIFICANT CHANGE UP
EOSINOPHIL # BLD AUTO: 0.09 K/UL — SIGNIFICANT CHANGE UP (ref 0–0.5)
EOSINOPHIL NFR BLD AUTO: 2.5 % — SIGNIFICANT CHANGE UP (ref 0–6)
FERRITIN SERPL-MCNC: 30 NG/ML — SIGNIFICANT CHANGE UP (ref 30–400)
GLUCOSE BLDC GLUCOMTR-MCNC: 164 MG/DL — HIGH (ref 70–99)
GLUCOSE BLDC GLUCOMTR-MCNC: 165 MG/DL — HIGH (ref 70–99)
GLUCOSE SERPL-MCNC: 175 MG/DL — HIGH (ref 70–99)
HCT VFR BLD CALC: 28.8 % — LOW (ref 39–50)
HGB BLD-MCNC: 9.6 G/DL — LOW (ref 13–17)
IMM GRANULOCYTES NFR BLD AUTO: 0 % — SIGNIFICANT CHANGE UP (ref 0–0.9)
IRON SATN MFR SERPL: 12 % — LOW (ref 16–55)
IRON SATN MFR SERPL: 35 UG/DL — LOW (ref 59–158)
LYMPHOCYTES # BLD AUTO: 1.85 K/UL — SIGNIFICANT CHANGE UP (ref 1–3.3)
LYMPHOCYTES # BLD AUTO: 50.5 % — HIGH (ref 13–44)
MAGNESIUM SERPL-MCNC: 1.7 MG/DL — LOW (ref 1.8–2.6)
MCHC RBC-ENTMCNC: 28.6 PG — SIGNIFICANT CHANGE UP (ref 27–34)
MCHC RBC-ENTMCNC: 33.3 GM/DL — SIGNIFICANT CHANGE UP (ref 32–36)
MCV RBC AUTO: 85.7 FL — SIGNIFICANT CHANGE UP (ref 80–100)
MONOCYTES # BLD AUTO: 0.46 K/UL — SIGNIFICANT CHANGE UP (ref 0–0.9)
MONOCYTES NFR BLD AUTO: 12.6 % — SIGNIFICANT CHANGE UP (ref 2–14)
NEUTROPHILS # BLD AUTO: 1.23 K/UL — LOW (ref 1.8–7.4)
NEUTROPHILS NFR BLD AUTO: 33.6 % — LOW (ref 43–77)
PHOSPHATE SERPL-MCNC: 4 MG/DL — SIGNIFICANT CHANGE UP (ref 2.4–4.7)
PLATELET # BLD AUTO: 154 K/UL — SIGNIFICANT CHANGE UP (ref 150–400)
POTASSIUM SERPL-MCNC: 3.3 MMOL/L — LOW (ref 3.5–5.3)
POTASSIUM SERPL-SCNC: 3.3 MMOL/L — LOW (ref 3.5–5.3)
RBC # BLD: 3.36 M/UL — LOW (ref 4.2–5.8)
RBC # FLD: 16 % — HIGH (ref 10.3–14.5)
SODIUM SERPL-SCNC: 143 MMOL/L — SIGNIFICANT CHANGE UP (ref 135–145)
TIBC SERPL-MCNC: 302 UG/DL — SIGNIFICANT CHANGE UP (ref 220–430)
TRANSFERRIN SERPL-MCNC: 211 MG/DL — SIGNIFICANT CHANGE UP (ref 180–329)
WBC # BLD: 3.66 K/UL — LOW (ref 3.8–10.5)
WBC # FLD AUTO: 3.66 K/UL — LOW (ref 3.8–10.5)

## 2023-05-14 PROCEDURE — 96361 HYDRATE IV INFUSION ADD-ON: CPT

## 2023-05-14 PROCEDURE — 36415 COLL VENOUS BLD VENIPUNCTURE: CPT

## 2023-05-14 PROCEDURE — 80177 DRUG SCRN QUAN LEVETIRACETAM: CPT

## 2023-05-14 PROCEDURE — 82728 ASSAY OF FERRITIN: CPT

## 2023-05-14 PROCEDURE — 99285 EMERGENCY DEPT VISIT HI MDM: CPT | Mod: 25

## 2023-05-14 PROCEDURE — 83550 IRON BINDING TEST: CPT

## 2023-05-14 PROCEDURE — 82330 ASSAY OF CALCIUM: CPT

## 2023-05-14 PROCEDURE — 99239 HOSP IP/OBS DSCHRG MGMT >30: CPT

## 2023-05-14 PROCEDURE — 81001 URINALYSIS AUTO W/SCOPE: CPT

## 2023-05-14 PROCEDURE — 85025 COMPLETE CBC W/AUTO DIFF WBC: CPT

## 2023-05-14 PROCEDURE — 82435 ASSAY OF BLOOD CHLORIDE: CPT

## 2023-05-14 PROCEDURE — 82140 ASSAY OF AMMONIA: CPT

## 2023-05-14 PROCEDURE — 84466 ASSAY OF TRANSFERRIN: CPT

## 2023-05-14 PROCEDURE — 83735 ASSAY OF MAGNESIUM: CPT

## 2023-05-14 PROCEDURE — 82009 KETONE BODYS QUAL: CPT

## 2023-05-14 PROCEDURE — 85014 HEMATOCRIT: CPT

## 2023-05-14 PROCEDURE — 85018 HEMOGLOBIN: CPT

## 2023-05-14 PROCEDURE — 84100 ASSAY OF PHOSPHORUS: CPT

## 2023-05-14 PROCEDURE — 83036 HEMOGLOBIN GLYCOSYLATED A1C: CPT

## 2023-05-14 PROCEDURE — 85027 COMPLETE CBC AUTOMATED: CPT

## 2023-05-14 PROCEDURE — 82550 ASSAY OF CK (CPK): CPT

## 2023-05-14 PROCEDURE — 84132 ASSAY OF SERUM POTASSIUM: CPT

## 2023-05-14 PROCEDURE — 96374 THER/PROPH/DIAG INJ IV PUSH: CPT

## 2023-05-14 PROCEDURE — 93005 ELECTROCARDIOGRAM TRACING: CPT

## 2023-05-14 PROCEDURE — 83605 ASSAY OF LACTIC ACID: CPT

## 2023-05-14 PROCEDURE — 82962 GLUCOSE BLOOD TEST: CPT

## 2023-05-14 PROCEDURE — 82947 ASSAY GLUCOSE BLOOD QUANT: CPT

## 2023-05-14 PROCEDURE — 80307 DRUG TEST PRSMV CHEM ANLYZR: CPT

## 2023-05-14 PROCEDURE — 82803 BLOOD GASES ANY COMBINATION: CPT

## 2023-05-14 PROCEDURE — 87641 MR-STAPH DNA AMP PROBE: CPT

## 2023-05-14 PROCEDURE — 83540 ASSAY OF IRON: CPT

## 2023-05-14 PROCEDURE — 87086 URINE CULTURE/COLONY COUNT: CPT

## 2023-05-14 PROCEDURE — 71045 X-RAY EXAM CHEST 1 VIEW: CPT

## 2023-05-14 PROCEDURE — 84295 ASSAY OF SERUM SODIUM: CPT

## 2023-05-14 PROCEDURE — 80048 BASIC METABOLIC PNL TOTAL CA: CPT

## 2023-05-14 PROCEDURE — 87640 STAPH A DNA AMP PROBE: CPT

## 2023-05-14 PROCEDURE — 87040 BLOOD CULTURE FOR BACTERIA: CPT

## 2023-05-14 PROCEDURE — 80053 COMPREHEN METABOLIC PANEL: CPT

## 2023-05-14 RX ORDER — INSULIN LISPRO 100/ML
5 VIAL (ML) SUBCUTANEOUS
Qty: 2 | Refills: 0
Start: 2023-05-14 | End: 2023-06-12

## 2023-05-14 RX ORDER — MAGNESIUM SULFATE 500 MG/ML
2 VIAL (ML) INJECTION ONCE
Refills: 0 | Status: COMPLETED | OUTPATIENT
Start: 2023-05-14 | End: 2023-05-14

## 2023-05-14 RX ORDER — POTASSIUM BICARBONATE 978 MG/1
1 TABLET, EFFERVESCENT ORAL
Qty: 3 | Refills: 0
Start: 2023-05-14 | End: 2023-05-16

## 2023-05-14 RX ORDER — POTASSIUM CHLORIDE 20 MEQ
40 PACKET (EA) ORAL ONCE
Refills: 0 | Status: COMPLETED | OUTPATIENT
Start: 2023-05-14 | End: 2023-05-14

## 2023-05-14 RX ORDER — IRON SUCROSE 20 MG/ML
200 INJECTION, SOLUTION INTRAVENOUS ONCE
Refills: 0 | Status: COMPLETED | OUTPATIENT
Start: 2023-05-14 | End: 2023-05-14

## 2023-05-14 RX ADMIN — Medication 1: at 11:14

## 2023-05-14 RX ADMIN — Medication 1: at 07:26

## 2023-05-14 RX ADMIN — LEVETIRACETAM 1000 MILLIGRAM(S): 250 TABLET, FILM COATED ORAL at 05:09

## 2023-05-14 RX ADMIN — Medication 25 GRAM(S): at 10:02

## 2023-05-14 RX ADMIN — Medication 5 UNIT(S): at 11:13

## 2023-05-14 RX ADMIN — APIXABAN 5 MILLIGRAM(S): 2.5 TABLET, FILM COATED ORAL at 05:09

## 2023-05-14 RX ADMIN — IRON SUCROSE 110 MILLIGRAM(S): 20 INJECTION, SOLUTION INTRAVENOUS at 11:14

## 2023-05-14 RX ADMIN — Medication 40 MILLIEQUIVALENT(S): at 10:02

## 2023-05-14 RX ADMIN — Medication 5 UNIT(S): at 07:25

## 2023-05-14 NOTE — DISCHARGE NOTE NURSING/CASE MANAGEMENT/SOCIAL WORK - NSDCVIVACCINE_GEN_ALL_CORE_FT
Tdap; 29-Oct-2019 23:07; Odessa Landis (RN); [employer]; [id number] (Exp. Date: 22-Oct-2021); IntraMuscular; Deltoid Left.; 0.5 milliLiter(s); VIS (VIS Published: 09-May-2013, VIS Presented: 29-Oct-2019);

## 2023-05-14 NOTE — DISCHARGE NOTE NURSING/CASE MANAGEMENT/SOCIAL WORK - PATIENT PORTAL LINK FT
You can access the FollowMyHealth Patient Portal offered by [facility] by registering at the following website: [email or web address]. By joining [employer]’s FollowMyHealth portal, you will also be able to view your health information using other applications (apps) compatible with our system.

## 2023-05-14 NOTE — DISCHARGE NOTE PROVIDER - HOSPITAL COURSE
Patient is a 32 year-old male with a history of AFib, HTN, IDDM2, and seizures presented to [facility] ED with trouble breathing and associate chest tightness.  In the ED, patient breathing improved, however, was found to be in DKA with a BG >700, undetectable AG and serum bicarb. Patient was admitted to MICU for IVF and Insulin gtt. Ketosis now resolved patient was downgraded to medicine service. Endocrinology following for severely uncontrolled DM2 (A1c 14%) and recommended to continue with current regimen of 15U Lantus qhs, 5U Admelog with meals and insulin sliding scale for correction. Patient no longer requires inpatient hospitalization.     All changes in home medications were noted on discharge paperwork. New prescriptions where sent to patient's pharmacy. Discussed with patient to take medications as prescribed. Patient to schedule follow up appointment with the following Provider(s): Primary care doctor, endocrinology    At this time, patient is medically stable for discharge    Length of discharge: 35 minutes     Patient is a 32 year-old male with a history of AFib, HTN, IDDM2, and seizures presented to [facility] ED with trouble breathing and associate chest tightness.  In the ED, patient breathing improved, however, was found to be in DKA with a BG >700, undetectable AG and serum bicarb. Patient was admitted to MICU for IVF and Insulin gtt. Ketosis now resolved patient was downgraded to medicine service. Endocrinology following for severely uncontrolled DM2 (A1c 14%) and recommended to continue with current regimen of 15U Lantus qhs, 5U Admelog with meals and insulin sliding scale for correction. Patient no longer requires inpatient hospitalization.     All changes in home medications were noted on discharge paperwork. New prescriptions where sent to patient's pharmacy. Discussed with patient to take medications as prescribed. Patient to schedule follow up appointment with the following Provider(s): Primary care doctor, endocrinology    At this time, patient is medically stable for discharge

## 2023-05-14 NOTE — DISCHARGE NOTE PROVIDER - NSDCMRMEDTOKEN_GEN_ALL_CORE_FT
Eliquis 5 mg oral tablet: 1 tab(s) orally 2 times a day  folic acid 1 mg oral tablet: 1 tab(s) orally once a day  HumaLOG KwikPen 100 units/mL injectable solution: 4 times a day (before meals and at bedtime) as per sliding scale:  151-200 2 units; 201-250 4 units; 251-300 6 units; 301-350 8 units; 351-400 10 units; &gt; 400 call MD.   HumaLOG KwikPen 100 units/mL injectable solution: 6 units injectable 2 times a day (with meals) with breakfast and lunch  HumaLOG KwikPen 100 units/mL injectable solution: 11 unit(s) subcutaneous once a day (with meals) 11 unit(s) subcutaneous 3 times a day (with meals) dinner  Lantus 100 units/mL subcutaneous solution: 15 unit(s) subcutaneous once a day (at bedtime)  levETIRAcetam 1000 mg oral tablet: 1 tab(s) orally 2 times a day  metoprolol succinate 25 mg oral tablet, extended release: 1 tab(s) orally once a day  Multiple Vitamins oral tablet: 1 tab(s) orally once a day  pantoprazole 40 mg oral delayed release tablet: 1 tab(s) orally once a day  SEROquel 25 mg oral tablet: 1 tab(s) orally once a day (at bedtime)  sucralfate 1 g oral tablet: 1 tab(s) orally 4 times a day  thiamine 100 mg oral tablet: 1 tab(s) orally once a day   Eliquis 5 mg oral tablet: 1 tab(s) orally 2 times a day  folic acid 1 mg oral tablet: 1 tab(s) orally once a day  Lantus 100 units/mL subcutaneous solution: 15 unit(s) subcutaneous once a day (at bedtime)  levETIRAcetam 1000 mg oral tablet: 1 tab(s) orally 2 times a day  Multiple Vitamins oral tablet: 1 tab(s) orally once a day  potassium bicarbonate 20 mEq oral tablet, effervescent: 1 tab(s) orally once a day  SEROquel 25 mg oral tablet: 1 tab(s) orally once a day (at bedtime)  thiamine 100 mg oral tablet: 1 tab(s) orally once a day

## 2023-05-14 NOTE — DISCHARGE NOTE PROVIDER - CARE PROVIDER_API CALL
endocrinologist,   Phone: (   )    -  Fax: (   )    -  Follow Up Time:     PCP,   Phone: (   )    -  Fax: (   )    -  Follow Up Time:

## 2023-05-14 NOTE — DISCHARGE NOTE NURSING/CASE MANAGEMENT/SOCIAL WORK - NSDCPEPT PROEDMA_GEN_ALL_CORE
Called and spoke with pt about her surgery scheduled for Monday the 23rd.  She was wanting to know if she needed a  here for her procedure.  I explained that yes she will need a  to remain her with her the whole time.  Voices understanding   Yes

## 2023-05-14 NOTE — DISCHARGE NOTE PROVIDER - PROVIDER TOKENS
FREE:[LAST:[endocrinologist],PHONE:[(   )    -],FAX:[(   )    -]],FREE:[LAST:[PCP],PHONE:[(   )    -],FAX:[(   )    -]]

## 2023-05-14 NOTE — DISCHARGE NOTE PROVIDER - ATTENDING DISCHARGE PHYSICAL EXAMINATION:
PHYSICAL EXAM:    GENERAL: middle aged male, sitting in chair, NAD  HEAD:  Atraumatic, Normocephalic  EYES: EOMI, PERRLA, conjunctiva and sclera clear  ENMT: Moist mucous membranes  NECK: Supple  NERVOUS SYSTEM:  Alert & Oriented X3, Motor Strength 5/5 B/L upper and lower extremities   CHEST/LUNG: Clear to auscultation bilaterally; No rales, rhonchi, wheezing, or rubs  HEART: Regular rate and rhythm; + S1/S2  ABDOMEN: Soft, Nontender, Nondistended; Bowel sounds present  EXTREMITIES:  no pedal edema

## 2023-05-14 NOTE — DISCHARGE NOTE PROVIDER - NSDCCPCAREPLAN_GEN_ALL_CORE_FT
PRINCIPAL DISCHARGE DIAGNOSIS  Diagnosis: DKA (diabetic ketoacidosis)  Assessment and Plan of Treatment: you were hospitalized for diabetic ketoacidosis. this is a potentally life threating condition that occurs due to high blood sugars.  you were treated with IV fluids and an insulin drip and are no longer in DKA. It is improtant that you take your medications and check your blood sugar regularly to prevent this from happening.  If you start to feeling symptoms again please seek immeidate medical attention.      SECONDARY DISCHARGE DIAGNOSES  Diagnosis: Hypokalemia  Assessment and Plan of Treatment: likely due to above. now resolved.     PRINCIPAL DISCHARGE DIAGNOSIS  Diagnosis: DKA (diabetic ketoacidosis)  Assessment and Plan of Treatment: you were hospitalized for diabetic ketoacidosis. this is a potentally life threating condition that occurs due to high blood sugars.  you were treated with IV fluids and an insulin drip and are no longer in DKA. It is improtant that you take your medications and check your blood sugar regularly to prevent this from happening.  If you start to feeling symptoms again please seek immeidate medical attention.  Check your sugars three times a day and keep a log for your endocrinologist  Please follow up with your PCP and endo within 1 week      SECONDARY DISCHARGE DIAGNOSES  Diagnosis: Hypokalemia  Assessment and Plan of Treatment: please take potassium supplements x 3 days and repeat your BMP within 1 week with your PCP.

## 2023-05-15 LAB — LEVETIRACETAM SERPL-MCNC: 14.2 UG/ML — SIGNIFICANT CHANGE UP (ref 10–40)

## 2023-08-23 NOTE — PATIENT PROFILE ADULT - CENTRAL VENOUS CATHETER/PICC LINE
Central Prior Authorization Team   Phone: [phone]    PA Initiation    Medication: SILDENAFIL CITRATE 50 MG PO TABS  Insurance Company: [employer] - Phone [phone] Fax [phone]  Pharmacy Filling the Rx:    Filling Pharmacy Phone:    Filling Pharmacy Fax:    Start Date: 8/21/2023    
Fax was received from patient's pharmacy that prior authorization is needed for sildenafil (VIAGRA) 50 MG tablet. Routing to prior authorization team to initiate.     Mackenzie Arellano RN   [facility]    
Message sent to pt w/PA denial per insurance d/t they do not cover drugs rx's for sexual dysfunction (ED), these drugs are excluded from rx benefits. Faviola Graff RN on 8/23/2023 at 2:27 PM        
PRIOR AUTHORIZATION DENIED    Medication: SILDENAFIL CITRATE 50 MG PO TABS  Insurance Company: [employer] - Phone [phone] Fax [phone]  Denial Date:    Denial Rational: DRUGS USED FOR SEXUAL DYSFUNCTION ARE EXCLUDED FROM PATIENT'S RX BENEFITS      Appeal Information: IF PROVIDER WOULD LIKE TO APPEAL THIS DECISION PLEASE PROVIDE PA TEAM WITH LETTER OF MEDICAL NECESSITY      Patient Notified: No      
no

## 2023-08-28 NOTE — ED ADULT TRIAGE NOTE - AS HEIGHT TYPE
Problem: Pain  Goal: #Acceptable pain level achieved/maintained at rest using NRS/Faces  Description: This goal is used for patients who can self-report.  Acceptable means the level is at or below the identified comfort/function goal.  Outcome: Outcome Not Met, Continue to Monitor  Goal: # Verbalizes understanding of pain management  Description: Documented in Patient Education Activity  Outcome: Outcome Met, Continue evaluating goal progress toward completion     Problem: At Risk for Falls  Goal: # Patient does not fall  Outcome: Outcome Met, Continue evaluating goal progress toward completion     
Gabriel Albrgiht discharged to home accompanied by family member.   Patient provided with the following educational materials upon discharge:  Medication changes and discharge summery.   Valuables and belongings sent with patient.   discharge instructions reviewed with patient and family member.  Patient and family member verbalized understanding  
Problem: Pain  Goal: # Acceptable pain level achieved/maintained at rest using NRS/Faces without oversedation (opioid naive or PCA/Epidural infusion)  Description: This goal is used if Opioid-naïve or on PCA/Epidural Infusion.  Outcome: Outcome Met, Continue evaluating goal progress toward completion     Problem: At Risk for Falls  Goal: # Patient does not fall  Outcome: Outcome Met, Continue evaluating goal progress toward completion     Problem: Postoperative Care  Goal: Vital signs are maintained within parameters  Outcome: Outcome Met, Continue evaluating goal progress toward completion     
stated

## 2023-09-13 NOTE — ED CDU PROVIDER DISPOSITION NOTE - CARE PLAN
BP Readings from Last 2 Encounters:   09/06/23 112/60   08/16/23 (!) 142/80     Follow up appointment: Visit date not found     
1

## 2023-10-08 ENCOUNTER — INPATIENT (INPATIENT)
Facility: HOSPITAL | Age: 33
LOS: 2 days | Discharge: HOME | DRG: 637 | End: 2023-10-11
Admitting: STUDENT IN AN ORGANIZED HEALTH CARE EDUCATION/TRAINING PROGRAM
Payer: COMMERCIAL

## 2023-10-08 VITALS
TEMPERATURE: 98 F | RESPIRATION RATE: 33 BRPM | DIASTOLIC BLOOD PRESSURE: 89 MMHG | SYSTOLIC BLOOD PRESSURE: 144 MMHG | OXYGEN SATURATION: 99 % | HEART RATE: 152 BPM

## 2023-10-08 DIAGNOSIS — E11.10 TYPE 2 DIABETES MELLITUS WITH KETOACIDOSIS WITHOUT COMA: ICD-10-CM

## 2023-10-08 LAB
A1C WITH ESTIMATED AVERAGE GLUCOSE RESULT: 15.5 % — HIGH (ref 4–5.6)
ACETONE SERPL-MCNC: ABNORMAL
ALBUMIN SERPL ELPH-MCNC: 3.1 G/DL — LOW (ref 3.3–5.2)
ALBUMIN SERPL ELPH-MCNC: 3.4 G/DL — SIGNIFICANT CHANGE UP (ref 3.3–5.2)
ALBUMIN SERPL ELPH-MCNC: 3.9 G/DL — SIGNIFICANT CHANGE UP (ref 3.3–5.2)
ALBUMIN SERPL ELPH-MCNC: 4.8 G/DL — SIGNIFICANT CHANGE UP (ref 3.3–5.2)
ALP SERPL-CCNC: 106 U/L — SIGNIFICANT CHANGE UP (ref 40–120)
ALP SERPL-CCNC: 119 U/L — SIGNIFICANT CHANGE UP (ref 40–120)
ALP SERPL-CCNC: 139 U/L — HIGH (ref 40–120)
ALP SERPL-CCNC: 206 U/L — HIGH (ref 40–120)
ALT FLD-CCNC: 13 U/L — SIGNIFICANT CHANGE UP
ALT FLD-CCNC: 7 U/L — SIGNIFICANT CHANGE UP
ALT FLD-CCNC: 8 U/L — SIGNIFICANT CHANGE UP
ALT FLD-CCNC: 9 U/L — SIGNIFICANT CHANGE UP
ANION GAP SERPL CALC-SCNC: 12 MMOL/L — SIGNIFICANT CHANGE UP (ref 5–17)
ANION GAP SERPL CALC-SCNC: 19 MMOL/L — HIGH (ref 5–17)
ANION GAP SERPL CALC-SCNC: 20 MMOL/L — HIGH (ref 5–17)
ANION GAP SERPL CALC-SCNC: 22 MMOL/L — HIGH (ref 5–17)
ANION GAP SERPL CALC-SCNC: 22 MMOL/L — HIGH (ref 5–17)
ANION GAP SERPL CALC-SCNC: SIGNIFICANT CHANGE UP MMOL/L (ref 5–17)
APAP SERPL-MCNC: <3 UG/ML — LOW (ref 10–26)
APPEARANCE UR: CLEAR — SIGNIFICANT CHANGE UP
APTT BLD: 28.1 SEC — SIGNIFICANT CHANGE UP (ref 24.5–35.6)
AST SERPL-CCNC: 10 U/L — SIGNIFICANT CHANGE UP
AST SERPL-CCNC: 11 U/L — SIGNIFICANT CHANGE UP
AST SERPL-CCNC: 21 U/L — SIGNIFICANT CHANGE UP
AST SERPL-CCNC: 8 U/L — SIGNIFICANT CHANGE UP
BACTERIA # UR AUTO: NEGATIVE — SIGNIFICANT CHANGE UP
BASE EXCESS BLDV CALC-SCNC: -30.5 MMOL/L — LOW (ref -2–3)
BASOPHILS # BLD AUTO: 0.08 K/UL — SIGNIFICANT CHANGE UP (ref 0–0.2)
BASOPHILS NFR BLD AUTO: 0.6 % — SIGNIFICANT CHANGE UP (ref 0–2)
BILIRUB DIRECT SERPL-MCNC: 0.1 MG/DL — SIGNIFICANT CHANGE UP (ref 0–0.3)
BILIRUB INDIRECT FLD-MCNC: 0.4 MG/DL — SIGNIFICANT CHANGE UP (ref 0.2–1)
BILIRUB SERPL-MCNC: 0.4 MG/DL — SIGNIFICANT CHANGE UP (ref 0.4–2)
BILIRUB SERPL-MCNC: 0.5 MG/DL — SIGNIFICANT CHANGE UP (ref 0.4–2)
BILIRUB SERPL-MCNC: 0.5 MG/DL — SIGNIFICANT CHANGE UP (ref 0.4–2)
BILIRUB SERPL-MCNC: 0.6 MG/DL — SIGNIFICANT CHANGE UP (ref 0.4–2)
BILIRUB UR-MCNC: NEGATIVE — SIGNIFICANT CHANGE UP
BUN SERPL-MCNC: 12 MG/DL — SIGNIFICANT CHANGE UP (ref 8–20)
BUN SERPL-MCNC: 5.2 MG/DL — LOW (ref 8–20)
BUN SERPL-MCNC: 5.7 MG/DL — LOW (ref 8–20)
BUN SERPL-MCNC: 6.5 MG/DL — LOW (ref 8–20)
BUN SERPL-MCNC: 7.8 MG/DL — LOW (ref 8–20)
BUN SERPL-MCNC: 9.9 MG/DL — SIGNIFICANT CHANGE UP (ref 8–20)
CA-I SERPL-SCNC: 1.34 MMOL/L — HIGH (ref 1.15–1.33)
CALCIUM SERPL-MCNC: 8 MG/DL — LOW (ref 8.4–10.5)
CALCIUM SERPL-MCNC: 8.1 MG/DL — LOW (ref 8.4–10.5)
CALCIUM SERPL-MCNC: 8.1 MG/DL — LOW (ref 8.4–10.5)
CALCIUM SERPL-MCNC: 8.2 MG/DL — LOW (ref 8.4–10.5)
CALCIUM SERPL-MCNC: 8.9 MG/DL — SIGNIFICANT CHANGE UP (ref 8.4–10.5)
CALCIUM SERPL-MCNC: 9.8 MG/DL — SIGNIFICANT CHANGE UP (ref 8.4–10.5)
CHLORIDE BLDV-SCNC: 105 MMOL/L — SIGNIFICANT CHANGE UP (ref 96–108)
CHLORIDE SERPL-SCNC: 103 MMOL/L — SIGNIFICANT CHANGE UP (ref 96–108)
CHLORIDE SERPL-SCNC: 104 MMOL/L — SIGNIFICANT CHANGE UP (ref 96–108)
CHLORIDE SERPL-SCNC: 106 MMOL/L — SIGNIFICANT CHANGE UP (ref 96–108)
CHLORIDE SERPL-SCNC: 108 MMOL/L — SIGNIFICANT CHANGE UP (ref 96–108)
CHLORIDE SERPL-SCNC: 109 MMOL/L — HIGH (ref 96–108)
CHLORIDE SERPL-SCNC: 96 MMOL/L — SIGNIFICANT CHANGE UP (ref 96–108)
CO2 SERPL-SCNC: 10 MMOL/L — CRITICAL LOW (ref 22–29)
CO2 SERPL-SCNC: 11 MMOL/L — LOW (ref 22–29)
CO2 SERPL-SCNC: 16 MMOL/L — LOW (ref 22–29)
CO2 SERPL-SCNC: 8 MMOL/L — CRITICAL LOW (ref 22–29)
CO2 SERPL-SCNC: 9 MMOL/L — CRITICAL LOW (ref 22–29)
CO2 SERPL-SCNC: <6 MMOL/L — CRITICAL LOW (ref 22–29)
COLOR SPEC: YELLOW — SIGNIFICANT CHANGE UP
CREAT SERPL-MCNC: 0.66 MG/DL — SIGNIFICANT CHANGE UP (ref 0.5–1.3)
CREAT SERPL-MCNC: 0.71 MG/DL — SIGNIFICANT CHANGE UP (ref 0.5–1.3)
CREAT SERPL-MCNC: 0.72 MG/DL — SIGNIFICANT CHANGE UP (ref 0.5–1.3)
CREAT SERPL-MCNC: 0.74 MG/DL — SIGNIFICANT CHANGE UP (ref 0.5–1.3)
CREAT SERPL-MCNC: 0.79 MG/DL — SIGNIFICANT CHANGE UP (ref 0.5–1.3)
CREAT SERPL-MCNC: 1.01 MG/DL — SIGNIFICANT CHANGE UP (ref 0.5–1.3)
DIFF PNL FLD: ABNORMAL
EGFR: 101 ML/MIN/1.73M2 — SIGNIFICANT CHANGE UP
EGFR: 120 ML/MIN/1.73M2 — SIGNIFICANT CHANGE UP
EGFR: 123 ML/MIN/1.73M2 — SIGNIFICANT CHANGE UP
EGFR: 124 ML/MIN/1.73M2 — SIGNIFICANT CHANGE UP
EGFR: 124 ML/MIN/1.73M2 — SIGNIFICANT CHANGE UP
EGFR: 127 ML/MIN/1.73M2 — SIGNIFICANT CHANGE UP
EOSINOPHIL # BLD AUTO: 0 K/UL — SIGNIFICANT CHANGE UP (ref 0–0.5)
EOSINOPHIL NFR BLD AUTO: 0 % — SIGNIFICANT CHANGE UP (ref 0–6)
EPI CELLS # UR: NEGATIVE — SIGNIFICANT CHANGE UP
ESTIMATED AVERAGE GLUCOSE: 398 MG/DL — HIGH (ref 68–114)
ETHANOL SERPL-MCNC: <10 MG/DL — SIGNIFICANT CHANGE UP (ref 0–9)
GAS PNL BLDA: SIGNIFICANT CHANGE UP
GAS PNL BLDV: 135 MMOL/L — LOW (ref 136–145)
GAS PNL BLDV: SIGNIFICANT CHANGE UP
GLUCOSE BLDC GLUCOMTR-MCNC: 119 MG/DL — HIGH (ref 70–99)
GLUCOSE BLDC GLUCOMTR-MCNC: 132 MG/DL — HIGH (ref 70–99)
GLUCOSE BLDC GLUCOMTR-MCNC: 137 MG/DL — HIGH (ref 70–99)
GLUCOSE BLDC GLUCOMTR-MCNC: 179 MG/DL — HIGH (ref 70–99)
GLUCOSE BLDC GLUCOMTR-MCNC: 181 MG/DL — HIGH (ref 70–99)
GLUCOSE BLDC GLUCOMTR-MCNC: 183 MG/DL — HIGH (ref 70–99)
GLUCOSE BLDC GLUCOMTR-MCNC: 183 MG/DL — HIGH (ref 70–99)
GLUCOSE BLDC GLUCOMTR-MCNC: 184 MG/DL — HIGH (ref 70–99)
GLUCOSE BLDC GLUCOMTR-MCNC: 189 MG/DL — HIGH (ref 70–99)
GLUCOSE BLDC GLUCOMTR-MCNC: 190 MG/DL — HIGH (ref 70–99)
GLUCOSE BLDC GLUCOMTR-MCNC: 198 MG/DL — HIGH (ref 70–99)
GLUCOSE BLDC GLUCOMTR-MCNC: 207 MG/DL — HIGH (ref 70–99)
GLUCOSE BLDC GLUCOMTR-MCNC: 225 MG/DL — HIGH (ref 70–99)
GLUCOSE BLDC GLUCOMTR-MCNC: 232 MG/DL — HIGH (ref 70–99)
GLUCOSE BLDC GLUCOMTR-MCNC: 237 MG/DL — HIGH (ref 70–99)
GLUCOSE BLDC GLUCOMTR-MCNC: 238 MG/DL — HIGH (ref 70–99)
GLUCOSE BLDC GLUCOMTR-MCNC: 288 MG/DL — HIGH (ref 70–99)
GLUCOSE BLDV-MCNC: 565 MG/DL — CRITICAL HIGH (ref 70–99)
GLUCOSE SERPL-MCNC: 112 MG/DL — HIGH (ref 70–99)
GLUCOSE SERPL-MCNC: 160 MG/DL — HIGH (ref 70–99)
GLUCOSE SERPL-MCNC: 187 MG/DL — HIGH (ref 70–99)
GLUCOSE SERPL-MCNC: 215 MG/DL — HIGH (ref 70–99)
GLUCOSE SERPL-MCNC: 226 MG/DL — HIGH (ref 70–99)
GLUCOSE SERPL-MCNC: 477 MG/DL — CRITICAL HIGH (ref 70–99)
GLUCOSE UR QL: 1000 MG/DL
HCO3 BLDV-SCNC: 4 MMOL/L — CRITICAL LOW (ref 22–29)
HCT VFR BLD CALC: 49.2 % — SIGNIFICANT CHANGE UP (ref 39–50)
HCT VFR BLDA CALC: 51 % — SIGNIFICANT CHANGE UP
HGB BLD CALC-MCNC: 17.1 G/DL — SIGNIFICANT CHANGE UP (ref 12.6–17.4)
HGB BLD-MCNC: 16.6 G/DL — SIGNIFICANT CHANGE UP (ref 13–17)
IMM GRANULOCYTES NFR BLD AUTO: 1.3 % — HIGH (ref 0–0.9)
INR BLD: 0.84 RATIO — LOW (ref 0.85–1.18)
KETONES UR-MCNC: ABNORMAL
LACTATE BLDV-MCNC: 3.5 MMOL/L — HIGH (ref 0.5–2)
LACTATE SERPL-SCNC: 1.1 MMOL/L — SIGNIFICANT CHANGE UP (ref 0.5–2)
LEUKOCYTE ESTERASE UR-ACNC: NEGATIVE — SIGNIFICANT CHANGE UP
LIDOCAIN IGE QN: 13 U/L — LOW (ref 22–51)
LYMPHOCYTES # BLD AUTO: 1.84 K/UL — SIGNIFICANT CHANGE UP (ref 1–3.3)
LYMPHOCYTES # BLD AUTO: 13.6 % — SIGNIFICANT CHANGE UP (ref 13–44)
MAGNESIUM SERPL-MCNC: 1.6 MG/DL — SIGNIFICANT CHANGE UP (ref 1.6–2.6)
MAGNESIUM SERPL-MCNC: 1.7 MG/DL — SIGNIFICANT CHANGE UP (ref 1.6–2.6)
MAGNESIUM SERPL-MCNC: 1.8 MG/DL — SIGNIFICANT CHANGE UP (ref 1.6–2.6)
MAGNESIUM SERPL-MCNC: 2 MG/DL — SIGNIFICANT CHANGE UP (ref 1.6–2.6)
MAGNESIUM SERPL-MCNC: 2.4 MG/DL — SIGNIFICANT CHANGE UP (ref 1.6–2.6)
MAGNESIUM SERPL-MCNC: 2.4 MG/DL — SIGNIFICANT CHANGE UP (ref 1.8–2.6)
MCHC RBC-ENTMCNC: 31.5 PG — SIGNIFICANT CHANGE UP (ref 27–34)
MCHC RBC-ENTMCNC: 33.7 GM/DL — SIGNIFICANT CHANGE UP (ref 32–36)
MCV RBC AUTO: 93.4 FL — SIGNIFICANT CHANGE UP (ref 80–100)
MONOCYTES # BLD AUTO: 1.33 K/UL — HIGH (ref 0–0.9)
MONOCYTES NFR BLD AUTO: 9.9 % — SIGNIFICANT CHANGE UP (ref 2–14)
MRSA PCR RESULT.: SIGNIFICANT CHANGE UP
NEUTROPHILS # BLD AUTO: 10.06 K/UL — HIGH (ref 1.8–7.4)
NEUTROPHILS NFR BLD AUTO: 74.6 % — SIGNIFICANT CHANGE UP (ref 43–77)
NITRITE UR-MCNC: NEGATIVE — SIGNIFICANT CHANGE UP
PCO2 BLDV: 26 MMHG — LOW (ref 42–55)
PH BLDV: <6.942 — CRITICAL LOW (ref 7.32–7.43)
PH UR: 5 — SIGNIFICANT CHANGE UP (ref 5–8)
PHOSPHATE SERPL-MCNC: 1.1 MG/DL — LOW (ref 2.4–4.7)
PHOSPHATE SERPL-MCNC: 1.2 MG/DL — LOW (ref 2.4–4.7)
PHOSPHATE SERPL-MCNC: 1.4 MG/DL — LOW (ref 2.4–4.7)
PHOSPHATE SERPL-MCNC: 1.6 MG/DL — LOW (ref 2.4–4.7)
PHOSPHATE SERPL-MCNC: 1.8 MG/DL — LOW (ref 2.4–4.7)
PHOSPHATE SERPL-MCNC: 3.9 MG/DL — SIGNIFICANT CHANGE UP (ref 2.4–4.7)
PLATELET # BLD AUTO: 346 K/UL — SIGNIFICANT CHANGE UP (ref 150–400)
PO2 BLDV: 96 MMHG — HIGH (ref 25–45)
POTASSIUM BLDV-SCNC: 4.2 MMOL/L — SIGNIFICANT CHANGE UP (ref 3.5–5.1)
POTASSIUM SERPL-MCNC: 2.8 MMOL/L — CRITICAL LOW (ref 3.5–5.3)
POTASSIUM SERPL-MCNC: 3.1 MMOL/L — LOW (ref 3.5–5.3)
POTASSIUM SERPL-MCNC: 3.8 MMOL/L — SIGNIFICANT CHANGE UP (ref 3.5–5.3)
POTASSIUM SERPL-MCNC: 4 MMOL/L — SIGNIFICANT CHANGE UP (ref 3.5–5.3)
POTASSIUM SERPL-MCNC: 4.5 MMOL/L — SIGNIFICANT CHANGE UP (ref 3.5–5.3)
POTASSIUM SERPL-MCNC: 4.9 MMOL/L — SIGNIFICANT CHANGE UP (ref 3.5–5.3)
POTASSIUM SERPL-SCNC: 2.8 MMOL/L — CRITICAL LOW (ref 3.5–5.3)
POTASSIUM SERPL-SCNC: 3.1 MMOL/L — LOW (ref 3.5–5.3)
POTASSIUM SERPL-SCNC: 3.8 MMOL/L — SIGNIFICANT CHANGE UP (ref 3.5–5.3)
POTASSIUM SERPL-SCNC: 4 MMOL/L — SIGNIFICANT CHANGE UP (ref 3.5–5.3)
POTASSIUM SERPL-SCNC: 4.5 MMOL/L — SIGNIFICANT CHANGE UP (ref 3.5–5.3)
POTASSIUM SERPL-SCNC: 4.9 MMOL/L — SIGNIFICANT CHANGE UP (ref 3.5–5.3)
PROCALCITONIN SERPL-MCNC: 0.32 NG/ML — HIGH (ref 0.02–0.1)
PROT SERPL-MCNC: 5 G/DL — LOW (ref 6.6–8.7)
PROT SERPL-MCNC: 5.7 G/DL — LOW (ref 6.6–8.7)
PROT SERPL-MCNC: 6.3 G/DL — LOW (ref 6.6–8.7)
PROT SERPL-MCNC: 8.2 G/DL — SIGNIFICANT CHANGE UP (ref 6.6–8.7)
PROT UR-MCNC: 15
PROTHROM AB SERPL-ACNC: 9.4 SEC — LOW (ref 9.5–13)
RAPID RVP RESULT: SIGNIFICANT CHANGE UP
RBC # BLD: 5.27 M/UL — SIGNIFICANT CHANGE UP (ref 4.2–5.8)
RBC # FLD: 15.9 % — HIGH (ref 10.3–14.5)
RBC CASTS # UR COMP ASSIST: SIGNIFICANT CHANGE UP /HPF (ref 0–4)
S AUREUS DNA NOSE QL NAA+PROBE: DETECTED
SALICYLATES SERPL-MCNC: <0.6 MG/DL — LOW (ref 10–20)
SAO2 % BLDV: 95.7 % — SIGNIFICANT CHANGE UP
SARS-COV-2 RNA SPEC QL NAA+PROBE: SIGNIFICANT CHANGE UP
SODIUM SERPL-SCNC: 133 MMOL/L — LOW (ref 135–145)
SODIUM SERPL-SCNC: 133 MMOL/L — LOW (ref 135–145)
SODIUM SERPL-SCNC: 136 MMOL/L — SIGNIFICANT CHANGE UP (ref 135–145)
SODIUM SERPL-SCNC: 136 MMOL/L — SIGNIFICANT CHANGE UP (ref 135–145)
SODIUM SERPL-SCNC: 138 MMOL/L — SIGNIFICANT CHANGE UP (ref 135–145)
SODIUM SERPL-SCNC: 138 MMOL/L — SIGNIFICANT CHANGE UP (ref 135–145)
SP GR SPEC: 1.02 — SIGNIFICANT CHANGE UP (ref 1.01–1.02)
TROPONIN T SERPL-MCNC: <0.01 NG/ML — SIGNIFICANT CHANGE UP (ref 0–0.06)
UROBILINOGEN FLD QL: NEGATIVE MG/DL — SIGNIFICANT CHANGE UP
WBC # BLD: 13.48 K/UL — HIGH (ref 3.8–10.5)
WBC # FLD AUTO: 13.48 K/UL — HIGH (ref 3.8–10.5)
WBC UR QL: SIGNIFICANT CHANGE UP /HPF (ref 0–5)

## 2023-10-08 PROCEDURE — 93306 TTE W/DOPPLER COMPLETE: CPT | Mod: 26

## 2023-10-08 PROCEDURE — 71250 CT THORAX DX C-: CPT | Mod: 26

## 2023-10-08 PROCEDURE — 99291 CRITICAL CARE FIRST HOUR: CPT | Mod: 25

## 2023-10-08 PROCEDURE — 71045 X-RAY EXAM CHEST 1 VIEW: CPT | Mod: 26

## 2023-10-08 PROCEDURE — 74176 CT ABD & PELVIS W/O CONTRAST: CPT | Mod: 26

## 2023-10-08 PROCEDURE — 93010 ELECTROCARDIOGRAM REPORT: CPT | Mod: 76

## 2023-10-08 RX ORDER — POTASSIUM CHLORIDE 20 MEQ
40 PACKET (EA) ORAL ONCE
Refills: 0 | Status: COMPLETED | OUTPATIENT
Start: 2023-10-08 | End: 2023-10-08

## 2023-10-08 RX ORDER — INSULIN HUMAN 100 [IU]/ML
2 INJECTION, SOLUTION SUBCUTANEOUS
Qty: 100 | Refills: 0 | Status: DISCONTINUED | OUTPATIENT
Start: 2023-10-08 | End: 2023-10-09

## 2023-10-08 RX ORDER — INSULIN HUMAN 100 [IU]/ML
5 INJECTION, SOLUTION SUBCUTANEOUS
Qty: 100 | Refills: 0 | Status: DISCONTINUED | OUTPATIENT
Start: 2023-10-08 | End: 2023-10-09

## 2023-10-08 RX ORDER — SODIUM CHLORIDE 9 MG/ML
2000 INJECTION, SOLUTION INTRAVENOUS ONCE
Refills: 0 | Status: COMPLETED | OUTPATIENT
Start: 2023-10-08 | End: 2023-10-08

## 2023-10-08 RX ORDER — POTASSIUM CHLORIDE 20 MEQ
10 PACKET (EA) ORAL
Refills: 0 | Status: COMPLETED | OUTPATIENT
Start: 2023-10-08 | End: 2023-10-08

## 2023-10-08 RX ORDER — POTASSIUM PHOSPHATE, MONOBASIC POTASSIUM PHOSPHATE, DIBASIC 236; 224 MG/ML; MG/ML
30 INJECTION, SOLUTION INTRAVENOUS ONCE
Refills: 0 | Status: COMPLETED | OUTPATIENT
Start: 2023-10-08 | End: 2023-10-08

## 2023-10-08 RX ORDER — DEXTROSE 50 % IN WATER 50 %
12.5 SYRINGE (ML) INTRAVENOUS ONCE
Refills: 0 | Status: DISCONTINUED | OUTPATIENT
Start: 2023-10-08 | End: 2023-10-11

## 2023-10-08 RX ORDER — PANTOPRAZOLE SODIUM 20 MG/1
40 TABLET, DELAYED RELEASE ORAL DAILY
Refills: 0 | Status: DISCONTINUED | OUTPATIENT
Start: 2023-10-08 | End: 2023-10-09

## 2023-10-08 RX ORDER — SODIUM CHLORIDE 9 MG/ML
1000 INJECTION, SOLUTION INTRAVENOUS
Refills: 0 | Status: DISCONTINUED | OUTPATIENT
Start: 2023-10-08 | End: 2023-10-09

## 2023-10-08 RX ORDER — DEXTROSE 50 % IN WATER 50 %
25 SYRINGE (ML) INTRAVENOUS ONCE
Refills: 0 | Status: DISCONTINUED | OUTPATIENT
Start: 2023-10-08 | End: 2023-10-11

## 2023-10-08 RX ORDER — SODIUM BICARBONATE 1 MEQ/ML
50 SYRINGE (ML) INTRAVENOUS ONCE
Refills: 0 | Status: COMPLETED | OUTPATIENT
Start: 2023-10-08 | End: 2023-10-08

## 2023-10-08 RX ORDER — SODIUM CHLORIDE 9 MG/ML
1000 INJECTION, SOLUTION INTRAVENOUS
Refills: 0 | Status: DISCONTINUED | OUTPATIENT
Start: 2023-10-08 | End: 2023-10-08

## 2023-10-08 RX ORDER — THIAMINE MONONITRATE (VIT B1) 100 MG
100 TABLET ORAL DAILY
Refills: 0 | Status: DISCONTINUED | OUTPATIENT
Start: 2023-10-08 | End: 2023-10-11

## 2023-10-08 RX ORDER — DEXTROSE 50 % IN WATER 50 %
15 SYRINGE (ML) INTRAVENOUS ONCE
Refills: 0 | Status: DISCONTINUED | OUTPATIENT
Start: 2023-10-08 | End: 2023-10-11

## 2023-10-08 RX ORDER — INSULIN LISPRO 100/ML
VIAL (ML) SUBCUTANEOUS EVERY 6 HOURS
Refills: 0 | Status: DISCONTINUED | OUTPATIENT
Start: 2023-10-08 | End: 2023-10-08

## 2023-10-08 RX ORDER — SODIUM CHLORIDE 9 MG/ML
1000 INJECTION, SOLUTION INTRAVENOUS ONCE
Refills: 0 | Status: COMPLETED | OUTPATIENT
Start: 2023-10-08 | End: 2023-10-08

## 2023-10-08 RX ORDER — MAGNESIUM SULFATE 500 MG/ML
2 VIAL (ML) INJECTION ONCE
Refills: 0 | Status: COMPLETED | OUTPATIENT
Start: 2023-10-08 | End: 2023-10-08

## 2023-10-08 RX ORDER — POTASSIUM CHLORIDE 20 MEQ
40 PACKET (EA) ORAL EVERY 4 HOURS
Refills: 0 | Status: DISCONTINUED | OUTPATIENT
Start: 2023-10-08 | End: 2023-10-08

## 2023-10-08 RX ORDER — MUPIROCIN 20 MG/G
1 OINTMENT TOPICAL
Refills: 0 | Status: DISCONTINUED | OUTPATIENT
Start: 2023-10-08 | End: 2023-10-11

## 2023-10-08 RX ORDER — FOLIC ACID 0.8 MG
1 TABLET ORAL DAILY
Refills: 0 | Status: DISCONTINUED | OUTPATIENT
Start: 2023-10-08 | End: 2023-10-11

## 2023-10-08 RX ORDER — INSULIN HUMAN 100 [IU]/ML
7 INJECTION, SOLUTION SUBCUTANEOUS
Qty: 100 | Refills: 0 | Status: DISCONTINUED | OUTPATIENT
Start: 2023-10-08 | End: 2023-10-08

## 2023-10-08 RX ORDER — INSULIN HUMAN 100 [IU]/ML
4 INJECTION, SOLUTION SUBCUTANEOUS
Qty: 100 | Refills: 0 | Status: DISCONTINUED | OUTPATIENT
Start: 2023-10-08 | End: 2023-10-08

## 2023-10-08 RX ORDER — HEPARIN SODIUM 5000 [USP'U]/ML
5000 INJECTION INTRAVENOUS; SUBCUTANEOUS EVERY 8 HOURS
Refills: 0 | Status: DISCONTINUED | OUTPATIENT
Start: 2023-10-08 | End: 2023-10-09

## 2023-10-08 RX ORDER — GLUCAGON INJECTION, SOLUTION 0.5 MG/.1ML
1 INJECTION, SOLUTION SUBCUTANEOUS ONCE
Refills: 0 | Status: DISCONTINUED | OUTPATIENT
Start: 2023-10-08 | End: 2023-10-11

## 2023-10-08 RX ORDER — INFLUENZA VIRUS VACCINE 15; 15; 15; 15 UG/.5ML; UG/.5ML; UG/.5ML; UG/.5ML
0.5 SUSPENSION INTRAMUSCULAR ONCE
Refills: 0 | Status: DISCONTINUED | OUTPATIENT
Start: 2023-10-08 | End: 2023-10-11

## 2023-10-08 RX ORDER — SODIUM CHLORIDE 9 MG/ML
1000 INJECTION, SOLUTION INTRAVENOUS
Refills: 0 | Status: DISCONTINUED | OUTPATIENT
Start: 2023-10-08 | End: 2023-10-11

## 2023-10-08 RX ORDER — DIAZEPAM 5 MG
10 TABLET ORAL
Refills: 0 | Status: DISCONTINUED | OUTPATIENT
Start: 2023-10-08 | End: 2023-10-10

## 2023-10-08 RX ADMIN — INSULIN HUMAN 4 UNIT(S)/HR: 100 INJECTION, SOLUTION SUBCUTANEOUS at 10:06

## 2023-10-08 RX ADMIN — Medication 100 MILLIEQUIVALENT(S): at 15:30

## 2023-10-08 RX ADMIN — Medication 40 MILLIEQUIVALENT(S): at 14:25

## 2023-10-08 RX ADMIN — SODIUM CHLORIDE 1000 MILLILITER(S): 9 INJECTION, SOLUTION INTRAVENOUS at 22:27

## 2023-10-08 RX ADMIN — Medication 100 MILLIEQUIVALENT(S): at 08:33

## 2023-10-08 RX ADMIN — Medication 50 MILLIEQUIVALENT(S): at 04:25

## 2023-10-08 RX ADMIN — SODIUM CHLORIDE 150 MILLILITER(S): 9 INJECTION, SOLUTION INTRAVENOUS at 07:52

## 2023-10-08 RX ADMIN — Medication 100 MILLIEQUIVALENT(S): at 04:26

## 2023-10-08 RX ADMIN — INSULIN HUMAN 7 UNIT(S)/HR: 100 INJECTION, SOLUTION SUBCUTANEOUS at 04:28

## 2023-10-08 RX ADMIN — SODIUM CHLORIDE 200 MILLILITER(S): 9 INJECTION, SOLUTION INTRAVENOUS at 07:09

## 2023-10-08 RX ADMIN — Medication 100 MILLIEQUIVALENT(S): at 14:14

## 2023-10-08 RX ADMIN — Medication 40 MILLIEQUIVALENT(S): at 15:14

## 2023-10-08 RX ADMIN — POTASSIUM PHOSPHATE, MONOBASIC POTASSIUM PHOSPHATE, DIBASIC 83.33 MILLIMOLE(S): 236; 224 INJECTION, SOLUTION INTRAVENOUS at 21:47

## 2023-10-08 RX ADMIN — SODIUM CHLORIDE 2000 MILLILITER(S): 9 INJECTION, SOLUTION INTRAVENOUS at 04:15

## 2023-10-08 RX ADMIN — SODIUM CHLORIDE 1000 MILLILITER(S): 9 INJECTION, SOLUTION INTRAVENOUS at 06:07

## 2023-10-08 RX ADMIN — HEPARIN SODIUM 5000 UNIT(S): 5000 INJECTION INTRAVENOUS; SUBCUTANEOUS at 13:00

## 2023-10-08 RX ADMIN — HEPARIN SODIUM 5000 UNIT(S): 5000 INJECTION INTRAVENOUS; SUBCUTANEOUS at 22:29

## 2023-10-08 RX ADMIN — Medication 25 GRAM(S): at 14:15

## 2023-10-08 RX ADMIN — SODIUM CHLORIDE 1000 MILLILITER(S): 9 INJECTION, SOLUTION INTRAVENOUS at 04:29

## 2023-10-08 RX ADMIN — Medication 40 MILLIEQUIVALENT(S): at 23:52

## 2023-10-08 RX ADMIN — INSULIN HUMAN 7 UNIT(S)/HR: 100 INJECTION, SOLUTION SUBCUTANEOUS at 07:53

## 2023-10-08 RX ADMIN — PANTOPRAZOLE SODIUM 40 MILLIGRAM(S): 20 TABLET, DELAYED RELEASE ORAL at 12:59

## 2023-10-08 RX ADMIN — MUPIROCIN 1 APPLICATION(S): 20 OINTMENT TOPICAL at 17:02

## 2023-10-08 RX ADMIN — Medication 100 MILLIEQUIVALENT(S): at 06:07

## 2023-10-08 RX ADMIN — Medication 100 MILLIEQUIVALENT(S): at 16:33

## 2023-10-08 NOTE — ED ADULT NURSE NOTE - CHIEF COMPLAINT QUOTE
Pt p/w c/o hyperglycemia. T2D on metformin and insulin (compliant with meds). Endorsing sudden onset SOB and nausea/vomiting that began 2 hours ago. Pt. tachpyneic and tachycardic.  in triage. Brought to CC.

## 2023-10-08 NOTE — ED ADULT NURSE NOTE - OBJECTIVE STATEMENT
Pt A&OX4. PT coming in with SOB and chest pain.  PT stated that he has been experiencing abdominal pain, nausea and vomiting for a few hours prior to coming into ED.  Pt has a hx of DM2 and stated that he is compliant with drug regimen.  Pt stated that he has not taken his insulin today and stated that he has been unable to take oral medications due to vomiting. Pt also stated that he has a hx of a-fib and has not taken his blood thinners in 3 months. Pt brought from walk in triage to CC.

## 2023-10-08 NOTE — ED PROVIDER NOTE - PHYSICAL EXAMINATION
Gen: minimal distress 2/2 tachypnea  Head: NC/AT  Neck: trachea midline  Card: regular rate and rhythm  Resp:  CTAB, tachypneic  Abd: soft, non-distended, mild epigastric ttp  Ext: no deformities above reported baseline  Neuro:  A&O, no motor or sensory deficits above reported baseline  Skin:  Warm and dry as visualized  Psych:  Normal affect and mood

## 2023-10-08 NOTE — PROVIDER CONTACT NOTE (CRITICAL VALUE NOTIFICATION) - BACKGROUND
32YOM presents with nausea, DX DKA
32 YOM presents with sob, nausea vomitng, abd pain DX DKA
32YOM presents with sob, abd pain, nausea vomiting
32YOM presents with nausea, DX DKA

## 2023-10-08 NOTE — PATIENT PROFILE ADULT - FALL HARM RISK - RISK INTERVENTIONS

## 2023-10-08 NOTE — ED PROVIDER NOTE - OBJECTIVE STATEMENT
33 y/o M pt w/ pmhx of dm2 on insulin and metformin presenting w/ abd pain and nausea/vomiting today. Pt felt ok yesterday but woke up w/ nausea and vomiting. FS was >400 in ED. Reports compliance w/ meds. He denies fever, chills, diarrhea, chest pain, SOB, cough, dysuria, hematuria, or any other complaints.

## 2023-10-08 NOTE — H&P ADULT - NSHPLABSRESULTS_GEN_ALL_CORE
\             16.6   13.48 )-----------( 346      ( 08 Oct 2023 03:33 )             49.2     10-08    136  |  96  |  12.0  ----------------------------<  477<HH>  4.9   |  <6.0<LL>  |  1.01    Ca    9.8      08 Oct 2023 03:33  Phos  3.9     10-08  Mg     2.4     10-08    TPro  8.2  /  Alb  4.8  /  TBili  0.4  /  DBili  x   /  AST  21  /  ALT  13  /  AlkPhos  206<H>  10-08    LIVER FUNCTIONS - ( 08 Oct 2023 03:33 )  Alb: 4.8 g/dL / Pro: 8.2 g/dL / ALK PHOS: 206 U/L / ALT: 13 U/L / AST: 21 U/L / GGT: x             Urinalysis Basic - ( 08 Oct 2023 03:33 )  Color: x / Appearance: x / SG: x / pH: x  Gluc: 477 mg/dL / Ketone: x  / Bili: x / Urobili: x   Blood: x / Protein: x / Nitrite: x   Leuk Esterase: x / RBC: x / WBC x   Sq Epi: x / Non Sq Epi: x / Bacteria: x      MICROBIOLOGY/CULTURES:      RADIOLOGY RESULTS:

## 2023-10-08 NOTE — ED ADULT TRIAGE NOTE - LOCATION:
Pleurx placed with minimal sedation. Pt transferred to unit in stable condition. Report called to RN on PCU.   
Procedure: L thoracentesis with 900 mL serosanguinous fluid removed   Sedation: N/A  Specimens: N/A  O2: 10 L OxyPlus Mask    Patient tolerated procedure well. CXR ordered post-procedure. Patient stable on transfer to floor. Report called to MARYBEL Zarco.         
Left arm;

## 2023-10-08 NOTE — H&P ADULT - HISTORY OF PRESENT ILLNESS
31 y/o Male with PMH of DM Type 2, ?Afib (On Eliquis, Stopped taking x3 months), ETOH Abuse presents to [facility] ED complaining of shortness of breath. Reports progressive worsening shortness of breath starting this AM. Admits to associated abdominal pain x2 days. Describes as intermittent cramping. Endorses multiple episodes of vomiting. Describes as non-bilious non-bloody. Admits to decreased PO intake secondary to nausea. Last meal Friday AM. Denies Insulin use yesterday or Friday. Of note, patient takes Metformin and Pre-meal Insulin for DM management.       On presentation to ED, labs significant for leukocytosis (WBC 13.48), hyperglycemia (Blood Glucose 477), acidosis (Serum CO2 <6), Acetones+ and AG undetectable.

## 2023-10-08 NOTE — ED PROVIDER NOTE - CLINICAL SUMMARY MEDICAL DECISION MAKING FREE TEXT BOX
pt w/ pmhx of dm on insulin presenting w/ nausea/vomiting and hyperglycemia. Labs, EKG, urine, vbg, LR ordered.

## 2023-10-08 NOTE — H&P ADULT - ASSESSMENT
33 y/o Male with PMH of DM Type 2, ?Afib (On Eliquis, Stopped taking x3 months), ETOH Abuse presents to [facility] ED complaining of shortness of breath with:       1. DKA  2. Severe Metabolic Encephalopathy       -Admit to ICU   - 31 y/o Male with PMH of DM Type 2, ?Afib (On Eliquis, Stopped taking x3 months), ETOH Abuse presents to [facility] ED complaining of shortness of breath with:       1. DKA  2. Severe Metabolic Encephalopathy           -Patient with history of abdominal pain, nausea and intermittent vomiting. Reports poor compliance with Insulin secondary to decreased PO intake. Blood Glucose >400 on admission, serum CO2 <6 , Acetones + and AG undetectable. Initiated on Insulin gtt, 10U. BG Q1hrs. Repeat CMP @9:00. Serial BMPs Q4-6hrs. VBG pH <7.0, in setting of profound tachypnea will give dose of Na Bicarbonate. Respiratory status likely Kussmaul respirations in setting of acidemia. Etiology uncertain at this time, likely multifactorial in setting non-compliance with medications v underlying infection.   -Hemodynamically stable. Tachycardic, likely secondary to profound dehydration. EKG ordered, uncertain if AFib on monitor. Possible history of Afib, reports recent history of Eliquis however non-compliant x3 months. Coags ordered. TTE ordered, for r/o atrial thrombus. Patient not on antiarrhythmia   -NPO. GI prophylaxis with Protonix. Patient with history of ?pancreatitis, however Lipase negative.    - s/p 3L NS in ED. Will give additional 1L LR bolus. Initiate on maintenance fluids with LR @200/hr. Trend labs replete lytes aggressively as needed to maintain K>4, Mg >3, Phos >2.   -A1C in AM. Once transitioned off Insulin gtt, will initiate on Insulin Lantus and Premeal. BG ACHS.   -Leukocytosis, likely reactive. Afebrile.  CT Chest and AP ordered, r/o underlying infection. BCx, UA and UCx ordered, results pending. MRSA and RVP ordered to rule out viral infection. Monitor off antibiotics. Trend WBC and fever.            CRITICAL CARE TIME SPENT: 109 minutes assessing presenting problems of acute illness, which pose high probability of life threatening deterioration or end organ damage/dysfunction, as well as medical decision making including initiating plan of care, reviewing data, reviewing radiologic exams, discussing with multidisciplinary team,  discussing goals of care with patient/family, and writing this note.  Non-inclusive of procedures performed

## 2023-10-08 NOTE — H&P ADULT - NSHPREVIEWOFSYSTEMS_GEN_ALL_CORE
CONSTITUTIONAL: +Fatigue. No fever or weight loss.  EYES: No eye pain, visual disturbances, or discharge.  ENMT:  No difficulty hearing, tinnitus, vertigo. No sinus or throat pain.  NECK: No pain or stiffness.  RESPIRATORY: +Shortness of breath. No cough, wheezing, chills or hemoptysis.  CARDIOVASCULAR: +Chest pain. No palpitations, dizziness, or leg swelling.  GASTROINTESTINAL: + Abdominal pain, vomiting, and nausea. No epigastric pain. No hematemesis. No diarrhea or constipation. No melena or hematochezia.  GENITOURINARY: No dysuria, frequency, hematuria, or incontinence.  NEUROLOGICAL: No headaches, memory loss, loss of strength, numbness, or tremors.  SKIN: No itching, burning, rashes, or lesions.  ENDOCRINE: No heat or cold intolerance. No hair loss.  MUSCULOSKELETAL: No joint pain or swelling. No muscle, back, or extremity pain.  PSYCHIATRIC: No depression, anxiety, mood swings, or difficulty sleeping.

## 2023-10-08 NOTE — H&P ADULT - NS PANP COMMENT GEN_ALL_CORE FT
DKA , unclear trigger   Start IV fluids and insulin.   Check BMP, VBG, Ph, and Mg Q 4hrs  EKG, trops, RVP , CXR   Rest of plan as above

## 2023-10-08 NOTE — ED ADULT NURSE NOTE - NSFALLUNIVINTERV_ED_ALL_ED
Bed/Stretcher in lowest position, wheels locked, appropriate side rails in place/Call bell, personal items and telephone in reach/Instruct patient to call for assistance before getting out of bed/chair/stretcher/Non-slip footwear applied when patient is off stretcher/[place] to call system/Physically safe environment - no spills, clutter or unnecessary equipment/Purposeful proactive rounding/Room/bathroom lighting operational, light cord in reach

## 2023-10-08 NOTE — PROVIDER CONTACT NOTE (CRITICAL VALUE NOTIFICATION) - ACTION/TREATMENT ORDERED:
Aware, hold insulin, repeate K, 3 k riders, 40meg K powder, 2g mag
Aware, no intervention at this time

## 2023-10-08 NOTE — H&P ADULT - NSHPPHYSICALEXAM_GEN_ALL_CORE
GENERAL: Middle ages male, disheveled. In moderate respiratory distress.   HEAD:  Atraumatic, Normocephalic.  EYES: EOMI, PERRLA, conjunctiva and sclera clear.  ENMT: No tonsillar erythema, exudates, or enlargement; Dry mucous membranes.  NERVOUS SYSTEM:  Alert & Oriented X3, Good concentration. Answering questions appropriately, following commands. Strength intact B/L UE and LE.   CHEST/LUNG: Clear to percussion bilaterally; No rales, rhonchi, wheezing, or rubs. No significant sputum production.   HEART: Irregular rate, regular rhythm; No murmurs, rubs, or gallops. S1, S2 intact.   ABDOMEN: Soft, Nontender, Nondistended; Bowel sounds present. No masses palpable.   EXTREMITIES:  2+ Peripheral Pulses, No clubbing, cyanosis, or edema.  SKIN: Warm and dry. No rashes or lesions

## 2023-10-08 NOTE — PROVIDER CONTACT NOTE (CRITICAL VALUE NOTIFICATION) - ASSESSMENT
No EKG changes pt denies pain or discomfort, vitals in flowsheet
vitals in flowsheet
No changes, vitals in flowsheet, insulin back on at 4ml/hr
vitals in flowsheet

## 2023-10-09 LAB
ALBUMIN SERPL ELPH-MCNC: 2.7 G/DL — LOW (ref 3.3–5.2)
ALBUMIN SERPL ELPH-MCNC: 2.8 G/DL — LOW (ref 3.3–5.2)
ALP SERPL-CCNC: 89 U/L — SIGNIFICANT CHANGE UP (ref 40–120)
ALP SERPL-CCNC: 94 U/L — SIGNIFICANT CHANGE UP (ref 40–120)
ALT FLD-CCNC: 6 U/L — SIGNIFICANT CHANGE UP
ALT FLD-CCNC: 7 U/L — SIGNIFICANT CHANGE UP
ANION GAP SERPL CALC-SCNC: 10 MMOL/L — SIGNIFICANT CHANGE UP (ref 5–17)
ANION GAP SERPL CALC-SCNC: 11 MMOL/L — SIGNIFICANT CHANGE UP (ref 5–17)
ANION GAP SERPL CALC-SCNC: 11 MMOL/L — SIGNIFICANT CHANGE UP (ref 5–17)
ANION GAP SERPL CALC-SCNC: 12 MMOL/L — SIGNIFICANT CHANGE UP (ref 5–17)
ANISOCYTOSIS BLD QL: SLIGHT — SIGNIFICANT CHANGE UP
AST SERPL-CCNC: 10 U/L — SIGNIFICANT CHANGE UP
AST SERPL-CCNC: 7 U/L — SIGNIFICANT CHANGE UP
BASOPHILS # BLD AUTO: 0 K/UL — SIGNIFICANT CHANGE UP (ref 0–0.2)
BASOPHILS NFR BLD AUTO: 0 % — SIGNIFICANT CHANGE UP (ref 0–2)
BILIRUB SERPL-MCNC: 0.5 MG/DL — SIGNIFICANT CHANGE UP (ref 0.4–2)
BILIRUB SERPL-MCNC: 0.5 MG/DL — SIGNIFICANT CHANGE UP (ref 0.4–2)
BUN SERPL-MCNC: 4.7 MG/DL — LOW (ref 8–20)
BUN SERPL-MCNC: 5.2 MG/DL — LOW (ref 8–20)
BUN SERPL-MCNC: 5.2 MG/DL — LOW (ref 8–20)
BUN SERPL-MCNC: 5.3 MG/DL — LOW (ref 8–20)
CALCIUM SERPL-MCNC: 7.6 MG/DL — LOW (ref 8.4–10.5)
CALCIUM SERPL-MCNC: 7.7 MG/DL — LOW (ref 8.4–10.5)
CALCIUM SERPL-MCNC: 7.7 MG/DL — LOW (ref 8.4–10.5)
CALCIUM SERPL-MCNC: 7.9 MG/DL — LOW (ref 8.4–10.5)
CHLORIDE SERPL-SCNC: 104 MMOL/L — SIGNIFICANT CHANGE UP (ref 96–108)
CHLORIDE SERPL-SCNC: 104 MMOL/L — SIGNIFICANT CHANGE UP (ref 96–108)
CHLORIDE SERPL-SCNC: 106 MMOL/L — SIGNIFICANT CHANGE UP (ref 96–108)
CHLORIDE SERPL-SCNC: 110 MMOL/L — HIGH (ref 96–108)
CO2 SERPL-SCNC: 17 MMOL/L — LOW (ref 22–29)
CO2 SERPL-SCNC: 17 MMOL/L — LOW (ref 22–29)
CO2 SERPL-SCNC: 18 MMOL/L — LOW (ref 22–29)
CO2 SERPL-SCNC: 19 MMOL/L — LOW (ref 22–29)
CREAT SERPL-MCNC: 0.42 MG/DL — LOW (ref 0.5–1.3)
CREAT SERPL-MCNC: 0.48 MG/DL — LOW (ref 0.5–1.3)
CREAT SERPL-MCNC: 0.5 MG/DL — SIGNIFICANT CHANGE UP (ref 0.5–1.3)
CREAT SERPL-MCNC: 0.51 MG/DL — SIGNIFICANT CHANGE UP (ref 0.5–1.3)
EGFR: 137 ML/MIN/1.73M2 — SIGNIFICANT CHANGE UP
EGFR: 138 ML/MIN/1.73M2 — SIGNIFICANT CHANGE UP
EGFR: 140 ML/MIN/1.73M2 — SIGNIFICANT CHANGE UP
EGFR: 146 ML/MIN/1.73M2 — SIGNIFICANT CHANGE UP
EOSINOPHIL # BLD AUTO: 0 K/UL — SIGNIFICANT CHANGE UP (ref 0–0.5)
EOSINOPHIL NFR BLD AUTO: 0 % — SIGNIFICANT CHANGE UP (ref 0–6)
GIANT PLATELETS BLD QL SMEAR: PRESENT — SIGNIFICANT CHANGE UP
GLUCOSE BLDC GLUCOMTR-MCNC: 113 MG/DL — HIGH (ref 70–99)
GLUCOSE BLDC GLUCOMTR-MCNC: 132 MG/DL — HIGH (ref 70–99)
GLUCOSE BLDC GLUCOMTR-MCNC: 149 MG/DL — HIGH (ref 70–99)
GLUCOSE BLDC GLUCOMTR-MCNC: 149 MG/DL — HIGH (ref 70–99)
GLUCOSE BLDC GLUCOMTR-MCNC: 163 MG/DL — HIGH (ref 70–99)
GLUCOSE BLDC GLUCOMTR-MCNC: 165 MG/DL — HIGH (ref 70–99)
GLUCOSE BLDC GLUCOMTR-MCNC: 175 MG/DL — HIGH (ref 70–99)
GLUCOSE BLDC GLUCOMTR-MCNC: 176 MG/DL — HIGH (ref 70–99)
GLUCOSE BLDC GLUCOMTR-MCNC: 181 MG/DL — HIGH (ref 70–99)
GLUCOSE BLDC GLUCOMTR-MCNC: 185 MG/DL — HIGH (ref 70–99)
GLUCOSE BLDC GLUCOMTR-MCNC: 200 MG/DL — HIGH (ref 70–99)
GLUCOSE BLDC GLUCOMTR-MCNC: 205 MG/DL — HIGH (ref 70–99)
GLUCOSE BLDC GLUCOMTR-MCNC: 265 MG/DL — HIGH (ref 70–99)
GLUCOSE BLDC GLUCOMTR-MCNC: 95 MG/DL — SIGNIFICANT CHANGE UP (ref 70–99)
GLUCOSE BLDC GLUCOMTR-MCNC: 98 MG/DL — SIGNIFICANT CHANGE UP (ref 70–99)
GLUCOSE SERPL-MCNC: 163 MG/DL — HIGH (ref 70–99)
GLUCOSE SERPL-MCNC: 173 MG/DL — HIGH (ref 70–99)
GLUCOSE SERPL-MCNC: 207 MG/DL — HIGH (ref 70–99)
GLUCOSE SERPL-MCNC: 217 MG/DL — HIGH (ref 70–99)
HCT VFR BLD CALC: 26 % — LOW (ref 39–50)
HCT VFR BLD CALC: 27.7 % — LOW (ref 39–50)
HGB BLD-MCNC: 10 G/DL — LOW (ref 13–17)
HGB BLD-MCNC: 9.2 G/DL — LOW (ref 13–17)
LYMPHOCYTES # BLD AUTO: 1.39 K/UL — SIGNIFICANT CHANGE UP (ref 1–3.3)
LYMPHOCYTES # BLD AUTO: 31.9 % — SIGNIFICANT CHANGE UP (ref 13–44)
MAGNESIUM SERPL-MCNC: 1.8 MG/DL — SIGNIFICANT CHANGE UP (ref 1.6–2.6)
MANUAL SMEAR VERIFICATION: SIGNIFICANT CHANGE UP
MCHC RBC-ENTMCNC: 30.6 PG — SIGNIFICANT CHANGE UP (ref 27–34)
MCHC RBC-ENTMCNC: 31.7 PG — SIGNIFICANT CHANGE UP (ref 27–34)
MCHC RBC-ENTMCNC: 35.4 GM/DL — SIGNIFICANT CHANGE UP (ref 32–36)
MCHC RBC-ENTMCNC: 36.1 GM/DL — HIGH (ref 32–36)
MCV RBC AUTO: 86.4 FL — SIGNIFICANT CHANGE UP (ref 80–100)
MCV RBC AUTO: 87.9 FL — SIGNIFICANT CHANGE UP (ref 80–100)
MONOCYTES # BLD AUTO: 0.46 K/UL — SIGNIFICANT CHANGE UP (ref 0–0.9)
MONOCYTES NFR BLD AUTO: 10.6 % — SIGNIFICANT CHANGE UP (ref 2–14)
NEUTROPHILS # BLD AUTO: 2.51 K/UL — SIGNIFICANT CHANGE UP (ref 1.8–7.4)
NEUTROPHILS NFR BLD AUTO: 57.5 % — SIGNIFICANT CHANGE UP (ref 43–77)
NRBC # BLD: 2 /100 — HIGH (ref 0–0)
PHOSPHATE SERPL-MCNC: 3 MG/DL — SIGNIFICANT CHANGE UP (ref 2.4–4.7)
PLAT MORPH BLD: NORMAL — SIGNIFICANT CHANGE UP
PLATELET # BLD AUTO: 170 K/UL — SIGNIFICANT CHANGE UP (ref 150–400)
PLATELET # BLD AUTO: SIGNIFICANT CHANGE UP K/UL (ref 150–400)
POLYCHROMASIA BLD QL SMEAR: SLIGHT — SIGNIFICANT CHANGE UP
POTASSIUM SERPL-MCNC: 3.1 MMOL/L — LOW (ref 3.5–5.3)
POTASSIUM SERPL-MCNC: 3.5 MMOL/L — SIGNIFICANT CHANGE UP (ref 3.5–5.3)
POTASSIUM SERPL-MCNC: 3.6 MMOL/L — SIGNIFICANT CHANGE UP (ref 3.5–5.3)
POTASSIUM SERPL-MCNC: 3.8 MMOL/L — SIGNIFICANT CHANGE UP (ref 3.5–5.3)
POTASSIUM SERPL-SCNC: 3.1 MMOL/L — LOW (ref 3.5–5.3)
POTASSIUM SERPL-SCNC: 3.5 MMOL/L — SIGNIFICANT CHANGE UP (ref 3.5–5.3)
POTASSIUM SERPL-SCNC: 3.6 MMOL/L — SIGNIFICANT CHANGE UP (ref 3.5–5.3)
POTASSIUM SERPL-SCNC: 3.8 MMOL/L — SIGNIFICANT CHANGE UP (ref 3.5–5.3)
PROT SERPL-MCNC: 4.5 G/DL — LOW (ref 6.6–8.7)
PROT SERPL-MCNC: 4.8 G/DL — LOW (ref 6.6–8.7)
RBC # BLD: 3.01 M/UL — LOW (ref 4.2–5.8)
RBC # BLD: 3.15 M/UL — LOW (ref 4.2–5.8)
RBC # FLD: 15.8 % — HIGH (ref 10.3–14.5)
RBC # FLD: 16 % — HIGH (ref 10.3–14.5)
RBC BLD AUTO: NORMAL — SIGNIFICANT CHANGE UP
SODIUM SERPL-SCNC: 133 MMOL/L — LOW (ref 135–145)
SODIUM SERPL-SCNC: 134 MMOL/L — LOW (ref 135–145)
SODIUM SERPL-SCNC: 135 MMOL/L — SIGNIFICANT CHANGE UP (ref 135–145)
SODIUM SERPL-SCNC: 137 MMOL/L — SIGNIFICANT CHANGE UP (ref 135–145)
WBC # BLD: 3.76 K/UL — LOW (ref 3.8–10.5)
WBC # BLD: 4.36 K/UL — SIGNIFICANT CHANGE UP (ref 3.8–10.5)
WBC # FLD AUTO: 3.76 K/UL — LOW (ref 3.8–10.5)
WBC # FLD AUTO: 4.36 K/UL — SIGNIFICANT CHANGE UP (ref 3.8–10.5)

## 2023-10-09 PROCEDURE — 99222 1ST HOSP IP/OBS MODERATE 55: CPT

## 2023-10-09 PROCEDURE — 99233 SBSQ HOSP IP/OBS HIGH 50: CPT

## 2023-10-09 RX ORDER — APIXABAN 2.5 MG/1
5 TABLET, FILM COATED ORAL EVERY 12 HOURS
Refills: 0 | Status: DISCONTINUED | OUTPATIENT
Start: 2023-10-09 | End: 2023-10-11

## 2023-10-09 RX ORDER — SODIUM CHLORIDE 9 MG/ML
2000 INJECTION, SOLUTION INTRAVENOUS
Refills: 0 | Status: DISCONTINUED | OUTPATIENT
Start: 2023-10-09 | End: 2023-10-10

## 2023-10-09 RX ORDER — LEVETIRACETAM 250 MG/1
1000 TABLET, FILM COATED ORAL
Refills: 0 | Status: DISCONTINUED | OUTPATIENT
Start: 2023-10-09 | End: 2023-10-11

## 2023-10-09 RX ORDER — INSULIN LISPRO 100/ML
VIAL (ML) SUBCUTANEOUS
Refills: 0 | Status: DISCONTINUED | OUTPATIENT
Start: 2023-10-09 | End: 2023-10-11

## 2023-10-09 RX ORDER — POTASSIUM CHLORIDE 20 MEQ
40 PACKET (EA) ORAL EVERY 4 HOURS
Refills: 0 | Status: COMPLETED | OUTPATIENT
Start: 2023-10-09 | End: 2023-10-09

## 2023-10-09 RX ORDER — POTASSIUM CHLORIDE 20 MEQ
10 PACKET (EA) ORAL
Refills: 0 | Status: COMPLETED | OUTPATIENT
Start: 2023-10-09 | End: 2023-10-09

## 2023-10-09 RX ORDER — POTASSIUM CHLORIDE 20 MEQ
40 PACKET (EA) ORAL ONCE
Refills: 0 | Status: DISCONTINUED | OUTPATIENT
Start: 2023-10-09 | End: 2023-10-09

## 2023-10-09 RX ORDER — MAGNESIUM SULFATE 500 MG/ML
2 VIAL (ML) INJECTION ONCE
Refills: 0 | Status: COMPLETED | OUTPATIENT
Start: 2023-10-09 | End: 2023-10-09

## 2023-10-09 RX ORDER — CHLORHEXIDINE GLUCONATE 213 G/1000ML
1 SOLUTION TOPICAL DAILY
Refills: 0 | Status: DISCONTINUED | OUTPATIENT
Start: 2023-10-09 | End: 2023-10-11

## 2023-10-09 RX ORDER — INSULIN GLARGINE 100 [IU]/ML
25 INJECTION, SOLUTION SUBCUTANEOUS ONCE
Refills: 0 | Status: COMPLETED | OUTPATIENT
Start: 2023-10-09 | End: 2023-10-09

## 2023-10-09 RX ADMIN — HEPARIN SODIUM 5000 UNIT(S): 5000 INJECTION INTRAVENOUS; SUBCUTANEOUS at 06:35

## 2023-10-09 RX ADMIN — Medication 6: at 16:32

## 2023-10-09 RX ADMIN — PANTOPRAZOLE SODIUM 40 MILLIGRAM(S): 20 TABLET, DELAYED RELEASE ORAL at 11:25

## 2023-10-09 RX ADMIN — Medication 40 MILLIEQUIVALENT(S): at 13:43

## 2023-10-09 RX ADMIN — Medication 1 MILLIGRAM(S): at 11:24

## 2023-10-09 RX ADMIN — SODIUM CHLORIDE 150 MILLILITER(S): 9 INJECTION, SOLUTION INTRAVENOUS at 10:05

## 2023-10-09 RX ADMIN — SODIUM CHLORIDE 70 MILLILITER(S): 9 INJECTION, SOLUTION INTRAVENOUS at 15:47

## 2023-10-09 RX ADMIN — MUPIROCIN 1 APPLICATION(S): 20 OINTMENT TOPICAL at 06:35

## 2023-10-09 RX ADMIN — SODIUM CHLORIDE 70 MILLILITER(S): 9 INJECTION, SOLUTION INTRAVENOUS at 12:05

## 2023-10-09 RX ADMIN — Medication 100 MILLIEQUIVALENT(S): at 10:05

## 2023-10-09 RX ADMIN — INSULIN GLARGINE 25 UNIT(S): 100 INJECTION, SOLUTION SUBCUTANEOUS at 10:06

## 2023-10-09 RX ADMIN — Medication 40 MILLIEQUIVALENT(S): at 11:25

## 2023-10-09 RX ADMIN — Medication 100 MILLIEQUIVALENT(S): at 08:52

## 2023-10-09 RX ADMIN — MUPIROCIN 1 APPLICATION(S): 20 OINTMENT TOPICAL at 19:08

## 2023-10-09 RX ADMIN — Medication 25 GRAM(S): at 08:52

## 2023-10-09 RX ADMIN — Medication 100 MILLIGRAM(S): at 11:24

## 2023-10-09 RX ADMIN — Medication 100 MILLIEQUIVALENT(S): at 10:55

## 2023-10-09 NOTE — PROGRESS NOTE ADULT - SUBJECTIVE AND OBJECTIVE BOX
HPI: 2yoM with PMHx of DM with recurrent DKA, ETOH abuse, Afib (reportedly stopped taking 3 months ago), HTN, Seizure disorder (spoke his AED) presented to [facility] ED complaining of shortness of breath and vomiting and found again with DKA. Denies fever, abdominal pain or change in BM habits. Patient admitted to [facility] also for DKA 5/2023, presenting with similar symptoms. Admission note states that pt reportedly admitted to drinking alcohol and not being compliant with medications for few days prior this presentation. Upon presentation to ED, labs significant for leukocytosis whit WBC 13.48), hyperglycemia  477, Metabolic acidosis CO2 <6, Acetones+ and AG undetectable. Patient admitted to MICU for DKA,    In MICU: pt tx w/ IVF and IV insulin gtt. Gap closed x 3, patient tolerating po diet, given subq insulin 25units (weight based) and insulin gtt d/c'd at noon. Currently on LR @ 75cc/hr. Patient's K being repleted. Patient restarted on eliquis and keppra. HDS, stable for transfer to [facility].     Patient seen and examined at bedside in MICU. Patient reports not feeling well for past few days, now feels much better. Denies any CP, SOB, cough, abd pain, n/v. No BMs yet. Tolerating diet well. Patient reports during my evaluation pt reports he has not been drinking for 3-4 months. Reports he has not been taking his AC and AED because he ran out of [employer]. Lives with his parents.     MEDICATIONS  (STANDING):  apixaban 5 milliGRAM(s) Oral every 12 hours  chlorhexidine 2% Cloths 1 Application(s) Topical daily  dextrose 5%. 1000 milliLiter(s) (100 mL/Hr) IV Continuous <Continuous>  dextrose 5%. 1000 milliLiter(s) (50 mL/Hr) IV Continuous <Continuous>  dextrose 50% Injectable 25 Gram(s) IV Push once  dextrose 50% Injectable 12.5 Gram(s) IV Push once  dextrose 50% Injectable 25 Gram(s) IV Push once  folic acid 1 milliGRAM(s) Oral daily  glucagon  Injectable 1 milliGRAM(s) IntraMuscular once  influenza   Vaccine 0.5 milliLiter(s) IntraMuscular once  insulin lispro (ADMELOG) corrective regimen sliding scale   SubCutaneous three times a day before meals  lactated ringers. 2000 milliLiter(s) (70 mL/Hr) IV Continuous <Continuous>  levETIRAcetam 1000 milliGRAM(s) Oral two times a day  mupirocin 2% Nasal 1 Application(s) Both Nostrils two times a day  thiamine 100 milliGRAM(s) Oral daily    MEDICATIONS  (PRN):  dextrose Oral Gel 15 Gram(s) Oral once PRN Blood Glucose LESS THAN 70 milliGRAM(s)/deciliter  diazepam    Tablet 10 milliGRAM(s) Oral every 1 hour PRN CIWA >8        I&O's Summary    08 Oct 2023 07:01  -  09 Oct 2023 07:00  --------------------------------------------------------  IN: 5462.8 mL / OUT: 3100 mL / NET: 2362.8 mL    09 Oct 2023 07:01  -  09 Oct 2023 13:56  --------------------------------------------------------  IN: 1245 mL / OUT: 450 mL / NET: 795 mL        PHYSICAL EXAM:  Vital Signs Last 24 Hrs  T(C): 37 (09 Oct 2023 11:25), Max: 37.1 (08 Oct 2023 20:39)  T(F): 98.6 (09 Oct 2023 11:25), Max: 98.8 (08 Oct 2023 20:39)  HR: 83 (09 Oct 2023 13:00) (63 - 103)  BP: 101/78 (09 Oct 2023 13:00) (74/56 - 108/73)  BP(mean): 86 (09 Oct 2023 13:00) (59 - 86)  RR: 18 (09 Oct 2023 13:00) (9 - 27)  SpO2: 100% (09 Oct 2023 13:00) (94% - 100%)    Parameters below as of 09 Oct 2023 12:00  Patient On (Oxygen Delivery Method): room air        CONSTITUTIONAL: No apparent distress, laying comfortably in bed   HEENT: Normocephalic, Atraumatic. Trachea midline.  RESPIRATORY:  lungs are clear to auscultation bilaterally, breath sounds equal bilaterally. No crackles, rhonchi, wheezes  CARDIOVASCULAR: Regular rate and rhythm, no lower extremity edema, Peripheral pulses are 2+ bilaterally  ABDOMEN: Soft, non-distended, nontender to palpation, +BS  MUSCLOSKELETAL: moving all 4 extremities spontaneously  PSYCH: thoughts linear, mildly blunted affect   NEUROLOGY: Alert, Oriented x3, CN 2-12 grossly intact  SKIN: No rashes    LABS:                        9.2    4.36  )-----------( 170      ( 09 Oct 2023 05:14 )             26.0     10-09    137  |  110<H>  |  5.3<L>  ----------------------------<  173<H>  3.5   |  17.0<L>  |  0.42<L>    Ca    7.7<L>      09 Oct 2023 12:32  Phos  3.0     10-09  Mg     1.8     10-09    TPro  4.8<L>  /  Alb  2.7<L>  /  TBili  0.5  /  DBili  x   /  AST  10  /  ALT  7   /  AlkPhos  94  10-09    PT/INR - ( 08 Oct 2023 09:51 )   PT: 9.4 sec;   INR: 0.84 ratio         PTT - ( 08 Oct 2023 09:51 )  PTT:28.1 sec  CARDIAC MARKERS ( 08 Oct 2023 09:51 )  x     / <0.01 ng/mL / x     / x     / x          Urinalysis Basic - ( 09 Oct 2023 12:32 )    Color: x / Appearance: x / SG: x / pH: x  Gluc: 173 mg/dL / Ketone: x  / Bili: x / Urobili: x   Blood: x / Protein: x / Nitrite: x   Leuk Esterase: x / RBC: x / WBC x   Sq Epi: x / Non Sq Epi: x / Bacteria: x        CAPILLARY BLOOD GLUCOSE      POCT Blood Glucose.: 163 mg/dL (09 Oct 2023 12:57)  POCT Blood Glucose.: 181 mg/dL (09 Oct 2023 12:10)  POCT Blood Glucose.: 175 mg/dL (09 Oct 2023 11:02)  POCT Blood Glucose.: 149 mg/dL (09 Oct 2023 10:03)  POCT Blood Glucose.: 200 mg/dL (09 Oct 2023 08:57)  POCT Blood Glucose.: 185 mg/dL (09 Oct 2023 07:27)  POCT Blood Glucose.: 176 mg/dL (09 Oct 2023 06:11)  POCT Blood Glucose.: 165 mg/dL (09 Oct 2023 05:12)  POCT Blood Glucose.: 149 mg/dL (09 Oct 2023 04:20)  POCT Blood Glucose.: 132 mg/dL (09 Oct 2023 03:09)  POCT Blood Glucose.: 113 mg/dL (09 Oct 2023 02:18)  POCT Blood Glucose.: 98 mg/dL (09 Oct 2023 01:15)  POCT Blood Glucose.: 95 mg/dL (09 Oct 2023 00:22)  POCT Blood Glucose.: 119 mg/dL (08 Oct 2023 23:13)  POCT Blood Glucose.: 132 mg/dL (08 Oct 2023 22:16)  POCT Blood Glucose.: 183 mg/dL (08 Oct 2023 21:06)  POCT Blood Glucose.: 238 mg/dL (08 Oct 2023 20:01)  POCT Blood Glucose.: 232 mg/dL (08 Oct 2023 18:56)  POCT Blood Glucose.: 225 mg/dL (08 Oct 2023 18:00)  POCT Blood Glucose.: 190 mg/dL (08 Oct 2023 17:13)  POCT Blood Glucose.: 198 mg/dL (08 Oct 2023 16:06)  POCT Blood Glucose.: 189 mg/dL (08 Oct 2023 14:58)  POCT Blood Glucose.: 181 mg/dL (08 Oct 2023 14:02)        RADIOLOGY & ADDITIONAL TESTS:  Results Reviewed:   Imaging Personally Reviewed:  Electrocardiogram Personally Reviewed:

## 2023-10-09 NOTE — CONSULT NOTE ADULT - SUBJECTIVE AND OBJECTIVE BOX
HPI:  32 y.o Male with PMH of DM with recurrent DKA, ETOH abuse, Afib (reportedly stopped taking 3 months ago), HTN, Seizure disorder, presented to [facility] ED complaining of shortness of breath and vomiting and found again with DKA. Denies fever, abdominal pain or change in BM habits. Patient is well known to endocrinology service, last DKA in 5/2023, presenting with similar symptoms. Reportedly admits drinking alcohol and not compliant with medications for few days prior this presentation. However during my evaluation denies drinking alcohol.  Upon presentation to ED, labs significant for leukocytosis whit WBC 13.48), hyperglycemia  477, Metabolic acidosis CO2 <6, Acetones+ and AG undetectable. Treated in ICU settings with IVF and IV insulin gtt. Endocrinology consult requested for DKA an DM management.     PAST MEDICAL & SURGICAL HISTORY:  Diabetes  Pancreatitis  HTN (hypertension)  Atrial fibrillation  Non-ischemic cardiomyopathy    No significant past surgical history    Social History:  ETOH abuse    FAMILY HISTORY:  FH: hypertension      Allergy Status Unknown  No Known Allergies      REVIEW OF SYSTEMS:    CONSTITUTIONAL: No fever, weight loss, or fatigue  EYES: No eye pain, visual disturbances, or discharge  ENMT:  No difficulty hearing, tinnitus, vertigo; No sinus or throat pain  NECK: No pain or stiffness  RESPIRATORY: No cough, wheezing, chills or hemoptysis; + shortness of breath  CARDIOVASCULAR: No chest pain, palpitations, dizziness, or leg swelling  GASTROINTESTINAL: No abdominal or epigastric pain. No diarrhea or constipation. No melena or hematochezia. + Nausea and Vomiting.   NEUROLOGICAL: No headaches, memory loss, loss of strength, numbness, or tremors  SKIN: No itching, burning, rashes, or lesions   MUSCULOSKELETAL: No joint pain or swelling; No muscle, back, or extremity pain  PSYCHIATRIC: No depression, anxiety, mood swings, or difficulty sleeping      MEDICATIONS  (STANDING):  apixaban 5 milliGRAM(s) Oral every 12 hours  chlorhexidine 2% Cloths 1 Application(s) Topical daily  dextrose 5%. 1000 milliLiter(s) (50 mL/Hr) IV Continuous <Continuous>  dextrose 5%. 1000 milliLiter(s) (100 mL/Hr) IV Continuous <Continuous>  dextrose 50% Injectable 25 Gram(s) IV Push once  dextrose 50% Injectable 12.5 Gram(s) IV Push once  dextrose 50% Injectable 25 Gram(s) IV Push once  folic acid 1 milliGRAM(s) Oral daily  glucagon  Injectable 1 milliGRAM(s) IntraMuscular once  influenza   Vaccine 0.5 milliLiter(s) IntraMuscular once  insulin lispro (ADMELOG) corrective regimen sliding scale   SubCutaneous three times a day before meals  lactated ringers. 2000 milliLiter(s) (70 mL/Hr) IV Continuous <Continuous>  levETIRAcetam 1000 milliGRAM(s) Oral two times a day  mupirocin 2% Nasal 1 Application(s) Both Nostrils two times a day  potassium chloride    Tablet ER 40 milliEquivalent(s) Oral every 4 hours  thiamine 100 milliGRAM(s) Oral daily    MEDICATIONS  (PRN):  dextrose Oral Gel 15 Gram(s) Oral once PRN Blood Glucose LESS THAN 70 milliGRAM(s)/deciliter  diazepam    Tablet 10 milliGRAM(s) Oral every 1 hour PRN CIWA >8      Vital Signs Last 24 Hrs  T(C): 37 (09 Oct 2023 11:25), Max: 37.1 (08 Oct 2023 20:39)  T(F): 98.6 (09 Oct 2023 11:25), Max: 98.8 (08 Oct 2023 20:39)  HR: 73 (09 Oct 2023 12:00) (63 - 103)  BP: 98/76 (09 Oct 2023 12:00) (74/56 - 118/71)  BP(mean): 84 (09 Oct 2023 12:00) (59 - 84)  RR: 11 (09 Oct 2023 12:00) (9 - 27)  SpO2: 99% (09 Oct 2023 12:00) (94% - 100%)    Parameters below as of 09 Oct 2023 12:00  Patient On (Oxygen Delivery Method): room ai    Physical Exam:    Constitutional: NAD  HEENT: EOMI, no exophalmos  Neck: trachea midline, no thyroid enlargement  Respiratory: CTAB, normal respirations  Cardiovascular: S1 and S2, RRR  Gastrointestinal: BS+, soft, ntnd  Extremities: No peripheral edema  Neurological: AOx3, no focal deficits  Psychiatric: Normal mood and normal affect  Skin: dry and warm    LABS  10-09    134<L>  |  106  |  5.2<L>  ----------------------------<  163<H>  3.1<L>   |  17.0<L>  |  0.51    Ca    7.7<L>      09 Oct 2023 05:14  Phos  3.0     10-09  Mg     1.8     10-09    TPro  4.5<L>  /  Alb  2.8<L>  /  TBili  0.5  /  DBili  x   /  AST  7   /  ALT  6   /  AlkPhos  89  10-09                          9.2    4.36  )-----------( 170      ( 09 Oct 2023 05:14 )             26.0       A1C with Estimated Average Glucose Result: 15.5 % (10-08-23 @ 09:51)      Ketone - Urine: Large (10-08 @ 09:51)    Albumin: 2.8 g/dL (10-09-23 @ 05:14)  Aspartate Aminotransferase (AST/SGOT): 7 U/L (10-09-23 @ 05:14)  Alanine Aminotransferase (ALT/SGPT): 6 U/L (10-09-23 @ 05:14)  Alkaline Phosphatase: 89 U/L (10-09-23 @ 05:14)  Alanine Aminotransferase (ALT/SGPT): 7 U/L (10-08-23 @ 22:20)  Alkaline Phosphatase: 106 U/L (10-08-23 @ 22:20)  Albumin: 3.1 g/dL (10-08-23 @ 22:20)  Aspartate Aminotransferase (AST/SGOT): 8 U/L (10-08-23 @ 22:20)  Alkaline Phosphatase: 119 U/L (10-08-23 @ 18:00)  Albumin: 3.4 g/dL (10-08-23 @ 18:00)  Aspartate Aminotransferase (AST/SGOT): 10 U/L (10-08-23 @ 18:00)  Alanine Aminotransferase (ALT/SGPT): 8 U/L (10-08-23 @ 18:00)  Alkaline Phosphatase: 139 U/L (10-08-23 @ 09:51)  Albumin: 3.9 g/dL (10-08-23 @ 09:51)  Aspartate Aminotransferase (AST/SGOT): 11 U/L (10-08-23 @ 09:51)  Alanine Aminotransferase (ALT/SGPT): 9 U/L (10-08-23 @ 09:51)  Alkaline Phosphatase: 206 U/L (10-08-23 @ 03:33)  Albumin: 4.8 g/dL (10-08-23 @ 03:33)  Aspartate Aminotransferase (AST/SGOT): 21 U/L (10-08-23 @ 03:33)  Alanine Aminotransferase (ALT/SGPT): 13 U/L (10-08-23 @ 03:33)      CAPILLARY BLOOD GLUCOSE      POCT Blood Glucose.: 181 mg/dL (09 Oct 2023 12:10)  POCT Blood Glucose.: 175 mg/dL (09 Oct 2023 11:02)  POCT Blood Glucose.: 149 mg/dL (09 Oct 2023 10:03)  POCT Blood Glucose.: 200 mg/dL (09 Oct 2023 08:57)

## 2023-10-09 NOTE — PROGRESS NOTE ADULT - ASSESSMENT
31 y/o Male with PMH of DM Type 2, Afib (On Eliquis, Stopped taking x3 months), ETOH Abuse, seizures (stopped taking Keppra) presents to [facility] ED with abdominal pain. Patient admitted to ICU for DKA, AG closed x3, stable for downgrade to medicine.    #DKA  - pt presented with abdominal pain, n/v, elevated BG, high AG, low bicarb, ketones in U/A; HgbA1c 15.5  - tx in MICU with IV fluids and insulin gtt  - stable for downgrade to medicine  - serial BMPs Q4-6hrs.   - c/w diet  - c/w long acting insulin 25units qd  - c/w ISS  - c/w IVF for now, likely d/c tomorrow     #hx of seizures  - non compliant with Keppra, unknown stop date; reports he ran out of medication  - started on keppra bid     #?urinary retention   - minimal/no urinary outpt today  - f/u bladder scan    #A fib  - per pt not on any rate controlling agents  - started on eliquis bid

## 2023-10-09 NOTE — CONSULT NOTE ADULT - ASSESSMENT
32 y.o Male, well known to endocrinology service with DM with recurrent DKA, ETOH abuse, Afib (reportedly stopped taking 3 months ago), HTN, Seizure disorder, presented to [facility] ED complaining of shortness of breath and vomiting and found with DKA.  Upon presentation to ED, labs significant for leukocytosis whit WBC 13.48), hyperglycemia  477, Metabolic acidosis CO2 <6, Acetones+ and AG undetectable. Treated in ICU settings with IVF and IV insulin gtt. Endocrinology consult requested for DKA an DM management. Patient not very sure what doses of insulin takes but states Lantus 8U qhs and premeal insulin 8U TIDac. On the last hospitalization he was managed on Lantus 15U with Pre meal 5U TIDac    # Severely uncontrolled DM exacerbated by recurrent DKA secondary to noncompliance and ETOH abuse.  A1C 15.5%  PADMINI negative in the past with low C-peptide  Suspect pancreatogenic DM.  Currently on insulin gtt with plan for transitioning to SQ  Continue current regimen as per ICU protocol.  Upon resolving ketosis, overlap with with basal insulin for at least 2h prior to d/c insulin gtt.  Endocrinology will follow after insulin transition.    # Hx of ETOH abuse  Watch for withdrawal  MTV, Thiamine, Folate

## 2023-10-10 LAB
ALBUMIN SERPL ELPH-MCNC: 2.9 G/DL — LOW (ref 3.3–5.2)
ALP SERPL-CCNC: 92 U/L — SIGNIFICANT CHANGE UP (ref 40–120)
ALT FLD-CCNC: 7 U/L — SIGNIFICANT CHANGE UP
ANION GAP SERPL CALC-SCNC: 10 MMOL/L — SIGNIFICANT CHANGE UP (ref 5–17)
AST SERPL-CCNC: 10 U/L — SIGNIFICANT CHANGE UP
BASOPHILS # BLD AUTO: 0.03 K/UL — SIGNIFICANT CHANGE UP (ref 0–0.2)
BASOPHILS NFR BLD AUTO: 0.9 % — SIGNIFICANT CHANGE UP (ref 0–2)
BILIRUB SERPL-MCNC: 0.3 MG/DL — LOW (ref 0.4–2)
BLD GP AB SCN SERPL QL: SIGNIFICANT CHANGE UP
BUN SERPL-MCNC: 6.3 MG/DL — LOW (ref 8–20)
CALCIUM SERPL-MCNC: 8.2 MG/DL — LOW (ref 8.4–10.5)
CHLORIDE SERPL-SCNC: 110 MMOL/L — HIGH (ref 96–108)
CO2 SERPL-SCNC: 23 MMOL/L — SIGNIFICANT CHANGE UP (ref 22–29)
CREAT SERPL-MCNC: 0.43 MG/DL — LOW (ref 0.5–1.3)
EGFR: 145 ML/MIN/1.73M2 — SIGNIFICANT CHANGE UP
EOSINOPHIL # BLD AUTO: 0.03 K/UL — SIGNIFICANT CHANGE UP (ref 0–0.5)
EOSINOPHIL NFR BLD AUTO: 0.8 % — SIGNIFICANT CHANGE UP (ref 0–6)
GLUCOSE BLDC GLUCOMTR-MCNC: 145 MG/DL — HIGH (ref 70–99)
GLUCOSE BLDC GLUCOMTR-MCNC: 150 MG/DL — HIGH (ref 70–99)
GLUCOSE BLDC GLUCOMTR-MCNC: 151 MG/DL — HIGH (ref 70–99)
GLUCOSE BLDC GLUCOMTR-MCNC: 153 MG/DL — HIGH (ref 70–99)
GLUCOSE BLDC GLUCOMTR-MCNC: 159 MG/DL — HIGH (ref 70–99)
GLUCOSE BLDC GLUCOMTR-MCNC: 184 MG/DL — HIGH (ref 70–99)
GLUCOSE BLDC GLUCOMTR-MCNC: 202 MG/DL — HIGH (ref 70–99)
GLUCOSE SERPL-MCNC: 138 MG/DL — HIGH (ref 70–99)
HCT VFR BLD CALC: 28.2 % — LOW (ref 39–50)
HGB BLD-MCNC: 10.1 G/DL — LOW (ref 13–17)
LYMPHOCYTES # BLD AUTO: 1.9 K/UL — SIGNIFICANT CHANGE UP (ref 1–3.3)
LYMPHOCYTES # BLD AUTO: 60 % — HIGH (ref 13–44)
MAGNESIUM SERPL-MCNC: 1.9 MG/DL — SIGNIFICANT CHANGE UP (ref 1.6–2.6)
MCHC RBC-ENTMCNC: 30.9 PG — SIGNIFICANT CHANGE UP (ref 27–34)
MCHC RBC-ENTMCNC: 35.8 GM/DL — SIGNIFICANT CHANGE UP (ref 32–36)
MCV RBC AUTO: 86.2 FL — SIGNIFICANT CHANGE UP (ref 80–100)
MONOCYTES # BLD AUTO: 0.19 K/UL — SIGNIFICANT CHANGE UP (ref 0–0.9)
MONOCYTES NFR BLD AUTO: 6.1 % — SIGNIFICANT CHANGE UP (ref 2–14)
NEUTROPHILS # BLD AUTO: 1.02 K/UL — LOW (ref 1.8–7.4)
NEUTROPHILS NFR BLD AUTO: 32.2 % — LOW (ref 43–77)
PHOSPHATE SERPL-MCNC: 2.8 MG/DL — SIGNIFICANT CHANGE UP (ref 2.4–4.7)
PLATELET # BLD AUTO: 187 K/UL — SIGNIFICANT CHANGE UP (ref 150–400)
POTASSIUM SERPL-MCNC: 3 MMOL/L — LOW (ref 3.5–5.3)
POTASSIUM SERPL-SCNC: 3 MMOL/L — LOW (ref 3.5–5.3)
PROT SERPL-MCNC: 4.9 G/DL — LOW (ref 6.6–8.7)
RBC # BLD: 3.27 M/UL — LOW (ref 4.2–5.8)
RBC # FLD: 15.8 % — HIGH (ref 10.3–14.5)
SODIUM SERPL-SCNC: 142 MMOL/L — SIGNIFICANT CHANGE UP (ref 135–145)
WBC # BLD: 3.16 K/UL — LOW (ref 3.8–10.5)
WBC # FLD AUTO: 3.16 K/UL — LOW (ref 3.8–10.5)

## 2023-10-10 PROCEDURE — 99233 SBSQ HOSP IP/OBS HIGH 50: CPT

## 2023-10-10 RX ORDER — INSULIN LISPRO 100/ML
5 VIAL (ML) SUBCUTANEOUS
Refills: 0 | Status: DISCONTINUED | OUTPATIENT
Start: 2023-10-10 | End: 2023-10-11

## 2023-10-10 RX ORDER — INSULIN GLARGINE 100 [IU]/ML
20 INJECTION, SOLUTION SUBCUTANEOUS EVERY MORNING
Refills: 0 | Status: DISCONTINUED | OUTPATIENT
Start: 2023-10-10 | End: 2023-10-11

## 2023-10-10 RX ORDER — POTASSIUM CHLORIDE 20 MEQ
40 PACKET (EA) ORAL EVERY 4 HOURS
Refills: 0 | Status: COMPLETED | OUTPATIENT
Start: 2023-10-10 | End: 2023-10-10

## 2023-10-10 RX ORDER — INSULIN GLARGINE 100 [IU]/ML
20 INJECTION, SOLUTION SUBCUTANEOUS ONCE
Refills: 0 | Status: COMPLETED | OUTPATIENT
Start: 2023-10-10 | End: 2023-10-10

## 2023-10-10 RX ADMIN — APIXABAN 5 MILLIGRAM(S): 2.5 TABLET, FILM COATED ORAL at 17:04

## 2023-10-10 RX ADMIN — Medication 2: at 16:24

## 2023-10-10 RX ADMIN — SODIUM CHLORIDE 70 MILLILITER(S): 9 INJECTION, SOLUTION INTRAVENOUS at 09:04

## 2023-10-10 RX ADMIN — LEVETIRACETAM 1000 MILLIGRAM(S): 250 TABLET, FILM COATED ORAL at 17:04

## 2023-10-10 RX ADMIN — MUPIROCIN 1 APPLICATION(S): 20 OINTMENT TOPICAL at 17:04

## 2023-10-10 RX ADMIN — INSULIN GLARGINE 20 UNIT(S): 100 INJECTION, SOLUTION SUBCUTANEOUS at 15:17

## 2023-10-10 RX ADMIN — APIXABAN 5 MILLIGRAM(S): 2.5 TABLET, FILM COATED ORAL at 04:59

## 2023-10-10 RX ADMIN — Medication 100 MILLIGRAM(S): at 12:44

## 2023-10-10 RX ADMIN — Medication 1 MILLIGRAM(S): at 12:44

## 2023-10-10 RX ADMIN — Medication 5 UNIT(S): at 16:25

## 2023-10-10 RX ADMIN — MUPIROCIN 1 APPLICATION(S): 20 OINTMENT TOPICAL at 05:00

## 2023-10-10 RX ADMIN — Medication 2: at 12:43

## 2023-10-10 RX ADMIN — LEVETIRACETAM 1000 MILLIGRAM(S): 250 TABLET, FILM COATED ORAL at 04:59

## 2023-10-10 RX ADMIN — Medication 5 UNIT(S): at 12:44

## 2023-10-10 RX ADMIN — Medication 40 MILLIEQUIVALENT(S): at 17:04

## 2023-10-10 NOTE — PROGRESS NOTE ADULT - SUBJECTIVE AND OBJECTIVE BOX
INTERVAL HPI/OVERNIGHT EVENTS:    CC: DKA, a fib, seizures      Chart and course reviewed  no overnight events  denies complaints  last drink 3 months ago  not taking medications for 3 months as he ran out.    Vital Signs Last 24 Hrs  T(C): 36.6 (10 Oct 2023 10:47), Max: 37.1 (10 Oct 2023 08:40)  T(F): 97.8 (10 Oct 2023 10:47), Max: 98.8 (10 Oct 2023 08:40)  HR: 58 (10 Oct 2023 10:47) (56 - 71)  BP: 105/66 (10 Oct 2023 10:47) (96/61 - 108/69)  BP(mean): --  RR: 18 (10 Oct 2023 10:47) (15 - 18)  SpO2: 96% (10 Oct 2023 10:47) (96% - 98%)    Parameters below as of 10 Oct 2023 08:40  Patient On (Oxygen Delivery Method): room air        PHYSICAL EXAM:    GENERAL: alert, not in distress  CHEST/LUNG: b/l air entry  HEART: reg  ABDOMEN: soft, bs+  EXTREMITIES:  no edema, tenderness    MEDICATIONS  (STANDING):  apixaban 5 milliGRAM(s) Oral every 12 hours  chlorhexidine 2% Cloths 1 Application(s) Topical daily  dextrose 5%. 1000 milliLiter(s) (100 mL/Hr) IV Continuous <Continuous>  dextrose 5%. 1000 milliLiter(s) (50 mL/Hr) IV Continuous <Continuous>  dextrose 50% Injectable 25 Gram(s) IV Push once  dextrose 50% Injectable 12.5 Gram(s) IV Push once  dextrose 50% Injectable 25 Gram(s) IV Push once  folic acid 1 milliGRAM(s) Oral daily  glucagon  Injectable 1 milliGRAM(s) IntraMuscular once  influenza   Vaccine 0.5 milliLiter(s) IntraMuscular once  insulin glargine Injectable (LANTUS) 20 Unit(s) SubCutaneous every morning  insulin lispro (ADMELOG) corrective regimen sliding scale   SubCutaneous three times a day before meals  insulin lispro Injectable (ADMELOG) 5 Unit(s) SubCutaneous three times a day before meals  levETIRAcetam 1000 milliGRAM(s) Oral two times a day  mupirocin 2% Nasal 1 Application(s) Both Nostrils two times a day  potassium chloride    Tablet ER 40 milliEquivalent(s) Oral every 4 hours  thiamine 100 milliGRAM(s) Oral daily    MEDICATIONS  (PRN):  dextrose Oral Gel 15 Gram(s) Oral once PRN Blood Glucose LESS THAN 70 milliGRAM(s)/deciliter      Allergies    Allergy Status Unknown  No Known Allergies    Intolerances          LABS:                          10.1   3.16  )-----------( 187      ( 10 Oct 2023 07:21 )             28.2     10-10    142  |  110<H>  |  6.3<L>  ----------------------------<  138<H>  3.0<L>   |  23.0  |  0.43<L>    Ca    8.2<L>      10 Oct 2023 07:21  Phos  2.8     10-10  Mg     1.9     10-10    TPro  4.9<L>  /  Alb  2.9<L>  /  TBili  0.3<L>  /  DBili  x   /  AST  10  /  ALT  7   /  AlkPhos  92  10-10      Urinalysis Basic - ( 10 Oct 2023 07:21 )    Color: x / Appearance: x / SG: x / pH: x  Gluc: 138 mg/dL / Ketone: x  / Bili: x / Urobili: x   Blood: x / Protein: x / Nitrite: x   Leuk Esterase: x / RBC: x / WBC x   Sq Epi: x / Non Sq Epi: x / Bacteria: x        RADIOLOGY & ADDITIONAL TESTS:

## 2023-10-10 NOTE — CHART NOTE - NSCHARTNOTEFT_GEN_A_CORE
MICU DOWN GRADE NOTE      Patient is a 32y old  Male who presents with a chief complaint of DKA (08 Oct 2023 05:45)      HPI:  31 y/o Male with PMH of DM Type 2, Afib (On Eliquis, Stopped taking x3 months), ETOH Abuse, seizures (stopped taking Keppra) presents to [facility] ED complaining of shortness of breath. Reports progressive worsening shortness of breath starting this AM. Admits to associated abdominal pain x2 days. Describes as intermittent cramping. Endorses multiple episodes of vomiting. Describes as non-bilious non-bloody. Admits to decreased PO intake secondary to nausea. Last meal Friday AM. Admits to drinking excessive amounts of alcohol for past week, denies insulin use during this time. Of note, patient takes Metformin and Pre-meal Insulin for DM management. On presentation to ED, labs significant for leukocytosis (WBC 13.48), hyperglycemia (Blood Glucose 477), acidosis (Serum CO2 <6), Acetones+ and AG undetectable. Patient admitted to ICU for DKA.       Overnight:   AG closed overnight, began eating, bicarb low.     Subjective: Examined patient at bedside. A&Ox4. Patient tolerating advancement of carb consistent diet. Insulin drip stopped at 1200, subQ insulin 25U given at 1000, BG at noon was 180. Potassium low throughout admission, frequent repletions. Keppra 1000 mg BID restarted for seizures. CIWA score of 1 this morning. Was on heparin subq q8, placed on apixaban 5 mg PO BID for afib.      REVIEW OF SYSTEMS:  CONSTITUTIONAL: No fever, chills  HEENT:  No blurry vision No sinus or throat pain  NECK: No pain or stiffness  RESPIRATORY: No cough, wheezing, chills or hemoptysis; No shortness of breath  CARDIOVASCULAR: No chest pain, palpitations  GASTROINTESTINAL: No abdominal pain. No nausea, vomiting, or diarrhea  GENITOURINARY: No dysuria  NEUROLOGICAL: No HA, No focal weakness  SKIN: No itching, burning, rashes, or lesions   MUSCULOSKELETAL: No joint pain or swelling; No muscle, back, or extremity pain    MEDICATIONS:  apixaban 5 milliGRAM(s) Oral every 12 hours  chlorhexidine 2% Cloths 1 Application(s) Topical daily  dextrose 5%. 1000 milliLiter(s) IV Continuous <Continuous>  dextrose 5%. 1000 milliLiter(s) IV Continuous <Continuous>  dextrose 50% Injectable 25 Gram(s) IV Push once  dextrose 50% Injectable 12.5 Gram(s) IV Push once  dextrose 50% Injectable 25 Gram(s) IV Push once  dextrose Oral Gel 15 Gram(s) Oral once PRN  diazepam    Tablet 10 milliGRAM(s) Oral every 1 hour PRN  folic acid 1 milliGRAM(s) Oral daily  glucagon  Injectable 1 milliGRAM(s) IntraMuscular once  influenza   Vaccine 0.5 milliLiter(s) IntraMuscular once  insulin lispro (ADMELOG) corrective regimen sliding scale   SubCutaneous three times a day before meals  lactated ringers. 2000 milliLiter(s) IV Continuous <Continuous>  levETIRAcetam 1000 milliGRAM(s) Oral two times a day  mupirocin 2% Nasal 1 Application(s) Both Nostrils two times a day  potassium chloride    Tablet ER 40 milliEquivalent(s) Oral every 4 hours  thiamine 100 milliGRAM(s) Oral daily      T(C): 37 (10-09-23 @ 11:25), Max: 37.1 (10-08-23 @ 20:39)  HR: 73 (10-09-23 @ 12:00) (63 - 103)  BP: 98/76 (10-09-23 @ 12:00) (74/56 - 118/71)  RR: 11 (10-09-23 @ 12:00) (9 - 27)  SpO2: 99% (10-09-23 @ 12:00) (94% - 100%)  Wt(kg): --Vital Signs Last 24 Hrs  T(C): 37 (09 Oct 2023 11:25), Max: 37.1 (08 Oct 2023 20:39)  T(F): 98.6 (09 Oct 2023 11:25), Max: 98.8 (08 Oct 2023 20:39)  HR: 73 (09 Oct 2023 12:00) (63 - 103)  BP: 98/76 (09 Oct 2023 12:00) (74/56 - 118/71)  BP(mean): 84 (09 Oct 2023 12:00) (59 - 84)  RR: 11 (09 Oct 2023 12:00) (9 - 27)  SpO2: 99% (09 Oct 2023 12:00) (94% - 100%)    Parameters below as of 09 Oct 2023 12:00  Patient On (Oxygen Delivery Method): room air        PHYSICAL EXAM:  GENERAL: NAD, well-groomed, well-developed  HEAD:  Atraumatic, Normocephalic  EYES: EOMI, PERRLA, conjunctiva and sclera clear  ENMT:  Moist mucous membranes  NECK: Supple, No JVD,  CHEST/LUNG: Clear to auscultation bilaterally; No rales, rhonchi, wheezing, or rubs  HEART: Regular rate and rhythm; No murmurs, rubs, or gallops  ABDOMEN: Soft, Nontender, Nondistended; Bowel sounds present  NEURO: Alert & Oriented X3  EXTREMITIES: No LE edema, no calf tenderness  LYMPH: No lymphadenopathy noted  SKIN: No rashes or lesions    Consultant(s) Notes Reviewed:  [x ] YES  [ ] NO  Care Discussed with Consultants/Other Providers [ x] YES  [ ] NO    LABS:                        9.2    4.36  )-----------( 170      ( 09 Oct 2023 05:14 )             26.0     10-09    134<L>  |  106  |  5.2<L>  ----------------------------<  163<H>  3.1<L>   |  17.0<L>  |  0.51    Ca    7.7<L>      09 Oct 2023 05:14  Phos  3.0     10-09  Mg     1.8     10-09    TPro  4.5<L>  /  Alb  2.8<L>  /  TBili  0.5  /  DBili  x   /  AST  7   /  ALT  6   /  AlkPhos  89  10-09    PT/INR - ( 08 Oct 2023 09:51 )   PT: 9.4 sec;   INR: 0.84 ratio         PTT - ( 08 Oct 2023 09:51 )  PTT:28.1 sec  Urinalysis Basic - ( 09 Oct 2023 05:14 )    Color: x / Appearance: x / SG: x / pH: x  Gluc: 163 mg/dL / Ketone: x  / Bili: x / Urobili: x   Blood: x / Protein: x / Nitrite: x   Leuk Esterase: x / RBC: x / WBC x   Sq Epi: x / Non Sq Epi: x / Bacteria: x      CAPILLARY BLOOD GLUCOSE      POCT Blood Glucose.: 181 mg/dL (09 Oct 2023 12:10)  POCT Blood Glucose.: 175 mg/dL (09 Oct 2023 11:02)  POCT Blood Glucose.: 149 mg/dL (09 Oct 2023 10:03)  POCT Blood Glucose.: 200 mg/dL (09 Oct 2023 08:57)  POCT Blood Glucose.: 185 mg/dL (09 Oct 2023 07:27)  POCT Blood Glucose.: 176 mg/dL (09 Oct 2023 06:11)  POCT Blood Glucose.: 165 mg/dL (09 Oct 2023 05:12)  POCT Blood Glucose.: 149 mg/dL (09 Oct 2023 04:20)  POCT Blood Glucose.: 132 mg/dL (09 Oct 2023 03:09)  POCT Blood Glucose.: 113 mg/dL (09 Oct 2023 02:18)  POCT Blood Glucose.: 98 mg/dL (09 Oct 2023 01:15)  POCT Blood Glucose.: 95 mg/dL (09 Oct 2023 00:22)  POCT Blood Glucose.: 119 mg/dL (08 Oct 2023 23:13)  POCT Blood Glucose.: 132 mg/dL (08 Oct 2023 22:16)  POCT Blood Glucose.: 183 mg/dL (08 Oct 2023 21:06)  POCT Blood Glucose.: 238 mg/dL (08 Oct 2023 20:01)  POCT Blood Glucose.: 232 mg/dL (08 Oct 2023 18:56)  POCT Blood Glucose.: 225 mg/dL (08 Oct 2023 18:00)  POCT Blood Glucose.: 190 mg/dL (08 Oct 2023 17:13)  POCT Blood Glucose.: 198 mg/dL (08 Oct 2023 16:06)  POCT Blood Glucose.: 189 mg/dL (08 Oct 2023 14:58)  POCT Blood Glucose.: 181 mg/dL (08 Oct 2023 14:02)  POCT Blood Glucose.: 183 mg/dL (08 Oct 2023 12:59)      ABG - ( 08 Oct 2023 09:00 )  pH, Arterial: 7.260 pH, Blood: x     /  pCO2: <20   /  pO2: 124   / HCO3: 7     / Base Excess: -19.9 /  SaO2: 99.3              Urinalysis Basic - ( 09 Oct 2023 05:14 )    Color: x / Appearance: x / SG: x / pH: x  Gluc: 163 mg/dL / Ketone: x  / Bili: x / Urobili: x   Blood: x / Protein: x / Nitrite: x   Leuk Esterase: x / RBC: x / WBC x   Sq Epi: x / Non Sq Epi: x / Bacteria: x        RADIOLOGY & ADDITIONAL TESTS:    Imaging Personally Reviewed:  [x ] YES  [ ] NO    Assessment:   31 y/o Male with PMH of DM Type 2, Afib (On Eliquis, Stopped taking x3 months), ETOH Abuse, seizures (stopped taking Keppra) presents to [facility] ED with abdominal pain. Patient admitted to ICU for DKA.     Plan:     - Neuro: Patient A&Ox4, no signs of seizure, non compliant with Keppra, unknown stop date, unknown reason why he stopped taking it, likely did not feel like taking it.   - Cardio: Tachycardic on admission, fluid responsive, H/H stable, did not require pressors   - Pulm: Rapid shallow breathing likely Kussmaul breathing due to DKA, patient saturated well on room air throughout ICU stay  - GI: abdominal pain, alcohol abuse, non compliant with insulin, history of pancreatitis, lipase negative, no signs of pancreatitis, found to be in DKA. AG was undetectable, bicarb was <6, metabolic acidosis, HgbA1c 15.5. Patient monitored Q1 BGs checks, IV insulin infusion started at 10U and then titrated to BG level. D5WLR given, rate changed based on BGs. Electrolytes replete as needed. Serial BMPs Q4-6hrs. NPO. GI prophylaxis with Protonix. Diet advanced as tolerated. Abdominal pain stopped, PPI stopped. Bridge subQ with insulin drip performed, patient glucose held, BMP ordered, waiting result.   - : producing urine throughout admission, none today, will conduct bladder scans Q6 for retention.   - ID: Leukocytosis, likely reactive. Afebrile.  CT Chest and AP ordered, no signs of infection. BCx, UA and UCx ordered, results pending, have not resulted. MRSA negative, RVP negative, Staph positive. Trend WBC and fever.
Patient with VSS and CIWA 0    Chart reviewed and discussed with MD   Changed vitals to per routine and discontinued CIWA orders   Will continue to monitor   RN to notify provider with any changes in patient status
INTERVAL HPI/OVERNIGHT EVENTS:  33 y/o Male with PMH of DM Type 2, Afib (On Eliquis, Stopped taking x3 months), ETOH Abuse presents to [facility] ED complaining of shortness of breath. Reports progressive worsening shortness of breath starting this AM. Admits to associated abdominal pain x2 days. Describes as intermittent cramping. Endorses multiple episodes of vomiting. Describes as non-bilious non-bloody. Admits to decreased PO intake secondary to nausea. Last meal Friday AM. Denies Insulin use yesterday or Friday. Of note, patient takes Metformin, Pre-meal Insulin, and long acting at night for DM management. On presentation to ED, labs significant for leukocytosis (WBC 13.48), hyperglycemia (Blood Glucose 477), acidosis (Serum CO2 <6), Acetones+ and AG undetectable.     SUBJECTIVE: Patient seen and examined at bedside. Admits to not taking DM meds for a few weeks and drinking alcohol. Complaining of epigastric pain radiating up esophagus. Endorses nausea, no vomiting since admission, no diarrhea, constipation. Slight leukocytosis. H/H stable. AG decreasing, on insulin, appetite improving. History of alcohol abuse, CIWA protocol and PRN valium ordered. Denies chest pain.     ROS: All negative except as listed above.    VITAL SIGNS:  ICU Vital Signs Last 24 Hrs  T(C): 37.6 (08 Oct 2023 11:21), Max: 37.6 (08 Oct 2023 11:21)  T(F): 99.7 (08 Oct 2023 11:21), Max: 99.7 (08 Oct 2023 11:21)  HR: 115 (08 Oct 2023 10:00) (91 - 152)  BP: 123/82 (08 Oct 2023 10:00) (117/83 - 151/99)  BP(mean): 96 (08 Oct 2023 10:00) (91 - 110)  ABP: --  ABP(mean): --  RR: 27 (08 Oct 2023 10:00) (15 - 33)  SpO2: 100% (08 Oct 2023 10:00) (99% - 100%)    O2 Parameters below as of 08 Oct 2023 08:00  Patient On (Oxygen Delivery Method): nasal cannula  O2 Flow (L/min): 3          Plateau pressure:   P/F ratio:     10-07 @ 07:01  -  10-08 @ 07:00  --------------------------------------------------------  IN: 210 mL / OUT: 700 mL / NET: -490 mL    10-08 @ 07:01  -  10-08 @ 11:45  --------------------------------------------------------  IN: 363 mL / OUT: 750 mL / NET: -387 mL      CAPILLARY BLOOD GLUCOSE      POCT Blood Glucose.: 184 mg/dL (08 Oct 2023 11:03)      ECG: reviewed.    PHYSICAL EXAM:    GENERAL: NAD, lying in bed comfortably  HEAD:  Atraumatic, normocephalic  EYES: EOMI, PERRLA, conjunctiva and sclera clear  NECK: Supple, trachea midline, no JVD  HEART: Regular rate and rhythm, no murmurs, rubs, or gallops  LUNGS: Unlabored respirations.  Clear to auscultation bilaterally, no crackles, wheezing, or rhonchi  ABDOMEN: Soft, tender in epigastric region, nondistended, +BS  EXTREMITIES: 2+ peripheral pulses bilaterally, cap refill<2 secs. No clubbing, cyanosis, or edema  NERVOUS SYSTEM:  A&Ox3, following commands, moving all extremities, no focal deficits   SKIN: No rashes or lesions    MEDICATIONS:  MEDICATIONS  (STANDING):  dextrose 5% + lactated ringers. 1000 milliLiter(s) (100 mL/Hr) IV Continuous <Continuous>  dextrose 5%. 1000 milliLiter(s) (50 mL/Hr) IV Continuous <Continuous>  dextrose 5%. 1000 milliLiter(s) (100 mL/Hr) IV Continuous <Continuous>  dextrose 50% Injectable 25 Gram(s) IV Push once  dextrose 50% Injectable 12.5 Gram(s) IV Push once  dextrose 50% Injectable 25 Gram(s) IV Push once  glucagon  Injectable 1 milliGRAM(s) IntraMuscular once  heparin   Injectable 5000 Unit(s) SubCutaneous every 8 hours  insulin regular Infusion 7 Unit(s)/Hr (7 mL/Hr) IV Continuous <Continuous>  insulin regular Infusion 4 Unit(s)/Hr (4 mL/Hr) IV Continuous <Continuous>    MEDICATIONS  (PRN):  dextrose Oral Gel 15 Gram(s) Oral once PRN Blood Glucose LESS THAN 70 milliGRAM(s)/deciliter  diazepam    Tablet 10 milliGRAM(s) Oral every 1 hour PRN CIWA >8      ALLERGIES:  Allergies    Allergy Status Unknown  No Known Allergies    Intolerances        LABS:                        16.6   13.48 )-----------( 346      ( 08 Oct 2023 03:33 )             49.2     10-08    138  |  106  |  9.9  ----------------------------<  160<H>  4.0   |  10.0<LL>  |  0.79    Ca    8.9      08 Oct 2023 09:51  Phos  1.2     10-08  Mg     1.7     10-08    TPro  6.3<L>  /  Alb  3.9  /  TBili  0.5  /  DBili  0.1  /  AST  11  /  ALT  9   /  AlkPhos  139<H>  1008    PT/INR - ( 08 Oct 2023 09:51 )   PT: 9.4 sec;   INR: 0.84 ratio         PTT - ( 08 Oct 2023 09:51 )  PTT:28.1 sec  Urinalysis Basic - ( 08 Oct 2023 09:51 )    Color: Yellow / Appearance: Clear / S.020 / pH: x  Gluc: 160 mg/dL / Ketone: Large  / Bili: Negative / Urobili: Negative mg/dL   Blood: x / Protein: 15 / Nitrite: Negative   Leuk Esterase: Negative / RBC: 0-2 /HPF / WBC 0-2 /HPF   Sq Epi: x / Non Sq Epi: x / Bacteria: Negative      ABG:  pO2, Arterial: 124 mmHg (10-08-23 @ 09:00)  pCO2, Arterial: <20 mmHg (10-08-23 @ 09:00)  pH, Arterial: 7.260 (10-08-23 @ 09:00)      vBG:  pCO2, Venous: 26 mmHg (10-08-23 @ 03:33)  pH, Venous: <6.942 (10-08-23 @ 03:33)  HCO3, Venous: 4 mmol/L (10-08-23 @ 03:33)  pO2, Venous: 96 mmHg (10-08-23 @ 03:33)          RADIOLOGY & ADDITIONAL TESTS: Reviewed.    Assessment:   33 y/o Male with PMH of DM Type 2, Afib (On Eliquis, Stopped taking x3 months), ETOH Abuse presents to [facility] ED complaining of shortness of breath. Admitted to ICU for DKA.     Plan:     -Endocrine: DKA due to drinking and medication noncompliance, serial BMPs Q4-6hrs. Adjusting insulin and D5W as needed. Once transitioned off Insulin gtt, will initiate on Insulin Lantus and Premeal. BG ACHS.   -Cardiology: Hemodynamically stable. TTE ordered, for r/o atrial thrombus. Patient not on antiarrhythmic.   -GI: esophagitis, likely due to vomiting and alcohol intake, GI prophylaxis with Protonix. Lipase negative.    - ID: Leukocytosis, likely reactive. Afebrile.  CT Chest and AP ordered, showed esophagitis. BCx, UA and UCx ordered, results pending. Staph Aureus +. MRSA and RVP ordered to rule out viral infection. Monitor off antibiotics. Trend WBC and fever.

## 2023-10-10 NOTE — ADVANCED PRACTICE NURSE CONSULT - RECOMMEDATIONS
continue diabetes self management education w pt and family  pt and family to demonstrate correct use of insulin pen  pls call [phone] when family is present

## 2023-10-10 NOTE — PROGRESS NOTE ADULT - SUBJECTIVE AND OBJECTIVE BOX
INTERVAL EVENTS:  Follow up diabetes management    ROS: Denies abd pain, nausea, vomiting.     MEDICATIONS  (STANDING):  apixaban 5 milliGRAM(s) Oral every 12 hours  chlorhexidine 2% Cloths 1 Application(s) Topical daily  dextrose 5%. 1000 milliLiter(s) (100 mL/Hr) IV Continuous <Continuous>  dextrose 5%. 1000 milliLiter(s) (50 mL/Hr) IV Continuous <Continuous>  dextrose 50% Injectable 25 Gram(s) IV Push once  dextrose 50% Injectable 12.5 Gram(s) IV Push once  dextrose 50% Injectable 25 Gram(s) IV Push once  folic acid 1 milliGRAM(s) Oral daily  glucagon  Injectable 1 milliGRAM(s) IntraMuscular once  influenza   Vaccine 0.5 milliLiter(s) IntraMuscular once  insulin glargine Injectable (LANTUS) 20 Unit(s) SubCutaneous every morning  insulin lispro (ADMELOG) corrective regimen sliding scale   SubCutaneous three times a day before meals  insulin lispro Injectable (ADMELOG) 5 Unit(s) SubCutaneous three times a day before meals  lactated ringers. 2000 milliLiter(s) (70 mL/Hr) IV Continuous <Continuous>  levETIRAcetam 1000 milliGRAM(s) Oral two times a day  mupirocin 2% Nasal 1 Application(s) Both Nostrils two times a day  thiamine 100 milliGRAM(s) Oral daily    MEDICATIONS  (PRN):  dextrose Oral Gel 15 Gram(s) Oral once PRN Blood Glucose LESS THAN 70 milliGRAM(s)/deciliter    Allergies  Allergy Status Unknown  No Known Allergies    Vital Signs Last 24 Hrs  T(C): 36.6 (10 Oct 2023 10:47), Max: 37.1 (10 Oct 2023 08:40)  T(F): 97.8 (10 Oct 2023 10:47), Max: 98.8 (10 Oct 2023 08:40)  HR: 58 (10 Oct 2023 10:47) (56 - 71)  BP: 105/66 (10 Oct 2023 10:47) (91/59 - 108/69)  BP(mean): 78 (09 Oct 2023 15:00) (78 - 78)  RR: 18 (10 Oct 2023 10:47) (10 - 18)  SpO2: 96% (10 Oct 2023 10:47) (96% - 99%)    Parameters below as of 10 Oct 2023 08:40  Patient On (Oxygen Delivery Method): room air    PHYSICAL EXAM:  General: No apparent distress  Neck: Supple, trachea midline, no thyromegaly  Respiratory: Lungs clear bilaterally, normal rate, effort  Cardiac: +S1, S2, no m/r/g  GI: +BS, soft, non tender, non distended  Extremities: No peripheral edema, no pedal lesions  Neuro: A+O X3, no tremor    LABS:                        10.1   3.16  )-----------( 187      ( 10 Oct 2023 07:21 )             28.2     10-10    142  |  110<H>  |  6.3<L>  ----------------------------<  138<H>  3.0<L>   |  23.0  |  0.43<L>    Ca    8.2<L>      10 Oct 2023 07:21  Phos  2.8     10-10  Mg     1.9     10-10    TPro  4.9<L>  /  Alb  2.9<L>  /  TBili  0.3<L>  /  DBili  x   /  AST  10  /  ALT  7   /  AlkPhos  92  10-10    Urinalysis Basic - ( 10 Oct 2023 07:21 )    Color: x / Appearance: x / SG: x / pH: x  Gluc: 138 mg/dL / Ketone: x  / Bili: x / Urobili: x   Blood: x / Protein: x / Nitrite: x   Leuk Esterase: x / RBC: x / WBC x   Sq Epi: x / Non Sq Epi: x / Bacteria: x    POCT Blood Glucose.: 184 mg/dL (10-10-23 @ 12:41)  POCT Blood Glucose.: 159 mg/dL (10-10-23 @ 11:20)  POCT Blood Glucose.: 150 mg/dL (10-10-23 @ 08:02)  POCT Blood Glucose.: 145 mg/dL (10-10-23 @ 04:58)  POCT Blood Glucose.: 202 mg/dL (10-10-23 @ 00:59)  POCT Blood Glucose.: 205 mg/dL (10-09-23 @ 20:55)  POCT Blood Glucose.: 265 mg/dL (10-09-23 @ 16:30)

## 2023-10-10 NOTE — PROGRESS NOTE ADULT - ASSESSMENT
32M well known to endocrinology service with DM with recurrent DKA, ETOH abuse, Afib (reportedly stopped taking 3 months ago), HTN, Seizure disorder, presented to [facility] ED complaining of shortness of breath and vomiting and found with DKA.     Consulted for diabetes management  Home diabetes meds: Lantus 8u qhs, premeal 8u tid  Current a1c: 15.5%    1. 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

## 2023-10-10 NOTE — PROGRESS NOTE ADULT - ASSESSMENT
32 yr old male with diabetes mellitus, seizure disorder, atrial fibrillation, alcohol abuse presented with complaints of abdominal pain, shortness of breath with nausea and vomiting. He was noted to have blood glucose >400, serum HCO3 <6 and undetectable anion gap. Also noted to have leucocytosis. He was admitted to ICU, insulin gtt and fluids were initiated and cultures were sent. MRSA and RVP was sent. His anion gap normalized, blood sugars improved. ECHO was ordered, revealed normal EF. He reported non compliance with medications, not taking Eliquis and Keppra, this was resumed in the ICU and subsequently downgraded to medical floor on 10/9. Endocrine consultation was requested for management of diabetes mellitus, A1C 15.  32 yr old male with diabetes mellitus, seizure disorder, atrial fibrillation, alcohol abuse presented with complaints of abdominal pain, shortness of breath with nausea and vomiting. He was noted to have blood glucose >400, serum HCO3 <6 and undetectable anion gap. Also noted to have leucocytosis. He was admitted to ICU, insulin gtt and fluids were initiated and cultures were sent. MRSA and RVP was sent. His anion gap normalized, blood sugars improved. ECHO was ordered, revealed normal EF. He reported non compliance with medications, not taking Eliquis and Keppra, this was resumed in the ICU and subsequently downgraded to medical floor on 10/9. Endocrine consultation was requested for management of diabetes mellitus, A1C 15.     1. S/p DKA, uncontrolled diabetes mellitus:  Endocrine evaluation appreciated  monitor FS  continue Lantus 20 units QD and pre meal 5 units  poor insight and non compliant    2. Seizure disorder:  Keppra resumed in ICU    3. Atrial fibrillation:  EKG this admission sinus  will repeat  Eliquis resumed in Icu  ECHO with normal EF and no thrombus  will request EP input regarding continuing the same, vs MCOT  he was advised follow up in 2021, but did not do the same    4. Hypokalemia:  Replace and monitor    5. DVT ppx:  Eliquis    Discussed with patient and RN.

## 2023-10-11 ENCOUNTER — TRANSCRIPTION ENCOUNTER (OUTPATIENT)
Age: 33
End: 2023-10-11

## 2023-10-11 VITALS
DIASTOLIC BLOOD PRESSURE: 67 MMHG | HEART RATE: 70 BPM | OXYGEN SATURATION: 98 % | RESPIRATION RATE: 17 BRPM | TEMPERATURE: 98 F | SYSTOLIC BLOOD PRESSURE: 101 MMHG

## 2023-10-11 DIAGNOSIS — I48.91 UNSPECIFIED ATRIAL FIBRILLATION: ICD-10-CM

## 2023-10-11 LAB
ANION GAP SERPL CALC-SCNC: 8 MMOL/L — SIGNIFICANT CHANGE UP (ref 5–17)
BASOPHILS # BLD AUTO: 0.03 K/UL — SIGNIFICANT CHANGE UP (ref 0–0.2)
BASOPHILS NFR BLD AUTO: 1 % — SIGNIFICANT CHANGE UP (ref 0–2)
BUN SERPL-MCNC: 7.2 MG/DL — LOW (ref 8–20)
CALCIUM SERPL-MCNC: 7.9 MG/DL — LOW (ref 8.4–10.5)
CHLORIDE SERPL-SCNC: 109 MMOL/L — HIGH (ref 96–108)
CO2 SERPL-SCNC: 22 MMOL/L — SIGNIFICANT CHANGE UP (ref 22–29)
CREAT SERPL-MCNC: 0.41 MG/DL — LOW (ref 0.5–1.3)
EGFR: 147 ML/MIN/1.73M2 — SIGNIFICANT CHANGE UP
EOSINOPHIL # BLD AUTO: 0.05 K/UL — SIGNIFICANT CHANGE UP (ref 0–0.5)
EOSINOPHIL NFR BLD AUTO: 1.7 % — SIGNIFICANT CHANGE UP (ref 0–6)
GLUCOSE BLDC GLUCOMTR-MCNC: 113 MG/DL — HIGH (ref 70–99)
GLUCOSE BLDC GLUCOMTR-MCNC: 189 MG/DL — HIGH (ref 70–99)
GLUCOSE BLDC GLUCOMTR-MCNC: 239 MG/DL — HIGH (ref 70–99)
GLUCOSE SERPL-MCNC: 223 MG/DL — HIGH (ref 70–99)
HCT VFR BLD CALC: 29.4 % — LOW (ref 39–50)
HGB BLD-MCNC: 10.5 G/DL — LOW (ref 13–17)
IMM GRANULOCYTES NFR BLD AUTO: 0 % — SIGNIFICANT CHANGE UP (ref 0–0.9)
LYMPHOCYTES # BLD AUTO: 1.54 K/UL — SIGNIFICANT CHANGE UP (ref 1–3.3)
LYMPHOCYTES # BLD AUTO: 51.2 % — HIGH (ref 13–44)
MAGNESIUM SERPL-MCNC: 2 MG/DL — SIGNIFICANT CHANGE UP (ref 1.6–2.6)
MCHC RBC-ENTMCNC: 31.5 PG — SIGNIFICANT CHANGE UP (ref 27–34)
MCHC RBC-ENTMCNC: 35.7 GM/DL — SIGNIFICANT CHANGE UP (ref 32–36)
MCV RBC AUTO: 88.3 FL — SIGNIFICANT CHANGE UP (ref 80–100)
MONOCYTES # BLD AUTO: 0.4 K/UL — SIGNIFICANT CHANGE UP (ref 0–0.9)
MONOCYTES NFR BLD AUTO: 13.3 % — SIGNIFICANT CHANGE UP (ref 2–14)
NEUTROPHILS # BLD AUTO: 0.99 K/UL — LOW (ref 1.8–7.4)
NEUTROPHILS NFR BLD AUTO: 32.8 % — LOW (ref 43–77)
PHOSPHATE SERPL-MCNC: 3.6 MG/DL — SIGNIFICANT CHANGE UP (ref 2.4–4.7)
PLATELET # BLD AUTO: 183 K/UL — SIGNIFICANT CHANGE UP (ref 150–400)
POTASSIUM SERPL-MCNC: 3.8 MMOL/L — SIGNIFICANT CHANGE UP (ref 3.5–5.3)
POTASSIUM SERPL-SCNC: 3.8 MMOL/L — SIGNIFICANT CHANGE UP (ref 3.5–5.3)
RBC # BLD: 3.33 M/UL — LOW (ref 4.2–5.8)
RBC # FLD: 15.9 % — HIGH (ref 10.3–14.5)
SODIUM SERPL-SCNC: 139 MMOL/L — SIGNIFICANT CHANGE UP (ref 135–145)
WBC # BLD: 3.01 K/UL — LOW (ref 3.8–10.5)
WBC # FLD AUTO: 3.01 K/UL — LOW (ref 3.8–10.5)

## 2023-10-11 PROCEDURE — 84132 ASSAY OF SERUM POTASSIUM: CPT

## 2023-10-11 PROCEDURE — 36415 COLL VENOUS BLD VENIPUNCTURE: CPT

## 2023-10-11 PROCEDURE — 83735 ASSAY OF MAGNESIUM: CPT

## 2023-10-11 PROCEDURE — 87040 BLOOD CULTURE FOR BACTERIA: CPT

## 2023-10-11 PROCEDURE — 99233 SBSQ HOSP IP/OBS HIGH 50: CPT

## 2023-10-11 PROCEDURE — 84100 ASSAY OF PHOSPHORUS: CPT

## 2023-10-11 PROCEDURE — 84484 ASSAY OF TROPONIN QUANT: CPT

## 2023-10-11 PROCEDURE — 85027 COMPLETE CBC AUTOMATED: CPT

## 2023-10-11 PROCEDURE — 82962 GLUCOSE BLOOD TEST: CPT

## 2023-10-11 PROCEDURE — 80048 BASIC METABOLIC PNL TOTAL CA: CPT

## 2023-10-11 PROCEDURE — 82248 BILIRUBIN DIRECT: CPT

## 2023-10-11 PROCEDURE — 82803 BLOOD GASES ANY COMBINATION: CPT

## 2023-10-11 PROCEDURE — 81001 URINALYSIS AUTO W/SCOPE: CPT

## 2023-10-11 PROCEDURE — 87641 MR-STAPH DNA AMP PROBE: CPT

## 2023-10-11 PROCEDURE — 85018 HEMOGLOBIN: CPT

## 2023-10-11 PROCEDURE — 0225U NFCT DS DNA&RNA 21 SARSCOV2: CPT

## 2023-10-11 PROCEDURE — 93010 ELECTROCARDIOGRAM REPORT: CPT

## 2023-10-11 PROCEDURE — 80053 COMPREHEN METABOLIC PANEL: CPT

## 2023-10-11 PROCEDURE — 84295 ASSAY OF SERUM SODIUM: CPT

## 2023-10-11 PROCEDURE — 99285 EMERGENCY DEPT VISIT HI MDM: CPT

## 2023-10-11 PROCEDURE — 84145 PROCALCITONIN (PCT): CPT

## 2023-10-11 PROCEDURE — 74176 CT ABD & PELVIS W/O CONTRAST: CPT

## 2023-10-11 PROCEDURE — 93005 ELECTROCARDIOGRAM TRACING: CPT

## 2023-10-11 PROCEDURE — 82330 ASSAY OF CALCIUM: CPT

## 2023-10-11 PROCEDURE — 85025 COMPLETE CBC W/AUTO DIFF WBC: CPT

## 2023-10-11 PROCEDURE — 82009 KETONE BODYS QUAL: CPT

## 2023-10-11 PROCEDURE — 83036 HEMOGLOBIN GLYCOSYLATED A1C: CPT

## 2023-10-11 PROCEDURE — 83605 ASSAY OF LACTIC ACID: CPT

## 2023-10-11 PROCEDURE — 96375 TX/PRO/DX INJ NEW DRUG ADDON: CPT

## 2023-10-11 PROCEDURE — 85610 PROTHROMBIN TIME: CPT

## 2023-10-11 PROCEDURE — 99222 1ST HOSP IP/OBS MODERATE 55: CPT

## 2023-10-11 PROCEDURE — 86901 BLOOD TYPING SEROLOGIC RH(D): CPT

## 2023-10-11 PROCEDURE — 82947 ASSAY GLUCOSE BLOOD QUANT: CPT

## 2023-10-11 PROCEDURE — 99239 HOSP IP/OBS DSCHRG MGMT >30: CPT

## 2023-10-11 PROCEDURE — 86850 RBC ANTIBODY SCREEN: CPT

## 2023-10-11 PROCEDURE — 82435 ASSAY OF BLOOD CHLORIDE: CPT

## 2023-10-11 PROCEDURE — 86900 BLOOD TYPING SEROLOGIC ABO: CPT

## 2023-10-11 PROCEDURE — 96374 THER/PROPH/DIAG INJ IV PUSH: CPT

## 2023-10-11 PROCEDURE — 87640 STAPH A DNA AMP PROBE: CPT

## 2023-10-11 PROCEDURE — 93306 TTE W/DOPPLER COMPLETE: CPT

## 2023-10-11 PROCEDURE — 71045 X-RAY EXAM CHEST 1 VIEW: CPT

## 2023-10-11 PROCEDURE — 80307 DRUG TEST PRSMV CHEM ANLYZR: CPT

## 2023-10-11 PROCEDURE — 71250 CT THORAX DX C-: CPT

## 2023-10-11 PROCEDURE — 83690 ASSAY OF LIPASE: CPT

## 2023-10-11 PROCEDURE — 85730 THROMBOPLASTIN TIME PARTIAL: CPT

## 2023-10-11 PROCEDURE — 85014 HEMATOCRIT: CPT

## 2023-10-11 RX ORDER — INSULIN LISPRO 100/ML
5 VIAL (ML) SUBCUTANEOUS
Qty: 1 | Refills: 0
Start: 2023-10-11 | End: 2023-11-09

## 2023-10-11 RX ORDER — ASPIRIN/CALCIUM CARB/MAGNESIUM 324 MG
81 TABLET ORAL DAILY
Refills: 0 | Status: DISCONTINUED | OUTPATIENT
Start: 2023-10-11 | End: 2023-10-11

## 2023-10-11 RX ORDER — INSULIN GLARGINE 100 [IU]/ML
20 INJECTION, SOLUTION SUBCUTANEOUS
Qty: 1 | Refills: 0
Start: 2023-10-11 | End: 2023-11-09

## 2023-10-11 RX ORDER — LEVETIRACETAM 250 MG/1
1 TABLET, FILM COATED ORAL
Qty: 60 | Refills: 0
Start: 2023-10-11 | End: 2023-11-09

## 2023-10-11 RX ORDER — ASPIRIN/CALCIUM CARB/MAGNESIUM 324 MG
1 TABLET ORAL
Qty: 0 | Refills: 0 | DISCHARGE
Start: 2023-10-11

## 2023-10-11 RX ORDER — ASPIRIN/CALCIUM CARB/MAGNESIUM 324 MG
1 TABLET ORAL
Qty: 30 | Refills: 0
Start: 2023-10-11 | End: 2023-11-09

## 2023-10-11 RX ADMIN — LEVETIRACETAM 1000 MILLIGRAM(S): 250 TABLET, FILM COATED ORAL at 17:13

## 2023-10-11 RX ADMIN — CHLORHEXIDINE GLUCONATE 1 APPLICATION(S): 213 SOLUTION TOPICAL at 11:40

## 2023-10-11 RX ADMIN — MUPIROCIN 1 APPLICATION(S): 20 OINTMENT TOPICAL at 05:14

## 2023-10-11 RX ADMIN — Medication 1 MILLIGRAM(S): at 11:33

## 2023-10-11 RX ADMIN — Medication 5 UNIT(S): at 16:18

## 2023-10-11 RX ADMIN — Medication 81 MILLIGRAM(S): at 17:13

## 2023-10-11 RX ADMIN — APIXABAN 5 MILLIGRAM(S): 2.5 TABLET, FILM COATED ORAL at 05:09

## 2023-10-11 RX ADMIN — Medication 4: at 16:18

## 2023-10-11 RX ADMIN — Medication 5 UNIT(S): at 11:33

## 2023-10-11 RX ADMIN — LEVETIRACETAM 1000 MILLIGRAM(S): 250 TABLET, FILM COATED ORAL at 05:09

## 2023-10-11 RX ADMIN — Medication 5 UNIT(S): at 07:38

## 2023-10-11 RX ADMIN — Medication 2: at 07:37

## 2023-10-11 RX ADMIN — Medication 100 MILLIGRAM(S): at 11:34

## 2023-10-11 RX ADMIN — MUPIROCIN 1 APPLICATION(S): 20 OINTMENT TOPICAL at 17:13

## 2023-10-11 RX ADMIN — INSULIN GLARGINE 20 UNIT(S): 100 INJECTION, SOLUTION SUBCUTANEOUS at 07:37

## 2023-10-11 NOTE — DISCHARGE NOTE NURSING/CASE MANAGEMENT/SOCIAL WORK - NSDCVIVACCINE_GEN_ALL_CORE_FT
Tdap; 29-Oct-2019 23:07; Odessa Landis (RN); [employer]; [id number] (Exp. Date: 22-Oct-2021); IntraMuscular; Deltoid Left.; 0.5 milliLiter(s); VIS (VIS Published: 09-May-2013, VIS Presented: 29-Oct-2019);

## 2023-10-11 NOTE — PROGRESS NOTE ADULT - SUBJECTIVE AND OBJECTIVE BOX
INTERVAL HPI/OVERNIGHT EVENTS:    CC: S/p DKA    no overnight events  had episode of low BP this am  asymptomatic  reports baseline BP around 90  denies complaints.       Vital Signs Last 24 Hrs  T(C): 36.8 (11 Oct 2023 10:45), Max: 37.3 (10 Oct 2023 17:00)  T(F): 98.3 (11 Oct 2023 10:45), Max: 99.1 (10 Oct 2023 17:00)  HR: 82 (11 Oct 2023 10:45) (57 - 82)  BP: 102/59 (11 Oct 2023 11:37) (79/50 - 102/59)  BP(mean): --  RR: 18 (11 Oct 2023 10:45) (17 - 18)  SpO2: 93% (11 Oct 2023 10:45) (93% - 98%)    Parameters below as of 11 Oct 2023 07:43  Patient On (Oxygen Delivery Method): room air        PHYSICAL EXAM:    GENERAL: alert, not in distress  CHEST/LUNG: b/l air entry  HEART: reg  ABDOMEN: soft, bs+  EXTREMITIES:  no edema, tenderness    MEDICATIONS  (STANDING):  apixaban 5 milliGRAM(s) Oral every 12 hours  chlorhexidine 2% Cloths 1 Application(s) Topical daily  dextrose 5%. 1000 milliLiter(s) (50 mL/Hr) IV Continuous <Continuous>  dextrose 5%. 1000 milliLiter(s) (100 mL/Hr) IV Continuous <Continuous>  dextrose 50% Injectable 25 Gram(s) IV Push once  dextrose 50% Injectable 12.5 Gram(s) IV Push once  dextrose 50% Injectable 25 Gram(s) IV Push once  folic acid 1 milliGRAM(s) Oral daily  glucagon  Injectable 1 milliGRAM(s) IntraMuscular once  influenza   Vaccine 0.5 milliLiter(s) IntraMuscular once  insulin glargine Injectable (LANTUS) 20 Unit(s) SubCutaneous every morning  insulin lispro (ADMELOG) corrective regimen sliding scale   SubCutaneous three times a day before meals  insulin lispro Injectable (ADMELOG) 5 Unit(s) SubCutaneous three times a day before meals  levETIRAcetam 1000 milliGRAM(s) Oral two times a day  mupirocin 2% Nasal 1 Application(s) Both Nostrils two times a day  thiamine 100 milliGRAM(s) Oral daily    MEDICATIONS  (PRN):  dextrose Oral Gel 15 Gram(s) Oral once PRN Blood Glucose LESS THAN 70 milliGRAM(s)/deciliter      Allergies    Allergy Status Unknown  No Known Allergies    Intolerances          LABS:                          10.5   3.01  )-----------( 183      ( 11 Oct 2023 07:07 )             29.4     10-11    139  |  109<H>  |  7.2<L>  ----------------------------<  223<H>  3.8   |  22.0  |  0.41<L>    Ca    7.9<L>      11 Oct 2023 07:07  Phos  3.6     10-11  Mg     2.0     10-11    TPro  4.9<L>  /  Alb  2.9<L>  /  TBili  0.3<L>  /  DBili  x   /  AST  10  /  ALT  7   /  AlkPhos  92  10-10      Urinalysis Basic - ( 11 Oct 2023 07:07 )    Color: x / Appearance: x / SG: x / pH: x  Gluc: 223 mg/dL / Ketone: x  / Bili: x / Urobili: x   Blood: x / Protein: x / Nitrite: x   Leuk Esterase: x / RBC: x / WBC x   Sq Epi: x / Non Sq Epi: x / Bacteria: x        RADIOLOGY & ADDITIONAL TESTS:

## 2023-10-11 NOTE — PROGRESS NOTE ADULT - SUBJECTIVE AND OBJECTIVE BOX
INTERVAL EVENTS:  Follow up diabetes management    ROS: Denies abd pain, nausea, vomiting.     MEDICATIONS  (STANDING):  apixaban 5 milliGRAM(s) Oral every 12 hours  chlorhexidine 2% Cloths 1 Application(s) Topical daily  dextrose 5%. 1000 milliLiter(s) (100 mL/Hr) IV Continuous <Continuous>  dextrose 5%. 1000 milliLiter(s) (50 mL/Hr) IV Continuous <Continuous>  dextrose 50% Injectable 25 Gram(s) IV Push once  dextrose 50% Injectable 12.5 Gram(s) IV Push once  dextrose 50% Injectable 25 Gram(s) IV Push once  folic acid 1 milliGRAM(s) Oral daily  glucagon  Injectable 1 milliGRAM(s) IntraMuscular once  influenza   Vaccine 0.5 milliLiter(s) IntraMuscular once  insulin glargine Injectable (LANTUS) 20 Unit(s) SubCutaneous every morning  insulin lispro (ADMELOG) corrective regimen sliding scale   SubCutaneous three times a day before meals  insulin lispro Injectable (ADMELOG) 5 Unit(s) SubCutaneous three times a day before meals  levETIRAcetam 1000 milliGRAM(s) Oral two times a day  mupirocin 2% Nasal 1 Application(s) Both Nostrils two times a day  thiamine 100 milliGRAM(s) Oral daily    MEDICATIONS  (PRN):  dextrose Oral Gel 15 Gram(s) Oral once PRN Blood Glucose LESS THAN 70 milliGRAM(s)/deciliter    Allergies  Allergy Status Unknown  No Known Allergies    Vital Signs Last 24 Hrs  T(C): 36.8 (11 Oct 2023 10:45), Max: 37.3 (10 Oct 2023 17:00)  T(F): 98.3 (11 Oct 2023 10:45), Max: 99.1 (10 Oct 2023 17:00)  HR: 82 (11 Oct 2023 10:45) (57 - 82)  BP: 97/72 (11 Oct 2023 10:53) (79/50 - 100/62)  BP(mean): --  RR: 18 (11 Oct 2023 10:45) (17 - 18)  SpO2: 93% (11 Oct 2023 10:45) (93% - 98%)    Parameters below as of 11 Oct 2023 07:43  Patient On (Oxygen Delivery Method): room air    PHYSICAL EXAM:  General: No apparent distress  Neck: Supple, trachea midline, no thyromegaly  Respiratory: Lungs clear bilaterally, normal rate, effort  Cardiac: +S1, S2, no m/r/g  GI: +BS, soft, non tender, non distended  Extremities: No peripheral edema, no pedal lesions  Neuro: A+O X3, no tremor    LABS:                        10.5   3.01  )-----------( 183      ( 11 Oct 2023 07:07 )             29.4     10-11    139  |  109<H>  |  7.2<L>  ----------------------------<  223<H>  3.8   |  22.0  |  0.41<L>    Ca    7.9<L>      11 Oct 2023 07:07  Phos  3.6     10-11  Mg     2.0     10-11    TPro  4.9<L>  /  Alb  2.9<L>  /  TBili  0.3<L>  /  DBili  x   /  AST  10  /  ALT  7   /  AlkPhos  92  10-10    Urinalysis Basic - ( 11 Oct 2023 07:07 )    Color: x / Appearance: x / SG: x / pH: x  Gluc: 223 mg/dL / Ketone: x  / Bili: x / Urobili: x   Blood: x / Protein: x / Nitrite: x   Leuk Esterase: x / RBC: x / WBC x   Sq Epi: x / Non Sq Epi: x / Bacteria: x    POCT Blood Glucose.: 113 mg/dL (10-11-23 @ 11:31)  POCT Blood Glucose.: 189 mg/dL (10-11-23 @ 07:36)  POCT Blood Glucose.: 150 mg/dL (10-10-23 @ 22:40)  POCT Blood Glucose.: 153 mg/dL (10-10-23 @ 16:19)  POCT Blood Glucose.: 151 mg/dL (10-10-23 @ 15:15)  POCT Blood Glucose.: 184 mg/dL (10-10-23 @ 12:41)

## 2023-10-11 NOTE — DISCHARGE NOTE NURSING/CASE MANAGEMENT/SOCIAL WORK - NSDCPEFALRISK_GEN_ALL_CORE
For information on Fall & Injury Prevention, visit: [email or web address] OR  [email or web address] OR  https://www.cdc.gov/steadi/patient.html

## 2023-10-11 NOTE — PROGRESS NOTE ADULT - ASSESSMENT
32M well known to endocrinology service with DM with recurrent DKA, ETOH abuse, Afib (reportedly stopped taking 3 months ago), HTN, Seizure disorder, presented to [facility] ED complaining of shortness of breath and vomiting and found with DKA.     Consulted for diabetes management  Home diabetes meds: Lantus 8u qhs, premeal 8u tid  Current a1c: 15.5%    1. 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

## 2023-10-11 NOTE — DISCHARGE NOTE PROVIDER - CARE PROVIDER_API CALL
Robert Olmstead  Internal Medicine  [address] [place] [address]  Phone: [phone]  Fax: [phone]  Follow Up Time:     [facility]  [address] [place] [address]  Phone: [phone]  Fax: [phone]  Follow Up Time:     Drarin Chavez  Cardiovascular Disease  [address], [address]  [place] [address]  Phone: [phone]  Fax: [phone]  Follow Up Time:

## 2023-10-11 NOTE — PROGRESS NOTE ADULT - ASSESSMENT
32 yr old male with diabetes mellitus, seizure disorder, atrial fibrillation, alcohol abuse presented with complaints of abdominal pain, shortness of breath with nausea and vomiting. He was noted to have blood glucose >400, serum HCO3 <6 and undetectable anion gap. Also noted to have leucocytosis. He was admitted to ICU, insulin gtt and fluids were initiated and cultures were sent. MRSA and RVP was sent. His anion gap normalized, blood sugars improved. ECHO was ordered, revealed normal EF. He reported non compliance with medications, not taking Eliquis and Keppra, this was resumed in the ICU and subsequently downgraded to medical floor on 10/9. Endocrine consultation was requested for management of diabetes mellitus, A1C 15.     1. S/p DKA, uncontrolled diabetes mellitus:  Endocrine follow up noted.   monitor FS  continue Lantus 20 units QD and pre meal 5 units  poor insight and non compliant  home care arranged    2. Seizure disorder:  Keppra resumed in ICU    3. Atrial fibrillation:  now in sinus with ECHO showing improvement in LV function  cardiology consulted and recommendations noted  start aspirin  discontinue Eliquis  advised follow up as outpatient.     4. DVT ppx:  SCD    Discussed with patient and RN.   Discharge planning.   counseled at length regarding compliance and follow up.

## 2023-10-11 NOTE — PROGRESS NOTE ADULT - ASSESSMENT
33 y/o Male with PMH of DM Type 2, ?Afib (On Eliquis, Stopped taking x3 months), ETOH Abuse presents to [facility] ED complaining of shortness of breath.  Admitted with Severe Metabolic Encephalopathy.   Patient has since stabilized and is in the discharging process.  A cardiac consult was called due to Hx of Afib in 2021, seen by [place] cardiology  Plan was to follow up with a OT outpatient.  Patient is noncompliant and states he did not know he was to follow up.  Patient educated on Afib diagnosis and treatment.  Patient stated verbal understanding and the importance of following up outpatient.

## 2023-10-11 NOTE — DISCHARGE NOTE PROVIDER - NSDCCPCAREPLAN_GEN_ALL_CORE_FT
PRINCIPAL DISCHARGE DIAGNOSIS  Diagnosis: DKA, type 2  Assessment and Plan of Treatment: resolved  continue Lantus and pre meal insulin  important to take medications and monitor blood sugars 3 times a day  follow up with endocrine        SECONDARY DISCHARGE DIAGNOSES  Diagnosis: Seizure  Assessment and Plan of Treatment: Continue Keppra.    Diagnosis: Paroxysmal atrial fibrillation  Assessment and Plan of Treatment: now in sinus rhythm  continue aspirin  follow up with cardiology in 1 week

## 2023-10-11 NOTE — DISCHARGE NOTE PROVIDER - PROVIDER TOKENS
PROVIDER:[TOKEN:[[id number]]],PROVIDER:[TOKEN:[[id number]]],PROVIDER:[TOKEN:[[id number]]]

## 2023-10-11 NOTE — DISCHARGE NOTE NURSING/CASE MANAGEMENT/SOCIAL WORK - PATIENT PORTAL LINK FT
You can access the FollowMyHealth Patient Portal offered by [facility] by registering at the following website: [email or web address]. By joining [facility]’s FollowMyHealth portal, you will also be able to view your health information using other applications (apps) compatible with our system.

## 2023-10-11 NOTE — DISCHARGE NOTE PROVIDER - NSDCMRMEDTOKEN_GEN_ALL_CORE_FT
Eliquis 5 mg oral tablet: 1 tab(s) orally 2 times a day  folic acid 1 mg oral tablet: 1 tab(s) orally once a day  Lantus 100 units/mL subcutaneous solution: 15 unit(s) subcutaneous once a day (at bedtime)  levETIRAcetam 1000 mg oral tablet: 1 tab(s) orally 2 times a day  Multiple Vitamins oral tablet: 1 tab(s) orally once a day  potassium bicarbonate 20 mEq oral tablet, effervescent: 1 tab(s) orally once a day  SEROquel 25 mg oral tablet: 1 tab(s) orally once a day (at bedtime)  thiamine 100 mg oral tablet: 1 tab(s) orally once a day   alcohol pads: to check sugars 3 times a day  aspirin 81 mg oral delayed release tablet: 1 tab(s) orally once a day  folic acid 1 mg oral tablet: 1 tab(s) orally once a day  Glucometer: to check sugars 3 times a day  HumaLOG KwikPen 100 units/mL injectable solution: 5 unit(s) subcutaneous 3 times a day (with meals) 11 unit(s) subcutaneous 3 times a day (with meals) dinner  Keppra 1000 mg oral tablet: 1 tab(s) orally 2 times a day  lancets: to check sugars 3 times a day  Lantus Solostar Pen 100 units/mL subcutaneous solution: 20 unit(s) subcutaneous once a day (at bedtime)  strips: to check sugars 3 times a day  thiamine 100 mg oral tablet: 1 tab(s) orally once a day

## 2023-10-11 NOTE — PROGRESS NOTE ADULT - PROBLEM SELECTOR PLAN 1
Patient history of afib since may 2021  Plan was to follow up outpatient of a [facility]  Patient has history of noncompliance with medications and follow up  Explained diagnosis of afib and treatment in detail to patient  Patient is not in Afib currently  D.C Eliquis  Start patient on Asprin 81mg p daily  CHADSVASC 1  Outpatient follow up for [facility] 1 week after discharge.

## 2023-10-11 NOTE — DISCHARGE NOTE PROVIDER - HOSPITAL COURSE
32 yr old male with diabetes mellitus, seizure disorder, atrial fibrillation, alcohol abuse presented with complaints of abdominal pain, shortness of breath with nausea and vomiting. He was noted to have blood glucose >400, serum HCO3 <6 and undetectable anion gap. Also noted to have leucocytosis. He was admitted to ICU, insulin gtt and fluids were initiated and cultures were sent. MRSA and RVP was sent. His anion gap normalized, blood sugars improved. ECHO was ordered, revealed normal EF. He reported non compliance with medications, not taking Eliquis and Keppra, this was resumed in the ICU and subsequently downgraded to medical floor on 10/9. Endocrine consultation was requested for management of diabetes mellitus, A1C 15. Lantus and pre meal insulin was adjusted. Counseled extensively regarding compliance. Cardiology consulted regarding Eliquis, advised to start aspirin and discontinue Eliquis. His blood sugars normalized, he is medically stable for discharge home.

## 2023-10-11 NOTE — PROGRESS NOTE ADULT - SUBJECTIVE AND OBJECTIVE BOX
[facility]                                              CARDIOLOGY AT [place] [address]                                             Telephone: [phone]. Fax:[phone]                                                       CARDIOLOGY CONSULTATION NOTE                                                                                             History obtained by: Patient and medical record  Community Cardiologist:    obtained: Yes [  ] No [  ]  Reason for Consultation:   Available out pt records reviewed: Yes [  ] No [  ]    Chief complaint:    Patient is a 32y old  Male who presents with a chief complaint of DKA (11 Oct 2023 11:36)      HPI:  31 y/o Male with PMH of DM Type 2, ?Afib (On Eliquis, Stopped taking x3 months), ETOH Abuse presents to [facility] ED complaining of shortness of breath.  Admitted with Severe Metabolic Encephalopathy.   Patient has since stabilized and is in the discharging process.  A cardiac consult was called due to Hx of Afib in 2021, seen by [place] cardiology  Plan was to follow up with a [facility] outpatient.  Patient is noncompliant and states he did not know he was to follow up.  Patient educated on Afib diagnosis and treatment.  Patient stated verbal understanding and the importance of following up outpatient.              CARDIAC TESTING   ECHO:  Summary:   1. Left ventricular ejection fraction, by visual estimation, is 65 to   70%.   2. Normal global left ventricular systolic function.   3. Diastolic function is normal.   4. Normal right ventricular size and function, estimated PASP 25 mmHg.   5. Normal left atrial size.   6. Trace mitral valve regurgitation.   7. Mild tricuspid regurgitation.   8. There is no evidence of pericardial effusion.   9. Compared to the prior TTE study from 5/26/21, LV systolic function/EF   have improved.    STRESS:    CATH:     ELECTROPHYSIOLOGY:     PAST MEDICAL HISTORY  Alcohol abuse  Pancreatitis  HTN (hypertension)  Diabetes  Atrial fibrillation  Non-ischemic cardiomyopathy    PAST SURGICAL HISTORY  No significant past surgical history    SOCIAL HISTORY:  Denies smoking/alcohol/drugs  CIGARETTES:     ALCOHOL:  DRUGS:    FAMILY HISTORY:  FH: hypertension    Family History of Cardiovascular Disease:  Yes [  ] No [x  ]  Coronary Artery Disease in first degree relative: Yes [  ] No [x  ]  Sudden Cardiac Death in First degree relative: Yes [  ] No [ x ]    HOME MEDICATIONS:  Lantus 100 units/mL subcutaneous solution: 15 unit(s) subcutaneous once a day (at bedtime) (11 May 2023 14:36)      CURRENT CARDIAC MEDICATIONS:    CURRENT OTHER MEDICATIONS:  levETIRAcetam 1000 milliGRAM(s) Oral two times a day  apixaban 5 milliGRAM(s) Oral every 12 hours  chlorhexidine 2% Cloths 1 Application(s) Topical daily  dextrose 5%. 1000 milliLiter(s) (100 mL/Hr) IV Continuous <Continuous>  dextrose 5%. 1000 milliLiter(s) (50 mL/Hr) IV Continuous <Continuous>  dextrose 50% Injectable 25 Gram(s) IV Push once, Stop order after: 1 Doses  dextrose 50% Injectable 12.5 Gram(s) IV Push once, Stop order after: 1 Doses  dextrose 50% Injectable 25 Gram(s) IV Push once, Stop order after: 1 Doses  dextrose Oral Gel 15 Gram(s) Oral once, Stop order after: 1 Doses PRN Blood Glucose LESS THAN 70 milliGRAM(s)/deciliter  folic acid 1 milliGRAM(s) Oral daily  glucagon  Injectable 1 milliGRAM(s) IntraMuscular once, Stop order after: 1 Doses  influenza   Vaccine 0.5 milliLiter(s) IntraMuscular once  insulin glargine Injectable (LANTUS) 20 Unit(s) SubCutaneous every morning  insulin lispro (ADMELOG) corrective regimen sliding scale   SubCutaneous three times a day before meals  insulin lispro Injectable (ADMELOG) 5 Unit(s) SubCutaneous three times a day before meals  mupirocin 2% Nasal 1 Application(s) Both Nostrils two times a day  thiamine 100 milliGRAM(s) Oral daily    ALLERGIES:   Allergy Status Unknown  No Known Allergies      REVIEW OF SYMPTOMS:   CONSTITUTIONAL: No fever, no chills, no weight loss, no weight gain, no fatigue   ENMT:  No vertigo; No sinus or throat pain  NECK: No pain or stiffness  CARDIOVASCULAR: No chest pain, no dyspnea, no syncope/presyncope, no palpitations, no dizziness, no Orthopnea, no Paroxsymal nocturnal dyspnea  RESPIRATORY: no Shortness of breath, no cough, no wheezing  : No dysuria, no hematuria   GI: No dark color stool, no nausea, no diarrhea, no constipation, no abdominal pain   NEURO: No headache, no slurred speech   MUSCULOSKELETAL: No joint pain or swelling; No muscle, back, or extremity pain  PSYCH: No agitation, no anxiety.    ALL OTHER REVIEW OF SYSTEMS ARE NEGATIVE.    VITAL SIGNS:  T(C): 36.8 (10-11-23 @ 10:45), Max: 37.3 (10-10-23 @ 17:00)  T(F): 98.3 (10-11-23 @ 10:45), Max: 99.1 (10-10-23 @ 17:00)  HR: 82 (10-11-23 @ 10:45) (57 - 82)  BP: 102/59 (10-11-23 @ 11:37) (79/50 - 102/59)  RR: 18 (10-11-23 @ 10:45) (17 - 18)  SpO2: 93% (10-11-23 @ 10:45) (93% - 98%)    INTAKE AND OUTPUT:     10-11 @ 07:01  -  10-11 @ 13:10  --------------------------------------------------------  IN: 600 mL / OUT: 0 mL / NET: 600 mL    PHYSICAL EXAM:  Constitutional: Comfortable . No acute distress.   HEENT: Atraumatic and normocephalic , neck is supple . no JVD. No carotid bruit.  CNS: A&Ox3. No focal deficits.   Respiratory: CTAB, unlabored   Cardiovascular: RRR normal s1 s2. No murmur. No rubs or gallop.  Gastrointestinal: Soft, non-tender. +Bowel sounds.   Extremities: 2+ Peripheral Pulses, No clubbing, cyanosis, or edema  Psychiatric: Calm . no agitation.   Skin: Warm and dry, no ulcers on extremities     LABS:  ( 08 Oct 2023 09:51 )  Troponin T  <0.01,  CPK  X    , CKMB  X    , BNP X                            10.5   3.01  )-----------( 183      ( 11 Oct 2023 07:07 )             29.4     10-11    139  |  109<H>  |  7.2<L>  ----------------------------<  223<H>  3.8   |  22.0  |  0.41<L>    Ca    7.9<L>      11 Oct 2023 07:07  Phos  3.6     10-11  Mg     2.0     10-11    TPro  4.9<L>  /  Alb  2.9<L>  /  TBili  0.3<L>  /  DBili  x   /  AST  10  /  ALT  7   /  AlkPhos  92  10-10      Urinalysis Basic - ( 11 Oct 2023 07:07 )    Color: x / Appearance: x / SG: x / pH: x  Gluc: 223 mg/dL / Ketone: x  / Bili: x / Urobili: x   Blood: x / Protein: x / Nitrite: x   Leuk Esterase: x / RBC: x / WBC x   Sq Epi: x / Non Sq Epi: x / Bacteria: x    ECG:     Ventricular Rate 66 BPM  Atrial Rate 66 BPM  P-R Interval 180 ms  QRS Duration 80 ms  Q-T Interval 404 ms  QTC Calculation(Bazett) 423 ms  P Axis 77 degrees  R Axis 94 degrees  T Axis 66 degrees  Diagnosis Line Normal sinus rhythm  Rightward axis    Prior ECG: Yes [ x ] No [  ]                                                  [facility]                                              CARDIOLOGY AT [place] [address]                                             Telephone: [phone]. Fax:[phone]                                                       CARDIOLOGY CONSULTATION NOTE                                                                                             History obtained by: Patient and medical record  Community Cardiologist: None   obtained: Yes [  ] No [  ]  Reason for Consultation: pAfib  Available out pt records reviewed: Yes [  ] No [  ]    Chief complaint:    Patient is a 32y old  Male who presents with a chief complaint of DKA (11 Oct 2023 11:36)      HPI:  31 y/o Male with PMH of DM Type 2, ?Afib (On Eliquis, Stopped taking x3 months), ETOH Abuse presents to [facility] ED complaining of shortness of breath.  Admitted with Severe Metabolic Encephalopathy.   Patient has since stabilized and is in the discharging process.  A cardiac consult was called due to Hx of Afib in 2021, seen by [place] cardiology  Plan was to follow up with a [facility] outpatient.  Patient is noncompliant and states he did not know he was to follow up.  Patient educated on Afib diagnosis and treatment.  Patient stated verbal understanding and the importance of following up outpatient.              CARDIAC TESTING   ECHO:  Summary:   1. Left ventricular ejection fraction, by visual estimation, is 65 to   70%.   2. Normal global left ventricular systolic function.   3. Diastolic function is normal.   4. Normal right ventricular size and function, estimated PASP 25 mmHg.   5. Normal left atrial size.   6. Trace mitral valve regurgitation.   7. Mild tricuspid regurgitation.   8. There is no evidence of pericardial effusion.   9. Compared to the prior TTE study from 5/26/21, LV systolic function/EF   have improved.    STRESS:    CATH:     ELECTROPHYSIOLOGY:     PAST MEDICAL HISTORY  Alcohol abuse  Pancreatitis  HTN (hypertension)  Diabetes  Atrial fibrillation  Non-ischemic cardiomyopathy    PAST SURGICAL HISTORY  No significant past surgical history    SOCIAL HISTORY:  Denies smoking/alcohol/drugs  CIGARETTES:     ALCOHOL:  DRUGS:    FAMILY HISTORY:  FH: hypertension    Family History of Cardiovascular Disease:  Yes [  ] No [x  ]  Coronary Artery Disease in first degree relative: Yes [  ] No [x  ]  Sudden Cardiac Death in First degree relative: Yes [  ] No [ x ]    HOME MEDICATIONS:  Lantus 100 units/mL subcutaneous solution: 15 unit(s) subcutaneous once a day (at bedtime) (11 May 2023 14:36)      CURRENT CARDIAC MEDICATIONS:    CURRENT OTHER MEDICATIONS:  levETIRAcetam 1000 milliGRAM(s) Oral two times a day  apixaban 5 milliGRAM(s) Oral every 12 hours  chlorhexidine 2% Cloths 1 Application(s) Topical daily  dextrose 5%. 1000 milliLiter(s) (100 mL/Hr) IV Continuous <Continuous>  dextrose 5%. 1000 milliLiter(s) (50 mL/Hr) IV Continuous <Continuous>  dextrose 50% Injectable 25 Gram(s) IV Push once, Stop order after: 1 Doses  dextrose 50% Injectable 12.5 Gram(s) IV Push once, Stop order after: 1 Doses  dextrose 50% Injectable 25 Gram(s) IV Push once, Stop order after: 1 Doses  dextrose Oral Gel 15 Gram(s) Oral once, Stop order after: 1 Doses PRN Blood Glucose LESS THAN 70 milliGRAM(s)/deciliter  folic acid 1 milliGRAM(s) Oral daily  glucagon  Injectable 1 milliGRAM(s) IntraMuscular once, Stop order after: 1 Doses  influenza   Vaccine 0.5 milliLiter(s) IntraMuscular once  insulin glargine Injectable (LANTUS) 20 Unit(s) SubCutaneous every morning  insulin lispro (ADMELOG) corrective regimen sliding scale   SubCutaneous three times a day before meals  insulin lispro Injectable (ADMELOG) 5 Unit(s) SubCutaneous three times a day before meals  mupirocin 2% Nasal 1 Application(s) Both Nostrils two times a day  thiamine 100 milliGRAM(s) Oral daily    ALLERGIES:   Allergy Status Unknown  No Known Allergies      REVIEW OF SYMPTOMS:   CONSTITUTIONAL: No fever, no chills, no weight loss, no weight gain, no fatigue   ENMT:  No vertigo; No sinus or throat pain  NECK: No pain or stiffness  CARDIOVASCULAR: No chest pain, no dyspnea, no syncope/presyncope, no palpitations, no dizziness, no Orthopnea, no Paroxsymal nocturnal dyspnea  RESPIRATORY: no Shortness of breath, no cough, no wheezing  : No dysuria, no hematuria   GI: No dark color stool, no nausea, no diarrhea, no constipation, no abdominal pain   NEURO: No headache, no slurred speech   MUSCULOSKELETAL: No joint pain or swelling; No muscle, back, or extremity pain  PSYCH: No agitation, no anxiety.    ALL OTHER REVIEW OF SYSTEMS ARE NEGATIVE.    VITAL SIGNS:  T(C): 36.8 (10-11-23 @ 10:45), Max: 37.3 (10-10-23 @ 17:00)  T(F): 98.3 (10-11-23 @ 10:45), Max: 99.1 (10-10-23 @ 17:00)  HR: 82 (10-11-23 @ 10:45) (57 - 82)  BP: 102/59 (10-11-23 @ 11:37) (79/50 - 102/59)  RR: 18 (10-11-23 @ 10:45) (17 - 18)  SpO2: 93% (10-11-23 @ 10:45) (93% - 98%)    INTAKE AND OUTPUT:     10-11 @ 07:01  -  10-11 @ 13:10  --------------------------------------------------------  IN: 600 mL / OUT: 0 mL / NET: 600 mL    PHYSICAL EXAM:  Constitutional: Comfortable . No acute distress.   HEENT: Atraumatic and normocephalic , neck is supple . no JVD. No carotid bruit.  CNS: A&Ox3. No focal deficits.   Respiratory: CTAB, unlabored   Cardiovascular: RRR normal s1 s2. No murmur. No rubs or gallop.  Gastrointestinal: Soft, non-tender. +Bowel sounds.   Extremities: 2+ Peripheral Pulses, No clubbing, cyanosis, or edema  Psychiatric: Calm . no agitation.   Skin: Warm and dry, no ulcers on extremities     LABS:  ( 08 Oct 2023 09:51 )  Troponin T  <0.01,  CPK  X    , CKMB  X    , BNP X                            10.5   3.01  )-----------( 183      ( 11 Oct 2023 07:07 )             29.4     10-11    139  |  109<H>  |  7.2<L>  ----------------------------<  223<H>  3.8   |  22.0  |  0.41<L>    Ca    7.9<L>      11 Oct 2023 07:07  Phos  3.6     10-11  Mg     2.0     10-11    TPro  4.9<L>  /  Alb  2.9<L>  /  TBili  0.3<L>  /  DBili  x   /  AST  10  /  ALT  7   /  AlkPhos  92  10-10      Urinalysis Basic - ( 11 Oct 2023 07:07 )    Color: x / Appearance: x / SG: x / pH: x  Gluc: 223 mg/dL / Ketone: x  / Bili: x / Urobili: x   Blood: x / Protein: x / Nitrite: x   Leuk Esterase: x / RBC: x / WBC x   Sq Epi: x / Non Sq Epi: x / Bacteria: x    ECG:     Ventricular Rate 66 BPM  Atrial Rate 66 BPM  P-R Interval 180 ms  QRS Duration 80 ms  Q-T Interval 404 ms  QTC Calculation(Bazett) 423 ms  P Axis 77 degrees  R Axis 94 degrees  T Axis 66 degrees  Diagnosis Line Normal sinus rhythm  Rightward axis    Prior ECG: Yes [ x ] No [  ]

## 2023-10-13 ENCOUNTER — TRANSCRIPTION ENCOUNTER (OUTPATIENT)
Age: 33
End: 2023-10-13

## 2023-10-13 LAB
CULTURE RESULTS: SIGNIFICANT CHANGE UP
CULTURE RESULTS: SIGNIFICANT CHANGE UP
GLUCOSE BLDC GLUCOMTR-MCNC: 373 MG/DL — HIGH (ref 70–99)
SPECIMEN SOURCE: SIGNIFICANT CHANGE UP
SPECIMEN SOURCE: SIGNIFICANT CHANGE UP

## 2023-11-03 ENCOUNTER — APPOINTMENT (OUTPATIENT)
Dept: ENDOCRINOLOGY | Facility: CLINIC | Age: 33
End: 2023-11-03

## 2023-11-12 ENCOUNTER — INPATIENT (INPATIENT)
Facility: HOSPITAL | Age: 33
LOS: 5 days | Discharge: HOME | DRG: 637 | End: 2023-11-18
Attending: HOSPITALIST | Admitting: INTERNAL MEDICINE
Payer: COMMERCIAL

## 2023-11-12 VITALS
DIASTOLIC BLOOD PRESSURE: 68 MMHG | SYSTOLIC BLOOD PRESSURE: 103 MMHG | HEART RATE: 86 BPM | OXYGEN SATURATION: 100 % | RESPIRATION RATE: 22 BRPM

## 2023-11-12 DIAGNOSIS — E11.10 TYPE 2 DIABETES MELLITUS WITH KETOACIDOSIS WITHOUT COMA: ICD-10-CM

## 2023-11-12 LAB
ALBUMIN SERPL ELPH-MCNC: 3.9 G/DL — SIGNIFICANT CHANGE UP (ref 3.3–5.2)
ALBUMIN SERPL ELPH-MCNC: 3.9 G/DL — SIGNIFICANT CHANGE UP (ref 3.3–5.2)
ALBUMIN SERPL ELPH-MCNC: 4 G/DL — SIGNIFICANT CHANGE UP (ref 3.3–5.2)
ALBUMIN SERPL ELPH-MCNC: 4 G/DL — SIGNIFICANT CHANGE UP (ref 3.3–5.2)
ALP SERPL-CCNC: 242 U/L — HIGH (ref 40–120)
ALP SERPL-CCNC: 242 U/L — HIGH (ref 40–120)
ALP SERPL-CCNC: 244 U/L — HIGH (ref 40–120)
ALP SERPL-CCNC: 244 U/L — HIGH (ref 40–120)
ALT FLD-CCNC: 14 U/L — SIGNIFICANT CHANGE UP
ANION GAP SERPL CALC-SCNC: 23 MMOL/L — HIGH (ref 5–17)
ANION GAP SERPL CALC-SCNC: 23 MMOL/L — HIGH (ref 5–17)
ANION GAP SERPL CALC-SCNC: 24 MMOL/L — HIGH (ref 5–17)
ANION GAP SERPL CALC-SCNC: 24 MMOL/L — HIGH (ref 5–17)
ANION GAP SERPL CALC-SCNC: SIGNIFICANT CHANGE UP MMOL/L (ref 5–17)
ANISOCYTOSIS BLD QL: SLIGHT — SIGNIFICANT CHANGE UP
ANISOCYTOSIS BLD QL: SLIGHT — SIGNIFICANT CHANGE UP
APAP SERPL-MCNC: <3 UG/ML — LOW (ref 10–26)
APAP SERPL-MCNC: <3 UG/ML — LOW (ref 10–26)
APPEARANCE UR: CLEAR — SIGNIFICANT CHANGE UP
APPEARANCE UR: CLEAR — SIGNIFICANT CHANGE UP
AST SERPL-CCNC: 21 U/L — SIGNIFICANT CHANGE UP
AST SERPL-CCNC: 21 U/L — SIGNIFICANT CHANGE UP
AST SERPL-CCNC: 22 U/L — SIGNIFICANT CHANGE UP
AST SERPL-CCNC: 22 U/L — SIGNIFICANT CHANGE UP
B-OH-BUTYR SERPL-SCNC: 3.7 MMOL/L — HIGH
B-OH-BUTYR SERPL-SCNC: 3.7 MMOL/L — HIGH
BACTERIA # UR AUTO: ABNORMAL /HPF
BACTERIA # UR AUTO: ABNORMAL /HPF
BASE EXCESS BLDV CALC-SCNC: -14.8 MMOL/L — LOW (ref -2–3)
BASE EXCESS BLDV CALC-SCNC: -14.8 MMOL/L — LOW (ref -2–3)
BASE EXCESS BLDV CALC-SCNC: SIGNIFICANT CHANGE UP MMOL/L (ref -2–3)
BASE EXCESS BLDV CALC-SCNC: SIGNIFICANT CHANGE UP MMOL/L (ref -2–3)
BASOPHILS # BLD AUTO: 0 K/UL — SIGNIFICANT CHANGE UP (ref 0–0.2)
BASOPHILS # BLD AUTO: 0 K/UL — SIGNIFICANT CHANGE UP (ref 0–0.2)
BASOPHILS NFR BLD AUTO: 0 % — SIGNIFICANT CHANGE UP (ref 0–2)
BASOPHILS NFR BLD AUTO: 0 % — SIGNIFICANT CHANGE UP (ref 0–2)
BILIRUB SERPL-MCNC: 0.3 MG/DL — LOW (ref 0.4–2)
BILIRUB UR-MCNC: NEGATIVE — SIGNIFICANT CHANGE UP
BILIRUB UR-MCNC: NEGATIVE — SIGNIFICANT CHANGE UP
BUN SERPL-MCNC: 28.8 MG/DL — HIGH (ref 8–20)
BUN SERPL-MCNC: 28.8 MG/DL — HIGH (ref 8–20)
BUN SERPL-MCNC: 30 MG/DL — HIGH (ref 8–20)
BUN SERPL-MCNC: 30 MG/DL — HIGH (ref 8–20)
BUN SERPL-MCNC: 32.3 MG/DL — HIGH (ref 8–20)
BUN SERPL-MCNC: 32.3 MG/DL — HIGH (ref 8–20)
BUN SERPL-MCNC: 32.4 MG/DL — HIGH (ref 8–20)
BUN SERPL-MCNC: 32.4 MG/DL — HIGH (ref 8–20)
BUN SERPL-MCNC: 33.2 MG/DL — HIGH (ref 8–20)
BUN SERPL-MCNC: 33.2 MG/DL — HIGH (ref 8–20)
BURR CELLS BLD QL SMEAR: PRESENT — SIGNIFICANT CHANGE UP
BURR CELLS BLD QL SMEAR: PRESENT — SIGNIFICANT CHANGE UP
CA-I SERPL-SCNC: 1.26 MMOL/L — SIGNIFICANT CHANGE UP (ref 1.15–1.33)
CA-I SERPL-SCNC: 1.26 MMOL/L — SIGNIFICANT CHANGE UP (ref 1.15–1.33)
CA-I SERPL-SCNC: 1.29 MMOL/L — SIGNIFICANT CHANGE UP (ref 1.15–1.33)
CA-I SERPL-SCNC: 1.29 MMOL/L — SIGNIFICANT CHANGE UP (ref 1.15–1.33)
CALCIUM SERPL-MCNC: 7.9 MG/DL — LOW (ref 8.4–10.5)
CALCIUM SERPL-MCNC: 8 MG/DL — LOW (ref 8.4–10.5)
CALCIUM SERPL-MCNC: 8.1 MG/DL — LOW (ref 8.4–10.5)
CALCIUM SERPL-MCNC: 8.1 MG/DL — LOW (ref 8.4–10.5)
CAST: 2 /LPF — SIGNIFICANT CHANGE UP (ref 0–4)
CAST: 2 /LPF — SIGNIFICANT CHANGE UP (ref 0–4)
CHLORIDE BLDV-SCNC: 106 MMOL/L — SIGNIFICANT CHANGE UP (ref 96–108)
CHLORIDE SERPL-SCNC: 100 MMOL/L — SIGNIFICANT CHANGE UP (ref 96–108)
CHLORIDE SERPL-SCNC: 100 MMOL/L — SIGNIFICANT CHANGE UP (ref 96–108)
CHLORIDE SERPL-SCNC: 104 MMOL/L — SIGNIFICANT CHANGE UP (ref 96–108)
CHLORIDE SERPL-SCNC: 107 MMOL/L — SIGNIFICANT CHANGE UP (ref 96–108)
CHLORIDE SERPL-SCNC: 107 MMOL/L — SIGNIFICANT CHANGE UP (ref 96–108)
CHLORIDE SERPL-SCNC: 97 MMOL/L — SIGNIFICANT CHANGE UP (ref 96–108)
CHLORIDE SERPL-SCNC: 97 MMOL/L — SIGNIFICANT CHANGE UP (ref 96–108)
CO2 SERPL-SCNC: 10 MMOL/L — CRITICAL LOW (ref 22–29)
CO2 SERPL-SCNC: 10 MMOL/L — CRITICAL LOW (ref 22–29)
CO2 SERPL-SCNC: 9 MMOL/L — CRITICAL LOW (ref 22–29)
CO2 SERPL-SCNC: 9 MMOL/L — CRITICAL LOW (ref 22–29)
CO2 SERPL-SCNC: <6 MMOL/L — CRITICAL LOW (ref 22–29)
COLOR SPEC: YELLOW — SIGNIFICANT CHANGE UP
COLOR SPEC: YELLOW — SIGNIFICANT CHANGE UP
CREAT SERPL-MCNC: 1.62 MG/DL — HIGH (ref 0.5–1.3)
CREAT SERPL-MCNC: 1.62 MG/DL — HIGH (ref 0.5–1.3)
CREAT SERPL-MCNC: 1.7 MG/DL — HIGH (ref 0.5–1.3)
CREAT SERPL-MCNC: 1.7 MG/DL — HIGH (ref 0.5–1.3)
CREAT SERPL-MCNC: 1.71 MG/DL — HIGH (ref 0.5–1.3)
CREAT SERPL-MCNC: 1.71 MG/DL — HIGH (ref 0.5–1.3)
CREAT SERPL-MCNC: 1.88 MG/DL — HIGH (ref 0.5–1.3)
CREAT SERPL-MCNC: 1.88 MG/DL — HIGH (ref 0.5–1.3)
CREAT SERPL-MCNC: 2.24 MG/DL — HIGH (ref 0.5–1.3)
CREAT SERPL-MCNC: 2.24 MG/DL — HIGH (ref 0.5–1.3)
DACRYOCYTES BLD QL SMEAR: SLIGHT — SIGNIFICANT CHANGE UP
DACRYOCYTES BLD QL SMEAR: SLIGHT — SIGNIFICANT CHANGE UP
DIFF PNL FLD: ABNORMAL
DIFF PNL FLD: ABNORMAL
EGFR: 39 ML/MIN/1.73M2 — LOW
EGFR: 39 ML/MIN/1.73M2 — LOW
EGFR: 48 ML/MIN/1.73M2 — LOW
EGFR: 48 ML/MIN/1.73M2 — LOW
EGFR: 54 ML/MIN/1.73M2 — LOW
EGFR: 57 ML/MIN/1.73M2 — LOW
EGFR: 57 ML/MIN/1.73M2 — LOW
EOSINOPHIL # BLD AUTO: 0 K/UL — SIGNIFICANT CHANGE UP (ref 0–0.5)
EOSINOPHIL # BLD AUTO: 0 K/UL — SIGNIFICANT CHANGE UP (ref 0–0.5)
EOSINOPHIL NFR BLD AUTO: 0 % — SIGNIFICANT CHANGE UP (ref 0–6)
EOSINOPHIL NFR BLD AUTO: 0 % — SIGNIFICANT CHANGE UP (ref 0–6)
GAS PNL BLDV: 134 MMOL/L — LOW (ref 136–145)
GAS PNL BLDV: SIGNIFICANT CHANGE UP
GLUCOSE BLDC GLUCOMTR-MCNC: 370 MG/DL — HIGH (ref 70–99)
GLUCOSE BLDC GLUCOMTR-MCNC: 370 MG/DL — HIGH (ref 70–99)
GLUCOSE BLDC GLUCOMTR-MCNC: 386 MG/DL — HIGH (ref 70–99)
GLUCOSE BLDC GLUCOMTR-MCNC: 386 MG/DL — HIGH (ref 70–99)
GLUCOSE BLDC GLUCOMTR-MCNC: 398 MG/DL — HIGH (ref 70–99)
GLUCOSE BLDC GLUCOMTR-MCNC: 398 MG/DL — HIGH (ref 70–99)
GLUCOSE BLDC GLUCOMTR-MCNC: 410 MG/DL — HIGH (ref 70–99)
GLUCOSE BLDC GLUCOMTR-MCNC: 410 MG/DL — HIGH (ref 70–99)
GLUCOSE BLDC GLUCOMTR-MCNC: 420 MG/DL — HIGH (ref 70–99)
GLUCOSE BLDC GLUCOMTR-MCNC: 420 MG/DL — HIGH (ref 70–99)
GLUCOSE BLDC GLUCOMTR-MCNC: 434 MG/DL — HIGH (ref 70–99)
GLUCOSE BLDC GLUCOMTR-MCNC: 434 MG/DL — HIGH (ref 70–99)
GLUCOSE BLDC GLUCOMTR-MCNC: 446 MG/DL — HIGH (ref 70–99)
GLUCOSE BLDC GLUCOMTR-MCNC: 446 MG/DL — HIGH (ref 70–99)
GLUCOSE BLDC GLUCOMTR-MCNC: 449 MG/DL — HIGH (ref 70–99)
GLUCOSE BLDC GLUCOMTR-MCNC: 449 MG/DL — HIGH (ref 70–99)
GLUCOSE BLDC GLUCOMTR-MCNC: 476 MG/DL — CRITICAL HIGH (ref 70–99)
GLUCOSE BLDC GLUCOMTR-MCNC: 476 MG/DL — CRITICAL HIGH (ref 70–99)
GLUCOSE BLDC GLUCOMTR-MCNC: 491 MG/DL — CRITICAL HIGH (ref 70–99)
GLUCOSE BLDC GLUCOMTR-MCNC: 491 MG/DL — CRITICAL HIGH (ref 70–99)
GLUCOSE BLDC GLUCOMTR-MCNC: 510 MG/DL — CRITICAL HIGH (ref 70–99)
GLUCOSE BLDC GLUCOMTR-MCNC: 510 MG/DL — CRITICAL HIGH (ref 70–99)
GLUCOSE BLDC GLUCOMTR-MCNC: >530 MG/DL — CRITICAL HIGH (ref 70–99)
GLUCOSE BLDC GLUCOMTR-MCNC: >530 MG/DL — CRITICAL HIGH (ref 70–99)
GLUCOSE BLDV-MCNC: 518 MG/DL — CRITICAL HIGH (ref 70–99)
GLUCOSE BLDV-MCNC: 518 MG/DL — CRITICAL HIGH (ref 70–99)
GLUCOSE BLDV-MCNC: >656 MG/DL — CRITICAL HIGH (ref 70–99)
GLUCOSE BLDV-MCNC: >656 MG/DL — CRITICAL HIGH (ref 70–99)
GLUCOSE SERPL-MCNC: 462 MG/DL — CRITICAL HIGH (ref 70–99)
GLUCOSE SERPL-MCNC: 462 MG/DL — CRITICAL HIGH (ref 70–99)
GLUCOSE SERPL-MCNC: 471 MG/DL — CRITICAL HIGH (ref 70–99)
GLUCOSE SERPL-MCNC: 471 MG/DL — CRITICAL HIGH (ref 70–99)
GLUCOSE SERPL-MCNC: 507 MG/DL — CRITICAL HIGH (ref 70–99)
GLUCOSE SERPL-MCNC: 507 MG/DL — CRITICAL HIGH (ref 70–99)
GLUCOSE SERPL-MCNC: 777 MG/DL — CRITICAL HIGH (ref 70–99)
GLUCOSE SERPL-MCNC: 777 MG/DL — CRITICAL HIGH (ref 70–99)
GLUCOSE SERPL-MCNC: 794 MG/DL — CRITICAL HIGH (ref 70–99)
GLUCOSE SERPL-MCNC: 794 MG/DL — CRITICAL HIGH (ref 70–99)
GLUCOSE UR QL: >=1000 MG/DL
GLUCOSE UR QL: >=1000 MG/DL
HCO3 BLDV-SCNC: 11 MMOL/L — LOW (ref 22–29)
HCO3 BLDV-SCNC: 11 MMOL/L — LOW (ref 22–29)
HCO3 BLDV-SCNC: SIGNIFICANT CHANGE UP MMOL/L (ref 22–29)
HCO3 BLDV-SCNC: SIGNIFICANT CHANGE UP MMOL/L (ref 22–29)
HCT VFR BLD CALC: 40.7 % — SIGNIFICANT CHANGE UP (ref 39–50)
HCT VFR BLD CALC: 40.7 % — SIGNIFICANT CHANGE UP (ref 39–50)
HCT VFR BLDA CALC: 38 % — SIGNIFICANT CHANGE UP
HCT VFR BLDA CALC: 38 % — SIGNIFICANT CHANGE UP
HCT VFR BLDA CALC: 41 % — SIGNIFICANT CHANGE UP
HCT VFR BLDA CALC: 41 % — SIGNIFICANT CHANGE UP
HGB BLD CALC-MCNC: 12.6 G/DL — SIGNIFICANT CHANGE UP (ref 12.6–17.4)
HGB BLD CALC-MCNC: 12.6 G/DL — SIGNIFICANT CHANGE UP (ref 12.6–17.4)
HGB BLD CALC-MCNC: 13.8 G/DL — SIGNIFICANT CHANGE UP (ref 12.6–17.4)
HGB BLD CALC-MCNC: 13.8 G/DL — SIGNIFICANT CHANGE UP (ref 12.6–17.4)
HGB BLD-MCNC: 12.9 G/DL — LOW (ref 13–17)
HGB BLD-MCNC: 12.9 G/DL — LOW (ref 13–17)
KETONES UR-MCNC: 80 MG/DL
KETONES UR-MCNC: 80 MG/DL
LACTATE BLDV-MCNC: 2.3 MMOL/L — HIGH (ref 0.5–2)
LACTATE BLDV-MCNC: 2.3 MMOL/L — HIGH (ref 0.5–2)
LACTATE BLDV-MCNC: 2.4 MMOL/L — HIGH (ref 0.5–2)
LACTATE BLDV-MCNC: 2.4 MMOL/L — HIGH (ref 0.5–2)
LACTATE SERPL-SCNC: 1.9 MMOL/L — SIGNIFICANT CHANGE UP (ref 0.5–2)
LACTATE SERPL-SCNC: 1.9 MMOL/L — SIGNIFICANT CHANGE UP (ref 0.5–2)
LEUKOCYTE ESTERASE UR-ACNC: NEGATIVE — SIGNIFICANT CHANGE UP
LEUKOCYTE ESTERASE UR-ACNC: NEGATIVE — SIGNIFICANT CHANGE UP
LYMPHOCYTES # BLD AUTO: 2.93 K/UL — SIGNIFICANT CHANGE UP (ref 1–3.3)
LYMPHOCYTES # BLD AUTO: 2.93 K/UL — SIGNIFICANT CHANGE UP (ref 1–3.3)
LYMPHOCYTES # BLD AUTO: 8.8 % — LOW (ref 13–44)
LYMPHOCYTES # BLD AUTO: 8.8 % — LOW (ref 13–44)
MACROCYTES BLD QL: SLIGHT — SIGNIFICANT CHANGE UP
MACROCYTES BLD QL: SLIGHT — SIGNIFICANT CHANGE UP
MAGNESIUM SERPL-MCNC: 1.3 MG/DL — LOW (ref 1.6–2.6)
MAGNESIUM SERPL-MCNC: 1.3 MG/DL — LOW (ref 1.6–2.6)
MAGNESIUM SERPL-MCNC: 1.7 MG/DL — LOW (ref 1.8–2.6)
MAGNESIUM SERPL-MCNC: 1.7 MG/DL — LOW (ref 1.8–2.6)
MAGNESIUM SERPL-MCNC: 2.2 MG/DL — SIGNIFICANT CHANGE UP (ref 1.6–2.6)
MAGNESIUM SERPL-MCNC: 2.2 MG/DL — SIGNIFICANT CHANGE UP (ref 1.6–2.6)
MAGNESIUM SERPL-MCNC: 2.6 MG/DL — SIGNIFICANT CHANGE UP (ref 1.6–2.6)
MAGNESIUM SERPL-MCNC: 2.6 MG/DL — SIGNIFICANT CHANGE UP (ref 1.6–2.6)
MANUAL SMEAR VERIFICATION: SIGNIFICANT CHANGE UP
MANUAL SMEAR VERIFICATION: SIGNIFICANT CHANGE UP
MCHC RBC-ENTMCNC: 30.4 PG — SIGNIFICANT CHANGE UP (ref 27–34)
MCHC RBC-ENTMCNC: 30.4 PG — SIGNIFICANT CHANGE UP (ref 27–34)
MCHC RBC-ENTMCNC: 31.7 GM/DL — LOW (ref 32–36)
MCHC RBC-ENTMCNC: 31.7 GM/DL — LOW (ref 32–36)
MCV RBC AUTO: 96 FL — SIGNIFICANT CHANGE UP (ref 80–100)
MCV RBC AUTO: 96 FL — SIGNIFICANT CHANGE UP (ref 80–100)
METAMYELOCYTES # FLD: 0.9 % — HIGH (ref 0–0)
METAMYELOCYTES # FLD: 0.9 % — HIGH (ref 0–0)
MONOCYTES # BLD AUTO: 0.9 K/UL — SIGNIFICANT CHANGE UP (ref 0–0.9)
MONOCYTES # BLD AUTO: 0.9 K/UL — SIGNIFICANT CHANGE UP (ref 0–0.9)
MONOCYTES NFR BLD AUTO: 2.7 % — SIGNIFICANT CHANGE UP (ref 2–14)
MONOCYTES NFR BLD AUTO: 2.7 % — SIGNIFICANT CHANGE UP (ref 2–14)
NEUTROPHILS # BLD AUTO: 29.21 K/UL — HIGH (ref 1.8–7.4)
NEUTROPHILS # BLD AUTO: 29.21 K/UL — HIGH (ref 1.8–7.4)
NEUTROPHILS NFR BLD AUTO: 85.8 % — HIGH (ref 43–77)
NEUTROPHILS NFR BLD AUTO: 85.8 % — HIGH (ref 43–77)
NEUTS BAND # BLD: 1.8 % — SIGNIFICANT CHANGE UP (ref 0–8)
NEUTS BAND # BLD: 1.8 % — SIGNIFICANT CHANGE UP (ref 0–8)
NITRITE UR-MCNC: NEGATIVE — SIGNIFICANT CHANGE UP
NITRITE UR-MCNC: NEGATIVE — SIGNIFICANT CHANGE UP
OSMOLALITY SERPL: 361 MOSMOL/KG — HIGH (ref 275–300)
OSMOLALITY SERPL: 361 MOSMOL/KG — HIGH (ref 275–300)
OTHER CELLS CSF MANUAL: SIGNIFICANT CHANGE UP ML/DL (ref 18–22)
OTHER CELLS CSF MANUAL: SIGNIFICANT CHANGE UP ML/DL (ref 18–22)
OVALOCYTES BLD QL SMEAR: SLIGHT — SIGNIFICANT CHANGE UP
OVALOCYTES BLD QL SMEAR: SLIGHT — SIGNIFICANT CHANGE UP
PCO2 BLDV: 22 MMHG — LOW (ref 42–55)
PCO2 BLDV: 22 MMHG — LOW (ref 42–55)
PCO2 BLDV: <20 MMHG — LOW (ref 42–55)
PCO2 BLDV: <20 MMHG — LOW (ref 42–55)
PCP SPEC-MCNC: SIGNIFICANT CHANGE UP
PCP SPEC-MCNC: SIGNIFICANT CHANGE UP
PH BLDV: 7.32 — SIGNIFICANT CHANGE UP (ref 7.32–7.43)
PH BLDV: 7.32 — SIGNIFICANT CHANGE UP (ref 7.32–7.43)
PH BLDV: <6.942 — CRITICAL LOW (ref 7.32–7.43)
PH BLDV: <6.942 — CRITICAL LOW (ref 7.32–7.43)
PH UR: 5 — SIGNIFICANT CHANGE UP (ref 5–8)
PH UR: 5 — SIGNIFICANT CHANGE UP (ref 5–8)
PHOSPHATE SERPL-MCNC: 0.7 MG/DL — CRITICAL LOW (ref 2.4–4.7)
PHOSPHATE SERPL-MCNC: 0.7 MG/DL — CRITICAL LOW (ref 2.4–4.7)
PHOSPHATE SERPL-MCNC: 1.4 MG/DL — LOW (ref 2.4–4.7)
PHOSPHATE SERPL-MCNC: 1.4 MG/DL — LOW (ref 2.4–4.7)
PHOSPHATE SERPL-MCNC: 2.3 MG/DL — LOW (ref 2.4–4.7)
PHOSPHATE SERPL-MCNC: 2.3 MG/DL — LOW (ref 2.4–4.7)
PHOSPHATE SERPL-MCNC: 8.7 MG/DL — HIGH (ref 2.4–4.7)
PHOSPHATE SERPL-MCNC: 8.7 MG/DL — HIGH (ref 2.4–4.7)
PLAT MORPH BLD: NORMAL — SIGNIFICANT CHANGE UP
PLAT MORPH BLD: NORMAL — SIGNIFICANT CHANGE UP
PLATELET # BLD AUTO: 375 K/UL — SIGNIFICANT CHANGE UP (ref 150–400)
PLATELET # BLD AUTO: 375 K/UL — SIGNIFICANT CHANGE UP (ref 150–400)
PO2 BLDV: 156 MMHG — HIGH (ref 25–45)
PO2 BLDV: 156 MMHG — HIGH (ref 25–45)
PO2 BLDV: 47 MMHG — HIGH (ref 25–45)
PO2 BLDV: 47 MMHG — HIGH (ref 25–45)
POIKILOCYTOSIS BLD QL AUTO: SLIGHT — SIGNIFICANT CHANGE UP
POIKILOCYTOSIS BLD QL AUTO: SLIGHT — SIGNIFICANT CHANGE UP
POLYCHROMASIA BLD QL SMEAR: SLIGHT — SIGNIFICANT CHANGE UP
POLYCHROMASIA BLD QL SMEAR: SLIGHT — SIGNIFICANT CHANGE UP
POTASSIUM BLDV-SCNC: 3 MMOL/L — LOW (ref 3.5–5.1)
POTASSIUM BLDV-SCNC: 3 MMOL/L — LOW (ref 3.5–5.1)
POTASSIUM BLDV-SCNC: 4.4 MMOL/L — SIGNIFICANT CHANGE UP (ref 3.5–5.1)
POTASSIUM BLDV-SCNC: 4.4 MMOL/L — SIGNIFICANT CHANGE UP (ref 3.5–5.1)
POTASSIUM SERPL-MCNC: 3.1 MMOL/L — LOW (ref 3.5–5.3)
POTASSIUM SERPL-MCNC: 3.1 MMOL/L — LOW (ref 3.5–5.3)
POTASSIUM SERPL-MCNC: 3.8 MMOL/L — SIGNIFICANT CHANGE UP (ref 3.5–5.3)
POTASSIUM SERPL-MCNC: 3.8 MMOL/L — SIGNIFICANT CHANGE UP (ref 3.5–5.3)
POTASSIUM SERPL-MCNC: 4.3 MMOL/L — SIGNIFICANT CHANGE UP (ref 3.5–5.3)
POTASSIUM SERPL-MCNC: 4.3 MMOL/L — SIGNIFICANT CHANGE UP (ref 3.5–5.3)
POTASSIUM SERPL-MCNC: 4.4 MMOL/L — SIGNIFICANT CHANGE UP (ref 3.5–5.3)
POTASSIUM SERPL-MCNC: 4.4 MMOL/L — SIGNIFICANT CHANGE UP (ref 3.5–5.3)
POTASSIUM SERPL-MCNC: 4.6 MMOL/L — SIGNIFICANT CHANGE UP (ref 3.5–5.3)
POTASSIUM SERPL-MCNC: 4.6 MMOL/L — SIGNIFICANT CHANGE UP (ref 3.5–5.3)
POTASSIUM SERPL-SCNC: 3.1 MMOL/L — LOW (ref 3.5–5.3)
POTASSIUM SERPL-SCNC: 3.1 MMOL/L — LOW (ref 3.5–5.3)
POTASSIUM SERPL-SCNC: 3.8 MMOL/L — SIGNIFICANT CHANGE UP (ref 3.5–5.3)
POTASSIUM SERPL-SCNC: 3.8 MMOL/L — SIGNIFICANT CHANGE UP (ref 3.5–5.3)
POTASSIUM SERPL-SCNC: 4.3 MMOL/L — SIGNIFICANT CHANGE UP (ref 3.5–5.3)
POTASSIUM SERPL-SCNC: 4.3 MMOL/L — SIGNIFICANT CHANGE UP (ref 3.5–5.3)
POTASSIUM SERPL-SCNC: 4.4 MMOL/L — SIGNIFICANT CHANGE UP (ref 3.5–5.3)
POTASSIUM SERPL-SCNC: 4.4 MMOL/L — SIGNIFICANT CHANGE UP (ref 3.5–5.3)
POTASSIUM SERPL-SCNC: 4.6 MMOL/L — SIGNIFICANT CHANGE UP (ref 3.5–5.3)
POTASSIUM SERPL-SCNC: 4.6 MMOL/L — SIGNIFICANT CHANGE UP (ref 3.5–5.3)
PROCALCITONIN SERPL-MCNC: 7.13 NG/ML — HIGH (ref 0.02–0.1)
PROCALCITONIN SERPL-MCNC: 7.13 NG/ML — HIGH (ref 0.02–0.1)
PROT SERPL-MCNC: 6.4 G/DL — LOW (ref 6.6–8.7)
PROT SERPL-MCNC: 6.4 G/DL — LOW (ref 6.6–8.7)
PROT SERPL-MCNC: 6.8 G/DL — SIGNIFICANT CHANGE UP (ref 6.6–8.7)
PROT SERPL-MCNC: 6.8 G/DL — SIGNIFICANT CHANGE UP (ref 6.6–8.7)
PROT UR-MCNC: 100 MG/DL
PROT UR-MCNC: 100 MG/DL
RAPID RVP RESULT: SIGNIFICANT CHANGE UP
RAPID RVP RESULT: SIGNIFICANT CHANGE UP
RBC # BLD: 4.24 M/UL — SIGNIFICANT CHANGE UP (ref 4.2–5.8)
RBC # BLD: 4.24 M/UL — SIGNIFICANT CHANGE UP (ref 4.2–5.8)
RBC # FLD: 13.9 % — SIGNIFICANT CHANGE UP (ref 10.3–14.5)
RBC # FLD: 13.9 % — SIGNIFICANT CHANGE UP (ref 10.3–14.5)
RBC BLD AUTO: ABNORMAL
RBC BLD AUTO: ABNORMAL
RBC CASTS # UR COMP ASSIST: 0 /HPF — SIGNIFICANT CHANGE UP (ref 0–4)
RBC CASTS # UR COMP ASSIST: 0 /HPF — SIGNIFICANT CHANGE UP (ref 0–4)
S PNEUM AG UR QL: NEGATIVE — SIGNIFICANT CHANGE UP
S PNEUM AG UR QL: NEGATIVE — SIGNIFICANT CHANGE UP
SALICYLATES SERPL-MCNC: <0.6 MG/DL — LOW (ref 10–20)
SALICYLATES SERPL-MCNC: <0.6 MG/DL — LOW (ref 10–20)
SAO2 % BLDV: 86.4 % — SIGNIFICANT CHANGE UP
SAO2 % BLDV: 86.4 % — SIGNIFICANT CHANGE UP
SAO2 % BLDV: 99.2 % — SIGNIFICANT CHANGE UP
SAO2 % BLDV: 99.2 % — SIGNIFICANT CHANGE UP
SARS-COV-2 RNA SPEC QL NAA+PROBE: SIGNIFICANT CHANGE UP
SARS-COV-2 RNA SPEC QL NAA+PROBE: SIGNIFICANT CHANGE UP
SMUDGE CELLS # BLD: PRESENT — SIGNIFICANT CHANGE UP
SMUDGE CELLS # BLD: PRESENT — SIGNIFICANT CHANGE UP
SODIUM SERPL-SCNC: 136 MMOL/L — SIGNIFICANT CHANGE UP (ref 135–145)
SODIUM SERPL-SCNC: 137 MMOL/L — SIGNIFICANT CHANGE UP (ref 135–145)
SODIUM SERPL-SCNC: 139 MMOL/L — SIGNIFICANT CHANGE UP (ref 135–145)
SODIUM SERPL-SCNC: 139 MMOL/L — SIGNIFICANT CHANGE UP (ref 135–145)
SP GR SPEC: 1.02 — SIGNIFICANT CHANGE UP (ref 1–1.03)
SP GR SPEC: 1.02 — SIGNIFICANT CHANGE UP (ref 1–1.03)
SQUAMOUS # UR AUTO: 1 /HPF — SIGNIFICANT CHANGE UP (ref 0–5)
SQUAMOUS # UR AUTO: 1 /HPF — SIGNIFICANT CHANGE UP (ref 0–5)
TROPONIN T, HIGH SENSITIVITY RESULT: 15 NG/L — SIGNIFICANT CHANGE UP (ref 0–51)
TROPONIN T, HIGH SENSITIVITY RESULT: 15 NG/L — SIGNIFICANT CHANGE UP (ref 0–51)
UROBILINOGEN FLD QL: 0.2 MG/DL — SIGNIFICANT CHANGE UP (ref 0.2–1)
UROBILINOGEN FLD QL: 0.2 MG/DL — SIGNIFICANT CHANGE UP (ref 0.2–1)
WBC # BLD: 33.34 K/UL — HIGH (ref 3.8–10.5)
WBC # BLD: 33.34 K/UL — HIGH (ref 3.8–10.5)
WBC # FLD AUTO: 33.34 K/UL — HIGH (ref 3.8–10.5)
WBC # FLD AUTO: 33.34 K/UL — HIGH (ref 3.8–10.5)
WBC UR QL: 1 /HPF — SIGNIFICANT CHANGE UP (ref 0–5)
WBC UR QL: 1 /HPF — SIGNIFICANT CHANGE UP (ref 0–5)

## 2023-11-12 PROCEDURE — 71045 X-RAY EXAM CHEST 1 VIEW: CPT | Mod: 26

## 2023-11-12 PROCEDURE — 74176 CT ABD & PELVIS W/O CONTRAST: CPT | Mod: 26

## 2023-11-12 PROCEDURE — 71250 CT THORAX DX C-: CPT | Mod: 26

## 2023-11-12 PROCEDURE — 70450 CT HEAD/BRAIN W/O DYE: CPT | Mod: 26

## 2023-11-12 PROCEDURE — 99291 CRITICAL CARE FIRST HOUR: CPT | Mod: 25

## 2023-11-12 PROCEDURE — 93010 ELECTROCARDIOGRAM REPORT: CPT

## 2023-11-12 PROCEDURE — 36556 INSERT NON-TUNNEL CV CATH: CPT

## 2023-11-12 RX ORDER — PIPERACILLIN AND TAZOBACTAM 4; .5 G/20ML; G/20ML
3.38 INJECTION, POWDER, LYOPHILIZED, FOR SOLUTION INTRAVENOUS ONCE
Refills: 0 | Status: COMPLETED | OUTPATIENT
Start: 2023-11-12 | End: 2023-11-12

## 2023-11-12 RX ORDER — POTASSIUM PHOSPHATE, MONOBASIC POTASSIUM PHOSPHATE, DIBASIC 236; 224 MG/ML; MG/ML
30 INJECTION, SOLUTION INTRAVENOUS ONCE
Refills: 0 | Status: COMPLETED | OUTPATIENT
Start: 2023-11-12 | End: 2023-11-12

## 2023-11-12 RX ORDER — MAGNESIUM SULFATE 500 MG/ML
2 VIAL (ML) INJECTION ONCE
Refills: 0 | Status: COMPLETED | OUTPATIENT
Start: 2023-11-12 | End: 2023-11-12

## 2023-11-12 RX ORDER — INSULIN HUMAN 100 [IU]/ML
8 INJECTION, SOLUTION SUBCUTANEOUS
Qty: 100 | Refills: 0 | Status: DISCONTINUED | OUTPATIENT
Start: 2023-11-12 | End: 2023-11-12

## 2023-11-12 RX ORDER — DEXTROSE 50 % IN WATER 50 %
50 SYRINGE (ML) INTRAVENOUS
Refills: 0 | Status: DISCONTINUED | OUTPATIENT
Start: 2023-11-12 | End: 2023-11-18

## 2023-11-12 RX ORDER — SODIUM CHLORIDE 9 MG/ML
1000 INJECTION, SOLUTION INTRAVENOUS ONCE
Refills: 0 | Status: COMPLETED | OUTPATIENT
Start: 2023-11-12 | End: 2023-11-12

## 2023-11-12 RX ORDER — THIAMINE MONONITRATE (VIT B1) 100 MG
100 TABLET ORAL DAILY
Refills: 0 | Status: DISCONTINUED | OUTPATIENT
Start: 2023-11-12 | End: 2023-11-18

## 2023-11-12 RX ORDER — SODIUM CHLORIDE 9 MG/ML
1000 INJECTION, SOLUTION INTRAVENOUS
Refills: 0 | Status: DISCONTINUED | OUTPATIENT
Start: 2023-11-12 | End: 2023-11-12

## 2023-11-12 RX ORDER — FOLIC ACID 0.8 MG
1 TABLET ORAL DAILY
Refills: 0 | Status: DISCONTINUED | OUTPATIENT
Start: 2023-11-12 | End: 2023-11-18

## 2023-11-12 RX ORDER — PIPERACILLIN AND TAZOBACTAM 4; .5 G/20ML; G/20ML
3.38 INJECTION, POWDER, LYOPHILIZED, FOR SOLUTION INTRAVENOUS EVERY 12 HOURS
Refills: 0 | Status: DISCONTINUED | OUTPATIENT
Start: 2023-11-12 | End: 2023-11-13

## 2023-11-12 RX ORDER — INSULIN HUMAN 100 [IU]/ML
6 INJECTION, SOLUTION SUBCUTANEOUS
Qty: 100 | Refills: 0 | Status: DISCONTINUED | OUTPATIENT
Start: 2023-11-12 | End: 2023-11-13

## 2023-11-12 RX ORDER — PANTOPRAZOLE SODIUM 20 MG/1
40 TABLET, DELAYED RELEASE ORAL DAILY
Refills: 0 | Status: DISCONTINUED | OUTPATIENT
Start: 2023-11-12 | End: 2023-11-15

## 2023-11-12 RX ORDER — POTASSIUM CHLORIDE 20 MEQ
20 PACKET (EA) ORAL
Refills: 0 | Status: COMPLETED | OUTPATIENT
Start: 2023-11-12 | End: 2023-11-12

## 2023-11-12 RX ORDER — SODIUM CHLORIDE 9 MG/ML
1000 INJECTION, SOLUTION INTRAVENOUS
Refills: 0 | Status: DISCONTINUED | OUTPATIENT
Start: 2023-11-12 | End: 2023-11-13

## 2023-11-12 RX ORDER — SODIUM BICARBONATE 1 MEQ/ML
0.47 SYRINGE (ML) INTRAVENOUS
Qty: 150 | Refills: 0 | Status: DISCONTINUED | OUTPATIENT
Start: 2023-11-12 | End: 2023-11-12

## 2023-11-12 RX ORDER — SODIUM BICARBONATE 1 MEQ/ML
0.28 SYRINGE (ML) INTRAVENOUS
Qty: 150 | Refills: 0 | Status: DISCONTINUED | OUTPATIENT
Start: 2023-11-12 | End: 2023-11-12

## 2023-11-12 RX ORDER — LEVETIRACETAM 250 MG/1
1000 TABLET, FILM COATED ORAL EVERY 12 HOURS
Refills: 0 | Status: DISCONTINUED | OUTPATIENT
Start: 2023-11-12 | End: 2023-11-13

## 2023-11-12 RX ORDER — POTASSIUM CHLORIDE 20 MEQ
20 PACKET (EA) ORAL
Refills: 0 | Status: DISCONTINUED | OUTPATIENT
Start: 2023-11-12 | End: 2023-11-12

## 2023-11-12 RX ORDER — AZITHROMYCIN 500 MG/1
500 TABLET, FILM COATED ORAL EVERY 24 HOURS
Refills: 0 | Status: DISCONTINUED | OUTPATIENT
Start: 2023-11-12 | End: 2023-11-13

## 2023-11-12 RX ORDER — VANCOMYCIN HCL 1 G
1000 VIAL (EA) INTRAVENOUS ONCE
Refills: 0 | Status: COMPLETED | OUTPATIENT
Start: 2023-11-12 | End: 2023-11-12

## 2023-11-12 RX ADMIN — Medication 250 MILLIGRAM(S): at 09:38

## 2023-11-12 RX ADMIN — SODIUM CHLORIDE 1000 MILLILITER(S): 9 INJECTION, SOLUTION INTRAVENOUS at 08:13

## 2023-11-12 RX ADMIN — PIPERACILLIN AND TAZOBACTAM 25 GRAM(S): 4; .5 INJECTION, POWDER, LYOPHILIZED, FOR SOLUTION INTRAVENOUS at 13:45

## 2023-11-12 RX ADMIN — Medication 25 GRAM(S): at 21:28

## 2023-11-12 RX ADMIN — Medication 250 MEQ/KG/HR: at 18:49

## 2023-11-12 RX ADMIN — Medication 50 MILLIEQUIVALENT(S): at 16:48

## 2023-11-12 RX ADMIN — LEVETIRACETAM 400 MILLIGRAM(S): 250 TABLET, FILM COATED ORAL at 22:21

## 2023-11-12 RX ADMIN — SODIUM CHLORIDE 1000 MILLILITER(S): 9 INJECTION, SOLUTION INTRAVENOUS at 09:38

## 2023-11-12 RX ADMIN — SODIUM CHLORIDE 250 MILLILITER(S): 9 INJECTION, SOLUTION INTRAVENOUS at 21:33

## 2023-11-12 RX ADMIN — POTASSIUM PHOSPHATE, MONOBASIC POTASSIUM PHOSPHATE, DIBASIC 83.33 MILLIMOLE(S): 236; 224 INJECTION, SOLUTION INTRAVENOUS at 18:49

## 2023-11-12 RX ADMIN — SODIUM CHLORIDE 1000 MILLILITER(S): 9 INJECTION, SOLUTION INTRAVENOUS at 23:08

## 2023-11-12 RX ADMIN — INSULIN HUMAN 8 UNIT(S)/HR: 100 INJECTION, SOLUTION SUBCUTANEOUS at 12:43

## 2023-11-12 RX ADMIN — Medication 50 MILLIEQUIVALENT(S): at 18:03

## 2023-11-12 RX ADMIN — AZITHROMYCIN 255 MILLIGRAM(S): 500 TABLET, FILM COATED ORAL at 14:45

## 2023-11-12 RX ADMIN — PIPERACILLIN AND TAZOBACTAM 25 GRAM(S): 4; .5 INJECTION, POWDER, LYOPHILIZED, FOR SOLUTION INTRAVENOUS at 22:56

## 2023-11-12 RX ADMIN — SODIUM CHLORIDE 250 MILLILITER(S): 9 INJECTION, SOLUTION INTRAVENOUS at 12:44

## 2023-11-12 RX ADMIN — INSULIN HUMAN 8 UNIT(S)/HR: 100 INJECTION, SOLUTION SUBCUTANEOUS at 13:45

## 2023-11-12 RX ADMIN — Medication 100 MILLIGRAM(S): at 12:43

## 2023-11-12 RX ADMIN — INSULIN HUMAN 8 UNIT(S)/HR: 100 INJECTION, SOLUTION SUBCUTANEOUS at 08:12

## 2023-11-12 RX ADMIN — Medication 50 MILLIEQUIVALENT(S): at 20:11

## 2023-11-12 RX ADMIN — INSULIN HUMAN 8 UNIT(S)/HR: 100 INJECTION, SOLUTION SUBCUTANEOUS at 22:25

## 2023-11-12 RX ADMIN — PIPERACILLIN AND TAZOBACTAM 200 GRAM(S): 4; .5 INJECTION, POWDER, LYOPHILIZED, FOR SOLUTION INTRAVENOUS at 09:26

## 2023-11-12 RX ADMIN — Medication 150 MEQ/KG/HR: at 12:44

## 2023-11-12 RX ADMIN — SODIUM CHLORIDE 250 MILLILITER(S): 9 INJECTION, SOLUTION INTRAVENOUS at 23:08

## 2023-11-12 RX ADMIN — Medication 50 GRAM(S): at 16:48

## 2023-11-12 RX ADMIN — PANTOPRAZOLE SODIUM 40 MILLIGRAM(S): 20 TABLET, DELAYED RELEASE ORAL at 12:50

## 2023-11-12 RX ADMIN — Medication 1 MILLIGRAM(S): at 12:43

## 2023-11-12 NOTE — ED PROVIDER NOTE - CARE PLAN
1 Principal Discharge DX:	DKA (diabetic ketoacidosis)  Secondary Diagnosis:	SYED (acute kidney injury)

## 2023-11-12 NOTE — ED PROVIDER NOTE - CLINICAL SUMMARY MEDICAL DECISION MAKING FREE TEXT BOX
Patient is a 33yo M with PMHx of HTN, DM, and ETOH abuse who presents to the ED with DKA. Will give fluids, insulin, check labs.

## 2023-11-12 NOTE — ED ADULT NURSE REASSESSMENT NOTE - NS ED NURSE REASSESS COMMENT FT1
pt remains in CC on CM. pt noted with agitation, pulling out lines and attempting to take bear hugger off. MD aware, mittens placed on pt for safety. ICU at bedside, awaiting CT at this time.

## 2023-11-12 NOTE — PATIENT PROFILE ADULT - NSPROHMDIABETMGMTSTRAT_GEN_A_NUR
activity/adequate rest/blood glucose testing/coping strategies/diet modification/insulin therapy/medication therapy/routine screenings

## 2023-11-12 NOTE — ED PROVIDER NOTE - OBJECTIVE STATEMENT
Patient is a 31yo M with PMHx of HTN, DM, and ETOH abuse who presents to the ED with DKA. Found by wife to be unresponsive. Hypotensive with EMS with . Wife reports he did not take his insulin yesterday. Unable to confirm medication compliance with patient.

## 2023-11-12 NOTE — PATIENT PROFILE ADULT - NSTRANSFERBELONGINGSDISPO_GEN_A_NUR
"situation, Protonix bolus and drip. Â   10:00 a.m. [facility] Patient's blood pressure came up to 060 systolic. Kidney function is normal.  His BUN is significantly elevated at 51 from 19 yesterday, and hemoglobin has dropped from 9.6 yesterday to 7.2 today. His white blood cell count is elevated as well. I have ordered a CT abdomen pelvis to evaluate his diverticulitis and am ordering antibiotics as well. I suspect the lactic acid elevation is more related to GI bleed then infection. Paging GI. Â   10:07 a.m. [facility] Patient's blood pressure is dropped into the 80s again. I am ordering a L of lactated Ringer's. Blood is ready at this time lab is sending we are going to start. Â   10:29 a.m. [facility] Patient discussed with Dr. Dakotah Barboza of Critical Care service will be on consult. Patient discussed with Dr. Adam Hussein of GI through his nurse as he is scrubbed in, will be on consult. Â   11:07 a.m. Blood pressure is 93/57. Heart rate is now in the 80s. He appears more comfortable. Second unit of blood is almost infused. We are going to recheck patient's lactic acid, hemoglobin, and he is going for CT at this time. Patient examined in ED with Dr. Derrek Flores. Receiving a 3rd unit PRBC. Hemodynamically stable. Admit to ICU for further management.        Past Medical History:   Diagnosis Date   â¢ Diabetes mellitus (CMS/HCC)    â¢ Hypercholesterolemia    â¢ Hypothyroidism    â¢ Leg pain    â¢ Osteoarthritis        Past Surgical History:   Procedure Laterality Date   â¢ Ankle surgery Left 01/11/2018   â¢ Arthroscopy knee medial or lat  12/03/2015    Right knee cleaning up of meniscus   â¢ Carpal tunnel release      right, left hand/thumb   â¢ Cervical fusion      C4-5-6, 12/23/2020   â¢ Colonoscopy diagnostic  01/01/2003    Colonoscopy, Dx   â¢ Excision of lingual tonsil  01/01/1968   â¢ Fracture surgery  1957    facial/nasal   â¢ Hand surgery     â¢ Joint replacement  09/23/2013    Right EFRAIN   â¢ Knee surgery  12/01/2007    left   â¢ Neck surgery "  â¢ BMI N/A 32.26       Weight trend:  Weight    06/03/22 0927 06/03/22 1522 06/04/22 0400   Weight: 111.8 kg (246 lb 7.6 oz) 113.4 kg (250 lb) 110.9 kg (244 lb 7.8 oz)            Peripheral IV 06/03/22 Left Forearm 18 (Active)       Peripheral IV 06/03/22 Right Antecubital 20 (Active)   Site Assessment WDL 06/03/22 1000   Dressing Type Transparent 06/03/22 1000   Dressing Activity Applied 06/03/22 1000   Line Status Blood return noted 06/03/22 1000   Interventions Labs drawn;Capped IV; Blood culture drawn 06/03/22 1000       Peripheral IV 06/03/22 Right Hand 20 (Active)   Site Assessment WDL 06/03/22 1000   Dressing Type Transparent 06/03/22 1000   Dressing Activity Applied 06/03/22 1000   Line Status Blood return noted 06/03/22 1000   Interventions Capped IV; Blood culture drawn 06/03/22 1000       Wound Penis Incision (Active)       Wound Knee Left Anterior Surgical Wound (Active)       Wound Ankle Left Surgical incision (Active)         Intake/Output:    No intake/output data recorded. I/O last 3 completed shifts: In: 3586.3 [I.V.:2507.3;  Blood:979; IV Piggyback:100]  Out: 9141 [Urine:1675]      Intake/Output Summary (Last 24 hours) at 6/4/2022 0931  Last data filed at 6/4/2022 0600  Gross per 24 hour   Intake 3586.34 ml   Output 1675 ml   Net 1911.34 ml         Scheduled Medications:  â¢ sodium chloride (PF)  2 mL Intracatheter 2 times per day   â¢ sodium chloride  1,000 mL Intravenous Once   â¢ metroNIDAZOLE  500 mg Intravenous 3 times per day   â¢ atorvastatin  10 mg Oral Daily   â¢ levothyroxine  50 mcg Oral Daily   â¢ cefTRIAXone  2,000 mg Intravenous Daily   â¢ melatonin  6 mg Oral Nightly         Infusions:  â¢ sodium chloride 0.9% infusion     â¢ sodium chloride 0.9% infusion     â¢ sodium chloride 0.9% infusion 100 mL/hr at 06/04/22 0228   â¢ pantoprazole (PROTONIX) 80 mg/100mL in sodium chloride 0.9% infusion 8 mg/hr (06/04/22 0226)   â¢ sodium chloride 0.9% infusion Stopped (06/03/22 1600)           Physical Exam:      General appearance:  Alert  No acute distress      HEENT:  Normocephalic  Atraumatic  Trachea midline      Cardiovascular:  S1 S2 RRR  No murmur  No rub  No gallop  Peripheral pulses intact  CRT < 3 seconds      Extremities:  Warm and dry  No clubbing, cyanosis noted  +2 BLE pitting edema      Pulmonary:  Fair air entry bilaterally  Breath sounds equal and symmetric  No rhonchi  No wheezes  No crackles  No accessory muscle use      Gastrointestinal:  Soft  Nontender  NPO      Genitourinary:  Continent      Neurological:  Awake and alert  Oriented to person, place and time  Moves all extremities spontaneously  Follows commands  Conversational      Integumentary:  Abrasion to right scalp (post fall)      Laboratory Results:    CBC:  Recent Labs   Lab 06/04/22 0518 06/03/22 2357 06/03/22  1745 06/03/22  1140 06/03/22  0916 06/02/22  0916   WBC 13.7*  --   --   --  18.1* 13.1*   RBC 2.66*  --   --   --  2.49* 3.33*   HGB 7.8* 8.2* 9.1* 8.0* 7.2* 9.6*   HCT 23.8* 24.4* 26.9* 24.7* 22.6* 29.9*   MCV 89.5  --   --   --  90.8 89.8   MCH 29.3  --   --   --  28.9 28.8   MCHC 32.8  --   --   --  31.9* 32.1     --   --   --  392 367   SEG 80  --   --   --  70 77       CMP:  Recent Labs   Lab 06/04/22 0518 06/03/22 2357 06/03/22  0917 06/02/22  0916   SODIUM 141  --  140 141   POTASSIUM 3.8 4.3 4.2 3.9   CHLORIDE 111*  --  106 108   CO2 22  --  20* 26   ANIONGAP 12  --  18 11   GLUCOSE 107*  --  314* 172*   BUN 36*  --  51* 19   CREATININE 0.74  --  0.98 0.93   GFRESTIMATE >90  --  78 83   BCRAT 49*  --  52* 20   CALCIUM 7.6*  --  7.9* 8.1*   BILIRUBIN 0.3  --  0.2 0.3   AST 16  --  15 13   GPT 13  --  14 14   ALKPT 61  --  61 75   ALBUMIN 2.0*  --  2.1* 2.2*   GLOB 2.7  --  2.8 3.3   AGR 0.7*  --  0.8* 0.7*       Coag:  Recent Labs   Lab 06/03/22  0916   PT 11.1   INR 1.0   PTT 22       Cardiac Markers:  Recent Labs   Lab 06/03/22  0917   CPK 52       Arterial Blood Gas:  No results insulin  Hold home Metformin  NPO for EGD 6/4  Oral medications on hold  Follow up on obtained culture results   Blood cultures pending   Urine culture   Advance activity as tolerated   PT / OT  DVT prophylaxis   SCD's    May transfer out of ICU today. Pulmonary / Critical Care medicine will sign off. Please reach out with any questions/concerns. On 6/4/2022, Katherin BRANCH APNP scribed the services personally performed by Beto Dozier MD.     The documentation recorded by the scribe accurately and completely reflects the service(s) I personally performed and the decisions made by me.      Beto Dozier MD, MPH  Interventional Pulmonary and Critical Care  O: [phone]  6/4/2022 with patient

## 2023-11-12 NOTE — ED ADULT NURSE REASSESSMENT NOTE - NS ED NURSE REASSESS COMMENT FT1
all labs obtained via central line, temp senescing casarez placed in ED as charted. mother at bedside, all questions and concerns addressed. pt awaiting CT and ICU consult at this time. VSS as charted

## 2023-11-12 NOTE — H&P ADULT - HISTORY OF PRESENT ILLNESS
HPI:  31 y/o F with pmhx of HTN, DM1 with multiple admissions for DKA, ETOH abuse presented to ED after being found unresponsive by his wife. Initial labwork significant for WBC 33 with L shift, Blood, serum Co2 < 6, Scr 1.70 (baseline 0.4), VBG pH < 6.942 . Started on IV crystalloid and insulin gtt, ICU consulted for DKA with course complicated by AMS, leukocytosis, SYED,

## 2023-11-12 NOTE — ED PROVIDER NOTE - PRO INTERPRETER NEED 2
[place] [HIV Infection] : no HIV [Exposure To Gonorrhea] : no gonorrhea [Chlamydial Infections] : no chlamydia [Syphilis] : no syphilis [Herpes Simplex] : no genital herpes [Human Papilloma Virus Infection] : no genital warts [Hepatitis, B Virus] : no Hepatitis B [Hepatitis, C Virus] : no Hepatitis C [Trichomoniasis] : no trichomoniasis

## 2023-11-12 NOTE — ED ADULT NURSE NOTE - NSFALLUNIVINTERV_ED_ALL_ED
Bed/Stretcher in lowest position, wheels locked, appropriate side rails in place/Call bell, personal items and telephone in reach/Instruct patient to call for assistance before getting out of bed/chair/stretcher/Non-slip footwear applied when patient is off stretcher/Riley to call system/Physically safe environment - no spills, clutter or unnecessary equipment/Purposeful proactive rounding/Room/bathroom lighting operational, light cord in reach

## 2023-11-12 NOTE — H&P ADULT - NSHPPHYSICALEXAM_GEN_ALL_CORE
PHYSICAL EXAM:    GENERAL: Very lethargic, but arousable to voice.   HEAD:  Atraumatic, Normocephalic  EYES: EOMI, PERRLA, conjunctiva and sclera clear  ENMT: No tonsillar erythema, exudates, or enlargement; Moist mucous membranes, Good dentition, No lesions  NECK: Supple, No JVD, Normal thyroid  NERVOUS SYSTEM:  Very lethargic, but arousable. + DTR.   CHEST/LUNG: Clear to auscultation bilaterally; No rales, rhonchi, wheezing, or rubs  HEART: Regular rate and rhythm; No murmurs, rubs, or gallops  ABDOMEN: Soft, Nontender, Nondistended; Bowel sounds present  EXTREMITIES:  2+ Peripheral Pulses, No clubbing, cyanosis, or edema

## 2023-11-12 NOTE — ED PROVIDER NOTE - PROGRESS NOTE DETAILS
[place]: Patient reassessed, more alert, moaning now to voice. Agitated saline flushed through CVC visible in RV on bedside sono.

## 2023-11-12 NOTE — ED ADULT NURSE REASSESSMENT NOTE - NS ED NURSE REASSESS COMMENT FT1
pt obtained ICU bed, reports given to ICU RN Alyssa. pt appears more alert and awake, mittens remain on for safety. insulin cont to run. pt to get CT and go to ICU.

## 2023-11-12 NOTE — ED ADULT NURSE NOTE - OBJECTIVE STATEMENT
pt reports to the ED with c/o of hyperglycemia and AMS. pt brought to CC, placed on CM, MD at bedside, pt not answering questions at this time, poor historian. NSR on monitor.

## 2023-11-12 NOTE — PATIENT PROFILE ADULT - FALL HARM RISK - RISK INTERVENTIONS

## 2023-11-12 NOTE — H&P ADULT - NSHPLABSRESULTS_GEN_ALL_CORE
Lab Results:  CBC  CBC Full  -  ( 12 Nov 2023 08:31 )  WBC Count : 33.34 K/uL  RBC Count : 4.24 M/uL  Hemoglobin : 12.9 g/dL  Hematocrit : 40.7 %  Platelet Count - Automated : 375 K/uL  Mean Cell Volume : 96.0 fl  Mean Cell Hemoglobin : 30.4 pg  Mean Cell Hemoglobin Concentration : 31.7 gm/dL  Auto Neutrophil # : 29.21 K/uL  Auto Lymphocyte # : 2.93 K/uL  Auto Monocyte # : 0.90 K/uL  Auto Eosinophil # : 0.00 K/uL  Auto Basophil # : 0.00 K/uL  Auto Neutrophil % : 85.8 %  Auto Lymphocyte % : 8.8 %  Auto Monocyte % : 2.7 %  Auto Eosinophil % : 0.0 %  Auto Basophil % : 0.0 %    .		Differential:	[] Automated		[] Manual  Chemistry                        12.9   33.34 )-----------( 375      ( 12 Nov 2023 08:31 )             40.7     11-12    136  |  100  |  28.8<H>  ----------------------------<  777<HH>  4.3   |  <6.0<LL>  |  1.70<H>    Ca    7.9<L>      12 Nov 2023 08:31  Phos  8.7     11-12  Mg     2.6     11-12    TPro  6.4<L>  /  Alb  3.9  /  TBili  0.3<L>  /  DBili  x   /  AST  21  /  ALT  14  /  AlkPhos  242<H>  11-12    LIVER FUNCTIONS - ( 12 Nov 2023 08:31 )  Alb: 3.9 g/dL / Pro: 6.4 g/dL / ALK PHOS: 242 U/L / ALT: 14 U/L / AST: 21 U/L / GGT: x             Urinalysis Basic - ( 12 Nov 2023 08:31 )    Color: x / Appearance: x / SG: x / pH: x  Gluc: 777 mg/dL / Ketone: x  / Bili: x / Urobili: x   Blood: x / Protein: x / Nitrite: x   Leuk Esterase: x / RBC: x / WBC x   Sq Epi: x / Non Sq Epi: x / Bacteria: x

## 2023-11-12 NOTE — ED CLERICAL - NS ED CLERK NOTE PRE-ARRIVAL INFORMATION; ADDITIONAL PRE-ARRIVAL INFORMATION
This patient is enrolled in a readmission reduction program and has active care navigation. This patient can be followed up by the care navigation team within 24 hours. To arrange close follow-up or to obtain additional clinical information about this patient, please call the contact number above.     Please speak with the [place] ED Case Manager for assistance with discharge planning

## 2023-11-12 NOTE — ED ADULT NURSE NOTE - NS PRO PASSIVE SMOKE EXP
Attended Phase II Cardiac Rehab. No medication or health history changes reported. See [facility] for details.   Unknown

## 2023-11-12 NOTE — H&P ADULT - ASSESSMENT
Assessment/Plan  31 y/o F with pmhx of HTN, DM1 with multiple admissions for DKA, ETOH abuse is accepted to ICU for active management of   1. DKA  2. SYED  3. Leukocytosis  4. AMS  5. Seizures   6. ETOH abuse    Neuro: Very lethargic, but arousable. CT head ordered to r/o intracranial pathology. Likely 2/2 toxic-metabolic encephelopathy in setting of DKA. Utox negative, ETOH level wnl.  Hx of seizures, no clinical signs of active seizure, will start on home AED's. ETOH abuse, thiamine/folate started. Hypothermic to low 90'  s F, started on bear hugger.   Cardio: . BP soft although acceptable, likely hypovolemic component given DKA, c/w aggressive IVF. Low threshold for starting levo to maintain MAP > 65. Denies any chest pain, troponin level ordered, EKG.  Pulm: Maintaing o2 sat > 92 % on minimal o2. Asp precautions  GI: NPO   Renal: SYED with Scr 1.77 ( baseline around 0.4) likely prerenal in setting of DKA. C/w aggressive IVF. Severely acidotic to VBG < 6.94, will give 1 L of 150 meq bicarb. CT abd/ pelvis to r/o obstructive/ hydro. I/o's. Avoid nephrotoxic agents.  Heme: Hep sub Q for DVT prophylaxis once CT head heg for bleed  ID: Elevated WBC to 33 with associated hypothermia, s/p zosyn and vanco in ED. CXR clear, UA neg for UTI. UNclear source of WBC, in setting of DKA of unknown cause will start on empiric zosyn, no hx of MDRO's. CT abd/pelvis/chest pending to r/o infectious source. Procal, MRSA, blood cx, urine cx ordered.   Endo: Presented glucose in 700's, AG, serum Co2 < 6, ketones in urine consistent with DKA. Started on insulin gtt. Q 1 hour finger sticks. Started on /hr  with 20 meq K+. Q 6 hour BMP. Once glucose falls < 250 will trnastion to D5/LR. Aggressive repletion of K.  Once AG closes will transtion to standing lantus and leave on gtt for 2 hours while starting diet according to protocol. DKA likely in setting of infection given WBC.   Dispo: Accepted to ICU for active management of DKA       Time spent on this patient encounter, which includes documenting this note in the electronic medical record, was 77 minutes including assessing the presenting problems with associated risks, reviewing the medical record to prepare for the encounter, and meeting face to face with patient to obtain additional history. I have also performed an appropriate physical exam, made interventions listed and ordered and interpreted appropriate diagnostic studies as documented. To improve communication and patient safety, I have coordinated care with the multidisciplinary team including the bedside nurse, appropriate attending of record and consultants as needed.   Date of entry of this note is equal to the date of services rendered      Case Discussed with ICU attending Dr Yost and plan agreed to

## 2023-11-12 NOTE — ED PROVIDER NOTE - PHYSICAL EXAMINATION
Gen: obtunded, Kussmaul breathing  HEENT: Normocephalic atraumatic. EOMI. No scleral icterus. Dry mucus membranes.  CV: RRR. Audible S1 and S2. No murmurs. 2+ radial and PT pulses   Pulm: Clear to auscultation bilaterally. No wheezes, rales, or rhonchi. Kussmaul breathing  Abdomen: soft, normoactive BS, non distended, nontender, no rebound, no guarding  Musculoskeletal:  Moving all extremities equally. No gross deformity. No tenderness to palpation.  Skin: No rashes or lesions. Warm.  Neurologic: No focal neurological deficits. CN II-XII grossly intact.  : no CVA tenderness  Psych: Appropriate mood and affect. Cooperative.

## 2023-11-12 NOTE — ED ADULT TRIAGE NOTE - CHIEF COMPLAINT QUOTE
Patient with hyperglycemia, altered mental status, low BP per ems. Patient responsive to painful stimuli.  Dr. Ramírez called and patient placed in critical care.

## 2023-11-12 NOTE — ED PROVIDER NOTE - ATTENDING CONTRIBUTION TO CARE
I, Gely Elizalde DO, have personally provided 65 minutes of critical care time exclusive of time spent on separately billable procedures. Time includes review of laboratory data, radiology results, discussion with consultants, and monitoring for potential decompensation. Interventions were performed as documented above.    I personally saw the patient with the resident, and completed the key components of the history and physical exam. I then discussed the management plan with the resident.      32-year-old male with past medical history diabetes, alcohol disorder presents for unresponsiveness, Accu-Chek greater than 530.  Per EMS family called after mom was unable to awake patient.  Patient moans to painful stimuli.  Review of chart shows patient was admitted for DKA that was precipitated by drinking binge 1 month ago.    Patient obtunded, minimally responsive to painful stimuli, satting well on room air, cold to touch, RRR, lungs CTA B/L, ABD soft, nontender, nondistended, poor hygiene of bilateral feet.    Unable to obtain peripheral  access easily, patient in critical condition requiring CVC, insulin drip started, CT head due to patient's severe altered mental status, patient with improving mental status with fluid resuscitation, ICU consulted, to be admitted to ICU.

## 2023-11-13 LAB
ALBUMIN SERPL ELPH-MCNC: 2.7 G/DL — LOW (ref 3.3–5.2)
ALBUMIN SERPL ELPH-MCNC: 2.7 G/DL — LOW (ref 3.3–5.2)
ALP SERPL-CCNC: 116 U/L — SIGNIFICANT CHANGE UP (ref 40–120)
ALP SERPL-CCNC: 116 U/L — SIGNIFICANT CHANGE UP (ref 40–120)
ALT FLD-CCNC: 10 U/L — SIGNIFICANT CHANGE UP
ALT FLD-CCNC: 10 U/L — SIGNIFICANT CHANGE UP
ANION GAP SERPL CALC-SCNC: 11 MMOL/L — SIGNIFICANT CHANGE UP (ref 5–17)
ANION GAP SERPL CALC-SCNC: 11 MMOL/L — SIGNIFICANT CHANGE UP (ref 5–17)
ANION GAP SERPL CALC-SCNC: 14 MMOL/L — SIGNIFICANT CHANGE UP (ref 5–17)
AST SERPL-CCNC: 15 U/L — SIGNIFICANT CHANGE UP
AST SERPL-CCNC: 15 U/L — SIGNIFICANT CHANGE UP
BASE EXCESS BLDA CALC-SCNC: -8.1 MMOL/L — LOW (ref -2–3)
BASE EXCESS BLDA CALC-SCNC: -8.1 MMOL/L — LOW (ref -2–3)
BILIRUB SERPL-MCNC: 0.4 MG/DL — SIGNIFICANT CHANGE UP (ref 0.4–2)
BILIRUB SERPL-MCNC: 0.4 MG/DL — SIGNIFICANT CHANGE UP (ref 0.4–2)
BLOOD GAS COMMENTS ARTERIAL: SIGNIFICANT CHANGE UP
BLOOD GAS COMMENTS ARTERIAL: SIGNIFICANT CHANGE UP
BUN SERPL-MCNC: 31.9 MG/DL — HIGH (ref 8–20)
BUN SERPL-MCNC: 31.9 MG/DL — HIGH (ref 8–20)
BUN SERPL-MCNC: 32.7 MG/DL — HIGH (ref 8–20)
BUN SERPL-MCNC: 32.7 MG/DL — HIGH (ref 8–20)
BUN SERPL-MCNC: 33.4 MG/DL — HIGH (ref 8–20)
BUN SERPL-MCNC: 33.4 MG/DL — HIGH (ref 8–20)
CALCIUM SERPL-MCNC: 7.8 MG/DL — LOW (ref 8.4–10.5)
CALCIUM SERPL-MCNC: 7.8 MG/DL — LOW (ref 8.4–10.5)
CALCIUM SERPL-MCNC: 8 MG/DL — LOW (ref 8.4–10.5)
CALCIUM SERPL-MCNC: 8 MG/DL — LOW (ref 8.4–10.5)
CALCIUM SERPL-MCNC: 8.2 MG/DL — LOW (ref 8.4–10.5)
CALCIUM SERPL-MCNC: 8.2 MG/DL — LOW (ref 8.4–10.5)
CHLORIDE SERPL-SCNC: 109 MMOL/L — HIGH (ref 96–108)
CHLORIDE SERPL-SCNC: 109 MMOL/L — HIGH (ref 96–108)
CHLORIDE SERPL-SCNC: 112 MMOL/L — HIGH (ref 96–108)
CHLORIDE SERPL-SCNC: 112 MMOL/L — HIGH (ref 96–108)
CHLORIDE SERPL-SCNC: 115 MMOL/L — HIGH (ref 96–108)
CHLORIDE SERPL-SCNC: 115 MMOL/L — HIGH (ref 96–108)
CK SERPL-CCNC: 53 U/L — SIGNIFICANT CHANGE UP (ref 30–200)
CK SERPL-CCNC: 53 U/L — SIGNIFICANT CHANGE UP (ref 30–200)
CO2 SERPL-SCNC: 15 MMOL/L — LOW (ref 22–29)
CO2 SERPL-SCNC: 15 MMOL/L — LOW (ref 22–29)
CO2 SERPL-SCNC: 17 MMOL/L — LOW (ref 22–29)
CO2 SERPL-SCNC: 17 MMOL/L — LOW (ref 22–29)
CO2 SERPL-SCNC: 18 MMOL/L — LOW (ref 22–29)
CO2 SERPL-SCNC: 18 MMOL/L — LOW (ref 22–29)
CREAT SERPL-MCNC: 2.46 MG/DL — HIGH (ref 0.5–1.3)
CREAT SERPL-MCNC: 2.46 MG/DL — HIGH (ref 0.5–1.3)
CREAT SERPL-MCNC: 2.72 MG/DL — HIGH (ref 0.5–1.3)
CREAT SERPL-MCNC: 2.72 MG/DL — HIGH (ref 0.5–1.3)
CREAT SERPL-MCNC: 2.82 MG/DL — HIGH (ref 0.5–1.3)
CREAT SERPL-MCNC: 2.82 MG/DL — HIGH (ref 0.5–1.3)
CULTURE RESULTS: NO GROWTH — SIGNIFICANT CHANGE UP
CULTURE RESULTS: NO GROWTH — SIGNIFICANT CHANGE UP
EGFR: 29 ML/MIN/1.73M2 — LOW
EGFR: 29 ML/MIN/1.73M2 — LOW
EGFR: 31 ML/MIN/1.73M2 — LOW
EGFR: 31 ML/MIN/1.73M2 — LOW
EGFR: 35 ML/MIN/1.73M2 — LOW
EGFR: 35 ML/MIN/1.73M2 — LOW
GAS PNL BLDA: SIGNIFICANT CHANGE UP
GAS PNL BLDA: SIGNIFICANT CHANGE UP
GLUCOSE BLDC GLUCOMTR-MCNC: 152 MG/DL — HIGH (ref 70–99)
GLUCOSE BLDC GLUCOMTR-MCNC: 152 MG/DL — HIGH (ref 70–99)
GLUCOSE BLDC GLUCOMTR-MCNC: 184 MG/DL — HIGH (ref 70–99)
GLUCOSE BLDC GLUCOMTR-MCNC: 184 MG/DL — HIGH (ref 70–99)
GLUCOSE BLDC GLUCOMTR-MCNC: 205 MG/DL — HIGH (ref 70–99)
GLUCOSE BLDC GLUCOMTR-MCNC: 205 MG/DL — HIGH (ref 70–99)
GLUCOSE BLDC GLUCOMTR-MCNC: 223 MG/DL — HIGH (ref 70–99)
GLUCOSE BLDC GLUCOMTR-MCNC: 223 MG/DL — HIGH (ref 70–99)
GLUCOSE BLDC GLUCOMTR-MCNC: 235 MG/DL — HIGH (ref 70–99)
GLUCOSE BLDC GLUCOMTR-MCNC: 235 MG/DL — HIGH (ref 70–99)
GLUCOSE BLDC GLUCOMTR-MCNC: 250 MG/DL — HIGH (ref 70–99)
GLUCOSE BLDC GLUCOMTR-MCNC: 256 MG/DL — HIGH (ref 70–99)
GLUCOSE BLDC GLUCOMTR-MCNC: 256 MG/DL — HIGH (ref 70–99)
GLUCOSE BLDC GLUCOMTR-MCNC: 280 MG/DL — HIGH (ref 70–99)
GLUCOSE BLDC GLUCOMTR-MCNC: 280 MG/DL — HIGH (ref 70–99)
GLUCOSE BLDC GLUCOMTR-MCNC: 290 MG/DL — HIGH (ref 70–99)
GLUCOSE BLDC GLUCOMTR-MCNC: 290 MG/DL — HIGH (ref 70–99)
GLUCOSE BLDC GLUCOMTR-MCNC: 308 MG/DL — HIGH (ref 70–99)
GLUCOSE BLDC GLUCOMTR-MCNC: 308 MG/DL — HIGH (ref 70–99)
GLUCOSE BLDC GLUCOMTR-MCNC: 97 MG/DL — SIGNIFICANT CHANGE UP (ref 70–99)
GLUCOSE BLDC GLUCOMTR-MCNC: 97 MG/DL — SIGNIFICANT CHANGE UP (ref 70–99)
GLUCOSE BLDC GLUCOMTR-MCNC: 98 MG/DL — SIGNIFICANT CHANGE UP (ref 70–99)
GLUCOSE BLDC GLUCOMTR-MCNC: 98 MG/DL — SIGNIFICANT CHANGE UP (ref 70–99)
GLUCOSE SERPL-MCNC: 120 MG/DL — HIGH (ref 70–99)
GLUCOSE SERPL-MCNC: 120 MG/DL — HIGH (ref 70–99)
GLUCOSE SERPL-MCNC: 250 MG/DL — HIGH (ref 70–99)
GLUCOSE SERPL-MCNC: 250 MG/DL — HIGH (ref 70–99)
GLUCOSE SERPL-MCNC: 294 MG/DL — HIGH (ref 70–99)
GLUCOSE SERPL-MCNC: 294 MG/DL — HIGH (ref 70–99)
HCO3 BLDA-SCNC: 17 MMOL/L — LOW (ref 21–28)
HCO3 BLDA-SCNC: 17 MMOL/L — LOW (ref 21–28)
HCT VFR BLD CALC: 26.4 % — LOW (ref 39–50)
HCT VFR BLD CALC: 26.4 % — LOW (ref 39–50)
HGB BLD-MCNC: 9.6 G/DL — LOW (ref 13–17)
HGB BLD-MCNC: 9.6 G/DL — LOW (ref 13–17)
HOROWITZ INDEX BLDA+IHG-RTO: 21 — SIGNIFICANT CHANGE UP
HOROWITZ INDEX BLDA+IHG-RTO: 21 — SIGNIFICANT CHANGE UP
LACTATE SERPL-SCNC: 1.2 MMOL/L — SIGNIFICANT CHANGE UP (ref 0.5–2)
LACTATE SERPL-SCNC: 1.2 MMOL/L — SIGNIFICANT CHANGE UP (ref 0.5–2)
LEGIONELLA AG UR QL: NEGATIVE — SIGNIFICANT CHANGE UP
LEGIONELLA AG UR QL: NEGATIVE — SIGNIFICANT CHANGE UP
MAGNESIUM SERPL-MCNC: 2.3 MG/DL — SIGNIFICANT CHANGE UP (ref 1.6–2.6)
MAGNESIUM SERPL-MCNC: 2.3 MG/DL — SIGNIFICANT CHANGE UP (ref 1.6–2.6)
MAGNESIUM SERPL-MCNC: 2.4 MG/DL — SIGNIFICANT CHANGE UP (ref 1.6–2.6)
MAGNESIUM SERPL-MCNC: 2.4 MG/DL — SIGNIFICANT CHANGE UP (ref 1.6–2.6)
MCHC RBC-ENTMCNC: 30.1 PG — SIGNIFICANT CHANGE UP (ref 27–34)
MCHC RBC-ENTMCNC: 30.1 PG — SIGNIFICANT CHANGE UP (ref 27–34)
MCHC RBC-ENTMCNC: 36.4 GM/DL — HIGH (ref 32–36)
MCHC RBC-ENTMCNC: 36.4 GM/DL — HIGH (ref 32–36)
MCV RBC AUTO: 82.8 FL — SIGNIFICANT CHANGE UP (ref 80–100)
MCV RBC AUTO: 82.8 FL — SIGNIFICANT CHANGE UP (ref 80–100)
MRSA PCR RESULT.: SIGNIFICANT CHANGE UP
MRSA PCR RESULT.: SIGNIFICANT CHANGE UP
PCO2 BLDA: 27 MMHG — LOW (ref 35–48)
PCO2 BLDA: 27 MMHG — LOW (ref 35–48)
PH BLDA: 7.4 — SIGNIFICANT CHANGE UP (ref 7.35–7.45)
PH BLDA: 7.4 — SIGNIFICANT CHANGE UP (ref 7.35–7.45)
PHOSPHATE SERPL-MCNC: 1.7 MG/DL — LOW (ref 2.4–4.7)
PHOSPHATE SERPL-MCNC: 1.7 MG/DL — LOW (ref 2.4–4.7)
PHOSPHATE SERPL-MCNC: 2.8 MG/DL — SIGNIFICANT CHANGE UP (ref 2.4–4.7)
PHOSPHATE SERPL-MCNC: 2.8 MG/DL — SIGNIFICANT CHANGE UP (ref 2.4–4.7)
PLATELET # BLD AUTO: 169 K/UL — SIGNIFICANT CHANGE UP (ref 150–400)
PLATELET # BLD AUTO: 169 K/UL — SIGNIFICANT CHANGE UP (ref 150–400)
PO2 BLDA: 169 MMHG — HIGH (ref 83–108)
PO2 BLDA: 169 MMHG — HIGH (ref 83–108)
POTASSIUM SERPL-MCNC: 3.4 MMOL/L — LOW (ref 3.5–5.3)
POTASSIUM SERPL-MCNC: 3.4 MMOL/L — LOW (ref 3.5–5.3)
POTASSIUM SERPL-MCNC: 3.8 MMOL/L — SIGNIFICANT CHANGE UP (ref 3.5–5.3)
POTASSIUM SERPL-MCNC: 3.8 MMOL/L — SIGNIFICANT CHANGE UP (ref 3.5–5.3)
POTASSIUM SERPL-MCNC: 4.1 MMOL/L — SIGNIFICANT CHANGE UP (ref 3.5–5.3)
POTASSIUM SERPL-MCNC: 4.1 MMOL/L — SIGNIFICANT CHANGE UP (ref 3.5–5.3)
POTASSIUM SERPL-SCNC: 3.4 MMOL/L — LOW (ref 3.5–5.3)
POTASSIUM SERPL-SCNC: 3.4 MMOL/L — LOW (ref 3.5–5.3)
POTASSIUM SERPL-SCNC: 3.8 MMOL/L — SIGNIFICANT CHANGE UP (ref 3.5–5.3)
POTASSIUM SERPL-SCNC: 3.8 MMOL/L — SIGNIFICANT CHANGE UP (ref 3.5–5.3)
POTASSIUM SERPL-SCNC: 4.1 MMOL/L — SIGNIFICANT CHANGE UP (ref 3.5–5.3)
POTASSIUM SERPL-SCNC: 4.1 MMOL/L — SIGNIFICANT CHANGE UP (ref 3.5–5.3)
PROT SERPL-MCNC: 4.8 G/DL — LOW (ref 6.6–8.7)
PROT SERPL-MCNC: 4.8 G/DL — LOW (ref 6.6–8.7)
RBC # BLD: 3.19 M/UL — LOW (ref 4.2–5.8)
RBC # BLD: 3.19 M/UL — LOW (ref 4.2–5.8)
RBC # FLD: 13.5 % — SIGNIFICANT CHANGE UP (ref 10.3–14.5)
RBC # FLD: 13.5 % — SIGNIFICANT CHANGE UP (ref 10.3–14.5)
S AUREUS DNA NOSE QL NAA+PROBE: SIGNIFICANT CHANGE UP
S AUREUS DNA NOSE QL NAA+PROBE: SIGNIFICANT CHANGE UP
SAO2 % BLDA: 99.7 % — HIGH (ref 94–98)
SAO2 % BLDA: 99.7 % — HIGH (ref 94–98)
SODIUM SERPL-SCNC: 138 MMOL/L — SIGNIFICANT CHANGE UP (ref 135–145)
SODIUM SERPL-SCNC: 138 MMOL/L — SIGNIFICANT CHANGE UP (ref 135–145)
SODIUM SERPL-SCNC: 143 MMOL/L — SIGNIFICANT CHANGE UP (ref 135–145)
SODIUM SERPL-SCNC: 143 MMOL/L — SIGNIFICANT CHANGE UP (ref 135–145)
SODIUM SERPL-SCNC: 144 MMOL/L — SIGNIFICANT CHANGE UP (ref 135–145)
SODIUM SERPL-SCNC: 144 MMOL/L — SIGNIFICANT CHANGE UP (ref 135–145)
SPECIMEN SOURCE: SIGNIFICANT CHANGE UP
SPECIMEN SOURCE: SIGNIFICANT CHANGE UP
WBC # BLD: 8.11 K/UL — SIGNIFICANT CHANGE UP (ref 3.8–10.5)
WBC # BLD: 8.11 K/UL — SIGNIFICANT CHANGE UP (ref 3.8–10.5)
WBC # FLD AUTO: 8.11 K/UL — SIGNIFICANT CHANGE UP (ref 3.8–10.5)
WBC # FLD AUTO: 8.11 K/UL — SIGNIFICANT CHANGE UP (ref 3.8–10.5)

## 2023-11-13 PROCEDURE — 99221 1ST HOSP IP/OBS SF/LOW 40: CPT

## 2023-11-13 PROCEDURE — 99233 SBSQ HOSP IP/OBS HIGH 50: CPT | Mod: GC

## 2023-11-13 PROCEDURE — 99233 SBSQ HOSP IP/OBS HIGH 50: CPT

## 2023-11-13 RX ORDER — SODIUM BICARBONATE 1 MEQ/ML
0.07 SYRINGE (ML) INTRAVENOUS
Qty: 75 | Refills: 0 | Status: DISCONTINUED | OUTPATIENT
Start: 2023-11-13 | End: 2023-11-14

## 2023-11-13 RX ORDER — DEXTROSE 50 % IN WATER 50 %
25 SYRINGE (ML) INTRAVENOUS ONCE
Refills: 0 | Status: DISCONTINUED | OUTPATIENT
Start: 2023-11-13 | End: 2023-11-18

## 2023-11-13 RX ORDER — INSULIN LISPRO 100/ML
15 VIAL (ML) SUBCUTANEOUS
Refills: 0 | Status: DISCONTINUED | OUTPATIENT
Start: 2023-11-13 | End: 2023-11-14

## 2023-11-13 RX ORDER — SODIUM CHLORIDE 9 MG/ML
1000 INJECTION, SOLUTION INTRAVENOUS
Refills: 0 | Status: DISCONTINUED | OUTPATIENT
Start: 2023-11-13 | End: 2023-11-18

## 2023-11-13 RX ORDER — POTASSIUM CHLORIDE 20 MEQ
20 PACKET (EA) ORAL
Refills: 0 | Status: COMPLETED | OUTPATIENT
Start: 2023-11-13 | End: 2023-11-13

## 2023-11-13 RX ORDER — INSULIN GLARGINE 100 [IU]/ML
40 INJECTION, SOLUTION SUBCUTANEOUS EVERY MORNING
Refills: 0 | Status: DISCONTINUED | OUTPATIENT
Start: 2023-11-13 | End: 2023-11-13

## 2023-11-13 RX ORDER — DEXTROSE 50 % IN WATER 50 %
15 SYRINGE (ML) INTRAVENOUS ONCE
Refills: 0 | Status: DISCONTINUED | OUTPATIENT
Start: 2023-11-13 | End: 2023-11-18

## 2023-11-13 RX ORDER — CHLORHEXIDINE GLUCONATE 213 G/1000ML
1 SOLUTION TOPICAL DAILY
Refills: 0 | Status: DISCONTINUED | OUTPATIENT
Start: 2023-11-13 | End: 2023-11-18

## 2023-11-13 RX ORDER — CEFTRIAXONE 500 MG/1
1000 INJECTION, POWDER, FOR SOLUTION INTRAMUSCULAR; INTRAVENOUS EVERY 24 HOURS
Refills: 0 | Status: COMPLETED | OUTPATIENT
Start: 2023-11-13 | End: 2023-11-17

## 2023-11-13 RX ORDER — INSULIN LISPRO 100/ML
VIAL (ML) SUBCUTANEOUS
Refills: 0 | Status: DISCONTINUED | OUTPATIENT
Start: 2023-11-13 | End: 2023-11-14

## 2023-11-13 RX ORDER — POTASSIUM PHOSPHATE, MONOBASIC POTASSIUM PHOSPHATE, DIBASIC 236; 224 MG/ML; MG/ML
15 INJECTION, SOLUTION INTRAVENOUS ONCE
Refills: 0 | Status: COMPLETED | OUTPATIENT
Start: 2023-11-13 | End: 2023-11-13

## 2023-11-13 RX ORDER — LEVETIRACETAM 250 MG/1
1000 TABLET, FILM COATED ORAL
Refills: 0 | Status: DISCONTINUED | OUTPATIENT
Start: 2023-11-13 | End: 2023-11-15

## 2023-11-13 RX ORDER — GLUCAGON INJECTION, SOLUTION 0.5 MG/.1ML
1 INJECTION, SOLUTION SUBCUTANEOUS ONCE
Refills: 0 | Status: DISCONTINUED | OUTPATIENT
Start: 2023-11-13 | End: 2023-11-18

## 2023-11-13 RX ORDER — SODIUM CHLORIDE 9 MG/ML
1000 INJECTION, SOLUTION INTRAVENOUS
Refills: 0 | Status: DISCONTINUED | OUTPATIENT
Start: 2023-11-13 | End: 2023-11-13

## 2023-11-13 RX ORDER — CEFTRIAXONE 500 MG/1
1000 INJECTION, POWDER, FOR SOLUTION INTRAMUSCULAR; INTRAVENOUS EVERY 24 HOURS
Refills: 0 | Status: DISCONTINUED | OUTPATIENT
Start: 2023-11-13 | End: 2023-11-13

## 2023-11-13 RX ORDER — INSULIN GLARGINE 100 [IU]/ML
20 INJECTION, SOLUTION SUBCUTANEOUS
Refills: 0 | Status: DISCONTINUED | OUTPATIENT
Start: 2023-11-13 | End: 2023-11-14

## 2023-11-13 RX ORDER — HEPARIN SODIUM 5000 [USP'U]/ML
5000 INJECTION INTRAVENOUS; SUBCUTANEOUS EVERY 8 HOURS
Refills: 0 | Status: DISCONTINUED | OUTPATIENT
Start: 2023-11-13 | End: 2023-11-15

## 2023-11-13 RX ADMIN — LEVETIRACETAM 1000 MILLIGRAM(S): 250 TABLET, FILM COATED ORAL at 17:21

## 2023-11-13 RX ADMIN — PIPERACILLIN AND TAZOBACTAM 25 GRAM(S): 4; .5 INJECTION, POWDER, LYOPHILIZED, FOR SOLUTION INTRAVENOUS at 06:30

## 2023-11-13 RX ADMIN — Medication 15 UNIT(S): at 10:05

## 2023-11-13 RX ADMIN — SODIUM CHLORIDE 150 MILLILITER(S): 9 INJECTION, SOLUTION INTRAVENOUS at 01:54

## 2023-11-13 RX ADMIN — Medication 8: at 17:21

## 2023-11-13 RX ADMIN — Medication 1 MILLIGRAM(S): at 15:21

## 2023-11-13 RX ADMIN — Medication 1 TABLET(S): at 11:18

## 2023-11-13 RX ADMIN — PANTOPRAZOLE SODIUM 40 MILLIGRAM(S): 20 TABLET, DELAYED RELEASE ORAL at 11:18

## 2023-11-13 RX ADMIN — LEVETIRACETAM 400 MILLIGRAM(S): 250 TABLET, FILM COATED ORAL at 05:40

## 2023-11-13 RX ADMIN — Medication 15 UNIT(S): at 17:20

## 2023-11-13 RX ADMIN — Medication 75 MEQ/KG/HR: at 22:50

## 2023-11-13 RX ADMIN — Medication 100 MILLIGRAM(S): at 11:17

## 2023-11-13 RX ADMIN — Medication 75 MEQ/KG/HR: at 22:01

## 2023-11-13 RX ADMIN — CHLORHEXIDINE GLUCONATE 1 APPLICATION(S): 213 SOLUTION TOPICAL at 11:18

## 2023-11-13 RX ADMIN — CEFTRIAXONE 1000 MILLIGRAM(S): 500 INJECTION, POWDER, FOR SOLUTION INTRAMUSCULAR; INTRAVENOUS at 15:42

## 2023-11-13 RX ADMIN — SODIUM CHLORIDE 200 MILLILITER(S): 9 INJECTION, SOLUTION INTRAVENOUS at 11:17

## 2023-11-13 RX ADMIN — Medication 100 MILLIEQUIVALENT(S): at 08:50

## 2023-11-13 RX ADMIN — HEPARIN SODIUM 5000 UNIT(S): 5000 INJECTION INTRAVENOUS; SUBCUTANEOUS at 21:58

## 2023-11-13 RX ADMIN — INSULIN HUMAN 6 UNIT(S)/HR: 100 INJECTION, SOLUTION SUBCUTANEOUS at 02:04

## 2023-11-13 RX ADMIN — INSULIN GLARGINE 20 UNIT(S): 100 INJECTION, SOLUTION SUBCUTANEOUS at 21:58

## 2023-11-13 RX ADMIN — Medication 100 MILLIEQUIVALENT(S): at 05:41

## 2023-11-13 RX ADMIN — POTASSIUM PHOSPHATE, MONOBASIC POTASSIUM PHOSPHATE, DIBASIC 62.5 MILLIMOLE(S): 236; 224 INJECTION, SOLUTION INTRAVENOUS at 11:17

## 2023-11-13 NOTE — CONSULT NOTE ADULT - ASSESSMENT
31 y/o F with pmhx of HTN, DM1 with multiple admissions for DKA, admitted for DKA, also found to have new SYED.     #IDDM with DKA  sp insulin drip in the ICU, gap resolved  now started on Lantus 20U BID + premales + ISS  f/u A1C  CC diet  Endocorine consulted by MICU, pending recs    #SYED  suspect 2/2 hydration, SCr up to 2.7, baseline 0.4  encourage PO intake and c/w IVF  trend BMP  f/u urine studies  renally dose all medications and avoid nephrotoxic medications  Sebastian removed 11/13, f/u TOV result    #PNA   Noted with initial leukocytosis up to 33, now resolved  RVP negative  CT chest showing b/l PNA and possible cystitis  BCx and UCx NGTD  c/w Doxy and CTX  f/u urine legionella and strep pneumo    #H/o seizure disorder  c/w home keppra    #H/o ETOH abuse  reports last drink > week ago  Symptosm trigger CIWA 5 -->2  c/w folic acid, thiamine and MV  Mental status at baseline    DVT ppx: HSQ  Diet: CC   33 y/o F with pmhx of HTN, DM1 with multiple admissions for DKA, admitted for DKA, also found to have new SYED.     #IDDM with DKA  sp insulin drip in the ICU, gap resolved  based IV drip insulin usage x 24 hours, started on Lantus 20U BID + premales + ISS by ICU  f/u A1C  trend BMP  CC diet  Endocrine consulted by MICU, pending recs    #SYED  suspect 2/2 hydration, SCr up to 2.7, baseline 0.4  encourage PO intake and c/w IVF  trend BMP  f/u urine studies  renally dose all medications and avoid nephrotoxic medications  Sebastian removed 11/13, f/u TOV result    #PNA   Noted with initial leukocytosis up to 33, now resolved  RVP negative  CT chest showing b/l PNA and possible cystitis  BCx and UCx NGTD  c/w Doxy and CTX, plan for 5 days total  f/u urine legionella and strep pneumo    #H/o seizure disorder  c/w home keppra    #H/o ETOH abuse  reports last drink > month ago  Symptosm trigger CIWA 5 -->2  c/w folic acid, thiamine and MV  Mental status at baseline    DVT ppx: HSQ  Diet: CC   31 y/o F with pmhx of HTN, DM1 with multiple admissions for DKA, admitted for DKA, also found to have new SYED.     #IDDM with DKA  sp insulin drip in the ICU, gap resolved  based IV drip insulin usage x 24 hours, started on Lantus 20U BID + premales + ISS by ICU  f/u A1C  trend BMP  CC diet  Endocrine consulted by MICU, pending recs    #SYED  suspect 2/2 hydration, SCr up to 2.7, baseline 0.4  encourage PO intake and c/w IVF  trend BMP  f/u urine studies  renally dose all medications and avoid nephrotoxic medications  Sebastian removed 11/13, f/u TOV result    #PNA   Noted with initial leukocytosis up to 33, now resolved  RVP negative  CT chest showing b/l PNA and possible cystitis  BCx and UCx NGTD  c/w CTX, plan for 5 days total  dc doxy, legionella neg  f/u strep pneumo    #H/o seizure disorder  c/w home keppra    #H/o ETOH abuse  reports last drink > month ago  Symptosm trigger CIWA 5 -->2  c/w folic acid, thiamine and MV  Mental status at baseline    DVT ppx: HSQ  Diet: CC   31 y/o F with pmhx of HTN, DM1 with multiple admissions for DKA, admitted for DKA, also found to have new SYED.     #IDDM with DKA  s/p insulin drip in the ICU, gap resolved  based IV drip insulin usage x 24 hours, started on Lantus 20U BID + premales 15U + ISS  f/u A1C  trend BMP and AG  CC diet  Endocrine consulted by MICU, aryan recs    #SYED  suspect 2/2 hydration, SCr up to 2.7, baseline 0.4  encourage PO intake and c/w IVF  trend BMP  f/u urine studies  renally dose all medications and avoid nephrotoxic medications  Sebastian removed 11/13, f/u TOV result    #PNA   Noted with initial leukocytosis up to 33, now resolved  RVP negative  CT chest showing b/l PNA and possible cystitis  BCx and UCx NGTD  c/w empiric CTX, plan for 5 days total  d/c doxy as legionella neg  f/u strep pneumo    #H/o seizure disorder  c/w home Keppra, changed to PO    #H/o ETOH abuse  reports last drink > month ago  Symptoms trigger CIWA 5 -->2  c/w folic acid, thiamine and MV  Mental status at baseline    DVT ppx: HSQ  Diet: CC  Dispo: pending improvement in SYED and Endo recs, likely 24-48 hours 33 y/o F with pmhx of HTN, DM1 with multiple admissions for DKA, admitted for DKA, also found to have new SYED.     #IDDM with DKA  s/p insulin drip in the ICU, gap resolved  based IV drip insulin usage x 24 hours, started on Lantus 20U BID + premales 15U + ISS  f/u A1C  trend BMP and AG  CC diet  Endocrine consulted by MICU, aryan recs    #SYED  suspect 2/2 hydration, SCr up to 2.7, baseline 0.4  encourage PO intake  trend BMP  f/u urine studies  renally dose all medications and avoid nephrotoxic medications  Sebastian removed 11/13, f/u TOV result    #PNA   Noted with initial leukocytosis up to 33, now resolved  RVP negative  CT chest showing b/l PNA and possible cystitis  BCx and UCx NGTD  c/w empiric CTX, plan for 5 days total  d/c doxy as legionella neg  f/u strep pneumo    #H/o seizure disorder  c/w home Keppra, changed to PO    #H/o ETOH abuse  reports last drink > month ago  Symptoms trigger CIWA 5 -->2  c/w folic acid, thiamine and MV  Mental status at baseline    DVT ppx: HSQ  Diet: CC  Dispo: pending improvement in SYED and Endo recs, likely 24-48 hours

## 2023-11-13 NOTE — PROGRESS NOTE ADULT - ATTENDING COMMENTS
PCCM Attending Attestation:    Patient was seen and examined at the bedside. I was physically present for the key portions of the evaluation and management (E/M) service provided. Agree with history, physical exam, assessment, and plan as outlined by medical student note above, with the following additions and/or comments:    32M IDDM, EtOH abuse who presented after being found unresponsive, found with severe HAGMA, DKA, PNA. He reports compliance with insulin at home - he reports being on sliding scale and 8U of Lantus at night. He does not have an endocrinologist, DM managed by his PCP. Hgb A1c 15. Transitioned to Lantus 20U BID with 15U Admelog pre-meal. Ensure can tolerate PO intake. Zosyn + Doxy for PNA - complete 5 day course. Endocrine consult. Pt is stable for downgrade to medical floor.    Chandrakant Manrique M.D.  , Pulmonary & Critical Care Medicine  [facility]  Pulmonary and [facility] Medicine at [place]  [address]  [place], [place] [address]  T: [phone]  F: [phone]

## 2023-11-13 NOTE — CONSULT NOTE ADULT - ASSESSMENT
32 y.o Male with PMH of DM with multiple admissions for recurrent DKA, ETOH abuse, Pancreatitis, Afib, HTN, Seizure disorder, brought to ED after being found unresponsive at home. Initial labs consistent with severe DKA (, Incalculable AG, Bicarb <6.0, pH venous 6.9. Patient was treated with IVF and IV insulin gtt in ICU setting with resolving ketosis. Patient was transitioned to SQ insulin this morning and bridged with long acting insulin Endocrinology consult was requested for DM management.       # Severely uncontrolled DM with recurrent DKA secondary to non compliance and ETOH abuse  Ketosis resolved after IVF and Insulin gtt  Patient tolerated diet.    Agree with current regimen  Continue Lantus 20U q12h  Continue Admelog 15U TID  + correction scale  Monitor POC glucose qac and qhs with goal 120 - 180 mg/dL

## 2023-11-13 NOTE — PROGRESS NOTE ADULT - ASSESSMENT
33 y/o F with pmhx of HTN, DM1 with multiple admissions for DKA, ETOH abuse is accepted to ICU for active management of   1. DKA  2. SYED  3. Leukocytosis  4. AMS  5. Seizures   6. ETOH abuse    Neuro:   - Mentating well, no focal defecits  - ETOH abuse, thiamine/folate started.  - c/w Keppra     Cardio:   - BP soft although acceptable,   - c/w D5 LR at 200 mL/hr     Pulm:  - saturating well on room air  - CT Chest showed Nonspecific bilateral lower lobe peripheral patchy groundglass opacities, compatible with pneumonia, including atypical or viral pneumonia.  - c/w doxycycline and zosyn     GI:   - consistent carb diet   - c/w IV PPI     Renal:  - SYED with Scr 2.72 ( baseline around 0.4) likely prerenal in setting of DKA  - C/w D5 LR    - Sebastian removed    Heme:  - SCDs for dvt ppx    ID:   - WBC count downtrending  - c/w doxycycline and zosyn for PNA     Endo:  - Anion gap closed   - ABG shows 7.4/27/169/17  - c/w lantus 20 BID, ISS  - Insulin drip discontinued     Dispo: Stable for downgrade      CRITICAL CARE TIME SPENT: 37 minutes assessing presenting problems of acute illness, which pose high probability of life threatening deterioration or end organ damage/dysfunction, as well as medical decision making including initiating plan of care, reviewing data, reviewing radiologic exams, discussing with multidisciplinary team,  discussing goals of care with patient/family, and writing this note.  Non-inclusive of procedures performed,

## 2023-11-13 NOTE — CONSULT NOTE ADULT - SUBJECTIVE AND OBJECTIVE BOX
HPI:  32 y.o Male with PMH of DM with multiple admissions for recurrent DKA, ETOH abuse, Pancreatitis, Afib, HTN, Seizure disorder, brought to ED after being found unresponsive at home. Initial labs consistent with severe DKA (, Incalculable AG, Bicarb <6.0, pH venous 6.9. Patient was treated with IVF and IV insulin gtt in ICU setting with resolving ketosis. Patient was transitioned to SQ insulin this morning and bridged with long acting insulin Endocrinology consult was requested for DM management.       PAST MEDICAL & SURGICAL HISTORY:  DM  Alcohol abuse  Pancreatitis  HTN (hypertension)  Atrial fibrillation  Non-ischemic cardiomyopathy    No significant past surgical history    Social History:  ETOH abuse     FAMILY HISTORY:  FH: hypertension    Allergies    No Known Allergies  Allergy Status Unknown    REVIEW OF SYSTEMS:    CONSTITUTIONAL: No fever, weight loss, or fatigue  EYES: No eye pain, visual disturbances, or discharge  ENMT:  No difficulty hearing, tinnitus, vertigo; No sinus or throat pain  NECK: No pain or stiffness  RESPIRATORY: No cough, wheezing, chills or hemoptysis; No shortness of breath  CARDIOVASCULAR: No chest pain, palpitations, dizziness, or leg swelling  GASTROINTESTINAL: +abdominal pain. + nausea, + vomiting, No hematemesis; No diarrhea or constipation. No melena or hematochezia.  NEUROLOGICAL: No headaches, memory loss, loss of strength, numbness, or tremors  SKIN: No itching, burning, rashes, or lesions   MUSCULOSKELETAL: No joint pain or swelling; No muscle, back, or extremity pain  PSYCHIATRIC: No depression, anxiety, mood swings, or difficulty sleeping      MEDICATIONS  (STANDING):  cefTRIAXone Injectable. 1000 milliGRAM(s) IV Push every 24 hours  chlorhexidine 2% Cloths 1 Application(s) Topical daily  dextrose 5% + lactated ringers. 1000 milliLiter(s) (200 mL/Hr) IV Continuous <Continuous>  dextrose 5%. 1000 milliLiter(s) (100 mL/Hr) IV Continuous <Continuous>  dextrose 5%. 1000 milliLiter(s) (50 mL/Hr) IV Continuous <Continuous>  dextrose 50% Injectable 50 milliLiter(s) IV Push every 15 minutes  dextrose 50% Injectable 25 Gram(s) IV Push once  dextrose 50% Injectable 25 Gram(s) IV Push once  doxycycline IVPB 100 milliGRAM(s) IV Intermittent every 12 hours  folic acid Injectable 1 milliGRAM(s) IV Push daily  glucagon  Injectable 1 milliGRAM(s) IntraMuscular once  heparin   Injectable 5000 Unit(s) SubCutaneous every 8 hours  insulin glargine Injectable (LANTUS) 20 Unit(s) SubCutaneous two times a day  insulin lispro (ADMELOG) corrective regimen sliding scale   SubCutaneous three times a day before meals  insulin lispro Injectable (ADMELOG) 15 Unit(s) SubCutaneous three times a day before meals  levETIRAcetam 1000 milliGRAM(s) Oral two times a day  multivitamin 1 Tablet(s) Oral daily  pantoprazole  Injectable 40 milliGRAM(s) IV Push daily  thiamine Injectable 100 milliGRAM(s) IV Push daily    MEDICATIONS  (PRN):  dextrose Oral Gel 15 Gram(s) Oral once PRN Blood Glucose LESS THAN 70 milliGRAM(s)/deciliter  LORazepam   Injectable 2 milliGRAM(s) IV Push every 1 hour PRN CIWA-Ar score 8 or greater      Vital Signs Last 24 Hrs  T(C): 37.3 (13 Nov 2023 15:29), Max: 37.8 (12 Nov 2023 22:00)  T(F): 99.2 (13 Nov 2023 15:29), Max: 100 (12 Nov 2023 22:00)  HR: 99 (13 Nov 2023 13:00) (91 - 111)  BP: 114/86 (13 Nov 2023 13:00) (92/65 - 156/90)  BP(mean): 95 (13 Nov 2023 13:00) (75 - 106)  RR: 23 (13 Nov 2023 13:00) (11 - 23)  SpO2: 100% (13 Nov 2023 13:00) (98% - 100%)    Parameters below as of 13 Nov 2023 12:00  Patient On (Oxygen Delivery Method): room air    Physical Exam:    Constitutional: NAD,  HEENT: EOMI, no exophalmos  Neck: trachea midline,   Respiratory: CTAB, normal respirations  Cardiovascular: S1 and S2, RRR  Gastrointestinal: BS+, soft, ntnd  Extremities: No peripheral edema  Neurological: AOx3, no focal deficits  Psychiatric: Normal mood and normal affect  Skin: warm and dry    LABS  11-13    144  |  115<H>  |  33.4<H>  ----------------------------<  120<H>  3.8   |  18.0<L>  |  2.72<H>    Ca    8.0<L>      13 Nov 2023 09:15  Phos  2.8     11-13  Mg     2.3     11-13    TPro  4.8<L>  /  Alb  2.7<L>  /  TBili  0.4  /  DBili  x   /  AST  15  /  ALT  10  /  AlkPhos  116  11-13                          9.6    8.11  )-----------( 169      ( 13 Nov 2023 04:55 )             26.4       Ketone - Urine: 80 mg/dL (11-12 @ 08:19)    Albumin: 2.7 g/dL (11-13-23 @ 03:03)  Aspartate Aminotransferase (AST/SGOT): 15 U/L (11-13-23 @ 03:03)  Alanine Aminotransferase (ALT/SGPT): 10 U/L (11-13-23 @ 03:03)  Alkaline Phosphatase: 116 U/L (11-13-23 @ 03:03)  Alkaline Phosphatase: 242 U/L (11-12-23 @ 08:31)  Albumin: 3.9 g/dL (11-12-23 @ 08:31)  Aspartate Aminotransferase (AST/SGOT): 21 U/L (11-12-23 @ 08:31)  Alanine Aminotransferase (ALT/SGPT): 14 U/L (11-12-23 @ 08:31)  Albumin: 4.0 g/dL (11-12-23 @ 07:55)  Aspartate Aminotransferase (AST/SGOT): 22 U/L (11-12-23 @ 07:55)  Alanine Aminotransferase (ALT/SGPT): 14 U/L (11-12-23 @ 07:55)  Alkaline Phosphatase: 244 U/L (11-12-23 @ 07:55)    CAPILLARY BLOOD GLUCOSE    POCT Blood Glucose.: 98 mg/dL (13 Nov 2023 09:16)  POCT Blood Glucose.: 97 mg/dL (13 Nov 2023 08:32)  POCT Blood Glucose.: 152 mg/dL (13 Nov 2023 07:02)  POCT Blood Glucose.: 184 mg/dL (13 Nov 2023 06:07)

## 2023-11-13 NOTE — PROGRESS NOTE ADULT - SUBJECTIVE AND OBJECTIVE BOX
Patient is a 32y old  Male who presents with a chief complaint of CC: DKA (12 Nov 2023 10:56)  HPI: 31 y/o F with pmhx of HTN, DM1 with multiple admissions for DKA, ETOH abuse presented to ED after being found unresponsive by his wife. Initial labwork significant for WBC 33 with L shift, Blood, serum Co2 < 6, Scr 1.70 (baseline 0.4), VBG pH < 6.942 . Started on IV crystalloid and insulin gtt, ICU consulted for DKA with course complicated by AMS, leukocytosis, SYED    24 Hour Interval: Patient was seen and evaluated at bedside. No acute events overnight. Patient's repeat ABG showed 7.4/27/169/17. Anion gap closed, insulin drip was stopped and switched to Lantus 20U BID. Will monitor patient off insulin drip. Stable for downgrade later today.     ROS: negative except as stated above    PAST MEDICAL & SURGICAL HISTORY:  Alcohol abuse      Pancreatitis      HTN (hypertension)      Diabetes      Atrial fibrillation      Non-ischemic cardiomyopathy      No significant past surgical history        CARMEN JOYCE   32y    Male        Allergies    No Known Allergies  Allergy Status Unknown    Intolerances          ICU Vital Signs Last 24 Hrs  T(C): 37.3 (13 Nov 2023 10:00), Max: 37.8 (12 Nov 2023 22:00)  T(F): 99.1 (13 Nov 2023 10:00), Max: 100 (12 Nov 2023 22:00)  HR: 98 (13 Nov 2023 10:00) (91 - 111)  BP: 156/90 (13 Nov 2023 10:00) (92/66 - 156/90)  BP(mean): 106 (13 Nov 2023 10:00) (69 - 106)  ABP: --  ABP(mean): --  RR: 21 (13 Nov 2023 10:00) (11 - 30)  SpO2: 100% (13 Nov 2023 10:00) (98% - 100%)    O2 Parameters below as of 13 Nov 2023 08:00  Patient On (Oxygen Delivery Method): room air          Physical Examination:    General: No acute distress    HEENT: Normocephalic, atraumatic, PERRL     PULM: CTA b/l, no W/R/R     CVS: +S1/S2, no M/R/G    ABD: soft, nontender, nondistended     EXT: no LE edema, 5/5 muscle strength in upper and lower extremities    SKIN: warm, dry     Neuro: alert and oriented, follows all commands      ABG - ( 13 Nov 2023 04:57 )  pH, Arterial: 7.400 pH, Blood: x     /  pCO2: 27    /  pO2: 169   / HCO3: 17    / Base Excess: -8.1  /  SaO2: 99.7          LABS:                        9.6    8.11  )-----------( 169      ( 13 Nov 2023 04:55 )             26.4     11-13    144  |  115<H>  |  33.4<H>  ----------------------------<  120<H>  3.8   |  18.0<L>  |  2.72<H>    Ca    8.0<L>      13 Nov 2023 09:15  Phos  2.8     11-13  Mg     2.3     11-13    TPro  4.8<L>  /  Alb  2.7<L>  /  TBili  0.4  /  DBili  x   /  AST  15  /  ALT  10  /  AlkPhos  116  11-13          CAPILLARY BLOOD GLUCOSE      POCT Blood Glucose.: 98 mg/dL (13 Nov 2023 09:16)  POCT Blood Glucose.: 97 mg/dL (13 Nov 2023 08:32)  POCT Blood Glucose.: 152 mg/dL (13 Nov 2023 07:02)  POCT Blood Glucose.: 184 mg/dL (13 Nov 2023 06:07)  POCT Blood Glucose.: 205 mg/dL (13 Nov 2023 04:55)  POCT Blood Glucose.: 223 mg/dL (13 Nov 2023 04:13)  POCT Blood Glucose.: 235 mg/dL (13 Nov 2023 03:07)  POCT Blood Glucose.: 250 mg/dL (13 Nov 2023 01:55)  POCT Blood Glucose.: 280 mg/dL (13 Nov 2023 00:56)  POCT Blood Glucose.: 290 mg/dL (13 Nov 2023 00:02)  POCT Blood Glucose.: 370 mg/dL (12 Nov 2023 23:08)  POCT Blood Glucose.: 449 mg/dL (12 Nov 2023 22:09)  POCT Blood Glucose.: 434 mg/dL (12 Nov 2023 21:05)  POCT Blood Glucose.: 410 mg/dL (12 Nov 2023 20:06)  POCT Blood Glucose.: 386 mg/dL (12 Nov 2023 19:03)  POCT Blood Glucose.: 398 mg/dL (12 Nov 2023 18:18)  POCT Blood Glucose.: 446 mg/dL (12 Nov 2023 17:15)  POCT Blood Glucose.: 510 mg/dL (12 Nov 2023 16:25)  POCT Blood Glucose.: 420 mg/dL (12 Nov 2023 15:16)  POCT Blood Glucose.: 476 mg/dL (12 Nov 2023 13:59)  POCT Blood Glucose.: 491 mg/dL (12 Nov 2023 12:48)  POCT Blood Glucose.: >530 mg/dL (12 Nov 2023 11:34)      Urinalysis Basic - ( 13 Nov 2023 09:15 )    Color: x / Appearance: x / SG: x / pH: x  Gluc: 120 mg/dL / Ketone: x  / Bili: x / Urobili: x   Blood: x / Protein: x / Nitrite: x   Leuk Esterase: x / RBC: x / WBC x   Sq Epi: x / Non Sq Epi: x / Bacteria: x      CULTURES:  Rapid RVP Result: NotDetec (11-12 @ 08:10)      Medications:  MEDICATIONS  (STANDING):  azithromycin  IVPB 500 milliGRAM(s) IV Intermittent every 24 hours  chlorhexidine 2% Cloths 1 Application(s) Topical daily  dextrose 5% + lactated ringers. 1000 milliLiter(s) (200 mL/Hr) IV Continuous <Continuous>  dextrose 5%. 1000 milliLiter(s) (100 mL/Hr) IV Continuous <Continuous>  dextrose 5%. 1000 milliLiter(s) (50 mL/Hr) IV Continuous <Continuous>  dextrose 50% Injectable 50 milliLiter(s) IV Push every 15 minutes  dextrose 50% Injectable 25 Gram(s) IV Push once  dextrose 50% Injectable 25 Gram(s) IV Push once  folic acid Injectable 1 milliGRAM(s) IV Push daily  glucagon  Injectable 1 milliGRAM(s) IntraMuscular once  insulin glargine Injectable (LANTUS) 20 Unit(s) SubCutaneous two times a day  insulin lispro (ADMELOG) corrective regimen sliding scale   SubCutaneous three times a day before meals  insulin lispro Injectable (ADMELOG) 15 Unit(s) SubCutaneous three times a day before meals  levETIRAcetam  IVPB 1000 milliGRAM(s) IV Intermittent every 12 hours  multivitamin 1 Tablet(s) Oral daily  pantoprazole  Injectable 40 milliGRAM(s) IV Push daily  piperacillin/tazobactam IVPB.. 3.375 Gram(s) IV Intermittent every 8 hours  potassium phosphate IVPB 15 milliMole(s) IV Intermittent once  thiamine Injectable 100 milliGRAM(s) IV Push daily    MEDICATIONS  (PRN):  dextrose Oral Gel 15 Gram(s) Oral once PRN Blood Glucose LESS THAN 70 milliGRAM(s)/deciliter  LORazepam   Injectable 2 milliGRAM(s) IV Push every 1 hour PRN CIWA-Ar score 8 or greater        11-12 @ 07:01 - 11-13 @ 07:00  --------------------------------------------------------  IN: 8214.8 mL / OUT: 1950 mL / NET: 6264.8 mL    11-13 @ 07:01 - 11-13 @ 11:13  --------------------------------------------------------  IN: 1102.5 mL / OUT: 200 mL / NET: 902.5 mL        RADIOLOGY/IMAGING/ECHO    IMPRESSION:  Nonspecific bilateral lower lobe peripheral patchy groundglass opacities,   compatible with pneumonia, including atypical or viral pneumonia.   Patient is a 32y old  Male who presents with a chief complaint of CC: DKA (12 Nov 2023 10:56)  HPI: 31 y/o M with pmhx of HTN, DM1 with multiple admissions for DKA, ETOH abuse presented to ED after being found unresponsive by his wife. Initial labwork significant for WBC 33 with L shift, Blood, serum Co2 < 6, Scr 1.70 (baseline 0.4), VBG pH < 6.942 . Started on IV crystalloid and insulin gtt, ICU consulted for DKA with course complicated by AMS, leukocytosis, SYED    24 Hour Interval: Patient was seen and evaluated at bedside. No acute events overnight. Patient's repeat ABG showed 7.4/27/169/17. Anion gap closed, insulin drip was stopped and switched to Lantus 20U BID. Will monitor patient off insulin drip. Stable for downgrade later today.     ROS: negative except as stated above    PAST MEDICAL & SURGICAL HISTORY:  Alcohol abuse      Pancreatitis      HTN (hypertension)      Diabetes      Atrial fibrillation      Non-ischemic cardiomyopathy      No significant past surgical history        CARMEN JOYCE   32y    Male        Allergies    No Known Allergies  Allergy Status Unknown    Intolerances          ICU Vital Signs Last 24 Hrs  T(C): 37.3 (13 Nov 2023 10:00), Max: 37.8 (12 Nov 2023 22:00)  T(F): 99.1 (13 Nov 2023 10:00), Max: 100 (12 Nov 2023 22:00)  HR: 98 (13 Nov 2023 10:00) (91 - 111)  BP: 156/90 (13 Nov 2023 10:00) (92/66 - 156/90)  BP(mean): 106 (13 Nov 2023 10:00) (69 - 106)  ABP: --  ABP(mean): --  RR: 21 (13 Nov 2023 10:00) (11 - 30)  SpO2: 100% (13 Nov 2023 10:00) (98% - 100%)    O2 Parameters below as of 13 Nov 2023 08:00  Patient On (Oxygen Delivery Method): room air          Physical Examination:    General: No acute distress    HEENT: Normocephalic, atraumatic, PERRL     PULM: CTA b/l, no W/R/R     CVS: +S1/S2, no M/R/G    ABD: soft, nontender, nondistended     EXT: no LE edema, 5/5 muscle strength in upper and lower extremities    SKIN: warm, dry     Neuro: alert and oriented, follows all commands      ABG - ( 13 Nov 2023 04:57 )  pH, Arterial: 7.400 pH, Blood: x     /  pCO2: 27    /  pO2: 169   / HCO3: 17    / Base Excess: -8.1  /  SaO2: 99.7          LABS:                        9.6    8.11  )-----------( 169      ( 13 Nov 2023 04:55 )             26.4     11-13    144  |  115<H>  |  33.4<H>  ----------------------------<  120<H>  3.8   |  18.0<L>  |  2.72<H>    Ca    8.0<L>      13 Nov 2023 09:15  Phos  2.8     11-13  Mg     2.3     11-13    TPro  4.8<L>  /  Alb  2.7<L>  /  TBili  0.4  /  DBili  x   /  AST  15  /  ALT  10  /  AlkPhos  116  11-13          CAPILLARY BLOOD GLUCOSE      POCT Blood Glucose.: 98 mg/dL (13 Nov 2023 09:16)  POCT Blood Glucose.: 97 mg/dL (13 Nov 2023 08:32)  POCT Blood Glucose.: 152 mg/dL (13 Nov 2023 07:02)  POCT Blood Glucose.: 184 mg/dL (13 Nov 2023 06:07)  POCT Blood Glucose.: 205 mg/dL (13 Nov 2023 04:55)  POCT Blood Glucose.: 223 mg/dL (13 Nov 2023 04:13)  POCT Blood Glucose.: 235 mg/dL (13 Nov 2023 03:07)  POCT Blood Glucose.: 250 mg/dL (13 Nov 2023 01:55)  POCT Blood Glucose.: 280 mg/dL (13 Nov 2023 00:56)  POCT Blood Glucose.: 290 mg/dL (13 Nov 2023 00:02)  POCT Blood Glucose.: 370 mg/dL (12 Nov 2023 23:08)  POCT Blood Glucose.: 449 mg/dL (12 Nov 2023 22:09)  POCT Blood Glucose.: 434 mg/dL (12 Nov 2023 21:05)  POCT Blood Glucose.: 410 mg/dL (12 Nov 2023 20:06)  POCT Blood Glucose.: 386 mg/dL (12 Nov 2023 19:03)  POCT Blood Glucose.: 398 mg/dL (12 Nov 2023 18:18)  POCT Blood Glucose.: 446 mg/dL (12 Nov 2023 17:15)  POCT Blood Glucose.: 510 mg/dL (12 Nov 2023 16:25)  POCT Blood Glucose.: 420 mg/dL (12 Nov 2023 15:16)  POCT Blood Glucose.: 476 mg/dL (12 Nov 2023 13:59)  POCT Blood Glucose.: 491 mg/dL (12 Nov 2023 12:48)  POCT Blood Glucose.: >530 mg/dL (12 Nov 2023 11:34)      Urinalysis Basic - ( 13 Nov 2023 09:15 )    Color: x / Appearance: x / SG: x / pH: x  Gluc: 120 mg/dL / Ketone: x  / Bili: x / Urobili: x   Blood: x / Protein: x / Nitrite: x   Leuk Esterase: x / RBC: x / WBC x   Sq Epi: x / Non Sq Epi: x / Bacteria: x      CULTURES:  Rapid RVP Result: NotDetec (11-12 @ 08:10)      Medications:  MEDICATIONS  (STANDING):  azithromycin  IVPB 500 milliGRAM(s) IV Intermittent every 24 hours  chlorhexidine 2% Cloths 1 Application(s) Topical daily  dextrose 5% + lactated ringers. 1000 milliLiter(s) (200 mL/Hr) IV Continuous <Continuous>  dextrose 5%. 1000 milliLiter(s) (100 mL/Hr) IV Continuous <Continuous>  dextrose 5%. 1000 milliLiter(s) (50 mL/Hr) IV Continuous <Continuous>  dextrose 50% Injectable 50 milliLiter(s) IV Push every 15 minutes  dextrose 50% Injectable 25 Gram(s) IV Push once  dextrose 50% Injectable 25 Gram(s) IV Push once  folic acid Injectable 1 milliGRAM(s) IV Push daily  glucagon  Injectable 1 milliGRAM(s) IntraMuscular once  insulin glargine Injectable (LANTUS) 20 Unit(s) SubCutaneous two times a day  insulin lispro (ADMELOG) corrective regimen sliding scale   SubCutaneous three times a day before meals  insulin lispro Injectable (ADMELOG) 15 Unit(s) SubCutaneous three times a day before meals  levETIRAcetam  IVPB 1000 milliGRAM(s) IV Intermittent every 12 hours  multivitamin 1 Tablet(s) Oral daily  pantoprazole  Injectable 40 milliGRAM(s) IV Push daily  piperacillin/tazobactam IVPB.. 3.375 Gram(s) IV Intermittent every 8 hours  potassium phosphate IVPB 15 milliMole(s) IV Intermittent once  thiamine Injectable 100 milliGRAM(s) IV Push daily    MEDICATIONS  (PRN):  dextrose Oral Gel 15 Gram(s) Oral once PRN Blood Glucose LESS THAN 70 milliGRAM(s)/deciliter  LORazepam   Injectable 2 milliGRAM(s) IV Push every 1 hour PRN CIWA-Ar score 8 or greater        11-12 @ 07:01 - 11-13 @ 07:00  --------------------------------------------------------  IN: 8214.8 mL / OUT: 1950 mL / NET: 6264.8 mL    11-13 @ 07:01 - 11-13 @ 11:13  --------------------------------------------------------  IN: 1102.5 mL / OUT: 200 mL / NET: 902.5 mL        RADIOLOGY/IMAGING/ECHO    IMPRESSION:  Nonspecific bilateral lower lobe peripheral patchy groundglass opacities,   compatible with pneumonia, including atypical or viral pneumonia.

## 2023-11-13 NOTE — CONSULT NOTE ADULT - SUBJECTIVE AND OBJECTIVE BOX
CARMEN JOYCE  32y  Male      Patient is a 32y old  Male who presents with a chief complaint of CC: DKA (13 Nov 2023 11:12)    32y old M PMHx IDDM, HTN, ETOH abuse admitted after been found unresponsive by his wife. Initially blood work with WBC 33, serum Co2 < 6, Scr 1.70 (baseline 0.4), VBG pH < 6.942, admitted to the ICU for insulin gtt. s/p insulin drip with improvement in AG and glucose, pt started on diet and started on Lantus BID + premeal. Pt also s/po CT C/A/P with concern for PNA, ?viral vs cystitis and started on empiric abx Doxy and Zosyn given initial leukocytosis. Now WBC better. Course further c/b SYED on IVF. Now stable for downgrade to medicine.     At the time of my examination,   ROS negative for fever, chill, SOB, n/v/d/c nor urinary symptoms.      PAST MEDICAL/SURGICAL HISTORY  PAST MEDICAL & SURGICAL HISTORY:  Alcohol abuse  Pancreatitis  HTN (hypertension)  Diabetes  Atrial fibrillation  Non-ischemic cardiomyopathy  No significant past surgical history      REVIEW OF SYSTEMS:  See above    T(C): 37.4 (11-13-23 @ 13:00), Max: 37.8 (11-12-23 @ 22:00)  HR: 99 (11-13-23 @ 13:00) (91 - 111)  BP: 114/86 (11-13-23 @ 13:00) (92/65 - 156/90)  RR: 23 (11-13-23 @ 13:00) (11 - 29)  SpO2: 100% (11-13-23 @ 13:00) (98% - 100%)  Wt(kg): --Vital Signs Last 24 Hrs  T(C): 37.4 (13 Nov 2023 13:00), Max: 37.8 (12 Nov 2023 22:00)  T(F): 99.3 (13 Nov 2023 13:00), Max: 100 (12 Nov 2023 22:00)  HR: 99 (13 Nov 2023 13:00) (91 - 111)  BP: 114/86 (13 Nov 2023 13:00) (92/65 - 156/90)  BP(mean): 95 (13 Nov 2023 13:00) (75 - 106)  RR: 23 (13 Nov 2023 13:00) (11 - 29)  SpO2: 100% (13 Nov 2023 13:00) (98% - 100%)    Parameters below as of 13 Nov 2023 12:00  Patient On (Oxygen Delivery Method): room air    PHYSICAL EXAM:  GENERAL: NAD, well-groomed  HEAD:  Atraumatic, Normocephalic  EYES: EOMI, PERRLA, conjunctiva and sclera clear  ENMT: No tonsillar erythema, exudates, or enlargement; Moist mucous membranes, Good dentition, No lesions  NECK: Supple, No JVD, Normal thyroid  NERVOUS SYSTEM:  Alert & Oriented X3, Good concentration; Motor Strength 5/5 B/L upper and lower extremities; DTRs 2+ intact and symmetric  CHEST/LUNG: Clear to percussion bilaterally; No rales, rhonchi, wheezing, or rubs  HEART: Regular rate and rhythm; No murmurs, rubs, or gallops  ABDOMEN: Soft, Nontender, Nondistended; Bowel sounds present  EXTREMITIES:  2+ Peripheral Pulses, No clubbing, cyanosis, or edema  LYMPH: No lymphadenopathy noted  SKIN: No rashes or lesions    Consultant(s) Notes Reviewed:  [x ] YES  [ ] NO  Care Discussed with Consultants/Other Providers [ x] YES  [ ] NO    LABS:  CBC   11-13-23 @ 04:55  Hematcorit 26.4  Hemoglobin 9.6  Mean Cell Hemoglobin 30.1  Platelet Count-Automated 169  RBC Count 3.19  Red Cell Distrib Width 13.5  Wbc Count 8.11      BMP  11-13-23 @ 09:15  Anion Gap. Serum 11  Blood Urea Nitrogen,Serm 33.4  Calcium, Total Serum 8.0  Carbon Dioxide, Serum 18.0  Chloride, Serum 115  Creatinine, Serum 2.72  eGFR in  --  eGFR in Non Afican American --  Gloucose, serum 120  Potassium, Serum 3.8  Sodium, Serum 144      11-13-23 @ 03:03  Anion Gap. Serum 14  Blood Urea Nitrogen,Serm 32.7  Calcium, Total Serum 7.8  Carbon Dioxide, Serum 17.0  Chloride, Serum 112  Creatinine, Serum 2.46  eGFR in  --  eGFR in Non Afican American --  Gloucose, serum 250  Potassium, Serum 3.4  Sodium, Serum 143      11-12-23 @ 21:04  Anion Gap. Serum 24  Blood Urea Nitrogen,Serm 33.2  Calcium, Total Serum 7.9  Carbon Dioxide, Serum 9.0  Chloride, Serum 104  Creatinine, Serum 2.24  eGFR in  --  eGFR in Non Afican American --  Gloucose, serum 462  Potassium, Serum 4.6  Sodium, Serum 137      11-12-23 @ 17:01  Anion Gap. Serum 23  Blood Urea Nitrogen,Serm 32.4  Calcium, Total Serum 8.0  Carbon Dioxide, Serum 10.0  Chloride, Serum 104  Creatinine, Serum 1.88  eGFR in  --  eGFR in Non Afican American --  Gloucose, serum 507  Potassium, Serum 3.1  Sodium, Serum 137    11-12-23 @ 13:45  Anion Gap. Serum Unable to Calculate  Blood Urea Nitrogen,Serm 32.3  Calcium, Total Serum 8.1  Carbon Dioxide, Serum <6.0  Chloride, Serum 107  Creatinine, Serum 1.71  eGFR in  --  eGFR in Non Afican American --  Gloucose, serum 471  Potassium, Serum 3.8  Sodium, Serum 139    11-12-23 @ 08:31  Anion Gap. Serum Unable to Calculate  Blood Urea Nitrogen,Serm 28.8  Calcium, Total Serum 7.9  Carbon Dioxide, Serum <6.0  Chloride, Serum 100  Creatinine, Serum 1.70  eGFR in  --  eGFR in Non Afican American --  Gloucose, serum 777  Potassium, Serum 4.3  Sodium, Serum 136    11-12-23 @ 07:55  Anion Gap. Serum Unable to Calculate  Blood Urea Nitrogen,Serm 30.0  Calcium, Total Serum 8.0  Carbon Dioxide, Serum <6.0  Chloride, Serum 97  Creatinine, Serum 1.62  eGFR in  --  eGFR in Non Afican American --  Gloucose, serum 794  Potassium, Serum 4.4  Sodium, Serum 136    CMP  11-13-23 @ 09:15  Nessa Aminotransferase(ALT/SGPT)--  Albumin, Serum --  Alkaline Phosphatase, Serum --  Anion Gap, Serum 11  Aspartate Aminotransferase (AST/SGOT)--  Bilirubin Total, Serum --  Blood Urea Nitrogen, Serum 33.4  Calcium,Total Serum 8.0  Carbon Dioxide, Serum 18.0  Chloride, Serum 115  Creatinine, Serum 2.72  eGFR if  --  eGFR if Non African American --  Glucose, Serum 120  Potassium, Serum 3.8  Protein Total, Serum --  Sodium, Serum 144      11-13-23 @ 03:03  Nessa Aminotransferase(ALT/SGPT)10  Albumin, Serum 2.7  Alkaline Phosphatase, Serum 116  Anion Gap, Serum 14  Aspartate Aminotransferase (AST/SGOT)15  Bilirubin Total, Serum 0.4  Blood Urea Nitrogen, Serum 32.7  Calcium,Total Serum 7.8  Carbon Dioxide, Serum 17.0  Chloride, Serum 112  Creatinine, Serum 2.46  eGFR if  --  eGFR if Non African American --  Glucose, Serum 250  Potassium, Serum 3.4  Protein Total, Serum 4.8  Sodium, Serum 143      11-12-23 @ 21:04  Nessa Aminotransferase(ALT/SGPT)--  Albumin, Serum --  Alkaline Phosphatase, Serum --  Anion Gap, Serum 24  Aspartate Aminotransferase (AST/SGOT)--  Bilirubin Total, Serum --  Blood Urea Nitrogen, Serum 33.2  Calcium,Total Serum 7.9  Carbon Dioxide, Serum 9.0  Chloride, Serum 104  Creatinine, Serum 2.24  eGFR if  --  eGFR if Non African American --  Glucose, Serum 462  Potassium, Serum 4.6  Protein Total, Serum --  Sodium, Serum 137      11-12-23 @ 17:01  Nessa Aminotransferase(ALT/SGPT)--  Albumin, Serum --  Alkaline Phosphatase, Serum --  Anion Gap, Serum 23  Aspartate Aminotransferase (AST/SGOT)--  Bilirubin Total, Serum --  Blood Urea Nitrogen, Serum 32.4  Calcium,Total Serum 8.0  Carbon Dioxide, Serum 10.0  Chloride, Serum 104  Creatinine, Serum 1.88  eGFR if  --  eGFR if Non African American --  Glucose, Serum 507  Potassium, Serum 3.1  Protein Total, Serum --  Sodium, Serum 137      11-12-23 @ 13:45  Nessa Aminotransferase(ALT/SGPT)--  Albumin, Serum --  Alkaline Phosphatase, Serum --  Anion Gap, Serum Unable to Calculate  Aspartate Aminotransferase (AST/SGOT)--  Bilirubin Total, Serum --  Blood Urea Nitrogen, Serum 32.3  Calcium,Total Serum 8.1  Carbon Dioxide, Serum <6.0  Chloride, Serum 107  Creatinine, Serum 1.71  eGFR if  --  eGFR if Non African American --  Glucose, Serum 471  Potassium, Serum 3.8  Protein Total, Serum --  Sodium, Serum 139      11-12-23 @ 08:31  Nessa Aminotransferase(ALT/SGPT)14  Albumin, Serum 3.9  Alkaline Phosphatase, Serum 242  Anion Gap, Serum Unable to Calculate  Aspartate Aminotransferase (AST/SGOT)21  Bilirubin Total, Serum 0.3  Blood Urea Nitrogen, Serum 28.8  Calcium,Total Serum 7.9  Carbon Dioxide, Serum <6.0  Chloride, Serum 100  Creatinine, Serum 1.70  eGFR if  --  eGFR if Non African American --  Glucose, Serum 777  Potassium, Serum 4.3  Protein Total, Serum 6.4  Sodium, Serum 136      11-12-23 @ 07:55  Nessa Aminotransferase(ALT/SGPT)14  Albumin, Serum 4.0  Alkaline Phosphatase, Serum 244  Anion Gap, Serum Unable to Calculate  Aspartate Aminotransferase (AST/SGOT)22  Bilirubin Total, Serum 0.3  Blood Urea Nitrogen, Serum 30.0  Calcium,Total Serum 8.0  Carbon Dioxide, Serum <6.0  Chloride, Serum 97  Creatinine, Serum 1.62  eGFR if  --  eGFR if Non African American --  Glucose, Serum 794  Potassium, Serum 4.4  Protein Total, Serum 6.8  Sodium, Serum 136    PT/INR      Amylase/Lipase      RADIOLOGY & ADDITIONAL TESTS:    Imaging Personally Reviewed:  [ ] YES  [ ] NO      < from: CT Chest No Cont (11.12.23 @ 12:02) >  IMPRESSION:  Nonspecific bilateral lower lobe peripheral patchy groundglass opacities,   compatible with pneumonia, including atypical or viral pneumonia.    Bladder decompressed with indwelling Sebastian balloon catheter. Nonspecific   circumferential bladder wall thickening which may be secondary to   underdistention versus cystitis. Correlate with urinalysis.    < end of copied text > CARMEN JOYCE  32y  Male      Patient is a 32y old  Male who presents with a chief complaint of CC: DKA (13 Nov 2023 11:12)    32y old M PMHx IDDM, HTN, ETOH abuse admitted after been found unresponsive by his wife. Initially blood work with WBC 33, serum Co2 < 6, Scr 1.70 (baseline 0.4), VBG pH < 6.942, admitted to the ICU for insulin gtt. s/p insulin drip with improvement in AG and glucose, pt started on diet and started on Lantus BID + premeal. Pt also s/po CT C/A/P with concern for PNA, ?viral vs cystitis and started on empiric abx Doxy and Zosyn given initial leukocytosis. Now WBC better. Course further c/b SYED on IVF. Now stable for downgrade to medicine.     At the time of my examination, pt still feeling nauseous and only ate very little of this lunch.   ROS negative for fever, chill, SOB, /d/c nor urinary symptoms.      PAST MEDICAL/SURGICAL HISTORY  PAST MEDICAL & SURGICAL HISTORY:  Alcohol abuse  Pancreatitis  HTN (hypertension)  Diabetes  Atrial fibrillation  Non-ischemic cardiomyopathy  No significant past surgical history      REVIEW OF SYSTEMS:  See above    T(C): 37.4 (11-13-23 @ 13:00), Max: 37.8 (11-12-23 @ 22:00)  HR: 99 (11-13-23 @ 13:00) (91 - 111)  BP: 114/86 (11-13-23 @ 13:00) (92/65 - 156/90)  RR: 23 (11-13-23 @ 13:00) (11 - 29)  SpO2: 100% (11-13-23 @ 13:00) (98% - 100%)  Wt(kg): --Vital Signs Last 24 Hrs  T(C): 37.4 (13 Nov 2023 13:00), Max: 37.8 (12 Nov 2023 22:00)  T(F): 99.3 (13 Nov 2023 13:00), Max: 100 (12 Nov 2023 22:00)  HR: 99 (13 Nov 2023 13:00) (91 - 111)  BP: 114/86 (13 Nov 2023 13:00) (92/65 - 156/90)  BP(mean): 95 (13 Nov 2023 13:00) (75 - 106)  RR: 23 (13 Nov 2023 13:00) (11 - 29)  SpO2: 100% (13 Nov 2023 13:00) (98% - 100%)    Parameters below as of 13 Nov 2023 12:00  Patient On (Oxygen Delivery Method): room air    PHYSICAL EXAM:  GENERAL: NAD, well-groomed  NECK: Supple, No JVD, Normal thyroid  CHEST/LUNG: Clear to percussion bilaterally; No LE edema  HEART: Regular rate and rhythm; No murmurs, rubs, or gallops  ABDOMEN: Soft, Nontender, Nondistended; Bowel sounds present  LYMPH: No lymphadenopathy noted  SKIN: No rashes or lesions    Consultant(s) Notes Reviewed:  [x ] YES  [ ] NO  Care Discussed with Consultants/Other Providers [ x] YES  [ ] NO    LABS:  CBC   11-13-23 @ 04:55  Hematcorit 26.4  Hemoglobin 9.6  Mean Cell Hemoglobin 30.1  Platelet Count-Automated 169  RBC Count 3.19  Red Cell Distrib Width 13.5  Wbc Count 8.11      BMP  11-13-23 @ 09:15  Anion Gap. Serum 11  Blood Urea Nitrogen,Serm 33.4  Calcium, Total Serum 8.0  Carbon Dioxide, Serum 18.0  Chloride, Serum 115  Creatinine, Serum 2.72  eGFR in  --  eGFR in Non Afican American --  Gloucose, serum 120  Potassium, Serum 3.8  Sodium, Serum 144      11-13-23 @ 03:03  Anion Gap. Serum 14  Blood Urea Nitrogen,Serm 32.7  Calcium, Total Serum 7.8  Carbon Dioxide, Serum 17.0  Chloride, Serum 112  Creatinine, Serum 2.46  eGFR in  --  eGFR in Non Afican American --  Gloucose, serum 250  Potassium, Serum 3.4  Sodium, Serum 143      11-12-23 @ 21:04  Anion Gap. Serum 24  Blood Urea Nitrogen,Serm 33.2  Calcium, Total Serum 7.9  Carbon Dioxide, Serum 9.0  Chloride, Serum 104  Creatinine, Serum 2.24  eGFR in  --  eGFR in Non Afican American --  Gloucose, serum 462  Potassium, Serum 4.6  Sodium, Serum 137      11-12-23 @ 17:01  Anion Gap. Serum 23  Blood Urea Nitrogen,Serm 32.4  Calcium, Total Serum 8.0  Carbon Dioxide, Serum 10.0  Chloride, Serum 104  Creatinine, Serum 1.88  eGFR in  --  eGFR in Non Afican American --  Gloucose, serum 507  Potassium, Serum 3.1  Sodium, Serum 137    11-12-23 @ 13:45  Anion Gap. Serum Unable to Calculate  Blood Urea Nitrogen,Serm 32.3  Calcium, Total Serum 8.1  Carbon Dioxide, Serum <6.0  Chloride, Serum 107  Creatinine, Serum 1.71  eGFR in  --  eGFR in Non Afican American --  Gloucose, serum 471  Potassium, Serum 3.8  Sodium, Serum 139    11-12-23 @ 08:31  Anion Gap. Serum Unable to Calculate  Blood Urea Nitrogen,Serm 28.8  Calcium, Total Serum 7.9  Carbon Dioxide, Serum <6.0  Chloride, Serum 100  Creatinine, Serum 1.70  eGFR in  --  eGFR in Non Afican American --  Gloucose, serum 777  Potassium, Serum 4.3  Sodium, Serum 136    11-12-23 @ 07:55  Anion Gap. Serum Unable to Calculate  Blood Urea Nitrogen,Serm 30.0  Calcium, Total Serum 8.0  Carbon Dioxide, Serum <6.0  Chloride, Serum 97  Creatinine, Serum 1.62  eGFR in  --  eGFR in Non Afican American --  Gloucose, serum 794  Potassium, Serum 4.4  Sodium, Serum 136    CMP  11-13-23 @ 09:15  Nessa Aminotransferase(ALT/SGPT)--  Albumin, Serum --  Alkaline Phosphatase, Serum --  Anion Gap, Serum 11  Aspartate Aminotransferase (AST/SGOT)--  Bilirubin Total, Serum --  Blood Urea Nitrogen, Serum 33.4  Calcium,Total Serum 8.0  Carbon Dioxide, Serum 18.0  Chloride, Serum 115  Creatinine, Serum 2.72  eGFR if  --  eGFR if Non African American --  Glucose, Serum 120  Potassium, Serum 3.8  Protein Total, Serum --  Sodium, Serum 144      11-13-23 @ 03:03  Nessa Aminotransferase(ALT/SGPT)10  Albumin, Serum 2.7  Alkaline Phosphatase, Serum 116  Anion Gap, Serum 14  Aspartate Aminotransferase (AST/SGOT)15  Bilirubin Total, Serum 0.4  Blood Urea Nitrogen, Serum 32.7  Calcium,Total Serum 7.8  Carbon Dioxide, Serum 17.0  Chloride, Serum 112  Creatinine, Serum 2.46  eGFR if  --  eGFR if Non African American --  Glucose, Serum 250  Potassium, Serum 3.4  Protein Total, Serum 4.8  Sodium, Serum 143      11-12-23 @ 21:04  Nessa Aminotransferase(ALT/SGPT)--  Albumin, Serum --  Alkaline Phosphatase, Serum --  Anion Gap, Serum 24  Aspartate Aminotransferase (AST/SGOT)--  Bilirubin Total, Serum --  Blood Urea Nitrogen, Serum 33.2  Calcium,Total Serum 7.9  Carbon Dioxide, Serum 9.0  Chloride, Serum 104  Creatinine, Serum 2.24  eGFR if  --  eGFR if Non African American --  Glucose, Serum 462  Potassium, Serum 4.6  Protein Total, Serum --  Sodium, Serum 137      11-12-23 @ 17:01  Nessa Aminotransferase(ALT/SGPT)--  Albumin, Serum --  Alkaline Phosphatase, Serum --  Anion Gap, Serum 23  Aspartate Aminotransferase (AST/SGOT)--  Bilirubin Total, Serum --  Blood Urea Nitrogen, Serum 32.4  Calcium,Total Serum 8.0  Carbon Dioxide, Serum 10.0  Chloride, Serum 104  Creatinine, Serum 1.88  eGFR if  --  eGFR if Non African American --  Glucose, Serum 507  Potassium, Serum 3.1  Protein Total, Serum --  Sodium, Serum 137      11-12-23 @ 13:45  Nessa Aminotransferase(ALT/SGPT)--  Albumin, Serum --  Alkaline Phosphatase, Serum --  Anion Gap, Serum Unable to Calculate  Aspartate Aminotransferase (AST/SGOT)--  Bilirubin Total, Serum --  Blood Urea Nitrogen, Serum 32.3  Calcium,Total Serum 8.1  Carbon Dioxide, Serum <6.0  Chloride, Serum 107  Creatinine, Serum 1.71  eGFR if  --  eGFR if Non African American --  Glucose, Serum 471  Potassium, Serum 3.8  Protein Total, Serum --  Sodium, Serum 139      11-12-23 @ 08:31  Nessa Aminotransferase(ALT/SGPT)14  Albumin, Serum 3.9  Alkaline Phosphatase, Serum 242  Anion Gap, Serum Unable to Calculate  Aspartate Aminotransferase (AST/SGOT)21  Bilirubin Total, Serum 0.3  Blood Urea Nitrogen, Serum 28.8  Calcium,Total Serum 7.9  Carbon Dioxide, Serum <6.0  Chloride, Serum 100  Creatinine, Serum 1.70  eGFR if  --  eGFR if Non African American --  Glucose, Serum 777  Potassium, Serum 4.3  Protein Total, Serum 6.4  Sodium, Serum 136      11-12-23 @ 07:55  Nessa Aminotransferase(ALT/SGPT)14  Albumin, Serum 4.0  Alkaline Phosphatase, Serum 244  Anion Gap, Serum Unable to Calculate  Aspartate Aminotransferase (AST/SGOT)22  Bilirubin Total, Serum 0.3  Blood Urea Nitrogen, Serum 30.0  Calcium,Total Serum 8.0  Carbon Dioxide, Serum <6.0  Chloride, Serum 97  Creatinine, Serum 1.62  eGFR if  --  eGFR if Non African American --  Glucose, Serum 794  Potassium, Serum 4.4  Protein Total, Serum 6.8  Sodium, Serum 136    PT/INR      Amylase/Lipase      RADIOLOGY & ADDITIONAL TESTS:    Imaging Personally Reviewed:  [ ] YES  [ ] NO      < from: CT Chest No Cont (11.12.23 @ 12:02) >  IMPRESSION:  Nonspecific bilateral lower lobe peripheral patchy groundglass opacities,   compatible with pneumonia, including atypical or viral pneumonia.    Bladder decompressed with indwelling Sebastian balloon catheter. Nonspecific   circumferential bladder wall thickening which may be secondary to   underdistention versus cystitis. Correlate with urinalysis.    < end of copied text > CARMEN JOYCE  32y  Male      Patient is a 32y old  Male who presents with a chief complaint of CC: DKA (13 Nov 2023 11:12)    32y old M PMHx IDDM, HTN, ETOH abuse admitted after been found unresponsive by his wife. Initially blood work with WBC 33, serum Co2 < 6, Scr 1.70 (baseline 0.4), VBG pH < 6.942, admitted to the ICU for insulin gtt. S/p insulin drip with improvement in AG and glucose, pt started on diet and started on Lantus BID + premeal. Pt also noted on CT C/A/P with concern for PNA, ?viral vs cystitis and started on empiric abx Doxy and Zosyn given initial leukocytosis. Now WBC better. Course further c/b SYED and continued on IVF. Now stable for downgrade to medicine.     At the time of my examination, pt still feeling nauseous and only ate very little of his lunch. Otherwise ROS negative for fever, chill, SOB, d/c nor urinary symptoms.      PAST MEDICAL/SURGICAL HISTORY  PAST MEDICAL & SURGICAL HISTORY:  Alcohol abuse  Pancreatitis  HTN (hypertension)  Diabetes  Atrial fibrillation  Non-ischemic cardiomyopathy  No significant past surgical history    SHx: Last ETOH use months ago    REVIEW OF SYSTEMS:  See above    T(C): 37.4 (11-13-23 @ 13:00), Max: 37.8 (11-12-23 @ 22:00)  HR: 99 (11-13-23 @ 13:00) (91 - 111)  BP: 114/86 (11-13-23 @ 13:00) (92/65 - 156/90)  RR: 23 (11-13-23 @ 13:00) (11 - 29)  SpO2: 100% (11-13-23 @ 13:00) (98% - 100%)  Wt(kg): --Vital Signs Last 24 Hrs  T(C): 37.4 (13 Nov 2023 13:00), Max: 37.8 (12 Nov 2023 22:00)  T(F): 99.3 (13 Nov 2023 13:00), Max: 100 (12 Nov 2023 22:00)  HR: 99 (13 Nov 2023 13:00) (91 - 111)  BP: 114/86 (13 Nov 2023 13:00) (92/65 - 156/90)  BP(mean): 95 (13 Nov 2023 13:00) (75 - 106)  RR: 23 (13 Nov 2023 13:00) (11 - 29)  SpO2: 100% (13 Nov 2023 13:00) (98% - 100%)    Parameters below as of 13 Nov 2023 12:00  Patient On (Oxygen Delivery Method): room air    PHYSICAL EXAM:  GENERAL: NAD, well-groomed, dry mucus membrane   NECK: Supple, No JVD, Normal thyroid  CHEST/LUNG: Clear to percussion bilaterally; No LE edema  HEART: Regular rate and rhythm; No murmurs, rubs, or gallops  ABDOMEN: Soft, Nontender, Nondistended; Bowel sounds present  SKIN: No rashes or lesions    Consultant(s) Notes Reviewed:  [x ] YES  [ ] NO  Care Discussed with Consultants/Other Providers [ x] YES  [ ] NO    LABS:  CBC   11-13-23 @ 04:55  Hematcorit 26.4  Hemoglobin 9.6  Mean Cell Hemoglobin 30.1  Platelet Count-Automated 169  RBC Count 3.19  Red Cell Distrib Width 13.5  Wbc Count 8.11      BMP  11-13-23 @ 09:15  Anion Gap. Serum 11  Blood Urea Nitrogen,Serm 33.4  Calcium, Total Serum 8.0  Carbon Dioxide, Serum 18.0  Chloride, Serum 115  Creatinine, Serum 2.72  eGFR in  --  eGFR in Non Afican American --  Gloucose, serum 120  Potassium, Serum 3.8  Sodium, Serum 144      11-13-23 @ 03:03  Anion Gap. Serum 14  Blood Urea Nitrogen,Serm 32.7  Calcium, Total Serum 7.8  Carbon Dioxide, Serum 17.0  Chloride, Serum 112  Creatinine, Serum 2.46  eGFR in  --  eGFR in Non Afican American --  Gloucose, serum 250  Potassium, Serum 3.4  Sodium, Serum 143      11-12-23 @ 21:04  Anion Gap. Serum 24  Blood Urea Nitrogen,Serm 33.2  Calcium, Total Serum 7.9  Carbon Dioxide, Serum 9.0  Chloride, Serum 104  Creatinine, Serum 2.24  eGFR in  --  eGFR in Non Afican American --  Gloucose, serum 462  Potassium, Serum 4.6  Sodium, Serum 137      11-12-23 @ 17:01  Anion Gap. Serum 23  Blood Urea Nitrogen,Serm 32.4  Calcium, Total Serum 8.0  Carbon Dioxide, Serum 10.0  Chloride, Serum 104  Creatinine, Serum 1.88  eGFR in  --  eGFR in Non Afican American --  Gloucose, serum 507  Potassium, Serum 3.1  Sodium, Serum 137    11-12-23 @ 13:45  Anion Gap. Serum Unable to Calculate  Blood Urea Nitrogen,Serm 32.3  Calcium, Total Serum 8.1  Carbon Dioxide, Serum <6.0  Chloride, Serum 107  Creatinine, Serum 1.71  eGFR in  --  eGFR in Non Afican American --  Gloucose, serum 471  Potassium, Serum 3.8  Sodium, Serum 139    11-12-23 @ 08:31  Anion Gap. Serum Unable to Calculate  Blood Urea Nitrogen,Serm 28.8  Calcium, Total Serum 7.9  Carbon Dioxide, Serum <6.0  Chloride, Serum 100  Creatinine, Serum 1.70  eGFR in  --  eGFR in Non Afican American --  Gloucose, serum 777  Potassium, Serum 4.3  Sodium, Serum 136    11-12-23 @ 07:55  Anion Gap. Serum Unable to Calculate  Blood Urea Nitrogen,Serm 30.0  Calcium, Total Serum 8.0  Carbon Dioxide, Serum <6.0  Chloride, Serum 97  Creatinine, Serum 1.62  eGFR in  --  eGFR in Non Afican American --  Gloucose, serum 794  Potassium, Serum 4.4  Sodium, Serum 136    CMP  11-13-23 @ 09:15  Nessa Aminotransferase(ALT/SGPT)--  Albumin, Serum --  Alkaline Phosphatase, Serum --  Anion Gap, Serum 11  Aspartate Aminotransferase (AST/SGOT)--  Bilirubin Total, Serum --  Blood Urea Nitrogen, Serum 33.4  Calcium,Total Serum 8.0  Carbon Dioxide, Serum 18.0  Chloride, Serum 115  Creatinine, Serum 2.72  eGFR if  --  eGFR if Non African American --  Glucose, Serum 120  Potassium, Serum 3.8  Protein Total, Serum --  Sodium, Serum 144      11-13-23 @ 03:03  Nessa Aminotransferase(ALT/SGPT)10  Albumin, Serum 2.7  Alkaline Phosphatase, Serum 116  Anion Gap, Serum 14  Aspartate Aminotransferase (AST/SGOT)15  Bilirubin Total, Serum 0.4  Blood Urea Nitrogen, Serum 32.7  Calcium,Total Serum 7.8  Carbon Dioxide, Serum 17.0  Chloride, Serum 112  Creatinine, Serum 2.46  eGFR if  --  eGFR if Non African American --  Glucose, Serum 250  Potassium, Serum 3.4  Protein Total, Serum 4.8  Sodium, Serum 143      11-12-23 @ 21:04  Nessa Aminotransferase(ALT/SGPT)--  Albumin, Serum --  Alkaline Phosphatase, Serum --  Anion Gap, Serum 24  Aspartate Aminotransferase (AST/SGOT)--  Bilirubin Total, Serum --  Blood Urea Nitrogen, Serum 33.2  Calcium,Total Serum 7.9  Carbon Dioxide, Serum 9.0  Chloride, Serum 104  Creatinine, Serum 2.24  eGFR if  --  eGFR if Non African American --  Glucose, Serum 462  Potassium, Serum 4.6  Protein Total, Serum --  Sodium, Serum 137      11-12-23 @ 17:01  Nessa Aminotransferase(ALT/SGPT)--  Albumin, Serum --  Alkaline Phosphatase, Serum --  Anion Gap, Serum 23  Aspartate Aminotransferase (AST/SGOT)--  Bilirubin Total, Serum --  Blood Urea Nitrogen, Serum 32.4  Calcium,Total Serum 8.0  Carbon Dioxide, Serum 10.0  Chloride, Serum 104  Creatinine, Serum 1.88  eGFR if  --  eGFR if Non African American --  Glucose, Serum 507  Potassium, Serum 3.1  Protein Total, Serum --  Sodium, Serum 137      11-12-23 @ 13:45  Nessa Aminotransferase(ALT/SGPT)--  Albumin, Serum --  Alkaline Phosphatase, Serum --  Anion Gap, Serum Unable to Calculate  Aspartate Aminotransferase (AST/SGOT)--  Bilirubin Total, Serum --  Blood Urea Nitrogen, Serum 32.3  Calcium,Total Serum 8.1  Carbon Dioxide, Serum <6.0  Chloride, Serum 107  Creatinine, Serum 1.71  eGFR if  --  eGFR if Non African American --  Glucose, Serum 471  Potassium, Serum 3.8  Protein Total, Serum --  Sodium, Serum 139      11-12-23 @ 08:31  Nessa Aminotransferase(ALT/SGPT)14  Albumin, Serum 3.9  Alkaline Phosphatase, Serum 242  Anion Gap, Serum Unable to Calculate  Aspartate Aminotransferase (AST/SGOT)21  Bilirubin Total, Serum 0.3  Blood Urea Nitrogen, Serum 28.8  Calcium,Total Serum 7.9  Carbon Dioxide, Serum <6.0  Chloride, Serum 100  Creatinine, Serum 1.70  eGFR if  --  eGFR if Non African American --  Glucose, Serum 777  Potassium, Serum 4.3  Protein Total, Serum 6.4  Sodium, Serum 136      11-12-23 @ 07:55  Nessa Aminotransferase(ALT/SGPT)14  Albumin, Serum 4.0  Alkaline Phosphatase, Serum 244  Anion Gap, Serum Unable to Calculate  Aspartate Aminotransferase (AST/SGOT)22  Bilirubin Total, Serum 0.3  Blood Urea Nitrogen, Serum 30.0  Calcium,Total Serum 8.0  Carbon Dioxide, Serum <6.0  Chloride, Serum 97  Creatinine, Serum 1.62  eGFR if  --  eGFR if Non African American --  Glucose, Serum 794  Potassium, Serum 4.4  Protein Total, Serum 6.8  Sodium, Serum 136    PT/INR      Amylase/Lipase      RADIOLOGY & ADDITIONAL TESTS:    Imaging Personally Reviewed:  [ ] YES  [ ] NO      < from: CT Chest No Cont (11.12.23 @ 12:02) >  IMPRESSION:  Nonspecific bilateral lower lobe peripheral patchy groundglass opacities,   compatible with pneumonia, including atypical or viral pneumonia.    Bladder decompressed with indwelling Sebastian balloon catheter. Nonspecific   circumferential bladder wall thickening which may be secondary to   underdistention versus cystitis. Correlate with urinalysis.    < end of copied text >

## 2023-11-14 LAB
A1C WITH ESTIMATED AVERAGE GLUCOSE RESULT: 15.5 % — HIGH (ref 4–5.6)
A1C WITH ESTIMATED AVERAGE GLUCOSE RESULT: 15.5 % — HIGH (ref 4–5.6)
ANION GAP SERPL CALC-SCNC: 12 MMOL/L — SIGNIFICANT CHANGE UP (ref 5–17)
ANION GAP SERPL CALC-SCNC: 12 MMOL/L — SIGNIFICANT CHANGE UP (ref 5–17)
ANION GAP SERPL CALC-SCNC: 15 MMOL/L — SIGNIFICANT CHANGE UP (ref 5–17)
ANION GAP SERPL CALC-SCNC: 15 MMOL/L — SIGNIFICANT CHANGE UP (ref 5–17)
BASOPHILS # BLD AUTO: 0.02 K/UL — SIGNIFICANT CHANGE UP (ref 0–0.2)
BASOPHILS # BLD AUTO: 0.02 K/UL — SIGNIFICANT CHANGE UP (ref 0–0.2)
BASOPHILS NFR BLD AUTO: 0.3 % — SIGNIFICANT CHANGE UP (ref 0–2)
BASOPHILS NFR BLD AUTO: 0.3 % — SIGNIFICANT CHANGE UP (ref 0–2)
BUN SERPL-MCNC: 27.8 MG/DL — HIGH (ref 8–20)
BUN SERPL-MCNC: 27.8 MG/DL — HIGH (ref 8–20)
BUN SERPL-MCNC: 29.7 MG/DL — HIGH (ref 8–20)
BUN SERPL-MCNC: 29.7 MG/DL — HIGH (ref 8–20)
CALCIUM SERPL-MCNC: 7.7 MG/DL — LOW (ref 8.4–10.5)
CALCIUM SERPL-MCNC: 7.7 MG/DL — LOW (ref 8.4–10.5)
CALCIUM SERPL-MCNC: 7.9 MG/DL — LOW (ref 8.4–10.5)
CALCIUM SERPL-MCNC: 7.9 MG/DL — LOW (ref 8.4–10.5)
CHLORIDE SERPL-SCNC: 110 MMOL/L — HIGH (ref 96–108)
CHLORIDE SERPL-SCNC: 110 MMOL/L — HIGH (ref 96–108)
CHLORIDE SERPL-SCNC: 112 MMOL/L — HIGH (ref 96–108)
CHLORIDE SERPL-SCNC: 112 MMOL/L — HIGH (ref 96–108)
CO2 SERPL-SCNC: 16 MMOL/L — LOW (ref 22–29)
CO2 SERPL-SCNC: 16 MMOL/L — LOW (ref 22–29)
CO2 SERPL-SCNC: 18 MMOL/L — LOW (ref 22–29)
CO2 SERPL-SCNC: 18 MMOL/L — LOW (ref 22–29)
CREAT SERPL-MCNC: 3.36 MG/DL — HIGH (ref 0.5–1.3)
CREAT SERPL-MCNC: 3.36 MG/DL — HIGH (ref 0.5–1.3)
CREAT SERPL-MCNC: 3.38 MG/DL — HIGH (ref 0.5–1.3)
CREAT SERPL-MCNC: 3.38 MG/DL — HIGH (ref 0.5–1.3)
EGFR: 24 ML/MIN/1.73M2 — LOW
EOSINOPHIL # BLD AUTO: 0.02 K/UL — SIGNIFICANT CHANGE UP (ref 0–0.5)
EOSINOPHIL # BLD AUTO: 0.02 K/UL — SIGNIFICANT CHANGE UP (ref 0–0.5)
EOSINOPHIL NFR BLD AUTO: 0.3 % — SIGNIFICANT CHANGE UP (ref 0–6)
EOSINOPHIL NFR BLD AUTO: 0.3 % — SIGNIFICANT CHANGE UP (ref 0–6)
ESTIMATED AVERAGE GLUCOSE: 398 MG/DL — HIGH (ref 68–114)
ESTIMATED AVERAGE GLUCOSE: 398 MG/DL — HIGH (ref 68–114)
GLUCOSE BLDC GLUCOMTR-MCNC: 158 MG/DL — HIGH (ref 70–99)
GLUCOSE BLDC GLUCOMTR-MCNC: 158 MG/DL — HIGH (ref 70–99)
GLUCOSE BLDC GLUCOMTR-MCNC: 257 MG/DL — HIGH (ref 70–99)
GLUCOSE BLDC GLUCOMTR-MCNC: 257 MG/DL — HIGH (ref 70–99)
GLUCOSE BLDC GLUCOMTR-MCNC: 262 MG/DL — HIGH (ref 70–99)
GLUCOSE BLDC GLUCOMTR-MCNC: 262 MG/DL — HIGH (ref 70–99)
GLUCOSE BLDC GLUCOMTR-MCNC: 73 MG/DL — SIGNIFICANT CHANGE UP (ref 70–99)
GLUCOSE BLDC GLUCOMTR-MCNC: 73 MG/DL — SIGNIFICANT CHANGE UP (ref 70–99)
GLUCOSE SERPL-MCNC: 152 MG/DL — HIGH (ref 70–99)
GLUCOSE SERPL-MCNC: 152 MG/DL — HIGH (ref 70–99)
GLUCOSE SERPL-MCNC: 333 MG/DL — HIGH (ref 70–99)
GLUCOSE SERPL-MCNC: 333 MG/DL — HIGH (ref 70–99)
HCT VFR BLD CALC: 25.2 % — LOW (ref 39–50)
HCT VFR BLD CALC: 25.2 % — LOW (ref 39–50)
HGB BLD-MCNC: 9.1 G/DL — LOW (ref 13–17)
HGB BLD-MCNC: 9.1 G/DL — LOW (ref 13–17)
IMM GRANULOCYTES NFR BLD AUTO: 0.3 % — SIGNIFICANT CHANGE UP (ref 0–0.9)
IMM GRANULOCYTES NFR BLD AUTO: 0.3 % — SIGNIFICANT CHANGE UP (ref 0–0.9)
LYMPHOCYTES # BLD AUTO: 1.32 K/UL — SIGNIFICANT CHANGE UP (ref 1–3.3)
LYMPHOCYTES # BLD AUTO: 1.32 K/UL — SIGNIFICANT CHANGE UP (ref 1–3.3)
LYMPHOCYTES # BLD AUTO: 22.4 % — SIGNIFICANT CHANGE UP (ref 13–44)
LYMPHOCYTES # BLD AUTO: 22.4 % — SIGNIFICANT CHANGE UP (ref 13–44)
MCHC RBC-ENTMCNC: 30.7 PG — SIGNIFICANT CHANGE UP (ref 27–34)
MCHC RBC-ENTMCNC: 30.7 PG — SIGNIFICANT CHANGE UP (ref 27–34)
MCHC RBC-ENTMCNC: 36.1 GM/DL — HIGH (ref 32–36)
MCHC RBC-ENTMCNC: 36.1 GM/DL — HIGH (ref 32–36)
MCV RBC AUTO: 85.1 FL — SIGNIFICANT CHANGE UP (ref 80–100)
MCV RBC AUTO: 85.1 FL — SIGNIFICANT CHANGE UP (ref 80–100)
MONOCYTES # BLD AUTO: 0.82 K/UL — SIGNIFICANT CHANGE UP (ref 0–0.9)
MONOCYTES # BLD AUTO: 0.82 K/UL — SIGNIFICANT CHANGE UP (ref 0–0.9)
MONOCYTES NFR BLD AUTO: 13.9 % — SIGNIFICANT CHANGE UP (ref 2–14)
MONOCYTES NFR BLD AUTO: 13.9 % — SIGNIFICANT CHANGE UP (ref 2–14)
NEUTROPHILS # BLD AUTO: 3.7 K/UL — SIGNIFICANT CHANGE UP (ref 1.8–7.4)
NEUTROPHILS # BLD AUTO: 3.7 K/UL — SIGNIFICANT CHANGE UP (ref 1.8–7.4)
NEUTROPHILS NFR BLD AUTO: 62.8 % — SIGNIFICANT CHANGE UP (ref 43–77)
NEUTROPHILS NFR BLD AUTO: 62.8 % — SIGNIFICANT CHANGE UP (ref 43–77)
PLATELET # BLD AUTO: 114 K/UL — LOW (ref 150–400)
PLATELET # BLD AUTO: 114 K/UL — LOW (ref 150–400)
POTASSIUM SERPL-MCNC: 3.1 MMOL/L — LOW (ref 3.5–5.3)
POTASSIUM SERPL-MCNC: 3.1 MMOL/L — LOW (ref 3.5–5.3)
POTASSIUM SERPL-MCNC: 3.7 MMOL/L — SIGNIFICANT CHANGE UP (ref 3.5–5.3)
POTASSIUM SERPL-MCNC: 3.7 MMOL/L — SIGNIFICANT CHANGE UP (ref 3.5–5.3)
POTASSIUM SERPL-SCNC: 3.1 MMOL/L — LOW (ref 3.5–5.3)
POTASSIUM SERPL-SCNC: 3.1 MMOL/L — LOW (ref 3.5–5.3)
POTASSIUM SERPL-SCNC: 3.7 MMOL/L — SIGNIFICANT CHANGE UP (ref 3.5–5.3)
POTASSIUM SERPL-SCNC: 3.7 MMOL/L — SIGNIFICANT CHANGE UP (ref 3.5–5.3)
RBC # BLD: 2.96 M/UL — LOW (ref 4.2–5.8)
RBC # BLD: 2.96 M/UL — LOW (ref 4.2–5.8)
RBC # FLD: 14.9 % — HIGH (ref 10.3–14.5)
RBC # FLD: 14.9 % — HIGH (ref 10.3–14.5)
SODIUM SERPL-SCNC: 140 MMOL/L — SIGNIFICANT CHANGE UP (ref 135–145)
SODIUM SERPL-SCNC: 140 MMOL/L — SIGNIFICANT CHANGE UP (ref 135–145)
SODIUM SERPL-SCNC: 142 MMOL/L — SIGNIFICANT CHANGE UP (ref 135–145)
SODIUM SERPL-SCNC: 142 MMOL/L — SIGNIFICANT CHANGE UP (ref 135–145)
WBC # BLD: 5.9 K/UL — SIGNIFICANT CHANGE UP (ref 3.8–10.5)
WBC # BLD: 5.9 K/UL — SIGNIFICANT CHANGE UP (ref 3.8–10.5)
WBC # FLD AUTO: 5.9 K/UL — SIGNIFICANT CHANGE UP (ref 3.8–10.5)
WBC # FLD AUTO: 5.9 K/UL — SIGNIFICANT CHANGE UP (ref 3.8–10.5)

## 2023-11-14 PROCEDURE — 76770 US EXAM ABDO BACK WALL COMP: CPT | Mod: 26

## 2023-11-14 PROCEDURE — 99233 SBSQ HOSP IP/OBS HIGH 50: CPT

## 2023-11-14 RX ORDER — INSULIN LISPRO 100/ML
10 VIAL (ML) SUBCUTANEOUS
Refills: 0 | Status: DISCONTINUED | OUTPATIENT
Start: 2023-11-14 | End: 2023-11-15

## 2023-11-14 RX ORDER — INSULIN LISPRO 100/ML
VIAL (ML) SUBCUTANEOUS
Refills: 0 | Status: DISCONTINUED | OUTPATIENT
Start: 2023-11-14 | End: 2023-11-18

## 2023-11-14 RX ORDER — INSULIN GLARGINE 100 [IU]/ML
22 INJECTION, SOLUTION SUBCUTANEOUS AT BEDTIME
Refills: 0 | Status: DISCONTINUED | OUTPATIENT
Start: 2023-11-14 | End: 2023-11-14

## 2023-11-14 RX ORDER — INSULIN LISPRO 100/ML
17 VIAL (ML) SUBCUTANEOUS
Refills: 0 | Status: DISCONTINUED | OUTPATIENT
Start: 2023-11-14 | End: 2023-11-14

## 2023-11-14 RX ORDER — SODIUM BICARBONATE 1 MEQ/ML
0.14 SYRINGE (ML) INTRAVENOUS
Qty: 75 | Refills: 0 | Status: DISCONTINUED | OUTPATIENT
Start: 2023-11-14 | End: 2023-11-15

## 2023-11-14 RX ORDER — INSULIN GLARGINE 100 [IU]/ML
20 INJECTION, SOLUTION SUBCUTANEOUS AT BEDTIME
Refills: 0 | Status: DISCONTINUED | OUTPATIENT
Start: 2023-11-14 | End: 2023-11-15

## 2023-11-14 RX ORDER — POTASSIUM CHLORIDE 20 MEQ
20 PACKET (EA) ORAL THREE TIMES A DAY
Refills: 0 | Status: COMPLETED | OUTPATIENT
Start: 2023-11-14 | End: 2023-11-15

## 2023-11-14 RX ORDER — INSULIN LISPRO 100/ML
1 VIAL (ML) SUBCUTANEOUS ONCE
Refills: 0 | Status: COMPLETED | OUTPATIENT
Start: 2023-11-14 | End: 2023-11-14

## 2023-11-14 RX ORDER — POTASSIUM CHLORIDE 20 MEQ
40 PACKET (EA) ORAL ONCE
Refills: 0 | Status: COMPLETED | OUTPATIENT
Start: 2023-11-14 | End: 2023-11-14

## 2023-11-14 RX ORDER — SODIUM CHLORIDE 9 MG/ML
1000 INJECTION, SOLUTION INTRAVENOUS
Refills: 0 | Status: DISCONTINUED | OUTPATIENT
Start: 2023-11-14 | End: 2023-11-14

## 2023-11-14 RX ORDER — INSULIN GLARGINE 100 [IU]/ML
20 INJECTION, SOLUTION SUBCUTANEOUS EVERY MORNING
Refills: 0 | Status: DISCONTINUED | OUTPATIENT
Start: 2023-11-15 | End: 2023-11-16

## 2023-11-14 RX ADMIN — Medication 6: at 09:17

## 2023-11-14 RX ADMIN — LEVETIRACETAM 1000 MILLIGRAM(S): 250 TABLET, FILM COATED ORAL at 19:04

## 2023-11-14 RX ADMIN — Medication 1: at 19:03

## 2023-11-14 RX ADMIN — Medication 10 UNIT(S): at 19:02

## 2023-11-14 RX ADMIN — PANTOPRAZOLE SODIUM 40 MILLIGRAM(S): 20 TABLET, DELAYED RELEASE ORAL at 13:37

## 2023-11-14 RX ADMIN — Medication 1 UNIT(S): at 21:25

## 2023-11-14 RX ADMIN — INSULIN GLARGINE 20 UNIT(S): 100 INJECTION, SOLUTION SUBCUTANEOUS at 09:18

## 2023-11-14 RX ADMIN — Medication 40 MILLIEQUIVALENT(S): at 13:37

## 2023-11-14 RX ADMIN — Medication 20 MILLIEQUIVALENT(S): at 21:25

## 2023-11-14 RX ADMIN — Medication 75 MEQ/KG/HR: at 16:10

## 2023-11-14 RX ADMIN — INSULIN GLARGINE 20 UNIT(S): 100 INJECTION, SOLUTION SUBCUTANEOUS at 21:25

## 2023-11-14 RX ADMIN — HEPARIN SODIUM 5000 UNIT(S): 5000 INJECTION INTRAVENOUS; SUBCUTANEOUS at 21:24

## 2023-11-14 RX ADMIN — HEPARIN SODIUM 5000 UNIT(S): 5000 INJECTION INTRAVENOUS; SUBCUTANEOUS at 13:37

## 2023-11-14 RX ADMIN — CEFTRIAXONE 1000 MILLIGRAM(S): 500 INJECTION, POWDER, FOR SOLUTION INTRAMUSCULAR; INTRAVENOUS at 19:04

## 2023-11-14 RX ADMIN — Medication 100 MILLIGRAM(S): at 13:36

## 2023-11-14 RX ADMIN — HEPARIN SODIUM 5000 UNIT(S): 5000 INJECTION INTRAVENOUS; SUBCUTANEOUS at 05:03

## 2023-11-14 RX ADMIN — LEVETIRACETAM 1000 MILLIGRAM(S): 250 TABLET, FILM COATED ORAL at 05:03

## 2023-11-14 RX ADMIN — Medication 1 TABLET(S): at 19:05

## 2023-11-14 RX ADMIN — CHLORHEXIDINE GLUCONATE 1 APPLICATION(S): 213 SOLUTION TOPICAL at 19:05

## 2023-11-14 RX ADMIN — Medication 1 MILLIGRAM(S): at 13:37

## 2023-11-14 RX ADMIN — Medication 15 UNIT(S): at 09:17

## 2023-11-14 NOTE — PROGRESS NOTE ADULT - ASSESSMENT
32M with PMHx DM with multiple admissions for recurrent DKA, ETOH abuse, Pancreatitis, Afib, HTN, Seizure disorder, brought to ED after being found unresponsive at home. Labs consistent with severe DKA. Consulted for diabetes management.    1. Poorly controlled DM, a1c 15.5% - s/p DKA secondary to noncompliance  - Increase lantus to 22 units qhs, continue lantus 20 units every morning  - Increase premeal admelog 17 units tid  - Hyperglycemic now but will likely need less insulin than currently requiring  - Spoke with patient in depth regarding complications of uncontrolled DM and the severity of his DKA. He has recently had mulitple admissions for DKA due to non compliance. Multiple outpt follow up appointments were scheduled and he did not show up for any of them. He seems to have very poor understanding of his diabetes and the long term effects. 32M with PMHx DM with multiple admissions for recurrent DKA, ETOH abuse, Pancreatitis, Afib, HTN, Seizure disorder, brought to ED after being found unresponsive at home. Labs consistent with severe DKA. Consulted for diabetes management.    1. Poorly controlled DM, a1c 15.5% - s/p DKA secondary to noncompliance  - Increase lantus to 22 units qhs, continue lantus 20 units every morning  - Increase premeal admelog 17 units tid  - Hyperglycemic now but will likely need less insulin than currently requiring  - Spoke with patient in depth regarding complications of uncontrolled DM and the severity of his DKA. He has recently had multiple admissions for DKA due to non compliance. Multiple outpt follow up appointments were scheduled and he did not show up for any of them. He seems to have very poor understanding of his diabetes and the long term effects. 32M with PMHx DM with multiple admissions for recurrent DKA, ETOH abuse, Pancreatitis, Afib, HTN, Seizure disorder, brought to ED after being found unresponsive at home. Labs consistent with severe DKA. Consulted for diabetes management.    1. Poorly controlled DM, a1c 15.5% - s/p DKA secondary to noncompliance  - FS 73 at lunch time, decrease premeal admelog to 10 units TID  - Change sliding scale to mild coverage  - Continue lantus 20 units bid  - Spoke with patient in depth regarding complications of uncontrolled DM and the severity of his DKA. He has recently had multiple admissions for DKA due to non compliance. Multiple outpt follow up appointments were scheduled and he did not show up for any of them. He seems to have very poor understanding of his diabetes and the long term effects.

## 2023-11-14 NOTE — DIETITIAN INITIAL EVALUATION ADULT - OTHER INFO
32y old M PMHx IDDM, HTN, ETOH abuse admitted after been found unresponsive by his wife. Initially blood work with WBC 33, serum Co2 < 6, Scr 1.70 (baseline 0.4), VBG pH < 6.942, admitted to the ICU for insulin gtt. S/p insulin drip with improvement in AG and glucose, pt started on diet and started on Lantus BID + premeal. Pt also noted on CT C/A/P with concern for PNA, ?viral vs cystitis and started on empiric abx Doxy and Zosyn given initial leukocytosis. Now WBC better. Course further c/b SYED and continued on IVF.

## 2023-11-14 NOTE — CONSULT NOTE ADULT - ASSESSMENT
A) ARF, Proteinuria, DKA, poorly  compliant with  DM  control.    P) IV  fluid, urine  studies, sono. Follow up labs.

## 2023-11-14 NOTE — DIETITIAN INITIAL EVALUATION ADULT - ADD RECOMMEND
Bennie shake TID to optimize po intake and provide an additional 220 kcal, 10g protein per serving   Continue MVI, thiamine, folic acid

## 2023-11-14 NOTE — DIETITIAN INITIAL EVALUATION ADULT - ORAL INTAKE PTA/DIET HISTORY
Spoke with pt who reports significant weight loss over last 6 mo. Known to be non compliant with diet. Pt eats food from home as per EMR. A1C 15.5 noted. Provided meal planning with plate model with instruction. Pt agreed to try Glucerna shake with meals.

## 2023-11-14 NOTE — PATIENT PROFILE ADULT - FUNCTIONAL ASSESSMENT - DAILY ACTIVITY SECTION LABEL
bilateral upper extremity ROM was WNL (within normal limits)/bilateral lower extremity was ROM was WNL (within normal limits)
.

## 2023-11-14 NOTE — PROGRESS NOTE ADULT - SUBJECTIVE AND OBJECTIVE BOX
INTERVAL EVENTS:  Follow up diabetes management    ROS: Denies abd pain, nausea, vomiting.     MEDICATIONS  (STANDING):  cefTRIAXone Injectable. 1000 milliGRAM(s) IV Push every 24 hours  chlorhexidine 2% Cloths 1 Application(s) Topical daily  dextrose 5%. 1000 milliLiter(s) (50 mL/Hr) IV Continuous <Continuous>  dextrose 5%. 1000 milliLiter(s) (100 mL/Hr) IV Continuous <Continuous>  dextrose 50% Injectable 50 milliLiter(s) IV Push every 15 minutes  dextrose 50% Injectable 25 Gram(s) IV Push once  dextrose 50% Injectable 25 Gram(s) IV Push once  folic acid Injectable 1 milliGRAM(s) IV Push daily  glucagon  Injectable 1 milliGRAM(s) IntraMuscular once  heparin   Injectable 5000 Unit(s) SubCutaneous every 8 hours  insulin glargine Injectable (LANTUS) 22 Unit(s) SubCutaneous at bedtime  insulin lispro (ADMELOG) corrective regimen sliding scale   SubCutaneous three times a day before meals  insulin lispro Injectable (ADMELOG) 17 Unit(s) SubCutaneous three times a day before meals  lactated ringers. 1000 milliLiter(s) (125 mL/Hr) IV Continuous <Continuous>  levETIRAcetam 1000 milliGRAM(s) Oral two times a day  multivitamin 1 Tablet(s) Oral daily  pantoprazole  Injectable 40 milliGRAM(s) IV Push daily  potassium chloride    Tablet ER 40 milliEquivalent(s) Oral once  sodium bicarbonate  Infusion 0.07 mEq/kG/Hr (75 mL/Hr) IV Continuous <Continuous>  thiamine Injectable 100 milliGRAM(s) IV Push daily    MEDICATIONS  (PRN):  dextrose Oral Gel 15 Gram(s) Oral once PRN Blood Glucose LESS THAN 70 milliGRAM(s)/deciliter  LORazepam   Injectable 2 milliGRAM(s) IV Push every 1 hour PRN CIWA-Ar score 8 or greater    Allergies  No Known Allergies  Allergy Status Unknown    Vital Signs Last 24 Hrs  T(C): 37.1 (14 Nov 2023 10:12), Max: 37.3 (13 Nov 2023 15:29)  T(F): 98.8 (14 Nov 2023 10:12), Max: 99.2 (13 Nov 2023 15:29)  HR: 81 (14 Nov 2023 10:12) (76 - 95)  BP: 116/82 (14 Nov 2023 10:12) (105/76 - 124/104)  BP(mean): 90 (13 Nov 2023 18:00) (90 - 112)  RR: 18 (14 Nov 2023 10:12) (11 - 22)  SpO2: 98% (14 Nov 2023 10:12) (94% - 100%)    Parameters below as of 14 Nov 2023 10:12  Patient On (Oxygen Delivery Method): room air    PHYSICAL EXAM:  General: No apparent distress  Neck: Supple, trachea midline, no thyromegaly  Respiratory: Lungs clear bilaterally, normal rate, effort  Cardiac: +S1, S2, no m/r/g  GI: +BS, soft, non tender, non distended  Extremities: No peripheral edema, no pedal lesions  Neuro: A+O X3, no tremor    LABS:                        9.1    5.90  )-----------( 114      ( 14 Nov 2023 06:10 )             25.2     11-14    140  |  110<H>  |  29.7<H>  ----------------------------<  333<H>  3.1<L>   |  16.0<L>  |  3.36<H>    Ca    7.9<L>      14 Nov 2023 06:10  Phos  2.8     11-13  Mg     2.3     11-13    TPro  4.8<L>  /  Alb  2.7<L>  /  TBili  0.4  /  DBili  x   /  AST  15  /  ALT  10  /  AlkPhos  116  11-13    Urinalysis Basic - ( 14 Nov 2023 06:10 )    Color: x / Appearance: x / SG: x / pH: x  Gluc: 333 mg/dL / Ketone: x  / Bili: x / Urobili: x   Blood: x / Protein: x / Nitrite: x   Leuk Esterase: x / RBC: x / WBC x   Sq Epi: x / Non Sq Epi: x / Bacteria: x    POCT Blood Glucose.: 73 mg/dL (11-14-23 @ 13:20)  POCT Blood Glucose.: 262 mg/dL (11-14-23 @ 08:43)  POCT Blood Glucose.: 250 mg/dL (11-13-23 @ 21:57)  POCT Blood Glucose.: 308 mg/dL (11-13-23 @ 17:13)  POCT Blood Glucose.: 256 mg/dL (11-13-23 @ 15:27)

## 2023-11-14 NOTE — DIETITIAN INITIAL EVALUATION ADULT - PERTINENT MEDS FT
MEDICATIONS  (STANDING):  cefTRIAXone Injectable. 1000 milliGRAM(s) IV Push every 24 hours  chlorhexidine 2% Cloths 1 Application(s) Topical daily  dextrose 5%. 1000 milliLiter(s) (50 mL/Hr) IV Continuous <Continuous>  dextrose 5%. 1000 milliLiter(s) (100 mL/Hr) IV Continuous <Continuous>  dextrose 50% Injectable 25 Gram(s) IV Push once  dextrose 50% Injectable 50 milliLiter(s) IV Push every 15 minutes  dextrose 50% Injectable 25 Gram(s) IV Push once  folic acid Injectable 1 milliGRAM(s) IV Push daily  glucagon  Injectable 1 milliGRAM(s) IntraMuscular once  heparin   Injectable 5000 Unit(s) SubCutaneous every 8 hours  insulin glargine Injectable (LANTUS) 20 Unit(s) SubCutaneous two times a day  insulin lispro (ADMELOG) corrective regimen sliding scale   SubCutaneous three times a day before meals  insulin lispro Injectable (ADMELOG) 15 Unit(s) SubCutaneous three times a day before meals  levETIRAcetam 1000 milliGRAM(s) Oral two times a day  multivitamin 1 Tablet(s) Oral daily  pantoprazole  Injectable 40 milliGRAM(s) IV Push daily  sodium bicarbonate  Infusion 0.07 mEq/kG/Hr (75 mL/Hr) IV Continuous <Continuous>  thiamine Injectable 100 milliGRAM(s) IV Push daily    MEDICATIONS  (PRN):  dextrose Oral Gel 15 Gram(s) Oral once PRN Blood Glucose LESS THAN 70 milliGRAM(s)/deciliter  LORazepam   Injectable 2 milliGRAM(s) IV Push every 1 hour PRN CIWA-Ar score 8 or greater

## 2023-11-14 NOTE — DIETITIAN INITIAL EVALUATION ADULT - PERTINENT LABORATORY DATA
11-14    140  |  110<H>  |  29.7<H>  ----------------------------<  333<H>  3.1<L>   |  16.0<L>  |  3.36<H>    Ca    7.9<L>      14 Nov 2023 06:10  Phos  2.8     11-13  Mg     2.3     11-13    TPro  4.8<L>  /  Alb  2.7<L>  /  TBili  0.4  /  DBili  x   /  AST  15  /  ALT  10  /  AlkPhos  116  11-13  POCT Blood Glucose.: 262 mg/dL (11-14-23 @ 08:43)  A1C with Estimated Average Glucose Result: 15.5 % (11-14-23 @ 06:10)  A1C with Estimated Average Glucose Result: 15.5 % (10-08-23 @ 09:51)  A1C with Estimated Average Glucose Result: 14.0 % (05-12-23 @ 03:50)

## 2023-11-14 NOTE — CONSULT NOTE ADULT - SUBJECTIVE AND OBJECTIVE BOX
Patient is a 32y old  Male who presents with a chief complaint of CC: DKA (14 Nov 2023 13:20)   Acute  renal failure.      31 y/o F with pmhx of HTN, DM1 with multiple admissions for DKA, ETOH abuse presented to ED after being found unresponsive by his wife. Initial labwork significant for WBC 33 with L shift, Blood, serum Co2 < 6, Scr 1.70 (baseline 0.4), VBG pH < 6.942 . Started on IV crystalloid and insulin gtt, ICU consulted for DKA with course complicated by AMS, leukocytosis, SYED,  (12 Nov 2023 10:56). pt denies any  renal issues  but  stattes  does have  foamy  urine  at times, no  blood. Denies  Hx  renal stones. Pt  not  monitoring  home  glucose as  directed. Denies  retinopathy or PN.       PAST MEDICAL & SURGICAL HISTORY:  Alcohol abuse      Pancreatitis      HTN (hypertension)      Diabetes      Atrial fibrillation      Non-ischemic cardiomyopathy      No significant past surgical history          FAMILY HISTORY:  FH: hypertension    NC    Social History:Non smoker    MEDICATIONS  (STANDING):  cefTRIAXone Injectable. 1000 milliGRAM(s) IV Push every 24 hours  chlorhexidine 2% Cloths 1 Application(s) Topical daily  dextrose 5%. 1000 milliLiter(s) (100 mL/Hr) IV Continuous <Continuous>  dextrose 5%. 1000 milliLiter(s) (50 mL/Hr) IV Continuous <Continuous>  dextrose 50% Injectable 25 Gram(s) IV Push once  dextrose 50% Injectable 50 milliLiter(s) IV Push every 15 minutes  dextrose 50% Injectable 25 Gram(s) IV Push once  folic acid Injectable 1 milliGRAM(s) IV Push daily  glucagon  Injectable 1 milliGRAM(s) IntraMuscular once  heparin   Injectable 5000 Unit(s) SubCutaneous every 8 hours  insulin glargine Injectable (LANTUS) 20 Unit(s) SubCutaneous at bedtime  insulin lispro (ADMELOG) corrective regimen sliding scale   SubCutaneous three times a day before meals  insulin lispro Injectable (ADMELOG) 10 Unit(s) SubCutaneous three times a day before meals  lactated ringers. 1000 milliLiter(s) (125 mL/Hr) IV Continuous <Continuous>  levETIRAcetam 1000 milliGRAM(s) Oral two times a day  multivitamin 1 Tablet(s) Oral daily  pantoprazole  Injectable 40 milliGRAM(s) IV Push daily  sodium bicarbonate  Infusion 0.07 mEq/kG/Hr (75 mL/Hr) IV Continuous <Continuous>  thiamine Injectable 100 milliGRAM(s) IV Push daily    MEDICATIONS  (PRN):  dextrose Oral Gel 15 Gram(s) Oral once PRN Blood Glucose LESS THAN 70 milliGRAM(s)/deciliter  LORazepam   Injectable 2 milliGRAM(s) IV Push every 1 hour PRN CIWA-Ar score 8 or greater   Meds reviewed    Allergies    No Known Allergies  Allergy Status Unknown        REVIEW OF SYSTEMS:    As above    ALLERY AND IMMUNOLOGIC: No hives or eczema      Vital Signs Last 24 Hrs  T(C): 37.1 (14 Nov 2023 10:12), Max: 37.3 (13 Nov 2023 18:00)  T(F): 98.8 (14 Nov 2023 10:12), Max: 99.1 (13 Nov 2023 18:00)  HR: 81 (14 Nov 2023 10:12) (76 - 95)  BP: 116/82 (14 Nov 2023 10:12) (105/76 - 116/82)  BP(mean): 90 (13 Nov 2023 18:00) (90 - 92)  RR: 18 (14 Nov 2023 10:12) (12 - 22)  SpO2: 98% (14 Nov 2023 10:12) (96% - 100%)    Parameters below as of 14 Nov 2023 10:12  Patient On (Oxygen Delivery Method): room air      PHYSICAL EXAM:    GENERAL: appears comfortable in bed; oriented.  HEAD:  Atraumatic, Normocephalic  EYES: EOMI, PERRLA, conjunctiva and sclera clear  ENMT: No tonsillar erythema, exudates, or enlargement; Moist mucous membranes, Good dentition, No lesions  NECK: Supple, neck  veins flat, dressing  right  neck  NERVOUS SYSTEM:  Alert & Oriented X3, Good concentration; Motor Strength wnl upper and lower extremities;  CHEST/LUNG: Clear to percussion bilaterally; No rales, No 02  HEART: Regular rate and rhythm; No murmurs, rubs, or gallops  ABDOMEN: Soft, Nontender, Nondistended; Bowel sounds present  EXTREMITIES:  No  edema  LYMPH: No lymphadenopathy noted  SKIN: No rashes or lesions, pale  :: Sebastian  with  clear   urine    LABS:                        9.1    5.90  )-----------( 114      ( 14 Nov 2023 06:10 )             25.2     11-14    140  |  110<H>  |  29.7<H>  ----------------------------<  333<H>  3.1<L>   |  16.0<L>  |  3.36<H>    Ca    7.9<L>      14 Nov 2023 06:10  Phos  2.8     11-13  Mg     2.3     11-13    TPro  4.8<L>  /  Alb  2.7<L>  /  TBili  0.4  /  DBili  x   /  AST  15  /  ALT  10  /  AlkPhos  116  11-13      Urinalysis Basic - ( 14 Nov 2023 06:10 )    Color: x / Appearance: x / SG: x / pH: x  Gluc: 333 mg/dL / Ketone: x  / Bili: x / Urobili: x   Blood: x / Protein: x / Nitrite: x   Leuk Esterase: x / RBC: x / WBC x   Sq Epi: x / Non Sq Epi: x / Bacteria: x        ABG - ( 13 Nov 2023 04:57 )  pH, Arterial: 7.400 pH, Blood: x     /  pCO2: 27    /  pO2: 169   / HCO3: 17    / Base Excess: -8.1  /  SaO2: 99.7                  RADIOLOGY & ADDITIONAL TESTS:

## 2023-11-15 LAB
ALBUMIN SERPL ELPH-MCNC: 2.4 G/DL — LOW (ref 3.3–5.2)
ALBUMIN SERPL ELPH-MCNC: 2.4 G/DL — LOW (ref 3.3–5.2)
ALP SERPL-CCNC: 88 U/L — SIGNIFICANT CHANGE UP (ref 40–120)
ALP SERPL-CCNC: 88 U/L — SIGNIFICANT CHANGE UP (ref 40–120)
ALT FLD-CCNC: 10 U/L — SIGNIFICANT CHANGE UP
ALT FLD-CCNC: 10 U/L — SIGNIFICANT CHANGE UP
ANION GAP SERPL CALC-SCNC: 11 MMOL/L — SIGNIFICANT CHANGE UP (ref 5–17)
ANION GAP SERPL CALC-SCNC: 11 MMOL/L — SIGNIFICANT CHANGE UP (ref 5–17)
APPEARANCE UR: CLEAR — SIGNIFICANT CHANGE UP
APPEARANCE UR: CLEAR — SIGNIFICANT CHANGE UP
APTT BLD: 30.4 SEC — SIGNIFICANT CHANGE UP (ref 24.5–35.6)
APTT BLD: 30.4 SEC — SIGNIFICANT CHANGE UP (ref 24.5–35.6)
AST SERPL-CCNC: 14 U/L — SIGNIFICANT CHANGE UP
AST SERPL-CCNC: 14 U/L — SIGNIFICANT CHANGE UP
BILIRUB SERPL-MCNC: 0.3 MG/DL — LOW (ref 0.4–2)
BILIRUB SERPL-MCNC: 0.3 MG/DL — LOW (ref 0.4–2)
BILIRUB UR-MCNC: NEGATIVE — SIGNIFICANT CHANGE UP
BILIRUB UR-MCNC: NEGATIVE — SIGNIFICANT CHANGE UP
BUN SERPL-MCNC: 28.5 MG/DL — HIGH (ref 8–20)
BUN SERPL-MCNC: 28.5 MG/DL — HIGH (ref 8–20)
CALCIUM SERPL-MCNC: 7.4 MG/DL — LOW (ref 8.4–10.5)
CALCIUM SERPL-MCNC: 7.4 MG/DL — LOW (ref 8.4–10.5)
CHLORIDE SERPL-SCNC: 114 MMOL/L — HIGH (ref 96–108)
CHLORIDE SERPL-SCNC: 114 MMOL/L — HIGH (ref 96–108)
CO2 SERPL-SCNC: 18 MMOL/L — LOW (ref 22–29)
CO2 SERPL-SCNC: 18 MMOL/L — LOW (ref 22–29)
COLOR SPEC: YELLOW — SIGNIFICANT CHANGE UP
COLOR SPEC: YELLOW — SIGNIFICANT CHANGE UP
CREAT SERPL-MCNC: 3.03 MG/DL — HIGH (ref 0.5–1.3)
CREAT SERPL-MCNC: 3.03 MG/DL — HIGH (ref 0.5–1.3)
DIFF PNL FLD: NEGATIVE — SIGNIFICANT CHANGE UP
DIFF PNL FLD: NEGATIVE — SIGNIFICANT CHANGE UP
EGFR: 27 ML/MIN/1.73M2 — LOW
EGFR: 27 ML/MIN/1.73M2 — LOW
FERRITIN SERPL-MCNC: 106 NG/ML — SIGNIFICANT CHANGE UP (ref 30–400)
FERRITIN SERPL-MCNC: 106 NG/ML — SIGNIFICANT CHANGE UP (ref 30–400)
FERRITIN SERPL-MCNC: 96 NG/ML — SIGNIFICANT CHANGE UP (ref 30–400)
FERRITIN SERPL-MCNC: 96 NG/ML — SIGNIFICANT CHANGE UP (ref 30–400)
GLUCOSE BLDC GLUCOMTR-MCNC: 115 MG/DL — HIGH (ref 70–99)
GLUCOSE BLDC GLUCOMTR-MCNC: 115 MG/DL — HIGH (ref 70–99)
GLUCOSE BLDC GLUCOMTR-MCNC: 74 MG/DL — SIGNIFICANT CHANGE UP (ref 70–99)
GLUCOSE BLDC GLUCOMTR-MCNC: 74 MG/DL — SIGNIFICANT CHANGE UP (ref 70–99)
GLUCOSE BLDC GLUCOMTR-MCNC: 76 MG/DL — SIGNIFICANT CHANGE UP (ref 70–99)
GLUCOSE BLDC GLUCOMTR-MCNC: 76 MG/DL — SIGNIFICANT CHANGE UP (ref 70–99)
GLUCOSE BLDC GLUCOMTR-MCNC: 95 MG/DL — SIGNIFICANT CHANGE UP (ref 70–99)
GLUCOSE BLDC GLUCOMTR-MCNC: 95 MG/DL — SIGNIFICANT CHANGE UP (ref 70–99)
GLUCOSE SERPL-MCNC: 101 MG/DL — HIGH (ref 70–99)
GLUCOSE SERPL-MCNC: 101 MG/DL — HIGH (ref 70–99)
GLUCOSE UR QL: NEGATIVE MG/DL — SIGNIFICANT CHANGE UP
GLUCOSE UR QL: NEGATIVE MG/DL — SIGNIFICANT CHANGE UP
HAPTOGLOB SERPL-MCNC: 143 MG/DL — SIGNIFICANT CHANGE UP (ref 34–200)
HAPTOGLOB SERPL-MCNC: 143 MG/DL — SIGNIFICANT CHANGE UP (ref 34–200)
HCT VFR BLD CALC: 25.8 % — LOW (ref 39–50)
HCT VFR BLD CALC: 25.8 % — LOW (ref 39–50)
HEPARIN-PF4 AB RESULT: <0.6 U/ML — SIGNIFICANT CHANGE UP (ref 0–0.9)
HEPARIN-PF4 AB RESULT: <0.6 U/ML — SIGNIFICANT CHANGE UP (ref 0–0.9)
HGB BLD-MCNC: 8.7 G/DL — LOW (ref 13–17)
HGB BLD-MCNC: 8.7 G/DL — LOW (ref 13–17)
INR BLD: 0.91 RATIO — SIGNIFICANT CHANGE UP (ref 0.85–1.18)
INR BLD: 0.91 RATIO — SIGNIFICANT CHANGE UP (ref 0.85–1.18)
IRON SATN MFR SERPL: 111 UG/DL — SIGNIFICANT CHANGE UP (ref 59–158)
IRON SATN MFR SERPL: 111 UG/DL — SIGNIFICANT CHANGE UP (ref 59–158)
IRON SATN MFR SERPL: 44 % — SIGNIFICANT CHANGE UP (ref 16–55)
IRON SATN MFR SERPL: 44 % — SIGNIFICANT CHANGE UP (ref 16–55)
KETONES UR-MCNC: NEGATIVE MG/DL — SIGNIFICANT CHANGE UP
KETONES UR-MCNC: NEGATIVE MG/DL — SIGNIFICANT CHANGE UP
LDH SERPL L TO P-CCNC: 203 U/L — HIGH (ref 98–192)
LDH SERPL L TO P-CCNC: 203 U/L — HIGH (ref 98–192)
LEGIONELLA AG UR QL: NEGATIVE — SIGNIFICANT CHANGE UP
LEGIONELLA AG UR QL: NEGATIVE — SIGNIFICANT CHANGE UP
LEUKOCYTE ESTERASE UR-ACNC: NEGATIVE — SIGNIFICANT CHANGE UP
LEUKOCYTE ESTERASE UR-ACNC: NEGATIVE — SIGNIFICANT CHANGE UP
MAGNESIUM SERPL-MCNC: 2 MG/DL — SIGNIFICANT CHANGE UP (ref 1.8–2.6)
MAGNESIUM SERPL-MCNC: 2 MG/DL — SIGNIFICANT CHANGE UP (ref 1.8–2.6)
MCHC RBC-ENTMCNC: 29.9 PG — SIGNIFICANT CHANGE UP (ref 27–34)
MCHC RBC-ENTMCNC: 29.9 PG — SIGNIFICANT CHANGE UP (ref 27–34)
MCHC RBC-ENTMCNC: 33.7 GM/DL — SIGNIFICANT CHANGE UP (ref 32–36)
MCHC RBC-ENTMCNC: 33.7 GM/DL — SIGNIFICANT CHANGE UP (ref 32–36)
MCV RBC AUTO: 88.7 FL — SIGNIFICANT CHANGE UP (ref 80–100)
MCV RBC AUTO: 88.7 FL — SIGNIFICANT CHANGE UP (ref 80–100)
NITRITE UR-MCNC: NEGATIVE — SIGNIFICANT CHANGE UP
NITRITE UR-MCNC: NEGATIVE — SIGNIFICANT CHANGE UP
OB PNL STL: NEGATIVE — SIGNIFICANT CHANGE UP
OB PNL STL: NEGATIVE — SIGNIFICANT CHANGE UP
PF4 HEPARIN CMPLX AB SER-ACNC: NEGATIVE — SIGNIFICANT CHANGE UP
PF4 HEPARIN CMPLX AB SER-ACNC: NEGATIVE — SIGNIFICANT CHANGE UP
PH UR: 6 — SIGNIFICANT CHANGE UP (ref 5–8)
PH UR: 6 — SIGNIFICANT CHANGE UP (ref 5–8)
PHOSPHATE SERPL-MCNC: 4.3 MG/DL — SIGNIFICANT CHANGE UP (ref 2.4–4.7)
PHOSPHATE SERPL-MCNC: 4.3 MG/DL — SIGNIFICANT CHANGE UP (ref 2.4–4.7)
PLATELET # BLD AUTO: 109 K/UL — LOW (ref 150–400)
PLATELET # BLD AUTO: 109 K/UL — LOW (ref 150–400)
POTASSIUM SERPL-MCNC: 3.4 MMOL/L — LOW (ref 3.5–5.3)
POTASSIUM SERPL-MCNC: 3.4 MMOL/L — LOW (ref 3.5–5.3)
POTASSIUM SERPL-SCNC: 3.4 MMOL/L — LOW (ref 3.5–5.3)
POTASSIUM SERPL-SCNC: 3.4 MMOL/L — LOW (ref 3.5–5.3)
PROT SERPL-MCNC: 4.2 G/DL — LOW (ref 6.6–8.7)
PROT SERPL-MCNC: 4.2 G/DL — LOW (ref 6.6–8.7)
PROT UR-MCNC: NEGATIVE MG/DL — SIGNIFICANT CHANGE UP
PROT UR-MCNC: NEGATIVE MG/DL — SIGNIFICANT CHANGE UP
PROTHROM AB SERPL-ACNC: 10.1 SEC — SIGNIFICANT CHANGE UP (ref 9.5–13)
PROTHROM AB SERPL-ACNC: 10.1 SEC — SIGNIFICANT CHANGE UP (ref 9.5–13)
RBC # BLD: 2.91 M/UL — LOW (ref 4.2–5.8)
RBC # FLD: 15.3 % — HIGH (ref 10.3–14.5)
RBC # FLD: 15.3 % — HIGH (ref 10.3–14.5)
RETICS #: 29.7 K/UL — SIGNIFICANT CHANGE UP (ref 25–125)
RETICS #: 29.7 K/UL — SIGNIFICANT CHANGE UP (ref 25–125)
RETICS/RBC NFR: 1 % — SIGNIFICANT CHANGE UP (ref 0.5–2.5)
RETICS/RBC NFR: 1 % — SIGNIFICANT CHANGE UP (ref 0.5–2.5)
S PNEUM AG UR QL: NEGATIVE — SIGNIFICANT CHANGE UP
S PNEUM AG UR QL: NEGATIVE — SIGNIFICANT CHANGE UP
SODIUM SERPL-SCNC: 143 MMOL/L — SIGNIFICANT CHANGE UP (ref 135–145)
SODIUM SERPL-SCNC: 143 MMOL/L — SIGNIFICANT CHANGE UP (ref 135–145)
SP GR SPEC: 1.01 — SIGNIFICANT CHANGE UP (ref 1–1.03)
SP GR SPEC: 1.01 — SIGNIFICANT CHANGE UP (ref 1–1.03)
TIBC SERPL-MCNC: 255 UG/DL — SIGNIFICANT CHANGE UP (ref 220–430)
TIBC SERPL-MCNC: 255 UG/DL — SIGNIFICANT CHANGE UP (ref 220–430)
TRANSFERRIN SERPL-MCNC: 178 MG/DL — LOW (ref 180–329)
TRANSFERRIN SERPL-MCNC: 178 MG/DL — LOW (ref 180–329)
UROBILINOGEN FLD QL: 0.2 MG/DL — SIGNIFICANT CHANGE UP (ref 0.2–1)
UROBILINOGEN FLD QL: 0.2 MG/DL — SIGNIFICANT CHANGE UP (ref 0.2–1)
WBC # BLD: 4.91 K/UL — SIGNIFICANT CHANGE UP (ref 3.8–10.5)
WBC # BLD: 4.91 K/UL — SIGNIFICANT CHANGE UP (ref 3.8–10.5)
WBC # FLD AUTO: 4.91 K/UL — SIGNIFICANT CHANGE UP (ref 3.8–10.5)
WBC # FLD AUTO: 4.91 K/UL — SIGNIFICANT CHANGE UP (ref 3.8–10.5)

## 2023-11-15 PROCEDURE — 99233 SBSQ HOSP IP/OBS HIGH 50: CPT

## 2023-11-15 PROCEDURE — 99232 SBSQ HOSP IP/OBS MODERATE 35: CPT

## 2023-11-15 RX ORDER — POTASSIUM CHLORIDE 20 MEQ
40 PACKET (EA) ORAL ONCE
Refills: 0 | Status: COMPLETED | OUTPATIENT
Start: 2023-11-15 | End: 2023-11-15

## 2023-11-15 RX ORDER — LEVETIRACETAM 250 MG/1
500 TABLET, FILM COATED ORAL
Refills: 0 | Status: DISCONTINUED | OUTPATIENT
Start: 2023-11-15 | End: 2023-11-18

## 2023-11-15 RX ORDER — SODIUM BICARBONATE 1 MEQ/ML
0.09 SYRINGE (ML) INTRAVENOUS
Qty: 75 | Refills: 0 | Status: DISCONTINUED | OUTPATIENT
Start: 2023-11-15 | End: 2023-11-17

## 2023-11-15 RX ORDER — FAMOTIDINE 10 MG/ML
20 INJECTION INTRAVENOUS
Refills: 0 | Status: DISCONTINUED | OUTPATIENT
Start: 2023-11-15 | End: 2023-11-18

## 2023-11-15 RX ORDER — INSULIN LISPRO 100/ML
5 VIAL (ML) SUBCUTANEOUS
Refills: 0 | Status: DISCONTINUED | OUTPATIENT
Start: 2023-11-15 | End: 2023-11-18

## 2023-11-15 RX ADMIN — HEPARIN SODIUM 5000 UNIT(S): 5000 INJECTION INTRAVENOUS; SUBCUTANEOUS at 05:09

## 2023-11-15 RX ADMIN — LEVETIRACETAM 500 MILLIGRAM(S): 250 TABLET, FILM COATED ORAL at 17:19

## 2023-11-15 RX ADMIN — Medication 5 UNIT(S): at 12:11

## 2023-11-15 RX ADMIN — FAMOTIDINE 20 MILLIGRAM(S): 10 INJECTION INTRAVENOUS at 17:19

## 2023-11-15 RX ADMIN — Medication 20 MILLIEQUIVALENT(S): at 16:12

## 2023-11-15 RX ADMIN — Medication 1 TABLET(S): at 16:12

## 2023-11-15 RX ADMIN — CEFTRIAXONE 1000 MILLIGRAM(S): 500 INJECTION, POWDER, FOR SOLUTION INTRAMUSCULAR; INTRAVENOUS at 16:13

## 2023-11-15 RX ADMIN — Medication 40 MILLIEQUIVALENT(S): at 12:08

## 2023-11-15 RX ADMIN — Medication 20 MILLIEQUIVALENT(S): at 05:09

## 2023-11-15 RX ADMIN — Medication 1 MILLIGRAM(S): at 12:09

## 2023-11-15 RX ADMIN — Medication 100 MEQ/KG/HR: at 10:12

## 2023-11-15 RX ADMIN — INSULIN GLARGINE 20 UNIT(S): 100 INJECTION, SOLUTION SUBCUTANEOUS at 08:40

## 2023-11-15 RX ADMIN — Medication 100 MILLIGRAM(S): at 12:09

## 2023-11-15 RX ADMIN — LEVETIRACETAM 1000 MILLIGRAM(S): 250 TABLET, FILM COATED ORAL at 05:09

## 2023-11-15 RX ADMIN — CHLORHEXIDINE GLUCONATE 1 APPLICATION(S): 213 SOLUTION TOPICAL at 16:12

## 2023-11-15 NOTE — PROGRESS NOTE ADULT - ASSESSMENT
32M with PMHx DM with multiple admissions for recurrent DKA, ETOH abuse, Pancreatitis, Afib, HTN, Seizure disorder, brought to ED after being found unresponsive at home. Labs consistent with severe DKA. Consulted for diabetes management.    1. Poorly controlled DM, a1c 15.5% - s/p DKA secondary to noncompliance  - Decrease premeal admelog to 5 units TID  - Continue lantus 20 units every morning  - Discontinue lantus 20 units qhs  - Mild sliding scale coverage  - Spoke with patient in depth regarding complications of uncontrolled DM and the severity of his DKA. He has recently had multiple admissions for DKA due to non compliance. Multiple outpt follow up appointments were scheduled and he did not show up for any of them. He seems to have very poor understanding of his diabetes and the long term effects.

## 2023-11-15 NOTE — PROGRESS NOTE ADULT - ASSESSMENT
33 y/o F with pmhx of HTN, DM1 with multiple admissions for DKA, admitted for DKA, also found to have new SYED.       #IDDM with DKA  s/p insulin drip in the ICU, gap resolved  based IV drip insulin usage x 24 hours, started on Lantus 20U BID + premales 15U + ISS  A1C is 15.5  trend BMP and AG  CC diet  Endocrine is following     #SYED with possible CKD   suspect 2/2 hydration, SCr up to 2.7, baseline 0.4  encourage PO intake  Creatinine is trending up now 3.3, will give IV fluids  f/u urine studies  renally dose all medications keppras lowered and avoid nephrotoxic medications    Anemia +Thrombocytopenia- HIT antibodies negative  occult blood negative    Lovenox held - follow trend   Hypokalemia:  again replaced and monitor     #PNA   Noted with initial leukocytosis up to 33, now resolved  RVP negative  CT chest showing b/l PNA and possible cystitis  BCx and UCx NGTD  c/w empiric CTX, plan for 5 days total  d/c doxy as legionella neg  f/u strep pneumo    #H/o seizure disorder  c/w home Keppra, changed to PO    #H/o ETOH abuse  reports last drink > month ago  Symptoms trigger CIWA 5 -->2  c/w folic acid, thiamine and MV  Mental status at baseline    DVT ppx: HSQ  Diet: CC  Dispo: pending improvement in SYED and Endo

## 2023-11-15 NOTE — PROGRESS NOTE ADULT - SUBJECTIVE AND OBJECTIVE BOX
CARMEN JOYCE Patient is a 32y old  Male who presents with a chief complaint of CC: DKA (15 Nov 2023 12:55)     HPI:  HPI:  31 y/o F with pmhx of HTN, DM1 with multiple admissions for DKA, ETOH abuse presented to ED after being found unresponsive by his wife. Initial labwork significant for WBC 33 with L shift, Blood, serum Co2 < 6, Scr 1.70 (baseline 0.4), VBG pH < 6.942 . Started on IV crystalloid and insulin gtt, ICU consulted for DKA with course complicated by AMS, leukocytosis, SYED,  (12 Nov 2023 10:56)    The patient was seen and evaluated offers no complaints conversing pleasant   The patient is in no acute distress.  Denied any fever chest pain, palpitations, shortness of breath, abdominal pain, fever, dysuria, cough, edema       I&O's Summary    14 Nov 2023 07:01  -  15 Nov 2023 07:00  --------------------------------------------------------  IN: 0 mL / OUT: 300 mL / NET: -300 mL    15 Nov 2023 07:01  -  15 Nov 2023 22:21  --------------------------------------------------------  IN: 500 mL / OUT: 1400 mL / NET: -900 mL      Allergies    No Known Allergies  Allergy Status Unknown    Intolerances      HEALTH ISSUES - PROBLEM Dx:        PAST MEDICAL & SURGICAL HISTORY:  Alcohol abuse      Pancreatitis      HTN (hypertension)      Diabetes      Atrial fibrillation      Non-ischemic cardiomyopathy      No significant past surgical history              Vital Signs Last 24 Hrs  T(C): 37.1 (15 Nov 2023 20:03), Max: 37.1 (15 Nov 2023 10:52)  T(F): 98.7 (15 Nov 2023 20:03), Max: 98.7 (15 Nov 2023 10:52)  HR: 76 (15 Nov 2023 20:03) (70 - 76)  BP: 113/79 (15 Nov 2023 20:03) (101/70 - 116/76)  BP(mean): --  RR: 18 (15 Nov 2023 20:03) (18 - 18)  SpO2: 100% (15 Nov 2023 20:03) (98% - 100%)    Parameters below as of 15 Nov 2023 20:03  Patient On (Oxygen Delivery Method): room air    T(C): 37.1 (11-15-23 @ 20:03), Max: 37.1 (11-15-23 @ 10:52)  HR: 76 (11-15-23 @ 20:03) (70 - 76)  BP: 113/79 (11-15-23 @ 20:03) (101/70 - 116/76)  RR: 18 (11-15-23 @ 20:03) (18 - 18)  SpO2: 100% (11-15-23 @ 20:03) (98% - 100%)  Wt(kg): --    PHYSICAL EXAM:    GENERAL: NAD  HEAD:  Atraumatic, Normocephalic  EYES: EOMI, conjunctiva and sclera clear  ENMT:  Moist mucous membranes,  No lesions  NECK: Supple,  Normal thyroid  NERVOUS SYSTEM:  Alert & Oriented X3,  Moves upper and lower extremities; CNS-II-XII  CHEST/LUNG: Clear to auscultation bilaterally; No rales, rhonchi, wheezing,   HEART: Regular rate and rhythm; No murmurs,   ABDOMEN: Soft, Nontender, Nondistended; Bowel sounds present  EXTREMITIES:  Peripheral Pulses, No  cyanosis, or edema  psychiatry- mood and affect appropriate, Insight and judgement intact     cefTRIAXone Injectable. 1000 milliGRAM(s) IV Push every 24 hours  chlorhexidine 2% Cloths 1 Application(s) Topical daily  dextrose 5%. 1000 milliLiter(s) IV Continuous <Continuous>  dextrose 5%. 1000 milliLiter(s) IV Continuous <Continuous>  dextrose 50% Injectable 25 Gram(s) IV Push once  dextrose 50% Injectable 50 milliLiter(s) IV Push every 15 minutes  dextrose 50% Injectable 25 Gram(s) IV Push once  dextrose Oral Gel 15 Gram(s) Oral once PRN  famotidine    Tablet 20 milliGRAM(s) Oral two times a day  folic acid Injectable 1 milliGRAM(s) IV Push daily  glucagon  Injectable 1 milliGRAM(s) IntraMuscular once  insulin glargine Injectable (LANTUS) 20 Unit(s) SubCutaneous every morning  insulin lispro (ADMELOG) corrective regimen sliding scale   SubCutaneous three times a day before meals  insulin lispro Injectable (ADMELOG) 5 Unit(s) SubCutaneous three times a day before meals  levETIRAcetam 500 milliGRAM(s) Oral two times a day  multivitamin 1 Tablet(s) Oral daily  sodium bicarbonate  Infusion 0.094 mEq/kG/Hr IV Continuous <Continuous>  thiamine Injectable 100 milliGRAM(s) IV Push daily      LABS:                          8.7    4.91  )-----------( 109      ( 15 Nov 2023 05:50 )             25.8     11-15    143  |  114<H>  |  28.5<H>  ----------------------------<  101<H>  3.4<L>   |  18.0<L>  |  3.03<H>    Ca    7.4<L>      15 Nov 2023 05:50  Phos  4.3     11-15  Mg     2.0     11-15    TPro  4.2<L>  /  Alb  2.4<L>  /  TBili  0.3<L>  /  DBili  x   /  AST  14  /  ALT  10  /  AlkPhos  88  11-15    LIVER FUNCTIONS - ( 15 Nov 2023 05:50 )  Alb: 2.4 g/dL / Pro: 4.2 g/dL / ALK PHOS: 88 U/L / ALT: 10 U/L / AST: 14 U/L / GGT: x           PT/INR - ( 15 Nov 2023 09:58 )   PT: 10.1 sec;   INR: 0.91 ratio         PTT - ( 15 Nov 2023 09:58 )  PTT:30.4 sec      Urinalysis Basic - ( 15 Nov 2023 05:50 )    Color: x / Appearance: x / SG: x / pH: x  Gluc: 101 mg/dL / Ketone: x  / Bili: x / Urobili: x   Blood: x / Protein: x / Nitrite: x   Leuk Esterase: x / RBC: x / WBC x   Sq Epi: x / Non Sq Epi: x / Bacteria: x      CAPILLARY BLOOD GLUCOSE      POCT Blood Glucose.: 95 mg/dL (15 Nov 2023 21:06)  POCT Blood Glucose.: 74 mg/dL (15 Nov 2023 17:20)  POCT Blood Glucose.: 115 mg/dL (15 Nov 2023 12:07)  POCT Blood Glucose.: 76 mg/dL (15 Nov 2023 08:36)      RADIOLOGY & ADDITIONAL TESTS:      Consultant notes reviewed    Case discussed with consultant/provider/ family /patient

## 2023-11-15 NOTE — PROGRESS NOTE ADULT - SUBJECTIVE AND OBJECTIVE BOX
INTERVAL EVENTS:  Follow up diabetes management    ROS: Denies abd pain, nausea, vomiting.     MEDICATIONS  (STANDING):  cefTRIAXone Injectable. 1000 milliGRAM(s) IV Push every 24 hours  chlorhexidine 2% Cloths 1 Application(s) Topical daily  dextrose 5%. 1000 milliLiter(s) (50 mL/Hr) IV Continuous <Continuous>  dextrose 5%. 1000 milliLiter(s) (100 mL/Hr) IV Continuous <Continuous>  dextrose 50% Injectable 50 milliLiter(s) IV Push every 15 minutes  dextrose 50% Injectable 25 Gram(s) IV Push once  dextrose 50% Injectable 25 Gram(s) IV Push once  famotidine    Tablet 20 milliGRAM(s) Oral two times a day  folic acid Injectable 1 milliGRAM(s) IV Push daily  glucagon  Injectable 1 milliGRAM(s) IntraMuscular once  insulin glargine Injectable (LANTUS) 20 Unit(s) SubCutaneous every morning  insulin lispro (ADMELOG) corrective regimen sliding scale   SubCutaneous three times a day before meals  insulin lispro Injectable (ADMELOG) 5 Unit(s) SubCutaneous three times a day before meals  levETIRAcetam 500 milliGRAM(s) Oral two times a day  multivitamin 1 Tablet(s) Oral daily  potassium chloride    Tablet ER 20 milliEquivalent(s) Oral three times a day  sodium bicarbonate  Infusion 0.094 mEq/kG/Hr (100 mL/Hr) IV Continuous <Continuous>  thiamine Injectable 100 milliGRAM(s) IV Push daily    MEDICATIONS  (PRN):  dextrose Oral Gel 15 Gram(s) Oral once PRN Blood Glucose LESS THAN 70 milliGRAM(s)/deciliter  LORazepam   Injectable 2 milliGRAM(s) IV Push every 1 hour PRN CIWA-Ar score 8 or greater    Allergies  No Known Allergies  Allergy Status Unknown    Vital Signs Last 24 Hrs  T(C): 37.1 (15 Nov 2023 10:52), Max: 37.1 (14 Nov 2023 16:54)  T(F): 98.7 (15 Nov 2023 10:52), Max: 98.7 (14 Nov 2023 16:54)  HR: 72 (15 Nov 2023 10:52) (71 - 84)  BP: 114/82 (15 Nov 2023 10:52) (112/83 - 122/94)  BP(mean): --  RR: 18 (15 Nov 2023 10:52) (18 - 18)  SpO2: 100% (15 Nov 2023 10:52) (98% - 100%)    Parameters below as of 15 Nov 2023 10:52  Patient On (Oxygen Delivery Method): room air    PHYSICAL EXAM:  General: No apparent distress  Neck: Supple, trachea midline, no thyromegaly  Respiratory: Lungs clear bilaterally, normal rate, effort  Cardiac: +S1, S2, no m/r/g  GI: +BS, soft, non tender, non distended  Extremities: No peripheral edema, no pedal lesions  Neuro: A+O X3, no tremor    LABS:                        8.7    4.91  )-----------( 109      ( 15 Nov 2023 05:50 )             25.8     11-15    143  |  114<H>  |  28.5<H>  ----------------------------<  101<H>  3.4<L>   |  18.0<L>  |  3.03<H>    Ca    7.4<L>      15 Nov 2023 05:50  Phos  4.3     11-15  Mg     2.0     11-15    TPro  4.2<L>  /  Alb  2.4<L>  /  TBili  0.3<L>  /  DBili  x   /  AST  14  /  ALT  10  /  AlkPhos  88  11-15    Urinalysis Basic - ( 15 Nov 2023 05:50 )    Color: x / Appearance: x / SG: x / pH: x  Gluc: 101 mg/dL / Ketone: x  / Bili: x / Urobili: x   Blood: x / Protein: x / Nitrite: x   Leuk Esterase: x / RBC: x / WBC x   Sq Epi: x / Non Sq Epi: x / Bacteria: x    POCT Blood Glucose.: 115 mg/dL (11-15-23 @ 12:07)  POCT Blood Glucose.: 76 mg/dL (11-15-23 @ 08:36)  POCT Blood Glucose.: 257 mg/dL (11-14-23 @ 20:53)  POCT Blood Glucose.: 158 mg/dL (11-14-23 @ 18:12)  POCT Blood Glucose.: 73 mg/dL (11-14-23 @ 13:20)

## 2023-11-15 NOTE — PROGRESS NOTE ADULT - ASSESSMENT
SYED, MA: DKA  Renal function, acidosis and electrolytes slowly improving  NO CKD noted (Creatinine: 0.41 mg/dL (10.11.23))  CT scan: No renal stone nor hydronephrosis.  - avoid potential nephrotoxins  - IVF/bicarb as needed  - optimize serum glucose control  - trend labs

## 2023-11-15 NOTE — PROGRESS NOTE ADULT - SUBJECTIVE AND OBJECTIVE BOX
NEPHROLOGY INTERVAL HPI/OVERNIGHT EVENTS:  pt resting when seen earlier  no acute overnight distress noted    MEDICATIONS  (STANDING):  cefTRIAXone Injectable. 1000 milliGRAM(s) IV Push every 24 hours  chlorhexidine 2% Cloths 1 Application(s) Topical daily  dextrose 5%. 1000 milliLiter(s) (100 mL/Hr) IV Continuous <Continuous>  dextrose 5%. 1000 milliLiter(s) (50 mL/Hr) IV Continuous <Continuous>  dextrose 50% Injectable 50 milliLiter(s) IV Push every 15 minutes  dextrose 50% Injectable 25 Gram(s) IV Push once  dextrose 50% Injectable 25 Gram(s) IV Push once  famotidine    Tablet 20 milliGRAM(s) Oral two times a day  folic acid Injectable 1 milliGRAM(s) IV Push daily  glucagon  Injectable 1 milliGRAM(s) IntraMuscular once  insulin glargine Injectable (LANTUS) 20 Unit(s) SubCutaneous every morning  insulin lispro (ADMELOG) corrective regimen sliding scale   SubCutaneous three times a day before meals  insulin lispro Injectable (ADMELOG) 5 Unit(s) SubCutaneous three times a day before meals  levETIRAcetam 500 milliGRAM(s) Oral two times a day  multivitamin 1 Tablet(s) Oral daily  potassium chloride    Tablet ER 20 milliEquivalent(s) Oral three times a day  sodium bicarbonate  Infusion 0.094 mEq/kG/Hr (100 mL/Hr) IV Continuous <Continuous>  thiamine Injectable 100 milliGRAM(s) IV Push daily    MEDICATIONS  (PRN):  dextrose Oral Gel 15 Gram(s) Oral once PRN Blood Glucose LESS THAN 70 milliGRAM(s)/deciliter  LORazepam   Injectable 2 milliGRAM(s) IV Push every 1 hour PRN CIWA-Ar score 8 or greater      Allergies    No Known Allergies  Allergy Status Unknown          Vital Signs Last 24 Hrs  T(C): 37.1 (15 Nov 2023 10:52), Max: 37.1 (14 Nov 2023 16:54)  T(F): 98.7 (15 Nov 2023 10:52), Max: 98.7 (14 Nov 2023 16:54)  HR: 72 (15 Nov 2023 10:52) (71 - 84)  BP: 114/82 (15 Nov 2023 10:52) (112/83 - 122/94)  BP(mean): --  RR: 18 (15 Nov 2023 10:52) (18 - 18)  SpO2: 100% (15 Nov 2023 10:52) (98% - 100%)    Parameters below as of 15 Nov 2023 10:52  Patient On (Oxygen Delivery Method): room air        PHYSICAL EXAM:  GENERAL: Fatigued but no distress  HEENMT: Dry MMs  NECK: No JVD  NERVOUS SYSTEM:  Awake  CHEST/LUNG: Clear bilaterally  HEART: Regular rate and rhythm; No rub  ABDOMEN: Soft, Nontender, +BS  EXTREMITIES:  No  dependent edema  SKIN: No rashes    LABS:                        8.7    4.91  )-----------( 109      ( 15 Nov 2023 05:50 )             25.8     11-15    143  |  114<H>  |  28.5<H>  ----------------------------<  101<H>  3.4<L>   |  18.0<L>  |  3.03<H>    Creatinine: 3.38 mg/dL (11.14.23)    Creatinine: 0.41 mg/dL (10.11.23)    Ca    7.4<L>      15 Nov 2023 05:50  Phos  4.3     11-15  Mg     2.0     11-15    TPro  4.2<L>  /  Alb  2.4<L>  /  TBili  0.3<L>  /  DBili  x   /  AST  14  /  ALT  10  /  AlkPhos  88  11-15    PT/INR - ( 15 Nov 2023 09:58 )   PT: 10.1 sec;   INR: 0.91 ratio         PTT - ( 15 Nov 2023 09:58 )  PTT:30.4 sec  Urinalysis Basic - ( 15 Nov 2023 05:50 )    Color: x / Appearance: x / SG: x / pH: x  Gluc: 101 mg/dL / Ketone: x  / Bili: x / Urobili: x   Blood: x / Protein: x / Nitrite: x   Leuk Esterase: x / RBC: x / WBC x   Sq Epi: x / Non Sq Epi: x / Bacteria: x      Magnesium: 2.0 mg/dL (11-15 @ 05:50)  Phosphorus: 4.3 mg/dL (11-15 @ 05:50)      RADIOLOGY & ADDITIONAL TESTS:  < from: CT Abdomen and Pelvis No Cont (11.12.23 @ 12:04) >    ACC: [id number] EXAM:  CT ABDOMEN AND PELVIS   ORDERED BY: ROSETTE BELTRAN     ACC: [id number] EXAM:  CT CHEST   ORDERED BY: ROSETTE BELTRAN     PROCEDURE DATE:  11/12/2023          INTERPRETATION:  CLINICAL INFORMATION: Diabetic ketoacidosis.   Unresponsive. Hypotensive.    COMPARISON: CT chest/abdomen/pelvis 10/8/2023.    CONTRAST/COMPLICATIONS:  IV Contrast: NONE  Oral Contrast: NONE  Complications: None reported at time of study completion    PROCEDURE:  CT of the Chest, Abdomen and Pelvis was performed.  Sagittal and coronal reformats were performed.    FINDINGS:    Evaluation of the parenchymal organs and vascular structures is limited   without intravenous contrast.    Limited evaluation secondary to motion artifact. Within these limitations:    CHEST:    LUNGS AND LARGE AIRWAYS: Patent central airways. Bilateral lower lobe   peripheral patchy groundglass opacities, right greater than left. Right   upper lobe perifissural nodule (3-76), 0.4 cm, likely intrapulmonary   lymph node. Right lower and middle lobe calcified granulomas.  PLEURA: No pleural effusion.  VESSELS: MIld atherosclerotic changes of the aorta and coronary arteries.   Right IJ central venous catheter with tip in the SVC.  HEART: Heart size is normal. No pericardial effusion.  MEDIASTINUM AND JOSE: No lymphadenopathy.  CHEST WALL AND LOWER NECK: Within normal limits.    ABDOMEN AND PELVIS:  LIVER: Grossly within normal limits.  BILE DUCTS: Grossly within normal limits.  GALLBLADDER: Grossly within normal limits.  SPLEEN: Grossly within normal limits.  PANCREAS: Grossly within normal limits.  ADRENALS: Not well visualized.  KIDNEYS/URETERS: No renal stone or hydronephrosis.    BLADDER: Decompressed with indwelling Sebastian balloon catheter.   Circumferential bladder wall thickening, nonspecific.  REPRODUCTIVE ORGANS: Prostate within normal limits.    BOWEL: Moderate to large stool burden. No bowel obstruction. Appendix is   normal.  PERITONEUM: No ascites.  VESSELS: Atherosclerotic changes of the branches of the bilateral   external iliac arteries and bilateral lower extremities.  RETROPERITONEUM/LYMPH NODES: No lymphadenopathy by size criteria.  ABDOMINAL WALL: Within normal limits.  BONES: No acute osseous abnormality.    IMPRESSION:  Nonspecific bilateral lower lobe peripheral patchy groundglass opacities,   compatible with pneumonia, including atypical or viral pneumonia.    Bladder decompressed with indwelling Sebastian balloon catheter. Nonspecific   circumferential bladder wall thickening which may be secondary to   underdistention versus cystitis. Correlate with urinalysis.    < end of copied text >

## 2023-11-16 LAB
ALBUMIN SERPL ELPH-MCNC: 2.6 G/DL — LOW (ref 3.3–5.2)
ALBUMIN SERPL ELPH-MCNC: 2.6 G/DL — LOW (ref 3.3–5.2)
ALP SERPL-CCNC: 94 U/L — SIGNIFICANT CHANGE UP (ref 40–120)
ALP SERPL-CCNC: 94 U/L — SIGNIFICANT CHANGE UP (ref 40–120)
ALT FLD-CCNC: 10 U/L — SIGNIFICANT CHANGE UP
ALT FLD-CCNC: 10 U/L — SIGNIFICANT CHANGE UP
ANION GAP SERPL CALC-SCNC: 11 MMOL/L — SIGNIFICANT CHANGE UP (ref 5–17)
ANION GAP SERPL CALC-SCNC: 11 MMOL/L — SIGNIFICANT CHANGE UP (ref 5–17)
AST SERPL-CCNC: 12 U/L — SIGNIFICANT CHANGE UP
AST SERPL-CCNC: 12 U/L — SIGNIFICANT CHANGE UP
BILIRUB SERPL-MCNC: 0.3 MG/DL — LOW (ref 0.4–2)
BILIRUB SERPL-MCNC: 0.3 MG/DL — LOW (ref 0.4–2)
BUN SERPL-MCNC: 25 MG/DL — HIGH (ref 8–20)
BUN SERPL-MCNC: 25 MG/DL — HIGH (ref 8–20)
CALCIUM SERPL-MCNC: 7.6 MG/DL — LOW (ref 8.4–10.5)
CALCIUM SERPL-MCNC: 7.6 MG/DL — LOW (ref 8.4–10.5)
CHLORIDE SERPL-SCNC: 110 MMOL/L — HIGH (ref 96–108)
CHLORIDE SERPL-SCNC: 110 MMOL/L — HIGH (ref 96–108)
CO2 SERPL-SCNC: 17 MMOL/L — LOW (ref 22–29)
CO2 SERPL-SCNC: 17 MMOL/L — LOW (ref 22–29)
CREAT SERPL-MCNC: 3.58 MG/DL — HIGH (ref 0.5–1.3)
CREAT SERPL-MCNC: 3.58 MG/DL — HIGH (ref 0.5–1.3)
EGFR: 22 ML/MIN/1.73M2 — LOW
EGFR: 22 ML/MIN/1.73M2 — LOW
GLUCOSE BLDC GLUCOMTR-MCNC: 123 MG/DL — HIGH (ref 70–99)
GLUCOSE BLDC GLUCOMTR-MCNC: 123 MG/DL — HIGH (ref 70–99)
GLUCOSE BLDC GLUCOMTR-MCNC: 189 MG/DL — HIGH (ref 70–99)
GLUCOSE BLDC GLUCOMTR-MCNC: 189 MG/DL — HIGH (ref 70–99)
GLUCOSE BLDC GLUCOMTR-MCNC: 74 MG/DL — SIGNIFICANT CHANGE UP (ref 70–99)
GLUCOSE BLDC GLUCOMTR-MCNC: 74 MG/DL — SIGNIFICANT CHANGE UP (ref 70–99)
GLUCOSE BLDC GLUCOMTR-MCNC: 95 MG/DL — SIGNIFICANT CHANGE UP (ref 70–99)
GLUCOSE BLDC GLUCOMTR-MCNC: 95 MG/DL — SIGNIFICANT CHANGE UP (ref 70–99)
GLUCOSE SERPL-MCNC: 261 MG/DL — HIGH (ref 70–99)
GLUCOSE SERPL-MCNC: 261 MG/DL — HIGH (ref 70–99)
HCT VFR BLD CALC: 28.3 % — LOW (ref 39–50)
HCT VFR BLD CALC: 28.3 % — LOW (ref 39–50)
HGB BLD-MCNC: 9.4 G/DL — LOW (ref 13–17)
HGB BLD-MCNC: 9.4 G/DL — LOW (ref 13–17)
MCHC RBC-ENTMCNC: 30.5 PG — SIGNIFICANT CHANGE UP (ref 27–34)
MCHC RBC-ENTMCNC: 30.5 PG — SIGNIFICANT CHANGE UP (ref 27–34)
MCHC RBC-ENTMCNC: 33.2 GM/DL — SIGNIFICANT CHANGE UP (ref 32–36)
MCHC RBC-ENTMCNC: 33.2 GM/DL — SIGNIFICANT CHANGE UP (ref 32–36)
MCV RBC AUTO: 91.9 FL — SIGNIFICANT CHANGE UP (ref 80–100)
MCV RBC AUTO: 91.9 FL — SIGNIFICANT CHANGE UP (ref 80–100)
PLATELET # BLD AUTO: 96 K/UL — LOW (ref 150–400)
PLATELET # BLD AUTO: 96 K/UL — LOW (ref 150–400)
POTASSIUM SERPL-MCNC: 4.5 MMOL/L — SIGNIFICANT CHANGE UP (ref 3.5–5.3)
POTASSIUM SERPL-MCNC: 4.5 MMOL/L — SIGNIFICANT CHANGE UP (ref 3.5–5.3)
POTASSIUM SERPL-SCNC: 4.5 MMOL/L — SIGNIFICANT CHANGE UP (ref 3.5–5.3)
POTASSIUM SERPL-SCNC: 4.5 MMOL/L — SIGNIFICANT CHANGE UP (ref 3.5–5.3)
PROT SERPL-MCNC: 4.6 G/DL — LOW (ref 6.6–8.7)
PROT SERPL-MCNC: 4.6 G/DL — LOW (ref 6.6–8.7)
RBC # BLD: 3.08 M/UL — LOW (ref 4.2–5.8)
RBC # BLD: 3.08 M/UL — LOW (ref 4.2–5.8)
RBC # FLD: 15.1 % — HIGH (ref 10.3–14.5)
RBC # FLD: 15.1 % — HIGH (ref 10.3–14.5)
SODIUM SERPL-SCNC: 138 MMOL/L — SIGNIFICANT CHANGE UP (ref 135–145)
SODIUM SERPL-SCNC: 138 MMOL/L — SIGNIFICANT CHANGE UP (ref 135–145)
WBC # BLD: 3.81 K/UL — SIGNIFICANT CHANGE UP (ref 3.8–10.5)
WBC # BLD: 3.81 K/UL — SIGNIFICANT CHANGE UP (ref 3.8–10.5)
WBC # FLD AUTO: 3.81 K/UL — SIGNIFICANT CHANGE UP (ref 3.8–10.5)
WBC # FLD AUTO: 3.81 K/UL — SIGNIFICANT CHANGE UP (ref 3.8–10.5)

## 2023-11-16 PROCEDURE — 99233 SBSQ HOSP IP/OBS HIGH 50: CPT

## 2023-11-16 RX ORDER — SODIUM BICARBONATE 1 MEQ/ML
650 SYRINGE (ML) INTRAVENOUS THREE TIMES A DAY
Refills: 0 | Status: DISCONTINUED | OUTPATIENT
Start: 2023-11-16 | End: 2023-11-18

## 2023-11-16 RX ORDER — INSULIN GLARGINE 100 [IU]/ML
8 INJECTION, SOLUTION SUBCUTANEOUS EVERY MORNING
Refills: 0 | Status: COMPLETED | OUTPATIENT
Start: 2023-11-17 | End: 2023-11-17

## 2023-11-16 RX ORDER — INSULIN GLARGINE 100 [IU]/ML
8 INJECTION, SOLUTION SUBCUTANEOUS AT BEDTIME
Refills: 0 | Status: COMPLETED | OUTPATIENT
Start: 2023-11-17 | End: 2023-11-17

## 2023-11-16 RX ORDER — INSULIN GLARGINE 100 [IU]/ML
16 INJECTION, SOLUTION SUBCUTANEOUS AT BEDTIME
Refills: 0 | Status: DISCONTINUED | OUTPATIENT
Start: 2023-11-18 | End: 2023-11-18

## 2023-11-16 RX ADMIN — Medication 650 MILLIGRAM(S): at 14:56

## 2023-11-16 RX ADMIN — Medication 5 UNIT(S): at 13:01

## 2023-11-16 RX ADMIN — FAMOTIDINE 20 MILLIGRAM(S): 10 INJECTION INTRAVENOUS at 18:10

## 2023-11-16 RX ADMIN — INSULIN GLARGINE 20 UNIT(S): 100 INJECTION, SOLUTION SUBCUTANEOUS at 09:12

## 2023-11-16 RX ADMIN — FAMOTIDINE 20 MILLIGRAM(S): 10 INJECTION INTRAVENOUS at 06:11

## 2023-11-16 RX ADMIN — LEVETIRACETAM 500 MILLIGRAM(S): 250 TABLET, FILM COATED ORAL at 06:11

## 2023-11-16 RX ADMIN — LEVETIRACETAM 500 MILLIGRAM(S): 250 TABLET, FILM COATED ORAL at 18:10

## 2023-11-16 RX ADMIN — Medication 650 MILLIGRAM(S): at 21:30

## 2023-11-16 RX ADMIN — Medication 5 UNIT(S): at 09:12

## 2023-11-16 RX ADMIN — Medication 1: at 09:11

## 2023-11-16 NOTE — PROGRESS NOTE ADULT - SUBJECTIVE AND OBJECTIVE BOX
INTERVAL EVENTS:  Follow up diabetes management     ROS: Denies abd pain, nausea, vomiting.     MEDICATIONS  (STANDING):  cefTRIAXone Injectable. 1000 milliGRAM(s) IV Push every 24 hours  chlorhexidine 2% Cloths 1 Application(s) Topical daily  dextrose 5%. 1000 milliLiter(s) (50 mL/Hr) IV Continuous <Continuous>  dextrose 5%. 1000 milliLiter(s) (100 mL/Hr) IV Continuous <Continuous>  dextrose 50% Injectable 25 Gram(s) IV Push once  dextrose 50% Injectable 25 Gram(s) IV Push once  dextrose 50% Injectable 50 milliLiter(s) IV Push every 15 minutes  famotidine    Tablet 20 milliGRAM(s) Oral two times a day  folic acid Injectable 1 milliGRAM(s) IV Push daily  glucagon  Injectable 1 milliGRAM(s) IntraMuscular once  insulin glargine Injectable (LANTUS) 20 Unit(s) SubCutaneous every morning  insulin lispro (ADMELOG) corrective regimen sliding scale   SubCutaneous three times a day before meals  insulin lispro Injectable (ADMELOG) 5 Unit(s) SubCutaneous three times a day before meals  levETIRAcetam 500 milliGRAM(s) Oral two times a day  multivitamin 1 Tablet(s) Oral daily  sodium bicarbonate 650 milliGRAM(s) Oral three times a day  sodium bicarbonate  Infusion 0.094 mEq/kG/Hr (100 mL/Hr) IV Continuous <Continuous>  thiamine Injectable 100 milliGRAM(s) IV Push daily    MEDICATIONS  (PRN):  dextrose Oral Gel 15 Gram(s) Oral once PRN Blood Glucose LESS THAN 70 milliGRAM(s)/deciliter    Allergies  No Known Allergies  Allergy Status Unknown    Vital Signs Last 24 Hrs  T(C): 37.1 (16 Nov 2023 08:09), Max: 37.1 (15 Nov 2023 20:03)  T(F): 98.7 (16 Nov 2023 08:09), Max: 98.7 (15 Nov 2023 20:03)  HR: 75 (16 Nov 2023 08:09) (70 - 76)  BP: 117/81 (16 Nov 2023 08:09) (101/70 - 117/81)  BP(mean): --  RR: 18 (16 Nov 2023 08:09) (18 - 18)  SpO2: 97% (16 Nov 2023 08:09) (97% - 100%)    Parameters below as of 16 Nov 2023 08:09  Patient On (Oxygen Delivery Method): room air    PHYSICAL EXAM:  General: No apparent distress  Neck: Supple, trachea midline, no thyromegaly  Respiratory: Lungs clear bilaterally, normal rate, effort  Cardiac: +S1, S2, no m/r/g  GI: +BS, soft, non tender, non distended  Extremities: No peripheral edema, no pedal lesions  Neuro: A+O X3, no tremor    LABS:                        9.4    3.81  )-----------( 96       ( 16 Nov 2023 05:47 )             28.3     11-16    138  |  110<H>  |  25.0<H>  ----------------------------<  261<H>  4.5   |  17.0<L>  |  3.58<H>    Ca    7.6<L>      16 Nov 2023 05:47  Phos  4.3     11-15  Mg     2.0     11-15    TPro  4.6<L>  /  Alb  2.6<L>  /  TBili  0.3<L>  /  DBili  x   /  AST  12  /  ALT  10  /  AlkPhos  94  11-16    Urinalysis Basic - ( 16 Nov 2023 05:47 )    Color: x / Appearance: x / SG: x / pH: x  Gluc: 261 mg/dL / Ketone: x  / Bili: x / Urobili: x   Blood: x / Protein: x / Nitrite: x   Leuk Esterase: x / RBC: x / WBC x   Sq Epi: x / Non Sq Epi: x / Bacteria: x    POCT Blood Glucose.: 95 mg/dL (11-16-23 @ 12:56)  POCT Blood Glucose.: 189 mg/dL (11-16-23 @ 09:08)  POCT Blood Glucose.: 95 mg/dL (11-15-23 @ 21:06)  POCT Blood Glucose.: 74 mg/dL (11-15-23 @ 17:20)

## 2023-11-16 NOTE — PROGRESS NOTE ADULT - ASSESSMENT
SYED, MA: DKA  Renal function, acidosis and electrolytes slowly improving  NO CKD noted (Creatinine: 0.41 mg/dL (10.11.23))  CT scan: No renal stone nor hydronephrosis  - cont to avoid potential nephrotoxins  - optimize serum glucose control  - add PO NaHCO3  - cont IVF  - check urine prot:creat  - trend labs

## 2023-11-16 NOTE — PROGRESS NOTE ADULT - ASSESSMENT
33 y/o F with pmhx of HTN, DM1 with multiple admissions for DKA, admitted for DKA, also found to have new SYED.       #IDDM with DKA  s/p insulin drip in the ICU, gap resolved  based IV drip insulin usage x 24 hours, started on Lantus 20U BID + premales 15U + ISS  A1C is 15.5  trend BMP and AG  CC diet  Endocrine is following     #SYED with possible CKD   suspect 2/2 hydration, SCr up to 2.7, baseline 0.4  encourage PO intake  Creatinine is trending up now 3.3, will give IV fluids  f/u urine studies  renally dose all medications keppras lowered and avoid nephrotoxic medications    Anemia +Thrombocytopenia- HIT antibodies negative  occult blood negative    Lovenox held - follow trend   Hypokalemia:  again replaced and monitor     #PNA   Noted with initial leukocytosis up to 33, now resolved  RVP negative  CT chest showing b/l PNA and possible cystitis  BCx and UCx NGTD  c/w empiric CTX, plan for 5 days total  d/c doxy as legionella neg  f/u strep pneumo    #H/o seizure disorder  c/w home Keppra, changed to PO    #H/o ETOH abuse  reports last drink > month ago  Symptoms trigger CIWA 5 -->2  c/w folic acid, thiamine and MV  Mental status at baseline    DVT ppx: HSQ  Diet: CC  Dispo: pending improvement in SYED and Endo     33 y/o F with pmhx of HTN, DM1 with multiple admissions for DKA, admitted for DKA, also found to have new SYED.       #IDDM with DKA  s/p insulin drip in the ICU, gap resolved  A1C is 15.5  CC diet  Endocrine is following,   - Continue premeal admelog 5 units TID and sliding scale coverage  - Lantus 20 units every morning  - He takes lantus at night at home, so will convert to qhs lantus. Give lantus 8 units tomorrow morning and lantus 8 units tomorrow night, then on 11/18 give lantus 16 units qhs.    #SYED with possible CKD   suspect 2/2 hydration, SCr up to 3.5  encourage PO intake  renally dose all medications keppras lowered and avoid nephrotoxic medications  will check bladder scan    Anemia +Thrombocytopenia- HIT antibodies negative  occult blood negative    Lovenox held - follow trend   Hypokalemia:  again replaced and monitor     #PNA   Noted with initial leukocytosis up to 32, now resolved  RVP negative  CT chest showing b/l PNA and possible cystitis  BCx and UCx NGTD  c/w empiric CTX, plan for 5 days total  d/c doxy as legionella neg  f/u strep pneumo    #H/o seizure disorder  c/w home Keppra, changed to PO    #H/o ETOH abuse  reports last drink > month ago  Symptoms trigger CIWA 5 -->2  c/w folic acid, thiamine and MV  Mental status at baseline    DVT ppx: scds  Dispo: pending improvement in SYED and Endo     31 y/o F with pmhx of HTN, DM1 with multiple admissions for DKA, admitted for DKA, also found to have new SYED.       #IDDM with DKA  s/p insulin drip in the ICU, gap resolved  A1C is 15.5  CC diet  Endocrine is following,   - Continue premeal admelog 5 units TID and sliding scale coverage  - Lantus 20 units every morning  - He takes lantus at night at home, so will convert to qhs lantus. Give lantus 8 units tomorrow morning and lantus 8 units tomorrow night, then on 11/18 give lantus 16 units qhs.    #SYED with possible CKD and acidosis  suspect 2/2 hydration, SCr up to 3.5  encourage PO intake  renally dose all medications keppras lowered and avoid nephrotoxic medications  will check bladder scan  start on po bicarb    Anemia +Thrombocytopenia- HIT antibodies negative  occult blood negative    Lovenox held - follow trend   Hypokalemia:  again replaced and monitor     #PNA   Noted with initial leukocytosis up to 33, now resolved  RVP negative  CT chest showing b/l PNA and possible cystitis  BCx and UCx NGTD  c/w empiric CTX, plan for 5 days total  d/c doxy as legionella neg  f/u strep pneumo    #H/o seizure disorder  c/w home Keppra, changed to PO    #H/o ETOH abuse  reports last drink > month ago  Symptoms trigger CIWA 5 -->2  c/w folic acid, thiamine and MV  Mental status at baseline    DVT ppx: scds  Dispo: pending improvement in SYED and Endo

## 2023-11-16 NOTE — PROGRESS NOTE ADULT - ASSESSMENT
32M with PMHx DM with multiple admissions for recurrent DKA, ETOH abuse, Pancreatitis, Afib, HTN, Seizure disorder, brought to ED after being found unresponsive at home. Labs consistent with severe DKA. Consulted for diabetes management.    1. Poorly controlled DM, a1c 15.5% - s/p DKA secondary to noncompliance  - Continue premeal admelog 5 units TID and sliding scale coverage  - Lantus 20 units every morning (glucoses 70-90s yesterday)  - He takes lantus at night at home, so will convert to qhs lantus. Give lantus 8 units tomorrow morning and lantus 8 units tomorrow night, then on 11/18 give lantus 16 units qhs.    2. SYED  - Nephrology following, avoid potential nephrotoxins

## 2023-11-16 NOTE — PROGRESS NOTE ADULT - SUBJECTIVE AND OBJECTIVE BOX
NEPHROLOGY INTERVAL HPI/OVERNIGHT EVENTS:  pt comfortable this AM  no acute complaints  states he is tolerating diet well    MEDICATIONS  (STANDING):  cefTRIAXone Injectable. 1000 milliGRAM(s) IV Push every 24 hours  chlorhexidine 2% Cloths 1 Application(s) Topical daily  dextrose 5%. 1000 milliLiter(s) (50 mL/Hr) IV Continuous <Continuous>  dextrose 5%. 1000 milliLiter(s) (100 mL/Hr) IV Continuous <Continuous>  dextrose 50% Injectable 25 Gram(s) IV Push once  dextrose 50% Injectable 25 Gram(s) IV Push once  dextrose 50% Injectable 50 milliLiter(s) IV Push every 15 minutes  famotidine    Tablet 20 milliGRAM(s) Oral two times a day  folic acid Injectable 1 milliGRAM(s) IV Push daily  glucagon  Injectable 1 milliGRAM(s) IntraMuscular once  insulin glargine Injectable (LANTUS) 20 Unit(s) SubCutaneous every morning  insulin lispro (ADMELOG) corrective regimen sliding scale   SubCutaneous three times a day before meals  insulin lispro Injectable (ADMELOG) 5 Unit(s) SubCutaneous three times a day before meals  levETIRAcetam 500 milliGRAM(s) Oral two times a day  multivitamin 1 Tablet(s) Oral daily  sodium bicarbonate  Infusion 0.094 mEq/kG/Hr (100 mL/Hr) IV Continuous <Continuous>  thiamine Injectable 100 milliGRAM(s) IV Push daily    MEDICATIONS  (PRN):  dextrose Oral Gel 15 Gram(s) Oral once PRN Blood Glucose LESS THAN 70 milliGRAM(s)/deciliter      Allergies    No Known Allergies  Allergy Status Unknown          Vital Signs Last 24 Hrs  T(C): 37.1 (16 Nov 2023 08:09), Max: 37.1 (15 Nov 2023 10:52)  T(F): 98.7 (16 Nov 2023 08:09), Max: 98.7 (15 Nov 2023 10:52)  HR: 75 (16 Nov 2023 08:09) (70 - 76)  BP: 117/81 (16 Nov 2023 08:09) (101/70 - 117/81)  BP(mean): --  RR: 18 (16 Nov 2023 08:09) (18 - 18)  SpO2: 97% (16 Nov 2023 08:09) (97% - 100%)    Parameters below as of 16 Nov 2023 08:09  Patient On (Oxygen Delivery Method): room air        PHYSICAL EXAM:  GENERAL: Comfortable  HEENMT: No periorbital edema  NECK: No JVD  NERVOUS SYSTEM:  AAOx3  CHEST/LUNG: Clear bilaterally  HEART: Regular rate and rhythm; No rub  ABDOMEN: Soft, Nontender, +BS  EXTREMITIES:  No  dependent edema  SKIN: No rashes    LABS:                        9.4    3.81  )-----------( 96       ( 16 Nov 2023 05:47 )             28.3     11-16    138  |  110<H>  |  25.0<H>  ----------------------------<  261<H>  4.5   |  17.0<L>  |  3.58<H>        Ca    7.6<L>      16 Nov 2023 05:47  Phos  4.3     11-15  Mg     2.0     11-15    TPro  4.6<L>  /  Alb  2.6<L>  /  TBili  0.3<L>  /  DBili  x   /  AST  12  /  ALT  10  /  AlkPhos  94  11-16    PT/INR - ( 15 Nov 2023 09:58 )   PT: 10.1 sec;   INR: 0.91 ratio         PTT - ( 15 Nov 2023 09:58 )  PTT:30.4 sec  Urinalysis Basic - ( 16 Nov 2023 05:47 )    Color: x / Appearance: x / SG: x / pH: x  Gluc: 261 mg/dL / Ketone: x  / Bili: x / Urobili: x   Blood: x / Protein: x / Nitrite: x   Leuk Esterase: x / RBC: x / WBC x   Sq Epi: x / Non Sq Epi: x / Bacteria: x          RADIOLOGY & ADDITIONAL TESTS:  < from: US Kidney and Bladder (11.14.23 @ 12:09) >    ACC: [id number] EXAM:  US KIDNEYS AND BLADDER   ORDERED BY: ROLLY LOOMIS     PROCEDURE DATE:  11/14/2023          INTERPRETATION:  CLINICAL INFORMATION: Renal insufficiency.    COMPARISON: November 12, 2023.    TECHNIQUE: Sonography of the kidneys and bladder.    FINDINGS: Increased echotexture of the renal parenchyma suggests chronic   medical renal disease.    Right kidney: 12.5 cm. No renal mass, hydronephrosis or calculi.    Left kidney: 12.5 cm. No renal mass, hydronephrosis or calculi.    Urinary bladder: Diffuse bladder wall thickening is identified measuring   up to 6 mm. Echogenicity within the bladder lumen may be related to   debris.    A small volume of intra-abdominal/pelvic free fluid identified.  Note made of small right pleural effusion.    IMPRESSION:  Increased echotexture of the renal parenchyma suggests chronic medical   renal disease. No hydronephrosis. Diffuse bladder wall thickening is   identified measuring up to 6 mm. Echogenicity within the bladder lumen   may berelated to debris. Small volume free intra-abdominal/pelvic fluid.    < end of copied text >

## 2023-11-16 NOTE — PROGRESS NOTE ADULT - SUBJECTIVE AND OBJECTIVE BOX
[facility]    Chief Complaint:  DKA    SUBJECTIVE / OVERNIGHT EVENTS: patient seen and examined. no complaints    Patient denies chest pain, SOB, abd pain, N/V, fever, chills, dysuria or any other complaints. All remainder ROS negative.     MEDICATIONS  (STANDING):  cefTRIAXone Injectable. 1000 milliGRAM(s) IV Push every 24 hours  chlorhexidine 2% Cloths 1 Application(s) Topical daily  dextrose 5%. 1000 milliLiter(s) (50 mL/Hr) IV Continuous <Continuous>  dextrose 5%. 1000 milliLiter(s) (100 mL/Hr) IV Continuous <Continuous>  dextrose 50% Injectable 25 Gram(s) IV Push once  dextrose 50% Injectable 25 Gram(s) IV Push once  dextrose 50% Injectable 50 milliLiter(s) IV Push every 15 minutes  famotidine    Tablet 20 milliGRAM(s) Oral two times a day  folic acid Injectable 1 milliGRAM(s) IV Push daily  glucagon  Injectable 1 milliGRAM(s) IntraMuscular once  insulin lispro (ADMELOG) corrective regimen sliding scale   SubCutaneous three times a day before meals  insulin lispro Injectable (ADMELOG) 5 Unit(s) SubCutaneous three times a day before meals  levETIRAcetam 500 milliGRAM(s) Oral two times a day  multivitamin 1 Tablet(s) Oral daily  sodium bicarbonate 650 milliGRAM(s) Oral three times a day  sodium bicarbonate  Infusion 0.094 mEq/kG/Hr (100 mL/Hr) IV Continuous <Continuous>  thiamine Injectable 100 milliGRAM(s) IV Push daily    MEDICATIONS  (PRN):  dextrose Oral Gel 15 Gram(s) Oral once PRN Blood Glucose LESS THAN 70 milliGRAM(s)/deciliter        I&O's Summary    15 Nov 2023 07:01  -  16 Nov 2023 07:00  --------------------------------------------------------  IN: 500 mL / OUT: 2200 mL / NET: -1700 mL    16 Nov 2023 07:01  -  16 Nov 2023 16:19  --------------------------------------------------------  IN: 0 mL / OUT: 400 mL / NET: -400 mL        PHYSICAL EXAM:  Vital Signs Last 24 Hrs  T(C): 37.1 (16 Nov 2023 08:09), Max: 37.1 (15 Nov 2023 20:03)  T(F): 98.7 (16 Nov 2023 08:09), Max: 98.7 (15 Nov 2023 20:03)  HR: 75 (16 Nov 2023 08:09) (70 - 76)  BP: 117/81 (16 Nov 2023 08:09) (101/70 - 117/81)  BP(mean): --  RR: 18 (16 Nov 2023 08:09) (18 - 18)  SpO2: 97% (16 Nov 2023 08:09) (97% - 100%)    Parameters below as of 16 Nov 2023 08:09  Patient On (Oxygen Delivery Method): room air            CONSTITUTIONAL: NAD,  ENMT: Moist oral mucosa, no pharyngeal injection or exudates  RESPIRATORY: Normal respiratory effort; lungs are clear to auscultation bilaterally  CARDIOVASCULAR: Regular rate and rhythm, normal S1 and S2, No lower extremity edema;  ABDOMEN: Nontender to palpation, normoactive bowel sounds, no rebound/guarding;  MUSCLOSKELETAL:   no joint swelling or tenderness to palpation  PSYCH: A+O to person, place, and time; affect appropriate  NEUROLOGY: CN 2-12 are intact and symmetric; no gross sensory deficits;   SKIN: No rashes; no palpable lesions    LABS:                        9.4    3.81  )-----------( 96       ( 16 Nov 2023 05:47 )             28.3     11-16    138  |  110<H>  |  25.0<H>  ----------------------------<  261<H>  4.5   |  17.0<L>  |  3.58<H>    Ca    7.6<L>      16 Nov 2023 05:47  Phos  4.3     11-15  Mg     2.0     11-15    TPro  4.6<L>  /  Alb  2.6<L>  /  TBili  0.3<L>  /  DBili  x   /  AST  12  /  ALT  10  /  AlkPhos  94  11-16    PT/INR - ( 15 Nov 2023 09:58 )   PT: 10.1 sec;   INR: 0.91 ratio         PTT - ( 15 Nov 2023 09:58 )  PTT:30.4 sec      Urinalysis Basic - ( 16 Nov 2023 05:47 )    Color: x / Appearance: x / SG: x / pH: x  Gluc: 261 mg/dL / Ketone: x  / Bili: x / Urobili: x   Blood: x / Protein: x / Nitrite: x   Leuk Esterase: x / RBC: x / WBC x   Sq Epi: x / Non Sq Epi: x / Bacteria: x        CAPILLARY BLOOD GLUCOSE      POCT Blood Glucose.: 95 mg/dL (16 Nov 2023 12:56)  POCT Blood Glucose.: 189 mg/dL (16 Nov 2023 09:08)  POCT Blood Glucose.: 95 mg/dL (15 Nov 2023 21:06)  POCT Blood Glucose.: 74 mg/dL (15 Nov 2023 17:20)        RADIOLOGY & ADDITIONAL TESTS:  Results Reviewed:   Imaging Personally Reviewed:  Electrocardiogram Personally Reviewed:

## 2023-11-17 ENCOUNTER — TRANSCRIPTION ENCOUNTER (OUTPATIENT)
Age: 33
End: 2023-11-17

## 2023-11-17 LAB
ALBUMIN SERPL ELPH-MCNC: 2.6 G/DL — LOW (ref 3.3–5.2)
ALBUMIN SERPL ELPH-MCNC: 2.6 G/DL — LOW (ref 3.3–5.2)
ALP SERPL-CCNC: 89 U/L — SIGNIFICANT CHANGE UP (ref 40–120)
ALP SERPL-CCNC: 89 U/L — SIGNIFICANT CHANGE UP (ref 40–120)
ALT FLD-CCNC: 9 U/L — SIGNIFICANT CHANGE UP
ALT FLD-CCNC: 9 U/L — SIGNIFICANT CHANGE UP
ANION GAP SERPL CALC-SCNC: 10 MMOL/L — SIGNIFICANT CHANGE UP (ref 5–17)
ANION GAP SERPL CALC-SCNC: 10 MMOL/L — SIGNIFICANT CHANGE UP (ref 5–17)
AST SERPL-CCNC: 13 U/L — SIGNIFICANT CHANGE UP
AST SERPL-CCNC: 13 U/L — SIGNIFICANT CHANGE UP
BASOPHILS # BLD AUTO: 0.02 K/UL — SIGNIFICANT CHANGE UP (ref 0–0.2)
BASOPHILS # BLD AUTO: 0.02 K/UL — SIGNIFICANT CHANGE UP (ref 0–0.2)
BASOPHILS NFR BLD AUTO: 0.6 % — SIGNIFICANT CHANGE UP (ref 0–2)
BASOPHILS NFR BLD AUTO: 0.6 % — SIGNIFICANT CHANGE UP (ref 0–2)
BILIRUB SERPL-MCNC: 0.3 MG/DL — LOW (ref 0.4–2)
BILIRUB SERPL-MCNC: 0.3 MG/DL — LOW (ref 0.4–2)
BUN SERPL-MCNC: 23.5 MG/DL — HIGH (ref 8–20)
BUN SERPL-MCNC: 23.5 MG/DL — HIGH (ref 8–20)
CALCIUM SERPL-MCNC: 7.8 MG/DL — LOW (ref 8.4–10.5)
CALCIUM SERPL-MCNC: 7.8 MG/DL — LOW (ref 8.4–10.5)
CHLORIDE SERPL-SCNC: 115 MMOL/L — HIGH (ref 96–108)
CHLORIDE SERPL-SCNC: 115 MMOL/L — HIGH (ref 96–108)
CO2 SERPL-SCNC: 21 MMOL/L — LOW (ref 22–29)
CO2 SERPL-SCNC: 21 MMOL/L — LOW (ref 22–29)
CREAT SERPL-MCNC: 3.12 MG/DL — HIGH (ref 0.5–1.3)
CREAT SERPL-MCNC: 3.12 MG/DL — HIGH (ref 0.5–1.3)
CULTURE RESULTS: SIGNIFICANT CHANGE UP
EGFR: 26 ML/MIN/1.73M2 — LOW
EGFR: 26 ML/MIN/1.73M2 — LOW
EOSINOPHIL # BLD AUTO: 0.04 K/UL — SIGNIFICANT CHANGE UP (ref 0–0.5)
EOSINOPHIL # BLD AUTO: 0.04 K/UL — SIGNIFICANT CHANGE UP (ref 0–0.5)
EOSINOPHIL NFR BLD AUTO: 1.1 % — SIGNIFICANT CHANGE UP (ref 0–6)
EOSINOPHIL NFR BLD AUTO: 1.1 % — SIGNIFICANT CHANGE UP (ref 0–6)
GLUCOSE BLDC GLUCOMTR-MCNC: 110 MG/DL — HIGH (ref 70–99)
GLUCOSE BLDC GLUCOMTR-MCNC: 110 MG/DL — HIGH (ref 70–99)
GLUCOSE BLDC GLUCOMTR-MCNC: 181 MG/DL — HIGH (ref 70–99)
GLUCOSE BLDC GLUCOMTR-MCNC: 181 MG/DL — HIGH (ref 70–99)
GLUCOSE BLDC GLUCOMTR-MCNC: 205 MG/DL — HIGH (ref 70–99)
GLUCOSE BLDC GLUCOMTR-MCNC: 205 MG/DL — HIGH (ref 70–99)
GLUCOSE BLDC GLUCOMTR-MCNC: 306 MG/DL — HIGH (ref 70–99)
GLUCOSE BLDC GLUCOMTR-MCNC: 306 MG/DL — HIGH (ref 70–99)
GLUCOSE SERPL-MCNC: 151 MG/DL — HIGH (ref 70–99)
GLUCOSE SERPL-MCNC: 151 MG/DL — HIGH (ref 70–99)
HCT VFR BLD CALC: 28.7 % — LOW (ref 39–50)
HCT VFR BLD CALC: 28.7 % — LOW (ref 39–50)
HGB BLD-MCNC: 9.6 G/DL — LOW (ref 13–17)
HGB BLD-MCNC: 9.6 G/DL — LOW (ref 13–17)
IMM GRANULOCYTES NFR BLD AUTO: 0.3 % — SIGNIFICANT CHANGE UP (ref 0–0.9)
IMM GRANULOCYTES NFR BLD AUTO: 0.3 % — SIGNIFICANT CHANGE UP (ref 0–0.9)
LYMPHOCYTES # BLD AUTO: 1.31 K/UL — SIGNIFICANT CHANGE UP (ref 1–3.3)
LYMPHOCYTES # BLD AUTO: 1.31 K/UL — SIGNIFICANT CHANGE UP (ref 1–3.3)
LYMPHOCYTES # BLD AUTO: 37.1 % — SIGNIFICANT CHANGE UP (ref 13–44)
LYMPHOCYTES # BLD AUTO: 37.1 % — SIGNIFICANT CHANGE UP (ref 13–44)
MAGNESIUM SERPL-MCNC: 2 MG/DL — SIGNIFICANT CHANGE UP (ref 1.6–2.6)
MAGNESIUM SERPL-MCNC: 2 MG/DL — SIGNIFICANT CHANGE UP (ref 1.6–2.6)
MCHC RBC-ENTMCNC: 30.2 PG — SIGNIFICANT CHANGE UP (ref 27–34)
MCHC RBC-ENTMCNC: 30.2 PG — SIGNIFICANT CHANGE UP (ref 27–34)
MCHC RBC-ENTMCNC: 33.4 GM/DL — SIGNIFICANT CHANGE UP (ref 32–36)
MCHC RBC-ENTMCNC: 33.4 GM/DL — SIGNIFICANT CHANGE UP (ref 32–36)
MCV RBC AUTO: 90.3 FL — SIGNIFICANT CHANGE UP (ref 80–100)
MCV RBC AUTO: 90.3 FL — SIGNIFICANT CHANGE UP (ref 80–100)
MONOCYTES # BLD AUTO: 0.53 K/UL — SIGNIFICANT CHANGE UP (ref 0–0.9)
MONOCYTES # BLD AUTO: 0.53 K/UL — SIGNIFICANT CHANGE UP (ref 0–0.9)
MONOCYTES NFR BLD AUTO: 15 % — HIGH (ref 2–14)
MONOCYTES NFR BLD AUTO: 15 % — HIGH (ref 2–14)
NEUTROPHILS # BLD AUTO: 1.62 K/UL — LOW (ref 1.8–7.4)
NEUTROPHILS # BLD AUTO: 1.62 K/UL — LOW (ref 1.8–7.4)
NEUTROPHILS NFR BLD AUTO: 45.9 % — SIGNIFICANT CHANGE UP (ref 43–77)
NEUTROPHILS NFR BLD AUTO: 45.9 % — SIGNIFICANT CHANGE UP (ref 43–77)
PLATELET # BLD AUTO: 112 K/UL — LOW (ref 150–400)
PLATELET # BLD AUTO: 112 K/UL — LOW (ref 150–400)
POTASSIUM SERPL-MCNC: 4.2 MMOL/L — SIGNIFICANT CHANGE UP (ref 3.5–5.3)
POTASSIUM SERPL-MCNC: 4.2 MMOL/L — SIGNIFICANT CHANGE UP (ref 3.5–5.3)
POTASSIUM SERPL-SCNC: 4.2 MMOL/L — SIGNIFICANT CHANGE UP (ref 3.5–5.3)
POTASSIUM SERPL-SCNC: 4.2 MMOL/L — SIGNIFICANT CHANGE UP (ref 3.5–5.3)
PROT SERPL-MCNC: 4.6 G/DL — LOW (ref 6.6–8.7)
PROT SERPL-MCNC: 4.6 G/DL — LOW (ref 6.6–8.7)
RBC # BLD: 3.18 M/UL — LOW (ref 4.2–5.8)
RBC # BLD: 3.18 M/UL — LOW (ref 4.2–5.8)
RBC # FLD: 14.5 % — SIGNIFICANT CHANGE UP (ref 10.3–14.5)
RBC # FLD: 14.5 % — SIGNIFICANT CHANGE UP (ref 10.3–14.5)
SODIUM SERPL-SCNC: 146 MMOL/L — HIGH (ref 135–145)
SODIUM SERPL-SCNC: 146 MMOL/L — HIGH (ref 135–145)
SPECIMEN SOURCE: SIGNIFICANT CHANGE UP
WBC # BLD: 3.53 K/UL — LOW (ref 3.8–10.5)
WBC # BLD: 3.53 K/UL — LOW (ref 3.8–10.5)
WBC # FLD AUTO: 3.53 K/UL — LOW (ref 3.8–10.5)
WBC # FLD AUTO: 3.53 K/UL — LOW (ref 3.8–10.5)

## 2023-11-17 PROCEDURE — 99233 SBSQ HOSP IP/OBS HIGH 50: CPT

## 2023-11-17 PROCEDURE — 99232 SBSQ HOSP IP/OBS MODERATE 35: CPT

## 2023-11-17 RX ORDER — SODIUM CHLORIDE 9 MG/ML
1000 INJECTION INTRAMUSCULAR; INTRAVENOUS; SUBCUTANEOUS
Refills: 0 | Status: DISCONTINUED | OUTPATIENT
Start: 2023-11-17 | End: 2023-11-18

## 2023-11-17 RX ADMIN — Medication 1 MILLIGRAM(S): at 12:58

## 2023-11-17 RX ADMIN — CEFTRIAXONE 1000 MILLIGRAM(S): 500 INJECTION, POWDER, FOR SOLUTION INTRAMUSCULAR; INTRAVENOUS at 14:54

## 2023-11-17 RX ADMIN — INSULIN GLARGINE 8 UNIT(S): 100 INJECTION, SOLUTION SUBCUTANEOUS at 09:15

## 2023-11-17 RX ADMIN — CHLORHEXIDINE GLUCONATE 1 APPLICATION(S): 213 SOLUTION TOPICAL at 12:58

## 2023-11-17 RX ADMIN — Medication 1: at 13:04

## 2023-11-17 RX ADMIN — Medication 650 MILLIGRAM(S): at 14:54

## 2023-11-17 RX ADMIN — INSULIN GLARGINE 8 UNIT(S): 100 INJECTION, SOLUTION SUBCUTANEOUS at 22:00

## 2023-11-17 RX ADMIN — Medication 100 MILLIGRAM(S): at 12:58

## 2023-11-17 RX ADMIN — LEVETIRACETAM 500 MILLIGRAM(S): 250 TABLET, FILM COATED ORAL at 05:32

## 2023-11-17 RX ADMIN — Medication 1 TABLET(S): at 12:58

## 2023-11-17 RX ADMIN — FAMOTIDINE 20 MILLIGRAM(S): 10 INJECTION INTRAVENOUS at 05:32

## 2023-11-17 RX ADMIN — Medication 5 UNIT(S): at 18:23

## 2023-11-17 RX ADMIN — FAMOTIDINE 20 MILLIGRAM(S): 10 INJECTION INTRAVENOUS at 17:38

## 2023-11-17 RX ADMIN — SODIUM CHLORIDE 100 MILLILITER(S): 9 INJECTION INTRAMUSCULAR; INTRAVENOUS; SUBCUTANEOUS at 16:03

## 2023-11-17 RX ADMIN — Medication 4: at 18:23

## 2023-11-17 RX ADMIN — Medication 650 MILLIGRAM(S): at 05:32

## 2023-11-17 RX ADMIN — Medication 650 MILLIGRAM(S): at 22:01

## 2023-11-17 RX ADMIN — Medication 5 UNIT(S): at 13:06

## 2023-11-17 RX ADMIN — LEVETIRACETAM 500 MILLIGRAM(S): 250 TABLET, FILM COATED ORAL at 17:38

## 2023-11-17 RX ADMIN — Medication 5 UNIT(S): at 09:39

## 2023-11-17 NOTE — DISCHARGE NOTE PROVIDER - ATTENDING DISCHARGE PHYSICAL EXAM:
Indication: Intense LEFT testicular pain upon waking this morning.



Comparison: No relevant prior exams available on the [facility] PACS for comparison.



Technique: Scrotal ultrasound.



Report: 4.2 x 1.5 x 2.3 cm RIGHT testicle demonstrate normal echotexture and vascularity.

0.8 x 0.9 cm RIGHT epididymis head is unremarkable. Negative for RIGHT hydrocele or

varicocele.



3.6 x 2.1 x 3.0 cm LEFT testicle demonstrates mildly heterogeneous echotexture without

discrete focal intratesticular lesions. No blood flow detected at the 2.2 x 2.7 cm LEFT

testicle or epididymis head. Only trace fluid surrounding the LEFT testicle. Negative for

varicocele.



IMPRESSION: No detectable blood flow at the LEFT testicle or epididymis consistent with

complete torsion. While not significantly enlarged the LEFT testicle demonstrates mild

heterogeneous echogenicity compared with the RIGHT most consistent with ischemia. Results

discussed with Dr. Julien 12/6/2017 8:43 AM EST
Attending Discharge Physical Examination:

## 2023-11-17 NOTE — DISCHARGE NOTE PROVIDER - CARE PROVIDER_API CALL
Senthil Perea  Nephrology  [address] [place] [address]  Phone: [phone]  Fax: [phone]  Follow Up Time: 1 week

## 2023-11-17 NOTE — DISCHARGE NOTE PROVIDER - ATTENDING DISCHARGE PHYSICAL EXAMINATION:
General: no acute distress  HEENT: normocephalic, atraumatic  Lungs: clear to auscultation  CNS:AAOx3. no focal deficit

## 2023-11-17 NOTE — PROGRESS NOTE ADULT - NS ATTEND AMEND GEN_ALL_CORE FT
Patient seen and examined at bedside with NP present. Agree with above assessment and recommendations.
Case was discussed with NP in details. Agree with the above assessment and recommendations.
Case discussed with NP. Agree with above assessment and recommendations.
Patient seen and examined at bedside with NP present. Agree with above assessment and recommendations.

## 2023-11-17 NOTE — DISCHARGE NOTE PROVIDER - NSDCCPCAREPLAN_GEN_ALL_CORE_FT
PRINCIPAL DISCHARGE DIAGNOSIS  Diagnosis: DKA (diabetic ketoacidosis)  Assessment and Plan of Treatment: please take insulin as prescribed  follow up with Endocrinologycheck you blood sugar 3 times a day      SECONDARY DISCHARGE DIAGNOSES  Diagnosis: SYED (acute kidney injury)  Assessment and Plan of Treatment: follow up with Nephrology    Diagnosis: CAP (community acquired pneumonia)  Assessment and Plan of Treatment: you completed antibiotics in the hospital     PRINCIPAL DISCHARGE DIAGNOSIS  Diagnosis: DKA (diabetic ketoacidosis)  Assessment and Plan of Treatment: please take lantus 16 units at bedtime and ademalog 5 units with meals and according to sliding scale on top of the scheduled 5 units.  follow up with Endocrinology.      SECONDARY DISCHARGE DIAGNOSES  Diagnosis: SYED (acute kidney injury)  Assessment and Plan of Treatment: follow up with Nephrology in 1-2 weeks    Diagnosis: CAP (community acquired pneumonia)  Assessment and Plan of Treatment: you completed antibiotics in the hospital     PRINCIPAL DISCHARGE DIAGNOSIS  Diagnosis: DKA (diabetic ketoacidosis)  Assessment and Plan of Treatment: please take lantus 16 units at bedtime and humalog kwikpen insulin 5 units with meals and according to sliding scale on top of the scheduled 5 units.  follow up with Endocrinology.      SECONDARY DISCHARGE DIAGNOSES  Diagnosis: SYED (acute kidney injury)  Assessment and Plan of Treatment: follow up with Nephrology in 1-2 weeks    Diagnosis: CAP (community acquired pneumonia)  Assessment and Plan of Treatment: you completed antibiotics in the hospital

## 2023-11-17 NOTE — PROGRESS NOTE ADULT - ASSESSMENT
31 y/o F with pmhx of HTN, DM1 with multiple admissions for DKA, admitted for DKA, also found to have new SYED.       #IDDM with DKA  s/p insulin drip in the ICU, gap resolved  A1C is 15.5  CC diet  Endocrine is following,  - Continue premeal admelog 5 units TID and sliding scale coverage  - Per Endo, Lantus 8 units this morning, lantus 8 units qhs ordered (then tomorrow 11/18 give lantus 16 units qhs)    #SYED with possible CKD and acidosis  suspect 2/2 hydration, SCr up to 3  encourage PO intake  renally dose all medications keppras lowered and avoid nephrotoxic medications  no retention on bladder scan  po bicarb  IVFs adjusted by Nephro    Anemia +Thrombocytopenia- HIT antibodies negative  occult blood negative   Lovenox held - follow trend     #PNA   Noted with initial leukocytosis up to 33, now resolved  RVP negative  CT chest showing b/l PNA and possible cystitis  BCx and UCx NGTD  c/w empiric CTX, plan for 5 days total    #H/o seizure disorder  c/w home Keppra, changed to PO    #H/o ETOH abuse  reports last drink > month ago  c/w folic acid, thiamine and MV  Mental status at baseline    DVT ppx: scds  Dispo: pending improvement in SYED . POssible DC in 24 hours once cleared by Renal

## 2023-11-17 NOTE — PROGRESS NOTE ADULT - NUTRITIONAL ASSESSMENT
This patient has been assessed with a concern for Malnutrition and has been determined to have a diagnosis/diagnoses of Severe protein-calorie malnutrition.    This patient is being managed with:   Diet Consistent Carbohydrate w/Evening Snack-  Entered: Nov 13 2023  9:22AM  

## 2023-11-17 NOTE — DISCHARGE NOTE PROVIDER - NSDCMRMEDTOKEN_GEN_ALL_CORE_FT
alcohol pads: to check sugars 3 times a day  aspirin 81 mg oral delayed release tablet: 1 tab(s) orally once a day  folic acid 1 mg oral tablet: 1 tab(s) orally once a day  Glucometer: to check sugars 3 times a day  HumaLOG KwikPen 100 units/mL injectable solution: 5 unit(s) subcutaneous 3 times a day (with meals) 11 unit(s) subcutaneous 3 times a day (with meals) dinner  Keppra 1000 mg oral tablet: 1 tab(s) orally 2 times a day  lancets: to check sugars 3 times a day  Lantus Solostar Pen 100 units/mL subcutaneous solution: 20 unit(s) subcutaneous once a day (at bedtime)  strips: to check sugars 3 times a day  thiamine 100 mg oral tablet: 1 tab(s) orally once a day   aspirin 81 mg oral delayed release tablet: 1 tab(s) orally once a day  folic acid 1 mg oral tablet: 1 tab(s) orally once a day  Keppra 1000 mg oral tablet: 1 tab(s) orally 2 times a day  thiamine 100 mg oral tablet: 1 tab(s) orally once a day

## 2023-11-17 NOTE — CHART NOTE - NSCHARTNOTEFT_GEN_A_CORE
Source: Patient [ x]  Family [ ]   other [ x]EMR    Current Diet: Consistent CHO with evening snack    Patient reports [ ] nausea  [ ] vomiting [ ] diarrhea [ ] constipation  [ ]chewing problems [ ] swallowing issues  [ ] other:     PO intake:  < 50% [ ]   50-75%  [x ]   %  [x ]  other :pt reports    Source for PO intake [x ] Patient [ ] family [x ] chart [ ] staff [ ] other    Enteral /Parenteral Nutrition:     Current Weight: 130.2#  no edema    % Weight Change     Pertinent Medications: MEDICATIONS  (STANDING):  cefTRIAXone Injectable. 1000 milliGRAM(s) IV Push every 24 hours  chlorhexidine 2% Cloths 1 Application(s) Topical daily  dextrose 5%. 1000 milliLiter(s) (100 mL/Hr) IV Continuous <Continuous>  dextrose 5%. 1000 milliLiter(s) (50 mL/Hr) IV Continuous <Continuous>  dextrose 50% Injectable 25 Gram(s) IV Push once  dextrose 50% Injectable 25 Gram(s) IV Push once  dextrose 50% Injectable 50 milliLiter(s) IV Push every 15 minutes  famotidine    Tablet 20 milliGRAM(s) Oral two times a day  folic acid Injectable 1 milliGRAM(s) IV Push daily  glucagon  Injectable 1 milliGRAM(s) IntraMuscular once  insulin glargine Injectable (LANTUS) 8 Unit(s) SubCutaneous at bedtime  insulin lispro (ADMELOG) corrective regimen sliding scale   SubCutaneous three times a day before meals  insulin lispro Injectable (ADMELOG) 5 Unit(s) SubCutaneous three times a day before meals  levETIRAcetam 500 milliGRAM(s) Oral two times a day  multivitamin 1 Tablet(s) Oral daily  sodium bicarbonate 650 milliGRAM(s) Oral three times a day  sodium bicarbonate  Infusion 0.094 mEq/kG/Hr (100 mL/Hr) IV Continuous <Continuous>  thiamine Injectable 100 milliGRAM(s) IV Push daily    MEDICATIONS  (PRN):  dextrose Oral Gel 15 Gram(s) Oral once PRN Blood Glucose LESS THAN 70 milliGRAM(s)/deciliter    Pertinent Labs: CBC Full  -  ( 17 Nov 2023 04:52 )  WBC Count : 3.53 K/uL  RBC Count : 3.18 M/uL  Hemoglobin : 9.6 g/dL  Hematocrit : 28.7 %  Platelet Count - Automated : 112 K/uL  Mean Cell Volume : 90.3 fl  Mean Cell Hemoglobin : 30.2 pg  Mean Cell Hemoglobin Concentration : 33.4 gm/dL  Auto Neutrophil # : 1.62 K/uL  Auto Lymphocyte # : 1.31 K/uL  Auto Monocyte # : 0.53 K/uL  Auto Eosinophil # : 0.04 K/uL  Auto Basophil # : 0.02 K/uL  Auto Neutrophil % : 45.9 %  Auto Lymphocyte % : 37.1 %  Auto Monocyte % : 15.0 %  Auto Eosinophil % : 1.1 %  Auto Basophil % : 0.6 %      11-17 Na146 mmol/L<H> Glu 151 mg/dL<H> K+ 4.2 mmol/L Cr  3.12 mg/dL<H> BUN 23.5 mg/dL<H> Phos n/a   Alb 2.6 g/dL<L> PAB n/a           Skin:     Nutrition focused physical exam conducted - found signs of malnutrition [ ]absent [ x]present    Subcutaneous fat loss: [ ] Orbital fat pads region, [x ]Buccal fat region, [ ]Triceps region,  [ ]Ribs region    Muscle wasting: [x ]Temples region, [ x]Clavicle region, [ ]Shoulder region, [ ]Scapula region, [ ]Interosseous region,  [ ]thigh region, [ ]Calf region    Estimated Needs:   [ x] no change since previous assessment  [ ] recalculated:     Current Nutrition Diagnosis: Malnutrition...  severe  related to inadequate energy intake in setting of ETOH abuse  as evidenced by mild/mod muscle/fat depletion, 24% weight loss x 6 mo, meets<75%EER>1 mo. Pt claims he eats well. Reinforced consistent CHO diet.       Recommendations:  Glucerna shake TID to optimize po intake and provide an additional 220 kcal, 10g protein per serving   Continue MVI, thiamine, folic acid      Monitoring and Evaluation:   [x ] PO intake [ x] Tolerance to diet prescription [X] Weights  [X] Follow up per protocol [X] Labs:

## 2023-11-17 NOTE — PROGRESS NOTE ADULT - ASSESSMENT
31 yo M w h/o HTN, type 1 DM has had multiple admissions for DKA, admitted for DKA again, also found to have SYED    SYED nonOliguric 2/2 hypoperfusion from DKA, renal function slowly improving  MA: DKA improving  Renal function, acidosis and electrolytes better  NO CKD noted had previous nL kidney function  (Creatinine: 0.41 mg/dL (10.11.23))  CT scan: No renal stone nor hydronephrosis  HyperNatremia mild IVF made somewhat hypotonic  - cont to avoid potential nephrotoxins  - optimize serum glucose control  - cont PO NaHCO3  - cont IVF adjusted added bicarb     Possible discharge tomorrow depending on labs    Discussed w Dr Scott PEREIRA labs will follow

## 2023-11-17 NOTE — PROGRESS NOTE ADULT - SUBJECTIVE AND OBJECTIVE BOX
NEPHROLOGY INTERVAL HPI/OVERNIGHT EVENTS:    Examined  No distress  Feeling better  Denies HA CP no SOB    MEDICATIONS  (STANDING):  cefTRIAXone Injectable. 1000 milliGRAM(s) IV Push every 24 hours  chlorhexidine 2% Cloths 1 Application(s) Topical daily  dextrose 5%. 1000 milliLiter(s) (100 mL/Hr) IV Continuous <Continuous>  dextrose 5%. 1000 milliLiter(s) (50 mL/Hr) IV Continuous <Continuous>  dextrose 50% Injectable 25 Gram(s) IV Push once  dextrose 50% Injectable 25 Gram(s) IV Push once  dextrose 50% Injectable 50 milliLiter(s) IV Push every 15 minutes  famotidine    Tablet 20 milliGRAM(s) Oral two times a day  folic acid Injectable 1 milliGRAM(s) IV Push daily  glucagon  Injectable 1 milliGRAM(s) IntraMuscular once  insulin glargine Injectable (LANTUS) 8 Unit(s) SubCutaneous at bedtime  insulin lispro (ADMELOG) corrective regimen sliding scale   SubCutaneous three times a day before meals  insulin lispro Injectable (ADMELOG) 5 Unit(s) SubCutaneous three times a day before meals  levETIRAcetam 500 milliGRAM(s) Oral two times a day  multivitamin 1 Tablet(s) Oral daily  sodium bicarbonate 650 milliGRAM(s) Oral three times a day  sodium chloride 0.225% 1000 milliLiter(s) (100 mL/Hr) IV Continuous <Continuous>  thiamine Injectable 100 milliGRAM(s) IV Push daily    MEDICATIONS  (PRN):  dextrose Oral Gel 15 Gram(s) Oral once PRN Blood Glucose LESS THAN 70 milliGRAM(s)/deciliter      Allergies    No Known Allergies  Allergy Status Unknown    Intolerances        Vital Signs Last 24 Hrs  T(C): 36.9 (17 Nov 2023 11:09), Max: 37 (16 Nov 2023 20:13)  T(F): 98.4 (17 Nov 2023 11:09), Max: 98.6 (16 Nov 2023 20:13)  HR: 65 (17 Nov 2023 11:09) (65 - 73)  BP: 121/88 (17 Nov 2023 11:09) (110/73 - 122/78)  BP(mean): --  RR: 18 (17 Nov 2023 11:09) (18 - 18)  SpO2: 100% (17 Nov 2023 11:09) (98% - 100%)    Parameters below as of 17 Nov 2023 11:09  Patient On (Oxygen Delivery Method): room air        PHYSICAL EXAM:  GENERAL: NAD  HEENT: NCAT   NECK: No JVD  NERVOUS SYSTEM:  AAOx3  CHEST/LUNG: Clear bilaterally  HEART: Regular rate and rhythm; No rub  ABDOMEN: Soft, Nontender, +BS  EXTREMITIES:  No  dependent edema    LABS:                        9.6    3.53  )-----------( 112      ( 17 Nov 2023 04:52 )             28.7     11-17    146<H>  |  115<H>  |  23.5<H>  ----------------------------<  151<H>  4.2   |  21.0<L>  |  3.12<H>    Ca    7.8<L>      17 Nov 2023 04:52  Mg     2.0     11-17    TPro  4.6<L>  /  Alb  2.6<L>  /  TBili  0.3<L>  /  DBili  x   /  AST  13  /  ALT  9   /  AlkPhos  89  11-17      Urinalysis Basic - ( 17 Nov 2023 04:52 )    Color: x / Appearance: x / SG: x / pH: x  Gluc: 151 mg/dL / Ketone: x  / Bili: x / Urobili: x   Blood: x / Protein: x / Nitrite: x   Leuk Esterase: x / RBC: x / WBC x   Sq Epi: x / Non Sq Epi: x / Bacteria: x      Magnesium: 2.0 mg/dL (11-17 @ 04:52)          RADIOLOGY & ADDITIONAL TESTS:

## 2023-11-17 NOTE — PROGRESS NOTE ADULT - ASSESSMENT
32M with PMHx DM with multiple admissions for recurrent DKA, ETOH abuse, Pancreatitis, Afib, HTN, Seizure disorder, brought to ED after being found unresponsive at home. Labs consistent with severe DKA. Consulted for diabetes management.    1. Poorly controlled DM, a1c 15.5% - s/p DKA secondary to noncompliance  - Glucoses improving  - Continue premeal admelog 5 units TID and sliding scale coverage  - Lantus 8 units this morning, lantus 8 units qhs ordered (then tomorrow 11/18 give lantus 16 units qhs)    2. SYED  - Nephrology following, avoid potential nephrotoxins

## 2023-11-17 NOTE — DISCHARGE NOTE PROVIDER - HOSPITAL COURSE
31 y/o M with pmhx of HTN, DM1 with multiple admissions for DKA, ETOH abuse presented to ED after being found unresponsive by his wife. Initial labwork significant for WBC 33 with L shift, Blood, serum Co2 < 6, Scr 1.70 (baseline 0.4), VBG pH < 6.942 . Started on IV crystalloid and insulin gtt, admitted to ICU or DKA with course complicated by AMS, leukocytosis, SYED.  Patient was seen and evaluated at bedside. No acute events overnight. Patient's repeat ABG showed 7.4/27/169/17. Anion gap closed, insulin drip was stopped and switched to Lantus 20U BID. Endocrine was consulted and insulin adjusted. 33 y/o M with pmhx of HTN, DM1 with multiple admissions for DKA, ETOH abuse presented to ED after being found unresponsive by his wife. Initial labwork significant for WBC 33 with L shift, Blood, serum Co2 < 6, Scr 1.70 (baseline 0.4), VBG pH < 6.942 . Started on IV crystalloid and insulin gtt, admitted to ICU or DKA with course complicated by AMS, leukocytosis, SYED. Patient was started on IV antibiotics for a pneumonia.  Patient was seen and evaluated at bedside. No acute events overnight. Patient's repeat ABG showed 7.4/27/169/17. Anion gap closed, insulin drip was stopped and switched to Lantus 20U BID. Endocrine was consulted and insulin adjusted. For SYED, he was started on fluids and bicarb drip, nephrology was consulted. 31 y/o M with pmhx of HTN, DM1 with multiple admissions for DKA, ETOH abuse presented to ED after being found unresponsive by his wife. Initial labwork significant for WBC 33 with L shift, Blood, serum Co2 < 6, Scr 1.70 (baseline 0.4), VBG pH < 6.942 . Started on IV crystalloid and insulin gtt, admitted to ICU for DKA with course complicated by AMS, leukocytosis, SYED. DKA resolved. Patient was treated with IV antibiotics for a pneumonia. Endocrine was consulted and insulin adjusted. For SYED, he was started on fluids and bicarb drip, nephrology was consulted. SYED improved and creatinine is trending down. He will follow up with nephrology in couple of weeks.He will continue lantus 16 units at bedtime and humalog kwikpen 5 units with meals with additional insulin based on SS.

## 2023-11-17 NOTE — PROGRESS NOTE ADULT - SUBJECTIVE AND OBJECTIVE BOX
[facility] Division of Hospital Medicine    Chief Complaint:  DKA    SUBJECTIVE / OVERNIGHT EVENTS: Patient seen and examined. He really wants to go home but I explained his kidney function remains above 3 and his number in Oct was less then 1. He says he feels good. No complaints.     Patient denies chest pain, SOB, abd pain, N/V, fever, chills, dysuria or any other complaints. All remainder ROS negative.     MEDICATIONS  (STANDING):  cefTRIAXone Injectable. 1000 milliGRAM(s) IV Push every 24 hours  chlorhexidine 2% Cloths 1 Application(s) Topical daily  dextrose 5%. 1000 milliLiter(s) (100 mL/Hr) IV Continuous <Continuous>  dextrose 5%. 1000 milliLiter(s) (50 mL/Hr) IV Continuous <Continuous>  dextrose 50% Injectable 25 Gram(s) IV Push once  dextrose 50% Injectable 25 Gram(s) IV Push once  dextrose 50% Injectable 50 milliLiter(s) IV Push every 15 minutes  famotidine    Tablet 20 milliGRAM(s) Oral two times a day  folic acid Injectable 1 milliGRAM(s) IV Push daily  glucagon  Injectable 1 milliGRAM(s) IntraMuscular once  insulin glargine Injectable (LANTUS) 8 Unit(s) SubCutaneous at bedtime  insulin lispro (ADMELOG) corrective regimen sliding scale   SubCutaneous three times a day before meals  insulin lispro Injectable (ADMELOG) 5 Unit(s) SubCutaneous three times a day before meals  levETIRAcetam 500 milliGRAM(s) Oral two times a day  multivitamin 1 Tablet(s) Oral daily  sodium bicarbonate 650 milliGRAM(s) Oral three times a day  sodium chloride 0.225% 1000 milliLiter(s) (100 mL/Hr) IV Continuous <Continuous>  thiamine Injectable 100 milliGRAM(s) IV Push daily    MEDICATIONS  (PRN):  dextrose Oral Gel 15 Gram(s) Oral once PRN Blood Glucose LESS THAN 70 milliGRAM(s)/deciliter        I&O's Summary    16 Nov 2023 07:01  -  17 Nov 2023 07:00  --------------------------------------------------------  IN: 0 mL / OUT: 1100 mL / NET: -1100 mL    17 Nov 2023 07:01  -  17 Nov 2023 14:47  --------------------------------------------------------  IN: 0 mL / OUT: 1000 mL / NET: -1000 mL        PHYSICAL EXAM:  Vital Signs Last 24 Hrs  T(C): 36.9 (17 Nov 2023 11:09), Max: 37 (16 Nov 2023 20:13)  T(F): 98.4 (17 Nov 2023 11:09), Max: 98.6 (16 Nov 2023 20:13)  HR: 65 (17 Nov 2023 11:09) (65 - 73)  BP: 121/88 (17 Nov 2023 11:09) (110/73 - 122/78)  BP(mean): --  RR: 18 (17 Nov 2023 11:09) (18 - 18)  SpO2: 100% (17 Nov 2023 11:09) (98% - 100%)    Parameters below as of 17 Nov 2023 11:09  Patient On (Oxygen Delivery Method): room air      CONSTITUTIONAL: NAD,  ENMT: Moist oral mucosa, no pharyngeal injection or exudates  RESPIRATORY: Normal respiratory effort; lungs are clear to auscultation bilaterally  CARDIOVASCULAR: Regular rate and rhythm, normal S1 and S2, No lower extremity edema;  ABDOMEN: Nontender to palpation, normoactive bowel sounds, no rebound/guarding;  MUSCLOSKELETAL:   no joint swelling or tenderness to palpation  PSYCH: A+O to person, place, and time; affect appropriate  NEUROLOGY: CN 2-12 are intact and symmetric; no gross sensory deficits;   SKIN: No rashes; no palpable lesions      LABS:                        9.6    3.53  )-----------( 112      ( 17 Nov 2023 04:52 )             28.7     11-17    146<H>  |  115<H>  |  23.5<H>  ----------------------------<  151<H>  4.2   |  21.0<L>  |  3.12<H>    Ca    7.8<L>      17 Nov 2023 04:52  Mg     2.0     11-17    TPro  4.6<L>  /  Alb  2.6<L>  /  TBili  0.3<L>  /  DBili  x   /  AST  13  /  ALT  9   /  AlkPhos  89  11-17          Urinalysis Basic - ( 17 Nov 2023 04:52 )    Color: x / Appearance: x / SG: x / pH: x  Gluc: 151 mg/dL / Ketone: x  / Bili: x / Urobili: x   Blood: x / Protein: x / Nitrite: x   Leuk Esterase: x / RBC: x / WBC x   Sq Epi: x / Non Sq Epi: x / Bacteria: x        CAPILLARY BLOOD GLUCOSE      POCT Blood Glucose.: 181 mg/dL (17 Nov 2023 13:02)  POCT Blood Glucose.: 110 mg/dL (17 Nov 2023 09:05)  POCT Blood Glucose.: 123 mg/dL (16 Nov 2023 22:07)  POCT Blood Glucose.: 74 mg/dL (16 Nov 2023 18:08)        RADIOLOGY & ADDITIONAL TESTS:  Results Reviewed:   Imaging Personally Reviewed:  Electrocardiogram Personally Reviewed:

## 2023-11-17 NOTE — PROGRESS NOTE ADULT - SUBJECTIVE AND OBJECTIVE BOX
INTERVAL EVENTS:  Follow up diabetes management    ROS: Denies abd pain, nausea, vomiting. Endorses good appetite.     MEDICATIONS  (STANDING):  cefTRIAXone Injectable. 1000 milliGRAM(s) IV Push every 24 hours  chlorhexidine 2% Cloths 1 Application(s) Topical daily  dextrose 5%. 1000 milliLiter(s) (100 mL/Hr) IV Continuous <Continuous>  dextrose 5%. 1000 milliLiter(s) (50 mL/Hr) IV Continuous <Continuous>  dextrose 50% Injectable 25 Gram(s) IV Push once  dextrose 50% Injectable 25 Gram(s) IV Push once  dextrose 50% Injectable 50 milliLiter(s) IV Push every 15 minutes  famotidine    Tablet 20 milliGRAM(s) Oral two times a day  folic acid Injectable 1 milliGRAM(s) IV Push daily  glucagon  Injectable 1 milliGRAM(s) IntraMuscular once  insulin glargine Injectable (LANTUS) 8 Unit(s) SubCutaneous at bedtime  insulin lispro (ADMELOG) corrective regimen sliding scale   SubCutaneous three times a day before meals  insulin lispro Injectable (ADMELOG) 5 Unit(s) SubCutaneous three times a day before meals  levETIRAcetam 500 milliGRAM(s) Oral two times a day  multivitamin 1 Tablet(s) Oral daily  sodium bicarbonate 650 milliGRAM(s) Oral three times a day  sodium bicarbonate  Infusion 0.094 mEq/kG/Hr (100 mL/Hr) IV Continuous <Continuous>  thiamine Injectable 100 milliGRAM(s) IV Push daily    MEDICATIONS  (PRN):  dextrose Oral Gel 15 Gram(s) Oral once PRN Blood Glucose LESS THAN 70 milliGRAM(s)/deciliter    Allergies  No Known Allergies  Allergy Status Unknown    Vital Signs Last 24 Hrs  T(C): 36.9 (17 Nov 2023 11:09), Max: 37 (16 Nov 2023 20:13)  T(F): 98.4 (17 Nov 2023 11:09), Max: 98.6 (16 Nov 2023 20:13)  HR: 65 (17 Nov 2023 11:09) (65 - 73)  BP: 121/88 (17 Nov 2023 11:09) (110/73 - 122/78)  BP(mean): --  RR: 18 (17 Nov 2023 11:09) (18 - 18)  SpO2: 100% (17 Nov 2023 11:09) (98% - 100%)    Parameters below as of 17 Nov 2023 11:09  Patient On (Oxygen Delivery Method): room air    PHYSICAL EXAM:  General: No apparent distress  Neck: Supple, trachea midline, no thyromegaly  Respiratory: Lungs clear bilaterally, normal rate, effort  Cardiac: +S1, S2, no m/r/g  GI: +BS, soft, non tender, non distended  Extremities: No peripheral edema, no pedal lesions  Neuro: A+O X3, no tremor    LABS:                        9.6    3.53  )-----------( 112      ( 17 Nov 2023 04:52 )             28.7     11-17    146<H>  |  115<H>  |  23.5<H>  ----------------------------<  151<H>  4.2   |  21.0<L>  |  3.12<H>    Ca    7.8<L>      17 Nov 2023 04:52  Mg     2.0     11-17    TPro  4.6<L>  /  Alb  2.6<L>  /  TBili  0.3<L>  /  DBili  x   /  AST  13  /  ALT  9   /  AlkPhos  89  11-17    Urinalysis Basic - ( 17 Nov 2023 04:52 )    Color: x / Appearance: x / SG: x / pH: x  Gluc: 151 mg/dL / Ketone: x  / Bili: x / Urobili: x   Blood: x / Protein: x / Nitrite: x   Leuk Esterase: x / RBC: x / WBC x   Sq Epi: x / Non Sq Epi: x / Bacteria: x    POCT Blood Glucose.: 181 mg/dL (11-17-23 @ 13:02)  POCT Blood Glucose.: 110 mg/dL (11-17-23 @ 09:05)  POCT Blood Glucose.: 123 mg/dL (11-16-23 @ 22:07)  POCT Blood Glucose.: 74 mg/dL (11-16-23 @ 18:08)

## 2023-11-18 ENCOUNTER — TRANSCRIPTION ENCOUNTER (OUTPATIENT)
Age: 33
End: 2023-11-18

## 2023-11-18 VITALS
OXYGEN SATURATION: 99 % | TEMPERATURE: 99 F | HEART RATE: 74 BPM | DIASTOLIC BLOOD PRESSURE: 66 MMHG | SYSTOLIC BLOOD PRESSURE: 102 MMHG | RESPIRATION RATE: 18 BRPM

## 2023-11-18 LAB
ANION GAP SERPL CALC-SCNC: 11 MMOL/L — SIGNIFICANT CHANGE UP (ref 5–17)
ANION GAP SERPL CALC-SCNC: 11 MMOL/L — SIGNIFICANT CHANGE UP (ref 5–17)
BASOPHILS # BLD AUTO: 0.02 K/UL — SIGNIFICANT CHANGE UP (ref 0–0.2)
BASOPHILS # BLD AUTO: 0.02 K/UL — SIGNIFICANT CHANGE UP (ref 0–0.2)
BASOPHILS NFR BLD AUTO: 0.5 % — SIGNIFICANT CHANGE UP (ref 0–2)
BASOPHILS NFR BLD AUTO: 0.5 % — SIGNIFICANT CHANGE UP (ref 0–2)
BUN SERPL-MCNC: 23.1 MG/DL — HIGH (ref 8–20)
BUN SERPL-MCNC: 23.1 MG/DL — HIGH (ref 8–20)
CALCIUM SERPL-MCNC: 7.5 MG/DL — LOW (ref 8.4–10.5)
CALCIUM SERPL-MCNC: 7.5 MG/DL — LOW (ref 8.4–10.5)
CHLORIDE SERPL-SCNC: 111 MMOL/L — HIGH (ref 96–108)
CHLORIDE SERPL-SCNC: 111 MMOL/L — HIGH (ref 96–108)
CO2 SERPL-SCNC: 22 MMOL/L — SIGNIFICANT CHANGE UP (ref 22–29)
CO2 SERPL-SCNC: 22 MMOL/L — SIGNIFICANT CHANGE UP (ref 22–29)
CREAT SERPL-MCNC: 2.91 MG/DL — HIGH (ref 0.5–1.3)
CREAT SERPL-MCNC: 2.91 MG/DL — HIGH (ref 0.5–1.3)
EGFR: 28 ML/MIN/1.73M2 — LOW
EGFR: 28 ML/MIN/1.73M2 — LOW
EOSINOPHIL # BLD AUTO: 0.06 K/UL — SIGNIFICANT CHANGE UP (ref 0–0.5)
EOSINOPHIL # BLD AUTO: 0.06 K/UL — SIGNIFICANT CHANGE UP (ref 0–0.5)
EOSINOPHIL NFR BLD AUTO: 1.4 % — SIGNIFICANT CHANGE UP (ref 0–6)
EOSINOPHIL NFR BLD AUTO: 1.4 % — SIGNIFICANT CHANGE UP (ref 0–6)
GLUCOSE BLDC GLUCOMTR-MCNC: 178 MG/DL — HIGH (ref 70–99)
GLUCOSE BLDC GLUCOMTR-MCNC: 178 MG/DL — HIGH (ref 70–99)
GLUCOSE BLDC GLUCOMTR-MCNC: 86 MG/DL — SIGNIFICANT CHANGE UP (ref 70–99)
GLUCOSE BLDC GLUCOMTR-MCNC: 86 MG/DL — SIGNIFICANT CHANGE UP (ref 70–99)
GLUCOSE SERPL-MCNC: 87 MG/DL — SIGNIFICANT CHANGE UP (ref 70–99)
GLUCOSE SERPL-MCNC: 87 MG/DL — SIGNIFICANT CHANGE UP (ref 70–99)
HCT VFR BLD CALC: 28.6 % — LOW (ref 39–50)
HCT VFR BLD CALC: 28.6 % — LOW (ref 39–50)
HGB BLD-MCNC: 9.4 G/DL — LOW (ref 13–17)
HGB BLD-MCNC: 9.4 G/DL — LOW (ref 13–17)
IMM GRANULOCYTES NFR BLD AUTO: 0.5 % — SIGNIFICANT CHANGE UP (ref 0–0.9)
IMM GRANULOCYTES NFR BLD AUTO: 0.5 % — SIGNIFICANT CHANGE UP (ref 0–0.9)
LYMPHOCYTES # BLD AUTO: 1.28 K/UL — SIGNIFICANT CHANGE UP (ref 1–3.3)
LYMPHOCYTES # BLD AUTO: 1.28 K/UL — SIGNIFICANT CHANGE UP (ref 1–3.3)
LYMPHOCYTES # BLD AUTO: 29.6 % — SIGNIFICANT CHANGE UP (ref 13–44)
LYMPHOCYTES # BLD AUTO: 29.6 % — SIGNIFICANT CHANGE UP (ref 13–44)
MAGNESIUM SERPL-MCNC: 1.8 MG/DL — SIGNIFICANT CHANGE UP (ref 1.6–2.6)
MAGNESIUM SERPL-MCNC: 1.8 MG/DL — SIGNIFICANT CHANGE UP (ref 1.6–2.6)
MCHC RBC-ENTMCNC: 30 PG — SIGNIFICANT CHANGE UP (ref 27–34)
MCHC RBC-ENTMCNC: 30 PG — SIGNIFICANT CHANGE UP (ref 27–34)
MCHC RBC-ENTMCNC: 32.9 GM/DL — SIGNIFICANT CHANGE UP (ref 32–36)
MCHC RBC-ENTMCNC: 32.9 GM/DL — SIGNIFICANT CHANGE UP (ref 32–36)
MCV RBC AUTO: 91.4 FL — SIGNIFICANT CHANGE UP (ref 80–100)
MCV RBC AUTO: 91.4 FL — SIGNIFICANT CHANGE UP (ref 80–100)
MONOCYTES # BLD AUTO: 0.62 K/UL — SIGNIFICANT CHANGE UP (ref 0–0.9)
MONOCYTES # BLD AUTO: 0.62 K/UL — SIGNIFICANT CHANGE UP (ref 0–0.9)
MONOCYTES NFR BLD AUTO: 14.4 % — HIGH (ref 2–14)
MONOCYTES NFR BLD AUTO: 14.4 % — HIGH (ref 2–14)
NEUTROPHILS # BLD AUTO: 2.32 K/UL — SIGNIFICANT CHANGE UP (ref 1.8–7.4)
NEUTROPHILS # BLD AUTO: 2.32 K/UL — SIGNIFICANT CHANGE UP (ref 1.8–7.4)
NEUTROPHILS NFR BLD AUTO: 53.6 % — SIGNIFICANT CHANGE UP (ref 43–77)
NEUTROPHILS NFR BLD AUTO: 53.6 % — SIGNIFICANT CHANGE UP (ref 43–77)
PLATELET # BLD AUTO: 129 K/UL — LOW (ref 150–400)
PLATELET # BLD AUTO: 129 K/UL — LOW (ref 150–400)
POTASSIUM SERPL-MCNC: 3.9 MMOL/L — SIGNIFICANT CHANGE UP (ref 3.5–5.3)
POTASSIUM SERPL-MCNC: 3.9 MMOL/L — SIGNIFICANT CHANGE UP (ref 3.5–5.3)
POTASSIUM SERPL-SCNC: 3.9 MMOL/L — SIGNIFICANT CHANGE UP (ref 3.5–5.3)
POTASSIUM SERPL-SCNC: 3.9 MMOL/L — SIGNIFICANT CHANGE UP (ref 3.5–5.3)
RBC # BLD: 3.13 M/UL — LOW (ref 4.2–5.8)
RBC # BLD: 3.13 M/UL — LOW (ref 4.2–5.8)
RBC # FLD: 14.1 % — SIGNIFICANT CHANGE UP (ref 10.3–14.5)
RBC # FLD: 14.1 % — SIGNIFICANT CHANGE UP (ref 10.3–14.5)
SODIUM SERPL-SCNC: 144 MMOL/L — SIGNIFICANT CHANGE UP (ref 135–145)
SODIUM SERPL-SCNC: 144 MMOL/L — SIGNIFICANT CHANGE UP (ref 135–145)
WBC # BLD: 4.32 K/UL — SIGNIFICANT CHANGE UP (ref 3.8–10.5)
WBC # BLD: 4.32 K/UL — SIGNIFICANT CHANGE UP (ref 3.8–10.5)
WBC # FLD AUTO: 4.32 K/UL — SIGNIFICANT CHANGE UP (ref 3.8–10.5)
WBC # FLD AUTO: 4.32 K/UL — SIGNIFICANT CHANGE UP (ref 3.8–10.5)

## 2023-11-18 PROCEDURE — 96365 THER/PROPH/DIAG IV INF INIT: CPT | Mod: XU

## 2023-11-18 PROCEDURE — 83550 IRON BINDING TEST: CPT

## 2023-11-18 PROCEDURE — 81001 URINALYSIS AUTO W/SCOPE: CPT

## 2023-11-18 PROCEDURE — 82330 ASSAY OF CALCIUM: CPT

## 2023-11-18 PROCEDURE — 83540 ASSAY OF IRON: CPT

## 2023-11-18 PROCEDURE — 84466 ASSAY OF TRANSFERRIN: CPT

## 2023-11-18 PROCEDURE — 36556 INSERT NON-TUNNEL CV CATH: CPT

## 2023-11-18 PROCEDURE — 83735 ASSAY OF MAGNESIUM: CPT

## 2023-11-18 PROCEDURE — C1751: CPT

## 2023-11-18 PROCEDURE — 70450 CT HEAD/BRAIN W/O DYE: CPT | Mod: MA

## 2023-11-18 PROCEDURE — 93005 ELECTROCARDIOGRAM TRACING: CPT

## 2023-11-18 PROCEDURE — 71045 X-RAY EXAM CHEST 1 VIEW: CPT

## 2023-11-18 PROCEDURE — 83930 ASSAY OF BLOOD OSMOLALITY: CPT

## 2023-11-18 PROCEDURE — 99239 HOSP IP/OBS DSCHRG MGMT >30: CPT

## 2023-11-18 PROCEDURE — 80048 BASIC METABOLIC PNL TOTAL CA: CPT

## 2023-11-18 PROCEDURE — 86022 PLATELET ANTIBODIES: CPT

## 2023-11-18 PROCEDURE — 81003 URINALYSIS AUTO W/O SCOPE: CPT

## 2023-11-18 PROCEDURE — 85730 THROMBOPLASTIN TIME PARTIAL: CPT

## 2023-11-18 PROCEDURE — 96375 TX/PRO/DX INJ NEW DRUG ADDON: CPT | Mod: XU

## 2023-11-18 PROCEDURE — 82010 KETONE BODYS QUAN: CPT

## 2023-11-18 PROCEDURE — 99285 EMERGENCY DEPT VISIT HI MDM: CPT

## 2023-11-18 PROCEDURE — 71250 CT THORAX DX C-: CPT | Mod: QQ

## 2023-11-18 PROCEDURE — 87641 MR-STAPH DNA AMP PROBE: CPT

## 2023-11-18 PROCEDURE — 82803 BLOOD GASES ANY COMBINATION: CPT

## 2023-11-18 PROCEDURE — 82962 GLUCOSE BLOOD TEST: CPT

## 2023-11-18 PROCEDURE — 82272 OCCULT BLD FECES 1-3 TESTS: CPT

## 2023-11-18 PROCEDURE — 84145 PROCALCITONIN (PCT): CPT

## 2023-11-18 PROCEDURE — 76770 US EXAM ABDO BACK WALL COMP: CPT

## 2023-11-18 PROCEDURE — 87086 URINE CULTURE/COLONY COUNT: CPT

## 2023-11-18 PROCEDURE — 82435 ASSAY OF BLOOD CHLORIDE: CPT

## 2023-11-18 PROCEDURE — 84100 ASSAY OF PHOSPHORUS: CPT

## 2023-11-18 PROCEDURE — 82728 ASSAY OF FERRITIN: CPT

## 2023-11-18 PROCEDURE — 36415 COLL VENOUS BLD VENIPUNCTURE: CPT

## 2023-11-18 PROCEDURE — 74176 CT ABD & PELVIS W/O CONTRAST: CPT | Mod: QQ

## 2023-11-18 PROCEDURE — 80307 DRUG TEST PRSMV CHEM ANLYZR: CPT

## 2023-11-18 PROCEDURE — 83036 HEMOGLOBIN GLYCOSYLATED A1C: CPT

## 2023-11-18 PROCEDURE — 85025 COMPLETE CBC W/AUTO DIFF WBC: CPT

## 2023-11-18 PROCEDURE — 87449 NOS EACH ORGANISM AG IA: CPT

## 2023-11-18 PROCEDURE — 36600 WITHDRAWAL OF ARTERIAL BLOOD: CPT

## 2023-11-18 PROCEDURE — 83605 ASSAY OF LACTIC ACID: CPT

## 2023-11-18 PROCEDURE — 85610 PROTHROMBIN TIME: CPT

## 2023-11-18 PROCEDURE — 85027 COMPLETE CBC AUTOMATED: CPT

## 2023-11-18 PROCEDURE — 87640 STAPH A DNA AMP PROBE: CPT

## 2023-11-18 PROCEDURE — 87899 AGENT NOS ASSAY W/OPTIC: CPT

## 2023-11-18 PROCEDURE — 85045 AUTOMATED RETICULOCYTE COUNT: CPT

## 2023-11-18 PROCEDURE — 84295 ASSAY OF SERUM SODIUM: CPT

## 2023-11-18 PROCEDURE — 83010 ASSAY OF HAPTOGLOBIN QUANT: CPT

## 2023-11-18 PROCEDURE — 80053 COMPREHEN METABOLIC PANEL: CPT

## 2023-11-18 PROCEDURE — 83615 LACTATE (LD) (LDH) ENZYME: CPT

## 2023-11-18 PROCEDURE — 84132 ASSAY OF SERUM POTASSIUM: CPT

## 2023-11-18 PROCEDURE — 87040 BLOOD CULTURE FOR BACTERIA: CPT

## 2023-11-18 PROCEDURE — 85014 HEMATOCRIT: CPT

## 2023-11-18 PROCEDURE — 99233 SBSQ HOSP IP/OBS HIGH 50: CPT

## 2023-11-18 PROCEDURE — 84484 ASSAY OF TROPONIN QUANT: CPT

## 2023-11-18 PROCEDURE — 82947 ASSAY GLUCOSE BLOOD QUANT: CPT

## 2023-11-18 PROCEDURE — 82550 ASSAY OF CK (CPK): CPT

## 2023-11-18 PROCEDURE — 85018 HEMOGLOBIN: CPT

## 2023-11-18 PROCEDURE — 0225U NFCT DS DNA&RNA 21 SARSCOV2: CPT

## 2023-11-18 RX ORDER — INSULIN GLARGINE 100 [IU]/ML
16 INJECTION, SOLUTION SUBCUTANEOUS
Qty: 1 | Refills: 0
Start: 2023-11-18 | End: 2023-12-17

## 2023-11-18 RX ORDER — INSULIN LISPRO 100/ML
5 VIAL (ML) SUBCUTANEOUS
Qty: 1 | Refills: 0
Start: 2023-11-18 | End: 2023-12-17

## 2023-11-18 RX ADMIN — FAMOTIDINE 20 MILLIGRAM(S): 10 INJECTION INTRAVENOUS at 05:18

## 2023-11-18 RX ADMIN — Medication 1 TABLET(S): at 12:29

## 2023-11-18 RX ADMIN — SODIUM CHLORIDE 100 MILLILITER(S): 9 INJECTION INTRAMUSCULAR; INTRAVENOUS; SUBCUTANEOUS at 06:36

## 2023-11-18 RX ADMIN — Medication 5 UNIT(S): at 09:16

## 2023-11-18 RX ADMIN — LEVETIRACETAM 500 MILLIGRAM(S): 250 TABLET, FILM COATED ORAL at 05:17

## 2023-11-18 RX ADMIN — Medication 1 MILLIGRAM(S): at 12:30

## 2023-11-18 RX ADMIN — Medication 650 MILLIGRAM(S): at 05:18

## 2023-11-18 RX ADMIN — Medication 100 MILLIGRAM(S): at 12:29

## 2023-11-18 RX ADMIN — Medication 5 UNIT(S): at 13:03

## 2023-11-18 RX ADMIN — CHLORHEXIDINE GLUCONATE 1 APPLICATION(S): 213 SOLUTION TOPICAL at 12:30

## 2023-11-18 RX ADMIN — Medication 1: at 13:03

## 2023-11-18 NOTE — DISCHARGE NOTE NURSING/CASE MANAGEMENT/SOCIAL WORK - NSDCVIVACCINE_GEN_ALL_CORE_FT
Tdap; 29-Oct-2019 23:07; Odessa Landis (RN); [employer]; [id number] (Exp. Date: 22-Oct-2021); IntraMuscular; Deltoid Left.; 0.5 milliLiter(s); VIS (VIS Published: 09-May-2013, VIS Presented: 29-Oct-2019);

## 2023-11-18 NOTE — PROGRESS NOTE ADULT - ASSESSMENT
33 yo M w h/o HTN, type 1 DM has had multiple admissions for DKA, admitted for DKA again, also found to have SYED    SYED nonOliguric 2/2 hypoperfusion from DKA, renal function slowly improving  Serum Cr 3.5--> 2.9  MA: DKA improving  Renal function, acidosis and electrolytes better  NO CKD noted had previous nL kidney function  (Creatinine: 0.41 mg/dL (10.11.23))  CT scan: No renal stone nor hydronephrosis    HyperNatremia mild IVF made somewhat hypotonic  - cont to avoid potential nephrotoxins  - optimize serum glucose control    MA 2/2 CKD  - cont PO NaHCO3  - s/p coarse of IVF o/n w added bicarb     OK to discharge from renal standpoint must follow up in our office in 1-2 weeks    Will follow

## 2023-11-18 NOTE — PROGRESS NOTE ADULT - ASSESSMENT
32 M with PMHx DM with multiple admissions for recurrent DKA, ETOH abuse, Pancreatitis, Afib, HTN, Seizure disorder, brought to ED after being found unresponsive at home. Labs consistent with severe DKA. Consulted for diabetes management.    1. Poorly controlled DM, a1c 15.5% - s/p DKA secondary to noncompliance  - BG this AM slightly below normal   - Patient is going to be discharged today  - Would recommend continue current regimen of Lantus 16U qhs and Admelog 5U TID  - Discussed compliance and adherence to the management. Reiterated that recurrent DKA can eventually be detrimental for him even fatal.   - Patient verbalized his understanding.     2. SYED  - Nephrology following, avoid potential nephrotoxins

## 2023-11-18 NOTE — PROGRESS NOTE ADULT - REASON FOR ADMISSION
CC: DKA

## 2023-11-18 NOTE — PROGRESS NOTE ADULT - PROVIDER SPECIALTY LIST ADULT
Endocrinology
Hospitalist
Hospitalist
Nephrology
Endocrinology
Endocrinology
MICU
Nephrology
Hospitalist
Nephrology
Endocrinology
Endocrinology
Nephrology
Hospitalist

## 2023-11-18 NOTE — PROGRESS NOTE ADULT - SUBJECTIVE AND OBJECTIVE BOX
Interval Events:  no overnight events  follow up on DM management     Patient seen and examined at bedside. NAD comfortable. Able to tolerate meals.       MEDICATIONS  (STANDING):  chlorhexidine 2% Cloths 1 Application(s) Topical daily  dextrose 5%. 1000 milliLiter(s) (50 mL/Hr) IV Continuous <Continuous>  dextrose 5%. 1000 milliLiter(s) (100 mL/Hr) IV Continuous <Continuous>  dextrose 50% Injectable 25 Gram(s) IV Push once  dextrose 50% Injectable 25 Gram(s) IV Push once  dextrose 50% Injectable 50 milliLiter(s) IV Push every 15 minutes  famotidine    Tablet 20 milliGRAM(s) Oral two times a day  folic acid Injectable 1 milliGRAM(s) IV Push daily  glucagon  Injectable 1 milliGRAM(s) IntraMuscular once  insulin glargine Injectable (LANTUS) 16 Unit(s) SubCutaneous at bedtime  insulin lispro (ADMELOG) corrective regimen sliding scale   SubCutaneous three times a day before meals  insulin lispro Injectable (ADMELOG) 5 Unit(s) SubCutaneous three times a day before meals  levETIRAcetam 500 milliGRAM(s) Oral two times a day  multivitamin 1 Tablet(s) Oral daily  sodium bicarbonate 650 milliGRAM(s) Oral three times a day  sodium chloride 0.225% 1000 milliLiter(s) (100 mL/Hr) IV Continuous <Continuous>  thiamine Injectable 100 milliGRAM(s) IV Push daily    MEDICATIONS  (PRN):  dextrose Oral Gel 15 Gram(s) Oral once PRN Blood Glucose LESS THAN 70 milliGRAM(s)/deciliter      Vital Signs Last 24 Hrs  T(C): 37.2 (18 Nov 2023 11:02), Max: 37.2 (17 Nov 2023 21:11)  T(F): 99 (18 Nov 2023 11:02), Max: 99 (17 Nov 2023 21:11)  HR: 74 (18 Nov 2023 11:02) (62 - 81)  BP: 102/66 (18 Nov 2023 11:02) (102/66 - 121/85)  BP(mean): --  RR: 18 (18 Nov 2023 11:02) (18 - 18)  SpO2: 99% (18 Nov 2023 11:02) (96% - 100%)    Parameters below as of 18 Nov 2023 11:02  Patient On (Oxygen Delivery Method): room air      Weight (kg): 80 (11-12-23 @ 07:44)    Physical Exam:    General: No apparent distress  Neck: Supple, trachea midline, no thyromegaly  Respiratory: Lungs clear bilaterally, normal rate, effort  Cardiac: +S1, S2, no m/r/g  GI: +BS, soft, non tender, non distended  Extremities: No peripheral edema, no pedal lesions  Neuro: A+O X3, no tremor      LABS  11-18    144  |  111<H>  |  23.1<H>  ----------------------------<  87  3.9   |  22.0  |  2.91<H>    Ca    7.5<L>      18 Nov 2023 06:12  Mg     1.8     11-18    TPro  4.6<L>  /  Alb  2.6<L>  /  TBili  0.3<L>  /  DBili  x   /  AST  13  /  ALT  9   /  AlkPhos  89  11-17                          9.4    4.32  )-----------( 129      ( 18 Nov 2023 06:12 )             28.6     A1C with Estimated Average Glucose Result: 15.5 % (11-14-23 @ 06:10)      Alkaline Phosphatase: 89 U/L (11-17-23 @ 04:52)  Albumin: 2.6 g/dL (11-17-23 @ 04:52)  Aspartate Aminotransferase (AST/SGOT): 13 U/L (11-17-23 @ 04:52)  Alanine Aminotransferase (ALT/SGPT): 9 U/L (11-17-23 @ 04:52)    CAPILLARY BLOOD GLUCOSE    POCT Blood Glucose.: 178 mg/dL (18 Nov 2023 13:02)  POCT Blood Glucose.: 86 mg/dL (18 Nov 2023 09:15)  POCT Blood Glucose.: 205 mg/dL (17 Nov 2023 21:59)  POCT Blood Glucose.: 306 mg/dL (17 Nov 2023 18:22)

## 2023-11-18 NOTE — PROGRESS NOTE ADULT - SUBJECTIVE AND OBJECTIVE BOX
NEPHROLOGY INTERVAL HPI/OVERNIGHT EVENTS:    Examined  No distress  Feeling better  Denies HA CP no SOB    MEDICATIONS  (STANDING):  chlorhexidine 2% Cloths 1 Application(s) Topical daily  dextrose 5%. 1000 milliLiter(s) (50 mL/Hr) IV Continuous <Continuous>  dextrose 5%. 1000 milliLiter(s) (100 mL/Hr) IV Continuous <Continuous>  dextrose 50% Injectable 25 Gram(s) IV Push once  dextrose 50% Injectable 25 Gram(s) IV Push once  dextrose 50% Injectable 50 milliLiter(s) IV Push every 15 minutes  famotidine    Tablet 20 milliGRAM(s) Oral two times a day  folic acid Injectable 1 milliGRAM(s) IV Push daily  glucagon  Injectable 1 milliGRAM(s) IntraMuscular once  insulin glargine Injectable (LANTUS) 16 Unit(s) SubCutaneous at bedtime  insulin lispro (ADMELOG) corrective regimen sliding scale   SubCutaneous three times a day before meals  insulin lispro Injectable (ADMELOG) 5 Unit(s) SubCutaneous three times a day before meals  levETIRAcetam 500 milliGRAM(s) Oral two times a day  multivitamin 1 Tablet(s) Oral daily  sodium bicarbonate 650 milliGRAM(s) Oral three times a day  sodium chloride 0.225% 1000 milliLiter(s) (100 mL/Hr) IV Continuous <Continuous>  thiamine Injectable 100 milliGRAM(s) IV Push daily    MEDICATIONS  (PRN):  dextrose Oral Gel 15 Gram(s) Oral once PRN Blood Glucose LESS THAN 70 milliGRAM(s)/deciliter      Allergies    No Known Allergies  Allergy Status Unknown    Intolerances        Vital Signs Last 24 Hrs  T(C): 36.6 (18 Nov 2023 05:00), Max: 37.2 (17 Nov 2023 21:11)  T(F): 97.8 (18 Nov 2023 05:00), Max: 99 (17 Nov 2023 21:11)  HR: 62 (18 Nov 2023 05:00) (62 - 81)  BP: 120/79 (18 Nov 2023 05:00) (113/78 - 121/88)  BP(mean): --  RR: 18 (18 Nov 2023 05:00) (18 - 18)  SpO2: 96% (18 Nov 2023 05:00) (96% - 100%)    Parameters below as of 18 Nov 2023 05:00  Patient On (Oxygen Delivery Method): room air      PHYSICAL EXAM:  GENERAL: NAD  HEENT: NCAT   NECK: No JVD  NERVOUS SYSTEM:  AAOx3  CHEST/LUNG: Clear bilaterally  HEART: Regular rate and rhythm; No rub  ABDOMEN: Soft, Nontender, +BS  EXTREMITIES:  No  dependent edema    LABS:                        9.4    4.32  )-----------( 129      ( 18 Nov 2023 06:12 )             28.6     11-18    144  |  111<H>  |  23.1<H>  ----------------------------<  87  3.9   |  22.0  |  2.91<H>    Ca    7.5<L>      18 Nov 2023 06:12  Mg     1.8     11-18    TPro  4.6<L>  /  Alb  2.6<L>  /  TBili  0.3<L>  /  DBili  x   /  AST  13  /  ALT  9   /  AlkPhos  89  11-17      Urinalysis Basic - ( 18 Nov 2023 06:12 )    Color: x / Appearance: x / SG: x / pH: x  Gluc: 87 mg/dL / Ketone: x  / Bili: x / Urobili: x   Blood: x / Protein: x / Nitrite: x   Leuk Esterase: x / RBC: x / WBC x   Sq Epi: x / Non Sq Epi: x / Bacteria: x      Magnesium: 1.8 mg/dL (11-18 @ 06:12)          RADIOLOGY & ADDITIONAL TESTS:

## 2023-11-18 NOTE — DISCHARGE NOTE NURSING/CASE MANAGEMENT/SOCIAL WORK - NSDCPEFALRISK_GEN_ALL_CORE
For information on Fall & Injury Prevention, visit: [email or web address] OR  [email or web address] OR  https://www.cdc.gov/steadi/patient.html

## 2023-11-18 NOTE — DISCHARGE NOTE NURSING/CASE MANAGEMENT/SOCIAL WORK - PATIENT PORTAL LINK FT
You can access the FollowMyHealth Patient Portal offered by [facility] by registering at the following website: [email or web address]. By joining [employer]’s FollowMyHealth portal, you will also be able to view your health information using other applications (apps) compatible with our system.

## 2023-11-21 NOTE — ED ADULT NURSE NOTE - FALL HARM RISK CONCLUSION
Detail Level: Detailed Add 03022 Cpt? (Important Note: In 2017 The Use Of 15519 Is Being Tracked By Cms To Determine Future Global Period Reimbursement For Global Periods): yes Universal Safety Interventions

## 2023-12-07 NOTE — SBIRT NOTE ADULT - NSSBIRTDRGNOACTINTDET_GEN_A_CORE
Left v/m informing patient that an order has been placed for her blood work. Pt advised to call back for any further assistance. n/a

## 2023-12-19 NOTE — ED CDU PROVIDER INITIAL DAY NOTE - NS ED ATTENDING STATEMENT MOD
Please contact your Oncologist if you have any questions regarding the leucovorin/cadd that you received today. You are instructed to call the office or go to the Emergency Dept. If you experience any of the following symptoms:    Dizziness/lightheadedness   Acute nausea or vomiting-not relieved by medications  Headaches-not relieved by medications  New chest pain or pressure  New rash /itching  New shortness of breath  Fever,chills or signs or symptoms of infection    Make sure you are drinking 48 to 64 ounces of water daily-if you are unable to drink fluids let us know right away.
Attending Only

## 2023-12-28 LAB
BASE EXCESS BLDA CALC-SCNC: -24.8 MMOL/L — LOW (ref -2–3)
BASE EXCESS BLDA CALC-SCNC: -24.8 MMOL/L — LOW (ref -2–3)
HCO3 BLDA-SCNC: 4 MMOL/L — CRITICAL LOW (ref 21–28)
HCO3 BLDA-SCNC: 4 MMOL/L — CRITICAL LOW (ref 21–28)
PCO2 BLDA: <20 MMHG — LOW (ref 35–48)
PCO2 BLDA: <20 MMHG — LOW (ref 35–48)
PO2 BLDA: 141 MMHG — HIGH (ref 83–108)
PO2 BLDA: 141 MMHG — HIGH (ref 83–108)
SAO2 % BLDA: 99.7 % — HIGH (ref 94–98)
SAO2 % BLDA: 99.7 % — HIGH (ref 94–98)

## 2024-01-29 NOTE — ED ADULT TRIAGE NOTE - NS ED TRIAGE AVPU SCALE
"Subjective:      Patient ID: Armando Green is a 55 y.o. male.    Vitals:  height is 5' 5" (1.651 m) and weight is 69.9 kg (154 lb). His oral temperature is 98.1 °F (36.7 °C). His blood pressure is 150/93 (abnormal) and his pulse is 78. His respiration is 19 and oxygen saturation is 95%.     Chief Complaint: Dental Pain (Lower left jaw )    Pt present with face edema on his lower left jaw from possible tooth infection X 1 week.     Dental Pain   This is a new problem. The current episode started in the past 7 days. The problem occurs constantly. The problem has been gradually worsening. The pain is at a severity of 4/10. The pain is moderate. Associated symptoms include facial pain. Pertinent negatives include no difficulty swallowing or fever. He has tried NSAIDs for the symptoms. The treatment provided mild relief.       Constitution: Negative for fever.      Objective:     Physical Exam   Constitutional: He is oriented to person, place, and time. He appears well-developed. He is cooperative.  Non-toxic appearance. He does not appear ill. No distress.   HENT:   Head: Normocephalic and atraumatic.   Ears:   Right Ear: Hearing and external ear normal.   Left Ear: Hearing and external ear normal.   Nose: Nose normal.   Mouth/Throat: Uvula is midline, oropharynx is clear and moist and mucous membranes are normal. No trismus in the jaw. Abnormal dentition. Dental caries present. No uvula swelling. No oropharyngeal exudate, posterior oropharyngeal edema or posterior oropharyngeal erythema.       Eyes: Conjunctivae and lids are normal. No scleral icterus.   Neck: Trachea normal and phonation normal. Neck supple. No edema present. No erythema present. No neck rigidity present.   Cardiovascular: Normal rate.   Pulmonary/Chest: Effort normal and breath sounds normal. No respiratory distress.   Abdominal: Normal appearance.   Musculoskeletal: Normal range of motion.         General: No deformity. Normal range of motion. "   Neurological: He is alert and oriented to person, place, and time. He exhibits normal muscle tone. Coordination normal.   Skin: Skin is warm, dry, intact, not diaphoretic and not pale.   Psychiatric: His speech is normal and behavior is normal. Judgment and thought content normal.   Nursing note and vitals reviewed.      Assessment:     1. Dental infection        Plan:   Patient is trying to come up with the deductible for dental procedure, but has follow-up with orthodontist for dentures.  We discussed that his dental issues will probably only be temporarily fixed with oral antibiotic until he sees an oral surgeon and orthodontist for complete dental care.    Dental infection  -     clindamycin (CLEOCIN) 150 MG capsule; Take 3 capsules (450 mg total) by mouth every 8 (eight) hours. for 7 days  Dispense: 63 capsule; Refill: 0        Patient Instructions   Please schedule follow-up with your general dentist as soon as possible to discuss your ongoing issue of dental fractures and dental caries.                 Alert-The patient is alert, awake and responds to voice. The patient is oriented to time, place, and person. The triage nurse is able to obtain subjective information.

## 2024-03-11 ENCOUNTER — INPATIENT (INPATIENT)
Facility: HOSPITAL | Age: 34
LOS: 2 days | Discharge: HOME | DRG: 637 | End: 2024-03-14
Attending: INTERNAL MEDICINE | Admitting: STUDENT IN AN ORGANIZED HEALTH CARE EDUCATION/TRAINING PROGRAM
Payer: COMMERCIAL

## 2024-03-11 VITALS
WEIGHT: 130.07 LBS | RESPIRATION RATE: 40 BRPM | HEIGHT: 66 IN | OXYGEN SATURATION: 100 % | SYSTOLIC BLOOD PRESSURE: 147 MMHG | DIASTOLIC BLOOD PRESSURE: 88 MMHG | HEART RATE: 140 BPM

## 2024-03-11 DIAGNOSIS — E11.10 TYPE 2 DIABETES MELLITUS WITH KETOACIDOSIS WITHOUT COMA: ICD-10-CM

## 2024-03-11 LAB
ACETONE SERPL-MCNC: ABNORMAL
ALBUMIN SERPL ELPH-MCNC: 5 G/DL — SIGNIFICANT CHANGE UP (ref 3.3–5.2)
ALP SERPL-CCNC: 161 U/L — HIGH (ref 40–120)
ALT FLD-CCNC: 15 U/L — SIGNIFICANT CHANGE UP
ANION GAP SERPL CALC-SCNC: SIGNIFICANT CHANGE UP MMOL/L (ref 5–17)
APPEARANCE UR: ABNORMAL
AST SERPL-CCNC: 20 U/L — SIGNIFICANT CHANGE UP
BACTERIA # UR AUTO: NEGATIVE /HPF — SIGNIFICANT CHANGE UP
BASOPHILS # BLD AUTO: 0.06 K/UL — SIGNIFICANT CHANGE UP (ref 0–0.2)
BASOPHILS NFR BLD AUTO: 0.5 % — SIGNIFICANT CHANGE UP (ref 0–2)
BILIRUB SERPL-MCNC: 0.5 MG/DL — SIGNIFICANT CHANGE UP (ref 0.4–2)
BILIRUB UR-MCNC: NEGATIVE — SIGNIFICANT CHANGE UP
BUN SERPL-MCNC: 11.1 MG/DL — SIGNIFICANT CHANGE UP (ref 8–20)
BUN SERPL-MCNC: 11.8 MG/DL — SIGNIFICANT CHANGE UP (ref 8–20)
BUN SERPL-MCNC: 11.8 MG/DL — SIGNIFICANT CHANGE UP (ref 8–20)
CA-I SERPL-SCNC: 1.32 MMOL/L — SIGNIFICANT CHANGE UP (ref 1.15–1.33)
CALCIUM SERPL-MCNC: 8.1 MG/DL — LOW (ref 8.4–10.5)
CALCIUM SERPL-MCNC: 8.1 MG/DL — LOW (ref 8.4–10.5)
CALCIUM SERPL-MCNC: 9 MG/DL — SIGNIFICANT CHANGE UP (ref 8.4–10.5)
CAST: 10 /LPF — HIGH (ref 0–4)
CHLORIDE BLDV-SCNC: 103 MMOL/L — SIGNIFICANT CHANGE UP (ref 96–108)
CHLORIDE SERPL-SCNC: 106 MMOL/L — SIGNIFICANT CHANGE UP (ref 96–108)
CHLORIDE SERPL-SCNC: 93 MMOL/L — LOW (ref 96–108)
CHLORIDE SERPL-SCNC: 99 MMOL/L — SIGNIFICANT CHANGE UP (ref 96–108)
CO2 SERPL-SCNC: <6 MMOL/L — CRITICAL LOW (ref 22–29)
COLOR SPEC: YELLOW — SIGNIFICANT CHANGE UP
CREAT SERPL-MCNC: 1 MG/DL — SIGNIFICANT CHANGE UP (ref 0.5–1.3)
CREAT SERPL-MCNC: 1.05 MG/DL — SIGNIFICANT CHANGE UP (ref 0.5–1.3)
CREAT SERPL-MCNC: 1.35 MG/DL — HIGH (ref 0.5–1.3)
DIFF PNL FLD: ABNORMAL
EGFR: 101 ML/MIN/1.73M2 — SIGNIFICANT CHANGE UP
EGFR: 71 ML/MIN/1.73M2 — SIGNIFICANT CHANGE UP
EGFR: 96 ML/MIN/1.73M2 — SIGNIFICANT CHANGE UP
EOSINOPHIL # BLD AUTO: 0.01 K/UL — SIGNIFICANT CHANGE UP (ref 0–0.5)
EOSINOPHIL NFR BLD AUTO: 0.1 % — SIGNIFICANT CHANGE UP (ref 0–6)
FINE GRAN CASTS #/AREA URNS AUTO: 20 — SIGNIFICANT CHANGE UP
GAS PNL BLDV: 135 MMOL/L — LOW (ref 136–145)
GAS PNL BLDV: SIGNIFICANT CHANGE UP
GLUCOSE BLDC GLUCOMTR-MCNC: 267 MG/DL — HIGH (ref 70–99)
GLUCOSE BLDC GLUCOMTR-MCNC: 275 MG/DL — HIGH (ref 70–99)
GLUCOSE BLDC GLUCOMTR-MCNC: 318 MG/DL — HIGH (ref 70–99)
GLUCOSE BLDV-MCNC: 524 MG/DL — CRITICAL HIGH (ref 70–99)
GLUCOSE SERPL-MCNC: 280 MG/DL — HIGH (ref 70–99)
GLUCOSE SERPL-MCNC: 394 MG/DL — HIGH (ref 70–99)
GLUCOSE SERPL-MCNC: 445 MG/DL — HIGH (ref 70–99)
GLUCOSE UR QL: >=1000 MG/DL
HCT VFR BLD CALC: 47.5 % — SIGNIFICANT CHANGE UP (ref 39–50)
HCT VFR BLDA CALC: 48 % — SIGNIFICANT CHANGE UP
HGB BLD CALC-MCNC: 16 G/DL — SIGNIFICANT CHANGE UP (ref 12.6–17.4)
HGB BLD-MCNC: 15.1 G/DL — SIGNIFICANT CHANGE UP (ref 13–17)
IMM GRANULOCYTES NFR BLD AUTO: 1.8 % — HIGH (ref 0–0.9)
KETONES UR-MCNC: 80 MG/DL
LACTATE BLDV-MCNC: 5.6 MMOL/L — CRITICAL HIGH (ref 0.5–2)
LEUKOCYTE ESTERASE UR-ACNC: NEGATIVE — SIGNIFICANT CHANGE UP
LIDOCAIN IGE QN: 9 U/L — LOW (ref 22–51)
LYMPHOCYTES # BLD AUTO: 1.76 K/UL — SIGNIFICANT CHANGE UP (ref 1–3.3)
LYMPHOCYTES # BLD AUTO: 14.1 % — SIGNIFICANT CHANGE UP (ref 13–44)
MAGNESIUM SERPL-MCNC: 2.1 MG/DL — SIGNIFICANT CHANGE UP (ref 1.6–2.6)
MAGNESIUM SERPL-MCNC: 2.5 MG/DL — SIGNIFICANT CHANGE UP (ref 1.6–2.6)
MCHC RBC-ENTMCNC: 28.4 PG — SIGNIFICANT CHANGE UP (ref 27–34)
MCHC RBC-ENTMCNC: 31.8 GM/DL — LOW (ref 32–36)
MCV RBC AUTO: 89.3 FL — SIGNIFICANT CHANGE UP (ref 80–100)
MONOCYTES # BLD AUTO: 0.8 K/UL — SIGNIFICANT CHANGE UP (ref 0–0.9)
MONOCYTES NFR BLD AUTO: 6.4 % — SIGNIFICANT CHANGE UP (ref 2–14)
NEUTROPHILS # BLD AUTO: 9.61 K/UL — HIGH (ref 1.8–7.4)
NEUTROPHILS NFR BLD AUTO: 77.1 % — HIGH (ref 43–77)
NITRITE UR-MCNC: NEGATIVE — SIGNIFICANT CHANGE UP
OSMOLALITY SERPL: 334 MOSMOL/KG — HIGH (ref 275–300)
PCO2 BLDV: 34 MMHG — LOW (ref 42–55)
PH BLDV: <6.942 — CRITICAL LOW (ref 7.32–7.43)
PH UR: 5.5 — SIGNIFICANT CHANGE UP (ref 5–8)
PHOSPHATE SERPL-MCNC: 2.7 MG/DL — SIGNIFICANT CHANGE UP (ref 2.4–4.7)
PHOSPHATE SERPL-MCNC: 5.6 MG/DL — HIGH (ref 2.4–4.7)
PLATELET # BLD AUTO: 345 K/UL — SIGNIFICANT CHANGE UP (ref 150–400)
PO2 BLDV: 48 MMHG — HIGH (ref 25–45)
POTASSIUM BLDV-SCNC: 4.5 MMOL/L — SIGNIFICANT CHANGE UP (ref 3.5–5.1)
POTASSIUM SERPL-MCNC: 4.3 MMOL/L — SIGNIFICANT CHANGE UP (ref 3.5–5.3)
POTASSIUM SERPL-MCNC: 4.5 MMOL/L — SIGNIFICANT CHANGE UP (ref 3.5–5.3)
POTASSIUM SERPL-MCNC: 4.6 MMOL/L — SIGNIFICANT CHANGE UP (ref 3.5–5.3)
POTASSIUM SERPL-SCNC: 4.3 MMOL/L — SIGNIFICANT CHANGE UP (ref 3.5–5.3)
POTASSIUM SERPL-SCNC: 4.5 MMOL/L — SIGNIFICANT CHANGE UP (ref 3.5–5.3)
POTASSIUM SERPL-SCNC: 4.6 MMOL/L — SIGNIFICANT CHANGE UP (ref 3.5–5.3)
PROT SERPL-MCNC: 8.4 G/DL — SIGNIFICANT CHANGE UP (ref 6.6–8.7)
PROT UR-MCNC: 300 MG/DL
RBC # BLD: 5.32 M/UL — SIGNIFICANT CHANGE UP (ref 4.2–5.8)
RBC # FLD: 14.5 % — SIGNIFICANT CHANGE UP (ref 10.3–14.5)
RBC CASTS # UR COMP ASSIST: 0 /HPF — SIGNIFICANT CHANGE UP (ref 0–4)
SAO2 % BLDV: 70.7 % — SIGNIFICANT CHANGE UP
SODIUM SERPL-SCNC: 134 MMOL/L — LOW (ref 135–145)
SODIUM SERPL-SCNC: 141 MMOL/L — SIGNIFICANT CHANGE UP (ref 135–145)
SODIUM SERPL-SCNC: 143 MMOL/L — SIGNIFICANT CHANGE UP (ref 135–145)
SP GR SPEC: 1.03 — SIGNIFICANT CHANGE UP (ref 1–1.03)
SQUAMOUS # UR AUTO: 4 /HPF — SIGNIFICANT CHANGE UP (ref 0–5)
TROPONIN T, HIGH SENSITIVITY RESULT: <6 NG/L — SIGNIFICANT CHANGE UP (ref 0–51)
UROBILINOGEN FLD QL: 0.2 MG/DL — SIGNIFICANT CHANGE UP (ref 0.2–1)
WBC # BLD: 12.47 K/UL — HIGH (ref 3.8–10.5)
WBC # FLD AUTO: 12.47 K/UL — HIGH (ref 3.8–10.5)
WBC UR QL: 0 /HPF — SIGNIFICANT CHANGE UP (ref 0–5)

## 2024-03-11 PROCEDURE — 71250 CT THORAX DX C-: CPT | Mod: 26

## 2024-03-11 PROCEDURE — 74176 CT ABD & PELVIS W/O CONTRAST: CPT | Mod: 26

## 2024-03-11 PROCEDURE — 71045 X-RAY EXAM CHEST 1 VIEW: CPT | Mod: 26

## 2024-03-11 PROCEDURE — 93010 ELECTROCARDIOGRAM REPORT: CPT

## 2024-03-11 PROCEDURE — 99291 CRITICAL CARE FIRST HOUR: CPT

## 2024-03-11 RX ORDER — SODIUM BICARBONATE 1 MEQ/ML
50 SYRINGE (ML) INTRAVENOUS ONCE
Refills: 0 | Status: COMPLETED | OUTPATIENT
Start: 2024-03-11 | End: 2024-03-11

## 2024-03-11 RX ORDER — INSULIN HUMAN 100 [IU]/ML
6 INJECTION, SOLUTION SUBCUTANEOUS
Qty: 50 | Refills: 0 | Status: DISCONTINUED | OUTPATIENT
Start: 2024-03-11 | End: 2024-03-11

## 2024-03-11 RX ORDER — SODIUM CHLORIDE 9 MG/ML
1000 INJECTION, SOLUTION INTRAVENOUS
Refills: 0 | Status: DISCONTINUED | OUTPATIENT
Start: 2024-03-11 | End: 2024-03-13

## 2024-03-11 RX ORDER — INSULIN HUMAN 100 [IU]/ML
6 INJECTION, SOLUTION SUBCUTANEOUS
Qty: 100 | Refills: 0 | Status: DISCONTINUED | OUTPATIENT
Start: 2024-03-11 | End: 2024-03-12

## 2024-03-11 RX ORDER — THIAMINE MONONITRATE (VIT B1) 100 MG
100 TABLET ORAL DAILY
Refills: 0 | Status: COMPLETED | OUTPATIENT
Start: 2024-03-12 | End: 2024-03-12

## 2024-03-11 RX ORDER — FOLIC ACID 0.8 MG
1 TABLET ORAL DAILY
Refills: 0 | Status: DISCONTINUED | OUTPATIENT
Start: 2024-03-12 | End: 2024-03-12

## 2024-03-11 RX ORDER — HEPARIN SODIUM 5000 [USP'U]/ML
5000 INJECTION INTRAVENOUS; SUBCUTANEOUS EVERY 8 HOURS
Refills: 0 | Status: DISCONTINUED | OUTPATIENT
Start: 2024-03-11 | End: 2024-03-14

## 2024-03-11 RX ORDER — SODIUM CHLORIDE 9 MG/ML
1000 INJECTION, SOLUTION INTRAVENOUS
Refills: 0 | Status: DISCONTINUED | OUTPATIENT
Start: 2024-03-11 | End: 2024-03-12

## 2024-03-11 RX ORDER — LEVETIRACETAM 250 MG/1
1000 TABLET, FILM COATED ORAL
Refills: 0 | Status: DISCONTINUED | OUTPATIENT
Start: 2024-03-11 | End: 2024-03-14

## 2024-03-11 RX ORDER — SODIUM CHLORIDE 9 MG/ML
1000 INJECTION, SOLUTION INTRAVENOUS ONCE
Refills: 0 | Status: COMPLETED | OUTPATIENT
Start: 2024-03-11 | End: 2024-03-11

## 2024-03-11 RX ORDER — SODIUM CHLORIDE 9 MG/ML
2000 INJECTION INTRAMUSCULAR; INTRAVENOUS; SUBCUTANEOUS ONCE
Refills: 0 | Status: COMPLETED | OUTPATIENT
Start: 2024-03-11 | End: 2024-03-11

## 2024-03-11 RX ADMIN — INSULIN HUMAN 8 UNIT(S)/HR: 100 INJECTION, SOLUTION SUBCUTANEOUS at 22:58

## 2024-03-11 RX ADMIN — SODIUM CHLORIDE 2000 MILLILITER(S): 9 INJECTION INTRAMUSCULAR; INTRAVENOUS; SUBCUTANEOUS at 17:15

## 2024-03-11 RX ADMIN — SODIUM CHLORIDE 200 MILLILITER(S): 9 INJECTION, SOLUTION INTRAVENOUS at 22:20

## 2024-03-11 RX ADMIN — Medication 50 MILLIEQUIVALENT(S): at 18:16

## 2024-03-11 RX ADMIN — HEPARIN SODIUM 5000 UNIT(S): 5000 INJECTION INTRAVENOUS; SUBCUTANEOUS at 23:38

## 2024-03-11 RX ADMIN — SODIUM CHLORIDE 2000 MILLILITER(S): 9 INJECTION INTRAMUSCULAR; INTRAVENOUS; SUBCUTANEOUS at 19:00

## 2024-03-11 RX ADMIN — SODIUM CHLORIDE 1000 MILLILITER(S): 9 INJECTION, SOLUTION INTRAVENOUS at 18:20

## 2024-03-11 RX ADMIN — INSULIN HUMAN 6 UNIT(S)/HR: 100 INJECTION, SOLUTION SUBCUTANEOUS at 18:30

## 2024-03-11 RX ADMIN — SODIUM CHLORIDE 200 MILLILITER(S): 9 INJECTION, SOLUTION INTRAVENOUS at 19:31

## 2024-03-11 NOTE — ED PROVIDER NOTE - CLINICAL SUMMARY MEDICAL DECISION MAKING FREE TEXT BOX
33-year-old male with increased work of breathing in the setting of diabetes on insulin with multiple admissions for DKA.  Hemodynamically stable, tachypneic saturating well on room air.  Differential includes DKA, HHS, electrolyte derangement, medication noncompliance. 33-year-old male with increased work of breathing in the setting of diabetes on insulin with multiple admissions for DKA.  Hemodynamically stable, tachypneic saturating well on room air.  Differential includes DKA, HHS, electrolyte derangement, medication noncompliance.  Labs and vital reviewed and interpreted, treating for DKA.  Insulin drip started.  MICU admission.

## 2024-03-11 NOTE — H&P ADULT - NSHPPHYSICALEXAM_GEN_ALL_CORE
PHYSICAL EXAM:    GENERAL: NAD, well-groomed, well-developed  HEAD:  Atraumatic, Normocephalic  EYES: EOMI, PERRLA, conjunctiva and sclera clear  ENMT: No tonsillar erythema, exudates, or enlargement; Moist mucous membranes, Good dentition, No lesions  NECK: Supple, No JVD, Normal thyroid  NERVOUS SYSTEM:  Alert & Oriented X3, Good concentration; Motor Strength 5/5 B/L upper and lower extremities; DTRs 2+ intact and symmetric  CHEST/LUNG: Clear to auscultation bilaterally; No rales, rhonchi, wheezing, or rubs  HEART: Insu tach to 120's No murmurs, rubs, or gallops  ABDOMEN: Soft, Nontender, Nondistended; Bowel sounds present  EXTREMITIES:  2+ Peripheral Pulses, No clubbing, cyanosis, or edema

## 2024-03-11 NOTE — PATIENT PROFILE ADULT - FALL HARM RISK - RISK INTERVENTIONS

## 2024-03-11 NOTE — ED ADULT NURSE NOTE - OBJECTIVE STATEMENT
assumed care of pt in CC at 1710, MD at bedside. pt reports nausea, vomiting, chest pain and difficulty breathing since last night. pt reports being type 1 diabetic, reports being compliant with medications. Kussmaul respirations present. placed on cardiac monitor and . denies fevers, chills. rr labored. anox4. pt educated on plan of care, pt able to successfully teach back plan of care to RN, RN will continue to reeducate pt during hospital stay.

## 2024-03-11 NOTE — H&P ADULT - HISTORY OF PRESENT ILLNESS
HPI:  32 y/o M with pmhx of HTN, IDDM with multiple admissions for DKA, ETOH abuse, seizure disorder presents to hospital for SOB. Initial labs significant for WBC 12 w L shift, Co2 <6, AG unable to detect, SCr 1.35 (baseline around 0.4 11/23), glucose 445, acetones in blood, + ketones in urine. VBG with pH < 6.94. Denies missing any insulin doses. States last dirnk was 2 days ago, had 2 beers,  CXR clear, UA negative. Treated with IVF in ED. ICU consulted for DKA management.

## 2024-03-11 NOTE — ED PROVIDER NOTE - OBJECTIVE STATEMENT
33-year-old male with history of HTN, DM type I, multiple admissions for DKA presenting with increased work of breathing for 1 day.  Patient reports compliance with insulin.  denies fevers, chills, headache, chest pain, palpitations, cough, nausea, vomiting, diarrhea, hematuria, dysuria, dark stools, focal neurologic symptoms.

## 2024-03-11 NOTE — ED ADULT NURSE REASSESSMENT NOTE - NS ED NURSE REASSESS COMMENT FT1
MD Dooley made aware if fluid iv infiltration from ultra sound IV placed in the right upper extremity. iv access taken out, warm compresses provided

## 2024-03-11 NOTE — H&P ADULT - ASSESSMENT
Assessment/Plan   34 y/o M with pmhx of HTN, IDDM with multiple admissions for DKA, ETOH abuse, seizure disorder is accepted to ICU for active management of   1. DKA  2. Leukocytosis  3. Seizure disorder  4. ETOH abuse      Neuro: A O x4, at baseline. ETOH abuse hx, states last drink was 2 days ago, no clincal signs of withdrawal at this time, WA protocol. Thiamine/folate. Start home AED's  Cardio: HD stable, maintain MAP > 65. No chest pain, troponin wnl.   Pulm: Maintaining spo2 > 92 % on room air.   GI: NPO  Renal; SYED with Scr 1,35 ( baseline around 0.4 11/23) likely 2/2 severe dehydration/DKA. Aggressive IVF. CT abd/pelvis obstructive/ hydro process. Avoid nephrotoxic agents.   Heme: Hep subq for DVT prophylaxis   ID; Elevated WBC to 12 w L shift, no clear infectious source, UA negative, CXR clear. CT chest/abd/pelvis ordered to r/o infectious etiology. Start on empiric CTX in setting of DKA. Procal level. F/u cx.   Endo: Serum Co2 < 6, AG unable to calculate glucose 433, + acetones in blood consistent with DKA. DKA in setting of non-compliance vs possible infection? elevated WBC, no source, on empiric abx, Start on insulin gtt 0.1 u/hr.  cc/hr, once BG < 250 will switch to D5/LR. Q 1 hour fingersticks. BMP q 4 hours, aggressively replete K. Once gap closed, acidosis improves will transition to standing lantus and start on diet with 2 hour overlap consistent with protocol .  Dispo: Accepted to ICU for DKA management       Case discussed with ICU attending Dr Manrique and plan agreed to      Time spent on this patient encounter, which includes documenting this note in the electronic medical record, was 78 minutes including assessing the presenting problems with associated risks, reviewing the medical record to prepare for the encounter, and meeting face to face with patient to obtain additional history. I have also performed an appropriate physical exam, made interventions listed and ordered and interpreted appropriate diagnostic studies as documented. To improve communication and patient safety, I have coordinated care with the multidisciplinary team including the bedside nurse, appropriate attending of record and consultants as needed.   Date of entry of this note is equal to the date of services rendered   Assessment/Plan   34 y/o M with pmhx of HTN, IDDM with multiple admissions for DKA, ETOH abuse, seizure disorder is accepted to ICU for active management of   1. DKA  2. Leukocytosis  3. Seizure disorder  4. ETOH abuse      Neuro: A O x4, at baseline. ETOH abuse hx, states last drink was 2 days ago, no clincal signs of withdrawal at this time, WA protocol. Thiamine/folate. Start home AED's  Cardio: HD stable, maintain MAP > 65. No chest pain, troponin wnl.   Pulm: Maintaining spo2 > 92 % on room air.   GI: NPO  Renal; SYED with Scr 1,35 ( baseline around 0.4 11/23) likely 2/2 severe dehydration/DKA. Aggressive IVF. CT abd/pelvis obstructive/ hydro process. Avoid nephrotoxic agents.   Heme: Hep subq for DVT prophylaxis   ID; Elevated WBC to 12 w L shift, no clear infectious source, UA negative, CXR clear. CT chest/abd/pelvis ordered to r/o infectious etiology. Start on empiric CTX in setting of DKA. Procal level. F/u cx.   Endo: Serum Co2 < 6, AG unable to calculate glucose 433, + acetones in blood consistent with DKA. DKA in setting of non-compliance vs possible infection? elevated WBC, no source, on empiric abx, Start on insulin gtt 0.1 u/hr.  cc/hr, once BG < 250 will switch to D5/LR. Q 1 hour fingersticks. BMP q 4 hours, aggressively replete K. Once gap closed, acidosis improves will transition to standing lantus and start on diet with 2 hour overlap consistent with protocol . Endocrine consult in AM.   Dispo: Accepted to ICU for DKA management       Case discussed with ICU attending Dr Manrique and plan agreed to      Time spent on this patient encounter, which includes documenting this note in the electronic medical record, was 78 minutes including assessing the presenting problems with associated risks, reviewing the medical record to prepare for the encounter, and meeting face to face with patient to obtain additional history. I have also performed an appropriate physical exam, made interventions listed and ordered and interpreted appropriate diagnostic studies as documented. To improve communication and patient safety, I have coordinated care with the multidisciplinary team including the bedside nurse, appropriate attending of record and consultants as needed.   Date of entry of this note is equal to the date of services rendered

## 2024-03-11 NOTE — PHARMACOTHERAPY INTERVENTION NOTE - COMMENTS
Hyperglycemia
Spoke to patient at bedside to obtain medication list. As per patient, does not take any medications other than insulin. Outpatient Medication Review updated.    HOME MEDICATIONS:  HumaLOG 100 units/mL injectable solution: 10 units subcutaneously three times daily and sliding scale as directed (11 Mar 2024 20:29)  insulin glargine-yfgn 100 units/mL subcutaneous solution: 16 unit(s) subcutaneous once a day (at bedtime) (11 Mar 2024 20:29)

## 2024-03-11 NOTE — ED PROVIDER NOTE - PHYSICAL EXAMINATION
General: ill appearing in no acute distress, alert and cooperative  Head: Normocephalic, atraumatic  Eyes: PERRLA, no conjunctival injection, no scleral icterus, EOMI  ENMT: Atraumatic external nose and ears  Neck: Soft and supple, full ROM without pain  Cardiac: Regular rate and regular rhythm, no murmurs  Resp: tachypneic respiratory effort, lungs CTAB  Abd: Soft, non-tender, non-distended  MSK: Spine midline and non-tender, no CVA tenderness   Skin: Warm and dry  Neuro: AO x 3, moves all extremities symmetrically, Motor strength and sensation grossly intact

## 2024-03-11 NOTE — ED PROVIDER NOTE - ATTENDING CONTRIBUTION TO CARE
32-year-old male with history of hypertension and diabetes with previous admissions for diabetic ketoacidosis presents the ED complaining of increasing shortness of breath x 1 day.  Patient noted to have elevated blood sugar on arrival  but states he has been compliant with his insulin.   on exam patient tachypnea with respiratory rate in the 40s and appears acutely ill.  PERRL, mucosa membranes dry, neck supple, heart tachycardic lungs clear bilaterally abdomen soft no localized tenderness extremities no cyanosis edema neuro no focal deficits.   VBG consistent with severe acidosis will start on IV fluids and insulin drip consult MICU as patient will require admission to the medical intensive care unit

## 2024-03-11 NOTE — H&P ADULT - NSHPREVIEWOFSYSTEMS_GEN_ALL_CORE
CONSTITUTIONAL: No fever, weight loss, or fatigue  EYES: No eye pain, visual disturbances, or discharge  ENMT:  No difficulty hearing, tinnitus, vertigo; No sinus or throat pain  NECK: No pain or stiffness  BREASTS: No pain, masses, or nipple discharge  RESPIRATORY: + SOB.  No cough, wheezing, chills or hemoptysis;  CARDIOVASCULAR: No chest pain, palpitations, dizziness, or leg swelling  GASTROINTESTINAL:  + Nausea. No abdominal or epigastric pain. No  vomiting, or hematemesis; No diarrhea or constipation. No melena or hematochezia.  GENITOURINARY: No dysuria, frequency, hematuria, or incontinence  NEUROLOGICAL: No headaches, memory loss, loss of strength, numbness, or tremors  SKIN: No itching, burning, rashes, or lesions   LYMPH NODES: No enlarged glands  ENDOCRINE: No heat or cold intolerance; No hair loss  MUSCULOSKELETAL: No joint pain or swelling; No muscle, back, or extremity pain  PSYCHIATRIC: No depression, anxiety, mood swings, or difficulty sleeping  HEME/LYMPH: No easy bruising, or bleeding gums  ALLERGY AND IMMUNOLOGIC: No hives or eczema

## 2024-03-11 NOTE — ED ADULT TRIAGE NOTE - CHIEF COMPLAINT QUOTE
Pt arrive to ED c/o  of  SOB and difficulty breathing - Kussmaul breathing  noted, Pt DM type 1 - denies any recent sickness

## 2024-03-11 NOTE — H&P ADULT - NSHPLABSRESULTS_GEN_ALL_CORE
Lab Results:  CBC  CBC Full  -  ( 11 Mar 2024 17:08 )  WBC Count : 12.47 K/uL  RBC Count : 5.32 M/uL  Hemoglobin : 15.1 g/dL  Hematocrit : 47.5 %  Platelet Count - Automated : 345 K/uL  Mean Cell Volume : 89.3 fl  Mean Cell Hemoglobin : 28.4 pg  Mean Cell Hemoglobin Concentration : 31.8 gm/dL  Auto Neutrophil # : 9.61 K/uL  Auto Lymphocyte # : 1.76 K/uL  Auto Monocyte # : 0.80 K/uL  Auto Eosinophil # : 0.01 K/uL  Auto Basophil # : 0.06 K/uL  Auto Neutrophil % : 77.1 %  Auto Lymphocyte % : 14.1 %  Auto Monocyte % : 6.4 %  Auto Eosinophil % : 0.1 %  Auto Basophil % : 0.5 %    .		Differential:	[] Automated		[] Manual  Chemistry                        15.1   12.47 )-----------( 345      ( 11 Mar 2024 17:08 )             47.5     03-11    141  |  99  |  11.8  ----------------------------<  394<H>  4.3   |  <6.0<LL>  |  1.05    Ca    8.1<L>      11 Mar 2024 18:39  Phos  5.6     03-11  Mg     2.5     03-11    TPro  8.4  /  Alb  5.0  /  TBili  0.5  /  DBili  x   /  AST  20  /  ALT  15  /  AlkPhos  161<H>  03-11    LIVER FUNCTIONS - ( 11 Mar 2024 17:08 )  Alb: 5.0 g/dL / Pro: 8.4 g/dL / ALK PHOS: 161 U/L / ALT: 15 U/L / AST: 20 U/L / GGT: x             Urinalysis Basic - ( 11 Mar 2024 18:39 )    Color: x / Appearance: x / SG: x / pH: x  Gluc: 394 mg/dL / Ketone: x  / Bili: x / Urobili: x   Blood: x / Protein: x / Nitrite: x   Leuk Esterase: x / RBC: x / WBC x   Sq Epi: x / Non Sq Epi: x / Bacteria: x            MICROBIOLOGY/CULTURES:      RADIOLOGY RESULTS:

## 2024-03-12 LAB
ALBUMIN SERPL ELPH-MCNC: 4.2 G/DL — SIGNIFICANT CHANGE UP (ref 3.3–5.2)
ALBUMIN SERPL ELPH-MCNC: 4.8 G/DL — SIGNIFICANT CHANGE UP (ref 3.3–5.2)
ALP SERPL-CCNC: 117 U/L — SIGNIFICANT CHANGE UP (ref 40–120)
ALP SERPL-CCNC: 148 U/L — HIGH (ref 40–120)
ALT FLD-CCNC: 11 U/L — SIGNIFICANT CHANGE UP
ALT FLD-CCNC: 15 U/L — SIGNIFICANT CHANGE UP
ANION GAP SERPL CALC-SCNC: 12 MMOL/L — SIGNIFICANT CHANGE UP (ref 5–17)
ANION GAP SERPL CALC-SCNC: 16 MMOL/L — SIGNIFICANT CHANGE UP (ref 5–17)
ANION GAP SERPL CALC-SCNC: 17 MMOL/L — SIGNIFICANT CHANGE UP (ref 5–17)
ANION GAP SERPL CALC-SCNC: 20 MMOL/L — HIGH (ref 5–17)
ANION GAP SERPL CALC-SCNC: 29 MMOL/L — HIGH (ref 5–17)
AST SERPL-CCNC: 15 U/L — SIGNIFICANT CHANGE UP
AST SERPL-CCNC: 16 U/L — SIGNIFICANT CHANGE UP
BASE EXCESS BLDV CALC-SCNC: -17.9 MMOL/L — LOW (ref -2–3)
BILIRUB DIRECT SERPL-MCNC: 0.2 MG/DL — SIGNIFICANT CHANGE UP (ref 0–0.3)
BILIRUB INDIRECT FLD-MCNC: 0.4 MG/DL — SIGNIFICANT CHANGE UP (ref 0.2–1)
BILIRUB SERPL-MCNC: 0.6 MG/DL — SIGNIFICANT CHANGE UP (ref 0.4–2)
BILIRUB SERPL-MCNC: 0.7 MG/DL — SIGNIFICANT CHANGE UP (ref 0.4–2)
BUN SERPL-MCNC: 10.7 MG/DL — SIGNIFICANT CHANGE UP (ref 8–20)
BUN SERPL-MCNC: 10.8 MG/DL — SIGNIFICANT CHANGE UP (ref 8–20)
BUN SERPL-MCNC: 10.8 MG/DL — SIGNIFICANT CHANGE UP (ref 8–20)
BUN SERPL-MCNC: 11 MG/DL — SIGNIFICANT CHANGE UP (ref 8–20)
BUN SERPL-MCNC: 9.8 MG/DL — SIGNIFICANT CHANGE UP (ref 8–20)
CA-I SERPL-SCNC: 1.37 MMOL/L — HIGH (ref 1.15–1.33)
CALCIUM SERPL-MCNC: 8.7 MG/DL — SIGNIFICANT CHANGE UP (ref 8.4–10.5)
CALCIUM SERPL-MCNC: 9 MG/DL — SIGNIFICANT CHANGE UP (ref 8.4–10.5)
CALCIUM SERPL-MCNC: 9 MG/DL — SIGNIFICANT CHANGE UP (ref 8.4–10.5)
CALCIUM SERPL-MCNC: 9.1 MG/DL — SIGNIFICANT CHANGE UP (ref 8.4–10.5)
CALCIUM SERPL-MCNC: 9.4 MG/DL — SIGNIFICANT CHANGE UP (ref 8.4–10.5)
CHLORIDE BLDV-SCNC: 112 MMOL/L — HIGH (ref 96–108)
CHLORIDE SERPL-SCNC: 104 MMOL/L — SIGNIFICANT CHANGE UP (ref 96–108)
CHLORIDE SERPL-SCNC: 105 MMOL/L — SIGNIFICANT CHANGE UP (ref 96–108)
CHLORIDE SERPL-SCNC: 106 MMOL/L — SIGNIFICANT CHANGE UP (ref 96–108)
CHLORIDE SERPL-SCNC: 108 MMOL/L — SIGNIFICANT CHANGE UP (ref 96–108)
CHLORIDE SERPL-SCNC: 110 MMOL/L — HIGH (ref 96–108)
CO2 SERPL-SCNC: 15 MMOL/L — LOW (ref 22–29)
CO2 SERPL-SCNC: 15 MMOL/L — LOW (ref 22–29)
CO2 SERPL-SCNC: 16 MMOL/L — LOW (ref 22–29)
CO2 SERPL-SCNC: 7 MMOL/L — CRITICAL LOW (ref 22–29)
CO2 SERPL-SCNC: 9 MMOL/L — CRITICAL LOW (ref 22–29)
CREAT SERPL-MCNC: 0.72 MG/DL — SIGNIFICANT CHANGE UP (ref 0.5–1.3)
CREAT SERPL-MCNC: 0.75 MG/DL — SIGNIFICANT CHANGE UP (ref 0.5–1.3)
CREAT SERPL-MCNC: 0.77 MG/DL — SIGNIFICANT CHANGE UP (ref 0.5–1.3)
CREAT SERPL-MCNC: 0.86 MG/DL — SIGNIFICANT CHANGE UP (ref 0.5–1.3)
CREAT SERPL-MCNC: 0.93 MG/DL — SIGNIFICANT CHANGE UP (ref 0.5–1.3)
EGFR: 110 ML/MIN/1.73M2 — SIGNIFICANT CHANGE UP
EGFR: 117 ML/MIN/1.73M2 — SIGNIFICANT CHANGE UP
EGFR: 120 ML/MIN/1.73M2 — SIGNIFICANT CHANGE UP
EGFR: 121 ML/MIN/1.73M2 — SIGNIFICANT CHANGE UP
EGFR: 123 ML/MIN/1.73M2 — SIGNIFICANT CHANGE UP
GAS PNL BLDV: 136 MMOL/L — SIGNIFICANT CHANGE UP (ref 136–145)
GAS PNL BLDV: SIGNIFICANT CHANGE UP
GAS PNL BLDV: SIGNIFICANT CHANGE UP
GLUCOSE BLDC GLUCOMTR-MCNC: 123 MG/DL — HIGH (ref 70–99)
GLUCOSE BLDC GLUCOMTR-MCNC: 124 MG/DL — HIGH (ref 70–99)
GLUCOSE BLDC GLUCOMTR-MCNC: 129 MG/DL — HIGH (ref 70–99)
GLUCOSE BLDC GLUCOMTR-MCNC: 154 MG/DL — HIGH (ref 70–99)
GLUCOSE BLDC GLUCOMTR-MCNC: 179 MG/DL — HIGH (ref 70–99)
GLUCOSE BLDC GLUCOMTR-MCNC: 182 MG/DL — HIGH (ref 70–99)
GLUCOSE BLDC GLUCOMTR-MCNC: 192 MG/DL — HIGH (ref 70–99)
GLUCOSE BLDC GLUCOMTR-MCNC: 199 MG/DL — HIGH (ref 70–99)
GLUCOSE BLDC GLUCOMTR-MCNC: 204 MG/DL — HIGH (ref 70–99)
GLUCOSE BLDC GLUCOMTR-MCNC: 208 MG/DL — HIGH (ref 70–99)
GLUCOSE BLDC GLUCOMTR-MCNC: 229 MG/DL — HIGH (ref 70–99)
GLUCOSE BLDC GLUCOMTR-MCNC: 240 MG/DL — HIGH (ref 70–99)
GLUCOSE BLDC GLUCOMTR-MCNC: 246 MG/DL — HIGH (ref 70–99)
GLUCOSE BLDC GLUCOMTR-MCNC: 86 MG/DL — SIGNIFICANT CHANGE UP (ref 70–99)
GLUCOSE BLDC GLUCOMTR-MCNC: 91 MG/DL — SIGNIFICANT CHANGE UP (ref 70–99)
GLUCOSE BLDV-MCNC: 204 MG/DL — HIGH (ref 70–99)
GLUCOSE SERPL-MCNC: 105 MG/DL — HIGH (ref 70–99)
GLUCOSE SERPL-MCNC: 193 MG/DL — HIGH (ref 70–99)
GLUCOSE SERPL-MCNC: 199 MG/DL — HIGH (ref 70–99)
GLUCOSE SERPL-MCNC: 200 MG/DL — HIGH (ref 70–99)
GLUCOSE SERPL-MCNC: 90 MG/DL — SIGNIFICANT CHANGE UP (ref 70–99)
HCO3 BLDV-SCNC: 10 MMOL/L — CRITICAL LOW (ref 22–29)
HCT VFR BLD CALC: 37.2 % — LOW (ref 39–50)
HCT VFR BLDA CALC: 42 % — SIGNIFICANT CHANGE UP
HGB BLD CALC-MCNC: 14.1 G/DL — SIGNIFICANT CHANGE UP (ref 12.6–17.4)
HGB BLD-MCNC: 13.2 G/DL — SIGNIFICANT CHANGE UP (ref 13–17)
LACTATE BLDV-MCNC: 1 MMOL/L — SIGNIFICANT CHANGE UP (ref 0.5–2)
MAGNESIUM SERPL-MCNC: 1.7 MG/DL — SIGNIFICANT CHANGE UP (ref 1.6–2.6)
MAGNESIUM SERPL-MCNC: 1.7 MG/DL — SIGNIFICANT CHANGE UP (ref 1.6–2.6)
MAGNESIUM SERPL-MCNC: 1.8 MG/DL — SIGNIFICANT CHANGE UP (ref 1.6–2.6)
MAGNESIUM SERPL-MCNC: 2 MG/DL — SIGNIFICANT CHANGE UP (ref 1.8–2.6)
MCHC RBC-ENTMCNC: 29.3 PG — SIGNIFICANT CHANGE UP (ref 27–34)
MCHC RBC-ENTMCNC: 35.5 GM/DL — SIGNIFICANT CHANGE UP (ref 32–36)
MCV RBC AUTO: 82.5 FL — SIGNIFICANT CHANGE UP (ref 80–100)
PCO2 BLDV: 24 MMHG — LOW (ref 42–55)
PH BLDV: 7.22 — LOW (ref 7.32–7.43)
PHOSPHATE SERPL-MCNC: 1.3 MG/DL — LOW (ref 2.4–4.7)
PHOSPHATE SERPL-MCNC: 1.4 MG/DL — LOW (ref 2.4–4.7)
PHOSPHATE SERPL-MCNC: 2.5 MG/DL — SIGNIFICANT CHANGE UP (ref 2.4–4.7)
PHOSPHATE SERPL-MCNC: 2.7 MG/DL — SIGNIFICANT CHANGE UP (ref 2.4–4.7)
PLATELET # BLD AUTO: 229 K/UL — SIGNIFICANT CHANGE UP (ref 150–400)
PO2 BLDV: 118 MMHG — HIGH (ref 25–45)
POTASSIUM BLDV-SCNC: 3.5 MMOL/L — SIGNIFICANT CHANGE UP (ref 3.5–5.1)
POTASSIUM SERPL-MCNC: 3.1 MMOL/L — LOW (ref 3.5–5.3)
POTASSIUM SERPL-MCNC: 3.3 MMOL/L — LOW (ref 3.5–5.3)
POTASSIUM SERPL-MCNC: 3.5 MMOL/L — SIGNIFICANT CHANGE UP (ref 3.5–5.3)
POTASSIUM SERPL-MCNC: 3.7 MMOL/L — SIGNIFICANT CHANGE UP (ref 3.5–5.3)
POTASSIUM SERPL-MCNC: 4.3 MMOL/L — SIGNIFICANT CHANGE UP (ref 3.5–5.3)
POTASSIUM SERPL-SCNC: 3.1 MMOL/L — LOW (ref 3.5–5.3)
POTASSIUM SERPL-SCNC: 3.3 MMOL/L — LOW (ref 3.5–5.3)
POTASSIUM SERPL-SCNC: 3.5 MMOL/L — SIGNIFICANT CHANGE UP (ref 3.5–5.3)
POTASSIUM SERPL-SCNC: 3.7 MMOL/L — SIGNIFICANT CHANGE UP (ref 3.5–5.3)
POTASSIUM SERPL-SCNC: 4.3 MMOL/L — SIGNIFICANT CHANGE UP (ref 3.5–5.3)
PROT SERPL-MCNC: 6.7 G/DL — SIGNIFICANT CHANGE UP (ref 6.6–8.7)
PROT SERPL-MCNC: 8.3 G/DL — SIGNIFICANT CHANGE UP (ref 6.6–8.7)
RBC # BLD: 4.51 M/UL — SIGNIFICANT CHANGE UP (ref 4.2–5.8)
RBC # FLD: 14.4 % — SIGNIFICANT CHANGE UP (ref 10.3–14.5)
SAO2 % BLDV: 99.6 % — SIGNIFICANT CHANGE UP
SODIUM SERPL-SCNC: 136 MMOL/L — SIGNIFICANT CHANGE UP (ref 135–145)
SODIUM SERPL-SCNC: 137 MMOL/L — SIGNIFICANT CHANGE UP (ref 135–145)
SODIUM SERPL-SCNC: 137 MMOL/L — SIGNIFICANT CHANGE UP (ref 135–145)
SODIUM SERPL-SCNC: 139 MMOL/L — SIGNIFICANT CHANGE UP (ref 135–145)
SODIUM SERPL-SCNC: 140 MMOL/L — SIGNIFICANT CHANGE UP (ref 135–145)
WBC # BLD: 8.16 K/UL — SIGNIFICANT CHANGE UP (ref 3.8–10.5)
WBC # FLD AUTO: 8.16 K/UL — SIGNIFICANT CHANGE UP (ref 3.8–10.5)

## 2024-03-12 PROCEDURE — 99233 SBSQ HOSP IP/OBS HIGH 50: CPT | Mod: GC

## 2024-03-12 PROCEDURE — 99233 SBSQ HOSP IP/OBS HIGH 50: CPT

## 2024-03-12 PROCEDURE — 99222 1ST HOSP IP/OBS MODERATE 55: CPT

## 2024-03-12 RX ORDER — PANTOPRAZOLE SODIUM 20 MG/1
40 TABLET, DELAYED RELEASE ORAL EVERY 12 HOURS
Refills: 0 | Status: DISCONTINUED | OUTPATIENT
Start: 2024-03-12 | End: 2024-03-12

## 2024-03-12 RX ORDER — SODIUM,POTASSIUM PHOSPHATES 278-250MG
1 POWDER IN PACKET (EA) ORAL
Refills: 0 | Status: COMPLETED | OUTPATIENT
Start: 2024-03-12 | End: 2024-03-13

## 2024-03-12 RX ORDER — INSULIN GLARGINE 100 [IU]/ML
15 INJECTION, SOLUTION SUBCUTANEOUS EVERY MORNING
Refills: 0 | Status: DISCONTINUED | OUTPATIENT
Start: 2024-03-13 | End: 2024-03-14

## 2024-03-12 RX ORDER — INSULIN GLARGINE 100 [IU]/ML
15 INJECTION, SOLUTION SUBCUTANEOUS ONCE
Refills: 0 | Status: COMPLETED | OUTPATIENT
Start: 2024-03-12 | End: 2024-03-12

## 2024-03-12 RX ORDER — POTASSIUM CHLORIDE 20 MEQ
40 PACKET (EA) ORAL ONCE
Refills: 0 | Status: COMPLETED | OUTPATIENT
Start: 2024-03-12 | End: 2024-03-12

## 2024-03-12 RX ORDER — PANTOPRAZOLE SODIUM 20 MG/1
40 TABLET, DELAYED RELEASE ORAL
Refills: 0 | Status: DISCONTINUED | OUTPATIENT
Start: 2024-03-12 | End: 2024-03-14

## 2024-03-12 RX ORDER — THIAMINE MONONITRATE (VIT B1) 100 MG
100 TABLET ORAL DAILY
Refills: 0 | Status: DISCONTINUED | OUTPATIENT
Start: 2024-03-13 | End: 2024-03-14

## 2024-03-12 RX ORDER — ONDANSETRON 8 MG/1
4 TABLET, FILM COATED ORAL EVERY 8 HOURS
Refills: 0 | Status: DISCONTINUED | OUTPATIENT
Start: 2024-03-12 | End: 2024-03-14

## 2024-03-12 RX ORDER — MAGNESIUM OXIDE 400 MG ORAL TABLET 241.3 MG
400 TABLET ORAL ONCE
Refills: 0 | Status: COMPLETED | OUTPATIENT
Start: 2024-03-12 | End: 2024-03-12

## 2024-03-12 RX ORDER — INSULIN LISPRO 100/ML
2 VIAL (ML) SUBCUTANEOUS
Refills: 0 | Status: DISCONTINUED | OUTPATIENT
Start: 2024-03-12 | End: 2024-03-13

## 2024-03-12 RX ORDER — FOLIC ACID 0.8 MG
1 TABLET ORAL DAILY
Refills: 0 | Status: DISCONTINUED | OUTPATIENT
Start: 2024-03-12 | End: 2024-03-14

## 2024-03-12 RX ORDER — INSULIN LISPRO 100/ML
VIAL (ML) SUBCUTANEOUS
Refills: 0 | Status: DISCONTINUED | OUTPATIENT
Start: 2024-03-12 | End: 2024-03-14

## 2024-03-12 RX ORDER — CEFTRIAXONE 500 MG/1
1000 INJECTION, POWDER, FOR SOLUTION INTRAMUSCULAR; INTRAVENOUS EVERY 24 HOURS
Refills: 0 | Status: DISCONTINUED | OUTPATIENT
Start: 2024-03-12 | End: 2024-03-13

## 2024-03-12 RX ORDER — DIAZEPAM 5 MG
10 TABLET ORAL EVERY 6 HOURS
Refills: 0 | Status: DISCONTINUED | OUTPATIENT
Start: 2024-03-12 | End: 2024-03-12

## 2024-03-12 RX ORDER — POTASSIUM PHOSPHATE, MONOBASIC POTASSIUM PHOSPHATE, DIBASIC 236; 224 MG/ML; MG/ML
30 INJECTION, SOLUTION INTRAVENOUS ONCE
Refills: 0 | Status: COMPLETED | OUTPATIENT
Start: 2024-03-12 | End: 2024-03-12

## 2024-03-12 RX ORDER — CEFTRIAXONE 500 MG/1
1000 INJECTION, POWDER, FOR SOLUTION INTRAMUSCULAR; INTRAVENOUS EVERY 24 HOURS
Refills: 0 | Status: DISCONTINUED | OUTPATIENT
Start: 2024-03-12 | End: 2024-03-12

## 2024-03-12 RX ADMIN — CEFTRIAXONE 1000 MILLIGRAM(S): 500 INJECTION, POWDER, FOR SOLUTION INTRAMUSCULAR; INTRAVENOUS at 02:25

## 2024-03-12 RX ADMIN — Medication 1 TABLET(S): at 11:55

## 2024-03-12 RX ADMIN — Medication 1 TABLET(S): at 22:21

## 2024-03-12 RX ADMIN — Medication 2: at 22:22

## 2024-03-12 RX ADMIN — Medication 1 MILLIGRAM(S): at 12:01

## 2024-03-12 RX ADMIN — MAGNESIUM OXIDE 400 MG ORAL TABLET 400 MILLIGRAM(S): 241.3 TABLET ORAL at 15:02

## 2024-03-12 RX ADMIN — Medication 100 MILLIGRAM(S): at 11:55

## 2024-03-12 RX ADMIN — HEPARIN SODIUM 5000 UNIT(S): 5000 INJECTION INTRAVENOUS; SUBCUTANEOUS at 06:28

## 2024-03-12 RX ADMIN — Medication 1 TABLET(S): at 12:02

## 2024-03-12 RX ADMIN — Medication 1 TABLET(S): at 17:32

## 2024-03-12 RX ADMIN — INSULIN HUMAN 8 UNIT(S)/HR: 100 INJECTION, SOLUTION SUBCUTANEOUS at 08:35

## 2024-03-12 RX ADMIN — PANTOPRAZOLE SODIUM 40 MILLIGRAM(S): 20 TABLET, DELAYED RELEASE ORAL at 17:32

## 2024-03-12 RX ADMIN — INSULIN GLARGINE 15 UNIT(S): 100 INJECTION, SOLUTION SUBCUTANEOUS at 14:57

## 2024-03-12 RX ADMIN — Medication 40 MILLIEQUIVALENT(S): at 15:02

## 2024-03-12 RX ADMIN — POTASSIUM PHOSPHATE, MONOBASIC POTASSIUM PHOSPHATE, DIBASIC 83.33 MILLIMOLE(S): 236; 224 INJECTION, SOLUTION INTRAVENOUS at 05:34

## 2024-03-12 RX ADMIN — LEVETIRACETAM 1000 MILLIGRAM(S): 250 TABLET, FILM COATED ORAL at 17:32

## 2024-03-12 RX ADMIN — Medication 2 UNIT(S): at 22:22

## 2024-03-12 RX ADMIN — SODIUM CHLORIDE 225 MILLILITER(S): 9 INJECTION, SOLUTION INTRAVENOUS at 17:33

## 2024-03-12 RX ADMIN — HEPARIN SODIUM 5000 UNIT(S): 5000 INJECTION INTRAVENOUS; SUBCUTANEOUS at 22:21

## 2024-03-12 RX ADMIN — LEVETIRACETAM 1000 MILLIGRAM(S): 250 TABLET, FILM COATED ORAL at 06:28

## 2024-03-12 RX ADMIN — HEPARIN SODIUM 5000 UNIT(S): 5000 INJECTION INTRAVENOUS; SUBCUTANEOUS at 15:00

## 2024-03-12 NOTE — CONSULT NOTE ADULT - SUBJECTIVE AND OBJECTIVE BOX
HPI:  33M with pmhx of HTN, IDDM with multiple admissions for DKA, ETOH abuse, seizure disorder presents to hospital for SOB. Initial labs significant for WBC 12 w L shift, Co2 <6, AG unable to detect, SCr 1.35 (baseline around 0.4 11/23), glucose 445, acetones in blood, + ketones in urine. VBG with pH < 6.94. Denies missing any insulin doses. States last dirnk was 2 days ago, had 2 beers,  CXR clear, UA negative. Treated with IVF in ED. ICU consulted for DKA management.  (11 Mar 2024 19:57)    Diabetes history:  Known to our service for multiple admissions for DKA. He has had insulin dependent diabetes for many years. He endorses taking lispro 10 units TID before meals and long acting insulin 15-16 units nightly, frequently forgets to take basal insulin. He claims to have a PCP, but has not recently yecenia, usually gets insulin from the hospital.    PAST MEDICAL & SURGICAL HISTORY:  Alcohol abuse  Pancreatitis  HTN (hypertension)  Diabetes  Atrial fibrillation  Non-ischemic cardiomyopathy  No significant past surgical history    FAMILY HISTORY:  FH: hypertension    SOCIAL HISTORY: States he drank 2 24oz beers prior to admission, denies drug use    REVIEW OF SYSTEMS:  Constitutional: No fever, no chills, no change in weight.  Eyes: No eye swelling, no blurry vision, no redness, no loss of vision.  Neck: No neck pain, no change in voice.  Lungs: No shortness of breath, no wheezing, no cough  CV: No chest pain, no palpitations, no pain with walking.  GI: No nausea, no vomiting, no constipation, no diarrhea, no abdominal pain  : No urinary frequency, no blood in urine, no urinary burning, no difficulty voiding.  Musculoskeletal: No muscle pain, no joint pain, no swelling.  Skin: No rash, no infections.  Neurologic: No headaches, no weakness, no burning or pain in feet, no tremor.  Endocrine: No heat intolerance, no cold intolerance, no increased sweating, no shakiness between meals.  Psych: No depression, no anxiety, no trouble concentrating    MEDICATIONS  (STANDING):  cefTRIAXone Injectable. 1000 milliGRAM(s) IV Push every 24 hours  dextrose 5% + lactated ringers. 1000 milliLiter(s) (225 mL/Hr) IV Continuous <Continuous>  folic acid Injectable 1 milliGRAM(s) IV Push daily  heparin   Injectable 5000 Unit(s) SubCutaneous every 8 hours  insulin regular Infusion 6 Unit(s)/Hr (6 mL/Hr) IV Continuous <Continuous>  levETIRAcetam 1000 milliGRAM(s) Oral two times a day  pantoprazole  Injectable 40 milliGRAM(s) IV Push every 12 hours  potassium phosphate / sodium phosphate Tablet (K-PHOS No. 2) 1 Tablet(s) Oral four times a day with meals  thiamine Injectable 100 milliGRAM(s) IV Push daily    MEDICATIONS  (PRN):  diazepam    Tablet 10 milliGRAM(s) Oral every 6 hours PRN Alcohol withdrawal    Allergies  Allergy Status Unknown    PHYSICAL EXAM:  General: No apparent distress  Respiratory: Lungs clear bilaterally  Cardiac: +S1, S2, no m/r/g  GI: +BS, soft, non tender, non distended  Extremities: No peripheral edema  Neuro: Awake, forgetful    Vital Signs Last 24 Hrs  T(C): 37.1 (12 Mar 2024 07:10), Max: 37.1 (12 Mar 2024 07:10)  T(F): 98.8 (12 Mar 2024 07:10), Max: 98.8 (12 Mar 2024 07:10)  HR: 113 (12 Mar 2024 10:00) (105 - 140)  BP: 108/88 (12 Mar 2024 10:00) (106/67 - 156/100)  BP(mean): 96 (12 Mar 2024 10:00) (76 - 109)  RR: 15 (12 Mar 2024 10:00) (13 - 40)  SpO2: 100% (12 Mar 2024 10:00) (98% - 100%)    Parameters below as of 12 Mar 2024 07:00  Patient On (Oxygen Delivery Method): room air      LABS:                        15.1   12.47 )-----------( 345      ( 11 Mar 2024 17:08 )             47.5     03-12    139  |  110<H>  |  11.0  ----------------------------<  200<H>  3.7   |  9.0<LL>  |  0.75    Ca    9.4      12 Mar 2024 07:15  Phos  1.4     03-12  Mg     1.8     03-12    TPro  8.3  /  Alb  4.8  /  TBili  0.6  /  DBili  0.2  /  AST  16  /  ALT  15  /  AlkPhos  148<H>  03-12    Urinalysis Basic - ( 12 Mar 2024 07:15 )    Color: x / Appearance: x / SG: x / pH: x  Gluc: 200 mg/dL / Ketone: x  / Bili: x / Urobili: x   Blood: x / Protein: x / Nitrite: x   Leuk Esterase: x / RBC: x / WBC x   Sq Epi: x / Non Sq Epi: x / Bacteria: x    LIVER FUNCTIONS - ( 12 Mar 2024 01:18 )  Alb: 4.8 g/dL / Pro: 8.3 g/dL / ALK PHOS: 148 U/L / ALT: 15 U/L / AST: 16 U/L / GGT: x           POCT Blood Glucose.: 229 mg/dL (03-12-24 @ 11:08)  POCT Blood Glucose.: 204 mg/dL (03-12-24 @ 10:26)  POCT Blood Glucose.: 124 mg/dL (03-12-24 @ 09:05)  POCT Blood Glucose.: 179 mg/dL (03-12-24 @ 06:30)  POCT Blood Glucose.: 192 mg/dL (03-12-24 @ 05:15)  POCT Blood Glucose.: 208 mg/dL (03-12-24 @ 03:52)  POCT Blood Glucose.: 182 mg/dL (03-12-24 @ 03:09)  POCT Blood Glucose.: 199 mg/dL (03-12-24 @ 02:19)  POCT Blood Glucose.: 240 mg/dL (03-12-24 @ 01:18)  POCT Blood Glucose.: 246 mg/dL (03-12-24 @ 00:09)  POCT Blood Glucose.: 267 mg/dL (03-11-24 @ 22:56)  POCT Blood Glucose.: 275 mg/dL (03-11-24 @ 21:45)  POCT Blood Glucose.: 318 mg/dL (03-11-24 @ 20:09)  POCT Blood Glucose.: 353 mg/dL (03-11-24 @ 19:09)  POCT Blood Glucose.: 381 mg/dL (03-11-24 @ 18:13)  POCT Blood Glucose.: 386 mg/dL (03-11-24 @ 16:49) HPI:  33M with pmhx of HTN, IDDM with multiple admissions for DKA, ETOH abuse, seizure disorder presents to hospital for SOB. Initial labs significant for WBC 12 w L shift, Co2 <6, AG unable to detect, SCr 1.35 (baseline around 0.4 11/23), glucose 445, acetones in blood, + ketones in urine. VBG with pH < 6.94. Denies missing any insulin doses. States last dirnk was 2 days ago, had 2 beers,  CXR clear, UA negative. Treated with IVF in ED. ICU consulted for DKA management.  (11 Mar 2024 19:57)    Diabetes history:  Known to our service for multiple admissions for DKA. He has had insulin dependent diabetes for many years. He endorses taking lispro 10 units TID before meals and long acting insulin 15-16 units nightly, frequently forgets to take basal insulin. He claims to have a PCP, but has not recently seen, usually gets insulin from the hospital.    PAST MEDICAL & SURGICAL HISTORY:  Alcohol abuse  Pancreatitis  HTN (hypertension)  Diabetes  Atrial fibrillation  Non-ischemic cardiomyopathy  No significant past surgical history    FAMILY HISTORY:  FH: hypertension    SOCIAL HISTORY: States he drank 2 24oz beers prior to admission, denies drug use    REVIEW OF SYSTEMS:  Constitutional: No fever, no chills, no change in weight.  Eyes: No eye swelling, no blurry vision, no redness, no loss of vision.  Neck: No neck pain, no change in voice.  Lungs: No shortness of breath, no wheezing, no cough  CV: No chest pain, no palpitations, no pain with walking.  GI: No nausea, no vomiting, no constipation, no diarrhea, no abdominal pain  : No urinary frequency, no blood in urine, no urinary burning, no difficulty voiding.  Musculoskeletal: No muscle pain, no joint pain, no swelling.  Skin: No rash, no infections.  Neurologic: No headaches, no weakness, no burning or pain in feet, no tremor.  Endocrine: No heat intolerance, no cold intolerance, no increased sweating, no shakiness between meals.  Psych: No depression, no anxiety, no trouble concentrating    MEDICATIONS  (STANDING):  cefTRIAXone Injectable. 1000 milliGRAM(s) IV Push every 24 hours  dextrose 5% + lactated ringers. 1000 milliLiter(s) (225 mL/Hr) IV Continuous <Continuous>  folic acid Injectable 1 milliGRAM(s) IV Push daily  heparin   Injectable 5000 Unit(s) SubCutaneous every 8 hours  insulin regular Infusion 6 Unit(s)/Hr (6 mL/Hr) IV Continuous <Continuous>  levETIRAcetam 1000 milliGRAM(s) Oral two times a day  pantoprazole  Injectable 40 milliGRAM(s) IV Push every 12 hours  potassium phosphate / sodium phosphate Tablet (K-PHOS No. 2) 1 Tablet(s) Oral four times a day with meals  thiamine Injectable 100 milliGRAM(s) IV Push daily    MEDICATIONS  (PRN):  diazepam    Tablet 10 milliGRAM(s) Oral every 6 hours PRN Alcohol withdrawal    Allergies  Allergy Status Unknown    PHYSICAL EXAM:  General: No apparent distress  Respiratory: Lungs clear bilaterally  Cardiac: +S1, S2, no m/r/g  GI: +BS, soft, non tender, non distended  Extremities: No peripheral edema  Neuro: Awake, forgetful    Vital Signs Last 24 Hrs  T(C): 37.1 (12 Mar 2024 07:10), Max: 37.1 (12 Mar 2024 07:10)  T(F): 98.8 (12 Mar 2024 07:10), Max: 98.8 (12 Mar 2024 07:10)  HR: 113 (12 Mar 2024 10:00) (105 - 140)  BP: 108/88 (12 Mar 2024 10:00) (106/67 - 156/100)  BP(mean): 96 (12 Mar 2024 10:00) (76 - 109)  RR: 15 (12 Mar 2024 10:00) (13 - 40)  SpO2: 100% (12 Mar 2024 10:00) (98% - 100%)    Parameters below as of 12 Mar 2024 07:00  Patient On (Oxygen Delivery Method): room air      LABS:                        15.1   12.47 )-----------( 345      ( 11 Mar 2024 17:08 )             47.5     03-12    139  |  110<H>  |  11.0  ----------------------------<  200<H>  3.7   |  9.0<LL>  |  0.75    Ca    9.4      12 Mar 2024 07:15  Phos  1.4     03-12  Mg     1.8     03-12    TPro  8.3  /  Alb  4.8  /  TBili  0.6  /  DBili  0.2  /  AST  16  /  ALT  15  /  AlkPhos  148<H>  03-12    Urinalysis Basic - ( 12 Mar 2024 07:15 )    Color: x / Appearance: x / SG: x / pH: x  Gluc: 200 mg/dL / Ketone: x  / Bili: x / Urobili: x   Blood: x / Protein: x / Nitrite: x   Leuk Esterase: x / RBC: x / WBC x   Sq Epi: x / Non Sq Epi: x / Bacteria: x    LIVER FUNCTIONS - ( 12 Mar 2024 01:18 )  Alb: 4.8 g/dL / Pro: 8.3 g/dL / ALK PHOS: 148 U/L / ALT: 15 U/L / AST: 16 U/L / GGT: x           POCT Blood Glucose.: 229 mg/dL (03-12-24 @ 11:08)  POCT Blood Glucose.: 204 mg/dL (03-12-24 @ 10:26)  POCT Blood Glucose.: 124 mg/dL (03-12-24 @ 09:05)  POCT Blood Glucose.: 179 mg/dL (03-12-24 @ 06:30)  POCT Blood Glucose.: 192 mg/dL (03-12-24 @ 05:15)  POCT Blood Glucose.: 208 mg/dL (03-12-24 @ 03:52)  POCT Blood Glucose.: 182 mg/dL (03-12-24 @ 03:09)  POCT Blood Glucose.: 199 mg/dL (03-12-24 @ 02:19)  POCT Blood Glucose.: 240 mg/dL (03-12-24 @ 01:18)  POCT Blood Glucose.: 246 mg/dL (03-12-24 @ 00:09)  POCT Blood Glucose.: 267 mg/dL (03-11-24 @ 22:56)  POCT Blood Glucose.: 275 mg/dL (03-11-24 @ 21:45)  POCT Blood Glucose.: 318 mg/dL (03-11-24 @ 20:09)  POCT Blood Glucose.: 353 mg/dL (03-11-24 @ 19:09)  POCT Blood Glucose.: 381 mg/dL (03-11-24 @ 18:13)  POCT Blood Glucose.: 386 mg/dL (03-11-24 @ 16:49)

## 2024-03-12 NOTE — PHARMACOTHERAPY INTERVENTION NOTE - INTERVENTION CATEGORIES
Therapy
Therapy
Med Reconciliation
Misc
Attending and PA/NP shared services statement (NON-critical care):

## 2024-03-12 NOTE — CONSULT NOTE ADULT - ASSESSMENT
33M with PMHx HTN, IDDM, ETOH use, seizure disorder, multiple admissions for DKA, presented with shortness of breath. Labs significant for DKA, admitted for further management.     1. Poorly controlled IDDM, DKA  - Insulin gtt as per protocol  - When transitioning to subcutaneous insulin, give lantus 2 hrs prior to stopping insulin gtt  - Check a1c  - Spoke with patient in length about insulin compliance     2. SYED  - IVF  - Avoid nephrotoxic agents    3. Leukocytosis  - Empiric abx  - UA negative, CXR clear  - Care per ICU    4. ETOH abuse  - Alcohol cessation, monitor for withdrawal   33M with PMHx HTN, IDDM, ETOH use, seizure disorder, multiple admissions for DKA, presented with shortness of breath. Labs significant for DKA, admitted for further management.     1. Poorly controlled pancreatic diabetes, as a sequela of alcohol induced pancreatitis (note CT scan report) with recurrent DKA. Ongoing admitted ETOH use and very likely decreaed adherence with insulin.  - Insulin gtt as per protocol   - When transitioning to subcutaneous insulin, give lantus 2 hrs prior to stopping insulin gtt  - Check a1c  - Spoke with patient in length about insulin compliance. Encouraged daily use of basal insulin, with reminders from mother who he lives with, and setting Iphone reminders.    2. SYED  - IVF  - Avoid nephrotoxic agents    3. Leukocytosis  - Empiric abx  - UA negative, CXR clear  - Care per ICU    4. ETOH abuse  - Alcohol cessation, monitor for withdrawal

## 2024-03-12 NOTE — PROGRESS NOTE ADULT - ASSESSMENT
33M with PMHx HTN, IDDM, ETOH use, seizure disorder, multiple admissions for DKA, presented with shortness of breath. Labs significant for DKA, admitted for further management. Insulin drip and d5% at 8U/hr with improvement. AM glucose 3/12 levels 180-200s average with an episode at 124.     1. Poorly controlled IDDM, DKA  - Insulin gtt as per protocol  - AG from 29 > 20 this AM  - c/w d5% LR 225mL/hr  - Endo following will appreciate recs  - Check a1c  - advanced diet as tolerated  - PRN zofran PO     2. SYED  - IVF  - Avoid nephrotoxic agents    3. Leukocytosis  - c/w Rocephin 3/19  - UA negative, CXR clear    4. ETOH use disorder  - Alcohol cessation, monitor for withdrawal  - CIWA in place  - PRN valium CIWA directed care    5. Electrolyte abnormalities  - Phos 1.3 > 1.4 after 30mEq  - Replete phos  - Repeat labs       GI prophylaxis: 40 IV BID   DVT prophylaxis: heparin

## 2024-03-12 NOTE — PROGRESS NOTE ADULT - ASSESSMENT
The patient is a 33 year old old male with a past medical history of IDDM with multiple admissions for DKA, hypertension, Alcohol abuse, seizure disorder who presented to the [facility] complaints of shortness of breath. Admitted to the MICU for DKA. I. States last drink was 2 days ago, had 2 beers. CXR clear, UA negative. CT chest abdomen and pelvis showed distended stomach secondary to gastroparesis vs GOO; esophagitis and constipation.     Assessment/Plan:    1. Poorly controlled diabetes mellitus with DKA   - Recurrent admissions for DKA, questionable compliance with insulin  - GIven 15 units of lantus this morning , insulin infusion discontinued  - AG closed  - Admelog 2 units TID with meals   - Admelog sliding scale   - Hbaic   - Endocrine following  - MOnitor BSL Closely    2. SYED  - Secodnary to DKA/hypovolemia now resolved  - monitor CMP    3. Leukocytosis on admission  - Started on empiric IV Rocephin  - UA negative  - Chest xray negative  - CT chest abdomen and pelvis with no acute infectious source   - Blood cultures sent on 3/11 if negative stop iV Rocephin    4. ETOH use  - CIWA monitoring  - CIWA scores of 0  - Symptom triggered Ativan    5. Esophagitis and possible gastroparesis on CT  - Protonix  - GI consult in AM    VTE_ Heparin subcut

## 2024-03-12 NOTE — CHART NOTE - NSCHARTNOTEFT_GEN_A_CORE
33M with pmhx of HTN, IDDM with multiple admissions for DKA, ETOH abuse, seizure disorder presents to hospital for SOB. Initial labs significant for WBC 12 w L shift, Co2 <6, AG unable to detect, SCr 1.35 (baseline around 0.4 11/23), glucose 445, acetones in blood, + ketones in urine. VBG with pH < 6.94. Denies missing any insulin doses. States last drink was 2 days ago, had 2 beers. CXR clear, UA negative. Treated with IVF in ED. ICU consulted for DKA management.    Received insulin drip and d5 LR with improvement of BG. Electrolytes repleted. BG well controlled and bridge to ISS and basal insulin initiated. Diet progressed to regular.    ICU Vital Signs Last 24 Hrs  T(C): 36.8 (12 Mar 2024 15:51), Max: 37.1 (12 Mar 2024 07:10)  T(F): 98.2 (12 Mar 2024 15:51), Max: 98.8 (12 Mar 2024 07:10)  HR: 112 (12 Mar 2024 12:00) (105 - 140)  BP: 114/94 (12 Mar 2024 12:00) (106/67 - 156/100)  BP(mean): 101 (12 Mar 2024 12:00) (76 - 109)  ABP: --  ABP(mean): --  RR: 18 (12 Mar 2024 12:00) (13 - 40)  SpO2: 100% (12 Mar 2024 12:00) (98% - 100%)    O2 Parameters below as of 12 Mar 2024 07:00  Patient On (Oxygen Delivery Method): room air    PHYSICAL EXAM:  General: No apparent distress  Respiratory: Lungs clear bilaterally  Cardiac: +S1, S2, no m/r/g  GI: +BS, soft, non tender, non distended  Extremities: No peripheral edema  Neuro: Awake, no focal deficits      PLAN    1. Poorly controlled pancreatic diabetes, as a sequela of alcohol induced pancreatitis (note CT scan report) with recurrent DKA. Ongoing admitted ETOH use and very likely decreased adherence with insulin.  - ISS and basal insulin    - Check a1c      2. SYED now resolved  - IVF  - Avoid nephrotoxic agents      3. Leukocytosis now resolved  - Empiric abx  - Followup blood cultures  - UA negative, CXR clear  - Care per primary team       4. ETOH abuse  - Alcohol cessation, monitor for withdrawal  - CIWA in place        Plan was discussed with Dr. Renee and  33M with pmhx of HTN, IDDM with multiple admissions for DKA, ETOH abuse, seizure disorder presents to hospital for SOB. Initial labs significant for WBC 12 w L shift, Co2 <6, AG unable to detect, SCr 1.35 (baseline around 0.4 11/23), glucose 445, acetones in blood, + ketones in urine. VBG with pH < 6.94. Denies missing any insulin doses. States last drink was 2 days ago, had 2 beers. CXR clear, UA negative. Treated with IVF in ED. ICU consulted for DKA management.    Received insulin drip and d5 LR with improvement of BG. Electrolytes repleted. BG well controlled and bridge to ISS and basal insulin initiated. Diet progressed to regular.    ICU Vital Signs Last 24 Hrs  T(C): 36.8 (12 Mar 2024 15:51), Max: 37.1 (12 Mar 2024 07:10)  T(F): 98.2 (12 Mar 2024 15:51), Max: 98.8 (12 Mar 2024 07:10)  HR: 112 (12 Mar 2024 12:00) (105 - 140)  BP: 114/94 (12 Mar 2024 12:00) (106/67 - 156/100)  BP(mean): 101 (12 Mar 2024 12:00) (76 - 109)  ABP: --  ABP(mean): --  RR: 18 (12 Mar 2024 12:00) (13 - 40)  SpO2: 100% (12 Mar 2024 12:00) (98% - 100%)    O2 Parameters below as of 12 Mar 2024 07:00  Patient On (Oxygen Delivery Method): room air    PHYSICAL EXAM:  General: No apparent distress  Respiratory: Lungs clear bilaterally  Cardiac: +S1, S2, no m/r/g  GI: +BS, soft, non tender, non distended  Extremities: No peripheral edema  Neuro: Awake, no focal deficits      PLAN    1. Poorly controlled pancreatic diabetes, as a sequela of alcohol induced pancreatitis (note CT scan report) with recurrent DKA. Ongoing admitted ETOH use and very likely decreased adherence with insulin.  - ISS and basal insulin    - Check a1c      2. SYED now resolved  - IVF  - Avoid nephrotoxic agents      3. Leukocytosis now resolved  - Empiric abx  - Followup blood cultures  - UA negative, CXR clear  - Care per primary team       4. ETOH abuse  - Alcohol cessation, monitor for withdrawal  - CIWA in place        Plan was discussed with Dr. Renee and Dr. Moralez 33M with pmhx of HTN, IDDM with multiple admissions for DKA, ETOH abuse, seizure disorder presents to hospital for SOB. Initial labs significant for WBC 12 w L shift, Co2 <6, AG unable to detect, SCr 1.35 (baseline around 0.4 11/23), glucose 445, acetones in blood, + ketones in urine. VBG with pH < 6.94. Denies missing any insulin doses. States last drink was 2 days ago, had 2 beers. CXR clear, UA negative. Treated with IVF in ED. ICU consulted for DKA management.    Received insulin drip and d5 LR with improvement of BG. Electrolytes repleted. BG well controlled and bridge to ISS and basal insulin initiated. Diet progressed to regular.    ICU Vital Signs Last 24 Hrs  T(C): 36.8 (12 Mar 2024 15:51), Max: 37.1 (12 Mar 2024 07:10)  T(F): 98.2 (12 Mar 2024 15:51), Max: 98.8 (12 Mar 2024 07:10)  HR: 112 (12 Mar 2024 12:00) (105 - 140)  BP: 114/94 (12 Mar 2024 12:00) (106/67 - 156/100)  BP(mean): 101 (12 Mar 2024 12:00) (76 - 109)  ABP: --  ABP(mean): --  RR: 18 (12 Mar 2024 12:00) (13 - 40)  SpO2: 100% (12 Mar 2024 12:00) (98% - 100%)    O2 Parameters below as of 12 Mar 2024 07:00  Patient On (Oxygen Delivery Method): room air    PHYSICAL EXAM:  General: No apparent distress  Respiratory: Lungs clear bilaterally  Cardiac: +S1, S2, no m/r/g  GI: +BS, soft, non tender, non distended  Extremities: No peripheral edema  Neuro: Awake, no focal deficits      PLAN    1. Poorly controlled pancreatic diabetes, as a sequela of alcohol induced pancreatitis (note CT scan report) with recurrent DKA. Ongoing admitted ETOH use and very likely decreased adherence with insulin.  - ISS and basal insulin    - Check a1c      2. SYED now resolved  - IVF  - Avoid nephrotoxic agents      3. Leukocytosis now resolved  - Empiric abx  - Followup blood cultures  - UA negative, CXR clear  - Care per primary team       4. ETOH abuse  - Alcohol cessation, monitor for withdrawal  - CIWA in place        Plan was discussed with Dr. Renee and Dr. Pride

## 2024-03-12 NOTE — PROGRESS NOTE ADULT - SUBJECTIVE AND OBJECTIVE BOX
SUBJECTIVE: 34 y/o M with pmhx of HTN, IDDM with multiple admissions for DKA, ETOH abuse, seizure disorder presents to hospital for SOB.       HOSPITAL COURSE:    Initial labs significant for WBC 12 w L shift, Co2 <6, AG unable to detect, SCr 1.35 (baseline around 0.4 11/23), glucose 445, acetones in blood, + ketones in urine. VBG with pH < 6.94. Denies missing any insulin doses. States last drink was 3/9, had 2 beers,  CXR clear, UA negative. Treated with IVF in ED. ICU consulted for DKA management.         INTERVAL HPI/LAST 24HR EVENTS: Patient seen and examined at bedside at AM. No clinically acute events overnight.      MEDICATIONS  (STANDING):  cefTRIAXone Injectable. 1000 milliGRAM(s) IV Push every 24 hours  dextrose 5% + lactated ringers. 1000 milliLiter(s) (225 mL/Hr) IV Continuous <Continuous>  folic acid Injectable 1 milliGRAM(s) IV Push daily  heparin   Injectable 5000 Unit(s) SubCutaneous every 8 hours  insulin regular Infusion 6 Unit(s)/Hr (6 mL/Hr) IV Continuous <Continuous>  levETIRAcetam 1000 milliGRAM(s) Oral two times a day  pantoprazole  Injectable 40 milliGRAM(s) IV Push every 12 hours  potassium phosphate / sodium phosphate Tablet (K-PHOS No. 2) 1 Tablet(s) Oral four times a day with meals  thiamine Injectable 100 milliGRAM(s) IV Push daily    MEDICATIONS  (PRN):  diazepam    Tablet 10 milliGRAM(s) Oral every 6 hours PRN Alcohol withdrawal      Allergies    Allergy Status Unknown    Intolerances        REVIEW OF SYSTEMS:  CONSTITUTIONAL: No fever, or fatigue  RESPIRATORY: No cough, wheezing, chills or hemoptysis; No shortness of breath  CARDIOVASCULAR: No chest pain, palpitations, dizziness, or leg swelling  GASTROINTESTINAL: Mild epigastric pain. Intermittent nausea without vomiting, or hematemesis; No diarrhea or constipation. No melena or hematochezia.  NEUROLOGICAL: No headaches, loss of strength, numbness, or tremors  MUSCULOSKELETAL: No joint pain or swelling; No muscle, back, or extremity pain    OBJECTIVE:    Vital Signs Last 24 Hrs  T(C): 37.1 (12 Mar 2024 07:10), Max: 37.1 (12 Mar 2024 07:10)  T(F): 98.8 (12 Mar 2024 07:10), Max: 98.8 (12 Mar 2024 07:10)  HR: 108 (12 Mar 2024 11:00) (105 - 140)  BP: 117/88 (12 Mar 2024 11:00) (106/67 - 156/100)  BP(mean): 96 (12 Mar 2024 11:00) (76 - 109)  RR: 20 (12 Mar 2024 11:00) (13 - 40)  SpO2: 100% (12 Mar 2024 11:00) (98% - 100%)    Parameters below as of 12 Mar 2024 07:00  Patient On (Oxygen Delivery Method): room air        PHYSICAL EXAM:  Constitutional: Alert, interactive, in no acute distress  Head and Face: Atraumatic, head and face were normal in appearance  Eyes: Sclera and conjunctiva were normal, pupils were equal in size, round, eyelids normal  ENT: Ears and nose were normal in appearance  Neck: Appearance was normal, neck was supple, No JVD  Pulmonary: Clear to auscultation bilaterally, no rales/crackles, or wheezing  Heart: Regular rate and rhythm, no murmurs, gallops, or pericardial rubs  Abdominal: Soft, nontender, nondistended. Bowel sounds normal  Skin: Normal color and intact without appreciable rash or abnormal skin lesion  Extremities: Warm without edema. No clubbing.  Pulse: 2+ radial pulse  Neuro: Oriented to person, place, and time    Lab/ Imaging:    LABS:                        15.1   12.47 )-----------( 345      ( 11 Mar 2024 17:08 )             47.5     03-12    139  |  110<H>  |  11.0  ----------------------------<  200<H>  3.7   |  9.0<LL>  |  0.75    Ca    9.4      12 Mar 2024 07:15  Phos  1.4     03-12  Mg     1.8     03-12    TPro  8.3  /  Alb  4.8  /  TBili  0.6  /  DBili  0.2  /  AST  16  /  ALT  15  /  AlkPhos  148<H>  03-12      Urinalysis Basic - ( 12 Mar 2024 07:15 )    Color: x / Appearance: x / SG: x / pH: x  Gluc: 200 mg/dL / Ketone: x  / Bili: x / Urobili: x   Blood: x / Protein: x / Nitrite: x   Leuk Esterase: x / RBC: x / WBC x   Sq Epi: x / Non Sq Epi: x / Bacteria: x      CAPILLARY BLOOD GLUCOSE      POCT Blood Glucose.: 229 mg/dL (12 Mar 2024 11:08)  POCT Blood Glucose.: 204 mg/dL (12 Mar 2024 10:26)  POCT Blood Glucose.: 124 mg/dL (12 Mar 2024 09:05)  POCT Blood Glucose.: 179 mg/dL (12 Mar 2024 06:30)  POCT Blood Glucose.: 192 mg/dL (12 Mar 2024 05:15)  POCT Blood Glucose.: 208 mg/dL (12 Mar 2024 03:52)  POCT Blood Glucose.: 182 mg/dL (12 Mar 2024 03:09)  POCT Blood Glucose.: 199 mg/dL (12 Mar 2024 02:19)  POCT Blood Glucose.: 240 mg/dL (12 Mar 2024 01:18)  POCT Blood Glucose.: 246 mg/dL (12 Mar 2024 00:09)  POCT Blood Glucose.: 267 mg/dL (11 Mar 2024 22:56)  POCT Blood Glucose.: 275 mg/dL (11 Mar 2024 21:45)  POCT Blood Glucose.: 318 mg/dL (11 Mar 2024 20:09)  POCT Blood Glucose.: 353 mg/dL (11 Mar 2024 19:09)  POCT Blood Glucose.: 381 mg/dL (11 Mar 2024 18:13)  POCT Blood Glucose.: 386 mg/dL (11 Mar 2024 16:49)      RADIOLOGY & ADDITIONAL TESTS:        Imaging Personally Reviewed:  [ ] YES  [ ] NO    Consultant(s) Notes Reviewed:  [ ] YES  [ ] NO    Care Discussed with Consultants/Other Providers [ ] YES  [ ] NO    Plan of Care discussed with Housestaff [ ]YES [ ] NO

## 2024-03-12 NOTE — PROGRESS NOTE ADULT - ATTENDING COMMENTS
Events:   improved/resolved difficulty breathing  Denies any further nausea vomiting  CT abdomen pelvis noted with no clinical distention started empirically on p.o. Protonix  Tolerating diet well  Repleting potassium phosphorus and magnesium aggressively  Bridging to Lantus from insulin infusion    Physical Exam:  Vital Signs Last 24 Hrs  T(C): 37 (12 Mar 2024 11:10), Max: 37.1 (12 Mar 2024 07:10)  T(F): 98.6 (12 Mar 2024 11:10), Max: 98.8 (12 Mar 2024 07:10)  HR: 112 (12 Mar 2024 12:00) (105 - 140)  BP: 114/94 (12 Mar 2024 12:00) (106/67 - 156/100)  BP(mean): 101 (12 Mar 2024 12:00) (76 - 109)  RR: 18 (12 Mar 2024 12:00) (13 - 40)  SpO2: 100% (12 Mar 2024 12:00) (98% - 100%)    Parameters below as of 12 Mar 2024 07:00  Patient On (Oxygen Delivery Method): room air     33-year-old  male with history of hypertension insulin-dependent diabetes mellitus with multiple admissions for diabetic ketoacidosis alcohol use disorder with seizure disorder presented with shortness of breath admitted to medical ICU with diabetic ketoacidosis, hypokalemia hypophosphatemia hypomagnesemia severe dehydration metabolic acidosis      ====================== NEUROLOGY=====================  diazepam    Tablet 10 milliGRAM(s) Oral every 6 hours PRN Alcohol withdrawal  levETIRAcetam 1000 milliGRAM(s) Oral two times a day  ondansetron    Tablet 4 milliGRAM(s) Oral every 8 hours PRN Nausea   CIWA monitoring   thiamine folic acid multivitamin    ==================== RESPIRATORY======================   oxygenating and ventilating fine on room air    ====================CARDIOVASCULAR==================   hemodynamically stable  ===================HEMATOLOGIC/ONC ===================  heparin   Injectable 5000 Unit(s) SubCutaneous every 8 hours    ===================== RENAL =========================  Continue monitoring urine output  Replete potassium, phosphorus and magnesium aggressively with close follow-up    ==================== GASTROINTESTINAL===================  dextrose 5% + lactated ringers. 1000 milliLiter(s) (225 mL/Hr) IV Continuous <Continuous>  folic acid 1 milliGRAM(s) Oral daily  magnesium oxide 400 milliGRAM(s) Oral once  multivitamin 1 Tablet(s) Oral daily  pantoprazole    Tablet 40 milliGRAM(s) Oral two times a day  potassium chloride    Tablet ER 40 milliEquivalent(s) Oral once  potassium phosphate / sodium phosphate Tablet (K-PHOS No. 2) 1 Tablet(s) Oral four times a day with meals    =======================    ENDOCRINE  =====================   endocrinology consult  Insulin infusion to be  bridged to Lantus as follows as well as Premeal and sliding scale over 2 hours and transition dextrose with LR to LR at 150 cc/h after discontinuation of insulin infusion in 2 hours  insulin glargine Injectable (LANTUS) 15 Unit(s) SubCutaneous once  insulin lispro (ADMELOG) corrective regimen sliding scale.   SubCutaneous Before meals and at bedtime  insulin regular Infusion 6 Unit(s)/Hr (6 mL/Hr) IV Continuous <Continuous>    ========================INFECTIOUS DISEASE================  cefTRIAXone Injectable. 1000 milliGRAM(s) IV Push every 24 hours    Low threshold to discontinue antibiotics if blood culture remain negative by tomorrow      Care plan discussed with ICU care team.

## 2024-03-12 NOTE — PROGRESS NOTE ADULT - SUBJECTIVE AND OBJECTIVE BOX
ICU Transfer Acceptance Note:    INTERVAL HPI/OVERNIGHT EVENTS: Patient seen and examined,       Vital Signs Last 24 Hrs  T(C): 36.8 (12 Mar 2024 15:51), Max: 37.1 (12 Mar 2024 07:10)  T(F): 98.2 (12 Mar 2024 15:51), Max: 98.8 (12 Mar 2024 07:10)  HR: 89 (12 Mar 2024 16:00) (89 - 140)  BP: 99/64 (12 Mar 2024 16:00) (99/64 - 156/100)  BP(mean): 75 (12 Mar 2024 16:00) (75 - 109)  RR: 11 (12 Mar 2024 16:00) (11 - 40)  SpO2: 100% (12 Mar 2024 16:00) (98% - 100%)    Parameters below as of 12 Mar 2024 07:00  Patient On (Oxygen Delivery Method): room air        PHYSICAL EXAM:    GENERAL: NAD, AOX3  HEAD:  Atraumatic, Normocephalic  EYES:  conjunctiva and sclera clear  ENMT: Moist mucous membranes  NECK: Supple  CHEST/LUNG: Clear to auscultation bilaterally; No rales, rhonchi, wheezing, or rubs  HEART: Regular rate and rhythm; No murmurs, rubs, or gallops  ABDOMEN: Soft, Nontender, Nondistended; Bowel sounds present  EXTREMITIES:  2+ Peripheral Pulses, No clubbing, cyanosis, or edema        MEDICATIONS  (STANDING):  cefTRIAXone Injectable. 1000 milliGRAM(s) IV Push every 24 hours  dextrose 5% + lactated ringers. 1000 milliLiter(s) (225 mL/Hr) IV Continuous <Continuous>  folic acid 1 milliGRAM(s) Oral daily  heparin   Injectable 5000 Unit(s) SubCutaneous every 8 hours  insulin lispro (ADMELOG) corrective regimen sliding scale.   SubCutaneous Before meals and at bedtime  insulin lispro Injectable (ADMELOG) 2 Unit(s) SubCutaneous Before meals and at bedtime  levETIRAcetam 1000 milliGRAM(s) Oral two times a day  multivitamin 1 Tablet(s) Oral daily  pantoprazole    Tablet 40 milliGRAM(s) Oral two times a day  potassium phosphate / sodium phosphate Tablet (K-PHOS No. 2) 1 Tablet(s) Oral four times a day with meals    MEDICATIONS  (PRN):  LORazepam   Injectable 2 milliGRAM(s) IV Push every 1 hour PRN CIWA-Ar score 8 or greater  ondansetron    Tablet 4 milliGRAM(s) Oral every 8 hours PRN Nausea      Allergies    Allergy Status Unknown    Intolerances          LABS:                          13.2   8.16  )-----------( 229      ( 12 Mar 2024 12:46 )             37.2     03-12    137  |  106  |  10.8  ----------------------------<  90  3.3<L>   |  15.0<L>  |  0.77    Ca    9.0      12 Mar 2024 12:46  Phos  2.5     03-12  Mg     1.7     03-12    TPro  6.7  /  Alb  4.2  /  TBili  0.7  /  DBili  x   /  AST  15  /  ALT  11  /  AlkPhos  117  03-12      Urinalysis Basic - ( 12 Mar 2024 12:46 )    Color: x / Appearance: x / SG: x / pH: x  Gluc: 90 mg/dL / Ketone: x  / Bili: x / Urobili: x   Blood: x / Protein: x / Nitrite: x   Leuk Esterase: x / RBC: x / WBC x   Sq Epi: x / Non Sq Epi: x / Bacteria: x        RADIOLOGY & ADDITIONAL TESTS:   ICU Transfer Acceptance Note:    INTERVAL HPI/OVERNIGHT EVENTS: Patient seen and examined, laying comfortably in bed. Denies abdominal pain nausea vomiting or dyspepsia. Tolerating PO       Vital Signs Last 24 Hrs  T(C): 36.8 (12 Mar 2024 15:51), Max: 37.1 (12 Mar 2024 07:10)  T(F): 98.2 (12 Mar 2024 15:51), Max: 98.8 (12 Mar 2024 07:10)  HR: 89 (12 Mar 2024 16:00) (89 - 140)  BP: 99/64 (12 Mar 2024 16:00) (99/64 - 156/100)  BP(mean): 75 (12 Mar 2024 16:00) (75 - 109)  RR: 11 (12 Mar 2024 16:00) (11 - 40)  SpO2: 100% (12 Mar 2024 16:00) (98% - 100%)    Parameters below as of 12 Mar 2024 07:00  Patient On (Oxygen Delivery Method): room air        PHYSICAL EXAM:    GENERAL: NAD, AOX3  HEAD:  Atraumatic, Normocephalic  EYES:  conjunctiva and sclera clear  ENMT: Moist mucous membranes  NECK: Supple  CHEST/LUNG: Clear to auscultation bilaterally; No rales, rhonchi, wheezing, or rubs  HEART: Regular rate and rhythm; No murmurs, rubs, or gallops  ABDOMEN: Soft, Nontender, Nondistended; Bowel sounds present  EXTREMITIES:  2+ Peripheral Pulses, No clubbing, cyanosis, or edema        MEDICATIONS  (STANDING):  cefTRIAXone Injectable. 1000 milliGRAM(s) IV Push every 24 hours  dextrose 5% + lactated ringers. 1000 milliLiter(s) (225 mL/Hr) IV Continuous <Continuous>  folic acid 1 milliGRAM(s) Oral daily  heparin   Injectable 5000 Unit(s) SubCutaneous every 8 hours  insulin lispro (ADMELOG) corrective regimen sliding scale.   SubCutaneous Before meals and at bedtime  insulin lispro Injectable (ADMELOG) 2 Unit(s) SubCutaneous Before meals and at bedtime  levETIRAcetam 1000 milliGRAM(s) Oral two times a day  multivitamin 1 Tablet(s) Oral daily  pantoprazole    Tablet 40 milliGRAM(s) Oral two times a day  potassium phosphate / sodium phosphate Tablet (K-PHOS No. 2) 1 Tablet(s) Oral four times a day with meals    MEDICATIONS  (PRN):  LORazepam   Injectable 2 milliGRAM(s) IV Push every 1 hour PRN CIWA-Ar score 8 or greater  ondansetron    Tablet 4 milliGRAM(s) Oral every 8 hours PRN Nausea      Allergies    Allergy Status Unknown    Intolerances          LABS:                          13.2   8.16  )-----------( 229      ( 12 Mar 2024 12:46 )             37.2     03-12    137  |  106  |  10.8  ----------------------------<  90  3.3<L>   |  15.0<L>  |  0.77    Ca    9.0      12 Mar 2024 12:46  Phos  2.5     03-12  Mg     1.7     03-12    TPro  6.7  /  Alb  4.2  /  TBili  0.7  /  DBili  x   /  AST  15  /  ALT  11  /  AlkPhos  117  03-12      Urinalysis Basic - ( 12 Mar 2024 12:46 )    Color: x / Appearance: x / SG: x / pH: x  Gluc: 90 mg/dL / Ketone: x  / Bili: x / Urobili: x   Blood: x / Protein: x / Nitrite: x   Leuk Esterase: x / RBC: x / WBC x   Sq Epi: x / Non Sq Epi: x / Bacteria: x        RADIOLOGY & ADDITIONAL TESTS:

## 2024-03-13 LAB
A1C WITH ESTIMATED AVERAGE GLUCOSE RESULT: 14.6 % — HIGH (ref 4–5.6)
ALBUMIN SERPL ELPH-MCNC: 3 G/DL — LOW (ref 3.3–5.2)
ALBUMIN SERPL ELPH-MCNC: 3.3 G/DL — SIGNIFICANT CHANGE UP (ref 3.3–5.2)
ALP SERPL-CCNC: 79 U/L — SIGNIFICANT CHANGE UP (ref 40–120)
ALP SERPL-CCNC: 82 U/L — SIGNIFICANT CHANGE UP (ref 40–120)
ALT FLD-CCNC: 8 U/L — SIGNIFICANT CHANGE UP
ALT FLD-CCNC: 9 U/L — SIGNIFICANT CHANGE UP
ANION GAP SERPL CALC-SCNC: 10 MMOL/L — SIGNIFICANT CHANGE UP (ref 5–17)
ANION GAP SERPL CALC-SCNC: 13 MMOL/L — SIGNIFICANT CHANGE UP (ref 5–17)
AST SERPL-CCNC: 12 U/L — SIGNIFICANT CHANGE UP
AST SERPL-CCNC: 15 U/L — SIGNIFICANT CHANGE UP
BASOPHILS # BLD AUTO: 0.03 K/UL — SIGNIFICANT CHANGE UP (ref 0–0.2)
BASOPHILS NFR BLD AUTO: 0.6 % — SIGNIFICANT CHANGE UP (ref 0–2)
BILIRUB SERPL-MCNC: 0.5 MG/DL — SIGNIFICANT CHANGE UP (ref 0.4–2)
BILIRUB SERPL-MCNC: 0.5 MG/DL — SIGNIFICANT CHANGE UP (ref 0.4–2)
BUN SERPL-MCNC: 10.3 MG/DL — SIGNIFICANT CHANGE UP (ref 8–20)
BUN SERPL-MCNC: 5.7 MG/DL — LOW (ref 8–20)
CALCIUM SERPL-MCNC: 8.1 MG/DL — LOW (ref 8.4–10.5)
CALCIUM SERPL-MCNC: 8.4 MG/DL — SIGNIFICANT CHANGE UP (ref 8.4–10.5)
CHLORIDE SERPL-SCNC: 108 MMOL/L — SIGNIFICANT CHANGE UP (ref 96–108)
CHLORIDE SERPL-SCNC: 110 MMOL/L — HIGH (ref 96–108)
CO2 SERPL-SCNC: 17 MMOL/L — LOW (ref 22–29)
CO2 SERPL-SCNC: 18 MMOL/L — LOW (ref 22–29)
CREAT SERPL-MCNC: 0.54 MG/DL — SIGNIFICANT CHANGE UP (ref 0.5–1.3)
CREAT SERPL-MCNC: 0.58 MG/DL — SIGNIFICANT CHANGE UP (ref 0.5–1.3)
EGFR: 131 ML/MIN/1.73M2 — SIGNIFICANT CHANGE UP
EGFR: 134 ML/MIN/1.73M2 — SIGNIFICANT CHANGE UP
EOSINOPHIL # BLD AUTO: 0.02 K/UL — SIGNIFICANT CHANGE UP (ref 0–0.5)
EOSINOPHIL NFR BLD AUTO: 0.4 % — SIGNIFICANT CHANGE UP (ref 0–6)
ESTIMATED AVERAGE GLUCOSE: 372 MG/DL — HIGH (ref 68–114)
GLUCOSE BLDC GLUCOMTR-MCNC: 186 MG/DL — HIGH (ref 70–99)
GLUCOSE BLDC GLUCOMTR-MCNC: 201 MG/DL — HIGH (ref 70–99)
GLUCOSE BLDC GLUCOMTR-MCNC: 229 MG/DL — HIGH (ref 70–99)
GLUCOSE BLDC GLUCOMTR-MCNC: 293 MG/DL — HIGH (ref 70–99)
GLUCOSE SERPL-MCNC: 172 MG/DL — HIGH (ref 70–99)
GLUCOSE SERPL-MCNC: 78 MG/DL — SIGNIFICANT CHANGE UP (ref 70–99)
HCT VFR BLD CALC: 29.8 % — LOW (ref 39–50)
HGB BLD-MCNC: 10.7 G/DL — LOW (ref 13–17)
IMM GRANULOCYTES NFR BLD AUTO: 0.2 % — SIGNIFICANT CHANGE UP (ref 0–0.9)
LYMPHOCYTES # BLD AUTO: 2.17 K/UL — SIGNIFICANT CHANGE UP (ref 1–3.3)
LYMPHOCYTES # BLD AUTO: 42.6 % — SIGNIFICANT CHANGE UP (ref 13–44)
MAGNESIUM SERPL-MCNC: 1.7 MG/DL — SIGNIFICANT CHANGE UP (ref 1.6–2.6)
MCHC RBC-ENTMCNC: 29.2 PG — SIGNIFICANT CHANGE UP (ref 27–34)
MCHC RBC-ENTMCNC: 35.9 GM/DL — SIGNIFICANT CHANGE UP (ref 32–36)
MCV RBC AUTO: 81.4 FL — SIGNIFICANT CHANGE UP (ref 80–100)
MONOCYTES # BLD AUTO: 0.6 K/UL — SIGNIFICANT CHANGE UP (ref 0–0.9)
MONOCYTES NFR BLD AUTO: 11.8 % — SIGNIFICANT CHANGE UP (ref 2–14)
NEUTROPHILS # BLD AUTO: 2.26 K/UL — SIGNIFICANT CHANGE UP (ref 1.8–7.4)
NEUTROPHILS NFR BLD AUTO: 44.4 % — SIGNIFICANT CHANGE UP (ref 43–77)
PLATELET # BLD AUTO: 173 K/UL — SIGNIFICANT CHANGE UP (ref 150–400)
POTASSIUM SERPL-MCNC: 2.9 MMOL/L — CRITICAL LOW (ref 3.5–5.3)
POTASSIUM SERPL-MCNC: 3.7 MMOL/L — SIGNIFICANT CHANGE UP (ref 3.5–5.3)
POTASSIUM SERPL-SCNC: 2.9 MMOL/L — CRITICAL LOW (ref 3.5–5.3)
POTASSIUM SERPL-SCNC: 3.7 MMOL/L — SIGNIFICANT CHANGE UP (ref 3.5–5.3)
PROT SERPL-MCNC: 4.7 G/DL — LOW (ref 6.6–8.7)
PROT SERPL-MCNC: 5.1 G/DL — LOW (ref 6.6–8.7)
RBC # BLD: 3.66 M/UL — LOW (ref 4.2–5.8)
RBC # FLD: 14.4 % — SIGNIFICANT CHANGE UP (ref 10.3–14.5)
SODIUM SERPL-SCNC: 137 MMOL/L — SIGNIFICANT CHANGE UP (ref 135–145)
SODIUM SERPL-SCNC: 139 MMOL/L — SIGNIFICANT CHANGE UP (ref 135–145)
WBC # BLD: 5.09 K/UL — SIGNIFICANT CHANGE UP (ref 3.8–10.5)
WBC # FLD AUTO: 5.09 K/UL — SIGNIFICANT CHANGE UP (ref 3.8–10.5)

## 2024-03-13 PROCEDURE — 99232 SBSQ HOSP IP/OBS MODERATE 35: CPT

## 2024-03-13 PROCEDURE — 99233 SBSQ HOSP IP/OBS HIGH 50: CPT

## 2024-03-13 RX ORDER — POTASSIUM CHLORIDE 20 MEQ
40 PACKET (EA) ORAL ONCE
Refills: 0 | Status: COMPLETED | OUTPATIENT
Start: 2024-03-13 | End: 2024-03-13

## 2024-03-13 RX ORDER — POTASSIUM CHLORIDE 20 MEQ
10 PACKET (EA) ORAL
Refills: 0 | Status: COMPLETED | OUTPATIENT
Start: 2024-03-13 | End: 2024-03-13

## 2024-03-13 RX ORDER — INSULIN LISPRO 100/ML
3 VIAL (ML) SUBCUTANEOUS
Refills: 0 | Status: DISCONTINUED | OUTPATIENT
Start: 2024-03-13 | End: 2024-03-14

## 2024-03-13 RX ADMIN — Medication 1 TABLET(S): at 11:54

## 2024-03-13 RX ADMIN — Medication 40 MILLIEQUIVALENT(S): at 03:53

## 2024-03-13 RX ADMIN — Medication 2 UNIT(S): at 08:08

## 2024-03-13 RX ADMIN — HEPARIN SODIUM 5000 UNIT(S): 5000 INJECTION INTRAVENOUS; SUBCUTANEOUS at 15:14

## 2024-03-13 RX ADMIN — PANTOPRAZOLE SODIUM 40 MILLIGRAM(S): 20 TABLET, DELAYED RELEASE ORAL at 04:46

## 2024-03-13 RX ADMIN — Medication 3 UNIT(S): at 19:50

## 2024-03-13 RX ADMIN — CEFTRIAXONE 1000 MILLIGRAM(S): 500 INJECTION, POWDER, FOR SOLUTION INTRAMUSCULAR; INTRAVENOUS at 01:54

## 2024-03-13 RX ADMIN — Medication 1 MILLIGRAM(S): at 11:54

## 2024-03-13 RX ADMIN — SODIUM CHLORIDE 225 MILLILITER(S): 9 INJECTION, SOLUTION INTRAVENOUS at 03:37

## 2024-03-13 RX ADMIN — Medication 100 MILLIEQUIVALENT(S): at 03:58

## 2024-03-13 RX ADMIN — Medication 1 TABLET(S): at 08:07

## 2024-03-13 RX ADMIN — Medication 100 MILLIEQUIVALENT(S): at 05:56

## 2024-03-13 RX ADMIN — Medication 3 UNIT(S): at 11:54

## 2024-03-13 RX ADMIN — HEPARIN SODIUM 5000 UNIT(S): 5000 INJECTION INTRAVENOUS; SUBCUTANEOUS at 04:45

## 2024-03-13 RX ADMIN — Medication 4: at 08:08

## 2024-03-13 RX ADMIN — Medication 6: at 22:09

## 2024-03-13 RX ADMIN — PANTOPRAZOLE SODIUM 40 MILLIGRAM(S): 20 TABLET, DELAYED RELEASE ORAL at 18:22

## 2024-03-13 RX ADMIN — LEVETIRACETAM 1000 MILLIGRAM(S): 250 TABLET, FILM COATED ORAL at 04:45

## 2024-03-13 RX ADMIN — Medication 100 MILLIEQUIVALENT(S): at 04:46

## 2024-03-13 RX ADMIN — Medication 2: at 11:55

## 2024-03-13 RX ADMIN — HEPARIN SODIUM 5000 UNIT(S): 5000 INJECTION INTRAVENOUS; SUBCUTANEOUS at 22:09

## 2024-03-13 RX ADMIN — LEVETIRACETAM 1000 MILLIGRAM(S): 250 TABLET, FILM COATED ORAL at 18:22

## 2024-03-13 RX ADMIN — Medication 100 MILLIGRAM(S): at 11:54

## 2024-03-13 RX ADMIN — INSULIN GLARGINE 15 UNIT(S): 100 INJECTION, SOLUTION SUBCUTANEOUS at 08:11

## 2024-03-13 NOTE — DIETITIAN INITIAL EVALUATION ADULT - ADD RECOMMEND
Add DASH/TLC to diet order (h/o htn and pancreatitis)  Add Ensure Max BID to supplement (150 kcals, 30 g pro, 1 g sugar)  Continue Rx: MVI, thiamine and folic acid supplementation   Diet education when appropriate/feasible

## 2024-03-13 NOTE — DIETITIAN INITIAL EVALUATION ADULT - PERTINENT LABORATORY DATA
03-13    137  |  110<H>  |  10.3  ----------------------------<  78  2.9<LL>   |  17.0<L>  |  0.58    Ca    8.4      13 Mar 2024 02:51  Phos  2.7     03-12  Mg     1.7     03-12    TPro  5.1<L>  /  Alb  3.3  /  TBili  0.5  /  DBili  x   /  AST  12  /  ALT  8   /  AlkPhos  82  03-13  POCT Blood Glucose.: 186 mg/dL (03-13-24 @ 11:40)  A1C with Estimated Average Glucose Result: 14.6 % (03-13-24 @ 02:51)  A1C with Estimated Average Glucose Result: 15.5 % (11-14-23 @ 06:10)  A1C with Estimated Average Glucose Result: 15.5 % (10-08-23 @ 09:51)

## 2024-03-13 NOTE — PROGRESS NOTE ADULT - ASSESSMENT
33M with PMHx HTN, IDDM, ETOH use, seizure disorder, multiple admissions for DKA, presented with shortness of breath. Labs significant for DKA, admitted for further management.     1. 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 Encouraged daily use of basal insulin, with reminders from mother who he lives with, and setting iPhone reminders    2. ETOH abuse  - Alcohol cessation, monitor for withdrawal 33M with PMHx HTN, IDDM, ETOH use, seizure disorder, multiple admissions for DKA, presented with shortness of breath. Labs significant for DKA, admitted for further management.     1. 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 Encouraged daily use of basal insulin, with reminders from mother who he lives with, and setting iPhone reminders    2. ETOH abuse  - Alcohol cessation, monitor for withdrawal    3. Hypokalemia  - Supplement as per primary team 33M with PMHx HTN, IDDM, ETOH use, seizure disorder, multiple admissions for DKA, presented with shortness of breath. Labs significant for DKA, admitted for further management.     1. 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 Encouraged daily use of basal insulin, with reminders from mother who he lives with, and setting iPhone reminders    2. ETOH abuse  - Alcohol cessation, monitor for withdrawal    3. Hypokalemia  - Supplement as per primary team

## 2024-03-13 NOTE — DIETITIAN INITIAL EVALUATION ADULT - NS FNS REASON FOR WEIGHT CHANG
etoh abuse, pancreatitis, uncontrolled DM/decreased po intake/altered GI function (specify)/other (specify)

## 2024-03-13 NOTE — DIETITIAN INITIAL EVALUATION ADULT - NS FNS WEIGHT CHANGE REASON
Addended by: DIONICIO JEAN-BAPTISTE on: 2/21/2017 01:03 PM     Modules accepted: Orders    
unintentional

## 2024-03-13 NOTE — PROGRESS NOTE ADULT - SUBJECTIVE AND OBJECTIVE BOX
CC: Follow up     INTERVAL HPI/OVERNIGHT EVENTS: Patient seen and examined, laying comfortably in bed. Denies abdominal pain nausea or vomiting.       Vital Signs Last 24 Hrs  T(C): 36.8 (13 Mar 2024 15:00), Max: 36.9 (12 Mar 2024 20:03)  T(F): 98.2 (13 Mar 2024 15:00), Max: 98.4 (12 Mar 2024 20:03)  HR: 82 (13 Mar 2024 15:00) (74 - 107)  BP: 122/85 (13 Mar 2024 15:00) (95/62 - 122/85)  BP(mean): 95 (13 Mar 2024 15:00) (73 - 95)  RR: 15 (13 Mar 2024 15:00) (11 - 19)  SpO2: 99% (13 Mar 2024 15:00) (98% - 100%)    Parameters below as of 13 Mar 2024 08:00  Patient On (Oxygen Delivery Method): room air        PHYSICAL EXAM:    GENERAL: NAD, AOX3  HEAD:  Atraumatic, Normocephalic  EYES:  conjunctiva and sclera clear  ENMT: Moist mucous membranes  NECK: Supple  CHEST/LUNG: Clear to auscultation bilaterally; No rales, rhonchi, wheezing, or rubs  HEART: Regular rate and rhythm; No murmurs, rubs, or gallops  ABDOMEN: Soft, Nontender, Nondistended; Bowel sounds present  EXTREMITIES:  2+ Peripheral Pulses, No clubbing, cyanosis, or edema        MEDICATIONS  (STANDING):  folic acid 1 milliGRAM(s) Oral daily  heparin   Injectable 5000 Unit(s) SubCutaneous every 8 hours  insulin glargine Injectable (LANTUS) 15 Unit(s) SubCutaneous every morning  insulin lispro (ADMELOG) corrective regimen sliding scale.   SubCutaneous Before meals and at bedtime  insulin lispro Injectable (ADMELOG) 3 Unit(s) SubCutaneous three times a day before meals  levETIRAcetam 1000 milliGRAM(s) Oral two times a day  multivitamin 1 Tablet(s) Oral daily  pantoprazole    Tablet 40 milliGRAM(s) Oral two times a day  thiamine 100 milliGRAM(s) Oral daily    MEDICATIONS  (PRN):  LORazepam   Injectable 2 milliGRAM(s) IV Push every 1 hour PRN CIWA-Ar score 8 or greater  ondansetron    Tablet 4 milliGRAM(s) Oral every 8 hours PRN Nausea      Allergies    Allergy Status Unknown    Intolerances          LABS:                          10.7   5.09  )-----------( 173      ( 13 Mar 2024 02:51 )             29.8     03-13    139  |  108  |  5.7<L>  ----------------------------<  172<H>  3.7   |  18.0<L>  |  0.54    Ca    8.1<L>      13 Mar 2024 15:12  Phos  2.7     03-12  Mg     1.7     03-13    TPro  4.7<L>  /  Alb  3.0<L>  /  TBili  0.5  /  DBili  x   /  AST  15  /  ALT  9   /  AlkPhos  79  03-13      Urinalysis Basic - ( 13 Mar 2024 15:12 )    Color: x / Appearance: x / SG: x / pH: x  Gluc: 172 mg/dL / Ketone: x  / Bili: x / Urobili: x   Blood: x / Protein: x / Nitrite: x   Leuk Esterase: x / RBC: x / WBC x   Sq Epi: x / Non Sq Epi: x / Bacteria: x        RADIOLOGY & ADDITIONAL TESTS:

## 2024-03-13 NOTE — DIETITIAN NUTRITION RISK NOTIFICATION - ETIOLOGY-BASIS
Chronic illness [facility] Pregnancy And Lactation Text: This medication is Pregnancy Category X and is absolutely contraindicated during pregnancy. It is unknown if it is excreted in breast milk.

## 2024-03-13 NOTE — DIETITIAN INITIAL EVALUATION ADULT - ORAL INTAKE PTA/DIET HISTORY
MST consult triggered for unsure weight loss. Pt known to nutrition services from prior admissions with h/o malnutrition and poor-fair po intake. Weight hx per chart review: 10/15/22 169 lbs, 3/19/23 170 lbs, 11/12/23 135 lbs. This admission weight 126 lbs, appears thin with moderate muscle/fat wasting. Pt continues to have weight loss x ~1 year, likely in setting of ETOH abuse and uncontrolled DM, admitted with DKA a1c 14.6%, now off insulin gtt. H/o pancreatitis noted. CIWA protocol in place, pt asleep, unable to obtain nutrition hx and diet education not appropriate at this time, attempt at follow up if feasible. MST consult triggered for unsure weight loss. Pt known to nutrition services from prior admissions with h/o malnutrition and poor-fair po intake. Weight hx per chart review: 10/15/22 169 lbs, 3/19/23 170 lbs, 11/12/23 135 lbs. This admission weight 126 lbs, appears thin with moderate muscle/fat wasting. Pt continues to have weight loss x ~1 year, likely in setting of ETOH abuse and uncontrolled DM, admitted with DKA a1c 14.6%, now off insulin gtt. H/o pancreatitis noted. CIWA protocol in place, pt asleep, unable to obtain nutrition hx and diet education not appropriate at this time, attempt at follow up if feasible. Pended orders for verification and reached out to MD via teams with recommendations.

## 2024-03-13 NOTE — DIETITIAN INITIAL EVALUATION ADULT - OTHER INFO
32 yo male with pmhx of HTN, IDDM with multiple admissions for DKA, pancreatitis, ETOH abuse, seizure disorder admitted to ICU for DKA, leukocytosis and etoh abuse.

## 2024-03-13 NOTE — PROGRESS NOTE ADULT - ASSESSMENT
The patient is a 33 year old old male with a past medical history of IDDM with multiple admissions for DKA, hypertension, Alcohol abuse, seizure disorder who presented to the Er wt complaints of shortness of breath. Admitted to the MICU for DKA. I. States last drink was 2 days ago, had 2 beers. CXR clear, UA negative. CT chest abdomen and pelvis showed distended stomach secondary to gastroparesis vs GOO; esophagitis and constipation.     Assessment/Plan:    1. Poorly controlled diabetes mellitus with DKA   - Recurrent admissions for DKA, questionable compliance with insulin  - Lantus 15 units QHS  - Admelog 3 units TID with meals   - Admelog sliding scale   - Hbaic of 14   - Endocrine following  - Monitor BSL Closely    2. SYED  - Secondary to DKA/hypovolemia now resolved  - monitor CMP  - REsolved    3. Leukocytosis on admission  - Started on empiric IV Rocephin  - UA negative  - Chest xray negative  - CT chest abdomen and pelvis with no acute infectious source   - Blood cultures negative x 2. stop IV Rocephin    4. ETOH use  - CIWA monitoring  - CIWA scores of 0  - Symptom triggered Ativan    5. Esophagitis and possible gastroparesis on CT  - Protonix BID    6. Hypokalemia  - Supplemented     VTE_ Heparin subcut     ANticipate discharge in 24 hours if BSL stable

## 2024-03-13 NOTE — DIETITIAN INITIAL EVALUATION ADULT - PERTINENT MEDS FT
MEDICATIONS  (STANDING):  folic acid 1 milliGRAM(s) Oral daily  insulin glargine Injectable (LANTUS) 15 Unit(s) SubCutaneous every morning  insulin lispro (ADMELOG) corrective regimen sliding scale.   SubCutaneous Before meals and at bedtime  insulin lispro Injectable (ADMELOG) 3 Unit(s) SubCutaneous three times a day before meals  multivitamin 1 Tablet(s) Oral daily  pantoprazole    Tablet 40 milliGRAM(s) Oral two times a day  thiamine 100 milliGRAM(s) Oral daily    MEDICATIONS  (PRN):  ondansetron    Tablet 4 milliGRAM(s) Oral every 8 hours PRN Nausea

## 2024-03-13 NOTE — DIETITIAN INITIAL EVALUATION ADULT - SIGNS/SYMPTOMS
as evidenced by pt likely meeting < 75% est needs > 1 month, 26% wt loss x 1 year, physical findings

## 2024-03-13 NOTE — PROGRESS NOTE ADULT - SUBJECTIVE AND OBJECTIVE BOX
INTERVAL EVENTS:  Follow up diabetes management    ROS: Denies abd pain, nausea, vomiting. Endorses good appetite.     MEDICATIONS  (STANDING):  folic acid 1 milliGRAM(s) Oral daily  heparin   Injectable 5000 Unit(s) SubCutaneous every 8 hours  insulin glargine Injectable (LANTUS) 15 Unit(s) SubCutaneous every morning  insulin lispro (ADMELOG) corrective regimen sliding scale.   SubCutaneous Before meals and at bedtime  insulin lispro Injectable (ADMELOG) 2 Unit(s) SubCutaneous Before meals and at bedtime  levETIRAcetam 1000 milliGRAM(s) Oral two times a day  multivitamin 1 Tablet(s) Oral daily  pantoprazole    Tablet 40 milliGRAM(s) Oral two times a day  thiamine 100 milliGRAM(s) Oral daily    MEDICATIONS  (PRN):  LORazepam   Injectable 2 milliGRAM(s) IV Push every 1 hour PRN CIWA-Ar score 8 or greater  ondansetron    Tablet 4 milliGRAM(s) Oral every 8 hours PRN Nausea    Allergies  Allergy Status Unknown    Vital Signs Last 24 Hrs  T(C): 36.6 (13 Mar 2024 07:10), Max: 37 (12 Mar 2024 11:10)  T(F): 97.8 (13 Mar 2024 07:10), Max: 98.6 (12 Mar 2024 11:10)  HR: 92 (13 Mar 2024 08:00) (76 - 113)  BP: 118/79 (13 Mar 2024 08:00) (95/62 - 133/73)  BP(mean): 93 (13 Mar 2024 08:00) (73 - 101)  RR: 13 (13 Mar 2024 08:00) (11 - 20)  SpO2: 100% (13 Mar 2024 08:00) (98% - 100%)    Parameters below as of 13 Mar 2024 08:00  Patient On (Oxygen Delivery Method): room air    PHYSICAL EXAM:  General: No apparent distress  Respiratory: Lungs clear bilaterally  Cardiac: +S1, S2, no m/r/g  GI: +BS, soft, non tender  Extremities: No edema  Neuro: Alert, confused at times     LABS:                        10.7   5.09  )-----------( 173      ( 13 Mar 2024 02:51 )             29.8     03-13    137  |  110<H>  |  10.3  ----------------------------<  78  2.9<LL>   |  17.0<L>  |  0.58    Ca    8.4      13 Mar 2024 02:51  Phos  2.7     03-12  Mg     1.7     03-12    TPro  5.1<L>  /  Alb  3.3  /  TBili  0.5  /  DBili  x   /  AST  12  /  ALT  8   /  AlkPhos  82  03-13    Urinalysis Basic - ( 13 Mar 2024 02:51 )    Color: x / Appearance: x / SG: x / pH: x  Gluc: 78 mg/dL / Ketone: x  / Bili: x / Urobili: x   Blood: x / Protein: x / Nitrite: x   Leuk Esterase: x / RBC: x / WBC x   Sq Epi: x / Non Sq Epi: x / Bacteria: x    POCT Blood Glucose.: 229 mg/dL (03-13-24 @ 07:52)  POCT Blood Glucose.: 154 mg/dL (03-12-24 @ 22:07)  POCT Blood Glucose.: 86 mg/dL (03-12-24 @ 16:47)  POCT Blood Glucose.: 91 mg/dL (03-12-24 @ 15:07)  POCT Blood Glucose.: 123 mg/dL (03-12-24 @ 14:03)  POCT Blood Glucose.: 129 mg/dL (03-12-24 @ 12:18)  POCT Blood Glucose.: 229 mg/dL (03-12-24 @ 11:08)

## 2024-03-13 NOTE — DIETITIAN INITIAL EVALUATION ADULT - ETIOLOGY
related to inability to meet sufficient energy/protein intake in setting of etoh abuse, pancreatitis, uncontrolled DM

## 2024-03-14 ENCOUNTER — TRANSCRIPTION ENCOUNTER (OUTPATIENT)
Age: 34
End: 2024-03-14

## 2024-03-14 VITALS
OXYGEN SATURATION: 99 % | RESPIRATION RATE: 14 BRPM | SYSTOLIC BLOOD PRESSURE: 112 MMHG | HEART RATE: 88 BPM | DIASTOLIC BLOOD PRESSURE: 76 MMHG | TEMPERATURE: 98 F

## 2024-03-14 LAB
ALBUMIN SERPL ELPH-MCNC: 3 G/DL — LOW (ref 3.3–5.2)
ALP SERPL-CCNC: 78 U/L — SIGNIFICANT CHANGE UP (ref 40–120)
ALT FLD-CCNC: 8 U/L — SIGNIFICANT CHANGE UP
ANION GAP SERPL CALC-SCNC: 10 MMOL/L — SIGNIFICANT CHANGE UP (ref 5–17)
AST SERPL-CCNC: 13 U/L — SIGNIFICANT CHANGE UP
BASOPHILS # BLD AUTO: 0.01 K/UL — SIGNIFICANT CHANGE UP (ref 0–0.2)
BASOPHILS NFR BLD AUTO: 0.3 % — SIGNIFICANT CHANGE UP (ref 0–2)
BILIRUB SERPL-MCNC: 0.4 MG/DL — SIGNIFICANT CHANGE UP (ref 0.4–2)
BUN SERPL-MCNC: 4.8 MG/DL — LOW (ref 8–20)
CALCIUM SERPL-MCNC: 7.9 MG/DL — LOW (ref 8.4–10.5)
CHLORIDE SERPL-SCNC: 107 MMOL/L — SIGNIFICANT CHANGE UP (ref 96–108)
CO2 SERPL-SCNC: 24 MMOL/L — SIGNIFICANT CHANGE UP (ref 22–29)
CREAT SERPL-MCNC: 0.55 MG/DL — SIGNIFICANT CHANGE UP (ref 0.5–1.3)
EGFR: 133 ML/MIN/1.73M2 — SIGNIFICANT CHANGE UP
EOSINOPHIL # BLD AUTO: 0.02 K/UL — SIGNIFICANT CHANGE UP (ref 0–0.5)
EOSINOPHIL NFR BLD AUTO: 0.5 % — SIGNIFICANT CHANGE UP (ref 0–6)
GLUCOSE BLDC GLUCOMTR-MCNC: 127 MG/DL — HIGH (ref 70–99)
GLUCOSE BLDC GLUCOMTR-MCNC: 212 MG/DL — HIGH (ref 70–99)
GLUCOSE SERPL-MCNC: 73 MG/DL — SIGNIFICANT CHANGE UP (ref 70–99)
HCT VFR BLD CALC: 28.2 % — LOW (ref 39–50)
HGB BLD-MCNC: 10 G/DL — LOW (ref 13–17)
IMM GRANULOCYTES NFR BLD AUTO: 0.3 % — SIGNIFICANT CHANGE UP (ref 0–0.9)
LYMPHOCYTES # BLD AUTO: 2.03 K/UL — SIGNIFICANT CHANGE UP (ref 1–3.3)
LYMPHOCYTES # BLD AUTO: 51.4 % — HIGH (ref 13–44)
MAGNESIUM SERPL-MCNC: 1.7 MG/DL — SIGNIFICANT CHANGE UP (ref 1.6–2.6)
MCHC RBC-ENTMCNC: 29 PG — SIGNIFICANT CHANGE UP (ref 27–34)
MCHC RBC-ENTMCNC: 35.5 GM/DL — SIGNIFICANT CHANGE UP (ref 32–36)
MCV RBC AUTO: 81.7 FL — SIGNIFICANT CHANGE UP (ref 80–100)
MONOCYTES # BLD AUTO: 0.47 K/UL — SIGNIFICANT CHANGE UP (ref 0–0.9)
MONOCYTES NFR BLD AUTO: 11.9 % — SIGNIFICANT CHANGE UP (ref 2–14)
NEUTROPHILS # BLD AUTO: 1.41 K/UL — LOW (ref 1.8–7.4)
NEUTROPHILS NFR BLD AUTO: 35.6 % — LOW (ref 43–77)
PLATELET # BLD AUTO: 142 K/UL — LOW (ref 150–400)
POTASSIUM SERPL-MCNC: 3 MMOL/L — LOW (ref 3.5–5.3)
POTASSIUM SERPL-SCNC: 3 MMOL/L — LOW (ref 3.5–5.3)
PROT SERPL-MCNC: 4.7 G/DL — LOW (ref 6.6–8.7)
RBC # BLD: 3.45 M/UL — LOW (ref 4.2–5.8)
RBC # FLD: 14.4 % — SIGNIFICANT CHANGE UP (ref 10.3–14.5)
SODIUM SERPL-SCNC: 141 MMOL/L — SIGNIFICANT CHANGE UP (ref 135–145)
WBC # BLD: 3.95 K/UL — SIGNIFICANT CHANGE UP (ref 3.8–10.5)
WBC # FLD AUTO: 3.95 K/UL — SIGNIFICANT CHANGE UP (ref 3.8–10.5)

## 2024-03-14 PROCEDURE — 84484 ASSAY OF TROPONIN QUANT: CPT

## 2024-03-14 PROCEDURE — 80076 HEPATIC FUNCTION PANEL: CPT

## 2024-03-14 PROCEDURE — 84132 ASSAY OF SERUM POTASSIUM: CPT

## 2024-03-14 PROCEDURE — 74176 CT ABD & PELVIS W/O CONTRAST: CPT | Mod: MC

## 2024-03-14 PROCEDURE — 93005 ELECTROCARDIOGRAM TRACING: CPT

## 2024-03-14 PROCEDURE — 71250 CT THORAX DX C-: CPT | Mod: MC

## 2024-03-14 PROCEDURE — 85027 COMPLETE CBC AUTOMATED: CPT

## 2024-03-14 PROCEDURE — 85018 HEMOGLOBIN: CPT

## 2024-03-14 PROCEDURE — 82803 BLOOD GASES ANY COMBINATION: CPT

## 2024-03-14 PROCEDURE — 96374 THER/PROPH/DIAG INJ IV PUSH: CPT

## 2024-03-14 PROCEDURE — 99239 HOSP IP/OBS DSCHRG MGMT >30: CPT

## 2024-03-14 PROCEDURE — 83735 ASSAY OF MAGNESIUM: CPT

## 2024-03-14 PROCEDURE — 36415 COLL VENOUS BLD VENIPUNCTURE: CPT

## 2024-03-14 PROCEDURE — 83036 HEMOGLOBIN GLYCOSYLATED A1C: CPT

## 2024-03-14 PROCEDURE — 96375 TX/PRO/DX INJ NEW DRUG ADDON: CPT

## 2024-03-14 PROCEDURE — 83930 ASSAY OF BLOOD OSMOLALITY: CPT

## 2024-03-14 PROCEDURE — 82009 KETONE BODYS QUAL: CPT

## 2024-03-14 PROCEDURE — 85025 COMPLETE CBC W/AUTO DIFF WBC: CPT

## 2024-03-14 PROCEDURE — 83690 ASSAY OF LIPASE: CPT

## 2024-03-14 PROCEDURE — 99232 SBSQ HOSP IP/OBS MODERATE 35: CPT

## 2024-03-14 PROCEDURE — 82330 ASSAY OF CALCIUM: CPT

## 2024-03-14 PROCEDURE — 83605 ASSAY OF LACTIC ACID: CPT

## 2024-03-14 PROCEDURE — 82962 GLUCOSE BLOOD TEST: CPT

## 2024-03-14 PROCEDURE — 85014 HEMATOCRIT: CPT

## 2024-03-14 PROCEDURE — 84100 ASSAY OF PHOSPHORUS: CPT

## 2024-03-14 PROCEDURE — 71045 X-RAY EXAM CHEST 1 VIEW: CPT

## 2024-03-14 PROCEDURE — 82435 ASSAY OF BLOOD CHLORIDE: CPT

## 2024-03-14 PROCEDURE — 99285 EMERGENCY DEPT VISIT HI MDM: CPT | Mod: 25

## 2024-03-14 PROCEDURE — 82947 ASSAY GLUCOSE BLOOD QUANT: CPT

## 2024-03-14 PROCEDURE — 80053 COMPREHEN METABOLIC PANEL: CPT

## 2024-03-14 PROCEDURE — 80048 BASIC METABOLIC PNL TOTAL CA: CPT

## 2024-03-14 PROCEDURE — 87040 BLOOD CULTURE FOR BACTERIA: CPT

## 2024-03-14 PROCEDURE — 84295 ASSAY OF SERUM SODIUM: CPT

## 2024-03-14 PROCEDURE — 81001 URINALYSIS AUTO W/SCOPE: CPT

## 2024-03-14 RX ORDER — POTASSIUM CHLORIDE 20 MEQ
40 PACKET (EA) ORAL EVERY 4 HOURS
Refills: 0 | Status: COMPLETED | OUTPATIENT
Start: 2024-03-14 | End: 2024-03-14

## 2024-03-14 RX ORDER — THIAMINE MONONITRATE (VIT B1) 100 MG
1 TABLET ORAL
Qty: 30 | Refills: 0
Start: 2024-03-14 | End: 2024-04-12

## 2024-03-14 RX ORDER — INSULIN LISPRO 100/ML
0 VIAL (ML) SUBCUTANEOUS
Refills: 0 | DISCHARGE

## 2024-03-14 RX ORDER — FOLIC ACID 0.8 MG
1 TABLET ORAL
Qty: 30 | Refills: 0
Start: 2024-03-14 | End: 2024-04-12

## 2024-03-14 RX ORDER — INSULIN LISPRO 100/ML
4 VIAL (ML) SUBCUTANEOUS
Refills: 0 | Status: DISCONTINUED | OUTPATIENT
Start: 2024-03-14 | End: 2024-03-14

## 2024-03-14 RX ORDER — INSULIN LISPRO 100/ML
3 VIAL (ML) SUBCUTANEOUS
Qty: 300 | Refills: 0
Start: 2024-03-14 | End: 2024-04-12

## 2024-03-14 RX ORDER — INSULIN GLARGINE 100 [IU]/ML
16 INJECTION, SOLUTION SUBCUTANEOUS
Refills: 0 | DISCHARGE

## 2024-03-14 RX ORDER — LEVETIRACETAM 250 MG/1
1 TABLET, FILM COATED ORAL
Qty: 0 | Refills: 0 | DISCHARGE
Start: 2024-03-14

## 2024-03-14 RX ORDER — PANTOPRAZOLE SODIUM 20 MG/1
1 TABLET, DELAYED RELEASE ORAL
Qty: 60 | Refills: 0
Start: 2024-03-14 | End: 2024-04-12

## 2024-03-14 RX ORDER — INSULIN LISPRO 100/ML
4 VIAL (ML) SUBCUTANEOUS
Qty: 300 | Refills: 0
Start: 2024-03-14 | End: 2024-04-12

## 2024-03-14 RX ORDER — INSULIN GLARGINE 100 [IU]/ML
15 INJECTION, SOLUTION SUBCUTANEOUS
Qty: 300 | Refills: 0
Start: 2024-03-14 | End: 2024-04-12

## 2024-03-14 RX ORDER — INSULIN GLARGINE 100 [IU]/ML
16 INJECTION, SOLUTION SUBCUTANEOUS
Qty: 300 | Refills: 0
Start: 2024-03-14 | End: 2024-04-12

## 2024-03-14 RX ADMIN — Medication 40 MILLIEQUIVALENT(S): at 05:11

## 2024-03-14 RX ADMIN — LEVETIRACETAM 1000 MILLIGRAM(S): 250 TABLET, FILM COATED ORAL at 05:11

## 2024-03-14 RX ADMIN — Medication 40 MILLIEQUIVALENT(S): at 13:04

## 2024-03-14 RX ADMIN — HEPARIN SODIUM 5000 UNIT(S): 5000 INJECTION INTRAVENOUS; SUBCUTANEOUS at 05:12

## 2024-03-14 RX ADMIN — Medication 4 UNIT(S): at 12:19

## 2024-03-14 RX ADMIN — INSULIN GLARGINE 15 UNIT(S): 100 INJECTION, SOLUTION SUBCUTANEOUS at 08:01

## 2024-03-14 RX ADMIN — PANTOPRAZOLE SODIUM 40 MILLIGRAM(S): 20 TABLET, DELAYED RELEASE ORAL at 05:12

## 2024-03-14 RX ADMIN — Medication 3 UNIT(S): at 08:01

## 2024-03-14 NOTE — DISCHARGE NOTE PROVIDER - DETAILS OF MALNUTRITION DIAGNOSIS/DIAGNOSES
This patient has been assessed with a concern for Malnutrition and was treated during this hospitalization for the following Nutrition diagnosis/diagnoses:     -  03/13/2024: Moderate protein-calorie malnutrition

## 2024-03-14 NOTE — DISCHARGE NOTE PROVIDER - NSDCCPCAREPLAN_GEN_ALL_CORE_FT
PRINCIPAL DISCHARGE DIAGNOSIS  Diagnosis: DKA (diabetic ketoacidosis)  Assessment and Plan of Treatment: Now resolved  Counselled on the importance of medication compliance  To be continue lantus and admelog as prescribed  Follow up with Endocrine as an outpatient      SECONDARY DISCHARGE DIAGNOSES  Diagnosis: SYED (acute kidney injury)  Assessment and Plan of Treatment: - Secondary to DKA/hypovolemia now resolved      Diagnosis: Esophagitis  Assessment and Plan of Treatment: PO Protonix 40mg twice a day

## 2024-03-14 NOTE — DISCHARGE NOTE PROVIDER - ATTENDING DISCHARGE PHYSICAL EXAMINATION:
INTERVAL HPI/OVERNIGHT EVENTS: Patient seen and examined, no acute complaints overnight. Denies sob chest pain or palpitations. Denies nausea vomiting or abdominal pain      Vital Signs Last 24 Hrs  T(C): 36.8 (14 Mar 2024 07:05), Max: 37 (13 Mar 2024 20:20)  T(F): 98.2 (14 Mar 2024 07:05), Max: 98.6 (13 Mar 2024 20:20)  HR: 94 (14 Mar 2024 08:00) (74 - 98)  BP: 103/67 (14 Mar 2024 08:00) (103/67 - 122/85)  BP(mean): 78 (14 Mar 2024 08:00) (78 - 95)  RR: 20 (14 Mar 2024 08:00) (13 - 20)  SpO2: 100% (14 Mar 2024 08:00) (98% - 100%)    Parameters below as of 14 Mar 2024 04:00  Patient On (Oxygen Delivery Method): mask, nonrebreather  O2 Flow (L/min): 99      PHYSICAL EXAM:    GENERAL: NAD, AOX3  NECK: Supple  CHEST/LUNG: Clear to auscultation bilaterally; No rales, rhonchi, wheezing, or rubs  HEART: Regular rate and rhythm; No murmurs, rubs, or gallops  ABDOMEN: Soft, Nontender, Nondistended; Bowel sounds present  EXTREMITIES:  2+ Peripheral Pulses, No clubbing, cyanosis, or edema

## 2024-03-14 NOTE — DISCHARGE NOTE NURSING/CASE MANAGEMENT/SOCIAL WORK - NSDCPEFALRISK_GEN_ALL_CORE
For information on Fall & Injury Prevention, visit: [email or web address] OR  [email or web address] OR  https://www.cdc.gov/steadi/patient.html

## 2024-03-14 NOTE — PROGRESS NOTE ADULT - SUBJECTIVE AND OBJECTIVE BOX
INTERVAL EVENTS:  Follow up diabetes management    ROS: Denies chest pain, sob, abd pain. Endorses good appetite.     MEDICATIONS  (STANDING):  folic acid 1 milliGRAM(s) Oral daily  heparin   Injectable 5000 Unit(s) SubCutaneous every 8 hours  insulin glargine Injectable (LANTUS) 15 Unit(s) SubCutaneous every morning  insulin lispro (ADMELOG) corrective regimen sliding scale.   SubCutaneous Before meals and at bedtime  insulin lispro Injectable (ADMELOG) 4 Unit(s) SubCutaneous three times a day before meals  levETIRAcetam 1000 milliGRAM(s) Oral two times a day  multivitamin 1 Tablet(s) Oral daily  pantoprazole    Tablet 40 milliGRAM(s) Oral two times a day  potassium chloride    Tablet ER 40 milliEquivalent(s) Oral every 4 hours  thiamine 100 milliGRAM(s) Oral daily    MEDICATIONS  (PRN):  LORazepam   Injectable 2 milliGRAM(s) IV Push every 1 hour PRN CIWA-Ar score 8 or greater  ondansetron    Tablet 4 milliGRAM(s) Oral every 8 hours PRN Nausea    Allergies  Allergy Status Unknown    Vital Signs Last 24 Hrs  T(C): 36.8 (14 Mar 2024 07:05), Max: 37 (13 Mar 2024 20:20)  T(F): 98.2 (14 Mar 2024 07:05), Max: 98.6 (13 Mar 2024 20:20)  HR: 94 (14 Mar 2024 08:00) (74 - 98)  BP: 103/67 (14 Mar 2024 08:00) (103/67 - 122/85)  BP(mean): 78 (14 Mar 2024 08:00) (78 - 95)  RR: 20 (14 Mar 2024 08:00) (13 - 20)  SpO2: 100% (14 Mar 2024 08:00) (98% - 100%)    Parameters below as of 14 Mar 2024 04:00  Patient On (Oxygen Delivery Method): mask, nonrebreather  O2 Flow (L/min): 99      PHYSICAL EXAM:  General: No apparent distress  Respiratory: Lungs clear bilaterally  Cardiac: +S1, S2, no m/r/g  GI: +BS, soft, non tender, non distended  Extremities: No peripheral edema  Neuro: A+O    LABS:                        10.0   3.95  )-----------( 142      ( 14 Mar 2024 02:36 )             28.2     03-14    141  |  107  |  4.8<L>  ----------------------------<  73  3.0<L>   |  24.0  |  0.55    Ca    7.9<L>      14 Mar 2024 02:36  Phos  2.7     03-12  Mg     1.7     03-14    TPro  4.7<L>  /  Alb  3.0<L>  /  TBili  0.4  /  DBili  x   /  AST  13  /  ALT  8   /  AlkPhos  78  03-14    Urinalysis Basic - ( 14 Mar 2024 02:36 )    Color: x / Appearance: x / SG: x / pH: x  Gluc: 73 mg/dL / Ketone: x  / Bili: x / Urobili: x   Blood: x / Protein: x / Nitrite: x   Leuk Esterase: x / RBC: x / WBC x   Sq Epi: x / Non Sq Epi: x / Bacteria: x    POCT Blood Glucose.: 127 mg/dL (03-14-24 @ 07:54)  POCT Blood Glucose.: 293 mg/dL (03-13-24 @ 22:06)  POCT Blood Glucose.: 201 mg/dL (03-13-24 @ 16:30)  POCT Blood Glucose.: 186 mg/dL (03-13-24 @ 11:40)

## 2024-03-14 NOTE — PROGRESS NOTE ADULT - NS ATTEND AMEND GEN_ALL_CORE FT
insulin regimen adjusted as noted
insulin regimen modified. Stable at present, and should remain out of DKA as long as adherent with insulin

## 2024-03-14 NOTE — DISCHARGE NOTE PROVIDER - HOSPITAL COURSE
The patient is a 33 year old old male with a past medical history of IDDM with multiple admissions for DKA, hypertension, Alcohol abuse, seizure disorder who presented to the Er [facility] complaints of shortness of breath. Admitted to the MICU for DKA. I. States last drink was 2 days ago, had 2 beers. CXR clear, UA negative. CT chest abdomen and pelvis showed distended stomach secondary to gastroparesis vs GOO; esophagitis and constipation.  Patient denied complaints of nausea vomiting abdominal pain or dyspepsia. Started on Protonix PO. Leukocytosis noted on admission started on empiric IV Antibiotics; no source identified antibiotics were discontinued. BSL remains stable. Educated on medication compliance and close follow up. Electrolytes repleted. Discharged home in stable condition.         The patient is a 33 year old old male with a past medical history of IDDM with multiple admissions for DKA, hypertension, Alcohol abuse, seizure disorder who presented to the Er [facility] complaints of shortness of breath. Admitted to the MICU for DKA. I. States last drink was 2 days ago, had 2 beers. CXR clear, UA negative. CT chest abdomen and pelvis showed distended stomach secondary to gastroparesis vs GOO; esophagitis and constipation.  Patient denied complaints of nausea vomiting abdominal pain or dyspepsia. Started on Protonix PO. Leukocytosis noted on admission started on empiric IV Antibiotics; no source identified antibiotics were discontinued. BSL remains stable. Educated on medication compliance and close follow up. Electrolytes repleted. Discharged home in stable condition.     INTERVAL HPI/OVERNIGHT EVENTS:      Vital Signs Last 24 Hrs  T(C): 36.8 (14 Mar 2024 07:05), Max: 37 (13 Mar 2024 20:20)  T(F): 98.2 (14 Mar 2024 07:05), Max: 98.6 (13 Mar 2024 20:20)  HR: 94 (14 Mar 2024 08:00) (74 - 98)  BP: 103/67 (14 Mar 2024 08:00) (103/67 - 122/85)  BP(mean): 78 (14 Mar 2024 08:00) (78 - 95)  RR: 20 (14 Mar 2024 08:00) (13 - 20)  SpO2: 100% (14 Mar 2024 08:00) (98% - 100%)    Parameters below as of 14 Mar 2024 04:00  Patient On (Oxygen Delivery Method): mask, nonrebreather  O2 Flow (L/min): 99      PHYSICAL EXAM:    GENERAL: NAD, well-groomed, well-developed  HEAD:  Atraumatic, Normocephalic  EYES: EOMI, PERRLA, conjunctiva and sclera clear  ENMT: Moist mucous membranes  NECK: Supple, No JVD  NERVOUS SYSTEM:  Alert & Oriented X3, Motor Strength 5/5 B/L upper and lower extremities; DTRs 2+ intact and symmetric  CHEST/LUNG: Clear to auscultation bilaterally; No rales, rhonchi, wheezing, or rubs  HEART: Regular rate and rhythm; No murmurs, rubs, or gallops  ABDOMEN: Soft, Nontender, Nondistended; Bowel sounds present  EXTREMITIES:  2+ Peripheral Pulses, No clubbing, cyanosis, or edema        MEDICATIONS  (STANDING):  folic acid 1 milliGRAM(s) Oral daily  heparin   Injectable 5000 Unit(s) SubCutaneous every 8 hours  insulin glargine Injectable (LANTUS) 15 Unit(s) SubCutaneous every morning  insulin lispro (ADMELOG) corrective regimen sliding scale.   SubCutaneous Before meals and at bedtime  insulin lispro Injectable (ADMELOG) 3 Unit(s) SubCutaneous three times a day before meals  levETIRAcetam 1000 milliGRAM(s) Oral two times a day  multivitamin 1 Tablet(s) Oral daily  pantoprazole    Tablet 40 milliGRAM(s) Oral two times a day  potassium chloride    Tablet ER 40 milliEquivalent(s) Oral every 4 hours  thiamine 100 milliGRAM(s) Oral daily    MEDICATIONS  (PRN):  LORazepam   Injectable 2 milliGRAM(s) IV Push every 1 hour PRN CIWA-Ar score 8 or greater  ondansetron    Tablet 4 milliGRAM(s) Oral every 8 hours PRN Nausea      Allergies    Allergy Status Unknown    Intolerances          LABS:                          10.0   3.95  )-----------( 142      ( 14 Mar 2024 02:36 )             28.2     03-14    141  |  107  |  4.8<L>  ----------------------------<  73  3.0<L>   |  24.0  |  0.55    Ca    7.9<L>      14 Mar 2024 02:36  Phos  2.7     03-12  Mg     1.7     03-14    TPro  4.7<L>  /  Alb  3.0<L>  /  TBili  0.4  /  DBili  x   /  AST  13  /  ALT  8   /  AlkPhos  78  03-14      Urinalysis Basic - ( 14 Mar 2024 02:36 )    Color: x / Appearance: x / SG: x / pH: x  Gluc: 73 mg/dL / Ketone: x  / Bili: x / Urobili: x   Blood: x / Protein: x / Nitrite: x   Leuk Esterase: x / RBC: x / WBC x   Sq Epi: x / Non Sq Epi: x / Bacteria: x        RADIOLOGY & ADDITIONAL TESTS:

## 2024-03-14 NOTE — PROGRESS NOTE ADULT - ASSESSMENT
33M with PMHx HTN, IDDM, ETOH use, seizure disorder, multiple admissions for DKA, presented with shortness of breath. Labs significant for DKA, admitted for further management.     1. 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 Encouraged daily use of basal insulin, with reminders from mother who he lives with, and setting iPhone reminders  - He can be discharged on prior regimen (Lantus 16 units daily and premeal insulin 6 units TID), d/w primary team    2. ETOH abuse  - Alcohol cessation, monitor for withdrawal    3. Hypokalemia  - Supplement as per primary team

## 2024-03-14 NOTE — DISCHARGE NOTE PROVIDER - CARE PROVIDER_API CALL
Tye Winters  Endocrinology/Metab/Diabetes  [address] [place] [address]  Phone: [phone]  Fax: [phone]  Follow Up Time: 2 weeks

## 2024-03-14 NOTE — DISCHARGE NOTE NURSING/CASE MANAGEMENT/SOCIAL WORK - PATIENT PORTAL LINK FT
You can access the FollowMyHealth Patient Portal offered by [facility] by registering at the following website: [email or web address]. By joining [facility]’s FollowMyHealth portal, you will also be able to view your health information using other applications (apps) compatible with our system.

## 2024-03-14 NOTE — DISCHARGE NOTE NURSING/CASE MANAGEMENT/SOCIAL WORK - NSDCVIVACCINE_GEN_ALL_CORE_FT
Tdap; 29-Oct-2019 23:07; Odessa Landis (RN); [employer]; [id number] (Exp. Date: 22-Oct-2021); IntraMuscular; Deltoid Left.; 0.5 milliLiter(s); VIS (VIS Published: 09-May-2013, VIS Presented: 29-Oct-2019);

## 2024-03-14 NOTE — DISCHARGE NOTE PROVIDER - NSDCMRMEDTOKEN_GEN_ALL_CORE_FT
folic acid 1 mg oral tablet: 1 tab(s) orally once a day  HumaLOG KwikPen 100 units/mL injectable solution: 3 unit(s) subcutaneous 3 times a day (with meals)  Lantus Solostar Pen 100 units/mL subcutaneous solution: 15 unit(s) subcutaneous once a day (at bedtime)  levETIRAcetam 1000 mg oral tablet: 1 tab(s) orally 2 times a day  Multiple Vitamins oral tablet: 1 tab(s) orally once a day  pantoprazole 40 mg oral delayed release tablet: 1 tab(s) orally 2 times a day  thiamine 100 mg oral tablet: 1 tab(s) orally once a day   folic acid 1 mg oral tablet: 1 tab(s) orally once a day  HumaLOG KwikPen 100 units/mL injectable solution: 4 unit(s) subcutaneous 3 times a day (with meals)  Insulin Pen Needles, 4mm: 1 application subcutaneously 4 times a day. ** Use with insulin pen **  Lantus Solostar Pen 100 units/mL subcutaneous solution: 16 unit(s) subcutaneous once a day  levETIRAcetam 1000 mg oral tablet: 1 tab(s) orally 2 times a day  Multiple Vitamins oral tablet: 1 tab(s) orally once a day  pantoprazole 40 mg oral delayed release tablet: 1 tab(s) orally 2 times a day  thiamine 100 mg oral tablet: 1 tab(s) orally once a day

## 2024-03-15 ENCOUNTER — TRANSCRIPTION ENCOUNTER (OUTPATIENT)
Age: 34
End: 2024-03-15

## 2024-03-20 NOTE — H&P ADULT - ASSESSMENT
Please follow up with her ENT clinic in the next few days. Return to the ER if bleeding returns or if she has persistent vomiting or develops fevers.  
28 yo M with h/o ETOH abuse, pancreatitis, DM2 (noncompliant with insulin) here with etoh withdrawal.

## 2024-04-16 NOTE — ADVANCED PRACTICE NURSE CONSULT - ASSESSMENT
Detail Level: Simple
went to see pt in afternoon pt is a+ox2-3. he states he does not drink for 3 months. he tests his bg and takes his insulin. will return to educate when family is present
Detail Level: Generalized
Detail Level: Zone
Detail Level: Detailed

## 2024-05-22 ENCOUNTER — TRANSCRIPTION ENCOUNTER (OUTPATIENT)
Age: 34
End: 2024-05-22

## 2024-06-24 NOTE — PATIENT PROFILE ADULT - FUNCTIONAL ASSESSMENT - DAILY ACTIVITY 4.
well developed, well nourished , in no acute distress , ambulating without difficulty , normal communication ability 2 = A lot of assistance

## 2024-06-28 ENCOUNTER — INPATIENT (INPATIENT)
Facility: HOSPITAL | Age: 34
LOS: 2 days | Discharge: HOME | DRG: 639 | End: 2024-07-01
Attending: INTERNAL MEDICINE | Admitting: INTERNAL MEDICINE
Payer: COMMERCIAL

## 2024-06-28 VITALS
SYSTOLIC BLOOD PRESSURE: 129 MMHG | WEIGHT: 179.9 LBS | RESPIRATION RATE: 36 BRPM | OXYGEN SATURATION: 100 % | TEMPERATURE: 98 F | DIASTOLIC BLOOD PRESSURE: 96 MMHG | HEART RATE: 142 BPM

## 2024-06-28 DIAGNOSIS — E11.10 TYPE 2 DIABETES MELLITUS WITH KETOACIDOSIS WITHOUT COMA: ICD-10-CM

## 2024-06-28 LAB
A1C WITH ESTIMATED AVERAGE GLUCOSE RESULT: 13.6 % — HIGH (ref 4–5.6)
ACETONE SERPL-MCNC: ABNORMAL
ALBUMIN SERPL ELPH-MCNC: 4.8 G/DL — SIGNIFICANT CHANGE UP (ref 3.3–5.2)
ALP SERPL-CCNC: 190 U/L — HIGH (ref 40–120)
ALT FLD-CCNC: 16 U/L — SIGNIFICANT CHANGE UP
ANION GAP SERPL CALC-SCNC: 35 MMOL/L — HIGH (ref 5–17)
ANION GAP SERPL CALC-SCNC: SIGNIFICANT CHANGE UP MMOL/L (ref 5–17)
APPEARANCE UR: CLEAR — SIGNIFICANT CHANGE UP
AST SERPL-CCNC: 29 U/L — SIGNIFICANT CHANGE UP
BACTERIA # UR AUTO: NEGATIVE /HPF — SIGNIFICANT CHANGE UP
BASE EXCESS BLDV CALC-SCNC: -23.7 MMOL/L — LOW (ref -2–3)
BASOPHILS # BLD AUTO: 0.08 K/UL — SIGNIFICANT CHANGE UP (ref 0–0.2)
BASOPHILS NFR BLD AUTO: 0.4 % — SIGNIFICANT CHANGE UP (ref 0–2)
BILIRUB SERPL-MCNC: 0.6 MG/DL — SIGNIFICANT CHANGE UP (ref 0.4–2)
BILIRUB UR-MCNC: NEGATIVE — SIGNIFICANT CHANGE UP
BUN SERPL-MCNC: 15 MG/DL — SIGNIFICANT CHANGE UP (ref 8–20)
BUN SERPL-MCNC: 15.2 MG/DL — SIGNIFICANT CHANGE UP (ref 8–20)
CA-I SERPL-SCNC: 1.17 MMOL/L — SIGNIFICANT CHANGE UP (ref 1.15–1.33)
CA-I SERPL-SCNC: 1.29 MMOL/L — SIGNIFICANT CHANGE UP (ref 1.15–1.33)
CALCIUM SERPL-MCNC: 7.9 MG/DL — LOW (ref 8.4–10.5)
CALCIUM SERPL-MCNC: 9.1 MG/DL — SIGNIFICANT CHANGE UP (ref 8.4–10.5)
CHLORIDE BLDV-SCNC: 106 MMOL/L — SIGNIFICANT CHANGE UP (ref 96–108)
CHLORIDE BLDV-SCNC: 98 MMOL/L — SIGNIFICANT CHANGE UP (ref 96–108)
CHLORIDE SERPL-SCNC: 92 MMOL/L — LOW (ref 96–108)
CHLORIDE SERPL-SCNC: 99 MMOL/L — SIGNIFICANT CHANGE UP (ref 96–108)
CO2 SERPL-SCNC: 6 MMOL/L — CRITICAL LOW (ref 22–29)
CO2 SERPL-SCNC: <6 MMOL/L — CRITICAL LOW (ref 22–29)
COLOR SPEC: YELLOW — SIGNIFICANT CHANGE UP
CREAT SERPL-MCNC: 1.06 MG/DL — SIGNIFICANT CHANGE UP (ref 0.5–1.3)
CREAT SERPL-MCNC: 1.39 MG/DL — HIGH (ref 0.5–1.3)
DIFF PNL FLD: NEGATIVE — SIGNIFICANT CHANGE UP
EGFR: 68 ML/MIN/1.73M2 — SIGNIFICANT CHANGE UP
EGFR: 94 ML/MIN/1.73M2 — SIGNIFICANT CHANGE UP
EOSINOPHIL # BLD AUTO: 0.01 K/UL — SIGNIFICANT CHANGE UP (ref 0–0.5)
EOSINOPHIL NFR BLD AUTO: 0.1 % — SIGNIFICANT CHANGE UP (ref 0–6)
ESTIMATED AVERAGE GLUCOSE: 344 MG/DL — HIGH (ref 68–114)
GAS PNL BLDV: 132 MMOL/L — LOW (ref 136–145)
GAS PNL BLDV: 138 MMOL/L — SIGNIFICANT CHANGE UP (ref 136–145)
GAS PNL BLDV: SIGNIFICANT CHANGE UP
GAS PNL BLDV: SIGNIFICANT CHANGE UP
GLUCOSE BLDC GLUCOMTR-MCNC: 244 MG/DL — HIGH (ref 70–99)
GLUCOSE BLDC GLUCOMTR-MCNC: 383 MG/DL — HIGH (ref 70–99)
GLUCOSE BLDC GLUCOMTR-MCNC: 461 MG/DL — CRITICAL HIGH (ref 70–99)
GLUCOSE BLDV-MCNC: 518 MG/DL — CRITICAL HIGH (ref 70–99)
GLUCOSE BLDV-MCNC: >656 MG/DL — CRITICAL HIGH (ref 70–99)
GLUCOSE SERPL-MCNC: 440 MG/DL — HIGH (ref 70–99)
GLUCOSE SERPL-MCNC: 576 MG/DL — CRITICAL HIGH (ref 70–99)
GLUCOSE UR QL: >=1000 MG/DL
HCO3 BLDV-SCNC: 6 MMOL/L — CRITICAL LOW (ref 22–29)
HCT VFR BLD CALC: 48.2 % — SIGNIFICANT CHANGE UP (ref 39–50)
HCT VFR BLDA CALC: 42 % — SIGNIFICANT CHANGE UP
HCT VFR BLDA CALC: 50 % — SIGNIFICANT CHANGE UP
HGB BLD CALC-MCNC: 14 G/DL — SIGNIFICANT CHANGE UP (ref 12.6–17.4)
HGB BLD CALC-MCNC: 16.7 G/DL — SIGNIFICANT CHANGE UP (ref 12.6–17.4)
HGB BLD-MCNC: 15.6 G/DL — SIGNIFICANT CHANGE UP (ref 13–17)
IMM GRANULOCYTES NFR BLD AUTO: 1.7 % — HIGH (ref 0–0.9)
KETONES UR-MCNC: >=160 MG/DL
LACTATE BLDV-MCNC: 4.2 MMOL/L — CRITICAL HIGH (ref 0.5–2)
LACTATE BLDV-MCNC: 4.6 MMOL/L — CRITICAL HIGH (ref 0.5–2)
LEUKOCYTE ESTERASE UR-ACNC: NEGATIVE — SIGNIFICANT CHANGE UP
LIDOCAIN IGE QN: 11 U/L — LOW (ref 22–51)
LYMPHOCYTES # BLD AUTO: 1.56 K/UL — SIGNIFICANT CHANGE UP (ref 1–3.3)
LYMPHOCYTES # BLD AUTO: 8.2 % — LOW (ref 13–44)
MAGNESIUM SERPL-MCNC: 1.7 MG/DL — LOW (ref 1.8–2.6)
MAGNESIUM SERPL-MCNC: 2.5 MG/DL — SIGNIFICANT CHANGE UP (ref 1.6–2.6)
MCHC RBC-ENTMCNC: 29.3 PG — SIGNIFICANT CHANGE UP (ref 27–34)
MCHC RBC-ENTMCNC: 32.4 GM/DL — SIGNIFICANT CHANGE UP (ref 32–36)
MCV RBC AUTO: 90.6 FL — SIGNIFICANT CHANGE UP (ref 80–100)
MONOCYTES # BLD AUTO: 1.47 K/UL — HIGH (ref 0–0.9)
MONOCYTES NFR BLD AUTO: 7.7 % — SIGNIFICANT CHANGE UP (ref 2–14)
NEUTROPHILS # BLD AUTO: 15.55 K/UL — HIGH (ref 1.8–7.4)
NEUTROPHILS NFR BLD AUTO: 81.9 % — HIGH (ref 43–77)
NITRITE UR-MCNC: NEGATIVE — SIGNIFICANT CHANGE UP
PCO2 BLDV: 22 MMHG — LOW (ref 42–55)
PCO2 BLDV: 35 MMHG — LOW (ref 42–55)
PH BLDV: 7.07 — CRITICAL LOW (ref 7.32–7.43)
PH BLDV: <6.942 — CRITICAL LOW (ref 7.32–7.43)
PH UR: 5.5 — SIGNIFICANT CHANGE UP (ref 5–8)
PHOSPHATE SERPL-MCNC: 1.8 MG/DL — LOW (ref 2.4–4.7)
PHOSPHATE SERPL-MCNC: 6.7 MG/DL — HIGH (ref 2.4–4.7)
PLATELET # BLD AUTO: 352 K/UL — SIGNIFICANT CHANGE UP (ref 150–400)
PO2 BLDV: 57 MMHG — HIGH (ref 25–45)
PO2 BLDV: 88 MMHG — HIGH (ref 25–45)
POTASSIUM BLDV-SCNC: 3.8 MMOL/L — SIGNIFICANT CHANGE UP (ref 3.5–5.1)
POTASSIUM BLDV-SCNC: 5 MMOL/L — SIGNIFICANT CHANGE UP (ref 3.5–5.1)
POTASSIUM SERPL-MCNC: 3.8 MMOL/L — SIGNIFICANT CHANGE UP (ref 3.5–5.3)
POTASSIUM SERPL-MCNC: 5.4 MMOL/L — HIGH (ref 3.5–5.3)
POTASSIUM SERPL-SCNC: 3.8 MMOL/L — SIGNIFICANT CHANGE UP (ref 3.5–5.3)
POTASSIUM SERPL-SCNC: 5.4 MMOL/L — HIGH (ref 3.5–5.3)
PROCALCITONIN SERPL-MCNC: 1.47 NG/ML — HIGH (ref 0.02–0.1)
PROT SERPL-MCNC: 8.6 G/DL — SIGNIFICANT CHANGE UP (ref 6.6–8.7)
PROT UR-MCNC: 100 MG/DL
RBC # BLD: 5.32 M/UL — SIGNIFICANT CHANGE UP (ref 4.2–5.8)
RBC # FLD: 14.5 % — SIGNIFICANT CHANGE UP (ref 10.3–14.5)
RBC CASTS # UR COMP ASSIST: 1 /HPF — SIGNIFICANT CHANGE UP (ref 0–4)
SAO2 % BLDV: 87.2 % — SIGNIFICANT CHANGE UP
SAO2 % BLDV: 93.7 % — SIGNIFICANT CHANGE UP
SODIUM SERPL-SCNC: 133 MMOL/L — LOW (ref 135–145)
SODIUM SERPL-SCNC: 140 MMOL/L — SIGNIFICANT CHANGE UP (ref 135–145)
SP GR SPEC: 1.03 — SIGNIFICANT CHANGE UP (ref 1–1.03)
UROBILINOGEN FLD QL: 1 MG/DL — SIGNIFICANT CHANGE UP (ref 0.2–1)
WBC # BLD: 18.99 K/UL — HIGH (ref 3.8–10.5)
WBC # FLD AUTO: 18.99 K/UL — HIGH (ref 3.8–10.5)
WBC UR QL: 0 /HPF — SIGNIFICANT CHANGE UP (ref 0–5)

## 2024-06-28 PROCEDURE — 99291 CRITICAL CARE FIRST HOUR: CPT | Mod: GC

## 2024-06-28 PROCEDURE — 71045 X-RAY EXAM CHEST 1 VIEW: CPT | Mod: 26

## 2024-06-28 PROCEDURE — 99285 EMERGENCY DEPT VISIT HI MDM: CPT

## 2024-06-28 PROCEDURE — 93010 ELECTROCARDIOGRAM REPORT: CPT

## 2024-06-28 RX ORDER — SODIUM CHLORIDE 0.9 % (FLUSH) 0.9 %
2000 SYRINGE (ML) INJECTION ONCE
Refills: 0 | Status: COMPLETED | OUTPATIENT
Start: 2024-06-28 | End: 2024-06-28

## 2024-06-28 RX ORDER — CEFTRIAXONE SODIUM 500 MG
1000 VIAL (EA) INJECTION ONCE
Refills: 0 | Status: COMPLETED | OUTPATIENT
Start: 2024-06-28 | End: 2024-06-28

## 2024-06-28 RX ORDER — CEFTRIAXONE SODIUM 500 MG
VIAL (EA) INJECTION
Refills: 0 | Status: DISCONTINUED | OUTPATIENT
Start: 2024-06-28 | End: 2024-06-30

## 2024-06-28 RX ORDER — SODIUM BICARBONATE 650 MG/1
50 TABLET ORAL ONCE
Refills: 0 | Status: COMPLETED | OUTPATIENT
Start: 2024-06-28 | End: 2024-06-28

## 2024-06-28 RX ORDER — DIAZEPAM 10 MG/1
10 TABLET ORAL
Refills: 0 | Status: DISCONTINUED | OUTPATIENT
Start: 2024-06-28 | End: 2024-06-30

## 2024-06-28 RX ORDER — HEPARIN SODIUM 50 [USP'U]/ML
7500 INJECTION, SOLUTION INTRAVENOUS EVERY 8 HOURS
Refills: 0 | Status: DISCONTINUED | OUTPATIENT
Start: 2024-06-28 | End: 2024-06-29

## 2024-06-28 RX ORDER — CEFTRIAXONE SODIUM 500 MG
1000 VIAL (EA) INJECTION EVERY 24 HOURS
Refills: 0 | Status: DISCONTINUED | OUTPATIENT
Start: 2024-06-29 | End: 2024-06-30

## 2024-06-28 RX ORDER — POTASSIUM CHLORIDE 600 MG/1
10 TABLET, FILM COATED, EXTENDED RELEASE ORAL
Refills: 0 | Status: COMPLETED | OUTPATIENT
Start: 2024-06-28 | End: 2024-06-29

## 2024-06-28 RX ORDER — DEXTROSE MONOHYDRATE AND SODIUM CHLORIDE 5; .3 G/100ML; G/100ML
1000 INJECTION, SOLUTION INTRAVENOUS
Refills: 0 | Status: DISCONTINUED | OUTPATIENT
Start: 2024-06-28 | End: 2024-06-29

## 2024-06-28 RX ORDER — DEXTROSE MONOHYDRATE AND SODIUM CHLORIDE 5; .3 G/100ML; G/100ML
2000 INJECTION, SOLUTION INTRAVENOUS ONCE
Refills: 0 | Status: COMPLETED | OUTPATIENT
Start: 2024-06-28 | End: 2024-06-28

## 2024-06-28 RX ORDER — MAGNESIUM SULFATE 100 %
2 POWDER (GRAM) MISCELLANEOUS ONCE
Refills: 0 | Status: COMPLETED | OUTPATIENT
Start: 2024-06-28 | End: 2024-06-28

## 2024-06-28 RX ORDER — FOLIC ACID
1 POWDER (GRAM) MISCELLANEOUS DAILY
Refills: 0 | Status: DISCONTINUED | OUTPATIENT
Start: 2024-06-28 | End: 2024-07-01

## 2024-06-28 RX ORDER — INSULIN REGULAR, HUMAN 100/ML
8 VIAL (ML) INJECTION ONCE
Refills: 0 | Status: COMPLETED | OUTPATIENT
Start: 2024-06-28 | End: 2024-06-28

## 2024-06-28 RX ORDER — POTASSIUM PHOSPHATE, MONOBASIC POTASSIUM PHOSPHATE, DIBASIC INJECTION, 236; 224 MG/ML; MG/ML
30 SOLUTION, CONCENTRATE INTRAVENOUS ONCE
Refills: 0 | Status: COMPLETED | OUTPATIENT
Start: 2024-06-28 | End: 2024-06-28

## 2024-06-28 RX ORDER — DEXTROSE MONOHYDRATE AND SODIUM CHLORIDE 5; .3 G/100ML; G/100ML
1000 INJECTION, SOLUTION INTRAVENOUS ONCE
Refills: 0 | Status: COMPLETED | OUTPATIENT
Start: 2024-06-28 | End: 2024-06-28

## 2024-06-28 RX ORDER — LEVETIRACETAM 100 MG/ML
1000 INJECTION INTRAVENOUS
Refills: 0 | Status: DISCONTINUED | OUTPATIENT
Start: 2024-06-28 | End: 2024-07-01

## 2024-06-28 RX ORDER — THIAMINE HCL 100 MG
100 TABLET ORAL DAILY
Refills: 0 | Status: DISCONTINUED | OUTPATIENT
Start: 2024-06-28 | End: 2024-06-29

## 2024-06-28 RX ORDER — INSULIN REGULAR, HUMAN 100/ML
1 VIAL (ML) INJECTION
Qty: 100 | Refills: 0 | Status: DISCONTINUED | OUTPATIENT
Start: 2024-06-28 | End: 2024-06-29

## 2024-06-28 RX ORDER — PANTOPRAZOLE SODIUM 40 MG/10ML
40 INJECTION, POWDER, FOR SOLUTION INTRAVENOUS DAILY
Refills: 0 | Status: DISCONTINUED | OUTPATIENT
Start: 2024-06-28 | End: 2024-07-01

## 2024-06-28 RX ORDER — SODIUM BICARBONATE 650 MG/1
0.18 TABLET ORAL
Qty: 150 | Refills: 0 | Status: DISCONTINUED | OUTPATIENT
Start: 2024-06-28 | End: 2024-06-29

## 2024-06-28 RX ADMIN — Medication 1000 MILLIGRAM(S): at 19:51

## 2024-06-28 RX ADMIN — POTASSIUM CHLORIDE 100 MILLIEQUIVALENT(S): 600 TABLET, FILM COATED, EXTENDED RELEASE ORAL at 22:08

## 2024-06-28 RX ADMIN — Medication 25 GRAM(S): at 22:08

## 2024-06-28 RX ADMIN — SODIUM BICARBONATE 100 MEQ/KG/HR: 650 TABLET ORAL at 19:05

## 2024-06-28 RX ADMIN — POTASSIUM CHLORIDE 100 MILLIEQUIVALENT(S): 600 TABLET, FILM COATED, EXTENDED RELEASE ORAL at 23:17

## 2024-06-28 RX ADMIN — Medication 8 UNIT(S): at 18:19

## 2024-06-28 RX ADMIN — LEVETIRACETAM 1000 MILLIGRAM(S): 100 INJECTION INTRAVENOUS at 22:07

## 2024-06-28 RX ADMIN — DEXTROSE MONOHYDRATE AND SODIUM CHLORIDE 2000 MILLILITER(S): 5; .3 INJECTION, SOLUTION INTRAVENOUS at 17:53

## 2024-06-28 RX ADMIN — SODIUM BICARBONATE 50 MILLIEQUIVALENT(S): 650 TABLET ORAL at 17:59

## 2024-06-28 RX ADMIN — Medication 100 MILLIGRAM(S): at 19:12

## 2024-06-28 RX ADMIN — Medication 2000 MILLILITER(S): at 21:09

## 2024-06-28 RX ADMIN — Medication 8 UNIT(S)/HR: at 18:51

## 2024-06-28 RX ADMIN — DEXTROSE MONOHYDRATE AND SODIUM CHLORIDE 1000 MILLILITER(S): 5; .3 INJECTION, SOLUTION INTRAVENOUS at 19:05

## 2024-06-28 RX ADMIN — POTASSIUM PHOSPHATE, MONOBASIC POTASSIUM PHOSPHATE, DIBASIC INJECTION, 83.33 MILLIMOLE(S): 236; 224 SOLUTION, CONCENTRATE INTRAVENOUS at 22:59

## 2024-06-28 RX ADMIN — SODIUM BICARBONATE 50 MILLIEQUIVALENT(S): 650 TABLET ORAL at 18:20

## 2024-06-28 RX ADMIN — DEXTROSE MONOHYDRATE AND SODIUM CHLORIDE 200 MILLILITER(S): 5; .3 INJECTION, SOLUTION INTRAVENOUS at 23:16

## 2024-06-29 LAB
-  COAGULASE NEGATIVE STAPHYLOCOCCUS: SIGNIFICANT CHANGE UP
ALBUMIN SERPL ELPH-MCNC: 3.3 G/DL — SIGNIFICANT CHANGE UP (ref 3.3–5.2)
ALP SERPL-CCNC: 98 U/L — SIGNIFICANT CHANGE UP (ref 40–120)
ALT FLD-CCNC: 8 U/L — SIGNIFICANT CHANGE UP
ANION GAP SERPL CALC-SCNC: 13 MMOL/L — SIGNIFICANT CHANGE UP (ref 5–17)
ANION GAP SERPL CALC-SCNC: 15 MMOL/L — SIGNIFICANT CHANGE UP (ref 5–17)
ANION GAP SERPL CALC-SCNC: 16 MMOL/L — SIGNIFICANT CHANGE UP (ref 5–17)
AST SERPL-CCNC: 10 U/L — SIGNIFICANT CHANGE UP
BASE EXCESS BLDV CALC-SCNC: -7.9 MMOL/L — LOW (ref -2–3)
BILIRUB SERPL-MCNC: 0.6 MG/DL — SIGNIFICANT CHANGE UP (ref 0.4–2)
BUN SERPL-MCNC: 6 MG/DL — LOW (ref 8–20)
BUN SERPL-MCNC: 7.9 MG/DL — LOW (ref 8–20)
BUN SERPL-MCNC: 9.7 MG/DL — SIGNIFICANT CHANGE UP (ref 8–20)
CA-I SERPL-SCNC: 1.2 MMOL/L — SIGNIFICANT CHANGE UP (ref 1.15–1.33)
CALCIUM SERPL-MCNC: 7.4 MG/DL — LOW (ref 8.4–10.5)
CALCIUM SERPL-MCNC: 7.6 MG/DL — LOW (ref 8.4–10.5)
CALCIUM SERPL-MCNC: 7.9 MG/DL — LOW (ref 8.4–10.5)
CHLORIDE BLDV-SCNC: 112 MMOL/L — HIGH (ref 96–108)
CHLORIDE SERPL-SCNC: 108 MMOL/L — SIGNIFICANT CHANGE UP (ref 96–108)
CHLORIDE SERPL-SCNC: 109 MMOL/L — HIGH (ref 96–108)
CHLORIDE SERPL-SCNC: 110 MMOL/L — HIGH (ref 96–108)
CO2 SERPL-SCNC: 16 MMOL/L — LOW (ref 22–29)
CO2 SERPL-SCNC: 17 MMOL/L — LOW (ref 22–29)
CO2 SERPL-SCNC: 18 MMOL/L — LOW (ref 22–29)
CREAT SERPL-MCNC: 0.57 MG/DL — SIGNIFICANT CHANGE UP (ref 0.5–1.3)
CREAT SERPL-MCNC: 0.58 MG/DL — SIGNIFICANT CHANGE UP (ref 0.5–1.3)
CREAT SERPL-MCNC: 0.71 MG/DL — SIGNIFICANT CHANGE UP (ref 0.5–1.3)
CULTURE RESULTS: SIGNIFICANT CHANGE UP
EGFR: 123 ML/MIN/1.73M2 — SIGNIFICANT CHANGE UP
EGFR: 131 ML/MIN/1.73M2 — SIGNIFICANT CHANGE UP
EGFR: 132 ML/MIN/1.73M2 — SIGNIFICANT CHANGE UP
GAS PNL BLDV: 139 MMOL/L — SIGNIFICANT CHANGE UP (ref 136–145)
GAS PNL BLDV: SIGNIFICANT CHANGE UP
GLUCOSE BLDC GLUCOMTR-MCNC: 107 MG/DL — HIGH (ref 70–99)
GLUCOSE BLDC GLUCOMTR-MCNC: 117 MG/DL — HIGH (ref 70–99)
GLUCOSE BLDC GLUCOMTR-MCNC: 131 MG/DL — HIGH (ref 70–99)
GLUCOSE BLDC GLUCOMTR-MCNC: 159 MG/DL — HIGH (ref 70–99)
GLUCOSE BLDC GLUCOMTR-MCNC: 169 MG/DL — HIGH (ref 70–99)
GLUCOSE BLDC GLUCOMTR-MCNC: 176 MG/DL — HIGH (ref 70–99)
GLUCOSE BLDC GLUCOMTR-MCNC: 181 MG/DL — HIGH (ref 70–99)
GLUCOSE BLDC GLUCOMTR-MCNC: 183 MG/DL — HIGH (ref 70–99)
GLUCOSE BLDC GLUCOMTR-MCNC: 190 MG/DL — HIGH (ref 70–99)
GLUCOSE BLDC GLUCOMTR-MCNC: 206 MG/DL — HIGH (ref 70–99)
GLUCOSE BLDC GLUCOMTR-MCNC: 254 MG/DL — HIGH (ref 70–99)
GLUCOSE BLDC GLUCOMTR-MCNC: 259 MG/DL — HIGH (ref 70–99)
GLUCOSE BLDC GLUCOMTR-MCNC: 294 MG/DL — HIGH (ref 70–99)
GLUCOSE BLDC GLUCOMTR-MCNC: 302 MG/DL — HIGH (ref 70–99)
GLUCOSE BLDC GLUCOMTR-MCNC: 353 MG/DL — HIGH (ref 70–99)
GLUCOSE BLDV-MCNC: 185 MG/DL — HIGH (ref 70–99)
GLUCOSE SERPL-MCNC: 171 MG/DL — HIGH (ref 70–99)
GLUCOSE SERPL-MCNC: 199 MG/DL — HIGH (ref 70–99)
GLUCOSE SERPL-MCNC: 307 MG/DL — HIGH (ref 70–99)
GRAM STN FLD: ABNORMAL
HCO3 BLDV-SCNC: 18 MMOL/L — LOW (ref 22–29)
HCT VFR BLD CALC: 28.6 % — LOW (ref 39–50)
HCT VFR BLDA CALC: 32 % — SIGNIFICANT CHANGE UP
HGB BLD CALC-MCNC: 10.6 G/DL — LOW (ref 12.6–17.4)
HGB BLD-MCNC: 10.5 G/DL — LOW (ref 13–17)
LACTATE BLDV-MCNC: 1.5 MMOL/L — SIGNIFICANT CHANGE UP (ref 0.5–2)
LEGIONELLA AG UR QL: NEGATIVE — SIGNIFICANT CHANGE UP
MAGNESIUM SERPL-MCNC: 1.7 MG/DL — SIGNIFICANT CHANGE UP (ref 1.6–2.6)
MAGNESIUM SERPL-MCNC: 1.9 MG/DL — SIGNIFICANT CHANGE UP (ref 1.6–2.6)
MAGNESIUM SERPL-MCNC: 2 MG/DL — SIGNIFICANT CHANGE UP (ref 1.6–2.6)
MCHC RBC-ENTMCNC: 30.1 PG — SIGNIFICANT CHANGE UP (ref 27–34)
MCHC RBC-ENTMCNC: 36.7 GM/DL — HIGH (ref 32–36)
MCV RBC AUTO: 81.9 FL — SIGNIFICANT CHANGE UP (ref 80–100)
METHOD TYPE: SIGNIFICANT CHANGE UP
MRSA PCR RESULT.: SIGNIFICANT CHANGE UP
PCO2 BLDV: 32 MMHG — LOW (ref 42–55)
PH BLDV: 7.35 — SIGNIFICANT CHANGE UP (ref 7.32–7.43)
PHOSPHATE SERPL-MCNC: 1.2 MG/DL — LOW (ref 2.4–4.7)
PHOSPHATE SERPL-MCNC: 2.1 MG/DL — LOW (ref 2.4–4.7)
PHOSPHATE SERPL-MCNC: 3.5 MG/DL — SIGNIFICANT CHANGE UP (ref 2.4–4.7)
PLATELET # BLD AUTO: 190 K/UL — SIGNIFICANT CHANGE UP (ref 150–400)
PO2 BLDV: 52 MMHG — HIGH (ref 25–45)
POTASSIUM BLDV-SCNC: 2.9 MMOL/L — CRITICAL LOW (ref 3.5–5.1)
POTASSIUM SERPL-MCNC: 3.1 MMOL/L — LOW (ref 3.5–5.3)
POTASSIUM SERPL-MCNC: 3.3 MMOL/L — LOW (ref 3.5–5.3)
POTASSIUM SERPL-MCNC: 4.1 MMOL/L — SIGNIFICANT CHANGE UP (ref 3.5–5.3)
POTASSIUM SERPL-SCNC: 3.1 MMOL/L — LOW (ref 3.5–5.3)
POTASSIUM SERPL-SCNC: 3.3 MMOL/L — LOW (ref 3.5–5.3)
POTASSIUM SERPL-SCNC: 4.1 MMOL/L — SIGNIFICANT CHANGE UP (ref 3.5–5.3)
PROT SERPL-MCNC: 5.2 G/DL — LOW (ref 6.6–8.7)
RBC # BLD: 3.49 M/UL — LOW (ref 4.2–5.8)
RBC # FLD: 14.1 % — SIGNIFICANT CHANGE UP (ref 10.3–14.5)
S AUREUS DNA NOSE QL NAA+PROBE: SIGNIFICANT CHANGE UP
S PNEUM AG UR QL: NEGATIVE — SIGNIFICANT CHANGE UP
SAO2 % BLDV: 88.9 % — SIGNIFICANT CHANGE UP
SODIUM SERPL-SCNC: 139 MMOL/L — SIGNIFICANT CHANGE UP (ref 135–145)
SODIUM SERPL-SCNC: 140 MMOL/L — SIGNIFICANT CHANGE UP (ref 135–145)
SODIUM SERPL-SCNC: 142 MMOL/L — SIGNIFICANT CHANGE UP (ref 135–145)
SPECIMEN SOURCE: SIGNIFICANT CHANGE UP
SPECIMEN SOURCE: SIGNIFICANT CHANGE UP
WBC # BLD: 8.82 K/UL — SIGNIFICANT CHANGE UP (ref 3.8–10.5)
WBC # FLD AUTO: 8.82 K/UL — SIGNIFICANT CHANGE UP (ref 3.8–10.5)

## 2024-06-29 PROCEDURE — 99233 SBSQ HOSP IP/OBS HIGH 50: CPT

## 2024-06-29 PROCEDURE — 99221 1ST HOSP IP/OBS SF/LOW 40: CPT

## 2024-06-29 PROCEDURE — 99232 SBSQ HOSP IP/OBS MODERATE 35: CPT | Mod: GC

## 2024-06-29 RX ORDER — VANCOMYCIN HYDROCHLORIDE 50 MG/ML
1000 KIT ORAL ONCE
Refills: 0 | Status: COMPLETED | OUTPATIENT
Start: 2024-06-29 | End: 2024-06-29

## 2024-06-29 RX ORDER — DEXTROSE 30 % IN WATER 30 %
15 VIAL (ML) INTRAVENOUS ONCE
Refills: 0 | Status: DISCONTINUED | OUTPATIENT
Start: 2024-06-29 | End: 2024-07-01

## 2024-06-29 RX ORDER — THIAMINE HCL 100 MG
100 TABLET ORAL DAILY
Refills: 0 | Status: DISCONTINUED | OUTPATIENT
Start: 2024-06-29 | End: 2024-07-01

## 2024-06-29 RX ORDER — INSULIN GLARGINE 100 [IU]/ML
15 INJECTION, SOLUTION SUBCUTANEOUS EVERY MORNING
Refills: 0 | Status: DISCONTINUED | OUTPATIENT
Start: 2024-06-29 | End: 2024-06-30

## 2024-06-29 RX ORDER — VANCOMYCIN HYDROCHLORIDE 50 MG/ML
KIT ORAL
Refills: 0 | Status: DISCONTINUED | OUTPATIENT
Start: 2024-06-29 | End: 2024-06-30

## 2024-06-29 RX ORDER — DEXTROSE MONOHYDRATE 100 MG/ML
125 INJECTION, SOLUTION INTRAVENOUS ONCE
Refills: 0 | Status: DISCONTINUED | OUTPATIENT
Start: 2024-06-29 | End: 2024-07-01

## 2024-06-29 RX ORDER — VANCOMYCIN HYDROCHLORIDE 50 MG/ML
1000 KIT ORAL EVERY 12 HOURS
Refills: 0 | Status: DISCONTINUED | OUTPATIENT
Start: 2024-06-30 | End: 2024-06-30

## 2024-06-29 RX ORDER — DEXTROSE MONOHYDRATE AND SODIUM CHLORIDE 5; .3 G/100ML; G/100ML
1000 INJECTION, SOLUTION INTRAVENOUS
Refills: 0 | Status: DISCONTINUED | OUTPATIENT
Start: 2024-06-29 | End: 2024-07-01

## 2024-06-29 RX ORDER — DEXTROSE 30 % IN WATER 30 %
25 VIAL (ML) INTRAVENOUS ONCE
Refills: 0 | Status: DISCONTINUED | OUTPATIENT
Start: 2024-06-29 | End: 2024-07-01

## 2024-06-29 RX ORDER — POTASSIUM PHOSPHATE, MONOBASIC POTASSIUM PHOSPHATE, DIBASIC INJECTION, 236; 224 MG/ML; MG/ML
30 SOLUTION, CONCENTRATE INTRAVENOUS ONCE
Refills: 0 | Status: COMPLETED | OUTPATIENT
Start: 2024-06-29 | End: 2024-06-29

## 2024-06-29 RX ORDER — INSULIN LISPRO 100 [IU]/ML
INJECTION, SOLUTION SUBCUTANEOUS AT BEDTIME
Refills: 0 | Status: DISCONTINUED | OUTPATIENT
Start: 2024-06-29 | End: 2024-07-01

## 2024-06-29 RX ORDER — GLUCAGON HYDROCHLORIDE 1 MG/ML
1 INJECTION, POWDER, FOR SOLUTION INTRAMUSCULAR; INTRAVENOUS; SUBCUTANEOUS ONCE
Refills: 0 | Status: DISCONTINUED | OUTPATIENT
Start: 2024-06-29 | End: 2024-07-01

## 2024-06-29 RX ORDER — DEXTROSE MONOHYDRATE AND SODIUM CHLORIDE 5; .3 G/100ML; G/100ML
500 INJECTION, SOLUTION INTRAVENOUS ONCE
Refills: 0 | Status: COMPLETED | OUTPATIENT
Start: 2024-06-29 | End: 2024-06-29

## 2024-06-29 RX ORDER — DEXTROSE 30 % IN WATER 30 %
12.5 VIAL (ML) INTRAVENOUS ONCE
Refills: 0 | Status: DISCONTINUED | OUTPATIENT
Start: 2024-06-29 | End: 2024-07-01

## 2024-06-29 RX ORDER — MAGNESIUM SULFATE 100 %
2 POWDER (GRAM) MISCELLANEOUS ONCE
Refills: 0 | Status: COMPLETED | OUTPATIENT
Start: 2024-06-29 | End: 2024-06-29

## 2024-06-29 RX ORDER — HEPARIN SODIUM 50 [USP'U]/ML
5000 INJECTION, SOLUTION INTRAVENOUS EVERY 12 HOURS
Refills: 0 | Status: DISCONTINUED | OUTPATIENT
Start: 2024-06-29 | End: 2024-07-01

## 2024-06-29 RX ORDER — HEPARIN SODIUM 50 [USP'U]/ML
5000 INJECTION, SOLUTION INTRAVENOUS EVERY 8 HOURS
Refills: 0 | Status: DISCONTINUED | OUTPATIENT
Start: 2024-06-29 | End: 2024-06-29

## 2024-06-29 RX ORDER — INSULIN LISPRO 100 [IU]/ML
INJECTION, SOLUTION SUBCUTANEOUS
Refills: 0 | Status: DISCONTINUED | OUTPATIENT
Start: 2024-06-29 | End: 2024-07-01

## 2024-06-29 RX ORDER — POTASSIUM CHLORIDE 600 MG/1
10 TABLET, FILM COATED, EXTENDED RELEASE ORAL
Refills: 0 | Status: COMPLETED | OUTPATIENT
Start: 2024-06-29 | End: 2024-06-29

## 2024-06-29 RX ADMIN — HEPARIN SODIUM 5000 UNIT(S): 50 INJECTION, SOLUTION INTRAVENOUS at 05:12

## 2024-06-29 RX ADMIN — Medication 25 GRAM(S): at 05:11

## 2024-06-29 RX ADMIN — Medication 100 MILLIGRAM(S): at 11:33

## 2024-06-29 RX ADMIN — INSULIN LISPRO 2: 100 INJECTION, SOLUTION SUBCUTANEOUS at 16:24

## 2024-06-29 RX ADMIN — VANCOMYCIN HYDROCHLORIDE 250 MILLIGRAM(S): KIT at 23:10

## 2024-06-29 RX ADMIN — Medication 1 APPLICATION(S): at 05:12

## 2024-06-29 RX ADMIN — DEXTROSE MONOHYDRATE AND SODIUM CHLORIDE 500 MILLILITER(S): 5; .3 INJECTION, SOLUTION INTRAVENOUS at 11:26

## 2024-06-29 RX ADMIN — DEXTROSE MONOHYDRATE AND SODIUM CHLORIDE 200 MILLILITER(S): 5; .3 INJECTION, SOLUTION INTRAVENOUS at 05:12

## 2024-06-29 RX ADMIN — POTASSIUM CHLORIDE 100 MILLIEQUIVALENT(S): 600 TABLET, FILM COATED, EXTENDED RELEASE ORAL at 05:11

## 2024-06-29 RX ADMIN — POTASSIUM CHLORIDE 100 MILLIEQUIVALENT(S): 600 TABLET, FILM COATED, EXTENDED RELEASE ORAL at 07:48

## 2024-06-29 RX ADMIN — INSULIN LISPRO 6: 100 INJECTION, SOLUTION SUBCUTANEOUS at 11:33

## 2024-06-29 RX ADMIN — PANTOPRAZOLE SODIUM 40 MILLIGRAM(S): 40 INJECTION, POWDER, FOR SOLUTION INTRAVENOUS at 11:33

## 2024-06-29 RX ADMIN — Medication 5 UNIT(S)/HR: at 05:12

## 2024-06-29 RX ADMIN — POTASSIUM CHLORIDE 100 MILLIEQUIVALENT(S): 600 TABLET, FILM COATED, EXTENDED RELEASE ORAL at 00:30

## 2024-06-29 RX ADMIN — POTASSIUM PHOSPHATE, MONOBASIC POTASSIUM PHOSPHATE, DIBASIC INJECTION, 83.33 MILLIMOLE(S): 236; 224 SOLUTION, CONCENTRATE INTRAVENOUS at 06:25

## 2024-06-29 RX ADMIN — HEPARIN SODIUM 5000 UNIT(S): 50 INJECTION, SOLUTION INTRAVENOUS at 17:30

## 2024-06-29 RX ADMIN — INSULIN LISPRO 6: 100 INJECTION, SOLUTION SUBCUTANEOUS at 22:50

## 2024-06-29 RX ADMIN — LEVETIRACETAM 1000 MILLIGRAM(S): 100 INJECTION INTRAVENOUS at 05:11

## 2024-06-29 RX ADMIN — LEVETIRACETAM 1000 MILLIGRAM(S): 100 INJECTION INTRAVENOUS at 17:30

## 2024-06-29 RX ADMIN — POTASSIUM PHOSPHATE, MONOBASIC POTASSIUM PHOSPHATE, DIBASIC INJECTION, 83.33 MILLIMOLE(S): 236; 224 SOLUTION, CONCENTRATE INTRAVENOUS at 17:30

## 2024-06-29 RX ADMIN — Medication 1 MILLIGRAM(S): at 11:33

## 2024-06-29 RX ADMIN — POTASSIUM CHLORIDE 100 MILLIEQUIVALENT(S): 600 TABLET, FILM COATED, EXTENDED RELEASE ORAL at 06:28

## 2024-06-29 RX ADMIN — INSULIN GLARGINE 15 UNIT(S): 100 INJECTION, SOLUTION SUBCUTANEOUS at 08:11

## 2024-06-29 RX ADMIN — Medication 1000 MILLIGRAM(S): at 19:34

## 2024-06-30 LAB
ALBUMIN SERPL ELPH-MCNC: 2.9 G/DL — LOW (ref 3.3–5.2)
ALP SERPL-CCNC: 92 U/L — SIGNIFICANT CHANGE UP (ref 40–120)
ALT FLD-CCNC: 12 U/L — SIGNIFICANT CHANGE UP
ANION GAP SERPL CALC-SCNC: 12 MMOL/L — SIGNIFICANT CHANGE UP (ref 5–17)
AST SERPL-CCNC: 29 U/L — SIGNIFICANT CHANGE UP
BASOPHILS # BLD AUTO: 0.01 K/UL — SIGNIFICANT CHANGE UP (ref 0–0.2)
BASOPHILS NFR BLD AUTO: 0.3 % — SIGNIFICANT CHANGE UP (ref 0–2)
BILIRUB SERPL-MCNC: 0.5 MG/DL — SIGNIFICANT CHANGE UP (ref 0.4–2)
BUN SERPL-MCNC: 4.8 MG/DL — LOW (ref 8–20)
CALCIUM SERPL-MCNC: 7.7 MG/DL — LOW (ref 8.4–10.5)
CHLORIDE SERPL-SCNC: 107 MMOL/L — SIGNIFICANT CHANGE UP (ref 96–108)
CO2 SERPL-SCNC: 19 MMOL/L — LOW (ref 22–29)
CREAT SERPL-MCNC: 0.46 MG/DL — LOW (ref 0.5–1.3)
CULTURE RESULTS: ABNORMAL
EGFR: 141 ML/MIN/1.73M2 — SIGNIFICANT CHANGE UP
EOSINOPHIL # BLD AUTO: 0.04 K/UL — SIGNIFICANT CHANGE UP (ref 0–0.5)
EOSINOPHIL NFR BLD AUTO: 1.1 % — SIGNIFICANT CHANGE UP (ref 0–6)
GLUCOSE BLDC GLUCOMTR-MCNC: 163 MG/DL — HIGH (ref 70–99)
GLUCOSE BLDC GLUCOMTR-MCNC: 238 MG/DL — HIGH (ref 70–99)
GLUCOSE BLDC GLUCOMTR-MCNC: 266 MG/DL — HIGH (ref 70–99)
GLUCOSE BLDC GLUCOMTR-MCNC: 279 MG/DL — HIGH (ref 70–99)
GLUCOSE SERPL-MCNC: 316 MG/DL — HIGH (ref 70–99)
HCT VFR BLD CALC: 25.9 % — LOW (ref 39–50)
HGB BLD-MCNC: 9.3 G/DL — LOW (ref 13–17)
IMM GRANULOCYTES NFR BLD AUTO: 0.3 % — SIGNIFICANT CHANGE UP (ref 0–0.9)
LYMPHOCYTES # BLD AUTO: 1.49 K/UL — SIGNIFICANT CHANGE UP (ref 1–3.3)
LYMPHOCYTES # BLD AUTO: 42.6 % — SIGNIFICANT CHANGE UP (ref 13–44)
MAGNESIUM SERPL-MCNC: 1.9 MG/DL — SIGNIFICANT CHANGE UP (ref 1.6–2.6)
MCHC RBC-ENTMCNC: 30.3 PG — SIGNIFICANT CHANGE UP (ref 27–34)
MCHC RBC-ENTMCNC: 35.9 GM/DL — SIGNIFICANT CHANGE UP (ref 32–36)
MCV RBC AUTO: 84.4 FL — SIGNIFICANT CHANGE UP (ref 80–100)
MONOCYTES # BLD AUTO: 0.36 K/UL — SIGNIFICANT CHANGE UP (ref 0–0.9)
MONOCYTES NFR BLD AUTO: 10.3 % — SIGNIFICANT CHANGE UP (ref 2–14)
NEUTROPHILS # BLD AUTO: 1.59 K/UL — LOW (ref 1.8–7.4)
NEUTROPHILS NFR BLD AUTO: 45.4 % — SIGNIFICANT CHANGE UP (ref 43–77)
ORGANISM # SPEC MICROSCOPIC CNT: ABNORMAL
ORGANISM # SPEC MICROSCOPIC CNT: SIGNIFICANT CHANGE UP
PHOSPHATE SERPL-MCNC: 3 MG/DL — SIGNIFICANT CHANGE UP (ref 2.4–4.7)
PLATELET # BLD AUTO: 157 K/UL — SIGNIFICANT CHANGE UP (ref 150–400)
POTASSIUM SERPL-MCNC: 3.5 MMOL/L — SIGNIFICANT CHANGE UP (ref 3.5–5.3)
POTASSIUM SERPL-SCNC: 3.5 MMOL/L — SIGNIFICANT CHANGE UP (ref 3.5–5.3)
PROCALCITONIN SERPL-MCNC: 2.06 NG/ML — HIGH (ref 0.02–0.1)
PROT SERPL-MCNC: 4.7 G/DL — LOW (ref 6.6–8.7)
RBC # BLD: 3.07 M/UL — LOW (ref 4.2–5.8)
RBC # FLD: 15.2 % — HIGH (ref 10.3–14.5)
SODIUM SERPL-SCNC: 138 MMOL/L — SIGNIFICANT CHANGE UP (ref 135–145)
SPECIMEN SOURCE: SIGNIFICANT CHANGE UP
WBC # BLD: 3.5 K/UL — LOW (ref 3.8–10.5)
WBC # FLD AUTO: 3.5 K/UL — LOW (ref 3.8–10.5)

## 2024-06-30 PROCEDURE — 99233 SBSQ HOSP IP/OBS HIGH 50: CPT | Mod: GC

## 2024-06-30 PROCEDURE — 99233 SBSQ HOSP IP/OBS HIGH 50: CPT

## 2024-06-30 RX ORDER — INSULIN GLARGINE 100 [IU]/ML
15 INJECTION, SOLUTION SUBCUTANEOUS AT BEDTIME
Refills: 0 | Status: COMPLETED | OUTPATIENT
Start: 2024-06-30 | End: 2024-06-30

## 2024-06-30 RX ORDER — INSULIN LISPRO 100 [IU]/ML
5 INJECTION, SOLUTION SUBCUTANEOUS
Refills: 0 | Status: DISCONTINUED | OUTPATIENT
Start: 2024-06-30 | End: 2024-06-30

## 2024-06-30 RX ORDER — INSULIN GLARGINE 100 [IU]/ML
21 INJECTION, SOLUTION SUBCUTANEOUS AT BEDTIME
Refills: 0 | Status: DISCONTINUED | OUTPATIENT
Start: 2024-07-01 | End: 2024-07-01

## 2024-06-30 RX ORDER — INSULIN GLARGINE 100 [IU]/ML
10 INJECTION, SOLUTION SUBCUTANEOUS ONCE
Refills: 0 | Status: COMPLETED | OUTPATIENT
Start: 2024-06-30 | End: 2024-06-30

## 2024-06-30 RX ORDER — BENZONATATE 100 MG/1
100 TABLET ORAL THREE TIMES A DAY
Refills: 0 | Status: DISCONTINUED | OUTPATIENT
Start: 2024-06-30 | End: 2024-07-01

## 2024-06-30 RX ORDER — DEXTROSE MONOHYDRATE AND SODIUM CHLORIDE 5; .3 G/100ML; G/100ML
1000 INJECTION, SOLUTION INTRAVENOUS
Refills: 0 | Status: DISCONTINUED | OUTPATIENT
Start: 2024-06-30 | End: 2024-07-01

## 2024-06-30 RX ORDER — INSULIN LISPRO 100 [IU]/ML
7 INJECTION, SOLUTION SUBCUTANEOUS
Refills: 0 | Status: DISCONTINUED | OUTPATIENT
Start: 2024-06-30 | End: 2024-07-01

## 2024-06-30 RX ADMIN — INSULIN GLARGINE 15 UNIT(S): 100 INJECTION, SOLUTION SUBCUTANEOUS at 23:21

## 2024-06-30 RX ADMIN — Medication 100 MILLIGRAM(S): at 11:48

## 2024-06-30 RX ADMIN — INSULIN LISPRO 6: 100 INJECTION, SOLUTION SUBCUTANEOUS at 08:32

## 2024-06-30 RX ADMIN — Medication 1 MILLIGRAM(S): at 11:48

## 2024-06-30 RX ADMIN — LEVETIRACETAM 1000 MILLIGRAM(S): 100 INJECTION INTRAVENOUS at 05:43

## 2024-06-30 RX ADMIN — INSULIN LISPRO 2: 100 INJECTION, SOLUTION SUBCUTANEOUS at 17:24

## 2024-06-30 RX ADMIN — HEPARIN SODIUM 5000 UNIT(S): 50 INJECTION, SOLUTION INTRAVENOUS at 17:24

## 2024-06-30 RX ADMIN — DEXTROSE MONOHYDRATE AND SODIUM CHLORIDE 200 MILLILITER(S): 5; .3 INJECTION, SOLUTION INTRAVENOUS at 11:51

## 2024-06-30 RX ADMIN — LEVETIRACETAM 1000 MILLIGRAM(S): 100 INJECTION INTRAVENOUS at 17:23

## 2024-06-30 RX ADMIN — INSULIN GLARGINE 10 UNIT(S): 100 INJECTION, SOLUTION SUBCUTANEOUS at 08:34

## 2024-06-30 RX ADMIN — VANCOMYCIN HYDROCHLORIDE 250 MILLIGRAM(S): KIT at 05:43

## 2024-06-30 RX ADMIN — INSULIN LISPRO 7 UNIT(S): 100 INJECTION, SOLUTION SUBCUTANEOUS at 17:46

## 2024-06-30 RX ADMIN — PANTOPRAZOLE SODIUM 40 MILLIGRAM(S): 40 INJECTION, POWDER, FOR SOLUTION INTRAVENOUS at 11:48

## 2024-06-30 RX ADMIN — HEPARIN SODIUM 5000 UNIT(S): 50 INJECTION, SOLUTION INTRAVENOUS at 05:43

## 2024-06-30 RX ADMIN — INSULIN LISPRO 7 UNIT(S): 100 INJECTION, SOLUTION SUBCUTANEOUS at 12:59

## 2024-06-30 RX ADMIN — INSULIN LISPRO 6: 100 INJECTION, SOLUTION SUBCUTANEOUS at 12:58

## 2024-07-01 ENCOUNTER — TRANSCRIPTION ENCOUNTER (OUTPATIENT)
Age: 34
End: 2024-07-01

## 2024-07-01 VITALS
DIASTOLIC BLOOD PRESSURE: 72 MMHG | SYSTOLIC BLOOD PRESSURE: 107 MMHG | RESPIRATION RATE: 18 BRPM | TEMPERATURE: 99 F | OXYGEN SATURATION: 98 % | HEART RATE: 93 BPM

## 2024-07-01 LAB
ANION GAP SERPL CALC-SCNC: 11 MMOL/L — SIGNIFICANT CHANGE UP (ref 5–17)
BUN SERPL-MCNC: 6.7 MG/DL — LOW (ref 8–20)
CALCIUM SERPL-MCNC: 7.9 MG/DL — LOW (ref 8.4–10.5)
CHLORIDE SERPL-SCNC: 107 MMOL/L — SIGNIFICANT CHANGE UP (ref 96–108)
CO2 SERPL-SCNC: 23 MMOL/L — SIGNIFICANT CHANGE UP (ref 22–29)
CREAT SERPL-MCNC: 0.39 MG/DL — LOW (ref 0.5–1.3)
EGFR: 148 ML/MIN/1.73M2 — SIGNIFICANT CHANGE UP
GLUCOSE BLDC GLUCOMTR-MCNC: 119 MG/DL — HIGH (ref 70–99)
GLUCOSE BLDC GLUCOMTR-MCNC: 143 MG/DL — HIGH (ref 70–99)
GLUCOSE BLDC GLUCOMTR-MCNC: 517 MG/DL — CRITICAL HIGH (ref 70–99)
GLUCOSE SERPL-MCNC: 140 MG/DL — HIGH (ref 70–99)
HCT VFR BLD CALC: 28.2 % — LOW (ref 39–50)
HGB BLD-MCNC: 9.9 G/DL — LOW (ref 13–17)
MAGNESIUM SERPL-MCNC: 1.8 MG/DL — SIGNIFICANT CHANGE UP (ref 1.6–2.6)
MCHC RBC-ENTMCNC: 29.9 PG — SIGNIFICANT CHANGE UP (ref 27–34)
MCHC RBC-ENTMCNC: 35.1 GM/DL — SIGNIFICANT CHANGE UP (ref 32–36)
MCV RBC AUTO: 85.2 FL — SIGNIFICANT CHANGE UP (ref 80–100)
PLATELET # BLD AUTO: 154 K/UL — SIGNIFICANT CHANGE UP (ref 150–400)
POTASSIUM SERPL-MCNC: 3.3 MMOL/L — LOW (ref 3.5–5.3)
POTASSIUM SERPL-SCNC: 3.3 MMOL/L — LOW (ref 3.5–5.3)
RBC # BLD: 3.31 M/UL — LOW (ref 4.2–5.8)
RBC # FLD: 14.7 % — HIGH (ref 10.3–14.5)
SODIUM SERPL-SCNC: 141 MMOL/L — SIGNIFICANT CHANGE UP (ref 135–145)
WBC # BLD: 3.11 K/UL — LOW (ref 3.8–10.5)
WBC # FLD AUTO: 3.11 K/UL — LOW (ref 3.8–10.5)

## 2024-07-01 PROCEDURE — 87077 CULTURE AEROBIC IDENTIFY: CPT

## 2024-07-01 PROCEDURE — 71045 X-RAY EXAM CHEST 1 VIEW: CPT

## 2024-07-01 PROCEDURE — 99285 EMERGENCY DEPT VISIT HI MDM: CPT | Mod: 25

## 2024-07-01 PROCEDURE — 84132 ASSAY OF SERUM POTASSIUM: CPT

## 2024-07-01 PROCEDURE — 82803 BLOOD GASES ANY COMBINATION: CPT

## 2024-07-01 PROCEDURE — 87086 URINE CULTURE/COLONY COUNT: CPT

## 2024-07-01 PROCEDURE — 87641 MR-STAPH DNA AMP PROBE: CPT

## 2024-07-01 PROCEDURE — 84295 ASSAY OF SERUM SODIUM: CPT

## 2024-07-01 PROCEDURE — 80053 COMPREHEN METABOLIC PANEL: CPT

## 2024-07-01 PROCEDURE — 85014 HEMATOCRIT: CPT

## 2024-07-01 PROCEDURE — 83605 ASSAY OF LACTIC ACID: CPT

## 2024-07-01 PROCEDURE — 93005 ELECTROCARDIOGRAM TRACING: CPT

## 2024-07-01 PROCEDURE — 36415 COLL VENOUS BLD VENIPUNCTURE: CPT

## 2024-07-01 PROCEDURE — 96376 TX/PRO/DX INJ SAME DRUG ADON: CPT

## 2024-07-01 PROCEDURE — 82009 KETONE BODYS QUAL: CPT

## 2024-07-01 PROCEDURE — 83735 ASSAY OF MAGNESIUM: CPT

## 2024-07-01 PROCEDURE — 84145 PROCALCITONIN (PCT): CPT

## 2024-07-01 PROCEDURE — 87040 BLOOD CULTURE FOR BACTERIA: CPT

## 2024-07-01 PROCEDURE — 82962 GLUCOSE BLOOD TEST: CPT

## 2024-07-01 PROCEDURE — 82330 ASSAY OF CALCIUM: CPT

## 2024-07-01 PROCEDURE — 99239 HOSP IP/OBS DSCHRG MGMT >30: CPT | Mod: GC

## 2024-07-01 PROCEDURE — 87640 STAPH A DNA AMP PROBE: CPT

## 2024-07-01 PROCEDURE — 85018 HEMOGLOBIN: CPT

## 2024-07-01 PROCEDURE — 84100 ASSAY OF PHOSPHORUS: CPT

## 2024-07-01 PROCEDURE — 99232 SBSQ HOSP IP/OBS MODERATE 35: CPT

## 2024-07-01 PROCEDURE — 96374 THER/PROPH/DIAG INJ IV PUSH: CPT

## 2024-07-01 PROCEDURE — 82435 ASSAY OF BLOOD CHLORIDE: CPT

## 2024-07-01 PROCEDURE — 80048 BASIC METABOLIC PNL TOTAL CA: CPT

## 2024-07-01 PROCEDURE — 83690 ASSAY OF LIPASE: CPT

## 2024-07-01 PROCEDURE — 87899 AGENT NOS ASSAY W/OPTIC: CPT

## 2024-07-01 PROCEDURE — 85027 COMPLETE CBC AUTOMATED: CPT

## 2024-07-01 PROCEDURE — 87150 DNA/RNA AMPLIFIED PROBE: CPT

## 2024-07-01 PROCEDURE — 81001 URINALYSIS AUTO W/SCOPE: CPT

## 2024-07-01 PROCEDURE — 83036 HEMOGLOBIN GLYCOSYLATED A1C: CPT

## 2024-07-01 PROCEDURE — 85025 COMPLETE CBC W/AUTO DIFF WBC: CPT

## 2024-07-01 PROCEDURE — 87449 NOS EACH ORGANISM AG IA: CPT

## 2024-07-01 PROCEDURE — 82947 ASSAY GLUCOSE BLOOD QUANT: CPT

## 2024-07-01 PROCEDURE — 96375 TX/PRO/DX INJ NEW DRUG ADDON: CPT

## 2024-07-01 RX ORDER — THIAMINE HCL 100 MG
1 TABLET ORAL
Qty: 30 | Refills: 0
Start: 2024-07-01 | End: 2024-07-30

## 2024-07-01 RX ORDER — POTASSIUM CHLORIDE 600 MG/1
40 TABLET, FILM COATED, EXTENDED RELEASE ORAL EVERY 4 HOURS
Refills: 0 | Status: DISCONTINUED | OUTPATIENT
Start: 2024-07-01 | End: 2024-07-01

## 2024-07-01 RX ORDER — INSULIN LISPRO 100 [IU]/ML
10 INJECTION, SOLUTION SUBCUTANEOUS
Qty: 0 | Refills: 0 | DISCHARGE
Start: 2024-07-01

## 2024-07-01 RX ORDER — BENZONATATE 100 MG/1
1 TABLET ORAL
Qty: 90 | Refills: 0
Start: 2024-07-01 | End: 2024-07-30

## 2024-07-01 RX ORDER — INSULIN GLARGINE 100 [IU]/ML
28 INJECTION, SOLUTION SUBCUTANEOUS
Qty: 0 | Refills: 0 | DISCHARGE
Start: 2024-07-01

## 2024-07-01 RX ORDER — FOLIC ACID
1 POWDER (GRAM) MISCELLANEOUS
Qty: 30 | Refills: 0
Start: 2024-07-01 | End: 2024-07-30

## 2024-07-01 RX ADMIN — PANTOPRAZOLE SODIUM 40 MILLIGRAM(S): 40 INJECTION, POWDER, FOR SOLUTION INTRAVENOUS at 12:35

## 2024-07-01 RX ADMIN — INSULIN LISPRO 7 UNIT(S): 100 INJECTION, SOLUTION SUBCUTANEOUS at 09:35

## 2024-07-01 RX ADMIN — POTASSIUM CHLORIDE 40 MILLIEQUIVALENT(S): 600 TABLET, FILM COATED, EXTENDED RELEASE ORAL at 12:35

## 2024-07-01 RX ADMIN — LEVETIRACETAM 1000 MILLIGRAM(S): 100 INJECTION INTRAVENOUS at 05:52

## 2024-07-01 RX ADMIN — HEPARIN SODIUM 5000 UNIT(S): 50 INJECTION, SOLUTION INTRAVENOUS at 05:48

## 2024-07-01 RX ADMIN — INSULIN LISPRO 7 UNIT(S): 100 INJECTION, SOLUTION SUBCUTANEOUS at 12:39

## 2024-07-01 RX ADMIN — Medication 100 MILLIGRAM(S): at 12:35

## 2024-07-01 RX ADMIN — Medication 1 MILLIGRAM(S): at 12:35

## 2024-07-03 LAB
CULTURE RESULTS: SIGNIFICANT CHANGE UP
SPECIMEN SOURCE: SIGNIFICANT CHANGE UP

## 2024-07-14 NOTE — ED PROVIDER NOTE - ATTENDING CONTRIBUTION TO CARE
C[place] I, [place], personally saw the patient with the resident, and completed the key components of the history and physical exam. I then discussed the management plan with the resident.    Upon my evaluation, this patient had a high probability of imminent or life-threatening deterioration due to DKA  , which required my direct attention, intervention, and personal management.    30 yo M hx of  DM and alcohol abuse p/w tachycardia, tachypnea, hyperglycemia found to be in DKA with pH < 6.9, bicab < 6, glucose 593, Na 128, creatine 2.9. BP improved after IVF. insulin gtt started. admit to MICU.     I have personally provided 50__ minutes of critical care time exclusive of time spent on separately billable procedures.  Time includes review of laboratory data, radiology results, discussion with consultants, and monitoring for potential decompensation.  Interventions were performed as documented.

## 2024-10-03 ENCOUNTER — APPOINTMENT (OUTPATIENT)
Dept: PULMONOLOGY | Facility: CLINIC | Age: 34
End: 2024-10-03
Payer: COMMERCIAL

## 2024-10-03 VITALS
DIASTOLIC BLOOD PRESSURE: 72 MMHG | SYSTOLIC BLOOD PRESSURE: 119 MMHG | OXYGEN SATURATION: 98 % | HEIGHT: 67 IN | RESPIRATION RATE: 16 BRPM | WEIGHT: 122 LBS | BODY MASS INDEX: 19.15 KG/M2 | HEART RATE: 119 BPM

## 2024-10-03 DIAGNOSIS — R05.9 COUGH, UNSPECIFIED: ICD-10-CM

## 2024-10-03 DIAGNOSIS — J30.89 OTHER ALLERGIC RHINITIS: ICD-10-CM

## 2024-10-03 PROCEDURE — G2211 COMPLEX E/M VISIT ADD ON: CPT | Mod: NC

## 2024-10-03 PROCEDURE — 99213 OFFICE O/P EST LOW 20 MIN: CPT

## 2024-10-03 RX ORDER — ALBUTEROL SULFATE 90 UG/1
108 (90 BASE) INHALANT RESPIRATORY (INHALATION)
Qty: 1 | Refills: 3 | Status: ACTIVE | COMMUNITY
Start: 2024-10-03 | End: 1900-01-01

## 2024-10-03 RX ORDER — INSULIN LISPRO 100 [IU]/ML
INJECTION, SOLUTION INTRAVENOUS; SUBCUTANEOUS
Refills: 0 | Status: ACTIVE | COMMUNITY

## 2024-10-03 RX ORDER — BENZONATATE 100 MG/1
100 CAPSULE ORAL
Qty: 60 | Refills: 0 | Status: ACTIVE | COMMUNITY
Start: 2024-10-03 | End: 1900-01-01

## 2024-10-03 NOTE — ASSESSMENT
[FreeTextEntry1] : 33M PMH former smoker, COVID-19 (2020), childhood asthma, HTN, pancreatogenic IDDM2 who presents for initial pulmonary evaluation.   - Suspect allergic vs cough variant asthma especially given his h/o childhood asthma - Will check CBC, IgE, [place] allergen panel - Checking CXR PA/lateral - Will do trial of albuterol HFA - Will have him return for full PFT  The patient expressed understanding and agreement with the plan as outlined above and accepts responsibility to be compliant with any recommended testing, treatment, and follow-up visits.  All relevant questions and concerns were addressed.  30 minutes of time were spent on the encounter. Medical records were reviewed, including but not limited to hospital records, outpatient records, laboratory data, and diagnostic imaging studies. Greater than 50% of the face-to-face encounter time was spent on counseling and/or coordination of care.  Chandrakant Manrique MD, [facility] at [place] [address] [place], [place] [address] T: [phone] F: [phone]

## 2024-10-03 NOTE — RESULTS/DATA
[TextEntry] : [facility] ACC: [id number] EXAM: CT ABDOMEN AND PELVIS ORDERED BY: ROSETTE BELTRAN  ACC: [id number] EXAM: CT CHEST ORDERED BY: ROSETTE BELTRAN  PROCEDURE DATE: 03/11/2024    INTERPRETATION: CLINICAL INFORMATION: 33 years Male with R/o infectious etiology.  COMPARISON: CT chest abdomen and pelvis 11/12/2023  CONTRAST/COMPLICATIONS: IV Contrast: NONE Oral Contrast: NONE Complications: None reported at time of study completion  PROCEDURE: CT of the Chest, Abdomen and Pelvis was performed. Sagittal and coronal reformats were performed.  FINDINGS: CHEST: LUNGS AND LARGE AIRWAYS: Patent central airways. No pulmonary nodules. PLEURA: No pleural effusion. No pneumothorax. VESSELS: Mild aortic and coronary atherosclerosis. HEART: Heart size is normal. No pericardial effusion. MEDIASTINUM AND JOSE: No lymphadenopathy. No pneumomediastinum. CHEST WALL AND LOWER NECK: Within normal limits.  ABDOMEN AND PELVIS: LIVER: Within normal limits. BILE DUCTS: Normal caliber. GALLBLADDER: Cholelithiasis. SPLEEN: Within normal limits. PANCREAS: Atrophic with scattered coarse calcifications, likely sequela of prior pancreatitis. No duct distention. ADRENALS: Within normal limits. KIDNEYS/URETERS: Within normal limits.  BLADDER: Mildly distended. REPRODUCTIVE ORGANS: Mildly enlarged prostate.  BOWEL: No bowel obstruction. Appendix is not visualized. No evidence of inflammation in the pericecal region.. Markedly distended fluid-filled stomach. Thickened distal esophagus. Moderate colonic stool burden. PERITONEUM: No ascites. VESSELS: Within normal limits. RETROPERITONEUM/LYMPH NODES: No lymphadenopathy. ABDOMINAL WALL: Within normal limits. BONES: Within normal limits.  IMPRESSION: Markedly distended fluid-filled stomach suggestive of gastroparesis or partial gastric outlet obstruction. Recommend enteric catheter to prevent aspiration. Finding discussed with Larisa Brewer at 3/12/2024 7:48 AM with readback.  Esophageal wall thickening, possibly esophagitis. Recommend endoscopy.  Moderate constipation.  Mild bladder distention.  Chronic findings as above.    --- End of Report ---      LINETTE WORTHY MD; Attending Radiologist This document has been electronically signed. Mar 12 2024 7:49AM ~~~~~~~~~~~~~~~~~~~~~~~~~~~~~~~~~~~~~~~~~~~~~~~~~~~~~~~~~~~~~~~~~~~~~~~~

## 2024-10-03 NOTE — HISTORY OF PRESENT ILLNESS
[Former] : former [TextBox_4] : 33M PMH former smoker, COVID-19 (2020), childhood asthma, HTN, pancreatogenic IDDM2 who presents for initial pulmonary evaluation. In 07/2024 was at [facility] for DKA. For a few weeks has been having a dry cough, feels like his chest is 'itching.' He is not on any inhalers. He smoked 5-6 cigarettes per day x 4 years, quit in 2020. No fevers, no chills. The cough is keeping him up at night, affecting his sleep. He was prescribed an allergy pill in the past, cannot remember the name, but he says it did not help. [TextBox_11] : 0.25 [TextBox_13] : 4 [YearQuit] : 2020

## 2024-10-13 NOTE — DIETITIAN INITIAL EVALUATION ADULT - REASON FOR ADMISSION
"Patient was dx with hernia 2 days ago, has surgery scheduled for November 7- \"can't wait that long.\"  Pt states that he has been pushing hernia back into abdomen.        "
No
Nonintractable epilepsy with status epilepticus

## 2024-10-15 NOTE — ED ADULT NURSE NOTE - TEMPLATE
Admission Reconciliation is Completed  Discharge Reconciliation is Not Complete Admission Reconciliation is Completed  Discharge Reconciliation is Completed Abdominal Pain, N/V/D

## 2024-11-10 ENCOUNTER — INPATIENT (INPATIENT)
Facility: HOSPITAL | Age: 34
LOS: 3 days | Discharge: HOME | DRG: 639 | End: 2024-11-14
Attending: STUDENT IN AN ORGANIZED HEALTH CARE EDUCATION/TRAINING PROGRAM | Admitting: INTERNAL MEDICINE
Payer: COMMERCIAL

## 2024-11-10 VITALS
WEIGHT: 119.93 LBS | OXYGEN SATURATION: 98 % | RESPIRATION RATE: 30 BRPM | HEART RATE: 174 BPM | DIASTOLIC BLOOD PRESSURE: 81 MMHG | SYSTOLIC BLOOD PRESSURE: 111 MMHG

## 2024-11-10 LAB
ACETONE SERPL-MCNC: ABNORMAL
ALBUMIN SERPL ELPH-MCNC: 4.4 G/DL — SIGNIFICANT CHANGE UP (ref 3.3–5.2)
ALP SERPL-CCNC: 154 U/L — HIGH (ref 40–120)
ALT FLD-CCNC: 16 U/L — SIGNIFICANT CHANGE UP
AMPHET UR-MCNC: NEGATIVE — SIGNIFICANT CHANGE UP
ANION GAP SERPL CALC-SCNC: SIGNIFICANT CHANGE UP MMOL/L (ref 5–17)
APPEARANCE UR: CLEAR — SIGNIFICANT CHANGE UP
AST SERPL-CCNC: 21 U/L — SIGNIFICANT CHANGE UP
BACTERIA # UR AUTO: NEGATIVE /HPF — SIGNIFICANT CHANGE UP
BARBITURATES UR SCN-MCNC: NEGATIVE — SIGNIFICANT CHANGE UP
BASOPHILS # BLD AUTO: 0 K/UL — SIGNIFICANT CHANGE UP (ref 0–0.2)
BASOPHILS NFR BLD AUTO: 0 % — SIGNIFICANT CHANGE UP (ref 0–2)
BENZODIAZ UR-MCNC: NEGATIVE — SIGNIFICANT CHANGE UP
BILIRUB SERPL-MCNC: 0.6 MG/DL — SIGNIFICANT CHANGE UP (ref 0.4–2)
BILIRUB UR-MCNC: NEGATIVE — SIGNIFICANT CHANGE UP
BUN SERPL-MCNC: 22 MG/DL — HIGH (ref 8–20)
BURR CELLS BLD QL SMEAR: PRESENT — SIGNIFICANT CHANGE UP
CALCIUM SERPL-MCNC: 8.9 MG/DL — SIGNIFICANT CHANGE UP (ref 8.4–10.5)
CAST: 6 /LPF — HIGH (ref 0–4)
CHLORIDE SERPL-SCNC: 81 MMOL/L — LOW (ref 96–108)
CO2 SERPL-SCNC: <6 MMOL/L — CRITICAL LOW (ref 22–29)
COCAINE METAB.OTHER UR-MCNC: NEGATIVE — SIGNIFICANT CHANGE UP
COLOR SPEC: YELLOW — SIGNIFICANT CHANGE UP
CREAT SERPL-MCNC: 1.61 MG/DL — HIGH (ref 0.5–1.3)
DIFF PNL FLD: ABNORMAL
EGFR: 57 ML/MIN/1.73M2 — LOW
EOSINOPHIL # BLD AUTO: 0 K/UL — SIGNIFICANT CHANGE UP (ref 0–0.5)
EOSINOPHIL NFR BLD AUTO: 0 % — SIGNIFICANT CHANGE UP (ref 0–6)
FENTANYL UR QL SCN: NEGATIVE — SIGNIFICANT CHANGE UP
GAS PNL BLDV: SIGNIFICANT CHANGE UP
GIANT PLATELETS BLD QL SMEAR: PRESENT — SIGNIFICANT CHANGE UP
GLUCOSE SERPL-MCNC: 813 MG/DL — CRITICAL HIGH (ref 70–99)
GLUCOSE UR QL: >=1000 MG/DL
HCT VFR BLD CALC: 44 % — SIGNIFICANT CHANGE UP (ref 39–50)
HGB BLD-MCNC: 15.1 G/DL — SIGNIFICANT CHANGE UP (ref 13–17)
KETONES UR-MCNC: >=160 MG/DL
LEUKOCYTE ESTERASE UR-ACNC: NEGATIVE — SIGNIFICANT CHANGE UP
LYMPHOCYTES # BLD AUTO: 1.57 K/UL — SIGNIFICANT CHANGE UP (ref 1–3.3)
LYMPHOCYTES # BLD AUTO: 6.1 % — LOW (ref 13–44)
MAGNESIUM SERPL-MCNC: 2.4 MG/DL — SIGNIFICANT CHANGE UP (ref 1.6–2.6)
MANUAL SMEAR VERIFICATION: SIGNIFICANT CHANGE UP
MCHC RBC-ENTMCNC: 30.6 PG — SIGNIFICANT CHANGE UP (ref 27–34)
MCHC RBC-ENTMCNC: 34.3 G/DL — SIGNIFICANT CHANGE UP (ref 32–36)
MCV RBC AUTO: 89.2 FL — SIGNIFICANT CHANGE UP (ref 80–100)
METHADONE UR-MCNC: NEGATIVE — SIGNIFICANT CHANGE UP
MONOCYTES # BLD AUTO: 2.49 K/UL — HIGH (ref 0–0.9)
MONOCYTES NFR BLD AUTO: 9.7 % — SIGNIFICANT CHANGE UP (ref 2–14)
NEUTROPHILS # BLD AUTO: 21.39 K/UL — HIGH (ref 1.8–7.4)
NEUTROPHILS NFR BLD AUTO: 83.3 % — HIGH (ref 43–77)
NITRITE UR-MCNC: NEGATIVE — SIGNIFICANT CHANGE UP
OPIATES UR-MCNC: NEGATIVE — SIGNIFICANT CHANGE UP
OSMOLALITY SERPL: 334 MOSMOL/KG — HIGH (ref 275–300)
PCP SPEC-MCNC: SIGNIFICANT CHANGE UP
PCP UR-MCNC: NEGATIVE — SIGNIFICANT CHANGE UP
PH UR: 5.5 — SIGNIFICANT CHANGE UP (ref 5–8)
PHOSPHATE SERPL-MCNC: 8.9 MG/DL — HIGH (ref 2.4–4.7)
PLAT MORPH BLD: NORMAL — SIGNIFICANT CHANGE UP
PLATELET # BLD AUTO: 393 K/UL — SIGNIFICANT CHANGE UP (ref 150–400)
POIKILOCYTOSIS BLD QL AUTO: SIGNIFICANT CHANGE UP
POLYCHROMASIA BLD QL SMEAR: SLIGHT — SIGNIFICANT CHANGE UP
POTASSIUM SERPL-MCNC: 4.9 MMOL/L — SIGNIFICANT CHANGE UP (ref 3.5–5.3)
POTASSIUM SERPL-SCNC: 4.9 MMOL/L — SIGNIFICANT CHANGE UP (ref 3.5–5.3)
PROT SERPL-MCNC: 7.4 G/DL — SIGNIFICANT CHANGE UP (ref 6.6–8.7)
PROT UR-MCNC: 100 MG/DL
RBC # BLD: 4.93 M/UL — SIGNIFICANT CHANGE UP (ref 4.2–5.8)
RBC # FLD: 13.8 % — SIGNIFICANT CHANGE UP (ref 10.3–14.5)
RBC BLD AUTO: ABNORMAL
RBC CASTS # UR COMP ASSIST: 0 /HPF — SIGNIFICANT CHANGE UP (ref 0–4)
SODIUM SERPL-SCNC: 124 MMOL/L — LOW (ref 135–145)
SP GR SPEC: 1.03 — SIGNIFICANT CHANGE UP (ref 1–1.03)
SQUAMOUS # UR AUTO: 1 /HPF — SIGNIFICANT CHANGE UP (ref 0–5)
THC UR QL: NEGATIVE — SIGNIFICANT CHANGE UP
UROBILINOGEN FLD QL: 1 MG/DL — SIGNIFICANT CHANGE UP (ref 0.2–1)
VARIANT LYMPHS # BLD: 0.9 % — SIGNIFICANT CHANGE UP (ref 0–6)
WBC # BLD: 25.68 K/UL — HIGH (ref 3.8–10.5)
WBC # FLD AUTO: 25.68 K/UL — HIGH (ref 3.8–10.5)
WBC UR QL: 0 /HPF — SIGNIFICANT CHANGE UP (ref 0–5)

## 2024-11-10 PROCEDURE — 99291 CRITICAL CARE FIRST HOUR: CPT

## 2024-11-10 PROCEDURE — 71045 X-RAY EXAM CHEST 1 VIEW: CPT | Mod: 26

## 2024-11-10 RX ORDER — CEFTRIAXONE SODIUM 10 G
1000 VIAL (EA) INJECTION ONCE
Refills: 0 | Status: DISCONTINUED | OUTPATIENT
Start: 2024-11-10 | End: 2024-11-10

## 2024-11-10 RX ORDER — SODIUM BICARBONATE 1 MEQ/ML
50 VIAL (ML) INTRAVENOUS ONCE
Refills: 0 | Status: COMPLETED | OUTPATIENT
Start: 2024-11-10 | End: 2024-11-10

## 2024-11-10 RX ORDER — SODIUM BICARBONATE 1 MEQ/ML
0.28 VIAL (ML) INTRAVENOUS
Qty: 150 | Refills: 0 | Status: DISCONTINUED | OUTPATIENT
Start: 2024-11-10 | End: 2024-11-10

## 2024-11-10 RX ORDER — INSULIN REG, HUM S-S BUFF 100/ML
3 VIAL (ML) INJECTION
Qty: 100 | Refills: 0 | Status: DISCONTINUED | OUTPATIENT
Start: 2024-11-10 | End: 2024-11-11

## 2024-11-10 RX ORDER — METOPROLOL TARTRATE 50 MG
10 TABLET ORAL ONCE
Refills: 0 | Status: DISCONTINUED | OUTPATIENT
Start: 2024-11-10 | End: 2024-11-10

## 2024-11-10 RX ORDER — CEFTRIAXONE SODIUM 10 G
1000 VIAL (EA) INJECTION ONCE
Refills: 0 | Status: COMPLETED | OUTPATIENT
Start: 2024-11-10 | End: 2024-11-10

## 2024-11-10 RX ORDER — POTASSIUM CHLORIDE 10 MEQ
10 TABLET, EXTENDED RELEASE ORAL
Refills: 0 | Status: COMPLETED | OUTPATIENT
Start: 2024-11-10 | End: 2024-11-11

## 2024-11-10 RX ORDER — METOPROLOL TARTRATE 50 MG
5 TABLET ORAL ONCE
Refills: 0 | Status: COMPLETED | OUTPATIENT
Start: 2024-11-10 | End: 2024-11-10

## 2024-11-10 RX ORDER — VANCOMYCIN HYDROCHLORIDE 50 MG/ML
1000 KIT ORAL ONCE
Refills: 0 | Status: COMPLETED | OUTPATIENT
Start: 2024-11-10 | End: 2024-11-10

## 2024-11-10 RX ADMIN — Medication 5 MILLIGRAM(S): at 22:57

## 2024-11-10 RX ADMIN — VANCOMYCIN HYDROCHLORIDE 1000 MILLIGRAM(S): KIT at 22:40

## 2024-11-10 RX ADMIN — Medication 2000 MILLILITER(S): at 22:19

## 2024-11-10 RX ADMIN — Medication 1000 MILLIGRAM(S): at 21:42

## 2024-11-10 RX ADMIN — VANCOMYCIN HYDROCHLORIDE 250 MILLIGRAM(S): KIT at 21:41

## 2024-11-10 RX ADMIN — Medication 6 UNIT(S)/HR: at 22:26

## 2024-11-10 RX ADMIN — Medication 1000 MILLILITER(S): at 22:45

## 2024-11-10 RX ADMIN — Medication 50 MILLIEQUIVALENT(S): at 21:28

## 2024-11-10 RX ADMIN — Medication 100 MILLIEQUIVALENT(S): at 22:45

## 2024-11-10 RX ADMIN — Medication 100 MEQ/KG/HR: at 22:03

## 2024-11-10 RX ADMIN — Medication 1000 MILLILITER(S): at 23:02

## 2024-11-10 RX ADMIN — Medication 2000 MILLILITER(S): at 21:29

## 2024-11-10 NOTE — ED PROVIDER NOTE - NS ED MD TWO NIGHTS YN
History  Chief Complaint   Patient presents with    Shortness of Breath     Increasing SOB over the past few days with nausea and small amount of vomiting.  Hx of COPD. Denies CP and fevers.      59 yo female pt presenting for SOB. States she's been feeling short of breath for years but only in the past few days has it suddenly/rapidly progressed. States she is unable to walk 3-4 steps without becoming short of breath. No fever. Family members sick with URI symptoms. She denies chest pain. No leg pain or swelling. No recent travels, trauma, surgeries. Has h/o COPD but not oxygen dependent. Has h/o thoracic outlet syndrome treated when she was in her 20s.       History provided by:  Patient   used: No    Shortness of Breath  Associated symptoms: cough    Associated symptoms: no abdominal pain, no chest pain, no ear pain, no fever, no rash, no sore throat and no vomiting        Prior to Admission Medications   Prescriptions Last Dose Informant Patient Reported? Taking?   Fluticasone-Salmeterol (Advair) 250-50 mcg/dose inhaler 1/4/2024  Yes Yes   Sig: Inhale 1 puff 2 (two) times a day Rinse mouth after use.   acetaminophen-codeine (TYLENOL with CODEINE #4) 300-60 MG per tablet 1/4/2024  Yes Yes   Sig: Take 1 tablet by mouth every 4 (four) hours as needed for moderate pain   albuterol (PROVENTIL HFA,VENTOLIN HFA) 90 mcg/act inhaler 1/5/2024  Yes Yes   Sig: Inhale 2 puffs every 6 (six) hours as needed for wheezing   diazepam (VALIUM) 5 mg tablet 1/4/2024  Yes Yes   Sig: Take 5 mg by mouth every 6 (six) hours as needed for anxiety   tiotropium (SPIRIVA) 18 mcg inhalation capsule 1/4/2024  Yes Yes   Sig: Place 18 mcg into inhaler and inhale daily   warfarin (COUMADIN) 5 mg tablet 1/4/2024  Yes Yes   Sig: Take 5 mg by mouth daily      Facility-Administered Medications: None       Past Medical History:   Diagnosis Date    COPD (chronic obstructive pulmonary disease) (HCC) 1/5/2024    Multiple sclerosis  (HCC) 1/5/2024    Osteoporosis     Thoracic outlet syndrome 1/5/2024       Past Surgical History:   Procedure Laterality Date    THORACIC OUTLET SURGERY         Family History   Problem Relation Age of Onset    No Known Problems Mother     No Known Problems Father      I have reviewed and agree with the history as documented.    E-Cigarette/Vaping     E-Cigarette/Vaping Substances     Social History     Tobacco Use    Smoking status: Every Day     Current packs/day: 0.50     Average packs/day: 0.5 packs/day for 30.0 years (15.0 ttl pk-yrs)     Types: Cigarettes     Start date: 1/5/1994    Smokeless tobacco: Never   Substance Use Topics    Alcohol use: Not Currently    Drug use: Never       Review of Systems   Constitutional:  Positive for fatigue. Negative for chills and fever.   HENT:  Negative for ear pain and sore throat.    Eyes:  Negative for pain and visual disturbance.   Respiratory:  Positive for cough and shortness of breath.    Cardiovascular:  Negative for chest pain and palpitations.   Gastrointestinal:  Negative for abdominal pain and vomiting.   Genitourinary:  Negative for dysuria and hematuria.   Musculoskeletal:  Negative for arthralgias and back pain.   Skin:  Negative for color change and rash.   Neurological:  Negative for seizures and syncope.   All other systems reviewed and are negative.      Physical Exam  Physical Exam  Vitals and nursing note reviewed.   Constitutional:       General: She is not in acute distress.     Appearance: She is well-developed.   HENT:      Head: Normocephalic and atraumatic.   Eyes:      Conjunctiva/sclera: Conjunctivae normal.   Cardiovascular:      Rate and Rhythm: Normal rate and regular rhythm.      Heart sounds: No murmur heard.  Pulmonary:      Effort: Pulmonary effort is normal. No respiratory distress.      Breath sounds: Wheezing present.   Abdominal:      Palpations: Abdomen is soft.      Tenderness: There is no abdominal tenderness.   Musculoskeletal:          General: No swelling.      Cervical back: Neck supple.   Skin:     General: Skin is warm and dry.      Capillary Refill: Capillary refill takes less than 2 seconds.   Neurological:      Mental Status: She is alert.   Psychiatric:         Mood and Affect: Mood normal.         Vital Signs  ED Triage Vitals   Temperature Pulse Respirations Blood Pressure SpO2   01/05/24 1840 01/05/24 1802 01/05/24 1840 01/05/24 1802 01/05/24 1806   98.8 °F (37.1 °C) 104 (!) 26 140/58 (!) 44 %      Temp Source Heart Rate Source Patient Position - Orthostatic VS BP Location FiO2 (%)   01/05/24 2257 01/05/24 1845 -- -- --   Oral Monitor         Pain Score       01/05/24 2253       No Pain           Vitals:    01/06/24 0731 01/06/24 0831 01/06/24 1324 01/06/24 1503   BP: 115/67 116/68  118/68   Pulse: 93 99 95 100         Visual Acuity      ED Medications  Medications   umeclidinium 62.5 mcg/actuation inhaler AEPB 1 puff (1 puff Inhalation Not Given 1/6/24 1043)   fluticasone-vilanterol 200-25 mcg/actuation 1 puff (1 puff Inhalation Not Given 1/6/24 1043)   acetaminophen (TYLENOL) tablet 650 mg (has no administration in time range)   ondansetron (ZOFRAN) injection 4 mg (has no administration in time range)   nicotine (NICODERM CQ) 21 mg/24 hr TD 24 hr patch 1 patch (1 patch Transdermal Not Given 1/6/24 0839)   dexamethasone (PF) (DECADRON) injection 6 mg (6 mg Intravenous Given 1/6/24 0249)   remdesivir (Veklury) 200 mg in sodium chloride 0.9 % 290 mL IVPB (200 mg Intravenous New Bag 1/6/24 0243)     Followed by   remdesivir (Veklury) 100 mg in sodium chloride 0.9 % 270 mL IVPB (has no administration in time range)   cefTRIAXone (ROCEPHIN) IVPB (premix in dextrose) 1,000 mg 50 mL (1,000 mg Intravenous Not Given 1/6/24 0549)   albuterol (PROVENTIL HFA,VENTOLIN HFA) inhaler 2 puff (has no administration in time range)   dextrose 5 % infusion (75 mL/hr Intravenous New Bag 1/6/24 7238)   albuterol inhalation solution 10 mg (10 mg  Nebulization Given 1/5/24 1818)   ipratropium (ATROVENT) 0.02 % inhalation solution 1 mg (1 mg Nebulization Given 1/5/24 1818)   sodium chloride 0.9 % inhalation solution 12 mL (12 mL Nebulization Given 1/5/24 1818)   methylPREDNISolone sodium succinate (Solu-MEDROL) injection 80 mg (80 mg Intravenous Given 1/5/24 1841)   magnesium sulfate 4 g/100 mL IVPB (premix) 4 g (4 g Intravenous New Bag 1/6/24 0835)       Diagnostic Studies  Results Reviewed       Procedure Component Value Units Date/Time    Blood culture #1 [[id number]] Collected: 01/05/24 1824    Lab Status: Preliminary result Specimen: Blood from Arm, Left Updated: 01/06/24 0002     Blood Culture Received in Microbiology Lab. Culture in Progress.    Blood culture #2 [[id number]] Collected: 01/05/24 1820    Lab Status: Preliminary result Specimen: Blood from Arm, Right Updated: 01/06/24 0002     Blood Culture Received in Microbiology Lab. Culture in Progress.    HS Troponin I 4hr [[id number]]  (Abnormal) Collected: 01/05/24 2311    Lab Status: Final result Specimen: Blood from Arm, Left Updated: 01/05/24 2346     hs TnI 4hr 73 ng/L      Delta 4hr hsTnI -19 ng/L     HS Troponin I 2hr [[id number]]  (Abnormal) Collected: 01/05/24 2100    Lab Status: Final result Specimen: Blood from Arm, Right Updated: 01/05/24 2138     hs TnI 2hr 77 ng/L      Delta 2hr hsTnI -15 ng/L     FLU/RSV/COVID - if FLU/RSV clinically relevant [[id number]]  (Abnormal) Collected: 01/05/24 1836    Lab Status: Final result Specimen: Nares from Nose Updated: 01/05/24 1931     SARS-CoV-2 Positive     INFLUENZA A PCR Negative     INFLUENZA B PCR Negative     RSV PCR Negative    Narrative:      FOR PEDIATRIC PATIENTS - copy/paste COVID Guidelines URL to browser: [email or web address]    SARS-CoV-2 assay is a Nucleic Acid Amplification assay intended for the  qualitative detection of nucleic acid from SARS-CoV-2 in nasopharyngeal  swabs. Results  are for the presumptive identification of SARS-CoV-2 RNA.    Positive results are indicative of infection with SARS-CoV-2, the virus  causing COVID-19, but do not rule out bacterial infection or co-infection  with other viruses. Laboratories within the [place] and its  territories are required to report all positive results to the appropriate  public health authorities. Negative results do not preclude SARS-CoV-2  infection and should not be used as the sole basis for treatment or other  patient management decisions. Negative results must be combined with  clinical observations, patient history, and epidemiological information.  This test has not been FDA cleared or approved.    This test has been authorized by FDA under an Emergency Use Authorization  (EUA). This test is only authorized for the duration of time the  declaration that circumstances exist justifying the authorization of the  emergency use of an in vitro diagnostic tests for detection of SARS-CoV-2  virus and/or diagnosis of COVID-19 infection under section 564(b)(1) of  the Act, 21 U.S.C. 360bbb-3(b)(1), unless the authorization is terminated  or revoked sooner. The test has been validated but independent review by FDA  and CLIA is pending.    Test performed using [employer] GeneXpert: This RT-PCR assay targets N2,  a region unique to SARS-CoV-2. A conserved region in the E-gene was chosen  for pan-Sarbecovirus detection which includes SARS-CoV-2.    According to CMS-2020-01-R, this platform meets the definition of high-throughput technology.    Comprehensive metabolic panel [[id number]]  (Abnormal) Collected: 01/05/24 1902    Lab Status: Final result Specimen: Blood from Arm, Right Updated: 01/05/24 1929     Sodium 117 mmol/L      Potassium 4.8 mmol/L      Chloride 77 mmol/L      CO2 33 mmol/L      ANION GAP 7 mmol/L      BUN 14 mg/dL      Creatinine 0.36 mg/dL      Glucose 117 mg/dL      Calcium 8.1 mg/dL      AST 53 U/L      ALT 25 U/L       Alkaline Phosphatase 50 U/L      Total Protein 6.5 g/dL      Albumin 4.0 g/dL      Total Bilirubin 0.39 mg/dL      eGFR 118 ml/min/1.73sq m     Narrative:      National Kidney Disease Foundation guidelines for Chronic Kidney Disease (CKD):     Stage 1 with normal or high GFR (GFR > 90 mL/min/1.73 square meters)    Stage 2 Mild CKD (GFR = 60-89 mL/min/1.73 square meters)    Stage 3A Moderate CKD (GFR = 45-59 mL/min/1.73 square meters)    Stage 3B Moderate CKD (GFR = 30-44 mL/min/1.73 square meters)    Stage 4 Severe CKD (GFR = 15-29 mL/min/1.73 square meters)    Stage 5 End Stage CKD (GFR <15 mL/min/1.73 square meters)  Note: GFR calculation is accurate only with a steady state creatinine    B-Type Natriuretic Peptide(BNP) [[id number]]  (Abnormal) Collected: 01/05/24 1820    Lab Status: Final result Specimen: Blood from Arm, Right Updated: 01/05/24 1922      pg/mL     HS Troponin 0hr (reflex protocol) [[id number]]  (Abnormal) Collected: 01/05/24 1820    Lab Status: Final result Specimen: Blood from Arm, Right Updated: 01/05/24 1922     hs TnI 0hr 92 ng/L     Blood gas, venous [[id number]]  (Abnormal) Collected: 01/05/24 1849    Lab Status: Final result Specimen: Blood from Arm, Right Updated: 01/05/24 1922     pH, Win 7.332     pCO2, Win 63.6 mm Hg      pO2, Win 37.6 mm Hg      HCO3, Win 32.9 mmol/L      Base Excess, Win 4.7 mmol/L      O2 Content, Win 14.6 ml/dL      O2 HGB, VENOUS 65.1 %     Lactic acid, plasma (w/reflex if result > 2.0) [[id number]]  (Normal) Collected: 01/05/24 1820    Lab Status: Final result Specimen: Blood from Arm, Right Updated: 01/05/24 1909     LACTIC ACID 1.2 mmol/L     Narrative:      Result may be elevated if tourniquet was used during collection.    D-Dimer [[id number]]  (Normal) Collected: 01/05/24 1847    Lab Status: Final result Specimen: Blood Updated: 01/05/24 0711     D-Dimer, Quant <0.27 ug/ml FEU     Narrative:      In the evaluation for possible pulmonary embolism, in the  appropriate (Well's Score of 4 or less) patient, the age adjusted d-dimer cutoff for this patient can be calculated as:    Age x 0.01 (in ug/mL) for Age-adjusted D-dimer exclusion threshold for a patient over 50 years.    APTT [[id number]]  (Abnormal) Collected: 01/05/24 1847    Lab Status: Final result Specimen: Blood Updated: 01/05/24 1858     PTT 46 seconds     Protime-INR [[id number]]  (Abnormal) Collected: 01/05/24 1847    Lab Status: Final result Specimen: Blood Updated: 01/05/24 1858     Protime 36.4 seconds      INR 3.53    CBC and differential [[id number]]  (Abnormal) Collected: 01/05/24 1820    Lab Status: Final result Specimen: Blood from Arm, Right Updated: 01/05/24 1837     WBC 4.76 Thousand/uL      RBC 4.46 Million/uL      Hemoglobin 14.8 g/dL      Hematocrit 43.3 %      MCV 97 fL      MCH 33.2 pg      MCHC 34.2 g/dL      RDW 11.6 %      MPV 9.9 fL      Platelets 161 Thousands/uL      nRBC 0 /100 WBCs      Neutrophils Relative 70 %      Immat GRANS % 0 %      Lymphocytes Relative 17 %      Monocytes Relative 13 %      Eosinophils Relative 0 %      Basophils Relative 0 %      Neutrophils Absolute 3.31 Thousands/µL      Immature Grans Absolute 0.02 Thousand/uL      Lymphocytes Absolute 0.80 Thousands/µL      Monocytes Absolute 0.62 Thousand/µL      Eosinophils Absolute 0.00 Thousand/µL      Basophils Absolute 0.01 Thousands/µL                    XR chest 1 view portable   Final Result by Campbell Gould MD (01/06 1240)      No acute cardiopulmonary disease.                  Resident: BRENDAN QUINONES I, the attending radiologist, have reviewed the images and agree with the final report above.      Workstation performed: [id number]                    Procedures  ECG 12 Lead Documentation Only    Date/Time: 1/5/2024 6:33 PM    Performed by: Alec Huggins PA-C  Authorized by: Alec Huggins PA-C    ECG reviewed by me, the ED Provider: yes    Patient location:  ED  Interpretation:      Interpretation: normal    Quality:     Tracing quality:  Limited by artifact  Rate:     ECG rate:  97    ECG rate assessment: normal    Rhythm:     Rhythm: sinus rhythm    Ectopy:     Ectopy: none    QRS:     QRS axis:  Normal    QRS intervals:  Normal  Conduction:     Conduction: normal    ST segments:     ST segments:  Normal  T waves:     T waves: normal             ED Course                                             Medical Decision Making  DDX including but not limited to: pneumonia, pleural effusion, CHF, PE, PTX, ACS, MI, asthma exacerbation, COPD exacerbation, COVID 19, EVALI, anemia, metabolic abnormality, renal failure.  Plan: initiated upon 6L NC with improvement of sat to mid 80s. Start ANDRES neb. Cardiac workup.     MDM: 59 yo with sob. COVID positive. Elevated trop could be demand ischemia. Negative ddimer. Hyponatremia acute on chronic. O2 improved on 6L. Now normal. No resp distress. Will admit for further care.  Pt in agreement. Stable at time of admission.     Amount and/or Complexity of Data Reviewed  Labs: ordered.  Radiology: ordered.    Risk  Prescription drug management.  Decision regarding hospitalization.             Disposition  Final diagnoses:   COVID-19     Time reflects when diagnosis was documented in both MDM as applicable and the Disposition within this note       Time User Action Codes Description Comment    1/5/2024  8:56 PM Alec Huggins Add [U07.1] COVID-19     1/5/2024  9:04 PM Mathew Rust Add [E87.1] Hyponatremia           ED Disposition       ED Disposition   Admit    Condition   Stable    Date/Time   Fri Jan 5, 2024  8:56 PM    Comment   Case was discussed with Dr. Rust and the patient's admission status was agreed to be Admission Status: inpatient status to the service of Dr. Rust .               Follow-up Information    None         Current Discharge Medication List        CONTINUE these medications which have NOT CHANGED    Details   acetaminophen-codeine  (TYLENOL with CODEINE #4) 300-60 MG per tablet Take 1 tablet by mouth every 4 (four) hours as needed for moderate pain      albuterol (PROVENTIL HFA,VENTOLIN HFA) 90 mcg/act inhaler Inhale 2 puffs every 6 (six) hours as needed for wheezing      diazepam (VALIUM) 5 mg tablet Take 5 mg by mouth every 6 (six) hours as needed for anxiety      Fluticasone-Salmeterol (Advair) 250-50 mcg/dose inhaler Inhale 1 puff 2 (two) times a day Rinse mouth after use.      tiotropium (SPIRIVA) 18 mcg inhalation capsule Place 18 mcg into inhaler and inhale daily      warfarin (COUMADIN) 5 mg tablet Take 5 mg by mouth daily             No discharge procedures on file.    PDMP Review       None            ED Provider  Electronically Signed by             Alec Huggins PA-C  01/06/24 3570     Yes

## 2024-11-10 NOTE — ED PROVIDER NOTE - OBJECTIVE STATEMENT
33 year old male w/PMHx DM2 presents to the ED for eval. Patient states he has not taken his diabetes meds in 2 days as he "forgot". Today developed nausea, dizziness, palpitations and difficulty breathing. Denies any alcohol or drug use. Reports he was admitted to the ICU for DKA in the past. Cannot recall how much insulin he takes but states he takes "Some in the morning some at night".

## 2024-11-10 NOTE — ED PROVIDER NOTE - ATTENDING CONTRIBUTION TO CARE
33 year old male w/PMHx DM2 here for elevated sugar. Reports forgot to take insulin in last 2 days. Previous presentations for DKA.  Reports former drinker but no drinking recently. Denies any recent illness but today feels cold a/w generalized abd discomfort. Denies fever, vomiting, chest pain, sob, dysuria, diarrhea   AP - likely in DKA, will fluid hydrate, cover empirically for infecitous source. insulin ggt and MICU eval

## 2024-11-10 NOTE — ED PROVIDER NOTE - SECONDARY DIAGNOSIS.
10/28/2019       RE: Terese Bell  [address] [facility] [id number]     Dear Colleague,    Thank you for referring your patient, Terese Bell, to the [facility] EAR NOSE AND THROAT at [facility]. Please see a copy of my visit note below.    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 Schumacher.  He ended up doing 13 operations in 11 years, most recently in 2010.       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 Ferris, with R RFFF reconstruction by Dr. Leo on 9/1/2016.  The margins were negative but the lymph nodes were 1/40 positive.  She completed postoperative radiation therapy on 11/29/2016.     Patient has been dealing with pain in the right jaw and right thyroid ala region.  As of 6/5/2019, no evidence of ORN at the visit with Dr. Ferris. Patient referred back to us by [facility] with possible sore in the right jaw.  Upon evaluation on 8/21/2019, no ORN present in jaw.  There is some leukoplakia along the posterolateral tongue on the right         HPI:  Ms. Bettie Bell is here today for an 8-week follow-up visit and repeat evaluation for possible ORN and leukoplakia on the right side of the tongue.    Sarah reports that the pain she had been having in her right jaw and the right side of her neck by the thyroid although it is improving.  She still has it but it is less intense and its less frequent.  She continues to have her chronic issues with difficulty opening her mouth and swelling.  Nothing is new or different.  She did see  Dr. Martinez for the TM perforation and loss of balance.  She was told that the TM perforation is healing and was only the size of a pinhole at the time of the visit.  She was referred for vestibular therapy and is been rescheduled that for January 2020.    Otherwise, she has no new concerns at this time.  The patient denies any hoarseness of voice, referred otalgia, facial paresthesias, tinnitus, hemoptysis, other pain, bleeding, and recurrent/new lumps or bumps. Weight is stable.          Physical Exam:  /70   Pulse 62   Wt 71.7 kg (158 lb)   BMI 27.33 kg/m       Constitutional: The patient is accompanied, well-groomed, and in no acute distress.    Head: Normocephalic and atraumatic.   Eyes: Pupils were equal and reactive.  Extraocular movement intact.    Ears: Pinnae and tragus non-tender.  EACs and TMs were clear.     Nose: Sinuses were non-tender.  Anterior rhinoscopy revealed midline septum and absence of purulence or polyps.    Oral Cavity: well healed Free flap of the right sie of the tongue, small area of leukoplakia noted on the right posterolateral tongue, near the retromolar area (see image below), NO evidence of ORN, Normal floor of mouth, buccal mucosa, and palate.  No lesions or masses on inspection or palpation.    Oropharynx: Normal mucosa, palate symmetric with normal elevation. No abnormal lymph tissue in the oropharynx.  Pterygoid region non-tender.   Hypopharynx/Larynx: Deferred for fiberoptic endoscopic exam  Neck: pain with palpation over the right thyroid ala, no underlying mass.  The parotid beds were without masses.  No palpable thyroid.  Normal range of motion  Lymphatic: There is no palpable lymphadenopathy in the neck.   Respiratory: Breathing comfortably without stridor or exertion of accessory muscles.   Skin: Normal:  warm and pink without rash  Neurologic: Alert and oriented x 3.  CN's III-XII within normal limits.  Voice normal.   Psychiatric: The patient's affect was  calm, cooperative, and appropriate.          Leukoplakia of right side of tongue        Free flap right tongue    Fiberoptic Endoscopy:  Consent for fiberoptic laryngoscopy was obtained, and we confirmed correctness of procedure and identity of patient.  Fiberoptic laryngoscopy was indicated due to hx of tongue cancer and leukoplakia.  The nose was topically decongested and anesthetized.  The fiberoptic laryngoscope was passed under endoscopic vision.  The turbinates were pale.  The inferior and middle meati with clear mucus, but without purulence, masses, or polyps.  The nasopharynx with a fair amount of clear mucus c/w PND. The Eustachian tubes were clear.  The soft palate appeared normal with good mobility.  The epiglottis was sharp and the visualized portion of the vallecula was clear.  The larynx was clear with mobile cords.  The arytenoids were clear but slightly edematous and there was no pooling in the hypopharynx. No evidence of ORN in the piriform sinuses bilaterally.      Video of endoscopy (please click on image above to watch)        IMPRESSION AND PLAN:    1.  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  S/p glossectomy and neck dissection by me, with R RFFF reconstruction by Dr. Leo on 9/1/2016.   She completed postoperative radiation therapy on 11/29/2016. We have been monitoring right jaw and right thyroid ala pain for ORN. At last visit, no ORN present.      Improving pain overall and no evidence of ORN.  Leukoplakia stable (see images on imagestream).    Patient will returnin 4 months for a clinical exam.  Next set of scans (4 year scans) will be due on September 2020.       Bridgette Farmer PA-C  Otolaryngology  Head & Neck Surgery  [phone]       CC:  Nicole Mcdonald NP   [facility]   [address] [place] [address]  [address]      Pattie Natarajan MD   Radiation Therapy    [address] [facility]   [address]   [place], [place]   [address]      Moreno Leo MD  Otolaryngology/Head & Neck Surgery  [facility] 396     Thomas Ferris MD  Otolaryngology/Head & Neck Surgery  [facility] 396       Atrial fibrillation

## 2024-11-10 NOTE — ED ADULT TRIAGE NOTE - CHIEF COMPLAINT QUOTE
biba sent by family with c/o of hyperventilation hx of DM, blood glucose in 400s en route in EMS biba sent by family with c/o of hyperventilation with hx of DM, blood glucose in 400s en route in EMS. Pt brought into critical care, provider Arian Mak MD at bedside

## 2024-11-10 NOTE — ED ADULT NURSE NOTE - OBJECTIVE STATEMENT
Assumed care of pt in critical care at 2050. Pt is A&Ox4. Respirations are even and unlabored. Pt present to the ED for abnormal lab. pt blood sugar high per EMS. States he has not taken his insulin in two days. Hx of DM2. Pt appears to be tachypneic and tachycardic on cardiac monitor. +nausea. Pt states increased thirst and increased urination over last two days. ED provider evaluating, plan of care ongoing.

## 2024-11-10 NOTE — ED PROVIDER NOTE - CLINICAL SUMMARY MEDICAL DECISION MAKING FREE TEXT BOX
On arrival tachycardic 140s, /76, SpO2 100% on RA. Concern for DKA. Will start patient on IVF, labs sent. If K wnl, start insulin, w/K repletion as needed. On arrival tachycardic 140s, /76, SpO2 100% on RA. EKG showing afib with RVR. On chart review patient with history of afib not on AC; occurring during episodes of pancreatitis vs DKA. Will start patient on IVF, labs sent. If K wnl, start insulin, w/K repletion as needed. If still in afib despite fluids, will consider lopressor for rate control. On arrival tachycardic 140s, /76, SpO2 100% on RA. EKG showing afib with RVR. On chart review patient with history of afib not on AC; occurring during episodes of pancreatitis vs DKA. Will start patient on IVF, labs sent. If K wnl, start insulin, w/K repletion as needed. If still in afib despite fluids, will consider lopressor for rate control.    Pt seen by MICU, accepted for admission. Pt vitals stable at this time, mentating well, fluids and insulin running

## 2024-11-10 NOTE — ED ADULT NURSE NOTE - CHIEF COMPLAINT QUOTE
biba sent by family with c/o of hyperventilation with hx of DM, blood glucose in 400s en route in EMS. Pt brought into critical care, provider Arian Mak MD at bedside

## 2024-11-10 NOTE — ED PROVIDER NOTE - PROGRESS NOTE DETAILS
Spoke with MICU. Will see pt. Insulin, potassium, fluids started Pt still in afib with RVR. Will wait till 4th liter is running, if no improvement will discuss with ICU for lopressor Pt still in afib with RVR. 170s despite 3 liters. 5 mg lopressor IV push given MICU at bedside.  Patient still tachycardic to the 140s despite fluids and 5 mg Lopressor.  Another 10 mg Lopressor given.  Blood pressure stable at this time, last blood pressure reading 140/72 seen by MICU, accepted for admission

## 2024-11-10 NOTE — ED PROVIDER NOTE - PHYSICAL EXAMINATION
Gen: NAD, AOx3  Head: NCAT  HEENT: dry oral mucosa  Lung: deep rapid shallow breathing   CV: rrr, Normal perfusion  Abd: soft, NTND  MSK: No edema, no visible deformities  Neuro: No focal neurologic deficits  Skin: No rash   Psych: anxious

## 2024-11-11 ENCOUNTER — RESULT REVIEW (OUTPATIENT)
Age: 34
End: 2024-11-11

## 2024-11-11 DIAGNOSIS — E11.10 TYPE 2 DIABETES MELLITUS WITH KETOACIDOSIS WITHOUT COMA: ICD-10-CM

## 2024-11-11 DIAGNOSIS — I48.91 UNSPECIFIED ATRIAL FIBRILLATION: ICD-10-CM

## 2024-11-11 LAB
ALBUMIN SERPL ELPH-MCNC: 3.3 G/DL — SIGNIFICANT CHANGE UP (ref 3.3–5.2)
ALP SERPL-CCNC: 97 U/L — SIGNIFICANT CHANGE UP (ref 40–120)
ALT FLD-CCNC: 9 U/L — SIGNIFICANT CHANGE UP
ANION GAP SERPL CALC-SCNC: 12 MMOL/L — SIGNIFICANT CHANGE UP (ref 5–17)
ANION GAP SERPL CALC-SCNC: 13 MMOL/L — SIGNIFICANT CHANGE UP (ref 5–17)
ANION GAP SERPL CALC-SCNC: 14 MMOL/L — SIGNIFICANT CHANGE UP (ref 5–17)
ANION GAP SERPL CALC-SCNC: 29 MMOL/L — HIGH (ref 5–17)
AST SERPL-CCNC: 11 U/L — SIGNIFICANT CHANGE UP
BILIRUB SERPL-MCNC: 1.2 MG/DL — SIGNIFICANT CHANGE UP (ref 0.4–2)
BUN SERPL-MCNC: 12.1 MG/DL — SIGNIFICANT CHANGE UP (ref 8–20)
BUN SERPL-MCNC: 12.9 MG/DL — SIGNIFICANT CHANGE UP (ref 8–20)
BUN SERPL-MCNC: 14.5 MG/DL — SIGNIFICANT CHANGE UP (ref 8–20)
BUN SERPL-MCNC: 17.4 MG/DL — SIGNIFICANT CHANGE UP (ref 8–20)
CALCIUM SERPL-MCNC: 7.5 MG/DL — LOW (ref 8.4–10.5)
CALCIUM SERPL-MCNC: 8.1 MG/DL — LOW (ref 8.4–10.5)
CALCIUM SERPL-MCNC: 8.2 MG/DL — LOW (ref 8.4–10.5)
CALCIUM SERPL-MCNC: 8.3 MG/DL — LOW (ref 8.4–10.5)
CHLORIDE SERPL-SCNC: 103 MMOL/L — SIGNIFICANT CHANGE UP (ref 96–108)
CHLORIDE SERPL-SCNC: 103 MMOL/L — SIGNIFICANT CHANGE UP (ref 96–108)
CHLORIDE SERPL-SCNC: 104 MMOL/L — SIGNIFICANT CHANGE UP (ref 96–108)
CHLORIDE SERPL-SCNC: 97 MMOL/L — SIGNIFICANT CHANGE UP (ref 96–108)
CO2 SERPL-SCNC: 16 MMOL/L — LOW (ref 22–29)
CO2 SERPL-SCNC: 16 MMOL/L — LOW (ref 22–29)
CO2 SERPL-SCNC: 18 MMOL/L — LOW (ref 22–29)
CO2 SERPL-SCNC: 7 MMOL/L — CRITICAL LOW (ref 22–29)
CREAT SERPL-MCNC: 0.58 MG/DL — SIGNIFICANT CHANGE UP (ref 0.5–1.3)
CREAT SERPL-MCNC: 0.61 MG/DL — SIGNIFICANT CHANGE UP (ref 0.5–1.3)
CREAT SERPL-MCNC: 0.75 MG/DL — SIGNIFICANT CHANGE UP (ref 0.5–1.3)
CREAT SERPL-MCNC: 1.07 MG/DL — SIGNIFICANT CHANGE UP (ref 0.5–1.3)
CULTURE RESULTS: SIGNIFICANT CHANGE UP
EGFR: 121 ML/MIN/1.73M2 — SIGNIFICANT CHANGE UP
EGFR: 129 ML/MIN/1.73M2 — SIGNIFICANT CHANGE UP
EGFR: 131 ML/MIN/1.73M2 — SIGNIFICANT CHANGE UP
EGFR: 93 ML/MIN/1.73M2 — SIGNIFICANT CHANGE UP
GAS PNL BLDV: SIGNIFICANT CHANGE UP
GAS PNL BLDV: SIGNIFICANT CHANGE UP
GLUCOSE BLDC GLUCOMTR-MCNC: 119 MG/DL — HIGH (ref 70–99)
GLUCOSE BLDC GLUCOMTR-MCNC: 143 MG/DL — HIGH (ref 70–99)
GLUCOSE BLDC GLUCOMTR-MCNC: 153 MG/DL — HIGH (ref 70–99)
GLUCOSE BLDC GLUCOMTR-MCNC: 157 MG/DL — HIGH (ref 70–99)
GLUCOSE BLDC GLUCOMTR-MCNC: 191 MG/DL — HIGH (ref 70–99)
GLUCOSE BLDC GLUCOMTR-MCNC: 205 MG/DL — HIGH (ref 70–99)
GLUCOSE BLDC GLUCOMTR-MCNC: 231 MG/DL — HIGH (ref 70–99)
GLUCOSE BLDC GLUCOMTR-MCNC: 239 MG/DL — HIGH (ref 70–99)
GLUCOSE BLDC GLUCOMTR-MCNC: 241 MG/DL — HIGH (ref 70–99)
GLUCOSE BLDC GLUCOMTR-MCNC: 249 MG/DL — HIGH (ref 70–99)
GLUCOSE BLDC GLUCOMTR-MCNC: 263 MG/DL — HIGH (ref 70–99)
GLUCOSE BLDC GLUCOMTR-MCNC: 266 MG/DL — HIGH (ref 70–99)
GLUCOSE BLDC GLUCOMTR-MCNC: 301 MG/DL — HIGH (ref 70–99)
GLUCOSE BLDC GLUCOMTR-MCNC: 338 MG/DL — HIGH (ref 70–99)
GLUCOSE BLDC GLUCOMTR-MCNC: 378 MG/DL — HIGH (ref 70–99)
GLUCOSE BLDC GLUCOMTR-MCNC: 410 MG/DL — HIGH (ref 70–99)
GLUCOSE SERPL-MCNC: 139 MG/DL — HIGH (ref 70–99)
GLUCOSE SERPL-MCNC: 167 MG/DL — HIGH (ref 70–99)
GLUCOSE SERPL-MCNC: 250 MG/DL — HIGH (ref 70–99)
GLUCOSE SERPL-MCNC: 377 MG/DL — HIGH (ref 70–99)
HCT VFR BLD CALC: 29.2 % — LOW (ref 39–50)
HGB BLD-MCNC: 10.8 G/DL — LOW (ref 13–17)
LACTATE SERPL-SCNC: 1.6 MMOL/L — SIGNIFICANT CHANGE UP (ref 0.5–2)
LACTATE SERPL-SCNC: 3.1 MMOL/L — HIGH (ref 0.5–2)
MAGNESIUM SERPL-MCNC: 1.3 MG/DL — LOW (ref 1.6–2.6)
MCHC RBC-ENTMCNC: 30.8 PG — SIGNIFICANT CHANGE UP (ref 27–34)
MCHC RBC-ENTMCNC: 37 G/DL — HIGH (ref 32–36)
MCV RBC AUTO: 83.2 FL — SIGNIFICANT CHANGE UP (ref 80–100)
MRSA PCR RESULT.: SIGNIFICANT CHANGE UP
PHOSPHATE SERPL-MCNC: 1 MG/DL — CRITICAL LOW (ref 2.4–4.7)
PLATELET # BLD AUTO: 195 K/UL — SIGNIFICANT CHANGE UP (ref 150–400)
POTASSIUM SERPL-MCNC: 3.5 MMOL/L — SIGNIFICANT CHANGE UP (ref 3.5–5.3)
POTASSIUM SERPL-MCNC: 3.6 MMOL/L — SIGNIFICANT CHANGE UP (ref 3.5–5.3)
POTASSIUM SERPL-MCNC: 3.8 MMOL/L — SIGNIFICANT CHANGE UP (ref 3.5–5.3)
POTASSIUM SERPL-MCNC: 4.3 MMOL/L — SIGNIFICANT CHANGE UP (ref 3.5–5.3)
POTASSIUM SERPL-SCNC: 3.5 MMOL/L — SIGNIFICANT CHANGE UP (ref 3.5–5.3)
POTASSIUM SERPL-SCNC: 3.6 MMOL/L — SIGNIFICANT CHANGE UP (ref 3.5–5.3)
POTASSIUM SERPL-SCNC: 3.8 MMOL/L — SIGNIFICANT CHANGE UP (ref 3.5–5.3)
POTASSIUM SERPL-SCNC: 4.3 MMOL/L — SIGNIFICANT CHANGE UP (ref 3.5–5.3)
PROCALCITONIN SERPL-MCNC: 1.42 NG/ML — HIGH (ref 0.02–0.1)
PROT SERPL-MCNC: 5.1 G/DL — LOW (ref 6.6–8.7)
RAPID RVP RESULT: SIGNIFICANT CHANGE UP
RBC # BLD: 3.51 M/UL — LOW (ref 4.2–5.8)
RBC # FLD: 14.3 % — SIGNIFICANT CHANGE UP (ref 10.3–14.5)
S AUREUS DNA NOSE QL NAA+PROBE: SIGNIFICANT CHANGE UP
SARS-COV-2 RNA SPEC QL NAA+PROBE: SIGNIFICANT CHANGE UP
SODIUM SERPL-SCNC: 132 MMOL/L — LOW (ref 135–145)
SODIUM SERPL-SCNC: 132 MMOL/L — LOW (ref 135–145)
SODIUM SERPL-SCNC: 133 MMOL/L — LOW (ref 135–145)
SODIUM SERPL-SCNC: 134 MMOL/L — LOW (ref 135–145)
SPECIMEN SOURCE: SIGNIFICANT CHANGE UP
TROPONIN T, HIGH SENSITIVITY RESULT: 9 NG/L — SIGNIFICANT CHANGE UP (ref 0–51)
WBC # BLD: 9.23 K/UL — SIGNIFICANT CHANGE UP (ref 3.8–10.5)
WBC # FLD AUTO: 9.23 K/UL — SIGNIFICANT CHANGE UP (ref 3.8–10.5)

## 2024-11-11 PROCEDURE — 93010 ELECTROCARDIOGRAM REPORT: CPT

## 2024-11-11 PROCEDURE — 99223 1ST HOSP IP/OBS HIGH 75: CPT

## 2024-11-11 PROCEDURE — 99233 SBSQ HOSP IP/OBS HIGH 50: CPT

## 2024-11-11 PROCEDURE — 99233 SBSQ HOSP IP/OBS HIGH 50: CPT | Mod: GC

## 2024-11-11 PROCEDURE — 74176 CT ABD & PELVIS W/O CONTRAST: CPT | Mod: 26

## 2024-11-11 PROCEDURE — 71250 CT THORAX DX C-: CPT | Mod: 26

## 2024-11-11 RX ORDER — PANTOPRAZOLE SODIUM 40 MG/1
40 TABLET, DELAYED RELEASE ORAL DAILY
Refills: 0 | Status: DISCONTINUED | OUTPATIENT
Start: 2024-11-11 | End: 2024-11-14

## 2024-11-11 RX ORDER — INSULIN LISPRO 100/ML
10 VIAL (ML) SUBCUTANEOUS
Refills: 0 | Status: DISCONTINUED | OUTPATIENT
Start: 2024-11-11 | End: 2024-11-12

## 2024-11-11 RX ORDER — DIBASIC SODIUM PHOSPHATE, MONOBASIC POTASSIUM PHOSPHATE AND MONOBASIC SODIUM PHOSPHATE 852; 155; 130 MG/1; MG/1; MG/1
2 TABLET ORAL ONCE
Refills: 0 | Status: COMPLETED | OUTPATIENT
Start: 2024-11-11 | End: 2024-11-11

## 2024-11-11 RX ORDER — INSULIN LISPRO 100/ML
VIAL (ML) SUBCUTANEOUS AT BEDTIME
Refills: 0 | Status: DISCONTINUED | OUTPATIENT
Start: 2024-11-11 | End: 2024-11-14

## 2024-11-11 RX ORDER — HEPARIN SODIUM 10000 [USP'U]/ML
5000 INJECTION INTRAVENOUS; SUBCUTANEOUS EVERY 8 HOURS
Refills: 0 | Status: DISCONTINUED | OUTPATIENT
Start: 2024-11-11 | End: 2024-11-12

## 2024-11-11 RX ORDER — MAGNESIUM SULFATE IN 0.9% NACL 2 G/50 ML
4 INTRAVENOUS SOLUTION, PIGGYBACK (ML) INTRAVENOUS ONCE
Refills: 0 | Status: COMPLETED | OUTPATIENT
Start: 2024-11-11 | End: 2024-11-11

## 2024-11-11 RX ORDER — INSULIN GLARGINE,HUM.REC.ANLOG 100/ML
20 VIAL (ML) SUBCUTANEOUS
Refills: 0 | Status: DISCONTINUED | OUTPATIENT
Start: 2024-11-11 | End: 2024-11-14

## 2024-11-11 RX ORDER — INSULIN ASPART 100 [IU]/ML
0 INJECTION, SOLUTION INTRAVENOUS; SUBCUTANEOUS
Refills: 0 | DISCHARGE

## 2024-11-11 RX ORDER — INFLUENZ VIR VAC TV P-SURF2003 15MCG/.5ML
0.5 SYRINGE (ML) INTRAMUSCULAR ONCE
Refills: 0 | Status: DISCONTINUED | OUTPATIENT
Start: 2024-11-11 | End: 2024-11-14

## 2024-11-11 RX ORDER — ASPIRIN/MAG CARB/ALUMINUM AMIN 325 MG
81 TABLET ORAL DAILY
Refills: 0 | Status: DISCONTINUED | OUTPATIENT
Start: 2024-11-11 | End: 2024-11-14

## 2024-11-11 RX ORDER — METOPROLOL TARTRATE 50 MG
10 TABLET ORAL ONCE
Refills: 0 | Status: DISCONTINUED | OUTPATIENT
Start: 2024-11-11 | End: 2024-11-11

## 2024-11-11 RX ORDER — POTASSIUM CHLORIDE 10 MEQ
40 TABLET, EXTENDED RELEASE ORAL ONCE
Refills: 0 | Status: COMPLETED | OUTPATIENT
Start: 2024-11-11 | End: 2024-11-11

## 2024-11-11 RX ORDER — ERGOCALCIFEROL (VITAMIN D2) 200 MCG/ML
1 DROPS ORAL
Refills: 0 | DISCHARGE

## 2024-11-11 RX ORDER — INSULIN LISPRO 100/ML
VIAL (ML) SUBCUTANEOUS
Refills: 0 | Status: DISCONTINUED | OUTPATIENT
Start: 2024-11-11 | End: 2024-11-14

## 2024-11-11 RX ORDER — SODIUM CHLORIDE 9 MG/ML
1000 INJECTION, SOLUTION INTRAMUSCULAR; INTRAVENOUS; SUBCUTANEOUS
Refills: 0 | Status: COMPLETED | OUTPATIENT
Start: 2024-11-11 | End: 2024-11-11

## 2024-11-11 RX ORDER — DIAZEPAM 10 MG/1
10 FILM BUCCAL
Refills: 0 | Status: DISCONTINUED | OUTPATIENT
Start: 2024-11-11 | End: 2024-11-14

## 2024-11-11 RX ORDER — SENNA 187 MG
2 TABLET ORAL AT BEDTIME
Refills: 0 | Status: DISCONTINUED | OUTPATIENT
Start: 2024-11-11 | End: 2024-11-14

## 2024-11-11 RX ORDER — GLUCAGON INJECTION, SOLUTION 1 MG/.2ML
1 INJECTION, SOLUTION SUBCUTANEOUS ONCE
Refills: 0 | Status: DISCONTINUED | OUTPATIENT
Start: 2024-11-11 | End: 2024-11-14

## 2024-11-11 RX ORDER — METOPROLOL TARTRATE 50 MG
5 TABLET ORAL ONCE
Refills: 0 | Status: COMPLETED | OUTPATIENT
Start: 2024-11-11 | End: 2024-11-11

## 2024-11-11 RX ORDER — INSULIN GLARGINE,HUM.REC.ANLOG 100/ML
30 VIAL (ML) SUBCUTANEOUS ONCE
Refills: 0 | Status: COMPLETED | OUTPATIENT
Start: 2024-11-11 | End: 2024-11-11

## 2024-11-11 RX ORDER — CHLORHEXIDINE GLUCONATE 40 MG/ML
1 SOLUTION TOPICAL
Refills: 0 | Status: DISCONTINUED | OUTPATIENT
Start: 2024-11-11 | End: 2024-11-11

## 2024-11-11 RX ORDER — CETIRIZINE HCL 10 MG/1
1 TABLET ORAL
Refills: 0 | DISCHARGE

## 2024-11-11 RX ORDER — CEFTRIAXONE SODIUM 10 G
1000 VIAL (EA) INJECTION EVERY 24 HOURS
Refills: 0 | Status: DISCONTINUED | OUTPATIENT
Start: 2024-11-11 | End: 2024-11-11

## 2024-11-11 RX ORDER — DILTIAZEM HCL 5 MG/ML
25 VIAL (ML) INTRAVENOUS ONCE
Refills: 0 | Status: COMPLETED | OUTPATIENT
Start: 2024-11-11 | End: 2024-11-11

## 2024-11-11 RX ADMIN — Medication 200 MILLILITER(S): at 03:20

## 2024-11-11 RX ADMIN — Medication 1000 MILLILITER(S): at 00:20

## 2024-11-11 RX ADMIN — Medication 81 MILLIGRAM(S): at 11:24

## 2024-11-11 RX ADMIN — Medication 100 MILLIEQUIVALENT(S): at 01:23

## 2024-11-11 RX ADMIN — Medication 10 UNIT(S): at 16:51

## 2024-11-11 RX ADMIN — Medication 25 GRAM(S): at 11:22

## 2024-11-11 RX ADMIN — Medication 2 TABLET(S): at 21:21

## 2024-11-11 RX ADMIN — CHLORHEXIDINE GLUCONATE 1 APPLICATION(S): 40 SOLUTION TOPICAL at 05:06

## 2024-11-11 RX ADMIN — Medication 1000 MILLILITER(S): at 00:00

## 2024-11-11 RX ADMIN — SODIUM CHLORIDE 88 MILLILITER(S): 9 INJECTION, SOLUTION INTRAMUSCULAR; INTRAVENOUS; SUBCUTANEOUS at 21:36

## 2024-11-11 RX ADMIN — DIBASIC SODIUM PHOSPHATE, MONOBASIC POTASSIUM PHOSPHATE AND MONOBASIC SODIUM PHOSPHATE 2 PACKET(S): 852; 155; 130 TABLET ORAL at 10:50

## 2024-11-11 RX ADMIN — Medication 5 MILLIGRAM(S): at 00:32

## 2024-11-11 RX ADMIN — PANTOPRAZOLE SODIUM 40 MILLIGRAM(S): 40 TABLET, DELAYED RELEASE ORAL at 11:24

## 2024-11-11 RX ADMIN — Medication 1000 MILLILITER(S): at 00:02

## 2024-11-11 RX ADMIN — Medication 25 MILLIGRAM(S): at 01:24

## 2024-11-11 RX ADMIN — Medication 40 MILLIEQUIVALENT(S): at 08:05

## 2024-11-11 RX ADMIN — HEPARIN SODIUM 5000 UNIT(S): 10000 INJECTION INTRAVENOUS; SUBCUTANEOUS at 21:21

## 2024-11-11 RX ADMIN — Medication 4: at 21:14

## 2024-11-11 RX ADMIN — HEPARIN SODIUM 5000 UNIT(S): 10000 INJECTION INTRAVENOUS; SUBCUTANEOUS at 13:40

## 2024-11-11 RX ADMIN — Medication 100 MILLIEQUIVALENT(S): at 00:09

## 2024-11-11 RX ADMIN — SODIUM CHLORIDE 88 MILLILITER(S): 9 INJECTION, SOLUTION INTRAMUSCULAR; INTRAVENOUS; SUBCUTANEOUS at 13:40

## 2024-11-11 RX ADMIN — HEPARIN SODIUM 5000 UNIT(S): 10000 INJECTION INTRAVENOUS; SUBCUTANEOUS at 05:06

## 2024-11-11 RX ADMIN — Medication 30 UNIT(S): at 10:18

## 2024-11-11 RX ADMIN — Medication 1000 MILLILITER(S): at 02:20

## 2024-11-11 RX ADMIN — Medication 6 UNIT(S)/HR: at 01:24

## 2024-11-11 RX ADMIN — Medication 5 MILLIGRAM(S): at 00:37

## 2024-11-11 RX ADMIN — Medication 150 MILLILITER(S): at 01:24

## 2024-11-11 RX ADMIN — Medication 10 UNIT(S): at 11:39

## 2024-11-11 NOTE — H&P ADULT - NSHPPHYSICALEXAM_GEN_ALL_CORE
Physical Examination:    General: Disheveled, afebrile, interactive.    HEENT: Pupils equal, reactive to light.  Symmetric. Head normocephalic, atraumatic.    PULM: Clear to auscultation bilaterally, no significant sputum production    CVS: A fib with RVR.    ABD: Soft, nondistended, nontender, normoactive bowel sounds, no masses    EXT: No edema, nontender    SKIN: Warm and well perfused, no rashes noted.    Neuro: Alert and awake, follows command, move all four extremities

## 2024-11-11 NOTE — H&P ADULT - NSHPREVIEWOFSYSTEMS_GEN_ALL_CORE
Review of Systems:  CONSTITUTIONAL: No fever, chills, or fatigue  EYES: No eye pain, visual disturbances, or discharge  ENMT:  No difficulty hearing, tinnitus, vertigo; No sinus or throat pain  NECK: No pain or stiffness  RESPIRATORY: +chronic cough.  CARDIOVASCULAR: +Palpitations  GASTROINTESTINAL: No abdominal or epigastric pain. No nausea, vomiting, or hematemesis. No melena or hematochezia. Recently constipated and consumed prune juice for relief.  GENITOURINARY: No dysuria or hematuria.  NEUROLOGICAL: No headaches, memory loss, loss of strength, numbness, or tremors  SKIN: No itching, burning, rashes, or lesions   MUSCULOSKELETAL: No joint pain or swelling; No muscle, back, or extremity pain  PSYCHIATRIC: No depression, anxiety, mood swings, or difficulty sleeping

## 2024-11-11 NOTE — H&P ADULT - HISTORY OF PRESENT ILLNESS
HPI: 32 yo M w/ PMHx DMII, HTN, non ischemic cardiomyopathy, A fib pancreatitis, prior ETOH abuse claims to be currently sober, chronic cough, prior hospitalizations for DKA, noncompliant with meds who presents to the ED for hyperglycemia. Patient states he has not taken his diabetes meds in 2 days as he "forgot". Today developed nausea, dizziness, palpitations and difficulty breathing. Denies any alcohol or drug use. Notes taking allergy medications for chronic cough, feeling constipated and drinking frequent prune juice to resolve issue, Patient denies recent sick contacts or travel. Labs significant for WBC 25.68, Glucose 813, bicarb < 6, HAGMA, pH < 6.9, lactate 6.2, osmol 334, large acetones, Scr 1.61. U tox negative, ETOH < 10. CT CAP noting segmental wall thickening of distal esophagus concerning for esophagitis. Course complicated by patient found tachycardic and EKG revealed A fib with RVR. Patient given IVP Lopressor 5 mg x 3 with no relief. MICU consulted for DKA and A fib with RVR. IVP cardizem 25 mg x1 given with patient converting back to NSR. Patient denies having irregular heart rate in past.

## 2024-11-11 NOTE — CONSULT NOTE ADULT - SUBJECTIVE AND OBJECTIVE BOX
HISTORY OF PRESENT ILLNESS: 34 yo M w/ PMHx DMII, HTN, non ischemic cardiomyopathy, A fib, pancreatitis, prior ETOH use (last drink 3 months ago) presented to ED with nausea, dizziness, palpitations and difficulty breathing. Found to have DKA. GI consulted after found to have esophagitis on imaging. Patient denies history of acid reflux, heartburn, diarrhea, constipation. Charly family history of stomach cancer or colon cancer. Abdominal pain and nausea now resolved. He denies recent use of alcohol or smoking cigarette (last use was four months ago).  He never had an EGD in the past.      Review of Systems:  . Constitutional: No fever, chills  . HEENT: Negative  · Respiratory and Thorax: No shortness of breath, no cough  · Cardiovascular: No chest pain, palpitation, no dizziness  · Gastrointestinal: see above  · Genitourinary: No hematuria  · Musculoskeletal: Negative  · Neurological: negative  · Psychiatric: no agitation, no anxiety      PAST MEDICAL/SURGICAL HISTORY:  Alcohol abuse    Pancreatitis    HTN (hypertension)    Diabetes    Atrial fibrillation    Non-ischemic cardiomyopathy    No significant past surgical history      SOCIAL HISTORY:  - TOBACCO: Former use, last drink 4 months ago  - ALCOHOL: Former use, last drink 3 months ago  - ILLICIT DRUG USE: Denies    FAMILY HISTORY:  No known history of gastrointestinal or liver disease;  FH: hypertension        HOME MEDICATIONS:  Albuterol (Eqv-ProAir HFA) 90 mcg/inh inhalation aerosol: Inhale 1-2 puffs by mouth every 4 - 6 hours as needed (11 Nov 2024 07:52)  benzonatate 100 mg oral capsule: 1 cap(s) orally 3 times a day as needed for Cough (11 Nov 2024 07:50)  cetirizine 10 mg oral tablet: 1 tab(s) orally once a day (11 Nov 2024 07:52)  ergocalciferol 1.25 mg (50,000 intl units) oral capsule: 1 cap(s) orally once a week (11 Nov 2024 07:57)  NovoLOG FlexPen 100 units/mL injectable solution: using a sliding scale. Inject no more than 15 units in one day (11 Nov 2024 07:56)  omeprazole 40 mg oral delayed release capsule: 1 cap(s) orally once a day (before a meal) (11 Nov 2024 07:58)  Thera-M oral tablet: 1 tab(s) orally once a day (11 Nov 2024 07:57)    INPATIENT MEDICATIONS:  MEDICATIONS  (STANDING):  aspirin  chewable 81 milliGRAM(s) Oral daily  dextrose 5%. 1000 milliLiter(s) (100 mL/Hr) IV Continuous <Continuous>  dextrose 5%. 1000 milliLiter(s) (50 mL/Hr) IV Continuous <Continuous>  dextrose 50% Injectable 12.5 Gram(s) IV Push once  dextrose 50% Injectable 25 Gram(s) IV Push once  dextrose 50% Injectable 25 Gram(s) IV Push once  glucagon  Injectable 1 milliGRAM(s) IntraMuscular once  heparin   Injectable 5000 Unit(s) SubCutaneous every 8 hours  influenza   Vaccine 0.5 milliLiter(s) IntraMuscular once  insulin glargine Injectable (LANTUS) 20 Unit(s) SubCutaneous before breakfast  insulin lispro (ADMELOG) corrective regimen sliding scale   SubCutaneous at bedtime  insulin lispro (ADMELOG) corrective regimen sliding scale   SubCutaneous three times a day before meals  insulin lispro Injectable (ADMELOG) 10 Unit(s) SubCutaneous three times a day before meals  pantoprazole  Injectable 40 milliGRAM(s) IV Push daily  senna 2 Tablet(s) Oral at bedtime  sodium chloride 0.9%. 1000 milliLiter(s) (88 mL/Hr) IV Continuous <Continuous>    MEDICATIONS  (PRN):  dextrose Oral Gel 15 Gram(s) Oral once PRN Blood Glucose LESS THAN 70 milliGRAM(s)/deciliter  diazepam  Injectable 10 milliGRAM(s) IV Push every 1 hour PRN CIWA >8    ALLERGIES:  Allergy Status Unknown    T(C): 36.9 (11-11-24 @ 11:00), Max: 37.1 (11-11-24 @ 04:00)  HR: 90 (11-11-24 @ 13:00) (71 - 182)  BP: 90/56 (11-11-24 @ 13:00) (85/56 - 135/91)  RR: 22 (11-11-24 @ 13:00) (16 - 31)  SpO2: 100% (11-11-24 @ 13:00) (96% - 100%)    11-10-24 @ 07:01  -  11-11-24 @ 07:00  --------------------------------------------------------  IN: 2019 mL / OUT: 2100 mL / NET: -81 mL    11-11-24 @ 07:01  -  11-11-24 @ 14:09  --------------------------------------------------------  IN: 1212 mL / OUT: 875 mL / NET: 337 mL        PHYSICAL EXAM:    Constitutional: No acute distress  Neuro: Awake alert, oriented  HEENT: anicteric sclerae  CV: regular rate, regular rhythm  Pulm/chest: lung sounds clear bilaterally, no accessory muscle use noted  Abd: soft, nontender, nondistended +bowel sounds. No rigidity, rebound tenderness, or guarding  Ext: no edema  Skin: warm, no jaundice   Psych: calm, cooperative      LABS:             15.1   25.68 )-----------( 393      ( 11-10 @ 20:58 )             44.0         11-11    133[L]  |  103  |  12.1  ----------------------------<  139[H]  3.6   |  18.0[L]  |  0.58    Ca    8.3[L]      11 Nov 2024 13:09  Phos  1.0     11-11  Mg     1.3     11-11    TPro  5.1[L]  /  Alb  3.3  /  TBili  1.2  /  DBili  x   /  AST  11  /  ALT  9   /  AlkPhos  97  11-11    LIVER FUNCTIONS - ( 11 Nov 2024 09:25 )  Alb: 3.3 g/dL / Pro: 5.1 g/dL / ALK PHOS: 97 U/L / ALT: 9 U/L / AST: 11 U/L / GGT: x             Lipase: 39 U/L (11-10-24 @ 20:58)      Urinalysis Basic - ( 11 Nov 2024 13:09 )    Color: x / Appearance: x / SG: x / pH: x  Gluc: 139 mg/dL / Ketone: x  / Bili: x / Urobili: x   Blood: x / Protein: x / Nitrite: x   Leuk Esterase: x / RBC: x / WBC x   Sq Epi: x / Non Sq Epi: x / Bacteria: x      < from: CT Abdomen and Pelvis No Cont (11.11.24 @ 01:40) >  FINDINGS: Severe motion artifact limits detailed evaluation.  CHEST:  LUNGS AND LARGE AIRWAYS: Patent central airways. No pulmonary nodules.  PLEURA: No pleural effusion.  VESSELS: Within normal limits.  HEART: Heart sizeis normal. No pericardial effusion.  MEDIASTINUM AND JOSE: No lymphadenopathy.  CHEST WALL AND LOWER NECK: Within normal limits.    ABDOMEN AND PELVIS:  LIVER: Within normal limits.  BILE DUCTS: Normal caliber.  GALLBLADDER: Cholelithiasis.  SPLEEN: Within normal limits.  PANCREAS: Parenchymal calcification likely a sequela of pancreatitis.  ADRENALS: Within normal limits.  KIDNEYS/URETERS: Within normal limits.    BLADDER: Overdistended. No wall thickening or stones.  REPRODUCTIVE ORGANS: No prostatic enlargement.    BOWEL: Segmental wall thickening of distal esophagus (5-45), slightly   more progressed the since prior. No bowel obstruction. Appendix is normal.  PERITONEUM/RETROPERITONEUM: Within normal limits.  VESSELS: Within normal limits.  LYMPH NODES: No lymphadenopathy.  ABDOMINAL WALL: Within normal limits.  BONES: Within normal limits.    IMPRESSION:  Segmental wall thickening of distal esophagus, slightly more progressed   the since prior. Neoplastic or inflammatory conditions. Consider   endoscopic evaluation.    Cholelithiasis without evidence of cholecystitis.    < end of copied text >

## 2024-11-11 NOTE — PROGRESS NOTE ADULT - ASSESSMENT
32 y/o male with IDDM,  Afib, NICM (EF recovered), ETOH abuse (sober x2 years), former smoker, noncompliant, multiple DKA admissions presents with nausea, tachypnea and palpitations. Las showed ketoacidosis with blood glucose>600. Pt admits he hasn't taken his insulin in 3 days and was drinking prune juice for constipation. He received 6L IV fluids and was started on insulin drip. EKG showed Afib with  bpm. He was treated with IV Lopressor and Cardizem and converted to SR. DKA resolved and deemed stable for transfer to medicine.    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    ppx: lovenox

## 2024-11-11 NOTE — PROGRESS NOTE ADULT - SUBJECTIVE AND OBJECTIVE BOX
ICU transfer    seen in icu  feels well  no acute complaints  ros negative     MEDICATIONS  (STANDING):  aspirin  chewable 81 milliGRAM(s) Oral daily  dextrose 5%. 1000 milliLiter(s) (100 mL/Hr) IV Continuous <Continuous>  dextrose 5%. 1000 milliLiter(s) (50 mL/Hr) IV Continuous <Continuous>  dextrose 50% Injectable 12.5 Gram(s) IV Push once  dextrose 50% Injectable 25 Gram(s) IV Push once  dextrose 50% Injectable 25 Gram(s) IV Push once  glucagon  Injectable 1 milliGRAM(s) IntraMuscular once  heparin   Injectable 5000 Unit(s) SubCutaneous every 8 hours  influenza   Vaccine 0.5 milliLiter(s) IntraMuscular once  insulin lispro (ADMELOG) corrective regimen sliding scale   SubCutaneous three times a day before meals  insulin lispro (ADMELOG) corrective regimen sliding scale   SubCutaneous at bedtime  insulin lispro Injectable (ADMELOG) 10 Unit(s) SubCutaneous three times a day before meals  pantoprazole  Injectable 40 milliGRAM(s) IV Push daily  senna 2 Tablet(s) Oral at bedtime  sodium chloride 0.9%. 1000 milliLiter(s) (88 mL/Hr) IV Continuous <Continuous>    MEDICATIONS  (PRN):  dextrose Oral Gel 15 Gram(s) Oral once PRN Blood Glucose LESS THAN 70 milliGRAM(s)/deciliter  diazepam  Injectable 10 milliGRAM(s) IV Push every 1 hour PRN CIWA >8      Allergies    Allergy Status Unknown        Vital Signs Last 24 Hrs  T(C): 36.9 (11 Nov 2024 11:00), Max: 37.1 (11 Nov 2024 04:00)  T(F): 98.4 (11 Nov 2024 11:00), Max: 98.8 (11 Nov 2024 04:00)  HR: 90 (11 Nov 2024 13:00) (71 - 182)  BP: 90/56 (11 Nov 2024 13:00) (85/56 - 135/91)  BP(mean): 66 (11 Nov 2024 13:00) (63 - 87)  RR: 22 (11 Nov 2024 13:00) (16 - 31)  SpO2: 100% (11 Nov 2024 13:00) (96% - 100%)    Parameters below as of 11 Nov 2024 12:00  Patient On (Oxygen Delivery Method): room air        PHYSICAL EXAM:    GENERAL: NAD  CHEST/LUNG: Clear to ausculation bilaterally  HEART: Regular rate and rhythm; S1 S2;   ABDOMEN: Soft, Nontender,  Bowel sounds present  EXTREMITIES:  no edema   NERVOUS SYSTEM:  Alert & Oriented X3, Motor Strength 5/5 B/L upper and lower extremities      LABS:                        15.1   25.68 )-----------( 393      ( 10 Nov 2024 20:58 )             44.0     11-11    132[L]  |  103  |  12.9  ----------------------------<  167[H]  3.5   |  16.0[L]  |  0.61    Ca    8.2[L]      11 Nov 2024 09:25  Phos  1.0     11-11  Mg     1.3     11-11    TPro  5.1[L]  /  Alb  3.3  /  TBili  1.2  /  DBili  x   /  AST  11  /  ALT  9   /  AlkPhos  97  11-11      Urinalysis Basic - ( 11 Nov 2024 09:25 )    Color: x / Appearance: x / SG: x / pH: x  Gluc: 167 mg/dL / Ketone: x  / Bili: x / Urobili: x   Blood: x / Protein: x / Nitrite: x   Leuk Esterase: x / RBC: x / WBC x   Sq Epi: x / Non Sq Epi: x / Bacteria: x        CAPILLARY BLOOD GLUCOSE      POCT Blood Glucose.: 143 mg/dL (11 Nov 2024 13:07)  POCT Blood Glucose.: 205 mg/dL (11 Nov 2024 12:18)  POCT Blood Glucose.: 191 mg/dL (11 Nov 2024 11:04)  POCT Blood Glucose.: 153 mg/dL (11 Nov 2024 10:17)  POCT Blood Glucose.: 157 mg/dL (11 Nov 2024 09:23)  POCT Blood Glucose.: 241 mg/dL (11 Nov 2024 08:22)  POCT Blood Glucose.: 239 mg/dL (11 Nov 2024 07:23)  POCT Blood Glucose.: 249 mg/dL (11 Nov 2024 06:17)  POCT Blood Glucose.: 378 mg/dL (11 Nov 2024 06:07)  POCT Blood Glucose.: 263 mg/dL (11 Nov 2024 05:09)  POCT Blood Glucose.: 266 mg/dL (11 Nov 2024 04:21)  POCT Blood Glucose.: 231 mg/dL (11 Nov 2024 03:07)  POCT Blood Glucose.: 338 mg/dL (11 Nov 2024 02:15)  POCT Blood Glucose.: 410 mg/dL (11 Nov 2024 01:08)  POCT Blood Glucose.: 479 mg/dL (11 Nov 2024 00:04)  POCT Blood Glucose.: 495 mg/dL (11 Nov 2024 00:02)  POCT Blood Glucose.: >600 mg/dL (10 Nov 2024 23:00)  POCT Blood Glucose.: >600 mg/dL (10 Nov 2024 22:59)  POCT Blood Glucose.: >600 mg/dL (10 Nov 2024 22:08)  POCT Blood Glucose.: >600 mg/dL (10 Nov 2024 22:07)  POCT Blood Glucose.: >600 mg/dL (10 Nov 2024 20:43)  POCT Blood Glucose.: >600 mg/dL (10 Nov 2024 20:42)        RADIOLOGY & ADDITIONAL TESTS:

## 2024-11-11 NOTE — PROGRESS NOTE ADULT - ASSESSMENT
Assessment & Plan:   Assessment: 32 yo M with a pmhx DMII, pancreatitis, ETOH abuse, HTN, non ICM, A fib, chronic cough, frequent DKA admissions admitted for DKA with course complicated by A fib with RVR.    #DKA  #HAGMA  #A fib with RVR  #Leukocytosis  #SYED  #Lactic acidosis    ====================== NEUROLOGY=====================  diazepam  Injectable 10 milliGRAM(s) IV Push every 1 hour PRN CIWA >8    -ETOH abuse hx noted, ETOH < 10, but inconsistent timeline of sobriety. CIWA checks in place. Valium PRN for CIWA > 8.  -Tylenol for analgesia or fever PRN    ==================== RESPIRATORY======================  -Stable on room air  -Actively maintain SO2>92 for adequate tissue oxygenation, supplemental O2 PRN    ====================CARDIOVASCULAR==================  -A fib with RVR s/p IVP Lopressor x 3 with no relief. IVP cardizem 25 mg x1 given with conversion back to NSR.  -Cardiology consulted  -Patient noncompliant with home meds  - Will hold off PO meds for now  - Follow up TTE with non ICM hx, cardiology in agreement    ===================HEMATOLOGIC/ONC ===================  heparin   Injectable 5000 Unit(s) SubCutaneous every 8 hours for VTE prophylaxis    ===================== RENAL =========================  -Continue monitoring urine output  -BMP Q4H. Follow up pH 7.29 up from < 6.9. AG improved to 14 from 29. Bicarb increased  16 from   -Lactic acidosis downtrending now cleared from 3.1 t0o 1.4.  -SYED likely pre-renal from dehydration, resolving s/p IVF. Trend Scr daily. Monitor I/Os.  -Replete lytes to maintain K>4, Mg>2, Phos>3. K repletion given this AM for K 3.8.  ==================== GASTROINTESTINAL===================  dextrose 5% + lactated ringers. 1000 milliLiter(s) (200 mL/Hr) IV Continuous <Continuous>  pantoprazole  Injectable 40 milliGRAM(s) IV Push daily  potassium chloride    Tablet ER 40 milliEquivalent(s) Oral once  senna 2 Tablet(s) Oral at bedtime    -CT CAP noting segmental wall thickening of distal esophagus concerning for inflammation, ?esophagitis. NPO. Protonix for GI PPX    Diet: NPO  =======================    ENDOCRINE  =====================  insulin regular Infusion 5 Unit(s)/Hr (5 mL/Hr) IV Continuous <Continuous>    -DKA with HAGMA, s/p 6L IVF, insulin gtt started with LR IVF at 200 cc/hr, now transitioned to D5LR due to BG <250. Fingersticks Q1H, notify provider of glucose changes. Actively titrate insulin gtt as per provider.   -Previously on 28 units lantus and 10 units premeals as noted from prior hospitalization    ========================INFECTIOUS DISEASE================  cefTRIAXone Injectable. 1000 milliGRAM(s) IV Push every 24 hours    -Leukocytosis with unclear source of infection. Procal 1.42. Follow up cultures. Trend WBC and fever curves. Ordered empiric feftriaxone.      Mirta Duran MD Assessment & Plan:   Assessment: 34 yo M with a pmhx insulin dependent DM, pancreatitis, ETOH abuse, HTN, non ICM, A fib, chronic cough, frequent DKA admissions admitted for DKA with course complicated by A fib with RVR.    #DKA  #HAGMA  #A fib with RVR  #Leukocytosis  #SYED  #Lactic acidosis    ====================== NEUROLOGY=====================  diazepam  Injectable 10 milliGRAM(s) IV Push every 1 hour PRN CIWA >8    -ETOH abuse hx noted, ETOH < 10, but inconsistent timeline of sobriety. CIWA checks in place. Valium PRN for CIWA > 8.  -Tylenol for analgesia or fever PRN    ==================== RESPIRATORY======================  -Stable on room air  -Actively maintain SO2>92 for adequate tissue oxygenation, supplemental O2 PRN    ====================CARDIOVASCULAR==================  -A fib with RVR s/p IVP Lopressor x 3 with no relief. IVP cardizem 25 mg x1 given with conversion back to NSR.  -Cardiology consulted  -Patient noncompliant with home meds  - Will hold off PO meds for now  - Follow up TTE with non ICM hx, cardiology in agreement    ===================HEMATOLOGIC/ONC ===================  heparin   Injectable 5000 Unit(s) SubCutaneous every 8 hours for VTE prophylaxis    ===================== RENAL =========================  -Continue monitoring urine output  -BMP Q4H. Follow up pH 7.29 up from < 6.9. AG improved to 14 from 29. Bicarb increased  16 from   -Lactic acidosis downtrending now cleared from 3.1 t0o 1.4.  -SYED likely pre-renal from dehydration, resolving s/p IVF. Trend Scr daily. Monitor I/Os.  -Replete lytes to maintain K>4, Mg>2, Phos>3. K repletion given this AM for K 3.8.  ==================== GASTROINTESTINAL===================  dextrose 5% + lactated ringers. 1000 milliLiter(s) (200 mL/Hr) IV Continuous <Continuous>  pantoprazole  Injectable 40 milliGRAM(s) IV Push daily  potassium chloride    Tablet ER 40 milliEquivalent(s) Oral once  senna 2 Tablet(s) Oral at bedtime    -CT CAP noting segmental wall thickening of distal esophagus concerning for inflammation, ?esophagitis. NPO. Protonix for GI PPX    Diet: NPO  =======================    ENDOCRINE  =====================  insulin regular Infusion 5 Unit(s)/Hr (5 mL/Hr) IV Continuous <Continuous>    -DKA with HAGMA, s/p 6L IVF, insulin gtt started with LR IVF at 200 cc/hr, now transitioned to D5LR due to BG <250. Fingersticks Q1H, notify provider of glucose changes. Actively titrate insulin gtt as per provider.   -Previously on 28 units lantus and 10 units premeals as noted from prior hospitalization    ========================INFECTIOUS DISEASE================  cefTRIAXone Injectable. 1000 milliGRAM(s) IV Push every 24 hours    -Leukocytosis with unclear source of infection. Procal 1.42. Follow up cultures. Trend WBC and fever curves. Ordered empiric feftriaxone.      Mirta Duran MD  Fellow Assessment & Plan:   Assessment: 32 yo M with a pmhx insulin dependent DM, pancreatitis, ETOH abuse, HTN, non ICM, A fib, chronic cough, frequent DKA admissions admitted for DKA with course complicated by A fib with RVR.    #DKA  #HAGMA  #A fib with RVR  #Leukocytosis  #SYED  #Lactic acidosis    ====================== NEUROLOGY=====================  diazepam  Injectable 10 milliGRAM(s) IV Push every 1 hour PRN CIWA >8    -ETOH abuse hx noted, ETOH < 10, but inconsistent timeline of sobriety. CIWA checks in place. Valium PRN for CIWA > 8.  -Tylenol for analgesia or fever PRN    ==================== RESPIRATORY======================  -Stable on room air  -Actively maintain SO2>92 for adequate tissue oxygenation, supplemental O2 PRN    ====================CARDIOVASCULAR==================  -A fib with RVR s/p IVP Lopressor x 3 with no relief. IVP cardizem 25 mg x1 given with conversion back to NSR.  - Cardiology consulted  - Will hold off PO meds for now  - Follow up TTE with non ICM hx, cardiology in agreement    ===================HEMATOLOGIC/ONC ===================  heparin   Injectable 5000 Unit(s) SubCutaneous every 8 hours for VTE prophylaxis    ===================== RENAL =========================  -Continue monitoring urine output  -BMP Q4H. Follow up pH 7.29 up from < 6.9. AG improved to 14 from 29. Bicarb increased  16 from   -Lactic acidosis downtrending now cleared from 3.1 to 1.4.  -SYED likely pre-renal from dehydration, resolving s/p IVF. Trend Scr daily. Monitor I/Os.  -Replete lytes to maintain K>4, Mg>2, Phos>3.    ==================== GASTROINTESTINAL===================  dextrose 5% + lactated ringers. 1000 milliLiter(s) (200 mL/Hr) IV Continuous <Continuous>  pantoprazole  Injectable 40 milliGRAM(s) IV Push daily  potassium chloride    Tablet ER 40 milliEquivalent(s) Oral once  senna 2 Tablet(s) Oral at bedtime    -CT CAP noting segmental wall thickening of distal esophagus concerning for inflammation, ?esophagitis. NPO. Protonix for GI PPX    Diet: NPO  =======================    ENDOCRINE  =====================  insulin regular Infusion 5 Unit(s)/Hr (5 mL/Hr) IV Continuous <Continuous>    -DKA with HAGMA, s/p 6L IVF, insulin gtt started with LR IVF at 200 cc/hr, now transitioned to D5LR due to BG <250. Fingersticks Q1H, notify provider of glucose changes. Actively titrate insulin gtt as per provider.   -Previously on 28 units lantus and 10 units premeals as noted from prior hospitalization  -With improvement in blood glucose, bicarb, and anion gap closure, plan to bridge insulin GTT to subQ insulin. Will start lantus 30u and premeal lispro 10u with SSI.  -Plan for endocrinology consult    ========================INFECTIOUS DISEASE================  cefTRIAXone Injectable. 1000 milliGRAM(s) IV Push every 24 hours    -Leukocytosis with unclear source of infection. Procal 1.42. Follow up cultures. Trend WBC and fever curves. Ordered empiric ceftriaxone.      Mirta Duran MD  Fellow Assessment & Plan:   Assessment: 34 yo M with a pmhx insulin dependent DM, pancreatitis, ETOH abuse, HTN, non ICM, A fib, chronic cough, frequent DKA admissions admitted for DKA with course complicated by A fib with RVR.    #DKA  #HAGMA  #A fib with RVR  #Leukocytosis  #SYED  #Lactic acidosis    ====================== NEUROLOGY=====================  diazepam  Injectable 10 milliGRAM(s) IV Push every 1 hour PRN CIWA >8    -ETOH abuse hx noted, ETOH < 10, but inconsistent timeline of sobriety. CIWA checks in place. Valium PRN for CIWA > 8.  -Tylenol for analgesia or fever PRN    ==================== RESPIRATORY======================  -Stable on room air  -Actively maintain SO2>92 for adequate tissue oxygenation, supplemental O2 PRN    ====================CARDIOVASCULAR==================  -A fib with RVR s/p IVP Lopressor x 3 with no relief. IVP cardizem 25 mg x1 given with conversion back to NSR.  - Cardiology consulted  - Will hold off PO meds for now  - Follow up TTE with non ICM hx, cardiology in agreement    ===================HEMATOLOGIC/ONC ===================  heparin   Injectable 5000 Unit(s) SubCutaneous every 8 hours for VTE prophylaxis    ===================== RENAL =========================  -Continue monitoring urine output  -BMP Q4H. Follow up pH 7.29 up from < 6.9. AG improved to 14 from 29. Bicarb increased  16 from   -Lactic acidosis downtrending now cleared from 3.1 to 1.4.  -SYED likely pre-renal from dehydration, resolving s/p IVF. Trend Scr daily. Monitor I/Os.  -Replete lytes to maintain K>4, Mg>2, Phos>3.    ==================== GASTROINTESTINAL===================  dextrose 5% + lactated ringers. 1000 milliLiter(s) (200 mL/Hr) IV Continuous <Continuous>  pantoprazole  Injectable 40 milliGRAM(s) IV Push daily  potassium chloride    Tablet ER 40 milliEquivalent(s) Oral once  senna 2 Tablet(s) Oral at bedtime    -CT CAP noting segmental wall thickening of distal esophagus concerning for inflammation, esophagitis. NPO. Protonix for GI PPX  -Plan for GI consult    Diet: NPO  =======================    ENDOCRINE  =====================  insulin regular Infusion 5 Unit(s)/Hr (5 mL/Hr) IV Continuous <Continuous>    -DKA with HAGMA, s/p 6L IVF, insulin gtt started with LR IVF at 200 cc/hr, now transitioned to D5LR due to BG <250. Fingersticks Q1H, notify provider of glucose changes. Actively titrate insulin gtt as per provider.   -Previously on 28 units lantus and 10 units premeals as noted from prior hospitalization  -With improvement in blood glucose, bicarb, and anion gap closure, plan to bridge insulin GTT to subQ insulin. Will start lantus 30u and premeal lispro 10u with SSI.  -Plan for endocrinology consult    ========================INFECTIOUS DISEASE================  cefTRIAXone Injectable. 1000 milliGRAM(s) IV Push every 24 hours    -Leukocytosis with unclear source of infection. Procal 1.42. Follow up cultures. Trend WBC and fever curves.   -Initially ordered empiric ceftriaxone. Will discontinue given negative infectious workup and remains afebrile.      Mirta Duran MD  Fellow

## 2024-11-11 NOTE — PROGRESS NOTE ADULT - ATTENDING COMMENTS
I have personally seen and examined the patient. I fully participated in the care of this patient. I have made amendments to the documentation where necessary, and agree with the history, physical exam, and plan as documented by the Resident.    33 M w/ DM on insulin, chronic pancreatitis, EtOH use, NICM in 2022 but recovered EF now, Afib, frequent admissions for DKA due to not using his insulin, admitted to the MICU with DKA and Afib w/ RVR. His AG has now closed x 2. His symptoms improved. I asked him why he's not using his insulin and he told me he was drinking a large bottle of prune juice and didn't take his insulin because "he was being stupid". I told him he needs to use his insulin otherwise he will frequently continue to come to the hospital for DKA. I told him he needs to discuss with his family, including his mother whom he lives with. His mother is present as well, and I spoke to her with  (ID [id number]) that she needs to intervene and check in on him if he is not using his insulin. She told me that she will do that moving forward. Endocrine consulted by our team. He is stable for transfer to general medical floor.

## 2024-11-11 NOTE — PATIENT PROFILE ADULT - FALL HARM RISK - RISK INTERVENTIONS
Assistance OOB with selected safe patient handling equipment/Assistance with ambulation/Communicate Fall Risk and Risk Factors to all staff, patient, and family/Discuss with provider need for PT consult/Monitor for mental status changes/Monitor gait and stability/Provide patient with walking aids - walker, cane, crutches/Reinforce activity limits and safety measures with patient and family/Toileting schedule using arm’s reach rule for commode and bathroom/Use of alarms - bed, chair and/or voice tab/Visual Cue: Yellow wristband/Bed in lowest position, wheels locked, appropriate side rails in place/Call bell, personal items and telephone in reach/Instruct patient to call for assistance before getting out of bed or chair/Non-slip footwear when patient is out of bed/[place] to call system/Physically safe environment - no spills, clutter or unnecessary equipment/Purposeful Proactive Rounding/Room/bathroom lighting operational, light cord in reach

## 2024-11-11 NOTE — ED ADULT NURSE REASSESSMENT NOTE - NS ED NURSE REASSESS COMMENT FT1
MD Matthew aware of BS reading high. Per MD continue on insulin drip per order. Awaiting ICU recommendations.
pt axo4, denies any pain at this time updated on plans of care, medication running per MD orders, awaiting assessment by MICU team for admit
Pt . MICU NP Guo aware. Per NP continue insulin infusion per order. Pt appears to be in rapid afib at 145bpm, per NP administer meds per orders. Pt is A&Ox4. Respirations even and unlabored. Pt updated on plan of care

## 2024-11-11 NOTE — PROGRESS NOTE ADULT - SUBJECTIVE AND OBJECTIVE BOX
Patient is a 33y old  Male who presents for DKA, A fib with RVR (11 Nov 2024 04:05)    HPI: 32 yo M w/ PMHx DMII, HTN, non ischemic cardiomyopathy, A fib, prior ETOH abuse reporting he is currently sober, prior hospitalizations for DKA, noncompliant with meds who presents to the ED for hyperglycemia. Patient states he has not taken his diabetes meds in 2 days as he "forgot". He then developed nausea, dizziness, palpitations and difficulty breathing. Denies any alcohol or drug use. Notes taking allergy medications for chronic cough, feeling constipated and drinking frequent prune juice to resolve issue. Patient denies recent sick contacts or travel. Labs significant for WBC 25.68, Glucose 813, bicarb < 6, HAGMA, pH < 6.9, lactate 6.2, osmol 334, large acetones, Scr 1.61. U tox negative, ETOH < 10. CT CAP noting segmental wall thickening of distal esophagus concerning for esophagitis and cholelithiasis. Course complicated by patient found tachycardic and EKG revealed A fib with RVR. Patient given IVP Lopressor 5 mg x 3 with no relief. MICU consulted for DKA and A fib with RVR. IVP cardizem 25 mg x1 given with patient converting back to NSR.     Events last 24 hours: ***    PAST MEDICAL & SURGICAL HISTORY:  Alcohol abuse  Pancreatitis  HTN (hypertension)  Diabetes  Atrial fibrillation  Non-ischemic cardiomyopathy  No significant past surgical history    Allergies    Allergy Status Unknown      Medications:  cefTRIAXone Injectable. 1000 milliGRAM(s) IV Push every 24 hours  diazepam  Injectable 10 milliGRAM(s) IV Push every 1 hour PRN  heparin   Injectable 5000 Unit(s) SubCutaneous every 8 hours  pantoprazole  Injectable 40 milliGRAM(s) IV Push daily  senna 2 Tablet(s) Oral at bedtime  insulin regular Infusion 5 Unit(s)/Hr IV Continuous <Continuous>  dextrose 5% + lactated ringers. 1000 milliLiter(s) IV Continuous <Continuous>  potassium chloride    Tablet ER 40 milliEquivalent(s) Oral once  influenza   Vaccine 0.5 milliLiter(s) IntraMuscular once  chlorhexidine 2% Cloths 1 Application(s) Topical <User Schedule>      ICU Vital Signs Last 24 Hrs  T(C): 36.7 (11 Nov 2024 07:10), Max: 37.1 (11 Nov 2024 04:00)  T(F): 98.1 (11 Nov 2024 07:10), Max: 98.8 (11 Nov 2024 04:00)  HR: 83 (11 Nov 2024 07:10) (71 - 182)  BP: 91/59 (11 Nov 2024 07:00) (85/62 - 135/91)  BP(mean): 69 (11 Nov 2024 07:00) (69 - 87)  ABP: --  ABP(mean): --  RR: 16 (11 Nov 2024 07:10) (16 - 31)  SpO2: 100% (11 Nov 2024 07:10) (96% - 100%)    O2 Parameters below as of 11 Nov 2024 02:00  Patient On (Oxygen Delivery Method): room air        Physical Examination:    General: No acute distress.  Alert, oriented, interactive, nonfocal    HEENT: Pupils equal.  Symmetric.    PULM: Clear to auscultation bilaterally, no significant sputum production    CVS: Regular rate and rhythm, no murmurs, rubs, or gallops    ABD: Soft, nondistended, nontender, normoactive bowel sounds, no masses    EXT: No edema, nontender    SKIN: Warm and well perfused, no rashes noted.        I&O's Detail    10 Nov 2024 07:01  -  11 Nov 2024 07:00  --------------------------------------------------------  IN:    dextrose 5% + lactated ringers: 1000 mL    Insulin: 7 mL    Insulin: 3 mL    Insulin: 9 mL    Lactated Ringers Bolus: 1000 mL  Total IN: 2019 mL    OUT:    Voided (mL): 2100 mL  Total OUT: 2100 mL    Total NET: -81 mL      11 Nov 2024 07:01  -  11 Nov 2024 07:38  --------------------------------------------------------  IN:    dextrose 5% + lactated ringers: 200 mL    Insulin: 3 mL  Total IN: 203 mL    OUT:    Voided (mL): 450 mL  Total OUT: 450 mL    Total NET: -247 mL            LABS:                        15.1   25.68 )-----------( 393      ( 10 Nov 2024 20:58 )             44.0     11-11    134[L]  |  104  |  14.5  ----------------------------<  250[H]  3.8   |  16.0[L]  |  0.75    Ca    8.1[L]      11 Nov 2024 06:00  Phos  8.9     11-10  Mg     2.4     11-10    TPro  7.4  /  Alb  4.4  /  TBili  0.6  /  DBili  x   /  AST  21  /  ALT  16  /  AlkPhos  154[H]  11-10          CAPILLARY BLOOD GLUCOSE      POCT Blood Glucose.: 239 mg/dL (11 Nov 2024 07:23)      Urinalysis Basic - ( 11 Nov 2024 06:00 )    Color: x / Appearance: x / SG: x / pH: x  Gluc: 250 mg/dL / Ketone: x  / Bili: x / Urobili: x   Blood: x / Protein: x / Nitrite: x   Leuk Esterase: x / RBC: x / WBC x   Sq Epi: x / Non Sq Epi: x / Bacteria: x      CULTURES:           Patient is a 33y old  Male who presents for DKA, A fib with RVR (11 Nov 2024 04:05)    HPI: 32 yo M w/ PMHx insulin dependent DM, HTN, non ischemic cardiomyopathy, A fib, prior ETOH abuse reporting he is currently sober, prior hospitalizations for DKA, noncompliant with meds who presents to the ED for hyperglycemia. Patient states he has not taken his diabetes meds in 2 days as he "forgot". He then developed nausea, dizziness, palpitations and difficulty breathing. Denies any alcohol or drug use. Notes taking allergy medications for chronic cough, feeling constipated and drinking frequent prune juice to resolve issue. Patient denies recent sick contacts or travel. Labs significant for WBC 25.68, Glucose 813, bicarb < 6, HAGMA, pH < 6.9, lactate 6.2, osmol 334, large acetones, Scr 1.61. U tox negative, ETOH < 10. CT CAP noting segmental wall thickening of distal esophagus concerning for esophagitis and cholelithiasis. Course complicated by patient found tachycardic and EKG revealed A fib with RVR. Patient given IVP Lopressor 5 mg x 3 with no relief. MICU consulted for DKA and A fib with RVR. IVP cardizem 25 mg x1 given with patient converting back to NSR.     Events last 24 hours: Insulin drip titrated down to 5u/hr. Transitioned to d5LR @200cc/hr.    PAST MEDICAL & SURGICAL HISTORY:  Alcohol abuse  Pancreatitis  HTN (hypertension)  Diabetes  Atrial fibrillation  Non-ischemic cardiomyopathy  No significant past surgical history    Allergies    Allergy Status Unknown      Medications:  cefTRIAXone Injectable. 1000 milliGRAM(s) IV Push every 24 hours  diazepam  Injectable 10 milliGRAM(s) IV Push every 1 hour PRN  heparin   Injectable 5000 Unit(s) SubCutaneous every 8 hours  pantoprazole  Injectable 40 milliGRAM(s) IV Push daily  senna 2 Tablet(s) Oral at bedtime  insulin regular Infusion 5 Unit(s)/Hr IV Continuous <Continuous>  dextrose 5% + lactated ringers. 1000 milliLiter(s) IV Continuous <Continuous>  potassium chloride    Tablet ER 40 milliEquivalent(s) Oral once  influenza   Vaccine 0.5 milliLiter(s) IntraMuscular once  chlorhexidine 2% Cloths 1 Application(s) Topical <User Schedule>      ICU Vital Signs Last 24 Hrs  T(C): 36.7 (11 Nov 2024 07:10), Max: 37.1 (11 Nov 2024 04:00)  T(F): 98.1 (11 Nov 2024 07:10), Max: 98.8 (11 Nov 2024 04:00)  HR: 83 (11 Nov 2024 07:10) (71 - 182)  BP: 91/59 (11 Nov 2024 07:00) (85/62 - 135/91)  BP(mean): 69 (11 Nov 2024 07:00) (69 - 87)  ABP: --  ABP(mean): --  RR: 16 (11 Nov 2024 07:10) (16 - 31)  SpO2: 100% (11 Nov 2024 07:10) (96% - 100%)    O2 Parameters below as of 11 Nov 2024 02:00  Patient On (Oxygen Delivery Method): room air        Physical Examination:    General: No acute distress.  Alert, oriented, interactive, nonfocal    HEENT: Pupils equal.  Symmetric.    PULM: Clear to auscultation bilaterally, no significant sputum production    CVS: Regular rate and rhythm, no murmurs, rubs, or gallops    ABD: Soft, nondistended, nontender, normoactive bowel sounds, no masses    EXT: No edema, nontender    SKIN: Warm and well perfused, no rashes noted.        I&O's Detail    10 Nov 2024 07:01  -  11 Nov 2024 07:00  --------------------------------------------------------  IN:    dextrose 5% + lactated ringers: 1000 mL    Insulin: 7 mL    Insulin: 3 mL    Insulin: 9 mL    Lactated Ringers Bolus: 1000 mL  Total IN: 2019 mL    OUT:    Voided (mL): 2100 mL  Total OUT: 2100 mL    Total NET: -81 mL      11 Nov 2024 07:01  -  11 Nov 2024 07:38  --------------------------------------------------------  IN:    dextrose 5% + lactated ringers: 200 mL    Insulin: 3 mL  Total IN: 203 mL    OUT:    Voided (mL): 450 mL  Total OUT: 450 mL    Total NET: -247 mL            LABS:                        15.1   25.68 )-----------( 393      ( 10 Nov 2024 20:58 )             44.0     11-11    134[L]  |  104  |  14.5  ----------------------------<  250[H]  3.8   |  16.0[L]  |  0.75    Ca    8.1[L]      11 Nov 2024 06:00  Phos  8.9     11-10  Mg     2.4     11-10    TPro  7.4  /  Alb  4.4  /  TBili  0.6  /  DBili  x   /  AST  21  /  ALT  16  /  AlkPhos  154[H]  11-10          CAPILLARY BLOOD GLUCOSE      POCT Blood Glucose.: 239 mg/dL (11 Nov 2024 07:23)      Urinalysis Basic - ( 11 Nov 2024 06:00 )    Color: x / Appearance: x / SG: x / pH: x  Gluc: 250 mg/dL / Ketone: x  / Bili: x / Urobili: x   Blood: x / Protein: x / Nitrite: x   Leuk Esterase: x / RBC: x / WBC x   Sq Epi: x / Non Sq Epi: x / Bacteria: x      CULTURES:

## 2024-11-11 NOTE — CONSULT NOTE ADULT - NS ATTEND AMEND GEN_ALL_CORE FT
I agree with assessment and plan as above. Pt presented with DKA. CT imaging shows evidence of esophagitis. Continue PPI. Diet as tolerated. Will plan for EGD when optimized, likely Wed, 11/13.

## 2024-11-11 NOTE — CONSULT NOTE ADULT - PROBLEM SELECTOR RECOMMENDATION 9
-triggered by DKA  -telemetry monitoring  -converted to SR after IV Cardizem  -continue IV fluids  -pt previously on ASA 81 mg, will restart  -TTE  -outpatient follow up

## 2024-11-11 NOTE — H&P ADULT - ASSESSMENT
Assessment: 32 yo M with a pmhx DMII, pancreatitis, ETOH abuse, HTN, non ICM, A fib, chronic cough, frequent DKA admissions admitted for DKA with course complicated by A fib with RVR.    DKA  HAGMA  A fib with RVR  Leukocytosis  SYED  Lactic acidosis      Plan:   -DKA with HAGMA, s/p 6L IVF, insulin gtt started with LR IVF at 200 cc/hr. Fingersticks Q1H, notify provider of glucose changes. Actively titrate insulin gtt as per provider. Notify provider when  or less, will change LR to D5LR.  -BMP Q4H. Follow up pH 7.08 up from < 6.9. AG downtrending to 29, bicarb increased to 7. Lactic acidosis downtrending to 3.1. Continue to trend levels.  -Will transition to 28 units lantus and 10 units premeals as noted from prior hospitalization  -ETOH abuse hx noted, ETOH < 10, but inconsistent timeline of sobriety. CIWA checks in place. Valium PRN for CIWA > 8.  -Tylenol for analgesia or fever PRN.  -A fib with RVR s/p IVP Lopressor x 3 with no relief. IVP cardizem 25 mg x1 given with conversion back to NSR. Cardiology consulted, made aware patient prescribed medications but noncompliant. Will hold off PO meds for now. Follow up TTE with non ICM hx, cardiology in agreement.  -Actively maintain SO2>92 for adequate tissue oxygenation, supplemental O2 PRN.  -CT CAP noting segmental wall thickening of distal esophagus concerning for esop      CRITICAL CARE TIME SPENT: 70 minutes  Time spent evaluating/treating patient with medical issues that pose a high risk for life threatening deterioration, and/or end-organ damage, reviewing data/labs/imaging, discussing case with multidisciplinary team, discussing plan/goals of care with patient/family. Non-inclusive of procedure time. Date of entry of this note is equal to the date of services rendered.  Assessment: 34 yo M with a pmhx DMII, pancreatitis, ETOH abuse, HTN, non ICM, A fib, chronic cough, frequent DKA admissions admitted for DKA with course complicated by A fib with RVR.    DKA  HAGMA  A fib with RVR  Leukocytosis  SYED  Lactic acidosis      Plan:   -DKA with HAGMA, s/p 6L IVF, insulin gtt started with LR IVF at 200 cc/hr. Fingersticks Q1H, notify provider of glucose changes. Actively titrate insulin gtt as per provider. Notify provider when  or less, will change LR to D5LR.  -BMP Q4H. Follow up pH 7.08 up from < 6.9. AG downtrending to 29, bicarb increased to 7. Lactic acidosis downtrending to 3.1. Continue to trend levels.  -Will transition to 28 units lantus and 10 units premeals as noted from prior hospitalization  -ETOH abuse hx noted, ETOH < 10, but inconsistent timeline of sobriety. CIWA checks in place. Valium PRN for CIWA > 8.  -Tylenol for analgesia or fever PRN.  -A fib with RVR s/p IVP Lopressor x 3 with no relief. IVP cardizem 25 mg x1 given with conversion back to NSR. Cardiology consulted, made aware patient prescribed medications but noncompliant. Will hold off PO meds for now. Follow up TTE with non ICM hx, cardiology in agreement.  -Actively maintain SO2>92 for adequate tissue oxygenation, supplemental O2 PRN.  -CT CAP noting segmental wall thickening of distal esophagus concerning for inflammation, ?esophagitis. NPO. Protonix for GI PPX  -      CRITICAL CARE TIME SPENT: 70 minutes  Time spent evaluating/treating patient with medical issues that pose a high risk for life threatening deterioration, and/or end-organ damage, reviewing data/labs/imaging, discussing case with multidisciplinary team, discussing plan/goals of care with patient/family. Non-inclusive of procedure time. Date of entry of this note is equal to the date of services rendered.  Assessment: 34 yo M with a pmhx DMII, pancreatitis, ETOH abuse, HTN, non ICM, A fib, chronic cough, frequent DKA admissions admitted for DKA with course complicated by A fib with RVR.    DKA  HAGMA  A fib with RVR  Leukocytosis  SYED  Lactic acidosis      Plan:   -DKA with HAGMA, s/p 6L IVF, insulin gtt started with LR IVF at 200 cc/hr. Fingersticks Q1H, notify provider of glucose changes. Actively titrate insulin gtt as per provider. Notify provider when  or less, will change LR to D5LR.  -BMP Q4H. Follow up pH 7.08 up from < 6.9. AG downtrending to 29, bicarb increased to 7. Lactic acidosis downtrending to 3.1. Continue to trend levels.  -Will transition to 28 units lantus and 10 units premeals as noted from prior hospitalization  -ETOH abuse hx noted, ETOH < 10, but inconsistent timeline of sobriety. CIWA checks in place. Valium PRN for CIWA > 8.  -Tylenol for analgesia or fever PRN.  -A fib with RVR s/p IVP Lopressor x 3 with no relief. IVP cardizem 25 mg x1 given with conversion back to NSR. Cardiology consulted, made aware patient prescribed medications but noncompliant. Will hold off PO meds for now. Follow up TTE with non ICM hx, cardiology in agreement.  -Actively maintain SO2>92 for adequate tissue oxygenation, supplemental O2 PRN.  -CT CAP noting segmental wall thickening of distal esophagus concerning for inflammation, ?esophagitis. NPO. Protonix for GI PPX  -SYED likely pre-renal from dehydration, resolving s/p IVF. Trend Scr daily. Monitor I/Os to maintain UOP >0.5 cc/kg/hr. Replete lytes to maintain K>4, Mg>2, Phos>3.  -Leukocytosis with unclear source of infection. Procal 1.42. Follow up cultures. Trend WBC and fever curves. Ordered empiric CTX ABX.  -Heparin for DVT PPX, H&H stable, transfuse if Hgb < 7.      CRITICAL CARE TIME SPENT: 70 minutes  Time spent evaluating/treating patient with medical issues that pose a high risk for life threatening deterioration, and/or end-organ damage, reviewing data/labs/imaging, discussing case with multidisciplinary team, discussing plan/goals of care with patient/family. Non-inclusive of procedure time. Date of entry of this note is equal to the date of services rendered.  Assessment: 32 yo M with a pmhx DMII, pancreatitis, ETOH abuse, HTN, non ICM, A fib, chronic cough, frequent DKA admissions admitted for DKA with course complicated by A fib with RVR.    DKA  HAGMA  A fib with RVR  Leukocytosis  SYED  Lactic acidosis      Plan:    Endo:  -DKA with HAGMA, s/p 6L IVF, insulin gtt started with LR IVF at 200 cc/hr. Fingersticks Q1H, notify provider of glucose changes. Actively titrate insulin gtt as per provider. Notify provider when  or less, will change LR to D5LR.  -BMP Q4H. Follow up pH 7.08 up from < 6.9. AG downtrending to 29, bicarb increased to 7. Lactic acidosis downtrending to 3.1. Continue to trend levels.  -Will transition to 28 units lantus and 10 units premeals as noted from prior hospitalization    Neuro:  -ETOH abuse hx noted, ETOH < 10, but inconsistent timeline of sobriety. CIWA checks in place. Valium PRN for CIWA > 8.  -Tylenol for analgesia or fever PRN.    CV:  -A fib with RVR s/p IVP Lopressor x 3 with no relief. IVP cardizem 25 mg x1 given with conversion back to NSR. Cardiology consulted, made aware patient prescribed medications but noncompliant. Will hold off PO meds for now. Follow up TTE with non ICM hx, cardiology in agreement.    Pulm:  -Actively maintain SO2>92 for adequate tissue oxygenation, supplemental O2 PRN.    GI:  -CT CAP noting segmental wall thickening of distal esophagus concerning for inflammation, ?esophagitis. NPO. Protonix for GI PPX    Renal:  -SYED likely pre-renal from dehydration, resolving s/p IVF. Trend Scr daily. Monitor I/Os to maintain UOP >0.5 cc/kg/hr. Replete lytes to maintain K>4, Mg>2, Phos>3.    ID:  -Leukocytosis with unclear source of infection. Procal 1.42. Follow up cultures. Trend WBC and fever curves. Ordered empiric CTX ABX.    Heme:  -Heparin for DVT PPX, H&H stable, transfuse if Hgb < 7.      CRITICAL CARE TIME SPENT: 70 minutes  Time spent evaluating/treating patient with medical issues that pose a high risk for life threatening deterioration, and/or end-organ damage, reviewing data/labs/imaging, discussing case with multidisciplinary team, discussing plan/goals of care with patient/family. Non-inclusive of procedure time. Date of entry of this note is equal to the date of services rendered.  Assessment: 34 yo M with a pmhx DMII, pancreatitis, ETOH abuse, HTN, non ICM, A fib, chronic cough, frequent DKA admissions admitted for DKA with course complicated by A fib with RVR.    DKA  HAGMA  A fib with RVR  Leukocytosis  SYED  Lactic acidosis      Plan:    -DKA with HAGMA, s/p 6L IVF, insulin gtt started with LR IVF at 200 cc/hr. Fingersticks Q1H, notify provider of glucose changes. Actively titrate insulin gtt as per provider. Notify provider when  or less, will change LR to D5LR.  -BMP Q4H. Follow up pH 7.08 up from < 6.9. AG downtrending to 29, bicarb increased to 7. Lactic acidosis downtrending to 3.1. Continue to trend levels.  -Will transition to 28 units lantus and 10 units premeals as noted from prior hospitalization  -ETOH abuse hx noted, ETOH < 10, but inconsistent timeline of sobriety. CIWA checks in place. Valium PRN for CIWA > 8.  -Tylenol for analgesia or fever PRN.  -A fib with RVR s/p IVP Lopressor x 3 with no relief. IVP cardizem 25 mg x1 given with conversion back to NSR. Cardiology consulted, made aware patient prescribed medications but noncompliant. Will hold off PO meds for now. Follow up TTE with non ICM hx, cardiology in agreement.  -Actively maintain SO2>92 for adequate tissue oxygenation, supplemental O2 PRN.  -CT CAP noting segmental wall thickening of distal esophagus concerning for inflammation, ?esophagitis. NPO. Protonix for GI PPX  -SYED likely pre-renal from dehydration, resolving s/p IVF. Trend Scr daily. Monitor I/Os to maintain UOP >0.5 cc/kg/hr. Replete lytes to maintain K>4, Mg>2, Phos>3.  -Leukocytosis with unclear source of infection. Procal 1.42. Follow up cultures. Trend WBC and fever curves. Ordered empiric CTX ABX.  -Heparin for DVT PPX, H&H stable, transfuse if Hgb < 7.      CRITICAL CARE TIME SPENT: 70 minutes  Time spent evaluating/treating patient with medical issues that pose a high risk for life threatening deterioration, and/or end-organ damage, reviewing data/labs/imaging, discussing case with multidisciplinary team, discussing plan/goals of care with patient/family. Non-inclusive of procedure time. Date of entry of this note is equal to the date of services rendered.

## 2024-11-11 NOTE — PATIENT PROFILE ADULT - FALL HARM RISK - FACTORS
CIWA protocol initiation less than 96 hours/IV and/or equipment tethered to patient/Other medical problems/Other

## 2024-11-11 NOTE — CONSULT NOTE ADULT - SUBJECTIVE AND OBJECTIVE BOX
HPI:  32 yo M w/ PMHx DMII, HTN, non ischemic cardiomyopathy, A fib pancreatitis, prior ETOH abuse claims to be currently sober, chronic cough, prior hospitalizations for DKA, noncompliant with meds who presents to the ED for hyperglycemia. Patient states he has not taken his diabetes meds in 2 days as he "forgot". Today developed nausea, dizziness, palpitations and difficulty breathing. Denies any alcohol or drug use. Notes taking allergy medications for chronic cough, feeling constipated and drinking frequent prune juice to resolve issue, Patient denies recent sick contacts or travel. Labs significant for WBC 25.68, Glucose 813, bicarb < 6, HAGMA, pH < 6.9, lactate 6.2, osmol 334, large acetones, Scr 1.61. U tox negative, ETOH < 10. CT CAP noting segmental wall thickening of distal esophagus concerning for esophagitis. Course complicated by patient found tachycardic and EKG revealed A fib with RVR. Patient given IVP Lopressor 5 mg x 3 with no relief. MICU consulted for DKA and A fib with RVR. IVP cardizem 25 mg x1 given with patient converting back to NSR. Patient denies having irregular heart rate in past. (11 Nov 2024 03:07)    Consult for diabetes/DKA  Home regimen:    PAST MEDICAL & SURGICAL HISTORY:  Alcohol abuse  Pancreatitis  HTN (hypertension)  Diabetes  Atrial fibrillation  Non-ischemic cardiomyopathy  No significant past surgical history    FAMILY HISTORY:  FH: hypertension    SOCIAL HISTORY:    REVIEW OF SYSTEMS  Constitutional: No fever, no chills, no change in weight.  Eyes: No eye swelling, no blurry vision, no redness, no loss of vision.  Neck: No neck pain, no change in voice.  Lungs: No shortness of breath, no wheezing, no cough  CV: No chest pain, no palpitations, no pain with walking.  GI: No nausea, no vomiting, no constipation, no diarrhea, no abdominal pain  : No urinary frequency, no blood in urine, no urinary burning, no difficulty voiding.  Musculoskeletal: No muscle pain, no joint pain, no swelling.  Skin: No rash, no infections.  Neurologic: No headaches, no weakness, no burning or pain in feet, no tremor.  Endocrine: No heat intolerance, no cold intolerance, no increased sweating, no shakiness between meals.  Psych: No depression, no anxiety, no trouble concentrating    MEDICATIONS  (STANDING):  aspirin  chewable 81 milliGRAM(s) Oral daily  dextrose 5%. 1000 milliLiter(s) (100 mL/Hr) IV Continuous <Continuous>  dextrose 5%. 1000 milliLiter(s) (50 mL/Hr) IV Continuous <Continuous>  dextrose 50% Injectable 12.5 Gram(s) IV Push once  dextrose 50% Injectable 25 Gram(s) IV Push once  dextrose 50% Injectable 25 Gram(s) IV Push once  glucagon  Injectable 1 milliGRAM(s) IntraMuscular once  heparin   Injectable 5000 Unit(s) SubCutaneous every 8 hours  influenza   Vaccine 0.5 milliLiter(s) IntraMuscular once  insulin glargine Injectable (LANTUS) 20 Unit(s) SubCutaneous before breakfast  insulin lispro (ADMELOG) corrective regimen sliding scale   SubCutaneous three times a day before meals  insulin lispro (ADMELOG) corrective regimen sliding scale   SubCutaneous at bedtime  insulin lispro Injectable (ADMELOG) 10 Unit(s) SubCutaneous three times a day before meals  pantoprazole  Injectable 40 milliGRAM(s) IV Push daily  senna 2 Tablet(s) Oral at bedtime  sodium chloride 0.9%. 1000 milliLiter(s) (88 mL/Hr) IV Continuous <Continuous>    MEDICATIONS  (PRN):  dextrose Oral Gel 15 Gram(s) Oral once PRN Blood Glucose LESS THAN 70 milliGRAM(s)/deciliter  diazepam  Injectable 10 milliGRAM(s) IV Push every 1 hour PRN CIWA >8    Allergies  Allergy Status Unknown    PHYSICAL EXAM:  General: No apparent distress  Neck: Supple, trachea midline, no thyromegaly  Respiratory: Lungs clear bilaterally, normal rate, effort  Cardiac: +S1, S2, no m/r/g  GI: +BS, soft, non tender, non distended  Extremities: No peripheral edema, no pedal lesions  Neuro: A+O X3, no tremor  Pysch: Affect appropriate   Skin: No acanthosis     Vital Signs Last 24 Hrs  T(C): 36.9 (11 Nov 2024 11:00), Max: 37.1 (11 Nov 2024 04:00)  T(F): 98.4 (11 Nov 2024 11:00), Max: 98.8 (11 Nov 2024 04:00)  HR: 90 (11 Nov 2024 13:00) (71 - 182)  BP: 90/56 (11 Nov 2024 13:00) (85/56 - 135/91)  BP(mean): 66 (11 Nov 2024 13:00) (63 - 87)  RR: 22 (11 Nov 2024 13:00) (16 - 31)  SpO2: 100% (11 Nov 2024 13:00) (96% - 100%)    Parameters below as of 11 Nov 2024 12:00  Patient On (Oxygen Delivery Method): room air    LABS:                        15.1   25.68 )-----------( 393      ( 10 Nov 2024 20:58 )             44.0     11-11    132[L]  |  103  |  12.9  ----------------------------<  167[H]  3.5   |  16.0[L]  |  0.61    Ca    8.2[L]      11 Nov 2024 09:25  Phos  1.0     11-11  Mg     1.3     11-11    TPro  5.1[L]  /  Alb  3.3  /  TBili  1.2  /  DBili  x   /  AST  11  /  ALT  9   /  AlkPhos  97  11-11    Urinalysis Basic - ( 11 Nov 2024 09:25 )    Color: x / Appearance: x / SG: x / pH: x  Gluc: 167 mg/dL / Ketone: x  / Bili: x / Urobili: x   Blood: x / Protein: x / Nitrite: x   Leuk Esterase: x / RBC: x / WBC x   Sq Epi: x / Non Sq Epi: x / Bacteria: x    LIVER FUNCTIONS - ( 11 Nov 2024 09:25 )  Alb: 3.3 g/dL / Pro: 5.1 g/dL / ALK PHOS: 97 U/L / ALT: 9 U/L / AST: 11 U/L / GGT: x           POCT Blood Glucose.: 143 mg/dL (11-11-24 @ 13:07)  POCT Blood Glucose.: 205 mg/dL (11-11-24 @ 12:18)  POCT Blood Glucose.: 191 mg/dL (11-11-24 @ 11:04)  POCT Blood Glucose.: 153 mg/dL (11-11-24 @ 10:17)  POCT Blood Glucose.: 157 mg/dL (11-11-24 @ 09:23)  POCT Blood Glucose.: 241 mg/dL (11-11-24 @ 08:22)  POCT Blood Glucose.: 239 mg/dL (11-11-24 @ 07:23)  POCT Blood Glucose.: 249 mg/dL (11-11-24 @ 06:17)  POCT Blood Glucose.: 378 mg/dL (11-11-24 @ 06:07)  POCT Blood Glucose.: 263 mg/dL (11-11-24 @ 05:09)  POCT Blood Glucose.: 266 mg/dL (11-11-24 @ 04:21)  POCT Blood Glucose.: 231 mg/dL (11-11-24 @ 03:07)  POCT Blood Glucose.: 338 mg/dL (11-11-24 @ 02:15)  POCT Blood Glucose.: 410 mg/dL (11-11-24 @ 01:08)  POCT Blood Glucose.: 479 mg/dL (11-11-24 @ 00:04)  POCT Blood Glucose.: 495 mg/dL (11-11-24 @ 00:02)  POCT Blood Glucose.: >600 mg/dL (11-10-24 @ 23:00)  POCT Blood Glucose.: >600 mg/dL (11-10-24 @ 22:59)  POCT Blood Glucose.: >600 mg/dL (11-10-24 @ 22:08)  POCT Blood Glucose.: >600 mg/dL (11-10-24 @ 22:07)  POCT Blood Glucose.: >600 mg/dL (11-10-24 @ 20:43)  POCT Blood Glucose.: >600 mg/dL (11-10-24 @ 20:42)      RADIOLOGY & ADDITIONAL STUDIES:     HPI:  32 yo M w/ PMHx DMII, HTN, non ischemic cardiomyopathy, A fib pancreatitis, prior ETOH abuse claims to be currently sober, chronic cough, prior hospitalizations for DKA, noncompliant with meds who presents to the ED for hyperglycemia. Patient states he has not taken his diabetes meds in 2 days as he "forgot". Today developed nausea, dizziness, palpitations and difficulty breathing. Denies any alcohol or drug use.Feeling constipated and drinking frequent prune juice to resolve issue, Patient denies recent sick contacts or travel. U tox negative. CT CAP noting segmental wall thickening of distal esophagus concerning for esophagitis. Course complicated by patient found tachycardic and EKG revealed A fib with RVR. MICU. Consulted for DKA and A fib with RVR.     Consult for diabetes/DKA  Home regimen: insulin basal -bolus.     PAST MEDICAL & SURGICAL HISTORY:  Alcohol abuse  Pancreatitis  HTN (hypertension)  Diabetes  Atrial fibrillation  Non-ischemic cardiomyopathy  No significant past surgical history    FAMILY HISTORY:  FH: hypertension    SOCIAL HISTORY: no smokes, no drugs, drinks heavily.     REVIEW OF SYSTEMS  Constitutional: No fever, no chills  Eyes: No eye swelling, no blurry vision, no redness  Neck: No neck pain  Lungs: No shortness of breath, no wheezing, no cough  CV: No chest pain, no palpitations, no pain with walking.  GI: No nausea, no vomiting, no constipation, no diarrhea, no abdominal pain  : No urinary frequency, no blood in urine  Musculoskeletal: No muscle pain, no joint pain, no swelling.  Skin: No rash  Neurologic: No headaches, no weakness  Endocrine: No heat intolerance, no cold intolerance  Psych: No depression, no anxiety    MEDICATIONS  (STANDING):  aspirin  chewable 81 milliGRAM(s) Oral daily  dextrose 5%. 1000 milliLiter(s) (100 mL/Hr) IV Continuous <Continuous>  dextrose 5%. 1000 milliLiter(s) (50 mL/Hr) IV Continuous <Continuous>  dextrose 50% Injectable 12.5 Gram(s) IV Push once  dextrose 50% Injectable 25 Gram(s) IV Push once  dextrose 50% Injectable 25 Gram(s) IV Push once  glucagon  Injectable 1 milliGRAM(s) IntraMuscular once  heparin   Injectable 5000 Unit(s) SubCutaneous every 8 hours  influenza   Vaccine 0.5 milliLiter(s) IntraMuscular once  insulin glargine Injectable (LANTUS) 20 Unit(s) SubCutaneous before breakfast  insulin lispro (ADMELOG) corrective regimen sliding scale   SubCutaneous three times a day before meals  insulin lispro (ADMELOG) corrective regimen sliding scale   SubCutaneous at bedtime  insulin lispro Injectable (ADMELOG) 10 Unit(s) SubCutaneous three times a day before meals  pantoprazole  Injectable 40 milliGRAM(s) IV Push daily  senna 2 Tablet(s) Oral at bedtime  sodium chloride 0.9%. 1000 milliLiter(s) (88 mL/Hr) IV Continuous <Continuous>    MEDICATIONS  (PRN):  dextrose Oral Gel 15 Gram(s) Oral once PRN Blood Glucose LESS THAN 70 milliGRAM(s)/deciliter  diazepam  Injectable 10 milliGRAM(s) IV Push every 1 hour PRN CIWA >8    Allergies  Allergy Status Unknown    PHYSICAL EXAM:  General: No apparent distress  Neck: Supple, trachea midline, no thyromegaly  Respiratory: Lungs clear bilaterally, normal rate, effort  Cardiac: +S1, S2, no m/r/g  GI: +BS, soft, non tender, non distended  Extremities: No peripheral edema, no pedal lesions  Neuro: A+O X3, no tremor  Pysch: Affect appropriate   Skin: No acanthosis     Vital Signs Last 24 Hrs  T(C): 36.9 (11 Nov 2024 11:00), Max: 37.1 (11 Nov 2024 04:00)  T(F): 98.4 (11 Nov 2024 11:00), Max: 98.8 (11 Nov 2024 04:00)  HR: 90 (11 Nov 2024 13:00) (71 - 182)  BP: 90/56 (11 Nov 2024 13:00) (85/56 - 135/91)  BP(mean): 66 (11 Nov 2024 13:00) (63 - 87)  RR: 22 (11 Nov 2024 13:00) (16 - 31)  SpO2: 100% (11 Nov 2024 13:00) (96% - 100%)    Parameters below as of 11 Nov 2024 12:00  Patient On (Oxygen Delivery Method): room air    LABS:                        15.1   25.68 )-----------( 393      ( 10 Nov 2024 20:58 )             44.0   11-11    132[L]  |  103  |  12.9  ----------------------------<  167[H]  3.5   |  16.0[L]  |  0.61    Ca    8.2[L]      11 Nov 2024 09:25  Phos  1.0     11-11  Mg     1.3     11-11    TPro  5.1[L]  /  Alb  3.3  /  TBili  1.2  /  DBili  x   /  AST  11  /  ALT  9   /  AlkPhos  97  11-11    Urinalysis Basic - ( 11 Nov 2024 09:25 )  Color: x / Appearance: x / SG: x / pH: x  Gluc: 167 mg/dL / Ketone: x  / Bili: x / Urobili: x   Blood: x / Protein: x / Nitrite: x   Leuk Esterase: x / RBC: x / WBC x   Sq Epi: x / Non Sq Epi: x / Bacteria: x  LIVER FUNCTIONS - ( 11 Nov 2024 09:25 )  Alb: 3.3 g/dL / Pro: 5.1 g/dL / ALK PHOS: 97 U/L / ALT: 9 U/L / AST: 11 U/L / GGT: x         POCT Blood Glucose.: 143 mg/dL (11-11-24 @ 13:07)  POCT Blood Glucose.: 205 mg/dL (11-11-24 @ 12:18)  POCT Blood Glucose.: 191 mg/dL (11-11-24 @ 11:04)  POCT Blood Glucose.: 153 mg/dL (11-11-24 @ 10:17)  POCT Blood Glucose.: 157 mg/dL (11-11-24 @ 09:23)  POCT Blood Glucose.: 241 mg/dL (11-11-24 @ 08:22)  POCT Blood Glucose.: 239 mg/dL (11-11-24 @ 07:23)  POCT Blood Glucose.: 249 mg/dL (11-11-24 @ 06:17)  POCT Blood Glucose.: 378 mg/dL (11-11-24 @ 06:07)  POCT Blood Glucose.: 263 mg/dL (11-11-24 @ 05:09)  POCT Blood Glucose.: 266 mg/dL (11-11-24 @ 04:21)  POCT Blood Glucose.: 231 mg/dL (11-11-24 @ 03:07)  POCT Blood Glucose.: 338 mg/dL (11-11-24 @ 02:15)  POCT Blood Glucose.: 410 mg/dL (11-11-24 @ 01:08)  POCT Blood Glucose.: 479 mg/dL (11-11-24 @ 00:04)  POCT Blood Glucose.: 495 mg/dL (11-11-24 @ 00:02)  POCT Blood Glucose.: >600 mg/dL (11-10-24 @ 23:00)  POCT Blood Glucose.: >600 mg/dL (11-10-24 @ 22:59)  POCT Blood Glucose.: >600 mg/dL (11-10-24 @ 22:08)  POCT Blood Glucose.: >600 mg/dL (11-10-24 @ 22:07)  POCT Blood Glucose.: >600 mg/dL (11-10-24 @ 20:43)  POCT Blood Glucose.: >600 mg/dL (11-10-24 @ 20:42)      RADIOLOGY & ADDITIONAL STUDIES:  ACC: [id number] EXAM:  CT ABDOMEN AND PELVIS   ORDERED BY: LASHA SNOWDEN   ACC: [id number] EXAM:  CT CHEST   ORDERED BY: LASHA SNOWDEN   PROCEDURE DATE:  11/11/2024      INTERPRETATION:  CLINICAL INFORMATION: Rule out infection  COMPARISON: 3/11/2024, 11/12/2023 and multiple priors  CONTRAST/COMPLICATIONS:  IV Contrast: NONE  Oral Contrast: NONE  Complications: None reported at time of study completion    PROCEDURE:  CT of the Chest, Abdomen and Pelvis was performed. Sagittal and coronal reformats were performed.    FINDINGS: Severe motion artifact limits detailed evaluation.  CHEST:  LUNGS AND LARGE AIRWAYS: Patent central airways. No pulmonary nodules.  PLEURA: No pleural effusion.  VESSELS: Within normal limits.  HEART: Heart size is normal. No pericardial effusion.  MEDIASTINUM AND JOSE: No lymphadenopathy.  CHEST WALL AND LOWER NECK: Within normal limits.  ABDOMEN AND PELVIS:  LIVER: Within normal limits.  BILE DUCTS: Normal caliber.  GALLBLADDER: Cholelithiasis.  SPLEEN: Within normal limits.  PANCREAS: Parenchymal calcification likely a sequela of pancreatitis.  ADRENALS: Within normal limits.  KIDNEYS/URETERS: Within normal limits.    BLADDER: Overdistended. No wall thickening or stones.  REPRODUCTIVE ORGANS: No prostatic enlargement.    BOWEL: Segmental wall thickening of distal esophagus (5-45), slightly more progressed the since prior. No bowel obstruction. Appendix is normal.  PERITONEUM/RETROPERITONEUM: Within normal limits.  VESSELS: Within normal limits.  LYMPH NODES: No lymphadenopathy.  ABDOMINAL WALL: Within normal limits.  BONES: Within normal limits.    IMPRESSION:  Segmental wall thickening of distal esophagus, slightly more progressed the since prior. Neoplastic or inflammatory conditions. Consider endoscopic evaluation.    Cholelithiasis without evidence of cholecystitis.  -- End of Report ---    KACIE BRIONES MD; Attending Radiologist  This document has been electronically signed. Nov 11 2024  2:35AM

## 2024-11-11 NOTE — CONSULT NOTE ADULT - NS ATTEND AMEND GEN_ALL_CORE FT
atrial fibrillation with rapid ventricular rate .  treatment of underlying issues.  caridzem for rate control.  once stabilized with DKA ..  will plan for HARRY and cardioversion if remaoins in atrial fibrillation  CHADSVASC 2.  alcohol abuse.  hold anticoagulation for now.    will reevalaut efor long term anticoagulation on discharge.

## 2024-11-11 NOTE — CONSULT NOTE ADULT - ASSESSMENT
32 yo M w/ PMHx DMII, HTN, non ischemic cardiomyopathy, A fib, pancreatitis, prior ETOH use (last drink 3 months ago) admitted with DKA. GI consulted after found to have esophagitis on imaging.     Esophagitis   CT abd shows segmental wall thickening of distal esophagus, slightly more progressed from imaging on 03/2024  Can plan for an EGD on Wednesday 11/13/24  Diet as tolerating  Please keep NPO after midnight tomorrow for procedure on Wednesday 11/13/24  Protonix 40 mg BID  Avoid use of NSAIDs  CBC, BMP, coag, Type screen prior to procedure    
This is 32 y/o male with IDDM2, Afib, NICM (EF recovered), ETOH abuse (sober x2 years), former smoker, prior MICU admission for DKA who presents with nausea, tachypnea and palpitations. Las showed ketoacidosis with blood glucose>600. Pt admits he hasn't taken his insulin in 3 days and was drinking prune juice for constipation. He received 6L IV fluids and was started on insulin drip. EKG showed Afib with  bpm. He was treated with IV Lopressor and Cardizem and converted to SR. Pt was seen by [facility] on previous admissions but does not follow with anyone outpatient. 
33M with PMHx DMII, HTN, non ischemic cardiomyopathy, A fib pancreatitis, prior ETOH abuse claims to be currently sober, chronic cough, prior hospitalizations for DKA, noncompliant with meds who presents to the ED for hyperglycemia. Patient states he has not taken his diabetes meds in 2 days as he "forgot." Transferred to MICU for DKA, also with afib with RVR. CT CAP noting segmental wall thickening of distal esophagus concerning for esophagitis.    1. Poorly controlled pancreatic diabetes, s/p DKA due to non compliance  - Check a1c   - Received lantus 30 units this morning, ordered for lantus 20 units tomorrow morning  - Admelog 10 units TID   - Correction scale  - Diabetes education    2. Atrial fibrillation with RVR  - Converted to SR after IV Cardizem  - Continue ASA, care per cardiology    3. Esophageal thickening  - Continue protonix  - GI eval

## 2024-11-11 NOTE — CONSULT NOTE ADULT - SUBJECTIVE AND OBJECTIVE BOX
[facility]                                              CARDIOLOGY AT [place] [address]                                             Telephone: [phone]. Fax:[phone]                                                       CARDIOLOGY CONSULTATION NOTE                                                                                             History obtained by: Patient and medical record  Community Cardiologist: n/a   obtained: Yes [  ] No [ x ]  Reason for Consultation: atrial fibrillation  Available out pt records reviewed: Yes [x  ] No [  ]    Chief complaint:    Patient is a 33y old  Male who presents with a chief complaint of DKA, new onset A fib with RVR (11 Nov 2024 03:07)      HPI:  This is 34 y/o male with IDDM2, Afib, NICM (EF recovered), ETOH abuse (sober x2 years), former smoker, prior MICU admission for DKA who presents with nausea, tachypnea and palpitations. Las showed ketoacidosis with blood glucose>600. Pt admits he hasn't taken his insulin in 3 days and was drinking prune juice for constipation. He received 6L IV fluids and was started on insulin drip. EKG showed Afib with  bpm. He was treated with IV Lopressor and Cardizem and converted to SR. Pt was seen by [facility] on previous admissions but does not follow with anyone outpatient.       CARDIAC TESTING   ECHO:  < from: TTE Echo Complete w/o Contrast w/ Doppler (10.08.23 @ 09:12) >  Summary:   1. Left ventricular ejection fraction, by visual estimation, is 65 to   70%.   2. Normal global left ventricular systolic function.   3. Diastolic function is normal.   4. Normal right ventricular size and function, estimated PASP 25 mmHg.   5. Normal leftatrial size.   6. Trace mitral valve regurgitation.   7. Mild tricuspid regurgitation.   8. There is no evidence of pericardial effusion.   9. Compared to the prior TTE study from 5/26/21, LV systolic function/EF   have improved.    Darrin Cespedes DO Electronically signed on 10/8/2023 at 12:10:18 PM    < end of copied text >      STRESS: n/a    CATH: n/a    ELECTROPHYSIOLOGY: n/a    PAST MEDICAL HISTORY  Alcohol abuse    Pancreatitis    HTN (hypertension)    Diabetes    Atrial fibrillation    Non-ischemic cardiomyopathy        PAST SURGICAL HISTORY  No significant past surgical history        SOCIAL HISTORY: lives with mother, unemployed  CIGARETTES:   former smoker, quit 2 years ago  ALCOHOL: prior alcohol abuse, quit 2 years ago  DRUGS: denies    FAMILY HISTORY:  FH: hypertension      Family History of Cardiovascular Disease:  Yes [  ] No [  ]  Coronary Artery Disease in first degree relative: Yes [  ] No [  ]  Sudden Cardiac Death in First degree relative: Yes [  ] No [  ]    HOME MEDICATIONS:  Basaglar KwikPen 100 units/mL subcutaneous solution: 28 unit(s) subcutaneous once a day (at bedtime) (01 Jul 2024 12:28)  HumaLOG KwikPen 100 units/mL injectable solution: 10 unit(s) subcutaneous 3 times a day (before meals) (01 Jul 2024 12:28)      CURRENT OTHER MEDICATIONS:  diazepam  Injectable 10 milliGRAM(s) IV Push every 1 hour PRN CIWA >8  pantoprazole  Injectable 40 milliGRAM(s) IV Push daily  senna 2 Tablet(s) Oral at bedtime  cefTRIAXone Injectable. 1000 milliGRAM(s) IV Push every 24 hours, Stop order after: 7 Days  chlorhexidine 2% Cloths 1 Application(s) Topical <User Schedule>  dextrose 5% + lactated ringers. 1000 milliLiter(s) (200 mL/Hr) IV Continuous <Continuous>  heparin   Injectable 5000 Unit(s) SubCutaneous every 8 hours  insulin regular Infusion 2 Unit(s)/Hr (2 mL/Hr) IV Continuous <Continuous>      ALLERGIES:   Allergy Status Unknown      REVIEW OF SYMPTOMS:   CONSTITUTIONAL: No fever, no chills, no weight loss, no weight gain, no fatigue   ENMT:  No vertigo; No sinus or throat pain  NECK: No pain or stiffness  CARDIOVASCULAR: No chest pain, no dyspnea, no syncope/presyncope, + palpitations, no dizziness, no Orthopnea, no Paroxsymal nocturnal dyspnea  RESPIRATORY: no Shortness of breath, no cough, no wheezing  : No dysuria, no hematuria   GI: No dark color stool, + nausea, no diarrhea, no constipation, + abdominal pain   NEURO: No headache, no slurred speech   MUSCULOSKELETAL: No joint pain or swelling; No muscle, back, or extremity pain  PSYCH: No agitation, no anxiety.    ALL OTHER REVIEW OF SYSTEMS ARE NEGATIVE.    VITAL SIGNS:  T(C): 36.4 (11-11-24 @ 02:00), Max: 36.4 (11-11-24 @ 00:30)  T(F): 97.5 (11-11-24 @ 02:00), Max: 97.6 (11-11-24 @ 00:30)  HR: 82 (11-11-24 @ 03:00) (71 - 182)  BP: 90/63 (11-11-24 @ 03:00) (88/71 - 135/91)  RR: 29 (11-11-24 @ 03:00) (18 - 31)  SpO2: 100% (11-11-24 @ 03:00) (96% - 100%)    INTAKE AND OUTPUT:     11-10 @ 07:01  -  11-11 @ 04:09  --------------------------------------------------------  IN: 1207 mL / OUT: 1450 mL / NET: -243 mL        PHYSICAL EXAM:  Constitutional: Comfortable . No acute distress.   HEENT: Atraumatic and normocephalic , neck is supple . no JVD. No carotid bruit.  CNS: A&Ox3. No focal deficits.   Respiratory: CTAB, unlabored   Cardiovascular: RRR normal s1 s2. No murmurs  Gastrointestinal: Soft, non-tender. +Bowel sounds.   Extremities: 2+ Peripheral Pulses, No clubbing, cyanosis, or edema  Psychiatric: Calm . no agitation.   Skin: Warm and dry, no ulcers on extremities     LABS:                            15.1   25.68 )-----------( 393      ( 10 Nov 2024 20:58 )             44.0     11-11    132[L]  |  97  |  17.4  ----------------------------<  377[H]  4.3   |  7.0[LL]  |  1.07    Ca    7.5[L]      11 Nov 2024 01:10  Phos  8.9     11-10  Mg     2.4     11-10    TPro  7.4  /  Alb  4.4  /  TBili  0.6  /  DBili  x   /  AST  21  /  ALT  16  /  AlkPhos  154[H]  11-10      Urinalysis Basic - ( 11 Nov 2024 01:10 )    Color: x / Appearance: x / SG: x / pH: x  Gluc: 377 mg/dL / Ketone: x  / Bili: x / Urobili: x   Blood: x / Protein: x / Nitrite: x   Leuk Esterase: x / RBC: x / WBC x   Sq Epi: x / Non Sq Epi: x / Bacteria: x        INTERPRETATION OF TELEMETRY: Afib with RVR, SR    ECG: Afib with  bpm  Prior ECG: Yes [ x ] No [  ]    RADIOLOGY & ADDITIONAL STUDIES:    X-ray:    CT scan:   < from: CT Chest No Cont (11.11.24 @ 01:40) >  IMPRESSION:  Segmental wall thickening of distal esophagus, slightly more progressed   the since prior. Neoplastic or inflammatory conditions. Consider   endoscopic evaluation.    Cholelithiasis without evidence of cholecystitis.    --- End of Report ---      KACIE BRIONES MD; Attending Radiologist  This document has been electronically signed. Nov 11 2024  2:35AM    < end of copied text >    MRI:   US:

## 2024-11-12 DIAGNOSIS — Z01.810 ENCOUNTER FOR PREPROCEDURAL CARDIOVASCULAR EXAMINATION: ICD-10-CM

## 2024-11-12 LAB
A1C WITH ESTIMATED AVERAGE GLUCOSE RESULT: 13.5 % — HIGH (ref 4–5.6)
ANION GAP SERPL CALC-SCNC: 9 MMOL/L — SIGNIFICANT CHANGE UP (ref 5–17)
BUN SERPL-MCNC: 12 MG/DL — SIGNIFICANT CHANGE UP (ref 8–20)
CALCIUM SERPL-MCNC: 7.4 MG/DL — LOW (ref 8.4–10.5)
CHLORIDE SERPL-SCNC: 108 MMOL/L — SIGNIFICANT CHANGE UP (ref 96–108)
CO2 SERPL-SCNC: 19 MMOL/L — LOW (ref 22–29)
CREAT SERPL-MCNC: 0.71 MG/DL — SIGNIFICANT CHANGE UP (ref 0.5–1.3)
EGFR: 123 ML/MIN/1.73M2 — SIGNIFICANT CHANGE UP
ESTIMATED AVERAGE GLUCOSE: 341 MG/DL — HIGH (ref 68–114)
GLUCOSE BLDC GLUCOMTR-MCNC: 179 MG/DL — HIGH (ref 70–99)
GLUCOSE BLDC GLUCOMTR-MCNC: 188 MG/DL — HIGH (ref 70–99)
GLUCOSE BLDC GLUCOMTR-MCNC: 216 MG/DL — HIGH (ref 70–99)
GLUCOSE BLDC GLUCOMTR-MCNC: 97 MG/DL — SIGNIFICANT CHANGE UP (ref 70–99)
GLUCOSE SERPL-MCNC: 184 MG/DL — HIGH (ref 70–99)
HCT VFR BLD CALC: 26.5 % — LOW (ref 39–50)
HGB BLD-MCNC: 9.6 G/DL — LOW (ref 13–17)
MAGNESIUM SERPL-MCNC: 2 MG/DL — SIGNIFICANT CHANGE UP (ref 1.6–2.6)
MCHC RBC-ENTMCNC: 31 PG — SIGNIFICANT CHANGE UP (ref 27–34)
MCHC RBC-ENTMCNC: 36.2 G/DL — HIGH (ref 32–36)
MCV RBC AUTO: 85.5 FL — SIGNIFICANT CHANGE UP (ref 80–100)
PHOSPHATE SERPL-MCNC: 2.4 MG/DL — SIGNIFICANT CHANGE UP (ref 2.4–4.7)
PLATELET # BLD AUTO: 161 K/UL — SIGNIFICANT CHANGE UP (ref 150–400)
POTASSIUM SERPL-MCNC: 3.2 MMOL/L — LOW (ref 3.5–5.3)
POTASSIUM SERPL-SCNC: 3.2 MMOL/L — LOW (ref 3.5–5.3)
RBC # BLD: 3.1 M/UL — LOW (ref 4.2–5.8)
RBC # FLD: 15.1 % — HIGH (ref 10.3–14.5)
SODIUM SERPL-SCNC: 136 MMOL/L — SIGNIFICANT CHANGE UP (ref 135–145)
WBC # BLD: 4.65 K/UL — SIGNIFICANT CHANGE UP (ref 3.8–10.5)
WBC # FLD AUTO: 4.65 K/UL — SIGNIFICANT CHANGE UP (ref 3.8–10.5)

## 2024-11-12 PROCEDURE — 99232 SBSQ HOSP IP/OBS MODERATE 35: CPT

## 2024-11-12 PROCEDURE — 99233 SBSQ HOSP IP/OBS HIGH 50: CPT

## 2024-11-12 PROCEDURE — 93010 ELECTROCARDIOGRAM REPORT: CPT

## 2024-11-12 RX ORDER — ENOXAPARIN SODIUM 40MG/0.4ML
50 SYRINGE (ML) SUBCUTANEOUS EVERY 12 HOURS
Refills: 0 | Status: DISCONTINUED | OUTPATIENT
Start: 2024-11-12 | End: 2024-11-13

## 2024-11-12 RX ORDER — POTASSIUM CHLORIDE 10 MEQ
40 TABLET, EXTENDED RELEASE ORAL ONCE
Refills: 0 | Status: COMPLETED | OUTPATIENT
Start: 2024-11-12 | End: 2024-11-12

## 2024-11-12 RX ORDER — INSULIN LISPRO 100/ML
8 VIAL (ML) SUBCUTANEOUS
Refills: 0 | Status: DISCONTINUED | OUTPATIENT
Start: 2024-11-12 | End: 2024-11-14

## 2024-11-12 RX ADMIN — Medication 50 MILLIGRAM(S): at 17:38

## 2024-11-12 RX ADMIN — Medication 20 UNIT(S): at 08:20

## 2024-11-12 RX ADMIN — Medication 81 MILLIGRAM(S): at 08:20

## 2024-11-12 RX ADMIN — Medication 4: at 08:21

## 2024-11-12 RX ADMIN — Medication 8 UNIT(S): at 17:38

## 2024-11-12 RX ADMIN — HEPARIN SODIUM 5000 UNIT(S): 10000 INJECTION INTRAVENOUS; SUBCUTANEOUS at 12:37

## 2024-11-12 RX ADMIN — HEPARIN SODIUM 5000 UNIT(S): 10000 INJECTION INTRAVENOUS; SUBCUTANEOUS at 06:12

## 2024-11-12 RX ADMIN — Medication 8 UNIT(S): at 12:36

## 2024-11-12 RX ADMIN — Medication 2: at 17:38

## 2024-11-12 RX ADMIN — Medication 2 TABLET(S): at 21:37

## 2024-11-12 RX ADMIN — PANTOPRAZOLE SODIUM 40 MILLIGRAM(S): 40 TABLET, DELAYED RELEASE ORAL at 08:20

## 2024-11-12 RX ADMIN — Medication 10 UNIT(S): at 08:21

## 2024-11-12 RX ADMIN — Medication 40 MILLIEQUIVALENT(S): at 12:38

## 2024-11-12 NOTE — PROGRESS NOTE ADULT - SUBJECTIVE AND OBJECTIVE BOX
[facility]                                                         CARDIOLOGY AT [facility]                                                                  [address], [place] [address]                                                         Telephone: [phone]. Fax:[phone]                                                                             PROGRESS NOTE    Reason for follow up: Afib   Update: Pt seen and examined at the bedside. currently NSR on telemetry. Echo showed EF 55-60%, normal LV and RV size and function. Pt has no complaints.       Review of symptoms:   Cardiac:  No chest pain. No dyspnea. No palpitations.  Respiratory: no cough. No dyspnea  Gastrointestinal: No diarrhea. No abdominal pain. No bleeding.   Neuro: No focal neuro complaints.    Vitals:  T(C): 36.8 (11-12-24 @ 07:38), Max: 37.2 (11-12-24 @ 00:23)  HR: 80 (11-12-24 @ 07:38) (78 - 102)  BP: 106/70 (11-12-24 @ 07:38) (88/52 - 111/74)  RR: 18 (11-12-24 @ 07:38) (15 - 25)  SpO2: 100% (11-12-24 @ 07:38) (99% - 100%)  Wt(kg): --  I&O's Summary    11 Nov 2024 07:01  -  12 Nov 2024 07:00  --------------------------------------------------------  IN: 1652 mL / OUT: 1375 mL / NET: 277 mL      Weight (kg): 51.8 (11-11 @ 02:00)    PHYSICAL EXAM:  Appearance: Comfortable. No acute distress  HEENT:  Atraumatic. Normocephalic.  Normal oral mucosa  Neurologic: A & O x 3, no gross focal deficits.  Cardiovascular: RRR S1 S2, No murmur, no rubs/gallops. No JVD  Respiratory: Lungs clear to auscultation, unlabored   Gastrointestinal:  Soft, Non-tender, + BS  Lower Extremities: 2+ Peripheral Pulses, No clubbing, cyanosis, or edema  Psychiatry: Patient is calm. No agitation.   Skin: warm and dry.    CURRENT CARDIAC MEDICATIONS:      CURRENT OTHER MEDICATIONS:  diazepam  Injectable 10 milliGRAM(s) IV Push every 1 hour PRN CIWA >8  pantoprazole  Injectable 40 milliGRAM(s) IV Push daily  senna 2 Tablet(s) Oral at bedtime  dextrose 50% Injectable 12.5 Gram(s) IV Push once, Stop order after: 1 Doses  dextrose 50% Injectable 25 Gram(s) IV Push once, Stop order after: 1 Doses  dextrose 50% Injectable 25 Gram(s) IV Push once, Stop order after: 1 Doses  dextrose Oral Gel 15 Gram(s) Oral once, Stop order after: 1 Doses PRN Blood Glucose LESS THAN 70 milliGRAM(s)/deciliter  glucagon  Injectable 1 milliGRAM(s) IntraMuscular once, Stop order after: 1 Doses  insulin glargine Injectable (LANTUS) 20 Unit(s) SubCutaneous before breakfast  insulin lispro (ADMELOG) corrective regimen sliding scale   SubCutaneous three times a day before meals  insulin lispro (ADMELOG) corrective regimen sliding scale   SubCutaneous at bedtime  insulin lispro Injectable (ADMELOG) 10 Unit(s) SubCutaneous three times a day before meals  aspirin  chewable 81 milliGRAM(s) Oral daily  dextrose 5%. 1000 milliLiter(s) (100 mL/Hr) IV Continuous <Continuous>  dextrose 5%. 1000 milliLiter(s) (50 mL/Hr) IV Continuous <Continuous>  heparin   Injectable 5000 Unit(s) SubCutaneous every 8 hours  influenza   Vaccine 0.5 milliLiter(s) IntraMuscular once      LABS:	 	                            9.6    4.65  )-----------( 161      ( 12 Nov 2024 05:10 )             26.5     11-12    136  |  108  |  12.0  ----------------------------<  184[H]  3.2[L]   |  19.0[L]  |  0.71    Ca    7.4[L]      12 Nov 2024 05:10  Phos  2.4     11-12  Mg     2.0     11-12    TPro  5.1[L]  /  Alb  3.3  /  TBili  1.2  /  DBili  x   /  AST  11  /  ALT  9   /  AlkPhos  97  11-11      Lipid Profile:   HgA1c: 13.5%   TSH:     TELEMETRY: NSR   ECG: NSR      DIAGNOSTIC TESTING:  [x ] Echocardiogram:   < from: TTE W or WO Ultrasound Enhancing Agent (11.11.24 @ 11:02) >   1. Technically difficult image quality.   2. Left ventricular systolic function is normal with an ejection fraction visually estimated at 55 to 60 %.   3. Normal left ventricular diastolic function.   4. Normal right ventricular cavity size and normal right ventricular systolic function.   5. No prior echocardiogram is available for comparison.    < end of copied text >  [ x]  Catheterization: < from: Cardiac Cath Lab - Adult (05.10.21 @ 14:10) >  VENTRICLES: EF calculated by contrast ventriculography was 50 %.  CORONARY VESSELS: The coronary circulation is right dominant.  LM:   --  LM: Normal.  LAD:   --  LAD: Normal. The vessel was medium to large sized.  CX:   --  Circumflex: Normal. The vessel was very large sized.  RCA:   --  RCA:Normal.  COMPLICATIONS: No complications occurred during the cath lab visit.  DIAGNOSTIC IMPRESSIONS: Angiographically normal coronaries  Borderline normal LV function  DIAGNOSTIC RECOMMENDATIONS: continue medical therapy for Non ischemic  cardiomyopathy    < end of copied text >    [ ] Stress Test:    OTHER:

## 2024-11-12 NOTE — PROGRESS NOTE ADULT - ASSESSMENT
33M with PMHx DMII, HTN, non ischemic cardiomyopathy, A fib pancreatitis, prior ETOH abuse claims to be currently sober, chronic cough, prior hospitalizations for DKA, noncompliant with meds who presents to the ED for hyperglycemia. Patient states he has not taken his diabetes meds in 2 days as he "forgot." Transferred to MICU for DKA, also with afib with RVR. CT CAP noting segmental wall thickening of distal esophagus concerning for esophagitis.    1. Poorly controlled pancreatic diabetes, s/p DKA due to non compliance, a1c 13.5%  - Continue lantus 20 units every morning (received lantus 30 units yesterday)  - Continue admelog 10 units TID   - Correction scale  - Diabetes education    2. Atrial fibrillation with RVR  - Converted to SR after IV Cardizem  - Continue ASA, care per cardiology    3. Esophageal thickening  - Continue protonix  - GI following, plan for EGD on Wednesday 33M with PMHx DMII, HTN, non ischemic cardiomyopathy, A fib pancreatitis, prior ETOH abuse claims to be currently sober, chronic cough, prior hospitalizations for DKA, noncompliant with meds who presents to the ED for hyperglycemia. Patient states he has not taken his diabetes meds in 2 days as he "forgot." Transferred to MICU for DKA, also with afib with RVR. CT CAP noting segmental wall thickening of distal esophagus concerning for esophagitis.    1. Poorly controlled pancreatic diabetes, s/p DKA due to non compliance, a1c 13.5%  - Continue lantus 20 units every morning (received lantus 30 units yesterday)  - Decrease admelog to 8 units TID  - Correction scale  - Diabetes education    2. Atrial fibrillation with RVR  - Converted to SR after IV Cardizem  - Continue ASA, care per cardiology    3. Esophageal thickening  - Continue protonix  - GI following, plan for EGD on Wednesday

## 2024-11-12 NOTE — PROGRESS NOTE ADULT - PROBLEM SELECTOR PLAN 1
- triggered by DKA.   - monitor on telemetry   - converted back to NSR after IV Cardizem   - c/w ASA 81 mg po daily   - TTE EF 55-60%, normal LV and RV function     - [id number] 2   - hold anticoagulation for now, will need to evaluate for long term anticoagulation on discharge.   - will need outpatient cardiology follow up. - triggered by DKA.   - monitor on telemetry   - converted back to NSR after IV Cardizem   - c/w ASA 81 mg po daily   - TTE EF 55-60%, normal LV and RV function     - [id number] 2   - hold anticoagulation for now, will need outpatient cardiology follow up to assess need for anticoagulation  - will need [facility] outpatient to monitor for recurrent Afib.   - will need outpatient cardiology follow up.    No further inpatient cardiac work up at this time.  Will sign off.  Please reconsult if needed. - triggered by DKA.   - monitor on telemetry   - converted back to NSR after IV Cardizem   - currently NSR on telemetry   - c/w ASA 81 mg po daily   - TTE EF 55-60%, normal LV and RV function     - [id number] 2   - hold anticoagulation for now, will need outpatient cardiology follow up to assess need for anticoagulation  - will need [facility] outpatient to monitor for recurrent Afib.   - will need outpatient cardiology follow up.    No further inpatient cardiac work up at this time.  Will sign off.  Please reconsult if needed. - triggered by DKA.   - monitor on telemetry   - converted back to NSR after IV Cardizem   - currently NSR on telemetry   - c/w ASA 81 mg po daily   - TTE EF 55-60%, normal LV and RV function     - [id number] 2- start eliquis 5mg bid  - will need [facility] outpatient to assess AF burden   - will need outpatient cardiology follow up.    No further inpatient cardiac work up at this time.  Will sign off.  Please reconsult if needed.

## 2024-11-12 NOTE — PROGRESS NOTE ADULT - SUBJECTIVE AND OBJECTIVE BOX
Ankit Alas M.D.    Patient is a 33y old  Male who presents with a chief complaint of DKA, new onset A fib with RVR (12 Nov 2024 12:24)      SUBJECTIVE / OVERNIGHT EVENTS: no concerns.     Patient denies chest pain, SOB, abd pain, N/V, fever, chills, dysuria or any other complaints. All remainder ROS negative.     MEDICATIONS  (STANDING):  aspirin  chewable 81 milliGRAM(s) Oral daily  dextrose 5%. 1000 milliLiter(s) (100 mL/Hr) IV Continuous <Continuous>  dextrose 5%. 1000 milliLiter(s) (50 mL/Hr) IV Continuous <Continuous>  dextrose 50% Injectable 12.5 Gram(s) IV Push once  dextrose 50% Injectable 25 Gram(s) IV Push once  dextrose 50% Injectable 25 Gram(s) IV Push once  enoxaparin Injectable 50 milliGRAM(s) SubCutaneous every 12 hours  glucagon  Injectable 1 milliGRAM(s) IntraMuscular once  influenza   Vaccine 0.5 milliLiter(s) IntraMuscular once  insulin glargine Injectable (LANTUS) 20 Unit(s) SubCutaneous before breakfast  insulin lispro (ADMELOG) corrective regimen sliding scale   SubCutaneous three times a day before meals  insulin lispro (ADMELOG) corrective regimen sliding scale   SubCutaneous at bedtime  insulin lispro Injectable (ADMELOG) 8 Unit(s) SubCutaneous three times a day with meals  pantoprazole  Injectable 40 milliGRAM(s) IV Push daily  senna 2 Tablet(s) Oral at bedtime    MEDICATIONS  (PRN):  dextrose Oral Gel 15 Gram(s) Oral once PRN Blood Glucose LESS THAN 70 milliGRAM(s)/deciliter  diazepam  Injectable 10 milliGRAM(s) IV Push every 1 hour PRN CIWA >8      I&O's Summary    11 Nov 2024 07:01  -  12 Nov 2024 07:00  --------------------------------------------------------  IN: 1652 mL / OUT: 1375 mL / NET: 277 mL        PHYSICAL EXAM:  Vital Signs Last 24 Hrs  T(C): 36.9 (12 Nov 2024 12:40), Max: 37.2 (12 Nov 2024 00:23)  T(F): 98.5 (12 Nov 2024 12:40), Max: 99 (12 Nov 2024 00:23)  HR: 99 (12 Nov 2024 12:40) (78 - 99)  BP: 107/69 (12 Nov 2024 12:40) (90/62 - 107/69)  BP(mean): 82 (12 Nov 2024 12:40) (68 - 82)  RR: 18 (12 Nov 2024 12:40) (15 - 20)  SpO2: 98% (12 Nov 2024 12:40) (98% - 100%)    Parameters below as of 12 Nov 2024 12:40  Patient On (Oxygen Delivery Method): room air        CONSTITUTIONAL: NAD, well-groomed  ENMT: Moist oral mucosa, no pharyngeal injection or exudates; normal dentition  RESPIRATORY: Normal respiratory effort; lungs are clear to auscultation bilaterally  CARDIOVASCULAR: Regular rate and rhythm, normal S1 and S2, no murmur/rub/gallop; No lower extremity edema; Peripheral pulses are 2+ bilaterally  ABDOMEN: Nontender to palpation, normoactive bowel sounds, no rebound/guarding; No hepatosplenomegaly  MUSCLOSKELETAL:  Normal gait; no clubbing or cyanosis of digits; no joint swelling or tenderness to palpation  PSYCH: A+O x3; affect appropriate  NEUROLOGY: CN 2-12 are intact and symmetric; no gross sensory deficits;   SKIN: No rashes; no palpable lesions    LABS:                        9.6    4.65  )-----------( 161      ( 12 Nov 2024 05:10 )             26.5     11-12    136  |  108  |  12.0  ----------------------------<  184[H]  3.2[L]   |  19.0[L]  |  0.71    Ca    7.4[L]      12 Nov 2024 05:10  Phos  2.4     11-12  Mg     2.0     11-12    TPro  5.1[L]  /  Alb  3.3  /  TBili  1.2  /  DBili  x   /  AST  11  /  ALT  9   /  AlkPhos  97  11-11          Urinalysis Basic - ( 12 Nov 2024 05:10 )    Color: x / Appearance: x / SG: x / pH: x  Gluc: 184 mg/dL / Ketone: x  / Bili: x / Urobili: x   Blood: x / Protein: x / Nitrite: x   Leuk Esterase: x / RBC: x / WBC x   Sq Epi: x / Non Sq Epi: x / Bacteria: x        Culture - Urine (collected 10 Nov 2024 22:00)  Source: Clean Catch Clean Catch (Midstream)  Final Report (11 Nov 2024 21:34):    <10,000 CFU/mL Normal Urogenital Zee    Culture - Blood (collected 10 Nov 2024 21:22)  Source: .Blood BLOOD  Preliminary Report (12 Nov 2024 02:02):    No growth at 24 hours      CAPILLARY BLOOD GLUCOSE      POCT Blood Glucose.: 97 mg/dL (12 Nov 2024 12:10)  POCT Blood Glucose.: 216 mg/dL (12 Nov 2024 08:01)  POCT Blood Glucose.: 301 mg/dL (11 Nov 2024 21:06)  POCT Blood Glucose.: 119 mg/dL (11 Nov 2024 16:38)      RADIOLOGY & ADDITIONAL TESTS:  Results Reviewed:   Imaging Personally Reviewed:  Electrocardiogram Personally Reviewed:

## 2024-11-12 NOTE — PROGRESS NOTE ADULT - ASSESSMENT
32 y/o male with IDDM,  Afib, NICM (EF recovered), ETOH abuse (sober x2 years), former smoker, noncompliant, multiple DKA admissions presents with nausea, tachypnea and palpitations. Las showed ketoacidosis with blood glucose>600. Pt admits he hasn't taken his insulin in 3 days and was drinking prune juice for constipation. He received 6L IV fluids and was started on insulin drip. EKG showed Afib with  bpm. He was treated with IV Lopressor and Cardizem and converted to SR. DKA resolved and deemed stable for transfer to medicine.    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 Las showed ketoacidosis with blood glucose>600. Pt admits he hasn't taken his insulin in 3 days and was drinking prune juice for constipation. He received 6L IV fluids and was started on insulin drip. EKG showed Afib with  bpm. He was treated with IV Lopressor and Cardizem and converted to SR. DKA resolved and deemed stable for transfer to medicine.    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

## 2024-11-12 NOTE — PROGRESS NOTE ADULT - ASSESSMENT
32 yo M w/ PMHx DMII, HTN, non ischemic cardiomyopathy, A fib, pancreatitis, prior ETOH use (last drink 3 months ago) admitted with DKA. GI consulted after found to have esophagitis on imaging.     Concern for esophagitis on CT  CT abd shows segmental wall thickening of distal esophagus, slightly more progressed from imaging on 03/2024  Keep NPO at midnight for EGD on Wednesday 11/13/24  Diet as tolerated  Protonix 40 mg BID  Avoid use of NSAIDs

## 2024-11-12 NOTE — PROGRESS NOTE ADULT - SUBJECTIVE AND OBJECTIVE BOX
Chief Complaint:  Patient is a 33y old  Male who presents with a chief complaint of DKA, new onset A fib with RVR (12 Nov 2024 10:02)      HPI/ 24 hr events: Patient seen and examined at bedside. No new complaints this am. Denies abdominal pain, nausea, vomiting, diarrhea, constipation, black or bloody stools. Tolerating diet.       REVIEW OF SYSTEMS:   General: Negative  HEENT: Negative  CV: Negative  Respiratory: Negative  GI: See HPI  : Negative  MSK: Negative  Hematologic: Negative  Skin: Negative    MEDICATIONS:   MEDICATIONS  (STANDING):  aspirin  chewable 81 milliGRAM(s) Oral daily  dextrose 5%. 1000 milliLiter(s) (100 mL/Hr) IV Continuous <Continuous>  dextrose 5%. 1000 milliLiter(s) (50 mL/Hr) IV Continuous <Continuous>  dextrose 50% Injectable 12.5 Gram(s) IV Push once  dextrose 50% Injectable 25 Gram(s) IV Push once  dextrose 50% Injectable 25 Gram(s) IV Push once  glucagon  Injectable 1 milliGRAM(s) IntraMuscular once  heparin   Injectable 5000 Unit(s) SubCutaneous every 8 hours  influenza   Vaccine 0.5 milliLiter(s) IntraMuscular once  insulin glargine Injectable (LANTUS) 20 Unit(s) SubCutaneous before breakfast  insulin lispro (ADMELOG) corrective regimen sliding scale   SubCutaneous three times a day before meals  insulin lispro (ADMELOG) corrective regimen sliding scale   SubCutaneous at bedtime  insulin lispro Injectable (ADMELOG) 10 Unit(s) SubCutaneous three times a day before meals  pantoprazole  Injectable 40 milliGRAM(s) IV Push daily  potassium chloride    Tablet ER 40 milliEquivalent(s) Oral once  senna 2 Tablet(s) Oral at bedtime    MEDICATIONS  (PRN):  dextrose Oral Gel 15 Gram(s) Oral once PRN Blood Glucose LESS THAN 70 milliGRAM(s)/deciliter  diazepam  Injectable 10 milliGRAM(s) IV Push every 1 hour PRN CIWA >8            DIET:  Diet, NPO after Midnight:      NPO Start Date: 12-Nov-2024,   NPO Start Time: 23:59  Except Medications (11-12-24 @ 08:48) [Active]  Diet, Consistent Carbohydrate w/Evening Snack (11-11-24 @ 09:26) [Active]          ALLERGIES:   Allergies    Allergy Status Unknown    Intolerances        VITAL SIGNS:   Vital Signs Last 24 Hrs  T(C): 36.8 (12 Nov 2024 07:38), Max: 37.2 (12 Nov 2024 00:23)  T(F): 98.2 (12 Nov 2024 07:38), Max: 99 (12 Nov 2024 00:23)  HR: 80 (12 Nov 2024 07:38) (78 - 102)  BP: 106/70 (12 Nov 2024 07:38) (90/56 - 111/74)  BP(mean): 76 (12 Nov 2024 05:00) (66 - 85)  RR: 18 (12 Nov 2024 07:38) (15 - 22)  SpO2: 100% (12 Nov 2024 07:38) (99% - 100%)    Parameters below as of 12 Nov 2024 07:38  Patient On (Oxygen Delivery Method): room air      I&O's Summary    11 Nov 2024 07:01  -  12 Nov 2024 07:00  --------------------------------------------------------  IN: 1652 mL / OUT: 1375 mL / NET: 277 mL        PHYSICAL EXAM:   GENERAL:  No acute distress  HEENT:  NC/AT, conjunctiva clear, sclera anicteric  CHEST:  No increased effort  HEART:  Regular rate  ABDOMEN:  Soft, non-tender, non-distended, normoactive bowel sounds, no rebound or guarding  EXTREMITIES: No edema  SKIN:  Warm, dry  NEURO:  Calm, cooperative    LABS:                        9.6    4.65  )-----------( 161      ( 12 Nov 2024 05:10 )             26.5     Hemoglobin: 9.6 g/dL (11-12-24 @ 05:10)  Hemoglobin: 10.8 g/dL (11-11-24 @ 13:09)  Hemoglobin: 15.1 g/dL (11-10-24 @ 20:58)    11-12    136  |  108  |  12.0  ----------------------------<  184[H]  3.2[L]   |  19.0[L]  |  0.71    Ca    7.4[L]      12 Nov 2024 05:10  Phos  2.4     11-12  Mg     2.0     11-12    TPro  5.1[L]  /  Alb  3.3  /  TBili  1.2  /  DBili  x   /  AST  11  /  ALT  9   /  AlkPhos  97  11-11    LIVER FUNCTIONS - ( 11 Nov 2024 09:25 )  Alb: 3.3 g/dL / Pro: 5.1 g/dL / ALK PHOS: 97 U/L / ALT: 9 U/L / AST: 11 U/L / GGT: x                 Culture - Urine (collected 10 Nov 2024 22:00)  Source: Clean Catch Clean Catch (Midstream)  Final Report (11 Nov 2024 21:34):    <10,000 CFU/mL Normal Urogenital Zee    Culture - Blood (collected 10 Nov 2024 21:22)  Source: .Blood BLOOD  Preliminary Report (12 Nov 2024 02:02):    No growth at 24 hours                                          RADIOLOGY & ADDITIONAL STUDIES:      ACC: [id number] EXAM:  CT ABDOMEN AND PELVIS   ORDERED BY: LASHA SNOWDEN     ACC: [id number] EXAM:  CT CHEST   ORDERED BY: LASHA SNOWDEN     PROCEDURE DATE:  11/11/2024          INTERPRETATION:  CLINICAL INFORMATION: Rule out infection    COMPARISON: 3/11/2024, 11/12/2023 and multiple priors    CONTRAST/COMPLICATIONS:  IV Contrast: NONE  Oral Contrast: NONE  Complications: None reported at time of study completion    PROCEDURE:  CT of the Chest, Abdomen and Pelvis was performed.  Sagittal and coronalreformats were performed.    FINDINGS: Severe motion artifact limits detailed evaluation.  CHEST:  LUNGS AND LARGE AIRWAYS: Patent central airways. No pulmonary nodules.  PLEURA: No pleural effusion.  VESSELS: Within normal limits.  HEART: Heart sizeis normal. No pericardial effusion.  MEDIASTINUM AND JOSE: No lymphadenopathy.  CHEST WALL AND LOWER NECK: Within normal limits.    ABDOMEN AND PELVIS:  LIVER: Within normal limits.  BILE DUCTS: Normal caliber.  GALLBLADDER: Cholelithiasis.  SPLEEN: Within normal limits.  PANCREAS: Parenchymal calcification likely a sequela of pancreatitis.  ADRENALS: Within normal limits.  KIDNEYS/URETERS: Within normal limits.    BLADDER: Overdistended. No wall thickening or stones.  REPRODUCTIVE ORGANS: No prostatic enlargement.    BOWEL: Segmental wall thickening of distal esophagus (5-45), slightly   more progressed the since prior. No bowel obstruction. Appendix is normal.  PERITONEUM/RETROPERITONEUM: Within normal limits.  VESSELS: Within normal limits.  LYMPH NODES: No lymphadenopathy.  ABDOMINAL WALL: Within normal limits.  BONES: Within normal limits.    IMPRESSION:  Segmental wall thickening of distal esophagus, slightly more progressed   the since prior. Neoplastic or inflammatory conditions. Consider   endoscopic evaluation.    Cholelithiasis without evidence of cholecystitis.    --- End of Report ---            KACIE BRIONES MD; Attending Radiologist  This document has been electronically signed. Nov 11 2024  2:35AM  11-11-24 @ 01:40

## 2024-11-12 NOTE — PROGRESS NOTE ADULT - SUBJECTIVE AND OBJECTIVE BOX
INTERVAL EVENTS:  Follow up diabetes management. Glucoses 110-301 over last 24 hrs. Pt denies nausea/vomiting, reports good appetite.     MEDICATIONS  (STANDING):  aspirin  chewable 81 milliGRAM(s) Oral daily  dextrose 5%. 1000 milliLiter(s) (100 mL/Hr) IV Continuous <Continuous>  dextrose 5%. 1000 milliLiter(s) (50 mL/Hr) IV Continuous <Continuous>  dextrose 50% Injectable 12.5 Gram(s) IV Push once  dextrose 50% Injectable 25 Gram(s) IV Push once  dextrose 50% Injectable 25 Gram(s) IV Push once  glucagon  Injectable 1 milliGRAM(s) IntraMuscular once  heparin   Injectable 5000 Unit(s) SubCutaneous every 8 hours  influenza   Vaccine 0.5 milliLiter(s) IntraMuscular once  insulin glargine Injectable (LANTUS) 20 Unit(s) SubCutaneous before breakfast  insulin lispro (ADMELOG) corrective regimen sliding scale   SubCutaneous three times a day before meals  insulin lispro (ADMELOG) corrective regimen sliding scale   SubCutaneous at bedtime  insulin lispro Injectable (ADMELOG) 10 Unit(s) SubCutaneous three times a day before meals  pantoprazole  Injectable 40 milliGRAM(s) IV Push daily  potassium chloride    Tablet ER 40 milliEquivalent(s) Oral once  senna 2 Tablet(s) Oral at bedtime    MEDICATIONS  (PRN):  dextrose Oral Gel 15 Gram(s) Oral once PRN Blood Glucose LESS THAN 70 milliGRAM(s)/deciliter  diazepam  Injectable 10 milliGRAM(s) IV Push every 1 hour PRN CIWA >8    Allergies  Allergy Status Unknown    Vital Signs Last 24 Hrs  T(C): 36.8 (12 Nov 2024 07:38), Max: 37.2 (12 Nov 2024 00:23)  T(F): 98.2 (12 Nov 2024 07:38), Max: 99 (12 Nov 2024 00:23)  HR: 80 (12 Nov 2024 07:38) (78 - 102)  BP: 106/70 (12 Nov 2024 07:38) (88/52 - 111/74)  BP(mean): 76 (12 Nov 2024 05:00) (63 - 85)  RR: 18 (12 Nov 2024 07:38) (15 - 25)  SpO2: 100% (12 Nov 2024 07:38) (99% - 100%)    Parameters below as of 12 Nov 2024 07:38  Patient On (Oxygen Delivery Method): room air    PHYSICAL EXAM:  General: No apparent distress  Respiratory: Lungs clear bilaterally  Cardiac: +S1, S2, no m/r/g  GI: +BS, soft, non tender, non distended  Extremities: No peripheral edema  Neuro: A+O X3      LABS:                        9.6    4.65  )-----------( 161      ( 12 Nov 2024 05:10 )             26.5     11-12    136  |  108  |  12.0  ----------------------------<  184[H]  3.2[L]   |  19.0[L]  |  0.71    Ca    7.4[L]      12 Nov 2024 05:10  Phos  2.4     11-12  Mg     2.0     11-12    TPro  5.1[L]  /  Alb  3.3  /  TBili  1.2  /  DBili  x   /  AST  11  /  ALT  9   /  AlkPhos  97  11-11    Urinalysis Basic - ( 12 Nov 2024 05:10 )    Color: x / Appearance: x / SG: x / pH: x  Gluc: 184 mg/dL / Ketone: x  / Bili: x / Urobili: x   Blood: x / Protein: x / Nitrite: x   Leuk Esterase: x / RBC: x / WBC x   Sq Epi: x / Non Sq Epi: x / Bacteria: x    POCT Blood Glucose.: 216 mg/dL (11-12-24 @ 08:01)  POCT Blood Glucose.: 301 mg/dL (11-11-24 @ 21:06)  POCT Blood Glucose.: 119 mg/dL (11-11-24 @ 16:38)  POCT Blood Glucose.: 143 mg/dL (11-11-24 @ 13:07)  POCT Blood Glucose.: 205 mg/dL (11-11-24 @ 12:18)  POCT Blood Glucose.: 191 mg/dL (11-11-24 @ 11:04)  POCT Blood Glucose.: 153 mg/dL (11-11-24 @ 10:17)

## 2024-11-12 NOTE — PROGRESS NOTE ADULT - PROBLEM SELECTOR PLAN 2
Cardiac Risk Stratification for Procedure  - METS >4  - RCRI Risk Stratification: Class II  Risk 6.0%     Risk of Major Cardiac Event   - RCRI: Estimated Risk of Adverse Outcome with Non-cardiac Surgery: Low risk   - Estimated Rate of Myocardial Infarction, Pulmonary Edema, Ventricular Fibrillation, Cardiac Arrest, or Complete Heart Block is 0.9%       - GI may proceed with EGD with the understanding of the above risk profile. No absolute cardiac contraindication to the procedure/surgery.

## 2024-11-12 NOTE — PROGRESS NOTE ADULT - ASSESSMENT
This is 34 y/o male with IDDM2, Afib, NICM (EF recovered), ETOH abuse (sober x2 years), former smoker, prior MICU admission for DKA who presents with nausea, tachypnea and palpitations. Las showed ketoacidosis with blood glucose>600. Pt admits he hasn't taken his insulin in 3 days and was drinking prune juice for constipation. He received 6L IV fluids and was started on insulin drip. EKG showed Afib with  bpm. He was treated with IV Lopressor and Cardizem and converted to SR. Pt was seen by [facility] on previous admissions but does not follow with anyone outpatient.

## 2024-11-13 LAB
ANION GAP SERPL CALC-SCNC: 13 MMOL/L — SIGNIFICANT CHANGE UP (ref 5–17)
BLD GP AB SCN SERPL QL: SIGNIFICANT CHANGE UP
BUN SERPL-MCNC: 8.3 MG/DL — SIGNIFICANT CHANGE UP (ref 8–20)
CALCIUM SERPL-MCNC: 7.7 MG/DL — LOW (ref 8.4–10.5)
CHLORIDE SERPL-SCNC: 107 MMOL/L — SIGNIFICANT CHANGE UP (ref 96–108)
CO2 SERPL-SCNC: 19 MMOL/L — LOW (ref 22–29)
CREAT SERPL-MCNC: 0.38 MG/DL — LOW (ref 0.5–1.3)
EGFR: 149 ML/MIN/1.73M2 — SIGNIFICANT CHANGE UP
GLUCOSE BLDC GLUCOMTR-MCNC: 117 MG/DL — HIGH (ref 70–99)
GLUCOSE BLDC GLUCOMTR-MCNC: 160 MG/DL — HIGH (ref 70–99)
GLUCOSE BLDC GLUCOMTR-MCNC: 169 MG/DL — HIGH (ref 70–99)
GLUCOSE BLDC GLUCOMTR-MCNC: 171 MG/DL — HIGH (ref 70–99)
GLUCOSE BLDC GLUCOMTR-MCNC: 187 MG/DL — HIGH (ref 70–99)
GLUCOSE BLDC GLUCOMTR-MCNC: 199 MG/DL — HIGH (ref 70–99)
GLUCOSE SERPL-MCNC: 205 MG/DL — HIGH (ref 70–99)
HCT VFR BLD CALC: 25.6 % — LOW (ref 39–50)
HGB BLD-MCNC: 9.2 G/DL — LOW (ref 13–17)
INR BLD: 0.86 RATIO — SIGNIFICANT CHANGE UP (ref 0.85–1.16)
MCHC RBC-ENTMCNC: 30.4 PG — SIGNIFICANT CHANGE UP (ref 27–34)
MCHC RBC-ENTMCNC: 35.9 G/DL — SIGNIFICANT CHANGE UP (ref 32–36)
MCV RBC AUTO: 84.5 FL — SIGNIFICANT CHANGE UP (ref 80–100)
PLATELET # BLD AUTO: 158 K/UL — SIGNIFICANT CHANGE UP (ref 150–400)
POTASSIUM SERPL-MCNC: 3.4 MMOL/L — LOW (ref 3.5–5.3)
POTASSIUM SERPL-SCNC: 3.4 MMOL/L — LOW (ref 3.5–5.3)
PROTHROM AB SERPL-ACNC: 10 SEC — SIGNIFICANT CHANGE UP (ref 9.9–13.4)
RBC # BLD: 3.03 M/UL — LOW (ref 4.2–5.8)
RBC # FLD: 14.4 % — SIGNIFICANT CHANGE UP (ref 10.3–14.5)
SODIUM SERPL-SCNC: 139 MMOL/L — SIGNIFICANT CHANGE UP (ref 135–145)
WBC # BLD: 3.4 K/UL — LOW (ref 3.8–10.5)
WBC # FLD AUTO: 3.4 K/UL — LOW (ref 3.8–10.5)

## 2024-11-13 PROCEDURE — 99232 SBSQ HOSP IP/OBS MODERATE 35: CPT

## 2024-11-13 PROCEDURE — 99233 SBSQ HOSP IP/OBS HIGH 50: CPT

## 2024-11-13 RX ORDER — INSULIN GLARGINE,HUM.REC.ANLOG 100/ML
20 VIAL (ML) SUBCUTANEOUS ONCE
Refills: 0 | Status: COMPLETED | OUTPATIENT
Start: 2024-11-13 | End: 2024-11-13

## 2024-11-13 RX ADMIN — Medication 2: at 17:36

## 2024-11-13 RX ADMIN — Medication 81 MILLIGRAM(S): at 10:23

## 2024-11-13 RX ADMIN — Medication 50 MILLIGRAM(S): at 05:38

## 2024-11-13 RX ADMIN — Medication 8 UNIT(S): at 10:21

## 2024-11-13 RX ADMIN — PANTOPRAZOLE SODIUM 40 MILLIGRAM(S): 40 TABLET, DELAYED RELEASE ORAL at 10:23

## 2024-11-13 RX ADMIN — Medication 20 UNIT(S): at 10:20

## 2024-11-13 RX ADMIN — Medication 2 TABLET(S): at 21:35

## 2024-11-13 RX ADMIN — Medication 8 UNIT(S): at 17:36

## 2024-11-13 RX ADMIN — Medication 2: at 10:21

## 2024-11-13 RX ADMIN — Medication 8 UNIT(S): at 12:17

## 2024-11-13 NOTE — PROGRESS NOTE ADULT - SUBJECTIVE AND OBJECTIVE BOX
INTERVAL EVENTS:  Follow up diabetes management. Was NPO today for EGD, lantus not administered, EGD cancelled and rescheduled for tomorrow.    MEDICATIONS  (STANDING):  aspirin  chewable 81 milliGRAM(s) Oral daily  dextrose 5%. 1000 milliLiter(s) (100 mL/Hr) IV Continuous <Continuous>  dextrose 5%. 1000 milliLiter(s) (50 mL/Hr) IV Continuous <Continuous>  dextrose 50% Injectable 12.5 Gram(s) IV Push once  dextrose 50% Injectable 25 Gram(s) IV Push once  dextrose 50% Injectable 25 Gram(s) IV Push once  glucagon  Injectable 1 milliGRAM(s) IntraMuscular once  influenza   Vaccine 0.5 milliLiter(s) IntraMuscular once  insulin glargine Injectable (LANTUS) 20 Unit(s) SubCutaneous before breakfast  insulin lispro (ADMELOG) corrective regimen sliding scale   SubCutaneous at bedtime  insulin lispro (ADMELOG) corrective regimen sliding scale   SubCutaneous three times a day before meals  insulin lispro Injectable (ADMELOG) 8 Unit(s) SubCutaneous three times a day with meals  pantoprazole  Injectable 40 milliGRAM(s) IV Push daily  senna 2 Tablet(s) Oral at bedtime    MEDICATIONS  (PRN):  dextrose Oral Gel 15 Gram(s) Oral once PRN Blood Glucose LESS THAN 70 milliGRAM(s)/deciliter  diazepam  Injectable 10 milliGRAM(s) IV Push every 1 hour PRN CIWA >8    Allergies  Allergy Status Unknown    Vital Signs Last 24 Hrs  T(C): 36.8 (13 Nov 2024 07:34), Max: 37.4 (12 Nov 2024 16:53)  T(F): 98.3 (13 Nov 2024 07:34), Max: 99.3 (12 Nov 2024 16:53)  HR: 79 (13 Nov 2024 07:34) (76 - 99)  BP: 128/78 (13 Nov 2024 07:34) (104/69 - 128/78)  BP(mean): 82 (12 Nov 2024 12:40) (82 - 82)  RR: 20 (13 Nov 2024 07:34) (18 - 20)  SpO2: 100% (13 Nov 2024 07:34) (97% - 100%)    Parameters below as of 13 Nov 2024 07:34  Patient On (Oxygen Delivery Method): room air    PHYSICAL EXAM:  General: No apparent distress  Respiratory: Lungs clear bilaterally  Cardiac: +S1, S2, no m/r/g  GI: +BS, soft, non tender, non distended  Extremities: No peripheral edema  Neuro: A+O X3    LABS:                        9.2    3.40  )-----------( 158      ( 13 Nov 2024 04:16 )             25.6     11-13    139  |  107  |  8.3  ----------------------------<  205[H]  3.4[L]   |  19.0[L]  |  0.38[L]    Ca    7.7[L]      13 Nov 2024 04:16  Phos  2.4     11-12  Mg     2.0     11-12      Urinalysis Basic - ( 13 Nov 2024 04:16 )    Color: x / Appearance: x / SG: x / pH: x  Gluc: 205 mg/dL / Ketone: x  / Bili: x / Urobili: x   Blood: x / Protein: x / Nitrite: x   Leuk Esterase: x / RBC: x / WBC x   Sq Epi: x / Non Sq Epi: x / Bacteria: x    POCT Blood Glucose.: 160 mg/dL (11-13-24 @ 08:27)  POCT Blood Glucose.: 199 mg/dL (11-13-24 @ 03:18)  POCT Blood Glucose.: 188 mg/dL (11-12-24 @ 21:11)  POCT Blood Glucose.: 179 mg/dL (11-12-24 @ 17:03)  POCT Blood Glucose.: 97 mg/dL (11-12-24 @ 12:10)

## 2024-11-13 NOTE — DIETITIAN NUTRITION RISK NOTIFICATION - TREATMENT: THE FOLLOWING DIET HAS BEEN RECOMMENDED
Diet, NPO after Midnight:      NPO Start Date: 13-Nov-2024,   NPO Start Time: 23:59 (11-13-24 @ 10:24) [Active]  Diet, Consistent Carbohydrate w/Evening Snack (11-11-24 @ 09:26) [Active]

## 2024-11-13 NOTE — DIETITIAN INITIAL EVALUATION ADULT - OTHER INFO
32 y/o male with IDDM,  Afib, NICM (EF recovered), ETOH abuse (sober x2 years), former smoker, noncompliant, multiple DKA admissions presents with nausea, tachypnea and palpitations. Las showed ketoacidosis with blood glucose>600. Pt admits he hasn't taken his insulin in 3 days and was drinking prune juice for constipation. He received 6L IV fluids and was started on insulin drip. EKG showed Afib with  bpm. He was treated with IV Lopressor and Cardizem and converted to SR. DKA resolved and deemed stable for transfer to medicine.

## 2024-11-13 NOTE — DIETITIAN INITIAL EVALUATION ADULT - NUTRITON FOCUSED PHYSICAL EXAM
[facility] Hospitalist Progress Note    Date of Service: 2018    Chief Complaint  67 y.o. female admitted 2018 with back pain, nephrolithiasis and UTI, on coumadin for DVT.    Interval Problem Update  No overnight events , her O2 requirement have dropped now on only 2-3 L of O2 NC.   for her h/o DVT she will would like to switch to xarelto, Sw faxed information to pharmacy and her copay is around $144 , pt states that she is ok with it. Discussed with pharmacy and will switch her over to xarelto.  Urology following appreciate rec.     Consultants/Specialty  Urology.  PMA    Disposition  TBD. Will need to be re-anticoagulated and stable after surgery.        Review of Systems   Constitutional: Positive for malaise/fatigue. Negative for fever.   HENT: Negative for ear discharge.    Eyes: Negative for blurred vision and redness.   Respiratory: Negative for cough, shortness of breath and stridor.    Cardiovascular: Negative for chest pain and palpitations.   Gastrointestinal: Negative for abdominal pain, diarrhea, heartburn, nausea and vomiting.   Genitourinary: Positive for flank pain. Negative for dysuria.   Musculoskeletal: Positive for back pain. Negative for myalgias.   Skin: Negative for itching and rash.   Neurological: Positive for weakness. Negative for dizziness and headaches.   Endo/Heme/Allergies: Does not bruise/bleed easily.   Psychiatric/Behavioral: Negative for depression.      Physical Exam  Laboratory/Imaging   Hemodynamics  Temp (24hrs), Av.8 °C (98.2 °F), Min:36.4 °C (97.6 °F), Max:37.1 °C (98.8 °F)   Temperature: 37.1 °C (98.8 °F)  Pulse  Av.3  Min: 64  Max: 106 Heart Rate (Monitored): 71  NIBP: 123/61      Respiratory      Respiration: (!) 11, Pulse Oximetry: 97 %, O2 Daily Delivery Respiratory : Silicone Nasal Cannula     Given By:: Mouthpiece, Work Of Breathing / Effort: Shallow  RUL Breath Sounds: Clear, RML Breath Sounds: Clear;Diminished, RLL Breath Sounds: Diminished, [place]  Breath Sounds: Clear, LLL Breath Sounds: Diminished    Fluids    Intake/Output Summary (Last 24 hours) at 06/20/18 1426  Last data filed at 06/20/18 1300   Gross per 24 hour   Intake          3370.25 ml   Output             1500 ml   Net          1870.25 ml       Nutrition  Orders Placed This Encounter   Procedures   • DIET ORDER     Standing Status:   Standing     Number of Occurrences:   1     Order Specific Question:   Diet:     Answer:   Regular [1]     Physical Exam   Constitutional: She is oriented to person, place, and time. She appears well-developed and well-nourished. No distress.   Patient seen and examined by me.   HENT:   Head: Normocephalic and atraumatic.   Eyes: Conjunctivae and EOM are normal. Pupils are equal, round, and reactive to light. Right eye exhibits no discharge. Left eye exhibits no discharge. No scleral icterus.   Neck: Normal range of motion. Neck supple. No JVD present.   Cardiovascular: Normal rate, regular rhythm and normal heart sounds.    No murmur heard.  Pulmonary/Chest: Effort normal and breath sounds normal. No stridor. No respiratory distress. She has no wheezes.   Abdominal: Soft. Bowel sounds are normal. She exhibits no distension. There is no tenderness. There is CVA tenderness.   Musculoskeletal: She exhibits tenderness (mid back). She exhibits no edema.   Neurological: She is alert and oriented to person, place, and time. No cranial nerve deficit.   Skin: Skin is warm and dry. She is not diaphoretic. No erythema. No pallor.   Psychiatric: She has a normal mood and affect. Her behavior is normal. Thought content normal.   Nursing note and vitals reviewed.      Recent Labs      06/18/18   2219  06/19/18   1025  06/20/18   0415   WBC  8.4  12.7*  9.9   RBC  4.07*  3.68*  3.24*   HEMOGLOBIN  13.9  12.7  11.3*   HEMATOCRIT  43.8  38.8  34.6*   MCV  107.6*  105.4*  106.8*   MCH  34.2*  34.5*  34.9*   MCHC  31.7*  32.7*  32.7*   RDW  55.8*  54.5*  55.8*   PLATELETCT  151*  170   yes... 132*   MPV  10.8  10.9  11.1     Recent Labs      06/18/18   2219  06/19/18   1025  06/20/18   0415   SODIUM  137  134*  135   POTASSIUM  4.1  4.3  4.8   CHLORIDE  110  106  111   CO2  15*  20  20   GLUCOSE  100*  244*  170*   BUN  17  21  29*   CREATININE  1.43*  1.68*  1.34   CALCIUM  8.9  8.7  8.5     Recent Labs      06/17/18   1930   06/19/18   1220   06/19/18   2114  06/20/18   0415  06/20/18   1040   APTT  36.3*   < >   --    < >  54.8*  61.2*  45.6*   INR  1.91*   --   2.16*   --    --   1.91*   --     < > = values in this interval not displayed.     Recent Labs      06/18/18 2219   BNPBTYPENAT  190*              Assessment/Plan     Acute respiratory failure with hypoxia (HCC)- (present on admission)   Assessment & Plan    Likely related to anesthesia/sedation/atelectasis  Improving  PMA following appreciate rec.         Closed T11 fracture ([facility])- (present on admission)   Assessment & Plan    An L1 fracture subacute  The patient sees [facility] and she has appointment with him in a few days          Urinary tract infection with hematuria- (present on admission)   Assessment & Plan    Ucx growing Citrobacter freundii   Switching to Levaquin as is resistant to ceftriaxone         Obstruction of left ureteropelvic junction (UPJ) due to stone- (present on admission)   Assessment & Plan    2 cm in size  Post LEFT RETROGRADE PYELOGRAM   LEFT FLEXIBLE URETEROSCOPY WITH LASER LITHOTRIPSY TO ALLOW GUIDE WIRE PLACEMENT - Wound Class: Clean Contaminated  LEFT STENT PLACEMENT   Pain control  Cont on IV abx and follow up cultures         DVT (deep venous thrombosis) ([facility])- (present on admission)   Assessment & Plan    she will would like to switch to xarelto, Sw faxed information to pharmacy and her copay is around $144 , pt states that she is ok with it. Discussed with pharmacy and will switch her over to xarelto.            Quality-Core Measures   Reviewed items::  Labs reviewed, Medications reviewed and  Radiology images reviewed  Gonzalez catheter::  No Gonzalez  DVT prophylaxis pharmacological::  Heparin      Critical care time spent 40 minutes without overlap, exclude procedure time.

## 2024-11-13 NOTE — DIETITIAN INITIAL EVALUATION ADULT - ORAL INTAKE PTA/DIET HISTORY
Patient NPO for EGD. Pt previously tolerating consistent carbohydrate diet well with good appetite/PO intake. Pt reports eating 100% of meals. Pt states PTA his appetite was good and he was eating about 2-3 meals/day. Pt states for a few days PTA he was eating less due to nausea and vomiting. States he drank 2 glasses of prune juice due to feeling constipated and skipped taking his insulin which also made him feel sick contributing to poor PO intake. Pt now with improved appetite and PO intake. Since admission pt with no c/o N/V/C/D. States his UBW was about 140 lbs x1 year ago, but thinks he weighs about 115 lbs now. Current weight 114 lbs, indicates possible 26 lb weight loss (19%) x1 year. Pt states his PO intake has not decreased, but he continues to lose weight. Recommend certified diabetes educator be consulted to further discuss insulin regimen with pt. Informed NP of the same. Pt provided with education on consistent carbohydrate diet. Discussed importance of adequate protein-energy intake, pairing CHO with protein/fat to improve BG control, MyPlate guidelines, increasing fiber intake. Pt receptive of the same. NFPE conducted and pt with mild muscle/fat wasting identified. Recommend addition of Glucerna TID when diet is advanced. RD contact information provided for further nutrition-related questions or concerns. Will continue to monitor and follow up as needed. RD remains available.

## 2024-11-13 NOTE — PROGRESS NOTE ADULT - ASSESSMENT
33M with PMHx DMII, HTN, non ischemic cardiomyopathy, A fib pancreatitis, prior ETOH abuse claims to be currently sober, chronic cough, prior hospitalizations for DKA, noncompliant with meds who presents to the ED for hyperglycemia. Patient states he has not taken his diabetes meds in 2 days as he "forgot." Transferred to MICU for DKA, also with afib with RVR. CT CAP noting segmental wall thickening of distal esophagus concerning for esophagitis.    1. Poorly controlled pancreatic diabetes, s/p DKA due to non compliance, a1c 13.5%  - Lantus not administered this morning due to NPO, but EGD now cancelled  - Give lantus 20 units now, followed by every morning  - Continue admelog 8 units TID  - Correction scale  - Diabetes education    2. Atrial fibrillation with RVR  - Converted to SR after IV Cardizem  - Continue ASA, care per cardiology    3. Esophageal thickening  - Continue protonix  - GI following, plan for EGD tomorrow

## 2024-11-13 NOTE — DIETITIAN INITIAL EVALUATION ADULT - PERTINENT MEDS FT
MEDICATIONS  (STANDING):  dextrose 5%. 1000 milliLiter(s) (50 mL/Hr) IV Continuous <Continuous>  dextrose 50% Injectable 12.5 Gram(s) IV Push once  insulin glargine Injectable (LANTUS) 20 Unit(s) SubCutaneous before breakfast  insulin lispro (ADMELOG) corrective regimen sliding scale   SubCutaneous three times a day before meals  pantoprazole  Injectable 40 milliGRAM(s) IV Push daily  senna 2 Tablet(s) Oral at bedtime    MEDICATIONS  (PRN):  dextrose Oral Gel 15 Gram(s) Oral once PRN Blood Glucose LESS THAN 70 milliGRAM(s)/deciliter

## 2024-11-13 NOTE — DIETITIAN INITIAL EVALUATION ADULT - NS FNS DIET ORDER
Diet, NPO after Midnight:      NPO Start Date: 13-Nov-2024,   NPO Start Time: 23:59 (11-13-24 @ 10:24)

## 2024-11-13 NOTE — PROGRESS NOTE ADULT - SUBJECTIVE AND OBJECTIVE BOX
Ankit Alas M.D.    Patient is a 33y old  Male who presents with a chief complaint of DKA, new onset A fib with RVR (13 Nov 2024 09:51)      SUBJECTIVE / OVERNIGHT EVENTS: no concerns.     Patient denies chest pain, SOB, abd pain, N/V, fever, chills, dysuria or any other complaints. All remainder ROS negative.     MEDICATIONS  (STANDING):  aspirin  chewable 81 milliGRAM(s) Oral daily  dextrose 5%. 1000 milliLiter(s) (100 mL/Hr) IV Continuous <Continuous>  dextrose 5%. 1000 milliLiter(s) (50 mL/Hr) IV Continuous <Continuous>  dextrose 50% Injectable 12.5 Gram(s) IV Push once  dextrose 50% Injectable 25 Gram(s) IV Push once  dextrose 50% Injectable 25 Gram(s) IV Push once  glucagon  Injectable 1 milliGRAM(s) IntraMuscular once  influenza   Vaccine 0.5 milliLiter(s) IntraMuscular once  insulin glargine Injectable (LANTUS) 20 Unit(s) SubCutaneous before breakfast  insulin lispro (ADMELOG) corrective regimen sliding scale   SubCutaneous three times a day before meals  insulin lispro (ADMELOG) corrective regimen sliding scale   SubCutaneous at bedtime  insulin lispro Injectable (ADMELOG) 8 Unit(s) SubCutaneous three times a day with meals  pantoprazole  Injectable 40 milliGRAM(s) IV Push daily  senna 2 Tablet(s) Oral at bedtime    MEDICATIONS  (PRN):  dextrose Oral Gel 15 Gram(s) Oral once PRN Blood Glucose LESS THAN 70 milliGRAM(s)/deciliter  diazepam  Injectable 10 milliGRAM(s) IV Push every 1 hour PRN CIWA >8      I&O's Summary      PHYSICAL EXAM:  Vital Signs Last 24 Hrs  T(C): 36.8 (13 Nov 2024 07:34), Max: 37.4 (12 Nov 2024 16:53)  T(F): 98.3 (13 Nov 2024 07:34), Max: 99.3 (12 Nov 2024 16:53)  HR: 79 (13 Nov 2024 07:34) (76 - 99)  BP: 128/78 (13 Nov 2024 07:34) (104/69 - 128/78)  BP(mean): 82 (12 Nov 2024 12:40) (82 - 82)  RR: 20 (13 Nov 2024 07:34) (18 - 20)  SpO2: 100% (13 Nov 2024 07:34) (97% - 100%)    Parameters below as of 13 Nov 2024 07:34  Patient On (Oxygen Delivery Method): room air    CONSTITUTIONAL: NAD, well-groomed  RESPIRATORY: Normal respiratory effort; lungs are clear to auscultation bilaterally  CARDIOVASCULAR: Regular rate and rhythm, no LE edema  ABDOMEN: Nontender to palpation, normoactive bowel sounds  PSYCH: A+O x3; affect appropriate    LABS:                        9.2    3.40  )-----------( 158      ( 13 Nov 2024 04:16 )             25.6     11-13    139  |  107  |  8.3  ----------------------------<  205[H]  3.4[L]   |  19.0[L]  |  0.38[L]    Ca    7.7[L]      13 Nov 2024 04:16  Phos  2.4     11-12  Mg     2.0     11-12      PT/INR - ( 13 Nov 2024 04:16 )   PT: 10.0 sec;   INR: 0.86 ratio               Urinalysis Basic - ( 13 Nov 2024 04:16 )    Color: x / Appearance: x / SG: x / pH: x  Gluc: 205 mg/dL / Ketone: x  / Bili: x / Urobili: x   Blood: x / Protein: x / Nitrite: x   Leuk Esterase: x / RBC: x / WBC x   Sq Epi: x / Non Sq Epi: x / Bacteria: x        Culture - Urine (collected 10 Nov 2024 22:00)  Source: Clean Catch Clean Catch (Midstream)  Final Report (11 Nov 2024 21:34):    <10,000 CFU/mL Normal Urogenital Zee    Culture - Blood (collected 10 Nov 2024 21:22)  Source: .Blood BLOOD  Preliminary Report (13 Nov 2024 02:01):    No growth at 48 Hours      CAPILLARY BLOOD GLUCOSE      POCT Blood Glucose.: 171 mg/dL (13 Nov 2024 10:17)  POCT Blood Glucose.: 160 mg/dL (13 Nov 2024 08:27)  POCT Blood Glucose.: 199 mg/dL (13 Nov 2024 03:18)  POCT Blood Glucose.: 188 mg/dL (12 Nov 2024 21:11)  POCT Blood Glucose.: 179 mg/dL (12 Nov 2024 17:03)  POCT Blood Glucose.: 97 mg/dL (12 Nov 2024 12:10)      RADIOLOGY & ADDITIONAL TESTS:  Results Reviewed:   Imaging Personally Reviewed:  Electrocardiogram Personally Reviewed:

## 2024-11-13 NOTE — DIETITIAN INITIAL EVALUATION ADULT - PERTINENT LABORATORY DATA
11-13 Na139 mmol/L Glu 205 mg/dL[H] K+ 3.4 mmol/L[L] Cr  0.38 mg/dL[L] BUN 8.3 mg/dL     POCT Blood Glucose.: 117 mg/dL (11-13-24 @ 12:06)  A1C with Estimated Average Glucose Result: 13.5 % (11-12-24 @ 05:10)  A1C with Estimated Average Glucose Result: 13.6 % (06-28-24 @ 16:25)  A1C with Estimated Average Glucose Result: 14.6 % (03-13-24 @ 02:51)

## 2024-11-13 NOTE — PROGRESS NOTE ADULT - ASSESSMENT
34 y/o male with IDDM,  Afib, NICM (EF recovered), ETOH abuse (sober x2 years), former smoker, noncompliant, multiple DKA admissions presents with nausea, tachypnea and palpitations. Las showed ketoacidosis with blood glucose>600. Pt admits he hasn't taken his insulin in 3 days and was drinking prune juice for constipation. He received 6L IV fluids and was started on insulin drip. EKG showed Afib with  bpm. He was treated with IV Lopressor and Cardizem and converted to SR. DKA resolved and deemed stable for transfer to medicine.    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

## 2024-11-13 NOTE — DIETITIAN INITIAL EVALUATION ADULT - ADD RECOMMEND
1) Resume consistent carbohydrate diet when medically possible.   2) Encourage po intake, monitor diet tolerance, and provide assistance at meals as needed.   3) Add Glucerna TID when diet is advanced.   4) Certified Diabetes Educator consult recommended.   5) Rx: MVI daily.   6) Monitor BG levels, correct prn   7) Obtain weekly weights to monitor trends.

## 2024-11-14 ENCOUNTER — TRANSCRIPTION ENCOUNTER (OUTPATIENT)
Age: 34
End: 2024-11-14

## 2024-11-14 ENCOUNTER — RESULT REVIEW (OUTPATIENT)
Age: 34
End: 2024-11-14

## 2024-11-14 VITALS
RESPIRATION RATE: 18 BRPM | TEMPERATURE: 98 F | DIASTOLIC BLOOD PRESSURE: 82 MMHG | HEART RATE: 82 BPM | OXYGEN SATURATION: 99 % | SYSTOLIC BLOOD PRESSURE: 122 MMHG

## 2024-11-14 LAB
ANION GAP SERPL CALC-SCNC: 10 MMOL/L — SIGNIFICANT CHANGE UP (ref 5–17)
BASOPHILS # BLD AUTO: 0.02 K/UL — SIGNIFICANT CHANGE UP (ref 0–0.2)
BASOPHILS NFR BLD AUTO: 0.6 % — SIGNIFICANT CHANGE UP (ref 0–2)
BUN SERPL-MCNC: 8.4 MG/DL — SIGNIFICANT CHANGE UP (ref 8–20)
CALCIUM SERPL-MCNC: 7.9 MG/DL — LOW (ref 8.4–10.5)
CHLORIDE SERPL-SCNC: 105 MMOL/L — SIGNIFICANT CHANGE UP (ref 96–108)
CO2 SERPL-SCNC: 23 MMOL/L — SIGNIFICANT CHANGE UP (ref 22–29)
CREAT SERPL-MCNC: 0.47 MG/DL — LOW (ref 0.5–1.3)
EGFR: 140 ML/MIN/1.73M2 — SIGNIFICANT CHANGE UP
EOSINOPHIL # BLD AUTO: 0.05 K/UL — SIGNIFICANT CHANGE UP (ref 0–0.5)
EOSINOPHIL NFR BLD AUTO: 1.6 % — SIGNIFICANT CHANGE UP (ref 0–6)
GLUCOSE BLDC GLUCOMTR-MCNC: 226 MG/DL — HIGH (ref 70–99)
GLUCOSE BLDC GLUCOMTR-MCNC: 328 MG/DL — HIGH (ref 70–99)
GLUCOSE BLDC GLUCOMTR-MCNC: 75 MG/DL — SIGNIFICANT CHANGE UP (ref 70–99)
GLUCOSE SERPL-MCNC: 326 MG/DL — HIGH (ref 70–99)
HCT VFR BLD CALC: 28.2 % — LOW (ref 39–50)
HGB BLD-MCNC: 10 G/DL — LOW (ref 13–17)
IMM GRANULOCYTES NFR BLD AUTO: 0.3 % — SIGNIFICANT CHANGE UP (ref 0–0.9)
INR BLD: 0.84 RATIO — LOW (ref 0.85–1.16)
LYMPHOCYTES # BLD AUTO: 1.5 K/UL — SIGNIFICANT CHANGE UP (ref 1–3.3)
LYMPHOCYTES # BLD AUTO: 48.2 % — HIGH (ref 13–44)
MCHC RBC-ENTMCNC: 30.5 PG — SIGNIFICANT CHANGE UP (ref 27–34)
MCHC RBC-ENTMCNC: 35.5 G/DL — SIGNIFICANT CHANGE UP (ref 32–36)
MCV RBC AUTO: 86 FL — SIGNIFICANT CHANGE UP (ref 80–100)
MONOCYTES # BLD AUTO: 0.3 K/UL — SIGNIFICANT CHANGE UP (ref 0–0.9)
MONOCYTES NFR BLD AUTO: 9.6 % — SIGNIFICANT CHANGE UP (ref 2–14)
NEUTROPHILS # BLD AUTO: 1.23 K/UL — LOW (ref 1.8–7.4)
NEUTROPHILS NFR BLD AUTO: 39.7 % — LOW (ref 43–77)
PLATELET # BLD AUTO: 151 K/UL — SIGNIFICANT CHANGE UP (ref 150–400)
POTASSIUM SERPL-MCNC: 4 MMOL/L — SIGNIFICANT CHANGE UP (ref 3.5–5.3)
POTASSIUM SERPL-SCNC: 4 MMOL/L — SIGNIFICANT CHANGE UP (ref 3.5–5.3)
PROTHROM AB SERPL-ACNC: 9.5 SEC — LOW (ref 9.9–13.4)
RBC # BLD: 3.28 M/UL — LOW (ref 4.2–5.8)
RBC # FLD: 14.3 % — SIGNIFICANT CHANGE UP (ref 10.3–14.5)
SODIUM SERPL-SCNC: 137 MMOL/L — SIGNIFICANT CHANGE UP (ref 135–145)
WBC # BLD: 2.86 K/UL — LOW (ref 3.8–10.5)
WBC # FLD AUTO: 2.86 K/UL — LOW (ref 3.8–10.5)

## 2024-11-14 PROCEDURE — 87641 MR-STAPH DNA AMP PROBE: CPT

## 2024-11-14 PROCEDURE — 93005 ELECTROCARDIOGRAM TRACING: CPT

## 2024-11-14 PROCEDURE — 85014 HEMATOCRIT: CPT

## 2024-11-14 PROCEDURE — 85025 COMPLETE CBC W/AUTO DIFF WBC: CPT

## 2024-11-14 PROCEDURE — 86900 BLOOD TYPING SEROLOGIC ABO: CPT

## 2024-11-14 PROCEDURE — 96375 TX/PRO/DX INJ NEW DRUG ADDON: CPT

## 2024-11-14 PROCEDURE — 86901 BLOOD TYPING SEROLOGIC RH(D): CPT

## 2024-11-14 PROCEDURE — 84100 ASSAY OF PHOSPHORUS: CPT

## 2024-11-14 PROCEDURE — 80048 BASIC METABOLIC PNL TOTAL CA: CPT

## 2024-11-14 PROCEDURE — 36415 COLL VENOUS BLD VENIPUNCTURE: CPT

## 2024-11-14 PROCEDURE — 81001 URINALYSIS AUTO W/SCOPE: CPT

## 2024-11-14 PROCEDURE — 82009 KETONE BODYS QUAL: CPT

## 2024-11-14 PROCEDURE — 83690 ASSAY OF LIPASE: CPT

## 2024-11-14 PROCEDURE — 83036 HEMOGLOBIN GLYCOSYLATED A1C: CPT

## 2024-11-14 PROCEDURE — 86850 RBC ANTIBODY SCREEN: CPT

## 2024-11-14 PROCEDURE — 96365 THER/PROPH/DIAG IV INF INIT: CPT

## 2024-11-14 PROCEDURE — 84484 ASSAY OF TROPONIN QUANT: CPT

## 2024-11-14 PROCEDURE — 0225U NFCT DS DNA&RNA 21 SARSCOV2: CPT

## 2024-11-14 PROCEDURE — 82435 ASSAY OF BLOOD CHLORIDE: CPT

## 2024-11-14 PROCEDURE — 83930 ASSAY OF BLOOD OSMOLALITY: CPT

## 2024-11-14 PROCEDURE — 88342 IMHCHEM/IMCYTCHM 1ST ANTB: CPT | Mod: 26

## 2024-11-14 PROCEDURE — 85018 HEMOGLOBIN: CPT

## 2024-11-14 PROCEDURE — 85610 PROTHROMBIN TIME: CPT

## 2024-11-14 PROCEDURE — 71045 X-RAY EXAM CHEST 1 VIEW: CPT

## 2024-11-14 PROCEDURE — 93306 TTE W/DOPPLER COMPLETE: CPT

## 2024-11-14 PROCEDURE — 84132 ASSAY OF SERUM POTASSIUM: CPT

## 2024-11-14 PROCEDURE — 83735 ASSAY OF MAGNESIUM: CPT

## 2024-11-14 PROCEDURE — 88305 TISSUE EXAM BY PATHOLOGIST: CPT | Mod: 26

## 2024-11-14 PROCEDURE — 87640 STAPH A DNA AMP PROBE: CPT

## 2024-11-14 PROCEDURE — 82962 GLUCOSE BLOOD TEST: CPT

## 2024-11-14 PROCEDURE — 87086 URINE CULTURE/COLONY COUNT: CPT

## 2024-11-14 PROCEDURE — 80053 COMPREHEN METABOLIC PANEL: CPT

## 2024-11-14 PROCEDURE — 82803 BLOOD GASES ANY COMBINATION: CPT

## 2024-11-14 PROCEDURE — 99232 SBSQ HOSP IP/OBS MODERATE 35: CPT

## 2024-11-14 PROCEDURE — 99285 EMERGENCY DEPT VISIT HI MDM: CPT | Mod: 25

## 2024-11-14 PROCEDURE — 99239 HOSP IP/OBS DSCHRG MGMT >30: CPT

## 2024-11-14 PROCEDURE — 43239 EGD BIOPSY SINGLE/MULTIPLE: CPT

## 2024-11-14 PROCEDURE — 74176 CT ABD & PELVIS W/O CONTRAST: CPT | Mod: MC

## 2024-11-14 PROCEDURE — 82947 ASSAY GLUCOSE BLOOD QUANT: CPT

## 2024-11-14 PROCEDURE — 96376 TX/PRO/DX INJ SAME DRUG ADON: CPT

## 2024-11-14 PROCEDURE — 85027 COMPLETE CBC AUTOMATED: CPT

## 2024-11-14 PROCEDURE — 84295 ASSAY OF SERUM SODIUM: CPT

## 2024-11-14 PROCEDURE — 84145 PROCALCITONIN (PCT): CPT

## 2024-11-14 PROCEDURE — 80307 DRUG TEST PRSMV CHEM ANLYZR: CPT

## 2024-11-14 PROCEDURE — 87040 BLOOD CULTURE FOR BACTERIA: CPT

## 2024-11-14 PROCEDURE — 83605 ASSAY OF LACTIC ACID: CPT

## 2024-11-14 PROCEDURE — 82330 ASSAY OF CALCIUM: CPT

## 2024-11-14 PROCEDURE — 71250 CT THORAX DX C-: CPT | Mod: MC

## 2024-11-14 PROCEDURE — T1013: CPT

## 2024-11-14 RX ORDER — BENZOCAINE .06; .7 ML/1; ML/1
0 SWAB TOPICAL
Qty: 100 | Refills: 0
Start: 2024-11-14

## 2024-11-14 RX ORDER — APIXABAN 5 MG/1
5 TABLET, FILM COATED ORAL
Refills: 0 | Status: DISCONTINUED | OUTPATIENT
Start: 2024-11-14 | End: 2024-11-14

## 2024-11-14 RX ORDER — APIXABAN 5 MG/1
1 TABLET, FILM COATED ORAL
Qty: 60 | Refills: 0
Start: 2024-11-14 | End: 2024-12-13

## 2024-11-14 RX ORDER — ASPIRIN/MAG CARB/ALUMINUM AMIN 325 MG
1 TABLET ORAL
Qty: 30 | Refills: 0
Start: 2024-11-14 | End: 2024-12-13

## 2024-11-14 RX ORDER — PANTOPRAZOLE SODIUM 40 MG/1
1 TABLET, DELAYED RELEASE ORAL
Qty: 60 | Refills: 0
Start: 2024-11-14 | End: 2024-12-13

## 2024-11-14 RX ORDER — INSULIN ASPART 100 [IU]/ML
0 INJECTION, SOLUTION INTRAVENOUS; SUBCUTANEOUS
Refills: 0 | DISCHARGE

## 2024-11-14 RX ORDER — OMEPRAZOLE 10 MG
1 CAPSULE,DELAYED RELEASE (ENTERIC COATED) ORAL
Refills: 0 | DISCHARGE

## 2024-11-14 RX ORDER — INSULIN GLARGINE,HUM.REC.ANLOG 100/ML
20 VIAL (ML) SUBCUTANEOUS
Qty: 1 | Refills: 0
Start: 2024-11-14 | End: 2024-12-13

## 2024-11-14 RX ORDER — INSULIN LISPRO 100/ML
8 VIAL (ML) SUBCUTANEOUS
Qty: 1 | Refills: 0 | DISCHARGE
Start: 2024-11-14 | End: 2024-12-13

## 2024-11-14 RX ORDER — INSULIN LISPRO 100/ML
8 VIAL (ML) SUBCUTANEOUS
Qty: 1 | Refills: 0
Start: 2024-11-14 | End: 2024-12-13

## 2024-11-14 RX ORDER — BENZOCAINE .06; .7 ML/1; ML/1
0 SWAB TOPICAL
Qty: 100 | Refills: 1
Start: 2024-11-14

## 2024-11-14 RX ADMIN — APIXABAN 5 MILLIGRAM(S): 5 TABLET, FILM COATED ORAL at 17:53

## 2024-11-14 RX ADMIN — Medication 81 MILLIGRAM(S): at 14:23

## 2024-11-14 RX ADMIN — Medication 8 UNIT(S): at 17:52

## 2024-11-14 RX ADMIN — Medication 20 UNIT(S): at 09:26

## 2024-11-14 RX ADMIN — PANTOPRAZOLE SODIUM 40 MILLIGRAM(S): 40 TABLET, DELAYED RELEASE ORAL at 14:23

## 2024-11-14 RX ADMIN — Medication 8: at 17:51

## 2024-11-14 RX ADMIN — Medication 4: at 09:26

## 2024-11-14 NOTE — DISCHARGE NOTE PROVIDER - ATTENDING DISCHARGE PHYSICAL EXAMINATION:
VITALS:   T(C): 36.8 (11-14-24 @ 08:37), Max: 37.1 (11-13-24 @ 20:05)  HR: 70 (11-14-24 @ 08:37) (70 - 93)  BP: 108/73 (11-14-24 @ 08:37) (103/68 - 112/76)  RR: 18 (11-14-24 @ 08:37) (18 - 18)  SpO2: 98% (11-14-24 @ 08:37) (95% - 100%)    GENERAL: NAD, lying in bed comfortably  HEAD:  Atraumatic, Normocephalic  EYES: EOMI, PERRLA, conjunctiva and sclera clear  ENT: Moist mucous membranes  NECK: Supple, No JVD  CHEST/LUNG: Clear to auscultation bilaterally; No rales, rhonchi, wheezing, or rubs. Unlabored respirations  HEART: Regular rate and rhythm; No murmurs, rubs, or gallops  ABDOMEN: BSx4; Soft, nontender, nondistended  EXTREMITIES:  2+ Peripheral Pulses, brisk capillary refill. No clubbing, cyanosis, or edema  NERVOUS SYSTEM:  A&Ox3, no focal deficits   SKIN: No rashes or lesions  PSYCH: Normal affect, euthymic mood

## 2024-11-14 NOTE — DISCHARGE NOTE NURSING/CASE MANAGEMENT/SOCIAL WORK - FINANCIAL ASSISTANCE
[facility] provides services at a reduced cost to those who are determined to be eligible through [facility]’s financial assistance program. Information regarding [facility]’s financial assistance program can be found by going to [email or web address] or by calling [phone].

## 2024-11-14 NOTE — DISCHARGE NOTE NURSING/CASE MANAGEMENT/SOCIAL WORK - CAREGIVER PHONE NUMBER
Physical Therapy    Patient not seen in therapy.       Writer attempted to see patient at this time, but patient stating that she is very tired and did not sleep well. Patient requesting that writer return later. Will attempt back as schedule permits.                             Therapy procedure time and total treatment time can be found documented on the Time Entry flowsheet   [phone]

## 2024-11-14 NOTE — DISCHARGE NOTE PROVIDER - PROVIDER TOKENS
PROVIDER:[TOKEN:[[id number]],FOLLOWUP:[1 month]],PROVIDER:[TOKEN:[[id number]],FOLLOWUP:[Routine]],PROVIDER:[TOKEN:[[id number]]]

## 2024-11-14 NOTE — DISCHARGE NOTE PROVIDER - NSDCFUSCHEDAPPT_GEN_ALL_CORE_FT
[facility] [place] [place]  Scheduled Appointment: 12/31/2024    Chandrakant Manrique  [place] [address] [address] [place]  Scheduled Appointment: 12/31/2024

## 2024-11-14 NOTE — PROGRESS NOTE ADULT - SUBJECTIVE AND OBJECTIVE BOX
INTERVAL EVENTS:  Follow up diabetes management. NPO for EGD today.    MEDICATIONS  (STANDING):  aspirin  chewable 81 milliGRAM(s) Oral daily  dextrose 5%. 1000 milliLiter(s) (100 mL/Hr) IV Continuous <Continuous>  dextrose 5%. 1000 milliLiter(s) (50 mL/Hr) IV Continuous <Continuous>  dextrose 50% Injectable 12.5 Gram(s) IV Push once  dextrose 50% Injectable 25 Gram(s) IV Push once  dextrose 50% Injectable 25 Gram(s) IV Push once  glucagon  Injectable 1 milliGRAM(s) IntraMuscular once  influenza   Vaccine 0.5 milliLiter(s) IntraMuscular once  insulin glargine Injectable (LANTUS) 20 Unit(s) SubCutaneous before breakfast  insulin lispro (ADMELOG) corrective regimen sliding scale   SubCutaneous three times a day before meals  insulin lispro (ADMELOG) corrective regimen sliding scale   SubCutaneous at bedtime  insulin lispro Injectable (ADMELOG) 8 Unit(s) SubCutaneous three times a day with meals  pantoprazole  Injectable 40 milliGRAM(s) IV Push daily  senna 2 Tablet(s) Oral at bedtime    MEDICATIONS  (PRN):  dextrose Oral Gel 15 Gram(s) Oral once PRN Blood Glucose LESS THAN 70 milliGRAM(s)/deciliter  diazepam  Injectable 10 milliGRAM(s) IV Push every 1 hour PRN CIWA >8    Allergies  Allergy Status Unknown    Vital Signs Last 24 Hrs  T(C): 36.8 (14 Nov 2024 08:37), Max: 37.2 (13 Nov 2024 12:15)  T(F): 98.3 (14 Nov 2024 08:37), Max: 99 (13 Nov 2024 12:15)  HR: 70 (14 Nov 2024 08:37) (70 - 94)  BP: 108/73 (14 Nov 2024 08:37) (103/68 - 115/81)  BP(mean): 88 (14 Nov 2024 04:00) (85 - 92)  RR: 18 (14 Nov 2024 08:37) (18 - 20)  SpO2: 98% (14 Nov 2024 08:37) (95% - 100%)    Parameters below as of 14 Nov 2024 08:37  Patient On (Oxygen Delivery Method): room air    PHYSICAL EXAM:  General: No apparent distress  Respiratory: Lungs clear bilaterally  Cardiac: +S1, S2, no m/r/g  GI: +BS, soft, non tender, non distended  Extremities: No peripheral edema  Neuro: A+O X3    LABS:                        10.0   2.86  )-----------( 151      ( 14 Nov 2024 04:40 )             28.2     11-14    137  |  105  |  8.4  ----------------------------<  326[H]  4.0   |  23.0  |  0.47[L]    Ca    7.9[L]      14 Nov 2024 04:40      Urinalysis Basic - ( 14 Nov 2024 04:40 )    Color: x / Appearance: x / SG: x / pH: x  Gluc: 326 mg/dL / Ketone: x  / Bili: x / Urobili: x   Blood: x / Protein: x / Nitrite: x   Leuk Esterase: x / RBC: x / WBC x   Sq Epi: x / Non Sq Epi: x / Bacteria: x    POCT Blood Glucose.: 226 mg/dL (11-14-24 @ 08:19)  POCT Blood Glucose.: 187 mg/dL (11-13-24 @ 21:17)  POCT Blood Glucose.: 169 mg/dL (11-13-24 @ 17:21)  POCT Blood Glucose.: 117 mg/dL (11-13-24 @ 12:06)

## 2024-11-14 NOTE — DISCHARGE NOTE NURSING/CASE MANAGEMENT/SOCIAL WORK - NSDCVIVACCINE_GEN_ALL_CORE_FT
Tdap; 29-Oct-2019 23:07; Odessa Landis (RN); [employer]; [id number] (Exp. Date: 22-Oct-2021); IntraMuscular; Deltoid Left.; 0.5 milliLiter(s); VIS (VIS Published: 09-May-2013, VIS Presented: 29-Oct-2019);

## 2024-11-14 NOTE — PROGRESS NOTE ADULT - NS ATTEND AMEND GEN_ALL_CORE FT
I agree with plan above and discussed with NP.  cont basal -bolus insulin.
I agree with plan above and discussed with NP.  restart lantus daily and continue lispro TID w meals.
I agree with plan above and discussed with NP.  cont basal -bolus regimen. Bgs good.
32 y/o M with PMH IDDM2, Afib, NICM ([facility] 5/2021 with normal coronaries), HFrecEF (LVEF 40-45% 5/2021 -> 60-65% 10/2022), ETOH abuse (sober x2 years), former smoker, a/w DKA secondary to medication noncompliance. Was previously admitted to [facility] and treated for AF in 5/2021; was treated with cardizem and eliquis.  -Spontaneously converted to SR   -TTE with LVEF 55-60%  -CHADSVASC 2- would continue longterm AC with eliquis 5mg bid as this is recurrent AF  -Outpatient [facility] to assess AF burden   -Planned for EGD; patient is acceptable cardiac risk for procedure and is optimized from cardiac perspective     No further inpatient cardiac workup planned   Please reconsult cardiology with any further cardiac issues

## 2024-11-14 NOTE — PROGRESS NOTE ADULT - REASON FOR ADMISSION
DKA, new onset A fib with RVR
70

## 2024-11-14 NOTE — DISCHARGE NOTE PROVIDER - NSDCMRMEDTOKEN_GEN_ALL_CORE_FT
Albuterol (Eqv-ProAir HFA) 90 mcg/inh inhalation aerosol: Inhale 1-2 puffs by mouth every 4 - 6 hours as needed  benzonatate 100 mg oral capsule: 1 cap(s) orally 3 times a day as needed for Cough  cetirizine 10 mg oral tablet: 1 tab(s) orally once a day  ergocalciferol 1.25 mg (50,000 intl units) oral capsule: 1 cap(s) orally once a week  NovoLOG FlexPen 100 units/mL injectable solution: using a sliding scale. Inject no more than 15 units in one day  omeprazole 40 mg oral delayed release capsule: 1 cap(s) orally once a day (before a meal)  Thera-M oral tablet: 1 tab(s) orally once a day   Albuterol (Eqv-ProAir HFA) 90 mcg/inh inhalation aerosol: Inhale 1-2 puffs by mouth every 4 - 6 hours as needed  alcohol swabs: Apply topically to affected area 4 times a day  apixaban 5 mg oral tablet: 1 tab(s) orally 2 times a day  aspirin 81 mg oral tablet, chewable: 1 tab(s) orally once a day  Basaglar KwikPen 100 units/mL subcutaneous solution: 20 unit(s) subcutaneous once a day (at bedtime) unit(s) subcutaneous once a day  benzonatate 100 mg oral capsule: 1 cap(s) orally 3 times a day as needed for Cough  cetirizine 10 mg oral tablet: 1 tab(s) orally once a day  ergocalciferol 1.25 mg (50,000 intl units) oral capsule: 1 cap(s) orally once a week  glucometer (per patient&#x27;s insurance): Test blood sugars four times a day. Dispense #1 glucometer.  HumaLOG KwikPen 100 units/mL injectable solution: 8 unit(s) subcutaneous 3 times a day unit(s) subcutaneous 3 times a day (with meals)  Insulin Pen Needles, 4mm: 1 application subcutaneously 4 times a day. ** Use with insulin pen **  lancets: 1 application subcutaneously 4 times a day  Protonix 40 mg oral delayed release tablet: 1 tab(s) orally 2 times a day  test strips (per patient&#x27;s insurance): 1 application subcutaneously 4 times a day. ** Compatible with patient&#x27;s glucometer **  Thera-M oral tablet: 1 tab(s) orally once a day

## 2024-11-14 NOTE — PROGRESS NOTE ADULT - PROVIDER SPECIALTY LIST ADULT
Endocrinology
Internal Medicine
Cardiology
Endocrinology
Endocrinology
Hospitalist
MICU
Hospitalist
Gastroenterology

## 2024-11-14 NOTE — PROGRESS NOTE ADULT - ASSESSMENT
33M with PMHx DMII, HTN, non ischemic cardiomyopathy, A fib pancreatitis, prior ETOH abuse claims to be currently sober, chronic cough, prior hospitalizations for DKA, noncompliant with meds who presents to the ED for hyperglycemia. Patient states he has not taken his diabetes meds in 2 days as he "forgot." Transferred to MICU for DKA, also with afib with RVR. CT CAP noting segmental wall thickening of distal esophagus concerning for esophagitis.    1. Poorly controlled pancreatic diabetes, s/p DKA due to non compliance, a1c 13.5%  - NPO for EGD today  - Continue lantus 20 units every morning  - Continue admelog 8 units TID  - Correction scale  - Diabetes education    2. Atrial fibrillation with RVR  - Converted to SR after IV Cardizem  - Continue ASA, care per cardiology    3. Esophageal thickening  - Continue protonix  - Plan for EGD today

## 2024-11-14 NOTE — DISCHARGE NOTE PROVIDER - DETAILS OF MALNUTRITION DIAGNOSIS/DIAGNOSES
This patient has been assessed with a concern for Malnutrition and was treated during this hospitalization for the following Nutrition diagnosis/diagnoses:     -  11/13/2024: Moderate protein-calorie malnutrition   -  11/13/2024: Underweight (BMI < 19)

## 2024-11-14 NOTE — DISCHARGE NOTE PROVIDER - CARE PROVIDER_API CALL
Mary Beth Clark  Endocrinology/Metab/Diabetes  [address] [place] [address]  Phone: [phone]  Fax: [phone]  Follow Up Time: 1 month    Mirta Silva  Cardiology  [address] [place] [address]  Phone: [phone]  Fax: [phone]  Follow Up Time: Sussy Irene  Gastroenterology  [address], [address]  [place] [address]  Phone: [phone]  Fax: [phone]  Follow Up Time:

## 2024-11-14 NOTE — DISCHARGE NOTE PROVIDER - HOSPITAL COURSE
34 y/o male with IDDM,  Afib, NICM (EF recovered), ETOH abuse (sober x2 years), former smoker, noncompliant, multiple DKA admissions presents with nausea, tachypnea and palpitations. Las showed ketoacidosis with blood glucose>600. Pt admits he hasn't taken his insulin in 3 days and was drinking prune juice for constipation. He received 6L IV fluids and was started on insulin drip. EKG showed Afib with  bpm. He was treated with IV Lopressor and Cardizem and converted to SR. DKA resolved and deemed stable for transfer to medicine. 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 Seen by endo, insulin titrated. Course c/b Afib due to med non compliance. Seen by cards, plan to start NOAC and followup outpatient with cards for [facility]. Course further c/b esophageal thickening, noted on CT. Seen by GI, s/p EGD, and on protonix BID. Now cleared by discharge with outpatient cards, GI and endo followup.

## 2024-11-14 NOTE — DISCHARGE NOTE NURSING/CASE MANAGEMENT/SOCIAL WORK - PATIENT PORTAL LINK FT
You can access the FollowMyHealth Patient Portal offered by [facility] by registering at the following website: [email or web address]. By joining [facility]’s FollowMyHealth portal, you will also be able to view your health information using other applications (apps) compatible with our system.

## 2024-11-14 NOTE — DISCHARGE NOTE PROVIDER - NSDCCPCAREPLAN_GEN_ALL_CORE_FT
PRINCIPAL DISCHARGE DIAGNOSIS  Diagnosis: DKA (diabetic ketoacidosis)  Assessment and Plan of Treatment:       SECONDARY DISCHARGE DIAGNOSES  Diagnosis: Atrial fibrillation with RVR  Assessment and Plan of Treatment:     Diagnosis: Gastritis  Assessment and Plan of Treatment:

## 2024-11-14 NOTE — DISCHARGE NOTE PROVIDER - CARE PROVIDERS DIRECT ADDRESSES
,[email or web address]

## 2024-11-16 LAB
CULTURE RESULTS: SIGNIFICANT CHANGE UP
SPECIMEN SOURCE: SIGNIFICANT CHANGE UP

## 2024-11-18 LAB — SURGICAL PATHOLOGY STUDY: SIGNIFICANT CHANGE UP

## 2024-11-19 RX ORDER — CHOLESTEROL/SOYBEAN OIL/C/E 60-200-80
1 POWDER (GRAM) ORAL
Refills: 0 | DISCHARGE

## 2024-11-19 RX ORDER — ALBUTEROL 90 MCG
0 AEROSOL (GRAM) INHALATION
Refills: 0 | DISCHARGE

## 2024-11-19 NOTE — CHART NOTE - NSTELEHEALTH PATIENT LOCATION OTHER FT
Amanda notified of the message below -Verbalized Understanding
Amarjit Little W/ [address] [facility] ED calling a in regards to the pts sores on her left leg. She states that now it is red, warm to the touch and its has drainage now. She is requesting to have ABX, please advise.      Carolyn Lee in [address] [place] [address]
Antibiotics sent in for patient. Follow-up in office if no improvement or if symptoms worsen.
Home

## 2024-11-19 NOTE — CHART NOTE - NSCHARTNOTEFT_GEN_A_CORE
Pt planned for EGD today, however he got Lovenox this morning therefore unable to perform biopsy. Will cancel and reschedule EGD for tomorrow 11/14/24. Please hold lovenox. Regular diet today. NPO after MN. Continue with PPI BID.  D/w pt and family  D/w Dr. Alas
Post-Discharge Medication Review: Completed	  	  Patient's preferred pharmacy was updated in OMR: [facility] 	  	  Patient contacted to offer medication counseling post-discharge. Medication reconciliation completed.     Per patient, medications include:	  1.	Albuterol (Eqv-ProAir HFA) 90 mcg/inh inhalation aerosol Inhale 1-2 puffs by mouth                every 4 - 6 hours as needed  2.	apixaban 5 mg oral tablet 1 tab(s) orally 2 times a day  3.	aspirin 81 mg oral tablet, chewable 1 tab(s) orally once a day  4.	Basaglar KwikPen 100 units/mL subcutaneous solution 20 unit(s) subcutaneous once a                day (at bedtime) unit(s) subcutaneous once a day  5.	benzonatate 100 mg oral capsule 1 cap(s) orally 3 times a day as needed for Cough  6.	cetirizine 10 mg oral tablet 1 tab(s) orally once a day  7.	ergocalciferol 1.25 mg (50,000 intl units) oral capsule 1 cap(s) orally once a week  8.	HumaLOG KwikPen 100 units/mL injectable solution 8 unit(s) subcutaneous 3 times a                day unit(s) subcutaneous 3 times a day (with meals)  9.	Protonix 40 mg oral delayed release tablet 1 tab(s) orally 2 times a day  10.	Thera-M oral tablet 1 tab(s) orally once a day  	  Medication name, indication, administration, side effect, and monitoring reviewed for new medications during post discharge counseling visit with patient. Patient demonstrated understanding. Counseling offered for all medications.	    - During post discharge counseling call, pt stated he did not receive Humalog at discharge. S/w pharmacy, rx fill was too early per insurance, but okay to fill today. Coordinate delivery with pharmacy. Pt will start Humalog upon receipt. 	  	  Harpreet Ziegler, PharmD	  Clinical Pharmacy Specialist, Pharmacy Telehealth Team	  Can be reached via MS Teams or [phone]

## 2024-11-25 ENCOUNTER — NON-APPOINTMENT (OUTPATIENT)
Age: 34
End: 2024-11-25

## 2024-11-26 NOTE — PHARMACOTHERAPY INTERVENTION NOTE - INTERVENTION TYPE RECOOMEND
Patient is scheduled for two appts back to back on Tuesday, 12/3, at two different locations. He is scheduled for a nurse visit at 1:00 at [facility] for his Dupixent injection, and then for a f/u with Dr. HONEYCUTT at 1:30 in [facility] office. Left a message for pt's mom to call the office. Lynn has a nurse visit open at 2:00 in [facility] office that day, if that would be more convenient. If mom chooses to change the appointment, just verify with clinical staff at [facility] that the Dupixent would need to be sent to [facility] office.  
Therapy Recommended - Med Rec related
Therapy Recommended - Med Rec related

## 2024-12-04 NOTE — PROGRESS NOTE ADULT - NS_MD_PANP_GEN_ALL_CORE

## 2024-12-11 NOTE — ED ADULT NURSE NOTE - NS_NURSE_DISC_TEACHING_YN_ED_ALL_ED
Gastrointestinal Colonoscopy/Flexible Sigmoidoscopy - Lower Exam Discharge Instructions  Call Dr. Cheney at [phone] for any problems or questions.  Medication may cause drowsiness for several hours, therefore, do not drive or operate machinery for remainder of the day.  No alcohol today.  Ordinarily, you may resume regular diet and activity after exam unless otherwise specified by your physician.  Because of air put into your colon during exam, you may experience some abdominal distension and nausea, relieved by the passage of gas, for several hours.  Contact your physician if you have any of the following:  Excessive amount of bleeding - large amount when having a bowel movement.  Occasional specks of blood with bowel movement would not be unusual.  Severe abdominal pain  Fever or Chills    Any additional instructions:      The entire examined colon is normal.   Repeat colonoscopy in 10 years for screening purposes.          Yes

## 2024-12-30 NOTE — PROGRESS NOTE ADULT - SUBJECTIVE AND OBJECTIVE BOX
No current lab orders in the system,  Please advise.   CARMEN JOYCE    [id number]    32y      Male    Patient is a 32y old  Male who presents with a chief complaint of Type 2 diabetes mellitus with ketoacidosis without coma     (14 Nov 2023 09:02)      INTERVAL HPI/OVERNIGHT EVENTS:    Patient is doing ok, tolerating food well, no more nausea, vomiting, abdominal pain, denies fever, chills, chest pain,       Vital Signs Last 24 Hrs  T(C): 37.1 (14 Nov 2023 10:12), Max: 37.6 (13 Nov 2023 11:00)  T(F): 98.8 (14 Nov 2023 10:12), Max: 99.7 (13 Nov 2023 11:00)  HR: 81 (14 Nov 2023 10:12) (76 - 99)  BP: 116/82 (14 Nov 2023 10:12) (92/65 - 124/104)  BP(mean): 90 (13 Nov 2023 18:00) (75 - 112)  RR: 18 (14 Nov 2023 10:12) (11 - 23)  SpO2: 98% (14 Nov 2023 10:12) (94% - 100%)    Parameters below as of 14 Nov 2023 10:12  Patient On (Oxygen Delivery Method): room air        PHYSICAL EXAM:    GENERAL: Young male looking comfortable    HEENT: PERRL, +EOMI  NECK: soft, Supple, No JVD  CHEST/LUNG: Clear to auscultate bilaterally; No wheezing  HEART: S1S2+, Regular rate and rhythm; No murmurs  ABDOMEN: Soft, Nontender, Nondistended; Bowel sounds present  EXTREMITIES:  1+ Peripheral Pulses, No edema  SKIN: No rashes or lesions  NEURO: AAOX3  PSYCH: normal mood      LABS:                        9.1    5.90  )-----------( 114      ( 14 Nov 2023 06:10 )             25.2     11-14    140  |  110<H>  |  29.7<H>  ----------------------------<  333<H>  3.1<L>   |  16.0<L>  |  3.36<H>    Ca    7.9<L>      14 Nov 2023 06:10  Phos  2.8     11-13  Mg     2.3     11-13    TPro  4.8<L>  /  Alb  2.7<L>  /  TBili  0.4  /  DBili  x   /  AST  15  /  ALT  10  /  AlkPhos  116  11-13          I&O's Summary    13 Nov 2023 07:01  -  14 Nov 2023 07:00  --------------------------------------------------------  IN: 1827.5 mL / OUT: 500 mL / NET: 1327.5 mL        MEDICATIONS  (STANDING):  cefTRIAXone Injectable. 1000 milliGRAM(s) IV Push every 24 hours  chlorhexidine 2% Cloths 1 Application(s) Topical daily  dextrose 5%. 1000 milliLiter(s) (100 mL/Hr) IV Continuous <Continuous>  dextrose 5%. 1000 milliLiter(s) (50 mL/Hr) IV Continuous <Continuous>  dextrose 50% Injectable 25 Gram(s) IV Push once  dextrose 50% Injectable 25 Gram(s) IV Push once  dextrose 50% Injectable 50 milliLiter(s) IV Push every 15 minutes  folic acid Injectable 1 milliGRAM(s) IV Push daily  glucagon  Injectable 1 milliGRAM(s) IntraMuscular once  heparin   Injectable 5000 Unit(s) SubCutaneous every 8 hours  insulin glargine Injectable (LANTUS) 20 Unit(s) SubCutaneous two times a day  insulin lispro (ADMELOG) corrective regimen sliding scale   SubCutaneous three times a day before meals  insulin lispro Injectable (ADMELOG) 15 Unit(s) SubCutaneous three times a day before meals  levETIRAcetam 1000 milliGRAM(s) Oral two times a day  multivitamin 1 Tablet(s) Oral daily  pantoprazole  Injectable 40 milliGRAM(s) IV Push daily  sodium bicarbonate  Infusion 0.07 mEq/kG/Hr (75 mL/Hr) IV Continuous <Continuous>  thiamine Injectable 100 milliGRAM(s) IV Push daily    MEDICATIONS  (PRN):  dextrose Oral Gel 15 Gram(s) Oral once PRN Blood Glucose LESS THAN 70 milliGRAM(s)/deciliter  LORazepam   Injectable 2 milliGRAM(s) IV Push every 1 hour PRN CIWA-Ar score 8 or greater

## 2024-12-31 ENCOUNTER — APPOINTMENT (OUTPATIENT)
Dept: PULMONOLOGY | Facility: CLINIC | Age: 34
End: 2024-12-31

## 2025-01-01 NOTE — ED ADULT NURSE NOTE - CCCP TRG CHIEF CMPLNT
palpitations/hyperglycemia
Initiate Treatment: TAC 0.1% cream
Render In Strict Bullet Format?: No
Detail Level: Zone
[email or web address]

## 2025-01-17 NOTE — ED ADULT NURSE NOTE - NSHOSCREENINGQ1_ED_ALL_ED
[facility] Hospitalist Group History and Physical      CHIEF COMPLAINT:  dyspnea, leg swelling    History of Present Illness:  59 yo male w/ hx of CHF, end stage COPD on 5L NC, HTN, anxiety who p/w several weeks of progressively worsening dyspnea and leg swelling. Notes that he gained 7-8 lbs in the last week. Denies CP, abd pain, n/v/d, fevers. Has chronic LE redness and reports that his LE redness has not changed.         REVIEW OF SYSTEMS:  As per HPI.    PMH:  Past Medical History:   Diagnosis Date    Anxiety     COPD (chronic obstructive pulmonary disease) (HCC)     Hypertension     Leg swelling     Multiple gastric ulcers     Restless leg syndrome        Surgical History:  Past Surgical History:   Procedure Laterality Date    BRONCHOSCOPY N/A 04/27/2023    BRONCHOSCOPY DIAGNOSTIC OR CELL WASH ONLY performed by Zhang Murphy MD at [facility] ENDOSCOPY    HERNIA REPAIR      HIP SURGERY      JOINT REPLACEMENT  1987    KNEE SURGERY         Medications Prior to Admission:    Prior to Admission medications    Medication Sig Start Date End Date Taking? Authorizing Provider   omeprazole (PRILOSEC) 40 MG delayed release capsule Take 1 capsule by mouth daily 1/16/25   Jana Sanchez MD   Morphine Sulfate (MORPHINE 20MG/ML) SOLN concentrated solution Take 0.5 mLs by mouth every 2 hours as needed for Pain for up to 5 days. Max Daily Amount: 120 mg 1/16/25 1/21/25  Amrik Zuñiga APRN - CNP   HYDROcodone-acetaminophen (NORCO) 7.5-325 MG per tablet Take 1 tablet by mouth every 8 hours as needed for Pain for up to 30 days. Max Daily Amount: 3 tablets 1/16/25 2/15/25  Amrik Zuñiga APRN - MICA   Morphine Sulfate (MORPHINE 20MG/ML) SOLN concentrated solution Take 0.5 mLs by mouth every 2 hours as needed for Pain for up to 5 days. Max Daily Amount: 120 mg 1/21/25 1/26/25  Amrik Zuñiga APRN - MICA   Morphine Sulfate (MORPHINE 20MG/ML) SOLN concentrated solution Take 0.5 mLs by mouth every 2 hours as needed for Pain  No

## 2025-01-27 NOTE — ED PROVIDER NOTE - CHIEF COMPLAINT
History present illness: Patient presents today status post right carpal tunnel release and right thumb CMC injection.  She got nice relief from the CMC injection.  That relief is sustained.  The patient describes numbness and tingling through median nerve distribution of the right hand that has not changed or improved much compared to previous visit.        Physical examination:  General: Alert and oriented to person, place, and time.  No acute distress and breathing comfortably: Pleasant and cooperative with examination.  Extremities: Evaluation of the right upper extremity finds the patient had palpable radial artery at the wrist with brisk capillary refill to all digits.  Patient has intact sensation to axillary radial median and ulnar nerves.  There are no open wounds.  There are no signs of infection.  There is no evidence of lymphedema or lymphatic streaking.  The patient has supple compartments to right arm forearm and hand.      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  Right carpal tunnel syndrome with incomplete resolution of numbness and tingling.      Plan: Treatment options were discussed.  She describes numbness and tingling through the common digital nerve distribution to ring finger long finger.  Recommendations made for simple observation to continue with all massage and range of motion exercises.  3-month follow-up to continue to follow recovery from carpal tunnel release.  Sooner if needed for treatment of his thumb CMC arthritis.      Procedure:        Procedure:     The patient is a 31y Male complaining of hyperglycemia.

## 2025-03-12 NOTE — ED ADULT NURSE REASSESSMENT NOTE - GLASGOW COMA SCALE
baseline Bed in lowest position, wheels locked, appropriate side rails in place/Call bell, personal items and telephone in reach/Instruct patient to call for assistance before getting out of bed or chair/Non-slip footwear when patient is out of bed/[place] to call system/Physically safe environment - no spills, clutter or unnecessary equipment/Purposeful Proactive Rounding/Room/bathroom lighting operational, light cord in reach

## 2025-04-29 NOTE — PATIENT PROFILE ADULT - LANGUAGE ASSISTANCE NEEDED
Please see the attached refill request.   Yes-Patient/Caregiver accepts free interpretation services...

## 2025-05-02 NOTE — DIETITIAN INITIAL EVALUATION ADULT - HEIGHT FOR BMI (INCHES)
Denies h/o or family h/o DVT, PE  Denies bleeding or clotting disorders  Denies dentures/partials, loose teeth/caps 6

## 2025-05-29 ENCOUNTER — INPATIENT (INPATIENT)
Facility: HOSPITAL | Age: 35
LOS: 2 days | Discharge: HOME | DRG: 639 | End: 2025-06-01
Attending: STUDENT IN AN ORGANIZED HEALTH CARE EDUCATION/TRAINING PROGRAM | Admitting: STUDENT IN AN ORGANIZED HEALTH CARE EDUCATION/TRAINING PROGRAM
Payer: COMMERCIAL

## 2025-05-29 VITALS
DIASTOLIC BLOOD PRESSURE: 66 MMHG | SYSTOLIC BLOOD PRESSURE: 97 MMHG | RESPIRATION RATE: 26 BRPM | HEART RATE: 118 BPM | OXYGEN SATURATION: 100 % | WEIGHT: 99.21 LBS

## 2025-05-29 DIAGNOSIS — E10.10 TYPE 1 DIABETES MELLITUS WITH KETOACIDOSIS WITHOUT COMA: ICD-10-CM

## 2025-05-29 LAB
A1C WITH ESTIMATED AVERAGE GLUCOSE RESULT: 12.1 % — HIGH (ref 4–5.6)
ACANTHOCYTES BLD QL SMEAR: SLIGHT — SIGNIFICANT CHANGE UP
ACETONE SERPL-MCNC: ABNORMAL
ALBUMIN SERPL ELPH-MCNC: 4.5 G/DL — SIGNIFICANT CHANGE UP (ref 3.3–5.2)
ALP SERPL-CCNC: 148 U/L — HIGH (ref 40–120)
ALT FLD-CCNC: 14 U/L — SIGNIFICANT CHANGE UP
ANION GAP SERPL CALC-SCNC: 14 MMOL/L — SIGNIFICANT CHANGE UP (ref 5–17)
ANION GAP SERPL CALC-SCNC: 22 MMOL/L — HIGH (ref 5–17)
ANION GAP SERPL CALC-SCNC: SIGNIFICANT CHANGE UP MMOL/L (ref 5–17)
ANION GAP SERPL CALC-SCNC: SIGNIFICANT CHANGE UP MMOL/L (ref 5–17)
ANISOCYTOSIS BLD QL: SLIGHT — SIGNIFICANT CHANGE UP
APPEARANCE UR: CLEAR — SIGNIFICANT CHANGE UP
AST SERPL-CCNC: 18 U/L — SIGNIFICANT CHANGE UP
BACTERIA # UR AUTO: NEGATIVE /HPF — SIGNIFICANT CHANGE UP
BASOPHILS # BLD AUTO: 0.14 K/UL — SIGNIFICANT CHANGE UP (ref 0–0.2)
BASOPHILS # BLD MANUAL: 0.18 K/UL — SIGNIFICANT CHANGE UP (ref 0–0.2)
BASOPHILS NFR BLD AUTO: 0.6 % — SIGNIFICANT CHANGE UP (ref 0–2)
BASOPHILS NFR BLD MANUAL: 0.8 % — SIGNIFICANT CHANGE UP (ref 0–2)
BILIRUB SERPL-MCNC: 0.5 MG/DL — SIGNIFICANT CHANGE UP (ref 0.4–2)
BILIRUB UR-MCNC: NEGATIVE — SIGNIFICANT CHANGE UP
BUN SERPL-MCNC: 20.1 MG/DL — HIGH (ref 8–20)
BUN SERPL-MCNC: 23.1 MG/DL — HIGH (ref 8–20)
BUN SERPL-MCNC: 26.2 MG/DL — HIGH (ref 8–20)
BUN SERPL-MCNC: 26.4 MG/DL — HIGH (ref 8–20)
BURR CELLS BLD QL SMEAR: SLIGHT — SIGNIFICANT CHANGE UP
CALCIUM SERPL-MCNC: 8.4 MG/DL — SIGNIFICANT CHANGE UP (ref 8.4–10.5)
CALCIUM SERPL-MCNC: 9.2 MG/DL — SIGNIFICANT CHANGE UP (ref 8.4–10.5)
CALCIUM SERPL-MCNC: 9.5 MG/DL — SIGNIFICANT CHANGE UP (ref 8.4–10.5)
CALCIUM SERPL-MCNC: 9.6 MG/DL — SIGNIFICANT CHANGE UP (ref 8.4–10.5)
CAST: 4 /LPF — SIGNIFICANT CHANGE UP (ref 0–4)
CHLORIDE SERPL-SCNC: 108 MMOL/L — SIGNIFICANT CHANGE UP (ref 96–108)
CHLORIDE SERPL-SCNC: 110 MMOL/L — HIGH (ref 96–108)
CHLORIDE SERPL-SCNC: 111 MMOL/L — HIGH (ref 96–108)
CHLORIDE SERPL-SCNC: 97 MMOL/L — SIGNIFICANT CHANGE UP (ref 96–108)
CO2 SERPL-SCNC: 11 MMOL/L — LOW (ref 22–29)
CO2 SERPL-SCNC: 15 MMOL/L — LOW (ref 22–29)
CO2 SERPL-SCNC: <6 MMOL/L — CRITICAL LOW (ref 22–29)
CO2 SERPL-SCNC: <6 MMOL/L — CRITICAL LOW (ref 22–29)
COLOR SPEC: YELLOW — SIGNIFICANT CHANGE UP
CREAT SERPL-MCNC: 0.75 MG/DL — SIGNIFICANT CHANGE UP (ref 0.5–1.3)
CREAT SERPL-MCNC: 0.99 MG/DL — SIGNIFICANT CHANGE UP (ref 0.5–1.3)
CREAT SERPL-MCNC: 1.01 MG/DL — SIGNIFICANT CHANGE UP (ref 0.5–1.3)
CREAT SERPL-MCNC: 1.4 MG/DL — HIGH (ref 0.5–1.3)
DIFF PNL FLD: ABNORMAL
EGFR: 101 ML/MIN/1.73M2 — SIGNIFICANT CHANGE UP
EGFR: 101 ML/MIN/1.73M2 — SIGNIFICANT CHANGE UP
EGFR: 120 ML/MIN/1.73M2 — SIGNIFICANT CHANGE UP
EGFR: 120 ML/MIN/1.73M2 — SIGNIFICANT CHANGE UP
EGFR: 67 ML/MIN/1.73M2 — SIGNIFICANT CHANGE UP
EGFR: 67 ML/MIN/1.73M2 — SIGNIFICANT CHANGE UP
EGFR: 99 ML/MIN/1.73M2 — SIGNIFICANT CHANGE UP
EGFR: 99 ML/MIN/1.73M2 — SIGNIFICANT CHANGE UP
EOSINOPHIL # BLD AUTO: 0.01 K/UL — SIGNIFICANT CHANGE UP (ref 0–0.5)
EOSINOPHIL # BLD MANUAL: 0 K/UL — SIGNIFICANT CHANGE UP (ref 0–0.5)
EOSINOPHIL NFR BLD AUTO: 0 % — SIGNIFICANT CHANGE UP (ref 0–6)
EOSINOPHIL NFR BLD MANUAL: 0 % — SIGNIFICANT CHANGE UP (ref 0–6)
ESTIMATED AVERAGE GLUCOSE: 301 MG/DL — HIGH (ref 68–114)
ETHANOL SERPL-MCNC: <10 MG/DL — SIGNIFICANT CHANGE UP (ref 0–9)
GAS PNL BLDA: SIGNIFICANT CHANGE UP
GAS PNL BLDV: SIGNIFICANT CHANGE UP
GAS PNL BLDV: SIGNIFICANT CHANGE UP
GIANT PLATELETS BLD QL SMEAR: PRESENT
GLUCOSE BLDC GLUCOMTR-MCNC: 184 MG/DL — HIGH (ref 70–99)
GLUCOSE BLDC GLUCOMTR-MCNC: 231 MG/DL — HIGH (ref 70–99)
GLUCOSE BLDC GLUCOMTR-MCNC: 233 MG/DL — HIGH (ref 70–99)
GLUCOSE BLDC GLUCOMTR-MCNC: 257 MG/DL — HIGH (ref 70–99)
GLUCOSE BLDC GLUCOMTR-MCNC: 267 MG/DL — HIGH (ref 70–99)
GLUCOSE BLDC GLUCOMTR-MCNC: 268 MG/DL — HIGH (ref 70–99)
GLUCOSE BLDC GLUCOMTR-MCNC: 429 MG/DL — HIGH (ref 70–99)
GLUCOSE BLDC GLUCOMTR-MCNC: 442 MG/DL — HIGH (ref 70–99)
GLUCOSE BLDC GLUCOMTR-MCNC: 566 MG/DL — CRITICAL HIGH (ref 70–99)
GLUCOSE BLDC GLUCOMTR-MCNC: >600 MG/DL — CRITICAL HIGH (ref 70–99)
GLUCOSE SERPL-MCNC: 160 MG/DL — HIGH (ref 70–99)
GLUCOSE SERPL-MCNC: 224 MG/DL — HIGH (ref 70–99)
GLUCOSE SERPL-MCNC: 346 MG/DL — HIGH (ref 70–99)
GLUCOSE SERPL-MCNC: 735 MG/DL — CRITICAL HIGH (ref 70–99)
GLUCOSE UR QL: >=1000 MG/DL
HCT VFR BLD CALC: 42.3 % — SIGNIFICANT CHANGE UP (ref 39–50)
HCT VFR BLD CALC: 45.8 % — SIGNIFICANT CHANGE UP (ref 39–50)
HGB BLD-MCNC: 15.3 G/DL — SIGNIFICANT CHANGE UP (ref 13–17)
HGB BLD-MCNC: 15.5 G/DL — SIGNIFICANT CHANGE UP (ref 13–17)
IMM GRANULOCYTES # BLD AUTO: 0.78 K/UL — HIGH (ref 0–0.07)
IMM GRANULOCYTES NFR BLD AUTO: 3.4 % — HIGH (ref 0–0.9)
KETONES UR QL: >=160 MG/DL
LACTATE SERPL-SCNC: 2.2 MMOL/L — HIGH (ref 0.5–2)
LEUKOCYTE ESTERASE UR-ACNC: NEGATIVE — SIGNIFICANT CHANGE UP
LYMPHOCYTES # BLD AUTO: 1.58 K/UL — SIGNIFICANT CHANGE UP (ref 1–3.3)
LYMPHOCYTES # BLD MANUAL: 1.92 K/UL — SIGNIFICANT CHANGE UP (ref 1–3.3)
LYMPHOCYTES NFR BLD AUTO: 7 % — LOW (ref 13–44)
LYMPHOCYTES NFR BLD MANUAL: 8.5 % — LOW (ref 13–44)
MAGNESIUM SERPL-MCNC: 1.6 MG/DL — SIGNIFICANT CHANGE UP (ref 1.6–2.6)
MAGNESIUM SERPL-MCNC: 2.1 MG/DL — SIGNIFICANT CHANGE UP (ref 1.6–2.6)
MAGNESIUM SERPL-MCNC: 2.2 MG/DL — SIGNIFICANT CHANGE UP (ref 1.6–2.6)
MAGNESIUM SERPL-MCNC: 2.8 MG/DL — HIGH (ref 1.6–2.6)
MANUAL NEUTROPHIL BANDS #: 2.87 K/UL — HIGH (ref 0–0.84)
MANUAL NRBC #: 0.23 K/UL — HIGH (ref 0–0)
MCHC RBC-ENTMCNC: 33.4 G/DL — SIGNIFICANT CHANGE UP (ref 32–36)
MCHC RBC-ENTMCNC: 33.7 PG — SIGNIFICANT CHANGE UP (ref 27–34)
MCHC RBC-ENTMCNC: 34 PG — SIGNIFICANT CHANGE UP (ref 27–34)
MCHC RBC-ENTMCNC: 36.6 G/DL — HIGH (ref 32–36)
MCV RBC AUTO: 101.8 FL — HIGH (ref 80–100)
MCV RBC AUTO: 92 FL — SIGNIFICANT CHANGE UP (ref 80–100)
MONOCYTES # BLD AUTO: 2.65 K/UL — HIGH (ref 0–0.9)
MONOCYTES # BLD MANUAL: 2.69 K/UL — HIGH (ref 0–0.9)
MONOCYTES NFR BLD AUTO: 11.7 % — SIGNIFICANT CHANGE UP (ref 2–14)
MONOCYTES NFR BLD MANUAL: 11.9 % — SIGNIFICANT CHANGE UP (ref 2–14)
MRSA PCR RESULT.: SIGNIFICANT CHANGE UP
NEUTROPHILS # BLD AUTO: 17.46 K/UL — HIGH (ref 1.8–7.4)
NEUTROPHILS # BLD MANUAL: 14.95 K/UL — HIGH (ref 1.8–7.4)
NEUTROPHILS NFR BLD AUTO: 77.3 % — HIGH (ref 43–77)
NEUTROPHILS NFR BLD MANUAL: 66.1 % — SIGNIFICANT CHANGE UP (ref 43–77)
NEUTS BAND # BLD: 12.7 % — HIGH (ref 0–8)
NEUTS BAND NFR BLD: 12.7 % — HIGH (ref 0–8)
NITRITE UR-MCNC: NEGATIVE — SIGNIFICANT CHANGE UP
NRBC # BLD AUTO: 0 K/UL — SIGNIFICANT CHANGE UP (ref 0–0)
NRBC # BLD AUTO: 0.02 K/UL — HIGH (ref 0–0)
NRBC # BLD: 1 /100 WBCS — HIGH (ref 0–0)
NRBC # FLD: 0 K/UL — SIGNIFICANT CHANGE UP (ref 0–0)
NRBC # FLD: 0.02 K/UL — HIGH (ref 0–0)
NRBC BLD AUTO-RTO: 0 /100 WBCS — SIGNIFICANT CHANGE UP (ref 0–0)
NRBC BLD AUTO-RTO: 0 /100 WBCS — SIGNIFICANT CHANGE UP (ref 0–0)
NRBC BLD-RTO: 1 /100 WBCS — HIGH (ref 0–0)
PH UR: 5.5 — SIGNIFICANT CHANGE UP (ref 5–8)
PHOSPHATE SERPL-MCNC: 0.9 MG/DL — CRITICAL LOW (ref 2.4–4.7)
PHOSPHATE SERPL-MCNC: 1.7 MG/DL — LOW (ref 2.4–4.7)
PHOSPHATE SERPL-MCNC: 2.4 MG/DL — SIGNIFICANT CHANGE UP (ref 2.4–4.7)
PHOSPHATE SERPL-MCNC: 9.5 MG/DL — HIGH (ref 2.4–4.7)
PLAT MORPH BLD: NORMAL — SIGNIFICANT CHANGE UP
PLATELET # BLD AUTO: 241 K/UL — SIGNIFICANT CHANGE UP (ref 150–400)
PLATELET # BLD AUTO: 314 K/UL — SIGNIFICANT CHANGE UP (ref 150–400)
PMV BLD: 10.1 FL — SIGNIFICANT CHANGE UP (ref 7–13)
PMV BLD: 10.6 FL — SIGNIFICANT CHANGE UP (ref 7–13)
POIKILOCYTOSIS BLD QL AUTO: SLIGHT — SIGNIFICANT CHANGE UP
POTASSIUM SERPL-MCNC: 3.3 MMOL/L — LOW (ref 3.5–5.3)
POTASSIUM SERPL-MCNC: 3.5 MMOL/L — SIGNIFICANT CHANGE UP (ref 3.5–5.3)
POTASSIUM SERPL-MCNC: 4 MMOL/L — SIGNIFICANT CHANGE UP (ref 3.5–5.3)
POTASSIUM SERPL-MCNC: 4.8 MMOL/L — SIGNIFICANT CHANGE UP (ref 3.5–5.3)
POTASSIUM SERPL-SCNC: 3.3 MMOL/L — LOW (ref 3.5–5.3)
POTASSIUM SERPL-SCNC: 3.5 MMOL/L — SIGNIFICANT CHANGE UP (ref 3.5–5.3)
POTASSIUM SERPL-SCNC: 4 MMOL/L — SIGNIFICANT CHANGE UP (ref 3.5–5.3)
POTASSIUM SERPL-SCNC: 4.8 MMOL/L — SIGNIFICANT CHANGE UP (ref 3.5–5.3)
PROCALCITONIN SERPL-MCNC: 7.22 NG/ML — HIGH (ref 0.02–0.1)
PROT SERPL-MCNC: 7.3 G/DL — SIGNIFICANT CHANGE UP (ref 6.6–8.7)
PROT UR-MCNC: 100 MG/DL
RBC # BLD: 4.5 M/UL — SIGNIFICANT CHANGE UP (ref 4.2–5.8)
RBC # BLD: 4.6 M/UL — SIGNIFICANT CHANGE UP (ref 4.2–5.8)
RBC # FLD: 13 % — SIGNIFICANT CHANGE UP (ref 10.3–14.5)
RBC # FLD: 13.5 % — SIGNIFICANT CHANGE UP (ref 10.3–14.5)
RBC BLD AUTO: ABNORMAL
RBC CASTS # UR COMP ASSIST: 0 /HPF — SIGNIFICANT CHANGE UP (ref 0–4)
S AUREUS DNA NOSE QL NAA+PROBE: SIGNIFICANT CHANGE UP
SODIUM SERPL-SCNC: 138 MMOL/L — SIGNIFICANT CHANGE UP (ref 135–145)
SODIUM SERPL-SCNC: 140 MMOL/L — SIGNIFICANT CHANGE UP (ref 135–145)
SODIUM SERPL-SCNC: 141 MMOL/L — SIGNIFICANT CHANGE UP (ref 135–145)
SODIUM SERPL-SCNC: 141 MMOL/L — SIGNIFICANT CHANGE UP (ref 135–145)
SP GR SPEC: 1.02 — SIGNIFICANT CHANGE UP (ref 1–1.03)
SQUAMOUS # UR AUTO: 2 /HPF — SIGNIFICANT CHANGE UP (ref 0–5)
T3 SERPL-MCNC: <40 NG/DL — LOW (ref 80–200)
T4 AB SER-ACNC: 5 UG/DL — SIGNIFICANT CHANGE UP (ref 4.5–12)
TSH SERPL-MCNC: 1.94 UIU/ML — SIGNIFICANT CHANGE UP (ref 0.27–4.2)
UROBILINOGEN FLD QL: 0.2 MG/DL — SIGNIFICANT CHANGE UP (ref 0.2–1)
WBC # BLD: 22.62 K/UL — HIGH (ref 3.8–10.5)
WBC # BLD: 9.85 K/UL — SIGNIFICANT CHANGE UP (ref 3.8–10.5)
WBC # FLD AUTO: 22.62 K/UL — HIGH (ref 3.8–10.5)
WBC # FLD AUTO: 9.85 K/UL — SIGNIFICANT CHANGE UP (ref 3.8–10.5)
WBC UR QL: 1 /HPF — SIGNIFICANT CHANGE UP (ref 0–5)

## 2025-05-29 RX ORDER — CEFEPIME 2 G/20ML
1000 INJECTION, POWDER, FOR SOLUTION INTRAVENOUS ONCE
Refills: 0 | Status: COMPLETED | OUTPATIENT
Start: 2025-05-29 | End: 2025-05-29

## 2025-05-29 RX ORDER — VANCOMYCIN HCL IN 5 % DEXTROSE 1.5G/250ML
1000 PLASTIC BAG, INJECTION (ML) INTRAVENOUS ONCE
Refills: 0 | Status: COMPLETED | OUTPATIENT
Start: 2025-05-29 | End: 2025-05-29

## 2025-05-29 RX ORDER — SOD PHOS DI, MONO/K PHOS MONO 250 MG
1 TABLET ORAL ONCE
Refills: 0 | Status: COMPLETED | OUTPATIENT
Start: 2025-05-29 | End: 2025-05-29

## 2025-05-29 RX ORDER — DIAZEPAM 5 MG/1
20 TABLET ORAL
Refills: 0 | Status: DISCONTINUED | OUTPATIENT
Start: 2025-05-29 | End: 2025-06-01

## 2025-05-29 RX ORDER — SODIUM CHLORIDE 9 G/1000ML
1000 INJECTION, SOLUTION INTRAVENOUS
Refills: 0 | Status: DISCONTINUED | OUTPATIENT
Start: 2025-05-29 | End: 2025-05-29

## 2025-05-29 RX ORDER — HEPARIN SODIUM 1000 [USP'U]/ML
5000 INJECTION INTRAVENOUS; SUBCUTANEOUS EVERY 12 HOURS
Refills: 0 | Status: DISCONTINUED | OUTPATIENT
Start: 2025-05-29 | End: 2025-06-01

## 2025-05-29 RX ORDER — SODIUM CHLORIDE 9 G/1000ML
1000 INJECTION, SOLUTION INTRAVENOUS
Refills: 0 | Status: DISCONTINUED | OUTPATIENT
Start: 2025-05-29 | End: 2025-05-30

## 2025-05-29 RX ORDER — CEFEPIME 2 G/20ML
1000 INJECTION, POWDER, FOR SOLUTION INTRAVENOUS ONCE
Refills: 0 | Status: DISCONTINUED | OUTPATIENT
Start: 2025-05-29 | End: 2025-05-29

## 2025-05-29 RX ORDER — DIAZEPAM 5 MG/1
10 TABLET ORAL
Refills: 0 | Status: DISCONTINUED | OUTPATIENT
Start: 2025-05-29 | End: 2025-06-01

## 2025-05-29 RX ORDER — PIPERACILLIN-TAZO-DEXTROSE,ISO 3.375G/5
3.38 IV SOLUTION, PIGGYBACK PREMIX FROZEN(ML) INTRAVENOUS ONCE
Refills: 0 | Status: COMPLETED | OUTPATIENT
Start: 2025-05-29 | End: 2025-05-29

## 2025-05-29 RX ORDER — PIPERACILLIN-TAZO-DEXTROSE,ISO 3.375G/5
3.38 IV SOLUTION, PIGGYBACK PREMIX FROZEN(ML) INTRAVENOUS EVERY 8 HOURS
Refills: 0 | Status: DISCONTINUED | OUTPATIENT
Start: 2025-05-29 | End: 2025-05-31

## 2025-05-29 RX ORDER — SODIUM BICARBONATE 1 MEQ/ML
50 SYRINGE (ML) INTRAVENOUS ONCE
Refills: 0 | Status: COMPLETED | OUTPATIENT
Start: 2025-05-29 | End: 2025-05-29

## 2025-05-29 RX ORDER — POTASSIUM PHOSPHATE, MONOBASIC POTASSIUM PHOSPHATE, DIBASIC INJECTION, 236; 224 MG/ML; MG/ML
30 SOLUTION, CONCENTRATE INTRAVENOUS ONCE
Refills: 0 | Status: COMPLETED | OUTPATIENT
Start: 2025-05-29 | End: 2025-05-29

## 2025-05-29 RX ORDER — SODIUM CHLORIDE 9 G/1000ML
2000 INJECTION, SOLUTION INTRAVENOUS ONCE
Refills: 0 | Status: COMPLETED | OUTPATIENT
Start: 2025-05-29 | End: 2025-05-29

## 2025-05-29 RX ORDER — FOLIC ACID 1 MG/1
1 TABLET ORAL EVERY 24 HOURS
Refills: 0 | Status: DISCONTINUED | OUTPATIENT
Start: 2025-05-29 | End: 2025-06-01

## 2025-05-29 RX ORDER — SODIUM CHLORIDE 9 G/1000ML
1000 INJECTION, SOLUTION INTRAVENOUS ONCE
Refills: 0 | Status: COMPLETED | OUTPATIENT
Start: 2025-05-29 | End: 2025-05-29

## 2025-05-29 RX ORDER — B1/B2/B3/B5/B6/B12/VIT C/FOLIC 500-0.5 MG
1 TABLET ORAL DAILY
Refills: 0 | Status: DISCONTINUED | OUTPATIENT
Start: 2025-05-29 | End: 2025-06-01

## 2025-05-29 RX ADMIN — Medication 100 MILLIEQUIVALENT(S): at 10:50

## 2025-05-29 RX ADMIN — Medication 100 MILLIEQUIVALENT(S): at 20:45

## 2025-05-29 RX ADMIN — SODIUM CHLORIDE 150 MILLILITER(S): 9 INJECTION, SOLUTION INTRAVENOUS at 20:08

## 2025-05-29 RX ADMIN — Medication 100 MILLIEQUIVALENT(S): at 11:50

## 2025-05-29 RX ADMIN — Medication 5 UNIT(S)/HR: at 09:06

## 2025-05-29 RX ADMIN — Medication 200 GRAM(S): at 11:40

## 2025-05-29 RX ADMIN — Medication 105 MILLIGRAM(S): at 22:12

## 2025-05-29 RX ADMIN — FOLIC ACID 1 MILLIGRAM(S): 1 TABLET ORAL at 15:08

## 2025-05-29 RX ADMIN — Medication 1000 MILLIGRAM(S): at 09:00

## 2025-05-29 RX ADMIN — SODIUM CHLORIDE 150 MILLILITER(S): 9 INJECTION, SOLUTION INTRAVENOUS at 17:59

## 2025-05-29 RX ADMIN — Medication 10 UNIT(S)/HR: at 22:12

## 2025-05-29 RX ADMIN — POTASSIUM PHOSPHATE, MONOBASIC POTASSIUM PHOSPHATE, DIBASIC INJECTION, 83.33 MILLIMOLE(S): 236; 224 SOLUTION, CONCENTRATE INTRAVENOUS at 20:08

## 2025-05-29 RX ADMIN — Medication 25 GRAM(S): at 22:20

## 2025-05-29 RX ADMIN — Medication 100 MILLIEQUIVALENT(S): at 17:59

## 2025-05-29 RX ADMIN — Medication 105 MILLIGRAM(S): at 16:58

## 2025-05-29 RX ADMIN — Medication 50 MILLIEQUIVALENT(S): at 09:06

## 2025-05-29 RX ADMIN — Medication 10 UNIT(S)/HR: at 17:59

## 2025-05-29 RX ADMIN — Medication 250 MILLIGRAM(S): at 07:59

## 2025-05-29 RX ADMIN — Medication 1 PACKET(S): at 20:08

## 2025-05-29 RX ADMIN — HEPARIN SODIUM 5000 UNIT(S): 1000 INJECTION INTRAVENOUS; SUBCUTANEOUS at 17:58

## 2025-05-29 RX ADMIN — SODIUM CHLORIDE 2000 MILLILITER(S): 9 INJECTION, SOLUTION INTRAVENOUS at 08:20

## 2025-05-29 RX ADMIN — SODIUM CHLORIDE 1000 MILLILITER(S): 9 INJECTION, SOLUTION INTRAVENOUS at 18:37

## 2025-05-29 RX ADMIN — Medication 100 MILLIEQUIVALENT(S): at 09:05

## 2025-05-29 RX ADMIN — Medication 25 GRAM(S): at 17:58

## 2025-05-29 RX ADMIN — CEFEPIME 1000 MILLIGRAM(S): 2 INJECTION, POWDER, FOR SOLUTION INTRAVENOUS at 07:59

## 2025-05-29 RX ADMIN — Medication 10 UNIT(S)/HR: at 10:50

## 2025-05-29 RX ADMIN — SODIUM CHLORIDE 2000 MILLILITER(S): 9 INJECTION, SOLUTION INTRAVENOUS at 07:12

## 2025-05-29 RX ADMIN — Medication 100 MILLIEQUIVALENT(S): at 18:38

## 2025-05-29 NOTE — H&P ADULT - ASSESSMENT
35 year old male withPMHx IDDM with multiple admissions, NICM with recovered EF, ETOH abuse, presenting with altered mental status, found to be breathing very deeply and tachycardic. found to be in DKA with , pH <6.9, unable to calculate AG. no   In ED: hypothermic, tachycardic, found to have +leukocytosis 22, unable to calculate AG, , moderate acetones, pH <6.9 on VBG. CT head and CXR completed with no acute findings.   Admit to MICU for further management of DKA.     Neurology  - optimize sleep/wake  - delirium precautions  - 5/29 CTH no acute findings    Respiratory   - comfortable on room air    Cardiovascular   - HDS at present     Hematologic/Onc  - DVT ppx: heparin sq bid    Renal  - ROBERTO, BUN/Cr 26.4/1.4, continue to trend  - Continue monitoring urine output  - Avoid Nephrotoxic agents   - Adjusting medications for renal  function   - Replacing electrolytes as needed with Goal K> 4, PO> 3, Mg> 2    Gastrointestinal  - Diet- NPO for now    Endocrine  - DKA; c/w insulin gtt, q4h lytes   - will cs endo    Infectious Disease  - patient tachycardic and hypothermic to 34.5C, +leukocytosis.  - received cefepime + vanc x1 in ED  - empiric treatment with zosyn  - blood culture sent, f/u  - UA/RVP pending, f/u    ____________________________________________      Care plan discussed with ICU care team, family and consultants

## 2025-05-29 NOTE — ED ADULT NURSE REASSESSMENT NOTE - NS ED NURSE REASSESS COMMENT FT1
Received pt from Keo MORAN at 0855. Pt Awake and responsive to verbal and tactile stimuli. Pt on cardiac monitor and Sinus tachy noted on screen. Pt denies chest pain/sob, respirations are even and unlabored.

## 2025-05-29 NOTE — ED PROVIDER NOTE - CLINICAL SUMMARY MEDICAL DECISION MAKING FREE TEXT BOX
Matthew:  36 y/o male hx dm with multiple admissions dka (had poor compliance with insulin), NICM with recovered EF (55% on echo last year), etoh abuse biba from home for ams with deep breathing and tachycardia similar to prior DKA. pt awake but not answering appropriately and unable to provide history. ems states no history from house. pt arrives tachy, kusmaul breathing with FS >500. suspect dka, will check labs, urine, blood gas, cth, xr, etoh, give fluids, insulin drip as needed pending labs. reassess

## 2025-05-29 NOTE — ED ADULT TRIAGE NOTE - CHIEF COMPLAINT QUOTE
altered mental status, hyperglycemia with tachypnea, brought to critical care, patient awake, not answering quesitons appropriately

## 2025-05-29 NOTE — H&P ADULT - ATTENDING COMMENTS
Refill approved as requested.   .    35M PMH IDDM, NICM, EtOH abuse who presented 5/29/25 with AMS, noted with leukocytosis, hypothermia, unreadable FSG, DKA. CXR clear. CT head WNL. Will check CT chest/abdomen/pelvis non-contrast for sepsis work-up. BMP + VBG Q4H. Hold insulin if K<3.3. Empiric Zosyn. BCx are in the lab. HOB elevation, aspiration precautions. Admit to ICU.      Prudencio Sainz MD, [facility]  , Pulmonary & Critical Care Medicine  [facility]  Pulmonary and Sleep Medicine at [place]  [address]  [place] [address]  T: [phone]  F: [phone] .    35M PMH IDDM, NICM, EtOH abuse who presented 5/29/25 with AMS, noted with leukocytosis, hypothermia, unreadable FSG, DKA. CXR clear. CT head WNL. Will check CT chest/abdomen/pelvis non-contrast for sepsis work-up. BMP + VBG Q4H. Hold insulin if K<3.3. Empiric Zosyn. BCx are in the lab. HOB elevation, aspiration precautions. CIWA. High dose Thiamine + folic acid + MVI. Valium PRN. Admit to ICU.      Prudencio Sainz MD, [facility]  , Pulmonary & Critical Care Medicine  [facility]  Pulmonary and Sleep Medicine at [place]  [address]  [place], [place] [address]  T: [phone]  F: [phone]

## 2025-05-29 NOTE — H&P ADULT - NSHPLABSRESULTS_GEN_ALL_CORE
Patient called to follow up on his refill. I informed him that we received the request this morning. He said he has a few days left and would like a short term refill to local [facility] on file. I stated to him that he is due for an appointment. He was transferred to schedule an appointment. < from: CT Head No Cont (05.29.25 @ 08:44) >    IMPRESSION:  No acute intracranial hemorrhage, mass effect, or midline shift.    < end of copied text >    < from: Xray Chest 1 View- PORTABLE-Urgent (05.29.25 @ 07:20) >    IMPRESSION: No acute cardiopulmonary disease process.    < end of copied text >

## 2025-05-29 NOTE — H&P ADULT - NSHPPHYSICALEXAM_GEN_ALL_CORE
Gen: thin male, awake, not verbally responsive  Head: normocephalic, atraumatic  EENT: EOMI, moist mucous membranes  Lung: no increased work of breathing, clear to auscultation bilaterally  CV: tachycardic rate, regular rhythm, normal s1/s2  Abd: soft, non-tender, non-distended  MSK: No edema, no visible deformities  Neuro: Awake, alert, not speaking.  no facial asymmetry, moving all extremities against gravity

## 2025-05-29 NOTE — ED ADULT NURSE REASSESSMENT NOTE - NS ED NURSE REASSESS COMMENT FT1
Rechecked pt BS and continues to read >600. Micu PA notified and took verbal order to increase insulin drip from 5 units/hour to 10 units/hour.

## 2025-05-29 NOTE — H&P ADULT - HISTORY OF PRESENT ILLNESS
Joseph Richmond ([id number])  35 year old male with PMHx IDDM with multiple admissions, NICM with recovered EF, ETOH abuse, presenting with altered mental status, found to be breathing very deeply and tachycardic. Arrived awake but not verbally responsive.   In ED: hypothermic, tachycardic, found to have +leukocytosis 22, unable to calculate AG, , moderate acetones, pH <6.9 on VBG. CT head and CXR completed with no acute findings. received cefepime/vanc, and insulin gtt started   Admit to MICU for further management of DKA.  Joseph Shetty ( 1990/ MRN [id number])  35 year old male with PMHx IDDM with multiple admissions, NICM with recovered EF, ETOH abuse, presenting with altered mental status, found to be breathing very deeply and tachycardic. Arrived awake but not verbally responsive.   In ED: hypothermic, tachycardic, found to have +leukocytosis 22, unable to calculate AG, , moderate acetones, pH <6.9 on VBG. CT head and CXR completed with no acute findings. received cefepime/vanc, and insulin gtt started   Admit to MICU for further management of DKA.

## 2025-05-29 NOTE — ED PROVIDER NOTE - OBJECTIVE STATEMENT
Joseph Mulugeta Richmond ([id number]) 34 y/o male hx dm with multiple admissions dka (had poor compliance with insulin), NICM with recovered EF (55% on echo last year), etoh abuse biba from home for ams with deep breathing and tachycardia similar to prior DKA. pt awake but not answering appropriately and unable to provide history. ems states no history from house. pt arrives tachy, kusmaul breathing with FS >500.

## 2025-05-29 NOTE — ED ADULT NURSE NOTE - OBJECTIVE STATEMENT
Rcvd pt to [facility]1: Pt found AMS. Phx DM w/ DKA. Pt. w/ nonsensical speech on arrival to ED, +kussmals respirations. Fruity breath. FS reading HI. Pt. appears cachetic. Placed on CM and  for cts monitoring. Sinus tach on CM (114). O2 sat maintained on RA. Comfort measures provided, safety measures implemented, call camarena in reach. MD Castro at bedside.

## 2025-05-29 NOTE — PATIENT PROFILE ADULT - FALL HARM RISK - RISK INTERVENTIONS

## 2025-05-29 NOTE — ED PROVIDER NOTE - PHYSICAL EXAMINATION
Gen: unwell appearing, kussmaul breathing  HEENT: Mucous membranes moist, pink conjunctivae, EOMI  CV: tachy ~125, nl s1/s2.  Resp: tachypneic, barrel chested. clear lungs.   GI: Abdomen soft, NT, ND. No rebound, no guarding  : No CVAT  Neuro: moves extremities, not answering apporpriately.   MSK: No spine or joint tenderness to palpation  Skin: No rashes. intact.

## 2025-05-30 LAB
A1C WITH ESTIMATED AVERAGE GLUCOSE RESULT: 12.3 % — HIGH (ref 4–5.6)
ALBUMIN SERPL ELPH-MCNC: 3.2 G/DL — LOW (ref 3.3–5.2)
ALP SERPL-CCNC: 74 U/L — SIGNIFICANT CHANGE UP (ref 40–120)
ALT FLD-CCNC: 10 U/L — SIGNIFICANT CHANGE UP
ANION GAP SERPL CALC-SCNC: 11 MMOL/L — SIGNIFICANT CHANGE UP (ref 5–17)
ANION GAP SERPL CALC-SCNC: 13 MMOL/L — SIGNIFICANT CHANGE UP (ref 5–17)
ANION GAP SERPL CALC-SCNC: 17 MMOL/L — SIGNIFICANT CHANGE UP (ref 5–17)
AST SERPL-CCNC: 18 U/L — SIGNIFICANT CHANGE UP
BILIRUB SERPL-MCNC: 1.2 MG/DL — SIGNIFICANT CHANGE UP (ref 0.4–2)
BUN SERPL-MCNC: 16.3 MG/DL — SIGNIFICANT CHANGE UP (ref 8–20)
BUN SERPL-MCNC: 16.8 MG/DL — SIGNIFICANT CHANGE UP (ref 8–20)
BUN SERPL-MCNC: 18.6 MG/DL — SIGNIFICANT CHANGE UP (ref 8–20)
CALCIUM SERPL-MCNC: 7.9 MG/DL — LOW (ref 8.4–10.5)
CALCIUM SERPL-MCNC: 8.1 MG/DL — LOW (ref 8.4–10.5)
CALCIUM SERPL-MCNC: 8.3 MG/DL — LOW (ref 8.4–10.5)
CHLORIDE SERPL-SCNC: 108 MMOL/L — SIGNIFICANT CHANGE UP (ref 96–108)
CHLORIDE SERPL-SCNC: 111 MMOL/L — HIGH (ref 96–108)
CHLORIDE SERPL-SCNC: 113 MMOL/L — HIGH (ref 96–108)
CO2 SERPL-SCNC: 13 MMOL/L — LOW (ref 22–29)
CO2 SERPL-SCNC: 15 MMOL/L — LOW (ref 22–29)
CO2 SERPL-SCNC: 16 MMOL/L — LOW (ref 22–29)
CREAT SERPL-MCNC: 0.52 MG/DL — SIGNIFICANT CHANGE UP (ref 0.5–1.3)
CREAT SERPL-MCNC: 0.54 MG/DL — SIGNIFICANT CHANGE UP (ref 0.5–1.3)
CREAT SERPL-MCNC: 0.6 MG/DL — SIGNIFICANT CHANGE UP (ref 0.5–1.3)
EGFR: 128 ML/MIN/1.73M2 — SIGNIFICANT CHANGE UP
EGFR: 128 ML/MIN/1.73M2 — SIGNIFICANT CHANGE UP
EGFR: 132 ML/MIN/1.73M2 — SIGNIFICANT CHANGE UP
EGFR: 132 ML/MIN/1.73M2 — SIGNIFICANT CHANGE UP
EGFR: 134 ML/MIN/1.73M2 — SIGNIFICANT CHANGE UP
EGFR: 134 ML/MIN/1.73M2 — SIGNIFICANT CHANGE UP
ESTIMATED AVERAGE GLUCOSE: 306 MG/DL — HIGH (ref 68–114)
GAS PNL BLDV: SIGNIFICANT CHANGE UP
GLUCOSE BLDC GLUCOMTR-MCNC: 121 MG/DL — HIGH (ref 70–99)
GLUCOSE BLDC GLUCOMTR-MCNC: 124 MG/DL — HIGH (ref 70–99)
GLUCOSE BLDC GLUCOMTR-MCNC: 133 MG/DL — HIGH (ref 70–99)
GLUCOSE BLDC GLUCOMTR-MCNC: 142 MG/DL — HIGH (ref 70–99)
GLUCOSE BLDC GLUCOMTR-MCNC: 145 MG/DL — HIGH (ref 70–99)
GLUCOSE BLDC GLUCOMTR-MCNC: 149 MG/DL — HIGH (ref 70–99)
GLUCOSE BLDC GLUCOMTR-MCNC: 166 MG/DL — HIGH (ref 70–99)
GLUCOSE BLDC GLUCOMTR-MCNC: 172 MG/DL — HIGH (ref 70–99)
GLUCOSE BLDC GLUCOMTR-MCNC: 177 MG/DL — HIGH (ref 70–99)
GLUCOSE BLDC GLUCOMTR-MCNC: 249 MG/DL — HIGH (ref 70–99)
GLUCOSE BLDC GLUCOMTR-MCNC: 317 MG/DL — HIGH (ref 70–99)
GLUCOSE BLDC GLUCOMTR-MCNC: 319 MG/DL — HIGH (ref 70–99)
GLUCOSE BLDC GLUCOMTR-MCNC: 335 MG/DL — HIGH (ref 70–99)
GLUCOSE BLDC GLUCOMTR-MCNC: 83 MG/DL — SIGNIFICANT CHANGE UP (ref 70–99)
GLUCOSE SERPL-MCNC: 120 MG/DL — HIGH (ref 70–99)
GLUCOSE SERPL-MCNC: 128 MG/DL — HIGH (ref 70–99)
GLUCOSE SERPL-MCNC: 255 MG/DL — HIGH (ref 70–99)
HCT VFR BLD CALC: 33.8 % — LOW (ref 39–50)
HGB BLD-MCNC: 12.4 G/DL — LOW (ref 13–17)
INR BLD: 0.99 RATIO — SIGNIFICANT CHANGE UP (ref 0.85–1.16)
MAGNESIUM SERPL-MCNC: 1.6 MG/DL — SIGNIFICANT CHANGE UP (ref 1.6–2.6)
MAGNESIUM SERPL-MCNC: 1.6 MG/DL — SIGNIFICANT CHANGE UP (ref 1.6–2.6)
MAGNESIUM SERPL-MCNC: 2 MG/DL — SIGNIFICANT CHANGE UP (ref 1.6–2.6)
MCHC RBC-ENTMCNC: 34.1 PG — HIGH (ref 27–34)
MCHC RBC-ENTMCNC: 36.7 G/DL — HIGH (ref 32–36)
MCV RBC AUTO: 92.9 FL — SIGNIFICANT CHANGE UP (ref 80–100)
NRBC # BLD AUTO: 0 K/UL — SIGNIFICANT CHANGE UP (ref 0–0)
NRBC # FLD: 0 K/UL — SIGNIFICANT CHANGE UP (ref 0–0)
NRBC BLD AUTO-RTO: 0 /100 WBCS — SIGNIFICANT CHANGE UP (ref 0–0)
PHOSPHATE SERPL-MCNC: 2.6 MG/DL — SIGNIFICANT CHANGE UP (ref 2.4–4.7)
PHOSPHATE SERPL-MCNC: 2.8 MG/DL — SIGNIFICANT CHANGE UP (ref 2.4–4.7)
PHOSPHATE SERPL-MCNC: 3.6 MG/DL — SIGNIFICANT CHANGE UP (ref 2.4–4.7)
PLATELET # BLD AUTO: 174 K/UL — SIGNIFICANT CHANGE UP (ref 150–400)
PMV BLD: 10 FL — SIGNIFICANT CHANGE UP (ref 7–13)
POTASSIUM SERPL-MCNC: 3.7 MMOL/L — SIGNIFICANT CHANGE UP (ref 3.5–5.3)
POTASSIUM SERPL-MCNC: 3.8 MMOL/L — SIGNIFICANT CHANGE UP (ref 3.5–5.3)
POTASSIUM SERPL-MCNC: 3.9 MMOL/L — SIGNIFICANT CHANGE UP (ref 3.5–5.3)
POTASSIUM SERPL-SCNC: 3.7 MMOL/L — SIGNIFICANT CHANGE UP (ref 3.5–5.3)
POTASSIUM SERPL-SCNC: 3.8 MMOL/L — SIGNIFICANT CHANGE UP (ref 3.5–5.3)
POTASSIUM SERPL-SCNC: 3.9 MMOL/L — SIGNIFICANT CHANGE UP (ref 3.5–5.3)
PROT SERPL-MCNC: 4.8 G/DL — LOW (ref 6.6–8.7)
PROTHROM AB SERPL-ACNC: 11.2 SEC — SIGNIFICANT CHANGE UP (ref 9.9–13.4)
RBC # BLD: 3.64 M/UL — LOW (ref 4.2–5.8)
RBC # FLD: 13.1 % — SIGNIFICANT CHANGE UP (ref 10.3–14.5)
SODIUM SERPL-SCNC: 137 MMOL/L — SIGNIFICANT CHANGE UP (ref 135–145)
SODIUM SERPL-SCNC: 139 MMOL/L — SIGNIFICANT CHANGE UP (ref 135–145)
SODIUM SERPL-SCNC: 141 MMOL/L — SIGNIFICANT CHANGE UP (ref 135–145)
WBC # BLD: 10.15 K/UL — SIGNIFICANT CHANGE UP (ref 3.8–10.5)
WBC # FLD AUTO: 10.15 K/UL — SIGNIFICANT CHANGE UP (ref 3.8–10.5)

## 2025-05-30 RX ORDER — INSULIN LISPRO 100 U/ML
5 INJECTION, SOLUTION INTRAVENOUS; SUBCUTANEOUS
Refills: 0 | Status: DISCONTINUED | OUTPATIENT
Start: 2025-05-30 | End: 2025-06-01

## 2025-05-30 RX ORDER — SODIUM CHLORIDE 9 G/1000ML
1000 INJECTION, SOLUTION INTRAVENOUS
Refills: 0 | Status: DISCONTINUED | OUTPATIENT
Start: 2025-05-30 | End: 2025-06-01

## 2025-05-30 RX ORDER — SODIUM CHLORIDE 9 G/1000ML
500 INJECTION, SOLUTION INTRAVENOUS ONCE
Refills: 0 | Status: COMPLETED | OUTPATIENT
Start: 2025-05-30 | End: 2025-05-30

## 2025-05-30 RX ORDER — INSULIN GLARGINE-YFGN 100 [IU]/ML
15 INJECTION, SOLUTION SUBCUTANEOUS ONCE
Refills: 0 | Status: COMPLETED | OUTPATIENT
Start: 2025-05-30 | End: 2025-05-30

## 2025-05-30 RX ORDER — INSULIN LISPRO 100 U/ML
2 INJECTION, SOLUTION INTRAVENOUS; SUBCUTANEOUS
Refills: 0 | Status: DISCONTINUED | OUTPATIENT
Start: 2025-05-30 | End: 2025-05-30

## 2025-05-30 RX ORDER — DOXYCYCLINE HYCLATE 100 MG
100 TABLET ORAL EVERY 12 HOURS
Refills: 0 | Status: DISCONTINUED | OUTPATIENT
Start: 2025-05-30 | End: 2025-06-01

## 2025-05-30 RX ORDER — MAGNESIUM SULFATE 500 MG/ML
2 SYRINGE (ML) INJECTION ONCE
Refills: 0 | Status: COMPLETED | OUTPATIENT
Start: 2025-05-30 | End: 2025-05-30

## 2025-05-30 RX ORDER — GLUCAGON 3 MG/1
1 POWDER NASAL ONCE
Refills: 0 | Status: DISCONTINUED | OUTPATIENT
Start: 2025-05-30 | End: 2025-06-01

## 2025-05-30 RX ORDER — DEXTROSE 50 % IN WATER 50 %
15 SYRINGE (ML) INTRAVENOUS ONCE
Refills: 0 | Status: DISCONTINUED | OUTPATIENT
Start: 2025-05-30 | End: 2025-06-01

## 2025-05-30 RX ORDER — SODIUM BICARBONATE 1 MEQ/ML
650 SYRINGE (ML) INTRAVENOUS EVERY 8 HOURS
Refills: 0 | Status: DISCONTINUED | OUTPATIENT
Start: 2025-05-30 | End: 2025-06-01

## 2025-05-30 RX ORDER — DEXTROSE 50 % IN WATER 50 %
12.5 SYRINGE (ML) INTRAVENOUS ONCE
Refills: 0 | Status: DISCONTINUED | OUTPATIENT
Start: 2025-05-30 | End: 2025-06-01

## 2025-05-30 RX ORDER — INSULIN LISPRO 100 U/ML
INJECTION, SOLUTION INTRAVENOUS; SUBCUTANEOUS
Refills: 0 | Status: DISCONTINUED | OUTPATIENT
Start: 2025-05-30 | End: 2025-06-01

## 2025-05-30 RX ORDER — DEXTROSE 50 % IN WATER 50 %
25 SYRINGE (ML) INTRAVENOUS ONCE
Refills: 0 | Status: DISCONTINUED | OUTPATIENT
Start: 2025-05-30 | End: 2025-06-01

## 2025-05-30 RX ORDER — INSULIN LISPRO 100 U/ML
INJECTION, SOLUTION INTRAVENOUS; SUBCUTANEOUS AT BEDTIME
Refills: 0 | Status: DISCONTINUED | OUTPATIENT
Start: 2025-05-30 | End: 2025-06-01

## 2025-05-30 RX ORDER — INSULIN GLARGINE-YFGN 100 [IU]/ML
15 INJECTION, SOLUTION SUBCUTANEOUS AT BEDTIME
Refills: 0 | Status: DISCONTINUED | OUTPATIENT
Start: 2025-05-30 | End: 2025-06-01

## 2025-05-30 RX ADMIN — Medication 650 MILLIGRAM(S): at 14:18

## 2025-05-30 RX ADMIN — SODIUM CHLORIDE 1000 MILLILITER(S): 9 INJECTION, SOLUTION INTRAVENOUS at 09:41

## 2025-05-30 RX ADMIN — Medication 25 GRAM(S): at 23:08

## 2025-05-30 RX ADMIN — Medication 40 MILLIEQUIVALENT(S): at 03:48

## 2025-05-30 RX ADMIN — FOLIC ACID 1 MILLIGRAM(S): 1 TABLET ORAL at 12:55

## 2025-05-30 RX ADMIN — Medication 100 MILLIEQUIVALENT(S): at 06:12

## 2025-05-30 RX ADMIN — Medication 105 MILLIGRAM(S): at 12:55

## 2025-05-30 RX ADMIN — Medication 100 MILLIEQUIVALENT(S): at 03:47

## 2025-05-30 RX ADMIN — HEPARIN SODIUM 5000 UNIT(S): 1000 INJECTION INTRAVENOUS; SUBCUTANEOUS at 05:31

## 2025-05-30 RX ADMIN — SODIUM CHLORIDE 125 MILLILITER(S): 9 INJECTION, SOLUTION INTRAVENOUS at 03:47

## 2025-05-30 RX ADMIN — Medication 83 MILLILITER(S): at 21:57

## 2025-05-30 RX ADMIN — Medication 100 MILLIGRAM(S): at 21:55

## 2025-05-30 RX ADMIN — INSULIN LISPRO 2 UNIT(S): 100 INJECTION, SOLUTION INTRAVENOUS; SUBCUTANEOUS at 11:25

## 2025-05-30 RX ADMIN — INSULIN GLARGINE-YFGN 15 UNIT(S): 100 INJECTION, SOLUTION SUBCUTANEOUS at 21:43

## 2025-05-30 RX ADMIN — Medication 25 GRAM(S): at 12:56

## 2025-05-30 RX ADMIN — Medication 100 MILLIEQUIVALENT(S): at 05:03

## 2025-05-30 RX ADMIN — INSULIN LISPRO 2: 100 INJECTION, SOLUTION INTRAVENOUS; SUBCUTANEOUS at 21:43

## 2025-05-30 RX ADMIN — Medication 105 MILLIGRAM(S): at 22:04

## 2025-05-30 RX ADMIN — INSULIN LISPRO 4: 100 INJECTION, SOLUTION INTRAVENOUS; SUBCUTANEOUS at 15:55

## 2025-05-30 RX ADMIN — INSULIN LISPRO 2 UNIT(S): 100 INJECTION, SOLUTION INTRAVENOUS; SUBCUTANEOUS at 15:55

## 2025-05-30 RX ADMIN — Medication 1 TABLET(S): at 11:25

## 2025-05-30 RX ADMIN — Medication 650 MILLIGRAM(S): at 21:44

## 2025-05-30 RX ADMIN — Medication 25 GRAM(S): at 06:14

## 2025-05-30 RX ADMIN — INSULIN GLARGINE-YFGN 15 UNIT(S): 100 INJECTION, SOLUTION SUBCUTANEOUS at 07:54

## 2025-05-30 RX ADMIN — Medication 105 MILLIGRAM(S): at 05:30

## 2025-05-30 RX ADMIN — HEPARIN SODIUM 5000 UNIT(S): 1000 INJECTION INTRAVENOUS; SUBCUTANEOUS at 16:09

## 2025-05-30 RX ADMIN — Medication 100 MILLIGRAM(S): at 09:40

## 2025-05-30 RX ADMIN — Medication 25 GRAM(S): at 05:30

## 2025-05-30 NOTE — CONSULT NOTE ADULT - SUBJECTIVE AND OBJECTIVE BOX
CONSULT NOTE    ISAK CRITICAL  [id number]  35yMale    Patient is a 35y old  Male who presents with a chief complaint of DKA (30 May 2025 13:07)      BRIEF HOSPITAL COURSE:   35 year old male with PMHx IDDM with multiple admissions, NICM with recovered EF, ETOH abuse, presenting with altered mental status, found to be breathing very deeply and tachycardic. Arrived awake but not verbally responsive. In ED: hypothermic, tachycardic, found to have +leukocytosis 22, unable to calculate AG, , moderate acetones, pH <6.9 on VBG. CT head and CXR completed with no acute findings. received cefepime/vanc, and insulin gtt started. Admitted to MICU for further management. Lab work shows improvement on Day 2 of MICU admission.      PAST MEDICAL & SURGICAL HISTORY:      ALLERGY: NKDA    Home Medications:  insulin aspart 100 units/mL injectable solution: injectable 4 times a day (before meals and at bedtime) sliding scale (30 May 2025 10:34)      FAMILY HISTORY:    SOCIAL HISTORY:    ROS:    MEDICATIONS  (STANDING):  dextrose 5% + lactated ringers. 1000 milliLiter(s) (125 mL/Hr) IV Continuous <Continuous>  dextrose 5%. 1000 milliLiter(s) (50 mL/Hr) IV Continuous <Continuous>  dextrose 5%. 1000 milliLiter(s) (100 mL/Hr) IV Continuous <Continuous>  dextrose 50% Injectable 25 Gram(s) IV Push once  dextrose 50% Injectable 12.5 Gram(s) IV Push once  dextrose 50% Injectable 25 Gram(s) IV Push once  doxycycline IVPB 100 milliGRAM(s) IV Intermittent every 12 hours  folic acid Injectable 1 milliGRAM(s) IV Push every 24 hours  glucagon  Injectable 1 milliGRAM(s) IntraMuscular once  heparin   Injectable 5000 Unit(s) SubCutaneous every 12 hours  insulin lispro (ADMELOG) corrective regimen sliding scale   SubCutaneous three times a day before meals  insulin lispro (ADMELOG) corrective regimen sliding scale   SubCutaneous at bedtime  insulin lispro Injectable (ADMELOG) 2 Unit(s) SubCutaneous before breakfast  insulin lispro Injectable (ADMELOG) 2 Unit(s) SubCutaneous before lunch  insulin lispro Injectable (ADMELOG) 2 Unit(s) SubCutaneous before dinner  multivitamin 1 Tablet(s) Oral daily  piperacillin/tazobactam IVPB.. 3.375 Gram(s) IV Intermittent every 8 hours  sodium bicarbonate 650 milliGRAM(s) Oral every 8 hours  thiamine IVPB 500 milliGRAM(s) IV Intermittent every 8 hours      MEDICATIONS  (PRN):  dextrose Oral Gel 15 Gram(s) Oral once PRN Blood Glucose LESS THAN 70 milliGRAM(s)/deciliter  diazepam  Injectable 10 milliGRAM(s) IV Push every 1 hour PRN For CIWA 8-11  diazepam  Injectable 20 milliGRAM(s) IV Push every 1 hour PRN For CIWA >=12      OBJECTIVE:  Vital Signs Last 24 Hrs  T(C): 36.7 (30 May 2025 12:34), Max: 37.2 (29 May 2025 17:45)  T(F): 98 (30 May 2025 12:34), Max: 98.9 (29 May 2025 17:45)  HR: 79 (30 May 2025 13:00) (78 - 98)  BP: 92/57 (30 May 2025 13:00) (85/58 - 149/74)  BP(mean): 68 (30 May 2025 13:00) (68 - 89)  RR: 15 (30 May 2025 13:00) (11 - 25)  SpO2: 100% (30 May 2025 13:00) (98% - 100%)    Parameters below as of 30 May 2025 08:00  Patient On (Oxygen Delivery Method): room air        Orthostatic VS      Daily Height in cm: 170.18 (29 May 2025 18:00)    Daily     I&O's Summary    29 May 2025 07:01  -  30 May 2025 07:00  --------------------------------------------------------  IN: 3808.1 mL / OUT: 1000 mL / NET: 2808.1 mL    30 May 2025 07:01  -  30 May 2025 14:17  --------------------------------------------------------  IN: 1434 mL / OUT: 0 mL / NET: 1434 mL        PHYSICAL EXAMINATION  General:   HEENT:    NECK:    CVS:   RESP:    GI:    :   MSK:    CNS:    INTEG:    PSYCH:      MONITOR:  CAPILLARY BLOOD GLUCOSE      POCT Blood Glucose.: 83 mg/dL (30 May 2025 10:10)  POCT Blood Glucose.: 121 mg/dL (30 May 2025 08:56)  POCT Blood Glucose.: 133 mg/dL (30 May 2025 07:53)  POCT Blood Glucose.: 145 mg/dL (30 May 2025 07:02)  POCT Blood Glucose.: 149 mg/dL (30 May 2025 05:59)  POCT Blood Glucose.: 166 mg/dL (30 May 2025 05:00)  POCT Blood Glucose.: 177 mg/dL (30 May 2025 03:54)  POCT Blood Glucose.: 172 mg/dL (30 May 2025 03:11)  POCT Blood Glucose.: 142 mg/dL (30 May 2025 01:53)  POCT Blood Glucose.: 124 mg/dL (30 May 2025 01:01)  POCT Blood Glucose.: 319 mg/dL (30 May 2025 00:01)  POCT Blood Glucose.: 233 mg/dL (29 May 2025 22:53)  POCT Blood Glucose.: 268 mg/dL (29 May 2025 22:14)  POCT Blood Glucose.: 184 mg/dL (29 May 2025 21:02)  POCT Blood Glucose.: 231 mg/dL (29 May 2025 19:58)  POCT Blood Glucose.: 257 mg/dL (29 May 2025 18:34)  POCT Blood Glucose.: 267 mg/dL (29 May 2025 17:52)  POCT Blood Glucose.: 429 mg/dL (29 May 2025 16:36)  POCT Blood Glucose.: >600 mg/dL (29 May 2025 14:53)    A1C with Estimated Average Glucose Result: 12.3 % (05.30.25 @ 02:25)                         12.4   10.15 )-----------( 174      ( 30 May 2025 02:25 )             33.8     PT/INR - ( 30 May 2025 06:30 )   PT: 11.2 sec;   INR: 0.99 ratio           05-30    139  |  113[H]  |  16.8  ----------------------------<  128[H]  3.9   |  15.0[L]  |  0.60    Ca    7.9[L]      30 May 2025 06:41  Phos  2.8     05-30  Mg     1.6     05-30    TPro  7.3  /  Alb  4.5  /  TBili  0.5  /  DBili  x   /  AST  18  /  ALT  14  /  AlkPhos  148[H]  05-29        Urinalysis Basic - ( 30 May 2025 06:41 )    Color: x / Appearance: x / SG: x / pH: x  Gluc: 128 mg/dL / Ketone: x  / Bili: x / Urobili: x   Blood: x / Protein: x / Nitrite: x   Leuk Esterase: x / RBC: x / WBC x   Sq Epi: x / Non Sq Epi: x / Bacteria: x            TTE:    RADIOLOGY    < from: Xray Chest 1 View- PORTABLE-Urgent (05.29.25 @ 07:20) >  IMPRESSION: No acute cardiopulmonary disease process.    < end of copied text >        < from: CT Head No Cont (05.29.25 @ 08:44) >    IMPRESSION:  No acute intracranial hemorrhage, mass effect, or midline shift.    < end of copied text >        < from: CT Chest No Cont (05.29.25 @ 23:15) >    IMPRESSION:  *  Bibasilar patchy airspace opacity, concerning for pneumonia.  *  Cholelithiasis with prominent distention of the gallbladder. Correlate   with LFTs.  *  Sequela of chronic pancreatitis.  *  Stool throughout the colon.    < end of copied text >   CONSULT NOTE    ISAK LILLIE  [id number]  35yMale    Patient is a 35y old  Male who presents with a chief complaint of DKA (30 May 2025 13:07)      BRIEF HOSPITAL COURSE:   35 year old male with PMHx IDDM with multiple admissions, NICM with recovered EF, ETOH abuse, presenting with altered mental status, found to be breathing very deeply and tachycardic. Arrived awake but not verbally responsive. In ED: hypothermic, tachycardic, found to have +leukocytosis 22, unable to calculate AG, , moderate acetones, pH <6.9 on VBG. CT head and CXR completed with no acute findings. received cefepime/vanc, and insulin gtt started. Admitted to MICU for further management. Lab work shows improvement on Day 2 of MICU admission.    Discussed with MICU Dr Serra  Patient chart reviewed since admission  Patient interviewed and examined at bedside  Real name Joseph Martínez    34 year old male who states Type 2 Diabetes presented with appetite loss, weight loss, weakness and tiredness, chest pain, cough  and trouble breathing over past 2-3 days. Doesn't recall day of admission bu states mom said he was delirious.  Severe DKA, bilateral lower lobe infiltrates on presentation and admitted to MICU. Now being downgraded  Denies any symptoms currently. Report only having regular Insulins accoring to sliding scale ~ 10U each meal. No basal Insulin. A1c 12.       PAST MEDICAL & SURGICAL HISTORY:  Diabetes    ALLERGY: NKDA    Home Medications:  insulin aspart 100 units/mL injectable solution: injectable 4 times a day (before meals and at bedtime) sliding scale (30 May 2025 10:34)      FAMILY HISTORY: Father with colon cancer.     SOCIAL HISTORY: Lives with mother, Denies any smoking or drug use, States he stopped drinking. Currently unemployed.    ROS: Weight loss    MEDICATIONS  (STANDING):  dextrose 5% + lactated ringers. 1000 milliLiter(s) (125 mL/Hr) IV Continuous <Continuous>  dextrose 5%. 1000 milliLiter(s) (50 mL/Hr) IV Continuous <Continuous>  dextrose 5%. 1000 milliLiter(s) (100 mL/Hr) IV Continuous <Continuous>  dextrose 50% Injectable 25 Gram(s) IV Push once  dextrose 50% Injectable 12.5 Gram(s) IV Push once  dextrose 50% Injectable 25 Gram(s) IV Push once  doxycycline IVPB 100 milliGRAM(s) IV Intermittent every 12 hours  folic acid Injectable 1 milliGRAM(s) IV Push every 24 hours  glucagon  Injectable 1 milliGRAM(s) IntraMuscular once  heparin   Injectable 5000 Unit(s) SubCutaneous every 12 hours  insulin lispro (ADMELOG) corrective regimen sliding scale   SubCutaneous three times a day before meals  insulin lispro (ADMELOG) corrective regimen sliding scale   SubCutaneous at bedtime  insulin lispro Injectable (ADMELOG) 2 Unit(s) SubCutaneous before breakfast  insulin lispro Injectable (ADMELOG) 2 Unit(s) SubCutaneous before lunch  insulin lispro Injectable (ADMELOG) 2 Unit(s) SubCutaneous before dinner  multivitamin 1 Tablet(s) Oral daily  piperacillin/tazobactam IVPB.. 3.375 Gram(s) IV Intermittent every 8 hours  sodium bicarbonate 650 milliGRAM(s) Oral every 8 hours  thiamine IVPB 500 milliGRAM(s) IV Intermittent every 8 hours      MEDICATIONS  (PRN):  dextrose Oral Gel 15 Gram(s) Oral once PRN Blood Glucose LESS THAN 70 milliGRAM(s)/deciliter  diazepam  Injectable 10 milliGRAM(s) IV Push every 1 hour PRN For CIWA 8-11  diazepam  Injectable 20 milliGRAM(s) IV Push every 1 hour PRN For CIWA >=12      OBJECTIVE:  Vital Signs Last 24 Hrs  T(C): 36.7 (30 May 2025 12:34), Max: 37.2 (29 May 2025 17:45)  T(F): 98 (30 May 2025 12:34), Max: 98.9 (29 May 2025 17:45)  HR: 79 (30 May 2025 13:00) (78 - 98)  BP: 92/57 (30 May 2025 13:00) (85/58 - 149/74)  BP(mean): 68 (30 May 2025 13:00) (68 - 89)  RR: 15 (30 May 2025 13:00) (11 - 25)  SpO2: 100% (30 May 2025 13:00) (98% - 100%)    Parameters below as of 30 May 2025 08:00  Patient On (Oxygen Delivery Method): room air        Orthostatic VS      Daily Height in cm: 170.18 (29 May 2025 18:00)    Daily     I&O's Summary    29 May 2025 07:01  -  30 May 2025 07:00  --------------------------------------------------------  IN: 3808.1 mL / OUT: 1000 mL / NET: 2808.1 mL    30 May 2025 07:01  -  30 May 2025 14:17  --------------------------------------------------------  IN: 1434 mL / OUT: 0 mL / NET: 1434 mL        PHYSICAL EXAMINATION  General: Lean young male, sitting up in bed, comfortable  HEENT:  Anicteric sclera, extraocular movements intact   NECK:  Supple  CVS: regular rate and rhythm S1 S2  RESP: Clear to auscultation bilaterally  GI:  Soft nondistended nontender BS+  : No suprapubic tenderness  MSK:  Moves all extremities. No edema  CNS:  Awake, alert, oriented. No gross focal or global deficit appreciated  INTEG:  Warm dry skin  PSYCH:  Fair mood    MONITOR:  CAPILLARY BLOOD GLUCOSE      POCT Blood Glucose.: 83 mg/dL (30 May 2025 10:10)  POCT Blood Glucose.: 121 mg/dL (30 May 2025 08:56)  POCT Blood Glucose.: 133 mg/dL (30 May 2025 07:53)  POCT Blood Glucose.: 145 mg/dL (30 May 2025 07:02)  POCT Blood Glucose.: 149 mg/dL (30 May 2025 05:59)  POCT Blood Glucose.: 166 mg/dL (30 May 2025 05:00)  POCT Blood Glucose.: 177 mg/dL (30 May 2025 03:54)  POCT Blood Glucose.: 172 mg/dL (30 May 2025 03:11)  POCT Blood Glucose.: 142 mg/dL (30 May 2025 01:53)  POCT Blood Glucose.: 124 mg/dL (30 May 2025 01:01)  POCT Blood Glucose.: 319 mg/dL (30 May 2025 00:01)  POCT Blood Glucose.: 233 mg/dL (29 May 2025 22:53)  POCT Blood Glucose.: 268 mg/dL (29 May 2025 22:14)  POCT Blood Glucose.: 184 mg/dL (29 May 2025 21:02)  POCT Blood Glucose.: 231 mg/dL (29 May 2025 19:58)  POCT Blood Glucose.: 257 mg/dL (29 May 2025 18:34)  POCT Blood Glucose.: 267 mg/dL (29 May 2025 17:52)  POCT Blood Glucose.: 429 mg/dL (29 May 2025 16:36)  POCT Blood Glucose.: >600 mg/dL (29 May 2025 14:53)    A1C with Estimated Average Glucose Result: 12.3 % (05.30.25 @ 02:25)                         12.4   10.15 )-----------( 174      ( 30 May 2025 02:25 )             33.8     PT/INR - ( 30 May 2025 06:30 )   PT: 11.2 sec;   INR: 0.99 ratio           05-30    139  |  113[H]  |  16.8  ----------------------------<  128[H]  3.9   |  15.0[L]  |  0.60    Ca    7.9[L]      30 May 2025 06:41  Phos  2.8     05-30  Mg     1.6     05-30    TPro  7.3  /  Alb  4.5  /  TBili  0.5  /  DBili  x   /  AST  18  /  ALT  14  /  AlkPhos  148[H]  05-29        Urinalysis Basic - ( 30 May 2025 06:41 )    Color: x / Appearance: x / SG: x / pH: x  Gluc: 128 mg/dL / Ketone: x  / Bili: x / Urobili: x   Blood: x / Protein: x / Nitrite: x   Leuk Esterase: x / RBC: x / WBC x   Sq Epi: x / Non Sq Epi: x / Bacteria: x            TTE:    RADIOLOGY    < from: Xray Chest 1 View- PORTABLE-Urgent (05.29.25 @ 07:20) >  IMPRESSION: No acute cardiopulmonary disease process.    < end of copied text >        < from: CT Head No Cont (05.29.25 @ 08:44) >    IMPRESSION:  No acute intracranial hemorrhage, mass effect, or midline shift.    < end of copied text >        < from: CT Chest No Cont (05.29.25 @ 23:15) >    IMPRESSION:  *  Bibasilar patchy airspace opacity, concerning for pneumonia.  *  Cholelithiasis with prominent distention of the gallbladder. Correlate   with LFTs.  *  Sequela of chronic pancreatitis.  *  Stool throughout the colon.    < end of copied text >

## 2025-05-30 NOTE — CONSULT NOTE ADULT - ASSESSMENT
35 year old male presented Diabetes with encephalopathy in the setting of dyspnea.  Severe DKA - admitted to MICU; Hyperglycemia resolved AG closed, still with acidosis  Chest CT significant for bibasilar pneumonia    # Diabetes Type 2 vs Type 1  # DKA at presentation   # ROBERTO at presentation  - Diabetic Diet  - Glargine 15 U  - Hydration  - Bicarbonate supplementation  - BMP monitoring  - Endocrinology consultation  - Diabetic teaching ; Patient educated about need for bolus and basal Insulins rather then sliding scale premeal; Daily needs averaging around 30U to be spilt into 15U basal and 5U premeal three times a day    # Pneumonia, Bilateral. Mild  No hypoxia  - Anticipate Short course antibiotics  - Follow infectious work up    VTE prophylaxis  Incentive Spirometry  Mobilize    Disposition - Pending improvement in acidosis

## 2025-05-30 NOTE — PROGRESS NOTE ADULT - ATTENDING COMMENTS
.    35M PMH IDDM, NICM, EtOH abuse who presented 5/29/25 with AMS, noted with leukocytosis, hypothermia, unreadable FSG, DKA. CTH WNL; noted with PNA on CT. C/w Zosyn + Doxy; will give 7d of ABx total. Lantus 15U given this morning. He is tolerating PO intake. Serum bicarb improved - will start sodium bicarb tab 650mg Q8H. Symptomatically significantly improve and mental status improved. He is otherwise stable for downgrade from ICU.      Prudencio Sainz MD, [facility]  , Pulmonary & Critical Care Medicine  [facility]  Pulmonary and Sleep Medicine at [place]  [address]  [place], [place] [address]  T: [phone]  F: [phone]

## 2025-05-30 NOTE — PROGRESS NOTE ADULT - ASSESSMENT
Mr. Mulugeta Richmond is a 35-year-old male with a history of IDDM (with multiple prior DKA admissions due to poor compliance), non-ischemic cardiomyopathy with recovered EF, and ETOH abuse. He was brought in by EMS from home on 29-May-2025 for altered mental status, Kussmaul breathing, and tachycardia, with a fingerstick glucose >600. In the ED, he was noted to be hypothermic (T 34.5°C), tachycardic, and had leukocytosis (WBC 22 K/uL). VBG showed pH <6.9, serum glucose 735 mg/dL, and moderate ketones. Findings c/w severe DKA and AGMA. CT head and CXR were negative for acute findings. He received 2L LR, Cefepime, and Vancomycin in the ED and an insulin infusion was initiated. He was admitted to the MICU for management of DKA. Over the past 24 hours, Mr. Mulugeta Richmond has shown improvement with DKA management. He is now afebrile (Tmax 37.2°C). His mental status has improved. Anion gap is closing, FSG is improving. Blood cultures are pending. Overall prognosis is improving but remains serious. Code status is Full Code.    #Neuro:    -Altered mental status, improving with DKA treatment. Awake, non-verbal.  -CT Head (29-May): No acute intracranial hemorrhage, mass effect, or midline shift.  -Continue to monitor mental status. Delirium precautions.  Thiamine 500 mg IVPB q8h.  Folic acid 1 mg IV Push q24h.  Multivitamin 1 Tablet PO daily.  -CIWA protocol initiated with PRN Diazepam (for ETOH abuse history).    #CV:    -History of NICM with recovered EF (55%). Tachycardia (HR 95) likely related to DKA/stress, improving.  -Hemodynamically stable (/72, MAP 81).  -Telemetry monitoring.    #Resp:    -Kussmaul respirations resolved. Currently on Room Air. SpO2 100%, RR 12.  -CXR (29-May): No acute cardiopulmonary disease process.  -Monitor respiratory status.    #Renal/FEN:    -DKA with improving metabolic acidosis (HCO3 15 mEq/L). ROBERTO improved (Cr 0.60 mg/dL).  -Monitor electrolytes, keep K>4, Mg>2, Phos>3  -Nutrition: NPO     #GI/Abdomen:    -No active issues    #Heme/Onc:    -Leukocytosis, improving (WBC 10.15 K/uL from 22). Mild anemia (Hgb 12.4 g/dL).  -Monitor CBC.    #ID:    -DKA. Sepsis workup initiated in ED due to hypothermia and leukocytosis.  -Afebrile (Tmax 37.2°C).  -Antibiotics: Piperacillin/tazobactam 3.375 Grams IV q8h  -Blood cultures pending.  -Monitor for signs of infection.    #Endo:    -IDDM. DKA resolving.  -Glucose (serum 02:25): 128 mg/dL (H). POCT (10:10): 83 mg/dL.  -Patient bridged to SC insulin, drip held    #Prophylaxis:    -VTE: Heparin 5000 Units SubCutaneous q12h.  -Stress Ulcer: Consider Pantoprazole if NPO prolonged.    #Disp/GOC:    Location: MICU.  Primary Reason for ICU: DKA, AMS.  Prognosis: Serious but improving.  Code Status: Full Code  Family Communication: Ongoing as needed. Mr. Mulugeta Richmond is a 35-year-old male with a history of IDDM (with multiple prior DKA admissions due to poor compliance), non-ischemic cardiomyopathy with recovered EF, and ETOH abuse. He was brought in by EMS from home on 29-May-2025 for altered mental status, Kussmaul breathing, and tachycardia, with a fingerstick glucose >600. In the ED, he was noted to be hypothermic (T 34.5°C), tachycardic, and had leukocytosis (WBC 22 K/uL). VBG showed pH <6.9, serum glucose 735 mg/dL, and moderate ketones. Findings c/w severe DKA and AGMA. CT head and CXR were negative for acute findings. He received 2L LR, Cefepime, and Vancomycin in the ED and an insulin infusion was initiated. He was admitted to the MICU for management of DKA. Over the past 24 hours, Mr. Mulugeta Richmond has shown improvement with DKA management. He is now afebrile (Tmax 37.2°C). His mental status has improved. Anion gap is closing, FSG is improving. Blood cultures are pending. Overall prognosis is improving but remains serious. Code status is Full Code.    #Neuro:    -Altered mental status, improving with DKA treatment. Awake, non-verbal.  -CT Head (29-May): No acute intracranial hemorrhage, mass effect, or midline shift.  -Continue to monitor mental status. Delirium precautions.  Thiamine 500 mg IVPB q8h.  Folic acid 1 mg IV Push q24h.  Multivitamin 1 Tablet PO daily.  -CIWA protocol initiated with PRN Diazepam (for ETOH abuse history).    #CV:    -History of NICM with recovered EF (55%). Tachycardia (HR 95) likely related to DKA/stress, improving.  -Hemodynamically stable (/72, MAP 81).  -Telemetry monitoring.    #Resp:    -Kussmaul respirations resolved. Currently on Room Air. SpO2 100%, RR 12.  -Monitor respiratory status.  - 5/29 CT Chest - bibasilar patchy airspace opacity, concerning for pneumonia.    #Renal/FEN:    -DKA with improving metabolic acidosis (HCO3 15 mEq/L). ROBERTO improved (Cr 0.60 mg/dL).  -Monitor electrolytes, keep K>4, Mg>2, Phos>3  -Nutrition: NPO     #GI/Abdomen:    -No active issues    #Heme/Onc:    -Leukocytosis, improving (WBC 10.15 K/uL from 22). Mild anemia (Hgb 12.4 g/dL).  -Monitor CBC.    #ID:  -DKA. Sepsis workup initiated in ED due to hypothermia and leukocytosis.  -Afebrile (Tmax 37.2°C).  - 5/29 CT chest c/f pneumonia  -Antibiotics: Piperacillin/tazobactam 3.375 Grams IV q8h  -Blood cultures pending.  -Monitor for signs of infection.    #Endo:    -IDDM. DKA resolving.  - AG closed  -Glucose (serum 02:25): 128 mg/dL (H). POCT (10:10): 83 mg/dL.  -Patient bridged to SC insulin, drip held    #Prophylaxis:    -VTE: Heparin 5000 Units SubCutaneous q12h.  -Stress Ulcer: Consider Pantoprazole if NPO prolonged.    #Disp/GOC: Stable for downgrade to any bed    Location: MICU.  Primary Reason for ICU: DKA, AMS.  Prognosis: Serious but improving.  Code Status: Full Code  Family Communication: Ongoing as needed.

## 2025-05-31 LAB
ANION GAP SERPL CALC-SCNC: 12 MMOL/L — SIGNIFICANT CHANGE UP (ref 5–17)
BUN SERPL-MCNC: 13.2 MG/DL — SIGNIFICANT CHANGE UP (ref 8–20)
CALCIUM SERPL-MCNC: 7.9 MG/DL — LOW (ref 8.4–10.5)
CHLORIDE SERPL-SCNC: 106 MMOL/L — SIGNIFICANT CHANGE UP (ref 96–108)
CO2 SERPL-SCNC: 17 MMOL/L — LOW (ref 22–29)
CREAT SERPL-MCNC: 0.32 MG/DL — LOW (ref 0.5–1.3)
EGFR: 155 ML/MIN/1.73M2 — SIGNIFICANT CHANGE UP
EGFR: 155 ML/MIN/1.73M2 — SIGNIFICANT CHANGE UP
GLUCOSE BLDC GLUCOMTR-MCNC: 127 MG/DL — HIGH (ref 70–99)
GLUCOSE BLDC GLUCOMTR-MCNC: 217 MG/DL — HIGH (ref 70–99)
GLUCOSE BLDC GLUCOMTR-MCNC: 312 MG/DL — HIGH (ref 70–99)
GLUCOSE BLDC GLUCOMTR-MCNC: 348 MG/DL — HIGH (ref 70–99)
GLUCOSE SERPL-MCNC: 223 MG/DL — HIGH (ref 70–99)
MAGNESIUM SERPL-MCNC: 2 MG/DL — SIGNIFICANT CHANGE UP (ref 1.6–2.6)
PHOSPHATE SERPL-MCNC: 3.1 MG/DL — SIGNIFICANT CHANGE UP (ref 2.4–4.7)
POTASSIUM SERPL-MCNC: 3.5 MMOL/L — SIGNIFICANT CHANGE UP (ref 3.5–5.3)
POTASSIUM SERPL-SCNC: 3.5 MMOL/L — SIGNIFICANT CHANGE UP (ref 3.5–5.3)
SODIUM SERPL-SCNC: 135 MMOL/L — SIGNIFICANT CHANGE UP (ref 135–145)

## 2025-05-31 RX ORDER — CEFTRIAXONE 500 MG/1
1000 INJECTION, POWDER, FOR SOLUTION INTRAMUSCULAR; INTRAVENOUS EVERY 24 HOURS
Refills: 0 | Status: DISCONTINUED | OUTPATIENT
Start: 2025-05-31 | End: 2025-06-01

## 2025-05-31 RX ORDER — CEFTRIAXONE 500 MG/1
1000 INJECTION, POWDER, FOR SOLUTION INTRAMUSCULAR; INTRAVENOUS EVERY 24 HOURS
Refills: 0 | Status: DISCONTINUED | OUTPATIENT
Start: 2025-05-31 | End: 2025-05-31

## 2025-05-31 RX ADMIN — Medication 650 MILLIGRAM(S): at 12:49

## 2025-05-31 RX ADMIN — Medication 100 MILLIGRAM(S): at 22:13

## 2025-05-31 RX ADMIN — INSULIN GLARGINE-YFGN 15 UNIT(S): 100 INJECTION, SOLUTION SUBCUTANEOUS at 22:18

## 2025-05-31 RX ADMIN — Medication 105 MILLIGRAM(S): at 05:09

## 2025-05-31 RX ADMIN — INSULIN LISPRO 5 UNIT(S): 100 INJECTION, SOLUTION INTRAVENOUS; SUBCUTANEOUS at 12:49

## 2025-05-31 RX ADMIN — Medication 105 MILLIGRAM(S): at 22:14

## 2025-05-31 RX ADMIN — FOLIC ACID 1 MILLIGRAM(S): 1 TABLET ORAL at 12:49

## 2025-05-31 RX ADMIN — Medication 650 MILLIGRAM(S): at 22:14

## 2025-05-31 RX ADMIN — INSULIN LISPRO 2: 100 INJECTION, SOLUTION INTRAVENOUS; SUBCUTANEOUS at 08:16

## 2025-05-31 RX ADMIN — Medication 1 TABLET(S): at 12:49

## 2025-05-31 RX ADMIN — Medication 105 MILLIGRAM(S): at 14:02

## 2025-05-31 RX ADMIN — CEFTRIAXONE 1000 MILLIGRAM(S): 500 INJECTION, POWDER, FOR SOLUTION INTRAMUSCULAR; INTRAVENOUS at 17:24

## 2025-05-31 RX ADMIN — Medication 25 GRAM(S): at 12:50

## 2025-05-31 RX ADMIN — INSULIN LISPRO 2: 100 INJECTION, SOLUTION INTRAVENOUS; SUBCUTANEOUS at 22:19

## 2025-05-31 RX ADMIN — Medication 25 GRAM(S): at 05:10

## 2025-05-31 RX ADMIN — HEPARIN SODIUM 5000 UNIT(S): 1000 INJECTION INTRAVENOUS; SUBCUTANEOUS at 05:09

## 2025-05-31 RX ADMIN — INSULIN LISPRO 4: 100 INJECTION, SOLUTION INTRAVENOUS; SUBCUTANEOUS at 17:24

## 2025-05-31 RX ADMIN — INSULIN LISPRO 5 UNIT(S): 100 INJECTION, SOLUTION INTRAVENOUS; SUBCUTANEOUS at 17:25

## 2025-05-31 RX ADMIN — INSULIN LISPRO 5 UNIT(S): 100 INJECTION, SOLUTION INTRAVENOUS; SUBCUTANEOUS at 08:17

## 2025-05-31 RX ADMIN — Medication 100 MILLIGRAM(S): at 09:00

## 2025-05-31 RX ADMIN — Medication 650 MILLIGRAM(S): at 05:08

## 2025-05-31 RX ADMIN — HEPARIN SODIUM 5000 UNIT(S): 1000 INJECTION INTRAVENOUS; SUBCUTANEOUS at 17:24

## 2025-05-31 NOTE — DIETITIAN INITIAL EVALUATION ADULT - ORAL INTAKE PTA/DIET HISTORY
Met with pt who reports good po intake presently and in past however pt states # years ago and has lost weight since being diagnosed with DM x 6 years ago. Refusing formal diet education. Reviewed A1C 12.3 with pt and reviewed diet verbally. BMI noted. Agreed to try nutritional supplement.

## 2025-05-31 NOTE — PROGRESS NOTE ADULT - NUTRITIONAL ASSESSMENT
This patient has been assessed with a concern for Malnutrition and has been determined to have a diagnosis/diagnoses of Severe protein-calorie malnutrition and Underweight (BMI < 19).    This patient is being managed with:   Diet Consistent Carbohydrate w/Evening Snack-  Supplement Feeding Modality:  Oral  Glucerna Shake Cans or Servings Per Day:  1       Frequency:  Two Times a day  Entered: May 31 2025 11:42AM

## 2025-05-31 NOTE — PROGRESS NOTE ADULT - SUBJECTIVE AND OBJECTIVE BOX
Patient is a 35y old  Male who presents with a chief complaint of DKA (29 May 2025 11:03)      BRIEF HOSPITAL COURSE:   35 year old male with PMHx IDDM with multiple admissions, NICM with recovered EF, ETOH abuse, presenting with altered mental status, found to be breathing very deeply and tachycardic. Arrived awake but not verbally responsive. In ED: hypothermic, tachycardic, found to have +leukocytosis 22, unable to calculate AG, , moderate acetones, pH <6.9 on VBG. CT head and CXR completed with no acute findings. received cefepime/vanc, and insulin gtt started. Admitted to MICU for further management. Lab work shows improvement on Day 2 of MICU admission.    Interval HPI:  Seen and examined at bedside. Endorses lethargy. Has no other acute complaints. Denies any chest pain, shortness of breath, abd pain, diarrhea. Does state that he has nausea.    PAST MEDICAL & SURGICAL HISTORY:    Review of Systems:  All other review of systems negative except as noted in HPI    MEDICATIONS  (STANDING):  dextrose 5% + lactated ringers. 1000 milliLiter(s) (125 mL/Hr) IV Continuous <Continuous>  dextrose 5%. 1000 milliLiter(s) (50 mL/Hr) IV Continuous <Continuous>  dextrose 5%. 1000 milliLiter(s) (100 mL/Hr) IV Continuous <Continuous>  dextrose 50% Injectable 25 Gram(s) IV Push once  dextrose 50% Injectable 12.5 Gram(s) IV Push once  dextrose 50% Injectable 25 Gram(s) IV Push once  doxycycline IVPB 100 milliGRAM(s) IV Intermittent every 12 hours  folic acid Injectable 1 milliGRAM(s) IV Push every 24 hours  glucagon  Injectable 1 milliGRAM(s) IntraMuscular once  heparin   Injectable 5000 Unit(s) SubCutaneous every 12 hours  insulin lispro (ADMELOG) corrective regimen sliding scale   SubCutaneous three times a day before meals  insulin lispro (ADMELOG) corrective regimen sliding scale   SubCutaneous at bedtime  insulin lispro Injectable (ADMELOG) 2 Unit(s) SubCutaneous before breakfast  insulin lispro Injectable (ADMELOG) 2 Unit(s) SubCutaneous before lunch  insulin lispro Injectable (ADMELOG) 2 Unit(s) SubCutaneous before dinner  multivitamin 1 Tablet(s) Oral daily  piperacillin/tazobactam IVPB.. 3.375 Gram(s) IV Intermittent every 8 hours  thiamine IVPB 500 milliGRAM(s) IV Intermittent every 8 hours    MEDICATIONS  (PRN):  dextrose Oral Gel 15 Gram(s) Oral once PRN Blood Glucose LESS THAN 70 milliGRAM(s)/deciliter  diazepam  Injectable 10 milliGRAM(s) IV Push every 1 hour PRN For CIWA 8-11  diazepam  Injectable 20 milliGRAM(s) IV Push every 1 hour PRN For CIWA >=12        ICU Vital Signs Last 24 Hrs  T(C): 36.7 (30 May 2025 12:34), Max: 37.2 (29 May 2025 17:45)  T(F): 98 (30 May 2025 12:34), Max: 98.9 (29 May 2025 17:45)  HR: 95 (30 May 2025 11:00) (78 - 108)  BP: 101/72 (30 May 2025 11:00) (85/58 - 149/74)  BP(mean): 81 (30 May 2025 11:00) (68 - 89)  ABP: --  ABP(mean): --  RR: 12 (30 May 2025 11:00) (11 - 25)  SpO2: 100% (30 May 2025 11:00) (98% - 100%)    O2 Parameters below as of 30 May 2025 08:00  Patient On (Oxygen Delivery Method): room air            I&O's Detail    29 May 2025 07:01  -  30 May 2025 07:00  --------------------------------------------------------  IN:    dextrose 5% + lactated ringers: 1825 mL    Insulin: 47 mL    Insulin: 28 mL    IV PiggyBack: 583.1 mL    IV PiggyBack: 325 mL    IV PiggyBack: 700 mL    Lactated Ringers: 300 mL  Total IN: 3808.1 mL    OUT:    Voided (mL): 1000 mL  Total OUT: 1000 mL    Total NET: 2808.1 mL      30 May 2025 07:01  -  30 May 2025 13:08  --------------------------------------------------------  IN:    dextrose 5% + lactated ringers: 600 mL    Insulin: 9 mL    IV PiggyBack: 50 mL    IV PiggyBack: 25 mL    IV PiggyBack: 100 mL    Lactated Ringers Bolus: 500 mL  Total IN: 1284 mL    OUT:  Total OUT: 0 mL    Total NET: 1284 mL          LABS:                        12.4   10.15 )-----------( 174      ( 30 May 2025 02:25 )             33.8     05-30    139  |  113[H]  |  16.8  ----------------------------<  128[H]  3.9   |  15.0[L]  |  0.60    Ca    7.9[L]      30 May 2025 06:41  Phos  2.8     05-30  Mg     1.6     05-30    TPro  7.3  /  Alb  4.5  /  TBili  0.5  /  DBili  x   /  AST  18  /  ALT  14  /  AlkPhos  148[H]  05-29          CAPILLARY BLOOD GLUCOSE      POCT Blood Glucose.: 83 mg/dL (30 May 2025 10:10)    PT/INR - ( 30 May 2025 06:30 )   PT: 11.2 sec;   INR: 0.99 ratio           Urinalysis Basic - ( 30 May 2025 06:41 )    Color: x / Appearance: x / SG: x / pH: x  Gluc: 128 mg/dL / Ketone: x  / Bili: x / Urobili: x   Blood: x / Protein: x / Nitrite: x   Leuk Esterase: x / RBC: x / WBC x   Sq Epi: x / Non Sq Epi: x / Bacteria: x      CULTURES:      Physical Examination:    Gen: thin male, awake, not verbally responsive  Head: normocephalic, atraumatic  EENT: EOMI, moist mucous membranes  Lung: no increased work of breathing, clear to auscultation bilaterally  CV: tachycardic rate, regular rhythm, normal s1/s2  Abd: soft, non-tender, non-distended  MSK: No edema, no visible deformities  Neuro: Awake, alert, not speaking.  no facial asymmetry, moving all extremities against gravity    DEVICES:   P IV    RADIOLOGY: 
Patient is a 35y old  Male who presents with a chief complaint of DKA (31 May 2025 11:17)      INTERVAL HPI/OVERNIGHT EVENTS: seen and examined. No complains. No events. BG better controlled today     MEDICATIONS  (STANDING):  dextrose 5%. 1000 milliLiter(s) (50 mL/Hr) IV Continuous <Continuous>  dextrose 5%. 1000 milliLiter(s) (100 mL/Hr) IV Continuous <Continuous>  dextrose 50% Injectable 25 Gram(s) IV Push once  dextrose 50% Injectable 12.5 Gram(s) IV Push once  dextrose 50% Injectable 25 Gram(s) IV Push once  doxycycline IVPB 100 milliGRAM(s) IV Intermittent every 12 hours  folic acid Injectable 1 milliGRAM(s) IV Push every 24 hours  glucagon  Injectable 1 milliGRAM(s) IntraMuscular once  heparin   Injectable 5000 Unit(s) SubCutaneous every 12 hours  insulin glargine Injectable (LANTUS) 15 Unit(s) SubCutaneous at bedtime  insulin lispro (ADMELOG) corrective regimen sliding scale   SubCutaneous three times a day before meals  insulin lispro (ADMELOG) corrective regimen sliding scale   SubCutaneous at bedtime  insulin lispro Injectable (ADMELOG) 5 Unit(s) SubCutaneous three times a day before meals  multivitamin 1 Tablet(s) Oral daily  piperacillin/tazobactam IVPB.. 3.375 Gram(s) IV Intermittent every 8 hours  sodium bicarbonate 650 milliGRAM(s) Oral every 8 hours  thiamine IVPB 500 milliGRAM(s) IV Intermittent every 8 hours    MEDICATIONS  (PRN):  dextrose Oral Gel 15 Gram(s) Oral once PRN Blood Glucose LESS THAN 70 milliGRAM(s)/deciliter  diazepam  Injectable 10 milliGRAM(s) IV Push every 1 hour PRN For CIWA 8-11  diazepam  Injectable 20 milliGRAM(s) IV Push every 1 hour PRN For CIWA >=12      Allergies    No Known Allergies    Intolerances        REVIEW OF SYSTEMS:  CONSTITUTIONAL: No fever, weight loss, or fatigue  RESPIRATORY: No cough, wheezing, chills or hemoptysis; No shortness of breath  CARDIOVASCULAR: No chest pain, palpitations, dizziness, or leg swelling  GASTROINTESTINAL: No abdominal or epigastric pain. No nausea, vomiting, or hematemesis; No diarrhea or constipation. No melena or hematochezia.  NEUROLOGICAL: No headaches, memory loss, loss of strength, numbness, or tremors  MUSCULOSKELETAL: No joint pain or swelling; No muscle, back, or extremity pain      Vital Signs Last 24 Hrs  T(C): 36.9 (31 May 2025 08:22), Max: 37.2 (30 May 2025 15:35)  T(F): 98.5 (31 May 2025 08:22), Max: 98.9 (30 May 2025 15:35)  HR: 71 (31 May 2025 08:22) (62 - 96)  BP: 104/82 (31 May 2025 08:22) (94/73 - 132/82)  BP(mean): 93 (30 May 2025 17:00) (81 - 93)  RR: 14 (31 May 2025 08:22) (14 - 19)  SpO2: 100% (31 May 2025 08:22) (98% - 100%)    Parameters below as of 31 May 2025 08:22  Patient On (Oxygen Delivery Method): room air        PHYSICAL EXAM:  GENERAL: NAD,   HEAD:  Atraumatic, Normocephalic  EYES: EOMI, PERRLA, conjunctiva and sclera clear  NECK: Supple, No JVD, Normal thyroid  NERVOUS SYSTEM:  Alert & Oriented X3, No gross focal deficits  CHEST/LUNG: Clear to percussion bilaterally; No rales, rhonchi, wheezing, or rubs  HEART: Regular rate and rhythm; No murmurs, rubs, or gallops  ABDOMEN: Soft, Nontender, Nondistended; Bowel sounds present  EXTREMITIES:  No clubbing, cyanosis, or edema  SKIN: No rashes or lesions    LABS:                        12.4   10.15 )-----------( 174      ( 30 May 2025 02:25 )             33.8     05-31    135  |  106  |  13.2  ----------------------------<  223[H]  3.5   |  17.0[L]  |  0.32[L]    Ca    7.9[L]      31 May 2025 05:11  Phos  3.1     05-31  Mg     2.0     05-31    TPro  4.8[L]  /  Alb  3.2[L]  /  TBili  1.2  /  DBili  x   /  AST  18  /  ALT  10  /  AlkPhos  74  05-30    PT/INR - ( 30 May 2025 06:30 )   PT: 11.2 sec;   INR: 0.99 ratio           Urinalysis Basic - ( 31 May 2025 05:11 )    Color: x / Appearance: x / SG: x / pH: x  Gluc: 223 mg/dL / Ketone: x  / Bili: x / Urobili: x   Blood: x / Protein: x / Nitrite: x   Leuk Esterase: x / RBC: x / WBC x   Sq Epi: x / Non Sq Epi: x / Bacteria: x      CAPILLARY BLOOD GLUCOSE      POCT Blood Glucose.: 127 mg/dL (31 May 2025 12:39)  POCT Blood Glucose.: 217 mg/dL (31 May 2025 08:14)  POCT Blood Glucose.: 335 mg/dL (30 May 2025 21:40)  POCT Blood Glucose.: 317 mg/dL (30 May 2025 15:51)      RADIOLOGY & ADDITIONAL TESTS:    Imaging Personally Reviewed:  [ x] YES  [ ] NO    Consultant(s) Notes Reviewed:  [x ] YES  [ ] NO    Care Discussed with Consultants/Other Providers [ ] YES  [ ] NO    Plan of Care discussed with Housestaff [x ]YES [ ] NO

## 2025-05-31 NOTE — DIETITIAN INITIAL EVALUATION ADULT - OTHER INFO
35 year old male with PMHx IDDM with multiple admissions, NICM with recovered EF, ETOH abuse, presenting with altered mental status, found to be breathing very deeply and tachycardic. Arrived awake but not verbally responsive. In ED: hypothermic, tachycardic, found to have +leukocytosis 22, unable to calculate AG, , moderate acetones, pH <6.9 on VBG. CT head and CXR completed with no acute findings. received cefepime/vanc, and insulin gtt started. Now downgraded.

## 2025-05-31 NOTE — DIETITIAN INITIAL EVALUATION ADULT - PERTINENT LABORATORY DATA
05-31    135  |  106  |  13.2  ----------------------------<  223[H]  3.5   |  17.0[L]  |  0.32[L]    Ca    7.9[L]      31 May 2025 05:11  Phos  3.1     05-31  Mg     2.0     05-31    TPro  4.8[L]  /  Alb  3.2[L]  /  TBili  1.2  /  DBili  x   /  AST  18  /  ALT  10  /  AlkPhos  74  05-30  POCT Blood Glucose.: 217 mg/dL (05-31-25 @ 08:14)  A1C with Estimated Average Glucose Result: 12.3 % (05-30-25 @ 02:25)  A1C with Estimated Average Glucose Result: 12.1 % (05-29-25 @ 07:06)

## 2025-05-31 NOTE — DIETITIAN INITIAL EVALUATION ADULT - ADD RECOMMEND
Rec discontinue renal diet  Rec add Glucerna shake BID to optimize po intake and provide an additional 220 kcal, 10g protein per serving   Provide high protein geletein BID  Encourage po intake, monitor diet tolerance, and provide assistance at meals as needed.   Continue MVI, thiamine, folic acid  Encourage diet compliance

## 2025-05-31 NOTE — DIETITIAN INITIAL EVALUATION ADULT - PERTINENT MEDS FT
MEDICATIONS  (STANDING):  doxycycline IVPB 100 milliGRAM(s) IV Intermittent every 12 hours  folic acid Injectable 1 milliGRAM(s) IV Push every 24 hours  glucagon  Injectable 1 milliGRAM(s) IntraMuscular once  heparin   Injectable 5000 Unit(s) SubCutaneous every 12 hours  insulin glargine Injectable (LANTUS) 15 Unit(s) SubCutaneous at bedtime  insulin lispro (ADMELOG) corrective regimen sliding scale   SubCutaneous three times a day before meals  insulin lispro (ADMELOG) corrective regimen sliding scale   SubCutaneous at bedtime  insulin lispro Injectable (ADMELOG) 5 Unit(s) SubCutaneous three times a day before meals  multivitamin 1 Tablet(s) Oral daily  piperacillin/tazobactam IVPB.. 3.375 Gram(s) IV Intermittent every 8 hours  sodium bicarbonate 650 milliGRAM(s) Oral every 8 hours  thiamine IVPB 500 milliGRAM(s) IV Intermittent every 8 hours    MEDICATIONS  (PRN):  dextrose Oral Gel 15 Gram(s) Oral once PRN Blood Glucose LESS THAN 70 milliGRAM(s)/deciliter  diazepam  Injectable 10 milliGRAM(s) IV Push every 1 hour PRN For CIWA 8-11  diazepam  Injectable 20 milliGRAM(s) IV Push every 1 hour PRN For CIWA >=12

## 2025-05-31 NOTE — PROGRESS NOTE ADULT - ASSESSMENT
35 year old male presented Diabetes with encephalopathy in the setting of dyspnea.  Severe DKA - admitted to MICU; Hyperglycemia resolved AG closed, still with acidosis  Chest CT significant for bibasilar pneumonia    # Uncontrolled Diabetes   # DKA at presentation   # ROBERTO at presentation  HbA1C 12.5   Bg better controlled today   c/w  Glargine 15 U and ADMELOG 5 TID  - Diabetic teaching ; Patient educated about need for bolus and basal Insulins rather then sliding scale premeal; Daily needs averaging around 30U to be spilt into 15U basal and 5U premeal three times a day    # Pneumonia, Bilateral. Mild  Afebrile, WBC resolved   No hypoxia  - Anticipate Short course antibiotics, will continue for total of 5 days   Switch Zosyn to ceftriaxone         VTE prophylaxis  Incentive Spirometry  Mobilize    Disposition- home tomorrow

## 2025-06-01 VITALS
TEMPERATURE: 98 F | HEART RATE: 80 BPM | OXYGEN SATURATION: 97 % | DIASTOLIC BLOOD PRESSURE: 76 MMHG | SYSTOLIC BLOOD PRESSURE: 110 MMHG | RESPIRATION RATE: 18 BRPM

## 2025-06-01 LAB
GLUCOSE BLDC GLUCOMTR-MCNC: 171 MG/DL — HIGH (ref 70–99)
GLUCOSE BLDC GLUCOMTR-MCNC: 330 MG/DL — HIGH (ref 70–99)

## 2025-06-01 RX ORDER — INSULIN LISPRO 100 U/ML
5 INJECTION, SOLUTION INTRAVENOUS; SUBCUTANEOUS
Qty: 1 | Refills: 0
Start: 2025-06-01 | End: 2025-06-30

## 2025-06-01 RX ORDER — DOXYCYCLINE HYCLATE 100 MG
1 TABLET ORAL
Qty: 3 | Refills: 0
Start: 2025-06-01 | End: 2025-06-03

## 2025-06-01 RX ORDER — CEFUROXIME SODIUM 1.5 G
1 VIAL (EA) INJECTION
Qty: 6 | Refills: 0
Start: 2025-06-01 | End: 2025-06-03

## 2025-06-01 RX ORDER — INSULIN ASPART 100 [IU]/ML
0 INJECTION, SOLUTION INTRAVENOUS; SUBCUTANEOUS
Refills: 0 | DISCHARGE

## 2025-06-01 RX ORDER — INSULIN GLARGINE-YFGN 100 [IU]/ML
15 INJECTION, SOLUTION SUBCUTANEOUS
Qty: 1 | Refills: 0
Start: 2025-06-01 | End: 2025-06-30

## 2025-06-01 RX ADMIN — INSULIN LISPRO 4: 100 INJECTION, SOLUTION INTRAVENOUS; SUBCUTANEOUS at 12:51

## 2025-06-01 RX ADMIN — FOLIC ACID 1 MILLIGRAM(S): 1 TABLET ORAL at 12:55

## 2025-06-01 RX ADMIN — Medication 650 MILLIGRAM(S): at 05:37

## 2025-06-01 RX ADMIN — INSULIN LISPRO 1: 100 INJECTION, SOLUTION INTRAVENOUS; SUBCUTANEOUS at 08:13

## 2025-06-01 RX ADMIN — Medication 1 TABLET(S): at 12:53

## 2025-06-01 RX ADMIN — INSULIN LISPRO 5 UNIT(S): 100 INJECTION, SOLUTION INTRAVENOUS; SUBCUTANEOUS at 08:14

## 2025-06-01 RX ADMIN — Medication 105 MILLIGRAM(S): at 05:39

## 2025-06-01 RX ADMIN — Medication 650 MILLIGRAM(S): at 12:55

## 2025-06-01 RX ADMIN — Medication 100 MILLIGRAM(S): at 08:13

## 2025-06-01 RX ADMIN — INSULIN LISPRO 5 UNIT(S): 100 INJECTION, SOLUTION INTRAVENOUS; SUBCUTANEOUS at 12:52

## 2025-06-01 RX ADMIN — HEPARIN SODIUM 5000 UNIT(S): 1000 INJECTION INTRAVENOUS; SUBCUTANEOUS at 05:37

## 2025-06-01 NOTE — DISCHARGE NOTE PROVIDER - CARE PROVIDER_API CALL
Alcides Mary  Internal Medicine  [address] [place] [id number]  Phone: [phone]  Fax: [phone]  Follow Up Time: 1 week

## 2025-06-01 NOTE — DISCHARGE NOTE NURSING/CASE MANAGEMENT/SOCIAL WORK - FINANCIAL ASSISTANCE
[facility] provides services at a reduced cost to those who are determined to be eligible through [facility]’s financial assistance program. Information regarding [facility]’s financial assistance program can be found by going to [email or web address] or by calling [phone].

## 2025-06-01 NOTE — DISCHARGE NOTE NURSING/CASE MANAGEMENT/SOCIAL WORK - NSDCPEFALRISK_GEN_ALL_CORE
For information on Fall & Injury Prevention, visit: [email or web address] OR  [email or web address] OR  https://www.cdc.gov/steadi/patient.html

## 2025-06-01 NOTE — DISCHARGE NOTE NURSING/CASE MANAGEMENT/SOCIAL WORK - PATIENT PORTAL LINK FT
You can access the FollowMyHealth Patient Portal offered by [facility] by registering at the following website: [email or web address]. By joining [facility]’s FollowMyHealth portal, you will also be able to view your health information using other applications (apps) compatible with our system.

## 2025-06-01 NOTE — DISCHARGE NOTE PROVIDER - NSDCCPCAREPLAN_GEN_ALL_CORE_FT
PRINCIPAL DISCHARGE DIAGNOSIS  Diagnosis: DKA, type 1  Assessment and Plan of Treatment: You were treated for Diabetic Ketoacidosis (DKA), a serious complication of diabetes that occurs when your body produces high levels of blood acids (ketones) because it can't produce enough insulin to use sugar for energy. During your hospital stay, you were treated with IV insulin, which helped to lower your blood sugar levels.  Check your blood sugar regularly (typically 4-6 times daily, including before meals and at bedtime).  Seek immediate medical attention if you experience:  - Persistent vomiting or inability to keep fluids down  - Blood sugar levels that stay above 300 mg/dL despite medication  - Confusion or extreme drowsiness  - Difficulty breathing  Follow up with your primary care doctor WITHIN 1 WEEK of being discharged.      SECONDARY DISCHARGE DIAGNOSES  Diagnosis: Pneumonia  Assessment and Plan of Treatment: A CT scan of your chest showed that you have pneumonia, which is an infection in your lungs. This infection causes inflammation and fluid buildup in the lungs, making it harder to breathe. With proper treatment and rest, most people recover completely from pneumonia. During your hospital stay, you were treated with IV antibiotics.   Take your antibiotics exactly as prescribed.  Rest and drink plenty of fluids (water, juice, clear broths) to help loosen mucus.  Call your doctor or go to the hospital if you experience:  - difficulty breathing  - coughing up blood  - high fever  - chest pain  To prevent the spread and future pneumonia, wash your hands frequently. Cover your mouth when coughing or sneezing.u    Diagnosis: Acute metabolic encephalopathy  Assessment and Plan of Treatment: You were treated for acute metabolic encephalopathy, which is a temporary brain condition that occurred because of diabetic ketoacidosis (DKA). When your blood sugar became very high and your body produced ketones, it affected how your brain was working, causing confusion and other symptoms. Now that your DKA has been treated, your brain function should return to normal.  - You may still feel tired or slightly confused for a few days  - Full recovery usually occurs as your blood sugar and electrolytes return to normal levels  - Rest is important during this recovery period

## 2025-06-01 NOTE — DISCHARGE NOTE PROVIDER - NSDCMRMEDTOKEN_GEN_ALL_CORE_FT
insulin aspart 100 units/mL injectable solution: injectable 4 times a day (before meals and at bedtime) sliding scale   Basaglar KwikPen 100 units/mL subcutaneous solution: 15 unit(s) subcutaneous once a day (at bedtime)  cefuroxime 500 mg oral tablet: 1 tab(s) orally 2 times a day  doxycycline hyclate 100 mg oral capsule: 1 cap(s) orally once a day  HumaLOG Luis E KwikPen 100 units/mL injectable solution: 5 unit(s) injectable 3 times a day

## 2025-06-01 NOTE — DISCHARGE NOTE PROVIDER - DETAILS OF MALNUTRITION DIAGNOSIS/DIAGNOSES
This patient has been assessed with a concern for Malnutrition and was treated during this hospitalization for the following Nutrition diagnosis/diagnoses:     -  05/31/2025: Severe protein-calorie malnutrition   -  05/31/2025: Underweight (BMI < 19)

## 2025-06-01 NOTE — DISCHARGE NOTE PROVIDER - HOSPITAL COURSE
36 y/o Male with PMH of IDDM with multiple admissions, NICM with recovered EF, and EtOH use disorder presented to the ED with AMS, found to be Kussmaul breathing and tachycardic. , +leukocytosis 22, hypothermic, pH < 6.9 on VBG. CT head and CXR negative. CT chest notable for bibasilar PNA. Received cefepime and vancomycin in the ED. Started on insulin gtt. Started on zosyn and doxy.  Admitted to MICU for DKA. AG closed, mental status improved. Downgraded to medicine floor for further management. BG better controlled. WBC resolved. Medically stable for d/c home.      #DKA -- resolved  - HbA1c 12.5    #Acute metabolic encephalopathy -- resolved  - 2/2 DKA    #B/L PNA -- improving  - afebrile. WBC resolved  - s/p cefepime + vancomycin in ED  - started on zosyn + doxy during the admission. Switched to CTX  - c/w abx for 5 days total

## 2025-06-01 NOTE — DISCHARGE NOTE PROVIDER - ATTENDING DISCHARGE PHYSICAL EXAMINATION:
VITAL SIGNS:  T 97.5 F  /83  HR 70  RR 18  SpO2 95%      PHYSICAL EXAM:  GENERAL: NAD  HEENT: NCAT. PERRLA. Neck supple, no JVD  NERVOUS SYSTEM:  AAOc3, No gross focal deficits  CHEST/LUNG: CTAB. No rales, rhonchi, wheezing, or rubs  HEART: RRR. No MRG  ABDOMEN: Soft, Nontender, Nondistended; Bowel sounds present  EXTREMITIES:  No clubbing, cyanosis, or edema in B/L UE or LE  SKIN: Warm, dry. No rashes or lesions

## 2025-06-03 LAB
CULTURE RESULTS: SIGNIFICANT CHANGE UP
SPECIMEN SOURCE: SIGNIFICANT CHANGE UP
